# Patient Record
Sex: MALE | Race: ASIAN | NOT HISPANIC OR LATINO | URBAN - METROPOLITAN AREA
[De-identification: names, ages, dates, MRNs, and addresses within clinical notes are randomized per-mention and may not be internally consistent; named-entity substitution may affect disease eponyms.]

---

## 2017-09-03 ENCOUNTER — INPATIENT (INPATIENT)
Facility: HOSPITAL | Age: 77
LOS: 5 days | Discharge: HOME | End: 2017-09-09
Attending: SPECIALIST | Admitting: SPECIALIST
Payer: MEDICAID

## 2017-09-03 VITALS
DIASTOLIC BLOOD PRESSURE: 80 MMHG | TEMPERATURE: 98 F | SYSTOLIC BLOOD PRESSURE: 161 MMHG | OXYGEN SATURATION: 97 % | RESPIRATION RATE: 16 BRPM | HEART RATE: 53 BPM

## 2017-09-03 DIAGNOSIS — R10.9 UNSPECIFIED ABDOMINAL PAIN: ICD-10-CM

## 2017-09-03 DIAGNOSIS — N18.4 CHRONIC KIDNEY DISEASE, STAGE 4 (SEVERE): ICD-10-CM

## 2017-09-03 DIAGNOSIS — E87.5 HYPERKALEMIA: ICD-10-CM

## 2017-09-03 DIAGNOSIS — K80.00 CALCULUS OF GALLBLADDER WITH ACUTE CHOLECYSTITIS WITHOUT OBSTRUCTION: ICD-10-CM

## 2017-09-03 DIAGNOSIS — I10 ESSENTIAL (PRIMARY) HYPERTENSION: ICD-10-CM

## 2017-09-03 LAB
ALBUMIN SERPL ELPH-MCNC: 3.9 G/DL — SIGNIFICANT CHANGE UP (ref 3.3–5)
ALBUMIN SERPL ELPH-MCNC: 4.2 G/DL — SIGNIFICANT CHANGE UP (ref 3.3–5)
ALP SERPL-CCNC: 52 U/L — SIGNIFICANT CHANGE UP (ref 40–120)
ALP SERPL-CCNC: 56 U/L — SIGNIFICANT CHANGE UP (ref 40–120)
ALT FLD-CCNC: 10 U/L — SIGNIFICANT CHANGE UP (ref 4–41)
ALT FLD-CCNC: 14 U/L — SIGNIFICANT CHANGE UP (ref 4–41)
AMYLASE P1 CFR SERPL: 113 U/L — SIGNIFICANT CHANGE UP (ref 25–125)
APTT BLD: 29.2 SEC — SIGNIFICANT CHANGE UP (ref 27.5–37.4)
AST SERPL-CCNC: 19 U/L — SIGNIFICANT CHANGE UP (ref 4–40)
AST SERPL-CCNC: 36 U/L — SIGNIFICANT CHANGE UP (ref 4–40)
BASE EXCESS BLDV CALC-SCNC: -2 MMOL/L — SIGNIFICANT CHANGE UP
BASE EXCESS BLDV CALC-SCNC: -2.4 MMOL/L — SIGNIFICANT CHANGE UP
BASE EXCESS BLDV CALC-SCNC: -2.5 MMOL/L — SIGNIFICANT CHANGE UP
BASOPHILS # BLD AUTO: 0.06 K/UL — SIGNIFICANT CHANGE UP (ref 0–0.2)
BASOPHILS NFR BLD AUTO: 0.6 % — SIGNIFICANT CHANGE UP (ref 0–2)
BILIRUB SERPL-MCNC: 0.4 MG/DL — SIGNIFICANT CHANGE UP (ref 0.2–1.2)
BILIRUB SERPL-MCNC: 0.5 MG/DL — SIGNIFICANT CHANGE UP (ref 0.2–1.2)
BLD GP AB SCN SERPL QL: NEGATIVE — SIGNIFICANT CHANGE UP
BLOOD GAS VENOUS - CREATININE: 2.1 MG/DL — HIGH (ref 0.5–1.3)
BLOOD GAS VENOUS - CREATININE: 2.11 MG/DL — HIGH (ref 0.5–1.3)
BLOOD GAS VENOUS - CREATININE: 2.16 MG/DL — HIGH (ref 0.5–1.3)
BUN SERPL-MCNC: 21 MG/DL — SIGNIFICANT CHANGE UP (ref 7–23)
BUN SERPL-MCNC: 23 MG/DL — SIGNIFICANT CHANGE UP (ref 7–23)
BUN SERPL-MCNC: 23 MG/DL — SIGNIFICANT CHANGE UP (ref 7–23)
CALCIUM SERPL-MCNC: 9.4 MG/DL — SIGNIFICANT CHANGE UP (ref 8.4–10.5)
CALCIUM SERPL-MCNC: 9.5 MG/DL — SIGNIFICANT CHANGE UP (ref 8.4–10.5)
CALCIUM SERPL-MCNC: 9.5 MG/DL — SIGNIFICANT CHANGE UP (ref 8.4–10.5)
CHLORIDE BLDV-SCNC: 95 MMOL/L — LOW (ref 96–108)
CHLORIDE BLDV-SCNC: 98 MMOL/L — SIGNIFICANT CHANGE UP (ref 96–108)
CHLORIDE BLDV-SCNC: 98 MMOL/L — SIGNIFICANT CHANGE UP (ref 96–108)
CHLORIDE SERPL-SCNC: 94 MMOL/L — LOW (ref 98–107)
CHLORIDE SERPL-SCNC: 95 MMOL/L — LOW (ref 98–107)
CHLORIDE SERPL-SCNC: 96 MMOL/L — LOW (ref 98–107)
CK MB BLD-MCNC: 2.13 NG/ML — SIGNIFICANT CHANGE UP (ref 1–6.6)
CK MB BLD-MCNC: 2.62 NG/ML — SIGNIFICANT CHANGE UP (ref 1–6.6)
CK MB BLD-MCNC: SIGNIFICANT CHANGE UP (ref 0–2.5)
CK MB BLD-MCNC: SIGNIFICANT CHANGE UP (ref 0–2.5)
CK SERPL-CCNC: 107 U/L — SIGNIFICANT CHANGE UP (ref 30–200)
CK SERPL-CCNC: 64 U/L — SIGNIFICANT CHANGE UP (ref 30–200)
CO2 SERPL-SCNC: 17 MMOL/L — LOW (ref 22–31)
CO2 SERPL-SCNC: 18 MMOL/L — LOW (ref 22–31)
CO2 SERPL-SCNC: 20 MMOL/L — LOW (ref 22–31)
CREAT SERPL-MCNC: 2.19 MG/DL — HIGH (ref 0.5–1.3)
CREAT SERPL-MCNC: 2.3 MG/DL — HIGH (ref 0.5–1.3)
CREAT SERPL-MCNC: 2.31 MG/DL — HIGH (ref 0.5–1.3)
EOSINOPHIL # BLD AUTO: 0.22 K/UL — SIGNIFICANT CHANGE UP (ref 0–0.5)
EOSINOPHIL NFR BLD AUTO: 2.1 % — SIGNIFICANT CHANGE UP (ref 0–6)
GAS PNL BLDV: 110 MMOL/L — CRITICAL LOW (ref 136–146)
GAS PNL BLDV: 126 MMOL/L — LOW (ref 136–146)
GAS PNL BLDV: 130 MMOL/L — LOW (ref 136–146)
GLUCOSE BLDV-MCNC: 186 — HIGH (ref 70–99)
GLUCOSE BLDV-MCNC: 198 — HIGH (ref 70–99)
GLUCOSE BLDV-MCNC: 204 — HIGH (ref 70–99)
GLUCOSE SERPL-MCNC: 179 MG/DL — HIGH (ref 70–99)
GLUCOSE SERPL-MCNC: 192 MG/DL — HIGH (ref 70–99)
GLUCOSE SERPL-MCNC: 207 MG/DL — HIGH (ref 70–99)
HCO3 BLDV-SCNC: 20 MMOL/L — SIGNIFICANT CHANGE UP (ref 20–27)
HCO3 BLDV-SCNC: 21 MMOL/L — SIGNIFICANT CHANGE UP (ref 20–27)
HCO3 BLDV-SCNC: 21 MMOL/L — SIGNIFICANT CHANGE UP (ref 20–27)
HCT VFR BLD CALC: 45.1 % — SIGNIFICANT CHANGE UP (ref 39–50)
HCT VFR BLDV CALC: 41.4 % — SIGNIFICANT CHANGE UP (ref 39–51)
HCT VFR BLDV CALC: 43 % — SIGNIFICANT CHANGE UP (ref 39–51)
HCT VFR BLDV CALC: 45.3 % — SIGNIFICANT CHANGE UP (ref 39–51)
HGB BLD-MCNC: 14.7 G/DL — SIGNIFICANT CHANGE UP (ref 13–17)
HGB BLDV-MCNC: 13.5 G/DL — SIGNIFICANT CHANGE UP (ref 13–17)
HGB BLDV-MCNC: 14 G/DL — SIGNIFICANT CHANGE UP (ref 13–17)
HGB BLDV-MCNC: 14.8 G/DL — SIGNIFICANT CHANGE UP (ref 13–17)
IMM GRANULOCYTES # BLD AUTO: 0.03 # — SIGNIFICANT CHANGE UP
IMM GRANULOCYTES NFR BLD AUTO: 0.3 % — SIGNIFICANT CHANGE UP (ref 0–1.5)
INR BLD: 0.98 — SIGNIFICANT CHANGE UP (ref 0.88–1.17)
LACTATE BLDV-MCNC: 2.8 MMOL/L — HIGH (ref 0.5–2)
LACTATE BLDV-MCNC: 2.9 MMOL/L — HIGH (ref 0.5–2)
LACTATE BLDV-MCNC: 3.5 MMOL/L — HIGH (ref 0.5–2)
LIDOCAIN IGE QN: 111.2 U/L — HIGH (ref 7–60)
LYMPHOCYTES # BLD AUTO: 1.39 K/UL — SIGNIFICANT CHANGE UP (ref 1–3.3)
LYMPHOCYTES # BLD AUTO: 13.5 % — SIGNIFICANT CHANGE UP (ref 13–44)
MCHC RBC-ENTMCNC: 29.1 PG — SIGNIFICANT CHANGE UP (ref 27–34)
MCHC RBC-ENTMCNC: 32.6 % — SIGNIFICANT CHANGE UP (ref 32–36)
MCV RBC AUTO: 89.3 FL — SIGNIFICANT CHANGE UP (ref 80–100)
MONOCYTES # BLD AUTO: 0.69 K/UL — SIGNIFICANT CHANGE UP (ref 0–0.9)
MONOCYTES NFR BLD AUTO: 6.7 % — SIGNIFICANT CHANGE UP (ref 2–14)
NEUTROPHILS # BLD AUTO: 7.87 K/UL — HIGH (ref 1.8–7.4)
NEUTROPHILS NFR BLD AUTO: 76.8 % — SIGNIFICANT CHANGE UP (ref 43–77)
NRBC # FLD: 0 — SIGNIFICANT CHANGE UP
PCO2 BLDV: 47 MMHG — SIGNIFICANT CHANGE UP (ref 41–51)
PCO2 BLDV: 52 MMHG — HIGH (ref 41–51)
PCO2 BLDV: 59 MMHG — HIGH (ref 41–51)
PH BLDV: 7.24 PH — LOW (ref 7.32–7.43)
PH BLDV: 7.27 PH — LOW (ref 7.32–7.43)
PH BLDV: 7.32 PH — SIGNIFICANT CHANGE UP (ref 7.32–7.43)
PLATELET # BLD AUTO: 186 K/UL — SIGNIFICANT CHANGE UP (ref 150–400)
PMV BLD: 11.1 FL — SIGNIFICANT CHANGE UP (ref 7–13)
PO2 BLDV: 26 MMHG — LOW (ref 35–40)
PO2 BLDV: 27 MMHG — LOW (ref 35–40)
PO2 BLDV: 36 MMHG — SIGNIFICANT CHANGE UP (ref 35–40)
POTASSIUM BLDV-SCNC: 4.8 MMOL/L — HIGH (ref 3.4–4.5)
POTASSIUM BLDV-SCNC: 6.1 MMOL/L — HIGH (ref 3.4–4.5)
POTASSIUM BLDV-SCNC: SIGNIFICANT CHANGE UP MMOL/L (ref 3.4–4.5)
POTASSIUM SERPL-MCNC: 5.3 MMOL/L — SIGNIFICANT CHANGE UP (ref 3.5–5.3)
POTASSIUM SERPL-MCNC: 6 MMOL/L — HIGH (ref 3.5–5.3)
POTASSIUM SERPL-MCNC: SIGNIFICANT CHANGE UP MMOL/L (ref 3.5–5.3)
POTASSIUM SERPL-SCNC: 5.3 MMOL/L — SIGNIFICANT CHANGE UP (ref 3.5–5.3)
POTASSIUM SERPL-SCNC: 6 MMOL/L — HIGH (ref 3.5–5.3)
POTASSIUM SERPL-SCNC: SIGNIFICANT CHANGE UP MMOL/L (ref 3.5–5.3)
PROT SERPL-MCNC: 7.5 G/DL — SIGNIFICANT CHANGE UP (ref 6–8.3)
PROT SERPL-MCNC: 8.3 G/DL — SIGNIFICANT CHANGE UP (ref 6–8.3)
PROTHROM AB SERPL-ACNC: 11 SEC — SIGNIFICANT CHANGE UP (ref 9.8–13.1)
RBC # BLD: 5.05 M/UL — SIGNIFICANT CHANGE UP (ref 4.2–5.8)
RBC # FLD: 12.6 % — SIGNIFICANT CHANGE UP (ref 10.3–14.5)
RH IG SCN BLD-IMP: POSITIVE — SIGNIFICANT CHANGE UP
RH IG SCN BLD-IMP: POSITIVE — SIGNIFICANT CHANGE UP
SAO2 % BLDV: 39.8 % — LOW (ref 60–85)
SAO2 % BLDV: 43.6 % — LOW (ref 60–85)
SAO2 % BLDV: 57.4 % — LOW (ref 60–85)
SODIUM SERPL-SCNC: 127 MMOL/L — LOW (ref 135–145)
SODIUM SERPL-SCNC: 130 MMOL/L — LOW (ref 135–145)
SODIUM SERPL-SCNC: 131 MMOL/L — LOW (ref 135–145)
TROPONIN T SERPL-MCNC: < 0.06 NG/ML — SIGNIFICANT CHANGE UP (ref 0–0.06)
TROPONIN T SERPL-MCNC: < 0.06 NG/ML — SIGNIFICANT CHANGE UP (ref 0–0.06)
TSH SERPL-MCNC: 10.17 UIU/ML — HIGH (ref 0.27–4.2)
WBC # BLD: 10.26 K/UL — SIGNIFICANT CHANGE UP (ref 3.8–10.5)
WBC # FLD AUTO: 10.26 K/UL — SIGNIFICANT CHANGE UP (ref 3.8–10.5)

## 2017-09-03 PROCEDURE — 76705 ECHO EXAM OF ABDOMEN: CPT | Mod: 26

## 2017-09-03 PROCEDURE — 71010: CPT | Mod: 26

## 2017-09-03 RX ORDER — ONDANSETRON 8 MG/1
4 TABLET, FILM COATED ORAL ONCE
Qty: 0 | Refills: 0 | Status: COMPLETED | OUTPATIENT
Start: 2017-09-03 | End: 2017-09-03

## 2017-09-03 RX ORDER — DEXTROSE 50 % IN WATER 50 %
25 SYRINGE (ML) INTRAVENOUS ONCE
Qty: 0 | Refills: 0 | Status: DISCONTINUED | OUTPATIENT
Start: 2017-09-03 | End: 2017-09-04

## 2017-09-03 RX ORDER — DEXTROSE 50 % IN WATER 50 %
12.5 SYRINGE (ML) INTRAVENOUS ONCE
Qty: 0 | Refills: 0 | Status: DISCONTINUED | OUTPATIENT
Start: 2017-09-03 | End: 2017-09-04

## 2017-09-03 RX ORDER — HEPARIN SODIUM 5000 [USP'U]/ML
5000 INJECTION INTRAVENOUS; SUBCUTANEOUS EVERY 8 HOURS
Qty: 0 | Refills: 0 | Status: DISCONTINUED | OUTPATIENT
Start: 2017-09-03 | End: 2017-09-04

## 2017-09-03 RX ORDER — MORPHINE SULFATE 50 MG/1
4 CAPSULE, EXTENDED RELEASE ORAL ONCE
Qty: 0 | Refills: 0 | Status: DISCONTINUED | OUTPATIENT
Start: 2017-09-03 | End: 2017-09-03

## 2017-09-03 RX ORDER — SODIUM CHLORIDE 9 MG/ML
1000 INJECTION, SOLUTION INTRAVENOUS
Qty: 0 | Refills: 0 | Status: DISCONTINUED | OUTPATIENT
Start: 2017-09-03 | End: 2017-09-04

## 2017-09-03 RX ORDER — SODIUM CHLORIDE 9 MG/ML
1000 INJECTION INTRAMUSCULAR; INTRAVENOUS; SUBCUTANEOUS
Qty: 0 | Refills: 0 | Status: DISCONTINUED | OUTPATIENT
Start: 2017-09-03 | End: 2017-09-03

## 2017-09-03 RX ORDER — SODIUM CHLORIDE 9 MG/ML
1000 INJECTION INTRAMUSCULAR; INTRAVENOUS; SUBCUTANEOUS
Qty: 0 | Refills: 0 | Status: DISCONTINUED | OUTPATIENT
Start: 2017-09-03 | End: 2017-09-04

## 2017-09-03 RX ORDER — GLUCAGON INJECTION, SOLUTION 0.5 MG/.1ML
1 INJECTION, SOLUTION SUBCUTANEOUS ONCE
Qty: 0 | Refills: 0 | Status: DISCONTINUED | OUTPATIENT
Start: 2017-09-03 | End: 2017-09-04

## 2017-09-03 RX ORDER — SODIUM CHLORIDE 9 MG/ML
500 INJECTION INTRAMUSCULAR; INTRAVENOUS; SUBCUTANEOUS ONCE
Qty: 0 | Refills: 0 | Status: COMPLETED | OUTPATIENT
Start: 2017-09-03 | End: 2017-09-03

## 2017-09-03 RX ORDER — ATENOLOL 25 MG/1
25 TABLET ORAL DAILY
Qty: 0 | Refills: 0 | Status: DISCONTINUED | OUTPATIENT
Start: 2017-09-03 | End: 2017-09-04

## 2017-09-03 RX ORDER — FUROSEMIDE 40 MG
20 TABLET ORAL ONCE
Qty: 0 | Refills: 0 | Status: DISCONTINUED | OUTPATIENT
Start: 2017-09-03 | End: 2017-09-03

## 2017-09-03 RX ORDER — ATENOLOL 25 MG/1
25 TABLET ORAL ONCE
Qty: 0 | Refills: 0 | Status: DISCONTINUED | OUTPATIENT
Start: 2017-09-03 | End: 2017-09-03

## 2017-09-03 RX ORDER — LEVOTHYROXINE SODIUM 125 MCG
1 TABLET ORAL
Qty: 0 | Refills: 0 | COMMUNITY

## 2017-09-03 RX ORDER — LEVOTHYROXINE SODIUM 125 MCG
75 TABLET ORAL DAILY
Qty: 0 | Refills: 0 | Status: DISCONTINUED | OUTPATIENT
Start: 2017-09-03 | End: 2017-09-04

## 2017-09-03 RX ORDER — DEXTROSE 50 % IN WATER 50 %
1 SYRINGE (ML) INTRAVENOUS ONCE
Qty: 0 | Refills: 0 | Status: DISCONTINUED | OUTPATIENT
Start: 2017-09-03 | End: 2017-09-04

## 2017-09-03 RX ORDER — INSULIN LISPRO 100/ML
VIAL (ML) SUBCUTANEOUS EVERY 6 HOURS
Qty: 0 | Refills: 0 | Status: DISCONTINUED | OUTPATIENT
Start: 2017-09-03 | End: 2017-09-04

## 2017-09-03 RX ADMIN — MORPHINE SULFATE 4 MILLIGRAM(S): 50 CAPSULE, EXTENDED RELEASE ORAL at 16:00

## 2017-09-03 RX ADMIN — ONDANSETRON 4 MILLIGRAM(S): 8 TABLET, FILM COATED ORAL at 10:16

## 2017-09-03 RX ADMIN — SODIUM CHLORIDE 100 MILLILITER(S): 9 INJECTION INTRAMUSCULAR; INTRAVENOUS; SUBCUTANEOUS at 18:32

## 2017-09-03 RX ADMIN — MORPHINE SULFATE 4 MILLIGRAM(S): 50 CAPSULE, EXTENDED RELEASE ORAL at 10:16

## 2017-09-03 RX ADMIN — MORPHINE SULFATE 4 MILLIGRAM(S): 50 CAPSULE, EXTENDED RELEASE ORAL at 11:33

## 2017-09-03 RX ADMIN — ONDANSETRON 4 MILLIGRAM(S): 8 TABLET, FILM COATED ORAL at 15:44

## 2017-09-03 RX ADMIN — SODIUM CHLORIDE 500 MILLILITER(S): 9 INJECTION INTRAMUSCULAR; INTRAVENOUS; SUBCUTANEOUS at 10:48

## 2017-09-03 RX ADMIN — SODIUM CHLORIDE 75 MILLILITER(S): 9 INJECTION INTRAMUSCULAR; INTRAVENOUS; SUBCUTANEOUS at 15:35

## 2017-09-03 RX ADMIN — MORPHINE SULFATE 4 MILLIGRAM(S): 50 CAPSULE, EXTENDED RELEASE ORAL at 15:44

## 2017-09-03 RX ADMIN — HEPARIN SODIUM 5000 UNIT(S): 5000 INJECTION INTRAVENOUS; SUBCUTANEOUS at 22:44

## 2017-09-03 NOTE — ED ADULT NURSE NOTE - CHIEF COMPLAINT QUOTE
C/o continued upper abdominal pain since yesterday. As per daughter, pt c/o left sided chest pain yesterday. Denies chest pain at this moment/melena/hematochezia. PMH 2 cardiac stents, [place], gastric ulcer.

## 2017-09-03 NOTE — ED PROVIDER NOTE - PROGRESS NOTE DETAILS
ALEXANDRA note: 76y/o M with PMH HTN, DM, HLD, GERD , CAD s/p stent x 2 p/w RUQ pain since 4am. Pain RUQ w/ radiation to epigastric area 8/10 sharp , worse with palpation. Denies fever, chills, chest pain, SOB, N/V, diarrhea. Last BM yesterday nml. Tender to palpation RUQ + rand. Took asa 81x 2 and Tylenol x 2 . CBC, cmp, vbg, lipase, ekg, PT/INR,trop, ckmb, cxr, US RUQ likely biliary colic vs cholecystitis POCUS RUQ: some cholecystic sludge, no edema, no wall thickening. With stone in CBD. -Sudha PGY1

## 2017-09-03 NOTE — H&P ADULT - NSHPPHYSICALEXAM_GEN_ALL_CORE
T(C): 36.7 (09-03-17 @ 18:00), Max: 36.7 (09-03-17 @ 18:00)  HR: 54 (09-03-17 @ 18:00) (42 - 54)  BP: 139/82 (09-03-17 @ 18:00) (121/56 - 194/83)  RR: 16 (09-03-17 @ 18:00) (16 - 20)  SpO2: 96% (09-03-17 @ 18:00) (96% - 100%)      General: NAD, Lying in bed comfortably  Neuro: A+Ox3, no focal deficits  HEENT: NC/AT, EOMI  Cardio: RRR, nml S1/S2  Resp: Good effort, CTA b/l  GI/Abd: Soft, mildly distended, tender in RUQ with focal guarding, negative Ugarte's sign  Vascular: All 4 extremities warm  Musculoskeletal: All 4 extremities moving spontaneously, no limitations.

## 2017-09-03 NOTE — H&P ADULT - NSHPLABSRESULTS_GEN_ALL_CORE
CBC (09-03 @ 09:20)                              14.7                           10.26   )----------------(  186        76.8  % Neutrophils, 13.5  % Lymphocytes, ANC: 7.87<H>                              45.1      BMP (09-03 @ 14:25)             131<L>  |  95<L>   |  21    		Ca++ --      Ca 9.4                ---------------------------------( 179<H>		Mg --                 5.3     |  20<L>   |  2.19<H>			Ph --      BMP (09-03 @ 12:10)             127<L>  |  94<L>   |  23    		Ca++ --      Ca 9.5                ---------------------------------( 207<H>		Mg --                 --      |  18<L>   |  2.31<H>			Ph --        LFTs (09-03 @ 14:25)      TPro 7.5 / Alb 3.9 / TBili 0.5 / DBili -- / AST 19 / ALT 10 / AlkPhos 52  LFTs (09-03 @ 09:20)      TPro 8.3 / Alb 4.2 / TBili 0.4 / DBili -- / AST 36 / ALT 14 / AlkPhos 56    Coags (09-03 @ 09:20)  aPTT 29.2 / INR 0.98 / PT 11.0    Cardiac Markers (09-03 @ 14:25)     Trop: < 0.06 -- / CKMB: 2.13 / CK: 64  Cardiac Markers (09-03 @ 09:20)     Trop: < 0.06 -- / CKMB: 2.62 / CK: 107      VBG (09-03 @ 14:25)     7.32 / 47 / 26<L> / 21 / -2.0 / 39.8<L>%     Lactate: 2.8<H>  VBG (09-03 @ 12:10)     7.27<L> / 52<H> / 36 / 21 / -2.5 / 57.4<L>%     Lactate: 3.5<H>    ---------------------------------------------------------------------------------------------     IMAGING:   < from: US Abdomen Limited (09.03.17 @ 14:12) >  Gallbladder: There is a nonmobile stone measuring 0.8 x 0.6 cm within the   neck of the gallbladder.   < end of copied text >

## 2017-09-03 NOTE — ED ADULT NURSE NOTE - OBJECTIVE STATEMENT
pt is a 77 yr old male c/o abd pain RUQ since 3am, radiating to right arm and chest at times. pain 5/10 in RUQ only at this time. pt denies N/V/D. pt states feeling weak and "not well". pt denies dizziness, ha, fever, chills. PIV placed, labs sent, md lopes in progress. us done by md at bedside with gall stones noted. pt placed on cardiac monitor, noted SB 50s. will ctm

## 2017-09-03 NOTE — H&P ADULT - PMH
Diabetes    Essential hypertension    Hyperlipidemia    Peptic ulcer  s/p treatment for H pylori  Stented coronary artery

## 2017-09-03 NOTE — H&P ADULT - ASSESSMENT
ASSESSMENT: Patient is a 77y old m with symptomatic cholelithiasis    PLAN:   - Admit to Dr. Davis, general surgery  - NPO  - IV fluid hydration  - Pain control as needed  - No need for antibiotics  - Added on to OR schedule for today for laparoscopic cholecystectomy with IOC.  - Patient consented for above procedure  - Patient and plan discussed with Attending, Dr. Ryan Bloom MD PGY3  A Team Surgery, #[id number]

## 2017-09-03 NOTE — PATIENT PROFILE ADULT. - LANGUAGE ASSISTANCE NEEDED
patient able to make needs known in english/No-Patient/Caregiver offered and refused free interpretation services.

## 2017-09-03 NOTE — ED PROVIDER NOTE - ATTENDING CONTRIBUTION TO CARE
Case of a 76 y/o male patient with past medical history HTN, DM, s/p 2 stents, HLD, stomach ulcer presenting to the ED with abdominal pain. Patient's daughter states that at the beginning he did complain of right arm that radiated to her abdomen. Physical exam with RUQ tenderness, will do blood test, cardiac workup, ultrasound and pain meds. Will monitor closely. Agree with resident's physical exam, assessment and documentation

## 2017-09-03 NOTE — PATIENT PROFILE ADULT. - VISION (WITH CORRECTIVE LENSES IF THE PATIENT USUALLY WEARS THEM):
Partially impaired: cannot see medication labels or newsprint, but can see obstacles in path, and the surrounding layout; can count fingers at arm's length/wears glasses fro reading

## 2017-09-03 NOTE — H&P ADULT - HISTORY OF PRESENT ILLNESS
Patient is a 77y old  Male who presents with a chief complaint of abdominal pain.  Patient was in his usual state of health yesterday evening, but woke up early this morning around 3:00 am with right sided chest pain.  The pain then moved towards the center of his chest and then down towards his epigastric area, finally localizing to his right upper quadrant.  The location of the pain moved over the course of a few hours this morning.  His right upper quadrant pain persisted through this morning and he came to the ED for further evaluation.  He denies nausea or emesis, although he did not have an appetite this morning.  He denies fevers/chills, denies lightheadedness/dizziness, denies SOB, denies constipation/diarrhea, with his last BM yesterday evening and it was normal.      Patient's past medical history is significant for CAD with 4 stents, on aspirin and Effient.  He last took Effient two days ago as he ran out of his pills and has yet to obtain his refill.  He has known chronic ECG abnormalities but patient and daughter are unsure of the exact abnormalities.  He also has CKD with hyponatremia (usual sodium 130-135) and a baseline creatine of between 2-3, usually around 2.5.  Patient had a previous history of H. pylori - related PUD, and was treated with triple therapy.  He had a recent EGD earlier this year which showed resolution of his PUD.  Patient has a known history of cholelithiasis but had been asymptomatic prior to today.      Patient's daughter is an oncologist who previously worked at [facility], Dr. Arian Browne, who assisted with providing significant collateral information.      Patient speaks moderate level English and refused  services, insisted on discussion directly in English with help of his son as  as needed.      --------------------------------------------------------------------------------------------

## 2017-09-03 NOTE — ED PROVIDER NOTE - OBJECTIVE STATEMENT
78 y/o M PMH HTN, DM, s/p 2 stents, HLD, stomach ulcer p/w abdominal pain. Per daughter, pt woke up at 3am complaining of L chest/shoulder pain. She gave him 2 Tylenol and 2 [id number]. Pain subsided in the chest, but began in the RUQ radiating to the rest of his abdomen later. He denies n/v/d, fevers, chills. Had a bowel movement this morning, no blood. Pain does not radiate to the back. Denies palpitations. No hx of kidney stones, renal colic, gall stones. Patient ate rice and peas for dinner yesterday. No other complaints.

## 2017-09-03 NOTE — ED PROVIDER NOTE - MEDICAL DECISION MAKING DETAILS
77M pmh stents, hld, dm, htn p/w R abdominal pain. POCUS with choledocolithiasis. Will get official sono. Pt is bradycardic with abnml EKG. Will order troponin, ckmb. Order lipase to r/o pancreatitis. Pain control. Reassess.

## 2017-09-03 NOTE — ED ADULT TRIAGE NOTE - CHIEF COMPLAINT QUOTE
C/o continued upper abdominal pain since yesterday. As per daughter, pt c/o left sided chest pain yesterday. Denies chest pain at this moment/melena/hematochezia. PMH 2 cardiac stents, [place], gastric ulcer.

## 2017-09-03 NOTE — H&P ADULT - ATTENDING COMMENTS
Above noted. I spoke with Dr. Browne (daughter). She will get in touch with Dr. Savage (Cardiology) to obtain recent information regarding the patient's cardiac status.  Plan: NPO IV antibiotic in the interim. Discussed with the patient and his son and daughter.

## 2017-09-04 ENCOUNTER — RESULT REVIEW (OUTPATIENT)
Age: 77
End: 2017-09-04

## 2017-09-04 ENCOUNTER — TRANSCRIPTION ENCOUNTER (OUTPATIENT)
Age: 77
End: 2017-09-04

## 2017-09-04 DIAGNOSIS — E83.42 HYPOMAGNESEMIA: ICD-10-CM

## 2017-09-04 LAB
ALBUMIN SERPL ELPH-MCNC: 3.7 G/DL — SIGNIFICANT CHANGE UP (ref 3.3–5)
ALP SERPL-CCNC: 45 U/L — SIGNIFICANT CHANGE UP (ref 40–120)
ALT FLD-CCNC: 9 U/L — SIGNIFICANT CHANGE UP (ref 4–41)
AST SERPL-CCNC: 15 U/L — SIGNIFICANT CHANGE UP (ref 4–40)
BILIRUB DIRECT SERPL-MCNC: 0.2 MG/DL — SIGNIFICANT CHANGE UP (ref 0.1–0.2)
BILIRUB SERPL-MCNC: 0.8 MG/DL — SIGNIFICANT CHANGE UP (ref 0.2–1.2)
BUN SERPL-MCNC: 20 MG/DL — SIGNIFICANT CHANGE UP (ref 7–23)
CALCIUM SERPL-MCNC: 9.1 MG/DL — SIGNIFICANT CHANGE UP (ref 8.4–10.5)
CHLORIDE SERPL-SCNC: 96 MMOL/L — LOW (ref 98–107)
CO2 SERPL-SCNC: 21 MMOL/L — LOW (ref 22–31)
CREAT SERPL-MCNC: 2.1 MG/DL — HIGH (ref 0.5–1.3)
GLUCOSE SERPL-MCNC: 110 MG/DL — HIGH (ref 70–99)
HBA1C BLD-MCNC: 7 % — HIGH (ref 4–5.6)
HCT VFR BLD CALC: 41.7 % — SIGNIFICANT CHANGE UP (ref 39–50)
HGB BLD-MCNC: 13.3 G/DL — SIGNIFICANT CHANGE UP (ref 13–17)
MAGNESIUM SERPL-MCNC: 1.5 MG/DL — LOW (ref 1.6–2.6)
MCHC RBC-ENTMCNC: 27.9 PG — SIGNIFICANT CHANGE UP (ref 27–34)
MCHC RBC-ENTMCNC: 31.9 % — LOW (ref 32–36)
MCV RBC AUTO: 87.6 FL — SIGNIFICANT CHANGE UP (ref 80–100)
NRBC # FLD: 0 — SIGNIFICANT CHANGE UP
PHOSPHATE SERPL-MCNC: 2.8 MG/DL — SIGNIFICANT CHANGE UP (ref 2.5–4.5)
PLATELET # BLD AUTO: 185 K/UL — SIGNIFICANT CHANGE UP (ref 150–400)
PMV BLD: 11.7 FL — SIGNIFICANT CHANGE UP (ref 7–13)
POTASSIUM SERPL-MCNC: 4.5 MMOL/L — SIGNIFICANT CHANGE UP (ref 3.5–5.3)
POTASSIUM SERPL-SCNC: 4.5 MMOL/L — SIGNIFICANT CHANGE UP (ref 3.5–5.3)
PROT SERPL-MCNC: 7.1 G/DL — SIGNIFICANT CHANGE UP (ref 6–8.3)
RBC # BLD: 4.76 M/UL — SIGNIFICANT CHANGE UP (ref 4.2–5.8)
RBC # FLD: 12.5 % — SIGNIFICANT CHANGE UP (ref 10.3–14.5)
SODIUM SERPL-SCNC: 133 MMOL/L — LOW (ref 135–145)
WBC # BLD: 13.96 K/UL — HIGH (ref 3.8–10.5)
WBC # FLD AUTO: 13.96 K/UL — HIGH (ref 3.8–10.5)

## 2017-09-04 PROCEDURE — 74000: CPT | Mod: 26

## 2017-09-04 PROCEDURE — 88304 TISSUE EXAM BY PATHOLOGIST: CPT | Mod: 26

## 2017-09-04 RX ORDER — MAGNESIUM SULFATE 500 MG/ML
1 VIAL (ML) INJECTION ONCE
Qty: 0 | Refills: 0 | Status: COMPLETED | OUTPATIENT
Start: 2017-09-04 | End: 2017-09-04

## 2017-09-04 RX ORDER — HYDROMORPHONE HYDROCHLORIDE 2 MG/ML
0.5 INJECTION INTRAMUSCULAR; INTRAVENOUS; SUBCUTANEOUS ONCE
Qty: 0 | Refills: 0 | Status: DISCONTINUED | OUTPATIENT
Start: 2017-09-04 | End: 2017-09-04

## 2017-09-04 RX ORDER — HEPARIN SODIUM 5000 [USP'U]/ML
5000 INJECTION INTRAVENOUS; SUBCUTANEOUS EVERY 8 HOURS
Qty: 0 | Refills: 0 | Status: DISCONTINUED | OUTPATIENT
Start: 2017-09-04 | End: 2017-09-09

## 2017-09-04 RX ORDER — PIPERACILLIN AND TAZOBACTAM 4; .5 G/20ML; G/20ML
3.38 INJECTION, POWDER, LYOPHILIZED, FOR SOLUTION INTRAVENOUS EVERY 8 HOURS
Qty: 0 | Refills: 0 | Status: DISCONTINUED | OUTPATIENT
Start: 2017-09-04 | End: 2017-09-09

## 2017-09-04 RX ORDER — SODIUM CHLORIDE 9 MG/ML
1000 INJECTION INTRAMUSCULAR; INTRAVENOUS; SUBCUTANEOUS
Qty: 0 | Refills: 0 | Status: DISCONTINUED | OUTPATIENT
Start: 2017-09-04 | End: 2017-09-06

## 2017-09-04 RX ORDER — OXYCODONE HYDROCHLORIDE 5 MG/1
10 TABLET ORAL EVERY 6 HOURS
Qty: 0 | Refills: 0 | Status: DISCONTINUED | OUTPATIENT
Start: 2017-09-04 | End: 2017-09-09

## 2017-09-04 RX ORDER — OXYCODONE HYDROCHLORIDE 5 MG/1
5 TABLET ORAL EVERY 6 HOURS
Qty: 0 | Refills: 0 | Status: DISCONTINUED | OUTPATIENT
Start: 2017-09-04 | End: 2017-09-09

## 2017-09-04 RX ORDER — ACETAMINOPHEN 500 MG
650 TABLET ORAL EVERY 6 HOURS
Qty: 0 | Refills: 0 | Status: DISCONTINUED | OUTPATIENT
Start: 2017-09-04 | End: 2017-09-09

## 2017-09-04 RX ORDER — MAGNESIUM SULFATE 500 MG/ML
1 VIAL (ML) INJECTION ONCE
Qty: 0 | Refills: 0 | Status: DISCONTINUED | OUTPATIENT
Start: 2017-09-04 | End: 2017-09-04

## 2017-09-04 RX ORDER — ACETAMINOPHEN 500 MG
1000 TABLET ORAL ONCE
Qty: 0 | Refills: 0 | Status: COMPLETED | OUTPATIENT
Start: 2017-09-04 | End: 2017-09-04

## 2017-09-04 RX ORDER — FENTANYL CITRATE 50 UG/ML
25 INJECTION INTRAVENOUS
Qty: 0 | Refills: 0 | Status: DISCONTINUED | OUTPATIENT
Start: 2017-09-04 | End: 2017-09-04

## 2017-09-04 RX ORDER — ATENOLOL 25 MG/1
25 TABLET ORAL DAILY
Qty: 0 | Refills: 0 | Status: DISCONTINUED | OUTPATIENT
Start: 2017-09-04 | End: 2017-09-09

## 2017-09-04 RX ORDER — INSULIN LISPRO 100/ML
VIAL (ML) SUBCUTANEOUS EVERY 6 HOURS
Qty: 0 | Refills: 0 | Status: DISCONTINUED | OUTPATIENT
Start: 2017-09-04 | End: 2017-09-05

## 2017-09-04 RX ORDER — LEVOTHYROXINE SODIUM 125 MCG
75 TABLET ORAL DAILY
Qty: 0 | Refills: 0 | Status: DISCONTINUED | OUTPATIENT
Start: 2017-09-04 | End: 2017-09-07

## 2017-09-04 RX ADMIN — Medication 800 MILLIGRAM(S): at 22:50

## 2017-09-04 RX ADMIN — Medication 1000 MILLIGRAM(S): at 23:30

## 2017-09-04 RX ADMIN — Medication 800 MILLIGRAM(S): at 16:37

## 2017-09-04 RX ADMIN — FENTANYL CITRATE 25 MICROGRAM(S): 50 INJECTION INTRAVENOUS at 22:00

## 2017-09-04 RX ADMIN — SODIUM CHLORIDE 100 MILLILITER(S): 9 INJECTION INTRAMUSCULAR; INTRAVENOUS; SUBCUTANEOUS at 21:00

## 2017-09-04 RX ADMIN — SODIUM CHLORIDE 100 MILLILITER(S): 9 INJECTION INTRAMUSCULAR; INTRAVENOUS; SUBCUTANEOUS at 21:20

## 2017-09-04 RX ADMIN — HEPARIN SODIUM 5000 UNIT(S): 5000 INJECTION INTRAVENOUS; SUBCUTANEOUS at 06:15

## 2017-09-04 RX ADMIN — Medication 100 GRAM(S): at 11:05

## 2017-09-04 RX ADMIN — FENTANYL CITRATE 25 MICROGRAM(S): 50 INJECTION INTRAVENOUS at 21:40

## 2017-09-04 RX ADMIN — HYDROMORPHONE HYDROCHLORIDE 0.5 MILLIGRAM(S): 2 INJECTION INTRAMUSCULAR; INTRAVENOUS; SUBCUTANEOUS at 22:50

## 2017-09-04 RX ADMIN — HYDROMORPHONE HYDROCHLORIDE 0.5 MILLIGRAM(S): 2 INJECTION INTRAMUSCULAR; INTRAVENOUS; SUBCUTANEOUS at 22:35

## 2017-09-04 RX ADMIN — FENTANYL CITRATE 25 MICROGRAM(S): 50 INJECTION INTRAVENOUS at 21:25

## 2017-09-04 RX ADMIN — FENTANYL CITRATE 25 MICROGRAM(S): 50 INJECTION INTRAVENOUS at 22:15

## 2017-09-04 RX ADMIN — Medication 75 MICROGRAM(S): at 06:15

## 2017-09-04 RX ADMIN — HEPARIN SODIUM 5000 UNIT(S): 5000 INJECTION INTRAVENOUS; SUBCUTANEOUS at 14:48

## 2017-09-04 RX ADMIN — HEPARIN SODIUM 5000 UNIT(S): 5000 INJECTION INTRAVENOUS; SUBCUTANEOUS at 21:20

## 2017-09-04 RX ADMIN — ATENOLOL 25 MILLIGRAM(S): 25 TABLET ORAL at 06:15

## 2017-09-04 RX ADMIN — SODIUM CHLORIDE 100 MILLILITER(S): 9 INJECTION INTRAMUSCULAR; INTRAVENOUS; SUBCUTANEOUS at 11:58

## 2017-09-04 NOTE — CONSULT NOTE ADULT - SUBJECTIVE AND OBJECTIVE BOX
Patient is a 77y old  Male who presents with a chief complaint of Abdominal pain (03 Sep 2017 18:09)    :    History of Present Illness: 	  Patient is a 77y old  Male who presents with a chief complaint of abdominal pain.  Patient was in his usual state of health yesterday evening, but woke up early this morning around 3:00 am with right sided chest pain.  The pain then moved towards the center of his chest and then down towards his epigastric area, finally localizing to his right upper quadrant.  The location of the pain moved over the course of a few hours this morning.  His right upper quadrant pain persisted through this morning and he came to the ED for further evaluation.  He denies nausea or emesis, although he did not have an appetite this morning.  He denies fevers/chills, denies lightheadedness/dizziness, denies SOB, denies constipation/diarrhea, with his last BM yesterday evening and it was normal.      Patient's past medical history is significant for CAD with 4 stents, on aspirin and Effient. Last PCI was January in [facility] ([facility]) .   He last took Effient two days ago as he ran out of his pills and has yet to obtain his refill.    He also has CKD with hyponatremia (usual sodium 130-135) and a baseline creatine of between 2-3, usually around 2.5.  Patient had a previous history of H. pylori - related PUD, and was treated with triple therapy.  He had a recent EGD earlier this year (January)  which showed resolution of his PUD.  Patient has a known history of cholelithiasis but had been asymptomatic prior to today.    Patient denies shortness of breath.  Able to exercise/walk miles without symptoms.    Patient's daughter is an oncologist who previously worked at [facility], Dr. Arian Browne, who assisted with providing significant collateral information.     MEDICATIONS  (STANDING):  heparin  Injectable 5000 Unit(s) SubCutaneous every 8 hours  sodium chloride 0.9%. 1000 milliLiter(s) (100 mL/Hr) IV Continuous <Continuous>  ATENolol  Tablet 25 milliGRAM(s) Oral daily  levothyroxine 75 MICROGram(s) Oral daily  insulin lispro (HumaLOG) corrective regimen sliding scale   SubCutaneous every 6 hours  magnesium sulfate  IVPB 1 Gram(s) IV Intermittent once  (Effient and aspirin on hold).        ROS- hypothroid            Allergies    No Known Allergies    Intolerances            PHYSICAL EXAM:  Vital Signs Last 24 Hrs  T(C): 37.6 (04 Sep 2017 06:11), Max: 37.6 (04 Sep 2017 06:11)  T(F): 99.7 (04 Sep 2017 06:11), Max: 99.7 (04 Sep 2017 06:11)  HR: 72 (04 Sep 2017 10:52) (52 - 80)  BP: 119/62 (04 Sep 2017 10:52) (114/65 - 151/83)  BP(mean): --  RR: 18 (04 Sep 2017 10:52) (16 - 18)  SpO2: 94% (04 Sep 2017 10:52) (94% - 100%)    GENERAL: NAD, well-groomed, well-developed  HEAD:  Atraumatic, Normocephalic  EYES: EOMI, PERRLA, conjunctiva and sclera clear  NECK: Supple, No JVD, Normal thyroid  NERVOUS SYSTEM:  Alert & Oriented X3, Good concentration;  and symmetric  CHEST/LUNG: Clear to auscultation bilaterally; No rales, rhonchi, wheezing, or rubs  HEART: S1S2 regular, without murmur, rub nor gallop  ABDOMEN: Soft, moderately tender RUQ Bowel sounds present  EXTREMITIES:  2+ Peripheral Pulses, No clubbing, cyanosis, or edema      LABS:                        13.3   13.96 )-----------( 185      ( 04 Sep 2017 06:08 )             41.7     04 Sep 2017 06:08    133    |  96     |  20     ----------------------------<  110    4.5     |  21     |  2.10     Ca    9.1        04 Sep 2017 06:08  Phos  2.8       04 Sep 2017 06:08  Mg     1.5       04 Sep 2017 06:08    TPro  7.1    /  Alb  3.7    /  TBili  0.8    /  DBili  0.2    /  AST  15     /  ALT  9      /  AlkPhos  45     04 Sep 2017 06:08    PT/INR - ( 03 Sep 2017 09:20 )   PT: 11.0 SEC;   INR: 0.98     HbA1C 7  TSH 10           PTT - ( 03 Sep 2017 09:20 )  PTT:29.2 SEC  CAPILLARY BLOOD GLUCOSE  107 (04 Sep 2017 12:25)  94 (04 Sep 2017 09:11)  109 (04 Sep 2017 06:08)  87 (03 Sep 2017 23:21)      ECG SB, T inverted lead II, mark-laterally    < from: US Abdomen Limited (09.03.17 @ 14:12) >  Nonmobile gallstone within the neck of the gallbladder. Recommend nuclear   medicine hepatobiliary scan for further evaluation if there is concern   for acute cholecystitis.    < end of copied text >      assessment:  78 yo male with significant history of CAD, presents with cholycystitis      Plan:   Pt's ECG essentially unchanged from that was described in report from [facility], shown to me by his daughter, Dr. Browne.  Would therefore allow patient to go to OR today, check post op ECG, restart dual antiplatelet meds postoperative.     Care Discussed with Consultants/Other Providers: Dr. Browne

## 2017-09-04 NOTE — PROGRESS NOTE ADULT - SUBJECTIVE AND OBJECTIVE BOX
Surgery A Team Progress Note    HD# 2: with cholelithiasis    SUBJECTIVE:   patient was seen and examined this morning. There was no acute event overnight. He was resting in bed comfortably. He does not report pain, fever, n/v, chest pain, or shortness of breath.     OBJECTIVE:   T(C): 37.6 (09-04-17 @ 06:11), Max: 37.6 (09-04-17 @ 06:11)  HR: 72 (09-04-17 @ 10:52) (52 - 80)  BP: 119/62 (09-04-17 @ 10:52) (114/65 - 151/83)  RR: 18 (09-04-17 @ 10:52) (16 - 18)  SpO2: 94% (09-04-17 @ 10:52) (94% - 100%)  Wt(kg): --  CAPILLARY BLOOD GLUCOSE  94 (04 Sep 2017 09:11)  109 (04 Sep 2017 06:08)  87 (03 Sep 2017 23:21)        I&O's Detail    03 Sep 2017 07:01  -  04 Sep 2017 07:00  --------------------------------------------------------  IN:    Oral Fluid: 50 mL    sodium chloride 0.9%.: 1300 mL  Total IN: 1350 mL    OUT:    Voided: 800 mL  Total OUT: 800 mL    Total NET: 550 mL          PHYSICAL EXAM:  Gen: Well-nourished, well-developed, A&O x3, resting in bed in no acute distress  CV: RRR  Resp: Patent airways, unlabored   Abdomen: Soft, mild RUQ pain/tenderness, nondistended  Extremities: All 4 extremities warm and well perfused, no edema

## 2017-09-04 NOTE — PROGRESS NOTE ADULT - SUBJECTIVE AND OBJECTIVE BOX
Patient seen and examined at bedside. No chest pain/sob    VITALS:  T(F): 99.7 (09-04-17 @ 06:11), Max: 99.7 (09-04-17 @ 06:11)  HR: 72 (09-04-17 @ 10:52)  BP: 119/62 (09-04-17 @ 10:52)  RR: 18 (09-04-17 @ 10:52)  SpO2: 94% (09-04-17 @ 10:52)  Wt(kg): --    09-03 @ 07:01  -  09-04 @ 07:00  --------------------------------------------------------  IN: 1350 mL / OUT: 800 mL / NET: 550 mL      Height (cm): 157.48 (09-03 @ 18:00)  Weight (kg): 65.8 (09-03 @ 18:00)  BMI (kg/m2): 26.5 (09-03 @ 18:00)  BSA (m2): 1.67 (09-03 @ 18:00)    PHYSICAL EXAM:  Constitutional: NAD  Neck: No JVD  Respiratory: CTAB, no wheezes, rales or rhonchi  Cardiovascular: S1, S2, RRR  Gastrointestinal: BS+, soft, NT/ND  Extremities: No peripheral edema    Hospital Medications:   MEDICATIONS  (STANDING):  heparin  Injectable 5000 Unit(s) SubCutaneous every 8 hours  sodium chloride 0.9%. 1000 milliLiter(s) (100 mL/Hr) IV Continuous <Continuous>  ATENolol  Tablet 25 milliGRAM(s) Oral daily  levothyroxine 75 MICROGram(s) Oral daily  insulin lispro (HumaLOG) corrective regimen sliding scale   SubCutaneous every 6 hours  magnesium sulfate  IVPB 1 Gram(s) IV Intermittent once      LABS:  09-04    133<L>  |  96<L>  |  20  ----------------------------<  110<H>  4.5   |  21<L>  |  2.10<H>    Ca    9.1      04 Sep 2017 06:08  Phos  2.8     09-04  Mg     1.5     09-04    TPro  7.1  /  Alb  3.7  /  TBili  0.8  /  DBili  0.2  /  AST  15  /  ALT  9   /  AlkPhos  45  09-04    Creatinine Trend: 2.10 <--, 2.19 <--, 2.31 <--, 2.30 <--  Albumin, Serum: 3.7 g/dL (09-04 @ 06:08)  Phosphorus Level, Serum: 2.8 mg/dL (09-04 @ 06:08)  Albumin, Serum: 3.9 g/dL (09-03 @ 14:25)                              13.3   13.96 )-----------( 185      ( 04 Sep 2017 06:08 )             41.7     Urine Studies:          HbA1c 7.0      [09-04-17 @ 06:08]  TSH 10.17      [09-03-17 @ 12:10]        RADIOLOGY & ADDITIONAL STUDIES:

## 2017-09-04 NOTE — PROGRESS NOTE ADULT - ASSESSMENT
Patient is a 77y old m HD2 with symptomatic cholelithiasis, Lab is significant fr WBC:13.9, NA of 133, and magnesium of 1.5    PLAN:   - NPO  - IV fluid hydration  - Pain control as needed  - No need for antibiotics  - Added on to OR schedule for today for laparoscopic cholecystectomy with IOC.  - f/u with cardiology (Dr. Peters) recommendation  - per nephorology, BP is optimal, for hyperkalemia kayexalate PRN, monitor electrolytes  - Patient and plan discussed with Attending, Dr. Davis Patient is a 77y old m HD2 with symptomatic cholelithiasis, Lab is significant fr WBC:13.9, NA of 133, and magnesium of 1.5    PLAN:   - NPO  - IV fluid hydration  - Pain control as needed  - No need for antibiotics  - Added on to OR schedule for today for laparoscopic cholecystectomy with IOC.  - f/u with cardiology (Dr. Peters) recommendation  - per nephorology, BP is optimal, for hyperkalemia kayexalate PRN, monitor electrolytes

## 2017-09-05 LAB
BUN SERPL-MCNC: 23 MG/DL — SIGNIFICANT CHANGE UP (ref 7–23)
CALCIUM SERPL-MCNC: 8.3 MG/DL — LOW (ref 8.4–10.5)
CHLORIDE SERPL-SCNC: 98 MMOL/L — SIGNIFICANT CHANGE UP (ref 98–107)
CO2 SERPL-SCNC: 16 MMOL/L — LOW (ref 22–31)
CREAT SERPL-MCNC: 2.24 MG/DL — HIGH (ref 0.5–1.3)
GLUCOSE SERPL-MCNC: 185 MG/DL — HIGH (ref 70–99)
HCT VFR BLD CALC: 37.6 % — LOW (ref 39–50)
HGB BLD-MCNC: 12.4 G/DL — LOW (ref 13–17)
MAGNESIUM SERPL-MCNC: 1.9 MG/DL — SIGNIFICANT CHANGE UP (ref 1.6–2.6)
MCHC RBC-ENTMCNC: 29.5 PG — SIGNIFICANT CHANGE UP (ref 27–34)
MCHC RBC-ENTMCNC: 33 % — SIGNIFICANT CHANGE UP (ref 32–36)
MCV RBC AUTO: 89.3 FL — SIGNIFICANT CHANGE UP (ref 80–100)
NRBC # FLD: 0 — SIGNIFICANT CHANGE UP
PHOSPHATE SERPL-MCNC: 3.1 MG/DL — SIGNIFICANT CHANGE UP (ref 2.5–4.5)
PLATELET # BLD AUTO: 156 K/UL — SIGNIFICANT CHANGE UP (ref 150–400)
PMV BLD: 11.8 FL — SIGNIFICANT CHANGE UP (ref 7–13)
POTASSIUM SERPL-MCNC: 4.3 MMOL/L — SIGNIFICANT CHANGE UP (ref 3.5–5.3)
POTASSIUM SERPL-SCNC: 4.3 MMOL/L — SIGNIFICANT CHANGE UP (ref 3.5–5.3)
RBC # BLD: 4.21 M/UL — SIGNIFICANT CHANGE UP (ref 4.2–5.8)
RBC # FLD: 12.8 % — SIGNIFICANT CHANGE UP (ref 10.3–14.5)
SODIUM SERPL-SCNC: 134 MMOL/L — LOW (ref 135–145)
WBC # BLD: 15.82 K/UL — HIGH (ref 3.8–10.5)
WBC # FLD AUTO: 15.82 K/UL — HIGH (ref 3.8–10.5)

## 2017-09-05 RX ORDER — PANTOPRAZOLE SODIUM 20 MG/1
40 TABLET, DELAYED RELEASE ORAL DAILY
Qty: 0 | Refills: 0 | Status: DISCONTINUED | OUTPATIENT
Start: 2017-09-05 | End: 2017-09-05

## 2017-09-05 RX ORDER — INSULIN LISPRO 100/ML
VIAL (ML) SUBCUTANEOUS
Qty: 0 | Refills: 0 | Status: DISCONTINUED | OUTPATIENT
Start: 2017-09-05 | End: 2017-09-09

## 2017-09-05 RX ORDER — PANTOPRAZOLE SODIUM 20 MG/1
40 TABLET, DELAYED RELEASE ORAL
Qty: 0 | Refills: 0 | Status: DISCONTINUED | OUTPATIENT
Start: 2017-09-05 | End: 2017-09-09

## 2017-09-05 RX ADMIN — OXYCODONE HYDROCHLORIDE 10 MILLIGRAM(S): 5 TABLET ORAL at 10:45

## 2017-09-05 RX ADMIN — Medication 75 MICROGRAM(S): at 05:53

## 2017-09-05 RX ADMIN — PIPERACILLIN AND TAZOBACTAM 25 GRAM(S): 4; .5 INJECTION, POWDER, LYOPHILIZED, FOR SOLUTION INTRAVENOUS at 12:50

## 2017-09-05 RX ADMIN — Medication 650 MILLIGRAM(S): at 05:53

## 2017-09-05 RX ADMIN — Medication 1: at 05:53

## 2017-09-05 RX ADMIN — OXYCODONE HYDROCHLORIDE 10 MILLIGRAM(S): 5 TABLET ORAL at 11:15

## 2017-09-05 RX ADMIN — Medication: at 23:08

## 2017-09-05 RX ADMIN — PIPERACILLIN AND TAZOBACTAM 25 GRAM(S): 4; .5 INJECTION, POWDER, LYOPHILIZED, FOR SOLUTION INTRAVENOUS at 23:03

## 2017-09-05 RX ADMIN — PIPERACILLIN AND TAZOBACTAM 25 GRAM(S): 4; .5 INJECTION, POWDER, LYOPHILIZED, FOR SOLUTION INTRAVENOUS at 02:48

## 2017-09-05 RX ADMIN — PANTOPRAZOLE SODIUM 40 MILLIGRAM(S): 20 TABLET, DELAYED RELEASE ORAL at 12:49

## 2017-09-05 RX ADMIN — Medication 650 MILLIGRAM(S): at 23:04

## 2017-09-05 RX ADMIN — Medication 650 MILLIGRAM(S): at 18:18

## 2017-09-05 RX ADMIN — Medication 1: at 12:50

## 2017-09-05 RX ADMIN — ATENOLOL 25 MILLIGRAM(S): 25 TABLET ORAL at 05:53

## 2017-09-05 RX ADMIN — Medication 650 MILLIGRAM(S): at 06:23

## 2017-09-05 RX ADMIN — HEPARIN SODIUM 5000 UNIT(S): 5000 INJECTION INTRAVENOUS; SUBCUTANEOUS at 13:58

## 2017-09-05 RX ADMIN — Medication 650 MILLIGRAM(S): at 13:35

## 2017-09-05 RX ADMIN — SODIUM CHLORIDE 75 MILLILITER(S): 9 INJECTION INTRAMUSCULAR; INTRAVENOUS; SUBCUTANEOUS at 15:51

## 2017-09-05 RX ADMIN — HEPARIN SODIUM 5000 UNIT(S): 5000 INJECTION INTRAVENOUS; SUBCUTANEOUS at 23:04

## 2017-09-05 RX ADMIN — Medication 650 MILLIGRAM(S): at 12:49

## 2017-09-05 RX ADMIN — Medication 650 MILLIGRAM(S): at 17:48

## 2017-09-05 RX ADMIN — HEPARIN SODIUM 5000 UNIT(S): 5000 INJECTION INTRAVENOUS; SUBCUTANEOUS at 05:53

## 2017-09-05 NOTE — PROGRESS NOTE ADULT - SUBJECTIVE AND OBJECTIVE BOX
Dr. Radford  Office [phone]  Cell [phone]  Beckie ZHANG  Cell [phone]      Patient is a 77y old  Male who presents with a chief complaint of Abdominal pain (03 Sep 2017 18:09)      Patient seen and examined at bedside. No chest pain/sob    VITALS:  T(F): 99.2 (09-05-17 @ 22:21), Max: 99.2 (09-05-17 @ 22:21)  HR: 74 (09-05-17 @ 22:21)  BP: 145/77 (09-05-17 @ 22:21)  RR: 16 (09-05-17 @ 22:21)  SpO2: 96% (09-05-17 @ 22:21)  Wt(kg): --    09-04 @ 07:01  -  09-05 @ 07:00  --------------------------------------------------------  IN: 1200 mL / OUT: 650 mL / NET: 550 mL    09-05 @ 07:01  -  09-05 @ 22:35  --------------------------------------------------------  IN: 314 mL / OUT: 700 mL / NET: -386 mL          PHYSICAL EXAM:  Constitutional: NAD  Neck: No JVD  Respiratory: CTAB, no wheezes, rales or rhonchi  Cardiovascular: S1, S2, RRR  Gastrointestinal: BS+, soft, has right UQ and epigastric tenderness  Extremities: No peripheral edema    Hospital Medications:   MEDICATIONS  (STANDING):  heparin  Injectable 5000 Unit(s) SubCutaneous every 8 hours  sodium chloride 0.9%. 1000 milliLiter(s) (75 mL/Hr) IV Continuous <Continuous>  ATENolol  Tablet 25 milliGRAM(s) Oral daily  levothyroxine 75 MICROGram(s) Oral daily  insulin lispro (HumaLOG) corrective regimen sliding scale   SubCutaneous every 6 hours  piperacillin/tazobactam IVPB. 3.375 Gram(s) IV Intermittent every 8 hours  acetaminophen   Tablet. 650 milliGRAM(s) Oral every 6 hours  pantoprazole    Tablet 40 milliGRAM(s) Oral before breakfast      LABS:  09-05    134<L>  |  98  |  23  ----------------------------<  185<H>  4.3   |  16<L>  |  2.24<H>    Ca    8.3<L>      05 Sep 2017 05:41  Phos  3.1     09-05  Mg     1.9     09-05    TPro  7.1  /  Alb  3.7  /  TBili  0.8  /  DBili  0.2  /  AST  15  /  ALT  9   /  AlkPhos  45  09-04    Creatinine Trend: 2.24 <--, 2.10 <--, 2.19 <--, 2.31 <--, 2.30 <--    Phosphorus Level, Serum: 3.1 mg/dL (09-05 @ 05:41)                              12.4   15.82 )-----------( 156      ( 05 Sep 2017 05:41 )             37.6     Urine Studies:      HbA1c 7.0      [09-04-17 @ 06:08]  TSH 10.17      [09-03-17 @ 12:10]        RADIOLOGY & ADDITIONAL STUDIES:

## 2017-09-05 NOTE — PROGRESS NOTE ADULT - SUBJECTIVE AND OBJECTIVE BOX
A Team Progress Note    S: No acute events overnight. Patient resting comfortably in bed. Pain well controlled. Denies fevers/chills and N/V. Denies flatus or bowel movement.    O:  T(C): 36.5 (09-05-17 @ 05:43), Max: 38.1 (09-04-17 @ 15:32)  HR: 63 (09-05-17 @ 05:43) (56 - 86)  BP: 145/88 (09-05-17 @ 05:43) (108/63 - 147/74)  RR: 16 (09-05-17 @ 05:43) (15 - 21)  SpO2: 97% (09-05-17 @ 05:43) (94% - 100%)  Wt(kg): --    09-04 @ 07:01  -  09-05 @ 07:00  --------------------------------------------------------  IN:    IV PiggyBack: 100 mL    sodium chloride 0.9%.: 1100 mL  Total IN: 1200 mL    OUT:    Voided: 650 mL  Total OUT: 650 mL    Total NET: 550 mL        CBC Full  -  ( 05 Sep 2017 05:41 )  WBC Count : 15.82 K/uL  Hemoglobin : 12.4 g/dL  Hematocrit : 37.6 %  Platelet Count - Automated : 156 K/uL  Mean Cell Volume : 89.3 fL  Mean Cell Hemoglobin : 29.5 pg  Mean Cell Hemoglobin Concentration : 33.0 %      CAPILLARY BLOOD GLUCOSE  192 (05 Sep 2017 05:43)  128 (04 Sep 2017 23:30)  98 (04 Sep 2017 21:00)  92 (04 Sep 2017 17:05)  107 (04 Sep 2017 12:25)          PHYSICAL EXAM:  Gen: A&Ox3, laying in bed in NAD  Abd: Soft, NT, ND  Incision: Dressings in place c/d/i

## 2017-09-05 NOTE — CHART NOTE - NSCHARTNOTEFT_GEN_A_CORE
POST-OPERATION NOTE    STATUS POST:  Cholecystectomy with cholangiography    SUBJECTIVE:   patient was seen and examined last night at 11:30. There was no acute event post operation. He had cholecystectomy with cholangiogram which indicated a normal anatomy. however due to degree of inflammation, he is receiving Zosyn post-op. He does not have flatus. He does not report pain (except incision site), fever, n/v, chest pain, or shortness of breath.     Vital Signs Last 24 Hrs  T(C): 36.5 (05 Sep 2017 05:43), Max: 38.1 (04 Sep 2017 15:32)  T(F): 97.7 (05 Sep 2017 05:43), Max: 100.5 (04 Sep 2017 15:32)  HR: 63 (05 Sep 2017 05:43) (56 - 86)  BP: 145/88 (05 Sep 2017 05:43) (108/63 - 147/74)  BP(mean): --  RR: 16 (05 Sep 2017 05:43) (15 - 21)  SpO2: 97% (05 Sep 2017 05:43) (94% - 100%)  I&O's Summary    04 Sep 2017 07:01  -  05 Sep 2017 07:00  --------------------------------------------------------  IN: 1200 mL / OUT: 650 mL / NET: 550 mL      I&O's Detail    04 Sep 2017 07:01  -  05 Sep 2017 07:00  --------------------------------------------------------  IN:    IV PiggyBack: 100 mL    sodium chloride 0.9%.: 1100 mL  Total IN: 1200 mL    OUT:    Voided: 650 mL  Total OUT: 650 mL    Total NET: 550 mL          MEDICATIONS  (STANDING):  heparin  Injectable 5000 Unit(s) SubCutaneous every 8 hours  sodium chloride 0.9%. 1000 milliLiter(s) (100 mL/Hr) IV Continuous <Continuous>  ATENolol  Tablet 25 milliGRAM(s) Oral daily  levothyroxine 75 MICROGram(s) Oral daily  insulin lispro (HumaLOG) corrective regimen sliding scale   SubCutaneous every 6 hours  piperacillin/tazobactam IVPB. 3.375 Gram(s) IV Intermittent every 8 hours  acetaminophen   Tablet. 650 milliGRAM(s) Oral every 6 hours    MEDICATIONS  (PRN):  oxyCODONE    IR 5 milliGRAM(s) Oral every 6 hours PRN Moderate Pain (4 - 6)  oxyCODONE    IR 10 milliGRAM(s) Oral every 6 hours PRN Severe Pain (7 - 10)      LABS:                        12.4   15.82 )-----------( 156      ( 05 Sep 2017 05:41 )             37.6     09-05    134<L>  |  98  |  23  ----------------------------<  185<H>  4.3   |  16<L>  |  2.24<H>    Ca    8.3<L>      05 Sep 2017 05:41  Phos  3.1     09-05  Mg     1.9     09-05    TPro  7.1  /  Alb  3.7  /  TBili  0.8  /  DBili  0.2  /  AST  15  /  ALT  9   /  AlkPhos  45  09-04    PT/INR - ( 03 Sep 2017 09:20 )   PT: 11.0 SEC;   INR: 0.98          PTT - ( 03 Sep 2017 09:20 )  PTT:29.2 SEC        PHYSICAL EXAM:  Gen: Well-nourished, well-developed, A&O x3, resting in bed in no acute distress  CV: RRR  Resp: Patent airways, unlabored   Abdomen: Soft, mild tenderness at incision sites. distended  incision:c/d/i  Extremities: All 4 extremities warm and well perfused, no edema       Patient is a 77y old M with symptomatic cholelithiasis s/p Cholecystectomy with cholangiography    - Pain control: Tylenol, Oxycodone  - Diet: NPO  - Activity: as tolerated  - ABx: Zosyn  - DVT ppx:SQH  - f/u with cardiology (Dr. Peters) recommendation  - per nephorology, BP is optimal, for hyperkalemia kayexalate PRN, monitor electrolytes  :

## 2017-09-05 NOTE — PROGRESS NOTE ADULT - SUBJECTIVE AND OBJECTIVE BOX
Patient is a 77y old  Male who presents with a chief complaint of Abdominal pain (03 Sep 2017 18:09)    had cholycystectomy.  Now with diffuse abdominal tenderness  INTERVAL HPI/OVERNIGHT EVENTS:    MEDICATIONS  (STANDING):  heparin  Injectable 5000 Unit(s) SubCutaneous every 8 hours  sodium chloride 0.9%. 1000 milliLiter(s) (100 mL/Hr) IV Continuous <Continuous>  ATENolol  Tablet 25 milliGRAM(s) Oral daily  levothyroxine 75 MICROGram(s) Oral daily  insulin lispro (HumaLOG) corrective regimen sliding scale   SubCutaneous every 6 hours  piperacillin/tazobactam IVPB. 3.375 Gram(s) IV Intermittent every 8 hours  acetaminophen   Tablet. 650 milliGRAM(s) Oral every 6 hours    MEDICATIONS  (PRN):  oxyCODONE    IR 5 milliGRAM(s) Oral every 6 hours PRN Moderate Pain (4 - 6)  oxyCODONE    IR 10 milliGRAM(s) Oral every 6 hours PRN Severe Pain (7 - 10)        Allergies    No Known Allergies    Intolerances        REVIEW OF SYSTEMS:  CARDIOVASCULAR: No chest pain, palpitations, dizziness, or leg swelling; no shortness of breath     RESPIRATORY: No cough, wheezing, chills or hemoptysis; No shortness of breath    GASTROINTESTINAL: See above    NEUROLOGICAL: No headaches, memory loss, loss of strength, numbness      PHYSICAL EXAM:  Vital Signs Last 24 Hrs  T(C): 36.5 (05 Sep 2017 05:43), Max: 38.1 (04 Sep 2017 15:32)  T(F): 97.7 (05 Sep 2017 05:43), Max: 100.5 (04 Sep 2017 15:32)  HR: 63 (05 Sep 2017 05:43) (56 - 86)  BP: 145/88 (05 Sep 2017 05:43) (108/63 - 147/74)  BP(mean): --  RR: 16 (05 Sep 2017 05:43) (15 - 21)  SpO2: 97% (05 Sep 2017 05:43) (94% - 100%)    GENERAL: NAD, well-groomed, well-developed  HEAD:  Atraumatic, Normocephalic  EYES: EOMI, PERRLA, conjunctiva and sclera clear  NECK: Supple, No JVD, Normal thyroid  NERVOUS SYSTEM:  Alert & Oriented X3, Good concentration;  and symmetric  CHEST/LUNG: Clear to auscultation bilaterally; No rales, rhonchi, wheezing, or rubs  HEART: S1S2 regular, without murmur, rub nor gallop  ABDOMEN: Soft, mildly diffuse tender, without rebound or guarding  EXTREMITIES:  2+ Peripheral Pulses, No clubbing, cyanosis, or edema      LABS:                        12.4   15.82 )-----------( 156      ( 05 Sep 2017 05:41 )             37.6     05 Sep 2017 05:41    134    |  98     |  23     ----------------------------<  185    4.3     |  16     |  2.24     Ca    8.3        05 Sep 2017 05:41  Phos  3.1       05 Sep 2017 05:41  Mg     1.9       05 Sep 2017 05:41        CAPILLARY BLOOD GLUCOSE  192 (05 Sep 2017 05:43)  128 (04 Sep 2017 23:30)  98 (04 Sep 2017 21:00)  92 (04 Sep 2017 17:05)  107 (04 Sep 2017 12:25)          assessment:  POD 1-    Plan:   pt on Zosyn.  Follow heart rate.  Recheck TFT's   Follow abdominal exam, white blood cell count:

## 2017-09-05 NOTE — PROGRESS NOTE ADULT - SUBJECTIVE AND OBJECTIVE BOX
ANESTHESIA POSTOP CHECK    77y Male POSTOP DAY 1 S/P lap nedra    Vital Signs Last 24 Hrs  T(C): 36.5 (05 Sep 2017 05:43), Max: 38.1 (04 Sep 2017 15:32)  T(F): 97.7 (05 Sep 2017 05:43), Max: 100.5 (04 Sep 2017 15:32)  HR: 63 (05 Sep 2017 05:43) (56 - 86)  BP: 145/88 (05 Sep 2017 05:43) (108/63 - 147/74)  BP(mean): --  RR: 16 (05 Sep 2017 05:43) (15 - 21)  SpO2: 97% (05 Sep 2017 05:43) (94% - 100%)  I&O's Summary    04 Sep 2017 07:01  -  05 Sep 2017 07:00  --------------------------------------------------------  IN: 1200 mL / OUT: 650 mL / NET: 550 mL        [x ] NO APPARENT ANESTHESIA COMPLICATIONS      Comments:

## 2017-09-05 NOTE — PROGRESS NOTE ADULT - ASSESSMENT
A/P: 77M now s/p lap cholecystectomy w/ IOC for symptomatic cholecystitis. Patient noted to have a significant amount of inflammation and was therefore started on zosyn.    - Cont Zosyn  - Pain control  - Diet: Adv to CLD  - Encourage IS/OOB as tolerated  - DVT ppx:[facility]    Sid Burton M.D.

## 2017-09-06 DIAGNOSIS — E83.39 OTHER DISORDERS OF PHOSPHORUS METABOLISM: ICD-10-CM

## 2017-09-06 DIAGNOSIS — E87.2 ACIDOSIS: ICD-10-CM

## 2017-09-06 LAB
BUN SERPL-MCNC: 30 MG/DL — HIGH (ref 7–23)
CALCIUM SERPL-MCNC: 8.6 MG/DL — SIGNIFICANT CHANGE UP (ref 8.4–10.5)
CHLORIDE SERPL-SCNC: 103 MMOL/L — SIGNIFICANT CHANGE UP (ref 98–107)
CO2 SERPL-SCNC: 20 MMOL/L — LOW (ref 22–31)
CREAT SERPL-MCNC: 2.18 MG/DL — HIGH (ref 0.5–1.3)
GLUCOSE SERPL-MCNC: 108 MG/DL — HIGH (ref 70–99)
HCT VFR BLD CALC: 36.8 % — LOW (ref 39–50)
HGB BLD-MCNC: 11.7 G/DL — LOW (ref 13–17)
MAGNESIUM SERPL-MCNC: 2 MG/DL — SIGNIFICANT CHANGE UP (ref 1.6–2.6)
MCHC RBC-ENTMCNC: 28.1 PG — SIGNIFICANT CHANGE UP (ref 27–34)
MCHC RBC-ENTMCNC: 31.8 % — LOW (ref 32–36)
MCV RBC AUTO: 88.5 FL — SIGNIFICANT CHANGE UP (ref 80–100)
NRBC # FLD: 0 — SIGNIFICANT CHANGE UP
PHOSPHATE SERPL-MCNC: 1.8 MG/DL — LOW (ref 2.5–4.5)
PLATELET # BLD AUTO: 169 K/UL — SIGNIFICANT CHANGE UP (ref 150–400)
PMV BLD: 12.1 FL — SIGNIFICANT CHANGE UP (ref 7–13)
POTASSIUM SERPL-MCNC: 4.1 MMOL/L — SIGNIFICANT CHANGE UP (ref 3.5–5.3)
POTASSIUM SERPL-SCNC: 4.1 MMOL/L — SIGNIFICANT CHANGE UP (ref 3.5–5.3)
RBC # BLD: 4.16 M/UL — LOW (ref 4.2–5.8)
RBC # FLD: 13 % — SIGNIFICANT CHANGE UP (ref 10.3–14.5)
SODIUM SERPL-SCNC: 136 MMOL/L — SIGNIFICANT CHANGE UP (ref 135–145)
SURGICAL PATHOLOGY STUDY: SIGNIFICANT CHANGE UP
WBC # BLD: 12.87 K/UL — HIGH (ref 3.8–10.5)
WBC # FLD AUTO: 12.87 K/UL — HIGH (ref 3.8–10.5)

## 2017-09-06 RX ORDER — SODIUM CHLORIDE 9 MG/ML
3 INJECTION INTRAMUSCULAR; INTRAVENOUS; SUBCUTANEOUS EVERY 8 HOURS
Qty: 0 | Refills: 0 | Status: DISCONTINUED | OUTPATIENT
Start: 2017-09-06 | End: 2017-09-09

## 2017-09-06 RX ADMIN — PANTOPRAZOLE SODIUM 40 MILLIGRAM(S): 20 TABLET, DELAYED RELEASE ORAL at 05:29

## 2017-09-06 RX ADMIN — SODIUM CHLORIDE 3 MILLILITER(S): 9 INJECTION INTRAMUSCULAR; INTRAVENOUS; SUBCUTANEOUS at 23:07

## 2017-09-06 RX ADMIN — Medication 650 MILLIGRAM(S): at 11:28

## 2017-09-06 RX ADMIN — ATENOLOL 25 MILLIGRAM(S): 25 TABLET ORAL at 05:29

## 2017-09-06 RX ADMIN — PIPERACILLIN AND TAZOBACTAM 25 GRAM(S): 4; .5 INJECTION, POWDER, LYOPHILIZED, FOR SOLUTION INTRAVENOUS at 05:29

## 2017-09-06 RX ADMIN — Medication 650 MILLIGRAM(S): at 06:29

## 2017-09-06 RX ADMIN — Medication 650 MILLIGRAM(S): at 05:29

## 2017-09-06 RX ADMIN — HEPARIN SODIUM 5000 UNIT(S): 5000 INJECTION INTRAVENOUS; SUBCUTANEOUS at 05:29

## 2017-09-06 RX ADMIN — SODIUM CHLORIDE 3 MILLILITER(S): 9 INJECTION INTRAMUSCULAR; INTRAVENOUS; SUBCUTANEOUS at 13:07

## 2017-09-06 RX ADMIN — HEPARIN SODIUM 5000 UNIT(S): 5000 INJECTION INTRAVENOUS; SUBCUTANEOUS at 23:22

## 2017-09-06 RX ADMIN — PIPERACILLIN AND TAZOBACTAM 25 GRAM(S): 4; .5 INJECTION, POWDER, LYOPHILIZED, FOR SOLUTION INTRAVENOUS at 18:23

## 2017-09-06 RX ADMIN — Medication 650 MILLIGRAM(S): at 23:22

## 2017-09-06 RX ADMIN — Medication 75 MICROGRAM(S): at 05:29

## 2017-09-06 RX ADMIN — Medication 650 MILLIGRAM(S): at 18:23

## 2017-09-06 RX ADMIN — Medication 650 MILLIGRAM(S): at 00:04

## 2017-09-06 RX ADMIN — Medication 650 MILLIGRAM(S): at 12:27

## 2017-09-06 RX ADMIN — Medication 650 MILLIGRAM(S): at 18:41

## 2017-09-06 RX ADMIN — Medication 63.75 MILLIMOLE(S): at 09:38

## 2017-09-06 RX ADMIN — HEPARIN SODIUM 5000 UNIT(S): 5000 INJECTION INTRAVENOUS; SUBCUTANEOUS at 13:22

## 2017-09-06 RX ADMIN — PIPERACILLIN AND TAZOBACTAM 25 GRAM(S): 4; .5 INJECTION, POWDER, LYOPHILIZED, FOR SOLUTION INTRAVENOUS at 11:26

## 2017-09-06 NOTE — PROGRESS NOTE ADULT - PROBLEM SELECTOR PLAN 1
Baseline ~2.5. RF better than baseline  Monitor Baseline ~2.5. Renal function better than baseline  Monitor

## 2017-09-06 NOTE — PROGRESS NOTE ADULT - SUBJECTIVE AND OBJECTIVE BOX
Patient seen and examined at bedside. No chest pain/sob    VITALS:  T(F): 97.9 (09-06-17 @ 05:26), Max: 99.2 (09-05-17 @ 22:21)  HR: 70 (09-06-17 @ 05:26)  BP: 128/66 (09-06-17 @ 05:26)  RR: 16 (09-06-17 @ 05:26)  SpO2: 100% (09-06-17 @ 05:26)  Wt(kg): --    09-05 @ 07:01  -  09-06 @ 07:00  --------------------------------------------------------  IN: 914 mL / OUT: 1550 mL / NET: -636 mL          PHYSICAL EXAM:  Constitutional: NAD  Neck: No JVD  Respiratory: CTAB, no wheezes, rales or rhonchi  Cardiovascular: S1, S2, RRR  Gastrointestinal: BS+, soft, NT/ND  Extremities: No peripheral edema    Hospital Medications:   MEDICATIONS  (STANDING):  heparin  Injectable 5000 Unit(s) SubCutaneous every 8 hours  ATENolol  Tablet 25 milliGRAM(s) Oral daily  levothyroxine 75 MICROGram(s) Oral daily  piperacillin/tazobactam IVPB. 3.375 Gram(s) IV Intermittent every 8 hours  acetaminophen   Tablet. 650 milliGRAM(s) Oral every 6 hours  pantoprazole    Tablet 40 milliGRAM(s) Oral before breakfast  insulin lispro (HumaLOG) corrective regimen sliding scale   SubCutaneous Before meals and at bedtime  sodium chloride 0.9% lock flush 3 milliLiter(s) IV Push every 8 hours  sodium phosphate IVPB 15 milliMole(s) IV Intermittent once      LABS:  09-06    136  |  103  |  30<H>  ----------------------------<  108<H>  4.1   |  20<L>  |  2.18<H>    Ca    8.6      06 Sep 2017 07:00  Phos  1.8     09-06  Mg     2.0     09-06      Creatinine Trend: 2.18 <--, 2.24 <--, 2.10 <--, 2.19 <--, 2.31 <--, 2.30 <--  Phosphorus Level, Serum: 1.8 mg/dL (09-06 @ 07:00)                              11.7   12.87 )-----------( 169      ( 06 Sep 2017 07:00 )             36.8     Urine Studies:          HbA1c 7.0      [09-04-17 @ 06:08]  TSH 10.17      [09-03-17 @ 12:10]        RADIOLOGY & ADDITIONAL STUDIES: Patient seen and examined at bedside. No chest pain/sob    VITALS:  T(F): 97.9 (09-06-17 @ 05:26), Max: 99.2 (09-05-17 @ 22:21)  HR: 70 (09-06-17 @ 05:26)  BP: 128/66 (09-06-17 @ 05:26)  RR: 16 (09-06-17 @ 05:26)  SpO2: 100% (09-06-17 @ 05:26)  Wt(kg): --    09-05 @ 07:01  -  09-06 @ 07:00  --------------------------------------------------------  IN: 914 mL / OUT: 1550 mL / NET: -636 mL          PHYSICAL EXAM:  Constitutional: NAD  Neck: No JVD  Respiratory: CTAB, no wheezes, rales or rhonchi  Cardiovascular: S1, S2, RRR  Gastrointestinal: BS+, soft, Right UQ epigastric, still with some tenderness  Extremities: No peripheral edema    Hospital Medications:   MEDICATIONS  (STANDING):  heparin  Injectable 5000 Unit(s) SubCutaneous every 8 hours  ATENolol  Tablet 25 milliGRAM(s) Oral daily  levothyroxine 75 MICROGram(s) Oral daily  piperacillin/tazobactam IVPB. 3.375 Gram(s) IV Intermittent every 8 hours  acetaminophen   Tablet. 650 milliGRAM(s) Oral every 6 hours  pantoprazole    Tablet 40 milliGRAM(s) Oral before breakfast  insulin lispro (HumaLOG) corrective regimen sliding scale   SubCutaneous Before meals and at bedtime  sodium chloride 0.9% lock flush 3 milliLiter(s) IV Push every 8 hours  sodium phosphate IVPB 15 milliMole(s) IV Intermittent once      LABS:  09-06    136  |  103  |  30<H>  ----------------------------<  108<H>  4.1   |  20<L>  |  2.18<H>    Ca    8.6      06 Sep 2017 07:00  Phos  1.8     09-06  Mg     2.0     09-06      Creatinine Trend: 2.18 <--, 2.24 <--, 2.10 <--, 2.19 <--, 2.31 <--, 2.30 <--  Phosphorus Level, Serum: 1.8 mg/dL (09-06 @ 07:00)                              11.7   12.87 )-----------( 169      ( 06 Sep 2017 07:00 )             36.8     Urine Studies:          HbA1c 7.0      [09-04-17 @ 06:08]  TSH 10.17      [09-03-17 @ 12:10]        RADIOLOGY & ADDITIONAL STUDIES:

## 2017-09-06 NOTE — PROGRESS NOTE ADULT - SUBJECTIVE AND OBJECTIVE BOX
Patient is a 77y old  Male who presents with a chief complaint of Abdominal pain (03 Sep 2017 18:09)      INTERVAL HPI/OVERNIGHT EVENTS:    MEDICATIONS  (STANDING):  heparin  Injectable 5000 Unit(s) SubCutaneous every 8 hours  ATENolol  Tablet 25 milliGRAM(s) Oral daily  levothyroxine 75 MICROGram(s) Oral daily  piperacillin/tazobactam IVPB. 3.375 Gram(s) IV Intermittent every 8 hours  acetaminophen   Tablet. 650 milliGRAM(s) Oral every 6 hours  pantoprazole    Tablet 40 milliGRAM(s) Oral before breakfast  insulin lispro (HumaLOG) corrective regimen sliding scale   SubCutaneous Before meals and at bedtime  sodium chloride 0.9% lock flush 3 milliLiter(s) IV Push every 8 hours  sodium phosphate IVPB 15 milliMole(s) IV Intermittent once    MEDICATIONS  (PRN):  oxyCODONE    IR 5 milliGRAM(s) Oral every 6 hours PRN Moderate Pain (4 - 6)  oxyCODONE    IR 10 milliGRAM(s) Oral every 6 hours PRN Severe Pain (7 - 10)        Allergies    No Known Allergies    Intolerances        REVIEW OF SYSTEMS:  CARDIOVASCULAR: No chest pain, palpitations, dizziness, or leg swelling; no shortness of breath     RESPIRATORY: No cough, wheezing, chills or hemoptysis; No shortness of breath    GASTROINTESTINAL: No abdominal or epigastric pain. No nausea, vomiting, or hematemesis; No diarrhea or constipation. No melena or hematochezia.    NEUROLOGICAL: No headaches, memory loss, loss of strength, numbness      PHYSICAL EXAM:  Vital Signs Last 24 Hrs  T(C): 36.6 (06 Sep 2017 05:26), Max: 37.3 (05 Sep 2017 22:21)  T(F): 97.9 (06 Sep 2017 05:26), Max: 99.2 (05 Sep 2017 22:21)  HR: 70 (06 Sep 2017 05:26) (63 - 77)  BP: 128/66 (06 Sep 2017 05:26) (118/70 - 145/77)  BP(mean): --  RR: 16 (06 Sep 2017 05:26) (16 - 18)  SpO2: 100% (06 Sep 2017 05:26) (94% - 100%)    GENERAL: NAD, well-groomed, well-developed  HEAD:  Atraumatic, Normocephalic  EYES: EOMI, PERRLA, conjunctiva and sclera clear  NECK: Supple, No JVD, Normal thyroid  NERVOUS SYSTEM:  Alert & Oriented X3, Good concentration;  and symmetric  CHEST/LUNG: Clear to auscultation bilaterally; No rales, rhonchi, wheezing, or rubs  HEART: S1S2 regular, without murmur, rub nor gallop  ABDOMEN: Soft, Nontender, Nondistended; Bowel sounds present  EXTREMITIES:  2+ Peripheral Pulses, No clubbing, cyanosis, or edema      LABS:                        11.7   12.87 )-----------( 169      ( 06 Sep 2017 07:00 )             36.8     06 Sep 2017 07:00    136    |  103    |  30     ----------------------------<  108    4.1     |  20     |  2.18     Ca    8.6        06 Sep 2017 07:00  Phos  1.8       06 Sep 2017 07:00  Mg     2.0       06 Sep 2017 07:00        CAPILLARY BLOOD GLUCOSE  100 (06 Sep 2017 08:31)  154 (05 Sep 2017 22:21)  136 (05 Sep 2017 17:43)  189 (05 Sep 2017 11:45)          :        assessment:  patient tolerating PO.  POD 2.  HR better.       Plan:   Continue antibiotics.   Will recheck TSH tomorrow.   Sugars fairly controlled    Care Discussed with Consultants/Other Providers: Dr. Davis

## 2017-09-06 NOTE — PROGRESS NOTE ADULT - ASSESSMENT
77y M  s/p lap cholecystectomy w/ IOC for symptomatic cholecystitis. Intraoperatively a significant amount of inflammation and pus was noted; patient was started on Zosyn; recovering well - AVSS, however still has leukocytosis (improving - 12.87 decreased from 15.82 yesterday.)    - Continue Zosyn (Today is day 3)  - Continue Pain control  - Continue reg diet  - OOB  - DVT ppx:SQH

## 2017-09-06 NOTE — PROGRESS NOTE ADULT - SUBJECTIVE AND OBJECTIVE BOX
Surgery A Team Progress Note:    Hospital Day 4 POD 2 s/p laparoscopic cholecystectomy w/ intraoperative cholangiogram for symptomatic cholecystitis.    Subjective:  No acute overnight events.  Patient examined at bedside, resting.  States he ambulated 5 times yesterday.  Plan was discussed with the patient; team told patient that we will let him know when he can go home once we round with Dr. Davis.  Patient states he is "offended" by the comment of "being able to go home" - it is unclear what the patient was offended by; similar instance occurred yesterday and patient's son reexplained the plan at bedside yesterday; appears to be mild language barrier.  No further complaints at this time.    Objective:  Vital Signs Last 24 Hrs  T(C): 36.8 (06 Sep 2017 10:27), Max: 37.3 (05 Sep 2017 22:21)  T(F): 98.2 (06 Sep 2017 10:27), Max: 99.2 (05 Sep 2017 22:21)  HR: 65 (06 Sep 2017 10:27) (63 - 77)  BP: 125/73 (06 Sep 2017 10:27) (118/70 - 145/77)  RR: 17 (06 Sep 2017 10:27) (16 - 17)  SpO2: 99% (06 Sep 2017 10:27) (94% - 100%)    Labs:                        11.7   12.87 )-----------( 169      ( 06 Sep 2017 07:00 )             36.8       09-06    136  |  103  |  30<H>  ----------------------------<  108<H>  4.1   |  20<L>  |  2.18<H>    Ca    8.6      06 Sep 2017 07:00  Phos  1.8     09-06  Mg     2.0     09-06    I&O's Detail    05 Sep 2017 07:01  -  06 Sep 2017 07:00  --------------------------------------------------------  IN:    Oral Fluid: 160 mL    sodium chloride 0.9%: 754 mL  Total IN: 914 mL    OUT:    Voided: 1550 mL  Total OUT: 1550 mL    Total NET: -636 mL      06 Sep 2017 07:01  -  06 Sep 2017 11:05  --------------------------------------------------------  IN:    IV PiggyBack: 250 mL  Total IN: 250 mL    OUT:    Voided: 400 mL  Total OUT: 400 mL    Total NET: -150 mL    Physical Exam:  General: NAD  Respiratory: Nonlabored breathing  Abdomen: Soft, NT, ND. Removed dressings at bedside; incisions clean and dry.

## 2017-09-07 LAB
BUN SERPL-MCNC: 32 MG/DL — HIGH (ref 7–23)
CALCIUM SERPL-MCNC: 8.8 MG/DL — SIGNIFICANT CHANGE UP (ref 8.4–10.5)
CHLORIDE SERPL-SCNC: 103 MMOL/L — SIGNIFICANT CHANGE UP (ref 98–107)
CO2 SERPL-SCNC: 19 MMOL/L — LOW (ref 22–31)
CREAT SERPL-MCNC: 2.19 MG/DL — HIGH (ref 0.5–1.3)
GLUCOSE SERPL-MCNC: 78 MG/DL — SIGNIFICANT CHANGE UP (ref 70–99)
HCT VFR BLD CALC: 32.7 % — LOW (ref 39–50)
HGB BLD-MCNC: 10.8 G/DL — LOW (ref 13–17)
MAGNESIUM SERPL-MCNC: 1.9 MG/DL — SIGNIFICANT CHANGE UP (ref 1.6–2.6)
MCHC RBC-ENTMCNC: 28.7 PG — SIGNIFICANT CHANGE UP (ref 27–34)
MCHC RBC-ENTMCNC: 33 % — SIGNIFICANT CHANGE UP (ref 32–36)
MCV RBC AUTO: 87 FL — SIGNIFICANT CHANGE UP (ref 80–100)
NRBC # FLD: 0 — SIGNIFICANT CHANGE UP
PHOSPHATE SERPL-MCNC: 2.5 MG/DL — SIGNIFICANT CHANGE UP (ref 2.5–4.5)
PLATELET # BLD AUTO: 170 K/UL — SIGNIFICANT CHANGE UP (ref 150–400)
PMV BLD: 12 FL — SIGNIFICANT CHANGE UP (ref 7–13)
POTASSIUM SERPL-MCNC: 3.9 MMOL/L — SIGNIFICANT CHANGE UP (ref 3.5–5.3)
POTASSIUM SERPL-SCNC: 3.9 MMOL/L — SIGNIFICANT CHANGE UP (ref 3.5–5.3)
RBC # BLD: 3.76 M/UL — LOW (ref 4.2–5.8)
RBC # FLD: 12.8 % — SIGNIFICANT CHANGE UP (ref 10.3–14.5)
SODIUM SERPL-SCNC: 137 MMOL/L — SIGNIFICANT CHANGE UP (ref 135–145)
TSH SERPL-MCNC: 7.13 UIU/ML — HIGH (ref 0.27–4.2)
WBC # BLD: 8.64 K/UL — SIGNIFICANT CHANGE UP (ref 3.8–10.5)
WBC # FLD AUTO: 8.64 K/UL — SIGNIFICANT CHANGE UP (ref 3.8–10.5)

## 2017-09-07 RX ORDER — LEVOTHYROXINE SODIUM 125 MCG
88 TABLET ORAL DAILY
Qty: 0 | Refills: 0 | Status: DISCONTINUED | OUTPATIENT
Start: 2017-09-07 | End: 2017-09-09

## 2017-09-07 RX ADMIN — Medication: at 22:15

## 2017-09-07 RX ADMIN — Medication 650 MILLIGRAM(S): at 07:28

## 2017-09-07 RX ADMIN — PANTOPRAZOLE SODIUM 40 MILLIGRAM(S): 20 TABLET, DELAYED RELEASE ORAL at 06:28

## 2017-09-07 RX ADMIN — SODIUM CHLORIDE 3 MILLILITER(S): 9 INJECTION INTRAMUSCULAR; INTRAVENOUS; SUBCUTANEOUS at 22:13

## 2017-09-07 RX ADMIN — HEPARIN SODIUM 5000 UNIT(S): 5000 INJECTION INTRAVENOUS; SUBCUTANEOUS at 22:13

## 2017-09-07 RX ADMIN — PIPERACILLIN AND TAZOBACTAM 25 GRAM(S): 4; .5 INJECTION, POWDER, LYOPHILIZED, FOR SOLUTION INTRAVENOUS at 14:31

## 2017-09-07 RX ADMIN — Medication 75 MICROGRAM(S): at 06:28

## 2017-09-07 RX ADMIN — ATENOLOL 25 MILLIGRAM(S): 25 TABLET ORAL at 06:28

## 2017-09-07 RX ADMIN — Medication 88 MICROGRAM(S): at 14:32

## 2017-09-07 RX ADMIN — Medication 650 MILLIGRAM(S): at 00:22

## 2017-09-07 RX ADMIN — PIPERACILLIN AND TAZOBACTAM 25 GRAM(S): 4; .5 INJECTION, POWDER, LYOPHILIZED, FOR SOLUTION INTRAVENOUS at 18:55

## 2017-09-07 RX ADMIN — Medication 650 MILLIGRAM(S): at 15:07

## 2017-09-07 RX ADMIN — Medication 650 MILLIGRAM(S): at 14:31

## 2017-09-07 RX ADMIN — Medication 650 MILLIGRAM(S): at 06:28

## 2017-09-07 RX ADMIN — SODIUM CHLORIDE 3 MILLILITER(S): 9 INJECTION INTRAMUSCULAR; INTRAVENOUS; SUBCUTANEOUS at 14:28

## 2017-09-07 RX ADMIN — Medication 650 MILLIGRAM(S): at 18:55

## 2017-09-07 RX ADMIN — HEPARIN SODIUM 5000 UNIT(S): 5000 INJECTION INTRAVENOUS; SUBCUTANEOUS at 06:28

## 2017-09-07 RX ADMIN — SODIUM CHLORIDE 3 MILLILITER(S): 9 INJECTION INTRAMUSCULAR; INTRAVENOUS; SUBCUTANEOUS at 06:25

## 2017-09-07 RX ADMIN — PIPERACILLIN AND TAZOBACTAM 25 GRAM(S): 4; .5 INJECTION, POWDER, LYOPHILIZED, FOR SOLUTION INTRAVENOUS at 03:26

## 2017-09-07 RX ADMIN — HEPARIN SODIUM 5000 UNIT(S): 5000 INJECTION INTRAVENOUS; SUBCUTANEOUS at 14:32

## 2017-09-07 NOTE — PROGRESS NOTE ADULT - ASSESSMENT
A/P: 77M now s/p lap cholecystectomy w/ IOC for symptomatic cholecystitis. Patient noted to have a significant amount of inflammation and was therefore started on zosyn. Doing well.    - Cont Zosyn  - Diet: Reg  - Monitor GI function  - Pain control  - Encourage IS/OOB as tolerated  - DVT ppx: [facility]    Sid Burton M.D.

## 2017-09-07 NOTE — PROGRESS NOTE ADULT - SUBJECTIVE AND OBJECTIVE BOX
Patient seen and examined at bedside. No chest pain/sob    VITALS:  T(F): 98.1 (09-07-17 @ 06:25), Max: 99.3 (09-06-17 @ 22:08)  HR: 64 (09-07-17 @ 06:25)  BP: 138/65 (09-07-17 @ 06:25)  RR: 18 (09-07-17 @ 06:25)  SpO2: 96% (09-07-17 @ 06:25)  Wt(kg): --    09-06 @ 07:01  -  09-07 @ 07:00  --------------------------------------------------------  IN: 450 mL / OUT: 3300 mL / NET: -2850 mL          PHYSICAL EXAM:  Constitutional: NAD  Neck: No JVD  Respiratory: CTAB, no wheezes, rales or rhonchi  Cardiovascular: S1, S2, RRR  Gastrointestinal: BS+, soft, RUQ tenderness  Extremities: 1+ peripheral edema    Hospital Medications:   MEDICATIONS  (STANDING):  heparin  Injectable 5000 Unit(s) SubCutaneous every 8 hours  ATENolol  Tablet 25 milliGRAM(s) Oral daily  levothyroxine 75 MICROGram(s) Oral daily  piperacillin/tazobactam IVPB. 3.375 Gram(s) IV Intermittent every 8 hours  acetaminophen   Tablet. 650 milliGRAM(s) Oral every 6 hours  pantoprazole    Tablet 40 milliGRAM(s) Oral before breakfast  insulin lispro (HumaLOG) corrective regimen sliding scale   SubCutaneous Before meals and at bedtime  sodium chloride 0.9% lock flush 3 milliLiter(s) IV Push every 8 hours      LABS:  09-06    136  |  103  |  30<H>  ----------------------------<  108<H>  4.1   |  20<L>  |  2.18<H>    Ca    8.6      06 Sep 2017 07:00  Phos  1.8     09-06  Mg     2.0     09-06      Creatinine Trend: 2.18 <--, 2.24 <--, 2.10 <--, 2.19 <--, 2.31 <--, 2.30 <--                              11.7   12.87 )-----------( 169      ( 06 Sep 2017 07:00 )             36.8     Urine Studies:          HbA1c 7.0      [09-04-17 @ 06:08]  TSH 10.17      [09-03-17 @ 12:10]        RADIOLOGY & ADDITIONAL STUDIES:

## 2017-09-07 NOTE — PROGRESS NOTE ADULT - SUBJECTIVE AND OBJECTIVE BOX
A Team Progress Note    S: No acute events overnight. Patient resting comfortably in bed. Some abdominal pain today. Denies fevers/chills and N/V. + flatus/ - bowel movement. Tolerating diet.    O:  T(C): 36.7 (09-07-17 @ 06:25), Max: 37.4 (09-06-17 @ 22:08)  HR: 64 (09-07-17 @ 06:25) (64 - 79)  BP: 138/65 (09-07-17 @ 06:25) (114/70 - 138/65)  RR: 18 (09-07-17 @ 06:25) (18 - 18)  SpO2: 96% (09-07-17 @ 06:25) (94% - 97%)  Wt(kg): --    09-06 @ 07:01  -  09-07 @ 07:00  --------------------------------------------------------  IN:    IV PiggyBack: 450 mL  Total IN: 450 mL    OUT:    Voided: 3300 mL  Total OUT: 3300 mL    Total NET: -2850 mL        CBC Full  -  ( 07 Sep 2017 06:55 )  WBC Count : 8.64 K/uL  Hemoglobin : 10.8 g/dL  Hematocrit : 32.7 %  Platelet Count - Automated : 170 K/uL  Mean Cell Volume : 87.0 fL  Mean Cell Hemoglobin : 28.7 pg  Mean Cell Hemoglobin Concentration : 33.0 %      CAPILLARY BLOOD GLUCOSE  90 (06 Sep 2017 22:08)  70 (06 Sep 2017 17:44)  104 (06 Sep 2017 12:25)          PHYSICAL EXAM:  Gen: A&Ox3, laying in bed in NAD  Abd: Soft, moderately tender in RLQ, mildly distended  Incision: Dressings in place c/d/i

## 2017-09-07 NOTE — PROGRESS NOTE ADULT - SUBJECTIVE AND OBJECTIVE BOX
Patient is a 77y old  Male who presents with a chief complaint of Abdominal pain (03 Sep 2017 18:09)      INTERVAL HPI/OVERNIGHT EVENTS:    MEDICATIONS  (STANDING):  heparin  Injectable 5000 Unit(s) SubCutaneous every 8 hours  ATENolol  Tablet 25 milliGRAM(s) Oral daily  levothyroxine 75 MICROGram(s) Oral daily  piperacillin/tazobactam IVPB. 3.375 Gram(s) IV Intermittent every 8 hours  acetaminophen   Tablet. 650 milliGRAM(s) Oral every 6 hours  pantoprazole    Tablet 40 milliGRAM(s) Oral before breakfast  insulin lispro (HumaLOG) corrective regimen sliding scale   SubCutaneous Before meals and at bedtime  sodium chloride 0.9% lock flush 3 milliLiter(s) IV Push every 8 hours    MEDICATIONS  (PRN):  oxyCODONE    IR 5 milliGRAM(s) Oral every 6 hours PRN Moderate Pain (4 - 6)  oxyCODONE    IR 10 milliGRAM(s) Oral every 6 hours PRN Severe Pain (7 - 10)          Allergies    No Known Allergies    Intolerances        REVIEW OF SYSTEMS:  CARDIOVASCULAR: No chest pain, palpitations, dizziness, or leg swelling; no shortness of breath     RESPIRATORY: No cough, wheezing, chills or hemoptysis; No shortness of breath    GASTROINTESTINAL: No abdominal or epigastric pain. No nausea, vomiting, or hematemesis; No diarrhea or constipation. No melena or hematochezia.    NEUROLOGICAL: No headaches, memory loss, loss of strength, numbness      PHYSICAL EXAM:  Vital Signs Last 24 Hrs  T(C): 36.7 (07 Sep 2017 06:25), Max: 37.4 (06 Sep 2017 22:08)  T(F): 98.1 (07 Sep 2017 06:25), Max: 99.3 (06 Sep 2017 22:08)  HR: 64 (07 Sep 2017 06:25) (64 - 79)  BP: 138/65 (07 Sep 2017 06:25) (114/70 - 138/65)  BP(mean): --  RR: 18 (07 Sep 2017 06:25) (17 - 18)  SpO2: 96% (07 Sep 2017 06:25) (94% - 99%)    GENERAL: NAD, well-groomed, well-developed  HEAD:  Atraumatic, Normocephalic  EYES: EOMI, PERRLA, conjunctiva and sclera clear  NECK: Supple, No JVD, Normal thyroid  NERVOUS SYSTEM:  Alert & Oriented X3, Good concentration;  and symmetric  CHEST/LUNG: Clear to auscultation bilaterally; No rales, rhonchi, wheezing, or rubs  HEART: S1S2 regular, without murmur, rub nor gallop  ABDOMEN: Soft, Nontender, Nondistended; Bowel sounds present  EXTREMITIES:  2+ Peripheral Pulses, No clubbing, cyanosis, or edema      LABS:                        10.8   8.64  )-----------( 170      ( 07 Sep 2017 06:55 )             32.7     07 Sep 2017 06:55    137    |  103    |  32     ----------------------------<  78     3.9     |  19     |  2.19     Ca    8.8        07 Sep 2017 06:55  Phos  2.5       07 Sep 2017 06:55  Mg     1.9       07 Sep 2017 06:55        CAPILLARY BLOOD GLUCOSE  90 (06 Sep 2017 22:08)  70 (06 Sep 2017 17:44)  104 (06 Sep 2017 12:25)          assessment:  POD 3, h/o DM, CAD.      Plan:   TSH remains elevated.  Will increase Synthroid 75 to88

## 2017-09-08 LAB
BUN SERPL-MCNC: 32 MG/DL — HIGH (ref 7–23)
CALCIUM SERPL-MCNC: 9 MG/DL — SIGNIFICANT CHANGE UP (ref 8.4–10.5)
CHLORIDE SERPL-SCNC: 101 MMOL/L — SIGNIFICANT CHANGE UP (ref 98–107)
CO2 SERPL-SCNC: 18 MMOL/L — LOW (ref 22–31)
CREAT SERPL-MCNC: 2.06 MG/DL — HIGH (ref 0.5–1.3)
GLUCOSE SERPL-MCNC: 160 MG/DL — HIGH (ref 70–99)
HCT VFR BLD CALC: 33.8 % — LOW (ref 39–50)
HGB BLD-MCNC: 11 G/DL — LOW (ref 13–17)
MAGNESIUM SERPL-MCNC: 1.9 MG/DL — SIGNIFICANT CHANGE UP (ref 1.6–2.6)
MCHC RBC-ENTMCNC: 27.7 PG — SIGNIFICANT CHANGE UP (ref 27–34)
MCHC RBC-ENTMCNC: 32.5 % — SIGNIFICANT CHANGE UP (ref 32–36)
MCV RBC AUTO: 85.1 FL — SIGNIFICANT CHANGE UP (ref 80–100)
NRBC # FLD: 0 — SIGNIFICANT CHANGE UP
PHOSPHATE SERPL-MCNC: 2.9 MG/DL — SIGNIFICANT CHANGE UP (ref 2.5–4.5)
PLATELET # BLD AUTO: 192 K/UL — SIGNIFICANT CHANGE UP (ref 150–400)
PMV BLD: 11.9 FL — SIGNIFICANT CHANGE UP (ref 7–13)
POTASSIUM SERPL-MCNC: 4.2 MMOL/L — SIGNIFICANT CHANGE UP (ref 3.5–5.3)
POTASSIUM SERPL-SCNC: 4.2 MMOL/L — SIGNIFICANT CHANGE UP (ref 3.5–5.3)
RBC # BLD: 3.97 M/UL — LOW (ref 4.2–5.8)
RBC # FLD: 12.6 % — SIGNIFICANT CHANGE UP (ref 10.3–14.5)
SODIUM SERPL-SCNC: 132 MMOL/L — LOW (ref 135–145)
WBC # BLD: 6.68 K/UL — SIGNIFICANT CHANGE UP (ref 3.8–10.5)
WBC # FLD AUTO: 6.68 K/UL — SIGNIFICANT CHANGE UP (ref 3.8–10.5)

## 2017-09-08 RX ADMIN — Medication: at 23:10

## 2017-09-08 RX ADMIN — SODIUM CHLORIDE 3 MILLILITER(S): 9 INJECTION INTRAMUSCULAR; INTRAVENOUS; SUBCUTANEOUS at 14:14

## 2017-09-08 RX ADMIN — ATENOLOL 25 MILLIGRAM(S): 25 TABLET ORAL at 05:49

## 2017-09-08 RX ADMIN — PIPERACILLIN AND TAZOBACTAM 25 GRAM(S): 4; .5 INJECTION, POWDER, LYOPHILIZED, FOR SOLUTION INTRAVENOUS at 23:10

## 2017-09-08 RX ADMIN — Medication 88 MICROGRAM(S): at 05:49

## 2017-09-08 RX ADMIN — HEPARIN SODIUM 5000 UNIT(S): 5000 INJECTION INTRAVENOUS; SUBCUTANEOUS at 05:49

## 2017-09-08 RX ADMIN — Medication 650 MILLIGRAM(S): at 14:13

## 2017-09-08 RX ADMIN — SODIUM CHLORIDE 3 MILLILITER(S): 9 INJECTION INTRAMUSCULAR; INTRAVENOUS; SUBCUTANEOUS at 21:22

## 2017-09-08 RX ADMIN — HEPARIN SODIUM 5000 UNIT(S): 5000 INJECTION INTRAVENOUS; SUBCUTANEOUS at 23:10

## 2017-09-08 RX ADMIN — SODIUM CHLORIDE 3 MILLILITER(S): 9 INJECTION INTRAMUSCULAR; INTRAVENOUS; SUBCUTANEOUS at 05:45

## 2017-09-08 RX ADMIN — PIPERACILLIN AND TAZOBACTAM 25 GRAM(S): 4; .5 INJECTION, POWDER, LYOPHILIZED, FOR SOLUTION INTRAVENOUS at 02:12

## 2017-09-08 RX ADMIN — PIPERACILLIN AND TAZOBACTAM 25 GRAM(S): 4; .5 INJECTION, POWDER, LYOPHILIZED, FOR SOLUTION INTRAVENOUS at 13:03

## 2017-09-08 RX ADMIN — Medication 650 MILLIGRAM(S): at 23:10

## 2017-09-08 RX ADMIN — PANTOPRAZOLE SODIUM 40 MILLIGRAM(S): 20 TABLET, DELAYED RELEASE ORAL at 05:49

## 2017-09-08 RX ADMIN — Medication 2: at 13:02

## 2017-09-08 RX ADMIN — Medication 650 MILLIGRAM(S): at 06:19

## 2017-09-08 RX ADMIN — Medication 1: at 18:59

## 2017-09-08 RX ADMIN — Medication 650 MILLIGRAM(S): at 18:58

## 2017-09-08 RX ADMIN — HEPARIN SODIUM 5000 UNIT(S): 5000 INJECTION INTRAVENOUS; SUBCUTANEOUS at 13:04

## 2017-09-08 RX ADMIN — Medication 650 MILLIGRAM(S): at 13:04

## 2017-09-08 RX ADMIN — Medication 650 MILLIGRAM(S): at 05:49

## 2017-09-08 NOTE — PROGRESS NOTE ADULT - SUBJECTIVE AND OBJECTIVE BOX
Patient is a 77y old  Male who presents with a chief complaint of Abdominal pain (03 Sep 2017 18:09)      INTERVAL HPI/OVERNIGHT EVENTS:    MEDICATIONS  (STANDING):  heparin  Injectable 5000 Unit(s) SubCutaneous every 8 hours  ATENolol  Tablet 25 milliGRAM(s) Oral daily  piperacillin/tazobactam IVPB. 3.375 Gram(s) IV Intermittent every 8 hours  acetaminophen   Tablet. 650 milliGRAM(s) Oral every 6 hours  pantoprazole    Tablet 40 milliGRAM(s) Oral before breakfast  insulin lispro (HumaLOG) corrective regimen sliding scale   SubCutaneous Before meals and at bedtime  sodium chloride 0.9% lock flush 3 milliLiter(s) IV Push every 8 hours  levothyroxine 88 MICROGram(s) Oral daily    MEDICATIONS  (PRN):  oxyCODONE    IR 5 milliGRAM(s) Oral every 6 hours PRN Moderate Pain (4 - 6)  oxyCODONE    IR 10 milliGRAM(s) Oral every 6 hours PRN Severe Pain (7 - 10)        Orders last 24 hours:  Complete Blood Count: AM Sched. Collection: 08-Sep-2017 06:00 (09-07-17 @ 15:59)  Basic Metabolic Panel w/Mg & Inorg Phos: AM Sched. Collection: 08-Sep-2017 06:00 (09-07-17 @ 15:59)      Allergies    No Known Allergies    Intolerances        REVIEW OF SYSTEMS:  CARDIOVASCULAR: No chest pain, palpitations, dizziness, or leg swelling; no shortness of breath     RESPIRATORY: No cough, wheezing, chills or hemoptysis; No shortness of breath    GASTROINTESTINAL: No abdominal or epigastric pain. No nausea, vomiting, or hematemesis; No diarrhea or constipation. No melena or hematochezia.    NEUROLOGICAL: No headaches, memory loss, loss of strength, numbness      PHYSICAL EXAM:  Vital Signs Last 24 Hrs  T(C): 36.5 (08 Sep 2017 05:47), Max: 37.2 (08 Sep 2017 02:26)  T(F): 97.7 (08 Sep 2017 05:47), Max: 99 (08 Sep 2017 02:26)  HR: 67 (08 Sep 2017 05:47) (61 - 70)  BP: 142/68 (08 Sep 2017 05:47) (121/71 - 166/96)  BP(mean): --  RR: 16 (08 Sep 2017 05:47) (16 - 20)  SpO2: 98% (08 Sep 2017 05:47) (97% - 100%)    GENERAL: NAD, well-groomed, well-developed  HEAD:  Atraumatic, Normocephalic  EYES: EOMI, PERRLA, conjunctiva and sclera clear  NECK: Supple, No JVD, Normal thyroid  NERVOUS SYSTEM:  Alert & Oriented X3, Good concentration;  and symmetric  CHEST/LUNG: Clear to auscultation bilaterally; No rales, rhonchi, wheezing, or rubs  HEART: S1S2 regular, without murmur, rub nor gallop  ABDOMEN: Soft, Nontender, Nondistended; Bowel sounds present  EXTREMITIES:  2+ Peripheral Pulses, No clubbing, cyanosis, or edema  Patient is a 77y old  Male who presents with a chief complaint of Abdominal pain (03 Sep 2017 18:09)      MEDICATIONS  (PRN):  oxyCODONE    IR 5 milliGRAM(s) Oral every 6 hours PRN Moderate Pain (4 - 6)  oxyCODONE    IR 10 milliGRAM(s) Oral every 6 hours PRN Severe Pain (7 - 10)        Orders last 24 hours:  Complete Blood Count: AM Sched. Collection: 08-Sep-2017 06:00 (09-07-17 @ 15:59)  Basic Metabolic Panel w/Mg & Inorg Phos: AM Sched. Collection: 08-Sep-2017 06:00 (09-07-17 @ 15:59)      Allergies    No Known Allergies    Intolerances        REVIEW OF SYSTEMS:  CARDIOVASCULAR: No chest pain, palpitations, dizziness, or leg swelling; no shortness of breath     RESPIRATORY: No cough, wheezing, chills or hemoptysis; No shortness of breath    GASTROINTESTINAL: No abdominal or epigastric pain. No nausea, vomiting, or hematemesis; No diarrhea or constipation. No melena or hematochezia.    NEUROLOGICAL: No headaches, memory loss, loss of strength, numbness      PHYSICAL EXAM:  Vital Signs Last 24 Hrs  T(C): 36.5 (08 Sep 2017 05:47), Max: 37.2 (08 Sep 2017 02:26)  T(F): 97.7 (08 Sep 2017 05:47), Max: 99 (08 Sep 2017 02:26)  HR: 67 (08 Sep 2017 05:47) (61 - 70)  BP: 142/68 (08 Sep 2017 05:47) (121/71 - 166/96)  BP(mean): --  RR: 16 (08 Sep 2017 05:47) (16 - 20)  SpO2: 98% (08 Sep 2017 05:47) (97% - 100%)    GENERAL: NAD, well-groomed, well-developed  HEAD:  Atraumatic, Normocephalic  EYES: EOMI, PERRLA, conjunctiva and sclera clear  NECK: Supple, No JVD, Normal thyroid  NERVOUS SYSTEM:  Alert & Oriented X3, Good concentration;  and symmetric  CHEST/LUNG: Clear to auscultation bilaterally; No rales, rhonchi, wheezing, or rubs  HEART: S1S2 regular, without murmur, rub nor gallop  ABDOMEN: Soft, Nontender, Nondistended; Bowel sounds present  EXTREMITIES:  2+ Peripheral Pulses, No clubbing, cyanosis, or edema    Groin - essentially unchanged, mildly less erythema  LABS:                        11.0   6.68  )-----------( 192      ( 08 Sep 2017 07:00 )             33.8     08 Sep 2017 07:00    132    |  101    |  32     ----------------------------<  160    4.2     |  18     |  2.06     Ca    9.0        08 Sep 2017 07:00  Phos  2.9       08 Sep 2017 07:00  Mg     1.9       08 Sep 2017 07:00        CAPILLARY BLOOD GLUCOSE  167 (07 Sep 2017 22:14)  104 (07 Sep 2017 17:27)  129 (07 Sep 2017 12:06)  91 (07 Sep 2017 11:01)           assessment:  Pt stable, Sugars controlled with RISS.      Plan:   DM teaching.  Continue antibiotics/wound care.   dietary evaluation

## 2017-09-08 NOTE — PROGRESS NOTE ADULT - PROBLEM SELECTOR PROBLEM 1
CKD (chronic kidney disease), stage IV

## 2017-09-08 NOTE — PROGRESS NOTE ADULT - SUBJECTIVE AND OBJECTIVE BOX
Surgery A Team Progress Note    STATUS POST:  lap cholecystectomy with negative IOC    POST OPERATIVE DAY # 4    SUBJECTIVE:    patient was seen and examined this morning. There was no acute event overnight. His pain is well controlled. He is on regular diet and tolerates it well. He has flatus and BM. He does not report pain, fever, n/v, chest pain, or shortness of breath.   OBJECTIVE:   T(C): 36.5 (09-08-17 @ 05:47), Max: 37.2 (09-08-17 @ 02:26)  HR: 67 (09-08-17 @ 05:47) (61 - 70)  BP: 142/68 (09-08-17 @ 05:47) (121/71 - 166/96)  RR: 16 (09-08-17 @ 05:47) (16 - 20)  SpO2: 98% (09-08-17 @ 05:47) (97% - 100%)  Wt(kg): --  CAPILLARY BLOOD GLUCOSE  167 (07 Sep 2017 22:14)  104 (07 Sep 2017 17:27)  129 (07 Sep 2017 12:06)  91 (07 Sep 2017 11:01)        I&O's Detail    07 Sep 2017 07:01  -  08 Sep 2017 07:00  --------------------------------------------------------  IN:    IV PiggyBack: 100 mL    Oral Fluid: 400 mL  Total IN: 500 mL    OUT:    Voided: 850 mL  Total OUT: 850 mL    Total NET: -350 mL        PHYSICAL EXAM:  Gen: Well-nourished, well-developed, A&O x3, resting in bed in no acute distress  CV: RRR  Resp: Patent airways, unlabored   Abdomen: Soft, nontender, mildly distended, parumbilical echymosis  Extremities: All 4 extremities warm and well perfused, no edema

## 2017-09-08 NOTE — PROGRESS NOTE ADULT - PROBLEM SELECTOR PLAN 4
s/p cholecystectomy  f/u surgery
Feels better today  (+) stone neck of gallbladder  F/U surgery

## 2017-09-08 NOTE — PROGRESS NOTE ADULT - ASSESSMENT
A/P: 77M now s/p lap cholecystectomy w/ IOC for symptomatic cholecystitis. Patient noted to have a significant amount of inflammation and was therefore started on zosyn. Doing well. WBC is 6.6 and trending down. lab is significant for Na:132 and BG of 160.    - Cont Zosyn  - Monitor CBC for WBC  - Diet: Reg  - Monitor GI function  - Pain control  - Encourage IS/OOB as tolerated  - DVT ppx: SQH

## 2017-09-08 NOTE — PROGRESS NOTE ADULT - SUBJECTIVE AND OBJECTIVE BOX
Dr. Radford  Office [phone]  Cell [phone]  Beckie ZHANG  Cell [phone]      Patient is a 77y old  Male who presents with a chief complaint of Abdominal pain (03 Sep 2017 18:09)      Patient seen and examined at bedside. No chest pain/sob    VITALS:  T(F): 98.2 (09-08-17 @ 17:36), Max: 99 (09-08-17 @ 02:26)  HR: 58 (09-08-17 @ 17:36)  BP: 149/83 (09-08-17 @ 17:36)  RR: 18 (09-08-17 @ 17:36)  SpO2: 95% (09-08-17 @ 17:36)  Wt(kg): --    09-07 @ 07:01  -  09-08 @ 07:00  --------------------------------------------------------  IN: 500 mL / OUT: 850 mL / NET: -350 mL    09-08 @ 07:01  -  09-08 @ 21:12  --------------------------------------------------------  IN: 0 mL / OUT: 1600 mL / NET: -1600 mL          PHYSICAL EXAM:  Constitutional: NAD  Neck: No JVD  Respiratory: CTAB, no wheezes, rales or rhonchi  Cardiovascular: S1, S2, RRR  Gastrointestinal: BS+, soft, NT, right UQ tenderness  Extremities: No peripheral edema    Hospital Medications:   MEDICATIONS  (STANDING):  heparin  Injectable 5000 Unit(s) SubCutaneous every 8 hours  ATENolol  Tablet 25 milliGRAM(s) Oral daily  piperacillin/tazobactam IVPB. 3.375 Gram(s) IV Intermittent every 8 hours  acetaminophen   Tablet. 650 milliGRAM(s) Oral every 6 hours  pantoprazole    Tablet 40 milliGRAM(s) Oral before breakfast  insulin lispro (HumaLOG) corrective regimen sliding scale   SubCutaneous Before meals and at bedtime  sodium chloride 0.9% lock flush 3 milliLiter(s) IV Push every 8 hours  levothyroxine 88 MICROGram(s) Oral daily      LABS:  09-08    132<L>  |  101  |  32<H>  ----------------------------<  160<H>  4.2   |  18<L>  |  2.06<H>    Ca    9.0      08 Sep 2017 07:00  Phos  2.9     09-08  Mg     1.9     09-08      Creatinine Trend: 2.06 <--, 2.19 <--, 2.18 <--, 2.24 <--, 2.10 <--, 2.19 <--, 2.31 <--, 2.30 <--    Phosphorus Level, Serum: 2.9 mg/dL (09-08 @ 07:00)                              11.0   6.68  )-----------( 192      ( 08 Sep 2017 07:00 )             33.8     Urine Studies:      HbA1c 7.0      [09-04-17 @ 06:08]  TSH 7.13      [09-07-17 @ 06:55]        RADIOLOGY & ADDITIONAL STUDIES:

## 2017-09-08 NOTE — PROGRESS NOTE ADULT - PROBLEM SELECTOR PROBLEM 4
Abdominal pain, unspecified location

## 2017-09-08 NOTE — PROGRESS NOTE ADULT - PROBLEM SELECTOR PLAN 3
H/O recurrent hyperkalemia. Improved  Kayexalate PRN  Monitor electrolytes

## 2017-09-08 NOTE — PROGRESS NOTE ADULT - SUBJECTIVE AND OBJECTIVE BOX
Patient is a 77y old  Male who presents with a chief complaint of Abdominal pain (03 Sep 2017 18:09)      INTERVAL HPI/OVERNIGHT EVENTS:    MEDICATIONS  (STANDING):  heparin  Injectable 5000 Unit(s) SubCutaneous every 8 hours  ATENolol  Tablet 25 milliGRAM(s) Oral daily  piperacillin/tazobactam IVPB. 3.375 Gram(s) IV Intermittent every 8 hours  acetaminophen   Tablet. 650 milliGRAM(s) Oral every 6 hours  pantoprazole    Tablet 40 milliGRAM(s) Oral before breakfast  insulin lispro (HumaLOG) corrective regimen sliding scale   SubCutaneous Before meals and at bedtime  sodium chloride 0.9% lock flush 3 milliLiter(s) IV Push every 8 hours  levothyroxine 88 MICROGram(s) Oral daily    MEDICATIONS  (PRN):  oxyCODONE    IR 5 milliGRAM(s) Oral every 6 hours PRN Moderate Pain (4 - 6)  oxyCODONE    IR 10 milliGRAM(s) Oral every 6 hours PRN Severe Pain (7 - 10)        Orders last 24 hours:  Complete Blood Count: AM Sched. Collection: 08-Sep-2017 06:00 (09-07-17 @ 15:59)  Basic Metabolic Panel w/Mg & Inorg Phos: AM Sched. Collection: 08-Sep-2017 06:00 (09-07-17 @ 15:59)      Allergies    No Known Allergies    Intolerances        REVIEW OF SYSTEMS:  CARDIOVASCULAR: No chest pain, palpitations, dizziness, or leg swelling; no shortness of breath     RESPIRATORY: No cough, wheezing, chills or hemoptysis; No shortness of breath    GASTROINTESTINAL: mild abdominal pain; tolerating PO No diarrhea or constipation. No melena or hematochezia.    NEUROLOGICAL: No headaches, memory loss, loss of strength, numbness      PHYSICAL EXAM:  Vital Signs Last 24 Hrs  T(C): 36.1 (08 Sep 2017 09:46), Max: 37.2 (08 Sep 2017 02:26)  T(F): 97 (08 Sep 2017 09:46), Max: 99 (08 Sep 2017 02:26)  HR: 58 (08 Sep 2017 09:46) (58 - 70)  BP: 137/76 (08 Sep 2017 09:46) (121/71 - 166/96)  BP(mean): --  RR: 18 (08 Sep 2017 09:46) (16 - 20)  SpO2: 97% (08 Sep 2017 09:46) (97% - 100%)    GENERAL: NAD, well-groomed, well-developed  HEAD:  Atraumatic, Normocephalic  EYES: EOMI, PERRLA, conjunctiva and sclera clear  NECK: Supple, No JVD, Normal thyroid  NERVOUS SYSTEM:  Alert & Oriented X3, Good concentration;  and symmetric  CHEST/LUNG: Clear to auscultation bilaterally; No rales, rhonchi, wheezing, or rubs  HEART: S1S2 regular, without murmur, rub nor gallop  ABDOMEN: Soft, mildly tender RUQ, wound site clean and dry Bowel sounds present  EXTREMITIES:  2+ Peripheral Pulses, No clubbing, cyanosis, or edema      LABS:                        11.0   6.68  )-----------( 192      ( 08 Sep 2017 07:00 )             33.8     08 Sep 2017 07:00    132    |  101    |  32     ----------------------------<  160    4.2     |  18     |  2.06     Ca    9.0        08 Sep 2017 07:00  Phos  2.9       08 Sep 2017 07:00  Mg     1.9       08 Sep 2017 07:00        CAPILLARY BLOOD GLUCOSE  154 (08 Sep 2017 09:46)  167 (07 Sep 2017 22:14)  104 (07 Sep 2017 17:27)  129 (07 Sep 2017 12:06)  91 (07 Sep 2017 11:01)        POD 4.  tolerating PO.  Continue antibiotics.  DVT prophylaxis

## 2017-09-09 ENCOUNTER — TRANSCRIPTION ENCOUNTER (OUTPATIENT)
Age: 77
End: 2017-09-09

## 2017-09-09 VITALS
RESPIRATION RATE: 18 BRPM | TEMPERATURE: 98 F | DIASTOLIC BLOOD PRESSURE: 62 MMHG | OXYGEN SATURATION: 97 % | HEART RATE: 58 BPM | SYSTOLIC BLOOD PRESSURE: 117 MMHG

## 2017-09-09 LAB
BUN SERPL-MCNC: 31 MG/DL — HIGH (ref 7–23)
CALCIUM SERPL-MCNC: 9.5 MG/DL — SIGNIFICANT CHANGE UP (ref 8.4–10.5)
CHLORIDE SERPL-SCNC: 101 MMOL/L — SIGNIFICANT CHANGE UP (ref 98–107)
CO2 SERPL-SCNC: 19 MMOL/L — LOW (ref 22–31)
CREAT SERPL-MCNC: 2.06 MG/DL — HIGH (ref 0.5–1.3)
GLUCOSE SERPL-MCNC: 191 MG/DL — HIGH (ref 70–99)
HCT VFR BLD CALC: 35.8 % — LOW (ref 39–50)
HGB BLD-MCNC: 11.9 G/DL — LOW (ref 13–17)
MAGNESIUM SERPL-MCNC: 1.9 MG/DL — SIGNIFICANT CHANGE UP (ref 1.6–2.6)
MCHC RBC-ENTMCNC: 28.5 PG — SIGNIFICANT CHANGE UP (ref 27–34)
MCHC RBC-ENTMCNC: 33.2 % — SIGNIFICANT CHANGE UP (ref 32–36)
MCV RBC AUTO: 85.6 FL — SIGNIFICANT CHANGE UP (ref 80–100)
NRBC # FLD: 0 — SIGNIFICANT CHANGE UP
PHOSPHATE SERPL-MCNC: 3.2 MG/DL — SIGNIFICANT CHANGE UP (ref 2.5–4.5)
PLATELET # BLD AUTO: 217 K/UL — SIGNIFICANT CHANGE UP (ref 150–400)
PMV BLD: 11.4 FL — SIGNIFICANT CHANGE UP (ref 7–13)
POTASSIUM SERPL-MCNC: 4 MMOL/L — SIGNIFICANT CHANGE UP (ref 3.5–5.3)
POTASSIUM SERPL-SCNC: 4 MMOL/L — SIGNIFICANT CHANGE UP (ref 3.5–5.3)
RBC # BLD: 4.18 M/UL — LOW (ref 4.2–5.8)
RBC # FLD: 12.7 % — SIGNIFICANT CHANGE UP (ref 10.3–14.5)
SODIUM SERPL-SCNC: 137 MMOL/L — SIGNIFICANT CHANGE UP (ref 135–145)
WBC # BLD: 7.15 K/UL — SIGNIFICANT CHANGE UP (ref 3.8–10.5)
WBC # FLD AUTO: 7.15 K/UL — SIGNIFICANT CHANGE UP (ref 3.8–10.5)

## 2017-09-09 RX ORDER — HYDRALAZINE HCL 50 MG
10 TABLET ORAL ONCE
Qty: 0 | Refills: 0 | Status: COMPLETED | OUTPATIENT
Start: 2017-09-09 | End: 2017-09-09

## 2017-09-09 RX ORDER — ACETAMINOPHEN 500 MG
2 TABLET ORAL
Qty: 0 | Refills: 0 | DISCHARGE
Start: 2017-09-09

## 2017-09-09 RX ADMIN — PANTOPRAZOLE SODIUM 40 MILLIGRAM(S): 20 TABLET, DELAYED RELEASE ORAL at 05:25

## 2017-09-09 RX ADMIN — ATENOLOL 25 MILLIGRAM(S): 25 TABLET ORAL at 11:50

## 2017-09-09 RX ADMIN — SODIUM CHLORIDE 3 MILLILITER(S): 9 INJECTION INTRAMUSCULAR; INTRAVENOUS; SUBCUTANEOUS at 05:25

## 2017-09-09 RX ADMIN — Medication 650 MILLIGRAM(S): at 18:03

## 2017-09-09 RX ADMIN — Medication 650 MILLIGRAM(S): at 12:30

## 2017-09-09 RX ADMIN — Medication 650 MILLIGRAM(S): at 18:30

## 2017-09-09 RX ADMIN — Medication 2: at 13:45

## 2017-09-09 RX ADMIN — PIPERACILLIN AND TAZOBACTAM 25 GRAM(S): 4; .5 INJECTION, POWDER, LYOPHILIZED, FOR SOLUTION INTRAVENOUS at 05:25

## 2017-09-09 RX ADMIN — Medication 650 MILLIGRAM(S): at 11:50

## 2017-09-09 RX ADMIN — PIPERACILLIN AND TAZOBACTAM 25 GRAM(S): 4; .5 INJECTION, POWDER, LYOPHILIZED, FOR SOLUTION INTRAVENOUS at 13:46

## 2017-09-09 RX ADMIN — SODIUM CHLORIDE 3 MILLILITER(S): 9 INJECTION INTRAMUSCULAR; INTRAVENOUS; SUBCUTANEOUS at 13:46

## 2017-09-09 RX ADMIN — HEPARIN SODIUM 5000 UNIT(S): 5000 INJECTION INTRAVENOUS; SUBCUTANEOUS at 13:46

## 2017-09-09 RX ADMIN — Medication 650 MILLIGRAM(S): at 05:25

## 2017-09-09 RX ADMIN — HEPARIN SODIUM 5000 UNIT(S): 5000 INJECTION INTRAVENOUS; SUBCUTANEOUS at 05:25

## 2017-09-09 RX ADMIN — Medication 10 MILLIGRAM(S): at 03:50

## 2017-09-09 RX ADMIN — Medication 88 MICROGRAM(S): at 05:25

## 2017-09-09 NOTE — PROGRESS NOTE ADULT - ASSESSMENT
A/P: 77M now s/p lap cholecystectomy w/ IOC for symptomatic cholecystitis. Patient noted to have a significant amount of inflammation and was therefore started on zosyn. Doing well.    - Diet: Reg  - Pain control  - Encourage IS/OOB as tolerated  - DVT ppx: SQH  - Dispo: Discharge to home today.    Sid Burton M.D.

## 2017-09-09 NOTE — DISCHARGE NOTE ADULT - MEDICATION SUMMARY - MEDICATIONS TO TAKE
I will START or STAY ON the medications listed below when I get home from the hospital:    acetaminophen 325 mg oral tablet  -- 2 tab(s) by mouth every 6 hours  -- Indication: For Mild to Moderate Pain    Januvia 25 mg oral tablet  -- 1 tab(s) by mouth once a day  -- Indication: For Home med    Tradjenta 5 mg oral tablet  -- 1 tab(s) by mouth once a day  -- Indication: For Home med    glipiZIDE 10 mg oral tablet, extended release  -- 1 tab(s) by mouth 2 times a day  -- Indication: For Home med    simvastatin 20 mg oral tablet  -- 1 tab(s) by mouth once a day (at bedtime)  -- Indication: For Home med    fenofibrate 54 mg oral tablet  -- 1 tab(s) by mouth once a day  -- Indication: For Home med    Effient 5 mg oral tablet  -- 1 tab(s) by mouth once a day  -- Indication: For Home med    zolpidem 10 mg oral tablet  -- 1 tab(s) by mouth once a day (at bedtime)  -- Indication: For Home med    atenolol 25 mg oral tablet  -- 1 tab(s) by mouth once a day  -- Indication: For Home med    famotidine 40 mg oral tablet  -- 1 tab(s) by mouth once a day (at bedtime)  -- Indication: For Home med    simethicone 80 mg oral tablet  -- 2 cap(s) by mouth 4 times a day  -- Indication: For Home med    levothyroxine 75 mcg (0.075 mg) oral capsule  -- 1 cap(s) by mouth once a day  -- Indication: For Home med

## 2017-09-09 NOTE — DISCHARGE NOTE ADULT - ADDITIONAL INSTRUCTIONS
Please follow up with Dr. Davis within 1-2 weeks after discharge from the hospital. You may call [phone] to schedule an appointment.

## 2017-09-09 NOTE — PROGRESS NOTE ADULT - SUBJECTIVE AND OBJECTIVE BOX
A Team Progress Note    S: No acute events overnight. Patient resting comfortably in bed. Pain well controlled. Denies fevers/chills and N/V.     O:  T(C): 36.7 (09-08-17 @ 21:24), Max: 36.8 (09-08-17 @ 17:36)  HR: 59 (09-08-17 @ 21:24) (58 - 59)  BP: 163/92 (09-08-17 @ 21:24) (149/83 - 163/92)  RR: 18 (09-08-17 @ 21:24) (18 - 18)  SpO2: 100% (09-08-17 @ 21:24) (95% - 100%)  Wt(kg): --    09-08 @ 07:01  -  09-09 @ 07:00  --------------------------------------------------------  IN:  Total IN: 0 mL    OUT:    Voided: 1600 mL  Total OUT: 1600 mL    Total NET: -1600 mL        CBC Full  -  ( 09 Sep 2017 05:47 )  WBC Count : 7.15 K/uL  Hemoglobin : 11.9 g/dL  Hematocrit : 35.8 %  Platelet Count - Automated : 217 K/uL  Mean Cell Volume : 85.6 fL  Mean Cell Hemoglobin : 28.5 pg  Mean Cell Hemoglobin Concentration : 33.2 %      CAPILLARY BLOOD GLUCOSE  154 (08 Sep 2017 21:24)  190 (08 Sep 2017 17:36)  209 (08 Sep 2017 12:59)        PHYSICAL EXAM:  Gen: A&Ox3, laying in bed in NAD  Abd: Soft, moderately tender in RLQ, mildly distended  Incision: Dressings in place c/d/i

## 2017-09-09 NOTE — DISCHARGE NOTE ADULT - CARE PLAN
Principal Discharge DX:	Abdominal pain, unspecified location  Goal:	Surgical intervention  Instructions for follow-up, activity and diet:	The patient may resume a regular diet and activity. Take medications as prescribed.

## 2017-09-09 NOTE — DISCHARGE NOTE ADULT - HOSPITAL COURSE
Patient is a 77y old Male w/ PMH significant for CAD with 4 stents, on aspirin and Effient who p/w c/o abdominal pain. He denied N/V, but did have a decrease in his appetite. Patient has a known history of cholelithiasis now with with symptomatic cholelithiasis, Lab is significant fr WBC:13.9. Patient taken to the OR on 9/4 for Cholecystectomy with cholangiography and was found to have an acutely inflamed gallbladder and an IOC that demonstrated normal anatomy. Patient was started on Zosyn 2/2 the amount of inflammation seen. The patient recovered well and his leukocytosis improved. Patient tolerated a normal diet and his pain was well controlled. Patient is hemodynamically stable and now ready for discharge.

## 2017-09-09 NOTE — DISCHARGE NOTE ADULT - INSTRUCTIONS
The patient may resume a regular diet and activity. Take medications as prescribed. Call DM for f/u appointment, call for fever, severe pain

## 2017-09-09 NOTE — DISCHARGE NOTE ADULT - PLAN OF CARE
Surgical intervention The patient may resume a regular diet and activity. Take medications as prescribed.

## 2017-09-09 NOTE — DISCHARGE NOTE ADULT - PATIENT PORTAL LINK FT
“You can access the [facility] Patient Portal, offered by [facility], by registering with the following website: [email or web address]”

## 2017-09-25 PROBLEM — I10 ESSENTIAL (PRIMARY) HYPERTENSION: Chronic | Status: ACTIVE | Noted: 2017-09-03

## 2017-09-25 PROBLEM — Z00.00 ENCOUNTER FOR PREVENTIVE HEALTH EXAMINATION: Status: ACTIVE | Noted: 2017-09-25

## 2017-09-25 PROBLEM — K27.9 PEPTIC ULCER, SITE UNSPECIFIED, UNSPECIFIED AS ACUTE OR CHRONIC, WITHOUT HEMORRHAGE OR PERFORATION: Chronic | Status: ACTIVE | Noted: 2017-09-03

## 2017-09-25 PROBLEM — E78.5 HYPERLIPIDEMIA, UNSPECIFIED: Chronic | Status: ACTIVE | Noted: 2017-09-03

## 2017-09-27 ENCOUNTER — APPOINTMENT (OUTPATIENT)
Dept: SURGERY | Facility: CLINIC | Age: 77
End: 2017-09-27
Payer: COMMERCIAL

## 2017-09-27 VITALS
HEIGHT: 62 IN | DIASTOLIC BLOOD PRESSURE: 78 MMHG | TEMPERATURE: 98.7 F | HEART RATE: 62 BPM | SYSTOLIC BLOOD PRESSURE: 126 MMHG | BODY MASS INDEX: 25.76 KG/M2 | WEIGHT: 140 LBS

## 2017-09-27 DIAGNOSIS — F17.200 NICOTINE DEPENDENCE, UNSPECIFIED, UNCOMPLICATED: ICD-10-CM

## 2017-09-27 DIAGNOSIS — Z87.448 PERSONAL HISTORY OF OTHER DISEASES OF URINARY SYSTEM: ICD-10-CM

## 2017-09-27 DIAGNOSIS — Z83.3 FAMILY HISTORY OF DIABETES MELLITUS: ICD-10-CM

## 2017-09-27 DIAGNOSIS — Z86.79 PERSONAL HISTORY OF OTHER DISEASES OF THE CIRCULATORY SYSTEM: ICD-10-CM

## 2017-09-27 DIAGNOSIS — Z82.3 FAMILY HISTORY OF STROKE: ICD-10-CM

## 2017-09-27 DIAGNOSIS — Z86.39 PERSONAL HISTORY OF OTHER ENDOCRINE, NUTRITIONAL AND METABOLIC DISEASE: ICD-10-CM

## 2017-09-27 DIAGNOSIS — Z09 ENCOUNTER FOR FOLLOW-UP EXAMINATION AFTER COMPLETED TREATMENT FOR CONDITIONS OTHER THAN MALIGNANT NEOPLASM: ICD-10-CM

## 2017-09-27 PROCEDURE — 99024 POSTOP FOLLOW-UP VISIT: CPT

## 2017-09-27 RX ORDER — GLIPIZIDE 2.5 MG/1
2.5 TABLET, EXTENDED RELEASE ORAL
Refills: 0 | Status: ACTIVE | COMMUNITY

## 2017-09-27 RX ORDER — CHROMIUM 200 MCG
TABLET ORAL
Refills: 0 | Status: ACTIVE | COMMUNITY

## 2017-09-27 RX ORDER — LEVOTHYROXINE SODIUM 75 UG/1
75 TABLET ORAL
Refills: 0 | Status: ACTIVE | COMMUNITY

## 2017-09-27 RX ORDER — ATENOLOL 50 MG/1
50 TABLET ORAL
Refills: 0 | Status: ACTIVE | COMMUNITY

## 2017-09-27 RX ORDER — PRASUGREL HYDROCHLORIDE 5 MG/1
5 TABLET, COATED ORAL
Refills: 0 | Status: ACTIVE | COMMUNITY

## 2019-08-30 ENCOUNTER — INPATIENT (INPATIENT)
Facility: HOSPITAL | Age: 79
LOS: 17 days | Discharge: HOME HEALTH | End: 2019-09-17
Attending: INTERNAL MEDICINE | Admitting: INTERNAL MEDICINE
Payer: MEDICAID

## 2019-08-30 VITALS
DIASTOLIC BLOOD PRESSURE: 62 MMHG | TEMPERATURE: 98 F | RESPIRATION RATE: 17 BRPM | OXYGEN SATURATION: 100 % | HEART RATE: 78 BPM | SYSTOLIC BLOOD PRESSURE: 116 MMHG

## 2019-08-30 DIAGNOSIS — K59.00 CONSTIPATION, UNSPECIFIED: ICD-10-CM

## 2019-08-30 DIAGNOSIS — E78.5 HYPERLIPIDEMIA, UNSPECIFIED: ICD-10-CM

## 2019-08-30 DIAGNOSIS — E11.9 TYPE 2 DIABETES MELLITUS WITHOUT COMPLICATIONS: ICD-10-CM

## 2019-08-30 DIAGNOSIS — R55 SYNCOPE AND COLLAPSE: ICD-10-CM

## 2019-08-30 DIAGNOSIS — I10 ESSENTIAL (PRIMARY) HYPERTENSION: ICD-10-CM

## 2019-08-30 DIAGNOSIS — E03.9 HYPOTHYROIDISM, UNSPECIFIED: ICD-10-CM

## 2019-08-30 DIAGNOSIS — Z29.9 ENCOUNTER FOR PROPHYLACTIC MEASURES, UNSPECIFIED: ICD-10-CM

## 2019-08-30 DIAGNOSIS — E83.52 HYPERCALCEMIA: ICD-10-CM

## 2019-08-30 DIAGNOSIS — I25.10 ATHEROSCLEROTIC HEART DISEASE OF NATIVE CORONARY ARTERY WITHOUT ANGINA PECTORIS: ICD-10-CM

## 2019-08-30 DIAGNOSIS — Z95.5 PRESENCE OF CORONARY ANGIOPLASTY IMPLANT AND GRAFT: Chronic | ICD-10-CM

## 2019-08-30 DIAGNOSIS — N28.9 DISORDER OF KIDNEY AND URETER, UNSPECIFIED: ICD-10-CM

## 2019-08-30 DIAGNOSIS — E16.2 HYPOGLYCEMIA, UNSPECIFIED: ICD-10-CM

## 2019-08-30 LAB
ALBUMIN SERPL ELPH-MCNC: 3.2 G/DL — LOW (ref 3.3–5)
ALBUMIN SERPL ELPH-MCNC: 3.3 G/DL — SIGNIFICANT CHANGE UP (ref 3.3–5)
ALP SERPL-CCNC: 70 U/L — SIGNIFICANT CHANGE UP (ref 40–120)
ALP SERPL-CCNC: 72 U/L — SIGNIFICANT CHANGE UP (ref 40–120)
ALT FLD-CCNC: 58 U/L — HIGH (ref 4–41)
ALT FLD-CCNC: 67 U/L — HIGH (ref 4–41)
ANION GAP SERPL CALC-SCNC: 11 MMO/L — SIGNIFICANT CHANGE UP (ref 7–14)
ANION GAP SERPL CALC-SCNC: 9 MMO/L — SIGNIFICANT CHANGE UP (ref 7–14)
ANISOCYTOSIS BLD QL: SIGNIFICANT CHANGE UP
APPEARANCE UR: CLEAR — SIGNIFICANT CHANGE UP
APTT BLD: 22.4 SEC — LOW (ref 27.5–36.3)
AST SERPL-CCNC: 49 U/L — HIGH (ref 4–40)
AST SERPL-CCNC: 62 U/L — HIGH (ref 4–40)
BACTERIA # UR AUTO: NEGATIVE — SIGNIFICANT CHANGE UP
BASE EXCESS BLDV CALC-SCNC: 0.1 MMOL/L — SIGNIFICANT CHANGE UP
BASOPHILS # BLD AUTO: 0.02 K/UL — SIGNIFICANT CHANGE UP (ref 0–0.2)
BASOPHILS NFR BLD AUTO: 0.3 % — SIGNIFICANT CHANGE UP (ref 0–2)
BASOPHILS NFR SPEC: 0 % — SIGNIFICANT CHANGE UP (ref 0–2)
BILIRUB SERPL-MCNC: 0.7 MG/DL — SIGNIFICANT CHANGE UP (ref 0.2–1.2)
BILIRUB SERPL-MCNC: 0.8 MG/DL — SIGNIFICANT CHANGE UP (ref 0.2–1.2)
BILIRUB UR-MCNC: NEGATIVE — SIGNIFICANT CHANGE UP
BLASTS # FLD: 0 % — SIGNIFICANT CHANGE UP (ref 0–0)
BLOOD GAS VENOUS - CREATININE: 2.68 MG/DL — HIGH (ref 0.5–1.3)
BLOOD UR QL VISUAL: NEGATIVE — SIGNIFICANT CHANGE UP
BUN SERPL-MCNC: 40 MG/DL — HIGH (ref 7–23)
BUN SERPL-MCNC: 43 MG/DL — HIGH (ref 7–23)
CA-I BLD-SCNC: 1.46 MMOL/L — HIGH (ref 1.03–1.23)
CALCIUM SERPL-MCNC: 10.8 MG/DL — HIGH (ref 8.4–10.5)
CALCIUM SERPL-MCNC: 11.1 MG/DL — HIGH (ref 8.4–10.5)
CHLORIDE BLDV-SCNC: 97 MMOL/L — SIGNIFICANT CHANGE UP (ref 96–108)
CHLORIDE SERPL-SCNC: 93 MMOL/L — LOW (ref 98–107)
CHLORIDE SERPL-SCNC: 95 MMOL/L — LOW (ref 98–107)
CK SERPL-CCNC: 55 U/L — SIGNIFICANT CHANGE UP (ref 30–200)
CO2 SERPL-SCNC: 23 MMOL/L — SIGNIFICANT CHANGE UP (ref 22–31)
CO2 SERPL-SCNC: 23 MMOL/L — SIGNIFICANT CHANGE UP (ref 22–31)
COLOR SPEC: SIGNIFICANT CHANGE UP
CREAT SERPL-MCNC: 2.53 MG/DL — HIGH (ref 0.5–1.3)
CREAT SERPL-MCNC: 2.59 MG/DL — HIGH (ref 0.5–1.3)
EOSINOPHIL # BLD AUTO: 0.03 K/UL — SIGNIFICANT CHANGE UP (ref 0–0.5)
EOSINOPHIL NFR BLD AUTO: 0.5 % — SIGNIFICANT CHANGE UP (ref 0–6)
EOSINOPHIL NFR FLD: 0 % — SIGNIFICANT CHANGE UP (ref 0–6)
GAS PNL BLDV: 124 MMOL/L — LOW (ref 136–146)
GIANT PLATELETS BLD QL SMEAR: PRESENT — SIGNIFICANT CHANGE UP
GLUCOSE BLDV-MCNC: 40 MG/DL — CRITICAL LOW (ref 70–99)
GLUCOSE SERPL-MCNC: 42 MG/DL — CRITICAL LOW (ref 70–99)
GLUCOSE SERPL-MCNC: 43 MG/DL — CRITICAL LOW (ref 70–99)
GLUCOSE UR-MCNC: 300 — HIGH
HCO3 BLDV-SCNC: 22 MMOL/L — SIGNIFICANT CHANGE UP (ref 20–27)
HCT VFR BLD CALC: 46.4 % — SIGNIFICANT CHANGE UP (ref 39–50)
HCT VFR BLDV CALC: 46.1 % — SIGNIFICANT CHANGE UP (ref 39–51)
HGB BLD-MCNC: 15 G/DL — SIGNIFICANT CHANGE UP (ref 13–17)
HGB BLDV-MCNC: 15 G/DL — SIGNIFICANT CHANGE UP (ref 13–17)
HYALINE CASTS # UR AUTO: NEGATIVE — SIGNIFICANT CHANGE UP
IMM GRANULOCYTES NFR BLD AUTO: 1.4 % — SIGNIFICANT CHANGE UP (ref 0–1.5)
INR BLD: 0.97 — SIGNIFICANT CHANGE UP (ref 0.88–1.17)
KETONES UR-MCNC: NEGATIVE — SIGNIFICANT CHANGE UP
LACTATE BLDV-MCNC: 1.2 MMOL/L — SIGNIFICANT CHANGE UP (ref 0.5–2)
LEUKOCYTE ESTERASE UR-ACNC: NEGATIVE — SIGNIFICANT CHANGE UP
LYMPHOCYTES # BLD AUTO: 0.46 K/UL — LOW (ref 1–3.3)
LYMPHOCYTES # BLD AUTO: 7 % — LOW (ref 13–44)
LYMPHOCYTES NFR SPEC AUTO: 3.5 % — LOW (ref 13–44)
MACROCYTES BLD QL: SLIGHT — SIGNIFICANT CHANGE UP
MAGNESIUM SERPL-MCNC: 2 MG/DL — SIGNIFICANT CHANGE UP (ref 1.6–2.6)
MCHC RBC-ENTMCNC: 28.7 PG — SIGNIFICANT CHANGE UP (ref 27–34)
MCHC RBC-ENTMCNC: 32.3 % — SIGNIFICANT CHANGE UP (ref 32–36)
MCV RBC AUTO: 88.7 FL — SIGNIFICANT CHANGE UP (ref 80–100)
METAMYELOCYTES # FLD: 0 % — SIGNIFICANT CHANGE UP (ref 0–1)
MONOCYTES # BLD AUTO: 0.77 K/UL — SIGNIFICANT CHANGE UP (ref 0–0.9)
MONOCYTES NFR BLD AUTO: 11.7 % — SIGNIFICANT CHANGE UP (ref 2–14)
MONOCYTES NFR BLD: 12.3 % — HIGH (ref 2–9)
MYELOCYTES NFR BLD: 0 % — SIGNIFICANT CHANGE UP (ref 0–0)
NEUTROPHIL AB SER-ACNC: 73.7 % — SIGNIFICANT CHANGE UP (ref 43–77)
NEUTROPHILS # BLD AUTO: 5.2 K/UL — SIGNIFICANT CHANGE UP (ref 1.8–7.4)
NEUTROPHILS NFR BLD AUTO: 79.1 % — HIGH (ref 43–77)
NEUTS BAND # BLD: 7.9 % — HIGH (ref 0–6)
NITRITE UR-MCNC: NEGATIVE — SIGNIFICANT CHANGE UP
NRBC # BLD: 1 /100WBC — SIGNIFICANT CHANGE UP
NRBC # FLD: 0 K/UL — SIGNIFICANT CHANGE UP (ref 0–0)
OTHER - HEMATOLOGY %: 0 — SIGNIFICANT CHANGE UP
OVALOCYTES BLD QL SMEAR: SLIGHT — SIGNIFICANT CHANGE UP
PCO2 BLDV: 48 MMHG — SIGNIFICANT CHANGE UP (ref 41–51)
PH BLDV: 7.34 PH — SIGNIFICANT CHANGE UP (ref 7.32–7.43)
PH UR: 6 — SIGNIFICANT CHANGE UP (ref 5–8)
PHOSPHATE SERPL-MCNC: 3.1 MG/DL — SIGNIFICANT CHANGE UP (ref 2.5–4.5)
PLATELET # BLD AUTO: 122 K/UL — LOW (ref 150–400)
PLATELET COUNT - ESTIMATE: SIGNIFICANT CHANGE UP
PMV BLD: 11.6 FL — SIGNIFICANT CHANGE UP (ref 7–13)
PO2 BLDV: < 24 MMHG — LOW (ref 35–40)
POIKILOCYTOSIS BLD QL AUTO: SLIGHT — SIGNIFICANT CHANGE UP
POLYCHROMASIA BLD QL SMEAR: SIGNIFICANT CHANGE UP
POTASSIUM BLDV-SCNC: 4.4 MMOL/L — SIGNIFICANT CHANGE UP (ref 3.4–4.5)
POTASSIUM SERPL-MCNC: 4.7 MMOL/L — SIGNIFICANT CHANGE UP (ref 3.5–5.3)
POTASSIUM SERPL-MCNC: 4.9 MMOL/L — SIGNIFICANT CHANGE UP (ref 3.5–5.3)
POTASSIUM SERPL-SCNC: 4.7 MMOL/L — SIGNIFICANT CHANGE UP (ref 3.5–5.3)
POTASSIUM SERPL-SCNC: 4.9 MMOL/L — SIGNIFICANT CHANGE UP (ref 3.5–5.3)
PROMYELOCYTES # FLD: 0 % — SIGNIFICANT CHANGE UP (ref 0–0)
PROT SERPL-MCNC: 6.5 G/DL — SIGNIFICANT CHANGE UP (ref 6–8.3)
PROT SERPL-MCNC: 6.8 G/DL — SIGNIFICANT CHANGE UP (ref 6–8.3)
PROT UR-MCNC: 20 — SIGNIFICANT CHANGE UP
PROTHROM AB SERPL-ACNC: 11 SEC — SIGNIFICANT CHANGE UP (ref 9.8–13.1)
RBC # BLD: 5.23 M/UL — SIGNIFICANT CHANGE UP (ref 4.2–5.8)
RBC # FLD: 13.8 % — SIGNIFICANT CHANGE UP (ref 10.3–14.5)
RBC CASTS # UR COMP ASSIST: SIGNIFICANT CHANGE UP (ref 0–?)
SAO2 % BLDV: 18.3 % — LOW (ref 60–85)
SODIUM SERPL-SCNC: 127 MMOL/L — LOW (ref 135–145)
SODIUM SERPL-SCNC: 127 MMOL/L — LOW (ref 135–145)
SP GR SPEC: 1.01 — SIGNIFICANT CHANGE UP (ref 1–1.04)
SQUAMOUS # UR AUTO: SIGNIFICANT CHANGE UP
TROPONIN T, HIGH SENSITIVITY: 101 NG/L — CRITICAL HIGH (ref ?–14)
TROPONIN T, HIGH SENSITIVITY: 87 NG/L — CRITICAL HIGH (ref ?–14)
TROPONIN T, HIGH SENSITIVITY: 92 NG/L — CRITICAL HIGH (ref ?–14)
UROBILINOGEN FLD QL: NORMAL — SIGNIFICANT CHANGE UP
VARIANT LYMPHS # BLD: 2.6 % — SIGNIFICANT CHANGE UP
WBC # BLD: 6.57 K/UL — SIGNIFICANT CHANGE UP (ref 3.8–10.5)
WBC # FLD AUTO: 6.57 K/UL — SIGNIFICANT CHANGE UP (ref 3.8–10.5)
WBC UR QL: SIGNIFICANT CHANGE UP (ref 0–?)

## 2019-08-30 PROCEDURE — 71045 X-RAY EXAM CHEST 1 VIEW: CPT | Mod: 26

## 2019-08-30 PROCEDURE — 74019 RADEX ABDOMEN 2 VIEWS: CPT | Mod: 26

## 2019-08-30 PROCEDURE — 99222 1ST HOSP IP/OBS MODERATE 55: CPT

## 2019-08-30 RX ORDER — DOCUSATE SODIUM 100 MG
100 CAPSULE ORAL THREE TIMES A DAY
Refills: 0 | Status: DISCONTINUED | OUTPATIENT
Start: 2019-08-30 | End: 2019-09-17

## 2019-08-30 RX ORDER — ZOLPIDEM TARTRATE 10 MG/1
5 TABLET ORAL AT BEDTIME
Refills: 0 | Status: DISCONTINUED | OUTPATIENT
Start: 2019-08-30 | End: 2019-08-30

## 2019-08-30 RX ORDER — INSULIN LISPRO 100/ML
VIAL (ML) SUBCUTANEOUS
Refills: 0 | Status: DISCONTINUED | OUTPATIENT
Start: 2019-08-30 | End: 2019-09-17

## 2019-08-30 RX ORDER — ERGOCALCIFEROL 1.25 MG/1
50000 CAPSULE ORAL
Refills: 0 | Status: DISCONTINUED | OUTPATIENT
Start: 2019-08-30 | End: 2019-09-09

## 2019-08-30 RX ORDER — ISOSORBIDE DINITRATE 5 MG/1
5 TABLET ORAL THREE TIMES A DAY
Refills: 0 | Status: DISCONTINUED | OUTPATIENT
Start: 2019-08-30 | End: 2019-09-06

## 2019-08-30 RX ORDER — GABAPENTIN 400 MG/1
400 CAPSULE ORAL
Refills: 0 | Status: DISCONTINUED | OUTPATIENT
Start: 2019-08-30 | End: 2019-08-30

## 2019-08-30 RX ORDER — AMLODIPINE BESYLATE 2.5 MG/1
5 TABLET ORAL DAILY
Refills: 0 | Status: DISCONTINUED | OUTPATIENT
Start: 2019-08-30 | End: 2019-09-06

## 2019-08-30 RX ORDER — SIMETHICONE 80 MG/1
2 TABLET, CHEWABLE ORAL
Qty: 0 | Refills: 0 | DISCHARGE

## 2019-08-30 RX ORDER — ASPIRIN/CALCIUM CARB/MAGNESIUM 324 MG
81 TABLET ORAL DAILY
Refills: 0 | Status: DISCONTINUED | OUTPATIENT
Start: 2019-08-31 | End: 2019-09-11

## 2019-08-30 RX ORDER — DEXTROSE 50 % IN WATER 50 %
15 SYRINGE (ML) INTRAVENOUS ONCE
Refills: 0 | Status: DISCONTINUED | OUTPATIENT
Start: 2019-08-30 | End: 2019-09-17

## 2019-08-30 RX ORDER — PRASUGREL 5 MG/1
1 TABLET, FILM COATED ORAL
Qty: 0 | Refills: 0 | DISCHARGE

## 2019-08-30 RX ORDER — GLUCAGON INJECTION, SOLUTION 0.5 MG/.1ML
1 INJECTION, SOLUTION SUBCUTANEOUS ONCE
Refills: 0 | Status: DISCONTINUED | OUTPATIENT
Start: 2019-08-30 | End: 2019-09-17

## 2019-08-30 RX ORDER — FAMOTIDINE 10 MG/ML
20 INJECTION INTRAVENOUS
Refills: 0 | Status: DISCONTINUED | OUTPATIENT
Start: 2019-08-30 | End: 2019-08-30

## 2019-08-30 RX ORDER — LEVOTHYROXINE SODIUM 125 MCG
88 TABLET ORAL DAILY
Refills: 0 | Status: DISCONTINUED | OUTPATIENT
Start: 2019-08-30 | End: 2019-09-17

## 2019-08-30 RX ORDER — DEXTROSE 50 % IN WATER 50 %
12.5 SYRINGE (ML) INTRAVENOUS ONCE
Refills: 0 | Status: DISCONTINUED | OUTPATIENT
Start: 2019-08-30 | End: 2019-09-17

## 2019-08-30 RX ORDER — LEVOTHYROXINE SODIUM 125 MCG
1 TABLET ORAL
Qty: 0 | Refills: 0 | DISCHARGE

## 2019-08-30 RX ORDER — POLYETHYLENE GLYCOL 3350 17 G/17G
17 POWDER, FOR SOLUTION ORAL DAILY
Refills: 0 | Status: DISCONTINUED | OUTPATIENT
Start: 2019-08-30 | End: 2019-09-17

## 2019-08-30 RX ORDER — ZOLPIDEM TARTRATE 10 MG/1
1 TABLET ORAL
Qty: 0 | Refills: 0 | DISCHARGE

## 2019-08-30 RX ORDER — FAMOTIDINE 10 MG/ML
10 INJECTION INTRAVENOUS DAILY
Refills: 0 | Status: DISCONTINUED | OUTPATIENT
Start: 2019-08-30 | End: 2019-09-17

## 2019-08-30 RX ORDER — FAMOTIDINE 10 MG/ML
1 INJECTION INTRAVENOUS
Qty: 0 | Refills: 0 | DISCHARGE

## 2019-08-30 RX ORDER — CLOPIDOGREL BISULFATE 75 MG/1
75 TABLET, FILM COATED ORAL DAILY
Refills: 0 | Status: DISCONTINUED | OUTPATIENT
Start: 2019-08-30 | End: 2019-09-11

## 2019-08-30 RX ORDER — ZOLPIDEM TARTRATE 10 MG/1
5 TABLET ORAL AT BEDTIME
Refills: 0 | Status: DISCONTINUED | OUTPATIENT
Start: 2019-08-30 | End: 2019-09-04

## 2019-08-30 RX ORDER — DEXTROSE 50 % IN WATER 50 %
50 SYRINGE (ML) INTRAVENOUS ONCE
Refills: 0 | Status: COMPLETED | OUTPATIENT
Start: 2019-08-30 | End: 2019-08-30

## 2019-08-30 RX ORDER — LINAGLIPTIN 5 MG/1
1 TABLET, FILM COATED ORAL
Qty: 0 | Refills: 0 | DISCHARGE

## 2019-08-30 RX ORDER — SITAGLIPTIN 50 MG/1
1 TABLET, FILM COATED ORAL
Qty: 0 | Refills: 0 | DISCHARGE

## 2019-08-30 RX ORDER — SENNA PLUS 8.6 MG/1
2 TABLET ORAL AT BEDTIME
Refills: 0 | Status: DISCONTINUED | OUTPATIENT
Start: 2019-08-30 | End: 2019-09-17

## 2019-08-30 RX ORDER — FENOFIBRATE,MICRONIZED 130 MG
1 CAPSULE ORAL
Qty: 0 | Refills: 0 | DISCHARGE

## 2019-08-30 RX ORDER — ATENOLOL 25 MG/1
1 TABLET ORAL
Qty: 0 | Refills: 0 | DISCHARGE

## 2019-08-30 RX ORDER — HEPARIN SODIUM 5000 [USP'U]/ML
5000 INJECTION INTRAVENOUS; SUBCUTANEOUS EVERY 8 HOURS
Refills: 0 | Status: DISCONTINUED | OUTPATIENT
Start: 2019-08-30 | End: 2019-09-17

## 2019-08-30 RX ORDER — DEXTROSE 50 % IN WATER 50 %
25 SYRINGE (ML) INTRAVENOUS ONCE
Refills: 0 | Status: DISCONTINUED | OUTPATIENT
Start: 2019-08-30 | End: 2019-09-17

## 2019-08-30 RX ORDER — GABAPENTIN 400 MG/1
400 CAPSULE ORAL AT BEDTIME
Refills: 0 | Status: DISCONTINUED | OUTPATIENT
Start: 2019-08-30 | End: 2019-09-17

## 2019-08-30 RX ORDER — SIMVASTATIN 20 MG/1
20 TABLET, FILM COATED ORAL AT BEDTIME
Refills: 0 | Status: DISCONTINUED | OUTPATIENT
Start: 2019-08-30 | End: 2019-09-17

## 2019-08-30 RX ORDER — ASPIRIN/CALCIUM CARB/MAGNESIUM 324 MG
324 TABLET ORAL ONCE
Refills: 0 | Status: COMPLETED | OUTPATIENT
Start: 2019-08-30 | End: 2019-08-30

## 2019-08-30 RX ORDER — SODIUM CHLORIDE 9 MG/ML
1000 INJECTION, SOLUTION INTRAVENOUS
Refills: 0 | Status: DISCONTINUED | OUTPATIENT
Start: 2019-08-30 | End: 2019-09-17

## 2019-08-30 RX ADMIN — Medication 100 MILLIGRAM(S): at 22:25

## 2019-08-30 RX ADMIN — Medication 10 MILLIGRAM(S): at 22:25

## 2019-08-30 RX ADMIN — POLYETHYLENE GLYCOL 3350 17 GRAM(S): 17 POWDER, FOR SOLUTION ORAL at 18:05

## 2019-08-30 RX ADMIN — ISOSORBIDE DINITRATE 5 MILLIGRAM(S): 5 TABLET ORAL at 22:25

## 2019-08-30 RX ADMIN — SENNA PLUS 2 TABLET(S): 8.6 TABLET ORAL at 22:25

## 2019-08-30 RX ADMIN — SIMVASTATIN 20 MILLIGRAM(S): 20 TABLET, FILM COATED ORAL at 22:25

## 2019-08-30 RX ADMIN — Medication 5 MILLIGRAM(S): at 18:05

## 2019-08-30 RX ADMIN — GABAPENTIN 400 MILLIGRAM(S): 400 CAPSULE ORAL at 22:25

## 2019-08-30 RX ADMIN — Medication 50 MILLILITER(S): at 09:08

## 2019-08-30 RX ADMIN — CLOPIDOGREL BISULFATE 75 MILLIGRAM(S): 75 TABLET, FILM COATED ORAL at 18:04

## 2019-08-30 RX ADMIN — Medication 324 MILLIGRAM(S): at 14:36

## 2019-08-30 RX ADMIN — Medication 88 MICROGRAM(S): at 18:05

## 2019-08-30 NOTE — ED PROVIDER NOTE - CLINICAL SUMMARY MEDICAL DECISION MAKING FREE TEXT BOX
80 yo M Croatian speaking PMH significant for CAD with 4 stents, on aspirin p/w constipation x 1 day and syncopal episode 6 days ago. syncope maybe due cardiac will get trop cxr abasic labs and ekg. Constipation will get abd xray, enema if xr is normal.

## 2019-08-30 NOTE — ED PROVIDER NOTE - ATTENDING CONTRIBUTION TO CARE
80 yo male presents to ED for evaluation of constipation x 1 day. Patient is poor historian but reports that he had 78 yo male presents to ED for evaluation of constipation x 1 day. Patient is poor historian but reports that he had constipation since yesterday. Patient's daughter reports that patient had near syncopal episode 6 days ago. Patient reports last night, he took his insulin but did not eat any food this morning because he was feeling constipated. Upon arrival to ED, patient was found to have low glucose, was given d50. Patient was still awake and answering questions appropriately. Patient denies chest pain, shortness of breath, abd pain, blood in stool.   Gen: no acute distress, well appearing, awake, alert and oriented x 3  Cardiac: regular rate and rhythm, +S1S2  Pulm: Clear to auscultation bilaterally  Abd: soft, nontender, nondistended, no guarding  Neuro: awake, alert, oriented x 3, sensorimotor intact  MDM  Elderly male with constipation, near syncopal episode earlier this week - Will check labs, EKG, imaging, medicate, reassess

## 2019-08-30 NOTE — ED ADULT NURSE NOTE - OBJECTIVE STATEMENT
Receive pt in spot 26 alert and oriented x 3 c/p constipation s/p syncopal; episode this am witnessed by daughter.  Denies chest pain, sob, dizziness, head injury.  Resp unlabored.  Skin intact.  IV 20 g placed by float RN.  FS 37 at triage.  1 amp of d50 given

## 2019-08-30 NOTE — ED PROVIDER NOTE - NS ED ROS FT
GENERAL: No fever or chills, EYES: no change in vision, HEENT: no trouble speaking, CARDIAC: no chest pain, palpitation PULMONARY: no cough or SOB, GI: no abdominal pain, no nausea, no vomiting, no diarrhea or + constipation, : No changes in urination, SKIN: no rashes, NEURO: no headache,  MSK: No muscle pain ~Michelle Harper MD

## 2019-08-30 NOTE — H&P ADULT - NEGATIVE ENMT SYMPTOMS
no nasal congestion/no nasal obstruction/no nose bleeds/no dry mouth/no ear pain/no post-nasal discharge/no gum bleeding/no throat pain/no dysphagia/no tinnitus/no sinus symptoms/no vertigo/no nasal discharge

## 2019-08-30 NOTE — CONSULT NOTE ADULT - SUBJECTIVE AND OBJECTIVE BOX
HISTORY OF PRESENT ILLNESS:  78 YO M ? hx of CAD s/p 4 stent presents to the ED with constipation. As per patient had episode of dizziness and lightheadedness on saturday but never lost consciousness. No chest pain or shortness of breath. Came today to the ED because of constipation. Ed consulted cardiology because of +trop and concern for changes in EKG.         Allergies    No Known Allergies    Intolerances    	    MEDICATIONS:  aspirin  chewable 324 milliGRAM(s) Oral once          saline laxative (FLEET) Rectal Enema 1 Enema Rectal once          PAST MEDICAL & SURGICAL HISTORY:  Hyperlipidemia  Peptic ulcer: s/p treatment for H pylori  Stented coronary artery  Diabetes  Essential hypertension  No significant past surgical history      FAMILY HISTORY:  No pertinent family history in first degree relatives      SOCIAL HISTORY:    [ ] Non-smoker  [ ] Smoker  [ ] Alcohol      REVIEW OF SYSTEMS:  General: no fatigue/malaise, weight loss/gain.  Skin: no rashes.  Ophthalmologic: no blurred vision, no loss of vision. 	  ENT: no sore throat, rhinorrhea, sinus congestion.  Respiratory: no SOB, cough or wheeze.  Gastrointestinal:  no N/V/D, no melena/hematemesis/hematochezia.  Genitourinary: no dysuria/hesitancy or hematuria.  Musculoskeletal: no myalgias or arthralgias.  Neurological: no changes in vision or hearing, no lightheadedness/dizziness, no syncope/near syncope	  Psychiatric: no unusual stress/anxiety.   Hematology/Lymphatics: no unusual bleeding, bruising and no lymphadenopathy.  Endocrine: no unusual thirst.   All others negative except as stated above and in HPI.    PHYSICAL EXAM:  T(C): 36.4 (08-30-19 @ 10:01), Max: 36.4 (08-30-19 @ 08:34)  HR: 73 (08-30-19 @ 12:30) (73 - 78)  BP: 121/71 (08-30-19 @ 12:30) (116/62 - 139/70)  RR: 16 (08-30-19 @ 12:30) (16 - 17)  SpO2: 100% (08-30-19 @ 12:30) (100% - 100%)  Wt(kg): --  I&O's Summary      Appearance: Normal	  HEENT:   Normal oral mucosa, PERRL, EOMI	  Lymphatic: No lymphadenopathy  Cardiovascular: Normal S1 S2, No JVD, No murmurs, No edema  Respiratory: Lungs clear to auscultation	  Psychiatry: A & O x 3, Mood & affect appropriate  Gastrointestinal:  Soft, Non-tender, + BS	  Skin: No rashes, No ecchymoses, No cyanosis	  Neurologic: Non-focal  Extremities: Normal range of motion, No clubbing, cyanosis or edema  Vascular: Peripheral pulses palpable 2+ bilaterally        LABS:	 	    CBC Full  -  ( 30 Aug 2019 09:00 )  WBC Count : 6.57 K/uL  Hemoglobin : 15.0 g/dL  Hematocrit : 46.4 %  Platelet Count - Automated : 122 K/uL  Mean Cell Volume : 88.7 fL  Mean Cell Hemoglobin : 28.7 pg  Mean Cell Hemoglobin Concentration : 32.3 %  Auto Neutrophil # : 5.20 K/uL  Auto Lymphocyte # : 0.46 K/uL  Auto Monocyte # : 0.77 K/uL  Auto Eosinophil # : 0.03 K/uL  Auto Basophil # : 0.02 K/uL  Auto Neutrophil % : 79.1 %  Auto Lymphocyte % : 7.0 %  Auto Monocyte % : 11.7 %  Auto Eosinophil % : 0.5 %  Auto Basophil % : 0.3 %    08-30    127<L>  |  93<L>  |  43<H>  ----------------------------<  43<LL>  4.7   |  23  |  2.59<H>    Ca    11.1<H>      30 Aug 2019 09:00    TPro  6.8  /  Alb  3.3  /  TBili  0.8  /  DBili  x   /  AST  62<H>  /  ALT  67<H>  /  AlkPhos  72  08-30      proBNP:   Lipid Profile:   HgA1c:   TSH:       CARDIAC MARKERS:            TELEMETRY: 	    ECG: Sinus with 1st degree AV block.  Diffuse T wave inversions unchanged from prior. 9/3/17.  RADIOLOGY:  OTHER: 	    PREVIOUS DIAGNOSTIC TESTING:    [ ] Echocardiogram:   [ ]  Catheterization:  [ ] Stress Test:  	  	  ASSESSMENT/PLAN: 	  78 YO M with ?hx of CAD presents to the ED for constipation and near syncope/lightheadedness last week. In the ED had normal exam, EKG unchanged since prior. Trop notable @101 and found to have creatinine of 2.6 elevated from baseline of 2 as per ED convo with PMD and glucose of <60.     In setting of renal disease and hypoglycemia troponin elevation likely type II.   Unlikely ACS  c/w Asa.   further cardiac evaluation can be done as outpatient     Jasiel Mancini  Cardiology Fellow  [phone]  All Cardiology service information can be found 24/7 on [email or web address], password: [email or web address]

## 2019-08-30 NOTE — H&P ADULT - NSICDXPASTMEDICALHX_GEN_ALL_CORE_FT
PAST MEDICAL HISTORY:  Adult hypothyroidism     Diabetes     Essential hypertension     Hyperlipidemia     Peptic ulcer s/p treatment for H pylori    Stented coronary artery PAST MEDICAL HISTORY:  Adult hypothyroidism     CAD (coronary artery disease)     Diabetes     Essential hypertension     Hyperlipidemia     Peptic ulcer s/p treatment for H pylori

## 2019-08-30 NOTE — ED PROVIDER NOTE - OBJECTIVE STATEMENT
78 yo M Portuguese speakingPMH significant for CAD with 4 stents, on aspirin p/w constipation x 1 day. 80 yo M Tajik speaking PMH significant for CAD with 4 stents, on aspirin p/w constipation x 1 day and syncopal episode 6 days ago. Pt state that his abdomen has been more distended. and report pain in the anal region. Pt state that he has not taken anything for his constipation. Spoke to pt daughter on the phone Dr. Browne who state that pt had a near syncope6 days ago was lightheaded and has been constipated even after using cholace. Denies any chest pain, sob, abd pain, blood in stool.

## 2019-08-30 NOTE — H&P ADULT - NSHPSOCIALHISTORY_GEN_ALL_CORE
Patient lives with his wife and daughter. Patient admits to smoking 1 cigarette per day for the past 50 years. Patient denies alcohol or drug use. Patient ambulated with a cane.

## 2019-08-30 NOTE — H&P ADULT - PROBLEM SELECTOR PLAN 1
Admit to telemetry for continuous monitoring   Check orthostatic vital signs, serial EKGs, trend cardiac enzymes, monitor FBS + electrolytes. CT of the head. Admit to telemetry for continuous monitoring. Check serial EKGs, trend cardiac enzymes, monitor FBS + electrolytes. Echo, CT of the head, orthostatic vital signs. F/U MD note

## 2019-08-30 NOTE — H&P ADULT - NEGATIVE GENERAL SYMPTOMS
no sweating/no anorexia/no polydipsia/no chills/no weight loss/no fatigue/no fever/no weight gain/no polyphagia/no polyuria/no malaise

## 2019-08-30 NOTE — H&P ADULT - RS GEN PE MLT RESP DETAILS PC
no intercostal retractions/respirations non-labored/no rhonchi/no wheezes/no subcutaneous emphysema/no rales/breath sounds equal/good air movement/normal/airway patent/no chest wall tenderness/clear to auscultation bilaterally

## 2019-08-30 NOTE — H&P ADULT - NEGATIVE CARDIOVASCULAR SYMPTOMS
no chest pain/no dyspnea on exertion/no orthopnea/no paroxysmal nocturnal dyspnea/no peripheral edema/no palpitations

## 2019-08-30 NOTE — ED ADULT TRIAGE NOTE - CHIEF COMPLAINT QUOTE
Pt co constipation x 4 days. Taking senna without results. Daughter states patient had syncopal episode on Saturday while he was changing his clothes. Episode witnessed by daughter. +LOC x 1 minute. No head trauma. Daughter states patient appears more lethargic and is not sleeping well at night. Pt co constipation x 4 days. Taking senna without results. Daughter states patient had syncopal episode on Saturday while he was changing his clothes. Episode witnessed by daughter. +LOC x 1 minute. No head trauma. Daughter states patient appears more lethargic and is not sleeping well at night. PMH: cardiac stents, diabetes, htn Pt co constipation x 4 days. Taking senna without results. Daughter states patient had syncopal episode on Saturday while he was changing his clothes. Episode witnessed by daughter. +LOC x 1 minute. No head trauma. Daughter states patient appears more lethargic and is not sleeping well at night. PMH: cardiac stents, diabetes, htn  FS 37- give apple juice and brought directly to room 27. Pt awake and mentating well.

## 2019-08-30 NOTE — H&P ADULT - PROBLEM SELECTOR PLAN 8
Heparin 5000 U SQ q 8h  Hold Effient Continue amlodipine, isosorbide dinitrate  Monitor BP and adjust medications as necessary   DASH diet

## 2019-08-30 NOTE — CONSULT NOTE ADULT - ASSESSMENT
79 year old male with a history of CAD, DM2, HTN, and HLD presents with constipation and possible syncope    CKD (chronic kidney disease), stage IV.    Baseline ~ 2.2-2.5. Renal function relatively stable  monitor for now  avoid nephrotoxic agents    Essential hypertension.     Optimal.   monitor    hyponatremia  ? etiology (reluctant historian) possible sec to poor po intake  check urine na, osmo, TSH and cortiosl level  monitor bmp Q 12 until Na >130  avoid hypotonic solution    Hypercalcemia  ? dehydration  check pth, vit d 25, vit d 1,25  mild proteinuria. vasculitis w/u in office neg except +ANCA  was referred for rheum eval in office but unsure if patient followed up  repeat anca    proteinuria  check urine p/c ratio 79 year old male with a history of CAD, DM2, HTN, and HLD presents with constipation and possible syncope    CKD (chronic kidney disease), stage IV.    Baseline ~ 2.2-2.5. Renal function relatively stable  monitor for now  avoid nephrotoxic agents    Essential hypertension.    Optimal.   monitor    hyponatremia  ? etiology (reluctant historian) possible sec to poor po intake  check urine na, osmo, TSH and cortiosl level  monitor bmp Q 12 until Na >130  avoid hypotonic solution    Hypercalcemia  ? dehydration  check pth, vit d 25, vit d 1,25, SPEP, serum immunofixation, serum free light chain  mild proteinuria. vasculitis w/u in office neg except +ANCA  was referred for rheum eval in office but unsure if patient followed up  repeat anca    proteinuria  check urine p/c ratio

## 2019-08-30 NOTE — H&P ADULT - HISTORY OF PRESENT ILLNESS
79 year old male with a history of CAD, DM2, HTN, and HLD presents with constipation  and "rectal issue" since yesterday. Patient states that his abdomen feels distended and "filled with gas". Patient's last bowel movement was on Wednesday during the day. Patient denies abdominal pain, but states that he feels "uneasy". Patient denies dysphagia, blood in stool, weight changes, chest pain, SOB, fever, chills, nausea, vomiting.     As per ED note, the patient's daughter stated that he had a syncopal episode and fall while changing his clothes six days ago. She reports that the patient lost consciousness for about a minute. She denies head trauma. She mentions that he has not had an appetite for the past 6 days and has not been eating much.     Patient denies this syncopal episode, loss of consciousness, or dizziness. 79 year old male with a history of CAD, DM2, HTN, HLD & hypothyroidism presents with constipation  and "rectal issue" since yesterday. Patient states that his abdomen feels distended and "filled with gas". Patient's last bowel movement was on Wednesday during the day. Patient denies abdominal pain, but states that he feels "uneasy". Patient denies nausea, vomiting, dysphagia, blood in stool, weight changes, chest pain, SOB, fever, chills.   As per ED note, the patient's daughter stated that he had a syncopal episode and fall while changing his clothes six days ago. She reports that the patient lost consciousness for about a minute. She denies head trauma. She mentions that he has not had an appetite for the past 6 days and has not been eating much.   Patient denies this syncopal episode, loss of consciousness, or dizziness.

## 2019-08-30 NOTE — H&P ADULT - NEGATIVE NEUROLOGICAL SYMPTOMS
no paresthesias/no focal seizures/no syncope/no difficulty walking/no vertigo/no weakness/no generalized seizures/no tremors/no loss of sensation/no headache/no confusion/no loss of consciousness

## 2019-08-30 NOTE — ED PROVIDER NOTE - PHYSICAL EXAMINATION
Gen: AAOx3, non-toxic  Head: NCAT  HEENT: EOMI, PERRLA, oral mucosa moist, normal conjunctiva  Lung: CTAB, no respiratory distress, no wheezes/rhonchi/rales B/L, speaking in full sentences  CV: RRR, no murmurs, rubs or gallops  Abd: soft, NT, Distended, no guarding, no CVA tenderness, no rebound tenderness  MSK: no visible deformities, full range of motion of all 4 exts  Neuro: No focal sensory or motor deficits  Skin: Warm, well perfused, no rash  Psych: normal affect.   ~Michelle Harper MD

## 2019-08-30 NOTE — H&P ADULT - PROBLEM SELECTOR PLAN 6
Continue atenolol   Monitor BP and adjust medications as necessary   DASH diet continue ASA, Plavix, nitrates, statin

## 2019-08-30 NOTE — H&P ADULT - NEUROLOGICAL DETAILS
normal strength/responds to pain/sensation intact/cranial nerves intact/superficial reflexes intact/responds to verbal commands/no spontaneous movement/Not oriented to exact date

## 2019-08-30 NOTE — H&P ADULT - NEGATIVE OPHTHALMOLOGIC SYMPTOMS
no discharge R/no pain L/no irritation R/no lacrimation R/no blurred vision L/no discharge L/no irritation L/no loss of vision R/no diplopia/no photophobia/no blurred vision R/no lacrimation L/no pain R/no loss of vision L

## 2019-08-30 NOTE — H&P ADULT - PROBLEM SELECTOR PLAN 7
Continue Levothyroxine, monitor thyroid hormones Monitor FBS and adjust with ISS as needed, order HgbA1C, diabetic diet, Home meds held due to hypoglycemia

## 2019-08-30 NOTE — H&P ADULT - NEGATIVE GENERAL GENITOURINARY SYMPTOMS
no dysuria/no urinary hesitancy/normal urinary frequency/no flank pain L/no incontinence/no hematuria/no flank pain R/no nocturia

## 2019-08-30 NOTE — ED PROVIDER NOTE - CARE PLAN
Principal Discharge DX:	Syncope, unspecified syncope type Principal Discharge DX:	Syncope, unspecified syncope type  Secondary Diagnosis:	FRANCES (acute kidney injury)

## 2019-08-30 NOTE — H&P ADULT - PROBLEM SELECTOR PLAN 4
Hold Januvia, Tradjenta, Glipizide   Monitor FBS and adjust with ISS as needed, order HgbA1C, diabetic diet Monitor BUN/ creat. F/U Renal c/s Dr Acevedo

## 2019-08-30 NOTE — H&P ADULT - NSICDXPASTSURGICALHX_GEN_ALL_CORE_FT
PAST SURGICAL HISTORY:  S/P coronary artery stent placement PAST SURGICAL HISTORY:  S/P coronary artery stent placement x4, last one in early 2018

## 2019-08-30 NOTE — ED ADULT NURSE NOTE - CHIEF COMPLAINT QUOTE
Pt co constipation x 4 days. Taking senna without results. Daughter states patient had syncopal episode on Saturday while he was changing his clothes. Episode witnessed by daughter. +LOC x 1 minute. No head trauma. Daughter states patient appears more lethargic and is not sleeping well at night. PMH: cardiac stents, diabetes, htn  FS 37- give apple juice and brought directly to room 27. Pt awake and mentating well.

## 2019-08-30 NOTE — CONSULT NOTE ADULT - SUBJECTIVE AND OBJECTIVE BOX
[facility] [place] [facility]   MD UYEN HERNANDEZ DO ANGELA WONG, PA    TEL:  OFFICE: [phone]  DR BARGER CELL: [phone]  DR. HERNANDEZ CELL: [phone]  CHUCKIE TERRAZAS CELL: [phone]      HPI:  history mostly from chart. pt reluctant historian, want me to refer to his chart   79 year old male with a history of CAD, DM2, HTN, and HLD presents with constipation  and "rectal issue" since yesterday. Patient states that his abdomen feels distended and "filled with gas". Patient's last bowel movement was on Wednesday during the day. Patient denies abdominal pain, but states that he feels "uneasy". Patient denies dysphagia, blood in stool, weight changes, chest pain, SOB, fever, chills, nausea, vomiting.     As per ED note, the patient's daughter stated that he had a syncopal episode and fall while changing his clothes six days ago. She reports that the patient lost consciousness for about a minute. She denies head trauma. She mentions that he has not had an appetite for the past 6 days and has not been eating much.     pt follow up with dr. barger for ckd management. pt is ckd 4 baseline ~ 2.2-2.5    Allergies:  No Known Allergies      PAST MEDICAL & SURGICAL HISTORY:  CAD (coronary artery disease)  Adult hypothyroidism  Hyperlipidemia  Peptic ulcer: s/p treatment for H pylori  Diabetes  Essential hypertension  S/P coronary artery stent placement: x4, last one in early 2018      Home Medications Reviewed    Hospital Medications:   MEDICATIONS  (STANDING):  bisacodyl 5 milliGRAM(s) Oral once  clopidogrel Tablet 75 milliGRAM(s) Oral daily  docusate sodium 100 milliGRAM(s) Oral three times a day  ergocalciferol 90539 Unit(s) Oral <User Schedule>  famotidine    Tablet 20 milliGRAM(s) Oral two times a day  gabapentin 400 milliGRAM(s) Oral two times a day  heparin  Injectable 5000 Unit(s) SubCutaneous every 8 hours  levothyroxine 88 MICROGram(s) Oral daily  polyethylene glycol 3350 17 Gram(s) Oral daily  predniSONE   Tablet 10 milliGRAM(s) Oral two times a day  saline laxative (FLEET) Rectal Enema 1 Enema Rectal once  senna 2 Tablet(s) Oral at bedtime  simvastatin 20 milliGRAM(s) Oral at bedtime      SOCIAL HISTORY:  Denies ETOh, Smoking,     FAMILY HISTORY:  No pertinent family history in first degree relatives      REVIEW OF SYSTEMS:  CONSTITUTIONAL: No weakness, fevers or chills  EYES/ENT: No visual changes;  No vertigo or throat pain   NECK: No pain or stiffness  RESPIRATORY: No cough, wheezing, hemoptysis; No shortness of breath  CARDIOVASCULAR: No chest pain or palpitations.  GASTROINTESTINAL: see hpi  GENITOURINARY: No dysuria, frequency, foamy urine, urinary urgency, incontinence or hematuria  NEUROLOGICAL: No numbness or weakness  SKIN: No itching, burning, rashes, or lesions   VASCULAR: No bilateral lower extremity edema.   All other review of systems is negative unless indicated above.    VITALS:  T(F): 97.5 (19 @ 10:01), Max: 97.5 (19 @ 08:34)  HR: 73 (19 @ 12:30)  BP: 121/71 (19 @ 12:30)  RR: 16 (19 @ 12:30)  SpO2: 100% (19 @ 12:30)  Wt(kg): --    Height (cm): 157.48 ( @ 15:17)  Weight (kg): 65.3 ( @ 15:17)  BMI (kg/m2): 26.3 ( @ 15:17)  BSA (m2): 1.66 ( @ 15:17)    PHYSICAL EXAM:  Constitutional: NAD  HEENT: anicteric sclera, oropharynx clear, MMM  Neck: No JVD  Respiratory: CTAB, no wheezes, rales or rhonchi  Cardiovascular: S1, S2, RRR  Gastrointestinal: BS+, soft, NT/ND  Extremities: No cyanosis or clubbing. No peripheral edema  Neurological: A/O x 3, no focal deficits  Psychiatric: Normal mood, normal affect  : No CVA tenderness. No warren.   Skin: No rashes    LABS:      127<L>  |  93<L>  |  43<H>  ----------------------------<  43<LL>  4.7   |  23  |  2.59<H>    Ca    11.1<H>      30 Aug 2019 09:00    TPro  6.8  /  Alb  3.3  /  TBili  0.8  /  DBili      /  AST  62<H>  /  ALT  67<H>  /  AlkPhos  72  08    Creatinine Trend: 2.59 <--                        15.0   6.57  )-----------( 122      ( 30 Aug 2019 09:00 )             46.4     Urine Studies:  Urinalysis Basic - ( 30 Aug 2019 12:00 )    Color: LIGHT YELLOW / Appearance: CLEAR / S.013 / pH: 6.0  Gluc: 300 / Ketone: NEGATIVE  / Bili: NEGATIVE / Urobili: NORMAL   Blood: NEGATIVE / Protein: 20 / Nitrite: NEGATIVE   Leuk Esterase: NEGATIVE / RBC: 0-2 / WBC 0-2   Sq Epi: OCC / Non Sq Epi:  / Bacteria: NEGATIVE          RADIOLOGY & ADDITIONAL STUDIES:

## 2019-08-30 NOTE — H&P ADULT - ASSESSMENT
79 year old male with a history of CAD, DM2, HTN, and HLD presents with constipation x 1 day and syncope and collapse 6 days ago, admitted to telemetry for syncope workup.     EKG: NSR @ 73 bpm  TWI V2-V6, I and avL. Left axis deviation. 79 year old male with a history of CAD, DM2, HTN, HLD, Hypothyroidism, PUD in 2014 presents with constipation x 1 day and syncope and collapse 6 days ago, admitted to telemetry for syncope workup.     EKG: NSR @ 73 bpm 1st deg AVB, TWI V2-V6, I and avL. Left axis deviation. 79 year old male with a history of CAD, DM2, HTN, HLD, Hypothyroidism, PUD in 2014 presents with constipation x 1 day and syncope and collapse 6 days ago, admitted to telemetry for syncope workup, elevated troponin 101, Pt also with hypoglycemia FS 37 upon presentation. Pt also with Acute on Chronic Renal Insufficiency & Hypercalcemia      EKG: NSR @ 73 bpm 1st deg AVB, TWI V2-V6, I and avL. Left axis deviation.

## 2019-08-30 NOTE — H&P ADULT - MUSCULOSKELETAL
ROM intact/normal/normal strength/no joint swelling/no joint erythema/no joint warmth/no calf tenderness details… detailed exam

## 2019-08-30 NOTE — H&P ADULT - GASTROINTESTINAL DETAILS
no masses palpable/no rigidity/bowel sounds normal/no organomegaly/nontender/no bruit/no rebound tenderness/no guarding no organomegaly/no bruit/no rebound tenderness/no rigidity/soft/nontender/bowel sounds normal/no masses palpable/no guarding

## 2019-08-30 NOTE — H&P ADULT - NEGATIVE MUSCULOSKELETAL SYMPTOMS
no arm pain L/no arthralgia/no joint swelling/no muscle weakness/no back pain/no leg pain L/no myalgia/no neck pain/no arm pain R/no leg pain R/no arthritis/no muscle cramps/no stiffness

## 2019-08-30 NOTE — H&P ADULT - PROBLEM SELECTOR PLAN 2
Hold Januvia, Tradjenta, Glipizide  Monitor FBS and adjust with ISS as needed, order HgbA1C Hold Glipizide  Monitor FBS and adjust with ISS as needed, order HgbA1C Hold Glipizide, Januvia, Basal insulin, Monitor FBS and adjust with ISS as needed, order HgbA1C

## 2019-08-31 LAB
ALBUMIN SERPL ELPH-MCNC: 2.9 G/DL — LOW (ref 3.3–5)
ALP SERPL-CCNC: 65 U/L — SIGNIFICANT CHANGE UP (ref 40–120)
ALT FLD-CCNC: 54 U/L — HIGH (ref 4–41)
ANION GAP SERPL CALC-SCNC: 11 MMO/L — SIGNIFICANT CHANGE UP (ref 7–14)
ANION GAP SERPL CALC-SCNC: 12 MMO/L — SIGNIFICANT CHANGE UP (ref 7–14)
AST SERPL-CCNC: 44 U/L — HIGH (ref 4–40)
BASOPHILS # BLD AUTO: 0.01 K/UL — SIGNIFICANT CHANGE UP (ref 0–0.2)
BASOPHILS NFR BLD AUTO: 0.2 % — SIGNIFICANT CHANGE UP (ref 0–2)
BILIRUB SERPL-MCNC: 0.7 MG/DL — SIGNIFICANT CHANGE UP (ref 0.2–1.2)
BUN SERPL-MCNC: 38 MG/DL — HIGH (ref 7–23)
BUN SERPL-MCNC: 45 MG/DL — HIGH (ref 7–23)
CALCIUM SERPL-MCNC: 10 MG/DL — SIGNIFICANT CHANGE UP (ref 8.4–10.5)
CALCIUM SERPL-MCNC: 10.1 MG/DL — SIGNIFICANT CHANGE UP (ref 8.4–10.5)
CHLORIDE SERPL-SCNC: 93 MMOL/L — LOW (ref 98–107)
CHLORIDE SERPL-SCNC: 96 MMOL/L — LOW (ref 98–107)
CHOLEST SERPL-MCNC: 121 MG/DL — SIGNIFICANT CHANGE UP (ref 120–199)
CO2 SERPL-SCNC: 18 MMOL/L — LOW (ref 22–31)
CO2 SERPL-SCNC: 20 MMOL/L — LOW (ref 22–31)
CREAT ?TM UR-MCNC: 37.7 MG/DL — SIGNIFICANT CHANGE UP
CREAT SERPL-MCNC: 2.34 MG/DL — HIGH (ref 0.5–1.3)
CREAT SERPL-MCNC: 2.51 MG/DL — HIGH (ref 0.5–1.3)
EOSINOPHIL # BLD AUTO: 0.01 K/UL — SIGNIFICANT CHANGE UP (ref 0–0.5)
EOSINOPHIL NFR BLD AUTO: 0.2 % — SIGNIFICANT CHANGE UP (ref 0–6)
GLUCOSE SERPL-MCNC: 131 MG/DL — HIGH (ref 70–99)
GLUCOSE SERPL-MCNC: 194 MG/DL — HIGH (ref 70–99)
HBA1C BLD-MCNC: 6.1 % — HIGH (ref 4–5.6)
HCT VFR BLD CALC: 39 % — SIGNIFICANT CHANGE UP (ref 39–50)
HDLC SERPL-MCNC: 33 MG/DL — LOW (ref 35–55)
HGB BLD-MCNC: 13.1 G/DL — SIGNIFICANT CHANGE UP (ref 13–17)
IMM GRANULOCYTES NFR BLD AUTO: 0.6 % — SIGNIFICANT CHANGE UP (ref 0–1.5)
LIPID PNL WITH DIRECT LDL SERPL: 74 MG/DL — SIGNIFICANT CHANGE UP
LYMPHOCYTES # BLD AUTO: 0.23 K/UL — LOW (ref 1–3.3)
LYMPHOCYTES # BLD AUTO: 4.8 % — LOW (ref 13–44)
MAGNESIUM SERPL-MCNC: 1.9 MG/DL — SIGNIFICANT CHANGE UP (ref 1.6–2.6)
MCHC RBC-ENTMCNC: 29.2 PG — SIGNIFICANT CHANGE UP (ref 27–34)
MCHC RBC-ENTMCNC: 33.6 % — SIGNIFICANT CHANGE UP (ref 32–36)
MCV RBC AUTO: 86.9 FL — SIGNIFICANT CHANGE UP (ref 80–100)
MONOCYTES # BLD AUTO: 0.43 K/UL — SIGNIFICANT CHANGE UP (ref 0–0.9)
MONOCYTES NFR BLD AUTO: 8.9 % — SIGNIFICANT CHANGE UP (ref 2–14)
NEUTROPHILS # BLD AUTO: 4.1 K/UL — SIGNIFICANT CHANGE UP (ref 1.8–7.4)
NEUTROPHILS NFR BLD AUTO: 85.3 % — HIGH (ref 43–77)
NRBC # FLD: 0 K/UL — SIGNIFICANT CHANGE UP (ref 0–0)
OSMOLALITY UR: 256 MOSMO/KG — SIGNIFICANT CHANGE UP (ref 50–1200)
PHOSPHATE SERPL-MCNC: 3.4 MG/DL — SIGNIFICANT CHANGE UP (ref 2.5–4.5)
PLATELET # BLD AUTO: 110 K/UL — LOW (ref 150–400)
PMV BLD: 10.6 FL — SIGNIFICANT CHANGE UP (ref 7–13)
POTASSIUM SERPL-MCNC: 5 MMOL/L — SIGNIFICANT CHANGE UP (ref 3.5–5.3)
POTASSIUM SERPL-MCNC: 5.4 MMOL/L — HIGH (ref 3.5–5.3)
POTASSIUM SERPL-SCNC: 5 MMOL/L — SIGNIFICANT CHANGE UP (ref 3.5–5.3)
POTASSIUM SERPL-SCNC: 5.4 MMOL/L — HIGH (ref 3.5–5.3)
PROT SERPL-MCNC: 5.6 G/DL — LOW (ref 6–8.3)
RBC # BLD: 4.49 M/UL — SIGNIFICANT CHANGE UP (ref 4.2–5.8)
RBC # FLD: 13.9 % — SIGNIFICANT CHANGE UP (ref 10.3–14.5)
SODIUM SERPL-SCNC: 124 MMOL/L — LOW (ref 135–145)
SODIUM SERPL-SCNC: 126 MMOL/L — LOW (ref 135–145)
SODIUM UR-SCNC: 35 MMOL/L — SIGNIFICANT CHANGE UP
TRIGL SERPL-MCNC: 96 MG/DL — SIGNIFICANT CHANGE UP (ref 10–149)
TROPONIN T, HIGH SENSITIVITY: 70 NG/L — CRITICAL HIGH (ref ?–14)
TSH SERPL-MCNC: 2.56 UIU/ML — SIGNIFICANT CHANGE UP (ref 0.27–4.2)
WBC # BLD: 4.81 K/UL — SIGNIFICANT CHANGE UP (ref 3.8–10.5)
WBC # FLD AUTO: 4.81 K/UL — SIGNIFICANT CHANGE UP (ref 3.8–10.5)

## 2019-08-31 PROCEDURE — 99232 SBSQ HOSP IP/OBS MODERATE 35: CPT

## 2019-08-31 PROCEDURE — 70450 CT HEAD/BRAIN W/O DYE: CPT | Mod: 26

## 2019-08-31 RX ORDER — SODIUM POLYSTYRENE SULFONATE 4.1 MEQ/G
15 POWDER, FOR SUSPENSION ORAL ONCE
Refills: 0 | Status: COMPLETED | OUTPATIENT
Start: 2019-08-31 | End: 2019-08-31

## 2019-08-31 RX ORDER — METOPROLOL TARTRATE 50 MG
25 TABLET ORAL DAILY
Refills: 0 | Status: DISCONTINUED | OUTPATIENT
Start: 2019-08-31 | End: 2019-09-06

## 2019-08-31 RX ORDER — SODIUM CHLORIDE 9 MG/ML
1000 INJECTION INTRAMUSCULAR; INTRAVENOUS; SUBCUTANEOUS
Refills: 0 | Status: DISCONTINUED | OUTPATIENT
Start: 2019-08-31 | End: 2019-09-04

## 2019-08-31 RX ADMIN — Medication 100 MILLIGRAM(S): at 05:23

## 2019-08-31 RX ADMIN — Medication 100 MILLIGRAM(S): at 12:58

## 2019-08-31 RX ADMIN — Medication 10 MILLIGRAM(S): at 17:41

## 2019-08-31 RX ADMIN — SENNA PLUS 2 TABLET(S): 8.6 TABLET ORAL at 21:34

## 2019-08-31 RX ADMIN — SODIUM POLYSTYRENE SULFONATE 15 GRAM(S): 4.1 POWDER, FOR SUSPENSION ORAL at 14:52

## 2019-08-31 RX ADMIN — AMLODIPINE BESYLATE 5 MILLIGRAM(S): 2.5 TABLET ORAL at 05:23

## 2019-08-31 RX ADMIN — ISOSORBIDE DINITRATE 5 MILLIGRAM(S): 5 TABLET ORAL at 21:34

## 2019-08-31 RX ADMIN — Medication 88 MICROGRAM(S): at 05:23

## 2019-08-31 RX ADMIN — Medication 1: at 17:41

## 2019-08-31 RX ADMIN — Medication 3: at 12:58

## 2019-08-31 RX ADMIN — ISOSORBIDE DINITRATE 5 MILLIGRAM(S): 5 TABLET ORAL at 05:23

## 2019-08-31 RX ADMIN — POLYETHYLENE GLYCOL 3350 17 GRAM(S): 17 POWDER, FOR SOLUTION ORAL at 12:58

## 2019-08-31 RX ADMIN — SODIUM CHLORIDE 75 MILLILITER(S): 9 INJECTION INTRAMUSCULAR; INTRAVENOUS; SUBCUTANEOUS at 10:58

## 2019-08-31 RX ADMIN — FAMOTIDINE 10 MILLIGRAM(S): 10 INJECTION INTRAVENOUS at 12:58

## 2019-08-31 RX ADMIN — SIMVASTATIN 20 MILLIGRAM(S): 20 TABLET, FILM COATED ORAL at 21:34

## 2019-08-31 RX ADMIN — Medication 81 MILLIGRAM(S): at 12:57

## 2019-08-31 RX ADMIN — Medication 100 MILLIGRAM(S): at 21:34

## 2019-08-31 RX ADMIN — ISOSORBIDE DINITRATE 5 MILLIGRAM(S): 5 TABLET ORAL at 12:58

## 2019-08-31 RX ADMIN — Medication 10 MILLIGRAM(S): at 05:23

## 2019-08-31 RX ADMIN — GABAPENTIN 400 MILLIGRAM(S): 400 CAPSULE ORAL at 21:33

## 2019-08-31 RX ADMIN — CLOPIDOGREL BISULFATE 75 MILLIGRAM(S): 75 TABLET, FILM COATED ORAL at 12:57

## 2019-08-31 NOTE — PROGRESS NOTE ADULT - ASSESSMENT
79 year old male with CAD, DM2, HTN, HLD presents with constipation/abdominal complaints with syncope and mild troponin elevation.  Troponin 92 and subsequently downtrended in setting of CKD, additionally CK not elevated.    #Elevated troponin and syncope  -- would check TTE, alternatively patient could get performed as outpatient if rapid follow up could be obtained  -- continue asa, plavix, simvastatin, imdur    #CAD  -- continue asa  -- continue plavix  -- continue simvastatin  -- continue imdur  -- unclear why patient not on beta-blocker with CAD hx, would initiate toprol 25 mg    #HLD  -- continue simvastatin    #HTN  -- continue amlodipine    Addison Sanchez  Cardiovascular Disease Fellow  [phone]    For all cardiology-related service needs, information can be found 24/7 on [email or web address] [email or web address]

## 2019-08-31 NOTE — PROGRESS NOTE ADULT - SUBJECTIVE AND OBJECTIVE BOX
[facility] [place] [facility]   MD UYEN HERNANDEZ DO ANGELA WONG, PA    TEL:  OFFICE: [phone]  DR CASAREZ CELL: [phone]  DR. HERNANDEZ CELL: [phone]  CHUCKIE TERRAZAS CELL: [phone]        Patient is a 79y old  Male who presents with a chief complaint of     Patient seen and examined at bedside. No chest pain/sob    VITALS:  T(F): 98.4 (08-31-19 @ 12:50), Max: 98.4 (08-31-19 @ 12:50)  HR: 84 (08-31-19 @ 12:50)  BP: 117/71 (08-31-19 @ 12:50)  RR: 16 (08-31-19 @ 12:50)  SpO2: 98% (08-31-19 @ 12:50)  Wt(kg): --    08-30 @ 07:01  -  08-31 @ 07:00  --------------------------------------------------------  IN: 326 mL / OUT: 1100 mL / NET: -774 mL    08-31 @ 07:01  -  08-31 @ 13:20  --------------------------------------------------------  IN: 0 mL / OUT: 350 mL / NET: -350 mL      Height (cm): 157.48 (08-30 @ 15:17)  Weight (kg): 65.3 (08-30 @ 15:17)  BMI (kg/m2): 26.3 (08-30 @ 15:17)  BSA (m2): 1.66 (08-30 @ 15:17)    PHYSICAL EXAM:  Constitutional: NAD  Neck: No JVD  Respiratory: CTAB, no wheezes, rales or rhonchi  Cardiovascular: S1, S2, RRR  Gastrointestinal: BS+, soft, NT/ND  Extremities: No peripheral edema    Hospital Medications:   MEDICATIONS  (STANDING):  amLODIPine   Tablet 5 milliGRAM(s) Oral daily  aspirin enteric coated 81 milliGRAM(s) Oral daily  clopidogrel Tablet 75 milliGRAM(s) Oral daily  dextrose 5%. 1000 milliLiter(s) (50 mL/Hr) IV Continuous <Continuous>  dextrose 50% Injectable 12.5 Gram(s) IV Push once  dextrose 50% Injectable 25 Gram(s) IV Push once  dextrose 50% Injectable 25 Gram(s) IV Push once  docusate sodium 100 milliGRAM(s) Oral three times a day  ergocalciferol 24657 Unit(s) Oral <User Schedule>  famotidine    Tablet 10 milliGRAM(s) Oral daily  gabapentin 400 milliGRAM(s) Oral at bedtime  heparin  Injectable 5000 Unit(s) SubCutaneous every 8 hours  insulin lispro (HumaLOG) corrective regimen sliding scale   SubCutaneous three times a day before meals  isosorbide   dinitrate Tablet (ISORDIL) 5 milliGRAM(s) Oral three times a day  levothyroxine 88 MICROGram(s) Oral daily  polyethylene glycol 3350 17 Gram(s) Oral daily  predniSONE   Tablet 10 milliGRAM(s) Oral two times a day  senna 2 Tablet(s) Oral at bedtime  simvastatin 20 milliGRAM(s) Oral at bedtime  sodium chloride 0.9%. 1000 milliLiter(s) (75 mL/Hr) IV Continuous <Continuous>      LABS:  08-31    124<L>  |  93<L>  |  38<H>  ----------------------------<  131<H>  5.4<H>   |  20<L>  |  2.34<H>    Ca    10.1      31 Aug 2019 07:00  Phos  3.4     08-31  Mg     1.9     08-31    TPro  5.6<L>  /  Alb  2.9<L>  /  TBili  0.7  /  DBili      /  AST  44<H>  /  ALT  54<H>  /  AlkPhos  65  08-31    Creatinine Trend: 2.34 <--, 2.53 <--, 2.59 <--    Phosphorus Level, Serum: 3.4 mg/dL (08-31 @ 07:00)  Albumin, Serum: 2.9 g/dL (08-31 @ 07:00)  Phosphorus Level, Serum: 3.1 mg/dL (08-30 @ 16:10)  Albumin, Serum: 3.2 g/dL (08-30 @ 16:10)                              13.1   4.81  )-----------( 110      ( 31 Aug 2019 07:00 )             39.0     Urine Studies:  Urinalysis - [08-30-19 @ 12:00]      Color LIGHT YELLOW / Appearance CLEAR / SG 1.013 / pH 6.0      Gluc 300 / Ketone NEGATIVE  / Bili NEGATIVE / Urobili NORMAL       Blood NEGATIVE / Protein 20 / Leuk Est NEGATIVE / Nitrite NEGATIVE      RBC 0-2 / WBC 0-2 / Hyaline NEGATIVE / Gran  / Sq Epi OCC / Non Sq Epi  / Bacteria NEGATIVE      HbA1c 6.1      [08-31-19 @ 07:00]  TSH 2.56      [08-31-19 @ 07:00]  Lipid: chol 121, TG 96, HDL 33, LDL 74      [08-31-19 @ 07:00]        RADIOLOGY & ADDITIONAL STUDIES:

## 2019-08-31 NOTE — PROGRESS NOTE ADULT - SUBJECTIVE AND OBJECTIVE BOX
Patient is a 79y old  Male who presents with a chief complaint of     HPI:  79 year old male with a history of CAD, DM2, HTN, HLD & hypothyroidism presents with constipation  and "rectal issue" since yesterday. Patient states that his abdomen feels distended and "filled with gas". Patient's last bowel movement was on Wednesday during the day. Patient denies abdominal pain, but states that he feels "uneasy". Patient denies nausea, vomiting, dysphagia, blood in stool, weight changes, chest pain, SOB, fever, chills.   As per ED note, the patient's daughter stated that he had a syncopal episode and fall while changing his clothes six days ago. She reports that the patient lost consciousness for about a minute. She denies head trauma. She mentions that he has not had an appetite for the past 6 days and has not been eating much.   Patient denies this syncopal episode, loss of consciousness, or dizziness. (30 Aug 2019 15:17)    Currently stable  Denies dizziness but is confused    PAST MEDICAL & SURGICAL HISTORY:  CAD (coronary artery disease)  Adult hypothyroidism  Hyperlipidemia  Peptic ulcer: s/p treatment for H pylori  Diabetes  Essential hypertension  S/P coronary artery stent placement: x4, last one in early 2018      Review of Systems:   CONSTITUTIONAL: No fever, weight loss, or fatigue  EYES: No eye pain, visual disturbances, or discharge  ENMT:  No difficulty hearing, tinnitus, vertigo; No sinus or throat pain  NECK: No pain or stiffness  BREASTS: No pain, masses, or nipple discharge  RESPIRATORY: No cough, wheezing, chills or hemoptysis; No shortness of breath  CARDIOVASCULAR: No chest pain, palpitations, dizziness, or leg swelling  GASTROINTESTINAL: No abdominal or epigastric pain. No nausea, vomiting, or hematemesis; No diarrhea or constipation. No melena or hematochezia.  GENITOURINARY: No dysuria, frequency, hematuria, or incontinence  NEUROLOGICAL: No headaches, memory loss, loss of strength, numbness, or tremors  SKIN: No itching, burning, rashes, or lesions   LYMPH NODES: No enlarged glands  ENDOCRINE: No heat or cold intolerance; No hair loss  MUSCULOSKELETAL: No joint pain or swelling; No muscle, back, or extremity pain  PSYCHIATRIC: No depression, anxiety, mood swings, or difficulty sleeping  HEME/LYMPH: No easy bruising, or bleeding gums  ALLERY AND IMMUNOLOGIC: No hives or eczema    Allergies    No Known Allergies    Intolerances        Social History:     FAMILY HISTORY:  No pertinent family history in first degree relatives      MEDICATIONS  (STANDING):  amLODIPine   Tablet 5 milliGRAM(s) Oral daily  aspirin enteric coated 81 milliGRAM(s) Oral daily  clopidogrel Tablet 75 milliGRAM(s) Oral daily  dextrose 5%. 1000 milliLiter(s) (50 mL/Hr) IV Continuous <Continuous>  dextrose 50% Injectable 12.5 Gram(s) IV Push once  dextrose 50% Injectable 25 Gram(s) IV Push once  dextrose 50% Injectable 25 Gram(s) IV Push once  docusate sodium 100 milliGRAM(s) Oral three times a day  ergocalciferol 05836 Unit(s) Oral <User Schedule>  famotidine    Tablet 10 milliGRAM(s) Oral daily  gabapentin 400 milliGRAM(s) Oral at bedtime  heparin  Injectable 5000 Unit(s) SubCutaneous every 8 hours  insulin lispro (HumaLOG) corrective regimen sliding scale   SubCutaneous three times a day before meals  isosorbide   dinitrate Tablet (ISORDIL) 5 milliGRAM(s) Oral three times a day  levothyroxine 88 MICROGram(s) Oral daily  metoprolol succinate ER 25 milliGRAM(s) Oral daily  polyethylene glycol 3350 17 Gram(s) Oral daily  predniSONE   Tablet 10 milliGRAM(s) Oral two times a day  senna 2 Tablet(s) Oral at bedtime  simvastatin 20 milliGRAM(s) Oral at bedtime  sodium chloride 0.9%. 1000 milliLiter(s) (75 mL/Hr) IV Continuous <Continuous>    MEDICATIONS  (PRN):  dextrose 40% Gel 15 Gram(s) Oral once PRN Blood Glucose LESS THAN 70 milliGRAM(s)/deciliter  glucagon  Injectable 1 milliGRAM(s) IntraMuscular once PRN Glucose LESS THAN 70 milligrams/deciliter  zolpidem 5 milliGRAM(s) Oral at bedtime PRN Insomnia  zolpidem 5 milliGRAM(s) Oral at bedtime PRN Insomnia        CAPILLARY BLOOD GLUCOSE      POCT Blood Glucose.: 298 mg/dL (31 Aug 2019 12:35)  POCT Blood Glucose.: 130 mg/dL (31 Aug 2019 08:42)  POCT Blood Glucose.: 122 mg/dL (30 Aug 2019 23:13)  POCT Blood Glucose.: 118 mg/dL (30 Aug 2019 22:42)  POCT Blood Glucose.: 64 mg/dL (30 Aug 2019 22:21)  POCT Blood Glucose.: 44 mg/dL (30 Aug 2019 22:03)  POCT Blood Glucose.: 44 mg/dL (30 Aug 2019 22:01)  POCT Blood Glucose.: 87 mg/dL (30 Aug 2019 17:36)    I&O's Summary    30 Aug 2019 07:  -  31 Aug 2019 07:00  --------------------------------------------------------  IN: 326 mL / OUT: 1100 mL / NET: -774 mL    31 Aug 2019 07:01  -  31 Aug 2019 16:39  --------------------------------------------------------  IN: 0 mL / OUT: 750 mL / NET: -750 mL        PHYSICAL EXAM:  Vital Signs Last 24 Hrs  T(C): 36.9 (31 Aug 2019 12:50), Max: 36.9 (31 Aug 2019 12:50)  T(F): 98.4 (31 Aug 2019 12:50), Max: 98.4 (31 Aug 2019 12:50)  HR: 84 (31 Aug 2019 12:50) (72 - 92)  BP: 117/71 (31 Aug 2019 12:50) (113/72 - 129/74)  BP(mean): --  RR: 16 (31 Aug 2019 12:50) (16 - 17)  SpO2: 98% (31 Aug 2019 12:50) (98% - 99%)    GENERAL: NAD, well-developed  HEAD:  Atraumatic, Normocephalic  EYES: EOMI, PERRLA, conjunctiva and sclera clear  NECK: Supple, No JVD  CHEST/LUNG: Clear to auscultation bilaterally; No wheeze  HEART: Regular rate and rhythm; No murmurs, rubs, or gallops  ABDOMEN: Soft, Nontender, Nondistended; Bowel sounds present  EXTREMITIES:  2+ Peripheral Pulses, No clubbing, cyanosis, or edema  PSYCH: AAOx3  NEUROLOGY: non-focal  SKIN: No rashes or lesions    LABS:                        13.1   4.81  )-----------( 110      ( 31 Aug 2019 07:00 )             39.0     08-    124<L>  |  93<L>  |  38<H>  ----------------------------<  131<H>  5.4<H>   |  20<L>  |  2.34<H>    Ca    10.1      31 Aug 2019 07:00  Phos  3.4     08  Mg     1.9     08    TPro  5.6<L>  /  Alb  2.9<L>  /  TBili  0.7  /  DBili  x   /  AST  44<H>  /  ALT  54<H>  /  AlkPhos  65  0831    PT/INR - ( 30 Aug 2019 09:04 )   PT: 11.0 SEC;   INR: 0.97          PTT - ( 30 Aug 2019 09:04 )  PTT:22.4 SEC  CARDIAC MARKERS ( 30 Aug 2019 16:10 )  x     / x     / 55 u/L / x     / x          Urinalysis Basic - ( 30 Aug 2019 12:00 )    Color: LIGHT YELLOW / Appearance: CLEAR / S.013 / pH: 6.0  Gluc: 300 / Ketone: NEGATIVE  / Bili: NEGATIVE / Urobili: NORMAL   Blood: NEGATIVE / Protein: 20 / Nitrite: NEGATIVE   Leuk Esterase: NEGATIVE / RBC: 0-2 / WBC 0-2   Sq Epi: OCC / Non Sq Epi: x / Bacteria: NEGATIVE        RADIOLOGY & ADDITIONAL TESTS:    Imaging Personally Reviewed:    Consultant(s) Notes Reviewed:      Care Discussed with Consultants/Other Providers:

## 2019-08-31 NOTE — PROGRESS NOTE ADULT - ASSESSMENT
79 year old male with a history of CAD, DM2, HTN, and HLD presents with constipation and possible syncope    CKD (chronic kidney disease), stage IV.    Baseline ~ 2.2-2.5. Renal function relatively stable  monitor for now  avoid nephrotoxic agents    Essential hypertension.    Optimal.   monitor    hyponatremia  ? etiology (reluctant historian) possible sec to poor po intake  check urine na, osmo, TSH and cortiosl level  monitor bmp Q 12 until Na >130  will give a trail of gentle hydration repeat bmp @ 5pm (call dr. barger at [phone])  avoid hypotonic solution    Hypercalcemia  ? dehydration  check pth, vit d 25, vit d 1,25, SPEP, serum immunofixation, serum free light chain  mild proteinuria. vasculitis w/u in office neg except +ANCA  was referred for rheum eval in office but unsure if patient followed up  repeat anca    proteinuria  check urine p/c ratio

## 2019-08-31 NOTE — PROGRESS NOTE ADULT - SUBJECTIVE AND OBJECTIVE BOX
Cardiology Progress Note    Interval events: No acute events overnight. Patient with no complaints this morning.    Tele: SR/ST     Medications:  amLODIPine   Tablet 5 milliGRAM(s) Oral daily  aspirin enteric coated 81 milliGRAM(s) Oral daily  clopidogrel Tablet 75 milliGRAM(s) Oral daily  dextrose 40% Gel 15 Gram(s) Oral once PRN  dextrose 5%. 1000 milliLiter(s) IV Continuous <Continuous>  dextrose 50% Injectable 12.5 Gram(s) IV Push once  dextrose 50% Injectable 25 Gram(s) IV Push once  dextrose 50% Injectable 25 Gram(s) IV Push once  docusate sodium 100 milliGRAM(s) Oral three times a day  ergocalciferol 28922 Unit(s) Oral <User Schedule>  famotidine    Tablet 10 milliGRAM(s) Oral daily  gabapentin 400 milliGRAM(s) Oral at bedtime  glucagon  Injectable 1 milliGRAM(s) IntraMuscular once PRN  heparin  Injectable 5000 Unit(s) SubCutaneous every 8 hours  insulin lispro (HumaLOG) corrective regimen sliding scale   SubCutaneous three times a day before meals  isosorbide   dinitrate Tablet (ISORDIL) 5 milliGRAM(s) Oral three times a day  levothyroxine 88 MICROGram(s) Oral daily  polyethylene glycol 3350 17 Gram(s) Oral daily  predniSONE   Tablet 10 milliGRAM(s) Oral two times a day  senna 2 Tablet(s) Oral at bedtime  simvastatin 20 milliGRAM(s) Oral at bedtime  sodium chloride 0.9%. 1000 milliLiter(s) IV Continuous <Continuous>  zolpidem 5 milliGRAM(s) Oral at bedtime PRN  zolpidem 5 milliGRAM(s) Oral at bedtime PRN      Review of Systems:  Constitutional: [ ] Fever [ ] Chills [ ] Fatigue [ ] Weight change   HEENT: [ ] Blurred vision [ ] Eye Pain [ ] Headache [ ] Runny nose [ ] Sore Throat   Respiratory: [ ] Cough [ ] Wheezing [ ] Shortness of breath  Cardiovascular: [ ] Chest Pain [ ] Palpitations [ ] MACHUCA [ ] PND [ ] Orthopnea  Gastrointestinal: [ ] Abdominal Pain [ ] Diarrhea [ ] Constipation [ ] Hemorrhoids [ ] Nausea [ ] Vomiting  Genitourinary: [ ] Nocturia [ ] Dysuria [ ] Incontinence  Extremities: [ ] Swelling [ ] Joint Pain  Neurologic: [ ] Focal deficit [ ] Paresthesias [ ] Syncope  Lymphatic: [ ] Swelling [ ] Lymphadenopathy   Skin: [ ] Rash [ ] Ecchymoses [ ] Wounds [ ] Lesions  Psychiatry: [ ] Depression [ ] Suicidal/Homicidal Ideation [ ] Anxiety [ ] Sleep Disturbances  [x] 10 point review of systems is otherwise negative except as mentioned above            [ ]Unable to obtain    Vitals:  T(C): 36.4 (19 @ 05:21), Max: 36.8 (19 @ 18:55)  HR: 92 (19 @ 05:21) (72 - 92)  BP: 115/67 (19 @ 05:21) (113/72 - 139/70)  BP(mean): --  RR: 16 (19 @ 05:21) (16 - 17)  SpO2: 98% (19 @ 05:21) (98% - 100%)  Wt(kg): --  Daily Height in cm: 157.48 (30 Aug 2019 15:17)    Daily   I&O's Summary    30 Aug 2019 07:  -  31 Aug 2019 07:00  --------------------------------------------------------  IN: 326 mL / OUT: 1100 mL / NET: -774 mL    31 Aug 2019 07:01  -  31 Aug 2019 09:58  --------------------------------------------------------  IN: 0 mL / OUT: 350 mL / NET: -350 mL        Physical Exam:  NAD  PERRL, EOMI  Normal oral [place] NC/AT  S1, S2, RRR, No m/r/g appreciated, No edema, no elevation in JVP  Clear to auscultation bilaterally  Soft, Non-tender, Non-distended, BS+  No clubbing, No joint deformity   Non-focal  No lymphadenopathy  AAOx3, Mood & affect appropriate  No rashes, No ecchymoses, No cyanosis    Labs:                        13.1   4.81  )-----------( 110      ( 31 Aug 2019 07:00 )             39.0         124<L>  |  93<L>  |  38<H>  ----------------------------<  131<H>  5.4<H>   |  20<L>  |  2.34<H>    Ca    10.1      31 Aug 2019 07:00  Phos  3.4       Mg     1.9         TPro  5.6<L>  /  Alb  2.9<L>  /  TBili  0.7  /  DBili  x   /  AST  44<H>  /  ALT  54<H>  /  AlkPhos  65      PT/INR - ( 30 Aug 2019 09:04 )   PT: 11.0 SEC;   INR: 0.97          PTT - ( 30 Aug 2019 09:04 )  PTT:22.4 SEC  CARDIAC MARKERS ( 30 Aug 2019 16:10 )  x     / x     / 55 u/L / x     / x          Total Cholesterol: 121  LDL: 74  HDL: 33  T    Hemoglobin A1C, Whole Blood: 6.1 % ( @ 07:00)    New results/imaging:

## 2019-08-31 NOTE — PROGRESS NOTE ADULT - ASSESSMENT
79 year old male with a history of CAD, DM2, HTN, HLD, Hypothyroidism, PUD in 2014 presents with constipation x 1 day and syncope and collapse 6 days ago, admitted to telemetry for syncope workup, elevated troponin 101, Pt also with hypoglycemia FS 37 upon presentation. Pt also with Acute on Chronic Renal Insufficiency & Hypercalcemia      EKG: NSR @ 73 bpm 1st deg AVB, TWI V2-V6, I and avL. Left axis deviation.

## 2019-09-01 ENCOUNTER — OUTPATIENT (OUTPATIENT)
Dept: OUTPATIENT SERVICES | Facility: HOSPITAL | Age: 79
LOS: 1 days | End: 2019-09-01
Payer: MEDICAID

## 2019-09-01 DIAGNOSIS — Z95.5 PRESENCE OF CORONARY ANGIOPLASTY IMPLANT AND GRAFT: Chronic | ICD-10-CM

## 2019-09-01 LAB
ANION GAP SERPL CALC-SCNC: 13 MMO/L — SIGNIFICANT CHANGE UP (ref 7–14)
BASOPHILS # BLD AUTO: 0.01 K/UL — SIGNIFICANT CHANGE UP (ref 0–0.2)
BASOPHILS NFR BLD AUTO: 0.2 % — SIGNIFICANT CHANGE UP (ref 0–2)
BUN SERPL-MCNC: 45 MG/DL — HIGH (ref 7–23)
CALCIUM SERPL-MCNC: 9.7 MG/DL — SIGNIFICANT CHANGE UP (ref 8.4–10.5)
CHLORIDE SERPL-SCNC: 94 MMOL/L — LOW (ref 98–107)
CO2 SERPL-SCNC: 20 MMOL/L — LOW (ref 22–31)
CORTIS SERPL-MCNC: 3.7 UG/DL — SIGNIFICANT CHANGE UP (ref 2.7–18.4)
CREAT SERPL-MCNC: 2.42 MG/DL — HIGH (ref 0.5–1.3)
EOSINOPHIL # BLD AUTO: 0.01 K/UL — SIGNIFICANT CHANGE UP (ref 0–0.5)
EOSINOPHIL NFR BLD AUTO: 0.2 % — SIGNIFICANT CHANGE UP (ref 0–6)
GLUCOSE SERPL-MCNC: 315 MG/DL — HIGH (ref 70–99)
HCT VFR BLD CALC: 38.1 % — LOW (ref 39–50)
HGB BLD-MCNC: 11.9 G/DL — LOW (ref 13–17)
IMM GRANULOCYTES NFR BLD AUTO: 0.7 % — SIGNIFICANT CHANGE UP (ref 0–1.5)
LYMPHOCYTES # BLD AUTO: 0.22 K/UL — LOW (ref 1–3.3)
LYMPHOCYTES # BLD AUTO: 4.8 % — LOW (ref 13–44)
MAGNESIUM SERPL-MCNC: 2 MG/DL — SIGNIFICANT CHANGE UP (ref 1.6–2.6)
MCHC RBC-ENTMCNC: 28 PG — SIGNIFICANT CHANGE UP (ref 27–34)
MCHC RBC-ENTMCNC: 31.2 % — LOW (ref 32–36)
MCV RBC AUTO: 89.6 FL — SIGNIFICANT CHANGE UP (ref 80–100)
MONOCYTES # BLD AUTO: 0.46 K/UL — SIGNIFICANT CHANGE UP (ref 0–0.9)
MONOCYTES NFR BLD AUTO: 10 % — SIGNIFICANT CHANGE UP (ref 2–14)
NEUTROPHILS # BLD AUTO: 3.87 K/UL — SIGNIFICANT CHANGE UP (ref 1.8–7.4)
NEUTROPHILS NFR BLD AUTO: 84.1 % — HIGH (ref 43–77)
NRBC # FLD: 0 K/UL — SIGNIFICANT CHANGE UP (ref 0–0)
PLATELET # BLD AUTO: 122 K/UL — LOW (ref 150–400)
PMV BLD: 10.8 FL — SIGNIFICANT CHANGE UP (ref 7–13)
POTASSIUM SERPL-MCNC: 4.7 MMOL/L — SIGNIFICANT CHANGE UP (ref 3.5–5.3)
POTASSIUM SERPL-SCNC: 4.7 MMOL/L — SIGNIFICANT CHANGE UP (ref 3.5–5.3)
PROT SERPL-MCNC: 6.2 G/DL — SIGNIFICANT CHANGE UP (ref 6–8.3)
PROT UR-MCNC: 7.4 MG/DL — SIGNIFICANT CHANGE UP
PTH-INTACT SERPL-MCNC: 25.46 PG/ML — SIGNIFICANT CHANGE UP (ref 15–65)
RBC # BLD: 4.25 M/UL — SIGNIFICANT CHANGE UP (ref 4.2–5.8)
RBC # FLD: 13.8 % — SIGNIFICANT CHANGE UP (ref 10.3–14.5)
SODIUM SERPL-SCNC: 127 MMOL/L — LOW (ref 135–145)
TSH SERPL-MCNC: 2.14 UIU/ML — SIGNIFICANT CHANGE UP (ref 0.27–4.2)
WBC # BLD: 4.6 K/UL — SIGNIFICANT CHANGE UP (ref 3.8–10.5)
WBC # FLD AUTO: 4.6 K/UL — SIGNIFICANT CHANGE UP (ref 3.8–10.5)

## 2019-09-01 PROCEDURE — 99232 SBSQ HOSP IP/OBS MODERATE 35: CPT

## 2019-09-01 PROCEDURE — G9001: CPT

## 2019-09-01 PROCEDURE — 84166 PROTEIN E-PHORESIS/URINE/CSF: CPT | Mod: 26

## 2019-09-01 RX ORDER — SODIUM CHLORIDE 9 MG/ML
1000 INJECTION INTRAMUSCULAR; INTRAVENOUS; SUBCUTANEOUS
Refills: 0 | Status: DISCONTINUED | OUTPATIENT
Start: 2019-09-01 | End: 2019-09-04

## 2019-09-01 RX ADMIN — SODIUM CHLORIDE 75 MILLILITER(S): 9 INJECTION INTRAMUSCULAR; INTRAVENOUS; SUBCUTANEOUS at 15:10

## 2019-09-01 RX ADMIN — Medication 25 MILLIGRAM(S): at 05:25

## 2019-09-01 RX ADMIN — SODIUM CHLORIDE 75 MILLILITER(S): 9 INJECTION INTRAMUSCULAR; INTRAVENOUS; SUBCUTANEOUS at 21:44

## 2019-09-01 RX ADMIN — Medication 100 MILLIGRAM(S): at 05:25

## 2019-09-01 RX ADMIN — GABAPENTIN 400 MILLIGRAM(S): 400 CAPSULE ORAL at 21:43

## 2019-09-01 RX ADMIN — ERGOCALCIFEROL 50000 UNIT(S): 1.25 CAPSULE ORAL at 13:03

## 2019-09-01 RX ADMIN — SENNA PLUS 2 TABLET(S): 8.6 TABLET ORAL at 21:43

## 2019-09-01 RX ADMIN — AMLODIPINE BESYLATE 5 MILLIGRAM(S): 2.5 TABLET ORAL at 05:25

## 2019-09-01 RX ADMIN — FAMOTIDINE 10 MILLIGRAM(S): 10 INJECTION INTRAVENOUS at 09:02

## 2019-09-01 RX ADMIN — ISOSORBIDE DINITRATE 5 MILLIGRAM(S): 5 TABLET ORAL at 05:25

## 2019-09-01 RX ADMIN — Medication 10 MILLIGRAM(S): at 05:25

## 2019-09-01 RX ADMIN — SIMVASTATIN 20 MILLIGRAM(S): 20 TABLET, FILM COATED ORAL at 21:43

## 2019-09-01 RX ADMIN — Medication 100 MILLIGRAM(S): at 21:43

## 2019-09-01 RX ADMIN — POLYETHYLENE GLYCOL 3350 17 GRAM(S): 17 POWDER, FOR SOLUTION ORAL at 09:02

## 2019-09-01 RX ADMIN — Medication 3: at 09:00

## 2019-09-01 RX ADMIN — CLOPIDOGREL BISULFATE 75 MILLIGRAM(S): 75 TABLET, FILM COATED ORAL at 09:01

## 2019-09-01 RX ADMIN — Medication 10 MILLIGRAM(S): at 17:21

## 2019-09-01 RX ADMIN — Medication 3: at 13:02

## 2019-09-01 RX ADMIN — ISOSORBIDE DINITRATE 5 MILLIGRAM(S): 5 TABLET ORAL at 21:43

## 2019-09-01 RX ADMIN — Medication 88 MICROGRAM(S): at 05:25

## 2019-09-01 RX ADMIN — ISOSORBIDE DINITRATE 5 MILLIGRAM(S): 5 TABLET ORAL at 13:04

## 2019-09-01 RX ADMIN — Medication 81 MILLIGRAM(S): at 09:02

## 2019-09-01 NOTE — PROGRESS NOTE ADULT - SUBJECTIVE AND OBJECTIVE BOX
Patient is a 79y old  Male who presents with a chief complaint of     HPI:  Confused, no distress. Denies dizziness  Currently stable    PAST MEDICAL & SURGICAL HISTORY:  CAD (coronary artery disease)  Adult hypothyroidism  Hyperlipidemia  Peptic ulcer: s/p treatment for H pylori  Diabetes  Essential hypertension  S/P coronary artery stent placement: x4, last one in early 2018      Review of Systems:   CONSTITUTIONAL: No fever, weight loss, or fatigue  EYES: No eye pain, visual disturbances, or discharge  ENMT:  No difficulty hearing, tinnitus, vertigo; No sinus or throat pain  NECK: No pain or stiffness  BREASTS: No pain, masses, or nipple discharge  RESPIRATORY: No cough, wheezing, chills or hemoptysis; No shortness of breath  CARDIOVASCULAR: No chest pain, palpitations, dizziness, or leg swelling  GASTROINTESTINAL: No abdominal or epigastric pain. No nausea, vomiting, or hematemesis; No diarrhea or constipation. No melena or hematochezia.  GENITOURINARY: No dysuria, frequency, hematuria, or incontinence  NEUROLOGICAL: No headaches, memory loss, loss of strength, numbness, or tremors  SKIN: No itching, burning, rashes, or lesions   LYMPH NODES: No enlarged glands  ENDOCRINE: No heat or cold intolerance; No hair loss  MUSCULOSKELETAL: No joint pain or swelling; No muscle, back, or extremity pain  PSYCHIATRIC: No depression, anxiety, mood swings, or difficulty sleeping  HEME/LYMPH: No easy bruising, or bleeding gums  ALLERY AND IMMUNOLOGIC: No hives or eczema    Allergies    No Known Allergies    Intolerances        Social History:     FAMILY HISTORY:  No pertinent family history in first degree relatives      MEDICATIONS  (STANDING):  amLODIPine   Tablet 5 milliGRAM(s) Oral daily  aspirin enteric coated 81 milliGRAM(s) Oral daily  clopidogrel Tablet 75 milliGRAM(s) Oral daily  dextrose 5%. 1000 milliLiter(s) (50 mL/Hr) IV Continuous <Continuous>  dextrose 50% Injectable 12.5 Gram(s) IV Push once  dextrose 50% Injectable 25 Gram(s) IV Push once  dextrose 50% Injectable 25 Gram(s) IV Push once  docusate sodium 100 milliGRAM(s) Oral three times a day  ergocalciferol 33798 Unit(s) Oral <User Schedule>  famotidine    Tablet 10 milliGRAM(s) Oral daily  gabapentin 400 milliGRAM(s) Oral at bedtime  heparin  Injectable 5000 Unit(s) SubCutaneous every 8 hours  insulin lispro (HumaLOG) corrective regimen sliding scale   SubCutaneous three times a day before meals  isosorbide   dinitrate Tablet (ISORDIL) 5 milliGRAM(s) Oral three times a day  levothyroxine 88 MICROGram(s) Oral daily  metoprolol succinate ER 25 milliGRAM(s) Oral daily  polyethylene glycol 3350 17 Gram(s) Oral daily  predniSONE   Tablet 10 milliGRAM(s) Oral two times a day  senna 2 Tablet(s) Oral at bedtime  simvastatin 20 milliGRAM(s) Oral at bedtime  sodium chloride 0.9%. 1000 milliLiter(s) (75 mL/Hr) IV Continuous <Continuous>    MEDICATIONS  (PRN):  dextrose 40% Gel 15 Gram(s) Oral once PRN Blood Glucose LESS THAN 70 milliGRAM(s)/deciliter  glucagon  Injectable 1 milliGRAM(s) IntraMuscular once PRN Glucose LESS THAN 70 milligrams/deciliter  zolpidem 5 milliGRAM(s) Oral at bedtime PRN Insomnia  zolpidem 5 milliGRAM(s) Oral at bedtime PRN Insomnia        CAPILLARY BLOOD GLUCOSE      POCT Blood Glucose.: 298 mg/dL (31 Aug 2019 12:35)  POCT Blood Glucose.: 130 mg/dL (31 Aug 2019 08:42)  POCT Blood Glucose.: 122 mg/dL (30 Aug 2019 23:13)  POCT Blood Glucose.: 118 mg/dL (30 Aug 2019 22:42)  POCT Blood Glucose.: 64 mg/dL (30 Aug 2019 22:21)  POCT Blood Glucose.: 44 mg/dL (30 Aug 2019 22:03)  POCT Blood Glucose.: 44 mg/dL (30 Aug 2019 22:01)  POCT Blood Glucose.: 87 mg/dL (30 Aug 2019 17:36)    I&O's Summary    30 Aug 2019 07:  -  31 Aug 2019 07:00  --------------------------------------------------------  IN: 326 mL / OUT: 1100 mL / NET: -774 mL    31 Aug 2019 07:  -  31 Aug 2019 16:39  --------------------------------------------------------  IN: 0 mL / OUT: 750 mL / NET: -750 mL        PHYSICAL EXAM:  Vital Signs Last 24 Hrs  T(C): 36.9 (31 Aug 2019 12:50), Max: 36.9 (31 Aug 2019 12:50)  T(F): 98.4 (31 Aug 2019 12:50), Max: 98.4 (31 Aug 2019 12:50)  HR: 84 (31 Aug 2019 12:50) (72 - 92)  BP: 117/71 (31 Aug 2019 12:50) (113/72 - 129/74)  BP(mean): --  RR: 16 (31 Aug 2019 12:50) (16 - 17)  SpO2: 98% (31 Aug 2019 12:50) (98% - 99%)    GENERAL: NAD, well-developed  HEAD:  Atraumatic, Normocephalic  EYES: EOMI, PERRLA, conjunctiva and sclera clear  NECK: Supple, No JVD  CHEST/LUNG: Clear to auscultation bilaterally; No wheeze  HEART: Regular rate and rhythm; No murmurs, rubs, or gallops  ABDOMEN: Soft, Nontender, Nondistended; Bowel sounds present  EXTREMITIES:  2+ Peripheral Pulses, No clubbing, cyanosis, or edema  PSYCH: AAOx3  NEUROLOGY: non-focal  SKIN: No rashes or lesions    LABS:                        13.1   4.81  )-----------( 110      ( 31 Aug 2019 07:00 )             39.0         124<L>  |  93<L>  |  38<H>  ----------------------------<  131<H>  5.4<H>   |  20<L>  |  2.34<H>    Ca    10.1      31 Aug 2019 07:00  Phos  3.4       Mg     1.9         TPro  5.6<L>  /  Alb  2.9<L>  /  TBili  0.7  /  DBili  x   /  AST  44<H>  /  ALT  54<H>  /  AlkPhos  65      PT/INR - ( 30 Aug 2019 09:04 )   PT: 11.0 SEC;   INR: 0.97          PTT - ( 30 Aug 2019 09:04 )  PTT:22.4 SEC  CARDIAC MARKERS ( 30 Aug 2019 16:10 )  x     / x     / 55 u/L / x     / x          Urinalysis Basic - ( 30 Aug 2019 12:00 )    Color: LIGHT YELLOW / Appearance: CLEAR / S.013 / pH: 6.0  Gluc: 300 / Ketone: NEGATIVE  / Bili: NEGATIVE / Urobili: NORMAL   Blood: NEGATIVE / Protein: 20 / Nitrite: NEGATIVE   Leuk Esterase: NEGATIVE / RBC: 0-2 / WBC 0-2   Sq Epi: OCC / Non Sq Epi: x / Bacteria: NEGATIVE        RADIOLOGY & ADDITIONAL TESTS:    Imaging Personally Reviewed:    Consultant(s) Notes Reviewed:      Care Discussed with Consultants/Other Providers:

## 2019-09-01 NOTE — PROGRESS NOTE ADULT - ASSESSMENT
79 year old male with CAD, DM2, HTN, HLD presents with constipation/abdominal complaints with syncope and mild troponin elevation.  Troponin 92 and subsequently downtrended in setting of CKD, additionally CK not elevated.    #Elevated troponin and syncope  -- f/u tte  -- continue asa, plavix, simvastatin, imdur    #CAD  -- continue asa  -- continue plavix  -- continue simvastatin  -- continue imdur  -- continue toprol 25 mg    #HLD  -- continue simvastatin    #HTN  -- continue amlodipine    Addison Sanchez  Cardiovascular Disease Fellow  [phone]    For all cardiology-related service needs, information can be found 24/7 on [email or web address]

## 2019-09-01 NOTE — PROGRESS NOTE ADULT - SUBJECTIVE AND OBJECTIVE BOX
Patient seen and examined at bedside. No chest pain/sob    VITALS:  T(F): 98.2 (09-01-19 @ 13:00), Max: 98.2 (08-31-19 @ 21:27)  HR: 62 (09-01-19 @ 13:00)  BP: 104/65 (09-01-19 @ 13:00)  RR: 15 (09-01-19 @ 13:00)  SpO2: 99% (09-01-19 @ 13:00)  Wt(kg): --    08-31 @ 07:01  -  09-01 @ 07:00  --------------------------------------------------------  IN: 350 mL / OUT: 1170 mL / NET: -820 mL    09-01 @ 07:01  -  09-01 @ 14:13  --------------------------------------------------------  IN: 0 mL / OUT: 400 mL / NET: -400 mL          PHYSICAL EXAM:  Constitutional: NAD  Neck: No JVD  Respiratory: CTAB, no wheezes, rales or rhonchi  Cardiovascular: S1, S2, RRR  Gastrointestinal: BS+, soft, NT/ND  Extremities: No peripheral edema    Hospital Medications:   MEDICATIONS  (STANDING):  amLODIPine   Tablet 5 milliGRAM(s) Oral daily  aspirin enteric coated 81 milliGRAM(s) Oral daily  clopidogrel Tablet 75 milliGRAM(s) Oral daily  dextrose 5%. 1000 milliLiter(s) (50 mL/Hr) IV Continuous <Continuous>  dextrose 50% Injectable 12.5 Gram(s) IV Push once  dextrose 50% Injectable 25 Gram(s) IV Push once  dextrose 50% Injectable 25 Gram(s) IV Push once  docusate sodium 100 milliGRAM(s) Oral three times a day  ergocalciferol 86487 Unit(s) Oral <User Schedule>  famotidine    Tablet 10 milliGRAM(s) Oral daily  gabapentin 400 milliGRAM(s) Oral at bedtime  heparin  Injectable 5000 Unit(s) SubCutaneous every 8 hours  insulin lispro (HumaLOG) corrective regimen sliding scale   SubCutaneous three times a day before meals  isosorbide   dinitrate Tablet (ISORDIL) 5 milliGRAM(s) Oral three times a day  levothyroxine 88 MICROGram(s) Oral daily  metoprolol succinate ER 25 milliGRAM(s) Oral daily  polyethylene glycol 3350 17 Gram(s) Oral daily  predniSONE   Tablet 10 milliGRAM(s) Oral two times a day  senna 2 Tablet(s) Oral at bedtime  simvastatin 20 milliGRAM(s) Oral at bedtime  sodium chloride 0.9%. 1000 milliLiter(s) (75 mL/Hr) IV Continuous <Continuous>      LABS:  09-01    127<L>  |  94<L>  |  45<H>  ----------------------------<  315<H>  4.7   |  20<L>  |  2.42<H>    Ca    9.7      01 Sep 2019 06:00  Phos  3.4     08-31  Mg     2.0     09-01    TPro  6.2  /  Alb      /  TBili      /  DBili      /  AST      /  ALT      /  AlkPhos      09-01    Creatinine Trend: 2.42 <--, 2.51 <--, 2.34 <--, 2.53 <--, 2.59 <--    calcium--  intact pth--  parathyroid hormone intact, serum25.46                            11.9   4.60  )-----------( 122      ( 01 Sep 2019 06:00 )             38.1     Urine Studies:  Sodium, Random Urine: 35 mmol/L (08-31 @ 13:28)  Osmolality, Random Urine: 256 mosmo/kg (08-31 @ 13:28)  Creatinine, Random Urine: 37.70 mg/dL (08-31 @ 13:28)    Sodium, Random Urine: 35 mmol/L (08-31 @ 13:28)  Osmolality, Random Urine: 256 mosmo/kg (08-31 @ 13:28)  Creatinine, Random Urine: 37.70 mg/dL (08-31 @ 13:28)    Urinalysis - [08-30-19 @ 12:00]      Color LIGHT YELLOW / Appearance CLEAR / SG 1.013 / pH 6.0      Gluc 300 / Ketone NEGATIVE  / Bili NEGATIVE / Urobili NORMAL       Blood NEGATIVE / Protein 20 / Leuk Est NEGATIVE / Nitrite NEGATIVE      RBC 0-2 / WBC 0-2 / Hyaline NEGATIVE / Gran  / Sq Epi OCC / Non Sq Epi  / Bacteria NEGATIVE    Urine Creatinine 37.70      [08-31-19 @ 13:28]  Urine Sodium 35      [08-31-19 @ 13:28]  Urine Osmolality 256      [08-31-19 @ 13:28]    PTH 25.46 (Ca --)      [09-01-19 @ 06:00]   --  HbA1c 6.1      [08-31-19 @ 07:00]  TSH 2.14      [09-01-19 @ 06:00]  Lipid: chol 121, TG 96, HDL 33, LDL 74      [08-31-19 @ 07:00]        RADIOLOGY & ADDITIONAL STUDIES:

## 2019-09-01 NOTE — PROGRESS NOTE ADULT - ASSESSMENT
79 year old male with a history of CAD, DM2, HTN, and HLD presents with constipation and possible syncope    CKD (chronic kidney disease), stage IV.    Baseline ~ 2.2-2.5. Renal function is stable  monitor for now  avoid nephrotoxic agents      Essential hypertension.    Optimal.   monitor      hyponatremia  ? etiology (reluctant historian) possible sec to poor po intake  Na still less than 130.   Give another ter of NS at 75ml/hr x 1 day  Urine na low, U. osmo normal, TSH normal and cortisol low  monitor bmp Q 12 until Na >130  avoid hypotonic solution    Hypercalcemia  ? dehydration  F/U pth, vit d 25, vit d 1,25, SPEP, serum immunofixation, serum free light chain  mild proteinuria. vasculitis w/u in office neg except +ANCA  was referred for rheum eval in office but unsure if patient followed up  repeat anca    proteinuria  F/U urine p/c ratio

## 2019-09-02 LAB
ANION GAP SERPL CALC-SCNC: 13 MMO/L — SIGNIFICANT CHANGE UP (ref 7–14)
ANION GAP SERPL CALC-SCNC: 13 MMO/L — SIGNIFICANT CHANGE UP (ref 7–14)
BASOPHILS # BLD AUTO: 0.01 K/UL — SIGNIFICANT CHANGE UP (ref 0–0.2)
BASOPHILS NFR BLD AUTO: 0.2 % — SIGNIFICANT CHANGE UP (ref 0–2)
BUN SERPL-MCNC: 46 MG/DL — HIGH (ref 7–23)
BUN SERPL-MCNC: 46 MG/DL — HIGH (ref 7–23)
CALCIUM SERPL-MCNC: 9.4 MG/DL — SIGNIFICANT CHANGE UP (ref 8.4–10.5)
CALCIUM SERPL-MCNC: 9.7 MG/DL — SIGNIFICANT CHANGE UP (ref 8.4–10.5)
CHLORIDE SERPL-SCNC: 95 MMOL/L — LOW (ref 98–107)
CHLORIDE SERPL-SCNC: 97 MMOL/L — LOW (ref 98–107)
CO2 SERPL-SCNC: 14 MMOL/L — LOW (ref 22–31)
CO2 SERPL-SCNC: 20 MMOL/L — LOW (ref 22–31)
CREAT SERPL-MCNC: 1.95 MG/DL — HIGH (ref 0.5–1.3)
CREAT SERPL-MCNC: 2.05 MG/DL — HIGH (ref 0.5–1.3)
EOSINOPHIL # BLD AUTO: 0.03 K/UL — SIGNIFICANT CHANGE UP (ref 0–0.5)
EOSINOPHIL NFR BLD AUTO: 0.5 % — SIGNIFICANT CHANGE UP (ref 0–6)
GLUCOSE SERPL-MCNC: 255 MG/DL — HIGH (ref 70–99)
GLUCOSE SERPL-MCNC: 383 MG/DL — HIGH (ref 70–99)
HCT VFR BLD CALC: 40.5 % — SIGNIFICANT CHANGE UP (ref 39–50)
HGB BLD-MCNC: 12.8 G/DL — LOW (ref 13–17)
IMM GRANULOCYTES NFR BLD AUTO: 0.7 % — SIGNIFICANT CHANGE UP (ref 0–1.5)
LYMPHOCYTES # BLD AUTO: 0.29 K/UL — LOW (ref 1–3.3)
LYMPHOCYTES # BLD AUTO: 5.1 % — LOW (ref 13–44)
MAGNESIUM SERPL-MCNC: 2 MG/DL — SIGNIFICANT CHANGE UP (ref 1.6–2.6)
MCHC RBC-ENTMCNC: 29.1 PG — SIGNIFICANT CHANGE UP (ref 27–34)
MCHC RBC-ENTMCNC: 31.6 % — LOW (ref 32–36)
MCV RBC AUTO: 92 FL — SIGNIFICANT CHANGE UP (ref 80–100)
MONOCYTES # BLD AUTO: 0.81 K/UL — SIGNIFICANT CHANGE UP (ref 0–0.9)
MONOCYTES NFR BLD AUTO: 14.2 % — HIGH (ref 2–14)
NEUTROPHILS # BLD AUTO: 4.53 K/UL — SIGNIFICANT CHANGE UP (ref 1.8–7.4)
NEUTROPHILS NFR BLD AUTO: 79.3 % — HIGH (ref 43–77)
NRBC # FLD: 0 K/UL — SIGNIFICANT CHANGE UP (ref 0–0)
PLATELET # BLD AUTO: 135 K/UL — LOW (ref 150–400)
PMV BLD: 10.8 FL — SIGNIFICANT CHANGE UP (ref 7–13)
POTASSIUM SERPL-MCNC: 4.8 MMOL/L — SIGNIFICANT CHANGE UP (ref 3.5–5.3)
POTASSIUM SERPL-MCNC: 5.7 MMOL/L — HIGH (ref 3.5–5.3)
POTASSIUM SERPL-SCNC: 4.8 MMOL/L — SIGNIFICANT CHANGE UP (ref 3.5–5.3)
POTASSIUM SERPL-SCNC: 5.7 MMOL/L — HIGH (ref 3.5–5.3)
RBC # BLD: 4.4 M/UL — SIGNIFICANT CHANGE UP (ref 4.2–5.8)
RBC # FLD: 13.9 % — SIGNIFICANT CHANGE UP (ref 10.3–14.5)
SODIUM SERPL-SCNC: 124 MMOL/L — LOW (ref 135–145)
SODIUM SERPL-SCNC: 128 MMOL/L — LOW (ref 135–145)
WBC # BLD: 5.71 K/UL — SIGNIFICANT CHANGE UP (ref 3.8–10.5)
WBC # FLD AUTO: 5.71 K/UL — SIGNIFICANT CHANGE UP (ref 3.8–10.5)

## 2019-09-02 PROCEDURE — 99232 SBSQ HOSP IP/OBS MODERATE 35: CPT

## 2019-09-02 RX ORDER — SODIUM BICARBONATE 1 MEQ/ML
0.12 SYRINGE (ML) INTRAVENOUS
Qty: 75 | Refills: 0 | Status: DISCONTINUED | OUTPATIENT
Start: 2019-09-02 | End: 2019-09-03

## 2019-09-02 RX ORDER — SODIUM CHLORIDE 9 MG/ML
1000 INJECTION, SOLUTION INTRAVENOUS
Refills: 0 | Status: DISCONTINUED | OUTPATIENT
Start: 2019-09-02 | End: 2019-09-02

## 2019-09-02 RX ADMIN — Medication 88 MICROGRAM(S): at 06:54

## 2019-09-02 RX ADMIN — CLOPIDOGREL BISULFATE 75 MILLIGRAM(S): 75 TABLET, FILM COATED ORAL at 17:09

## 2019-09-02 RX ADMIN — FAMOTIDINE 10 MILLIGRAM(S): 10 INJECTION INTRAVENOUS at 17:09

## 2019-09-02 RX ADMIN — SIMVASTATIN 20 MILLIGRAM(S): 20 TABLET, FILM COATED ORAL at 21:25

## 2019-09-02 RX ADMIN — Medication 5: at 12:57

## 2019-09-02 RX ADMIN — Medication 100 MILLIGRAM(S): at 06:54

## 2019-09-02 RX ADMIN — AMLODIPINE BESYLATE 5 MILLIGRAM(S): 2.5 TABLET ORAL at 06:54

## 2019-09-02 RX ADMIN — Medication 2: at 08:32

## 2019-09-02 RX ADMIN — HEPARIN SODIUM 5000 UNIT(S): 5000 INJECTION INTRAVENOUS; SUBCUTANEOUS at 21:25

## 2019-09-02 RX ADMIN — Medication 10 MILLIGRAM(S): at 17:09

## 2019-09-02 RX ADMIN — ISOSORBIDE DINITRATE 5 MILLIGRAM(S): 5 TABLET ORAL at 06:54

## 2019-09-02 RX ADMIN — GABAPENTIN 400 MILLIGRAM(S): 400 CAPSULE ORAL at 21:25

## 2019-09-02 RX ADMIN — Medication 81 MILLIGRAM(S): at 17:09

## 2019-09-02 RX ADMIN — ISOSORBIDE DINITRATE 5 MILLIGRAM(S): 5 TABLET ORAL at 21:25

## 2019-09-02 RX ADMIN — Medication 100 MEQ/KG/HR: at 14:18

## 2019-09-02 RX ADMIN — Medication 1: at 17:10

## 2019-09-02 RX ADMIN — SODIUM CHLORIDE 100 MILLILITER(S): 9 INJECTION, SOLUTION INTRAVENOUS at 11:12

## 2019-09-02 RX ADMIN — Medication 25 MILLIGRAM(S): at 06:54

## 2019-09-02 RX ADMIN — Medication 10 MILLIGRAM(S): at 06:54

## 2019-09-02 NOTE — PROGRESS NOTE ADULT - ASSESSMENT
79 year old male with CAD, DM2, HTN, HLD presents with constipation/abdominal complaints with syncope and mild troponin elevation.  Troponin 92 and subsequently downtrended in setting of CKD, additionally CK not elevated.    #Elevated troponin and syncope  -- f/u tte  -- continue asa, plavix, simvastatin, imdur    #CAD  -- continue asa  -- continue plavix  -- continue simvastatin  -- continue imdur  -- continue toprol 25 mg    #HLD  -- continue simvastatin    #HTN  -- continue amlodipine    Addison Sanchez  Cardiovascular Disease Fellow  [phone]    For all cardiology-related service needs, information can be found 24/7 on [email or web address]

## 2019-09-02 NOTE — PROGRESS NOTE ADULT - SUBJECTIVE AND OBJECTIVE BOX
Cardiology Progress Note    Interval events: No acute events overnight. Patient resting comfortably. No complaints this morning.    Tele: SR    Medications:  amLODIPine   Tablet 5 milliGRAM(s) Oral daily  aspirin enteric coated 81 milliGRAM(s) Oral daily  clopidogrel Tablet 75 milliGRAM(s) Oral daily  dextrose 40% Gel 15 Gram(s) Oral once PRN  dextrose 5%. 1000 milliLiter(s) IV Continuous <Continuous>  dextrose 50% Injectable 12.5 Gram(s) IV Push once  dextrose 50% Injectable 25 Gram(s) IV Push once  dextrose 50% Injectable 25 Gram(s) IV Push once  docusate sodium 100 milliGRAM(s) Oral three times a day  ergocalciferol 25154 Unit(s) Oral <User Schedule>  famotidine    Tablet 10 milliGRAM(s) Oral daily  gabapentin 400 milliGRAM(s) Oral at bedtime  glucagon  Injectable 1 milliGRAM(s) IntraMuscular once PRN  heparin  Injectable 5000 Unit(s) SubCutaneous every 8 hours  insulin lispro (HumaLOG) corrective regimen sliding scale   SubCutaneous three times a day before meals  isosorbide   dinitrate Tablet (ISORDIL) 5 milliGRAM(s) Oral three times a day  levothyroxine 88 MICROGram(s) Oral daily  metoprolol succinate ER 25 milliGRAM(s) Oral daily  polyethylene glycol 3350 17 Gram(s) Oral daily  predniSONE   Tablet 10 milliGRAM(s) Oral two times a day  senna 2 Tablet(s) Oral at bedtime  simvastatin 20 milliGRAM(s) Oral at bedtime  sodium chloride 0.9%. 1000 milliLiter(s) IV Continuous <Continuous>  sodium chloride 0.9%. 1000 milliLiter(s) IV Continuous <Continuous>  zolpidem 5 milliGRAM(s) Oral at bedtime PRN  zolpidem 5 milliGRAM(s) Oral at bedtime PRN      Review of Systems:  Constitutional: [ ] Fever [ ] Chills [ ] Fatigue [ ] Weight change   HEENT: [ ] Blurred vision [ ] Eye Pain [ ] Headache [ ] Runny nose [ ] Sore Throat   Respiratory: [ ] Cough [ ] Wheezing [ ] Shortness of breath  Cardiovascular: [ ] Chest Pain [ ] Palpitations [ ] MACHUCA [ ] PND [ ] Orthopnea  Gastrointestinal: [ ] Abdominal Pain [ ] Diarrhea [ ] Constipation [ ] Hemorrhoids [ ] Nausea [ ] Vomiting  Genitourinary: [ ] Nocturia [ ] Dysuria [ ] Incontinence  Extremities: [ ] Swelling [ ] Joint Pain  Neurologic: [ ] Focal deficit [ ] Paresthesias [ ] Syncope  Lymphatic: [ ] Swelling [ ] Lymphadenopathy   Skin: [ ] Rash [ ] Ecchymoses [ ] Wounds [ ] Lesions  Psychiatry: [ ] Depression [ ] Suicidal/Homicidal Ideation [ ] Anxiety [ ] Sleep Disturbances  [x] 10 point review of systems is otherwise negative except as mentioned above            [ ]Unable to obtain    Vitals:  T(C): 36.7 (09-02-19 @ 06:53), Max: 36.8 (09-01-19 @ 13:00)  HR: 72 (09-02-19 @ 06:53) (62 - 72)  BP: 117/76 (09-02-19 @ 06:53) (104/65 - 118/64)  BP(mean): --  RR: 16 (09-02-19 @ 06:53) (15 - 16)  SpO2: 98% (09-02-19 @ 06:53) (98% - 99%)  Wt(kg): --  Daily     Daily   I&O's Summary    01 Sep 2019 07:01  -  02 Sep 2019 07:00  --------------------------------------------------------  IN: 1025 mL / OUT: 3330 mL / NET: -2305 mL        Physical Exam:  NAD  PERRL, EOMI  Normal oral [place] NC/AT  S1, S2, RRR, No m/r/g appreciated, No edema, no elevation in JVP  Clear to auscultation bilaterally  Soft, Non-tender, Non-distended, BS+  No clubbing, No joint deformity   Non-focal  No lymphadenopathy  AAOx3, Mood & affect appropriate  No rashes, No ecchymoses, No cyanosis    Labs:                        11.9   4.60  )-----------( 122      ( 01 Sep 2019 06:00 )             38.1     09-01    127<L>  |  94<L>  |  45<H>  ----------------------------<  315<H>  4.7   |  20<L>  |  2.42<H>    Ca    9.7      01 Sep 2019 06:00  Mg     2.0     09-01    TPro  6.2  /  Alb  x   /  TBili  x   /  DBili  x   /  AST  x   /  ALT  x   /  AlkPhos  x   09-01    New results/imaging:  pending

## 2019-09-02 NOTE — PROGRESS NOTE ADULT - ASSESSMENT
79 year old male with a history of CAD, DM2, HTN, and HLD presents with constipation and possible syncope    CKD (chronic kidney disease), stage IV.    Baseline ~ 2.2-2.5. Renal function is stable  monitor for now  avoid nephrotoxic agents      Essential hypertension.    Optimal.   monitor      hyponatremia  work up suggest polydipsia  Na still less than 130.   also has acidosis sec to diarrhea  Start 1/2NS with 75meq NaHCO3 100cc/hr  TSH normal and cortisol low  monitor bmp Q 12 until Na >130  avoid hypotonic solution    Hypercalcemia  ? dehydration, better now  F/U pth, vit d 25, vit d 1,25, SPEP, serum immunofixation, serum free light chain  mild proteinuria. vasculitis w/u in office neg except +ANCA  was referred for rheum eval in office but unsure if patient followed up  repeat anca    proteinuria  F/U urine p/c ratio

## 2019-09-02 NOTE — PROGRESS NOTE ADULT - SUBJECTIVE AND OBJECTIVE BOX
Dr. Radford  Office [phone]  Cell [phone]  Beckie ZHANG  Cell [phone]      Patient is a 79y old  Male who presents with a chief complaint of     Patient seen and examined at bedside. No chest pain/sob    VITALS:  T(F): 98 (09-02-19 @ 11:50), Max: 98 (09-02-19 @ 06:53)  HR: 71 (09-02-19 @ 11:50)  BP: 92/55 (09-02-19 @ 11:50)  RR: 16 (09-02-19 @ 11:50)  SpO2: 97% (09-02-19 @ 11:50)  Wt(kg): --    09-01 @ 07:01  -  09-02 @ 07:00  --------------------------------------------------------  IN: 1025 mL / OUT: 3330 mL / NET: -2305 mL    09-02 @ 07:01  -  09-02 @ 15:35  --------------------------------------------------------  IN: 0 mL / OUT: 600 mL / NET: -600 mL          PHYSICAL EXAM:  Constitutional: NAD  Neck: No JVD  Respiratory: CTAB, no wheezes, rales or rhonchi  Cardiovascular: S1, S2, RRR  Gastrointestinal: BS+, soft, NT/ND  Extremities: No peripheral edema    Hospital Medications:   MEDICATIONS  (STANDING):  amLODIPine   Tablet 5 milliGRAM(s) Oral daily  aspirin enteric coated 81 milliGRAM(s) Oral daily  clopidogrel Tablet 75 milliGRAM(s) Oral daily  dextrose 5%. 1000 milliLiter(s) (50 mL/Hr) IV Continuous <Continuous>  dextrose 50% Injectable 12.5 Gram(s) IV Push once  dextrose 50% Injectable 25 Gram(s) IV Push once  dextrose 50% Injectable 25 Gram(s) IV Push once  docusate sodium 100 milliGRAM(s) Oral three times a day  ergocalciferol 49632 Unit(s) Oral <User Schedule>  famotidine    Tablet 10 milliGRAM(s) Oral daily  gabapentin 400 milliGRAM(s) Oral at bedtime  heparin  Injectable 5000 Unit(s) SubCutaneous every 8 hours  insulin lispro (HumaLOG) corrective regimen sliding scale   SubCutaneous three times a day before meals  isosorbide   dinitrate Tablet (ISORDIL) 5 milliGRAM(s) Oral three times a day  levothyroxine 88 MICROGram(s) Oral daily  metoprolol succinate ER 25 milliGRAM(s) Oral daily  polyethylene glycol 3350 17 Gram(s) Oral daily  predniSONE   Tablet 10 milliGRAM(s) Oral two times a day  senna 2 Tablet(s) Oral at bedtime  simvastatin 20 milliGRAM(s) Oral at bedtime  sodium bicarbonate  Infusion 0.115 mEq/kG/Hr (100 mL/Hr) IV Continuous <Continuous>  sodium chloride 0.9%. 1000 milliLiter(s) (75 mL/Hr) IV Continuous <Continuous>  sodium chloride 0.9%. 1000 milliLiter(s) (75 mL/Hr) IV Continuous <Continuous>      LABS:  09-02    128<L>  |  95<L>  |  46<H>  ----------------------------<  383<H>  4.8   |  20<L>  |  2.05<H>    Ca    9.7      02 Sep 2019 12:24  Mg     2.0     09-02    TPro  6.2  /  Alb      /  TBili      /  DBili      /  AST      /  ALT      /  AlkPhos      09-01    Creatinine Trend: 2.05 <--, 1.95 <--, 2.42 <--, 2.51 <--, 2.34 <--, 2.53 <--, 2.59 <--                            12.8   5.71  )-----------( 135      ( 02 Sep 2019 05:00 )             40.5     Urine Studies:  Urinalysis - [08-30-19 @ 12:00]      Color LIGHT YELLOW / Appearance CLEAR / SG 1.013 / pH 6.0      Gluc 300 / Ketone NEGATIVE  / Bili NEGATIVE / Urobili NORMAL       Blood NEGATIVE / Protein 20 / Leuk Est NEGATIVE / Nitrite NEGATIVE      RBC 0-2 / WBC 0-2 / Hyaline NEGATIVE / Gran  / Sq Epi OCC / Non Sq Epi  / Bacteria NEGATIVE    Urine Creatinine 37.70      [08-31-19 @ 13:28]  Urine Protein 7.4      [09-01-19 @ 17:20]  Urine Sodium 35      [08-31-19 @ 13:28]  Urine Osmolality 256      [08-31-19 @ 13:28]    PTH 25.46 (Ca --)      [09-01-19 @ 06:00]   --  HbA1c 6.1      [08-31-19 @ 07:00]  TSH 2.14      [09-01-19 @ 06:00]  Lipid: chol 121, TG 96, HDL 33, LDL 74      [08-31-19 @ 07:00]        RADIOLOGY & ADDITIONAL STUDIES:

## 2019-09-03 DIAGNOSIS — E87.1 HYPO-OSMOLALITY AND HYPONATREMIA: ICD-10-CM

## 2019-09-03 DIAGNOSIS — Z71.89 OTHER SPECIFIED COUNSELING: ICD-10-CM

## 2019-09-03 PROBLEM — E03.9 HYPOTHYROIDISM, UNSPECIFIED: Chronic | Status: ACTIVE | Noted: 2019-08-30

## 2019-09-03 LAB
ANION GAP SERPL CALC-SCNC: 11 MMO/L — SIGNIFICANT CHANGE UP (ref 7–14)
BASOPHILS # BLD AUTO: 0.02 K/UL — SIGNIFICANT CHANGE UP (ref 0–0.2)
BASOPHILS NFR BLD AUTO: 0.4 % — SIGNIFICANT CHANGE UP (ref 0–2)
BUN SERPL-MCNC: 51 MG/DL — HIGH (ref 7–23)
CALCIUM SERPL-MCNC: 10.1 MG/DL — SIGNIFICANT CHANGE UP (ref 8.4–10.5)
CHLORIDE SERPL-SCNC: 94 MMOL/L — LOW (ref 98–107)
CO2 SERPL-SCNC: 24 MMOL/L — SIGNIFICANT CHANGE UP (ref 22–31)
CREAT SERPL-MCNC: 2.06 MG/DL — HIGH (ref 0.5–1.3)
EOSINOPHIL # BLD AUTO: 0.01 K/UL — SIGNIFICANT CHANGE UP (ref 0–0.5)
EOSINOPHIL NFR BLD AUTO: 0.2 % — SIGNIFICANT CHANGE UP (ref 0–6)
GLUCOSE SERPL-MCNC: 229 MG/DL — HIGH (ref 70–99)
HCT VFR BLD CALC: 40.6 % — SIGNIFICANT CHANGE UP (ref 39–50)
HCT VFR BLD CALC: 40.6 % — SIGNIFICANT CHANGE UP (ref 39–50)
HGB BLD-MCNC: 13.1 G/DL — SIGNIFICANT CHANGE UP (ref 13–17)
HGB BLD-MCNC: 13.1 G/DL — SIGNIFICANT CHANGE UP (ref 13–17)
IMM GRANULOCYTES NFR BLD AUTO: 1.2 % — SIGNIFICANT CHANGE UP (ref 0–1.5)
KAPPA FREE LIGHT CHAINS, SERUM: 4 MG/DL — HIGH (ref 0.33–1.94)
LAMBDA FREE LIGHT CHAINS, SERUM: 3.03 MG/DL — HIGH (ref 0.57–2.63)
LYMPHOCYTES # BLD AUTO: 0.37 K/UL — LOW (ref 1–3.3)
LYMPHOCYTES # BLD AUTO: 7.4 % — LOW (ref 13–44)
MAGNESIUM SERPL-MCNC: 1.9 MG/DL — SIGNIFICANT CHANGE UP (ref 1.6–2.6)
MCHC RBC-ENTMCNC: 28.5 PG — SIGNIFICANT CHANGE UP (ref 27–34)
MCHC RBC-ENTMCNC: 28.5 PG — SIGNIFICANT CHANGE UP (ref 27–34)
MCHC RBC-ENTMCNC: 32.3 % — SIGNIFICANT CHANGE UP (ref 32–36)
MCHC RBC-ENTMCNC: 32.3 % — SIGNIFICANT CHANGE UP (ref 32–36)
MCV RBC AUTO: 88.5 FL — SIGNIFICANT CHANGE UP (ref 80–100)
MCV RBC AUTO: 88.5 FL — SIGNIFICANT CHANGE UP (ref 80–100)
MONOCYTES # BLD AUTO: 0.55 K/UL — SIGNIFICANT CHANGE UP (ref 0–0.9)
MONOCYTES NFR BLD AUTO: 11 % — SIGNIFICANT CHANGE UP (ref 2–14)
NEUTROPHILS # BLD AUTO: 3.97 K/UL — SIGNIFICANT CHANGE UP (ref 1.8–7.4)
NEUTROPHILS NFR BLD AUTO: 79.8 % — HIGH (ref 43–77)
NRBC # FLD: 0 K/UL — SIGNIFICANT CHANGE UP (ref 0–0)
NRBC # FLD: 0 K/UL — SIGNIFICANT CHANGE UP (ref 0–0)
PLATELET # BLD AUTO: 158 K/UL — SIGNIFICANT CHANGE UP (ref 150–400)
PLATELET # BLD AUTO: 158 K/UL — SIGNIFICANT CHANGE UP (ref 150–400)
PMV BLD: 11.2 FL — SIGNIFICANT CHANGE UP (ref 7–13)
PMV BLD: 11.2 FL — SIGNIFICANT CHANGE UP (ref 7–13)
POTASSIUM SERPL-MCNC: 5 MMOL/L — SIGNIFICANT CHANGE UP (ref 3.5–5.3)
POTASSIUM SERPL-SCNC: 5 MMOL/L — SIGNIFICANT CHANGE UP (ref 3.5–5.3)
PROT SERPL-MCNC: 6.3 G/DL — SIGNIFICANT CHANGE UP (ref 6–8.3)
PTH-INTACT SERPL-MCNC: 20 PG/ML — SIGNIFICANT CHANGE UP (ref 15–65)
RBC # BLD: 4.59 M/UL — SIGNIFICANT CHANGE UP (ref 4.2–5.8)
RBC # BLD: 4.59 M/UL — SIGNIFICANT CHANGE UP (ref 4.2–5.8)
RBC # FLD: 13.8 % — SIGNIFICANT CHANGE UP (ref 10.3–14.5)
RBC # FLD: 13.8 % — SIGNIFICANT CHANGE UP (ref 10.3–14.5)
SODIUM SERPL-SCNC: 129 MMOL/L — LOW (ref 135–145)
WBC # BLD: 4.98 K/UL — SIGNIFICANT CHANGE UP (ref 3.8–10.5)
WBC # BLD: 4.98 K/UL — SIGNIFICANT CHANGE UP (ref 3.8–10.5)
WBC # FLD AUTO: 4.98 K/UL — SIGNIFICANT CHANGE UP (ref 3.8–10.5)
WBC # FLD AUTO: 4.98 K/UL — SIGNIFICANT CHANGE UP (ref 3.8–10.5)

## 2019-09-03 PROCEDURE — 86335 IMMUNFIX E-PHORSIS/URINE/CSF: CPT | Mod: 26

## 2019-09-03 PROCEDURE — 93306 TTE W/DOPPLER COMPLETE: CPT | Mod: 26

## 2019-09-03 PROCEDURE — 84165 PROTEIN E-PHORESIS SERUM: CPT | Mod: 26

## 2019-09-03 PROCEDURE — 99232 SBSQ HOSP IP/OBS MODERATE 35: CPT

## 2019-09-03 PROCEDURE — 86334 IMMUNOFIX E-PHORESIS SERUM: CPT | Mod: 26

## 2019-09-03 RX ORDER — SODIUM BICARBONATE 1 MEQ/ML
0.06 SYRINGE (ML) INTRAVENOUS
Qty: 75 | Refills: 0 | Status: DISCONTINUED | OUTPATIENT
Start: 2019-09-03 | End: 2019-09-04

## 2019-09-03 RX ADMIN — Medication 10 MILLIGRAM(S): at 18:00

## 2019-09-03 RX ADMIN — SIMVASTATIN 20 MILLIGRAM(S): 20 TABLET, FILM COATED ORAL at 21:38

## 2019-09-03 RX ADMIN — AMLODIPINE BESYLATE 5 MILLIGRAM(S): 2.5 TABLET ORAL at 05:20

## 2019-09-03 RX ADMIN — ZOLPIDEM TARTRATE 5 MILLIGRAM(S): 10 TABLET ORAL at 21:42

## 2019-09-03 RX ADMIN — Medication 1: at 09:11

## 2019-09-03 RX ADMIN — Medication 10 MILLIGRAM(S): at 05:20

## 2019-09-03 RX ADMIN — Medication 50 MEQ/KG/HR: at 14:12

## 2019-09-03 RX ADMIN — Medication 3: at 13:14

## 2019-09-03 RX ADMIN — CLOPIDOGREL BISULFATE 75 MILLIGRAM(S): 75 TABLET, FILM COATED ORAL at 13:15

## 2019-09-03 RX ADMIN — Medication 100 MEQ/KG/HR: at 09:10

## 2019-09-03 RX ADMIN — ISOSORBIDE DINITRATE 5 MILLIGRAM(S): 5 TABLET ORAL at 05:20

## 2019-09-03 RX ADMIN — Medication 100 MILLIGRAM(S): at 21:38

## 2019-09-03 RX ADMIN — HEPARIN SODIUM 5000 UNIT(S): 5000 INJECTION INTRAVENOUS; SUBCUTANEOUS at 21:41

## 2019-09-03 RX ADMIN — Medication 25 MILLIGRAM(S): at 05:20

## 2019-09-03 RX ADMIN — ISOSORBIDE DINITRATE 5 MILLIGRAM(S): 5 TABLET ORAL at 13:15

## 2019-09-03 RX ADMIN — FAMOTIDINE 10 MILLIGRAM(S): 10 INJECTION INTRAVENOUS at 13:15

## 2019-09-03 RX ADMIN — HEPARIN SODIUM 5000 UNIT(S): 5000 INJECTION INTRAVENOUS; SUBCUTANEOUS at 05:20

## 2019-09-03 RX ADMIN — ISOSORBIDE DINITRATE 5 MILLIGRAM(S): 5 TABLET ORAL at 22:00

## 2019-09-03 RX ADMIN — SENNA PLUS 2 TABLET(S): 8.6 TABLET ORAL at 21:37

## 2019-09-03 RX ADMIN — Medication 2: at 17:57

## 2019-09-03 RX ADMIN — Medication 50 MEQ/KG/HR: at 21:42

## 2019-09-03 RX ADMIN — Medication 88 MICROGRAM(S): at 05:20

## 2019-09-03 RX ADMIN — GABAPENTIN 400 MILLIGRAM(S): 400 CAPSULE ORAL at 21:38

## 2019-09-03 RX ADMIN — Medication 81 MILLIGRAM(S): at 13:15

## 2019-09-03 NOTE — PROGRESS NOTE ADULT - SUBJECTIVE AND OBJECTIVE BOX
Patient is a 79y old  Male who presents with a chief complaint of     HPI:  Confused, no distress. Denies dizziness  Currently stable  COnfused at times    PAST MEDICAL & SURGICAL HISTORY:  CAD (coronary artery disease)  Adult hypothyroidism  Hyperlipidemia  Peptic ulcer: s/p treatment for H pylori  Diabetes  Essential hypertension  S/P coronary artery stent placement: x4, last one in early 2018      Review of Systems:   CONSTITUTIONAL: No fever, weight loss, or fatigue  EYES: No eye pain, visual disturbances, or discharge  ENMT:  No difficulty hearing, tinnitus, vertigo; No sinus or throat pain  NECK: No pain or stiffness  BREASTS: No pain, masses, or nipple discharge  RESPIRATORY: No cough, wheezing, chills or hemoptysis; No shortness of breath  CARDIOVASCULAR: No chest pain, palpitations, dizziness, or leg swelling  GASTROINTESTINAL: No abdominal or epigastric pain. No nausea, vomiting, or hematemesis; No diarrhea or constipation. No melena or hematochezia.  GENITOURINARY: No dysuria, frequency, hematuria, or incontinence  NEUROLOGICAL: No headaches, memory loss, loss of strength, numbness, or tremors  SKIN: No itching, burning, rashes, or lesions   LYMPH NODES: No enlarged glands  ENDOCRINE: No heat or cold intolerance; No hair loss  MUSCULOSKELETAL: No joint pain or swelling; No muscle, back, or extremity pain  PSYCHIATRIC: No depression, anxiety, mood swings, or difficulty sleeping  HEME/LYMPH: No easy bruising, or bleeding gums  ALLERY AND IMMUNOLOGIC: No hives or eczema    Allergies    No Known Allergies    Intolerances        Social History:     FAMILY HISTORY:  No pertinent family history in first degree relatives      MEDICATIONS  (STANDING):  amLODIPine   Tablet 5 milliGRAM(s) Oral daily  aspirin enteric coated 81 milliGRAM(s) Oral daily  clopidogrel Tablet 75 milliGRAM(s) Oral daily  dextrose 5%. 1000 milliLiter(s) (50 mL/Hr) IV Continuous <Continuous>  dextrose 50% Injectable 12.5 Gram(s) IV Push once  dextrose 50% Injectable 25 Gram(s) IV Push once  dextrose 50% Injectable 25 Gram(s) IV Push once  docusate sodium 100 milliGRAM(s) Oral three times a day  ergocalciferol 73036 Unit(s) Oral <User Schedule>  famotidine    Tablet 10 milliGRAM(s) Oral daily  gabapentin 400 milliGRAM(s) Oral at bedtime  heparin  Injectable 5000 Unit(s) SubCutaneous every 8 hours  insulin lispro (HumaLOG) corrective regimen sliding scale   SubCutaneous three times a day before meals  isosorbide   dinitrate Tablet (ISORDIL) 5 milliGRAM(s) Oral three times a day  levothyroxine 88 MICROGram(s) Oral daily  metoprolol succinate ER 25 milliGRAM(s) Oral daily  polyethylene glycol 3350 17 Gram(s) Oral daily  predniSONE   Tablet 10 milliGRAM(s) Oral two times a day  senna 2 Tablet(s) Oral at bedtime  simvastatin 20 milliGRAM(s) Oral at bedtime  sodium chloride 0.9%. 1000 milliLiter(s) (75 mL/Hr) IV Continuous <Continuous>    MEDICATIONS  (PRN):  dextrose 40% Gel 15 Gram(s) Oral once PRN Blood Glucose LESS THAN 70 milliGRAM(s)/deciliter  glucagon  Injectable 1 milliGRAM(s) IntraMuscular once PRN Glucose LESS THAN 70 milligrams/deciliter  zolpidem 5 milliGRAM(s) Oral at bedtime PRN Insomnia  zolpidem 5 milliGRAM(s) Oral at bedtime PRN Insomnia        CAPILLARY BLOOD GLUCOSE      POCT Blood Glucose.: 298 mg/dL (31 Aug 2019 12:35)  POCT Blood Glucose.: 130 mg/dL (31 Aug 2019 08:42)  POCT Blood Glucose.: 122 mg/dL (30 Aug 2019 23:13)  POCT Blood Glucose.: 118 mg/dL (30 Aug 2019 22:42)  POCT Blood Glucose.: 64 mg/dL (30 Aug 2019 22:21)  POCT Blood Glucose.: 44 mg/dL (30 Aug 2019 22:03)  POCT Blood Glucose.: 44 mg/dL (30 Aug 2019 22:01)  POCT Blood Glucose.: 87 mg/dL (30 Aug 2019 17:36)    I&O's Summary    30 Aug 2019 07:  -  31 Aug 2019 07:00  --------------------------------------------------------  IN: 326 mL / OUT: 1100 mL / NET: -774 mL    31 Aug 2019 07:  -  31 Aug 2019 16:39  --------------------------------------------------------  IN: 0 mL / OUT: 750 mL / NET: -750 mL        PHYSICAL EXAM:  Vital Signs Last 24 Hrs  T(C): 36.9 (31 Aug 2019 12:50), Max: 36.9 (31 Aug 2019 12:50)  T(F): 98.4 (31 Aug 2019 12:50), Max: 98.4 (31 Aug 2019 12:50)  HR: 84 (31 Aug 2019 12:50) (72 - 92)  BP: 117/71 (31 Aug 2019 12:50) (113/72 - 129/74)  BP(mean): --  RR: 16 (31 Aug 2019 12:50) (16 - 17)  SpO2: 98% (31 Aug 2019 12:50) (98% - 99%)    GENERAL: NAD, well-developed  HEAD:  Atraumatic, Normocephalic  EYES: EOMI, PERRLA, conjunctiva and sclera clear  NECK: Supple, No JVD  CHEST/LUNG: Clear to auscultation bilaterally; No wheeze  HEART: Regular rate and rhythm; No murmurs, rubs, or gallops  ABDOMEN: Soft, Nontender, Nondistended; Bowel sounds present  EXTREMITIES:  2+ Peripheral Pulses, No clubbing, cyanosis, or edema  PSYCH: AAOx3  NEUROLOGY: non-focal  SKIN: No rashes or lesions    LABS:                        13.1   4.81  )-----------( 110      ( 31 Aug 2019 07:00 )             39.0     0831    124<L>  |  93<L>  |  38<H>  ----------------------------<  131<H>  5.4<H>   |  20<L>  |  2.34<H>    Ca    10.1      31 Aug 2019 07:00  Phos  3.4       Mg     1.9         TPro  5.6<L>  /  Alb  2.9<L>  /  TBili  0.7  /  DBili  x   /  AST  44<H>  /  ALT  54<H>  /  AlkPhos  65      PT/INR - ( 30 Aug 2019 09:04 )   PT: 11.0 SEC;   INR: 0.97          PTT - ( 30 Aug 2019 09:04 )  PTT:22.4 SEC  CARDIAC MARKERS ( 30 Aug 2019 16:10 )  x     / x     / 55 u/L / x     / x          Urinalysis Basic - ( 30 Aug 2019 12:00 )    Color: LIGHT YELLOW / Appearance: CLEAR / S.013 / pH: 6.0  Gluc: 300 / Ketone: NEGATIVE  / Bili: NEGATIVE / Urobili: NORMAL   Blood: NEGATIVE / Protein: 20 / Nitrite: NEGATIVE   Leuk Esterase: NEGATIVE / RBC: 0-2 / WBC 0-2   Sq Epi: OCC / Non Sq Epi: x / Bacteria: NEGATIVE        RADIOLOGY & ADDITIONAL TESTS:    Imaging Personally Reviewed:    Consultant(s) Notes Reviewed:      Care Discussed with Consultants/Other Providers:

## 2019-09-03 NOTE — PROGRESS NOTE ADULT - SUBJECTIVE AND OBJECTIVE BOX
[facility] [place] [facility]   MD UYEN HERNANDEZ DO ANGELA WONG, PA    TEL:  OFFICE: [phone]  DR CASAREZ CELL: [phone]  DR. HERNANDEZ CELL: [phone]  CHUCKIE TERRAZAS CELL: [phone]        Patient is a 79y old  Male who presents with a chief complaint of     Patient seen and examined at bedside. No chest pain/sob    VITALS:  T(F): 97.6 (09-03-19 @ 13:05), Max: 98.9 (09-02-19 @ 21:22)  HR: 78 (09-03-19 @ 13:05)  BP: 130/67 (09-03-19 @ 13:05)  RR: 17 (09-03-19 @ 13:05)  SpO2: 97% (09-03-19 @ 13:05)  Wt(kg): --    09-02 @ 07:01  -  09-03 @ 07:00  --------------------------------------------------------  IN: 0 mL / OUT: 600 mL / NET: -600 mL          PHYSICAL EXAM:  Constitutional: NAD  Neck: No JVD  Respiratory: CTAB, no wheezes, rales or rhonchi  Cardiovascular: S1, S2, RRR  Gastrointestinal: BS+, soft, NT/ND  Extremities: No peripheral edema    Hospital Medications:   MEDICATIONS  (STANDING):  amLODIPine   Tablet 5 milliGRAM(s) Oral daily  aspirin enteric coated 81 milliGRAM(s) Oral daily  clopidogrel Tablet 75 milliGRAM(s) Oral daily  dextrose 5%. 1000 milliLiter(s) (50 mL/Hr) IV Continuous <Continuous>  dextrose 50% Injectable 12.5 Gram(s) IV Push once  dextrose 50% Injectable 25 Gram(s) IV Push once  dextrose 50% Injectable 25 Gram(s) IV Push once  docusate sodium 100 milliGRAM(s) Oral three times a day  ergocalciferol 90817 Unit(s) Oral <User Schedule>  famotidine    Tablet 10 milliGRAM(s) Oral daily  gabapentin 400 milliGRAM(s) Oral at bedtime  heparin  Injectable 5000 Unit(s) SubCutaneous every 8 hours  insulin lispro (HumaLOG) corrective regimen sliding scale   SubCutaneous three times a day before meals  isosorbide   dinitrate Tablet (ISORDIL) 5 milliGRAM(s) Oral three times a day  levothyroxine 88 MICROGram(s) Oral daily  metoprolol succinate ER 25 milliGRAM(s) Oral daily  polyethylene glycol 3350 17 Gram(s) Oral daily  predniSONE   Tablet 10 milliGRAM(s) Oral two times a day  senna 2 Tablet(s) Oral at bedtime  simvastatin 20 milliGRAM(s) Oral at bedtime  sodium bicarbonate  Infusion 0.115 mEq/kG/Hr (100 mL/Hr) IV Continuous <Continuous>  sodium chloride 0.9%. 1000 milliLiter(s) (75 mL/Hr) IV Continuous <Continuous>  sodium chloride 0.9%. 1000 milliLiter(s) (75 mL/Hr) IV Continuous <Continuous>      LABS:  09-03    129<L>  |  94<L>  |  51<H>  ----------------------------<  229<H>  5.0   |  24  |  2.06<H>    Ca    10.1      03 Sep 2019 05:45  Mg     1.9     09-03    TPro  6.3  /  Alb      /  TBili      /  DBili      /  AST      /  ALT      /  AlkPhos      09-03    Creatinine Trend: 2.06 <--, 2.05 <--, 1.95 <--, 2.42 <--, 2.51 <--, 2.34 <--, 2.53 <--, 2.59 <--      calcium--  intact pth--  parathyroid hormone intact, serum20.00                            13.1   4.98  )-----------( 158      ( 03 Sep 2019 05:45 )             40.6     Urine Studies:  Urinalysis - [08-30-19 @ 12:00]      Color LIGHT YELLOW / Appearance CLEAR / SG 1.013 / pH 6.0      Gluc 300 / Ketone NEGATIVE  / Bili NEGATIVE / Urobili NORMAL       Blood NEGATIVE / Protein 20 / Leuk Est NEGATIVE / Nitrite NEGATIVE      RBC 0-2 / WBC 0-2 / Hyaline NEGATIVE / Gran  / Sq Epi OCC / Non Sq Epi  / Bacteria NEGATIVE    Urine Creatinine 37.70      [08-31-19 @ 13:28]  Urine Protein 7.4      [09-01-19 @ 17:20]  Urine Sodium 35      [08-31-19 @ 13:28]  Urine Osmolality 256      [08-31-19 @ 13:28]    PTH 20.00 (Ca --)      [09-03-19 @ 05:45]   --  PTH 25.46 (Ca --)      [09-01-19 @ 06:00]   --  HbA1c 6.1      [08-31-19 @ 07:00]  TSH 2.14      [09-01-19 @ 06:00]  Lipid: chol 121, TG 96, HDL 33, LDL 74      [08-31-19 @ 07:00]      Free Light Chains: kappa 4.00, lambda 3.03, ratio = --      [09-03 @ 05:45]    RADIOLOGY & ADDITIONAL STUDIES: [facility] [place] [facility]   MD MARYANN HERNANDEZ DO ANGELA WONG, PA    TEL:  OFFICE: [phone]  DR CASAREZ CELL: [phone]  DR. REYNOSO CELL: [phone]  CHUCKIE TERRAZAS CELL: [phone]        Patient is a 79y old  Male who presents with a chief complaint of constipation and SOB    Patient seen and examined at bedside. No chest pain/sob    VITALS:  T(F): 97.6 (09-03-19 @ 13:05), Max: 98.9 (09-02-19 @ 21:22)  HR: 78 (09-03-19 @ 13:05)  BP: 130/67 (09-03-19 @ 13:05)  RR: 17 (09-03-19 @ 13:05)  SpO2: 97% (09-03-19 @ 13:05)  Wt(kg): --    09-02 @ 07:01  -  09-03 @ 07:00  --------------------------------------------------------  IN: 0 mL / OUT: 600 mL / NET: -600 mL          PHYSICAL EXAM:  Constitutional: NAD  Neck: No JVD  Respiratory: Minimal rales in B/L bases. no wheezes or rhonchi  Cardiovascular: S1, S2, RRR  Gastrointestinal: BS+, soft, NT/ND  Extremities: 1+ B/L [facility] Medications:   MEDICATIONS  (STANDING):  amLODIPine   Tablet 5 milliGRAM(s) Oral daily  aspirin enteric coated 81 milliGRAM(s) Oral daily  clopidogrel Tablet 75 milliGRAM(s) Oral daily  dextrose 5%. 1000 milliLiter(s) (50 mL/Hr) IV Continuous <Continuous>  dextrose 50% Injectable 12.5 Gram(s) IV Push once  dextrose 50% Injectable 25 Gram(s) IV Push once  dextrose 50% Injectable 25 Gram(s) IV Push once  docusate sodium 100 milliGRAM(s) Oral three times a day  ergocalciferol 04062 Unit(s) Oral <User Schedule>  famotidine    Tablet 10 milliGRAM(s) Oral daily  gabapentin 400 milliGRAM(s) Oral at bedtime  heparin  Injectable 5000 Unit(s) SubCutaneous every 8 hours  insulin lispro (HumaLOG) corrective regimen sliding scale   SubCutaneous three times a day before meals  isosorbide   dinitrate Tablet (ISORDIL) 5 milliGRAM(s) Oral three times a day  levothyroxine 88 MICROGram(s) Oral daily  metoprolol succinate ER 25 milliGRAM(s) Oral daily  polyethylene glycol 3350 17 Gram(s) Oral daily  predniSONE   Tablet 10 milliGRAM(s) Oral two times a day  senna 2 Tablet(s) Oral at bedtime  simvastatin 20 milliGRAM(s) Oral at bedtime  sodium bicarbonate  Infusion 0.115 mEq/kG/Hr (100 mL/Hr) IV Continuous <Continuous>  sodium chloride 0.9%. 1000 milliLiter(s) (75 mL/Hr) IV Continuous <Continuous>  sodium chloride 0.9%. 1000 milliLiter(s) (75 mL/Hr) IV Continuous <Continuous>      LABS:  09-03    129<L>  |  94<L>  |  51<H>  ----------------------------<  229<H>  5.0   |  24  |  2.06<H>    Ca    10.1      03 Sep 2019 05:45  Mg     1.9     09-03    TPro  6.3  /  Alb      /  TBili      /  DBili      /  AST      /  ALT      /  AlkPhos      09-03    Creatinine Trend: 2.06 <--, 2.05 <--, 1.95 <--, 2.42 <--, 2.51 <--, 2.34 <--, 2.53 <--, 2.59 <--      calcium--  intact pth--  parathyroid hormone intact, serum20.00                            13.1   4.98  )-----------( 158      ( 03 Sep 2019 05:45 )             40.6     Urine Studies:  Urinalysis - [08-30-19 @ 12:00]      Color LIGHT YELLOW / Appearance CLEAR / SG 1.013 / pH 6.0      Gluc 300 / Ketone NEGATIVE  / Bili NEGATIVE / Urobili NORMAL       Blood NEGATIVE / Protein 20 / Leuk Est NEGATIVE / Nitrite NEGATIVE      RBC 0-2 / WBC 0-2 / Hyaline NEGATIVE / Gran  / Sq Epi OCC / Non Sq Epi  / Bacteria NEGATIVE    Urine Creatinine 37.70      [08-31-19 @ 13:28]  Urine Protein 7.4      [09-01-19 @ 17:20]  Urine Sodium 35      [08-31-19 @ 13:28]  Urine Osmolality 256      [08-31-19 @ 13:28]    PTH 20.00 (Ca --)      [09-03-19 @ 05:45]   --  PTH 25.46 (Ca --)      [09-01-19 @ 06:00]   --  HbA1c 6.1      [08-31-19 @ 07:00]  TSH 2.14      [09-01-19 @ 06:00]  Lipid: chol 121, TG 96, HDL 33, LDL 74      [08-31-19 @ 07:00]      Free Light Chains: kappa 4.00, lambda 3.03, ratio = --      [09-03 @ 05:45]    RADIOLOGY & ADDITIONAL STUDIES:

## 2019-09-03 NOTE — PROGRESS NOTE ADULT - ASSESSMENT
79 year old male with a history of CAD, DM2, HTN, and HLD presents with constipation and possible syncope    CKD (chronic kidney disease), stage IV.    Baseline ~ 2.2-2.5. Renal function is stable  monitor for now  avoid nephrotoxic agents      Essential hypertension.    Optimal.   monitor      hyponatremia  work up suggest polydipsia  Na still less than 130.   also has acidosis sec to diarrhea  on 1/2NS with 75meq NaHCO3 100cc/hr. now improving, diarrhea also better, will decrease IVF @ 50cc/hr  TSH normal and cortisol low  monitor bmp Q 12 until Na >130  avoid hypotonic solution    Hypercalcemia  ? dehydration, better now  PTH 20  K/L ratio ok  F/U vit d 25, vit d 1,25, SPEP, serum immunofixation  mild proteinuria. vasculitis w/u in office neg except +ANCA  was referred for rheum eval in office but unsure if patient followed up  repeat anca    proteinuria  F/U urine p/c ratio 79 year old male with a history of CAD, DM2, HTN, and HLD presents with constipation and possible syncope    CKD (chronic kidney disease), stage IV.    Baseline ~ 2.2-2.5. Renal function is stable  monitor for now  avoid nephrotoxic agents      Essential hypertension.    Optimal.   monitor      hyponatremia  work up suggests polydipsia  Na still less than 130.   also has acidosis sec to diarrhea  on 1/2NS with 75meq NaHCO3 100cc/hr. now improving, diarrhea also better, will decrease IVF @ 50cc/hr  TSH normal and cortisol low  monitor bmp Q 12 until Na >130  avoid hypotonic solution    Hypercalcemia  ? dehydration, better now  PTH 20  K/L ratio ok  F/U vit d 25, vit d 1,25, SPEP, serum immunofixation  mild proteinuria. vasculitis w/u in office neg except +ANCA  was referred for rheum eval in office but unsure if patient followed up  repeat anca    proteinuria  F/U urine p/c ratio

## 2019-09-04 LAB
24R-OH-CALCIDIOL SERPL-MCNC: 34.9 NG/ML — SIGNIFICANT CHANGE UP (ref 30–80)
ANION GAP SERPL CALC-SCNC: 14 MMO/L — SIGNIFICANT CHANGE UP (ref 7–14)
BUN SERPL-MCNC: 56 MG/DL — HIGH (ref 7–23)
C-ANCA SER-ACNC: NEGATIVE — SIGNIFICANT CHANGE UP
CALCIUM SERPL-MCNC: 10.4 MG/DL — SIGNIFICANT CHANGE UP (ref 8.4–10.5)
CHLORIDE SERPL-SCNC: 93 MMOL/L — LOW (ref 98–107)
CO2 SERPL-SCNC: 23 MMOL/L — SIGNIFICANT CHANGE UP (ref 22–31)
CREAT SERPL-MCNC: 2 MG/DL — HIGH (ref 0.5–1.3)
GLUCOSE SERPL-MCNC: 441 MG/DL — HIGH (ref 70–99)
HCT VFR BLD CALC: 37.1 % — LOW (ref 39–50)
HGB BLD-MCNC: 12.4 G/DL — LOW (ref 13–17)
KAPPA/LAMBDA FREE LIGHT CHAIN RATIO, SERUM: 1.32 — SIGNIFICANT CHANGE UP
MAGNESIUM SERPL-MCNC: 2 MG/DL — SIGNIFICANT CHANGE UP (ref 1.6–2.6)
MCHC RBC-ENTMCNC: 28.9 PG — SIGNIFICANT CHANGE UP (ref 27–34)
MCHC RBC-ENTMCNC: 33.4 % — SIGNIFICANT CHANGE UP (ref 32–36)
MCV RBC AUTO: 86.5 FL — SIGNIFICANT CHANGE UP (ref 80–100)
NRBC # FLD: 0 K/UL — SIGNIFICANT CHANGE UP (ref 0–0)
P-ANCA SER-ACNC: NEGATIVE — SIGNIFICANT CHANGE UP
PHOSPHATE SERPL-MCNC: 4.9 MG/DL — HIGH (ref 2.5–4.5)
PLATELET # BLD AUTO: 167 K/UL — SIGNIFICANT CHANGE UP (ref 150–400)
PMV BLD: 11.1 FL — SIGNIFICANT CHANGE UP (ref 7–13)
POTASSIUM SERPL-MCNC: 4.8 MMOL/L — SIGNIFICANT CHANGE UP (ref 3.5–5.3)
POTASSIUM SERPL-SCNC: 4.8 MMOL/L — SIGNIFICANT CHANGE UP (ref 3.5–5.3)
RBC # BLD: 4.29 M/UL — SIGNIFICANT CHANGE UP (ref 4.2–5.8)
RBC # FLD: 13.8 % — SIGNIFICANT CHANGE UP (ref 10.3–14.5)
SODIUM SERPL-SCNC: 130 MMOL/L — LOW (ref 135–145)
VIT D25+D1,25 OH+D1,25 PNL SERPL-MCNC: 149 PG/ML — HIGH (ref 19.9–79.3)
WBC # BLD: 4.5 K/UL — SIGNIFICANT CHANGE UP (ref 3.8–10.5)
WBC # FLD AUTO: 4.5 K/UL — SIGNIFICANT CHANGE UP (ref 3.8–10.5)

## 2019-09-04 RX ORDER — INSULIN GLARGINE 100 [IU]/ML
6 INJECTION, SOLUTION SUBCUTANEOUS AT BEDTIME
Refills: 0 | Status: DISCONTINUED | OUTPATIENT
Start: 2019-09-04 | End: 2019-09-11

## 2019-09-04 RX ADMIN — Medication 10 MILLIGRAM(S): at 17:06

## 2019-09-04 RX ADMIN — POLYETHYLENE GLYCOL 3350 17 GRAM(S): 17 POWDER, FOR SOLUTION ORAL at 08:53

## 2019-09-04 RX ADMIN — Medication 10 MILLIGRAM(S): at 05:33

## 2019-09-04 RX ADMIN — INSULIN GLARGINE 6 UNIT(S): 100 INJECTION, SOLUTION SUBCUTANEOUS at 21:43

## 2019-09-04 RX ADMIN — SIMVASTATIN 20 MILLIGRAM(S): 20 TABLET, FILM COATED ORAL at 21:43

## 2019-09-04 RX ADMIN — Medication 3: at 08:50

## 2019-09-04 RX ADMIN — Medication 3: at 12:56

## 2019-09-04 RX ADMIN — ZOLPIDEM TARTRATE 5 MILLIGRAM(S): 10 TABLET ORAL at 21:44

## 2019-09-04 RX ADMIN — HEPARIN SODIUM 5000 UNIT(S): 5000 INJECTION INTRAVENOUS; SUBCUTANEOUS at 13:13

## 2019-09-04 RX ADMIN — SENNA PLUS 2 TABLET(S): 8.6 TABLET ORAL at 21:43

## 2019-09-04 RX ADMIN — HEPARIN SODIUM 5000 UNIT(S): 5000 INJECTION INTRAVENOUS; SUBCUTANEOUS at 21:43

## 2019-09-04 RX ADMIN — Medication 81 MILLIGRAM(S): at 08:51

## 2019-09-04 RX ADMIN — ISOSORBIDE DINITRATE 5 MILLIGRAM(S): 5 TABLET ORAL at 21:43

## 2019-09-04 RX ADMIN — Medication 100 MILLIGRAM(S): at 05:32

## 2019-09-04 RX ADMIN — Medication 100 MILLIGRAM(S): at 13:15

## 2019-09-04 RX ADMIN — HEPARIN SODIUM 5000 UNIT(S): 5000 INJECTION INTRAVENOUS; SUBCUTANEOUS at 05:32

## 2019-09-04 RX ADMIN — Medication 5: at 17:50

## 2019-09-04 RX ADMIN — Medication 100 MILLIGRAM(S): at 21:43

## 2019-09-04 RX ADMIN — AMLODIPINE BESYLATE 5 MILLIGRAM(S): 2.5 TABLET ORAL at 05:32

## 2019-09-04 RX ADMIN — Medication 50 MEQ/KG/HR: at 12:56

## 2019-09-04 RX ADMIN — GABAPENTIN 400 MILLIGRAM(S): 400 CAPSULE ORAL at 21:43

## 2019-09-04 RX ADMIN — CLOPIDOGREL BISULFATE 75 MILLIGRAM(S): 75 TABLET, FILM COATED ORAL at 08:51

## 2019-09-04 RX ADMIN — Medication 25 MILLIGRAM(S): at 05:32

## 2019-09-04 RX ADMIN — Medication 88 MICROGRAM(S): at 05:32

## 2019-09-04 RX ADMIN — FAMOTIDINE 10 MILLIGRAM(S): 10 INJECTION INTRAVENOUS at 08:51

## 2019-09-04 RX ADMIN — ISOSORBIDE DINITRATE 5 MILLIGRAM(S): 5 TABLET ORAL at 05:32

## 2019-09-04 NOTE — PROGRESS NOTE ADULT - SUBJECTIVE AND OBJECTIVE BOX
Patient seen and examined at bedside.    Overnight Events:   No overnight events.     REVIEW OF SYSTEMS:  Constitutional:     [ ] negative [ ] fevers [ ] chills [ ] weight loss [ ] weight gain  HEENT:                  [ ] negative [ ] dry eyes [ ] eye irritation [ ] postnasal drip [ ] nasal congestion  CV:                         [ ] negative  [ ] chest pain [ ] orthopnea [ ] palpitations [ ] murmur  Resp:                     [ ] negative [ ] cough [ ] shortness of breath [ ] dyspnea [ ] wheezing [ ] sputum [ ]hemoptysis  GI:                          [ ] negative [ ] nausea [ ] vomiting [ ] diarrhea [ ] constipation [ ] abd pain [ ] dysphagia   :                        [ ] negative [ ] dysuria [ ] nocturia [ ] hematuria [ ] increased urinary frequency  Musculoskeletal: [ ] negative [ ] back pain [ ] myalgias [ ] arthralgias [ ] fracture  Skin:                       [ ] negative [ ] rash [ ] itch  Neurological:        [ ] negative [ ] headache [ ] dizziness [ ] syncope [ ] weakness [ ] numbness  Psychiatric:           [ ] negative [ ] anxiety [ ] depression  Endocrine:            [ ] negative [ ] diabetes [ ] thyroid problem  Heme/Lymph:      [ ] negative [ ] anemia [ ] bleeding problem  Allergic/Immune: [ ] negative [ ] itchy eyes [ ] nasal discharge [ ] hives [ ] angioedema    [ ] All other systems negative  [ ] Unable to assess ROS due to    Current Meds:  amLODIPine   Tablet 5 milliGRAM(s) Oral daily  aspirin enteric coated 81 milliGRAM(s) Oral daily  clopidogrel Tablet 75 milliGRAM(s) Oral daily  dextrose 40% Gel 15 Gram(s) Oral once PRN  dextrose 5%. 1000 milliLiter(s) IV Continuous <Continuous>  dextrose 50% Injectable 12.5 Gram(s) IV Push once  dextrose 50% Injectable 25 Gram(s) IV Push once  dextrose 50% Injectable 25 Gram(s) IV Push once  docusate sodium 100 milliGRAM(s) Oral three times a day  ergocalciferol 80511 Unit(s) Oral <User Schedule>  famotidine    Tablet 10 milliGRAM(s) Oral daily  gabapentin 400 milliGRAM(s) Oral at bedtime  glucagon  Injectable 1 milliGRAM(s) IntraMuscular once PRN  heparin  Injectable 5000 Unit(s) SubCutaneous every 8 hours  insulin lispro (HumaLOG) corrective regimen sliding scale   SubCutaneous three times a day before meals  isosorbide   dinitrate Tablet (ISORDIL) 5 milliGRAM(s) Oral three times a day  levothyroxine 88 MICROGram(s) Oral daily  metoprolol succinate ER 25 milliGRAM(s) Oral daily  polyethylene glycol 3350 17 Gram(s) Oral daily  predniSONE   Tablet 10 milliGRAM(s) Oral two times a day  senna 2 Tablet(s) Oral at bedtime  simvastatin 20 milliGRAM(s) Oral at bedtime  sodium bicarbonate  Infusion 0.057 mEq/kG/Hr IV Continuous <Continuous>  sodium chloride 0.9%. 1000 milliLiter(s) IV Continuous <Continuous>  sodium chloride 0.9%. 1000 milliLiter(s) IV Continuous <Continuous>  zolpidem 5 milliGRAM(s) Oral at bedtime PRN  zolpidem 5 milliGRAM(s) Oral at bedtime PRN      PAST MEDICAL & SURGICAL HISTORY:  CAD (coronary artery disease)  Adult hypothyroidism  Hyperlipidemia  Peptic ulcer: s/p treatment for H pylori  Diabetes  Essential hypertension  S/P coronary artery stent placement: x4, last one in early 2018      Vitals:  T(F): 97.6 (09-04), Max: 98 (09-03)  HR: 73 (09-04) (70 - 76)  BP: 98/50 (09-04) (98/50 - 134/78)  RR: 15 (09-04)  SpO2: 97% (09-04)  I&O's Summary    03 Sep 2019 07:01  -  04 Sep 2019 07:00  --------------------------------------------------------  IN: 1200 mL / OUT: 600 mL / NET: 600 mL    04 Sep 2019 07:01  -  04 Sep 2019 15:14  --------------------------------------------------------  IN: 0 mL / OUT: 700 mL / NET: -700 mL        Physical Exam:  Appearance: No acute distress; well appearing  Eyes: PERRL, EOMI, pink conjunctiva  HENT: Normal oral mucosa  Cardiovascular: RRR, S1, S2, no murmurs, rubs, or gallops; no edema; no JVD  Respiratory: Clear to auscultation bilaterally  Gastrointestinal: soft, non-tender, non-distended with normal bowel sounds  Musculoskeletal: No clubbing; no joint deformity   Neurologic: Non-focal  Lymphatic: No lymphadenopathy  Psychiatry: AAOx3, mood & affect appropriate  Skin: No rashes, ecchymoses, or cyanosis                          12.4   4.50  )-----------( 167      ( 04 Sep 2019 03:50 )             37.1     09-04    130<L>  |  93<L>  |  56<H>  ----------------------------<  441<H>  4.8   |  23  |  2.00<H>    Ca    10.4      04 Sep 2019 03:50  Phos  4.9     09-04  Mg     2.0     09-04    TPro  6.3  /  Alb  x   /  TBili  x   /  DBili  x   /  AST  x   /  ALT  x   /  AlkPhos  x   09-03                  New ECG(s): Personally reviewed    Echo:    Stress Testing:     Cath:    Imaging:    Interpretation of Telemetry:

## 2019-09-04 NOTE — PROGRESS NOTE ADULT - SUBJECTIVE AND OBJECTIVE BOX
[facility]   MD MARYANN HERNANDEZ D.O. FATIMA SHEIKH, D.O. RUORU WONG, PA    TEL:  OFFICE: [phone]  DR CASAREZ CELL: [phone]  DR. REYNOSO CELL: [phone]  DR. HERNANDEZ CELL: [phone]   GORDON TERRAZAS CELL: [phone]      RENAL FOLLOW UP NOTE  --------------------------------------------------------------------------------  HPI: Pt seen and examined. Has no complaints at this time. Denies SOB or diarrhea.        PAST HISTORY  --------------------------------------------------------------------------------  No significant changes to PMH, PSH, FHx, SHx, unless otherwise noted    ALLERGIES & MEDICATIONS  --------------------------------------------------------------------------------  Allergies    No Known Allergies    Intolerances      Standing Inpatient Medications  amLODIPine   Tablet 5 milliGRAM(s) Oral daily  aspirin enteric coated 81 milliGRAM(s) Oral daily  clopidogrel Tablet 75 milliGRAM(s) Oral daily  dextrose 5%. 1000 milliLiter(s) IV Continuous <Continuous>  dextrose 50% Injectable 12.5 Gram(s) IV Push once  dextrose 50% Injectable 25 Gram(s) IV Push once  dextrose 50% Injectable 25 Gram(s) IV Push once  docusate sodium 100 milliGRAM(s) Oral three times a day  ergocalciferol 59977 Unit(s) Oral <User Schedule>  famotidine    Tablet 10 milliGRAM(s) Oral daily  gabapentin 400 milliGRAM(s) Oral at bedtime  heparin  Injectable 5000 Unit(s) SubCutaneous every 8 hours  insulin lispro (HumaLOG) corrective regimen sliding scale   SubCutaneous three times a day before meals  isosorbide   dinitrate Tablet (ISORDIL) 5 milliGRAM(s) Oral three times a day  levothyroxine 88 MICROGram(s) Oral daily  metoprolol succinate ER 25 milliGRAM(s) Oral daily  polyethylene glycol 3350 17 Gram(s) Oral daily  predniSONE   Tablet 10 milliGRAM(s) Oral two times a day  senna 2 Tablet(s) Oral at bedtime  simvastatin 20 milliGRAM(s) Oral at bedtime  sodium bicarbonate  Infusion 0.057 mEq/kG/Hr IV Continuous <Continuous>  sodium chloride 0.9%. 1000 milliLiter(s) IV Continuous <Continuous>  sodium chloride 0.9%. 1000 milliLiter(s) IV Continuous <Continuous>    PRN Inpatient Medications  dextrose 40% Gel 15 Gram(s) Oral once PRN  glucagon  Injectable 1 milliGRAM(s) IntraMuscular once PRN  zolpidem 5 milliGRAM(s) Oral at bedtime PRN  zolpidem 5 milliGRAM(s) Oral at bedtime PRN      REVIEW OF SYSTEMS  --------------------------------------------------------------------------------  General: no fever  CVS: no chest pain  RESP: no sob, no cough  ABD: no abdominal pain  : no dysuria,  MSK: no edema     VITALS/PHYSICAL EXAM  --------------------------------------------------------------------------------  T(C): 36.4 (09-04-19 @ 13:10), Max: 36.7 (09-03-19 @ 21:00)  HR: 73 (09-04-19 @ 13:10) (70 - 76)  BP: 98/50 (09-04-19 @ 13:10) (98/50 - 134/78)  RR: 15 (09-04-19 @ 13:10) (15 - 16)  SpO2: 97% (09-04-19 @ 13:10) (97% - 98%)  Wt(kg): --        09-03-19 @ 07:01  -  09-04-19 @ 07:00  --------------------------------------------------------  IN: 1200 mL / OUT: 600 mL / NET: 600 mL    09-04-19 @ 07:01  -  09-04-19 @ 15:28  --------------------------------------------------------  IN: 0 mL / OUT: 700 mL / NET: -700 mL      Physical Exam:  	Gen: NAD  	HEENT: MMM  	Pulm: CTA B/L  	CV: S1S2  	Abd: Soft, +BS  	Ext: B/L LE edema, 2+ on left, 3+ on right              Neuro: Awake   	Skin: Warm and Dry   	    LABS/STUDIES  --------------------------------------------------------------------------------              12.4   4.50  >-----------<  167      [09-04-19 @ 03:50]              37.1     130  |  93  |  56  ----------------------------<  441      [09-04-19 @ 03:50]  4.8   |  23  |  2.00        Ca     10.4     [09-04-19 @ 03:50]      Mg     2.0     [09-04-19 @ 03:50]      Phos  4.9     [09-04-19 @ 03:50]    TPro  6.3  /  Alb  x   /  TBili  x   /  DBili  x   /  AST  x   /  ALT  x   /  AlkPhos  x   [09-03-19 @ 05:45]          Creatinine Trend:  SCr 2.00 [09-04 @ 03:50]  SCr 2.06 [09-03 @ 05:45]  SCr 2.05 [09-02 @ 12:24]  SCr 1.95 [09-02 @ 05:00]  SCr 2.42 [09-01 @ 06:00]    Urinalysis - [08-30-19 @ 12:00]      Color LIGHT YELLOW / Appearance CLEAR / SG 1.013 / pH 6.0      Gluc 300 / Ketone NEGATIVE  / Bili NEGATIVE / Urobili NORMAL       Blood NEGATIVE / Protein 20 / Leuk Est NEGATIVE / Nitrite NEGATIVE      RBC 0-2 / WBC 0-2 / Hyaline NEGATIVE / Gran  / Sq Epi OCC / Non Sq Epi  / Bacteria NEGATIVE    Urine Creatinine 37.70      [08-31-19 @ 13:28]  Urine Protein 7.4      [09-01-19 @ 17:20]  Urine Sodium 35      [08-31-19 @ 13:28]  Urine Osmolality 256      [08-31-19 @ 13:28]    PTH 20.00 (Ca --)      [09-03-19 @ 05:45]   --  PTH 25.46 (Ca --)      [09-01-19 @ 06:00]   --  Vitamin D (25OH) 34.9      [09-03-19 @ 05:45]  HbA1c 6.1      [08-31-19 @ 07:00]  TSH 2.14      [09-01-19 @ 06:00]  Lipid: chol 121, TG 96, HDL 33, LDL 74      [08-31-19 @ 07:00]      Free Light Chains: kappa 4.00, lambda 3.03, ratio = 1.32      [09-03 @ 05:45]

## 2019-09-04 NOTE — PROGRESS NOTE ADULT - ASSESSMENT
79 year old male with a history of CAD, DM2, HTN, and HLD presents with constipation and possible syncope    CKD (chronic kidney disease), stage IV.    Baseline ~ 2.2-2.5. Renal function is stable  monitor for now  avoid nephrotoxic agents      Met. acidosis 2/2 diarrhea  1/2NS with 75meq NaHCO3 @ 50cc/hr  recommend D/C IVF given worsening LE edema and resolved diarrhea    LE edema  suspect 2/2 IVF but recommend venous dopplers of B/L LE's to r/o DVT    Essential hypertension.    Optimal.   monitor      hyponatremia  work up suggests polydipsia  Na slowly improving   TSH normal and cortisol low  monitor bmp Q 12 until Na >130  avoid hypotonic solutions    Hypercalcemia  ? dehydration, better now  PTH 20  K/L ratio ok  F/U vit d 25, vit d 1,25, SPEP, serum immunofixation  mild proteinuria. vasculitis w/u in office neg except +ANCA  was referred for rheum eval in office but unsure if patient followed up  repeat anca    proteinuria  F/U urine p/c ratio normal shape/mass R

## 2019-09-04 NOTE — PROGRESS NOTE ADULT - SUBJECTIVE AND OBJECTIVE BOX
Patient is a 79y old  Male who presents with a chief complaint of     HPI:  Confused, no distress. Denies dizziness      PAST MEDICAL & SURGICAL HISTORY:  CAD (coronary artery disease)  Adult hypothyroidism  Hyperlipidemia  Peptic ulcer: s/p treatment for H pylori  Diabetes  Essential hypertension  S/P coronary artery stent placement: x4, last one in early 2018      Review of Systems:   CONSTITUTIONAL: No fever, weight loss, or fatigue  EYES: No eye pain, visual disturbances, or discharge  ENMT:  No difficulty hearing, tinnitus, vertigo; No sinus or throat pain  NECK: No pain or stiffness  BREASTS: No pain, masses, or nipple discharge  RESPIRATORY: No cough, wheezing, chills or hemoptysis; No shortness of breath  CARDIOVASCULAR: No chest pain, palpitations, dizziness, or leg swelling  GASTROINTESTINAL: No abdominal or epigastric pain. No nausea, vomiting, or hematemesis; No diarrhea or constipation. No melena or hematochezia.  GENITOURINARY: No dysuria, frequency, hematuria, or incontinence  NEUROLOGICAL: No headaches, memory loss, loss of strength, numbness, or tremors  SKIN: No itching, burning, rashes, or lesions   LYMPH NODES: No enlarged glands  ENDOCRINE: No heat or cold intolerance; No hair loss  MUSCULOSKELETAL: No joint pain or swelling; No muscle, back, or extremity pain  PSYCHIATRIC: No depression, anxiety, mood swings, or difficulty sleeping  HEME/LYMPH: No easy bruising, or bleeding gums  ALLERY AND IMMUNOLOGIC: No hives or eczema    Allergies    No Known Allergies    Intolerances        Social History:     FAMILY HISTORY:  No pertinent family history in first degree relatives      MEDICATIONS  (STANDING):  amLODIPine   Tablet 5 milliGRAM(s) Oral daily  aspirin enteric coated 81 milliGRAM(s) Oral daily  clopidogrel Tablet 75 milliGRAM(s) Oral daily  dextrose 5%. 1000 milliLiter(s) (50 mL/Hr) IV Continuous <Continuous>  dextrose 50% Injectable 12.5 Gram(s) IV Push once  dextrose 50% Injectable 25 Gram(s) IV Push once  dextrose 50% Injectable 25 Gram(s) IV Push once  docusate sodium 100 milliGRAM(s) Oral three times a day  ergocalciferol 61971 Unit(s) Oral <User Schedule>  famotidine    Tablet 10 milliGRAM(s) Oral daily  gabapentin 400 milliGRAM(s) Oral at bedtime  heparin  Injectable 5000 Unit(s) SubCutaneous every 8 hours  insulin lispro (HumaLOG) corrective regimen sliding scale   SubCutaneous three times a day before meals  isosorbide   dinitrate Tablet (ISORDIL) 5 milliGRAM(s) Oral three times a day  levothyroxine 88 MICROGram(s) Oral daily  metoprolol succinate ER 25 milliGRAM(s) Oral daily  polyethylene glycol 3350 17 Gram(s) Oral daily  predniSONE   Tablet 10 milliGRAM(s) Oral two times a day  senna 2 Tablet(s) Oral at bedtime  simvastatin 20 milliGRAM(s) Oral at bedtime  sodium chloride 0.9%. 1000 milliLiter(s) (75 mL/Hr) IV Continuous <Continuous>    MEDICATIONS  (PRN):  dextrose 40% Gel 15 Gram(s) Oral once PRN Blood Glucose LESS THAN 70 milliGRAM(s)/deciliter  glucagon  Injectable 1 milliGRAM(s) IntraMuscular once PRN Glucose LESS THAN 70 milligrams/deciliter  zolpidem 5 milliGRAM(s) Oral at bedtime PRN Insomnia  zolpidem 5 milliGRAM(s) Oral at bedtime PRN Insomnia        CAPILLARY BLOOD GLUCOSE      POCT Blood Glucose.: 298 mg/dL (31 Aug 2019 12:35)  POCT Blood Glucose.: 130 mg/dL (31 Aug 2019 08:42)  POCT Blood Glucose.: 122 mg/dL (30 Aug 2019 23:13)  POCT Blood Glucose.: 118 mg/dL (30 Aug 2019 22:42)  POCT Blood Glucose.: 64 mg/dL (30 Aug 2019 22:21)  POCT Blood Glucose.: 44 mg/dL (30 Aug 2019 22:03)  POCT Blood Glucose.: 44 mg/dL (30 Aug 2019 22:01)  POCT Blood Glucose.: 87 mg/dL (30 Aug 2019 17:36)    I&O's Summary    30 Aug 2019 07:  -  31 Aug 2019 07:00  --------------------------------------------------------  IN: 326 mL / OUT: 1100 mL / NET: -774 mL    31 Aug 2019 07:  -  31 Aug 2019 16:39  --------------------------------------------------------  IN: 0 mL / OUT: 750 mL / NET: -750 mL        PHYSICAL EXAM:  Vital Signs Last 24 Hrs  T(C): 36.9 (31 Aug 2019 12:50), Max: 36.9 (31 Aug 2019 12:50)  T(F): 98.4 (31 Aug 2019 12:50), Max: 98.4 (31 Aug 2019 12:50)  HR: 84 (31 Aug 2019 12:50) (72 - 92)  BP: 117/71 (31 Aug 2019 12:50) (113/72 - 129/74)  BP(mean): --  RR: 16 (31 Aug 2019 12:50) (16 - 17)  SpO2: 98% (31 Aug 2019 12:50) (98% - 99%)    GENERAL: NAD, well-developed  HEAD:  Atraumatic, Normocephalic  EYES: EOMI, PERRLA, conjunctiva and sclera clear  NECK: Supple, No JVD  CHEST/LUNG: Clear to auscultation bilaterally; No wheeze  HEART: Regular rate and rhythm; No murmurs, rubs, or gallops  ABDOMEN: Soft, Nontender, Nondistended; Bowel sounds present  EXTREMITIES:  2+ Peripheral Pulses, No clubbing, cyanosis, or edema  PSYCH: AAOx3  NEUROLOGY: non-focal  SKIN: No rashes or lesions    LABS:                        13.1   4.81  )-----------( 110      ( 31 Aug 2019 07:00 )             39.0     08    124<L>  |  93<L>  |  38<H>  ----------------------------<  131<H>  5.4<H>   |  20<L>  |  2.34<H>    Ca    10.1      31 Aug 2019 07:00  Phos  3.4       Mg     1.9         TPro  5.6<L>  /  Alb  2.9<L>  /  TBili  0.7  /  DBili  x   /  AST  44<H>  /  ALT  54<H>  /  AlkPhos  65      PT/INR - ( 30 Aug 2019 09:04 )   PT: 11.0 SEC;   INR: 0.97          PTT - ( 30 Aug 2019 09:04 )  PTT:22.4 SEC  CARDIAC MARKERS ( 30 Aug 2019 16:10 )  x     / x     / 55 u/L / x     / x          Urinalysis Basic - ( 30 Aug 2019 12:00 )    Color: LIGHT YELLOW / Appearance: CLEAR / S.013 / pH: 6.0  Gluc: 300 / Ketone: NEGATIVE  / Bili: NEGATIVE / Urobili: NORMAL   Blood: NEGATIVE / Protein: 20 / Nitrite: NEGATIVE   Leuk Esterase: NEGATIVE / RBC: 0-2 / WBC 0-2   Sq Epi: OCC / Non Sq Epi: x / Bacteria: NEGATIVE        RADIOLOGY & ADDITIONAL TESTS:    Imaging Personally Reviewed:    Consultant(s) Notes Reviewed:      Care Discussed with Consultants/Other Providers:

## 2019-09-04 NOTE — PROGRESS NOTE ADULT - ASSESSMENT
79 year old male with CAD, DM2, HTN, HLD presents with constipation/abdominal complaints with syncope and mild troponin elevation.  Troponin 92 and subsequently downtrended in setting of CKD, additionally CK not elevated. Echo with notable LVOT obstruction which can possibly explain symptoms of near syncope last week.     Would start Metoprolol 25mg twice daily  Cardiology follow up as outpatient  At this time no further cardiac interventions.   Please call cardiology with any questions.     Jasiel Mancini  [phone]

## 2019-09-05 LAB
ANION GAP SERPL CALC-SCNC: 13 MMO/L — SIGNIFICANT CHANGE UP (ref 7–14)
BUN SERPL-MCNC: 78 MG/DL — HIGH (ref 7–23)
CALCIUM SERPL-MCNC: 10.6 MG/DL — HIGH (ref 8.4–10.5)
CHLORIDE SERPL-SCNC: 93 MMOL/L — LOW (ref 98–107)
CO2 SERPL-SCNC: 22 MMOL/L — SIGNIFICANT CHANGE UP (ref 22–31)
CREAT SERPL-MCNC: 1.83 MG/DL — HIGH (ref 0.5–1.3)
GLUCOSE SERPL-MCNC: 273 MG/DL — HIGH (ref 70–99)
HCT VFR BLD CALC: 33.9 % — LOW (ref 39–50)
HGB BLD-MCNC: 11.2 G/DL — LOW (ref 13–17)
MAGNESIUM SERPL-MCNC: 1.8 MG/DL — SIGNIFICANT CHANGE UP (ref 1.6–2.6)
MCHC RBC-ENTMCNC: 29.5 PG — SIGNIFICANT CHANGE UP (ref 27–34)
MCHC RBC-ENTMCNC: 33 % — SIGNIFICANT CHANGE UP (ref 32–36)
MCV RBC AUTO: 89.2 FL — SIGNIFICANT CHANGE UP (ref 80–100)
NRBC # FLD: 0.02 K/UL — SIGNIFICANT CHANGE UP (ref 0–0)
PHOSPHATE SERPL-MCNC: 3 MG/DL — SIGNIFICANT CHANGE UP (ref 2.5–4.5)
PLATELET # BLD AUTO: 163 K/UL — SIGNIFICANT CHANGE UP (ref 150–400)
PMV BLD: 11.5 FL — SIGNIFICANT CHANGE UP (ref 7–13)
POTASSIUM SERPL-MCNC: 5 MMOL/L — SIGNIFICANT CHANGE UP (ref 3.5–5.3)
POTASSIUM SERPL-SCNC: 5 MMOL/L — SIGNIFICANT CHANGE UP (ref 3.5–5.3)
RBC # BLD: 3.8 M/UL — LOW (ref 4.2–5.8)
RBC # FLD: 14 % — SIGNIFICANT CHANGE UP (ref 10.3–14.5)
SODIUM SERPL-SCNC: 128 MMOL/L — LOW (ref 135–145)
WBC # BLD: 7.13 K/UL — SIGNIFICANT CHANGE UP (ref 3.8–10.5)
WBC # FLD AUTO: 7.13 K/UL — SIGNIFICANT CHANGE UP (ref 3.8–10.5)

## 2019-09-05 PROCEDURE — 93970 EXTREMITY STUDY: CPT | Mod: 26

## 2019-09-05 RX ORDER — FUROSEMIDE 40 MG
40 TABLET ORAL DAILY
Refills: 0 | Status: DISCONTINUED | OUTPATIENT
Start: 2019-09-05 | End: 2019-09-06

## 2019-09-05 RX ADMIN — Medication 40 MILLIGRAM(S): at 17:54

## 2019-09-05 RX ADMIN — POLYETHYLENE GLYCOL 3350 17 GRAM(S): 17 POWDER, FOR SOLUTION ORAL at 10:21

## 2019-09-05 RX ADMIN — Medication 88 MICROGRAM(S): at 05:30

## 2019-09-05 RX ADMIN — AMLODIPINE BESYLATE 5 MILLIGRAM(S): 2.5 TABLET ORAL at 05:30

## 2019-09-05 RX ADMIN — Medication 10 MILLIGRAM(S): at 17:56

## 2019-09-05 RX ADMIN — SIMVASTATIN 20 MILLIGRAM(S): 20 TABLET, FILM COATED ORAL at 23:29

## 2019-09-05 RX ADMIN — Medication 3: at 10:16

## 2019-09-05 RX ADMIN — CLOPIDOGREL BISULFATE 75 MILLIGRAM(S): 75 TABLET, FILM COATED ORAL at 10:17

## 2019-09-05 RX ADMIN — FAMOTIDINE 10 MILLIGRAM(S): 10 INJECTION INTRAVENOUS at 10:22

## 2019-09-05 RX ADMIN — Medication 1: at 17:51

## 2019-09-05 RX ADMIN — Medication 2: at 13:04

## 2019-09-05 RX ADMIN — Medication 25 MILLIGRAM(S): at 05:31

## 2019-09-05 RX ADMIN — Medication 10 MILLIGRAM(S): at 05:30

## 2019-09-05 RX ADMIN — INSULIN GLARGINE 6 UNIT(S): 100 INJECTION, SOLUTION SUBCUTANEOUS at 23:29

## 2019-09-05 RX ADMIN — Medication 81 MILLIGRAM(S): at 10:16

## 2019-09-05 RX ADMIN — HEPARIN SODIUM 5000 UNIT(S): 5000 INJECTION INTRAVENOUS; SUBCUTANEOUS at 05:30

## 2019-09-05 RX ADMIN — Medication 100 MILLIGRAM(S): at 05:30

## 2019-09-05 RX ADMIN — GABAPENTIN 400 MILLIGRAM(S): 400 CAPSULE ORAL at 23:29

## 2019-09-05 RX ADMIN — ISOSORBIDE DINITRATE 5 MILLIGRAM(S): 5 TABLET ORAL at 05:30

## 2019-09-05 NOTE — PROGRESS NOTE ADULT - PROBLEM SELECTOR PLAN 7
Monitor FBS and adjust with ISS as needed, order HgbA1C, diabetic diet, Home meds held due to hypoglycemia
Continue amlodipine, isosorbide dinitrate  Monitor BP and adjust medications as necessary   DASH diet
Monitor FBS and adjust with ISS as needed, order HgbA1C, diabetic diet, Home meds held due to hypoglycemia

## 2019-09-05 NOTE — PROGRESS NOTE ADULT - PROBLEM SELECTOR PROBLEM 3
Acute on chronic renal insufficiency
Constipation
Constipation

## 2019-09-05 NOTE — PROGRESS NOTE ADULT - PROBLEM SELECTOR PLAN 6
continue ASA, Plavix, nitrates, statin
Monitor FBS and adjust with ISS as needed, order HgbA1C, diabetic diet, Home meds held due to hypoglycemia. Lantus started for uncontrolled diabetes
Lantus started for uncontrolled diabetes since he is not on his home dose of medications. these can be restarted at discharge
Monitor FBS and adjust with ISS as needed, order HgbA1C, diabetic diet, Home meds held due to hypoglycemia
continue ASA, Plavix, nitrates, statin

## 2019-09-05 NOTE — PROGRESS NOTE ADULT - SUBJECTIVE AND OBJECTIVE BOX
Patient is a 79y old  Male who presents with a chief complaint of     HPI:  Confused, no distress. Denies dizziness      PAST MEDICAL & SURGICAL HISTORY:  CAD (coronary artery disease)  Adult hypothyroidism  Hyperlipidemia  Peptic ulcer: s/p treatment for H pylori  Diabetes  Essential hypertension  S/P coronary artery stent placement: x4, last one in early 2018      Review of Systems:   CONSTITUTIONAL: No fever, weight loss, or fatigue  EYES: No eye pain, visual disturbances, or discharge  ENMT:  No difficulty hearing, tinnitus, vertigo; No sinus or throat pain  NECK: No pain or stiffness  BREASTS: No pain, masses, or nipple discharge  RESPIRATORY: No cough, wheezing, chills or hemoptysis; No shortness of breath  CARDIOVASCULAR: No chest pain, palpitations, dizziness, or leg swelling  GASTROINTESTINAL: No abdominal or epigastric pain. No nausea, vomiting, or hematemesis; No diarrhea or constipation. No melena or hematochezia.  GENITOURINARY: No dysuria, frequency, hematuria, or incontinence  NEUROLOGICAL: No headaches, memory loss, loss of strength, numbness, or tremors  SKIN: No itching, burning, rashes, or lesions   LYMPH NODES: No enlarged glands  ENDOCRINE: No heat or cold intolerance; No hair loss  MUSCULOSKELETAL: No joint pain or swelling; No muscle, back, or extremity pain  PSYCHIATRIC: No depression, anxiety, mood swings, or difficulty sleeping  HEME/LYMPH: No easy bruising, or bleeding gums  ALLERY AND IMMUNOLOGIC: No hives or eczema    Allergies    No Known Allergies    Intolerances        Social History:     FAMILY HISTORY:  No pertinent family history in first degree relatives      MEDICATIONS  (STANDING):  amLODIPine   Tablet 5 milliGRAM(s) Oral daily  aspirin enteric coated 81 milliGRAM(s) Oral daily  clopidogrel Tablet 75 milliGRAM(s) Oral daily  dextrose 5%. 1000 milliLiter(s) (50 mL/Hr) IV Continuous <Continuous>  dextrose 50% Injectable 12.5 Gram(s) IV Push once  dextrose 50% Injectable 25 Gram(s) IV Push once  dextrose 50% Injectable 25 Gram(s) IV Push once  docusate sodium 100 milliGRAM(s) Oral three times a day  ergocalciferol 38771 Unit(s) Oral <User Schedule>  famotidine    Tablet 10 milliGRAM(s) Oral daily  gabapentin 400 milliGRAM(s) Oral at bedtime  heparin  Injectable 5000 Unit(s) SubCutaneous every 8 hours  insulin lispro (HumaLOG) corrective regimen sliding scale   SubCutaneous three times a day before meals  isosorbide   dinitrate Tablet (ISORDIL) 5 milliGRAM(s) Oral three times a day  levothyroxine 88 MICROGram(s) Oral daily  metoprolol succinate ER 25 milliGRAM(s) Oral daily  polyethylene glycol 3350 17 Gram(s) Oral daily  predniSONE   Tablet 10 milliGRAM(s) Oral two times a day  senna 2 Tablet(s) Oral at bedtime  simvastatin 20 milliGRAM(s) Oral at bedtime  sodium chloride 0.9%. 1000 milliLiter(s) (75 mL/Hr) IV Continuous <Continuous>    MEDICATIONS  (PRN):  dextrose 40% Gel 15 Gram(s) Oral once PRN Blood Glucose LESS THAN 70 milliGRAM(s)/deciliter  glucagon  Injectable 1 milliGRAM(s) IntraMuscular once PRN Glucose LESS THAN 70 milligrams/deciliter  zolpidem 5 milliGRAM(s) Oral at bedtime PRN Insomnia  zolpidem 5 milliGRAM(s) Oral at bedtime PRN Insomnia        CAPILLARY BLOOD GLUCOSE      POCT Blood Glucose.: 298 mg/dL (31 Aug 2019 12:35)  POCT Blood Glucose.: 130 mg/dL (31 Aug 2019 08:42)  POCT Blood Glucose.: 122 mg/dL (30 Aug 2019 23:13)  POCT Blood Glucose.: 118 mg/dL (30 Aug 2019 22:42)  POCT Blood Glucose.: 64 mg/dL (30 Aug 2019 22:21)  POCT Blood Glucose.: 44 mg/dL (30 Aug 2019 22:03)  POCT Blood Glucose.: 44 mg/dL (30 Aug 2019 22:01)  POCT Blood Glucose.: 87 mg/dL (30 Aug 2019 17:36)    I&O's Summary    30 Aug 2019 07:  -  31 Aug 2019 07:00  --------------------------------------------------------  IN: 326 mL / OUT: 1100 mL / NET: -774 mL    31 Aug 2019 07:  -  31 Aug 2019 16:39  --------------------------------------------------------  IN: 0 mL / OUT: 750 mL / NET: -750 mL        PHYSICAL EXAM:  Vital Signs Last 24 Hrs  T(C): 36.9 (31 Aug 2019 12:50), Max: 36.9 (31 Aug 2019 12:50)  T(F): 98.4 (31 Aug 2019 12:50), Max: 98.4 (31 Aug 2019 12:50)  HR: 84 (31 Aug 2019 12:50) (72 - 92)  BP: 117/71 (31 Aug 2019 12:50) (113/72 - 129/74)  BP(mean): --  RR: 16 (31 Aug 2019 12:50) (16 - 17)  SpO2: 98% (31 Aug 2019 12:50) (98% - 99%)    GENERAL: NAD, well-developed  HEAD:  Atraumatic, Normocephalic  EYES: EOMI, PERRLA, conjunctiva and sclera clear  NECK: Supple, No JVD  CHEST/LUNG: Clear to auscultation bilaterally; No wheeze  HEART: Regular rate and rhythm; No murmurs, rubs, or gallops  ABDOMEN: Soft, Nontender, Nondistended; Bowel sounds present  EXTREMITIES:  2+ Peripheral Pulses, No clubbing, cyanosis, or edema  PSYCH: AAOx3  NEUROLOGY: non-focal  SKIN: No rashes or lesions    LABS:                        13.1   4.81  )-----------( 110      ( 31 Aug 2019 07:00 )             39.0     08    124<L>  |  93<L>  |  38<H>  ----------------------------<  131<H>  5.4<H>   |  20<L>  |  2.34<H>    Ca    10.1      31 Aug 2019 07:00  Phos  3.4       Mg     1.9         TPro  5.6<L>  /  Alb  2.9<L>  /  TBili  0.7  /  DBili  x   /  AST  44<H>  /  ALT  54<H>  /  AlkPhos  65      PT/INR - ( 30 Aug 2019 09:04 )   PT: 11.0 SEC;   INR: 0.97          PTT - ( 30 Aug 2019 09:04 )  PTT:22.4 SEC  CARDIAC MARKERS ( 30 Aug 2019 16:10 )  x     / x     / 55 u/L / x     / x          Urinalysis Basic - ( 30 Aug 2019 12:00 )    Color: LIGHT YELLOW / Appearance: CLEAR / S.013 / pH: 6.0  Gluc: 300 / Ketone: NEGATIVE  / Bili: NEGATIVE / Urobili: NORMAL   Blood: NEGATIVE / Protein: 20 / Nitrite: NEGATIVE   Leuk Esterase: NEGATIVE / RBC: 0-2 / WBC 0-2   Sq Epi: OCC / Non Sq Epi: x / Bacteria: NEGATIVE        RADIOLOGY & ADDITIONAL TESTS:    Imaging Personally Reviewed:    Consultant(s) Notes Reviewed:      Care Discussed with Consultants/Other Providers:

## 2019-09-05 NOTE — PROGRESS NOTE ADULT - PROBLEM SELECTOR PLAN 9
Continue Levothyroxine, monitor thyroid hormones
Heparin SC TID
Continue Levothyroxine, monitor thyroid hormones
Heparin SC TID
Heparin SC TID

## 2019-09-05 NOTE — PROGRESS NOTE ADULT - PROBLEM SELECTOR PLAN 5
Ca 10.1 now.
continue ASA, Plavix, nitrates, statin
Ca improved
continue ASA, Plavix, nitrates, statin
continue ASA, Plavix, nitrates, statin

## 2019-09-05 NOTE — PROGRESS NOTE ADULT - PROBLEM SELECTOR PLAN 4
Ca improved
Monitor BUN/ creat. F/U Renal c/s Dr Acevedo noted.  Improving Hyponatremia: MCDANIEL as advised
Monitor BUN/ creat. F/U Renal c/s Dr Acevedo noted. Hyponatremia: MCDANIEL as advised

## 2019-09-05 NOTE — PROGRESS NOTE ADULT - SUBJECTIVE AND OBJECTIVE BOX
[facility] [place] [facility]   MD MARYANN HERNANDEZ D.O. FATIMA SHEIKH, D.O. RUORU WONG, PA    TEL:  OFFICE: [phone]  DR CASAREZ CELL: [phone]  DR. REYNOSO CELL: [phone]  DR. HERNANDEZ CELL: [phone]   GORDON TERRAZAS CELL: [phone]      RENAL FOLLOW UP NOTE  --------------------------------------------------------------------------------  HPI: Pt seen and examined. Has no complaints at this time. Wants to go home.        PAST HISTORY  --------------------------------------------------------------------------------  No significant changes to PMH, PSH, FHx, SHx, unless otherwise noted    ALLERGIES & MEDICATIONS  --------------------------------------------------------------------------------  Allergies    No Known Allergies    Intolerances      Standing Inpatient Medications  amLODIPine   Tablet 5 milliGRAM(s) Oral daily  aspirin enteric coated 81 milliGRAM(s) Oral daily  clopidogrel Tablet 75 milliGRAM(s) Oral daily  dextrose 5%. 1000 milliLiter(s) IV Continuous <Continuous>  dextrose 50% Injectable 12.5 Gram(s) IV Push once  dextrose 50% Injectable 25 Gram(s) IV Push once  dextrose 50% Injectable 25 Gram(s) IV Push once  docusate sodium 100 milliGRAM(s) Oral three times a day  ergocalciferol 12398 Unit(s) Oral <User Schedule>  famotidine    Tablet 10 milliGRAM(s) Oral daily  gabapentin 400 milliGRAM(s) Oral at bedtime  heparin  Injectable 5000 Unit(s) SubCutaneous every 8 hours  insulin glargine Injectable (LANTUS) 6 Unit(s) SubCutaneous at bedtime  insulin lispro (HumaLOG) corrective regimen sliding scale   SubCutaneous three times a day before meals  isosorbide   dinitrate Tablet (ISORDIL) 5 milliGRAM(s) Oral three times a day  levothyroxine 88 MICROGram(s) Oral daily  metoprolol succinate ER 25 milliGRAM(s) Oral daily  polyethylene glycol 3350 17 Gram(s) Oral daily  predniSONE   Tablet 10 milliGRAM(s) Oral two times a day  senna 2 Tablet(s) Oral at bedtime  simvastatin 20 milliGRAM(s) Oral at bedtime    PRN Inpatient Medications  dextrose 40% Gel 15 Gram(s) Oral once PRN  glucagon  Injectable 1 milliGRAM(s) IntraMuscular once PRN  zolpidem 5 milliGRAM(s) Oral at bedtime PRN  zolpidem 5 milliGRAM(s) Oral at bedtime PRN      REVIEW OF SYSTEMS  --------------------------------------------------------------------------------  General: no fever  CVS: no chest pain  RESP: no sob, no cough  ABD: no abdominal pain  : no dysuria,  MSK: no edema     VITALS/PHYSICAL EXAM  --------------------------------------------------------------------------------  T(C): 36.8 (09-05-19 @ 12:22), Max: 36.8 (09-05-19 @ 12:22)  HR: 82 (09-05-19 @ 12:22) (73 - 82)  BP: 100/70 (09-05-19 @ 12:40) (92/54 - 120/60)  RR: 16 (09-05-19 @ 12:22) (15 - 16)  SpO2: 97% (09-05-19 @ 12:22) (97% - 98%)  Wt(kg): --        09-04-19 @ 07:01  -  09-05-19 @ 07:00  --------------------------------------------------------  IN: 300 mL / OUT: 1800 mL / NET: -1500 mL    09-05-19 @ 07:01  -  09-05-19 @ 13:07  --------------------------------------------------------  IN: 0 mL / OUT: 500 mL / NET: -500 mL      Physical Exam:  	Gen: NAD  	HEENT: MMM  	Pulm: CTA B/L  	CV: S1S2  	Abd: Soft, +BS  	Ext: B/L 2+ pitting LE edema              Neuro: Awake   	Skin: Warm and Dry   	    LABS/STUDIES  --------------------------------------------------------------------------------              11.2   7.13  >-----------<  163      [09-05-19 @ 07:00]              33.9     128  |  93  |  78  ----------------------------<  273      [09-05-19 @ 07:00]  5.0   |  22  |  1.83        Ca     10.6     [09-05-19 @ 07:00]      Mg     1.8     [09-05-19 @ 07:00]      Phos  3.0     [09-05-19 @ 07:00]            Creatinine Trend:  SCr 1.83 [09-05 @ 07:00]  SCr 2.00 [09-04 @ 03:50]  SCr 2.06 [09-03 @ 05:45]  SCr 2.05 [09-02 @ 12:24]  SCr 1.95 [09-02 @ 05:00]    Urinalysis - [08-30-19 @ 12:00]      Color LIGHT YELLOW / Appearance CLEAR / SG 1.013 / pH 6.0      Gluc 300 / Ketone NEGATIVE  / Bili NEGATIVE / Urobili NORMAL       Blood NEGATIVE / Protein 20 / Leuk Est NEGATIVE / Nitrite NEGATIVE      RBC 0-2 / WBC 0-2 / Hyaline NEGATIVE / Gran  / Sq Epi OCC / Non Sq Epi  / Bacteria NEGATIVE    Urine Creatinine 37.70      [08-31-19 @ 13:28]  Urine Protein 7.4      [09-01-19 @ 17:20]  Urine Sodium 35      [08-31-19 @ 13:28]  Urine Osmolality 256      [08-31-19 @ 13:28]    PTH 20.00 (Ca --)      [09-03-19 @ 05:45]   --  PTH 25.46 (Ca --)      [09-01-19 @ 06:00]   --  Vitamin D (25OH) 34.9      [09-03-19 @ 05:45]  HbA1c 6.1      [08-31-19 @ 07:00]  TSH 2.14      [09-01-19 @ 06:00]  Lipid: chol 121, TG 96, HDL 33, LDL 74      [08-31-19 @ 07:00]      ANCA: cANCA Negative, pANCA Negative, atypical ANCA --      [09-03-19 @ 05:45]  Free Light Chains: kappa 4.00, lambda 3.03, ratio = 1.32      [09-03 @ 05:45]

## 2019-09-05 NOTE — PROGRESS NOTE ADULT - PROBLEM SELECTOR PROBLEM 6
CAD (coronary artery disease)
Type 2 diabetes mellitus
CAD (coronary artery disease)
Type 2 diabetes mellitus
Type 2 diabetes mellitus

## 2019-09-05 NOTE — PROGRESS NOTE ADULT - PROBLEM SELECTOR PLAN 8
Continue amlodipine, isosorbide dinitrate  Monitor BP and adjust medications as necessary   DASH diet
Continue Levothyroxine, monitor thyroid hormones
Continue amlodipine, isosorbide dinitrate  Monitor BP and adjust medications as necessary   DASH diet

## 2019-09-05 NOTE — PROGRESS NOTE ADULT - PROBLEM SELECTOR PROBLEM 5
CAD (coronary artery disease)
Hypercalcemia
Hypercalcemia

## 2019-09-05 NOTE — PROGRESS NOTE ADULT - PROBLEM SELECTOR PLAN 3
Monitor BUN/ creat. F/U Renal c/s Dr Acevedo noted.  Improving Hyponatremia: MCDANIEL as advised
Miralax, senna, colace, KAYA done, High fiber diet
Monitor BUN/ creat. F/U Renal c/s Dr Acevedo noted.
Monitor BUN/ creat. F/U Renal c/s Dr Acevedo noted.  Improving Hyponatremia: MCDANIEL as advised
Miralax, senna, colace, KAYA done, High fiber diet

## 2019-09-05 NOTE — PROGRESS NOTE ADULT - ASSESSMENT
79 year old male with a history of CAD, DM2, HTN, and HLD presents with constipation and possible syncope    CKD (chronic kidney disease), stage IV    Baseline ~ 2.2-2.5.   "Improved" Cr today is likely dilutional as pt is not adhering to fluid restriction  monitor for now  avoid nephrotoxic agents    LE edema  likely 2/2 volume overload from non-adherence  recommend lasix PO    Essential hypertension.    recommend holding isosorbide and starting lasix 40 mg PO (which will help with sodium, fluid retention and BP)  monitor BP as he has had SBP<100 mmHg today      hyponatremia  work up suggests polydipsia  non-adherent with fluid restriction  start lasix 40 mg PO daily  TSH normal and cortisol low  monitor bmp Q 12 until Na >130  avoid hypotonic solutions    Hypercalcemia  initially resolved but now recurrent  PTH 20, Vit D 1,25 OH is high but vit D 25-OH is normal  K/L ratio nml  mild proteinuria. vasculitis w/u in office neg except +ANCA  was referred for rheum eval in office but unsure if patient followed up  repeat anca here neg on 9/3  lasix will help with hypercalcemia as well    proteinuria  check urine p/c ratio 79 year old male with a history of CAD, DM2, HTN, and HLD presents with constipation and possible syncope    CKD (chronic kidney disease), stage IV    Baseline ~ 2.2-2.5.   "Improved" Cr today is likely dilutional as pt is not adhering to fluid restriction  monitor for now  avoid nephrotoxic agents    LE edema  likely 2/2 volume overload from non-adherence  recommend lasix PO    Essential hypertension  recommend holding isosorbide and starting lasix 40 mg PO (which will help with sodium, fluid retention and BP)  monitor BP as he has had SBP<100 mmHg today      hyponatremia  work up suggests polydipsia  non-adherent with fluid restriction  start lasix 40 mg PO daily  TSH normal and cortisol low  monitor bmp Q 12 until Na >130  avoid hypotonic solutions    Hypercalcemia  initially resolved but now recurrent  PTH 20, Vit D 1,25 OH is high but vit D 25-OH is normal  K/L ratio nml  mild proteinuria. vasculitis w/u in office neg except +ANCA  was referred for rheum eval in office but unsure if patient followed up  repeat anca here neg on 9/3  lasix will help with hypercalcemia as well    Uncontrolled DM  recommend endocrine eval    proteinuria  check urine p/c ratio 79 year old male with a history of CAD, DM2, HTN, and HLD presents with constipation and possible syncope    CKD (chronic kidney disease), stage IV    Baseline ~ 2.2-2.5.   "Improved" Cr today is likely dilutional as pt is not adhering to fluid restriction  monitor for now  avoid nephrotoxic agents    LE edema  likely 2/2 volume overload from non-adherence  recommend lasix PO    Essential hypertension  recommend holding isosorbide and starting lasix 40 mg PO (which will help with sodium, fluid retention and BP)  monitor BP as he has had SBP<100 mmHg today      hyponatremia  work up suggests polydipsia  non-adherent with fluid restriction  start lasix 40 mg PO daily  TSH normal and cortisol low  monitor bmp Q 12 until Na >130  avoid hypotonic solutions    Hypercalcemia  initially resolved but now recurrent  PTH 20, Vit D 1,25 OH is high but vit D 25-OH is normal  K/L ratio nml  mild proteinuria. vasculitis w/u in office neg except +ANCA  was referred for rheum eval in office but unsure if patient followed up  repeat anca here neg on 9/3  lasix will help with hypercalcemia as well  check ACE level    Uncontrolled DM  recommend endocrine eval    proteinuria  check urine p/c ratio

## 2019-09-05 NOTE — PROGRESS NOTE ADULT - PROBLEM SELECTOR PROBLEM 7
Type 2 diabetes mellitus
Essential hypertension
Type 2 diabetes mellitus

## 2019-09-05 NOTE — PROGRESS NOTE ADULT - PROBLEM SELECTOR PLAN 1
Telemetry benign so far. Cardiology eval noted. TTE ordered
Telemetry benign so far. Cardiology eval noted. TTE pending
Telemetry benign so far. Cardiology eval noted. TTE shows some degree of LVOT obstruction. This could be the reason for the weakness, unsteady gait.  PT evaluation for DC planning
Telemetry benign so far. Cardiology eval noted. TTE suggested
Telemetry benign so far. Cardiology eval noted. TTE shows some degree of LVOT obstruction. This could be the reason for the weakness

## 2019-09-05 NOTE — PROGRESS NOTE ADULT - PROBLEM SELECTOR PLAN 2
Ser Na improving. Renal FU noted
Hold Glipizide, Januvia, Basal insulin, Monitor FBS and adjust with ISS as needed, order HgbA1C
Ser Na improving. Renal FU noted
Ser Na still low  Renal FU noted
Hold Glipizide, Januvia, Basal insulin, Monitor FBS and adjust with ISS as needed, order HgbA1C

## 2019-09-05 NOTE — PROGRESS NOTE ADULT - PROBLEM SELECTOR PROBLEM 4
Hypercalcemia
Acute on chronic renal insufficiency
Hypercalcemia
Hypercalcemia
Acute on chronic renal insufficiency

## 2019-09-06 LAB
ALBUMIN SERPL ELPH-MCNC: 2.9 G/DL — LOW (ref 3.3–5)
ALP SERPL-CCNC: 72 U/L — SIGNIFICANT CHANGE UP (ref 40–120)
ALT FLD-CCNC: 34 U/L — SIGNIFICANT CHANGE UP (ref 4–41)
ANION GAP SERPL CALC-SCNC: 13 MMO/L — SIGNIFICANT CHANGE UP (ref 7–14)
ANION GAP SERPL CALC-SCNC: 15 MMO/L — HIGH (ref 7–14)
APPEARANCE UR: CLEAR — SIGNIFICANT CHANGE UP
AST SERPL-CCNC: 24 U/L — SIGNIFICANT CHANGE UP (ref 4–40)
BASE EXCESS BLDV CALC-SCNC: -0.4 MMOL/L — SIGNIFICANT CHANGE UP
BASOPHILS # BLD AUTO: 0.04 K/UL — SIGNIFICANT CHANGE UP (ref 0–0.2)
BASOPHILS NFR BLD AUTO: 0.5 % — SIGNIFICANT CHANGE UP (ref 0–2)
BILIRUB SERPL-MCNC: 0.3 MG/DL — SIGNIFICANT CHANGE UP (ref 0.2–1.2)
BILIRUB UR-MCNC: NEGATIVE — SIGNIFICANT CHANGE UP
BLD GP AB SCN SERPL QL: NEGATIVE — SIGNIFICANT CHANGE UP
BLOOD UR QL VISUAL: NEGATIVE — SIGNIFICANT CHANGE UP
BUN SERPL-MCNC: 78 MG/DL — HIGH (ref 7–23)
BUN SERPL-MCNC: 79 MG/DL — HIGH (ref 7–23)
CALCIUM SERPL-MCNC: 10.5 MG/DL — SIGNIFICANT CHANGE UP (ref 8.4–10.5)
CALCIUM SERPL-MCNC: 10.9 MG/DL — HIGH (ref 8.4–10.5)
CHLORIDE SERPL-SCNC: 91 MMOL/L — LOW (ref 98–107)
CHLORIDE SERPL-SCNC: 95 MMOL/L — LOW (ref 98–107)
CK MB BLD-MCNC: 4.26 NG/ML — SIGNIFICANT CHANGE UP (ref 1–6.6)
CK SERPL-CCNC: 20 U/L — LOW (ref 30–200)
CO2 SERPL-SCNC: 20 MMOL/L — LOW (ref 22–31)
CO2 SERPL-SCNC: 21 MMOL/L — LOW (ref 22–31)
COLOR SPEC: SIGNIFICANT CHANGE UP
CREAT SERPL-MCNC: 2.1 MG/DL — HIGH (ref 0.5–1.3)
CREAT SERPL-MCNC: 2.64 MG/DL — HIGH (ref 0.5–1.3)
EOSINOPHIL # BLD AUTO: 0.01 K/UL — SIGNIFICANT CHANGE UP (ref 0–0.5)
EOSINOPHIL NFR BLD AUTO: 0.1 % — SIGNIFICANT CHANGE UP (ref 0–6)
GAS PNL BLDV: 126 MMOL/L — LOW (ref 136–146)
GLUCOSE BLDV-MCNC: 193 MG/DL — HIGH (ref 70–99)
GLUCOSE SERPL-MCNC: 173 MG/DL — HIGH (ref 70–99)
GLUCOSE SERPL-MCNC: 199 MG/DL — HIGH (ref 70–99)
GLUCOSE UR-MCNC: 500 — HIGH
HCO3 BLDV-SCNC: 24 MMOL/L — SIGNIFICANT CHANGE UP (ref 20–27)
HCT VFR BLD CALC: 32.4 % — LOW (ref 39–50)
HCT VFR BLD CALC: 34.3 % — LOW (ref 39–50)
HCT VFR BLDV CALC: 34.4 % — LOW (ref 39–51)
HGB BLD-MCNC: 10.7 G/DL — LOW (ref 13–17)
HGB BLD-MCNC: 11.3 G/DL — LOW (ref 13–17)
HGB BLDV-MCNC: 11.2 G/DL — LOW (ref 13–17)
IMM GRANULOCYTES NFR BLD AUTO: 3.4 % — HIGH (ref 0–1.5)
KETONES UR-MCNC: NEGATIVE — SIGNIFICANT CHANGE UP
LEUKOCYTE ESTERASE UR-ACNC: NEGATIVE — SIGNIFICANT CHANGE UP
LYMPHOCYTES # BLD AUTO: 0.86 K/UL — LOW (ref 1–3.3)
LYMPHOCYTES # BLD AUTO: 11.7 % — LOW (ref 13–44)
MAGNESIUM SERPL-MCNC: 1.9 MG/DL — SIGNIFICANT CHANGE UP (ref 1.6–2.6)
MCHC RBC-ENTMCNC: 28.9 PG — SIGNIFICANT CHANGE UP (ref 27–34)
MCHC RBC-ENTMCNC: 29.2 PG — SIGNIFICANT CHANGE UP (ref 27–34)
MCHC RBC-ENTMCNC: 32.9 % — SIGNIFICANT CHANGE UP (ref 32–36)
MCHC RBC-ENTMCNC: 33 % — SIGNIFICANT CHANGE UP (ref 32–36)
MCV RBC AUTO: 87.6 FL — SIGNIFICANT CHANGE UP (ref 80–100)
MCV RBC AUTO: 88.6 FL — SIGNIFICANT CHANGE UP (ref 80–100)
MONOCYTES # BLD AUTO: 1.11 K/UL — HIGH (ref 0–0.9)
MONOCYTES NFR BLD AUTO: 15.2 % — HIGH (ref 2–14)
NEUTROPHILS # BLD AUTO: 5.05 K/UL — SIGNIFICANT CHANGE UP (ref 1.8–7.4)
NEUTROPHILS NFR BLD AUTO: 69.1 % — SIGNIFICANT CHANGE UP (ref 43–77)
NITRITE UR-MCNC: NEGATIVE — SIGNIFICANT CHANGE UP
NRBC # FLD: 0.02 K/UL — SIGNIFICANT CHANGE UP (ref 0–0)
NRBC # FLD: 0.03 K/UL — SIGNIFICANT CHANGE UP (ref 0–0)
PCO2 BLDV: 38 MMHG — LOW (ref 41–51)
PH BLDV: 7.41 PH — SIGNIFICANT CHANGE UP (ref 7.32–7.43)
PH UR: 6 — SIGNIFICANT CHANGE UP (ref 5–8)
PHOSPHATE SERPL-MCNC: 3.8 MG/DL — SIGNIFICANT CHANGE UP (ref 2.5–4.5)
PLATELET # BLD AUTO: 172 K/UL — SIGNIFICANT CHANGE UP (ref 150–400)
PLATELET # BLD AUTO: 188 K/UL — SIGNIFICANT CHANGE UP (ref 150–400)
PMV BLD: 11.1 FL — SIGNIFICANT CHANGE UP (ref 7–13)
PMV BLD: 11.2 FL — SIGNIFICANT CHANGE UP (ref 7–13)
PO2 BLDV: 50 MMHG — HIGH (ref 35–40)
POTASSIUM BLDV-SCNC: 4.4 MMOL/L — SIGNIFICANT CHANGE UP (ref 3.4–4.5)
POTASSIUM SERPL-MCNC: 4.7 MMOL/L — SIGNIFICANT CHANGE UP (ref 3.5–5.3)
POTASSIUM SERPL-MCNC: 4.7 MMOL/L — SIGNIFICANT CHANGE UP (ref 3.5–5.3)
POTASSIUM SERPL-SCNC: 4.7 MMOL/L — SIGNIFICANT CHANGE UP (ref 3.5–5.3)
POTASSIUM SERPL-SCNC: 4.7 MMOL/L — SIGNIFICANT CHANGE UP (ref 3.5–5.3)
PROT SERPL-MCNC: 5.8 G/DL — LOW (ref 6–8.3)
PROT UR-MCNC: NEGATIVE — SIGNIFICANT CHANGE UP
RBC # BLD: 3.7 M/UL — LOW (ref 4.2–5.8)
RBC # BLD: 3.87 M/UL — LOW (ref 4.2–5.8)
RBC # FLD: 14 % — SIGNIFICANT CHANGE UP (ref 10.3–14.5)
RBC # FLD: 14 % — SIGNIFICANT CHANGE UP (ref 10.3–14.5)
RH IG SCN BLD-IMP: POSITIVE — SIGNIFICANT CHANGE UP
SAO2 % BLDV: 81.7 % — SIGNIFICANT CHANGE UP (ref 60–85)
SODIUM SERPL-SCNC: 127 MMOL/L — LOW (ref 135–145)
SODIUM SERPL-SCNC: 128 MMOL/L — LOW (ref 135–145)
SP GR SPEC: 1.01 — SIGNIFICANT CHANGE UP (ref 1–1.04)
TROPONIN T, HIGH SENSITIVITY: 106 NG/L — CRITICAL HIGH (ref ?–14)
UROBILINOGEN FLD QL: NORMAL — SIGNIFICANT CHANGE UP
WBC # BLD: 7.26 K/UL — SIGNIFICANT CHANGE UP (ref 3.8–10.5)
WBC # BLD: 7.32 K/UL — SIGNIFICANT CHANGE UP (ref 3.8–10.5)
WBC # FLD AUTO: 7.26 K/UL — SIGNIFICANT CHANGE UP (ref 3.8–10.5)
WBC # FLD AUTO: 7.32 K/UL — SIGNIFICANT CHANGE UP (ref 3.8–10.5)

## 2019-09-06 PROCEDURE — 71045 X-RAY EXAM CHEST 1 VIEW: CPT | Mod: 26

## 2019-09-06 RX ORDER — SODIUM CHLORIDE 9 MG/ML
500 INJECTION INTRAMUSCULAR; INTRAVENOUS; SUBCUTANEOUS ONCE
Refills: 0 | Status: COMPLETED | OUTPATIENT
Start: 2019-09-06 | End: 2019-09-06

## 2019-09-06 RX ORDER — GLUCAGON INJECTION, SOLUTION 0.5 MG/.1ML
3 INJECTION, SOLUTION SUBCUTANEOUS ONCE
Refills: 0 | Status: COMPLETED | OUTPATIENT
Start: 2019-09-06 | End: 2019-09-06

## 2019-09-06 RX ADMIN — GLUCAGON INJECTION, SOLUTION 3 MILLIGRAM(S): 0.5 INJECTION, SOLUTION SUBCUTANEOUS at 15:53

## 2019-09-06 RX ADMIN — SENNA PLUS 2 TABLET(S): 8.6 TABLET ORAL at 21:50

## 2019-09-06 RX ADMIN — Medication 100 MILLIGRAM(S): at 14:50

## 2019-09-06 RX ADMIN — Medication 10 MILLIGRAM(S): at 17:23

## 2019-09-06 RX ADMIN — CLOPIDOGREL BISULFATE 75 MILLIGRAM(S): 75 TABLET, FILM COATED ORAL at 08:56

## 2019-09-06 RX ADMIN — Medication 100 MILLIGRAM(S): at 21:50

## 2019-09-06 RX ADMIN — Medication 3: at 08:55

## 2019-09-06 RX ADMIN — Medication 2: at 17:23

## 2019-09-06 RX ADMIN — FAMOTIDINE 10 MILLIGRAM(S): 10 INJECTION INTRAVENOUS at 08:54

## 2019-09-06 RX ADMIN — GABAPENTIN 400 MILLIGRAM(S): 400 CAPSULE ORAL at 21:50

## 2019-09-06 RX ADMIN — POLYETHYLENE GLYCOL 3350 17 GRAM(S): 17 POWDER, FOR SOLUTION ORAL at 08:54

## 2019-09-06 RX ADMIN — SODIUM CHLORIDE 500 MILLILITER(S): 9 INJECTION INTRAMUSCULAR; INTRAVENOUS; SUBCUTANEOUS at 12:30

## 2019-09-06 RX ADMIN — SIMVASTATIN 20 MILLIGRAM(S): 20 TABLET, FILM COATED ORAL at 21:50

## 2019-09-06 RX ADMIN — HEPARIN SODIUM 5000 UNIT(S): 5000 INJECTION INTRAVENOUS; SUBCUTANEOUS at 14:49

## 2019-09-06 RX ADMIN — SODIUM CHLORIDE 500 MILLILITER(S): 9 INJECTION INTRAMUSCULAR; INTRAVENOUS; SUBCUTANEOUS at 17:29

## 2019-09-06 RX ADMIN — INSULIN GLARGINE 6 UNIT(S): 100 INJECTION, SOLUTION SUBCUTANEOUS at 21:50

## 2019-09-06 RX ADMIN — Medication 81 MILLIGRAM(S): at 08:54

## 2019-09-06 NOTE — DIETITIAN INITIAL EVALUATION ADULT. - PROBLEM SELECTOR PLAN 1
Admit to telemetry for continuous monitoring. Check serial EKGs, trend cardiac enzymes, monitor FBS + electrolytes. Echo, CT of the head, orthostatic vital signs. F/U MD note

## 2019-09-06 NOTE — PROGRESS NOTE ADULT - SUBJECTIVE AND OBJECTIVE BOX
Patient is a 79y old  Male who presents with a chief complaint of syncope (04 Sep 2019 15:13)      INTERVAL HPI/OVERNIGHT EVENTS:  T(C): 36.7 (09-06-19 @ 12:07), Max: 36.7 (09-06-19 @ 06:20)  HR: 97 (09-06-19 @ 17:01) (43 - 97)  BP: 73/37 (09-06-19 @ 17:01) (48/25 - 118/68)  RR: 17 (09-06-19 @ 12:07) (17 - 18)  SpO2: 97% (09-06-19 @ 12:07) (96% - 98%)  Wt(kg): --  I&O's Summary    05 Sep 2019 07:01  -  06 Sep 2019 07:00  --------------------------------------------------------  IN: 1980 mL / OUT: 1910 mL / NET: 70 mL    06 Sep 2019 07:01  -  06 Sep 2019 18:08  --------------------------------------------------------  IN: 240 mL / OUT: 1250 mL / NET: -1010 mL        LABS:                        11.3   7.32  )-----------( 172      ( 06 Sep 2019 06:30 )             34.3     09-06    127<L>  |  91<L>  |  78<H>  ----------------------------<  173<H>  4.7   |  21<L>  |  2.10<H>    Ca    10.5      06 Sep 2019 11:56  Phos  3.8     09-06  Mg     1.9     09-06          CAPILLARY BLOOD GLUCOSE      POCT Blood Glucose.: 203 mg/dL (06 Sep 2019 16:36)  POCT Blood Glucose.: 231 mg/dL (06 Sep 2019 16:13)  POCT Blood Glucose.: 204 mg/dL (06 Sep 2019 15:47)  POCT Blood Glucose.: 142 mg/dL (06 Sep 2019 12:27)  POCT Blood Glucose.: 258 mg/dL (06 Sep 2019 08:28)  POCT Blood Glucose.: 311 mg/dL (06 Sep 2019 06:21)  POCT Blood Glucose.: 328 mg/dL (05 Sep 2019 23:10)            MEDICATIONS  (STANDING):  amLODIPine   Tablet 5 milliGRAM(s) Oral daily  aspirin enteric coated 81 milliGRAM(s) Oral daily  clopidogrel Tablet 75 milliGRAM(s) Oral daily  dextrose 5%. 1000 milliLiter(s) (50 mL/Hr) IV Continuous <Continuous>  dextrose 50% Injectable 12.5 Gram(s) IV Push once  dextrose 50% Injectable 25 Gram(s) IV Push once  dextrose 50% Injectable 25 Gram(s) IV Push once  docusate sodium 100 milliGRAM(s) Oral three times a day  ergocalciferol 41413 Unit(s) Oral <User Schedule>  famotidine    Tablet 10 milliGRAM(s) Oral daily  furosemide    Tablet 40 milliGRAM(s) Oral daily  gabapentin 400 milliGRAM(s) Oral at bedtime  heparin  Injectable 5000 Unit(s) SubCutaneous every 8 hours  insulin glargine Injectable (LANTUS) 6 Unit(s) SubCutaneous at bedtime  insulin lispro (HumaLOG) corrective regimen sliding scale   SubCutaneous three times a day before meals  levothyroxine 88 MICROGram(s) Oral daily  metoprolol succinate ER 25 milliGRAM(s) Oral daily  polyethylene glycol 3350 17 Gram(s) Oral daily  predniSONE   Tablet 10 milliGRAM(s) Oral two times a day  senna 2 Tablet(s) Oral at bedtime  simvastatin 20 milliGRAM(s) Oral at bedtime    MEDICATIONS  (PRN):  dextrose 40% Gel 15 Gram(s) Oral once PRN Blood Glucose LESS THAN 70 milliGRAM(s)/deciliter  glucagon  Injectable 1 milliGRAM(s) IntraMuscular once PRN Glucose LESS THAN 70 milligrams/deciliter  zolpidem 5 milliGRAM(s) Oral at bedtime PRN Insomnia  zolpidem 5 milliGRAM(s) Oral at bedtime PRN Insomnia          PHYSICAL EXAM:  GENERAL: NAD, well-groomed, well-developed  HEAD:  Atraumatic, Normocephalic  CHEST/LUNG: Clear to percussion bilaterally; No rales, rhonchi, wheezing, or rubs  HEART: Regular rate and rhythm; No murmurs, rubs, or gallops  ABDOMEN: Soft, Nontender, Nondistended; Bowel sounds present  EXTREMITIES:  2+ Peripheral Pulses, No clubbing, cyanosis, or edema  LYMPH: No lymphadenopathy noted  SKIN: No rashes or lesions    Care Discussed with Consultants/Other Providers [ ] YES  [ ] NO

## 2019-09-06 NOTE — PROVIDER CONTACT NOTE (OTHER) - ACTION/TREATMENT ORDERED:
Tele PA to contact nephrology if fluids should be NS or Na Bicarb. Awaiting for their orders at this time.

## 2019-09-06 NOTE — PROGRESS NOTE ADULT - ASSESSMENT
79 year old male with a history of CAD, DM2, HTN, and HLD presents with constipation and possible syncope    Hypotension  no evidence of infection at this time  recommend checking blood and urine cultures and checking CXR  check TTE and troponins to r/o cardiac event  continue to hold all anti-hypertensives and lasix  recommend glucagon IV for reversal of metoprolol  monitor BP closely  IV NS bolus PRN symptoms or worsening hypotension    CKD (chronic kidney disease), stage IV    Baseline ~ 2.2-2.5.   monitor for now, suspect he may have FRANCES given severe hypotension today  avoid nephrotoxic agents    LE edema  likely 2/2 volume overload from non-adherence  holding lasix due to hypotension      hyponatremia  work up suggests polydipsia  non-adherent with fluid restriction  TSH normal and cortisol low  monitor bmp Q 12 until Na >130  avoid hypotonic solutions    Hypercalcemia  initially resolved but now recurrent  PTH 20, Vit D 1,25 OH is high but vit D 25-OH is normal  K/L ratio nml  mild proteinuria. vasculitis w/u in office neg except +ANCA  was referred for rheum eval in office but unsure if patient followed up  repeat anca here neg on 9/3  lasix will help with hypercalcemia as well  check ACE level    Uncontrolled DM  recommend endocrine eval    proteinuria  check urine p/c ratio

## 2019-09-06 NOTE — CHART NOTE - NSCHARTNOTEFT_GEN_A_CORE
Rapid response called for hypotension (70s/40s) and unresponsiveness. Sternal rub was performed and patient became awake. BP was rechecked and was found to be 102/70. HR in 80s.Patient was oriented to place. Morning BP meds to be held. Will continue to monitor closely.

## 2019-09-06 NOTE — PROVIDER CONTACT NOTE (OTHER) - RECOMMENDATIONS
Notify tele LEATHA Veloz and continue to monitor BP Notify tele LEATHA Veloz and continue to monitor BP and give IV fluids

## 2019-09-06 NOTE — PROVIDER CONTACT NOTE (OTHER) - RECOMMENDATIONS
Place pt on bed alarm due to hypotension increasing pts fall risk. Education continued and reinforced. Call bell within reach.

## 2019-09-06 NOTE — PROGRESS NOTE ADULT - SUBJECTIVE AND OBJECTIVE BOX
CARDIOLOGY PROGRESS NOTE    CARLOS ENRIQUE TATE | [id number]    SUBJECTIVE / 24H EVENTS  Patient seen and examined. Patient with hypotension earlier in day. Patient asymptomatic w/o complaints eating lunch at time of exam. Denies HA, SOB, CP, nausea / vomiting.     OBJECTIVE:    VITAL SIGNS:  T(C): 36.7 (19 @ 12:07), Max: 36.7 (19 @ 06:20)  HR: 97 (19 @ 17:01) (43 - 97)  BP: 73/37 (19 @ 17:01) (48/25 - 118/68)  RR: 17 (19 @ 12:07) (17 - 18)  SpO2: 97% (19 @ 12:07) (96% - 98%)  Daily Weight in k (06 Sep 2019 14:34)  POCT Blood Glucose.: 203 mg/dL (19 @ 16:36)  POCT Blood Glucose.: 231 mg/dL (19 @ 16:13)  POCT Blood Glucose.: 204 mg/dL (19 @ 15:47)      PHYSICAL EXAM:  Gen: NAD  LS: Respirations unlabored. CTA b/l  Card: RRR. No m/r/g.   GI: Soft. Nontender. Nondistended.       19 @ 07:01  -  19 @ 07:00  --------------------------------------------------------  IN:    Oral Fluid: 1980 mL  Total IN: 1980 mL    OUT:    Voided: 1910 mL  Total OUT: 1910 mL    Total NET: 70 mL      19 @ 07:01  -  19 @ 17:35  --------------------------------------------------------  IN:    Oral Fluid: 240 mL  Total IN: 240 mL    OUT:    Voided: 1250 mL  Total OUT: 1250 mL    Total NET: -1010 mL    LAB VALUES:      127<L>  |  91<L>  |  78<H>  ----------------------------<  173<H>  4.7   |  21<L>  |  2.10<H>    Ca    10.5      06 Sep 2019 11:56  Phos  3.8       Mg     1.9                                      11.3   7.32  )-----------( 172      ( 06 Sep 2019 06:30 )             34.3       MICROBIOLOGY:    No new microbiology data for review.     RADIOLOGY:    No new radiographic images for review.    MEDICATIONS  (STANDING):  amLODIPine   Tablet 5 milliGRAM(s) Oral daily  aspirin enteric coated 81 milliGRAM(s) Oral daily  clopidogrel Tablet 75 milliGRAM(s) Oral daily  dextrose 5%. 1000 milliLiter(s) (50 mL/Hr) IV Continuous <Continuous>  dextrose 50% Injectable 12.5 Gram(s) IV Push once  dextrose 50% Injectable 25 Gram(s) IV Push once  dextrose 50% Injectable 25 Gram(s) IV Push once  docusate sodium 100 milliGRAM(s) Oral three times a day  ergocalciferol 09166 Unit(s) Oral <User Schedule>  famotidine    Tablet 10 milliGRAM(s) Oral daily  furosemide    Tablet 40 milliGRAM(s) Oral daily  gabapentin 400 milliGRAM(s) Oral at bedtime  heparin  Injectable 5000 Unit(s) SubCutaneous every 8 hours  insulin glargine Injectable (LANTUS) 6 Unit(s) SubCutaneous at bedtime  insulin lispro (HumaLOG) corrective regimen sliding scale   SubCutaneous three times a day before meals  levothyroxine 88 MICROGram(s) Oral daily  metoprolol succinate ER 25 milliGRAM(s) Oral daily  polyethylene glycol 3350 17 Gram(s) Oral daily  predniSONE   Tablet 10 milliGRAM(s) Oral two times a day  senna 2 Tablet(s) Oral at bedtime  simvastatin 20 milliGRAM(s) Oral at bedtime    MEDICATIONS  (PRN):  dextrose 40% Gel 15 Gram(s) Oral once PRN Blood Glucose LESS THAN 70 milliGRAM(s)/deciliter  glucagon  Injectable 1 milliGRAM(s) IntraMuscular once PRN Glucose LESS THAN 70 milligrams/deciliter  zolpidem 5 milliGRAM(s) Oral at bedtime PRN Insomnia  zolpidem 5 milliGRAM(s) Oral at bedtime PRN Insomnia

## 2019-09-06 NOTE — CHART NOTE - NSCHARTNOTEFT_GEN_A_CORE
pt remains hypotensive 70's/40's despite glucagon will order normal saline 500 cc bolus discussed with nephrology patients sodium 127 and hypotension despite 500 cc bolus earlier.  concern for bblock toxicity will give glucagon and if no improvement of bp will order another bolus.  awaiting feedback on NA level    addendum to above:  notified by nursing pts urine concentrated and foul smelling - will order ua and uc       addendum to above:  pt remains hypotensive 70's/40's despite glucagon will order normal saline 500 cc bolus.  cardiology called earlier today and will continue to follow patient discussed with nephrology patients sodium 127 and hypotension despite 500 cc bolus earlier.  concern for bblock toxicity will give glucagon and if no improvement of bp will order another bolus.  awaiting feedback on NA level    addendum to above:  notified by nursing pts urine concentrated and foul smelling - will order ua and uc .  currently afebrile     addendum to above:  pt remains hypotensive 70's/40's despite glucagon will order normal saline 500 cc bolus.  cardiology called earlier today and will continue to follow patient

## 2019-09-06 NOTE — DIETITIAN INITIAL EVALUATION ADULT. - PROBLEM SELECTOR PLAN 7
Monitor FBS and adjust with ISS as needed, order HgbA1C, diabetic diet, Home meds held due to hypoglycemia

## 2019-09-06 NOTE — PROGRESS NOTE ADULT - ASSESSMENT
Problem/Plan - 1:  ·  Problem: Syncope and collapse.  Plan: Telemetry benign so far. Cardiology eval noted. TTE shows some degree of LVOT obstruction. This could be the reason for the weakness, unsteady gait.  PT evaluation for DC planning.     Problem/Plan - 2:  ·  Problem: Hyponatremia syndrome.  Plan: Ser Na still low  Renal FU noted.     Problem/Plan - 3:  ·  Problem: Acute on chronic renal insufficiency.  Plan: Monitor BUN/ creat. F/U Renal c/s Dr Acevedo noted.     Problem/Plan - 4:  ·  Problem: Hypercalcemia.  Plan: Ca improved.     Problem/Plan - 5:  ·  Problem: CAD (coronary artery disease).  Plan: continue ASA, Plavix, nitrates, statin.     Problem/Plan - 6:Problem: Type 2 diabetes mellitus. Plan: Lantus started for uncontrolled diabetes since he is not on his home dose of medications. these can be restarted at discharge.    Problem/Plan - 7:  ·  Problem: Essential hypertension.  Plan: hold meds sec to hypotension    Problem/Plan - 8:  ·  Problem: Hypothyroidism.  Plan: Continue Levothyroxine, monitor thyroid hormones.

## 2019-09-06 NOTE — CHART NOTE - NSCHARTNOTEFT_GEN_A_CORE
Notified by RN of patient with low bp and lethargy. Patient seen at bedside A+Ox3. Resting comfortable in NAD. Denies chest pain, dyspnea, abdominal pain. Patient noted to be hypotensive throughout the day. BP 70/40. Patient received 1L fluid bolus in total and IV Glucagon. Bp meds including Amlodipine, Lasix, and Metoprolol d/c'd.  Infectious workup including CBC, CMP, CE, VBG w lactate, Blood cx x 2, and CXR ordered. Repeat Bp 85/50. Patient asymptomatic. Will continue to monitor closely.

## 2019-09-06 NOTE — DIETITIAN INITIAL EVALUATION ADULT. - PROBLEM SELECTOR PLAN 8
Continue amlodipine, isosorbide dinitrate  Monitor BP and adjust medications as necessary   DASH diet

## 2019-09-06 NOTE — CHART NOTE - NSCHARTNOTEFT_GEN_A_CORE
spoke with daughter Imani HCP - pt takes glipizide, januvia and insulin for dm control.  daughter thinks dose of lantus is 15-25 units at night.  endocrine consult called.

## 2019-09-06 NOTE — PROGRESS NOTE ADULT - SUBJECTIVE AND OBJECTIVE BOX
[facility]   MD MARYANN HERNANDEZ D.O. FATIMA SHEIKH, D.O. RUORU WONG, PA    OFFICE: [phone]  DR CASAREZ CELL: [phone]  DR. REYNOSO CELL: [phone]  DR. HERNANDEZ CELL: [phone]  LEATHA TERRAZAS CELL: [phone]      RENAL FOLLOW UP NOTE  --------------------------------------------------------------------------------  HPI: Pt seen and examined. Has no complaints at the time of my evaluation    Per notes and PA, pt had severe hypotension this AM with SBP in 40's and was barely responsive. RRT was called. All anti-hypertensive meds were held as was lasix.        PAST HISTORY  --------------------------------------------------------------------------------  No significant changes to PMH, PSH, FHx, SHx, unless otherwise noted    ALLERGIES & MEDICATIONS  --------------------------------------------------------------------------------  Allergies    No Known Allergies    Intolerances      Standing Inpatient Medications  amLODIPine   Tablet 5 milliGRAM(s) Oral daily  aspirin enteric coated 81 milliGRAM(s) Oral daily  clopidogrel Tablet 75 milliGRAM(s) Oral daily  dextrose 5%. 1000 milliLiter(s) IV Continuous <Continuous>  dextrose 50% Injectable 12.5 Gram(s) IV Push once  dextrose 50% Injectable 25 Gram(s) IV Push once  dextrose 50% Injectable 25 Gram(s) IV Push once  docusate sodium 100 milliGRAM(s) Oral three times a day  ergocalciferol 74335 Unit(s) Oral <User Schedule>  famotidine    Tablet 10 milliGRAM(s) Oral daily  furosemide    Tablet 40 milliGRAM(s) Oral daily  gabapentin 400 milliGRAM(s) Oral at bedtime  heparin  Injectable 5000 Unit(s) SubCutaneous every 8 hours  insulin glargine Injectable (LANTUS) 6 Unit(s) SubCutaneous at bedtime  insulin lispro (HumaLOG) corrective regimen sliding scale   SubCutaneous three times a day before meals  levothyroxine 88 MICROGram(s) Oral daily  metoprolol succinate ER 25 milliGRAM(s) Oral daily  polyethylene glycol 3350 17 Gram(s) Oral daily  predniSONE   Tablet 10 milliGRAM(s) Oral two times a day  senna 2 Tablet(s) Oral at bedtime  simvastatin 20 milliGRAM(s) Oral at bedtime    PRN Inpatient Medications  dextrose 40% Gel 15 Gram(s) Oral once PRN  glucagon  Injectable 1 milliGRAM(s) IntraMuscular once PRN  zolpidem 5 milliGRAM(s) Oral at bedtime PRN  zolpidem 5 milliGRAM(s) Oral at bedtime PRN      REVIEW OF SYSTEMS  --------------------------------------------------------------------------------  General: no fever  CVS: no chest pain  RESP: no sob, no cough  ABD: no abdominal pain  : no dysuria,  MSK: no edema     VITALS/PHYSICAL EXAM  --------------------------------------------------------------------------------  T(C): 36.7 (09-06-19 @ 12:07), Max: 36.7 (09-06-19 @ 06:20)  HR: 97 (09-06-19 @ 17:01) (43 - 97)  BP: 73/37 (09-06-19 @ 17:01) (48/25 - 118/68)  RR: 17 (09-06-19 @ 12:07) (17 - 18)  SpO2: 97% (09-06-19 @ 12:07) (96% - 98%)  Wt(kg): --        09-05-19 @ 07:01  -  09-06-19 @ 07:00  --------------------------------------------------------  IN: 1980 mL / OUT: 1910 mL / NET: 70 mL    09-06-19 @ 07:01  -  09-06-19 @ 18:32  --------------------------------------------------------  IN: 240 mL / OUT: 2150 mL / NET: -1910 mL      Physical Exam:  	Gen: NAD  	HEENT: MMM  	Pulm: CTA B/L  	CV: S1S2  	Abd: Soft, +BS  	Ext:  B/L 2+ LE edema              Neuro: Awake   	Skin: Warm and Dry   	    LABS/STUDIES  --------------------------------------------------------------------------------              11.3   7.32  >-----------<  172      [09-06-19 @ 06:30]              34.3     127  |  91  |  78  ----------------------------<  173      [09-06-19 @ 11:56]  4.7   |  21  |  2.10        Ca     10.5     [09-06-19 @ 11:56]      Mg     1.9     [09-06-19 @ 11:56]      Phos  3.8     [09-06-19 @ 11:56]            Creatinine Trend:  SCr 2.10 [09-06 @ 11:56]  SCr 1.83 [09-05 @ 07:00]  SCr 2.00 [09-04 @ 03:50]  SCr 2.06 [09-03 @ 05:45]  SCr 2.05 [09-02 @ 12:24]    Urinalysis - [09-06-19 @ 17:55]      Color LIGHT YELLOW / Appearance CLEAR / SG 1.012 / pH 6.0      Gluc 500 / Ketone NEGATIVE  / Bili NEGATIVE / Urobili NORMAL       Blood NEGATIVE / Protein NEGATIVE / Leuk Est NEGATIVE / Nitrite NEGATIVE      RBC  / WBC  / Hyaline  / Gran  / Sq Epi  / Non Sq Epi  / Bacteria     Urine Creatinine 37.70      [08-31-19 @ 13:28]  Urine Protein 7.4      [09-01-19 @ 17:20]  Urine Sodium 35      [08-31-19 @ 13:28]  Urine Osmolality 256      [08-31-19 @ 13:28]    PTH 20.00 (Ca --)      [09-03-19 @ 05:45]   --  PTH 25.46 (Ca --)      [09-01-19 @ 06:00]   --  Vitamin D (25OH) 34.9      [09-03-19 @ 05:45]  HbA1c 6.1      [08-31-19 @ 07:00]  TSH 2.14      [09-01-19 @ 06:00]  Lipid: chol 121, TG 96, HDL 33, LDL 74      [08-31-19 @ 07:00]      ANCA: cANCA Negative, pANCA Negative, atypical ANCA --      [09-03-19 @ 05:45]  Free Light Chains: kappa 4.00, lambda 3.03, ratio = 1.32      [09-03 @ 05:45]

## 2019-09-06 NOTE — PHYSICAL THERAPY INITIAL EVALUATION ADULT - PERTINENT HX OF CURRENT PROBLEM, REHAB EVAL
Patient is 79 year old male admitted with history of CAD, DM2, HTN, presents with constipation and rectal issue.

## 2019-09-06 NOTE — PROVIDER CONTACT NOTE (OTHER) - RECOMMENDATIONS
PA to come assess patient. JESSE DARDEN made aware. chest xray being completed. blood cultures drawn. Safety mantained,will continue to monitor.

## 2019-09-06 NOTE — PROVIDER CONTACT NOTE (OTHER) - BACKGROUND
Pt was RRT this morning for hypotension and continues to be hypotensive Pt was RRT this morning for hypotension and continues to be hypotensive. MEWS score now 7.

## 2019-09-06 NOTE — PROGRESS NOTE ADULT - ASSESSMENT
79 year old male with CAD, DM2, HTN, HLD presents with constipation/abdominal complaints with syncope and mild troponin elevation.  Cardiology notified of hypotension. Patient asymptomatic at time of examination.     Clinical and laboratory evidence of intravascular hypovolemia in the setting of recent diuretic administration. In the setting of LVOT, excessive hypovolemia can significantly impact hemodynamics. Recommend IV-F resuscitation at present time.   Cardiology will continue to follow.     Please call cardiology with any questions.    Cardiology Consult Service

## 2019-09-06 NOTE — DIETITIAN INITIAL EVALUATION ADULT. - OTHER INFO
78 y/o admitted with the DX of constipation, syncope and collapse, pt reported to have good po and appetite with no nausea, vomiting and diarrhea at this time, pt with c/o constipation, food options discussed, adequate hydration encouraged. Labs reviewed, K and phos level improved, pt on Lantus 6 units for bedtime and Humalog for corrective regimen/ CSCHO diet for elevated blood glucose levels. Current diet remains appropriate, Nutrition education provided with written material.

## 2019-09-07 LAB
ANION GAP SERPL CALC-SCNC: 14 MMO/L — SIGNIFICANT CHANGE UP (ref 7–14)
ANION GAP SERPL CALC-SCNC: 14 MMO/L — SIGNIFICANT CHANGE UP (ref 7–14)
BUN SERPL-MCNC: 69 MG/DL — HIGH (ref 7–23)
BUN SERPL-MCNC: 75 MG/DL — HIGH (ref 7–23)
CALCIUM SERPL-MCNC: 10.3 MG/DL — SIGNIFICANT CHANGE UP (ref 8.4–10.5)
CALCIUM SERPL-MCNC: 11.3 MG/DL — HIGH (ref 8.4–10.5)
CHLORIDE SERPL-SCNC: 93 MMOL/L — LOW (ref 98–107)
CHLORIDE SERPL-SCNC: 95 MMOL/L — LOW (ref 98–107)
CK MB BLD-MCNC: 4.02 NG/ML — SIGNIFICANT CHANGE UP (ref 1–6.6)
CK MB BLD-MCNC: SIGNIFICANT CHANGE UP (ref 0–2.5)
CK SERPL-CCNC: 15 U/L — LOW (ref 30–200)
CO2 SERPL-SCNC: 21 MMOL/L — LOW (ref 22–31)
CO2 SERPL-SCNC: 22 MMOL/L — SIGNIFICANT CHANGE UP (ref 22–31)
CREAT SERPL-MCNC: 2.15 MG/DL — HIGH (ref 0.5–1.3)
CREAT SERPL-MCNC: 2.32 MG/DL — HIGH (ref 0.5–1.3)
GAS PNL BLDMV: SIGNIFICANT CHANGE UP
GAS PNL BLDMV: SIGNIFICANT CHANGE UP
GLUCOSE SERPL-MCNC: 217 MG/DL — HIGH (ref 70–99)
GLUCOSE SERPL-MCNC: 220 MG/DL — HIGH (ref 70–99)
HCT VFR BLD CALC: 33 % — LOW (ref 39–50)
HGB BLD-MCNC: 10.9 G/DL — LOW (ref 13–17)
LDH SERPL L TO P-CCNC: 198 U/L — SIGNIFICANT CHANGE UP (ref 135–225)
MAGNESIUM SERPL-MCNC: 2 MG/DL — SIGNIFICANT CHANGE UP (ref 1.6–2.6)
MAGNESIUM SERPL-MCNC: 2.1 MG/DL — SIGNIFICANT CHANGE UP (ref 1.6–2.6)
MCHC RBC-ENTMCNC: 28.9 PG — SIGNIFICANT CHANGE UP (ref 27–34)
MCHC RBC-ENTMCNC: 33 % — SIGNIFICANT CHANGE UP (ref 32–36)
MCV RBC AUTO: 87.5 FL — SIGNIFICANT CHANGE UP (ref 80–100)
NRBC # FLD: 0.02 K/UL — SIGNIFICANT CHANGE UP (ref 0–0)
PHOSPHATE SERPL-MCNC: 4.5 MG/DL — SIGNIFICANT CHANGE UP (ref 2.5–4.5)
PHOSPHATE SERPL-MCNC: 4.8 MG/DL — HIGH (ref 2.5–4.5)
PLATELET # BLD AUTO: 193 K/UL — SIGNIFICANT CHANGE UP (ref 150–400)
PMV BLD: 11.5 FL — SIGNIFICANT CHANGE UP (ref 7–13)
POTASSIUM SERPL-MCNC: 4.8 MMOL/L — SIGNIFICANT CHANGE UP (ref 3.5–5.3)
POTASSIUM SERPL-MCNC: 5.2 MMOL/L — SIGNIFICANT CHANGE UP (ref 3.5–5.3)
POTASSIUM SERPL-SCNC: 4.8 MMOL/L — SIGNIFICANT CHANGE UP (ref 3.5–5.3)
POTASSIUM SERPL-SCNC: 5.2 MMOL/L — SIGNIFICANT CHANGE UP (ref 3.5–5.3)
RBC # BLD: 3.77 M/UL — LOW (ref 4.2–5.8)
RBC # FLD: 13.9 % — SIGNIFICANT CHANGE UP (ref 10.3–14.5)
SODIUM SERPL-SCNC: 129 MMOL/L — LOW (ref 135–145)
SODIUM SERPL-SCNC: 130 MMOL/L — LOW (ref 135–145)
SPECIMEN SOURCE: SIGNIFICANT CHANGE UP
SPECIMEN SOURCE: SIGNIFICANT CHANGE UP
TROPONIN T, HIGH SENSITIVITY: 82 NG/L — CRITICAL HIGH (ref ?–14)
WBC # BLD: 8.62 K/UL — SIGNIFICANT CHANGE UP (ref 3.8–10.5)
WBC # FLD AUTO: 8.62 K/UL — SIGNIFICANT CHANGE UP (ref 3.8–10.5)

## 2019-09-07 PROCEDURE — 99233 SBSQ HOSP IP/OBS HIGH 50: CPT | Mod: GC

## 2019-09-07 RX ORDER — FLUDROCORTISONE ACETATE 0.1 MG/1
0.1 TABLET ORAL DAILY
Refills: 0 | Status: DISCONTINUED | OUTPATIENT
Start: 2019-09-07 | End: 2019-09-11

## 2019-09-07 RX ADMIN — FAMOTIDINE 10 MILLIGRAM(S): 10 INJECTION INTRAVENOUS at 08:57

## 2019-09-07 RX ADMIN — Medication 3: at 12:54

## 2019-09-07 RX ADMIN — INSULIN GLARGINE 6 UNIT(S): 100 INJECTION, SOLUTION SUBCUTANEOUS at 21:38

## 2019-09-07 RX ADMIN — Medication 88 MICROGRAM(S): at 05:23

## 2019-09-07 RX ADMIN — Medication 100 MILLIGRAM(S): at 05:23

## 2019-09-07 RX ADMIN — FLUDROCORTISONE ACETATE 0.1 MILLIGRAM(S): 0.1 TABLET ORAL at 18:16

## 2019-09-07 RX ADMIN — Medication 10 MILLIGRAM(S): at 17:18

## 2019-09-07 RX ADMIN — Medication 81 MILLIGRAM(S): at 08:57

## 2019-09-07 RX ADMIN — Medication 1: at 08:57

## 2019-09-07 RX ADMIN — Medication 10 MILLIGRAM(S): at 05:23

## 2019-09-07 RX ADMIN — GABAPENTIN 400 MILLIGRAM(S): 400 CAPSULE ORAL at 21:38

## 2019-09-07 RX ADMIN — SENNA PLUS 2 TABLET(S): 8.6 TABLET ORAL at 21:38

## 2019-09-07 RX ADMIN — Medication 4: at 17:19

## 2019-09-07 RX ADMIN — CLOPIDOGREL BISULFATE 75 MILLIGRAM(S): 75 TABLET, FILM COATED ORAL at 08:57

## 2019-09-07 RX ADMIN — SIMVASTATIN 20 MILLIGRAM(S): 20 TABLET, FILM COATED ORAL at 21:38

## 2019-09-07 RX ADMIN — Medication 100 MILLIGRAM(S): at 21:38

## 2019-09-07 NOTE — PROGRESS NOTE ADULT - ASSESSMENT
79 year old male with a history of CAD, DM2, HTN, and HLD presents with constipation and possible syncope    Hypotension  Bp remains low today  no evidence of infection at this time  awaiting blood, urine cultures and echo  UA without evidence of blood or protein  Check x ray clear   Check AM cortisol and TSH   ? sympathetic dysfunction from DM ? over diuresis   monitor BP closely    CKD (chronic kidney disease), stage IV    Baseline ~ 2.2-2.5.   Currently stable   UA without protein or blood   avoid nephrotoxic agents    LE edema  likely 2/2 volume overload from non-adherence  holding lasix due to hypotension      hyponatremia  work up suggests polydipsia  non-adherent with fluid restriction  TSH normal and cortisol low  monitor bmp Q 12 until Na >130  avoid hypotonic solutions    Hypercalcemia   resolved  PTH 20, Vit D 1,25 OH is high but vit D 25-OH is normal  K/L ratio nml  mild proteinuria. vasculitis w/u in office neg except +ANCA  was referred for rheum eval in office but unsure if patient followed up  repeat anca here neg on 9/3  lasix will help with hypercalcemia as well  check ACE level    Uncontrolled DM  recommend endocrine eval    proteinuria  UA without protein or blood   check urine p/c ratio

## 2019-09-07 NOTE — CHART NOTE - NSCHARTNOTEFT_GEN_A_CORE
RRT called for patient with Bp 67/37. Patient was attempting to get out of bed. Repeat Bp check when patient lying flat 120/77. Patient asymptomatic. Possibly 2/2 orthostatic hypotension. Compression stockings ordered. Will continue to monitor    Vital Signs Last 24 Hrs  T(C): 36.5 (07 Sep 2019 04:40), Max: 36.7 (06 Sep 2019 06:20)  T(F): 97.7 (07 Sep 2019 04:40), Max: 98.1 (06 Sep 2019 12:07)  HR: 91 (07 Sep 2019 04:50) (43 - 107)  BP: 120/77 (07 Sep 2019 04:50) (48/25 - 120/77)  BP(mean): --  RR: 18 (07 Sep 2019 04:40) (17 - 18)  SpO2: 99% (07 Sep 2019 04:40) (96% - 99%)

## 2019-09-07 NOTE — PROGRESS NOTE ADULT - SUBJECTIVE AND OBJECTIVE BOX
Patient is a 79y old  Male who presents with a chief complaint of -- (07 Sep 2019 13:33)      INTERVAL HPI/OVERNIGHT EVENTS:  T(C): 36.7 (19 @ 16:00), Max: 36.7 (19 @ 12:02)  HR: 80 (19 @ 16:00) (70 - 107)  BP: 95/54 (19 @ 16:00) (67/37 - 120/77)  RR: 16 (19 @ 16:00) (16 - 18)  SpO2: 96% (19 @ 16:00) (96% - 99%)  Wt(kg): --  I&O's Summary    06 Sep 2019 07:  -  07 Sep 2019 07:00  --------------------------------------------------------  IN: 240 mL / OUT: 2750 mL / NET: -2510 mL    07 Sep 2019 07:01  -  07 Sep 2019 19:03  --------------------------------------------------------  IN: 240 mL / OUT: 1050 mL / NET: -810 mL        LABS:                        10.9   8.62  )-----------( 193      ( 07 Sep 2019 08:11 )             33.0         129<L>  |  93<L>  |  69<H>  ----------------------------<  217<H>  4.8   |  22  |  2.15<H>    Ca    10.3      07 Sep 2019 08:11  Phos  4.5     -  Mg     2.0         TPro  5.8<L>  /  Alb  2.9<L>  /  TBili  0.3  /  DBili  x   /  AST  24  /  ALT  34  /  AlkPhos  72        Urinalysis Basic - ( 06 Sep 2019 17:55 )    Color: LIGHT YELLOW / Appearance: CLEAR / S.012 / pH: 6.0  Gluc: 500 / Ketone: NEGATIVE  / Bili: NEGATIVE / Urobili: NORMAL   Blood: NEGATIVE / Protein: NEGATIVE / Nitrite: NEGATIVE   Leuk Esterase: NEGATIVE / RBC: x / WBC x   Sq Epi: x / Non Sq Epi: x / Bacteria: x      CAPILLARY BLOOD GLUCOSE      POCT Blood Glucose.: 312 mg/dL (07 Sep 2019 17:03)  POCT Blood Glucose.: 300 mg/dL (07 Sep 2019 12:38)  POCT Blood Glucose.: 189 mg/dL (07 Sep 2019 08:34)  POCT Blood Glucose.: 187 mg/dL (06 Sep 2019 21:26)        Urinalysis Basic - ( 06 Sep 2019 17:55 )    Color: LIGHT YELLOW / Appearance: CLEAR / S.012 / pH: 6.0  Gluc: 500 / Ketone: NEGATIVE  / Bili: NEGATIVE / Urobili: NORMAL   Blood: NEGATIVE / Protein: NEGATIVE / Nitrite: NEGATIVE   Leuk Esterase: NEGATIVE / RBC: x / WBC x   Sq Epi: x / Non Sq Epi: x / Bacteria: x        MEDICATIONS  (STANDING):  aspirin enteric coated 81 milliGRAM(s) Oral daily  clopidogrel Tablet 75 milliGRAM(s) Oral daily  dextrose 5%. 1000 milliLiter(s) (50 mL/Hr) IV Continuous <Continuous>  dextrose 50% Injectable 12.5 Gram(s) IV Push once  dextrose 50% Injectable 25 Gram(s) IV Push once  dextrose 50% Injectable 25 Gram(s) IV Push once  docusate sodium 100 milliGRAM(s) Oral three times a day  ergocalciferol 71448 Unit(s) Oral <User Schedule>  famotidine    Tablet 10 milliGRAM(s) Oral daily  fludroCORTISONE 0.1 milliGRAM(s) Oral daily  gabapentin 400 milliGRAM(s) Oral at bedtime  heparin  Injectable 5000 Unit(s) SubCutaneous every 8 hours  insulin glargine Injectable (LANTUS) 6 Unit(s) SubCutaneous at bedtime  insulin lispro (HumaLOG) corrective regimen sliding scale   SubCutaneous three times a day before meals  levothyroxine 88 MICROGram(s) Oral daily  polyethylene glycol 3350 17 Gram(s) Oral daily  senna 2 Tablet(s) Oral at bedtime  simvastatin 20 milliGRAM(s) Oral at bedtime    MEDICATIONS  (PRN):  dextrose 40% Gel 15 Gram(s) Oral once PRN Blood Glucose LESS THAN 70 milliGRAM(s)/deciliter  glucagon  Injectable 1 milliGRAM(s) IntraMuscular once PRN Glucose LESS THAN 70 milligrams/deciliter          PHYSICAL EXAM:  GENERAL: NAD, well-groomed, well-developed  HEAD:  Atraumatic, Normocephalic  CHEST/LUNG: Clear to percussion bilaterally; No rales, rhonchi, wheezing, or rubs  HEART: Regular rate and rhythm; No murmurs, rubs, or gallops  ABDOMEN: Soft, Nontender, Nondistended; Bowel sounds present  EXTREMITIES:  2+ Peripheral Pulses, No clubbing, cyanosis, or edema  LYMPH: No lymphadenopathy noted  SKIN: No rashes or lesions    Care Discussed with Consultants/Other Providers [ ] YES  [ ] NO

## 2019-09-07 NOTE — PROGRESS NOTE ADULT - SUBJECTIVE AND OBJECTIVE BOX
Cardiology Progress Note    Interval events: Multiple RRTs and chart notes reviewed for hypotensive episodes that are brief, possibly orthostatic in etiology. Patient lying supine upon my exam, no complaints.    Tele: SR 90s    Medications:  aspirin enteric coated 81 milliGRAM(s) Oral daily  clopidogrel Tablet 75 milliGRAM(s) Oral daily  dextrose 40% Gel 15 Gram(s) Oral once PRN  dextrose 5%. 1000 milliLiter(s) IV Continuous <Continuous>  dextrose 50% Injectable 12.5 Gram(s) IV Push once  dextrose 50% Injectable 25 Gram(s) IV Push once  dextrose 50% Injectable 25 Gram(s) IV Push once  docusate sodium 100 milliGRAM(s) Oral three times a day  ergocalciferol 91156 Unit(s) Oral <User Schedule>  famotidine    Tablet 10 milliGRAM(s) Oral daily  gabapentin 400 milliGRAM(s) Oral at bedtime  glucagon  Injectable 1 milliGRAM(s) IntraMuscular once PRN  heparin  Injectable 5000 Unit(s) SubCutaneous every 8 hours  insulin glargine Injectable (LANTUS) 6 Unit(s) SubCutaneous at bedtime  insulin lispro (HumaLOG) corrective regimen sliding scale   SubCutaneous three times a day before meals  levothyroxine 88 MICROGram(s) Oral daily  polyethylene glycol 3350 17 Gram(s) Oral daily  predniSONE   Tablet 10 milliGRAM(s) Oral two times a day  senna 2 Tablet(s) Oral at bedtime  simvastatin 20 milliGRAM(s) Oral at bedtime      Review of Systems:  Constitutional: [ ] Fever [ ] Chills [ ] Fatigue [ ] Weight change   HEENT: [ ] Blurred vision [ ] Eye Pain [ ] Headache [ ] Runny nose [ ] Sore Throat   Respiratory: [ ] Cough [ ] Wheezing [ ] Shortness of breath  Cardiovascular: [ ] Chest Pain [ ] Palpitations [ ] MACHUCA [ ] PND [ ] Orthopnea  Gastrointestinal: [ ] Abdominal Pain [ ] Diarrhea [ ] Constipation [ ] Hemorrhoids [ ] Nausea [ ] Vomiting  Genitourinary: [ ] Nocturia [ ] Dysuria [ ] Incontinence  Extremities: [ ] Swelling [ ] Joint Pain  Neurologic: [ ] Focal deficit [ ] Paresthesias [ ] Syncope  Lymphatic: [ ] Swelling [ ] Lymphadenopathy   Skin: [ ] Rash [ ] Ecchymoses [ ] Wounds [ ] Lesions  Psychiatry: [ ] Depression [ ] Suicidal/Homicidal Ideation [ ] Anxiety [ ] Sleep Disturbances  [x] 10 point review of systems is otherwise negative except as mentioned above            [ ]Unable to obtain    Vitals:  T(C): 36.5 (19 @ 04:40), Max: 36.7 (19 @ 12:07)  HR: 91 (19 @ 04:50) (70 - 107)  BP: 120/77 (19 @ 04:50) (67/37 - 120/77)  BP(mean): --  RR: 18 (19 @ 04:40) (17 - 18)  SpO2: 99% (19 @ 04:40) (96% - 99%)  Wt(kg): --  Daily     Daily Weight in k.2 (07 Sep 2019 07:28)  I&O's Summary    06 Sep 2019 07:01  -  07 Sep 2019 07:00  --------------------------------------------------------  IN: 240 mL / OUT: 2750 mL / NET: -2510 mL        Physical Exam:  Gen: NAD  LS: Respirations unlabored. CTA b/l  Card: RRR. No m/r/g.   GI: Soft. Nontender. Nondistended.     Labs:                        10.7   7.26  )-----------( 188      ( 06 Sep 2019 20:30 )             32.4     -    130<L>  |  95<L>  |  75<H>  ----------------------------<  220<H>  5.2   |  21<L>  |  2.32<H>    Ca    11.3<H>      07 Sep 2019 04:48  Phos  4.8     -  Mg     2.1         TPro  5.8<L>  /  Alb  2.9<L>  /  TBili  0.3  /  DBili  x   /  AST  24  /  ALT  34  /  AlkPhos  72  09-06      CARDIAC MARKERS ( 06 Sep 2019 20:30 )  x     / x     / 20 u/L / 4.26 ng/mL / x        New results/imaging:  TTE 9/3/19  CONCLUSIONS:  1. Mitral annular calcification, systolic anterior motion  of the mitral valve.  Mild mitral regurgitation.  2. Calcified trileaflet aortic valve with grossly  moderately decreased opening.  3. Normal left atrium.  LA volume index = 20 cc/m2.  4. Increased relative wall thickness with normal left  ventricular mass index, consistent with concentric left  ventricular remodeling.  5. Hyperdynamic left ventricle. Peak left ventricular  outflow tract gradient equals 43 mm Hg, consistent with  moderate LVOT obstruction.  6. Normal right ventricular size and function.

## 2019-09-07 NOTE — PROGRESS NOTE ADULT - SUBJECTIVE AND OBJECTIVE BOX
[facility] [place] NEPHROLOGY PRACTICE   MD UYEN HERNANDEZ D.O, PA    TELEPHONE NUMBERS:  OFFICE: [phone]  DR. CASAREZ CELL: [phone]  GORDON ZHANG CELL: [phone]  DR. HERNANDEZ CELL:  [phone]    RENAL FOLLOW UP NOTE  --------------------------------------------------------------------------------  HPI: Pt seen and examined at bedside.   Dickson SMITH, chest pain     PAST HISTORY  --------------------------------------------------------------------------------  No significant changes to PMH, PSH, FHx, SHx, unless otherwise noted    ALLERGIES & MEDICATIONS  --------------------------------------------------------------------------------  Allergies    No Known Allergies    Intolerances      Standing Inpatient Medications  aspirin enteric coated 81 milliGRAM(s) Oral daily  clopidogrel Tablet 75 milliGRAM(s) Oral daily  dextrose 5%. 1000 milliLiter(s) IV Continuous <Continuous>  dextrose 50% Injectable 12.5 Gram(s) IV Push once  dextrose 50% Injectable 25 Gram(s) IV Push once  dextrose 50% Injectable 25 Gram(s) IV Push once  docusate sodium 100 milliGRAM(s) Oral three times a day  ergocalciferol 55814 Unit(s) Oral <User Schedule>  famotidine    Tablet 10 milliGRAM(s) Oral daily  gabapentin 400 milliGRAM(s) Oral at bedtime  heparin  Injectable 5000 Unit(s) SubCutaneous every 8 hours  insulin glargine Injectable (LANTUS) 6 Unit(s) SubCutaneous at bedtime  insulin lispro (HumaLOG) corrective regimen sliding scale   SubCutaneous three times a day before meals  levothyroxine 88 MICROGram(s) Oral daily  polyethylene glycol 3350 17 Gram(s) Oral daily  predniSONE   Tablet 10 milliGRAM(s) Oral two times a day  senna 2 Tablet(s) Oral at bedtime  simvastatin 20 milliGRAM(s) Oral at bedtime    PRN Inpatient Medications  dextrose 40% Gel 15 Gram(s) Oral once PRN  glucagon  Injectable 1 milliGRAM(s) IntraMuscular once PRN      REVIEW OF SYSTEMS  --------------------------------------------------------------------------------  General: no fever  CVS: no chest pain  RESP: no sob, no cough  ABD: no abdominal pain  : no dysuria  MSK: no edema     VITALS/PHYSICAL EXAM  --------------------------------------------------------------------------------  T(C): 36.7 (09-07-19 @ 12:02), Max: 36.7 (09-07-19 @ 12:02)  HR: 80 (09-07-19 @ 12:02) (70 - 107)  BP: 96/50 (09-07-19 @ 12:02) (67/37 - 120/77)  RR: 17 (09-07-19 @ 12:02) (17 - 18)  SpO2: 96% (09-07-19 @ 12:02) (96% - 99%)  Wt(kg): --    09-06-19 @ 07:01  -  09-07-19 @ 07:00  --------------------------------------------------------  IN: 240 mL / OUT: 2750 mL / NET: -2510 mL    09-07-19 @ 07:01  -  09-07-19 @ 13:33  --------------------------------------------------------  IN: 240 mL / OUT: 1050 mL / NET: -810 mL      Physical Exam:  	Gen: NAD  	HEENT: MMM  	Pulm: CTA B/L  	CV: S1S2  	Abd: Soft, +BS  	Ext: No LE edema B/L                      Neuro: Awake   	Skin: Warm and Dry     LABS/STUDIES  --------------------------------------------------------------------------------              10.9   8.62  >-----------<  193      [09-07-19 @ 08:11]              33.0     129  |  93  |  69  ----------------------------<  217      [09-07-19 @ 08:11]  4.8   |  22  |  2.15        Ca     10.3     [09-07-19 @ 08:11]      Mg     2.0     [09-07-19 @ 08:11]      Phos  4.5     [09-07-19 @ 08:11]    TPro  5.8  /  Alb  2.9  /  TBili  0.3  /  DBili  x   /  AST  24  /  ALT  34  /  AlkPhos  72  [09-06-19 @ 20:30]    CK 15      [09-07-19 @ 08:11]        [09-07-19 @ 08:11]    Creatinine Trend:  SCr 2.15 [09-07 @ 08:11]  SCr 2.32 [09-07 @ 04:48]  SCr 2.64 [09-06 @ 20:30]  SCr 2.10 [09-06 @ 11:56]  SCr 1.83 [09-05 @ 07:00]    Urinalysis - [09-06-19 @ 17:55]      Color LIGHT YELLOW / Appearance CLEAR / SG 1.012 / pH 6.0      Gluc 500 / Ketone NEGATIVE  / Bili NEGATIVE / Urobili NORMAL       Blood NEGATIVE / Protein NEGATIVE / Leuk Est NEGATIVE / Nitrite NEGATIVE      RBC  / WBC  / Hyaline  / Gran  / Sq Epi  / Non Sq Epi  / Bacteria     Urine Protein 7.4      [09-01-19 @ 17:20]    PTH 20.00 (Ca --)      [09-03-19 @ 05:45]   --  PTH 25.46 (Ca --)      [09-01-19 @ 06:00]   --  Vitamin D (25OH) 34.9      [09-03-19 @ 05:45]  HbA1c 6.1      [08-31-19 @ 07:00]  TSH 2.14      [09-01-19 @ 06:00]  Lipid: chol 121, TG 96, HDL 33, LDL 74      [08-31-19 @ 07:00]      ANCA: cANCA Negative, pANCA Negative, atypical ANCA --      [09-03-19 @ 05:45]  Free Light Chains: kappa 4.00, lambda 3.03, ratio = 1.32      [09-03 @ 05:45]

## 2019-09-07 NOTE — RAPID RESPONSE TEAM SUMMARY - NSOTHERINTERVENTIONSRRT_GEN_ALL_CORE
All BP meds were stopped.
-None  -Would obtain ECG if bradycardic again, as patient's pamela cardia improved with painful stimuli  -Holding BP meds until the afternoon

## 2019-09-07 NOTE — RAPID RESPONSE TEAM SUMMARY - NSADDTLFINDINGSRRT_GEN_ALL_CORE
Nursing found patient with BP 60s/40s and not arousable after patient sat up to urinate.  Pt was laid in bed and BP went back to 112/75, arousable, talking.  Pt AOx3 after awake.
Vitals /84 HR 50s with improvement to 80s when woken up  Physical exam: patient responding to painful stimuli, AOx2 to person and place  Lungs: CTAB  Cardio: RRR  Ext: no edema

## 2019-09-07 NOTE — PROGRESS NOTE ADULT - ASSESSMENT
79 year old male with CAD, DM, HTN, HLD a/w constipation/abdominal complaints with syncope and mild troponin elevation.  TTE revealed hyperdynamic LV with LVOT obstruction, no SWMAs  Additionally, hospital course complicated by episodes of hypotension possibly orthostatic but given LVOT obstruction more likely the etiology would treat for this.    #LVOT obstruction leading to episodes of hypotension  -- liberalize fluid intake, IVF to supplement if needed  -- would initiate Toprol 50 mg daily, reduced HR and negative inotropy would benefit patient with LVOT obstruction  -- if HR not sufficiently reduced would consider CCBs  -- can provide compression stockings  -- if optimized from LVOT obstruction standpoint and still symptomatic may have to consider florinef for tx of orthostatic hypotension    #CAD  -- continue asa, plavix, simvastatin    #HLD  -- continue simvastatin    Addison Sanchez  Cardiovascular Disease Fellow  [phone]    For all cardiology-related service needs, information can be found 24/7 on [email or web address]

## 2019-09-08 LAB
ANION GAP SERPL CALC-SCNC: 16 MMO/L — HIGH (ref 7–14)
BACTERIA UR CULT: SIGNIFICANT CHANGE UP
BUN SERPL-MCNC: 62 MG/DL — HIGH (ref 7–23)
CALCIUM SERPL-MCNC: 10.3 MG/DL — SIGNIFICANT CHANGE UP (ref 8.4–10.5)
CHLORIDE SERPL-SCNC: 92 MMOL/L — LOW (ref 98–107)
CO2 SERPL-SCNC: 21 MMOL/L — LOW (ref 22–31)
CREAT SERPL-MCNC: 2.14 MG/DL — HIGH (ref 0.5–1.3)
GLUCOSE SERPL-MCNC: 289 MG/DL — HIGH (ref 70–99)
HCT VFR BLD CALC: 40.7 % — SIGNIFICANT CHANGE UP (ref 39–50)
HGB BLD-MCNC: 13 G/DL — SIGNIFICANT CHANGE UP (ref 13–17)
MAGNESIUM SERPL-MCNC: 2.1 MG/DL — SIGNIFICANT CHANGE UP (ref 1.6–2.6)
MCHC RBC-ENTMCNC: 29.3 PG — SIGNIFICANT CHANGE UP (ref 27–34)
MCHC RBC-ENTMCNC: 31.9 % — LOW (ref 32–36)
MCV RBC AUTO: 91.7 FL — SIGNIFICANT CHANGE UP (ref 80–100)
NRBC # FLD: 0 K/UL — SIGNIFICANT CHANGE UP (ref 0–0)
PHOSPHATE SERPL-MCNC: 3.4 MG/DL — SIGNIFICANT CHANGE UP (ref 2.5–4.5)
PLATELET # BLD AUTO: 200 K/UL — SIGNIFICANT CHANGE UP (ref 150–400)
PMV BLD: 11.6 FL — SIGNIFICANT CHANGE UP (ref 7–13)
POTASSIUM SERPL-MCNC: 5.2 MMOL/L — SIGNIFICANT CHANGE UP (ref 3.5–5.3)
POTASSIUM SERPL-SCNC: 5.2 MMOL/L — SIGNIFICANT CHANGE UP (ref 3.5–5.3)
RBC # BLD: 4.44 M/UL — SIGNIFICANT CHANGE UP (ref 4.2–5.8)
RBC # FLD: 14.3 % — SIGNIFICANT CHANGE UP (ref 10.3–14.5)
SODIUM SERPL-SCNC: 129 MMOL/L — LOW (ref 135–145)
SPECIMEN SOURCE: SIGNIFICANT CHANGE UP
WBC # BLD: 9.08 K/UL — SIGNIFICANT CHANGE UP (ref 3.8–10.5)
WBC # FLD AUTO: 9.08 K/UL — SIGNIFICANT CHANGE UP (ref 3.8–10.5)

## 2019-09-08 PROCEDURE — 99232 SBSQ HOSP IP/OBS MODERATE 35: CPT | Mod: GC

## 2019-09-08 RX ORDER — INSULIN LISPRO 100/ML
VIAL (ML) SUBCUTANEOUS AT BEDTIME
Refills: 0 | Status: DISCONTINUED | OUTPATIENT
Start: 2019-09-08 | End: 2019-09-17

## 2019-09-08 RX ADMIN — SENNA PLUS 2 TABLET(S): 8.6 TABLET ORAL at 21:40

## 2019-09-08 RX ADMIN — GABAPENTIN 400 MILLIGRAM(S): 400 CAPSULE ORAL at 21:40

## 2019-09-08 RX ADMIN — HEPARIN SODIUM 5000 UNIT(S): 5000 INJECTION INTRAVENOUS; SUBCUTANEOUS at 21:40

## 2019-09-08 RX ADMIN — Medication 81 MILLIGRAM(S): at 09:22

## 2019-09-08 RX ADMIN — Medication 10 MILLIGRAM(S): at 06:05

## 2019-09-08 RX ADMIN — Medication 4: at 12:59

## 2019-09-08 RX ADMIN — FAMOTIDINE 10 MILLIGRAM(S): 10 INJECTION INTRAVENOUS at 09:22

## 2019-09-08 RX ADMIN — POLYETHYLENE GLYCOL 3350 17 GRAM(S): 17 POWDER, FOR SOLUTION ORAL at 09:22

## 2019-09-08 RX ADMIN — INSULIN GLARGINE 6 UNIT(S): 100 INJECTION, SOLUTION SUBCUTANEOUS at 21:41

## 2019-09-08 RX ADMIN — SIMVASTATIN 20 MILLIGRAM(S): 20 TABLET, FILM COATED ORAL at 21:41

## 2019-09-08 RX ADMIN — Medication 3: at 09:22

## 2019-09-08 RX ADMIN — Medication 100 MILLIGRAM(S): at 21:40

## 2019-09-08 RX ADMIN — ERGOCALCIFEROL 50000 UNIT(S): 1.25 CAPSULE ORAL at 09:23

## 2019-09-08 RX ADMIN — Medication 100 MILLIGRAM(S): at 06:05

## 2019-09-08 RX ADMIN — Medication 100 MILLIGRAM(S): at 13:01

## 2019-09-08 RX ADMIN — Medication 2: at 22:05

## 2019-09-08 RX ADMIN — Medication 5 MILLIGRAM(S): at 17:45

## 2019-09-08 RX ADMIN — FLUDROCORTISONE ACETATE 0.1 MILLIGRAM(S): 0.1 TABLET ORAL at 06:05

## 2019-09-08 RX ADMIN — Medication 4: at 17:45

## 2019-09-08 RX ADMIN — Medication 88 MICROGRAM(S): at 06:05

## 2019-09-08 RX ADMIN — CLOPIDOGREL BISULFATE 75 MILLIGRAM(S): 75 TABLET, FILM COATED ORAL at 09:22

## 2019-09-08 NOTE — PROGRESS NOTE ADULT - SUBJECTIVE AND OBJECTIVE BOX
Cardiology Progress Note    Interval events: Orthostatics positive, florinef started. Patient in no acute distress this morning. No complaints. No dizziness, lightheadedness.    Tele: SR, PVCs    Medications:  aspirin enteric coated 81 milliGRAM(s) Oral daily  clopidogrel Tablet 75 milliGRAM(s) Oral daily  dextrose 40% Gel 15 Gram(s) Oral once PRN  dextrose 5%. 1000 milliLiter(s) IV Continuous <Continuous>  dextrose 50% Injectable 12.5 Gram(s) IV Push once  dextrose 50% Injectable 25 Gram(s) IV Push once  dextrose 50% Injectable 25 Gram(s) IV Push once  docusate sodium 100 milliGRAM(s) Oral three times a day  ergocalciferol 98765 Unit(s) Oral <User Schedule>  famotidine    Tablet 10 milliGRAM(s) Oral daily  fludroCORTISONE 0.1 milliGRAM(s) Oral daily  gabapentin 400 milliGRAM(s) Oral at bedtime  glucagon  Injectable 1 milliGRAM(s) IntraMuscular once PRN  heparin  Injectable 5000 Unit(s) SubCutaneous every 8 hours  insulin glargine Injectable (LANTUS) 6 Unit(s) SubCutaneous at bedtime  insulin lispro (HumaLOG) corrective regimen sliding scale   SubCutaneous three times a day before meals  levothyroxine 88 MICROGram(s) Oral daily  polyethylene glycol 3350 17 Gram(s) Oral daily  predniSONE   Tablet 10 milliGRAM(s) Oral daily  predniSONE   Tablet 5 milliGRAM(s) Oral daily  senna 2 Tablet(s) Oral at bedtime  simvastatin 20 milliGRAM(s) Oral at bedtime      Review of Systems:  Constitutional: [ ] Fever [ ] Chills [ ] Fatigue [ ] Weight change   HEENT: [ ] Blurred vision [ ] Eye Pain [ ] Headache [ ] Runny nose [ ] Sore Throat   Respiratory: [ ] Cough [ ] Wheezing [ ] Shortness of breath  Cardiovascular: [ ] Chest Pain [ ] Palpitations [ ] MACHUCA [ ] PND [ ] Orthopnea  Gastrointestinal: [ ] Abdominal Pain [ ] Diarrhea [ ] Constipation [ ] Hemorrhoids [ ] Nausea [ ] Vomiting  Genitourinary: [ ] Nocturia [ ] Dysuria [ ] Incontinence  Extremities: [ ] Swelling [ ] Joint Pain  Neurologic: [ ] Focal deficit [ ] Paresthesias [ ] Syncope  Lymphatic: [ ] Swelling [ ] Lymphadenopathy   Skin: [ ] Rash [ ] Ecchymoses [ ] Wounds [ ] Lesions  Psychiatry: [ ] Depression [ ] Suicidal/Homicidal Ideation [ ] Anxiety [ ] Sleep Disturbances  [x] 10 point review of systems is otherwise negative except as mentioned above            [ ]Unable to obtain    Vitals:  T(C): 36.6 (09-08-19 @ 05:15), Max: 36.7 (09-07-19 @ 12:02)  HR: 84 (09-08-19 @ 05:15) (61 - 87)  BP: 100/58 (09-08-19 @ 05:15) (95/54 - 108/64)  BP(mean): --  RR: 18 (09-08-19 @ 05:15) (16 - 18)  SpO2: 99% (09-08-19 @ 05:15) (96% - 99%)  Wt(kg): --  Daily     Daily   I&O's Summary    07 Sep 2019 07:01  -  08 Sep 2019 07:00  --------------------------------------------------------  IN: 240 mL / OUT: 1050 mL / NET: -810 mL        Physical Exam:  Gen: NAD  LS: Respirations unlabored. CTA b/l  Card: RRR. No m/r/g.   GI: Soft. Nontender. Nondistended.     Labs:                        10.9   8.62  )-----------( 193      ( 07 Sep 2019 08:11 )             33.0     09-07    129<L>  |  93<L>  |  69<H>  ----------------------------<  217<H>  4.8   |  22  |  2.15<H>    Ca    10.3      07 Sep 2019 08:11  Phos  4.5     09-07  Mg     2.0     09-07    TPro  5.8<L>  /  Alb  2.9<L>  /  TBili  0.3  /  DBili  x   /  AST  24  /  ALT  34  /  AlkPhos  72  09-06      CARDIAC MARKERS ( 07 Sep 2019 08:11 )  x     / x     / 15 u/L / 4.02 ng/mL / x      CARDIAC MARKERS ( 06 Sep 2019 20:30 )  x     / x     / 20 u/L / 4.26 ng/mL / x        New results/imaging:  TTE 9/3/19  CONCLUSIONS:  1. Mitral annular calcification, systolic anterior motion  of the mitral valve.  Mild mitral regurgitation.  2. Calcified trileaflet aortic valve with grossly  moderately decreased opening.  3. Normal left atrium.  LA volume index = 20 cc/m2.  4. Increased relative wall thickness with normal left  ventricular mass index, consistent with concentric left  ventricular remodeling.  5. Hyperdynamic left ventricle. Peak left ventricular  outflow tract gradient equals 43 mm Hg, consistent with  moderate LVOT obstruction.  6. Normal right ventricular size and function.

## 2019-09-08 NOTE — PROGRESS NOTE ADULT - ASSESSMENT
79 year old male with CAD, DM, HTN, HLD a/w constipation/abdominal complaints with syncope and mild troponin elevation.  TTE revealed hyperdynamic LV with LVOT obstruction, no SWMAs  Orthostatic vitals positive, started on florinef.    #Orthostatic hypotension  -- liberalize fluid intake, IVF if needed  -- continue florinef  -- rest of care as per primary team    #CAD  -- continue asa, plavix, simvastatin    #HLD  -- continue simvastatin    Addison Sanchez  Cardiovascular Disease Fellow  [phone]    For all cardiology-related service needs, information can be found 24/7 on [email or web address] [email or web address]

## 2019-09-08 NOTE — PROGRESS NOTE ADULT - ASSESSMENT
79 year old male with a history of CAD, DM2, HTN, and HLD presents with constipation and possible syncope    Hypotension  Bp stable today  no evidence of infection at this time  awaiting blood, urine cultures and echo  UA without evidence of blood or protein  Check x ray clear   Check AM cortisol and TSH   Pt started on florinef by cardiology. Monitor serum K   monitor BP closely    CKD (chronic kidney disease), stage IV    Baseline ~ 2.2-2.5.   Currently stable   UA without protein or blood   avoid nephrotoxic agents    LE edema  likely 2/2 volume overload from non-adherence  holding lasix due to hypotension      hyponatremia  work up suggests polydipsia  non-adherent with fluid restriction  TSH normal and cortisol low  monitor bmp Q 12 until Na >130  avoid hypotonic solutions    Hypercalcemia   resolved  PTH 20, Vit D 1,25 OH is high but vit D 25-OH is normal  K/L ratio nml  mild proteinuria. vasculitis w/u in office neg except +ANCA  was referred for rheum eval in office but unsure if patient followed up  repeat anca here neg on 9/3  lasix will help with hypercalcemia as well  check ACE level    Uncontrolled DM  recommend endocrine eval    proteinuria  UA without protein or blood   check urine p/c ratio

## 2019-09-08 NOTE — PROGRESS NOTE ADULT - ASSESSMENT
Problem/Plan - 1:  ·  Problem: Syncope and collapse.  Plan: Telemetry benign so far. Cardiology eval noted. TTE shows some degree of LVOT obstruction. This could be the reason for the weakness, unsteady gait.  PT evaluation for DC planning.     Problem/Plan - 2:  ·  Problem: Hyponatremia syndrome.  Plan: Ser Na still low  Renal FU noted.     Problem/Plan - 3:  ·  Problem: Acute on chronic renal insufficiency.  Plan: Monitor BUN/ creat. F/U Renal c/s Dr Acevedo noted.     Problem/Plan - 4:  ·  Problem: Hypercalcemia.  Plan: Ca improved.     Problem/Plan - 5:  ·  Problem: CAD (coronary artery disease).  Plan: continue ASA, Plavix, nitrates, statin.     Problem/Plan - 6:Problem: Type 2 diabetes mellitus. Plan: Lantus started for uncontrolled diabetes since he is not on his home dose of medications. these can be restarted at discharge.    Problem/Plan - 7:  ·  Problem: Essential hypertension.  Plan: hold meds sec to hypotension    Problem/Plan - 8:·  Problem: Hypothyroidism.  Plan: Continue Levothyroxine, monitor thyroid hormones.

## 2019-09-08 NOTE — PROGRESS NOTE ADULT - SUBJECTIVE AND OBJECTIVE BOX
[facility] [place] [facility]PHROLOGY PRACTICE   MD UYEN HERNANDEZ D.O, PA    TELEPHONE NUMBERS:  OFFICE: [phone]  DR. CASAREZ CELL: [phone]  GORDON ZHANG CELL: [phone]  DR. HERNANDEZ CELL:  [phone]    RENAL FOLLOW UP NOTE  --------------------------------------------------------------------------------  HPI: Pt seen and examined at bedside. Son at bedside. States the patient was on prednisone for one month for hip pain. He abruptly stopped the prednisone one week before being admitted to the hospital. Pt was restarted on prednisone in the hospital. Was being evaluated for hypotension. ? adrenal insufficiency from abrupt prednisone discontinuation causing hypotension.     PAST HISTORY  --------------------------------------------------------------------------------  No significant changes to PMH, PSH, FHx, SHx, unless otherwise noted    ALLERGIES & MEDICATIONS  --------------------------------------------------------------------------------  Allergies    No Known Allergies    Intolerances      Standing Inpatient Medications  aspirin enteric coated 81 milliGRAM(s) Oral daily  clopidogrel Tablet 75 milliGRAM(s) Oral daily  dextrose 5%. 1000 milliLiter(s) IV Continuous <Continuous>  dextrose 50% Injectable 12.5 Gram(s) IV Push once  dextrose 50% Injectable 25 Gram(s) IV Push once  dextrose 50% Injectable 25 Gram(s) IV Push once  docusate sodium 100 milliGRAM(s) Oral three times a day  ergocalciferol 93948 Unit(s) Oral <User Schedule>  famotidine    Tablet 10 milliGRAM(s) Oral daily  fludroCORTISONE 0.1 milliGRAM(s) Oral daily  gabapentin 400 milliGRAM(s) Oral at bedtime  heparin  Injectable 5000 Unit(s) SubCutaneous every 8 hours  insulin glargine Injectable (LANTUS) 6 Unit(s) SubCutaneous at bedtime  insulin lispro (HumaLOG) corrective regimen sliding scale   SubCutaneous three times a day before meals  levothyroxine 88 MICROGram(s) Oral daily  polyethylene glycol 3350 17 Gram(s) Oral daily  predniSONE   Tablet 10 milliGRAM(s) Oral daily  predniSONE   Tablet 5 milliGRAM(s) Oral daily  senna 2 Tablet(s) Oral at bedtime  simvastatin 20 milliGRAM(s) Oral at bedtime    PRN Inpatient Medications  dextrose 40% Gel 15 Gram(s) Oral once PRN  glucagon  Injectable 1 milliGRAM(s) IntraMuscular once PRN      REVIEW OF SYSTEMS  --------------------------------------------------------------------------------  General: no fever  CVS: no chest pain  RESP: no sob, no cough  ABD: no abdominal pain  : no dysuria  MSK: no edema     VITALS/PHYSICAL EXAM  --------------------------------------------------------------------------------  T(C): 36.3 (09-08-19 @ 13:00), Max: 36.7 (09-07-19 @ 16:00)  HR: 99 (09-08-19 @ 13:00) (61 - 99)  BP: 125/70 (09-08-19 @ 13:00) (90/43 - 125/70)  RR: 18 (09-08-19 @ 13:00) (16 - 18)  SpO2: 100% (09-08-19 @ 13:00) (96% - 100%)  Wt(kg): --    09-07-19 @ 07:01  -  09-08-19 @ 07:00  --------------------------------------------------------  IN: 240 mL / OUT: 1050 mL / NET: -810 mL      Physical Exam:  	Gen: NAD  	HEENT: MMM  	Pulm: CTA B/L  	CV: S1S2  	Abd: Soft, +BS  	Ext: No LE edema B/L                      Neuro: Awake   	Skin: Warm and Dry     LABS/STUDIES  --------------------------------------------------------------------------------              13.0   9.08  >-----------<  200      [09-08-19 @ 09:17]              40.7     129  |  92  |  62  ----------------------------<  289      [09-08-19 @ 09:17]  5.2   |  21  |  2.14        Ca     10.3     [09-08-19 @ 09:17]      Mg     2.1     [09-08-19 @ 09:17]      Phos  3.4     [09-08-19 @ 09:17]    TPro  5.8  /  Alb  2.9  /  TBili  0.3  /  DBili  x   /  AST  24  /  ALT  34  /  AlkPhos  72  [09-06-19 @ 20:30]    CK 15      [09-07-19 @ 08:11]        [09-07-19 @ 08:11]    Creatinine Trend:  SCr 2.14 [09-08 @ 09:17]  SCr 2.15 [09-07 @ 08:11]  SCr 2.32 [09-07 @ 04:48]  SCr 2.64 [09-06 @ 20:30]  SCr 2.10 [09-06 @ 11:56]    Urinalysis - [09-06-19 @ 17:55]      Color LIGHT YELLOW / Appearance CLEAR / SG 1.012 / pH 6.0      Gluc 500 / Ketone NEGATIVE  / Bili NEGATIVE / Urobili NORMAL       Blood NEGATIVE / Protein NEGATIVE / Leuk Est NEGATIVE / Nitrite NEGATIVE      RBC  / WBC  / Hyaline  / Gran  / Sq Epi  / Non Sq Epi  / Bacteria     Urine Protein 7.4      [09-01-19 @ 17:20]    PTH 20.00 (Ca --)      [09-03-19 @ 05:45]   --  PTH 25.46 (Ca --)      [09-01-19 @ 06:00]   --  Vitamin D (25OH) 34.9      [09-03-19 @ 05:45]  HbA1c 6.1      [08-31-19 @ 07:00]  TSH 2.14      [09-01-19 @ 06:00]  Lipid: chol 121, TG 96, HDL 33, LDL 74      [08-31-19 @ 07:00]      ANCA: cANCA Negative, pANCA Negative, atypical ANCA --      [09-03-19 @ 05:45]  Free Light Chains: kappa 4.00, lambda 3.03, ratio = 1.32      [09-03 @ 05:45]

## 2019-09-08 NOTE — PROGRESS NOTE ADULT - SUBJECTIVE AND OBJECTIVE BOX
Patient is a 79y old  Male who presents with a chief complaint of Hypotension (08 Sep 2019 15:36)      INTERVAL HPI/OVERNIGHT EVENTS:  T(C): 36.5 (09-08-19 @ 17:00), Max: 36.7 (09-07-19 @ 21:36)  HR: 96 (09-08-19 @ 17:00) (61 - 99)  BP: 122/68 (09-08-19 @ 17:00) (90/43 - 125/70)  RR: 16 (09-08-19 @ 17:00) (16 - 18)  SpO2: 97% (09-08-19 @ 17:00) (97% - 100%)  Wt(kg): --  I&O's Summary    07 Sep 2019 07:01  -  08 Sep 2019 07:00  --------------------------------------------------------  IN: 240 mL / OUT: 1050 mL / NET: -810 mL        LABS:                        13.0   9.08  )-----------( 200      ( 08 Sep 2019 09:17 )             40.7     09-08    129<L>  |  92<L>  |  62<H>  ----------------------------<  289<H>  5.2   |  21<L>  |  2.14<H>    Ca    10.3      08 Sep 2019 09:17  Phos  3.4     09-08  Mg     2.1     09-08    TPro  5.8<L>  /  Alb  2.9<L>  /  TBili  0.3  /  DBili  x   /  AST  24  /  ALT  34  /  AlkPhos  72  09-06        CAPILLARY BLOOD GLUCOSE      POCT Blood Glucose.: 330 mg/dL (08 Sep 2019 17:39)  POCT Blood Glucose.: 312 mg/dL (08 Sep 2019 12:01)  POCT Blood Glucose.: 256 mg/dL (08 Sep 2019 08:44)  POCT Blood Glucose.: 277 mg/dL (07 Sep 2019 20:59)            MEDICATIONS  (STANDING):  aspirin enteric coated 81 milliGRAM(s) Oral daily  clopidogrel Tablet 75 milliGRAM(s) Oral daily  dextrose 5%. 1000 milliLiter(s) (50 mL/Hr) IV Continuous <Continuous>  dextrose 50% Injectable 12.5 Gram(s) IV Push once  dextrose 50% Injectable 25 Gram(s) IV Push once  dextrose 50% Injectable 25 Gram(s) IV Push once  docusate sodium 100 milliGRAM(s) Oral three times a day  ergocalciferol 70394 Unit(s) Oral <User Schedule>  famotidine    Tablet 10 milliGRAM(s) Oral daily  fludroCORTISONE 0.1 milliGRAM(s) Oral daily  gabapentin 400 milliGRAM(s) Oral at bedtime  heparin  Injectable 5000 Unit(s) SubCutaneous every 8 hours  insulin glargine Injectable (LANTUS) 6 Unit(s) SubCutaneous at bedtime  insulin lispro (HumaLOG) corrective regimen sliding scale   SubCutaneous three times a day before meals  levothyroxine 88 MICROGram(s) Oral daily  polyethylene glycol 3350 17 Gram(s) Oral daily  predniSONE   Tablet 10 milliGRAM(s) Oral daily  predniSONE   Tablet 5 milliGRAM(s) Oral daily  senna 2 Tablet(s) Oral at bedtime  simvastatin 20 milliGRAM(s) Oral at bedtime    MEDICATIONS  (PRN):  dextrose 40% Gel 15 Gram(s) Oral once PRN Blood Glucose LESS THAN 70 milliGRAM(s)/deciliter  glucagon  Injectable 1 milliGRAM(s) IntraMuscular once PRN Glucose LESS THAN 70 milligrams/deciliter          PHYSICAL EXAM:  GENERAL: NAD, well-groomed, well-developed  HEAD:  Atraumatic, Normocephalic  CHEST/LUNG: Clear to percussion bilaterally; No rales, rhonchi, wheezing, or rubs  HEART: Regular rate and rhythm; No murmurs, rubs, or gallops  ABDOMEN: Soft, Nontender, Nondistended; Bowel sounds present  EXTREMITIES:  2+ Peripheral Pulses, No clubbing, cyanosis, or edema  LYMPH: No lymphadenopathy noted  SKIN: No rashes or lesions    Care Discussed with Consultants/Other Providers [ ] YES  [ ] NO

## 2019-09-09 LAB
ACE SERPL-CCNC: 24 U/L — SIGNIFICANT CHANGE UP (ref 14–82)
ANION GAP SERPL CALC-SCNC: 14 MMO/L — SIGNIFICANT CHANGE UP (ref 7–14)
ANION GAP SERPL CALC-SCNC: 14 MMO/L — SIGNIFICANT CHANGE UP (ref 7–14)
BUN SERPL-MCNC: 59 MG/DL — HIGH (ref 7–23)
BUN SERPL-MCNC: 61 MG/DL — HIGH (ref 7–23)
CALCIUM SERPL-MCNC: 10.6 MG/DL — HIGH (ref 8.4–10.5)
CALCIUM SERPL-MCNC: 11.3 MG/DL — HIGH (ref 8.4–10.5)
CHLORIDE SERPL-SCNC: 91 MMOL/L — LOW (ref 98–107)
CHLORIDE SERPL-SCNC: 92 MMOL/L — LOW (ref 98–107)
CO2 SERPL-SCNC: 20 MMOL/L — LOW (ref 22–31)
CO2 SERPL-SCNC: 21 MMOL/L — LOW (ref 22–31)
CORTIS SERPL-MCNC: 1.3 UG/DL — LOW (ref 2.7–18.4)
CREAT SERPL-MCNC: 1.94 MG/DL — HIGH (ref 0.5–1.3)
CREAT SERPL-MCNC: 2.38 MG/DL — HIGH (ref 0.5–1.3)
GLUCOSE SERPL-MCNC: 308 MG/DL — HIGH (ref 70–99)
GLUCOSE SERPL-MCNC: 342 MG/DL — HIGH (ref 70–99)
HCT VFR BLD CALC: 32.1 % — LOW (ref 39–50)
HGB BLD-MCNC: 10.6 G/DL — LOW (ref 13–17)
MAGNESIUM SERPL-MCNC: 2 MG/DL — SIGNIFICANT CHANGE UP (ref 1.6–2.6)
MAGNESIUM SERPL-MCNC: 2 MG/DL — SIGNIFICANT CHANGE UP (ref 1.6–2.6)
MCHC RBC-ENTMCNC: 29 PG — SIGNIFICANT CHANGE UP (ref 27–34)
MCHC RBC-ENTMCNC: 33 % — SIGNIFICANT CHANGE UP (ref 32–36)
MCV RBC AUTO: 87.9 FL — SIGNIFICANT CHANGE UP (ref 80–100)
NRBC # FLD: 0 K/UL — SIGNIFICANT CHANGE UP (ref 0–0)
PHOSPHATE SERPL-MCNC: 4.3 MG/DL — SIGNIFICANT CHANGE UP (ref 2.5–4.5)
PHOSPHATE SERPL-MCNC: 4.8 MG/DL — HIGH (ref 2.5–4.5)
PLATELET # BLD AUTO: 196 K/UL — SIGNIFICANT CHANGE UP (ref 150–400)
PMV BLD: 10.9 FL — SIGNIFICANT CHANGE UP (ref 7–13)
POTASSIUM SERPL-MCNC: 4.6 MMOL/L — SIGNIFICANT CHANGE UP (ref 3.5–5.3)
POTASSIUM SERPL-MCNC: 4.8 MMOL/L — SIGNIFICANT CHANGE UP (ref 3.5–5.3)
POTASSIUM SERPL-SCNC: 4.6 MMOL/L — SIGNIFICANT CHANGE UP (ref 3.5–5.3)
POTASSIUM SERPL-SCNC: 4.8 MMOL/L — SIGNIFICANT CHANGE UP (ref 3.5–5.3)
RBC # BLD: 3.65 M/UL — LOW (ref 4.2–5.8)
RBC # FLD: 13.9 % — SIGNIFICANT CHANGE UP (ref 10.3–14.5)
SODIUM SERPL-SCNC: 126 MMOL/L — LOW (ref 135–145)
SODIUM SERPL-SCNC: 126 MMOL/L — LOW (ref 135–145)
TSH SERPL-MCNC: 5.55 UIU/ML — HIGH (ref 0.27–4.2)
WBC # BLD: 6.78 K/UL — SIGNIFICANT CHANGE UP (ref 3.8–10.5)
WBC # FLD AUTO: 6.78 K/UL — SIGNIFICANT CHANGE UP (ref 3.8–10.5)

## 2019-09-09 RX ORDER — SODIUM CHLORIDE 9 MG/ML
500 INJECTION INTRAMUSCULAR; INTRAVENOUS; SUBCUTANEOUS ONCE
Refills: 0 | Status: COMPLETED | OUTPATIENT
Start: 2019-09-09 | End: 2019-09-09

## 2019-09-09 RX ADMIN — Medication 5: at 12:59

## 2019-09-09 RX ADMIN — INSULIN GLARGINE 6 UNIT(S): 100 INJECTION, SOLUTION SUBCUTANEOUS at 21:35

## 2019-09-09 RX ADMIN — SIMVASTATIN 20 MILLIGRAM(S): 20 TABLET, FILM COATED ORAL at 21:35

## 2019-09-09 RX ADMIN — Medication 88 MICROGRAM(S): at 04:49

## 2019-09-09 RX ADMIN — Medication 100 MILLIGRAM(S): at 21:35

## 2019-09-09 RX ADMIN — Medication 4: at 09:09

## 2019-09-09 RX ADMIN — HEPARIN SODIUM 5000 UNIT(S): 5000 INJECTION INTRAVENOUS; SUBCUTANEOUS at 04:48

## 2019-09-09 RX ADMIN — FAMOTIDINE 10 MILLIGRAM(S): 10 INJECTION INTRAVENOUS at 09:09

## 2019-09-09 RX ADMIN — Medication 81 MILLIGRAM(S): at 09:09

## 2019-09-09 RX ADMIN — GABAPENTIN 400 MILLIGRAM(S): 400 CAPSULE ORAL at 21:35

## 2019-09-09 RX ADMIN — Medication 3: at 21:36

## 2019-09-09 RX ADMIN — SODIUM CHLORIDE 500 MILLILITER(S): 9 INJECTION INTRAMUSCULAR; INTRAVENOUS; SUBCUTANEOUS at 18:05

## 2019-09-09 RX ADMIN — CLOPIDOGREL BISULFATE 75 MILLIGRAM(S): 75 TABLET, FILM COATED ORAL at 09:09

## 2019-09-09 RX ADMIN — Medication 5 MILLIGRAM(S): at 16:58

## 2019-09-09 RX ADMIN — SENNA PLUS 2 TABLET(S): 8.6 TABLET ORAL at 21:35

## 2019-09-09 RX ADMIN — HEPARIN SODIUM 5000 UNIT(S): 5000 INJECTION INTRAVENOUS; SUBCUTANEOUS at 21:35

## 2019-09-09 RX ADMIN — Medication 4: at 16:59

## 2019-09-09 RX ADMIN — Medication 10 MILLIGRAM(S): at 04:48

## 2019-09-09 RX ADMIN — FLUDROCORTISONE ACETATE 0.1 MILLIGRAM(S): 0.1 TABLET ORAL at 04:49

## 2019-09-09 NOTE — PROGRESS NOTE ADULT - SUBJECTIVE AND OBJECTIVE BOX
Patient seen and examined at bedside.    Overnight Events:   No overnight events.     REVIEW OF SYSTEMS:  Constitutional:     [ ] negative [ ] fevers [ ] chills [ ] weight loss [ ] weight gain  HEENT:                  [ ] negative [ ] dry eyes [ ] eye irritation [ ] postnasal drip [ ] nasal congestion  CV:                         [ ] negative  [ ] chest pain [ ] orthopnea [ ] palpitations [ ] murmur  Resp:                     [ ] negative [ ] cough [ ] shortness of breath [ ] dyspnea [ ] wheezing [ ] sputum [ ]hemoptysis  GI:                          [ ] negative [ ] nausea [ ] vomiting [ ] diarrhea [ ] constipation [ ] abd pain [ ] dysphagia   :                        [ ] negative [ ] dysuria [ ] nocturia [ ] hematuria [ ] increased urinary frequency  Musculoskeletal: [ ] negative [ ] back pain [ ] myalgias [ ] arthralgias [ ] fracture  Skin:                       [ ] negative [ ] rash [ ] itch  Neurological:        [ ] negative [ ] headache [ ] dizziness [ ] syncope [ ] weakness [ ] numbness  Psychiatric:           [ ] negative [ ] anxiety [ ] depression  Endocrine:            [ ] negative [ ] diabetes [ ] thyroid problem  Heme/Lymph:      [ ] negative [ ] anemia [ ] bleeding problem  Allergic/Immune: [ ] negative [ ] itchy eyes [ ] nasal discharge [ ] hives [ ] angioedema    [ ] All other systems negative  [ ] Unable to assess ROS due to    Current Meds:  aspirin enteric coated 81 milliGRAM(s) Oral daily  clopidogrel Tablet 75 milliGRAM(s) Oral daily  dextrose 40% Gel 15 Gram(s) Oral once PRN  dextrose 5%. 1000 milliLiter(s) IV Continuous <Continuous>  dextrose 50% Injectable 12.5 Gram(s) IV Push once  dextrose 50% Injectable 25 Gram(s) IV Push once  dextrose 50% Injectable 25 Gram(s) IV Push once  docusate sodium 100 milliGRAM(s) Oral three times a day  famotidine    Tablet 10 milliGRAM(s) Oral daily  fludroCORTISONE 0.1 milliGRAM(s) Oral daily  gabapentin 400 milliGRAM(s) Oral at bedtime  glucagon  Injectable 1 milliGRAM(s) IntraMuscular once PRN  heparin  Injectable 5000 Unit(s) SubCutaneous every 8 hours  insulin glargine Injectable (LANTUS) 6 Unit(s) SubCutaneous at bedtime  insulin lispro (HumaLOG) corrective regimen sliding scale   SubCutaneous at bedtime  insulin lispro (HumaLOG) corrective regimen sliding scale   SubCutaneous three times a day before meals  levothyroxine 88 MICROGram(s) Oral daily  polyethylene glycol 3350 17 Gram(s) Oral daily  predniSONE   Tablet 10 milliGRAM(s) Oral daily  predniSONE   Tablet 5 milliGRAM(s) Oral daily  senna 2 Tablet(s) Oral at bedtime  simvastatin 20 milliGRAM(s) Oral at bedtime      PAST MEDICAL & SURGICAL HISTORY:  CAD (coronary artery disease)  Adult hypothyroidism  Hyperlipidemia  Peptic ulcer: s/p treatment for H pylori  Diabetes  Essential hypertension  S/P coronary artery stent placement: x4, last one in early 2018      Vitals:  T(F): 97.3 (09-09), Max: 98 (09-08)  HR: 95 (09-09) (90 - 99)  BP: 109/58 (09-09) (108/60 - 128/70)  RR: 15 (09-09)  SpO2: 100% (09-09)  I&O's Summary    08 Sep 2019 07:01  -  09 Sep 2019 07:00  --------------------------------------------------------  IN: 320 mL / OUT: 1450 mL / NET: -1130 mL        Physical Exam:  Appearance: No acute distress; well appearing  Eyes: PERRL, EOMI, pink conjunctiva  HENT: Normal oral mucosa  Cardiovascular: RRR, S1, S2, no murmurs, rubs, or gallops; no edema; no JVD  Respiratory: Clear to auscultation bilaterally  Gastrointestinal: soft, non-tender, non-distended with normal bowel sounds  Musculoskeletal: No clubbing; no joint deformity   Neurologic: Non-focal  Lymphatic: No lymphadenopathy  Psychiatry: AAOx3, mood & affect appropriate  Skin: No rashes, ecchymoses, or cyanosis                          10.6   6.78  )-----------( 196      ( 09 Sep 2019 03:20 )             32.1     09-09    126<L>  |  92<L>  |  59<H>  ----------------------------<  342<H>  4.8   |  20<L>  |  1.94<H>    Ca    10.6<H>      09 Sep 2019 03:20  Phos  4.3     09-09  Mg     2.0     09-09                    New ECG(s): Personally reviewed    Echo:    Stress Testing:     Cath:    Imaging:    Interpretation of Telemetry:

## 2019-09-09 NOTE — PROGRESS NOTE ADULT - SUBJECTIVE AND OBJECTIVE BOX
Patient is a 79y old  Male who presents with a chief complaint of Hypotension (08 Sep 2019 15:36)      INTERVAL HPI/OVERNIGHT EVENTS:  T(C): 36.7 (09-09-19 @ 16:50), Max: 36.7 (09-08-19 @ 21:00)  HR: 96 (09-09-19 @ 16:50) (83 - 98)  BP: 89/49 (09-09-19 @ 16:50) (89/49 - 128/70)  RR: 15 (09-09-19 @ 16:50) (15 - 16)  SpO2: 96% (09-09-19 @ 16:50) (96% - 100%)  Wt(kg): --  I&O's Summary    08 Sep 2019 07:01  -  09 Sep 2019 07:00  --------------------------------------------------------  IN: 320 mL / OUT: 1450 mL / NET: -1130 mL    09 Sep 2019 07:01  -  09 Sep 2019 19:21  --------------------------------------------------------  IN: 0 mL / OUT: 300 mL / NET: -300 mL        LABS:                        10.6   6.78  )-----------( 196      ( 09 Sep 2019 03:20 )             32.1     09-09    126<L>  |  91<L>  |  61<H>  ----------------------------<  308<H>  4.6   |  21<L>  |  2.38<H>    Ca    11.3<H>      09 Sep 2019 17:30  Phos  4.8     09-09  Mg     2.0     09-09          CAPILLARY BLOOD GLUCOSE      POCT Blood Glucose.: 327 mg/dL (09 Sep 2019 16:55)  POCT Blood Glucose.: 375 mg/dL (09 Sep 2019 12:37)  POCT Blood Glucose.: 316 mg/dL (09 Sep 2019 08:56)  POCT Blood Glucose.: 301 mg/dL (08 Sep 2019 21:28)            MEDICATIONS  (STANDING):  aspirin enteric coated 81 milliGRAM(s) Oral daily  clopidogrel Tablet 75 milliGRAM(s) Oral daily  dextrose 5%. 1000 milliLiter(s) (50 mL/Hr) IV Continuous <Continuous>  dextrose 50% Injectable 12.5 Gram(s) IV Push once  dextrose 50% Injectable 25 Gram(s) IV Push once  dextrose 50% Injectable 25 Gram(s) IV Push once  docusate sodium 100 milliGRAM(s) Oral three times a day  famotidine    Tablet 10 milliGRAM(s) Oral daily  fludroCORTISONE 0.1 milliGRAM(s) Oral daily  gabapentin 400 milliGRAM(s) Oral at bedtime  heparin  Injectable 5000 Unit(s) SubCutaneous every 8 hours  insulin glargine Injectable (LANTUS) 6 Unit(s) SubCutaneous at bedtime  insulin lispro (HumaLOG) corrective regimen sliding scale   SubCutaneous at bedtime  insulin lispro (HumaLOG) corrective regimen sliding scale   SubCutaneous three times a day before meals  levothyroxine 88 MICROGram(s) Oral daily  polyethylene glycol 3350 17 Gram(s) Oral daily  predniSONE   Tablet 10 milliGRAM(s) Oral daily  predniSONE   Tablet 5 milliGRAM(s) Oral daily  senna 2 Tablet(s) Oral at bedtime  simvastatin 20 milliGRAM(s) Oral at bedtime    MEDICATIONS  (PRN):  dextrose 40% Gel 15 Gram(s) Oral once PRN Blood Glucose LESS THAN 70 milliGRAM(s)/deciliter  glucagon  Injectable 1 milliGRAM(s) IntraMuscular once PRN Glucose LESS THAN 70 milligrams/deciliter          PHYSICAL EXAM:  GENERAL: NAD, well-groomed, well-developed  HEAD:  Atraumatic, Normocephalic  CHEST/LUNG: Clear to percussion bilaterally; No rales, rhonchi, wheezing, or rubs  HEART: Regular rate and rhythm; No murmurs, rubs, or gallops  ABDOMEN: Soft, Nontender, Nondistended; Bowel sounds present  EXTREMITIES:  2+ Peripheral Pulses, No clubbing, cyanosis, or edema  LYMPH: No lymphadenopathy noted  SKIN: No rashes or lesions    Care Discussed with Consultants/Other Providers [ ] YES  [ ] NO

## 2019-09-09 NOTE — PROGRESS NOTE ADULT - SUBJECTIVE AND OBJECTIVE BOX
[facility] [place] [facility]   MD UYEN HERNANDEZ, DO MARYANN REYNOSO, LEATHA NICOLE    TEL:  OFFICE: [phone]  DR CASAREZ CELL: [phone]  DR. HERNANDEZ CELL: [phone]  DR. REYNOSO CELL: [phone]  CHUCKIE TERRAZAS CELL: [phone]        Patient is a 79y old  Male who presents with a chief complaint of Hypotension (08 Sep 2019 15:36)      Patient seen and examined at bedside. No chest pain/sob    VITALS:  T(F): 98 (09-09-19 @ 16:50), Max: 98 (09-08-19 @ 21:00)  HR: 96 (09-09-19 @ 16:50)  BP: 89/49 (09-09-19 @ 16:50)  RR: 15 (09-09-19 @ 16:50)  SpO2: 96% (09-09-19 @ 16:50)  Wt(kg): --    09-08 @ 07:01  -  09-09 @ 07:00  --------------------------------------------------------  IN: 320 mL / OUT: 1450 mL / NET: -1130 mL    09-09 @ 07:01  -  09-09 @ 17:47  --------------------------------------------------------  IN: 0 mL / OUT: 300 mL / NET: -300 mL          PHYSICAL EXAM:  Constitutional: NAD  Neck: No JVD  Respiratory: CTAB, no wheezes, rales or rhonchi  Cardiovascular: S1, S2, RRR  Gastrointestinal: BS+, soft, NT/ND  Extremities: No peripheral edema    Hospital Medications:   MEDICATIONS  (STANDING):  aspirin enteric coated 81 milliGRAM(s) Oral daily  clopidogrel Tablet 75 milliGRAM(s) Oral daily  dextrose 5%. 1000 milliLiter(s) (50 mL/Hr) IV Continuous <Continuous>  dextrose 50% Injectable 12.5 Gram(s) IV Push once  dextrose 50% Injectable 25 Gram(s) IV Push once  dextrose 50% Injectable 25 Gram(s) IV Push once  docusate sodium 100 milliGRAM(s) Oral three times a day  famotidine    Tablet 10 milliGRAM(s) Oral daily  fludroCORTISONE 0.1 milliGRAM(s) Oral daily  gabapentin 400 milliGRAM(s) Oral at bedtime  heparin  Injectable 5000 Unit(s) SubCutaneous every 8 hours  insulin glargine Injectable (LANTUS) 6 Unit(s) SubCutaneous at bedtime  insulin lispro (HumaLOG) corrective regimen sliding scale   SubCutaneous at bedtime  insulin lispro (HumaLOG) corrective regimen sliding scale   SubCutaneous three times a day before meals  levothyroxine 88 MICROGram(s) Oral daily  polyethylene glycol 3350 17 Gram(s) Oral daily  predniSONE   Tablet 10 milliGRAM(s) Oral daily  predniSONE   Tablet 5 milliGRAM(s) Oral daily  senna 2 Tablet(s) Oral at bedtime  simvastatin 20 milliGRAM(s) Oral at bedtime      LABS:  09-09    126<L>  |  92<L>  |  59<H>  ----------------------------<  342<H>  4.8   |  20<L>  |  1.94<H>    Ca    10.6<H>      09 Sep 2019 03:20  Phos  4.3     09-09  Mg     2.0     09-09      Creatinine Trend: 1.94 <--, 2.14 <--, 2.15 <--, 2.32 <--, 2.64 <--, 2.10 <--, 1.83 <--, 2.00 <--, 2.06 <--    Phosphorus Level, Serum: 4.3 mg/dL (09-09 @ 03:20)                              10.6   6.78  )-----------( 196      ( 09 Sep 2019 03:20 )             32.1     Urine Studies:  Urinalysis - [09-06-19 @ 17:55]      Color LIGHT YELLOW / Appearance CLEAR / SG 1.012 / pH 6.0      Gluc 500 / Ketone NEGATIVE  / Bili NEGATIVE / Urobili NORMAL       Blood NEGATIVE / Protein NEGATIVE / Leuk Est NEGATIVE / Nitrite NEGATIVE      RBC  / WBC  / Hyaline  / Gran  / Sq Epi  / Non Sq Epi  / Bacteria       PTH 20.00 (Ca --)      [09-03-19 @ 05:45]   --  PTH 25.46 (Ca --)      [09-01-19 @ 06:00]   --  Vitamin D (25OH) 34.9      [09-03-19 @ 05:45]  HbA1c 6.1      [08-31-19 @ 07:00]  TSH 5.55      [09-09-19 @ 03:20]  Lipid: chol 121, TG 96, HDL 33, LDL 74      [08-31-19 @ 07:00]      ANCA: cANCA Negative, pANCA Negative, atypical ANCA --      [09-03-19 @ 05:45]  Free Light Chains: kappa 4.00, lambda 3.03, ratio = 1.32      [09-03 @ 05:45]    RADIOLOGY & ADDITIONAL STUDIES:

## 2019-09-09 NOTE — PROGRESS NOTE ADULT - ASSESSMENT
79 year old male with a history of CAD, DM2, HTN, and HLD presents with constipation and possible syncope    Hypotension  Bp still soft  no evidence of infection at this time  awaiting blood, urine cultures and echo  UA without evidence of blood or protein  Check x ray clear   Am cortisol low on prednisone now  Pt started on florinef by cardiology. Monitor serum K   monitor BP closely    CKD (chronic kidney disease), stage IV    Baseline ~ 2.2-2.5.   Currently stable   UA without protein or blood   avoid nephrotoxic agents    LE edema  likely 2/2 volume overload from non-adherence  holding lasix due to hypotension  better now      hyponatremia  work up suggests polydipsia  non-adherent with fluid restriction  TSH normal and cortisol low  monitor bmp Q 12 until Na >130  avoid hypotonic solutions    Hypercalcemia  PTH 20, Vit D 1,25 OH is high but vit D 25-OH is normal  K/L ratio nml  d/c vit d   mild proteinuria. vasculitis w/u in office neg except +ANCA  was referred for rheum eval in office but unsure if patient followed up  repeat anca here neg on 9/3  lasix will help with hypercalcemia as well  check ACE level  check QuantiFeron     Uncontrolled DM  recommend endocrine eval    proteinuria  UA without protein or blood   check urine p/c ratio 79 year old male with a history of CAD, DM2, HTN, and HLD presents with constipation and possible syncope    Hypotension  Bp still soft  no evidence of infection at this time  awaiting blood, urine cultures and echo  UA without evidence of blood or protein  Check x ray clear   Am cortisol low on prednisone now  Pt started on florinef by cardiology. Monitor serum K   monitor BP closely    CKD (chronic kidney disease), stage IV    Baseline ~ 2.2-2.5.   Currently stable   UA without protein or blood   avoid nephrotoxic agents    LE edema  likely 2/2 volume overload from non-adherence  holding lasix due to hypotension  better now      hyponatremia  work up suggests polydipsia  non-adherent with fluid restriction  TSH normal and cortisol low  monitor bmp Q 12 until Na >130  avoid hypotonic solutions  holding lasix 2/2 hypotension    Hypercalcemia  PTH 20, Vit D 1,25 OH is high but vit D 25-OH is normal  K/L ratio nml  d/c vit d   mild proteinuria. vasculitis w/u in office neg except +ANCA  was referred for rheum eval in office but unsure if patient followed up  repeat anca here neg on 9/3  lasix will help with hypercalcemia as well  check ACE level  check QuantiFeron     Uncontrolled DM  recommend endocrine eval    proteinuria  UA without protein or blood   check urine p/c ratio

## 2019-09-09 NOTE — PROGRESS NOTE ADULT - ASSESSMENT
80 YO M hx of CKD, presented with ?syncope. 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 Given lasix for peripheral edema and started on BB to increase filling time followed by hypotension. Orthostatics positive. Started on fludricortisone.     Syncope likely multifactorial  Pt. is extremely preload dependant from LVOT obstruction accompanied by autonomic disability.   Pt. improving.   Care per primary team  Please call cardiology with any other questions.       Jasiel Mancini  Fellow  [phone]

## 2019-09-10 LAB
ANION GAP SERPL CALC-SCNC: 12 MMO/L — SIGNIFICANT CHANGE UP (ref 7–14)
BUN SERPL-MCNC: 55 MG/DL — HIGH (ref 7–23)
CALCIUM SERPL-MCNC: 11.1 MG/DL — HIGH (ref 8.4–10.5)
CHLORIDE SERPL-SCNC: 91 MMOL/L — LOW (ref 98–107)
CO2 SERPL-SCNC: 22 MMOL/L — SIGNIFICANT CHANGE UP (ref 22–31)
CREAT ?TM UR-MCNC: 28.4 MG/DL — SIGNIFICANT CHANGE UP
CREAT SERPL-MCNC: 1.99 MG/DL — HIGH (ref 0.5–1.3)
GLUCOSE SERPL-MCNC: 387 MG/DL — HIGH (ref 70–99)
HCT VFR BLD CALC: 31.5 % — LOW (ref 39–50)
HGB BLD-MCNC: 10.4 G/DL — LOW (ref 13–17)
MAGNESIUM SERPL-MCNC: 2 MG/DL — SIGNIFICANT CHANGE UP (ref 1.6–2.6)
MCHC RBC-ENTMCNC: 28.9 PG — SIGNIFICANT CHANGE UP (ref 27–34)
MCHC RBC-ENTMCNC: 33 % — SIGNIFICANT CHANGE UP (ref 32–36)
MCV RBC AUTO: 87.5 FL — SIGNIFICANT CHANGE UP (ref 80–100)
NRBC # FLD: 0.04 K/UL — SIGNIFICANT CHANGE UP (ref 0–0)
PHOSPHATE SERPL-MCNC: 4.4 MG/DL — SIGNIFICANT CHANGE UP (ref 2.5–4.5)
PLATELET # BLD AUTO: 225 K/UL — SIGNIFICANT CHANGE UP (ref 150–400)
PMV BLD: 11.4 FL — SIGNIFICANT CHANGE UP (ref 7–13)
POTASSIUM SERPL-MCNC: 4.8 MMOL/L — SIGNIFICANT CHANGE UP (ref 3.5–5.3)
POTASSIUM SERPL-SCNC: 4.8 MMOL/L — SIGNIFICANT CHANGE UP (ref 3.5–5.3)
PROT UR-MCNC: 4.5 MG/DL — SIGNIFICANT CHANGE UP
RBC # BLD: 3.6 M/UL — LOW (ref 4.2–5.8)
RBC # FLD: 14 % — SIGNIFICANT CHANGE UP (ref 10.3–14.5)
SODIUM SERPL-SCNC: 125 MMOL/L — LOW (ref 135–145)
WBC # BLD: 7.47 K/UL — SIGNIFICANT CHANGE UP (ref 3.8–10.5)
WBC # FLD AUTO: 7.47 K/UL — SIGNIFICANT CHANGE UP (ref 3.8–10.5)

## 2019-09-10 PROCEDURE — 93010 ELECTROCARDIOGRAM REPORT: CPT

## 2019-09-10 PROCEDURE — 74176 CT ABD & PELVIS W/O CONTRAST: CPT | Mod: 26

## 2019-09-10 PROCEDURE — 71250 CT THORAX DX C-: CPT | Mod: 26

## 2019-09-10 RX ORDER — METOPROLOL TARTRATE 50 MG
12.5 TABLET ORAL ONCE
Refills: 0 | Status: COMPLETED | OUTPATIENT
Start: 2019-09-10 | End: 2019-09-11

## 2019-09-10 RX ORDER — OLANZAPINE 15 MG/1
5 TABLET, FILM COATED ORAL ONCE
Refills: 0 | Status: COMPLETED | OUTPATIENT
Start: 2019-09-10 | End: 2019-09-10

## 2019-09-10 RX ORDER — SODIUM CHLORIDE 9 MG/ML
1000 INJECTION INTRAMUSCULAR; INTRAVENOUS; SUBCUTANEOUS ONCE
Refills: 0 | Status: COMPLETED | OUTPATIENT
Start: 2019-09-10 | End: 2019-09-10

## 2019-09-10 RX ADMIN — SIMVASTATIN 20 MILLIGRAM(S): 20 TABLET, FILM COATED ORAL at 21:18

## 2019-09-10 RX ADMIN — SODIUM CHLORIDE 83.33 MILLILITER(S): 9 INJECTION INTRAMUSCULAR; INTRAVENOUS; SUBCUTANEOUS at 16:16

## 2019-09-10 RX ADMIN — HEPARIN SODIUM 5000 UNIT(S): 5000 INJECTION INTRAVENOUS; SUBCUTANEOUS at 12:44

## 2019-09-10 RX ADMIN — Medication 5: at 17:19

## 2019-09-10 RX ADMIN — Medication 88 MICROGRAM(S): at 05:01

## 2019-09-10 RX ADMIN — Medication 81 MILLIGRAM(S): at 12:44

## 2019-09-10 RX ADMIN — Medication 5 MILLIGRAM(S): at 21:18

## 2019-09-10 RX ADMIN — INSULIN GLARGINE 6 UNIT(S): 100 INJECTION, SOLUTION SUBCUTANEOUS at 21:19

## 2019-09-10 RX ADMIN — Medication 10 MILLIGRAM(S): at 05:00

## 2019-09-10 RX ADMIN — Medication 4: at 12:45

## 2019-09-10 RX ADMIN — GABAPENTIN 400 MILLIGRAM(S): 400 CAPSULE ORAL at 21:18

## 2019-09-10 RX ADMIN — FAMOTIDINE 10 MILLIGRAM(S): 10 INJECTION INTRAVENOUS at 12:43

## 2019-09-10 RX ADMIN — FLUDROCORTISONE ACETATE 0.1 MILLIGRAM(S): 0.1 TABLET ORAL at 05:00

## 2019-09-10 RX ADMIN — HEPARIN SODIUM 5000 UNIT(S): 5000 INJECTION INTRAVENOUS; SUBCUTANEOUS at 05:00

## 2019-09-10 RX ADMIN — Medication 2: at 08:56

## 2019-09-10 RX ADMIN — CLOPIDOGREL BISULFATE 75 MILLIGRAM(S): 75 TABLET, FILM COATED ORAL at 12:44

## 2019-09-10 RX ADMIN — Medication 1: at 21:19

## 2019-09-10 RX ADMIN — OLANZAPINE 5 MILLIGRAM(S): 15 TABLET, FILM COATED ORAL at 21:28

## 2019-09-10 RX ADMIN — Medication 100 MILLIGRAM(S): at 04:59

## 2019-09-10 NOTE — PROGRESS NOTE ADULT - SUBJECTIVE AND OBJECTIVE BOX
Patient is a 79y old  Male who presents with a chief complaint of Hypotension (08 Sep 2019 15:36)      INTERVAL HPI/OVERNIGHT EVENTS:  T(C): 36.7 (09-10-19 @ 12:25), Max: 36.7 (09-10-19 @ 00:25)  HR: 95 (09-10-19 @ 12:25) (70 - 98)  BP: 113/72 (09-10-19 @ 12:25) (92/56 - 125/60)  RR: 16 (09-10-19 @ 12:25) (14 - 16)  SpO2: 97% (09-10-19 @ 12:25) (97% - 100%)  Wt(kg): --  I&O's Summary    09 Sep 2019 07:01  -  10 Sep 2019 07:00  --------------------------------------------------------  IN: 120 mL / OUT: 1850 mL / NET: -1730 mL    10 Sep 2019 07:01  -  10 Sep 2019 18:02  --------------------------------------------------------  IN: 0 mL / OUT: 600 mL / NET: -600 mL        LABS:                        10.4   7.47  )-----------( 225      ( 10 Sep 2019 11:55 )             31.5     09-10    125<L>  |  91<L>  |  55<H>  ----------------------------<  387<H>  4.8   |  22  |  1.99<H>    Ca    11.1<H>      10 Sep 2019 11:55  Phos  4.4     09-10  Mg     2.0     09-10          CAPILLARY BLOOD GLUCOSE      POCT Blood Glucose.: 354 mg/dL (10 Sep 2019 17:07)  POCT Blood Glucose.: 349 mg/dL (10 Sep 2019 12:21)  POCT Blood Glucose.: 247 mg/dL (10 Sep 2019 08:45)  POCT Blood Glucose.: 353 mg/dL (09 Sep 2019 21:29)            MEDICATIONS  (STANDING):  aspirin enteric coated 81 milliGRAM(s) Oral daily  clopidogrel Tablet 75 milliGRAM(s) Oral daily  dextrose 5%. 1000 milliLiter(s) (50 mL/Hr) IV Continuous <Continuous>  dextrose 50% Injectable 12.5 Gram(s) IV Push once  dextrose 50% Injectable 25 Gram(s) IV Push once  dextrose 50% Injectable 25 Gram(s) IV Push once  docusate sodium 100 milliGRAM(s) Oral three times a day  famotidine    Tablet 10 milliGRAM(s) Oral daily  fludroCORTISONE 0.1 milliGRAM(s) Oral daily  gabapentin 400 milliGRAM(s) Oral at bedtime  heparin  Injectable 5000 Unit(s) SubCutaneous every 8 hours  insulin glargine Injectable (LANTUS) 6 Unit(s) SubCutaneous at bedtime  insulin lispro (HumaLOG) corrective regimen sliding scale   SubCutaneous at bedtime  insulin lispro (HumaLOG) corrective regimen sliding scale   SubCutaneous three times a day before meals  levothyroxine 88 MICROGram(s) Oral daily  polyethylene glycol 3350 17 Gram(s) Oral daily  predniSONE   Tablet 10 milliGRAM(s) Oral daily  predniSONE   Tablet 5 milliGRAM(s) Oral daily  senna 2 Tablet(s) Oral at bedtime  simvastatin 20 milliGRAM(s) Oral at bedtime    MEDICATIONS  (PRN):  dextrose 40% Gel 15 Gram(s) Oral once PRN Blood Glucose LESS THAN 70 milliGRAM(s)/deciliter  glucagon  Injectable 1 milliGRAM(s) IntraMuscular once PRN Glucose LESS THAN 70 milligrams/deciliter          PHYSICAL EXAM:  GENERAL: NAD, well-groomed, well-developed  HEAD:  Atraumatic, Normocephalic  CHEST/LUNG: Clear to percussion bilaterally; No rales, rhonchi, wheezing, or rubs  HEART: Regular rate and rhythm; No murmurs, rubs, or gallops  ABDOMEN: Soft, Nontender, Nondistended; Bowel sounds present  EXTREMITIES:  2+ Peripheral Pulses, No clubbing, cyanosis, or edema  LYMPH: No lymphadenopathy noted  SKIN: No rashes or lesions    Care Discussed with Consultants/Other Providers [ ] YES  [ ] NO

## 2019-09-10 NOTE — PROVIDER CONTACT NOTE (MEDICATION) - SITUATION
pt stated he was looking for something under his bed. and then pt went unresponsive. pt found unresponsive and placed into bed where he woke up

## 2019-09-10 NOTE — PROGRESS NOTE ADULT - SUBJECTIVE AND OBJECTIVE BOX
[facility] [place] [facility]   MD UYEN HERNANDEZ, DO MARYANN REYNOSO, LEATHA NICOLE    TEL:  OFFICE: [phone]  DR CASAREZ CELL: [phone]  DR. HERNANDEZ CELL: [phone]  DR. REYNOSO CELL: [phone]  CHUCKIE TERRAZAS CELL: [phone]        Patient is a 79y old  Male who presents with a chief complaint of Hypotension (08 Sep 2019 15:36)      Patient seen and examined at bedside. No chest pain/sob    VITALS:  T(F): 98.1 (09-10-19 @ 12:25), Max: 98.1 (09-10-19 @ 12:25)  HR: 95 (09-10-19 @ 12:25)  BP: 113/72 (09-10-19 @ 12:25)  RR: 16 (09-10-19 @ 12:25)  SpO2: 97% (09-10-19 @ 12:25)  Wt(kg): --    09-09 @ 07:01  -  09-10 @ 07:00  --------------------------------------------------------  IN: 120 mL / OUT: 1850 mL / NET: -1730 mL    09-10 @ 07:01  -  09-10 @ 15:36  --------------------------------------------------------  IN: 0 mL / OUT: 600 mL / NET: -600 mL          PHYSICAL EXAM:  Constitutional: NAD  Neck: No JVD  Respiratory: CTAB, no wheezes, rales or rhonchi  Cardiovascular: S1, S2, RRR  Gastrointestinal: BS+, soft, NT/ND  Extremities: No peripheral edema    Hospital Medications:   MEDICATIONS  (STANDING):  aspirin enteric coated 81 milliGRAM(s) Oral daily  clopidogrel Tablet 75 milliGRAM(s) Oral daily  dextrose 5%. 1000 milliLiter(s) (50 mL/Hr) IV Continuous <Continuous>  dextrose 50% Injectable 12.5 Gram(s) IV Push once  dextrose 50% Injectable 25 Gram(s) IV Push once  dextrose 50% Injectable 25 Gram(s) IV Push once  docusate sodium 100 milliGRAM(s) Oral three times a day  famotidine    Tablet 10 milliGRAM(s) Oral daily  fludroCORTISONE 0.1 milliGRAM(s) Oral daily  gabapentin 400 milliGRAM(s) Oral at bedtime  heparin  Injectable 5000 Unit(s) SubCutaneous every 8 hours  insulin glargine Injectable (LANTUS) 6 Unit(s) SubCutaneous at bedtime  insulin lispro (HumaLOG) corrective regimen sliding scale   SubCutaneous at bedtime  insulin lispro (HumaLOG) corrective regimen sliding scale   SubCutaneous three times a day before meals  levothyroxine 88 MICROGram(s) Oral daily  polyethylene glycol 3350 17 Gram(s) Oral daily  predniSONE   Tablet 10 milliGRAM(s) Oral daily  predniSONE   Tablet 5 milliGRAM(s) Oral daily  senna 2 Tablet(s) Oral at bedtime  simvastatin 20 milliGRAM(s) Oral at bedtime      LABS:  09-10    125<L>  |  91<L>  |  55<H>  ----------------------------<  387<H>  4.8   |  22  |  1.99<H>    Ca    11.1<H>      10 Sep 2019 11:55  Phos  4.4     09-10  Mg     2.0     09-10      Creatinine Trend: 1.99 <--, 2.38 <--, 1.94 <--, 2.14 <--, 2.15 <--, 2.32 <--, 2.64 <--, 2.10 <--, 1.83 <--, 2.00 <--    Phosphorus Level, Serum: 4.4 mg/dL (09-10 @ 11:55)  Phosphorus Level, Serum: 4.8 mg/dL (09-09 @ 17:30)                              10.4   7.47  )-----------( 225      ( 10 Sep 2019 11:55 )             31.5     Urine Studies:  Urinalysis - [09-06-19 @ 17:55]      Color LIGHT YELLOW / Appearance CLEAR / SG 1.012 / pH 6.0      Gluc 500 / Ketone NEGATIVE  / Bili NEGATIVE / Urobili NORMAL       Blood NEGATIVE / Protein NEGATIVE / Leuk Est NEGATIVE / Nitrite NEGATIVE      RBC  / WBC  / Hyaline  / Gran  / Sq Epi  / Non Sq Epi  / Bacteria       PTH 20.00 (Ca --)      [09-03-19 @ 05:45]   --  PTH 25.46 (Ca --)      [09-01-19 @ 06:00]   --  Vitamin D (25OH) 34.9      [09-03-19 @ 05:45]  HbA1c 6.1      [08-31-19 @ 07:00]  TSH 5.55      [09-09-19 @ 03:20]  Lipid: chol 121, TG 96, HDL 33, LDL 74      [08-31-19 @ 07:00]      ANCA: cANCA Negative, pANCA Negative, atypical ANCA --      [09-03-19 @ 05:45]  Free Light Chains: kappa 4.00, lambda 3.03, ratio = 1.32      [09-03 @ 05:45]    RADIOLOGY & ADDITIONAL STUDIES:

## 2019-09-10 NOTE — CHART NOTE - NSCHARTNOTEFT_GEN_A_CORE
Rapid response called for unresponsiveness. Patient was found down on the floor by nursing staff with loss of consciousness. During rapid patient was oriented and regained consciousness but became uncooperative. Patient has multiple rapid responses.    Vital Signs Last 24 Hrs  T(C): 36.7 (10 Sep 2019 20:26), Max: 36.7 (10 Sep 2019 00:25)  T(F): 98 (10 Sep 2019 20:26), Max: 98.1 (10 Sep 2019 12:25)  HR: 111 (10 Sep 2019 20:26) (70 - 111)  BP: 117/63 (10 Sep 2019 20:26) (92/56 - 125/60)  BP(mean): --  RR: 18 (10 Sep 2019 20:26) (15 - 18)  SpO2: 98% (10 Sep 2019 20:26) (97% - 100%)    Recommendations made by RRT.    -Continue IVF hydration. Patient has LVOT obstruction and needs to remain euvolemic to slightly hypervolemic in order to generate adequate cardiac output  -Continue Florinef -- consider uptitrating to 0.2 mg in morning  -consider introduction of BB for HR control to allow for adequate ventricular filling  -Psych c/s for recommendations regarding behavioral issues and aggression.    Will continue to monitor closely. Rapid response called for unresponsiveness. Patient was found down on the floor by nursing staff with loss of consciousness. During rapid patient was oriented and regained consciousness but became uncooperative. Patient has multiple rapid responses.    Vital Signs Last 24 Hrs  T(C): 36.7 (10 Sep 2019 20:26), Max: 36.7 (10 Sep 2019 00:25)  T(F): 98 (10 Sep 2019 20:26), Max: 98.1 (10 Sep 2019 12:25)  HR: 111 (10 Sep 2019 20:26) (70 - 111)  BP: 117/63 (10 Sep 2019 20:26) (92/56 - 125/60)  BP(mean): --  RR: 18 (10 Sep 2019 20:26) (15 - 18)  SpO2: 98% (10 Sep 2019 20:26) (97% - 100%)    Recommendations made by RRT.    -Continue IVF hydration. Patient has LVOT obstruction and needs to remain euvolemic to slightly hypervolemic in order to generate adequate cardiac output  -Continue Florinef -- consider uptitrating to 0.2 mg in morning  -consider introduction of BB for HR control to allow for adequate ventricular filling  -Psych c/s for recommendations regarding behavioral issues and aggression.    Metoprolol 12.5 mg started. Zyprexa 5 mg ordered. Will continue to monitor closely.

## 2019-09-10 NOTE — RAPID RESPONSE TEAM SUMMARY - NSSITUATIONBACKGROUNDRRT_GEN_ALL_CORE
79 year old man with a history of CAD, DM2, HTN, HLD, Hypothyroidism, PUD in 2014 presented with constipation and syncope with collapse, admitted to telemetry for syncope workup.
79 year old man with a history of CAD, DM2, HTN, HLD, Hypothyroidism, PUD in 2014 presented with constipation and syncope with collapse, admitted to telemetry for syncope workup.  RN concerned over hypotension 70s/40 and not responding to painful stimuli.
80 YO M hx of CKD, CAD, DM2, HTN, and HLD who was found down at home due to possible syncopal episode. 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 Orthostatics positive.     Patient was found down on the floor by nursing staff with LOC. There was no post-ictal episode and patient was oriented after he regained consciousness, but he became combative and uncooperative with exam. Telemetry demonstrated sinus tachycardia with HR low 100s. Of note, multiple RRTs have been called on patient for the same reason. Vitals: afebrile, HR 90s, SBP 150s. Pertinent exam findings: coarse breath sounds on lung exam.     Recommendations:   -Continue IVF hydration. Patient has LVOT obstruction and needs to remain euvolemic to slightly hypervolemic in order to generate adequate cardiac output  -Continue Florinef -- consider uptitrating to 0.2 mg in morning  -consider introduction of BB for HR control to allow for adequate ventricular filling  -Psych c/s for recommendations regarding behavioral issues and aggression

## 2019-09-10 NOTE — PROGRESS NOTE ADULT - ASSESSMENT
79 year old male with a history of CAD, DM2, HTN, and HLD presents with constipation and possible syncope    Hypotension  was hypotensive to 80s yesterday  given NS bouls   start NS @ 75cc/hr x 12 hrs today, monitor fluid status closely. lasix 40mg po x 1 if fluid overloaded   no evidence of infection at this time  UA without evidence of blood or protein  Check x ray clear   Am cortisol low on prednisone now  Pt started on florinef by cardiology. Monitor serum K   monitor BP closely    CKD (chronic kidney disease), stage IV    Baseline ~ 2.2-2.5.   Currently stable   UA without protein or blood   avoid nephrotoxic agents    LE edema  likely 2/2 volume overload from non-adherence  getting NS for hypotension   monitor fluid status   better now      hyponatremia  work up suggests polydipsia and hyperglycemia  non-adherent with fluid restriction  corrected Na is 130  please optimize DM control   TSH normal and cortisol low  monitor bmp Q 12 until Na >130  avoid hypotonic solutions    Hypercalcemia  PTH 20, Vit D 1,25 OH is high but vit D 25-OH is normal  K/L ratio nml  d/c vit d   mild proteinuria. vasculitis w/u in office neg except +ANCA  was referred for rheum eval in office but unsure if patient followed up  repeat anca here neg on 9/3  lasix will help with hypercalcemia as well   ACE level ok  pending QuantiFeron     calcium persistently high, will need CT abd/plevic/chest with PO contrast only. would avoid IV contrast at this time  plan D/w team    Uncontrolled DM  recommend endocrine eval    proteinuria  UA without protein or blood   check urine p/c ratio 79 year old male with a history of CAD, DM2, HTN, and HLD presents with constipation and possible syncope    Hypotension  was hypotensive to 80s yesterday  given NS bouls   start NS @ 75cc/hr x 12 hrs today, monitor fluid status closely. lasix 40mg po x 1 if fluid overloaded   no evidence of infection at this time  UA without evidence of blood or protein  Check x ray clear   Am cortisol low on prednisone now  Pt on florinef by cardiology. Monitor serum K   monitor BP closely    CKD (chronic kidney disease), stage IV    Baseline ~ 2.2-2.5.   Currently stable   UA without protein or blood   avoid nephrotoxic agents    LE edema  likely 2/2 volume overload from non-adherence  getting NS for hypotension   monitor fluid status   better now      hyponatremia  work up suggests polydipsia and hyperglycemia  non-adherent with fluid restriction  corrected Na is 130  please optimize DM control   TSH normal and cortisol low  monitor bmp Q 12 until Na >130  avoid hypotonic solutions    Hypercalcemia  PTH 20, Vit D 1,25 OH is high but vit D 25-OH is normal  K/L ratio nml  d/c vit d   mild proteinuria. vasculitis w/u in office neg except +ANCA  was referred for rheum eval in office but unsure if patient followed up  repeat anca here neg on 9/3  lasix will help with hypercalcemia as well   ACE level ok  pending QuantiFeron     calcium persistently high, will need CT abd/plevic/chest with PO contrast only. would avoid IV contrast at this time  plan D/w team    Uncontrolled DM  recommend endocrine eval    proteinuria  UA without protein or blood   check urine p/c ratio

## 2019-09-11 DIAGNOSIS — F43.20 ADJUSTMENT DISORDER, UNSPECIFIED: ICD-10-CM

## 2019-09-11 LAB
ANION GAP SERPL CALC-SCNC: 13 MMO/L — SIGNIFICANT CHANGE UP (ref 7–14)
ANION GAP SERPL CALC-SCNC: 19 MMO/L — HIGH (ref 7–14)
BACTERIA BLD CULT: SIGNIFICANT CHANGE UP
BACTERIA BLD CULT: SIGNIFICANT CHANGE UP
BUN SERPL-MCNC: 50 MG/DL — HIGH (ref 7–23)
BUN SERPL-MCNC: 52 MG/DL — HIGH (ref 7–23)
CALCIUM SERPL-MCNC: 11.4 MG/DL — HIGH (ref 8.4–10.5)
CALCIUM SERPL-MCNC: 11.4 MG/DL — HIGH (ref 8.4–10.5)
CHLORIDE SERPL-SCNC: 91 MMOL/L — LOW (ref 98–107)
CHLORIDE SERPL-SCNC: 95 MMOL/L — LOW (ref 98–107)
CO2 SERPL-SCNC: 18 MMOL/L — LOW (ref 22–31)
CO2 SERPL-SCNC: 18 MMOL/L — LOW (ref 22–31)
CREAT SERPL-MCNC: 2.06 MG/DL — HIGH (ref 0.5–1.3)
CREAT SERPL-MCNC: 2.18 MG/DL — HIGH (ref 0.5–1.3)
GLUCOSE SERPL-MCNC: 277 MG/DL — HIGH (ref 70–99)
GLUCOSE SERPL-MCNC: 286 MG/DL — HIGH (ref 70–99)
MAGNESIUM SERPL-MCNC: 2.2 MG/DL — SIGNIFICANT CHANGE UP (ref 1.6–2.6)
MAGNESIUM SERPL-MCNC: 2.3 MG/DL — SIGNIFICANT CHANGE UP (ref 1.6–2.6)
PHOSPHATE SERPL-MCNC: 4.4 MG/DL — SIGNIFICANT CHANGE UP (ref 2.5–4.5)
PHOSPHATE SERPL-MCNC: 5.1 MG/DL — HIGH (ref 2.5–4.5)
POTASSIUM SERPL-MCNC: 5.1 MMOL/L — SIGNIFICANT CHANGE UP (ref 3.5–5.3)
POTASSIUM SERPL-MCNC: 5.3 MMOL/L — SIGNIFICANT CHANGE UP (ref 3.5–5.3)
POTASSIUM SERPL-SCNC: 5.1 MMOL/L — SIGNIFICANT CHANGE UP (ref 3.5–5.3)
POTASSIUM SERPL-SCNC: 5.3 MMOL/L — SIGNIFICANT CHANGE UP (ref 3.5–5.3)
SODIUM SERPL-SCNC: 126 MMOL/L — LOW (ref 135–145)
SODIUM SERPL-SCNC: 128 MMOL/L — LOW (ref 135–145)

## 2019-09-11 PROCEDURE — 90792 PSYCH DIAG EVAL W/MED SRVCS: CPT

## 2019-09-11 PROCEDURE — 99222 1ST HOSP IP/OBS MODERATE 55: CPT | Mod: GC

## 2019-09-11 RX ORDER — HALOPERIDOL DECANOATE 100 MG/ML
1 INJECTION INTRAMUSCULAR EVERY 6 HOURS
Refills: 0 | Status: DISCONTINUED | OUTPATIENT
Start: 2019-09-11 | End: 2019-09-17

## 2019-09-11 RX ORDER — SODIUM CHLORIDE 9 MG/ML
1000 INJECTION INTRAMUSCULAR; INTRAVENOUS; SUBCUTANEOUS ONCE
Refills: 0 | Status: COMPLETED | OUTPATIENT
Start: 2019-09-11 | End: 2019-09-11

## 2019-09-11 RX ORDER — FLUDROCORTISONE ACETATE 0.1 MG/1
0.2 TABLET ORAL DAILY
Refills: 0 | Status: DISCONTINUED | OUTPATIENT
Start: 2019-09-11 | End: 2019-09-13

## 2019-09-11 RX ORDER — METOPROLOL TARTRATE 50 MG
12.5 TABLET ORAL DAILY
Refills: 0 | Status: DISCONTINUED | OUTPATIENT
Start: 2019-09-11 | End: 2019-09-17

## 2019-09-11 RX ORDER — INSULIN GLARGINE 100 [IU]/ML
10 INJECTION, SOLUTION SUBCUTANEOUS AT BEDTIME
Refills: 0 | Status: DISCONTINUED | OUTPATIENT
Start: 2019-09-11 | End: 2019-09-17

## 2019-09-11 RX ADMIN — INSULIN GLARGINE 10 UNIT(S): 100 INJECTION, SOLUTION SUBCUTANEOUS at 22:23

## 2019-09-11 RX ADMIN — CLOPIDOGREL BISULFATE 75 MILLIGRAM(S): 75 TABLET, FILM COATED ORAL at 12:29

## 2019-09-11 RX ADMIN — FAMOTIDINE 10 MILLIGRAM(S): 10 INJECTION INTRAVENOUS at 12:29

## 2019-09-11 RX ADMIN — Medication 3: at 17:44

## 2019-09-11 RX ADMIN — SIMVASTATIN 20 MILLIGRAM(S): 20 TABLET, FILM COATED ORAL at 22:23

## 2019-09-11 RX ADMIN — GABAPENTIN 400 MILLIGRAM(S): 400 CAPSULE ORAL at 22:23

## 2019-09-11 RX ADMIN — SODIUM CHLORIDE 125 MILLILITER(S): 9 INJECTION INTRAMUSCULAR; INTRAVENOUS; SUBCUTANEOUS at 17:00

## 2019-09-11 RX ADMIN — FLUDROCORTISONE ACETATE 0.1 MILLIGRAM(S): 0.1 TABLET ORAL at 05:35

## 2019-09-11 RX ADMIN — Medication 10 MILLIGRAM(S): at 05:36

## 2019-09-11 RX ADMIN — Medication 12.5 MILLIGRAM(S): at 05:35

## 2019-09-11 RX ADMIN — Medication 3: at 08:58

## 2019-09-11 RX ADMIN — Medication 5 MILLIGRAM(S): at 22:23

## 2019-09-11 RX ADMIN — Medication 5: at 12:29

## 2019-09-11 RX ADMIN — Medication 88 MICROGRAM(S): at 05:35

## 2019-09-11 RX ADMIN — Medication 81 MILLIGRAM(S): at 12:29

## 2019-09-11 NOTE — PROGRESS NOTE ADULT - ASSESSMENT
Problem/Plan - 1:  ·  Problem: Syncope and collapse.  Plan: Telemetry benign so far. Cardiology eval noted. TTE shows some degree of LVOT obstruction. This could be the reason for the weakness, unsteady gait.  PT evaluation for DC planning.     Problem/Plan - 2:  ·  Problem: Hyponatremia syndrome.  Plan: Ser Na still low  Renal FU noted.     Problem/Plan - 3:  ·  Problem: Acute on chronic renal insufficiency.  Plan: Monitor BUN/ creat. F/U Renal c/s Dr Acevedo noted.     Problem/Plan - 4:  ·  Problem: Hypercalcemia.  Plan: Ca improved.     Problem/Plan - 5:  ·  Problem: CAD (coronary artery disease).  Plan: continue ASA, Plavix, nitrates, statin.     Problem/Plan - 6:Problem: Type 2 diabetes mellitus. Plan: Lantus started for uncontrolled diabetes since he is not on his home dose of medications. these can be restarted at discharge.    Problem/Plan - 7:  ·  Problem: Essential hypertension.  Plan: hold meds sec to hypotension    Problem/Plan - 8:·  Problem: lymphadenopthay.  Plan: heme fu appreciated   biopdy pending  ro lymphoma

## 2019-09-11 NOTE — CHART NOTE - NSCHARTNOTEFT_GEN_A_CORE
RRT was called for patient last night 9/10 for unresponsiveness. Pt has been having consistently low BPs despite fluids. RRT recommended IVF hydration and uptitrating Florinef to 0.2mg. Patient has LVOT obstruction and needs to remain euvolemic to slightly hypervolemic in order to generate adequate cardiac output. Spoke with Cardiology resident who agrees with increasing the Florinef to 0.2mg. Will continue to monitor. RRT was called for patient last night 9/10 for unresponsiveness. Pt has been having consistently low BPs despite fluids. RRT recommended IVF hydration and uptitrating Florinef to 0.2mg. Patient has LVOT obstruction and needs to remain euvolemic to slightly hypervolemic in order to generate adequate cardiac output. Spoke with Cardiology resident who agrees with increasing the Florinef to 0.2mg. Will continue to monitor.    RRT also recommended psych consult for agitation. Called psych in am who recommended Haldol 1mg IV q6h PRN. Last EKG done 9/10 with a QTC of 425.

## 2019-09-11 NOTE — BEHAVIORAL HEALTH ASSESSMENT NOTE - RISK ASSESSMENT
Risk factors; Acute and chronic  medical issues, periods of agitation,   Protective factors; Denies acute psychotic sxs, denies SI and HI.    Patient is not at acute risk of harm to self or others at this time.

## 2019-09-11 NOTE — BEHAVIORAL HEALTH ASSESSMENT NOTE - NSBHCHARTREVIEWVS_PSY_A_CORE FT
ICU Vital Signs Last 24 Hrs  T(C): 36.8 (11 Sep 2019 12:20), Max: 37.1 (11 Sep 2019 04:20)  T(F): 98.2 (11 Sep 2019 12:20), Max: 98.7 (11 Sep 2019 04:20)  HR: 77 (11 Sep 2019 12:20) (70 - 111)  BP: 99/64 (11 Sep 2019 12:20) (99/64 - 147/68)  BP(mean): --  ABP: --  ABP(mean): --  RR: 18 (11 Sep 2019 12:20) (16 - 18)  SpO2: 100% (11 Sep 2019 12:20) (98% - 100%)

## 2019-09-11 NOTE — CONSULT NOTE ADULT - SUBJECTIVE AND OBJECTIVE BOX
HPI:    79M with PMH CAD, DM, HTN, HLD, hypothyroidism, who presents with constipation was noted to have a recent syncopal episode and fall when changing his clothes 6 days PTA with LOC of 1 minute duration. No head trauma. He was evaluated by cardiology and TTE shows LVOT obstruction, with unremarkable telemetry thus far. He was additionally noted to have hyponatremia and acute on chronic kidney insufficiency (stage IV at baseline per EHR).  He was placed on florinef per cardiology after being found to have a low serum cortisol level and is also on prednisone. He is receiving IVF for hypotension. Hyponatremia is felt to be due to hyperglycemia and polydipsia with nonadherence to fluid restriction. He additionally has hypercalcemia with a normal PTH level and elevated 1, 25 hydroxy vitamin D level. ACE level wnl. Quantiferon testing has been ordered. He had a vasculitis workup done in the office which was ANCA +, has not seen rheumatology, but repeat ANCA negative on 9/3.    Patient went for a CT/C/A/P for workup of hypercalcemia (PO contrast only) and was found to have lymphadenopathy involving the bilateral hilar, subcarinal, paratracheal, retroperitoneal lymph nodes and ill-defined hypodensity involving the majority of the spleen (10 cm). There are additionally scattered pulmonary nodules in the b/l lungs, largest measuring 1.4cm and a 1.3 cm pleural based nodule.      Allergies    No Known Allergies    Intolerances        MEDICATIONS  (STANDING):  aspirin enteric coated 81 milliGRAM(s) Oral daily  clopidogrel Tablet 75 milliGRAM(s) Oral daily  dextrose 5%. 1000 milliLiter(s) (50 mL/Hr) IV Continuous <Continuous>  dextrose 50% Injectable 12.5 Gram(s) IV Push once  dextrose 50% Injectable 25 Gram(s) IV Push once  dextrose 50% Injectable 25 Gram(s) IV Push once  docusate sodium 100 milliGRAM(s) Oral three times a day  famotidine    Tablet 10 milliGRAM(s) Oral daily  fludroCORTISONE 0.2 milliGRAM(s) Oral daily  gabapentin 400 milliGRAM(s) Oral at bedtime  heparin  Injectable 5000 Unit(s) SubCutaneous every 8 hours  insulin glargine Injectable (LANTUS) 6 Unit(s) SubCutaneous at bedtime  insulin lispro (HumaLOG) corrective regimen sliding scale   SubCutaneous at bedtime  insulin lispro (HumaLOG) corrective regimen sliding scale   SubCutaneous three times a day before meals  levothyroxine 88 MICROGram(s) Oral daily  metoprolol succinate ER 12.5 milliGRAM(s) Oral daily  polyethylene glycol 3350 17 Gram(s) Oral daily  predniSONE   Tablet 5 milliGRAM(s) Oral daily  senna 2 Tablet(s) Oral at bedtime  simvastatin 20 milliGRAM(s) Oral at bedtime    MEDICATIONS  (PRN):  dextrose 40% Gel 15 Gram(s) Oral once PRN Blood Glucose LESS THAN 70 milliGRAM(s)/deciliter  glucagon  Injectable 1 milliGRAM(s) IntraMuscular once PRN Glucose LESS THAN 70 milligrams/deciliter  haloperidol    Injectable 1 milliGRAM(s) IV Push every 6 hours PRN Agitation      PAST MEDICAL & SURGICAL HISTORY:  CAD (coronary artery disease)  Adult hypothyroidism  Hyperlipidemia  Peptic ulcer: s/p treatment for H pylori  Diabetes  Essential hypertension  S/P coronary artery stent placement: x4, last one in early 2018      FAMILY HISTORY:  No pertinent family history in first degree relatives      SOCIAL HISTORY: No EtOH, no tobacco    REVIEW OF SYSTEMS:    CONSTITUTIONAL: No weakness, fevers or chills  EYES/ENT: No visual changes;  No vertigo or throat pain   NECK: No pain or stiffness  RESPIRATORY: No cough, wheezing, hemoptysis; No shortness of breath  CARDIOVASCULAR: No chest pain or palpitations  GASTROINTESTINAL: No abdominal or epigastric pain. No nausea, vomiting, or hematemesis; No diarrhea or constipation. No melena or hematochezia.  GENITOURINARY: No dysuria, frequency or hematuria  NEUROLOGICAL: No numbness or weakness  SKIN: No itching, burning, rashes, or lesions   All other review of systems is negative unless indicated above.        T(F): 98.2 (09-11-19 @ 12:20), Max: 98.7 (09-11-19 @ 04:20)  HR: 77 (09-11-19 @ 12:20)  BP: 99/64 (09-11-19 @ 12:20)  RR: 18 (09-11-19 @ 12:20)  SpO2: 100% (09-11-19 @ 12:20)  Wt(kg): --    GENERAL: NAD, well-developed  HEAD:  Atraumatic, Normocephalic  EYES: EOMI, PERRLA, conjunctiva and sclera clear  NECK: Supple, No JVD  CHEST/LUNG: Clear to auscultation bilaterally; No wheeze  HEART: Regular rate and rhythm; No murmurs, rubs, or gallops  ABDOMEN: Soft, Nontender, Nondistended; Bowel sounds present  EXTREMITIES:  2+ Peripheral Pulses, No clubbing, cyanosis, or edema  NEUROLOGY: non-focal  SKIN: No rashes or lesions                          10.4   7.47  )-----------( 225      ( 10 Sep 2019 11:55 )             31.5       09-11    128<L>  |  91<L>  |  52<H>  ----------------------------<  277<H>  5.3   |  18<L>  |  2.06<H>    Ca    11.4<H>      11 Sep 2019 07:25  Phos  4.4     09-11  Mg     2.3     09-11        Phosphorus Level, Serum: 4.4 mg/dL (09-11 @ 07:25)  Magnesium, Serum: 2.3 mg/dL (09-11 @ 07:25)

## 2019-09-11 NOTE — BEHAVIORAL HEALTH ASSESSMENT NOTE - HPI (INCLUDE ILLNESS QUALITY, SEVERITY, DURATION, TIMING, CONTEXT, MODIFYING FACTORS, ASSOCIATED SIGNS AND SYMPTOMS)
Patient is a 79 year old man with  pmh of CAD, DM2, HTN, and HLD, who  presents with constipation and possible syncope. Patient received Zyprexa 5mg PRN on 9/10, psychiatry was consulted for agitation.   Patient seen and evaluated, cooperative but easily irritable and at times dismissive. Patient AAOX3, stated that he came to the hospital as he almost fell and was constipated. Patient denies being agitated, he stated that he was just upset. Patient expressed frustration being in the hospital, stated that " I am fine, why staff keep on coming and check on me". Patient denies feeling confused, denies acute psychotic sxs, denies SI and HI.     PPH: Denies past admission, denies SA, denies outpt. treatment    SAH: Denies cigarettes smoking, denies alcohol use.

## 2019-09-11 NOTE — PROGRESS NOTE ADULT - SUBJECTIVE AND OBJECTIVE BOX
[facility]   MD UYEN HERNANDEZ, DO MARYANN REYNOSO, LEATHA NICOLE    TEL:  OFFICE: [phone]  DR CASAREZ CELL: [phone]  DR. HERNANDEZ CELL: [phone]  DR. REYNOSO CELL: [phone]  CHUCKIE TERRAZAS CELL: [phone]        Patient is a 79y old  Male who presents with a chief complaint of Hypotension (08 Sep 2019 15:36)      Patient seen and examined at bedside. No chest pain/sob    VITALS:  T(F): 98.2 (09-11-19 @ 12:20), Max: 98.7 (09-11-19 @ 04:20)  HR: 77 (09-11-19 @ 12:20)  BP: 99/64 (09-11-19 @ 12:20)  RR: 18 (09-11-19 @ 12:20)  SpO2: 100% (09-11-19 @ 12:20)  Wt(kg): --    09-10 @ 07:01  -  09-11 @ 07:00  --------------------------------------------------------  IN: 1000 mL / OUT: 2100 mL / NET: -1100 mL          PHYSICAL EXAM:  Constitutional: NAD  Neck: No JVD  Respiratory: CTAB, no wheezes, rales or rhonchi  Cardiovascular: S1, S2, RRR  Gastrointestinal: BS+, soft, NT/ND  Extremities: No peripheral edema    Hospital Medications:   MEDICATIONS  (STANDING):  aspirin enteric coated 81 milliGRAM(s) Oral daily  clopidogrel Tablet 75 milliGRAM(s) Oral daily  dextrose 5%. 1000 milliLiter(s) (50 mL/Hr) IV Continuous <Continuous>  dextrose 50% Injectable 12.5 Gram(s) IV Push once  dextrose 50% Injectable 25 Gram(s) IV Push once  dextrose 50% Injectable 25 Gram(s) IV Push once  docusate sodium 100 milliGRAM(s) Oral three times a day  famotidine    Tablet 10 milliGRAM(s) Oral daily  fludroCORTISONE 0.2 milliGRAM(s) Oral daily  gabapentin 400 milliGRAM(s) Oral at bedtime  heparin  Injectable 5000 Unit(s) SubCutaneous every 8 hours  insulin glargine Injectable (LANTUS) 6 Unit(s) SubCutaneous at bedtime  insulin lispro (HumaLOG) corrective regimen sliding scale   SubCutaneous at bedtime  insulin lispro (HumaLOG) corrective regimen sliding scale   SubCutaneous three times a day before meals  levothyroxine 88 MICROGram(s) Oral daily  metoprolol succinate ER 12.5 milliGRAM(s) Oral daily  polyethylene glycol 3350 17 Gram(s) Oral daily  predniSONE   Tablet 5 milliGRAM(s) Oral daily  senna 2 Tablet(s) Oral at bedtime  simvastatin 20 milliGRAM(s) Oral at bedtime      LABS:  09-11    128<L>  |  91<L>  |  52<H>  ----------------------------<  277<H>  5.3   |  18<L>  |  2.06<H>    Ca    11.4<H>      11 Sep 2019 07:25  Phos  4.4     09-11  Mg     2.3     09-11      Creatinine Trend: 2.06 <--, 1.99 <--, 2.38 <--, 1.94 <--, 2.14 <--, 2.15 <--, 2.32 <--, 2.64 <--, 2.10 <--, 1.83 <--    Phosphorus Level, Serum: 4.4 mg/dL (09-11 @ 07:25)                              10.4   7.47  )-----------( 225      ( 10 Sep 2019 11:55 )             31.5     Urine Studies:  Urinalysis - [09-06-19 @ 17:55]      Color LIGHT YELLOW / Appearance CLEAR / SG 1.012 / pH 6.0      Gluc 500 / Ketone NEGATIVE  / Bili NEGATIVE / Urobili NORMAL       Blood NEGATIVE / Protein NEGATIVE / Leuk Est NEGATIVE / Nitrite NEGATIVE      RBC  / WBC  / Hyaline  / Gran  / Sq Epi  / Non Sq Epi  / Bacteria     Urine Creatinine 28.40      [09-10-19 @ 19:45]  Urine Protein 4.5      [09-10-19 @ 19:45]    PTH 20.00 (Ca --)      [09-03-19 @ 05:45]   --  PTH 25.46 (Ca --)      [09-01-19 @ 06:00]   --  Vitamin D (25OH) 34.9      [09-03-19 @ 05:45]  HbA1c 6.1      [08-31-19 @ 07:00]  TSH 5.55      [09-09-19 @ 03:20]  Lipid: chol 121, TG 96, HDL 33, LDL 74      [08-31-19 @ 07:00]      ANCA: cANCA Negative, pANCA Negative, atypical ANCA --      [09-03-19 @ 05:45]  Free Light Chains: kappa 4.00, lambda 3.03, ratio = 1.32      [09-03 @ 05:45]    RADIOLOGY & ADDITIONAL STUDIES:

## 2019-09-11 NOTE — PROGRESS NOTE ADULT - ASSESSMENT
79 year old male with a history of CAD, DM2, HTN, and HLD presents with constipation and possible syncope    Hypotension  again with RRT called for syncope 9/10  Am cortisol low on prednisone now  florinef increased to 0.2 daily today  monitor BP closely  NS bolus if needed    CKD (chronic kidney disease), stage IV    Baseline ~ 2.2-2.5.   Currently stable   UA without protein or blood   avoid nephrotoxic agents      hyponatremia  work up suggests polydipsia and hyperglycemia  non-adherent with fluid restriction  corrected Na is 131 today  repeat work up, check urine na and osmo  please optimize DM control   TSH normal and cortisol low  monitor bmp  avoid hypotonic solutions    Hypercalcemia  PTH 20, Vit D 1,25 OH is high but vit D 25-OH is normal  K/L ratio nml  d/c vit d   mild proteinuria. vasculitis w/u in office neg except +ANCA  was referred for rheum eval in office but unsure if patient followed up  repeat anca here neg on 9/3  lasix will help with hypercalcemia as well   ACE level ok  pending QuantiFeron   check PTH RP    calcium persistently high, s/p CT abd/plevic/chest showed Scattered bilateral pulmonary nodules and mediastinal lymphadenopathy. possible lymphoma with possible splenic involvement. hemo/onc on board recommended biopsy.       Uncontrolled DM  recommend endocrine eval    proteinuria  UA without protein or blood   urine p/c ratio 158mg/day  mild  moniotr

## 2019-09-11 NOTE — CHART NOTE - NSCHARTNOTEFT_GEN_A_CORE
Spoke with IR resident Dr. Mckoy regarding IR biopsy of retroperitoneal lymph node. IR will do biopsy but pt needs to be off of aspirin and plavix first. Spoke with Cardiology Dr. Mancini to see if pt can be off aspirin and plavix for that long since he had stents placed in 2018. Dr. Mancini wants to speak to attending first tomorrow 9/12 and find out more information regarding the exact date before taking off the aspirin and plavix. OK to hold aspirin and plavix tomorrow 9/12 until Dr. Mancini talks to attending and will put the patient back on aspirin and plavix in the afternoon if attending says pt cannot be off of it.

## 2019-09-11 NOTE — CONSULT NOTE ADULT - ASSESSMENT
79M with PMH CAD, HTN, DM, CKD who presented with constipation, with recent history of syncope, found to have various metabolic abnormalities including hypercalcemia, hyponatremia, acute kidney injury, low cortisol level, with workup for hypercalcemia yielding lymphadenopathy suspicious for underlying lymphoma.    Lymphadenopathy, r/o lymphoma  - recommend biopsy of most accessible lymph node for further evaluation  - recheck LDH, uric acid, ESR, recent normal LDH noted  - check SPEP/UPEP, serum/urine SERINA. normal K/L ratio noted  - normal ACE level noted, f/u quantiferon gold     Anemia, normocytic  - check iron studies, b12, folate  - normal TSH noted    Jaime Bloom, PGY6  Hematology Fellow  Pager: [phone] 79M with PMH CAD, HTN, DM, CKD who presented with constipation, with recent history of syncope, found to have various metabolic abnormalities including hypercalcemia, hyponatremia, acute kidney injury, low cortisol level, with workup for hypercalcemia yielding lymphadenopathy suspicious for underlying lymphoma.    Lymphadenopathy, r/o lymphoma  - recommend biopsy of most accessible lymph node for further evaluation  - recheck LDH, uric acid, ESR, recent normal LDH noted  - normal SPEP/UPEP noted, normal K/L ratio noted  - normal ACE level noted, f/u quantiferon gold     Anemia, normocytic  - check iron studies, b12, folate  - normal TSH noted    Jaime Bloom, PGY6  Hematology Fellow  Pager: [phone] 79M with PMH CAD, HTN, DM, CKD who presented with constipation, with recent history of syncope, found to have various metabolic abnormalities including hypercalcemia, hyponatremia, acute kidney injury, low cortisol level, with workup for hypercalcemia yielding lymphadenopathy suspicious for underlying lymphoma.    Lymphadenopathy, r/o lymphoma  - recommend biopsy of most accessible lymph node for further evaluation  - recheck LDH, uric acid, ESR, recent normal LDH noted  - normal SPEP/UPEP noted, normal K/L ratio noted  - normal ACE level noted, f/u quantiferon gold   - check PSA to r/o alternative etiology of lymphadenopathy and hypercalcemia    Anemia, normocytic  - check iron studies, b12, folate  - normal SPEP/UPEP noted  - normal TSH noted    Jaime Bloom, PGY6  Hematology Fellow  Pager: [phone]

## 2019-09-11 NOTE — PROGRESS NOTE ADULT - SUBJECTIVE AND OBJECTIVE BOX
Patient is a 79y old  Male who presents with a chief complaint of Hypotension (08 Sep 2019 15:36)      INTERVAL HPI/OVERNIGHT EVENTS:  T(C): 36.6 (09-11-19 @ 16:20), Max: 37.1 (09-11-19 @ 04:20)  HR: 85 (09-11-19 @ 16:20) (70 - 111)  BP: 95/56 (09-11-19 @ 16:20) (95/56 - 147/68)  RR: 18 (09-11-19 @ 16:20) (18 - 18)  SpO2: 100% (09-11-19 @ 16:20) (98% - 100%)  Wt(kg): --  I&O's Summary    10 Sep 2019 07:01  -  11 Sep 2019 07:00  --------------------------------------------------------  IN: 1000 mL / OUT: 2100 mL / NET: -1100 mL    11 Sep 2019 07:01  -  11 Sep 2019 18:17  --------------------------------------------------------  IN: 550 mL / OUT: 1150 mL / NET: -600 mL        LABS:                        10.4   7.47  )-----------( 225      ( 10 Sep 2019 11:55 )             31.5     09-11    126<L>  |  95<L>  |  50<H>  ----------------------------<  286<H>  5.1   |  18<L>  |  2.18<H>    Ca    11.4<H>      11 Sep 2019 17:30  Phos  5.1     09-11  Mg     2.2     09-11          CAPILLARY BLOOD GLUCOSE      POCT Blood Glucose.: 254 mg/dL (11 Sep 2019 17:24)  POCT Blood Glucose.: 359 mg/dL (11 Sep 2019 12:19)  POCT Blood Glucose.: 288 mg/dL (11 Sep 2019 08:50)  POCT Blood Glucose.: 262 mg/dL (10 Sep 2019 20:27)            MEDICATIONS  (STANDING):  aspirin enteric coated 81 milliGRAM(s) Oral daily  clopidogrel Tablet 75 milliGRAM(s) Oral daily  dextrose 5%. 1000 milliLiter(s) (50 mL/Hr) IV Continuous <Continuous>  dextrose 50% Injectable 12.5 Gram(s) IV Push once  dextrose 50% Injectable 25 Gram(s) IV Push once  dextrose 50% Injectable 25 Gram(s) IV Push once  docusate sodium 100 milliGRAM(s) Oral three times a day  famotidine    Tablet 10 milliGRAM(s) Oral daily  fludroCORTISONE 0.2 milliGRAM(s) Oral daily  gabapentin 400 milliGRAM(s) Oral at bedtime  heparin  Injectable 5000 Unit(s) SubCutaneous every 8 hours  insulin glargine Injectable (LANTUS) 10 Unit(s) SubCutaneous at bedtime  insulin lispro (HumaLOG) corrective regimen sliding scale   SubCutaneous at bedtime  insulin lispro (HumaLOG) corrective regimen sliding scale   SubCutaneous three times a day before meals  levothyroxine 88 MICROGram(s) Oral daily  metoprolol succinate ER 12.5 milliGRAM(s) Oral daily  polyethylene glycol 3350 17 Gram(s) Oral daily  predniSONE   Tablet 5 milliGRAM(s) Oral daily  senna 2 Tablet(s) Oral at bedtime  simvastatin 20 milliGRAM(s) Oral at bedtime    MEDICATIONS  (PRN):  dextrose 40% Gel 15 Gram(s) Oral once PRN Blood Glucose LESS THAN 70 milliGRAM(s)/deciliter  glucagon  Injectable 1 milliGRAM(s) IntraMuscular once PRN Glucose LESS THAN 70 milligrams/deciliter  haloperidol    Injectable 1 milliGRAM(s) IV Push every 6 hours PRN Agitation          PHYSICAL EXAM:  GENERAL: NAD, well-groomed, well-developed  HEAD:  Atraumatic, Normocephalic  CHEST/LUNG: Clear to percussion bilaterally; No rales, rhonchi, wheezing, or rubs  HEART: Regular rate and rhythm; No murmurs, rubs, or gallops  ABDOMEN: Soft, Nontender, Nondistended; Bowel sounds present  EXTREMITIES:  2+ Peripheral Pulses, No clubbing, cyanosis, or edema  LYMPH: No lymphadenopathy noted  SKIN: No rashes or lesions    Care Discussed with Consultants/Other Providers [ ] YES  [ ] NO

## 2019-09-11 NOTE — BEHAVIORAL HEALTH ASSESSMENT NOTE - SUMMARY
Patient is a 79 year old man with  pmh of CAD, DM2, HTN, and HLD, who  presents with constipation and possible syncope. Patient received Zyprexa 5mg PRN on 9/10, psychiatry was consulted for agitation.     Patient seen and evaluated, cooperative but easily irritable and at times dismissive. Patient AAOX3, stated that he came to the hospital as he almost fell and was constipated. Patient denies being agitated, he stated that he was just upset. Patient expressed frustration being in the hospital, stated that " I am fine, why staff keep on coming and check on me". Patient denies feeling confused, denies acute psychotic sxs, denies SI and HI.       PLAN:   Haldol 1mg PO/IM/IV q6h prn for agitation, if qtc <500  Monitor EKG for qtc  Collateral needed, pt. gave consent to talk to his daughter

## 2019-09-12 LAB
ANION GAP SERPL CALC-SCNC: 13 MMO/L — SIGNIFICANT CHANGE UP (ref 7–14)
BUN SERPL-MCNC: 56 MG/DL — HIGH (ref 7–23)
CALCIUM SERPL-MCNC: 12 MG/DL — HIGH (ref 8.4–10.5)
CHLORIDE SERPL-SCNC: 96 MMOL/L — LOW (ref 98–107)
CO2 SERPL-SCNC: 21 MMOL/L — LOW (ref 22–31)
CREAT SERPL-MCNC: 2.39 MG/DL — HIGH (ref 0.5–1.3)
ERYTHROCYTE [SEDIMENTATION RATE] IN BLOOD: 12 MM/HR — SIGNIFICANT CHANGE UP (ref 1–15)
FERRITIN SERPL-MCNC: 214.6 NG/ML — SIGNIFICANT CHANGE UP (ref 30–400)
GAMMA INTERFERON BACKGROUND BLD IA-ACNC: 0.01 IU/ML — SIGNIFICANT CHANGE UP
GLUCOSE SERPL-MCNC: 224 MG/DL — HIGH (ref 70–99)
HCT VFR BLD CALC: 31.5 % — LOW (ref 39–50)
HGB BLD-MCNC: 10.3 G/DL — LOW (ref 13–17)
IRON SATN MFR SERPL: 234 UG/DL — SIGNIFICANT CHANGE UP (ref 155–535)
IRON SATN MFR SERPL: 38 UG/DL — LOW (ref 45–165)
LDH SERPL L TO P-CCNC: 169 U/L — SIGNIFICANT CHANGE UP (ref 135–225)
M TB IFN-G BLD-IMP: SIGNIFICANT CHANGE UP
M TB IFN-G CD4+ BCKGRND COR BLD-ACNC: 0 IU/ML — SIGNIFICANT CHANGE UP
M TB IFN-G CD4+CD8+ BCKGRND COR BLD-ACNC: 0 IU/ML — SIGNIFICANT CHANGE UP
MAGNESIUM SERPL-MCNC: 2.1 MG/DL — SIGNIFICANT CHANGE UP (ref 1.6–2.6)
MCHC RBC-ENTMCNC: 28.9 PG — SIGNIFICANT CHANGE UP (ref 27–34)
MCHC RBC-ENTMCNC: 32.7 % — SIGNIFICANT CHANGE UP (ref 32–36)
MCV RBC AUTO: 88.5 FL — SIGNIFICANT CHANGE UP (ref 80–100)
NRBC # FLD: 0.07 K/UL — SIGNIFICANT CHANGE UP (ref 0–0)
OSMOLALITY UR: 425 MOSMO/KG — SIGNIFICANT CHANGE UP (ref 50–1200)
PHOSPHATE SERPL-MCNC: 4.3 MG/DL — SIGNIFICANT CHANGE UP (ref 2.5–4.5)
PLATELET # BLD AUTO: 237 K/UL — SIGNIFICANT CHANGE UP (ref 150–400)
PMV BLD: 11.2 FL — SIGNIFICANT CHANGE UP (ref 7–13)
POTASSIUM SERPL-MCNC: 4.7 MMOL/L — SIGNIFICANT CHANGE UP (ref 3.5–5.3)
POTASSIUM SERPL-SCNC: 4.7 MMOL/L — SIGNIFICANT CHANGE UP (ref 3.5–5.3)
PSA FLD-MCNC: 0.51 NG/ML — SIGNIFICANT CHANGE UP (ref 0–4)
QUANT TB PLUS MITOGEN MINUS NIL: 0.14 IU/ML — SIGNIFICANT CHANGE UP
RBC # BLD: 3.56 M/UL — LOW (ref 4.2–5.8)
RBC # FLD: 14.6 % — HIGH (ref 10.3–14.5)
SODIUM SERPL-SCNC: 130 MMOL/L — LOW (ref 135–145)
SODIUM UR-SCNC: 57 MMOL/L — SIGNIFICANT CHANGE UP
UIBC SERPL-MCNC: 195.8 UG/DL — SIGNIFICANT CHANGE UP (ref 110–370)
URATE SERPL-MCNC: 9.3 MG/DL — HIGH (ref 3.4–8.8)
WBC # BLD: 7.82 K/UL — SIGNIFICANT CHANGE UP (ref 3.8–10.5)
WBC # FLD AUTO: 7.82 K/UL — SIGNIFICANT CHANGE UP (ref 3.8–10.5)

## 2019-09-12 RX ORDER — ASPIRIN/CALCIUM CARB/MAGNESIUM 324 MG
81 TABLET ORAL DAILY
Refills: 0 | Status: DISCONTINUED | OUTPATIENT
Start: 2019-09-12 | End: 2019-09-17

## 2019-09-12 RX ADMIN — Medication 2: at 09:12

## 2019-09-12 RX ADMIN — SIMVASTATIN 20 MILLIGRAM(S): 20 TABLET, FILM COATED ORAL at 21:29

## 2019-09-12 RX ADMIN — GABAPENTIN 400 MILLIGRAM(S): 400 CAPSULE ORAL at 21:29

## 2019-09-12 RX ADMIN — Medication 88 MICROGRAM(S): at 04:40

## 2019-09-12 RX ADMIN — INSULIN GLARGINE 10 UNIT(S): 100 INJECTION, SOLUTION SUBCUTANEOUS at 21:30

## 2019-09-12 RX ADMIN — Medication 2: at 13:20

## 2019-09-12 RX ADMIN — FAMOTIDINE 10 MILLIGRAM(S): 10 INJECTION INTRAVENOUS at 09:14

## 2019-09-12 RX ADMIN — Medication 3: at 18:10

## 2019-09-12 RX ADMIN — FLUDROCORTISONE ACETATE 0.2 MILLIGRAM(S): 0.1 TABLET ORAL at 04:40

## 2019-09-12 NOTE — PROGRESS NOTE ADULT - SUBJECTIVE AND OBJECTIVE BOX
Patient is a 79y old  Male who presents with a chief complaint of Hypotension (08 Sep 2019 15:36)      INTERVAL HPI/OVERNIGHT EVENTS:  No new events reported  No dizziness tele uneventful  Plavix has been held, as per Dr Browne, his daughter, his PCI was oevr a year ago.  T(C): 36.7 (09-10-19 @ 12:25), Max: 36.7 (09-10-19 @ 00:25)  HR: 95 (09-10-19 @ 12:25) (70 - 98)  BP: 113/72 (09-10-19 @ 12:25) (92/56 - 125/60)  RR: 16 (09-10-19 @ 12:25) (14 - 16)  SpO2: 97% (09-10-19 @ 12:25) (97% - 100%)  Wt(kg): --  I&O's Summary    09 Sep 2019 07:01  -  10 Sep 2019 07:00  --------------------------------------------------------  IN: 120 mL / OUT: 1850 mL / NET: -1730 mL    10 Sep 2019 07:01  -  10 Sep 2019 18:02  --------------------------------------------------------  IN: 0 mL / OUT: 600 mL / NET: -600 mL        LABS:                        10.4   7.47  )-----------( 225      ( 10 Sep 2019 11:55 )             31.5     09-10    125<L>  |  91<L>  |  55<H>  ----------------------------<  387<H>  4.8   |  22  |  1.99<H>    Ca    11.1<H>      10 Sep 2019 11:55  Phos  4.4     09-10  Mg     2.0     09-10          CAPILLARY BLOOD GLUCOSE      POCT Blood Glucose.: 354 mg/dL (10 Sep 2019 17:07)  POCT Blood Glucose.: 349 mg/dL (10 Sep 2019 12:21)  POCT Blood Glucose.: 247 mg/dL (10 Sep 2019 08:45)  POCT Blood Glucose.: 353 mg/dL (09 Sep 2019 21:29)            MEDICATIONS  (STANDING):  aspirin enteric coated 81 milliGRAM(s) Oral daily  clopidogrel Tablet 75 milliGRAM(s) Oral daily  dextrose 5%. 1000 milliLiter(s) (50 mL/Hr) IV Continuous <Continuous>  dextrose 50% Injectable 12.5 Gram(s) IV Push once  dextrose 50% Injectable 25 Gram(s) IV Push once  dextrose 50% Injectable 25 Gram(s) IV Push once  docusate sodium 100 milliGRAM(s) Oral three times a day  famotidine    Tablet 10 milliGRAM(s) Oral daily  fludroCORTISONE 0.1 milliGRAM(s) Oral daily  gabapentin 400 milliGRAM(s) Oral at bedtime  heparin  Injectable 5000 Unit(s) SubCutaneous every 8 hours  insulin glargine Injectable (LANTUS) 6 Unit(s) SubCutaneous at bedtime  insulin lispro (HumaLOG) corrective regimen sliding scale   SubCutaneous at bedtime  insulin lispro (HumaLOG) corrective regimen sliding scale   SubCutaneous three times a day before meals  levothyroxine 88 MICROGram(s) Oral daily  polyethylene glycol 3350 17 Gram(s) Oral daily  predniSONE   Tablet 10 milliGRAM(s) Oral daily  predniSONE   Tablet 5 milliGRAM(s) Oral daily  senna 2 Tablet(s) Oral at bedtime  simvastatin 20 milliGRAM(s) Oral at bedtime    MEDICATIONS  (PRN):  dextrose 40% Gel 15 Gram(s) Oral once PRN Blood Glucose LESS THAN 70 milliGRAM(s)/deciliter  glucagon  Injectable 1 milliGRAM(s) IntraMuscular once PRN Glucose LESS THAN 70 milligrams/deciliter          PHYSICAL EXAM:  GENERAL: NAD, well-groomed, well-developed  HEAD:  Atraumatic, Normocephalic  CHEST/LUNG: Clear to percussion bilaterally; No rales, rhonchi, wheezing, or rubs  HEART: Regular rate and rhythm; No murmurs, rubs, or gallops  ABDOMEN: Soft, Nontender, Nondistended; Bowel sounds present  EXTREMITIES:  2+ Peripheral Pulses, No clubbing, cyanosis, or edema  LYMPH: No lymphadenopathy noted  SKIN: No rashes or lesions    Care Discussed with Consultants/Other Providers [ ] YES  [ ] NO

## 2019-09-12 NOTE — PROGRESS NOTE ADULT - ASSESSMENT
Problem/Plan - 1:  ·  Problem: Syncope and collapse.  Plan: Telemetry benign so far. Cardiology eval noted. TTE shows some degree of LVOT obstruction. This could be the reason for the weakness, unsteady gait.  PT evaluation for DC planning.     Problem/Plan - 2:  ·  Problem: Hyponatremia syndrome.  Plan: Ser Na still low  Renal FU noted.     Problem/Plan - 3:  ·  Problem: Acute on chronic renal insufficiency.  Plan: Monitor BUN/ creat. F/U Renal c/s Dr Acevedo noted.     Problem/Plan - 4:  ·  Problem: Hypercalcemia.  Plan: Ca improved.     Problem/Plan - 5:  ·  Problem: CAD (coronary artery disease).  Plan: continue ASA, Plavix, nitrates, statin.     Problem/Plan - 6:Problem: Type 2 diabetes mellitus. Plan: Lantus started for uncontrolled diabetes since he is not on his home dose of medications.     Problem # 7: Abdominal lymphadenopathy: Needs a biopsy. PET can be performed to evaluate lymphadenopathy as well. Plavix held.   PET scanning needs  to be approved    Problem/Plan - 7:  ·  Problem: Essential hypertension.  Plan: hold meds sec to hypotension    Problem/Plan - 8:·  Problem: Hypothyroidism.  Plan: Continue Levothyroxine, monitor thyroid hormones.

## 2019-09-12 NOTE — PROGRESS NOTE ADULT - SUBJECTIVE AND OBJECTIVE BOX
[facility] [place] [facility]   MD MARYANN HERNANDEZ D.O. FATIMA SHEIKH, D.O. RUORU WONG, PA    OFFICE: [phone]  DR CASAREZ CELL: [phone]  DR. REYNOSO CELL: [phone]  DR. HERNANDEZ CELL: [phone]  LEATHA TERRAZAS CELL: [phone]      RENAL FOLLOW UP NOTE  --------------------------------------------------------------------------------  HPI: Pt seen and examined. has no complaints this AM.         PAST HISTORY  --------------------------------------------------------------------------------  No significant changes to PMH, PSH, FHx, SHx, unless otherwise noted    ALLERGIES & MEDICATIONS  --------------------------------------------------------------------------------  Allergies    No Known Allergies    Intolerances      Standing Inpatient Medications  dextrose 5%. 1000 milliLiter(s) IV Continuous <Continuous>  dextrose 50% Injectable 12.5 Gram(s) IV Push once  dextrose 50% Injectable 25 Gram(s) IV Push once  dextrose 50% Injectable 25 Gram(s) IV Push once  docusate sodium 100 milliGRAM(s) Oral three times a day  famotidine    Tablet 10 milliGRAM(s) Oral daily  fludroCORTISONE 0.2 milliGRAM(s) Oral daily  gabapentin 400 milliGRAM(s) Oral at bedtime  heparin  Injectable 5000 Unit(s) SubCutaneous every 8 hours  insulin glargine Injectable (LANTUS) 10 Unit(s) SubCutaneous at bedtime  insulin lispro (HumaLOG) corrective regimen sliding scale   SubCutaneous at bedtime  insulin lispro (HumaLOG) corrective regimen sliding scale   SubCutaneous three times a day before meals  levothyroxine 88 MICROGram(s) Oral daily  metoprolol succinate ER 12.5 milliGRAM(s) Oral daily  polyethylene glycol 3350 17 Gram(s) Oral daily  senna 2 Tablet(s) Oral at bedtime  simvastatin 20 milliGRAM(s) Oral at bedtime    PRN Inpatient Medications  dextrose 40% Gel 15 Gram(s) Oral once PRN  glucagon  Injectable 1 milliGRAM(s) IntraMuscular once PRN  haloperidol    Injectable 1 milliGRAM(s) IV Push every 6 hours PRN      REVIEW OF SYSTEMS  --------------------------------------------------------------------------------  General: no fever  CVS: no chest pain  RESP: no sob, no cough  ABD: no abdominal pain  : no dysuria,  MSK: no edema     VITALS/PHYSICAL EXAM  --------------------------------------------------------------------------------  T(C): 36.8 (09-12-19 @ 10:20), Max: 37.1 (09-12-19 @ 00:20)  HR: 94 (09-12-19 @ 10:20) (85 - 98)  BP: 107/56 (09-12-19 @ 10:20) (90/54 - 113/65)  RR: 17 (09-12-19 @ 10:20) (17 - 18)  SpO2: 98% (09-12-19 @ 10:20) (98% - 100%)  Wt(kg): --        09-11-19 @ 07:01  -  09-12-19 @ 07:00  --------------------------------------------------------  IN: 550 mL / OUT: 1150 mL / NET: -600 mL      Physical Exam:  	Gen: NAD  	HEENT: MMM  	Pulm: CTA B/L  	CV: S1S2  	Abd: Soft, +BS  	Ext: No LE edema B/L              Neuro: Awake   	Skin: Warm and Dry   	    LABS/STUDIES  --------------------------------------------------------------------------------              10.3   7.82  >-----------<  237      [09-12-19 @ 06:08]              31.5     130  |  96  |  56  ----------------------------<  224      [09-12-19 @ 06:08]  4.7   |  21  |  2.39        Ca     12.0     [09-12-19 @ 06:08]      Mg     2.1     [09-12-19 @ 06:08]      Phos  4.3     [09-12-19 @ 06:08]          Uric acid 9.3      [09-12-19 @ 06:08]        [09-12-19 @ 06:08]    Creatinine Trend:  SCr 2.39 [09-12 @ 06:08]  SCr 2.18 [09-11 @ 17:30]  SCr 2.06 [09-11 @ 07:25]  SCr 1.99 [09-10 @ 11:55]  SCr 2.38 [09-09 @ 17:30]    Urinalysis - [09-06-19 @ 17:55]      Color LIGHT YELLOW / Appearance CLEAR / SG 1.012 / pH 6.0      Gluc 500 / Ketone NEGATIVE  / Bili NEGATIVE / Urobili NORMAL       Blood NEGATIVE / Protein NEGATIVE / Leuk Est NEGATIVE / Nitrite NEGATIVE      RBC  / WBC  / Hyaline  / Gran  / Sq Epi  / Non Sq Epi  / Bacteria     Urine Creatinine 28.40      [09-10-19 @ 19:45]  Urine Protein 4.5      [09-10-19 @ 19:45]  Urine Sodium 57      [09-12-19 @ 05:00]  Urine Osmolality 425      [09-12-19 @ 05:00]    Iron 38, TIBC 234, %sat --      [09-12-19 @ 06:08]  Ferritin 214.6      [09-12-19 @ 06:08]  PTH 20.00 (Ca --)      [09-03-19 @ 05:45]   --  PTH 25.46 (Ca --)      [09-01-19 @ 06:00]   --  Vitamin D (25OH) 34.9      [09-03-19 @ 05:45]  HbA1c 6.1      [08-31-19 @ 07:00]  TSH 5.55      [09-09-19 @ 03:20]  Lipid: chol 121, TG 96, HDL 33, LDL 74      [08-31-19 @ 07:00]      ANCA: cANCA Negative, pANCA Negative, atypical ANCA --      [09-03-19 @ 05:45]  Free Light Chains: kappa 4.00, lambda 3.03, ratio = 1.32      [09-03 @ 05:45]

## 2019-09-12 NOTE — PROGRESS NOTE ADULT - ASSESSMENT
79 year old male with a history of CAD, DM2, HTN, and HLD presents with constipation and possible syncope    Hypotension  florinef increased to 0.2 daily on 9/11/19  monitor BP closely  NS bolus if needed    CKD (chronic kidney disease), stage IV    Baseline ~ 2.2-2.5.   Currently stable    avoid nephrotoxic agents      hyponatremia  work up suggests SIADH + hyperglycemia  optimize DM control   TSH normal and cortisol low  continue florinef  monitor bmp  avoid hypotonic solutions    Hypercalcemia  PTH 20, Vit D 1,25 OH is high but vit D 25-OH is normal  K/L ratio nml  mild proteinuria. vasculitis w/u in office neg except +ANCA  was referred for rheum eval in office but unsure if patient followed up  repeat anca here neg on 9/3     ACE level ok  pending QuantiFeron   check PTH RP    calcium persistently high, s/p CT abd/plevic/chest showed: Scattered bilateral pulmonary nodules and mediastinal lymphadenopathy. possible lymphoma with possible splenic involvement." hemo/onc on board recommended biopsy.     calcitonin if needed to rapidly decrease Ca but tachyphylaxis common so avoid use unless Ca >14  w/ current Ca level and CKD stage IV, avoid using bisphosphonate  continue to monitor closely    Uncontrolled DM  recommend endocrine eval    proteinuria  UA without protein or blood   urine p/c ratio 158mg/day  mild  monitor

## 2019-09-12 NOTE — PROGRESS NOTE ADULT - SUBJECTIVE AND OBJECTIVE BOX
Patient seen and examined at bedside.    Overnight Events:   No overnight events.     REVIEW OF SYSTEMS:  Constitutional:     [ ] negative [ ] fevers [ ] chills [ ] weight loss [ ] weight gain  HEENT:                  [ ] negative [ ] dry eyes [ ] eye irritation [ ] postnasal drip [ ] nasal congestion  CV:                         [ ] negative  [ ] chest pain [ ] orthopnea [ ] palpitations [ ] murmur  Resp:                     [ ] negative [ ] cough [ ] shortness of breath [ ] dyspnea [ ] wheezing [ ] sputum [ ]hemoptysis  GI:                          [ ] negative [ ] nausea [ ] vomiting [ ] diarrhea [ ] constipation [ ] abd pain [ ] dysphagia   :                        [ ] negative [ ] dysuria [ ] nocturia [ ] hematuria [ ] increased urinary frequency  Musculoskeletal: [ ] negative [ ] back pain [ ] myalgias [ ] arthralgias [ ] fracture  Skin:                       [ ] negative [ ] rash [ ] itch  Neurological:        [ ] negative [ ] headache [ ] dizziness [ ] syncope [ ] weakness [ ] numbness  Psychiatric:           [ ] negative [ ] anxiety [ ] depression  Endocrine:            [ ] negative [ ] diabetes [ ] thyroid problem  Heme/Lymph:      [ ] negative [ ] anemia [ ] bleeding problem  Allergic/Immune: [ ] negative [ ] itchy eyes [ ] nasal discharge [ ] hives [ ] angioedema    [ ] All other systems negative  [ ] Unable to assess ROS due to    Current Meds:  dextrose 40% Gel 15 Gram(s) Oral once PRN  dextrose 5%. 1000 milliLiter(s) IV Continuous <Continuous>  dextrose 50% Injectable 12.5 Gram(s) IV Push once  dextrose 50% Injectable 25 Gram(s) IV Push once  dextrose 50% Injectable 25 Gram(s) IV Push once  docusate sodium 100 milliGRAM(s) Oral three times a day  famotidine    Tablet 10 milliGRAM(s) Oral daily  fludroCORTISONE 0.2 milliGRAM(s) Oral daily  gabapentin 400 milliGRAM(s) Oral at bedtime  glucagon  Injectable 1 milliGRAM(s) IntraMuscular once PRN  haloperidol    Injectable 1 milliGRAM(s) IV Push every 6 hours PRN  heparin  Injectable 5000 Unit(s) SubCutaneous every 8 hours  insulin glargine Injectable (LANTUS) 10 Unit(s) SubCutaneous at bedtime  insulin lispro (HumaLOG) corrective regimen sliding scale   SubCutaneous at bedtime  insulin lispro (HumaLOG) corrective regimen sliding scale   SubCutaneous three times a day before meals  levothyroxine 88 MICROGram(s) Oral daily  metoprolol succinate ER 12.5 milliGRAM(s) Oral daily  polyethylene glycol 3350 17 Gram(s) Oral daily  senna 2 Tablet(s) Oral at bedtime  simvastatin 20 milliGRAM(s) Oral at bedtime      PAST MEDICAL & SURGICAL HISTORY:  CAD (coronary artery disease)  Adult hypothyroidism  Hyperlipidemia  Peptic ulcer: s/p treatment for H pylori  Diabetes  Essential hypertension  S/P coronary artery stent placement: x4, last one in early 2018      Vitals:  T(F): 98.2 (09-12), Max: 98.7 (09-12)  HR: 99 (09-12) (85 - 99)  BP: 96/56 (09-12) (90/54 - 113/65)  RR: 17 (09-12)  SpO2: 100% (09-12)  I&O's Summary    11 Sep 2019 07:01  -  12 Sep 2019 07:00  --------------------------------------------------------  IN: 550 mL / OUT: 1150 mL / NET: -600 mL        Physical Exam:  Appearance: No acute distress; well appearing  Eyes: PERRL, EOMI, pink conjunctiva  HENT: Normal oral mucosa  Cardiovascular: RRR, S1, S2, no murmurs, rubs, or gallops; no edema; no JVD  Respiratory: Clear to auscultation bilaterally  Gastrointestinal: soft, non-tender, non-distended with normal bowel sounds  Musculoskeletal: No clubbing; no joint deformity   Neurologic: Non-focal  Lymphatic: No lymphadenopathy  Psychiatry: AAOx3, mood & affect appropriate  Skin: No rashes, ecchymoses, or cyanosis                          10.3   7.82  )-----------( 237      ( 12 Sep 2019 06:08 )             31.5     09-12    130<L>  |  96<L>  |  56<H>  ----------------------------<  224<H>  4.7   |  21<L>  |  2.39<H>    Ca    12.0<H>      12 Sep 2019 06:08  Phos  4.3     09-12  Mg     2.1     09-12                    New ECG(s): Personally reviewed    Echo:  < from: Transthoracic Echocardiogram (09.03.19 @ 15:00) >  CONCLUSIONS:  1. Mitral annular calcification, systolic anterior motion  of the mitral valve.  Mild mitral regurgitation.  2. Calcified trileaflet aortic valve with grossly  moderately decreased opening.  3. Normal left atrium.  LA volume index = 20 cc/m2.  4. Increased relative wall thickness with normal left  ventricular mass index, consistent with concentric left  ventricular remodeling.  5. Hyperdynamic left ventricle. Peak left ventricular  outflow tract gradient equals 43 mm Hg, consistent with  moderate LVOT obstruction.  6. Normal right ventricular size and function.    < end of copied text >    Stress Testing:     Cath:    Imaging:  < from: CT Abdomen and Pelvis w/ Oral Cont (09.10.19 @ 18:32) >  Scattered bilateral pulmonary nodules and mediastinal lymphadenopathy.    Retroperitoneal lymphadenopathy and ill-defined hypodensity involving the   spleen.    Primary consideration is lymphoma with possible splenic involvement.    < end of copied text >    Interpretation of Telemetry:

## 2019-09-12 NOTE — PROGRESS NOTE ADULT - ASSESSMENT
79 year old male with a history of CAD, DM2, HTN, and HLD presents with constipation and possible syncope. No found to have possible lymphoma on imaging. Cardiology asked to reevaluate anti platelets preprocedure.     Unclear when patient had stents placed, pt. is unaware of his stents and imaging consistent with coronary stents  Please attempt to obtain records from outside hospital  If >1 year can hold plavix  Would continue Aspirin    Jasiel Mancini  Cardiology Fellow  [phone]    All Cardiology service information can be found 24/7 on [email or web address], password: [email or web address]

## 2019-09-13 LAB
ANION GAP SERPL CALC-SCNC: 12 MMO/L — SIGNIFICANT CHANGE UP (ref 7–14)
ANISOCYTOSIS BLD QL: SLIGHT — SIGNIFICANT CHANGE UP
BASOPHILS # BLD AUTO: 0.06 K/UL — SIGNIFICANT CHANGE UP (ref 0–0.2)
BASOPHILS NFR BLD AUTO: 0.5 % — SIGNIFICANT CHANGE UP (ref 0–2)
BASOPHILS NFR SPEC: 0 % — SIGNIFICANT CHANGE UP (ref 0–2)
BLASTS # FLD: 0 % — SIGNIFICANT CHANGE UP (ref 0–0)
BUN SERPL-MCNC: 54 MG/DL — HIGH (ref 7–23)
BURR CELLS BLD QL SMEAR: PRESENT — SIGNIFICANT CHANGE UP
CALCIUM SERPL-MCNC: 11.3 MG/DL — HIGH (ref 8.4–10.5)
CHLORIDE SERPL-SCNC: 96 MMOL/L — LOW (ref 98–107)
CO2 SERPL-SCNC: 20 MMOL/L — LOW (ref 22–31)
CREAT SERPL-MCNC: 2.27 MG/DL — HIGH (ref 0.5–1.3)
DACRYOCYTES BLD QL SMEAR: SLIGHT — SIGNIFICANT CHANGE UP
EOSINOPHIL # BLD AUTO: 0.02 K/UL — SIGNIFICANT CHANGE UP (ref 0–0.5)
EOSINOPHIL NFR BLD AUTO: 0.2 % — SIGNIFICANT CHANGE UP (ref 0–6)
EOSINOPHIL NFR FLD: 0 % — SIGNIFICANT CHANGE UP (ref 0–6)
GLUCOSE SERPL-MCNC: 148 MG/DL — HIGH (ref 70–99)
HBA1C BLD-MCNC: 7 % — HIGH (ref 4–5.6)
HCT VFR BLD CALC: 36.9 % — LOW (ref 39–50)
HGB BLD-MCNC: 11.8 G/DL — LOW (ref 13–17)
IMM GRANULOCYTES NFR BLD AUTO: 1.2 % — SIGNIFICANT CHANGE UP (ref 0–1.5)
LYMPHOCYTES # BLD AUTO: 0.81 K/UL — LOW (ref 1–3.3)
LYMPHOCYTES # BLD AUTO: 6.2 % — LOW (ref 13–44)
LYMPHOCYTES NFR SPEC AUTO: 0.9 % — LOW (ref 13–44)
MACROCYTES BLD QL: SLIGHT — SIGNIFICANT CHANGE UP
MAGNESIUM SERPL-MCNC: 2.1 MG/DL — SIGNIFICANT CHANGE UP (ref 1.6–2.6)
MCHC RBC-ENTMCNC: 28.7 PG — SIGNIFICANT CHANGE UP (ref 27–34)
MCHC RBC-ENTMCNC: 32 % — SIGNIFICANT CHANGE UP (ref 32–36)
MCV RBC AUTO: 89.8 FL — SIGNIFICANT CHANGE UP (ref 80–100)
METAMYELOCYTES # FLD: 0 % — SIGNIFICANT CHANGE UP (ref 0–1)
MONOCYTES # BLD AUTO: 2.97 K/UL — HIGH (ref 0–0.9)
MONOCYTES NFR BLD AUTO: 22.7 % — HIGH (ref 2–14)
MONOCYTES NFR BLD: 7 % — SIGNIFICANT CHANGE UP (ref 2–9)
MYELOCYTES NFR BLD: 0 % — SIGNIFICANT CHANGE UP (ref 0–0)
NEUTROPHIL AB SER-ACNC: 92.1 % — HIGH (ref 43–77)
NEUTROPHILS # BLD AUTO: 9.05 K/UL — HIGH (ref 1.8–7.4)
NEUTROPHILS NFR BLD AUTO: 69.2 % — SIGNIFICANT CHANGE UP (ref 43–77)
NEUTS BAND # BLD: 0 % — SIGNIFICANT CHANGE UP (ref 0–6)
NRBC # FLD: 0.12 K/UL — SIGNIFICANT CHANGE UP (ref 0–0)
OTHER - HEMATOLOGY %: 0 — SIGNIFICANT CHANGE UP
PLATELET # BLD AUTO: 227 K/UL — SIGNIFICANT CHANGE UP (ref 150–400)
PLATELET COUNT - ESTIMATE: NORMAL — SIGNIFICANT CHANGE UP
PMV BLD: 11.2 FL — SIGNIFICANT CHANGE UP (ref 7–13)
POIKILOCYTOSIS BLD QL AUTO: SLIGHT — SIGNIFICANT CHANGE UP
POLYCHROMASIA BLD QL SMEAR: SLIGHT — SIGNIFICANT CHANGE UP
POTASSIUM SERPL-MCNC: 4.5 MMOL/L — SIGNIFICANT CHANGE UP (ref 3.5–5.3)
POTASSIUM SERPL-SCNC: 4.5 MMOL/L — SIGNIFICANT CHANGE UP (ref 3.5–5.3)
PROMYELOCYTES # FLD: 0 % — SIGNIFICANT CHANGE UP (ref 0–0)
RBC # BLD: 4.11 M/UL — LOW (ref 4.2–5.8)
RBC # FLD: 14.9 % — HIGH (ref 10.3–14.5)
SCHISTOCYTES BLD QL AUTO: SLIGHT — SIGNIFICANT CHANGE UP
SODIUM SERPL-SCNC: 128 MMOL/L — LOW (ref 135–145)
VARIANT LYMPHS # BLD: 0 % — SIGNIFICANT CHANGE UP
WBC # BLD: 13.07 K/UL — HIGH (ref 3.8–10.5)
WBC # FLD AUTO: 13.07 K/UL — HIGH (ref 3.8–10.5)

## 2019-09-13 RX ORDER — FLUDROCORTISONE ACETATE 0.1 MG/1
0.1 TABLET ORAL
Refills: 0 | Status: DISCONTINUED | OUTPATIENT
Start: 2019-09-14 | End: 2019-09-17

## 2019-09-13 RX ORDER — FLUDROCORTISONE ACETATE 0.1 MG/1
0.1 TABLET ORAL
Refills: 0 | Status: COMPLETED | OUTPATIENT
Start: 2019-09-13 | End: 2019-09-13

## 2019-09-13 RX ADMIN — SIMVASTATIN 20 MILLIGRAM(S): 20 TABLET, FILM COATED ORAL at 21:07

## 2019-09-13 RX ADMIN — Medication 88 MICROGRAM(S): at 05:06

## 2019-09-13 RX ADMIN — GABAPENTIN 400 MILLIGRAM(S): 400 CAPSULE ORAL at 21:07

## 2019-09-13 RX ADMIN — INSULIN GLARGINE 10 UNIT(S): 100 INJECTION, SOLUTION SUBCUTANEOUS at 21:12

## 2019-09-13 RX ADMIN — Medication 81 MILLIGRAM(S): at 09:16

## 2019-09-13 RX ADMIN — FLUDROCORTISONE ACETATE 0.1 MILLIGRAM(S): 0.1 TABLET ORAL at 21:07

## 2019-09-13 RX ADMIN — FLUDROCORTISONE ACETATE 0.2 MILLIGRAM(S): 0.1 TABLET ORAL at 05:06

## 2019-09-13 RX ADMIN — Medication 1: at 21:12

## 2019-09-13 RX ADMIN — Medication 100 MILLIGRAM(S): at 13:24

## 2019-09-13 RX ADMIN — FAMOTIDINE 10 MILLIGRAM(S): 10 INJECTION INTRAVENOUS at 09:15

## 2019-09-13 RX ADMIN — Medication 1: at 17:47

## 2019-09-13 RX ADMIN — POLYETHYLENE GLYCOL 3350 17 GRAM(S): 17 POWDER, FOR SOLUTION ORAL at 09:16

## 2019-09-13 RX ADMIN — Medication 1: at 12:23

## 2019-09-13 RX ADMIN — HEPARIN SODIUM 5000 UNIT(S): 5000 INJECTION INTRAVENOUS; SUBCUTANEOUS at 13:22

## 2019-09-13 NOTE — PROVIDER CONTACT NOTE (OTHER) - BACKGROUND
Pt admitted for syncope and fall. Multiple rapid responses during admission for unresponsiveness and hypotension. Fludrocortisone was increased to 0.2mg on 9/11. Pt continues to receive Florinef.

## 2019-09-13 NOTE — CONSULT NOTE ADULT - ATTENDING COMMENTS
Consulted for possible lymph node biopsy for diagnosis for suspected lymphoma  CT abd reviewed- multiple mesenteric and RP nodes with a splenic mass  Would consult IR for possible splenic mass biopsy or RP node core needle biopsy for diagnosis  Consider GI for EUS core needle biopsy  If GI and IR biopsy are non-diagnostic - would plan for operative biopsy   -Thank you for allowing me to take care of your patient. I will follow the patient with you.  Plan discussed with   Regards  Rip Fischer MD  Division of Surgical Oncology  [address] [place] [id number]  Ph:[phone]
Patient seen and agree with Dr. Bloom's note. Patient is 79M with PMH CAD, HTN, DM, CKD who presented with constipation, with recent history of syncope, found to have various metabolic abnormalities including hypercalcemia, hyponatremia, acute kidney injury, low cortisol level, with workup for hypercalcemia yielding lymphadenopathy suspicious for underlying lymphoma.    Lymphadenopathy, r/o lymphoma  - recommend biopsy of most accessible lymph node for further evaluation  - recheck LDH, uric acid, ESR, recent normal LDH noted  - normal SPEP/UPEP noted, normal K/L ratio noted  - normal ACE level noted, f/u quantiferon gold   - check PSA to r/o alternative etiology of lymphadenopathy and hypercalcemia    Anemia, normocytic  - check iron studies, b12, folate  - normal SPEP/UPEP noted  - normal TSH noted

## 2019-09-13 NOTE — PROGRESS NOTE ADULT - ASSESSMENT
Problem/Plan - 1:  ·  Problem: Syncope and collapse.  Plan: Telemetry benign so far. Cardiology eval noted. TTE shows some degree of LVOT obstruction. This could be the reason for the weakness, unsteady gait.  PT evaluation for DC planning.     Problem/Plan - 2:  ·  Problem: Hyponatremia syndrome.  Plan: Ser Na still low  Renal FU noted.     Problem/Plan - 3:  ·  Problem: Acute on chronic renal insufficiency.  Plan: Monitor BUN/ creat. F/U Renal c/s Dr Acevedo noted.     Problem/Plan - 4:  ·  Problem: Hypercalcemia.  Plan: Ca improved.     Problem/Plan - 5:  ·  Problem: CAD (coronary artery disease).  Plan: continue ASA, Plavix, nitrates, statin.     Problem/Plan - 6:Problem: Type 2 diabetes mellitus. Plan: Lantus started for uncontrolled diabetes since he is not on his home dose of medications.     Problem # 7: Lung and Abdominal lymphadenopathy: Needs a biopsy. PET can be performed to evaluate lymphadenopathy as well. Plavix held. Onc and Onc surgery eval called  PET scanning needs  to be approved    Problem/Plan - 8:  ·  Problem: Essential hypertension.  Plan: hold meds sec to hypotension    Problem/Plan - 9:·  Problem: Hypothyroidism.  Plan: Continue Levothyroxine, monitor thyroid hormones.

## 2019-09-13 NOTE — PROVIDER CONTACT NOTE (OTHER) - RECOMMENDATIONS
sheet given from flow lab, unable to reach flow lab for clarification of proper tubes for blood draw.

## 2019-09-13 NOTE — CONSULT NOTE ADULT - CONSULT REASON
Abnormal CT scan ,R/O Lymphoproliferative disorder. Patient already followed by house Hem/onc,for second opinion called by Dr Bryan .

## 2019-09-13 NOTE — PROGRESS NOTE ADULT - SUBJECTIVE AND OBJECTIVE BOX
[facility] [place] [facility]   MD UYEN HERNANDEZ, DO MARYANN REYNOSO, LEATHA NICOLE    TEL:  OFFICE: [phone]  DR CASAREZ CELL: [phone]  DR. HERNANDEZ CELL: [phone]  DR. REYNOSO CELL: [phone]  CHUCKIE TERRAZAS CELL: [phone]        Patient is a 79y old  Male who presents with a chief complaint of sob (11 Sep 2019 18:17)      Patient seen and examined at bedside. No chest pain/sob    VITALS:  T(F): 98 (09-13-19 @ 11:31), Max: 98.5 (09-13-19 @ 00:15)  HR: 77 (09-13-19 @ 11:31)  BP: 107/53 (09-13-19 @ 11:31)  RR: 16 (09-13-19 @ 11:31)  SpO2: 97% (09-13-19 @ 11:31)  Wt(kg): --        PHYSICAL EXAM:  Constitutional: NAD  Neck: No JVD  Respiratory: CTAB, no wheezes, rales or rhonchi  Cardiovascular: S1, S2, RRR  Gastrointestinal: BS+, soft, NT/ND  Extremities: No peripheral edema    Hospital Medications:   MEDICATIONS  (STANDING):  aspirin enteric coated 81 milliGRAM(s) Oral daily  dextrose 5%. 1000 milliLiter(s) (50 mL/Hr) IV Continuous <Continuous>  dextrose 50% Injectable 12.5 Gram(s) IV Push once  dextrose 50% Injectable 25 Gram(s) IV Push once  dextrose 50% Injectable 25 Gram(s) IV Push once  docusate sodium 100 milliGRAM(s) Oral three times a day  famotidine    Tablet 10 milliGRAM(s) Oral daily  fludroCORTISONE 0.2 milliGRAM(s) Oral daily  gabapentin 400 milliGRAM(s) Oral at bedtime  heparin  Injectable 5000 Unit(s) SubCutaneous every 8 hours  insulin glargine Injectable (LANTUS) 10 Unit(s) SubCutaneous at bedtime  insulin lispro (HumaLOG) corrective regimen sliding scale   SubCutaneous at bedtime  insulin lispro (HumaLOG) corrective regimen sliding scale   SubCutaneous three times a day before meals  levothyroxine 88 MICROGram(s) Oral daily  metoprolol succinate ER 12.5 milliGRAM(s) Oral daily  polyethylene glycol 3350 17 Gram(s) Oral daily  senna 2 Tablet(s) Oral at bedtime  simvastatin 20 milliGRAM(s) Oral at bedtime      LABS:  09-13    128<L>  |  96<L>  |  54<H>  ----------------------------<  148<H>  4.5   |  20<L>  |  2.27<H>    Ca    11.3<H>      13 Sep 2019 06:50  Phos  4.3     09-12  Mg     2.1     09-13      Creatinine Trend: 2.27 <--, 2.39 <--, 2.18 <--, 2.06 <--, 1.99 <--, 2.38 <--, 1.94 <--, 2.14 <--, 2.15 <--, 2.32 <--, 2.64 <--                                11.8   13.07 )-----------( 227      ( 13 Sep 2019 06:50 )             36.9     Urine Studies:  Urinalysis - [09-06-19 @ 17:55]      Color LIGHT YELLOW / Appearance CLEAR / SG 1.012 / pH 6.0      Gluc 500 / Ketone NEGATIVE  / Bili NEGATIVE / Urobili NORMAL       Blood NEGATIVE / Protein NEGATIVE / Leuk Est NEGATIVE / Nitrite NEGATIVE      RBC  / WBC  / Hyaline  / Gran  / Sq Epi  / Non Sq Epi  / Bacteria     Urine Creatinine 28.40      [09-10-19 @ 19:45]  Urine Protein 4.5      [09-10-19 @ 19:45]  Urine Sodium 57      [09-12-19 @ 05:00]  Urine Osmolality 425      [09-12-19 @ 05:00]    Iron 38, TIBC 234, %sat --      [09-12-19 @ 06:08]  Ferritin 214.6      [09-12-19 @ 06:08]  PTH 20.00 (Ca --)      [09-03-19 @ 05:45]   --  PTH 25.46 (Ca --)      [09-01-19 @ 06:00]   --  Vitamin D (25OH) 34.9      [09-03-19 @ 05:45]  HbA1c 7.0      [09-13-19 @ 06:50]  TSH 5.55      [09-09-19 @ 03:20]  Lipid: chol 121, TG 96, HDL 33, LDL 74      [08-31-19 @ 07:00]      ANCA: cANCA Negative, pANCA Negative, atypical ANCA --      [09-03-19 @ 05:45]  Free Light Chains: kappa 4.00, lambda 3.03, ratio = 1.32      [09-03 @ 05:45]    RADIOLOGY & ADDITIONAL STUDIES:

## 2019-09-13 NOTE — PROVIDER CONTACT NOTE (OTHER) - BACKGROUND
(Admit Diagnosis) Syncope and collapse  (PMH) CAD (coronary artery disease)  (PMH) Adult hypothyroidism  (PMH) Peptic ulcer  (PMH) Essential hypertension

## 2019-09-13 NOTE — CONSULT NOTE ADULT - SUBJECTIVE AND OBJECTIVE BOX
CC: 79y old Male admitted with a chief complaint of Severe constipation and abdominal distention ,recent history of syncope    HPI:  79 year old male with a history of CAD, DM2, HTN, HLD & hypothyroidism presents with constipation. Patient states that his abdomen feels distended and "filled with gas". Patient's last bowel movement was on Wednesday during the day. Patient denies abdominal pain, but states that he feels "uneasy". Patient denies nausea, vomiting, dysphagia, blood in stool, weight changes, chest pain, SOB, fever, chills.   As per ED note, the patient's daughter stated that he had a syncopal episode and fall while changing his clothes six days ago. She reports that the patient lost consciousness for about a minute. She denies head trauma. She mentions that he has not had an appetite for the past 6 days and has not been eating much.   Patient denies this syncopal episode, loss of consciousness, or dizziness.    Currently Patient tolerating PO, reports he is passing flatus and having BM. Surgical oncology consulted for lymph node biopsy to r/o lymphoma.     PMHx: CAD (coronary artery disease)  Adult hypothyroidism  Hyperlipidemia  Peptic ulcer  Stented coronary artery  Diabetes  Essential hypertension    PSHx: S/P coronary artery stent placement  No significant past surgical history    Medications (inpatient): aspirin enteric coated 81 milliGRAM(s) Oral daily  dextrose 5%. 1000 milliLiter(s) IV Continuous <Continuous>  dextrose 50% Injectable 12.5 Gram(s) IV Push once  dextrose 50% Injectable 25 Gram(s) IV Push once  dextrose 50% Injectable 25 Gram(s) IV Push once  docusate sodium 100 milliGRAM(s) Oral three times a day  famotidine    Tablet 10 milliGRAM(s) Oral daily  gabapentin 400 milliGRAM(s) Oral at bedtime  heparin  Injectable 5000 Unit(s) SubCutaneous every 8 hours  insulin glargine Injectable (LANTUS) 10 Unit(s) SubCutaneous at bedtime  insulin lispro (HumaLOG) corrective regimen sliding scale   SubCutaneous at bedtime  insulin lispro (HumaLOG) corrective regimen sliding scale   SubCutaneous three times a day before meals  levothyroxine 88 MICROGram(s) Oral daily  metoprolol succinate ER 12.5 milliGRAM(s) Oral daily  polyethylene glycol 3350 17 Gram(s) Oral daily  senna 2 Tablet(s) Oral at bedtime  simvastatin 20 milliGRAM(s) Oral at bedtime    Medications (PRN):dextrose 40% Gel 15 Gram(s) Oral once PRN  glucagon  Injectable 1 milliGRAM(s) IntraMuscular once PRN  haloperidol    Injectable 1 milliGRAM(s) IV Push every 6 hours PRN    Allergies: No Known Allergies  (Intolerances: )  Social Hx:   Family Hx: No pertinent family history in first degree relatives      Physical Exam  T(C): 37.5  HR: 95 (70 - 99)  BP: 115/61 (88/55 - 115/61)  RR: 16 (16 - 16)  SpO2: 99% (97% - 100%)  Tmax: T(C): , Max: 37.5 (09-13-19 @ 21:00)    General: well developed, well nourished, NAD  Neuro: alert and oriented, no focal deficits, moves all extremities spontaneously  HEENT: NCAT, EOMI, anicteric, mucosa moist  Respiratory: airway patent, respirations unlabored  CVS: regular rate and rhythm  Abdomen: soft, nontender, nondistended  Extremities: no edema, sensation and movement grossly intact  Skin: warm, dry, appropriate color    Labs:                        11.8   13.07 )-----------( 227      ( 13 Sep 2019 06:50 )             36.9       09-13    128<L>  |  96<L>  |  54<H>  ----------------------------<  148<H>  4.5   |  20<L>  |  2.27<H>    Ca    11.3<H>      13 Sep 2019 06:50  Phos  4.3     09-12  Mg     2.1     09-13              Imaging and other studies:  < from: CT Abdomen and Pelvis w/ Oral Cont (09.10.19 @ 18:32) >  FINDINGS:    CHEST:     LUNGS AND LARGE AIRWAYS: Patent central airways. Scattered pulmonary   nodules in the bilateral lungs, the largest measuring 1.4 cm in the right   lower lobe.  PLEURA: A 1.3 x 0.5 cm pleural-based nodule. No pleural effusion.  VESSELS: Atherosclerotic changes of the thoracic aorta.  HEART: Heart size is normal. No pericardial effusion. Coronary artery   calcifications.  MEDIASTINUM AND AMBERLY: Bilateral hilar, subcarinal, and paratracheal   lymphadenopathy. For reference, a 2.8 x 2.0 cm subcarinal lymph node   (2:38). Additional subcentimeter prevascular lymph nodes.  CHEST WALL AND LOWER NECK: Within normal limits.    ABDOMEN AND PELVIS:    LIVER: Within normal limits.  BILE DUCTS: Normal caliber.  GALLBLADDER: Cholecystectomy.  SPLEEN: Ill-defined hypodensity involving the majority of the spleen.   Spleenis normal in size, measuring 10.0 cm in craniocaudal diameter.  PANCREAS: Within normal limits.  ADRENALS: Within normal limits.  KIDNEYS/URETERS: A 2.2 cm cyst in the upper pole of the right kidney.  No   renal stones or hydronephrosis.    BLADDER: Within normal limits.  REPRODUCTIVE ORGANS: Prostate within normal limits.    BOWEL: No bowel obstruction. Appendix is normal.  PERITONEUM: No ascites.  VESSELS: Atherosclerotic changes.  RETROPERITONEUM/LYMPH NODES: Retroperitoneal lymphadenopathy, for   reference a 1.9 x 1.3 cm left gastric lymph node (2:66). A 2.6 x 1.5 cm   left retrocrural lymph node. Subcentimeter mesenteric lymph nodes.  ABDOMINAL WALL: Small fat-containing umbilical hernia.  BONES: Degenerative changes.    IMPRESSION:     Scattered bilateral pulmonary nodules and mediastinal lymphadenopathy.    Retroperitoneal lymphadenopathy and ill-defined hypodensity involving the   spleen.    Primary consideration is lymphoma with possible splenic involvement.

## 2019-09-13 NOTE — CONSULT NOTE ADULT - SUBJECTIVE AND OBJECTIVE BOX
Patient is a 79y old  Male who presents with a chief complaint of sob (11 Sep 2019 18:17)      HPI:  79 year old male with a history of CAD, DM2, HTN, HLD & hypothyroidism presents with constipation  and "rectal issue" since yesterday. Patient states that his abdomen feels distended and "filled with gas". Patient's last bowel movement was on Wednesday during the day. Patient denies abdominal pain, but states that he feels "uneasy". Patient denies nausea, vomiting, dysphagia, blood in stool, weight changes, chest pain, SOB, fever, chills.   As per ED note, the patient's daughter stated that he had a syncopal episode and fall while changing his clothes six days ago. She reports that the patient lost consciousness for about a minute. She denies head trauma. She mentions that he has not had an appetite for the past 6 days and has not been eating much.   Patient denies this syncopal episode, loss of consciousness, or dizziness. (30 Aug 2019 15:17)       ROS:  Negative except for:    PAST MEDICAL & SURGICAL HISTORY:  CAD (coronary artery disease)  Adult hypothyroidism  Hyperlipidemia  Peptic ulcer: s/p treatment for H pylori  Diabetes  Essential hypertension  S/P coronary artery stent placement: x4, last one in early 2018      SOCIAL HISTORY:    FAMILY HISTORY:  No pertinent family history in first degree relatives      MEDICATIONS  (STANDING):  aspirin enteric coated 81 milliGRAM(s) Oral daily  dextrose 5%. 1000 milliLiter(s) (50 mL/Hr) IV Continuous <Continuous>  dextrose 50% Injectable 12.5 Gram(s) IV Push once  dextrose 50% Injectable 25 Gram(s) IV Push once  dextrose 50% Injectable 25 Gram(s) IV Push once  docusate sodium 100 milliGRAM(s) Oral three times a day  famotidine    Tablet 10 milliGRAM(s) Oral daily  fludroCORTISONE 0.1 milliGRAM(s) Oral <User Schedule>  gabapentin 400 milliGRAM(s) Oral at bedtime  heparin  Injectable 5000 Unit(s) SubCutaneous every 8 hours  insulin glargine Injectable (LANTUS) 10 Unit(s) SubCutaneous at bedtime  insulin lispro (HumaLOG) corrective regimen sliding scale   SubCutaneous at bedtime  insulin lispro (HumaLOG) corrective regimen sliding scale   SubCutaneous three times a day before meals  levothyroxine 88 MICROGram(s) Oral daily  metoprolol succinate ER 12.5 milliGRAM(s) Oral daily  polyethylene glycol 3350 17 Gram(s) Oral daily  senna 2 Tablet(s) Oral at bedtime  simvastatin 20 milliGRAM(s) Oral at bedtime    MEDICATIONS  (PRN):  dextrose 40% Gel 15 Gram(s) Oral once PRN Blood Glucose LESS THAN 70 milliGRAM(s)/deciliter  glucagon  Injectable 1 milliGRAM(s) IntraMuscular once PRN Glucose LESS THAN 70 milligrams/deciliter  haloperidol    Injectable 1 milliGRAM(s) IV Push every 6 hours PRN Agitation      Allergies    No Known Allergies    Intolerances        Vital Signs Last 24 Hrs  T(C): 36.7 (13 Sep 2019 17:02), Max: 36.9 (13 Sep 2019 00:15)  T(F): 98 (13 Sep 2019 17:02), Max: 98.5 (13 Sep 2019 00:15)  HR: 70 (13 Sep 2019 17:02) (70 - 99)  BP: 112/60 (13 Sep 2019 17:02) (88/55 - 112/60)  BP(mean): --  RR: 16 (13 Sep 2019 17:02) (16 - 16)  SpO2: 100% (13 Sep 2019 17:02) (97% - 100%)    PHYSICAL EXAM  General: adult in NAD  HEENT: clear oropharynx, anicteric sclera, pink conjunctiva  Neck: supple  CV: normal S1/S2 with no murmur rubs or gallops  Lungs: positive air movement b/l ant lungs,clear to auscultation, no wheezes, no rales  Abdomen: soft non-tender ,distended, no hepatosplenomegaly  Ext: no clubbing cyanosis or edema  Skin: no rashes and no petechiae  Neuro: alert with no focal deficits      LABS:                          11.8   13.07 )-----------( 227      ( 13 Sep 2019 06:50 )             36.9         Mean Cell Volume : 89.8 fL  Mean Cell Hemoglobin : 28.7 pg  Mean Cell Hemoglobin Concentration : 32.0 %  Auto Neutrophil # : 9.05 K/uL  Auto Lymphocyte # : 0.81 K/uL  Auto Monocyte # : 2.97 K/uL  Auto Eosinophil # : 0.02 K/uL  Auto Basophil # : 0.06 K/uL  Auto Neutrophil % : 69.2 %  Auto Lymphocyte % : 6.2 %  Auto Monocyte % : 22.7 %  Auto Eosinophil % : 0.2 %  Auto Basophil % : 0.5 %      Serial CBC's  09-13 @ 06:50  Hct-36.9 / Hgb-11.8 / Plat-227 / RBC-4.11 / WBC-13.07  Serial CBC's  09-12 @ 06:08  Hct-31.5 / Hgb-10.3 / Plat-237 / RBC-3.56 / WBC-7.82  Serial CBC's  09-10 @ 11:55  Hct-31.5 / Hgb-10.4 / Plat-225 / RBC-3.60 / WBC-7.47      09-13    128<L>  |  96<L>  |  54<H>  ----------------------------<  148<H>  4.5   |  20<L>  |  2.27<H>    Ca    11.3<H>      13 Sep 2019 06:50  Phos  4.3     09-12  Mg     2.1     09-13            Ferritin, Serum: 214.6 ng/mL (09-12 @ 06:08)              BLOOD SMEAR INTERPRETATION:       RADIOLOGY & ADDITIONAL STUDIES:  < from: CT Abdomen and Pelvis w/ Oral Cont (09.10.19 @ 18:32) >  EXAM:  CT ABDOMEN AND PELVIS OC      EXAM:  CT CHEST OC        PROCEDURE DATE:  Sep 10 2019         INTERPRETATION:  CLINICAL INFORMATION: Hypercalcemia R 55.     COMPARISON: None.    PROCEDURE:   CT of the Chest, Abdomen and Pelvis was performed without intravenous   contrast.   Intravenous contrast: None.  Oral contrast: Positive contrast was administered.  Sagittal and coronal reformats were performed.    FINDINGS:    CHEST:     LUNGS AND LARGE AIRWAYS: Patent central airways. Scattered pulmonary   nodules in the bilateral lungs, the largest measuring 1.4 cm in the right   lower lobe.    < end of copied text >  < from: CT Abdomen and Pelvis w/ Oral Cont (09.10.19 @ 18:32) >  PLEURA: A 1.3 x 0.5 cm pleural-based nodule. No pleural effusion.  VESSELS: Atherosclerotic changes of the thoracic aorta.  HEART: Heart size is normal. No pericardial effusion. Coronary artery   calcifications.  MEDIASTINUM AND AMBERLY: Bilateral hilar, subcarinal, and paratracheal   lymphadenopathy. For reference, a 2.8 x 2.0 cm subcarinal lymph node   (2:38). Additional subcentimeter prevascular lymph nodes.  CHEST WALL AND LOWER NECK: Within normal limits.    ABDOMEN AND PELVIS:    LIVER: Within normal limits.  BILE DUCTS: Normal caliber.  GALLBLADDER: Cholecystectomy.  SPLEEN: Ill-defined hypodensity involving the majority of the spleen.   Spleenis normal in size, measuring 10.0 cm in craniocaudal diameter.  PANCREAS: Within normal limits.  ADRENALS: Within normal limits.    < end of copied text >  < from: CT Abdomen and Pelvis w/ Oral Cont (09.10.19 @ 18:32) >  BOWEL: No bowel obstruction. Appendix is normal.  PERITONEUM: No ascites.  VESSELS: Atherosclerotic changes.  RETROPERITONEUM/LYMPH NODES: Retroperitoneal lymphadenopathy, for   reference a 1.9 x 1.3 cm left gastric lymph node (2:66). A 2.6 x 1.5 cm   left retrocrural lymph node. Subcentimeter mesenteric lymph nodes.  ABDOMINAL WALL: Small fat-containing umbilical hernia.  BONES: Degenerative changes.    < end of copied text >  < from: CT Abdomen and Pelvis w/ Oral Cont (09.10.19 @ 18:32) >  IMPRESSION:     Scattered bilateral pulmonary nodules and mediastinal lymphadenopathy.    Retroperitoneal lymphadenopathy and ill-defined hypodensity involving the   spleen.    Primary consideration is lymphoma with possible splenic involvement.            < end of copied text > Patient is a 79y old  Male who presents with a chief complaint of sob (11 Sep 2019 18:17)      HPI:  79 year old male with a history of CAD, DM2, HTN, HLD & hypothyroidism presents with constipation  and "rectal issue" since yesterday. Patient states that his abdomen feels distended and "filled with gas". Patient's last bowel movement was on Wednesday during the day. Patient denies abdominal pain, but states that he feels "uneasy". Patient denies nausea, vomiting, dysphagia, blood in stool, weight changes, chest pain, SOB, fever, chills.   As per ED note, the patient's daughter stated that he had a syncopal episode and fall while changing his clothes six days ago. She reports that the patient lost consciousness for about a minute. She denies head trauma. She mentions that he has not had an appetite for the past 6 days and has not been eating much.   Patient denies this syncopal episode, loss of consciousness, or dizziness. (30 Aug 2019 15:17)       ROS:  Negative except for: Constipation and abdominal distention    PAST MEDICAL & SURGICAL HISTORY:  CAD (coronary artery disease)  Adult hypothyroidism  Hyperlipidemia  Peptic ulcer: s/p treatment for H pylori  Diabetes  Essential hypertension  S/P coronary artery stent placement: x4, last one in early 2018      SOCIAL HISTORY:    FAMILY HISTORY:  No pertinent family history in first degree relatives      MEDICATIONS  (STANDING):  aspirin enteric coated 81 milliGRAM(s) Oral daily  dextrose 5%. 1000 milliLiter(s) (50 mL/Hr) IV Continuous <Continuous>  dextrose 50% Injectable 12.5 Gram(s) IV Push once  dextrose 50% Injectable 25 Gram(s) IV Push once  dextrose 50% Injectable 25 Gram(s) IV Push once  docusate sodium 100 milliGRAM(s) Oral three times a day  famotidine    Tablet 10 milliGRAM(s) Oral daily  fludroCORTISONE 0.1 milliGRAM(s) Oral <User Schedule>  gabapentin 400 milliGRAM(s) Oral at bedtime  heparin  Injectable 5000 Unit(s) SubCutaneous every 8 hours  insulin glargine Injectable (LANTUS) 10 Unit(s) SubCutaneous at bedtime  insulin lispro (HumaLOG) corrective regimen sliding scale   SubCutaneous at bedtime  insulin lispro (HumaLOG) corrective regimen sliding scale   SubCutaneous three times a day before meals  levothyroxine 88 MICROGram(s) Oral daily  metoprolol succinate ER 12.5 milliGRAM(s) Oral daily  polyethylene glycol 3350 17 Gram(s) Oral daily  senna 2 Tablet(s) Oral at bedtime  simvastatin 20 milliGRAM(s) Oral at bedtime    MEDICATIONS  (PRN):  dextrose 40% Gel 15 Gram(s) Oral once PRN Blood Glucose LESS THAN 70 milliGRAM(s)/deciliter  glucagon  Injectable 1 milliGRAM(s) IntraMuscular once PRN Glucose LESS THAN 70 milligrams/deciliter  haloperidol    Injectable 1 milliGRAM(s) IV Push every 6 hours PRN Agitation      Allergies    No Known Allergies    Intolerances        Vital Signs Last 24 Hrs  T(C): 36.7 (13 Sep 2019 17:02), Max: 36.9 (13 Sep 2019 00:15)  T(F): 98 (13 Sep 2019 17:02), Max: 98.5 (13 Sep 2019 00:15)  HR: 70 (13 Sep 2019 17:02) (70 - 99)  BP: 112/60 (13 Sep 2019 17:02) (88/55 - 112/60)  BP(mean): --  RR: 16 (13 Sep 2019 17:02) (16 - 16)  SpO2: 100% (13 Sep 2019 17:02) (97% - 100%)    PHYSICAL EXAM  General: adult in NAD  HEENT: clear oropharynx, anicteric sclera, pink conjunctiva  Neck: supple  CV: normal S1/S2 with no murmur rubs or gallops  Lungs: positive air movement b/l ant lungs,clear to auscultation, no wheezes, no rales  Abdomen: soft non-tender ,distended, no hepatosplenomegaly  Ext: no clubbing cyanosis or edema  Skin: no rashes and no petechiae  Neuro: alert with no focal deficits      LABS:                          11.8   13.07 )-----------( 227      ( 13 Sep 2019 06:50 )             36.9         Mean Cell Volume : 89.8 fL  Mean Cell Hemoglobin : 28.7 pg  Mean Cell Hemoglobin Concentration : 32.0 %  Auto Neutrophil # : 9.05 K/uL  Auto Lymphocyte # : 0.81 K/uL  Auto Monocyte # : 2.97 K/uL  Auto Eosinophil # : 0.02 K/uL  Auto Basophil # : 0.06 K/uL  Auto Neutrophil % : 69.2 %  Auto Lymphocyte % : 6.2 %  Auto Monocyte % : 22.7 %  Auto Eosinophil % : 0.2 %  Auto Basophil % : 0.5 %      Serial CBC's  09-13 @ 06:50  Hct-36.9 / Hgb-11.8 / Plat-227 / RBC-4.11 / WBC-13.07  Serial CBC's  09-12 @ 06:08  Hct-31.5 / Hgb-10.3 / Plat-237 / RBC-3.56 / WBC-7.82  Serial CBC's  09-10 @ 11:55  Hct-31.5 / Hgb-10.4 / Plat-225 / RBC-3.60 / WBC-7.47      09-13    128<L>  |  96<L>  |  54<H>  ----------------------------<  148<H>  4.5   |  20<L>  |  2.27<H>    Ca    11.3<H>      13 Sep 2019 06:50  Phos  4.3     09-12  Mg     2.1     09-13            Ferritin, Serum: 214.6 ng/mL (09-12 @ 06:08)              BLOOD SMEAR INTERPRETATION:       RADIOLOGY & ADDITIONAL STUDIES:  < from: CT Abdomen and Pelvis w/ Oral Cont (09.10.19 @ 18:32) >  EXAM:  CT ABDOMEN AND PELVIS OC      EXAM:  CT CHEST OC        PROCEDURE DATE:  Sep 10 2019         INTERPRETATION:  CLINICAL INFORMATION: Hypercalcemia R 55.     COMPARISON: None.    PROCEDURE:   CT of the Chest, Abdomen and Pelvis was performed without intravenous   contrast.   Intravenous contrast: None.  Oral contrast: Positive contrast was administered.  Sagittal and coronal reformats were performed.    FINDINGS:    CHEST:     LUNGS AND LARGE AIRWAYS: Patent central airways. Scattered pulmonary   nodules in the bilateral lungs, the largest measuring 1.4 cm in the right   lower lobe.    < end of copied text >  < from: CT Abdomen and Pelvis w/ Oral Cont (09.10.19 @ 18:32) >  PLEURA: A 1.3 x 0.5 cm pleural-based nodule. No pleural effusion.  VESSELS: Atherosclerotic changes of the thoracic aorta.  HEART: Heart size is normal. No pericardial effusion. Coronary artery   calcifications.  MEDIASTINUM AND AMBERLY: Bilateral hilar, subcarinal, and paratracheal   lymphadenopathy. For reference, a 2.8 x 2.0 cm subcarinal lymph node   (2:38). Additional subcentimeter prevascular lymph nodes.  CHEST WALL AND LOWER NECK: Within normal limits.    ABDOMEN AND PELVIS:    LIVER: Within normal limits.  BILE DUCTS: Normal caliber.  GALLBLADDER: Cholecystectomy.  SPLEEN: Ill-defined hypodensity involving the majority of the spleen.   Spleenis normal in size, measuring 10.0 cm in craniocaudal diameter.  PANCREAS: Within normal limits.  ADRENALS: Within normal limits.    < end of copied text >  < from: CT Abdomen and Pelvis w/ Oral Cont (09.10.19 @ 18:32) >  BOWEL: No bowel obstruction. Appendix is normal.  PERITONEUM: No ascites.  VESSELS: Atherosclerotic changes.  RETROPERITONEUM/LYMPH NODES: Retroperitoneal lymphadenopathy, for   reference a 1.9 x 1.3 cm left gastric lymph node (2:66). A 2.6 x 1.5 cm   left retrocrural lymph node. Subcentimeter mesenteric lymph nodes.  ABDOMINAL WALL: Small fat-containing umbilical hernia.  BONES: Degenerative changes.    < end of copied text >  < from: CT Abdomen and Pelvis w/ Oral Cont (09.10.19 @ 18:32) >  IMPRESSION:     Scattered bilateral pulmonary nodules and mediastinal lymphadenopathy.    Retroperitoneal lymphadenopathy and ill-defined hypodensity involving the   spleen.    Primary consideration is lymphoma with possible splenic involvement.            < end of copied text >

## 2019-09-13 NOTE — CONSULT NOTE ADULT - ASSESSMENT
78 y/o male admitted for syncopal work up found to have diffuse lymphadenopathy on CT scan, surgical eval for excisional LN biopsy     - consider IR for LN biopsy or GI consult for EUS guided biopsy of gastric nodes  - difficult to access RP nodes, surgery would be too invasive for diagnostic purposes  - care per primary team   - Discussed with Dr. Amado GAYTAN Team Surgery

## 2019-09-13 NOTE — PROGRESS NOTE ADULT - ASSESSMENT
79 year old male with a history of CAD, DM2, HTN, and HLD presents with constipation and possible syncope    Hypotension  florinef increased to 0.2 daily on 9/11/19, however bp still drops during the day  will change to florinef 0.1mg Q8  monitor BP closely    CKD (chronic kidney disease), stage IV    Baseline ~ 2.2-2.5.   Currently stable    avoid nephrotoxic agents      hyponatremia  work up suggests SIADH + hyperglycemia  optimize DM control   TSH normal and cortisol low  continue florinef  free water restriction <1L/day  monitor bmp  avoid hypotonic solutions    Hypercalcemia  PTH 20, Vit D 1,25 OH is high but vit D 25-OH is normal  K/L ratio nml  mild proteinuria. vasculitis w/u in office neg except +ANCA  was referred for rheum eval in office but unsure if patient followed up  repeat anca here neg on 9/3     ACE level ok  QuantiFeron indeterminate, chest xray clear  pending PTH RP    calcium persistently high, s/p CT abd/plevic/chest showed: Scattered bilateral pulmonary nodules and mediastinal lymphadenopathy. possible lymphoma with possible splenic involvement." hemo/onc on board recommended biopsy.     calcitonin if needed to rapidly decrease Ca but tachyphylaxis common so avoid use unless Ca >14  w/ current Ca level and CKD stage IV, avoid using bisphosphonate  continue to monitor closely    Uncontrolled DM  better    proteinuria  UA without protein or blood   urine p/c ratio 158mg/day  mild  monitor 79 year old male with a history of CAD, DM2, HTN, and HLD presents with constipation and possible syncope    Hypotension  florinef increased to 0.2 daily on 9/11/19, however bp still drops during the day  will change to florinef 0.1mg Q8 hrs  monitor BP closely    CKD (chronic kidney disease), stage IV    Baseline ~ 2.2-2.5.   Currently stable    avoid nephrotoxic agents      hyponatremia  work up suggests SIADH + hyperglycemia  optimize DM control   TSH normal and cortisol low  continue florinef as above  free water restriction <1L/day  monitor bmp  avoid hypotonic solutions    Hypercalcemia  PTH 20, Vit D 1,25 OH is high but vit D 25-OH is normal  K/L ratio nml  mild proteinuria. vasculitis w/u in office neg except +ANCA  was referred for rheum eval in office but unsure if patient followed up  repeat anca here neg on 9/3     ACE level ok  QuantiFeron indeterminate, chest xray clear  pending PTH RP    calcium persistently high, s/p CT abd/plevic/chest showed: Scattered bilateral pulmonary nodules and mediastinal lymphadenopathy. possible lymphoma with possible splenic involvement." hemo/onc following and recommended biopsy.     continue to monitor closely    Uncontrolled DM  better    proteinuria  UA without protein or blood   urine p/c ratio 158mg/day  mild  monitor

## 2019-09-13 NOTE — CONSULT NOTE ADULT - ASSESSMENT
79 year old male with a history of CAD, DM2, HTN, HLD, Hypothyroidism, PUD in 2014 presents with constipation x 1 day and syncope with collapse 6 days ago, admitted to telemetry for syncope workup, elevated troponin 101, Pt also with hypoglycemia FS 37 upon presentation. Pt also with Acute on Chronic Renal Insufficiency & Hypercalcemia .Workup showed mediastinal and retroperitoneal LNs concerning for lymphoma. Nucleated red blood cells on peripheral smear.  Patient will need staging PET scan followed by LN biopsy of the most active and assessable LN. Given normal LDH, high /intermediate grade lymphoma unlikely. Will also recommend peripheral blood flow cytometry. Patient being followed by nephrology and cardiology.Is on vigorous hydrtion for hypercalcemia. Parathyroid related peptide being requested.  Patient will be followed by house oncology for further care. Please reconsult as needed. Phone message was left for patient's daughter.  Thanks

## 2019-09-14 LAB
ALBUMIN SERPL ELPH-MCNC: 3 G/DL — LOW (ref 3.3–5)
ALP SERPL-CCNC: 78 U/L — SIGNIFICANT CHANGE UP (ref 40–120)
ALT FLD-CCNC: 30 U/L — SIGNIFICANT CHANGE UP (ref 4–41)
ANION GAP SERPL CALC-SCNC: 12 MMO/L — SIGNIFICANT CHANGE UP (ref 7–14)
AST SERPL-CCNC: 28 U/L — SIGNIFICANT CHANGE UP (ref 4–40)
BASOPHILS # BLD AUTO: 0.03 K/UL — SIGNIFICANT CHANGE UP (ref 0–0.2)
BASOPHILS NFR BLD AUTO: 0.3 % — SIGNIFICANT CHANGE UP (ref 0–2)
BILIRUB SERPL-MCNC: 0.8 MG/DL — SIGNIFICANT CHANGE UP (ref 0.2–1.2)
BUN SERPL-MCNC: 56 MG/DL — HIGH (ref 7–23)
CALCIUM SERPL-MCNC: 11 MG/DL — HIGH (ref 8.4–10.5)
CHLORIDE SERPL-SCNC: 95 MMOL/L — LOW (ref 98–107)
CO2 SERPL-SCNC: 22 MMOL/L — SIGNIFICANT CHANGE UP (ref 22–31)
CREAT SERPL-MCNC: 2.55 MG/DL — HIGH (ref 0.5–1.3)
EOSINOPHIL # BLD AUTO: 0.03 K/UL — SIGNIFICANT CHANGE UP (ref 0–0.5)
EOSINOPHIL NFR BLD AUTO: 0.3 % — SIGNIFICANT CHANGE UP (ref 0–6)
GAS PNL BLDMV: SIGNIFICANT CHANGE UP
GLUCOSE SERPL-MCNC: 186 MG/DL — HIGH (ref 70–99)
HCT VFR BLD CALC: 33.9 % — LOW (ref 39–50)
HGB BLD-MCNC: 11.2 G/DL — LOW (ref 13–17)
IMM GRANULOCYTES NFR BLD AUTO: 1.2 % — SIGNIFICANT CHANGE UP (ref 0–1.5)
LYMPHOCYTES # BLD AUTO: 0.54 K/UL — LOW (ref 1–3.3)
LYMPHOCYTES # BLD AUTO: 5.6 % — LOW (ref 13–44)
MCHC RBC-ENTMCNC: 29.4 PG — SIGNIFICANT CHANGE UP (ref 27–34)
MCHC RBC-ENTMCNC: 33 % — SIGNIFICANT CHANGE UP (ref 32–36)
MCV RBC AUTO: 89 FL — SIGNIFICANT CHANGE UP (ref 80–100)
MONOCYTES # BLD AUTO: 2.04 K/UL — HIGH (ref 0–0.9)
MONOCYTES NFR BLD AUTO: 21.2 % — HIGH (ref 2–14)
NEUTROPHILS # BLD AUTO: 6.85 K/UL — SIGNIFICANT CHANGE UP (ref 1.8–7.4)
NEUTROPHILS NFR BLD AUTO: 71.4 % — SIGNIFICANT CHANGE UP (ref 43–77)
NRBC # FLD: 0.02 K/UL — SIGNIFICANT CHANGE UP (ref 0–0)
PLATELET # BLD AUTO: 209 K/UL — SIGNIFICANT CHANGE UP (ref 150–400)
PMV BLD: 11.1 FL — SIGNIFICANT CHANGE UP (ref 7–13)
POTASSIUM SERPL-MCNC: 4.6 MMOL/L — SIGNIFICANT CHANGE UP (ref 3.5–5.3)
POTASSIUM SERPL-SCNC: 4.6 MMOL/L — SIGNIFICANT CHANGE UP (ref 3.5–5.3)
PROT SERPL-MCNC: 6.2 G/DL — SIGNIFICANT CHANGE UP (ref 6–8.3)
RBC # BLD: 3.81 M/UL — LOW (ref 4.2–5.8)
RBC # FLD: 14.9 % — HIGH (ref 10.3–14.5)
SODIUM SERPL-SCNC: 129 MMOL/L — LOW (ref 135–145)
WBC # BLD: 9.61 K/UL — SIGNIFICANT CHANGE UP (ref 3.8–10.5)
WBC # FLD AUTO: 9.61 K/UL — SIGNIFICANT CHANGE UP (ref 3.8–10.5)

## 2019-09-14 RX ORDER — SODIUM CHLORIDE 9 MG/ML
500 INJECTION INTRAMUSCULAR; INTRAVENOUS; SUBCUTANEOUS ONCE
Refills: 0 | Status: COMPLETED | OUTPATIENT
Start: 2019-09-14 | End: 2019-09-14

## 2019-09-14 RX ADMIN — HEPARIN SODIUM 5000 UNIT(S): 5000 INJECTION INTRAVENOUS; SUBCUTANEOUS at 13:22

## 2019-09-14 RX ADMIN — GABAPENTIN 400 MILLIGRAM(S): 400 CAPSULE ORAL at 21:02

## 2019-09-14 RX ADMIN — SODIUM CHLORIDE 500 MILLILITER(S): 9 INJECTION INTRAMUSCULAR; INTRAVENOUS; SUBCUTANEOUS at 09:54

## 2019-09-14 RX ADMIN — Medication 88 MICROGRAM(S): at 05:12

## 2019-09-14 RX ADMIN — INSULIN GLARGINE 10 UNIT(S): 100 INJECTION, SOLUTION SUBCUTANEOUS at 21:02

## 2019-09-14 RX ADMIN — SENNA PLUS 2 TABLET(S): 8.6 TABLET ORAL at 21:02

## 2019-09-14 RX ADMIN — Medication 1: at 17:48

## 2019-09-14 RX ADMIN — Medication 100 MILLIGRAM(S): at 21:02

## 2019-09-14 RX ADMIN — Medication 100 MILLIGRAM(S): at 05:12

## 2019-09-14 RX ADMIN — Medication 81 MILLIGRAM(S): at 09:26

## 2019-09-14 RX ADMIN — Medication 3: at 13:22

## 2019-09-14 RX ADMIN — Medication 2: at 21:03

## 2019-09-14 RX ADMIN — Medication 1: at 09:26

## 2019-09-14 RX ADMIN — FLUDROCORTISONE ACETATE 0.1 MILLIGRAM(S): 0.1 TABLET ORAL at 21:02

## 2019-09-14 RX ADMIN — FAMOTIDINE 10 MILLIGRAM(S): 10 INJECTION INTRAVENOUS at 09:27

## 2019-09-14 RX ADMIN — FLUDROCORTISONE ACETATE 0.1 MILLIGRAM(S): 0.1 TABLET ORAL at 05:12

## 2019-09-14 RX ADMIN — Medication 100 MILLIGRAM(S): at 13:22

## 2019-09-14 RX ADMIN — SIMVASTATIN 20 MILLIGRAM(S): 20 TABLET, FILM COATED ORAL at 21:02

## 2019-09-14 RX ADMIN — FLUDROCORTISONE ACETATE 0.1 MILLIGRAM(S): 0.1 TABLET ORAL at 13:23

## 2019-09-14 RX ADMIN — POLYETHYLENE GLYCOL 3350 17 GRAM(S): 17 POWDER, FOR SOLUTION ORAL at 09:26

## 2019-09-14 RX ADMIN — HEPARIN SODIUM 5000 UNIT(S): 5000 INJECTION INTRAVENOUS; SUBCUTANEOUS at 21:02

## 2019-09-14 NOTE — PROGRESS NOTE ADULT - SUBJECTIVE AND OBJECTIVE BOX
CARLOS ENRIQUE TATE  79y  Patient is a 79y old  Male who presents with a chief complaint of Severe constipation and abdominal distention ,recent history of syncope (13 Sep 2019 18:37)    HPI:  Followed for FRANCES, CKD and Hypotension, on Florinef.  Hypotensive last night.    HEALTH ISSUES - PROBLEM Dx:  Adjustment disorder  Hyponatremia syndrome: Hyponatremia syndrome  CAD (coronary artery disease): CAD (coronary artery disease)  Hypercalcemia: Hypercalcemia  Acute on chronic renal insufficiency: Acute on chronic renal insufficiency  Prophylactic measure: Prophylactic measure  Hypothyroidism: Hypothyroidism  Essential hypertension: Essential hypertension  Hyperlipidemia: Hyperlipidemia  Type 2 diabetes mellitus: Type 2 diabetes mellitus  Constipation: Constipation  Hypoglycemia: Hypoglycemia  Syncope and collapse: Syncope and collapse        MEDICATIONS  (STANDING):  aspirin enteric coated 81 milliGRAM(s) Oral daily  dextrose 5%. 1000 milliLiter(s) (50 mL/Hr) IV Continuous <Continuous>  dextrose 50% Injectable 12.5 Gram(s) IV Push once  dextrose 50% Injectable 25 Gram(s) IV Push once  dextrose 50% Injectable 25 Gram(s) IV Push once  docusate sodium 100 milliGRAM(s) Oral three times a day  famotidine    Tablet 10 milliGRAM(s) Oral daily  fludroCORTISONE 0.1 milliGRAM(s) Oral <User Schedule>  gabapentin 400 milliGRAM(s) Oral at bedtime  heparin  Injectable 5000 Unit(s) SubCutaneous every 8 hours  insulin glargine Injectable (LANTUS) 10 Unit(s) SubCutaneous at bedtime  insulin lispro (HumaLOG) corrective regimen sliding scale   SubCutaneous at bedtime  insulin lispro (HumaLOG) corrective regimen sliding scale   SubCutaneous three times a day before meals  levothyroxine 88 MICROGram(s) Oral daily  metoprolol succinate ER 12.5 milliGRAM(s) Oral daily  polyethylene glycol 3350 17 Gram(s) Oral daily  senna 2 Tablet(s) Oral at bedtime  simvastatin 20 milliGRAM(s) Oral at bedtime    MEDICATIONS  (PRN):  dextrose 40% Gel 15 Gram(s) Oral once PRN Blood Glucose LESS THAN 70 milliGRAM(s)/deciliter  glucagon  Injectable 1 milliGRAM(s) IntraMuscular once PRN Glucose LESS THAN 70 milligrams/deciliter  haloperidol    Injectable 1 milliGRAM(s) IV Push every 6 hours PRN Agitation    Vital Signs Last 24 Hrs  T(C): 36.6 (14 Sep 2019 05:00), Max: 37.5 (13 Sep 2019 21:00)  T(F): 97.8 (14 Sep 2019 05:00), Max: 99.5 (13 Sep 2019 21:00)  HR: 96 (14 Sep 2019 05:00) (70 - 98)  BP: 91/56 (14 Sep 2019 05:00) (91/56 - 115/61)  BP(mean): --  RR: 16 (14 Sep 2019 05:00) (16 - 16)  SpO2: 97% (14 Sep 2019 05:00) (97% - 100%)  Daily     Daily     PHYSICAL EXAM:  Constitutional:  He appears comfortable and not distressed. Not diaphoretic.    Neck:  The thyroid is normal. Trachea is midline.     Respiratory: The lungs are clear to auscultation. No dullness and expansion is normal.    Cardiovascular: S1 and S2 are normal. No mummurs, rubs or gallops are present.    Gastrointestinal: The abdomen is soft. No tenderness is present. No masses are present. Bowel sounds are normal.    Genitourinary: The bladder is not distended. No CVA tenderness is present.    Extremities: No edema is noted. No deformities are present.    Neurological: Cognition is normal. Tone, power and sensation are normal.                               11.2   9.61  )-----------( 209      ( 14 Sep 2019 07:00 )             33.9     09-14    129<L>  |  95<L>  |  56<H>  ----------------------------<  186<H>  4.6   |  22  |  2.55<H>    Ca    11.0<H>      14 Sep 2019 07:00  Mg     2.1     09-13    TPro  6.2  /  Alb  3.0<L>  /  TBili  0.8  /  DBili  x   /  AST  28  /  ALT  30  /  AlkPhos  78  09-14

## 2019-09-14 NOTE — PROGRESS NOTE ADULT - SUBJECTIVE AND OBJECTIVE BOX
Patient is a 79y old  Male who presents with a chief complaint of Hypotension (08 Sep 2019 15:36)  BP is labile  No dizziness reported    INTERVAL HPI/OVERNIGHT EVENTS:  No new events reported  No dizziness tele uneventful  Plavix has been held, as per Dr Browne, his daughter, his PCI was over a year ago.  T(C): 36.7 (09-10-19 @ 12:25), Max: 36.7 (09-10-19 @ 00:25)  HR: 95 (09-10-19 @ 12:25) (70 - 98)  BP: 113/72 (09-10-19 @ 12:25) (92/56 - 125/60)  RR: 16 (09-10-19 @ 12:25) (14 - 16)  SpO2: 97% (09-10-19 @ 12:25) (97% - 100%)  Wt(kg): --  I&O's Summary    09 Sep 2019 07:01  -  10 Sep 2019 07:00  --------------------------------------------------------  IN: 120 mL / OUT: 1850 mL / NET: -1730 mL    10 Sep 2019 07:01  -  10 Sep 2019 18:02  --------------------------------------------------------  IN: 0 mL / OUT: 600 mL / NET: -600 mL        LABS:                        10.4   7.47  )-----------( 225      ( 10 Sep 2019 11:55 )             31.5     09-10    125<L>  |  91<L>  |  55<H>  ----------------------------<  387<H>  4.8   |  22  |  1.99<H>    Ca    11.1<H>      10 Sep 2019 11:55  Phos  4.4     09-10  Mg     2.0     09-10          CAPILLARY BLOOD GLUCOSE      POCT Blood Glucose.: 354 mg/dL (10 Sep 2019 17:07)  POCT Blood Glucose.: 349 mg/dL (10 Sep 2019 12:21)  POCT Blood Glucose.: 247 mg/dL (10 Sep 2019 08:45)  POCT Blood Glucose.: 353 mg/dL (09 Sep 2019 21:29)            MEDICATIONS  (STANDING):  aspirin enteric coated 81 milliGRAM(s) Oral daily  clopidogrel Tablet 75 milliGRAM(s) Oral daily  dextrose 5%. 1000 milliLiter(s) (50 mL/Hr) IV Continuous <Continuous>  dextrose 50% Injectable 12.5 Gram(s) IV Push once  dextrose 50% Injectable 25 Gram(s) IV Push once  dextrose 50% Injectable 25 Gram(s) IV Push once  docusate sodium 100 milliGRAM(s) Oral three times a day  famotidine    Tablet 10 milliGRAM(s) Oral daily  fludroCORTISONE 0.1 milliGRAM(s) Oral daily  gabapentin 400 milliGRAM(s) Oral at bedtime  heparin  Injectable 5000 Unit(s) SubCutaneous every 8 hours  insulin glargine Injectable (LANTUS) 6 Unit(s) SubCutaneous at bedtime  insulin lispro (HumaLOG) corrective regimen sliding scale   SubCutaneous at bedtime  insulin lispro (HumaLOG) corrective regimen sliding scale   SubCutaneous three times a day before meals  levothyroxine 88 MICROGram(s) Oral daily  polyethylene glycol 3350 17 Gram(s) Oral daily  predniSONE   Tablet 10 milliGRAM(s) Oral daily  predniSONE   Tablet 5 milliGRAM(s) Oral daily  senna 2 Tablet(s) Oral at bedtime  simvastatin 20 milliGRAM(s) Oral at bedtime    MEDICATIONS  (PRN):  dextrose 40% Gel 15 Gram(s) Oral once PRN Blood Glucose LESS THAN 70 milliGRAM(s)/deciliter  glucagon  Injectable 1 milliGRAM(s) IntraMuscular once PRN Glucose LESS THAN 70 milligrams/deciliter          PHYSICAL EXAM:  GENERAL: NAD, well-groomed, well-developed  HEAD:  Atraumatic, Normocephalic  CHEST/LUNG: Clear to percussion bilaterally; No rales, rhonchi, wheezing, or rubs  HEART: Regular rate and rhythm; No murmurs, rubs, or gallops  ABDOMEN: Soft, Nontender, Nondistended; Bowel sounds present  EXTREMITIES:  2+ Peripheral Pulses, No clubbing, cyanosis, or edema  LYMPH: No lymphadenopathy noted  SKIN: No rashes or lesions    Care Discussed with Consultants/Other Providers [ ] YES  [ ] NO

## 2019-09-14 NOTE — CHART NOTE - NSCHARTNOTEFT_GEN_A_CORE
writer called by primary Rn via patient's low pressure of 71/43, writer examined patient at bedside - patient alert and oriented x 3, eating breakfast, verbalizing he needs to walk to the bathroom and walk around sometimes. Patient denies any dizziness, no chest pain or shortness of breathe noted, vital signs otherwise within normal limits. Normal saline 0.9% 500cc bolus ordered and will monitor blood pressure accordingly.

## 2019-09-15 LAB
ALBUMIN SERPL ELPH-MCNC: 2.6 G/DL — LOW (ref 3.3–5)
ALP SERPL-CCNC: 81 U/L — SIGNIFICANT CHANGE UP (ref 40–120)
ALT FLD-CCNC: 42 U/L — HIGH (ref 4–41)
ANION GAP SERPL CALC-SCNC: 16 MMO/L — HIGH (ref 7–14)
AST SERPL-CCNC: 44 U/L — HIGH (ref 4–40)
BILIRUB SERPL-MCNC: 0.7 MG/DL — SIGNIFICANT CHANGE UP (ref 0.2–1.2)
BUN SERPL-MCNC: 49 MG/DL — HIGH (ref 7–23)
CALCIUM SERPL-MCNC: 10.2 MG/DL — SIGNIFICANT CHANGE UP (ref 8.4–10.5)
CHLORIDE SERPL-SCNC: 94 MMOL/L — LOW (ref 98–107)
CO2 SERPL-SCNC: 16 MMOL/L — LOW (ref 22–31)
CREAT SERPL-MCNC: 2.53 MG/DL — HIGH (ref 0.5–1.3)
GLUCOSE SERPL-MCNC: 204 MG/DL — HIGH (ref 70–99)
HCT VFR BLD CALC: 32.1 % — LOW (ref 39–50)
HGB BLD-MCNC: 10.5 G/DL — LOW (ref 13–17)
MCHC RBC-ENTMCNC: 29.4 PG — SIGNIFICANT CHANGE UP (ref 27–34)
MCHC RBC-ENTMCNC: 32.7 % — SIGNIFICANT CHANGE UP (ref 32–36)
MCV RBC AUTO: 89.9 FL — SIGNIFICANT CHANGE UP (ref 80–100)
NRBC # FLD: 0 K/UL — SIGNIFICANT CHANGE UP (ref 0–0)
PLATELET # BLD AUTO: 203 K/UL — SIGNIFICANT CHANGE UP (ref 150–400)
PMV BLD: 11.3 FL — SIGNIFICANT CHANGE UP (ref 7–13)
POTASSIUM SERPL-MCNC: 4.8 MMOL/L — SIGNIFICANT CHANGE UP (ref 3.5–5.3)
POTASSIUM SERPL-SCNC: 4.8 MMOL/L — SIGNIFICANT CHANGE UP (ref 3.5–5.3)
PROT SERPL-MCNC: 5.4 G/DL — LOW (ref 6–8.3)
RBC # BLD: 3.57 M/UL — LOW (ref 4.2–5.8)
RBC # FLD: 14.8 % — HIGH (ref 10.3–14.5)
SODIUM SERPL-SCNC: 126 MMOL/L — LOW (ref 135–145)
WBC # BLD: 8.46 K/UL — SIGNIFICANT CHANGE UP (ref 3.8–10.5)
WBC # FLD AUTO: 8.46 K/UL — SIGNIFICANT CHANGE UP (ref 3.8–10.5)

## 2019-09-15 PROCEDURE — 74018 RADEX ABDOMEN 1 VIEW: CPT | Mod: 26

## 2019-09-15 RX ORDER — SIMETHICONE 80 MG/1
80 TABLET, CHEWABLE ORAL DAILY
Refills: 0 | Status: DISCONTINUED | OUTPATIENT
Start: 2019-09-15 | End: 2019-09-17

## 2019-09-15 RX ORDER — SODIUM CHLORIDE 9 MG/ML
250 INJECTION INTRAMUSCULAR; INTRAVENOUS; SUBCUTANEOUS ONCE
Refills: 0 | Status: COMPLETED | OUTPATIENT
Start: 2019-09-15 | End: 2019-09-15

## 2019-09-15 RX ADMIN — Medication 1: at 17:32

## 2019-09-15 RX ADMIN — SIMETHICONE 80 MILLIGRAM(S): 80 TABLET, CHEWABLE ORAL at 09:23

## 2019-09-15 RX ADMIN — Medication 1: at 09:21

## 2019-09-15 RX ADMIN — GABAPENTIN 400 MILLIGRAM(S): 400 CAPSULE ORAL at 21:57

## 2019-09-15 RX ADMIN — SODIUM CHLORIDE 500 MILLILITER(S): 9 INJECTION INTRAMUSCULAR; INTRAVENOUS; SUBCUTANEOUS at 05:29

## 2019-09-15 RX ADMIN — INSULIN GLARGINE 10 UNIT(S): 100 INJECTION, SOLUTION SUBCUTANEOUS at 21:57

## 2019-09-15 RX ADMIN — Medication 1: at 12:39

## 2019-09-15 RX ADMIN — FLUDROCORTISONE ACETATE 0.1 MILLIGRAM(S): 0.1 TABLET ORAL at 05:35

## 2019-09-15 RX ADMIN — HEPARIN SODIUM 5000 UNIT(S): 5000 INJECTION INTRAVENOUS; SUBCUTANEOUS at 12:40

## 2019-09-15 RX ADMIN — FLUDROCORTISONE ACETATE 0.1 MILLIGRAM(S): 0.1 TABLET ORAL at 21:57

## 2019-09-15 RX ADMIN — POLYETHYLENE GLYCOL 3350 17 GRAM(S): 17 POWDER, FOR SOLUTION ORAL at 09:21

## 2019-09-15 RX ADMIN — FAMOTIDINE 10 MILLIGRAM(S): 10 INJECTION INTRAVENOUS at 09:21

## 2019-09-15 RX ADMIN — SENNA PLUS 2 TABLET(S): 8.6 TABLET ORAL at 21:57

## 2019-09-15 RX ADMIN — Medication 81 MILLIGRAM(S): at 09:24

## 2019-09-15 RX ADMIN — Medication 100 MILLIGRAM(S): at 05:35

## 2019-09-15 RX ADMIN — SODIUM CHLORIDE 500 MILLILITER(S): 9 INJECTION INTRAMUSCULAR; INTRAVENOUS; SUBCUTANEOUS at 01:30

## 2019-09-15 RX ADMIN — SIMVASTATIN 20 MILLIGRAM(S): 20 TABLET, FILM COATED ORAL at 21:57

## 2019-09-15 RX ADMIN — Medication 100 MILLIGRAM(S): at 12:40

## 2019-09-15 RX ADMIN — Medication 88 MICROGRAM(S): at 05:35

## 2019-09-15 RX ADMIN — HEPARIN SODIUM 5000 UNIT(S): 5000 INJECTION INTRAVENOUS; SUBCUTANEOUS at 21:57

## 2019-09-15 RX ADMIN — SODIUM CHLORIDE 500 MILLILITER(S): 9 INJECTION INTRAMUSCULAR; INTRAVENOUS; SUBCUTANEOUS at 06:02

## 2019-09-15 RX ADMIN — FLUDROCORTISONE ACETATE 0.1 MILLIGRAM(S): 0.1 TABLET ORAL at 12:39

## 2019-09-15 RX ADMIN — Medication 100 MILLIGRAM(S): at 21:57

## 2019-09-15 NOTE — CHART NOTE - NSCHARTNOTEFT_GEN_A_CORE
79 year old male with a history of CAD, DM2, HTN, and HLD, initially admitted for possible syncope. RRT called for s/p fall. Patient has had syncope during this admission. He was responsive when found by nurse. Found to be hypotensive to SBP of 70s, which improved on its own to SBP in 90s (MAP 66). Patient was responding to questions during the RRT and verbally agitated, asking for water. Hypotension has responded to 250-500 cc bolus of IVF in the past. RRT was ended after patient found to have no neurologic deficits on exam and improvement in BP.    To Follow up  [ ] Incident report for fall per primary team  [ ] Work up for syncope  [ ] Complete 250 cc NS bolus x1    Sussy Bowles MD  Internal Medicine PGY2 Rapid Response PGY 2 Note  Patient is a 79y old  Male  admitted for syncope.  Rapid response team called because s/p fall.    Patient was seen and examined at the bedside by the rapid response team.    Allergies    No Known Allergies    Intolerances        PAST MEDICAL & SURGICAL HISTORY:  CAD (coronary artery disease)  Adult hypothyroidism  Hyperlipidemia  Peptic ulcer: s/p treatment for H pylori  Diabetes  Essential hypertension  S/P coronary artery stent placement: x4, last one in early 2018      Vital Signs Last 24 Hrs  T(C): 36.8 (15 Sep 2019 05:36), Max: 36.8 (14 Sep 2019 21:00)  T(F): 98.2 (15 Sep 2019 05:36), Max: 98.2 (14 Sep 2019 21:00)  HR: 96 (15 Sep 2019 05:36) (70 - 100)  BP: 108/66 (15 Sep 2019 05:36) (70/49 - 114/70)  BP(mean): --  RR: 16 (15 Sep 2019 05:36) (16 - 18)  SpO2: 100% (15 Sep 2019 05:36) (96% - 100%)          GENERAL: The patient is awake and alert in no apparent distress, verbally agitated  HEENT: Head is normocephalic and atraumatic. Extraocular muscles are intact. Mucous membranes are moist. No throat erythema/exudates no lymphadenopathy, no JVD,   NECK: Supple.  LUNGS: Clear to auscultation BL without wheezing, rales or rhonchi; respirations unlabored  HEART: Regular rate and rhythm ,+S1/+S2, no murmurs, rubs, gallops  ABDOMEN: Soft, nontender, and nondistended, no rebound, guarding rigidity, bowel sounds in all 4 quadrants  EXTREMITIES: Without any cyanosis, clubbing, rash, lesions or edema.  SKIN: No new rashes or lesions.  MSK: strength equal BL  VASCULAR: Radial and Dorsal pedal pulses palpable BL  NEUROLOGIC: Grossly intact, following commands, but agitated.  PSYCH: No new changes.                            11.2   9.61  )-----------( 209      ( 14 Sep 2019 07:00 )             33.9     09-14    129<L>  |  95<L>  |  56<H>  ----------------------------<  186<H>  4.6   |  22  |  2.55<H>    Ca    11.0<H>      14 Sep 2019 07:00    TPro  6.2  /  Alb  3.0<L>  /  TBili  0.8  /  DBili  x   /  AST  28  /  ALT  30  /  AlkPhos  78  09-14         LIVER FUNCTIONS - ( 14 Sep 2019 07:00 )  Alb: 3.0 g/dL / Pro: 6.2 g/dL / ALK PHOS: 78 u/L / ALT: 30 u/L / AST: 28 u/L / GGT: x                  Vital Signs Last 24 Hrs*       Assessment- Rapid Response called for 79y year old Male with a past medical history of CAD, DM2, HTN, and HLD, initially admitted for possible syncope. RRT called for s/p fall. Patient has had syncope during this admission. He was responsive when found on floor by nurse. Found to be hypotensive to SBP of 70s, which improved on its own to SBP in 90s (MAP 66). Patient was responding to questions during the RRT and verbally agitated, asking for water. Hypotension has responded to 250-500 cc bolus of IVF in the past. RRT was ended after patient found to have no neurologic deficits on exam and improvement in BP.    To Follow up  [ ] Incident report for fall per primary team  [ ] Work up for syncope  [ ] Complete 250 cc NS bolus x1    Sussy Bowles MD  Internal Medicine PGY2

## 2019-09-15 NOTE — PROGRESS NOTE ADULT - ASSESSMENT
79 year old male with a history of CAD, DM2, HTN, and HLD presents with constipation and possible syncope    Hypotension  florinef increased to 0.2 daily on 9/11/19, however bp still drops during the day  will change to florinef 0.1mg Q8 hrs  monitor BP closely    CKD (chronic kidney disease), stage IV    Baseline ~ 2.2-2.5.   Currently stable    avoid nephrotoxic agents      Hyponatremia  Work up suggests SIADH + hyperglycemia  Optimize DM control   TSH normal and cortisol low  Continue florinef as above  Avoid hypotonic solutions    Hypercalcemia  PTH 20, Vit D 1,25 OH is high but vit D 25-OH is normal  K/L ratio nml  mild proteinuria. vasculitis w/u in office neg except +ANCA  was referred for rheum eval in office but unsure if patient followed up  repeat anca here neg on 9/3     ACE level ok  QuantiFeron indeterminate, chest xray clear  pending PTH RP    Calcium persistently high, s/p CT abd/plevic/chest showed: Scattered bilateral pulmonary nodules and mediastinal lymphadenopathy. 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     continue to monitor closely    Uncontrolled DM  better    proteinuria  UA without protein or blood   urine p/c ratio 158mg/day  mild  monitor

## 2019-09-15 NOTE — PROGRESS NOTE ADULT - ASSESSMENT
Problem/Plan - 1:  ·  Problem: Syncope and collapse. with recurrent falls Plan: Telemetry benign so far. Cardiology eval noted. TTE shows some degree of LVOT obstruction. This could be the reason for the weakness, unsteady gait.  Falls probably from hypotension. already on Florinef. Stockings ordered as well    Problem/Plan - 2:  ·  Problem: Hyponatremia syndrome.  Plan: Ser Na still low  Renal FU noted.     Problem/Plan - 3:  ·  Problem: Acute on chronic renal insufficiency.  Plan: Monitor BUN/ creat. F/U Renal c/s Dr Acevedo noted.     Problem/Plan - 4:  ·  Problem: Hypercalcemia.  Plan: Ca improved.     Problem/Plan - 5:  ·  Problem: CAD (coronary artery disease).  Plan: continue ASA, Plavix, nitrates, statin.     Problem/Plan - 6:Problem: Type 2 diabetes mellitus. Plan: Lantus started for uncontrolled diabetes since he is not on his home dose of medications.     Problem # 7: Lung and Abdominal lymphadenopathy: Needs a biopsy. PET can be performed to evaluate lymphadenopathy as well. Plavix held. Onc and Onc surgery eval called  PET scanning needs  to be approved    Problem/Plan - 8:  ·  Problem: Essential hypertension.  Plan: hold meds sec to hypotension    Problem/Plan - 9:·  Problem: Hypothyroidism.  Plan: Continue Levothyroxine, monitor thyroid hormones.

## 2019-09-15 NOTE — CHART NOTE - NSCHARTNOTEFT_GEN_A_CORE
Pt BP low on routine VS check    ICU Vital Signs Last 24 Hrs  T(C): 36.7 (15 Sep 2019 01:00), Max: 36.8 (14 Sep 2019 21:00)  T(F): 98.1 (15 Sep 2019 01:00), Max: 98.2 (14 Sep 2019 21:00)  HR: 100 (15 Sep 2019 01:00) (70 - 100)  BP: 84/50 (15 Sep 2019 01:00) (70/49 - 114/70)  BP(mean): --  ABP: --  ABP(mean): --  RR: 16 (15 Sep 2019 01:00) (16 - 18)  SpO2: 100% (15 Sep 2019 01:00) (96% - 100%)      A/P:  Pts BP has been responding to fluid boluses  250cc .9NS IV Bolus Now, monitor VS symptoms

## 2019-09-15 NOTE — PROGRESS NOTE ADULT - SUBJECTIVE AND OBJECTIVE BOX
CARLOS ENRIQUE TATE  79y  Patient is a 79y old  Male who presents with a chief complaint of Severe constipation and abdominal distention ,recent history of syncope (13 Sep 2019 18:37)    HPI:  He continues to have hypotensive spells, but he states that he is not dizzy.  Follow up for CKD. He is treated with Florinef.    HEALTH ISSUES - PROBLEM Dx:  Adjustment disorder  Hyponatremia syndrome: Hyponatremia syndrome  CAD (coronary artery disease): CAD (coronary artery disease)  Hypercalcemia: Hypercalcemia  Acute on chronic renal insufficiency: Acute on chronic renal insufficiency  Prophylactic measure: Prophylactic measure  Hypothyroidism: Hypothyroidism  Essential hypertension: Essential hypertension  Hyperlipidemia: Hyperlipidemia  Type 2 diabetes mellitus: Type 2 diabetes mellitus  Constipation: Constipation  Hypoglycemia: Hypoglycemia  Syncope and collapse: Syncope and collapse        MEDICATIONS  (STANDING):  aspirin enteric coated 81 milliGRAM(s) Oral daily  dextrose 5%. 1000 milliLiter(s) (50 mL/Hr) IV Continuous <Continuous>  dextrose 50% Injectable 12.5 Gram(s) IV Push once  dextrose 50% Injectable 25 Gram(s) IV Push once  dextrose 50% Injectable 25 Gram(s) IV Push once  docusate sodium 100 milliGRAM(s) Oral three times a day  famotidine    Tablet 10 milliGRAM(s) Oral daily  fludroCORTISONE 0.1 milliGRAM(s) Oral <User Schedule>  gabapentin 400 milliGRAM(s) Oral at bedtime  heparin  Injectable 5000 Unit(s) SubCutaneous every 8 hours  insulin glargine Injectable (LANTUS) 10 Unit(s) SubCutaneous at bedtime  insulin lispro (HumaLOG) corrective regimen sliding scale   SubCutaneous at bedtime  insulin lispro (HumaLOG) corrective regimen sliding scale   SubCutaneous three times a day before meals  levothyroxine 88 MICROGram(s) Oral daily  metoprolol succinate ER 12.5 milliGRAM(s) Oral daily  polyethylene glycol 3350 17 Gram(s) Oral daily  senna 2 Tablet(s) Oral at bedtime  simvastatin 20 milliGRAM(s) Oral at bedtime    MEDICATIONS  (PRN):  dextrose 40% Gel 15 Gram(s) Oral once PRN Blood Glucose LESS THAN 70 milliGRAM(s)/deciliter  glucagon  Injectable 1 milliGRAM(s) IntraMuscular once PRN Glucose LESS THAN 70 milligrams/deciliter  haloperidol    Injectable 1 milliGRAM(s) IV Push every 6 hours PRN Agitation  simethicone 80 milliGRAM(s) Chew daily PRN Gas    Vital Signs Last 24 Hrs  T(C): 36.7 (15 Sep 2019 16:10), Max: 36.8 (14 Sep 2019 21:00)  T(F): 98 (15 Sep 2019 16:10), Max: 98.2 (14 Sep 2019 21:00)  HR: 88 (15 Sep 2019 16:10) (88 - 100)  BP: 100/58 (15 Sep 2019 16:10) (84/50 - 108/66)  BP(mean): --  RR: 16 (15 Sep 2019 16:10) (16 - 16)  SpO2: 100% (15 Sep 2019 16:10) (99% - 100%)  Daily     Daily     PHYSICAL EXAM:  Constitutional: He appears comfortable and not distressed. Not diaphoretic. Clinically appears euvolemic.    Neck:  The thyroid is normal. Trachea is midline.     Respiratory: The lungs are clear to auscultation. No dullness and expansion is normal.    Cardiovascular: S1 and S2 are normal. No mummurs, rubs or gallops are present.    Gastrointestinal: The abdomen is soft. No tenderness is present. No masses are present. Bowel sounds are normal.    Genitourinary: The bladder is not distended. No CVA tenderness is present.    Extremities: No edema is noted. No deformities are present.    Neurological: Cognition is normal. Tone, power and sensation are normal.     Skin: No lesions are seen  or palpated.    Lymph Nodes: No lymphadenopathy is present.                            10.5   8.46  )-----------( 203      ( 15 Sep 2019 12:25 )             32.1     09-15    126<L>  |  94<L>  |  49<H>  ----------------------------<  204<H>  4.8   |  16<L>  |  2.53<H>    Ca    10.2      15 Sep 2019 05:30    TPro  5.4<L>  /  Alb  2.6<L>  /  TBili  0.7  /  DBili  x   /  AST  44<H>  /  ALT  42<H>  /  AlkPhos  81  09-15

## 2019-09-15 NOTE — PROGRESS NOTE ADULT - SUBJECTIVE AND OBJECTIVE BOX
Patient is a 79y old  Male who presents with a chief complaint of Hypotension (08 Sep 2019 15:36)  BP is labile  No dizziness reported  Fell today, no obvious S/S of injury noted  BP is low at times.  Tele uneventful  Plavix has been held, as per Dr Browne, his daughter, his PCI was over a year ago.  T(C): 36.7 (09-10-19 @ 12:25), Max: 36.7 (09-10-19 @ 00:25)  HR: 95 (09-10-19 @ 12:25) (70 - 98)  BP: 113/72 (09-10-19 @ 12:25) (92/56 - 125/60)  RR: 16 (09-10-19 @ 12:25) (14 - 16)  SpO2: 97% (09-10-19 @ 12:25) (97% - 100%)  Wt(kg): --  I&O's Summary    09 Sep 2019 07:01  -  10 Sep 2019 07:00  --------------------------------------------------------  IN: 120 mL / OUT: 1850 mL / NET: -1730 mL    10 Sep 2019 07:01  -  10 Sep 2019 18:02  --------------------------------------------------------  IN: 0 mL / OUT: 600 mL / NET: -600 mL        LABS:                        10.4   7.47  )-----------( 225      ( 10 Sep 2019 11:55 )             31.5     09-10    125<L>  |  91<L>  |  55<H>  ----------------------------<  387<H>  4.8   |  22  |  1.99<H>    Ca    11.1<H>      10 Sep 2019 11:55  Phos  4.4     09-10  Mg     2.0     09-10          CAPILLARY BLOOD GLUCOSE      POCT Blood Glucose.: 354 mg/dL (10 Sep 2019 17:07)  POCT Blood Glucose.: 349 mg/dL (10 Sep 2019 12:21)  POCT Blood Glucose.: 247 mg/dL (10 Sep 2019 08:45)  POCT Blood Glucose.: 353 mg/dL (09 Sep 2019 21:29)            MEDICATIONS  (STANDING):  aspirin enteric coated 81 milliGRAM(s) Oral daily  clopidogrel Tablet 75 milliGRAM(s) Oral daily  dextrose 5%. 1000 milliLiter(s) (50 mL/Hr) IV Continuous <Continuous>  dextrose 50% Injectable 12.5 Gram(s) IV Push once  dextrose 50% Injectable 25 Gram(s) IV Push once  dextrose 50% Injectable 25 Gram(s) IV Push once  docusate sodium 100 milliGRAM(s) Oral three times a day  famotidine    Tablet 10 milliGRAM(s) Oral daily  fludroCORTISONE 0.1 milliGRAM(s) Oral daily  gabapentin 400 milliGRAM(s) Oral at bedtime  heparin  Injectable 5000 Unit(s) SubCutaneous every 8 hours  insulin glargine Injectable (LANTUS) 6 Unit(s) SubCutaneous at bedtime  insulin lispro (HumaLOG) corrective regimen sliding scale   SubCutaneous at bedtime  insulin lispro (HumaLOG) corrective regimen sliding scale   SubCutaneous three times a day before meals  levothyroxine 88 MICROGram(s) Oral daily  polyethylene glycol 3350 17 Gram(s) Oral daily  predniSONE   Tablet 10 milliGRAM(s) Oral daily  predniSONE   Tablet 5 milliGRAM(s) Oral daily  senna 2 Tablet(s) Oral at bedtime  simvastatin 20 milliGRAM(s) Oral at bedtime    MEDICATIONS  (PRN):  dextrose 40% Gel 15 Gram(s) Oral once PRN Blood Glucose LESS THAN 70 milliGRAM(s)/deciliter  glucagon  Injectable 1 milliGRAM(s) IntraMuscular once PRN Glucose LESS THAN 70 milligrams/deciliter          PHYSICAL EXAM:  GENERAL: NAD, well-groomed, well-developed  HEAD:  Atraumatic, Normocephalic  CHEST/LUNG: Clear to percussion bilaterally; No rales, rhonchi, wheezing, or rubs  HEART: Regular rate and rhythm; No murmurs, rubs, or gallops  ABDOMEN: Soft, Nontender, Nondistended; Bowel sounds present  EXTREMITIES:  2+ Peripheral Pulses, No clubbing, cyanosis, or edema  LYMPH: No lymphadenopathy noted  SKIN: No rashes or lesions    Care Discussed with Consultants/Other Providers [ ] YES  [ ] NO

## 2019-09-15 NOTE — CHART NOTE - NSCHARTNOTEFT_GEN_A_CORE
78 y/o male was found on the floor last night by the nurse. as per pt he denies falling or hitting any pats of his body. pt states he was trying to reach for his urinal when he slid down and set on the floor. the patient could not get up and was tired of sitting so he lay down on the floor until help came and helped him get back In to bad.   Vitals are stable  head is NC/AT  lungs naheed  card- + S1, S2  abd - soft, NT/ND  ext- no edema , no signs of trauma  A/P: 78 y/o male s/p being found on floor. not a fall. no need for xrays or CT scans. will cont close monitoring

## 2019-09-16 LAB
ALBUMIN SERPL ELPH-MCNC: 2.6 G/DL — LOW (ref 3.3–5)
ALP SERPL-CCNC: 83 U/L — SIGNIFICANT CHANGE UP (ref 40–120)
ALT FLD-CCNC: 62 U/L — HIGH (ref 4–41)
ANION GAP SERPL CALC-SCNC: 11 MMO/L — SIGNIFICANT CHANGE UP (ref 7–14)
ANION GAP SERPL CALC-SCNC: 11 MMO/L — SIGNIFICANT CHANGE UP (ref 7–14)
AST SERPL-CCNC: 53 U/L — HIGH (ref 4–40)
BASOPHILS # BLD AUTO: 0.03 K/UL — SIGNIFICANT CHANGE UP (ref 0–0.2)
BASOPHILS NFR BLD AUTO: 0.3 % — SIGNIFICANT CHANGE UP (ref 0–2)
BILIRUB SERPL-MCNC: 0.8 MG/DL — SIGNIFICANT CHANGE UP (ref 0.2–1.2)
BUN SERPL-MCNC: 43 MG/DL — HIGH (ref 7–23)
BUN SERPL-MCNC: 43 MG/DL — HIGH (ref 7–23)
CALCIUM SERPL-MCNC: 10.6 MG/DL — HIGH (ref 8.4–10.5)
CALCIUM SERPL-MCNC: 10.6 MG/DL — HIGH (ref 8.4–10.5)
CHLORIDE SERPL-SCNC: 97 MMOL/L — LOW (ref 98–107)
CHLORIDE SERPL-SCNC: 97 MMOL/L — LOW (ref 98–107)
CO2 SERPL-SCNC: 21 MMOL/L — LOW (ref 22–31)
CO2 SERPL-SCNC: 21 MMOL/L — LOW (ref 22–31)
CREAT SERPL-MCNC: 2.29 MG/DL — HIGH (ref 0.5–1.3)
CREAT SERPL-MCNC: 2.29 MG/DL — HIGH (ref 0.5–1.3)
EOSINOPHIL # BLD AUTO: 0.05 K/UL — SIGNIFICANT CHANGE UP (ref 0–0.5)
EOSINOPHIL NFR BLD AUTO: 0.6 % — SIGNIFICANT CHANGE UP (ref 0–6)
GLUCOSE SERPL-MCNC: 132 MG/DL — HIGH (ref 70–99)
GLUCOSE SERPL-MCNC: 132 MG/DL — HIGH (ref 70–99)
HCT VFR BLD CALC: 32.1 % — LOW (ref 39–50)
HCT VFR BLD CALC: 32.1 % — LOW (ref 39–50)
HGB BLD-MCNC: 10.2 G/DL — LOW (ref 13–17)
HGB BLD-MCNC: 10.2 G/DL — LOW (ref 13–17)
IMM GRANULOCYTES NFR BLD AUTO: 1 % — SIGNIFICANT CHANGE UP (ref 0–1.5)
LYMPHOCYTES # BLD AUTO: 0.65 K/UL — LOW (ref 1–3.3)
LYMPHOCYTES # BLD AUTO: 7.5 % — LOW (ref 13–44)
MAGNESIUM SERPL-MCNC: 2 MG/DL — SIGNIFICANT CHANGE UP (ref 1.6–2.6)
MCHC RBC-ENTMCNC: 28.5 PG — SIGNIFICANT CHANGE UP (ref 27–34)
MCHC RBC-ENTMCNC: 28.5 PG — SIGNIFICANT CHANGE UP (ref 27–34)
MCHC RBC-ENTMCNC: 31.8 % — LOW (ref 32–36)
MCHC RBC-ENTMCNC: 31.8 % — LOW (ref 32–36)
MCV RBC AUTO: 89.7 FL — SIGNIFICANT CHANGE UP (ref 80–100)
MCV RBC AUTO: 89.7 FL — SIGNIFICANT CHANGE UP (ref 80–100)
MONOCYTES # BLD AUTO: 2.12 K/UL — HIGH (ref 0–0.9)
MONOCYTES NFR BLD AUTO: 24.6 % — HIGH (ref 2–14)
NEUTROPHILS # BLD AUTO: 5.68 K/UL — SIGNIFICANT CHANGE UP (ref 1.8–7.4)
NEUTROPHILS NFR BLD AUTO: 66 % — SIGNIFICANT CHANGE UP (ref 43–77)
NRBC # FLD: 0 K/UL — SIGNIFICANT CHANGE UP (ref 0–0)
NRBC # FLD: 0 K/UL — SIGNIFICANT CHANGE UP (ref 0–0)
PLATELET # BLD AUTO: 186 K/UL — SIGNIFICANT CHANGE UP (ref 150–400)
PLATELET # BLD AUTO: 186 K/UL — SIGNIFICANT CHANGE UP (ref 150–400)
PMV BLD: 11.3 FL — SIGNIFICANT CHANGE UP (ref 7–13)
PMV BLD: 11.3 FL — SIGNIFICANT CHANGE UP (ref 7–13)
POTASSIUM SERPL-MCNC: 4.1 MMOL/L — SIGNIFICANT CHANGE UP (ref 3.5–5.3)
POTASSIUM SERPL-MCNC: 4.1 MMOL/L — SIGNIFICANT CHANGE UP (ref 3.5–5.3)
POTASSIUM SERPL-SCNC: 4.1 MMOL/L — SIGNIFICANT CHANGE UP (ref 3.5–5.3)
POTASSIUM SERPL-SCNC: 4.1 MMOL/L — SIGNIFICANT CHANGE UP (ref 3.5–5.3)
PROT SERPL-MCNC: 5.6 G/DL — LOW (ref 6–8.3)
RBC # BLD: 3.58 M/UL — LOW (ref 4.2–5.8)
RBC # BLD: 3.58 M/UL — LOW (ref 4.2–5.8)
RBC # FLD: 14.7 % — HIGH (ref 10.3–14.5)
RBC # FLD: 14.7 % — HIGH (ref 10.3–14.5)
SODIUM SERPL-SCNC: 129 MMOL/L — LOW (ref 135–145)
SODIUM SERPL-SCNC: 129 MMOL/L — LOW (ref 135–145)
WBC # BLD: 8.62 K/UL — SIGNIFICANT CHANGE UP (ref 3.8–10.5)
WBC # BLD: 8.62 K/UL — SIGNIFICANT CHANGE UP (ref 3.8–10.5)
WBC # FLD AUTO: 8.62 K/UL — SIGNIFICANT CHANGE UP (ref 3.8–10.5)
WBC # FLD AUTO: 8.62 K/UL — SIGNIFICANT CHANGE UP (ref 3.8–10.5)

## 2019-09-16 RX ORDER — SODIUM CHLORIDE 9 MG/ML
500 INJECTION INTRAMUSCULAR; INTRAVENOUS; SUBCUTANEOUS
Refills: 0 | Status: COMPLETED | OUTPATIENT
Start: 2019-09-16 | End: 2019-09-16

## 2019-09-16 RX ADMIN — SENNA PLUS 2 TABLET(S): 8.6 TABLET ORAL at 21:38

## 2019-09-16 RX ADMIN — POLYETHYLENE GLYCOL 3350 17 GRAM(S): 17 POWDER, FOR SOLUTION ORAL at 13:27

## 2019-09-16 RX ADMIN — INSULIN GLARGINE 10 UNIT(S): 100 INJECTION, SOLUTION SUBCUTANEOUS at 21:41

## 2019-09-16 RX ADMIN — FLUDROCORTISONE ACETATE 0.1 MILLIGRAM(S): 0.1 TABLET ORAL at 06:05

## 2019-09-16 RX ADMIN — Medication 100 MILLIGRAM(S): at 06:05

## 2019-09-16 RX ADMIN — Medication 100 MILLIGRAM(S): at 21:38

## 2019-09-16 RX ADMIN — Medication 81 MILLIGRAM(S): at 13:18

## 2019-09-16 RX ADMIN — HEPARIN SODIUM 5000 UNIT(S): 5000 INJECTION INTRAVENOUS; SUBCUTANEOUS at 21:38

## 2019-09-16 RX ADMIN — Medication 88 MICROGRAM(S): at 06:05

## 2019-09-16 RX ADMIN — FAMOTIDINE 10 MILLIGRAM(S): 10 INJECTION INTRAVENOUS at 13:24

## 2019-09-16 RX ADMIN — FLUDROCORTISONE ACETATE 0.1 MILLIGRAM(S): 0.1 TABLET ORAL at 21:38

## 2019-09-16 RX ADMIN — FLUDROCORTISONE ACETATE 0.1 MILLIGRAM(S): 0.1 TABLET ORAL at 13:18

## 2019-09-16 RX ADMIN — Medication 100 MILLIGRAM(S): at 13:17

## 2019-09-16 RX ADMIN — Medication 1: at 13:15

## 2019-09-16 RX ADMIN — SIMVASTATIN 20 MILLIGRAM(S): 20 TABLET, FILM COATED ORAL at 21:38

## 2019-09-16 RX ADMIN — GABAPENTIN 400 MILLIGRAM(S): 400 CAPSULE ORAL at 21:38

## 2019-09-16 RX ADMIN — SODIUM CHLORIDE 75 MILLILITER(S): 9 INJECTION INTRAMUSCULAR; INTRAVENOUS; SUBCUTANEOUS at 17:39

## 2019-09-16 RX ADMIN — HEPARIN SODIUM 5000 UNIT(S): 5000 INJECTION INTRAVENOUS; SUBCUTANEOUS at 06:05

## 2019-09-16 RX ADMIN — HEPARIN SODIUM 5000 UNIT(S): 5000 INJECTION INTRAVENOUS; SUBCUTANEOUS at 13:16

## 2019-09-16 NOTE — PROGRESS NOTE ADULT - SUBJECTIVE AND OBJECTIVE BOX
[facility]   MD UYEN HERNANDEZ, DO MARYANN REYNOSO, LEATHA NICOLE    TEL:  OFFICE: [phone]  DR CASAREZ CELL: [phone]  DR. HERNANDEZ CELL: [phone]  DR. REYNOSO CELL: [phone]  CHUCKIE TERRAZAS CELL: [phone]        Patient is a 79y old  Male who presents with a chief complaint of Severe constipation and abdominal distention ,recent history of syncope (13 Sep 2019 18:37)      Patient seen and examined at bedside. No chest pain/sob    VITALS:  T(F): 99.2 (09-16-19 @ 12:53), Max: 99.2 (09-16-19 @ 12:53)  HR: 98 (09-16-19 @ 12:53)  BP: 99/52 (09-16-19 @ 12:53)  RR: 16 (09-16-19 @ 12:53)  SpO2: 98% (09-16-19 @ 12:53)  Wt(kg): --    09-15 @ 07:01  -  09-16 @ 07:00  --------------------------------------------------------  IN: 640 mL / OUT: 650 mL / NET: -10 mL    09-16 @ 07:01  -  09-16 @ 16:03  --------------------------------------------------------  IN: 0 mL / OUT: 250 mL / NET: -250 mL          PHYSICAL EXAM:  Constitutional: NAD  Neck: No JVD  Respiratory: CTAB, no wheezes, rales or rhonchi  Cardiovascular: S1, S2, RRR  Gastrointestinal: BS+, soft, NT/ND  Extremities: No peripheral edema    Hospital Medications:   MEDICATIONS  (STANDING):  aspirin enteric coated 81 milliGRAM(s) Oral daily  dextrose 5%. 1000 milliLiter(s) (50 mL/Hr) IV Continuous <Continuous>  dextrose 50% Injectable 12.5 Gram(s) IV Push once  dextrose 50% Injectable 25 Gram(s) IV Push once  dextrose 50% Injectable 25 Gram(s) IV Push once  docusate sodium 100 milliGRAM(s) Oral three times a day  famotidine    Tablet 10 milliGRAM(s) Oral daily  fludroCORTISONE 0.1 milliGRAM(s) Oral <User Schedule>  gabapentin 400 milliGRAM(s) Oral at bedtime  heparin  Injectable 5000 Unit(s) SubCutaneous every 8 hours  insulin glargine Injectable (LANTUS) 10 Unit(s) SubCutaneous at bedtime  insulin lispro (HumaLOG) corrective regimen sliding scale   SubCutaneous at bedtime  insulin lispro (HumaLOG) corrective regimen sliding scale   SubCutaneous three times a day before meals  levothyroxine 88 MICROGram(s) Oral daily  metoprolol succinate ER 12.5 milliGRAM(s) Oral daily  polyethylene glycol 3350 17 Gram(s) Oral daily  senna 2 Tablet(s) Oral at bedtime  simvastatin 20 milliGRAM(s) Oral at bedtime      LABS:  09-16    129<L>  |  97<L>  |  43<H>  ----------------------------<  132<H>  4.1   |  21<L>  |  2.29<H>    Ca    10.6<H>      16 Sep 2019 05:00  Mg     2.0     09-16    TPro  5.6<L>  /  Alb  2.6<L>  /  TBili  0.8  /  DBili      /  AST  53<H>  /  ALT  62<H>  /  AlkPhos  83  09-16    Creatinine Trend: 2.29 <--, 2.53 <--, 2.55 <--, 2.27 <--, 2.39 <--, 2.18 <--, 2.06 <--, 1.99 <--, 2.38 <--    Albumin, Serum: 2.6 g/dL (09-16 @ 05:00)                              10.2   8.62  )-----------( 186      ( 16 Sep 2019 05:00 )             32.1     Urine Studies:  Urinalysis - [09-06-19 @ 17:55]      Color LIGHT YELLOW / Appearance CLEAR / SG 1.012 / pH 6.0      Gluc 500 / Ketone NEGATIVE  / Bili NEGATIVE / Urobili NORMAL       Blood NEGATIVE / Protein NEGATIVE / Leuk Est NEGATIVE / Nitrite NEGATIVE      RBC  / WBC  / Hyaline  / Gran  / Sq Epi  / Non Sq Epi  / Bacteria     Urine Creatinine 28.40      [09-10-19 @ 19:45]  Urine Protein 4.5      [09-10-19 @ 19:45]  Urine Sodium 57      [09-12-19 @ 05:00]  Urine Osmolality 425      [09-12-19 @ 05:00]    Iron 38, TIBC 234, %sat --      [09-12-19 @ 06:08]  Ferritin 214.6      [09-12-19 @ 06:08]  PTH 20.00 (Ca --)      [09-03-19 @ 05:45]   --  PTH 25.46 (Ca --)      [09-01-19 @ 06:00]   --  Vitamin D (25OH) 34.9      [09-03-19 @ 05:45]  HbA1c 7.0      [09-13-19 @ 06:50]  TSH 5.55      [09-09-19 @ 03:20]  Lipid: chol 121, TG 96, HDL 33, LDL 74      [08-31-19 @ 07:00]      ANCA: cANCA Negative, pANCA Negative, atypical ANCA --      [09-03-19 @ 05:45]  Free Light Chains: kappa 4.00, lambda 3.03, ratio = 1.32      [09-03 @ 05:45]    RADIOLOGY & ADDITIONAL STUDIES:

## 2019-09-16 NOTE — PROVIDER CONTACT NOTE (OTHER) - REASON
9 beats  of vtach
BP 84/50
BP 88/55
BP 90/55 after 250 cc bolus
BP 92/56
BP 95/56
BP 98/50
BP 99/64
Blood pressure 89/49 and orthostatic blood pressures
Blood pressure 90/43
Foul smelling urine
Notify provider sbp <100
Notify provider sbp <100
Patient complaining of R eye pressure
Patient's SBP <100
Patient's SBP <100
Patient's blood pressure was 92/55
Positive orthostatic blood pressure
Positive orthostatics
Pt BP was 70/43
Pt BP was 73/37
Pt BP was 80/45
Pt BP was 80/45
Pt BP was 92/54
SBP <100
continues with orthostatics positive
fs recheck
fs recheck
hypoglycemia
noted with blood pressure less than 100
noted with low blood pressure 70/40
noted with low blood pressure 87/44
noted with low blood pressure 90/50
noted with low blood pressure 95/55
orthostatics
orthostatics positive
pt more lethargic than last night. not baseline. bp 85/50. BP low all day as well
pt unresponsive to pain, pamela and low bp
refusing orthostatics
unable to reach Flow Cytometry lab
unable to reach Flow Cytometry lab regarding proper tube needed for sheet collection
Notify provider sbp <100
hypoglycemia

## 2019-09-16 NOTE — CHART NOTE - NSCHARTNOTEFT_GEN_A_CORE
Hematology previously concerned for lymphadenopathy. Subsequently private hematologist was additionally consulted. The patient plans to follow up with Dr. Licea on discharge and plan was discussed with her that further workup can be arranged as an outpatient including PET-CT and biopsy. Will sign off at this time, please call Dr. Licea with any further questions.    Jaime Bloom, PGY6  Hematology Fellow  Pager: [phone]

## 2019-09-16 NOTE — PROVIDER CONTACT NOTE (OTHER) - NAME OF MD/NP/PA/DO NOTIFIED:
cynthia osorio [id number]
Elizabeth Rebolledo
Elizabeth Rebolledo
Kelsey ZHANG
LEATHA Bruce
LEATHA Bruce
RAPID RESPONSE
Tele LEATHA Bruce
Tele LEATHA Cole
Tele LEATHA Drummond notified
Tele LEATHA Veloz
Tele LEATHA Veloz notified
Tele LEATHA Veloz notified
Tele PA Cassy
Zoila Cárdenas
cynthia osorio [id number]
cynthia osorio [id number]
cynthia osorio [id number]
cynthia pierce [id number]
cynthia tomlinson
cynthia werner [id number]
tele JULES chavez
tele LEATHA Drummond
tele LEATHA Drummond
tele LEATHA bishop
tele LEATHA bishop
tele NP david aware
tele cynthia Cummings
Tele LEATHA Cole

## 2019-09-16 NOTE — CHART NOTE - NSCHARTNOTEFT_GEN_A_CORE
Source: Patient [ ]    Family [ ]     other [x ] RN, chart review     Nutrition f/u. 78 y/o M admitted with syncope and collapse; pt with hypernatremia and hypercalcemia (due to possible lymphoma with pending LN biopsy per chart). Pt sleeping, A&Ox3; discussed pt with RN. Per RN, pt with fair PO intake "picks" at food. Last BM 9/13 per flow sheets. Pt on bowel regimen -admitted with constipation.     Diet, Regular:   Consistent Carbohydrate {No Snacks} (CSTCHO)  1000mL Fluid Restriction ([id number])  Low Sodium (09-13-19 @ 15:43)    Current Weight:   (8/30) 65.3 kg  (9/3) 61.2 kg  (9/7) 60.2 kg     Fluid related wt loss? Edema reduced from 2/3+ B/L LE to 1+ L/R arm     Edema: 1+ L/R arm   Pressure Injuries: none noted     __________________ Pertinent Medications__________________   MEDICATIONS  (STANDING):  aspirin enteric coated 81 milliGRAM(s) Oral daily  dextrose 5%. 1000 milliLiter(s) (50 mL/Hr) IV Continuous <Continuous>  dextrose 50% Injectable 12.5 Gram(s) IV Push once  dextrose 50% Injectable 25 Gram(s) IV Push once  dextrose 50% Injectable 25 Gram(s) IV Push once  docusate sodium 100 milliGRAM(s) Oral three times a day  famotidine    Tablet 10 milliGRAM(s) Oral daily  fludroCORTISONE 0.1 milliGRAM(s) Oral <User Schedule>  gabapentin 400 milliGRAM(s) Oral at bedtime  heparin  Injectable 5000 Unit(s) SubCutaneous every 8 hours  insulin glargine Injectable (LANTUS) 10 Unit(s) SubCutaneous at bedtime  insulin lispro (HumaLOG) corrective regimen sliding scale   SubCutaneous at bedtime  insulin lispro (HumaLOG) corrective regimen sliding scale   SubCutaneous three times a day before meals  levothyroxine 88 MICROGram(s) Oral daily  metoprolol succinate ER 12.5 milliGRAM(s) Oral daily  polyethylene glycol 3350 17 Gram(s) Oral daily  senna 2 Tablet(s) Oral at bedtime  simvastatin 20 milliGRAM(s) Oral at bedtime      __________________ Pertinent Labs__________________   09-16 Na129 mmol/L<L> Glu 132 mg/dL<H> K+ 4.1 mmol/L Cr  2.29 mg/dL<H> BUN 43 mg/dL<H> 09-12 Phos 4.3 mg/dL 09-16 Alb 2.6 g/dL<L> 09-13 [id number] 7.0 %<H> 08-31 Chol 121 mg/dL LDL 74 mg/dL HDL 33 mg/dL<L> Trig 96 mg/dL            Estimated Needs:   [x ] no change since previous assessment  [ ] recalculated:       Previous Nutrition Diagnosis: Altered nutrition related lab values     Nutrition Diagnosis is [ ] ongoing  [ ] resolved [x ] not applicable     New Nutrition Diagnosis: Inadequate oral intake related to reduced appetite/dislike of foods? as evidenced by PO intake <75% meals.       Recommendations:    1. Please help with menu selections and encouraging PO intake.   2. Consider adding Ensure Enlive 1x daily (350 alexander and 20 gm protein) to help patient meet protein/calorie needs.           Monitoring and Evaluation:     [x ] PO intake [ x] Tolerance to diet prescription [x ] weights [x ] follow up per protocol  [ ] other:

## 2019-09-16 NOTE — PROVIDER CONTACT NOTE (OTHER) - ASSESSMENT
BP 91/56; Pt denies any symptoms
BP 96/50 HR 84; pt does not complain of any symptoms, pt resting comfortably in bed
asymptomatic  VS stable
Manual BP 84/50 ; pt denies any symptoms
Pt denies any symptoms. Pt asleep at the time of vital signs assessment. BP 88/55 done automatically, HR 99.
Pt symptomatic
pt asymptomaic.
pt asymptomatic
pt asymptomatic
vital stable   no acute distress noted   fall safety maintained
vitals signs stable   no acute distress noted   fall safety maintained
BP 85/50.
No drainage or change in vision noted. No redness.
Patient's SBP <100
Pt is asymptomatic
Pt is asymptomatic and denies dizziness, weakness, and lightheadedness
Pt is asymptomatic and denies lightheadedness, dizziness, and discomfort.
Pt is asymptomatic at this time. Sitting at the side of the bed washing up and eating breakfast
Pt is asymptomatic, no dizziness or lightheadedness when standing or sitting. no floaters in vision
Pt is asymptomatic. Denies lightheadedness, dizziness, discomfort, and it A&Ox4.
Pt is asymptomatic. denies lightheadedness, dizziness, or discomfort. pt is resting comfortably
Urine has a strong odor and foul smelling
bp 48/25
no acute distress noted   fall safety maintained  pt is asymptomatic at this time   tele PA at bedside
vitals stable   no acute distress noted   fall safety maintained
vitals stable   no acute distress noted   fall safety maintained

## 2019-09-16 NOTE — PROGRESS NOTE ADULT - ASSESSMENT
79 year old male with a history of CAD, DM2, HTN, and HLD presents with constipation and possible syncope    Hypotension  on florinef 0.1mg Q8 hrs  still with orthostatic hypotension  please d/w cardio if midodrine can be added. (pt have LVOT obstruction)  on low  dose metoprolol  please d/w cardio if metoprolol can be d/c  monitor BP closely    CKD (chronic kidney disease), stage IV    Baseline ~ 2.2-2.5.   Currently stable    avoid nephrotoxic agents      Hyponatremia  Work up suggests SIADH + hyperglycemia  Optimize DM control   TSH normal and cortisol low  Continue florinef as above  Avoid hypotonic solutions    Hypercalcemia  PTH 20, Vit D 1,25 OH is high but vit D 25-OH is normal  K/L ratio nml  mild proteinuria. vasculitis w/u in office neg except +ANCA  was referred for rheum eval in office but unsure if patient followed up  repeat anca here neg on 9/3     ACE level ok  QuantiFeron indeterminate, chest xray clear  pending PTH RP    Calcium persistently high, s/p CT abd/plevic/chest showed: Scattered bilateral pulmonary nodules and mediastinal lymphadenopathy. 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     continue to monitor closely    Uncontrolled DM  better    proteinuria  UA without protein or blood   urine p/c ratio 158mg/day  mild  monitor 79 year old male with a history of CAD, DM2, HTN, and HLD presents with constipation and possible syncope    Hypotension  on florinef 0.1mg Q8 hrs  still with orthostatic hypotension  please d/w cardio if midodrine can be added. (pt have LVOT obstruction)  on low dose metoprolol please d/w cardio if metoprolol can be d/c  monitor BP closely    CKD (chronic kidney disease), stage IV    Baseline ~ 2.2-2.5.   Currently stable    avoid nephrotoxic agents      Hyponatremia  Work up suggests SIADH + hyperglycemia  Optimize DM control   TSH normal and cortisol low  Continue florinef as above  Avoid hypotonic solutions    Hypercalcemia  PTH 20, Vit D 1,25 OH is high but vit D 25-OH is normal  K/L ratio nml  mild proteinuria. vasculitis w/u in office neg except +ANCA  was referred for rheum eval in office but unsure if patient followed up  repeat anca here neg on 9/3    ACE level ok  QuantiFeron indeterminate, chest xray clear  pending PTH RP    Calcium persistently high, s/p CT abd/plevic/chest showed: Scattered bilateral pulmonary nodules and mediastinal lymphadenopathy. 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     continue to monitor closely    Uncontrolled DM  better    proteinuria  UA without protein or blood   urine p/c ratio 158mg/day  mild  monitor

## 2019-09-16 NOTE — PROGRESS NOTE ADULT - ASSESSMENT
Problem/Plan - 1:  ·  Problem: Syncope and collapse. with recurrent falls Plan: Telemetry benign so far. Cardiology eval noted. TTE shows some degree of LVOT obstruction. This could be the reason for the weakness, unsteady gait.  Falls probably from hypotension. already on Florinef. Stockings ordered as well    Problem/Plan - 2:  ·  Problem: Hyponatremia syndrome.  Plan: Ser Na still low  Renal FU noted.     Problem/Plan - 3:  ·  Problem: Acute on chronic renal insufficiency.  Plan: Monitor BUN/ creat. F/U Renal c/s Dr Acevedo noted.     Problem/Plan - 4:  ·  Problem: Hypercalcemia.  Plan: Ca improved.     Problem/Plan - 5:  ·  Problem: CAD (coronary artery disease).  Plan: continue ASA, Plavix, nitrates, statin.     Problem/Plan - 6:Problem: Type 2 diabetes mellitus. Plan: Lantus started for uncontrolled diabetes since he is not on his home dose of medications.     Problem # 7: Lung and Abdominal lymphadenopathy: Needs a biopsy. PET can be performed to evaluate lymphadenopathy as well. Plavix held. Onc and Onc surgery eval called  PET scanning needs  to be approved, can be performed as out patient as well.    Problem/Plan - 8:  ·  Problem: Essential hypertension.  Plan: hold meds sec to hypotension    Problem/Plan - 9:·  Problem: Hypothyroidism.  Plan: Continue Levothyroxine, monitor thyroid hormones.

## 2019-09-17 ENCOUNTER — TRANSCRIPTION ENCOUNTER (OUTPATIENT)
Age: 79
End: 2019-09-17

## 2019-09-17 VITALS
RESPIRATION RATE: 16 BRPM | DIASTOLIC BLOOD PRESSURE: 68 MMHG | HEART RATE: 86 BPM | TEMPERATURE: 98 F | SYSTOLIC BLOOD PRESSURE: 108 MMHG | OXYGEN SATURATION: 97 %

## 2019-09-17 LAB
ANION GAP SERPL CALC-SCNC: 12 MMO/L — SIGNIFICANT CHANGE UP (ref 7–14)
BUN SERPL-MCNC: 36 MG/DL — HIGH (ref 7–23)
CALCIUM SERPL-MCNC: 10.6 MG/DL — HIGH (ref 8.4–10.5)
CHLORIDE SERPL-SCNC: 95 MMOL/L — LOW (ref 98–107)
CO2 SERPL-SCNC: 21 MMOL/L — LOW (ref 22–31)
CREAT SERPL-MCNC: 2.1 MG/DL — HIGH (ref 0.5–1.3)
GLUCOSE SERPL-MCNC: 115 MG/DL — HIGH (ref 70–99)
HCT VFR BLD CALC: 32.7 % — LOW (ref 39–50)
HGB BLD-MCNC: 10.7 G/DL — LOW (ref 13–17)
MAGNESIUM SERPL-MCNC: 1.8 MG/DL — SIGNIFICANT CHANGE UP (ref 1.6–2.6)
MCHC RBC-ENTMCNC: 28.9 PG — SIGNIFICANT CHANGE UP (ref 27–34)
MCHC RBC-ENTMCNC: 32.7 % — SIGNIFICANT CHANGE UP (ref 32–36)
MCV RBC AUTO: 88.4 FL — SIGNIFICANT CHANGE UP (ref 80–100)
NRBC # FLD: 0 K/UL — SIGNIFICANT CHANGE UP (ref 0–0)
PLATELET # BLD AUTO: 202 K/UL — SIGNIFICANT CHANGE UP (ref 150–400)
PMV BLD: 11.7 FL — SIGNIFICANT CHANGE UP (ref 7–13)
POTASSIUM SERPL-MCNC: 4 MMOL/L — SIGNIFICANT CHANGE UP (ref 3.5–5.3)
POTASSIUM SERPL-SCNC: 4 MMOL/L — SIGNIFICANT CHANGE UP (ref 3.5–5.3)
RBC # BLD: 3.7 M/UL — LOW (ref 4.2–5.8)
RBC # FLD: 14.8 % — HIGH (ref 10.3–14.5)
SODIUM SERPL-SCNC: 128 MMOL/L — LOW (ref 135–145)
WBC # BLD: 7.67 K/UL — SIGNIFICANT CHANGE UP (ref 3.8–10.5)
WBC # FLD AUTO: 7.67 K/UL — SIGNIFICANT CHANGE UP (ref 3.8–10.5)

## 2019-09-17 PROCEDURE — 99232 SBSQ HOSP IP/OBS MODERATE 35: CPT

## 2019-09-17 RX ORDER — METOPROLOL TARTRATE 50 MG
0.5 TABLET ORAL
Qty: 15 | Refills: 0
Start: 2019-09-17 | End: 2019-10-16

## 2019-09-17 RX ORDER — SENNA PLUS 8.6 MG/1
2 TABLET ORAL
Qty: 0 | Refills: 0 | DISCHARGE
Start: 2019-09-17

## 2019-09-17 RX ORDER — DOCUSATE SODIUM 100 MG
1 CAPSULE ORAL
Qty: 0 | Refills: 0 | DISCHARGE
Start: 2019-09-17

## 2019-09-17 RX ORDER — AMLODIPINE BESYLATE 2.5 MG/1
1 TABLET ORAL
Qty: 0 | Refills: 0 | DISCHARGE

## 2019-09-17 RX ORDER — INSULIN GLARGINE 100 [IU]/ML
3 INJECTION, SOLUTION SUBCUTANEOUS
Qty: 0 | Refills: 0 | DISCHARGE
Start: 2019-09-17

## 2019-09-17 RX ORDER — ISOSORBIDE DINITRATE 5 MG/1
1 TABLET ORAL
Qty: 0 | Refills: 0 | DISCHARGE

## 2019-09-17 RX ORDER — FLUDROCORTISONE ACETATE 0.1 MG/1
1 TABLET ORAL
Qty: 0 | Refills: 0 | DISCHARGE
Start: 2019-09-17 | End: 2019-10-16

## 2019-09-17 RX ORDER — INSULIN GLARGINE 100 [IU]/ML
25 INJECTION, SOLUTION SUBCUTANEOUS
Qty: 0 | Refills: 0 | DISCHARGE

## 2019-09-17 RX ORDER — FLUDROCORTISONE ACETATE 0.1 MG/1
1 TABLET ORAL
Qty: 90 | Refills: 0
Start: 2019-09-17 | End: 2019-10-16

## 2019-09-17 RX ORDER — MIDODRINE HYDROCHLORIDE 2.5 MG/1
5 TABLET ORAL THREE TIMES A DAY
Refills: 0 | Status: DISCONTINUED | OUTPATIENT
Start: 2019-09-17 | End: 2019-09-17

## 2019-09-17 RX ORDER — MIDODRINE HYDROCHLORIDE 2.5 MG/1
1 TABLET ORAL
Qty: 90 | Refills: 0
Start: 2019-09-17 | End: 2019-10-16

## 2019-09-17 RX ORDER — CLOPIDOGREL BISULFATE 75 MG/1
1 TABLET, FILM COATED ORAL
Qty: 0 | Refills: 0 | DISCHARGE

## 2019-09-17 RX ORDER — GABAPENTIN 400 MG/1
1 CAPSULE ORAL
Qty: 0 | Refills: 0 | DISCHARGE

## 2019-09-17 RX ORDER — INSULIN GLARGINE 100 [IU]/ML
8 INJECTION, SOLUTION SUBCUTANEOUS
Qty: 0 | Refills: 0 | DISCHARGE
Start: 2019-09-17

## 2019-09-17 RX ORDER — INSULIN GLARGINE 100 [IU]/ML
10 INJECTION, SOLUTION SUBCUTANEOUS
Qty: 0 | Refills: 0 | DISCHARGE
Start: 2019-09-17

## 2019-09-17 RX ORDER — GABAPENTIN 400 MG/1
1 CAPSULE ORAL
Qty: 30 | Refills: 0
Start: 2019-09-17 | End: 2019-10-16

## 2019-09-17 RX ORDER — MIDODRINE HYDROCHLORIDE 2.5 MG/1
10 TABLET ORAL
Refills: 0 | Status: DISCONTINUED | OUTPATIENT
Start: 2019-09-17 | End: 2019-09-17

## 2019-09-17 RX ADMIN — HEPARIN SODIUM 5000 UNIT(S): 5000 INJECTION INTRAVENOUS; SUBCUTANEOUS at 13:01

## 2019-09-17 RX ADMIN — POLYETHYLENE GLYCOL 3350 17 GRAM(S): 17 POWDER, FOR SOLUTION ORAL at 12:58

## 2019-09-17 RX ADMIN — FLUDROCORTISONE ACETATE 0.1 MILLIGRAM(S): 0.1 TABLET ORAL at 05:17

## 2019-09-17 RX ADMIN — Medication 100 MILLIGRAM(S): at 13:01

## 2019-09-17 RX ADMIN — HEPARIN SODIUM 5000 UNIT(S): 5000 INJECTION INTRAVENOUS; SUBCUTANEOUS at 05:18

## 2019-09-17 RX ADMIN — MIDODRINE HYDROCHLORIDE 5 MILLIGRAM(S): 2.5 TABLET ORAL at 14:35

## 2019-09-17 RX ADMIN — Medication 88 MICROGRAM(S): at 05:17

## 2019-09-17 RX ADMIN — FAMOTIDINE 10 MILLIGRAM(S): 10 INJECTION INTRAVENOUS at 12:58

## 2019-09-17 RX ADMIN — Medication 81 MILLIGRAM(S): at 12:58

## 2019-09-17 RX ADMIN — FLUDROCORTISONE ACETATE 0.1 MILLIGRAM(S): 0.1 TABLET ORAL at 12:58

## 2019-09-17 RX ADMIN — Medication 1: at 12:58

## 2019-09-17 RX ADMIN — Medication 100 MILLIGRAM(S): at 05:17

## 2019-09-17 NOTE — DISCHARGE NOTE PROVIDER - PROVIDER TOKENS
PROVIDER:[TOKEN:[[id number]]],PROVIDER:[TOKEN:[[id number]]] PROVIDER:[TOKEN:[[id number]]],PROVIDER:[TOKEN:[[id number]]],FREE:[LAST:[Dr Salena Guaman],PHONE:[(   )    -],FAX:[(   )    -],ADDRESS:[cardiology]]

## 2019-09-17 NOTE — DISCHARGE NOTE NURSING/CASE MANAGEMENT/SOCIAL WORK - PATIENT PORTAL LINK FT
You can access the FollowMyHealth Patient Portal offered by [facility] by registering at the following website: [email or web address]. By joining [employer]’s FollowMyHealth portal, you will also be able to view your health information using other applications (apps) compatible with our system.

## 2019-09-17 NOTE — PROGRESS NOTE ADULT - PROVIDER SPECIALTY LIST ADULT
Cardiology
Hospitalist
Internal Medicine
Nephrology
Cardiology
Hospitalist
Cardiology
Hospitalist
Internal Medicine
Nephrology
Internal Medicine

## 2019-09-17 NOTE — PROGRESS NOTE ADULT - ASSESSMENT
Problem/Plan - 1:  ·  Problem: Syncope and collapse. with recurrent falls Plan: Telemetry benign so far. Cardiology eval noted. TTE shows some degree of LVOT obstruction. This could be the reason for the weakness, unsteady gait.  Falls probably from hypotension. already on Florinef. Stockings ordered as well    Problem/Plan - 2:  ·  Problem: Hyponatremia syndrome.  Plan: Ser Na still low  Renal FU noted.     Problem/Plan - 3:  ·  Problem: Acute on chronic renal insufficiency.  Plan: Monitor BUN/ creat. F/U Renal c/s Dr Acevedo noted.     Problem/Plan - 4:  ·  Problem: Hypercalcemia.  Plan: Ca improved.     Problem/Plan - 5:  ·  Problem: CAD (coronary artery disease).  Plan: continue ASA, Plavix, nitrates, statin.     Problem/Plan - 6:Problem: Type 2 diabetes mellitus. Plan: Lantus started for uncontrolled diabetes since he is not on his home dose of medications.     Problem # 7: Lung and Abdominal lymphadenopathy: Needs a biopsy. PET can be performed to evaluate lymphadenopathy as well. Plavix held. Onc and Onc surgery eval called  PET scanning needs  to be approved, can be performed as out patient as well.    Problem/Plan - 8:  ·  Problem: Essential hypertension.  Plan: hold meds sec to hypotension  Stop BB as well for now  Orthostasis reported. On florinef, add Midodrine 5 mg TID    Problem/Plan - 9:·  Problem: Hypothyroidism.  Plan: Continue Levothyroxine, monitor thyroid hormones.

## 2019-09-17 NOTE — DISCHARGE NOTE PROVIDER - NSDCCPCAREPLAN_GEN_ALL_CORE_FT
PRINCIPAL DISCHARGE DIAGNOSIS  Diagnosis: Syncope, unspecified syncope type  Assessment and Plan of Treatment: likely related to hypotension as well as moderate LVOT obtruction, continue low dose metoprolol. Hold rest of your blood pressure medication. Followup with cardiology in 2 weeks on discharge      SECONDARY DISCHARGE DIAGNOSES  Diagnosis: FRANCES (acute kidney injury)  Assessment and Plan of Treatment: PRINCIPAL DISCHARGE DIAGNOSIS  Diagnosis: Syncope, unspecified syncope type  Assessment and Plan of Treatment: likely related to hypotension as well as moderate LVOT obtruction, continue low dose metoprolol. Hold rest of your blood pressure medication. Followup with cardiology in 2 weeks on discharge      SECONDARY DISCHARGE DIAGNOSES  Diagnosis: Abnormal CT scan  Assessment and Plan of Treatment: Noted lung and mediastinal nodules, recommended to followup with Dr Licea in 1 week. you would need outpatient biopsy and PET scan.    Diagnosis: Hypercalcemia  Assessment and Plan of Treatment: elevated calcium improved, hypercalcemia possible related to abnormal finding in CT    Diagnosis: Hyponatremia syndrome  Assessment and Plan of Treatment: low calcium due to increase fluid intake. followup with neprhology    Diagnosis: CAD (coronary artery disease)  Assessment and Plan of Treatment: hold plavix for now given future biopsy.    Diagnosis: Acute on chronic renal insufficiency  Assessment and Plan of Treatment: Acute on chronic renal insufficiency    Diagnosis: Hypothyroidism  Assessment and Plan of Treatment: Hypothyroidism    Diagnosis: Essential hypertension  Assessment and Plan of Treatment: Essential hypertension    Diagnosis: Hyperlipidemia  Assessment and Plan of Treatment: Hyperlipidemia    Diagnosis: Type 2 diabetes mellitus  Assessment and Plan of Treatment: Type 2 diabetes mellitus PRINCIPAL DISCHARGE DIAGNOSIS  Diagnosis: Syncope, unspecified syncope type  Assessment and Plan of Treatment: likely related to hypotension as well as moderate LVOT obtruction, continue low dose metoprolol. Hold rest of your blood pressure medication. Followup with cardiology in 2 weeks on discharge      SECONDARY DISCHARGE DIAGNOSES  Diagnosis: Abnormal CT scan  Assessment and Plan of Treatment: Noted lung and mediastinal nodules, recommended to followup with Dr Licea in 1 week. you would need outpatient biopsy and PET scan.    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 followup with neprhology    Diagnosis: CAD (coronary artery disease)  Assessment and Plan of Treatment: hold plavix for now given future biopsy.    Diagnosis: Acute on chronic renal insufficiency  Assessment and Plan of Treatment: Acute on chronic renal insufficiency    Diagnosis: Hypothyroidism  Assessment and Plan of Treatment: Hypothyroidism    Diagnosis: Essential hypertension  Assessment and Plan of Treatment: Essential hypertension    Diagnosis: Hyperlipidemia  Assessment and Plan of Treatment: Hyperlipidemia    Diagnosis: Type 2 diabetes mellitus  Assessment and Plan of Treatment: Type 2 diabetes mellitus PRINCIPAL DISCHARGE DIAGNOSIS  Diagnosis: Syncope, unspecified syncope type  Assessment and Plan of Treatment: likely related to hypotension as well as moderate LVOT obtruction, continue low dose metoprolol. Hold rest of your blood pressure medication. Followup with cardiology in 2 weeks on discharge      SECONDARY DISCHARGE DIAGNOSES  Diagnosis: Orthostatic hypotension  Assessment and Plan of Treatment: continue florinef and midodrine as directed. followup with your PMD in 1 week    Diagnosis: Abnormal CT scan  Assessment and Plan of Treatment: Noted lung and mediastinal nodules, recommended to followup with Dr Licea in 1 week. you would need outpatient biopsy and PET scan.    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 followup with neprhology    Diagnosis: CAD (coronary artery disease)  Assessment and Plan of Treatment: hold plavix for now given future biopsy.    Diagnosis: Acute on chronic renal insufficiency  Assessment and Plan of Treatment: Continue blood pressure, cholesterol and diabetic medications. Goal of hemoglobin A1C (HgbA1C) < 7%.  Avoid nephrotoxic drugs such as nonsteroidal anti-inflammatory agents (NSAIDs).   Please follow up with your nephrologist to monitor your kidney function, continue with low protein and potassium diet.    Diagnosis: Hypothyroidism  Assessment and Plan of Treatment: Please continue to take your Synthroid as prescribed and follow up with your PMD and endocrinologist for follow up.    Diagnosis: Essential hypertension  Assessment and Plan of Treatment: Low sodium and fat diet, follow up with primary care physician.    Diagnosis: Hyperlipidemia  Assessment and Plan of Treatment: Low fat diet, exercise daily and continue current medications. Follow up with primary care physician and cardiologist for management.    Diagnosis: Type 2 diabetes mellitus  Assessment and Plan of Treatment: Hypoglycemia management: please check your fingerstick every morning or if you are not feeling well. If your FS is >300mg/dl X3 or more readings please contact your PMD/Endocrinologist. If your FS is low <70mg/dl and/or you have symptoms of very low blood sugar FIRST drink1/2 cup of apple juice (or take 4 glucose tab/tube of glucose gel) and recheck FS in 15mins. Repeat these steps until blood sugar is above 100mg/dl, if NECESSARY. Then call your provider to discuss low blood sugar.   What to expend at followup appointment: please bring a log of your fingerstick and/or your glucometer to you appointment. Your blood sugar tracking will help your diabetes team determine the best plan PRINCIPAL DISCHARGE DIAGNOSIS  Diagnosis: Syncope, unspecified syncope type  Assessment and Plan of Treatment: likely related to hypotension as well as moderate LVOT obtruction. Followup with cardiology in 1-2 weeks on discharge, would recommend restart low dose metoprolol if BP tolerates outpatient      SECONDARY DISCHARGE DIAGNOSES  Diagnosis: Orthostatic hypotension  Assessment and Plan of Treatment: continue florinef and midodrine as directed. followup with your PMD in 1 week    Diagnosis: Abnormal CT scan  Assessment and Plan of Treatment: Noted lung and mediastinal nodules, recommended to followup with Dr Licea in 1 week. you would need outpatient biopsy and PET scan.    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 followup with neprhology    Diagnosis: CAD (coronary artery disease)  Assessment and Plan of Treatment: hold plavix for now given future biopsy.    Diagnosis: Acute on chronic renal insufficiency  Assessment and Plan of Treatment: Continue blood pressure, cholesterol and diabetic medications. Goal of hemoglobin A1C (HgbA1C) < 7%.  Avoid nephrotoxic drugs such as nonsteroidal anti-inflammatory agents (NSAIDs).   Please follow up with your nephrologist to monitor your kidney function, continue with low protein and potassium diet.    Diagnosis: Hypothyroidism  Assessment and Plan of Treatment: Please continue to take your Synthroid as prescribed and follow up with your PMD and endocrinologist for follow up.    Diagnosis: Essential hypertension  Assessment and Plan of Treatment: Low sodium and fat diet, follow up with primary care physician.    Diagnosis: Hyperlipidemia  Assessment and Plan of Treatment: Low fat diet, exercise daily and continue current medications. Follow up with primary care physician and cardiologist for management.    Diagnosis: Type 2 diabetes mellitus  Assessment and Plan of Treatment: Hypoglycemia management: please check your fingerstick every morning or if you are not feeling well. If your FS is >300mg/dl X3 or more readings please contact your PMD/Endocrinologist. If your FS is low <70mg/dl and/or you have symptoms of very low blood sugar FIRST drink1/2 cup of apple juice (or take 4 glucose tab/tube of glucose gel) and recheck FS in 15mins. Repeat these steps until blood sugar is above 100mg/dl, if NECESSARY. Then call your provider to discuss low blood sugar.   What to expend at followup appointment: please bring a log of your fingerstick and/or your glucometer to you appointment. Your blood sugar tracking will help your diabetes team determine the best plan PRINCIPAL DISCHARGE DIAGNOSIS  Diagnosis: Syncope, unspecified syncope type  Assessment and Plan of Treatment: likely related to hypotension as well as moderate LVOT obtruction. Followup with your cardiology in 1 weeks on discharge, would recommend restart low dose metoprolol if BP tolerates outpatient      SECONDARY DISCHARGE DIAGNOSES  Diagnosis: Orthostatic hypotension  Assessment and Plan of Treatment: continue florinef and midodrine as directed. followup with your PMD in 1 week    Diagnosis: Abnormal CT scan  Assessment and Plan of Treatment: Noted lung and mediastinal nodules, recommended to followup with Dr Licea in 1 week. you would need outpatient biopsy and PET scan.    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 followup with neprhology    Diagnosis: CAD (coronary artery disease)  Assessment and Plan of Treatment: hold plavix for now given future biopsy.    Diagnosis: Acute on chronic renal insufficiency  Assessment and Plan of Treatment: Continue blood pressure, cholesterol and diabetic medications. Goal of hemoglobin A1C (HgbA1C) < 7%.  Avoid nephrotoxic drugs such as nonsteroidal anti-inflammatory agents (NSAIDs).   Please follow up with your nephrologist to monitor your kidney function, continue with low protein and potassium diet.    Diagnosis: Hypothyroidism  Assessment and Plan of Treatment: Please continue to take your Synthroid as prescribed and follow up with your PMD and endocrinologist for follow up.    Diagnosis: Essential hypertension  Assessment and Plan of Treatment: Low sodium and fat diet, follow up with primary care physician.    Diagnosis: Hyperlipidemia  Assessment and Plan of Treatment: Low fat diet, exercise daily and continue current medications. Follow up with primary care physician and cardiologist for management.    Diagnosis: Type 2 diabetes mellitus  Assessment and Plan of Treatment: Hypoglycemia management: please check your fingerstick every morning or if you are not feeling well. If your FS is >300mg/dl X3 or more readings please contact your PMD/Endocrinologist. If your FS is low <70mg/dl and/or you have symptoms of very low blood sugar FIRST drink1/2 cup of apple juice (or take 4 glucose tab/tube of glucose gel) and recheck FS in 15mins. Repeat these steps until blood sugar is above 100mg/dl, if NECESSARY. Then call your provider to discuss low blood sugar.   What to expend at followup appointment: please bring a log of your fingerstick and/or your glucometer to you appointment. Your blood sugar tracking will help your diabetes team determine the best plan

## 2019-09-17 NOTE — CHART NOTE - NSCHARTNOTESELECT_GEN_ALL_CORE
Event Note
Event Note/hematology
Nutrition Services/Follow Up
Rapid Response

## 2019-09-17 NOTE — CHART NOTE - NSCHARTNOTEFT_GEN_A_CORE
Spoke with renal recommended midodrine if clear by cards, ? if BB can be discontiued. spoke with cardiology Dr Addis mcginnis to start midodrine but would prefer pt be on metoprolol given LOVT. Case d/w Dr Tariq start midodrine 5mg TID. As per Dr Tariq pt cleared for discharge 9/17. Spoke with renal recommended midodrine if clear by cards, ? if BB can be discontiued. spoke with cardiology Dr Addis mcginnis to start midodrine but would prefer pt be on metoprolol given LOVT. Case d/w Dr Tariq start midodrine 5mg TID. Pt's BP has been around high 80s to low 90s, per parameter he has not been given metoprolol due to BP for the past 5 days. per Dr Tariq discontinue metoprolol. As per Dr Tariq pt cleared for discharge 9/17.

## 2019-09-17 NOTE — PROGRESS NOTE ADULT - SUBJECTIVE AND OBJECTIVE BOX
[facility]   MD UYEN HERNANDEZ, DO MARYANN REYNOSO, LEATHA NICOLE    TEL:  OFFICE: [phone]  DR CASAREZ CELL: [phone]  DR. HERNANDEZ CELL: [phone]  DR. REYNOSO CELL: [phone]  CHUCKIE TERRAZAS CELL: [phone]        Patient is a 79y old  Male who presents with a chief complaint of syncope (17 Sep 2019 08:55)      Patient seen and examined at bedside. No chest pain/sob    VITALS:  T(F): 97.7 (09-17-19 @ 09:15), Max: 99.2 (09-16-19 @ 12:53)  HR: 96 (09-17-19 @ 09:15)  BP: 113/61 (09-17-19 @ 09:15)  RR: 16 (09-17-19 @ 09:15)  SpO2: 97% (09-17-19 @ 09:15)  Wt(kg): --    09-16 @ 07:01  -  09-17 @ 07:00  --------------------------------------------------------  IN: 890 mL / OUT: 1400 mL / NET: -510 mL          PHYSICAL EXAM:  Constitutional: NAD  Neck: No JVD  Respiratory: CTAB, no wheezes, rales or rhonchi  Cardiovascular: S1, S2, RRR  Gastrointestinal: BS+, soft, NT/ND  Extremities: No peripheral edema    Hospital Medications:   MEDICATIONS  (STANDING):  aspirin enteric coated 81 milliGRAM(s) Oral daily  dextrose 5%. 1000 milliLiter(s) (50 mL/Hr) IV Continuous <Continuous>  dextrose 50% Injectable 12.5 Gram(s) IV Push once  dextrose 50% Injectable 25 Gram(s) IV Push once  dextrose 50% Injectable 25 Gram(s) IV Push once  docusate sodium 100 milliGRAM(s) Oral three times a day  famotidine    Tablet 10 milliGRAM(s) Oral daily  fludroCORTISONE 0.1 milliGRAM(s) Oral <User Schedule>  gabapentin 400 milliGRAM(s) Oral at bedtime  heparin  Injectable 5000 Unit(s) SubCutaneous every 8 hours  insulin glargine Injectable (LANTUS) 10 Unit(s) SubCutaneous at bedtime  insulin lispro (HumaLOG) corrective regimen sliding scale   SubCutaneous at bedtime  insulin lispro (HumaLOG) corrective regimen sliding scale   SubCutaneous three times a day before meals  levothyroxine 88 MICROGram(s) Oral daily  metoprolol succinate ER 12.5 milliGRAM(s) Oral daily  polyethylene glycol 3350 17 Gram(s) Oral daily  senna 2 Tablet(s) Oral at bedtime  simvastatin 20 milliGRAM(s) Oral at bedtime      LABS:  09-17    128<L>  |  95<L>  |  36<H>  ----------------------------<  115<H>  4.0   |  21<L>  |  2.10<H>    Ca    10.6<H>      17 Sep 2019 06:06  Mg     1.8     09-17    TPro  5.6<L>  /  Alb  2.6<L>  /  TBili  0.8  /  DBili      /  AST  53<H>  /  ALT  62<H>  /  AlkPhos  83  09-16    Creatinine Trend: 2.10 <--, 2.29 <--, 2.53 <--, 2.55 <--, 2.27 <--, 2.39 <--, 2.18 <--, 2.06 <--                                10.7   7.67  )-----------( 202      ( 17 Sep 2019 06:06 )             32.7     Urine Studies:  Urinalysis - [09-06-19 @ 17:55]      Color LIGHT YELLOW / Appearance CLEAR / SG 1.012 / pH 6.0      Gluc 500 / Ketone NEGATIVE  / Bili NEGATIVE / Urobili NORMAL       Blood NEGATIVE / Protein NEGATIVE / Leuk Est NEGATIVE / Nitrite NEGATIVE      RBC  / WBC  / Hyaline  / Gran  / Sq Epi  / Non Sq Epi  / Bacteria     Urine Creatinine 28.40      [09-10-19 @ 19:45]  Urine Protein 4.5      [09-10-19 @ 19:45]  Urine Sodium 57      [09-12-19 @ 05:00]  Urine Osmolality 425      [09-12-19 @ 05:00]    Iron 38, TIBC 234, %sat --      [09-12-19 @ 06:08]  Ferritin 214.6      [09-12-19 @ 06:08]  PTH 20.00 (Ca --)      [09-03-19 @ 05:45]   --  PTH 25.46 (Ca --)      [09-01-19 @ 06:00]   --  Vitamin D (25OH) 34.9      [09-03-19 @ 05:45]  HbA1c 7.0      [09-13-19 @ 06:50]  TSH 5.55      [09-09-19 @ 03:20]  Lipid: chol 121, TG 96, HDL 33, LDL 74      [08-31-19 @ 07:00]      ANCA: cANCA Negative, pANCA Negative, atypical ANCA --      [09-03-19 @ 05:45]  Free Light Chains: kappa 4.00, lambda 3.03, ratio = 1.32      [09-03 @ 05:45]    RADIOLOGY & ADDITIONAL STUDIES:

## 2019-09-17 NOTE — PROGRESS NOTE ADULT - SUBJECTIVE AND OBJECTIVE BOX
Patient is a 79y old  Male who presents with a chief complaint of Hypotension (08 Sep 2019 15:36)  BP is labile  No dizziness reported    Tele uneventful    T(C): 36.7 (09-10-19 @ 12:25), Max: 36.7 (09-10-19 @ 00:25)  HR: 95 (09-10-19 @ 12:25) (70 - 98)  BP: 113/72 (09-10-19 @ 12:25) (92/56 - 125/60)  RR: 16 (09-10-19 @ 12:25) (14 - 16)  SpO2: 97% (09-10-19 @ 12:25) (97% - 100%)  Wt(kg): --  I&O's Summary    09 Sep 2019 07:01  -  10 Sep 2019 07:00  --------------------------------------------------------  IN: 120 mL / OUT: 1850 mL / NET: -1730 mL    10 Sep 2019 07:01  -  10 Sep 2019 18:02  --------------------------------------------------------  IN: 0 mL / OUT: 600 mL / NET: -600 mL        LABS:                        10.4   7.47  )-----------( 225      ( 10 Sep 2019 11:55 )             31.5     09-10    125<L>  |  91<L>  |  55<H>  ----------------------------<  387<H>  4.8   |  22  |  1.99<H>    Ca    11.1<H>      10 Sep 2019 11:55  Phos  4.4     09-10  Mg     2.0     09-10          CAPILLARY BLOOD GLUCOSE      POCT Blood Glucose.: 354 mg/dL (10 Sep 2019 17:07)  POCT Blood Glucose.: 349 mg/dL (10 Sep 2019 12:21)  POCT Blood Glucose.: 247 mg/dL (10 Sep 2019 08:45)  POCT Blood Glucose.: 353 mg/dL (09 Sep 2019 21:29)            MEDICATIONS  (STANDING):  aspirin enteric coated 81 milliGRAM(s) Oral daily  clopidogrel Tablet 75 milliGRAM(s) Oral daily  dextrose 5%. 1000 milliLiter(s) (50 mL/Hr) IV Continuous <Continuous>  dextrose 50% Injectable 12.5 Gram(s) IV Push once  dextrose 50% Injectable 25 Gram(s) IV Push once  dextrose 50% Injectable 25 Gram(s) IV Push once  docusate sodium 100 milliGRAM(s) Oral three times a day  famotidine    Tablet 10 milliGRAM(s) Oral daily  fludroCORTISONE 0.1 milliGRAM(s) Oral daily  gabapentin 400 milliGRAM(s) Oral at bedtime  heparin  Injectable 5000 Unit(s) SubCutaneous every 8 hours  insulin glargine Injectable (LANTUS) 6 Unit(s) SubCutaneous at bedtime  insulin lispro (HumaLOG) corrective regimen sliding scale   SubCutaneous at bedtime  insulin lispro (HumaLOG) corrective regimen sliding scale   SubCutaneous three times a day before meals  levothyroxine 88 MICROGram(s) Oral daily  polyethylene glycol 3350 17 Gram(s) Oral daily  predniSONE   Tablet 10 milliGRAM(s) Oral daily  predniSONE   Tablet 5 milliGRAM(s) Oral daily  senna 2 Tablet(s) Oral at bedtime  simvastatin 20 milliGRAM(s) Oral at bedtime    MEDICATIONS  (PRN):  dextrose 40% Gel 15 Gram(s) Oral once PRN Blood Glucose LESS THAN 70 milliGRAM(s)/deciliter  glucagon  Injectable 1 milliGRAM(s) IntraMuscular once PRN Glucose LESS THAN 70 milligrams/deciliter          PHYSICAL EXAM:  GENERAL: NAD, well-groomed, well-developed  HEAD:  Atraumatic, Normocephalic  CHEST/LUNG: Clear to percussion bilaterally; No rales, rhonchi, wheezing, or rubs  HEART: Regular rate and rhythm; No murmurs, rubs, or gallops  ABDOMEN: Soft, Nontender, Nondistended; Bowel sounds present  EXTREMITIES:  2+ Peripheral Pulses, No clubbing, cyanosis, or edema  LYMPH: No lymphadenopathy noted  SKIN: No rashes or lesions    Care Discussed with Consultants/Other Providers [ ] YES  [ ] NO

## 2019-09-17 NOTE — PROGRESS NOTE ADULT - ASSESSMENT
79 year old male with a history of CAD, DM2, HTN, and HLD presents with constipation and possible syncope    Hypotension  on florinef 0.1mg Q8 hrs  still with orthostatic hypotension  please d/w cardio if midodrine can be added. (pt have LVOT obstruction)  on low dose metoprolol please d/w cardio if metoprolol can be d/c  monitor BP closely    CKD (chronic kidney disease), stage IV    Baseline ~ 2.2-2.5.   Currently stable    avoid nephrotoxic agents      Hyponatremia  Work up suggests SIADH + hyperglycemia  Optimize DM control   TSH normal and cortisol low  Continue florinef as above  Avoid hypotonic solutions    Hypercalcemia  PTH 20, Vit D 1,25 OH is high but vit D 25-OH is normal  K/L ratio nml  mild proteinuria. vasculitis w/u in office neg except +ANCA  was referred for rheum eval in office but unsure if patient followed up  repeat anca here neg on 9/3    ACE level ok  QuantiFeron indeterminate, chest xray clear  pending PTH RP    Calcium persistently high, s/p CT abd/plevic/chest showed: Scattered bilateral pulmonary nodules and mediastinal lymphadenopathy. 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     continue to monitor closely    Uncontrolled DM  better    proteinuria  UA without protein or blood   urine p/c ratio 158mg/day  mild  monitor

## 2019-09-17 NOTE — DISCHARGE NOTE PROVIDER - CARE PROVIDER_API CALL
Kunal Licea)  Hematology; HospicePalliative Medicine; Internal Medicine; Medical Oncology  [address] [place] [address]  Phone: [phone]  Fax: [phone]  Follow Up Time:     Casimiro Radford)  Internal Medicine; Nephrology  [address], [address] [place] [address]  Phone: [phone]  Fax: [phone]  Follow Up Time: Kunal Licea)  Hematology; HospicePalliative Medicine; Internal Medicine; Medical Oncology  [address] [place] [address]  Phone: [phone]  Fax: [phone]  Follow Up Time:     Casimiro Radford)  Internal Medicine; Nephrology  [address] [place] [address]  Phone: [phone]  Fax: [phone]  Follow Up Time:     Dr Salena Guaman,   cardiology  Phone: (   )    -  Fax: (   )    -  Follow Up Time:

## 2019-09-17 NOTE — DISCHARGE NOTE PROVIDER - HOSPITAL COURSE
80 yo M c/o constipation since yesterday. Daughter- Dr Browne reports pt syncopized x 1 minute last saturday while changing his clothes. EKG- NSR @ 73 1st deg AVB, LAD, TWI v2-6, I, L, CXR- clear lungs, AXR- moderate stool burden. Also found to have an elevated troponin 101, seen by [facility]. Pt also admitted with Hypoglycemia FS 37 & Hypercalcemia with FRANCES        + syncope/ increase trop - likely in setting of CKD    + Hypercalcemia - ? Lymphoma    + hyponatremia  likely due to polydipsia acidosis due to diarrhea    + Mod LVOT obstruction - 9/11 started on metoprolol         Hospital course:        Syncope and collapse. with recurrent falls     - Telemetry benign so far. Cardiology eval noted. TTE shows some degree of LVOT obstruction. This could be the reason for the weakness, unsteady gait.    Falls probably from hypotension. already on Florinef. Stockings ordered as well        Hyponatremia syndrome.     - sodium 128, hyponatremia likely due to polydipsia. TSH normal, cortisol lwo        - Renal FU noted.         Acute on chronic renal insufficiency.      - Monitor BUN/ creat. F/U Renal c/s Dr Acevedo noted.         Hypercalcemia.      - Ca improved.         CAD (coronary artery disease).      - continue ASA, nitrates, statin.     - plavix on hold for future biopsy        Type 2 diabetes mellitus.     - Lantus started for uncontrolled diabetes since he is not on his home dose of medications.         Lung and Abdominal lymphadenopathy    - outpatient biopsy and PET. Plavix held.         Essential hypertension    -  hold meds sec to hypotension    - metoprolol restarted for tachycardia    - continue florinef        Hypothyroidism    - Continue Levothyroxine, monitor thyroid hormones. 80 yo M c/o constipation since yesterday. Daughter- Dr Browne reports pt syncopized x 1 minute last saturday while changing his clothes. EKG- NSR @ 73 1st deg AVB, LAD, TWI v2-6, I, L, CXR- clear lungs, AXR- moderate stool burden. Also found to have an elevated troponin 101, seen by [facility]. Pt also admitted with Hypoglycemia FS 37 & Hypercalcemia with FRANCES        + syncope/ increase trop - likely in setting of CKD    + Hypercalcemia - ? Lymphoma    + hyponatremia  likely due to polydipsia acidosis due to diarrhea    + Mod LVOT obstruction - 9/11 started on metoprolol         Hospital course:        Syncope and collapse. with recurrent falls     - Telemetry benign so far. Cardiology eval noted. TTE shows some degree of LVOT obstruction. This could be the reason for the weakness, unsteady gait.    Falls probably from hypotension. already on Florinef. 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 Prominent soft tissue region is seen involving the left cavernous sinus region.    - Echo- Mild mitral regurgitation. Moderate LVOT obstruction.    - Metoprolol 12.5 mg was on hold for hypotension but restarted 9/11 due to high HR's    - AXR - non obstructive bowel gas pattern             Hyponatremia syndrome.     - sodium 128, hyponatremia likely due to polydipsia. TSH normal, cortisol lwo        Acute on chronic renal insufficiency.      - Monitor BUN/ creat.        Hypercalcemia.      - PTH 20, Vit D 1,25 OH is high but vit D 25-OH is normal. K/L ratio nml. ACE normal    - Renal cs- mild proteinuria. vasculitis w/u in office neg except +ANCA. Repeat anca here neg on 9/3    - 9/11 CT CAP- Scattered bilateral pulmonary nodules and mediastinal lymphadenopathy. Retroperitoneal lymphadenopathy and ill-defined hypodensity involving the spleen. Primary consideration is lymphoma with possible splenic involvement.    - Heme/onc cs- recommend biopsy of most accessible lymph node for further evaluation. Recheck LDH, uric acid, ESR, recent normal LDH noted. Normal SPEP/UPEP noted, normal K/L ratio noted. Normal ACE level noted, f/u quantiferon gold         CAD (coronary artery disease).      - continue ASA, nitrates, statin.     - plavix on hold for future biopsy        Type 2 diabetes mellitus.     - Lantus started for uncontrolled diabetes since he is not on his home dose of medications.         Lung and Abdominal lymphadenopathy    - outpatient biopsy and PET. Plavix held.         Essential hypertension    -  hold meds sec to hypotension    - metoprolol restarted for tachycardia    - continue florinef        Hypothyroidism    - Continue Levothyroxine, monitor thyroid hormones. 80 yo M c/o constipation since yesterday. Daughter- Dr Browne reports pt syncopized x 1 minute last saturday while changing his clothes. EKG- NSR @ 73 1st deg AVB, LAD, TWI v2-6, I, L, CXR- clear lungs, AXR- moderate stool burden. Also found to have an elevated troponin 101, seen by [facility]. Pt also admitted with Hypoglycemia FS 37 & Hypercalcemia with FRANCES        + syncope/ increase trop - likely in setting of CKD    + Hypercalcemia - ? Lymphoma    + hyponatremia  likely due to polydipsia acidosis due to diarrhea    + Mod LVOT obstruction - 9/11 started on metoprolol         Hospital course:        Syncope and collapse. with recurrent falls     - Telemetry benign so far. Cardiology eval noted. TTE shows some degree of LVOT obstruction. This could be the reason for the weakness, unsteady gait.    Falls probably from hypotension. already on Florinef. Stockings ordered as well    - CT head: Old infarcts . Prominent soft tissue region is seen involving the left cavernous sinus region.    - Echo- Mild mitral regurgitation. Moderate LVOT obstruction.    - Metoprolol 12.5 mg was restarted    - AXR - non obstructive bowel gas pattern             Hyponatremia syndrome.     - sodium 128, hyponatremia likely due to polydipsia. TSH normal, cortisol lwo        Acute on chronic renal insufficiency.      - Monitor BUN/ creat.        Hypercalcemia.      - PTH 20, Vit D 1,25 OH is high but vit D 25-OH is normal. K/L ratio nml. ACE normal    - Renal cs- mild proteinuria. vasculitis w/u in office neg except +ANCA. Repeat anca here neg on 9/3    - 9/11 CT CAP- Scattered bilateral pulmonary nodules and mediastinal lymphadenopathy. Retroperitoneal lymphadenopathy and ill-defined hypodensity involving the spleen. Primary consideration is lymphoma with possible splenic involvement.    - Heme/onc cs- recommend biopsy of most accessible lymph node for further evaluation. Recheck LDH, uric acid, ESR, recent normal LDH noted. Normal SPEP/UPEP noted, normal K/L ratio noted. Normal ACE level noted, f/u quantiferon gold         CAD (coronary artery disease).      - continue ASA, nitrates, statin.     - plavix on hold for future biopsy        Type 2 diabetes mellitus.     - Lantus started for uncontrolled diabetes since he is not on his home dose of medications.         Lung and Abdominal lymphadenopathy    - outpatient biopsy and PET. Plavix held.         Essential hypertension    -  hold meds sec to hypotension    - metoprolol restarted for tachycardia    - continue florinef        Hypothyroidism    - Continue Levothyroxine, monitor thyroid hormones. 80 yo M c/o constipation since yesterday. Daughter- Dr Browne reports pt syncopized x 1 minute last saturday while changing his clothes. EKG- NSR @ 73 1st deg AVB, LAD, TWI v2-6, I, L, CXR- clear lungs, AXR- moderate stool burden. Also found to have an elevated troponin 101, seen by [facility]. Pt also admitted with Hypoglycemia FS 37 & Hypercalcemia with FRANCES        + syncope/ increase trop - likely in setting of CKD    + Hypercalcemia - ? Lymphoma    + hyponatremia  likely due to polydipsia acidosis due to diarrhea    + Mod LVOT obstruction - 9/11 started on metoprolol         Hospital course:        Syncope and collapse. with recurrent falls     - Telemetry benign so far. Cardiology eval noted. TTE shows some degree of LVOT obstruction. This could be the reason for the weakness, unsteady gait.    Falls probably from hypotension. already on Florinef. Stockings ordered as well    - CT head: Old infarcts . Prominent soft tissue region is seen involving the left cavernous sinus region.    - Echo- Mild mitral regurgitation. Moderate LVOT obstruction.    - Metoprolol 12.5 mg was restarted    - AXR - non obstructive bowel gas pattern             Hyponatremia syndrome.     - sodium 128, hyponatremia likely due to polydipsia. TSH normal, cortisol lwo        Acute on chronic renal insufficiency.      - Monitor BUN/ creat.        Hypercalcemia.      - PTH 20, Vit D 1,25 OH is high but vit D 25-OH is normal. K/L ratio nml. ACE normal    - Renal cs- mild proteinuria. vasculitis w/u in office neg except +ANCA. Repeat anca here neg on 9/3    - 9/11 CT CAP- Scattered bilateral pulmonary nodules and mediastinal lymphadenopathy. Retroperitoneal lymphadenopathy and ill-defined hypodensity involving the spleen. Primary consideration is lymphoma with possible splenic involvement.    - Heme/onc cs- recommend biopsy of most accessible lymph node for further evaluation. Recheck LDH, uric acid, ESR, recent normal LDH noted. Normal SPEP/UPEP noted, normal K/L ratio noted. Normal ACE level noted, f/u quantiferon gold     - outpatient biopsy and PET scan, surgery consulted recommended IR guided bx.         CAD (coronary artery disease).      - continue ASA, nitrates, statin.     - plavix on hold for future biopsy        Type 2 diabetes mellitus.     - Lantus started for uncontrolled diabetes since he is not on his home dose of medications.         Lung and Abdominal lymphadenopathy    - outpatient biopsy and PET. Plavix held.         Essential hypertension    -  hold meds sec to hypotension    - metoprolol restarted for tachycardia    - continue florinef        Hypothyroidism    - Continue Levothyroxine, monitor thyroid hormones. 80 yo M c/o constipation since yesterday. Daughter- Dr Browne reports pt syncopized x 1 minute last saturday while changing his clothes. EKG- NSR @ 73 1st deg AVB, LAD, TWI v2-6, I, L, CXR- clear lungs, AXR- moderate stool burden. Also found to have an elevated troponin 101, seen by [facility]. Pt also admitted with Hypoglycemia FS 37 & Hypercalcemia with FRANCES        + syncope/ increase trop - likely in setting of CKD    + Hypercalcemia - ? Lymphoma    + hyponatremia  likely due to polydipsia acidosis due to diarrhea    + Mod LVOT obstruction - 9/11 started on metoprolol         Hospital course:        Syncope and collapse. with recurrent falls     - Telemetry benign so far. Cardiology eval noted. TTE shows some degree of LVOT obstruction. This could be the reason for the weakness, unsteady gait.    Falls probably from hypotension. already on Florinef. Stockings ordered as well    - CT head: Old infarcts . Prominent soft tissue region is seen involving the left cavernous sinus region.    - Echo- Mild mitral regurgitation. Moderate LVOT obstruction.    - Metoprolol 12.5 mg was restarted    - AXR - non obstructive bowel gas pattern     - PT: home PT         Hyponatremia syndrome.     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 TSH normal, cortisol lwo        Acute on chronic renal insufficiency.      - Monitor BUN/ creat.        Hypercalcemia.      - PTH 20, Vit D 1,25 OH is high but vit D 25-OH is normal. K/L ratio nml. ACE normal    - Renal cs- mild proteinuria. vasculitis w/u in office neg except +ANCA. Repeat anca here neg on 9/3    - 9/11 CT CAP- Scattered bilateral pulmonary nodules and mediastinal lymphadenopathy. Retroperitoneal lymphadenopathy and ill-defined hypodensity involving the spleen. Primary consideration is lymphoma with possible splenic involvement.    - Heme/onc cs- recommend biopsy of most accessible lymph node for further evaluation. Recheck LDH, uric acid, ESR, recent normal LDH noted. Normal SPEP/UPEP noted, normal K/L ratio noted. Normal ACE level noted, f/u quantiferon gold     - outpatient biopsy and PET scan, surgery consulted recommended IR guided bx.         CAD (coronary artery disease).      - continue ASA, nitrates, statin.     - plavix on hold for future biopsy        Type 2 diabetes mellitus.     - Lantus diabetes since he is not on his home dose of medications.         Lung and Abdominal lymphadenopathy    - outpatient biopsy and PET. Plavix held.         Essential hypertension    -  hold meds sec to hypotension    - metoprolol restarted for tachycardia    - continue florinef        Hypothyroidism    - Continue Levothyroxine, monitor thyroid hormones. 78 yo M c/o constipation since yesterday. Daughter- Dr Browne reports pt syncopized x 1 minute last saturday while changing his clothes. EKG- NSR @ 73 1st deg AVB, LAD, TWI v2-6, I, L, CXR- clear lungs, AXR- moderate stool burden. Also found to have an elevated troponin 101, seen by [facility]. Pt also admitted with Hypoglycemia FS 37 & Hypercalcemia with FRANCES        + syncope/ increase trop - likely in setting of CKD    + Hypercalcemia - ? Lymphoma    + hyponatremia  likely due to polydipsia acidosis due to diarrhea    + Mod LVOT obstruction - 9/11 started on metoprolol         Hospital course:        Syncope and collapse. with recurrent falls     - Telemetry benign so far. Cardiology eval noted. TTE shows some degree of LVOT obstruction. This could be the reason for the weakness, unsteady gait.    Falls probably from hypotension. already on Florinef. Stockings ordered as well    - CT head: Old infarcts . Prominent soft tissue region is seen involving the left cavernous sinus region.    - Echo- Mild mitral regurgitation. Moderate LVOT obstruction.    - Metoprolol 12.5 mg was restarted but BP has been low unable to tolerate metoprolol     - AXR - non obstructive bowel gas pattern     - PT: home PT         Hyponatremia syndrome.     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 TSH normal, cortisol lwo        Acute on chronic renal insufficiency.      - Monitor BUN/ creat.        Hypercalcemia.      - PTH 20, Vit D 1,25 OH is high but vit D 25-OH is normal. K/L ratio nml. ACE normal    - Renal cs- mild proteinuria. vasculitis w/u in office neg except +ANCA. Repeat anca here neg on 9/3    - 9/11 CT CAP- Scattered bilateral pulmonary nodules and mediastinal lymphadenopathy. Retroperitoneal lymphadenopathy and ill-defined hypodensity involving the spleen. Primary consideration is lymphoma with possible splenic involvement.    - Heme/onc cs- recommend biopsy of most accessible lymph node for further evaluation. Recheck LDH, uric acid, ESR, recent normal LDH noted. Normal SPEP/UPEP noted, normal K/L ratio noted. Normal ACE level noted, f/u quantiferon gold     - outpatient biopsy and PET scan, surgery consulted recommended IR guided bx.         CAD (coronary artery disease).      - continue ASA, nitrates, statin.     - plavix on hold for future biopsy        Type 2 diabetes mellitus.     - Lantus diabetes since he is not on his home dose of medications.         Lung and Abdominal lymphadenopathy    - outpatient biopsy and PET. Plavix held.         Essential hypertension    -  hold meds sec to hypotension    - metoprolol restarted for tachycardia    - continue florinef        Hypothyroidism    - Continue Levothyroxine, monitor thyroid hormones. 80 yo M c/o constipation since yesterday. Daughter- Dr Browne reports pt syncopized x 1 minute last saturday while changing his clothes. EKG- NSR @ 73 1st deg AVB, LAD, TWI v2-6, I, L, CXR- clear lungs, AXR- moderate stool burden. Also found to have an elevated troponin 101, seen by [facility]. Pt also admitted with Hypoglycemia FS 37 & Hypercalcemia with FRANCES        + syncope/ increase trop - likely in setting of CKD    + Hypercalcemia - ? Lymphoma    + hyponatremia  likely due to polydipsia acidosis due to diarrhea    + Mod LVOT obstruction - 9/11 started on metoprolol         Hospital course:        Syncope and collapse. with recurrent falls     - Telemetry benign so far. Cardiology eval noted. TTE shows some degree of LVOT obstruction. This could be the reason for the weakness, unsteady gait.    Falls probably from hypotension. already on Florinef. Stockings ordered as well    - CT head: Old infarcts . Prominent soft tissue region is seen involving the left cavernous sinus region.    - Echo- Mild mitral regurgitation. Moderate LVOT obstruction.    - Metoprolol 12.5 mg was restarted but BP has been low unable to tolerate metoprolol     - AXR - non obstructive bowel gas pattern     - PT: home PT         Hyponatremia syndrome.     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 TSH normal, cortisol lwo        Acute on chronic renal insufficiency.      - Monitor BUN/ creat.        Hypercalcemia.      - PTH 20, Vit D 1,25 OH is high but vit D 25-OH is normal. K/L ratio nml. ACE normal    - Renal cs- mild proteinuria. vasculitis w/u in office neg except +ANCA. Repeat anca here neg on 9/3    - 9/11 CT CAP- Scattered bilateral pulmonary nodules and mediastinal lymphadenopathy. Retroperitoneal lymphadenopathy and ill-defined hypodensity involving the spleen. Primary consideration is lymphoma with possible splenic involvement.    - Heme/onc cs- recommend biopsy of most accessible lymph node for further evaluation. Recheck LDH, uric acid, ESR, recent normal LDH noted. Normal SPEP/UPEP noted, normal K/L ratio noted. Normal ACE level noted, f/u quantiferon gold     - outpatient biopsy and PET scan, surgery consulted recommended IR guided bx.         CAD (coronary artery disease).      - continue ASA, nitrates, statin.     - plavix on hold for future biopsy        Type 2 diabetes mellitus.     - Lantus diabetes since he is not on his home dose of medications.         Lung and Abdominal lymphadenopathy    - outpatient biopsy and PET. Plavix held.         Essential hypertension    -  hold meds sec to hypotension    - continue florinef        Hypothyroidism    - Continue Levothyroxine, monitor thyroid hormones.         Reviewed discharge medications with patient; All new medications requiring new prescription sent to pharmacy of patients choice. Reviewed need for prescription for previous home medicaitons and new prescriptions sent if requested. Patient in agreement and understands. As per Dr Tariq pt cleared for discharge on 9/17

## 2019-09-17 NOTE — PROGRESS NOTE ADULT - SUBJECTIVE AND OBJECTIVE BOX
Patient seen and examined at bedside.    Overnight Events:   No overnight events.     REVIEW OF SYSTEMS:  Constitutional:     [ ] negative [ ] fevers [ ] chills [ ] weight loss [ ] weight gain  HEENT:                  [ ] negative [ ] dry eyes [ ] eye irritation [ ] postnasal drip [ ] nasal congestion  CV:                         [ ] negative  [ ] chest pain [ ] orthopnea [ ] palpitations [ ] murmur  Resp:                     [ ] negative [ ] cough [ ] shortness of breath [ ] dyspnea [ ] wheezing [ ] sputum [ ]hemoptysis  GI:                          [ ] negative [ ] nausea [ ] vomiting [ ] diarrhea [ ] constipation [ ] abd pain [ ] dysphagia   :                        [ ] negative [ ] dysuria [ ] nocturia [ ] hematuria [ ] increased urinary frequency  Musculoskeletal: [ ] negative [ ] back pain [ ] myalgias [ ] arthralgias [ ] fracture  Skin:                       [ ] negative [ ] rash [ ] itch  Neurological:        [ ] negative [ ] headache [ ] dizziness [ ] syncope [ ] weakness [ ] numbness  Psychiatric:           [ ] negative [ ] anxiety [ ] depression  Endocrine:            [ ] negative [ ] diabetes [ ] thyroid problem  Heme/Lymph:      [ ] negative [ ] anemia [ ] bleeding problem  Allergic/Immune: [ ] negative [ ] itchy eyes [ ] nasal discharge [ ] hives [ ] angioedema    [ ] All other systems negative  [ ] Unable to assess ROS due to    Current Meds:  aspirin enteric coated 81 milliGRAM(s) Oral daily  dextrose 40% Gel 15 Gram(s) Oral once PRN  dextrose 5%. 1000 milliLiter(s) IV Continuous <Continuous>  dextrose 50% Injectable 12.5 Gram(s) IV Push once  dextrose 50% Injectable 25 Gram(s) IV Push once  dextrose 50% Injectable 25 Gram(s) IV Push once  docusate sodium 100 milliGRAM(s) Oral three times a day  famotidine    Tablet 10 milliGRAM(s) Oral daily  fludroCORTISONE 0.1 milliGRAM(s) Oral <User Schedule>  gabapentin 400 milliGRAM(s) Oral at bedtime  glucagon  Injectable 1 milliGRAM(s) IntraMuscular once PRN  haloperidol    Injectable 1 milliGRAM(s) IV Push every 6 hours PRN  heparin  Injectable 5000 Unit(s) SubCutaneous every 8 hours  insulin glargine Injectable (LANTUS) 10 Unit(s) SubCutaneous at bedtime  insulin lispro (HumaLOG) corrective regimen sliding scale   SubCutaneous at bedtime  insulin lispro (HumaLOG) corrective regimen sliding scale   SubCutaneous three times a day before meals  levothyroxine 88 MICROGram(s) Oral daily  metoprolol succinate ER 12.5 milliGRAM(s) Oral daily  midodrine. 5 milliGRAM(s) Oral three times a day  polyethylene glycol 3350 17 Gram(s) Oral daily  senna 2 Tablet(s) Oral at bedtime  simethicone 80 milliGRAM(s) Chew daily PRN  simvastatin 20 milliGRAM(s) Oral at bedtime      PAST MEDICAL & SURGICAL HISTORY:  CAD (coronary artery disease)  Adult hypothyroidism  Hyperlipidemia  Peptic ulcer: s/p treatment for H pylori  Diabetes  Essential hypertension  S/P coronary artery stent placement: x4, last one in early 2018      Vitals:  T(F): 98 (09-17), Max: 98.3 (09-16)  HR: 95 (09-17) (83 - 96)  BP: 107/64 (09-17) (103/69 - 126/71)  RR: 16 (09-17)  SpO2: 97% (09-17)  I&O's Summary    16 Sep 2019 07:01  -  17 Sep 2019 07:00  --------------------------------------------------------  IN: 890 mL / OUT: 1400 mL / NET: -510 mL        Physical Exam:  Appearance: No acute distress; well appearing  Eyes: PERRL, EOMI, pink conjunctiva  HENT: Normal oral mucosa  Cardiovascular: RRR, S1, S2, no murmurs, rubs, or gallops; no edema; no JVD  Respiratory: Clear to auscultation bilaterally  Gastrointestinal: soft, non-tender, mildly distended with normal bowel sounds  Musculoskeletal: No clubbing; no joint deformity   Neurologic: Non-focal  Lymphatic: No lymphadenopathy  Psychiatry: AAOx3, mood & affect appropriate  Skin: No rashes, ecchymoses, or cyanosis                          10.7   7.67  )-----------( 202      ( 17 Sep 2019 06:06 )             32.7     09-17    128<L>  |  95<L>  |  36<H>  ----------------------------<  115<H>  4.0   |  21<L>  |  2.10<H>    Ca    10.6<H>      17 Sep 2019 06:06  Mg     1.8     09-17    TPro  5.6<L>  /  Alb  2.6<L>  /  TBili  0.8  /  DBili  x   /  AST  53<H>  /  ALT  62<H>  /  AlkPhos  83  09-16                  New ECG(s): Personally reviewed    Echo:  < from: Transthoracic Echocardiogram (09.03.19 @ 15:00) >  CONCLUSIONS:  1. Mitral annular calcification, systolic anterior motion  of the mitral valve.  Mild mitral regurgitation.  2. Calcified trileaflet aortic valve with grossly  moderately decreased opening.  3. Normal left atrium.  LA volume index = 20 cc/m2.  4. Increased relative wall thickness with normal left  ventricular mass index, consistent with concentric left  ventricular remodeling.  5. Hyperdynamic left ventricle. Peak left ventricular  outflow tract gradient equals 43 mm Hg, consistent with  moderate LVOT obstruction.  6. Normal right ventricular size and function.    < end of copied text >    Stress Testing:     Cath:    Imaging:  < from: CT Abdomen and Pelvis w/ Oral Cont (09.10.19 @ 18:32) >  Scattered bilateral pulmonary nodules and mediastinal lymphadenopathy.    Retroperitoneal lymphadenopathy and ill-defined hypodensity involving the   spleen.    Primary consideration is lymphoma with possible splenic involvement.      < end of copied text >    Interpretation of Telemetry:

## 2019-09-17 NOTE — PROGRESS NOTE ADULT - ATTENDING COMMENTS
Agree with above assessment and plan  Continue current meds  FU TTE  Tn likely not ischemic in origin
Check orthostatics daily.  Uptitrate Florinef as needed.
Increase fluid intake.  Falls precautions.  Check orthostatics. If (+), start fludrocortisone.
Patient seen and examined  Agree with above assessment and plan  Downtrending Tn without elevation of CK  Continue to monitor
Patient seen and examined - agree with above assessment and plan  Patient with syncope while straining from constipation  Agree with above plan  Can check TTE
The patient's care was discussed with the consulting cardiology fellow. I independently evaluated the patient. I agree with the updated assessment and recommendations noted above. The patient remains asymptomatic.     If PCI was performed more than one year ago, dual antiplatelet therapy may be held prior to lymph node biopsy. If the patient's PCI was performed within the last year, or specifically within the last 3 months, cessation of DAPT would be associated with an increased risk of acute stent thrombosis, and fadi-procedure continuation of DAPT should be considered. DAPT should not be stopped until the date of PCI can be confirmed, and if it cannot be verified, I would recommend pursuing a more conservative strategy as acute stent thrombosis is associated with significant morbidity and mortality.    Heber Magana MD  Cardiology
personally saw and examined patient, labs/vitals reviewed  agree with above assessment and plan  pt grossly euvolemic  would uptitrate BB therapy to allow for increased diastolic LV filling, which will stretch the LV, leading to decreased LVOT gradients and improve cardiac output and blood pressure.
Pt seen and examined. Please see LEATHA Agee's note for recommendations
DC planning if Diabetes gets under better control in AM with the use of Insulin.
DC planning if Diabetes gets under better control in AM with the use of Insulin.

## 2019-09-17 NOTE — PROGRESS NOTE ADULT - ASSESSMENT
78 YO M hx of CKD, CAD presented for syncope. 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 Course complicated by abdominal pain. CT scan concerning for lymphoma. Evaluation undergoing per primary team. Cardiology ask to evaluate for midodrine for BP as outpatient.     Pt. has LVOT obstruction.   Normotensive on nursing notes.   No contraindication for midodrine at this time  Would continue beta blocker to increase filling time and optimize cardiac output.       Jasiel Mancini  Cardiology Fellow  [phone]    All Cardiology service information can be found 24/7 on [email or web address], password: [email or web address] 78 YO M hx of CKD, CAD presented for syncope. 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 Course complicated by abdominal pain. CT scan concerning for lymphoma. Evaluation undergoing per primary team. Cardiology ask to evaluate for midodrine for BP as outpatient.     Pt. has LVOT obstruction.   Normotensive on nursing notes.   Would continue beta blocker to increase filling time and optimize cardiac output.       Jasiel Mancini  Cardiology Fellow  [phone]    All Cardiology service information can be found 24/7 on [email or web address], password: [email or web address]

## 2019-09-18 LAB — PTH RELATED PROT SERPL-MCNC: <2 — SIGNIFICANT CHANGE UP

## 2019-09-19 ENCOUNTER — INPATIENT (INPATIENT)
Facility: HOSPITAL | Age: 79
LOS: 2 days | Discharge: HOME | End: 2019-09-22
Attending: HOSPITALIST | Admitting: HOSPITALIST
Payer: MEDICAID

## 2019-09-19 VITALS
OXYGEN SATURATION: 100 % | RESPIRATION RATE: 20 BRPM | DIASTOLIC BLOOD PRESSURE: 88 MMHG | SYSTOLIC BLOOD PRESSURE: 138 MMHG | TEMPERATURE: 98 F | HEART RATE: 102 BPM

## 2019-09-19 DIAGNOSIS — Z95.5 PRESENCE OF CORONARY ANGIOPLASTY IMPLANT AND GRAFT: Chronic | ICD-10-CM

## 2019-09-19 DIAGNOSIS — E16.2 HYPOGLYCEMIA, UNSPECIFIED: ICD-10-CM

## 2019-09-19 LAB
ALBUMIN SERPL ELPH-MCNC: 2.9 G/DL — LOW (ref 3.3–5)
ALP SERPL-CCNC: 108 U/L — SIGNIFICANT CHANGE UP (ref 40–120)
ALT FLD-CCNC: 45 U/L — HIGH (ref 4–41)
ANION GAP SERPL CALC-SCNC: 13 MMO/L — SIGNIFICANT CHANGE UP (ref 7–14)
APTT BLD: 23.3 SEC — LOW (ref 27.5–36.3)
AST SERPL-CCNC: 89 U/L — HIGH (ref 4–40)
BASE EXCESS BLDV CALC-SCNC: -1.1 MMOL/L — SIGNIFICANT CHANGE UP
BASOPHILS # BLD AUTO: 0.03 K/UL — SIGNIFICANT CHANGE UP (ref 0–0.2)
BASOPHILS NFR BLD AUTO: 0.3 % — SIGNIFICANT CHANGE UP (ref 0–2)
BILIRUB SERPL-MCNC: 0.5 MG/DL — SIGNIFICANT CHANGE UP (ref 0.2–1.2)
BLOOD GAS VENOUS - CREATININE: 2.03 MG/DL — HIGH (ref 0.5–1.3)
BUN SERPL-MCNC: 22 MG/DL — SIGNIFICANT CHANGE UP (ref 7–23)
CALCIUM SERPL-MCNC: 10.5 MG/DL — SIGNIFICANT CHANGE UP (ref 8.4–10.5)
CHLORIDE BLDV-SCNC: 95 MMOL/L — LOW (ref 96–108)
CHLORIDE SERPL-SCNC: 94 MMOL/L — LOW (ref 98–107)
CO2 SERPL-SCNC: 21 MMOL/L — LOW (ref 22–31)
CREAT SERPL-MCNC: 1.88 MG/DL — HIGH (ref 0.5–1.3)
EOSINOPHIL # BLD AUTO: 0.01 K/UL — SIGNIFICANT CHANGE UP (ref 0–0.5)
EOSINOPHIL NFR BLD AUTO: 0.1 % — SIGNIFICANT CHANGE UP (ref 0–6)
GAS PNL BLDV: 126 MMOL/L — LOW (ref 136–146)
GLUCOSE BLDV-MCNC: 162 MG/DL — HIGH (ref 70–99)
GLUCOSE SERPL-MCNC: 145 MG/DL — HIGH (ref 70–99)
HCO3 BLDV-SCNC: 22 MMOL/L — SIGNIFICANT CHANGE UP (ref 20–27)
HCT VFR BLD CALC: 36.1 % — LOW (ref 39–50)
HCT VFR BLDV CALC: 38.3 % — LOW (ref 39–51)
HGB BLD-MCNC: 11.6 G/DL — LOW (ref 13–17)
HGB BLDV-MCNC: 12.5 G/DL — LOW (ref 13–17)
IMM GRANULOCYTES NFR BLD AUTO: 0.6 % — SIGNIFICANT CHANGE UP (ref 0–1.5)
INR BLD: 0.95 — SIGNIFICANT CHANGE UP (ref 0.88–1.17)
LACTATE BLDV-MCNC: 3.1 MMOL/L — HIGH (ref 0.5–2)
LYMPHOCYTES # BLD AUTO: 0.13 K/UL — LOW (ref 1–3.3)
LYMPHOCYTES # BLD AUTO: 1.2 % — LOW (ref 13–44)
MAGNESIUM SERPL-MCNC: 1.9 MG/DL — SIGNIFICANT CHANGE UP (ref 1.6–2.6)
MCHC RBC-ENTMCNC: 28.4 PG — SIGNIFICANT CHANGE UP (ref 27–34)
MCHC RBC-ENTMCNC: 32.1 % — SIGNIFICANT CHANGE UP (ref 32–36)
MCV RBC AUTO: 88.3 FL — SIGNIFICANT CHANGE UP (ref 80–100)
MONOCYTES # BLD AUTO: 1.11 K/UL — HIGH (ref 0–0.9)
MONOCYTES NFR BLD AUTO: 10.2 % — SIGNIFICANT CHANGE UP (ref 2–14)
NEUTROPHILS # BLD AUTO: 9.54 K/UL — HIGH (ref 1.8–7.4)
NEUTROPHILS NFR BLD AUTO: 87.6 % — HIGH (ref 43–77)
NRBC # FLD: 0 K/UL — SIGNIFICANT CHANGE UP (ref 0–0)
PCO2 BLDV: 46 MMHG — SIGNIFICANT CHANGE UP (ref 41–51)
PH BLDV: 7.33 PH — SIGNIFICANT CHANGE UP (ref 7.32–7.43)
PLATELET # BLD AUTO: 273 K/UL — SIGNIFICANT CHANGE UP (ref 150–400)
PMV BLD: 11.4 FL — SIGNIFICANT CHANGE UP (ref 7–13)
PO2 BLDV: 20 MMHG — LOW (ref 35–40)
POTASSIUM BLDV-SCNC: 5.4 MMOL/L — HIGH (ref 3.4–4.5)
POTASSIUM SERPL-MCNC: 4.8 MMOL/L — SIGNIFICANT CHANGE UP (ref 3.5–5.3)
POTASSIUM SERPL-SCNC: 4.8 MMOL/L — SIGNIFICANT CHANGE UP (ref 3.5–5.3)
PROT SERPL-MCNC: 7 G/DL — SIGNIFICANT CHANGE UP (ref 6–8.3)
PROTHROM AB SERPL-ACNC: 10.8 SEC — SIGNIFICANT CHANGE UP (ref 9.8–13.1)
RBC # BLD: 4.09 M/UL — LOW (ref 4.2–5.8)
RBC # FLD: 15 % — HIGH (ref 10.3–14.5)
SAO2 % BLDV: 21 % — LOW (ref 60–85)
SODIUM SERPL-SCNC: 128 MMOL/L — LOW (ref 135–145)
WBC # BLD: 10.89 K/UL — HIGH (ref 3.8–10.5)
WBC # FLD AUTO: 10.89 K/UL — HIGH (ref 3.8–10.5)

## 2019-09-19 PROCEDURE — 71045 X-RAY EXAM CHEST 1 VIEW: CPT | Mod: 26

## 2019-09-19 RX ORDER — IPRATROPIUM/ALBUTEROL SULFATE 18-103MCG
3 AEROSOL WITH ADAPTER (GRAM) INHALATION ONCE
Refills: 0 | Status: COMPLETED | OUTPATIENT
Start: 2019-09-19 | End: 2019-09-19

## 2019-09-19 RX ORDER — SODIUM CHLORIDE 9 MG/ML
1000 INJECTION INTRAMUSCULAR; INTRAVENOUS; SUBCUTANEOUS ONCE
Refills: 0 | Status: COMPLETED | OUTPATIENT
Start: 2019-09-19 | End: 2019-09-19

## 2019-09-19 RX ADMIN — SODIUM CHLORIDE 1000 MILLILITER(S): 9 INJECTION INTRAMUSCULAR; INTRAVENOUS; SUBCUTANEOUS at 22:32

## 2019-09-19 RX ADMIN — Medication 3 MILLILITER(S): at 23:34

## 2019-09-19 RX ADMIN — Medication 3 MILLILITER(S): at 22:55

## 2019-09-19 NOTE — ED PROVIDER NOTE - PMH
Adult hypothyroidism    CAD (coronary artery disease)    Diabetes    Essential hypertension    Hyperlipidemia    Peptic ulcer  s/p treatment for H pylori

## 2019-09-19 NOTE — ED ADULT TRIAGE NOTE - CHIEF COMPLAINT QUOTE
Pt was found unresponsive by family and upon EMS arrival pt's FS was 23. Pt was given 1 amp of D50 and pt became responsive. Upon arrival pt offers no complaints. Pt is on Januvia and Insulin as per EMS. Fs 81. pt is sent to room 11.

## 2019-09-19 NOTE — ED PROVIDER NOTE - PHYSICAL EXAMINATION
CONSTITUTIONAL: well developed; well nourished; well appearing in no acute distress  HEAD: normocephalic; atraumatic  NECK: supple; non tender. + full passive ROM in all directions  CARD: S1, S2 normal; no murmurs, gallops, or rubs. Regular rate and rhythm  RESP: no wheezes, rales or rhonchi. Good air movement bilaterally without significant accessory muscle use  ABD: soft; non-distended; non-tender. No rebound, no guarding, no pulsatile abdominal mass  SKIN: warm and dry, no lesions noted  NEURO: alert, oriented, CN II-XII grossly intact,motor and sensory grossly intact, speech nonslurred, stable gait, no focal deficits  PSYCH: calm, cooperative, appropriate, good eye contact, logical thought process, no apparent danger to self or others

## 2019-09-19 NOTE — ED PROVIDER NOTE - OBJECTIVE STATEMENT
Pt is a 78 yo m w/ a PMHx of DM, HLD, hypothyroidism, CAD and HTN. Presents to ED after found unresponsive at home. Daughter called EMS and glucose was 23. EMS gave 1 amp of D50 and became responsive. Pt has had hypoglycemic episodes in the past. Recent change in medications for his DM. Pt was at baseline mentally upon arrival. BG was 97 on arrival. Pt denies fever/chills, n/v/d, chest pain, abdominal pain, SOB, HA or urinary problems.

## 2019-09-19 NOTE — ED PROVIDER NOTE - PROGRESS NOTE DETAILS
Travis PGY3: pt presenting with hypoglycemia that resolved pt asx at presentation d/c'd 2 d/a glyburide had been added 2 d/a to his medications in ed pt developed wheezing (?aspiration) and tachycardia to 140s ekg showed ST at 118 will admit

## 2019-09-19 NOTE — ED ADULT NURSE NOTE - OBJECTIVE STATEMENT
A&Ox3 presents from home for hypoglycemia. At present patient is awake & alert, FS 93, given orange juice. HR 90's. SPO2 96 on room air. IV presents placed by EMS #20 L arm. Daily smoker. Some audible wheezing, family states possible aspiration. Placed on cardiac monitor, report given to primary RN Gloria

## 2019-09-19 NOTE — ED PROVIDER NOTE - NS ED ROS FT
Constitutional: no fevers/chills, no sick contacts  Eyes: No visual changes, eye pain or discharge. No photophobia  Neck:  No neck pain or stiffness. No limited ROM  Cardiac: No SOB or edema. No chest pain with exertion.  Respiratory: No cough or respiratory distress. No hemoptysis. No history of asthma or RAD  GI: No nausea, vomiting, diarrhea or abdominal pain  : No dysuria, frequency or burning. No discharge  MS: No myalgia, muscle weakness, joint pain or back pain  Neuro: No headache or weakness. No LOC  Except as documented in the HPI, all other systems are negative

## 2019-09-19 NOTE — ED PROVIDER NOTE - ATTENDING CONTRIBUTION TO CARE
JESSE Mitchell MD performed a history and physical exam of the patient and discussed their management with the resident and /or advanced care provider. I reviewed the resident and /or ACP's note and agree with the documented findings and plan of care. My medical decision making and observations are found above.    Awake, Alert, Conversant.  Resting comfortably.  Breath sounds clear in all lung fields.  Mildly tachycardic and regular heart rate without murmurs, rubs, or gallops.  Normal S1/S2.  Abdomen soft and nontender.  No lower extremity swelling or tenderness.  Oriented and conversant with fluent speech, moving all extremities with good strength.

## 2019-09-19 NOTE — ED PROVIDER NOTE - CLINICAL SUMMARY MEDICAL DECISION MAKING FREE TEXT BOX
Dr. Mitchell: I agree with the above provided history and exam and addend/modify it as follows.  79M hx CAD S/P stent, DM, CKD, HLD, hypothyroidism, HTN with recent admission for hypertension and management of glycemic control: discharged 2 days prior P/W hypoglycemia.  Given D50 IV and complex carbs PO on arrival. Likely due to poor PO intake in combination with renal insufficiency.  No evidence of infectious etiology at this time but will R/O.  Doubt ACS.  mental status improved following glucose.

## 2019-09-20 DIAGNOSIS — E11.649 TYPE 2 DIABETES MELLITUS WITH HYPOGLYCEMIA WITHOUT COMA: ICD-10-CM

## 2019-09-20 DIAGNOSIS — I25.10 ATHEROSCLEROTIC HEART DISEASE OF NATIVE CORONARY ARTERY WITHOUT ANGINA PECTORIS: ICD-10-CM

## 2019-09-20 DIAGNOSIS — I95.9 HYPOTENSION, UNSPECIFIED: ICD-10-CM

## 2019-09-20 DIAGNOSIS — N18.9 CHRONIC KIDNEY DISEASE, UNSPECIFIED: ICD-10-CM

## 2019-09-20 DIAGNOSIS — E16.2 HYPOGLYCEMIA, UNSPECIFIED: ICD-10-CM

## 2019-09-20 DIAGNOSIS — Z29.9 ENCOUNTER FOR PROPHYLACTIC MEASURES, UNSPECIFIED: ICD-10-CM

## 2019-09-20 DIAGNOSIS — R00.0 TACHYCARDIA, UNSPECIFIED: ICD-10-CM

## 2019-09-20 DIAGNOSIS — Q24.8 OTHER SPECIFIED CONGENITAL MALFORMATIONS OF HEART: ICD-10-CM

## 2019-09-20 DIAGNOSIS — E03.9 HYPOTHYROIDISM, UNSPECIFIED: ICD-10-CM

## 2019-09-20 DIAGNOSIS — R06.2 WHEEZING: ICD-10-CM

## 2019-09-20 DIAGNOSIS — E87.1 HYPO-OSMOLALITY AND HYPONATREMIA: ICD-10-CM

## 2019-09-20 DIAGNOSIS — E83.52 HYPERCALCEMIA: ICD-10-CM

## 2019-09-20 LAB
BASE EXCESS BLDV CALC-SCNC: -2.1 MMOL/L — SIGNIFICANT CHANGE UP
BLOOD GAS VENOUS - CREATININE: 1.92 MG/DL — HIGH (ref 0.5–1.3)
BLOOD GAS VENOUS - FIO2: 21 — SIGNIFICANT CHANGE UP
CHLORIDE BLDV-SCNC: 106 MMOL/L — SIGNIFICANT CHANGE UP (ref 96–108)
D DIMER BLD IA.RAPID-MCNC: 1563 NG/ML — SIGNIFICANT CHANGE UP
GAS PNL BLDV: 127 MMOL/L — LOW (ref 136–146)
GLUCOSE BLDV-MCNC: 26 MG/DL — CRITICAL LOW (ref 70–99)
HCO3 BLDV-SCNC: 22 MMOL/L — SIGNIFICANT CHANGE UP (ref 20–27)
HCT VFR BLDV CALC: 31 % — LOW (ref 39–51)
HGB BLDV-MCNC: 10 G/DL — LOW (ref 13–17)
LACTATE BLDV-MCNC: 2.8 MMOL/L — HIGH (ref 0.5–2)
NT-PROBNP SERPL-SCNC: 2936 PG/ML — SIGNIFICANT CHANGE UP
PCO2 BLDV: 37 MMHG — LOW (ref 41–51)
PH BLDV: 7.4 PH — SIGNIFICANT CHANGE UP (ref 7.32–7.43)
PO2 BLDV: 28 MMHG — LOW (ref 35–40)
POTASSIUM BLDV-SCNC: 3.4 MMOL/L — SIGNIFICANT CHANGE UP (ref 3.4–4.5)
SAO2 % BLDV: 45.2 % — LOW (ref 60–85)
TROPONIN T, HIGH SENSITIVITY: 69 NG/L — CRITICAL HIGH (ref ?–14)

## 2019-09-20 PROCEDURE — 99223 1ST HOSP IP/OBS HIGH 75: CPT

## 2019-09-20 PROCEDURE — 71045 X-RAY EXAM CHEST 1 VIEW: CPT | Mod: 26

## 2019-09-20 PROCEDURE — 78582 LUNG VENTILAT&PERFUS IMAGING: CPT | Mod: 26,GC

## 2019-09-20 PROCEDURE — 12345: CPT | Mod: NC

## 2019-09-20 RX ORDER — INSULIN LISPRO 100/ML
VIAL (ML) SUBCUTANEOUS AT BEDTIME
Refills: 0 | Status: DISCONTINUED | OUTPATIENT
Start: 2019-09-20 | End: 2019-09-22

## 2019-09-20 RX ORDER — DEXTROSE 50 % IN WATER 50 %
12.5 SYRINGE (ML) INTRAVENOUS ONCE
Refills: 0 | Status: DISCONTINUED | OUTPATIENT
Start: 2019-09-20 | End: 2019-09-22

## 2019-09-20 RX ORDER — SODIUM CHLORIDE 9 MG/ML
1000 INJECTION, SOLUTION INTRAVENOUS
Refills: 0 | Status: DISCONTINUED | OUTPATIENT
Start: 2019-09-20 | End: 2019-09-20

## 2019-09-20 RX ORDER — SODIUM CHLORIDE 9 MG/ML
1000 INJECTION, SOLUTION INTRAVENOUS
Refills: 0 | Status: DISCONTINUED | OUTPATIENT
Start: 2019-09-20 | End: 2019-09-22

## 2019-09-20 RX ORDER — LEVALBUTEROL 1.25 MG/.5ML
0.63 SOLUTION, CONCENTRATE RESPIRATORY (INHALATION) EVERY 6 HOURS
Refills: 0 | Status: DISCONTINUED | OUTPATIENT
Start: 2019-09-20 | End: 2019-09-22

## 2019-09-20 RX ORDER — DEXTROSE 50 % IN WATER 50 %
25 SYRINGE (ML) INTRAVENOUS ONCE
Refills: 0 | Status: DISCONTINUED | OUTPATIENT
Start: 2019-09-20 | End: 2019-09-22

## 2019-09-20 RX ORDER — LEVOTHYROXINE SODIUM 125 MCG
88 TABLET ORAL DAILY
Refills: 0 | Status: DISCONTINUED | OUTPATIENT
Start: 2019-09-20 | End: 2019-09-20

## 2019-09-20 RX ORDER — SODIUM CHLORIDE 9 MG/ML
1000 INJECTION INTRAMUSCULAR; INTRAVENOUS; SUBCUTANEOUS
Refills: 0 | Status: DISCONTINUED | OUTPATIENT
Start: 2019-09-20 | End: 2019-09-21

## 2019-09-20 RX ORDER — SIMVASTATIN 20 MG/1
20 TABLET, FILM COATED ORAL AT BEDTIME
Refills: 0 | Status: DISCONTINUED | OUTPATIENT
Start: 2019-09-20 | End: 2019-09-22

## 2019-09-20 RX ORDER — FAMOTIDINE 10 MG/ML
20 INJECTION INTRAVENOUS
Refills: 0 | Status: DISCONTINUED | OUTPATIENT
Start: 2019-09-20 | End: 2019-09-22

## 2019-09-20 RX ORDER — SENNA PLUS 8.6 MG/1
2 TABLET ORAL AT BEDTIME
Refills: 0 | Status: DISCONTINUED | OUTPATIENT
Start: 2019-09-20 | End: 2019-09-22

## 2019-09-20 RX ORDER — INFLUENZA VIRUS VACCINE 15; 15; 15; 15 UG/.5ML; UG/.5ML; UG/.5ML; UG/.5ML
0.5 SUSPENSION INTRAMUSCULAR ONCE
Refills: 0 | Status: DISCONTINUED | OUTPATIENT
Start: 2019-09-20 | End: 2019-09-22

## 2019-09-20 RX ORDER — GLUCAGON INJECTION, SOLUTION 0.5 MG/.1ML
1 INJECTION, SOLUTION SUBCUTANEOUS ONCE
Refills: 0 | Status: DISCONTINUED | OUTPATIENT
Start: 2019-09-20 | End: 2019-09-22

## 2019-09-20 RX ORDER — DEXTROSE 50 % IN WATER 50 %
50 SYRINGE (ML) INTRAVENOUS ONCE
Refills: 0 | Status: COMPLETED | OUTPATIENT
Start: 2019-09-20 | End: 2019-09-20

## 2019-09-20 RX ORDER — DEXTROSE 50 % IN WATER 50 %
15 SYRINGE (ML) INTRAVENOUS ONCE
Refills: 0 | Status: DISCONTINUED | OUTPATIENT
Start: 2019-09-20 | End: 2019-09-22

## 2019-09-20 RX ORDER — INSULIN LISPRO 100/ML
VIAL (ML) SUBCUTANEOUS
Refills: 0 | Status: DISCONTINUED | OUTPATIENT
Start: 2019-09-20 | End: 2019-09-22

## 2019-09-20 RX ORDER — MIDODRINE HYDROCHLORIDE 2.5 MG/1
5 TABLET ORAL THREE TIMES A DAY
Refills: 0 | Status: DISCONTINUED | OUTPATIENT
Start: 2019-09-20 | End: 2019-09-22

## 2019-09-20 RX ORDER — ASPIRIN/CALCIUM CARB/MAGNESIUM 324 MG
81 TABLET ORAL DAILY
Refills: 0 | Status: DISCONTINUED | OUTPATIENT
Start: 2019-09-20 | End: 2019-09-20

## 2019-09-20 RX ORDER — DOCUSATE SODIUM 100 MG
100 CAPSULE ORAL THREE TIMES A DAY
Refills: 0 | Status: DISCONTINUED | OUTPATIENT
Start: 2019-09-20 | End: 2019-09-22

## 2019-09-20 RX ORDER — HEPARIN SODIUM 5000 [USP'U]/ML
5000 INJECTION INTRAVENOUS; SUBCUTANEOUS EVERY 8 HOURS
Refills: 0 | Status: DISCONTINUED | OUTPATIENT
Start: 2019-09-20 | End: 2019-09-22

## 2019-09-20 RX ORDER — FLUDROCORTISONE ACETATE 0.1 MG/1
0.1 TABLET ORAL
Refills: 0 | Status: DISCONTINUED | OUTPATIENT
Start: 2019-09-20 | End: 2019-09-22

## 2019-09-20 RX ADMIN — SENNA PLUS 2 TABLET(S): 8.6 TABLET ORAL at 21:14

## 2019-09-20 RX ADMIN — Medication 88 MICROGRAM(S): at 06:45

## 2019-09-20 RX ADMIN — FLUDROCORTISONE ACETATE 0.1 MILLIGRAM(S): 0.1 TABLET ORAL at 18:32

## 2019-09-20 RX ADMIN — Medication 4: at 18:25

## 2019-09-20 RX ADMIN — Medication 125 MILLIGRAM(S): at 10:53

## 2019-09-20 RX ADMIN — HEPARIN SODIUM 5000 UNIT(S): 5000 INJECTION INTRAVENOUS; SUBCUTANEOUS at 06:46

## 2019-09-20 RX ADMIN — Medication 50 MILLILITER(S): at 04:32

## 2019-09-20 RX ADMIN — FLUDROCORTISONE ACETATE 0.1 MILLIGRAM(S): 0.1 TABLET ORAL at 06:45

## 2019-09-20 RX ADMIN — Medication 2: at 13:44

## 2019-09-20 RX ADMIN — HEPARIN SODIUM 5000 UNIT(S): 5000 INJECTION INTRAVENOUS; SUBCUTANEOUS at 21:14

## 2019-09-20 RX ADMIN — Medication 100 MILLIGRAM(S): at 21:14

## 2019-09-20 RX ADMIN — Medication 50 MILLILITER(S): at 02:04

## 2019-09-20 RX ADMIN — LEVALBUTEROL 0.63 MILLIGRAM(S): 1.25 SOLUTION, CONCENTRATE RESPIRATORY (INHALATION) at 07:21

## 2019-09-20 RX ADMIN — SODIUM CHLORIDE 75 MILLILITER(S): 9 INJECTION, SOLUTION INTRAVENOUS at 02:30

## 2019-09-20 RX ADMIN — HEPARIN SODIUM 5000 UNIT(S): 5000 INJECTION INTRAVENOUS; SUBCUTANEOUS at 13:58

## 2019-09-20 RX ADMIN — MIDODRINE HYDROCHLORIDE 5 MILLIGRAM(S): 2.5 TABLET ORAL at 06:46

## 2019-09-20 RX ADMIN — LEVALBUTEROL 0.63 MILLIGRAM(S): 1.25 SOLUTION, CONCENTRATE RESPIRATORY (INHALATION) at 14:30

## 2019-09-20 RX ADMIN — SIMVASTATIN 20 MILLIGRAM(S): 20 TABLET, FILM COATED ORAL at 21:14

## 2019-09-20 RX ADMIN — Medication 100 MILLIGRAM(S): at 06:45

## 2019-09-20 RX ADMIN — SODIUM CHLORIDE 75 MILLILITER(S): 9 INJECTION INTRAMUSCULAR; INTRAVENOUS; SUBCUTANEOUS at 18:33

## 2019-09-20 RX ADMIN — SODIUM CHLORIDE 80 MILLILITER(S): 9 INJECTION, SOLUTION INTRAVENOUS at 09:13

## 2019-09-20 RX ADMIN — SODIUM CHLORIDE 50 MILLILITER(S): 9 INJECTION, SOLUTION INTRAVENOUS at 05:00

## 2019-09-20 RX ADMIN — Medication 81 MILLIGRAM(S): at 13:58

## 2019-09-20 RX ADMIN — FAMOTIDINE 20 MILLIGRAM(S): 10 INJECTION INTRAVENOUS at 06:45

## 2019-09-20 RX ADMIN — MIDODRINE HYDROCHLORIDE 5 MILLIGRAM(S): 2.5 TABLET ORAL at 14:30

## 2019-09-20 RX ADMIN — FAMOTIDINE 20 MILLIGRAM(S): 10 INJECTION INTRAVENOUS at 18:25

## 2019-09-20 RX ADMIN — Medication 3: at 22:53

## 2019-09-20 NOTE — H&P ADULT - NSHPPHYSICALEXAM_GEN_ALL_CORE
GENERAL APPEARANCE: Well developed, well nourished, alert and cooperative. NAD.   HEENT:  PERRL, EOMI. External auditory canals normal, hearing grossly intact.  NECK: Neck supple, non-tender without lymphadenopathy, masses or thyromegaly.  CARDIAC: Normal S1 and S2. No S3, S4 or murmurs. Rhythm is regular.  LUNGS: Clear to auscultation and percussion without rales, rhonchi, wheezing or diminished breath sounds.  ABDOMEN: Positive bowel sounds. Soft, nondistended, nontender. No guarding or rebound.   MUSCULOSKELETAL: ROM intact spine and extremities. No joint erythema or tenderness.   BACK: normal gait and posture, no spinal deformity, symmetry of spinal muscles, without tenderness, decreased range of motion.  EXTREMITIES: No significant deformity or joint abnormality. No edema. Peripheral pulses intact. No varicosities.  NEUROLOGICAL: CN II-XII intact. Strength and sensation symmetric and intact throughout.   SKIN: Skin normal color, texture and turgor with no lesions or eruptions.  PSYCHIATRIC: AOx3.Normal affect and behavior. GENERAL APPEARANCE: Well developed, well nourished, alert and cooperative  HEENT:  PERRL, EOMI. External auditory canals normal, hearing grossly intact.  NECK: Neck supple, non-tender without masses or thyromegaly.  CARDIAC: Normal S1 and S2. No S3, S4 or murmurs. Rhythm is tachycardic  LUNGS: Tachypneic but without accessory muscle use. Diffuse expiratory wheeze.   ABDOMEN: Positive bowel sounds. Soft, distended, nontender. No guarding or rebound.   MUSCULOSKELETAL: ROM intact spine and extremities. No joint erythema or tenderness.   BACK: normal posture, no spinal deformity, symmetry of spinal muscles, without tenderness, decreased range of motion.  EXTREMITIES: No significant deformity or joint abnormality. +1 edema. Peripheral pulses intact. No varicosities.  NEUROLOGICAL: CN II-XII intact. Strength and sensation symmetric and intact throughout.   SKIN: Skin normal color, texture and turgor with no lesions or eruptions.  PSYCHIATRIC: AOx2. Tangential but able to re-orient

## 2019-09-20 NOTE — PROGRESS NOTE ADULT - SUBJECTIVE AND OBJECTIVE BOX
Patient is a 79y old  Male who presents with a chief complaint of hypoglycemia (20 Sep 2019 13:10)        SUBJECTIVE / OVERNIGHT EVENTS:      MEDICATIONS  (STANDING):  aspirin  chewable 81 milliGRAM(s) Oral daily  dextrose 10% + sodium chloride 0.9%. 1000 milliLiter(s) (80 mL/Hr) IV Continuous <Continuous>  dextrose 5%. 1000 milliLiter(s) (50 mL/Hr) IV Continuous <Continuous>  dextrose 50% Injectable 12.5 Gram(s) IV Push once  dextrose 50% Injectable 25 Gram(s) IV Push once  dextrose 50% Injectable 25 Gram(s) IV Push once  docusate sodium 100 milliGRAM(s) Oral three times a day  famotidine    Tablet 20 milliGRAM(s) Oral two times a day  fludroCORTISONE 0.1 milliGRAM(s) Oral two times a day  heparin  Injectable 5000 Unit(s) SubCutaneous every 8 hours  insulin lispro (HumaLOG) corrective regimen sliding scale   SubCutaneous three times a day before meals  insulin lispro (HumaLOG) corrective regimen sliding scale   SubCutaneous at bedtime  levothyroxine 88 MICROGram(s) Oral daily  midodrine. 5 milliGRAM(s) Oral three times a day  senna 2 Tablet(s) Oral at bedtime  simvastatin 20 milliGRAM(s) Oral at bedtime    MEDICATIONS  (PRN):  dextrose 40% Gel 15 Gram(s) Oral once PRN Blood Glucose LESS THAN 70 milliGRAM(s)/deciliter  glucagon  Injectable 1 milliGRAM(s) IntraMuscular once PRN Glucose LESS THAN 70 milligrams/deciliter  levalbuterol Inhalation 0.63 milliGRAM(s) Inhalation every 6 hours PRN shortnes of breath/wheezing      Vital Signs Last 24 Hrs  T(C): 36.9 (20 Sep 2019 14:00), Max: 37.6 (20 Sep 2019 11:39)  T(F): 98.4 (20 Sep 2019 14:00), Max: 99.6 (20 Sep 2019 11:39)  HR: 95 (20 Sep 2019 14:00) (95 - 124)  BP: 115/69 (20 Sep 2019 14:00) (100/52 - 138/88)  BP(mean): --  RR: 18 (20 Sep 2019 14:00) (18 - 26)  SpO2: 95% (20 Sep 2019 14:00) (95% - 100%)  CAPILLARY BLOOD GLUCOSE      POCT Blood Glucose.: 224 mg/dL (20 Sep 2019 13:34)  POCT Blood Glucose.: 91 mg/dL (20 Sep 2019 08:16)  POCT Blood Glucose.: 97 mg/dL (20 Sep 2019 06:48)  POCT Blood Glucose.: 116 mg/dL (20 Sep 2019 05:53)  POCT Blood Glucose.: 189 mg/dL (20 Sep 2019 04:53)  POCT Blood Glucose.: 83 mg/dL (20 Sep 2019 03:58)  POCT Blood Glucose.: 97 mg/dL (20 Sep 2019 03:16)  POCT Blood Glucose.: 163 mg/dL (20 Sep 2019 02:14)  POCT Blood Glucose.: 29 mg/dL (20 Sep 2019 01:45)  POCT Blood Glucose.: 28 mg/dL (20 Sep 2019 01:44)  POCT Blood Glucose.: 83 mg/dL (19 Sep 2019 22:34)  POCT Blood Glucose.: 100 mg/dL (19 Sep 2019 21:40)  POCT Blood Glucose.: 93 mg/dL (19 Sep 2019 21:00)  POCT Blood Glucose.: 81 mg/dL (19 Sep 2019 20:49)    I&O's Summary        PHYSICAL EXAM  GENERAL: NAD, well-developed  HEAD:  Atraumatic, Normocephalic  EYES: EOMI, PERRLA, conjunctiva and sclera clear  NECK: Supple, No JVD  CHEST/LUNG: Clear to auscultation bilaterally; No wheeze  HEART: Regular rate and rhythm; No murmurs, rubs, or gallops  ABDOMEN: Soft, Nontender, Nondistended; Bowel sounds present  EXTREMITIES:  2+ Peripheral Pulses, No clubbing, cyanosis, or edema  PSYCH: AAOx3  SKIN: No rashes or lesions    LABS:                        10.1   11.23 )-----------( 240      ( 20 Sep 2019 07:00 )             31.7     09-20    131<L>  |  97<L>  |  19  ----------------------------<  80  3.9   |  21<L>  |  1.81<H>    Ca    9.8      20 Sep 2019 07:00  Mg     1.9     09-19    TPro  5.5<L>  /  Alb  2.5<L>  /  TBili  0.5  /  DBili  x   /  AST  47<H>  /  ALT  34  /  AlkPhos  81  09-20    PT/INR - ( 19 Sep 2019 21:10 )   PT: 10.8 SEC;   INR: 0.95          PTT - ( 19 Sep 2019 21:10 )  PTT:23.3 SEC  CARDIAC MARKERS ( 20 Sep 2019 07:00 )  x     / x     / 21 u/L / 4.02 ng/mL / x              RADIOLOGY & ADDITIONAL TESTS:    Imaging Personally Reviewed:  Consultant(s) Notes Reviewed:    Care Discussed with Consultants/Other Providers: Patient is a 79y old  Male who presents with a chief complaint of hypoglycemia (20 Sep 2019 13:10)        SUBJECTIVE / OVERNIGHT EVENTS: reports feeling better. No CP, SOB. AAOx3. Tolerating diet. No abn pain, N/V/D, dysuria. reports some constipation.       MEDICATIONS  (STANDING):  aspirin  chewable 81 milliGRAM(s) Oral daily  dextrose 10% + sodium chloride 0.9%. 1000 milliLiter(s) (80 mL/Hr) IV Continuous <Continuous>  dextrose 5%. 1000 milliLiter(s) (50 mL/Hr) IV Continuous <Continuous>  dextrose 50% Injectable 12.5 Gram(s) IV Push once  dextrose 50% Injectable 25 Gram(s) IV Push once  dextrose 50% Injectable 25 Gram(s) IV Push once  docusate sodium 100 milliGRAM(s) Oral three times a day  famotidine    Tablet 20 milliGRAM(s) Oral two times a day  fludroCORTISONE 0.1 milliGRAM(s) Oral two times a day  heparin  Injectable 5000 Unit(s) SubCutaneous every 8 hours  insulin lispro (HumaLOG) corrective regimen sliding scale   SubCutaneous three times a day before meals  insulin lispro (HumaLOG) corrective regimen sliding scale   SubCutaneous at bedtime  levothyroxine 88 MICROGram(s) Oral daily  midodrine. 5 milliGRAM(s) Oral three times a day  senna 2 Tablet(s) Oral at bedtime  simvastatin 20 milliGRAM(s) Oral at bedtime    MEDICATIONS  (PRN):  dextrose 40% Gel 15 Gram(s) Oral once PRN Blood Glucose LESS THAN 70 milliGRAM(s)/deciliter  glucagon  Injectable 1 milliGRAM(s) IntraMuscular once PRN Glucose LESS THAN 70 milligrams/deciliter  levalbuterol Inhalation 0.63 milliGRAM(s) Inhalation every 6 hours PRN shortnes of breath/wheezing      Vital Signs Last 24 Hrs  T(C): 36.9 (20 Sep 2019 14:00), Max: 37.6 (20 Sep 2019 11:39)  T(F): 98.4 (20 Sep 2019 14:00), Max: 99.6 (20 Sep 2019 11:39)  HR: 95 (20 Sep 2019 14:00) (95 - 124)  BP: 115/69 (20 Sep 2019 14:00) (100/52 - 138/88)  BP(mean): --  RR: 18 (20 Sep 2019 14:00) (18 - 26)  SpO2: 95% (20 Sep 2019 14:00) (95% - 100%)  CAPILLARY BLOOD GLUCOSE      POCT Blood Glucose.: 224 mg/dL (20 Sep 2019 13:34)  POCT Blood Glucose.: 91 mg/dL (20 Sep 2019 08:16)  POCT Blood Glucose.: 97 mg/dL (20 Sep 2019 06:48)  POCT Blood Glucose.: 116 mg/dL (20 Sep 2019 05:53)  POCT Blood Glucose.: 189 mg/dL (20 Sep 2019 04:53)  POCT Blood Glucose.: 83 mg/dL (20 Sep 2019 03:58)  POCT Blood Glucose.: 97 mg/dL (20 Sep 2019 03:16)  POCT Blood Glucose.: 163 mg/dL (20 Sep 2019 02:14)  POCT Blood Glucose.: 29 mg/dL (20 Sep 2019 01:45)  POCT Blood Glucose.: 28 mg/dL (20 Sep 2019 01:44)  POCT Blood Glucose.: 83 mg/dL (19 Sep 2019 22:34)  POCT Blood Glucose.: 100 mg/dL (19 Sep 2019 21:40)  POCT Blood Glucose.: 93 mg/dL (19 Sep 2019 21:00)  POCT Blood Glucose.: 81 mg/dL (19 Sep 2019 20:49)    I&O's Summary        PHYSICAL EXAM  GENERAL: NAD, well-developed  HEAD:  Atraumatic, Normocephalic  EYES: EOMI, PERRLA, conjunctiva and sclera clear  NECK: Supple, No JVD  CHEST/LUNG: b/l wheeze   HEART: Regular rate and rhythm; No murmurs, rubs, or gallops  ABDOMEN: Soft, Nontender, Nondistended; Bowel sounds present  EXTREMITIES:  2+ Peripheral Pulses, No clubbing, cyanosis, or edema  PSYCH: AAOx3  SKIN: No rashes or lesions    LABS:                        10.1   11.23 )-----------( 240      ( 20 Sep 2019 07:00 )             31.7     09-20    131<L>  |  97<L>  |  19  ----------------------------<  80  3.9   |  21<L>  |  1.81<H>    Ca    9.8      20 Sep 2019 07:00  Mg     1.9     09-19    TPro  5.5<L>  /  Alb  2.5<L>  /  TBili  0.5  /  DBili  x   /  AST  47<H>  /  ALT  34  /  AlkPhos  81  09-20    PT/INR - ( 19 Sep 2019 21:10 )   PT: 10.8 SEC;   INR: 0.95          PTT - ( 19 Sep 2019 21:10 )  PTT:23.3 SEC  CARDIAC MARKERS ( 20 Sep 2019 07:00 )  x     / x     / 21 u/L / 4.02 ng/mL / x              RADIOLOGY & ADDITIONAL TESTS:    Imaging Personally Reviewed: CXR personally reviewed: evidence of mediastinal congestion.  pending official read     Care Discussed with Consultants/Other Providers: discussed case with IR, consulted for biopsy

## 2019-09-20 NOTE — PROGRESS NOTE ADULT - PROBLEM SELECTOR PLAN 8
-orthostatic hypotension 2/2 to adrenal insuff? Low cortisol last admission  -continue with midodrine   -continue with florinef -orthostatic hypotension 2/2 to adrenal insuff? Low cortisol last admission  -continue with midodrine   -continue with florinef  -repeat cortisol in am

## 2019-09-20 NOTE — PROGRESS NOTE ADULT - PROBLEM SELECTOR PLAN 3
-EKG sinus tachycardia  -2/2 to hypoglycemia vs hypovolemia vs PE vs infection  -Wells score: low-mod risk for PE. D dimer elevated. Will check V/Q scan given baseline impaired renal function  -s/p 1L , continue with IVF -EKG sinus tachycardia w/ T wave inversions in V2-V4 unchanged from previous EKG. Sinus tachycardia now resolved  -2/2 to hypoglycemia vs hypovolemia vs PE?  -Wells score: low-mod risk for PE. D dimer elevated. Will check V/Q scan given baseline impaired renal function  -s/p 1L , continue with IVF

## 2019-09-20 NOTE — PROGRESS NOTE ADULT - PROBLEM SELECTOR PLAN 7
-Cr around baseline  -avoid nephrotoxins   -continue to monitor -Cr around baseline  -renally dose meds  -nephrology following and appreciate recs

## 2019-09-20 NOTE — H&P ADULT - PROBLEM SELECTOR PLAN 2
-pt with diffuse expiratory wheeze on exam, tachypneic and tachycardic  -Daughter reports no history of pulmonary dz but states pt is a long time smoker  -f/u RVP to r/o viral etiology  -Follow up repeat CXR to assess for fluid overload   -will start on xopenex given tachycardia. Consider adding on steroids for underlying copd exacerbation if no improvement -pt with diffuse expiratory wheeze on exam, tachypneic and tachycardic  -Daughter reports no history of pulmonary dz but states pt is a long time smoker. VBG stable.  -f/u RVP to r/o viral etiology  -Follow up repeat CXR to assess for fluid overload   -will start on xopenex given tachycardia. Consider adding on steroids for underlying copd exacerbation if no improvement -pt with diffuse expiratory wheeze on exam, tachypneic and tachycardic  -Daughter reports no history of pulmonary dz but states pt is a long time smoker. VBG stable.  -f/u RVP to r/o viral etiology  -concern for aspiration event, repeat dysphagia screen and c/w dysphagia diet for now  -Follow up repeat CXR to assess for fluid overload   -will start on xopenex given tachycardia. Consider adding on steroids for underlying copd exacerbation if no improvement

## 2019-09-20 NOTE — PROGRESS NOTE ADULT - PROBLEM SELECTOR PLAN 1
-c/b decreased responsiveness now improved to baseline mental status AOX2  -secondary to recent medication adjustments in the setting of poor PO intake  -hold all oral hypoglycemic agents and insulin   -s/p 1 amp of d50 with improvement in FS to the 100s. Continue with D5NS @75, FS q2 until normal x 3  -Dysphagia 1 diet for now pending repeat evaluation. Pt not cooperative with full dysphagia screen, can be re-attempted in AM  -Endo c/s in AM -c/b decreased responsiveness now improved to baseline mental status AOX2-3 (knew name, location and year)  -secondary to recent medication adjustments in the setting of poor PO intake? was on sulfonylurea at home and will discontinue. will monitor FS on basal/bolus regimen   -c/w D10 IVF but d/c once FS improved.   -Dysphagia 1 diet for now pending official speech/swallow eval.   -Endo consulted

## 2019-09-20 NOTE — H&P ADULT - ASSESSMENT
78 yo M w/ a PMHx of T2DM, hypothyroidism, CAD, HLD and HTN brought in by EMS after decreased responsiveness secondary to hypoglycemia in the setting of recent med adjustments c/b wheezing and respiratory distress 80 yo M w/ a PMHx of T2DM, hypothyroidism, CAD, HLD and HTN brought in by EMS after decreased responsiveness secondary to hypoglycemia in the setting of recent med adjustments c/b wheezing and sinus tachycardia

## 2019-09-20 NOTE — H&P ADULT - PROBLEM SELECTOR PLAN 5
-will need to determine etiology of tachycardia prior to restarting beta blocker therapy. Must also caution against precipitating worsening hypotension   -continue to monitor

## 2019-09-20 NOTE — H&P ADULT - NSICDXPASTMEDICALHX_GEN_ALL_CORE_FT
PAST MEDICAL HISTORY:  Adult hypothyroidism     CAD (coronary artery disease)     Diabetes     Essential hypertension     Hyperlipidemia     Peptic ulcer s/p treatment for H pylori

## 2019-09-20 NOTE — PROGRESS NOTE ADULT - PROBLEM SELECTOR PLAN 10
DVT ppx: hep  Diet: dysphagia 1 pending repeat dysphagia screen once pt cooperative  PT eval DVT ppx: HSQ  Diet: dysphagia 1 pending repeat dysphagia screen   PT eval

## 2019-09-20 NOTE — CONSULT NOTE ADULT - ATTENDING COMMENTS
Anjali Keene MD  Pager [phone] ([facility])/ [phone] ([facility]) [please provide 10 digit call back number]  Nights and weekends: [phone]  Please note that this patient may be followed by a different provider tomorrow. If no answer or after hours, please contact [phone].  For final dc reccomendations, please call [phone] or page the endocrine fellow on call.

## 2019-09-20 NOTE — H&P ADULT - PROBLEM SELECTOR PLAN 4
-Pt reported chest pressure at rest that is since resolved however he is a poor historian and at times denied any complaints  -EKG unchanged from prior. Trops 62, downtrending since prior admission. Low suspicion for ACS, suspected demand ischemia 2/2 to sinus tachy. Follow up repeat trops.   -continue with aspirin, statin

## 2019-09-20 NOTE — PROGRESS NOTE ADULT - PROBLEM SELECTOR PLAN 5
-will need to determine etiology of tachycardia prior to restarting beta blocker therapy. Must also caution against precipitating worsening hypotension   -continue to monitor -unable to tolerate BB, will consider restarting if BP can tolerate, but currently on midodrine and florinef for orthostatic hypotension

## 2019-09-20 NOTE — ED ADULT NURSE REASSESSMENT NOTE - NS ED NURSE REASSESS POST TX BREATHING
No longer audibly wheezing, resting quielty/breathing improved post treatment
wheezing improved cky=938% pt st" I feel better"/breathing slightly improved post treatment
breathing improved post treatment/decreased audible wheezing, pox= 98% room air. pt denies cp or shortness of breath

## 2019-09-20 NOTE — H&P ADULT - PROBLEM SELECTOR PLAN 6
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 If worsening, consider salt tabs

## 2019-09-20 NOTE — H&P ADULT - PROBLEM SELECTOR PLAN 8
-orthostatic hypotension 2/2 to adrenal insuff? Low cortisol last admission  -continue with midodrine   -continue with florinef

## 2019-09-20 NOTE — PROGRESS NOTE ADULT - PROBLEM SELECTOR PLAN 6
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 If worsening, consider salt tabs, fluid restriction  -repeat urine osm, na

## 2019-09-20 NOTE — CONSULT NOTE ADULT - ASSESSMENT
80 yo M w/ a PMHx of T2DM, hypothyroidism, CAD, HLD, ckd 4 and HTN brought in by EMS after decreased responsiveness secondary to hypoglycemia.      CKD (chronic kidney disease), stage IV    Baseline ~ 2.2-2.5.   Currently better than baseline  avoid nephrotoxic agents      Hyponatremia  Work up suggests SIADH prior  hx of low cortisol level, off steroid now  stable  on florinef   repeat urine na, urine osmo, cortisol  Avoid hypotonic solutions    Hypercalcemia  PTH 20, Vit D 1,25 OH is high but vit D 25-OH is normal  K/L ratio nml  mild proteinuria. vasculitis w/u in office neg except +ANCA  repeat anca here neg on 9/3  ACE level ok  QuantiFeron indeterminate, chest xray clear  Calcium persistently high, s/p CT abd/plevic/chest showed: Scattered bilateral pulmonary nodules and mediastinal lymphadenopathy. Possible lymphoma with possible splenic involvement."   continue to monitor closely  consider heme eval    hypoglycemia  on D5+ NS  consider endo eval    hx of HTN, now hypotensive  orthostatic +  on midodrine and florinef  continue to monitor 78 yo M w/ a PMHx of T2DM, hypothyroidism, CAD, HLD, ckd 4 and HTN brought in by EMS after decreased responsiveness secondary to hypoglycemia.      CKD (chronic kidney disease), stage IV    Baseline ~ 2.2-2.5.   Currently better than baseline  avoid nephrotoxic agents      Hyponatremia  Work up suggests SIADH prior  hx of low cortisol level, off steroid now  stable  on florinef   repeat urine na, urine osmo, cortisol  Avoid hypotonic solutions    Hypercalcemia  PTH 20, Vit D 1,25 OH is high but vit D 25-OH is normal  K/L ratio nml  mild proteinuria. vasculitis w/u in office neg except +ANCA  repeat anca here neg on 9/3  ACE level ok  QuantiFeron indeterminate, chest xray clear  Calcium persistently high, s/p CT abd/plevic/chest showed: Scattered bilateral pulmonary nodules and mediastinal lymphadenopathy. Possible lymphoma with possible splenic involvement."   continue to monitor closely  heme eval    hypoglycemia  on D5+ NS  consider endo eval    hx of HTN, now hypotensive  orthostatic +  on midodrine and florinef  continue to monitor

## 2019-09-20 NOTE — H&P ADULT - PROBLEM SELECTOR PLAN 3
-EKG sinus tachycardia  -2/2 to hypoglycemia vs hypovolemia vs PE vs infection  -Wells score: low-mod risk for PE. D dimer elevated. Will check V/Q scan given baseline impaired renal function  -s/p 1L , continue with IVF

## 2019-09-20 NOTE — CONSULT NOTE ADULT - SUBJECTIVE AND OBJECTIVE BOX
[facility] [facility]   MD UYEN HERNANDEZ DO MARYANNE SOURIAL, LEATHA NICOLE    TEL:  OFFICE: [phone]  DR CASAREZ CELL: [phone]  DR. HERNANDEZ CELL: [phone]  DR. REYNOSO CELL: [phone]  CHUCKIE TERRAZAS CELL: [phone]    HPI:  78 yo M w/ a PMHx of T2DM, hypothyroidism, CAD, HLD, ckd 4 and HTN brought in by EMS after decreased responsiveness secondary to hypoglycemia. patient recently admitted for syncope started on midodrine and flornief. echo showed moderated LVOT obstruction. pt also had work up for hypercalcemia. CT showed scattered b/l pl nodules and medistinal lymphadenopathy plan for outpatient work up.   pt follow up with dr. Forrest for ckd management for ckd 4 baseline ~ 2.2-2.5  pt seen in ED with audible wheeze, denied complaints.       Allergies:  No Known Allergies      PAST MEDICAL & SURGICAL HISTORY:  CAD (coronary artery disease)  Adult hypothyroidism  Hyperlipidemia  Peptic ulcer: s/p treatment for H pylori  Diabetes  Essential hypertension  S/P coronary artery stent placement: x4, last one in early 2018      Home Medications Reviewed    Hospital Medications:   MEDICATIONS  (STANDING):  aspirin  chewable 81 milliGRAM(s) Oral daily  dextrose 10% + sodium chloride 0.9%. 1000 milliLiter(s) (80 mL/Hr) IV Continuous <Continuous>  dextrose 5%. 1000 milliLiter(s) (50 mL/Hr) IV Continuous <Continuous>  dextrose 50% Injectable 12.5 Gram(s) IV Push once  dextrose 50% Injectable 25 Gram(s) IV Push once  dextrose 50% Injectable 25 Gram(s) IV Push once  docusate sodium 100 milliGRAM(s) Oral three times a day  famotidine    Tablet 20 milliGRAM(s) Oral two times a day  fludroCORTISONE 0.1 milliGRAM(s) Oral two times a day  heparin  Injectable 5000 Unit(s) SubCutaneous every 8 hours  insulin lispro (HumaLOG) corrective regimen sliding scale   SubCutaneous three times a day before meals  insulin lispro (HumaLOG) corrective regimen sliding scale   SubCutaneous at bedtime  levothyroxine 88 MICROGram(s) Oral daily  midodrine. 5 milliGRAM(s) Oral three times a day  senna 2 Tablet(s) Oral at bedtime  simvastatin 20 milliGRAM(s) Oral at bedtime      SOCIAL HISTORY:  Denies ETOh, Smoking,     FAMILY HISTORY:  No pertinent family history in first degree relatives      REVIEW OF SYSTEMS:  CONSTITUTIONAL: No weakness, fevers or chills  EYES/ENT: No visual changes;  No vertigo or throat pain   NECK: No pain or stiffness  RESPIRATORY: see hpi  CARDIOVASCULAR: No chest pain or palpitations.  GASTROINTESTINAL: No abdominal or epigastric pain. No nausea, vomiting, or hematemesis; No diarrhea or constipation. No melena or hematochezia.  GENITOURINARY: No dysuria, frequency, foamy urine, urinary urgency, incontinence or hematuria  NEUROLOGICAL: No numbness or weakness  SKIN: No itching, burning, rashes, or lesions   VASCULAR: No bilateral lower extremity edema.   All other review of systems is negative unless indicated above.    VITALS:  T(F): 99.6 (09-20-19 @ 11:39), Max: 99.6 (09-20-19 @ 11:39)  HR: 98 (09-20-19 @ 11:39)  BP: 100/52 (09-20-19 @ 11:39)  RR: 20 (09-20-19 @ 11:39)  SpO2: 98% (09-20-19 @ 11:39)  Wt(kg): --        PHYSICAL EXAM:  Constitutional: NAD  HEENT: anicteric sclera, oropharynx clear, MMM  Neck: No JVD  Respiratory: audible wheeze, no rales or crackles  Cardiovascular: S1, S2, RRR  Gastrointestinal: BS+, soft, NT/ND  Extremities: No cyanosis or clubbing. No peripheral edema  Neurological: A/O x 2/3  Psychiatric: Normal mood, normal affect  : No CVA tenderness. No warren.   Skin: No rashes      LABS:  09-20    131<L>  |  97<L>  |  19  ----------------------------<  80  3.9   |  21<L>  |  1.81<H>    Ca    9.8      20 Sep 2019 07:00  Mg     1.9     09-19    TPro  5.5<L>  /  Alb  2.5<L>  /  TBili  0.5  /  DBili      /  AST  47<H>  /  ALT  34  /  AlkPhos  81  09-20    Creatinine Trend: 1.81 <--, 1.83 <--, 1.88 <--, 2.10 <--, 2.29 <--, 2.53 <--, 2.55 <--                        10.1   11.23 )-----------( 240      ( 20 Sep 2019 07:00 )             31.7     Urine Studies:        RADIOLOGY & ADDITIONAL STUDIES:

## 2019-09-20 NOTE — ED ADULT NURSE REASSESSMENT NOTE - NS ED NURSE REASSESS COMMENT FT1
Patient is alert and oriented x 3, no c/o pain no respiratory distress. Cardiac monitor in place regular sinus rhythm, will continue to monitor.
[place] RN called to bedside: pt hypoglycemic. Pt on cm= sinus tach vs as per flow sheet charting. Pt resting quielty. is axox3 resp +exhertional  sob, with audible wheezing. POX 98%room air.Pt denies feeling short of breath, denies cp. On reassessment fbs 26, vbg sent glucose 26, (critcal value call by lab dept trop 69 and glucose 26) . call placed to Hospitalist. MAR on unit. Amp d50 given, repeat trop, cmp, intiated d5 ns, repeat son=092.   (as per converstation with family pt  type 2 DM, on Lantus took 10units last night when sugar dropped,)   dysphagia screen incomplete. Pt passed 5cc and 20cc refusing to drink  90cc , Hospitalist notifed.

## 2019-09-20 NOTE — H&P ADULT - PROBLEM SELECTOR PLAN 1
-c/b decreased responsiveness in the setting of   -secondary to recent medication adjustments in the setting of poor PO intake  -hold all oral hypoglycemic agents and insulin   -s/p 1 amp of d50 with improvement in FS to the 100s. Continue with D5NS @75, FS q2 until normal x 3  -Dysphagia 1 diet for now pending repeat evaluation. Pt not cooperative with full dysphagia screen, can be re-attempted in AM -c/b decreased responsiveness now improved to baseline mental status AOX2  -secondary to recent medication adjustments in the setting of poor PO intake  -hold all oral hypoglycemic agents and insulin   -s/p 1 amp of d50 with improvement in FS to the 100s. Continue with D5NS @75, FS q2 until normal x 3  -Dysphagia 1 diet for now pending repeat evaluation. Pt not cooperative with full dysphagia screen, can be re-attempted in AM -c/b decreased responsiveness now improved to baseline mental status AOX2  -secondary to recent medication adjustments in the setting of poor PO intake  -hold all oral hypoglycemic agents and insulin   -s/p 1 amp of d50 with improvement in FS to the 100s. Continue with D5NS @75, FS q2 until normal x 3  -Dysphagia 1 diet for now pending repeat evaluation. Pt not cooperative with full dysphagia screen, can be re-attempted in AM  -Endo c/s in AM

## 2019-09-20 NOTE — PROGRESS NOTE ADULT - PROBLEM SELECTOR PLAN 2
-pt with diffuse expiratory wheeze on exam, tachypneic and tachycardic  -Daughter reports no history of pulmonary dz but states pt is a long time smoker. VBG stable.  -f/u RVP to r/o viral etiology  -concern for aspiration event, repeat dysphagia screen and c/w dysphagia diet for now  -Follow up repeat CXR to assess for fluid overload   -will start on xopenex given tachycardia. Consider adding on steroids for underlying copd exacerbation if no improvement -in setting of mediastinal lymphadenopathy   -pt with diffuse expiratory wheeze on exam, tachypneic and tachycardic  -Daughter reports no history of pulmonary dz but states pt is a long time smoker.  -VBG w/ normal pH, CO2. RVP neg  -concern for aspiration event, repeat dysphagia screen and c/w dysphagia diet for now  -started on xopenex given tachycardia. can monitor off steroids given improvement in breathing   -breathing changes may be 2/2 underlying pulmonary disease, may have lymphoma? consulted IR for lymph node biopsy

## 2019-09-20 NOTE — ED ADULT NURSE REASSESSMENT NOTE - COMFORT CARE
repositioned
HOB elevated/warm blanket provided/repositioned
repositioned
repositioned/using urinal/assisted to bathroom
repositioned/warm blanket provided

## 2019-09-20 NOTE — H&P ADULT - NSHPLABSRESULTS_GEN_ALL_CORE
(09-19 @ 21:10)                      11.6  10.89 )-----------( 273                 36.1    Neutrophils = 9.54 (87.6%)  Lymphocytes = 0.13 (1.2%)  Eosinophils = 0.01 (0.1%)  Basophils = 0.03 (0.3%)  Monocytes = 1.11 (10.2%)  Bands = --%    09-19    128<L>  |  94<L>  |  22  ----------------------------<  145<H>  4.8   |  21<L>  |  1.88<H>    Ca    10.5      19 Sep 2019 21:10  Mg     1.9     09-19    TPro  7.0  /  Alb  2.9<L>  /  TBili  0.5  /  DBili  x   /  AST  89<H>  /  ALT  45<H>  /  AlkPhos  108  09-19    ( 19 Sep 2019 21:10 )   PT: 10.8 SEC;   INR: 0.95 ;       PTT:23.3 SEC      RVP: pending    Venous Blood Gas:  09-19 @ 21:10  7.33/46/20/22/21.0  VBG Lactate: 3.1 (09-19 @ 21:10)                      11.6  10.89 )-----------( 273                 36.1    Neutrophils = 9.54 (87.6%)  Lymphocytes = 0.13 (1.2%)  Eosinophils = 0.01 (0.1%)  Basophils = 0.03 (0.3%)  Monocytes = 1.11 (10.2%)  Bands = --%    09-19    128<L>  |  94<L>  |  22  ----------------------------<  145<H>  4.8   |  21<L>  |  1.88<H>    Ca    10.5      19 Sep 2019 21:10  Mg     1.9     09-19    TPro  7.0  /  Alb  2.9<L>  /  TBili  0.5  /  DBili  x   /  AST  89<H>  /  ALT  45<H>  /  AlkPhos  108  09-19    ( 19 Sep 2019 21:10 )   PT: 10.8 SEC;   INR: 0.95 ;       PTT:23.3 SEC      RVP: pending    Venous Blood Gas:  09-19 @ 21:10  7.33/46/20/22/21.0  VBG Lactate: 3.1    Labs reviewed  Elevated D dimer  Trops 69    < from: Xray Chest 1 View- PORTABLE-Urgent (09.19.19 @ 22:27) >      INTERPRETATION:  Bilateral pulmonary nodules are better appreciated on CT   chest 9/10/2019.    < end of copied text >    EKG Sinus tachy, diffuse T wave inversions unchanged from prior

## 2019-09-20 NOTE — ED ADULT NURSE REASSESSMENT NOTE - CONDITION
15 post d50 xus=131, Float mika Albrecht at bedside. coverage rpt to MIKA Cervantes to reassess./improved

## 2019-09-20 NOTE — H&P ADULT - ATTENDING COMMENTS
Pt reevaluated. Continues to have audible expiratory upper airway wheeze. Repeat CXR pending to eval for volume overload. RVP negative. Concern for copd/asthma given smoking history. Will treat with solumedrol 125 mg x1 and reassess, should also help with hypoglycemia. If persistently dropping sugars on D10, will need to get the ICU involved

## 2019-09-20 NOTE — PROGRESS NOTE ADULT - ASSESSMENT
78 yo M w/ a PMHx of T2DM, hypothyroidism, CAD, HLD and HTN brought in by EMS after decreased responsiveness secondary to hypoglycemia in the setting of recent med adjustments c/b wheezing and sinus tachycardia 79M h/o HTN, HLD, T2DM, hypothyroidism, CAD, brought in by EMS after decreased responsiveness secondary to hypoglycemia in the setting of recent med adjustments, c/b dyspnea, wheezing and sinus tachycardia, now improved:

## 2019-09-20 NOTE — ED ADULT NURSE REASSESSMENT NOTE - SYMPTOMS
none
shortness of breath/exhertional Shortness of breath
wheezing
none/+ wheezing denies feeling shortness of breath
tachycardia 118 rr 24

## 2019-09-20 NOTE — H&P ADULT - NSHPREVIEWOFSYSTEMS_GEN_ALL_CORE
Limited 2/2 to pt being a poor historian   CONSTITUTIONAL:  No weight loss, fever, chills, weakness or fatigue.  HEENT:  Eyes:  No visual loss, blurred vision, double vision or yellow sclerae. Ears, Nose, Throat:  No hearing loss, sneezing, congestion, runny nose or sore throat.  SKIN:  No rash or itching.  CARDIOVASCULAR:  +chest pressure No chest pain. No palpitations or edema.  RESPIRATORY:  No shortness of breath, cough or sputum.  GASTROINTESTINAL:  +pressure like abdominal pain No anorexia, nausea, vomiting or diarrhea. No blood.  GENITOURINARY:  Denies hematuria, dysuria.   NEUROLOGICAL:  No headache, dizziness, syncope, paralysis, ataxia, numbness or tingling in the extremities. No change in bowel or bladder control.  MUSCULOSKELETAL:  No muscle, back pain, joint pain or stiffness.  PSYCHIATRIC:  No history of depression or anxiety.  ALLERGIES:  No history of asthma, hives, eczema or rhinitis.

## 2019-09-20 NOTE — PROGRESS NOTE ADULT - PROBLEM SELECTOR PLAN 4
-Pt reported chest pressure at rest that is since resolved however he is a poor historian and at times denied any complaints  -EKG unchanged from prior. Trops 62, downtrending since prior admission. Low suspicion for ACS, suspected demand ischemia 2/2 to sinus tachy. Follow up repeat trops.   -continue with aspirin, statin -Pt reported chest pressure at rest that has since resolved  -EKG unchanged from prior. initial troponins with delta <6. now with 3 troponins that trended up, however most recent trop of 83 consistent with prior trop during last admission of 82. also with normal CKMB, CK. trop elevated in setting of CKD and will stop trending  -continue with statin, asa and plavix on hold for lymph node biopsy

## 2019-09-20 NOTE — H&P ADULT - NSHPSOCIALHISTORY_GEN_ALL_CORE
Currently some day smoker as per daughter, smokes 2-3 ciggs daily  Denies alcohol or illicit drug use  Lives with daughter and wife  Dependent for most ADLs

## 2019-09-20 NOTE — ED ADULT NURSE REASSESSMENT NOTE - GENERAL PATIENT STATE
axox4 fbs in ED 93 will repeat/family/SO at bedside/smiling/interactive/comfortable appearance
comfortable appearance
cooperative
family/SO at bedside/comfortable appearance
comfortable appearance/Pt is awake aox3./resting/sleeping
comfortable appearance/audible wheezing denies short of breath denies cp pt is axox4/resting/sleeping
improvement verbalized/following xopenex neb wheezing improved.

## 2019-09-20 NOTE — ED ADULT NURSE REASSESSMENT NOTE - RESPONSE TO
response to breathing treatment:
response to breathing treatment:
response to care/treatments:
response to breathing treatment:

## 2019-09-20 NOTE — CONSULT NOTE ADULT - SUBJECTIVE AND OBJECTIVE BOX
Reason For Consult: hypoglycemia     HPI:  80 yo M w/ a PMHx of T2DM, hypothyroidism, CAD, HLD and HTN brought in by EMS after decreased responsiveness secondary to hypoglycemia. Pt is a poor historian, history obtained from daughter Imani. Daughter reports pt was noted to be more lethargic and confused this evening. He was also noted to be short of breath and with an audible wheeze. EMS was called, FS found to be 23 at the time. Pt was treated with 1 amp of D50 with subsequent improvement in mental status. Of note, pt was recently admitted for syncopal workup. At that time, he was found to be hypoglycemic and his long acting insulin was readjusted. 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 As per daughter, pt is at baseline mental status (typically AOx2). BG was 97 on arrival. Pt complains of substernal pressure that occurred at rest yesterday night. Denies radiation of pain or associated symptoms. Also reports intermittent pressure like pain in the abdomen that is resolved. Pt denies fever/chills, n/v/d, chest pain, abdominal pain, SOB, HA or urinary problems.  Of note, pt was also evaluated by Heme/onc last admission due to unexplained hypercalcemia. CT chest demonstrated scattered bilateral pulmonary nodules and mediastinal lymphadenopathy. Pt was planned for outpatient biopsy and PET scan  In the ED, pt was noted to be tachypneic and tachycardic with audible wheeze. EKG was sinus tachycardia. Pt was treated with 1L NS bolus and given duonebs x 2 with improvement. (20 Sep 2019 00:55)      endocrine called for further assistance  please note, pt is very limited informant.   he does not know the names of his DM medications, states he does not check FSBG.  He states his daughter gives him all the medications.  Pt does endorse little to no appetite at home.   Labs at admission notable for GFR 35, normal calcium.  Regarding glucose his glucose was normal at presentation however decreased to 28 at around 1am. at the time of my exam he is on a dextrose drip.    Please note, I called the daughter to obtain collateral information, no answer, voicemail left.  Per med rec. pt appears to be on Glipizide 10mg, Basaglar 10mg, and Januvia. A1C 7.4    Pt also with hypothyroidism per the chart, on levothyroxine 88mcg  Pt also with recent hypercalcemia at recent admission, workup with elevated Kappa and Gamma light chains, normal ratio, PTH 20, Vit D 34, Vit D 1-25 149  additionally am cortisol at 3am sept 6th was 3.6 and 1.7 on sept 9th.  TSH recently 2.14    vital signs at admission notable for tachycardia.      PAST MEDICAL & SURGICAL HISTORY:  CAD (coronary artery disease)  Adult hypothyroidism  Hyperlipidemia  Peptic ulcer: s/p treatment for H pylori  Diabetes  Essential hypertension  S/P coronary artery stent placement: x4, last one in early 2018      FAMILY HISTORY:  No pertinent family history in first degree relatives      Social History:  lives with family     Outpatient Medications:  Home Medications:   * Patient Currently Takes Medications as of 19-Sep-2019 23:43 documented in Structured Notes  · 	midodrine 5 mg oral tablet: Last Dose Taken:  , 1 tab(s) orally 3 times a day  · 	fludrocortisone 0.1 mg oral tablet: Last Dose Taken:  , 1 tab(s) orally 2 times a day  · 	Basaglar KwikPen 100 units/mL subcutaneous solution: Last Dose Taken:  , 10 unit(s) subcutaneous once a day (at bedtime)  · 	docusate sodium 100 mg oral capsule: Last Dose Taken:  , 1 cap(s) orally 3 times a day  · 	senna oral tablet: Last Dose Taken:  , 2 tab(s) orally once a day (at bedtime)  · 	zolpidem 10 mg oral tablet: Last Dose Taken:  , 1 tab(s) orally once a day (at bedtime), As Needed - for insomnia  · 	levothyroxine 88 mcg (0.088 mg) oral capsule: Last Dose Taken:  , 1 cap(s) orally once a day  · 	Januvia 100 mg oral tablet: Last Dose Taken:  , 1 tab(s) orally once a day  · 	famotidine 20 mg oral tablet: Last Dose Taken:  , 1 tab(s) orally 2 times a day  · 	Vitamin D2 50,000 intl units (1.25 mg) oral capsule: Last Dose Taken:  , 1 cap(s) orally once a week  · 	aspirin 81 mg oral tablet: Last Dose Taken:  , 1 tab(s) orally once a day  · 	simvastatin 20 mg oral tablet: Last Dose Taken:  , 1 tab(s) orally once a day (at bedtime)  · 	glipiZIDE 10 mg oral tablet, extended release: Last Dose Taken:  , 1 tab(s) orally 2 times a day      MEDICATIONS  (STANDING):  dextrose 5%. 1000 milliLiter(s) (50 mL/Hr) IV Continuous <Continuous>  dextrose 50% Injectable 12.5 Gram(s) IV Push once  dextrose 50% Injectable 25 Gram(s) IV Push once  dextrose 50% Injectable 25 Gram(s) IV Push once  docusate sodium 100 milliGRAM(s) Oral three times a day  famotidine    Tablet 20 milliGRAM(s) Oral two times a day  fludroCORTISONE 0.1 milliGRAM(s) Oral two times a day  heparin  Injectable 5000 Unit(s) SubCutaneous every 8 hours  insulin lispro (HumaLOG) corrective regimen sliding scale   SubCutaneous three times a day before meals  insulin lispro (HumaLOG) corrective regimen sliding scale   SubCutaneous at bedtime  levothyroxine 88 MICROGram(s) Oral daily  midodrine. 5 milliGRAM(s) Oral three times a day  senna 2 Tablet(s) Oral at bedtime  simvastatin 20 milliGRAM(s) Oral at bedtime  sodium chloride 0.9%. 1000 milliLiter(s) (75 mL/Hr) IV Continuous <Continuous>    MEDICATIONS  (PRN):  dextrose 40% Gel 15 Gram(s) Oral once PRN Blood Glucose LESS THAN 70 milliGRAM(s)/deciliter  glucagon  Injectable 1 milliGRAM(s) IntraMuscular once PRN Glucose LESS THAN 70 milligrams/deciliter  levalbuterol Inhalation 0.63 milliGRAM(s) Inhalation every 6 hours PRN shortnes of breath/wheezing      Allergies    No Known Allergies    Intolerances      Review of Systems:  Constitutional: No fever  Eyes: No blurry vision  Neuro: No tremors  HEENT: No pain  Cardiovascular: No chest pain, palpitations  Respiratory: No SOB, no cough  GI: No nausea, vomiting, abdominal pain  : No dysuria  Skin: no rash  Psych: no depression  Endocrine: no polyuria, polydipsia  Hem/lymph: no swelling  Osteoporosis: no fractures    ALL OTHER SYSTEMS REVIEWED AND NEGATIVE        PHYSICAL EXAM:  VITALS: T(C): 36.9 (09-20-19 @ 14:00)  T(F): 98.4 (09-20-19 @ 14:00), Max: 99.6 (09-20-19 @ 11:39)  HR: 95 (09-20-19 @ 14:00) (95 - 124)  BP: 115/69 (09-20-19 @ 14:00) (100/52 - 138/88)  RR:  (18 - 26)  SpO2:  (95% - 100%)  Wt(kg): --  GENERAL: NAD,  EYES: No proptosis, no lid lag, anicteric  HEENT:  Atraumatic, Normocephalic, moist mucous membranes  RESPIRATORY: Clear to auscultation bilaterally; No rales, rhonchi, wheezing, or rubs  CARDIOVASCULAR: Regular rate and rhythm; No murmurs; no peripheral edema  GI: Soft, nontender, non distended, normal bowel sounds  SKIN: Dry, intact, No rashes or lesions  MUSCULOSKELETAL: Full range of motion, normal strength  NEURO: sensation intact, extraocular movements intact, no tremor, normal reflexes  PSYCH: Alert and oriented x 3, normal affect, normal mood      POCT Blood Glucose.: 224 mg/dL (09-20-19 @ 13:34)  POCT Blood Glucose.: 91 mg/dL (09-20-19 @ 08:16)  POCT Blood Glucose.: 97 mg/dL (09-20-19 @ 06:48)  POCT Blood Glucose.: 116 mg/dL (09-20-19 @ 05:53)  POCT Blood Glucose.: 189 mg/dL (09-20-19 @ 04:53)  POCT Blood Glucose.: 83 mg/dL (09-20-19 @ 03:58)  POCT Blood Glucose.: 97 mg/dL (09-20-19 @ 03:16)  POCT Blood Glucose.: 163 mg/dL (09-20-19 @ 02:14)  POCT Blood Glucose.: 29 mg/dL (09-20-19 @ 01:45)  POCT Blood Glucose.: 28 mg/dL (09-20-19 @ 01:44)  POCT Blood Glucose.: 83 mg/dL (09-19-19 @ 22:34)  POCT Blood Glucose.: 100 mg/dL (09-19-19 @ 21:40)  POCT Blood Glucose.: 93 mg/dL (09-19-19 @ 21:00)  POCT Blood Glucose.: 81 mg/dL (09-19-19 @ 20:49)  POCT Blood Glucose.: 127 mg/dL (09-17-19 @ 17:40)                            10.1   11.23 )-----------( 240      ( 20 Sep 2019 07:00 )             31.7       09-20    131<L>  |  97<L>  |  19  ----------------------------<  80  3.9   |  21<L>  |  1.81<H>    EGFR if : 40  EGFR if non : 35    Ca    9.8      09-20  Mg     1.9     09-19    TPro  5.5<L>  /  Alb  2.5<L>  /  TBili  0.5  /  DBili  x   /  AST  47<H>  /  ALT  34  /  AlkPhos  81  09-20      Thyroid Function Tests:  09-09 @ 03:20 TSH 5.55 FreeT4 -- T3 -- Anti TPO -- Anti Thyroglobulin Ab -- TSI --  09-01 @ 06:00 TSH 2.14 FreeT4 -- T3 -- Anti TPO -- Anti Thyroglobulin Ab -- TSI --      Hemoglobin A1C, Whole Blood: 7.0 % <H> [4.0 - 5.6] (09-13-19 @ 06:50)  Hemoglobin A1C, Whole Blood: 6.1 % <H> [4.0 - 5.6] (08-31-19 @ 07:00)      08-31 Chol 121 LDL 74 HDL 33<L> Trig 96    Radiology:

## 2019-09-20 NOTE — ED ADULT NURSE REASSESSMENT NOTE - ANCILLARY STATUS
Trenton Fine at bedside for intial eval labs sent by Trenton hogue.
awaiting radiology
trop,#2 cmp sent/lab results pending
am labs cmp, trop, vbg sent@ 7:45am/lab results pending
awaiting lab draw
vbg sent, repeat trop and cmp

## 2019-09-20 NOTE — PATIENT PROFILE ADULT - INFORMATION PROVIDED TO:
"Chief Complaint   Patient presents with     Urgent Care     Derm Problem     started having itching in vaginal and anal area. Was treated with Diflucan by primary 2 times but getting worse and now has rash.        Initial BP 96/60  Pulse 75  Temp 98.2  F (36.8  C) (Oral)  Resp 16  Ht 5' 5.5\" (1.664 m)  Wt 135 lb (61.2 kg)  SpO2 96%  Breastfeeding? No  BMI 22.12 kg/m2 Estimated body mass index is 22.12 kg/(m^2) as calculated from the following:    Height as of this encounter: 5' 5.5\" (1.664 m).    Weight as of this encounter: 135 lb (61.2 kg).  Medication Reconciliation: complete  " patient

## 2019-09-20 NOTE — H&P ADULT - HISTORY OF PRESENT ILLNESS
80 yo M w/ a PMHx of T2DM, hypothyroidism, CAD, HLD and HTN brought in by EMS after decreased responsiveness secondary to hypoglycemia. Pt is a poor historian, history obtained from daughter Imani. Daughter reports pt was noted to be more lethargic and confused this evening. He was also noted to be short of breath and with an audible wheeze. EMS was called, FS found to be 23 at the time. Pt was treated with 1 amp of D50 with subsequent improvement in mental status. Of note, pt was recently admitted for syncopal workup. At that time, he was found to be hypoglycemic and his long acting insulin was readjusted. Also found to have moderate LVOT Daughter Pt has had hypoglycemic episodes in the past. Recent change in medications for his DM. Pt was at baseline mentally upon arrival. BG was 97 on arrival. Pt denies fever/chills, n/v/d, chest pain, abdominal pain, SOB, HA or urinary problems. 80 yo M w/ a PMHx of T2DM, hypothyroidism, CAD, HLD and HTN brought in by EMS after decreased responsiveness secondary to hypoglycemia. Pt is a poor historian, history obtained from daughter Imani. Daughter reports pt was noted to be more lethargic and confused this evening. He was also noted to be short of breath and with an audible wheeze. EMS was called, FS found to be 23 at the time. Pt was treated with 1 amp of D50 with subsequent improvement in mental status. Of note, pt was recently admitted for syncopal workup. At that time, he was found to be hypoglycemic and his long acting insulin was readjusted. 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 As per daughter, pt is at baseline mental status (typically AOx2). BG was 97 on arrival. Pt complains of substernal pressure that occurred at rest yesterday night. Denies radiation of pain or associated symptoms. Also reports intermittent pressure like pain in the abdomen that is resolved. Pt denies fever/chills, n/v/d, chest pain, abdominal pain, SOB, HA or urinary problems.  In the ED, pt was noted to be tachypneic and tachycardic with audible wheeze. EKG was sinus tachycardia. Pt was treated with 1L NS bolus and given duonebs x 2 with improvement. 78 yo M w/ a PMHx of T2DM, hypothyroidism, CAD, HLD and HTN brought in by EMS after decreased responsiveness secondary to hypoglycemia. Pt is a poor historian, history obtained from daughter Imani. Daughter reports pt was noted to be more lethargic and confused this evening. He was also noted to be short of breath and with an audible wheeze. EMS was called, FS found to be 23 at the time. Pt was treated with 1 amp of D50 with subsequent improvement in mental status. Of note, pt was recently admitted for syncopal workup. At that time, he was found to be hypoglycemic and his long acting insulin was readjusted. 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 As per daughter, pt is at baseline mental status (typically AOx2). BG was 97 on arrival. Pt complains of substernal pressure that occurred at rest yesterday night. Denies radiation of pain or associated symptoms. Also reports intermittent pressure like pain in the abdomen that is resolved. Pt denies fever/chills, n/v/d, chest pain, abdominal pain, SOB, HA or urinary problems.  Of note, pt was also evaluated by Heme/onc last admission due to unexplained hypercalcemia. CT chest demonstrated scattered bilateral pulmonary nodules and mediastinal lymphadenopathy. Pt was planned for outpatient biopsy and PET scan  In the ED, pt was noted to be tachypneic and tachycardic with audible wheeze. EKG was sinus tachycardia. Pt was treated with 1L NS bolus and given duonebs x 2 with improvement.

## 2019-09-20 NOTE — CONSULT NOTE ADULT - ASSESSMENT
80 yo M w/ a PMHx of T2DM A1c 7.4 (in the setting of anemia and decreased renal function) , hypothyroidism, CAD, HLD and HTN brought in by EMS after decreased responsiveness secondary to hypoglycemia to 20s at home.  Of note, pt was recently admitted to [facility] for syncopal workup also found to be hypoglycemic and his long acting insulin was adjusted, moderate LVOT obstruction, orthostatic hypotension, hypercalcemia with CT chest w/ scattered bilateral pulmonary nodules and mediastinal lymphadenopathy to undergo further workup.      endocrine called for DM assistance      #Diabetes  pt is a limited informant, unable to obtain collateral from daughter Imani (called and left voicemail)  per most recent med recc, pt is on glipizide 10mg, basaglar 10 and Januvia   pt with very poor PO intake as well and decreased renal function to 35 which will both predispose him to hypoglycemia  pt with recurrent hypolgycemia inpt likely due to increased half life of sulfonylurea and insulin in decreased renal function  now glucose improved on dextrose drip     inpt plan  D5 IV infusion titrated to maintain target  180 mg/dL  dc D5W when above 200 for two FSBG  (please place on the pump for IVF with dextrose so it will run at a set rate 100cc/hr-120cc/hr)    NOTE: Avoid dextrose containing solutions except for treatment of hypoglycemia.      once glucose is stable for 2 more FSBG, check FSBG premeals and bedtime and 3am  hold insulin until we have further data and trend     if glucose >250 for two or more FSBG, start low dose ISS premeals and bedtime. Will add basal as needed.    once eating, BG goal for the pt 100-250. Avoid hypoglycemia. Okay to allow glucose to run higher at this time to allow the body to reset after marked hypoglycemia       Dispo: No sulfonylurea at dc given elderly age and decreased renal function.  regimen TBD based on inpt trend       #hx of recent hypercalcemia   currently calcium normal, however recent admission with hypercalcemia noted.   at that time, Vit D 1-25 149 (elevated, not expected in setting of decreased renal function) in setting of PTH of 20 (not suppressed, but not markedly elevated as seen in primary hyperpara)  appears to be possible malignancy related given elevated light chains and known CT chest findings or vit d 1-25 mediated   can send Pthrp   recommend SPEP UPEP with SERINA if not done previously   followup heme-onc reccs  encourage hydration   follow calcium levels daily, currently normal     #rule out AI  pt with syncope, hypoglycemia, weakness   found to low am cortisols (albeit done at 3am at 1.3-3) earlier this month   would rule out AI with 8am CORTISOL and ACTH   if cortisol <15, pt will need acth STIM test   please hold levothyroxine until above workup is completed     *if HD untable, would recommend stress dose steroids hydroCort 50mg q8 until further assessment*    #hypothryodism  on levoT 88mcg  hold until HPA axis is assessed 78 yo M w/ a PMHx of T2DM A1c 7.4 (in the setting of anemia and decreased renal function) , hypothyroidism, CAD, HLD and HTN brought in by EMS after decreased responsiveness secondary to hypoglycemia to 20s at home.  Of note, pt was recently admitted to [facility] for syncopal workup also found to be hypoglycemic and his long acting insulin was adjusted, moderate LVOT obstruction, orthostatic hypotension, hypercalcemia with CT chest w/ scattered bilateral pulmonary nodules and mediastinal lymphadenopathy to undergo further workup.      endocrine called for DM assistance      #Diabetes  pt is a limited informant, unable to obtain collateral from daughter Imani (called and left voicemail)  per most recent med recc, pt is on glipizide 10mg, basaglar 10 and Januvia   pt with very poor PO intake as well and decreased renal function to 35 which will both predispose him to hypoglycemia  pt with recurrent hypolgycemia inpt likely due to increased half life of sulfonylurea and insulin in decreased renal function  now glucose improved on dextrose drip     inpt plan  D5 IV infusion titrated to maintain target  180 mg/dL  dc D5W when above 200 for two FSBG  (please place on the pump for IVF with dextrose so it will run at a set rate 100cc/hr-120cc/hr)    NOTE: Avoid dextrose containing solutions except for treatment of hypoglycemia.      once glucose is stable for 2 more FSBG, check FSBG premeals and bedtime and 3am  hold insulin until we have further data and trend     if glucose >250 for two or more FSBG, start low dose ISS premeals and bedtime. Will add basal as needed.    once eating, BG goal for the pt 100-250. Avoid hypoglycemia. Okay to allow glucose to run higher at this time to allow the body to reset after marked hypoglycemia       Dispo: No sulfonylurea at dc given elderly age and decreased renal function.  regimen TBD based on inpt trend       #hx of recent hypercalcemia   currently calcium normal, however corrected is elevated at 11  recent previous workup,  Vit D 1-25 149 (elevated, not expected in setting of decreased renal function) in setting of PTH of 20 (not suppressed, but not markedly elevated as seen in primary hyperpara)  appears to be possible malignancy related given elevated light chains and known CT chest findings or vit d 1-25 mediated   can send Pthrp   recommend SPEP UPEP with SERINA if not done previously   followup heme-onc reccs  encourage hydration   follow calcium levels daily, correct daily for albumin. avoid calcium containing supplements     #rule out AI  pt with syncope, hypoglycemia, weakness   found to low am cortisols (albeit done at 3am at 1.3-3) earlier this month   would rule out AI with 8am CORTISOL and ACTH   if cortisol <15, pt will need acth STIM test   please hold levothyroxine until above workup is completed     *if HD untable, would recommend stress dose steroids hydroCort 50mg q8 until further assessment*    #hypothryodism  on levoT 88mcg  hold until HPA axis is assessed

## 2019-09-21 DIAGNOSIS — E27.40 UNSPECIFIED ADRENOCORTICAL INSUFFICIENCY: ICD-10-CM

## 2019-09-21 LAB
ALBUMIN SERPL ELPH-MCNC: 2.7 G/DL — LOW (ref 3.3–5)
ALP SERPL-CCNC: 104 U/L — SIGNIFICANT CHANGE UP (ref 40–120)
ALT FLD-CCNC: 35 U/L — SIGNIFICANT CHANGE UP (ref 4–41)
ANION GAP SERPL CALC-SCNC: 16 MMO/L — HIGH (ref 7–14)
AST SERPL-CCNC: 38 U/L — SIGNIFICANT CHANGE UP (ref 4–40)
BASOPHILS # BLD AUTO: 0.01 K/UL — SIGNIFICANT CHANGE UP (ref 0–0.2)
BASOPHILS NFR BLD AUTO: 0.1 % — SIGNIFICANT CHANGE UP (ref 0–2)
BILIRUB SERPL-MCNC: 0.6 MG/DL — SIGNIFICANT CHANGE UP (ref 0.2–1.2)
BUN SERPL-MCNC: 30 MG/DL — HIGH (ref 7–23)
CALCIUM SERPL-MCNC: 10.7 MG/DL — HIGH (ref 8.4–10.5)
CHLORIDE SERPL-SCNC: 97 MMOL/L — LOW (ref 98–107)
CO2 SERPL-SCNC: 17 MMOL/L — LOW (ref 22–31)
CORTIS SERPL-MCNC: 17.6 UG/DL — SIGNIFICANT CHANGE UP (ref 2.7–18.4)
CORTIS SERPL-MCNC: 19.6 UG/DL — HIGH (ref 2.7–18.4)
CORTIS SERPL-MCNC: 4.4 UG/DL — SIGNIFICANT CHANGE UP (ref 2.7–18.4)
CORTIS SERPL-MCNC: 6.3 UG/DL — SIGNIFICANT CHANGE UP (ref 2.7–18.4)
CREAT SERPL-MCNC: 1.89 MG/DL — HIGH (ref 0.5–1.3)
EOSINOPHIL # BLD AUTO: 0 K/UL — SIGNIFICANT CHANGE UP (ref 0–0.5)
EOSINOPHIL NFR BLD AUTO: 0 % — SIGNIFICANT CHANGE UP (ref 0–6)
GLUCOSE SERPL-MCNC: 282 MG/DL — HIGH (ref 70–99)
HCT VFR BLD CALC: 31.2 % — LOW (ref 39–50)
HGB BLD-MCNC: 9.9 G/DL — LOW (ref 13–17)
IMM GRANULOCYTES NFR BLD AUTO: 0.5 % — SIGNIFICANT CHANGE UP (ref 0–1.5)
LYMPHOCYTES # BLD AUTO: 0.23 K/UL — LOW (ref 1–3.3)
LYMPHOCYTES # BLD AUTO: 2.1 % — LOW (ref 13–44)
MAGNESIUM SERPL-MCNC: 2 MG/DL — SIGNIFICANT CHANGE UP (ref 1.6–2.6)
MCHC RBC-ENTMCNC: 28.4 PG — SIGNIFICANT CHANGE UP (ref 27–34)
MCHC RBC-ENTMCNC: 31.7 % — LOW (ref 32–36)
MCV RBC AUTO: 89.4 FL — SIGNIFICANT CHANGE UP (ref 80–100)
MONOCYTES # BLD AUTO: 0.7 K/UL — SIGNIFICANT CHANGE UP (ref 0–0.9)
MONOCYTES NFR BLD AUTO: 6.4 % — SIGNIFICANT CHANGE UP (ref 2–14)
NEUTROPHILS # BLD AUTO: 9.91 K/UL — HIGH (ref 1.8–7.4)
NEUTROPHILS NFR BLD AUTO: 90.9 % — HIGH (ref 43–77)
NRBC # FLD: 0 K/UL — SIGNIFICANT CHANGE UP (ref 0–0)
PHOSPHATE SERPL-MCNC: 3.2 MG/DL — SIGNIFICANT CHANGE UP (ref 2.5–4.5)
PLATELET # BLD AUTO: 257 K/UL — SIGNIFICANT CHANGE UP (ref 150–400)
PMV BLD: 11.1 FL — SIGNIFICANT CHANGE UP (ref 7–13)
POTASSIUM SERPL-MCNC: 4.1 MMOL/L — SIGNIFICANT CHANGE UP (ref 3.5–5.3)
POTASSIUM SERPL-SCNC: 4.1 MMOL/L — SIGNIFICANT CHANGE UP (ref 3.5–5.3)
PROT SERPL-MCNC: 6 G/DL — SIGNIFICANT CHANGE UP (ref 6–8.3)
PTH RELATED PROT SERPL-MCNC: <2 — SIGNIFICANT CHANGE UP
RBC # BLD: 3.49 M/UL — LOW (ref 4.2–5.8)
RBC # FLD: 15.4 % — HIGH (ref 10.3–14.5)
SODIUM SERPL-SCNC: 130 MMOL/L — LOW (ref 135–145)
WBC # BLD: 10.91 K/UL — HIGH (ref 3.8–10.5)
WBC # FLD AUTO: 10.91 K/UL — HIGH (ref 3.8–10.5)

## 2019-09-21 PROCEDURE — 99233 SBSQ HOSP IP/OBS HIGH 50: CPT

## 2019-09-21 RX ORDER — COSYNTROPIN 0.25 MG/ML
0.25 INJECTION, SOLUTION INTRAVENOUS ONCE
Refills: 0 | Status: COMPLETED | OUTPATIENT
Start: 2019-09-21 | End: 2019-09-21

## 2019-09-21 RX ORDER — INSULIN GLARGINE 100 [IU]/ML
8 INJECTION, SOLUTION SUBCUTANEOUS AT BEDTIME
Refills: 0 | Status: DISCONTINUED | OUTPATIENT
Start: 2019-09-21 | End: 2019-09-22

## 2019-09-21 RX ADMIN — FAMOTIDINE 20 MILLIGRAM(S): 10 INJECTION INTRAVENOUS at 17:57

## 2019-09-21 RX ADMIN — INSULIN GLARGINE 8 UNIT(S): 100 INJECTION, SOLUTION SUBCUTANEOUS at 22:22

## 2019-09-21 RX ADMIN — Medication 2: at 13:43

## 2019-09-21 RX ADMIN — SIMVASTATIN 20 MILLIGRAM(S): 20 TABLET, FILM COATED ORAL at 22:21

## 2019-09-21 RX ADMIN — HEPARIN SODIUM 5000 UNIT(S): 5000 INJECTION INTRAVENOUS; SUBCUTANEOUS at 22:21

## 2019-09-21 RX ADMIN — FAMOTIDINE 20 MILLIGRAM(S): 10 INJECTION INTRAVENOUS at 05:31

## 2019-09-21 RX ADMIN — Medication 100 MILLIGRAM(S): at 22:21

## 2019-09-21 RX ADMIN — Medication 3: at 09:30

## 2019-09-21 RX ADMIN — HEPARIN SODIUM 5000 UNIT(S): 5000 INJECTION INTRAVENOUS; SUBCUTANEOUS at 05:31

## 2019-09-21 RX ADMIN — HEPARIN SODIUM 5000 UNIT(S): 5000 INJECTION INTRAVENOUS; SUBCUTANEOUS at 13:45

## 2019-09-21 RX ADMIN — FLUDROCORTISONE ACETATE 0.1 MILLIGRAM(S): 0.1 TABLET ORAL at 05:31

## 2019-09-21 RX ADMIN — Medication 100 MILLIGRAM(S): at 05:31

## 2019-09-21 RX ADMIN — Medication 2: at 17:57

## 2019-09-21 RX ADMIN — FLUDROCORTISONE ACETATE 0.1 MILLIGRAM(S): 0.1 TABLET ORAL at 17:57

## 2019-09-21 RX ADMIN — COSYNTROPIN 0.25 MILLIGRAM(S): 0.25 INJECTION, SOLUTION INTRAVENOUS at 16:55

## 2019-09-21 RX ADMIN — Medication 100 MILLIGRAM(S): at 13:45

## 2019-09-21 NOTE — PROGRESS NOTE ADULT - SUBJECTIVE AND OBJECTIVE BOX
Patient is a 79y old  Male who presents with a chief complaint of hypoglycemia (20 Sep 2019 16:55)        SUBJECTIVE / OVERNIGHT EVENTS:      MEDICATIONS  (STANDING):  dextrose 5%. 1000 milliLiter(s) (100 mL/Hr) IV Continuous <Continuous>  dextrose 50% Injectable 12.5 Gram(s) IV Push once  dextrose 50% Injectable 25 Gram(s) IV Push once  dextrose 50% Injectable 25 Gram(s) IV Push once  docusate sodium 100 milliGRAM(s) Oral three times a day  famotidine    Tablet 20 milliGRAM(s) Oral two times a day  fludroCORTISONE 0.1 milliGRAM(s) Oral two times a day  heparin  Injectable 5000 Unit(s) SubCutaneous every 8 hours  influenza   Vaccine 0.5 milliLiter(s) IntraMuscular once  insulin lispro (HumaLOG) corrective regimen sliding scale   SubCutaneous three times a day before meals  insulin lispro (HumaLOG) corrective regimen sliding scale   SubCutaneous at bedtime  midodrine. 5 milliGRAM(s) Oral three times a day  senna 2 Tablet(s) Oral at bedtime  simvastatin 20 milliGRAM(s) Oral at bedtime  sodium chloride 0.9%. 1000 milliLiter(s) (75 mL/Hr) IV Continuous <Continuous>    MEDICATIONS  (PRN):  dextrose 40% Gel 15 Gram(s) Oral once PRN Blood Glucose LESS THAN 70 milliGRAM(s)/deciliter  glucagon  Injectable 1 milliGRAM(s) IntraMuscular once PRN Glucose LESS THAN 70 milligrams/deciliter  levalbuterol Inhalation 0.63 milliGRAM(s) Inhalation every 6 hours PRN shortnes of breath/wheezing      Vital Signs Last 24 Hrs  T(C): 36.6 (21 Sep 2019 08:00), Max: 37.6 (20 Sep 2019 11:39)  T(F): 97.9 (21 Sep 2019 08:00), Max: 99.6 (20 Sep 2019 11:39)  HR: 95 (21 Sep 2019 08:00) (94 - 120)  BP: 150/91 (21 Sep 2019 08:00) (100/52 - 150/91)  BP(mean): --  RR: 17 (21 Sep 2019 08:00) (16 - 20)  SpO2: 98% (21 Sep 2019 08:00) (95% - 99%)  CAPILLARY BLOOD GLUCOSE      POCT Blood Glucose.: 290 mg/dL (21 Sep 2019 09:18)  POCT Blood Glucose.: 359 mg/dL (20 Sep 2019 22:49)  POCT Blood Glucose.: 360 mg/dL (20 Sep 2019 21:35)  POCT Blood Glucose.: 309 mg/dL (20 Sep 2019 17:48)  POCT Blood Glucose.: 224 mg/dL (20 Sep 2019 13:34)    I&O's Summary    20 Sep 2019 07:01  -  21 Sep 2019 07:00  --------------------------------------------------------  IN: 0 mL / OUT: 500 mL / NET: -500 mL    21 Sep 2019 07:01  -  21 Sep 2019 09:42  --------------------------------------------------------  IN: 0 mL / OUT: 200 mL / NET: -200 mL          PHYSICAL EXAM  GENERAL: NAD, well-developed  HEAD:  Atraumatic, Normocephalic  EYES: EOMI, PERRLA, conjunctiva and sclera clear  NECK: Supple, No JVD  CHEST/LUNG: Clear to auscultation bilaterally; No wheeze  HEART: Regular rate and rhythm; No murmurs, rubs, or gallops  ABDOMEN: Soft, Nontender, Nondistended; Bowel sounds present  EXTREMITIES:  2+ Peripheral Pulses, No clubbing, cyanosis, or edema  PSYCH: AAOx3  SKIN: No rashes or lesions    LABS:                        10.1   11.23 )-----------( 240      ( 20 Sep 2019 07:00 )             31.7     09-20    131<L>  |  97<L>  |  19  ----------------------------<  80  3.9   |  21<L>  |  1.81<H>    Ca    9.8      20 Sep 2019 07:00  Mg     1.9     09-19    TPro  5.5<L>  /  Alb  2.5<L>  /  TBili  0.5  /  DBili  x   /  AST  47<H>  /  ALT  34  /  AlkPhos  81  09-20    PT/INR - ( 19 Sep 2019 21:10 )   PT: 10.8 SEC;   INR: 0.95          PTT - ( 19 Sep 2019 21:10 )  PTT:23.3 SEC  CARDIAC MARKERS ( 20 Sep 2019 07:00 )  x     / x     / 21 u/L / 4.02 ng/mL / x              RADIOLOGY & ADDITIONAL TESTS:    Imaging Personally Reviewed:  Consultant(s) Notes Reviewed:    Care Discussed with Consultants/Other Providers: Patient is a 79y old  Male who presents with a chief complaint of hypoglycemia (20 Sep 2019 16:55)        SUBJECTIVE / OVERNIGHT EVENTS: denies any complaints today. no F/C, CP, SOB, abn pain, N/V/D/C, change in urinary freq. tolerating diet.         MEDICATIONS  (STANDING):  dextrose 5%. 1000 milliLiter(s) (100 mL/Hr) IV Continuous <Continuous>  dextrose 50% Injectable 12.5 Gram(s) IV Push once  dextrose 50% Injectable 25 Gram(s) IV Push once  dextrose 50% Injectable 25 Gram(s) IV Push once  docusate sodium 100 milliGRAM(s) Oral three times a day  famotidine    Tablet 20 milliGRAM(s) Oral two times a day  fludroCORTISONE 0.1 milliGRAM(s) Oral two times a day  heparin  Injectable 5000 Unit(s) SubCutaneous every 8 hours  influenza   Vaccine 0.5 milliLiter(s) IntraMuscular once  insulin lispro (HumaLOG) corrective regimen sliding scale   SubCutaneous three times a day before meals  insulin lispro (HumaLOG) corrective regimen sliding scale   SubCutaneous at bedtime  midodrine. 5 milliGRAM(s) Oral three times a day  senna 2 Tablet(s) Oral at bedtime  simvastatin 20 milliGRAM(s) Oral at bedtime  sodium chloride 0.9%. 1000 milliLiter(s) (75 mL/Hr) IV Continuous <Continuous>    MEDICATIONS  (PRN):  dextrose 40% Gel 15 Gram(s) Oral once PRN Blood Glucose LESS THAN 70 milliGRAM(s)/deciliter  glucagon  Injectable 1 milliGRAM(s) IntraMuscular once PRN Glucose LESS THAN 70 milligrams/deciliter  levalbuterol Inhalation 0.63 milliGRAM(s) Inhalation every 6 hours PRN shortnes of breath/wheezing      Vital Signs Last 24 Hrs  T(C): 36.6 (21 Sep 2019 08:00), Max: 37.6 (20 Sep 2019 11:39)  T(F): 97.9 (21 Sep 2019 08:00), Max: 99.6 (20 Sep 2019 11:39)  HR: 95 (21 Sep 2019 08:00) (94 - 120)  BP: 150/91 (21 Sep 2019 08:00) (100/52 - 150/91)  BP(mean): --  RR: 17 (21 Sep 2019 08:00) (16 - 20)  SpO2: 98% (21 Sep 2019 08:00) (95% - 99%)  CAPILLARY BLOOD GLUCOSE      POCT Blood Glucose.: 290 mg/dL (21 Sep 2019 09:18)  POCT Blood Glucose.: 359 mg/dL (20 Sep 2019 22:49)  POCT Blood Glucose.: 360 mg/dL (20 Sep 2019 21:35)  POCT Blood Glucose.: 309 mg/dL (20 Sep 2019 17:48)  POCT Blood Glucose.: 224 mg/dL (20 Sep 2019 13:34)    I&O's Summary    20 Sep 2019 07:01  -  21 Sep 2019 07:00  --------------------------------------------------------  IN: 0 mL / OUT: 500 mL / NET: -500 mL    21 Sep 2019 07:01  -  21 Sep 2019 09:42  --------------------------------------------------------  IN: 0 mL / OUT: 200 mL / NET: -200 mL      PHYSICAL EXAM  GENERAL: NAD, well-developed  HEAD:  Atraumatic, Normocephalic  EYES: EOMI, PERRLA, conjunctiva and sclera clear  NECK: Supple, No JVD  CHEST/LUNG: CTABL, no wheeze  HEART: Regular rate and rhythm; No murmurs, rubs, or gallops  ABDOMEN: Soft, Nontender, Nondistended; Bowel sounds present  EXTREMITIES:  2+ Peripheral Pulses, No clubbing, cyanosis, or edema  PSYCH: AAOx3  SKIN: No rashes or lesions    LABS:                        10.1   11.23 )-----------( 240      ( 20 Sep 2019 07:00 )             31.7     09-20    131<L>  |  97<L>  |  19  ----------------------------<  80  3.9   |  21<L>  |  1.81<H>    Ca    9.8      20 Sep 2019 07:00  Mg     1.9     09-19    TPro  5.5<L>  /  Alb  2.5<L>  /  TBili  0.5  /  DBili  x   /  AST  47<H>  /  ALT  34  /  AlkPhos  81  09-20    PT/INR - ( 19 Sep 2019 21:10 )   PT: 10.8 SEC;   INR: 0.95          PTT - ( 19 Sep 2019 21:10 )  PTT:23.3 SEC  CARDIAC MARKERS ( 20 Sep 2019 07:00 )  x     / x     / 21 u/L / 4.02 ng/mL / x              RADIOLOGY & ADDITIONAL TESTS:    Imaging Personally Reviewed:  Consultant(s) Notes Reviewed:    Care Discussed with Consultants/Other Providers: discussed case with endocrinology team and reviewed consult note. will perform cosyntropin test for AI.

## 2019-09-21 NOTE — PROGRESS NOTE ADULT - PROBLEM SELECTOR PLAN 1
-c/b decreased responsiveness now improved to baseline mental status AOX2-3 (knew name, location and year)  -secondary to recent medication adjustments in the setting of poor PO intake? was on sulfonylurea at home and will discontinue. will monitor FS on basal/bolus regimen   -c/w D10 IVF but d/c once FS improved.   -Dysphagia 1 diet for now pending official speech/swallow eval.   -Endo consulted -c/b decreased responsiveness now improved to baseline mental status AOX2-3 (knows name, location and year)  -secondary to recent medication adjustments in the setting of poor PO intake? was on sulfonylurea at home and will discontinue. will monitor FS on basal/SSI regimen   -Dysphagia 1 diet for now pending official speech/swallow eval.   -Endo consulted and recs appreciated

## 2019-09-21 NOTE — PROGRESS NOTE ADULT - SUBJECTIVE AND OBJECTIVE BOX
[facility]   MD MARYANN HERNANDEZ D.O. FATIMA SHEIKH, D.O. RUORU WONG, PA    OFFICE: [phone]  DR CASAREZ CELL: [phone]  DR. REYNOSO CELL: [phone]  DR. HERNANDEZ CELL: [phone]  LEATHA TERRAZAS CELL: [phone]      RENAL FOLLOW UP NOTE  --------------------------------------------------------------------------------  HPI: Pt seen and examined. Has no complaints this AM.        PAST HISTORY  --------------------------------------------------------------------------------  No significant changes to PMH, PSH, FHx, SHx, unless otherwise noted    ALLERGIES & MEDICATIONS  --------------------------------------------------------------------------------  Allergies    No Known Allergies    Intolerances      Standing Inpatient Medications  dextrose 5%. 1000 milliLiter(s) IV Continuous <Continuous>  dextrose 50% Injectable 12.5 Gram(s) IV Push once  dextrose 50% Injectable 25 Gram(s) IV Push once  dextrose 50% Injectable 25 Gram(s) IV Push once  docusate sodium 100 milliGRAM(s) Oral three times a day  famotidine    Tablet 20 milliGRAM(s) Oral two times a day  fludroCORTISONE 0.1 milliGRAM(s) Oral two times a day  heparin  Injectable 5000 Unit(s) SubCutaneous every 8 hours  influenza   Vaccine 0.5 milliLiter(s) IntraMuscular once  insulin glargine Injectable (LANTUS) 8 Unit(s) SubCutaneous at bedtime  insulin lispro (HumaLOG) corrective regimen sliding scale   SubCutaneous three times a day before meals  insulin lispro (HumaLOG) corrective regimen sliding scale   SubCutaneous at bedtime  midodrine. 5 milliGRAM(s) Oral three times a day  senna 2 Tablet(s) Oral at bedtime  simvastatin 20 milliGRAM(s) Oral at bedtime    PRN Inpatient Medications  dextrose 40% Gel 15 Gram(s) Oral once PRN  glucagon  Injectable 1 milliGRAM(s) IntraMuscular once PRN  levalbuterol Inhalation 0.63 milliGRAM(s) Inhalation every 6 hours PRN      REVIEW OF SYSTEMS  --------------------------------------------------------------------------------  General: no fever  CVS: no chest pain  RESP: no sob, no cough  ABD: no abdominal pain  : no dysuria,  MSK: no edema     VITALS/PHYSICAL EXAM  --------------------------------------------------------------------------------  T(C): 36.8 (09-21-19 @ 16:00), Max: 36.8 (09-21-19 @ 16:00)  HR: 74 (09-21-19 @ 16:00) (74 - 120)  BP: 120/77 (09-21-19 @ 16:00) (119/61 - 150/91)  RR: 17 (09-21-19 @ 16:00) (16 - 19)  SpO2: 95% (09-21-19 @ 16:00) (95% - 100%)  Wt(kg): --  Height (cm): 157.5 (09-20-19 @ 17:30)  Weight (kg): 64.9 (09-20-19 @ 17:30)  BMI (kg/m2): 26.2 (09-20-19 @ 17:30)  BSA (m2): 1.66 (09-20-19 @ 17:30)      09-20-19 @ 07:01  -  09-21-19 @ 07:00  --------------------------------------------------------  IN: 0 mL / OUT: 500 mL / NET: -500 mL    09-21-19 @ 07:01  -  09-21-19 @ 17:42  --------------------------------------------------------  IN: 250 mL / OUT: 850 mL / NET: -600 mL      Physical Exam:  	Gen: NAD  	HEENT: MMM  	Pulm: CTA B/L  	CV: S1S2  	Abd: Soft, +BS  	Ext: B/L LE 2+ edema and UE 1+ edema noted              Neuro: Awake   	Skin: Warm and Dry       LABS/STUDIES  --------------------------------------------------------------------------------              9.9    10.91 >-----------<  257      [09-21-19 @ 11:25]              31.2     130  |  97  |  30  ----------------------------<  282      [09-21-19 @ 11:25]  4.1   |  17  |  1.89        Ca     10.7     [09-21-19 @ 11:25]      Mg     2.0     [09-21-19 @ 11:25]      Phos  3.2     [09-21-19 @ 11:25]    TPro  6.0  /  Alb  2.7  /  TBili  0.6  /  DBili  x   /  AST  38  /  ALT  35  /  AlkPhos  104  [09-21-19 @ 11:25]    PT/INR: PT 10.8 , INR 0.95       [09-19-19 @ 21:10]  PTT: 23.3       [09-19-19 @ 21:10]    CK 21      [09-20-19 @ 07:00]    Creatinine Trend:  SCr 1.89 [09-21 @ 11:25]  SCr 1.81 [09-20 @ 07:00]  SCr 1.83 [09-20 @ 02:26]  SCr 1.88 [09-19 @ 21:10]  SCr 2.10 [09-17 @ 06:06]    Urinalysis - [09-06-19 @ 17:55]      Color LIGHT YELLOW / Appearance CLEAR / SG 1.012 / pH 6.0      Gluc 500 / Ketone NEGATIVE  / Bili NEGATIVE / Urobili NORMAL       Blood NEGATIVE / Protein NEGATIVE / Leuk Est NEGATIVE / Nitrite NEGATIVE      RBC  / WBC  / Hyaline  / Gran  / Sq Epi  / Non Sq Epi  / Bacteria       Iron 38, TIBC 234, %sat --      [09-12-19 @ 06:08]  Ferritin 214.6      [09-12-19 @ 06:08]  PTH 20.00 (Ca --)      [09-03-19 @ 05:45]   --  PTH 25.46 (Ca --)      [09-01-19 @ 06:00]   --  Vitamin D (25OH) 34.9      [09-03-19 @ 05:45]  HbA1c 7.0      [09-13-19 @ 06:50]  TSH 5.55      [09-09-19 @ 03:20]  Lipid: chol 121, TG 96, HDL 33, LDL 74      [08-31-19 @ 07:00]      ANCA: cANCA Negative, pANCA Negative, atypical ANCA --      [09-03-19 @ 05:45]  Free Light Chains: kappa 4.00, lambda 3.03, ratio = 1.32      [09-03 @ 05:45]

## 2019-09-21 NOTE — PROGRESS NOTE ADULT - ASSESSMENT
80 yo M w/ a PMHx of T2DM, hypothyroidism, CAD, HLD, ckd 4 and HTN brought in by EMS after decreased responsiveness secondary to hypoglycemia.      CKD (chronic kidney disease), stage IV    Baseline ~ 2.2-2.5.   Cr likely improved 2/2 volume overload/dilution  Avoid nephrotoxins including NSAIDs, IV contrast (if possible), [employer] products  maintain MAP >65    Acidosis  likely 2/2 advanced CKD  start sodium bicarb 650 mg PO BID      Hyponatremia  Previous work up suggests SIADH   hx of low cortisol level, on florinef   stable  Avoid hypotonic solutions  1L fluid restriction  recommend lasix 40 mg PO daily    Hypercalcemia  PTH 20, Vit D 1,25 OH is high but vit D 25-OH is normal  K/L ratio nml  mild proteinuria. vasculitis w/u in office neg except +ANCA  repeat anca here neg on 9/3  ACE level ok  QuantiFeron indeterminate, chest xray clear  Calcium persistently high, s/p CT abd/pelvis/chest showed: "Scattered bilateral pulmonary nodules and mediastinal lymphadenopathy. Possible lymphoma with possible splenic involvement."   continue to monitor closely  heme eval  lasix 40 mg daily should help    Hypotension  w/ Hx HTN  improved  on midodrine and florinef; can increase florinef to 0.1 mg Q8hrs if needed for hypotension  continue to monitor

## 2019-09-21 NOTE — PROGRESS NOTE ADULT - PROBLEM SELECTOR PLAN 3
-EKG sinus tachycardia w/ T wave inversions in V2-V4 unchanged from previous EKG. Sinus tachycardia now resolved  -2/2 to hypoglycemia vs hypovolemia vs PE?  -Wells score: low-mod risk for PE. D dimer elevated. Will check V/Q scan given baseline impaired renal function  -s/p 1L , continue with IVF -Pt initially reported brief episode of chest pressure prior to admission that has since resolved  -EKG unchanged from prior. initial troponins with delta <6. now with 3 troponins that trended up, however 3rd  trop of 83 consistent with prior trop during last admission of 82. also with normal CKMB, CK. trop likely elevated in setting of CKD and will stop trending  -continue with statin. asa and plavix on hold for lymph node biopsy

## 2019-09-21 NOTE — PROGRESS NOTE ADULT - PROBLEM SELECTOR PROBLEM 2
GENERAL SURGERY POSTOPERATIVE NOTE    OPERATION:   -SUBJECTIVE:   Patient remains critically ill in the ICU, Precedex gtt stopped and no longer requiring pressors.  Patient found sitting in chair, states she is feeling better today.  Denies shortness of breath and abdominal pain is well controlled.  Patient does endorse nausea but denies vomiting.        OBJECTIVE:   Blood pressure 129/62, pulse 94, temperature 97.5 °F (36.4 °C), resp. rate 30, height 5' 2\" (1.575 m), weight 78.8 kg, SpO2 97 %.  RECENT WEIGHTS:  Weight    06/12/19 0612 06/13/19 0400 06/14/19 0600 06/15/19 0524   Weight: 73.4 kg 76.5 kg 79.7 kg 78.8 kg     General:  Alert, no acute distress.  Abdomen:  Soft, nondistended,  incisional tenderness. Surgical incision packing removed, viable tissue. Alysa wound with on increased erythema, induration or warmth. Right PAUL with serosanguinous drainage  35 ml/24 hrs, Left #2 PAUL with serous drainage 130 ml/24 hrs, Left #3 PAUL with serous drainage 35 ml/24 hrs   G-tube with thin biliious drainage 560 ml/24 hrs    Neurologic:  Grossly normal.  Intake/Output:    Intake/Output Summary (Last 24 hours) at 6/15/2019 0719  Last data filed at 6/15/2019 0600  Gross per 24 hour   Intake 3434.97 ml   Output 4115 ml   Net -680.03 ml      Last Stool Occurrence:  1(small) (06/13/19 0600)    Laboratory Results:  Recent Labs   Lab 06/15/19  0436 06/15/19  0315 06/14/19  0840 06/14/19  0230   WBC 13.6* 11.6* 14.6* 18.7*   RBC 2.57* 2.26*  --  3.19*   HCT 22.4* 19.5*  --  27.5*   HGB 7.0* 6.2*  --  8.7*    241  --  305     Recent Labs   Lab 06/15/19  0315 06/14/19 2015 06/14/19  0424 06/14/19  0230 06/13/19  1438  Adult hypothyroidism TPN until adequate oral intake.   -Luz to remain today, will assess tomorrow.  -G-tube to gravity  -Continue antibiotics  -Redress wound as needed. Iodoform packing to remain in place until Saturday.   -JPs to remain in place until patient tolerating a regular diet   -Encourage IS use and ambulation   -Medical management per critical care      ANGELA Kidd [phone]  After 5 pm please page the on call surgeon for the Acute Care Service at [phone] with any emergent concerns.    I have reviewed the chart of [place] and I have interviewed and examined the patient. She is feeling better today she is off the BiPAP. All her drains are serous the G-tube is working well. I agree with Otilia NP note, assessment and treatment plan.    Jonathan Ayala MD

## 2019-09-21 NOTE — PHYSICAL THERAPY INITIAL EVALUATION ADULT - ADDITIONAL COMMENTS
Patient lives in a house with 3 family members; 4 steps to enter and 11 steps to bedroom. Patient uses a straight cane for ambulation

## 2019-09-21 NOTE — PROGRESS NOTE ADULT - PROBLEM SELECTOR PLAN 8
-orthostatic hypotension 2/2 to adrenal insuff? Low cortisol last admission  -continue with midodrine   -continue with florinef  -repeat cortisol in am -synthroid on hold while working up AI

## 2019-09-21 NOTE — PROGRESS NOTE ADULT - PROBLEM SELECTOR PLAN 9
-continue with levothyroxine DVT ppx: HSQ  Diet: dysphagia 1 pending repeat dysphagia screen   PT eval: home with w/ rolling walker

## 2019-09-21 NOTE — PROGRESS NOTE ADULT - ASSESSMENT
79M h/o HTN, HLD, T2DM, hypothyroidism, CAD, brought in by EMS after decreased responsiveness secondary to hypoglycemia in the setting of recent med adjustments, c/b dyspnea, wheezing and sinus tachycardia, now improved: 79M h/o HTN, HLD, T2DM, hypothyroidism, CAD, brought in by EMS after decreased responsiveness secondary to hypoglycemia in the setting of recent med adjustments, hypoglycemia resolved and now pending w/u for AI:

## 2019-09-21 NOTE — PHYSICAL THERAPY INITIAL EVALUATION ADULT - PERTINENT HX OF CURRENT PROBLEM, REHAB EVAL
Patient is a 79 year old male who was admitted to [facility] for hypoglycemia. PMH includes HTN, HLD, DM, hypothyroid, CAD, and recent admission for syncope and collapse.

## 2019-09-21 NOTE — PHYSICAL THERAPY INITIAL EVALUATION ADULT - CRITERIA FOR SKILLED THERAPEUTIC INTERVENTIONS
risk reduction/prevention/rehab potential/impairments found/therapy frequency/anticipated equipment needs at discharge/anticipated discharge recommendation/functional limitations in following categories/predicted duration of therapy intervention

## 2019-09-21 NOTE — PROGRESS NOTE ADULT - PROBLEM SELECTOR PLAN 2
-in setting of mediastinal lymphadenopathy   -pt with diffuse expiratory wheeze on exam, tachypneic and tachycardic  -Daughter reports no history of pulmonary dz but states pt is a long time smoker.  -VBG w/ normal pH, CO2. RVP neg  -concern for aspiration event, repeat dysphagia screen and c/w dysphagia diet for now  -started on xopenex given tachycardia. can monitor off steroids given improvement in breathing   -breathing changes may be 2/2 underlying pulmonary disease, may have lymphoma? consulted IR for lymph node biopsy -given hypotension, low sodium, will w/u for adrenal insufficiency with cosyntropin stim test  -endo following and will appreciate recs

## 2019-09-21 NOTE — PROGRESS NOTE ADULT - SUBJECTIVE AND OBJECTIVE BOX
Chief Complaint: hypoglycemia     History: Patient reports to improved appetite. Now with hyperglycemia, BG 200s. No current complaints.    MEDICATIONS  (STANDING):  dextrose 5%. 1000 milliLiter(s) (100 mL/Hr) IV Continuous <Continuous>  dextrose 50% Injectable 12.5 Gram(s) IV Push once  dextrose 50% Injectable 25 Gram(s) IV Push once  dextrose 50% Injectable 25 Gram(s) IV Push once  docusate sodium 100 milliGRAM(s) Oral three times a day  famotidine    Tablet 20 milliGRAM(s) Oral two times a day  fludroCORTISONE 0.1 milliGRAM(s) Oral two times a day  heparin  Injectable 5000 Unit(s) SubCutaneous every 8 hours  influenza   Vaccine 0.5 milliLiter(s) IntraMuscular once  insulin lispro (HumaLOG) corrective regimen sliding scale   SubCutaneous three times a day before meals  insulin lispro (HumaLOG) corrective regimen sliding scale   SubCutaneous at bedtime  midodrine. 5 milliGRAM(s) Oral three times a day  senna 2 Tablet(s) Oral at bedtime  simvastatin 20 milliGRAM(s) Oral at bedtime  sodium chloride 0.9%. 1000 milliLiter(s) (75 mL/Hr) IV Continuous <Continuous>    MEDICATIONS  (PRN):  dextrose 40% Gel 15 Gram(s) Oral once PRN Blood Glucose LESS THAN 70 milliGRAM(s)/deciliter  glucagon  Injectable 1 milliGRAM(s) IntraMuscular once PRN Glucose LESS THAN 70 milligrams/deciliter  levalbuterol Inhalation 0.63 milliGRAM(s) Inhalation every 6 hours PRN shortnes of breath/wheezing      Allergies    No Known Allergies    Intolerances      Review of Systems:  ALL OTHER SYSTEMS REVIEWED AND NEGATIVE      PHYSICAL EXAM:  VITALS: T(C): 36.7 (09-21-19 @ 12:00)  T(F): 98 (09-21-19 @ 12:00), Max: 98.4 (09-20-19 @ 14:00)  HR: 90 (09-21-19 @ 12:00) (90 - 120)  BP: 133/72 (09-21-19 @ 12:00) (115/69 - 150/91)  RR:  (16 - 19)  SpO2:  (95% - 100%)  Wt(kg): --  GENERAL: NAD, well-groomed, well-developed  EYES: No proptosis, no lid lag, anicteric  HEENT:  Atraumatic, Normocephalic, moist mucous membranes  SKIN: Dry, intact, No rashes or lesions  MUSCULOSKELETAL: Full range of motion, normal strength  NEURO: sensation intact, extraocular movements intact, no tremor  PSYCH: Alert and oriented x 3, normal affect, normal mood    POCT Blood Glucose.: 212 mg/dL (09-21-19 @ 13:16)  POCT Blood Glucose.: 290 mg/dL (09-21-19 @ 09:18)  POCT Blood Glucose.: 359 mg/dL (09-20-19 @ 22:49)  POCT Blood Glucose.: 360 mg/dL (09-20-19 @ 21:35)  POCT Blood Glucose.: 309 mg/dL (09-20-19 @ 17:48)  POCT Blood Glucose.: 224 mg/dL (09-20-19 @ 13:34)  POCT Blood Glucose.: 91 mg/dL (09-20-19 @ 08:16)  POCT Blood Glucose.: 97 mg/dL (09-20-19 @ 06:48)  POCT Blood Glucose.: 116 mg/dL (09-20-19 @ 05:53)  POCT Blood Glucose.: 189 mg/dL (09-20-19 @ 04:53)  POCT Blood Glucose.: 83 mg/dL (09-20-19 @ 03:58)  POCT Blood Glucose.: 97 mg/dL (09-20-19 @ 03:16)  POCT Blood Glucose.: 163 mg/dL (09-20-19 @ 02:14)  POCT Blood Glucose.: 29 mg/dL (09-20-19 @ 01:45)  POCT Blood Glucose.: 28 mg/dL (09-20-19 @ 01:44)  POCT Blood Glucose.: 83 mg/dL (09-19-19 @ 22:34)  POCT Blood Glucose.: 100 mg/dL (09-19-19 @ 21:40)  POCT Blood Glucose.: 93 mg/dL (09-19-19 @ 21:00)  POCT Blood Glucose.: 81 mg/dL (09-19-19 @ 20:49)      09-21    130<L>  |  97<L>  |  30<H>  ----------------------------<  282<H>  4.1   |  17<L>  |  1.89<H>    EGFR if : 38  EGFR if non : 33    Ca    10.7<H>      09-21  Mg     2.0     09-21  Phos  3.2     09-21    TPro  6.0  /  Alb  2.7<L>  /  TBili  0.6  /  DBili  x   /  AST  38  /  ALT  35  /  AlkPhos  104  09-21          Thyroid Function Tests:  09-09 @ 03:20 TSH 5.55 FreeT4 -- T3 -- Anti TPO -- Anti Thyroglobulin Ab -- TSI --  09-01 @ 06:00 TSH 2.14 FreeT4 -- T3 -- Anti TPO -- Anti Thyroglobulin Ab -- TSI --      Hemoglobin A1C, Whole Blood: 7.0 % <H> [4.0 - 5.6] (09-13-19 @ 06:50)  Hemoglobin A1C, Whole Blood: 6.1 % <H> [4.0 - 5.6] (08-31-19 @ 07:00)

## 2019-09-21 NOTE — PROGRESS NOTE ADULT - PROBLEM SELECTOR PLAN 6
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 If worsening, consider salt tabs, fluid restriction  -repeat urine osm, na -CKD3  -renally dose meds  -nephrology following and appreciate recs

## 2019-09-21 NOTE — PROGRESS NOTE ADULT - PROBLEM SELECTOR PLAN 4
-Pt reported chest pressure at rest that has since resolved  -EKG unchanged from prior. initial troponins with delta <6. now with 3 troponins that trended up, however most recent trop of 83 consistent with prior trop during last admission of 82. also with normal CKMB, CK. trop elevated in setting of CKD and will stop trending  -continue with statin, asa and plavix on hold for lymph node biopsy -unable to tolerate BB, will consider restarting if BP can tolerate, but currently on midodrine and florinef for orthostatic hypotension vs adrenal insufficiency

## 2019-09-21 NOTE — PROGRESS NOTE ADULT - PROBLEM SELECTOR PLAN 5
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 If worsening, consider salt tabs, fluid restriction and pending w/u for AI  -repeat urine osm, na

## 2019-09-21 NOTE — PROGRESS NOTE ADULT - ASSESSMENT
78 yo M w/ a PMHx of T2DM A1c 7.4 (in the setting of anemia and decreased renal function) , hypothyroidism, CAD, HLD and HTN brought in by EMS after decreased responsiveness secondary to hypoglycemia to 20s at home.  Of note, pt was recently admitted to [facility] for syncopal workup also found to be hypoglycemic and his long acting insulin was adjusted, moderate LVOT obstruction, orthostatic hypotension, hypercalcemia with CT chest w/ scattered bilateral pulmonary nodules and mediastinal lymphadenopathy to undergo further workup.      endocrine called for DM assistance      #Diabetes  per most recent med recc, pt is on glipizide 10mg, basaglar 10 and Januvia   pt with very poor PO intake as well and decreased renal function to 35 which will both predispose him to hypoglycemia  pt with recurrent hypolgycemia inpt likely due to increased half life of sulfonylurea and insulin in decreased renal function  now glucose improved, off dextrose and on humalog correctional scale.   Start lantus 8 units qhs for now and will titrate as needed.     once eating, BG goal for the pt 100-250. Avoid hypoglycemia. Okay to allow glucose to run higher at this time to allow the body to reset after marked hypoglycemia     Dispo: No sulfonylurea at dc given elderly age and decreased renal function.  regimen TBD based on inpt trend, likely basal insulin +/- renally dosed januvia.      #hx of recent hypercalcemia   currently calcium normal, however corrected is elevated at 11.7.  recent previous workup,  Vit D 1-25 149 (elevated, not expected in setting of decreased renal function) in setting of PTH of 20 (not suppressed, but not markedly elevated as seen in primary hyperpara)  appears to be possible malignancy related given elevated light chains and known CT chest findings or vit d 1-25 mediated   PTHrp already sent previously and negative.  SPEP already done previously and appears to have increased kappa light chains. Also with previously known lymphadenopathy. has outpatient hematologist and needs close f/u.   followup heme-onc reccs  encourage hydration   follow calcium levels daily, correct daily for albumin. avoid calcium containing supplements     #rule out AI  pt with syncope, hypoglycemia, weakness. On last admission appears to have been started on midrodrine and florinef.  found to low am cortisols (albeit done at 3am at 1.3-3) earlier this month   repeat AM cortisol was 6.3 (at 11AM). Needs cosyntropin stimulation test- can be done at any time of day as follows. Check cortisol at time 0, then inject 0.25mg of cosyntropin IV X1 and check cortisol again at time 30 minutes and at time 60 minutes after cosyntropin administration.   please hold levothyroxine until above workup is completed     *if HD untable, would recommend stress dose steroids hydroCort 50mg q8 until further assessment*    #hypothryodism  on levoT 88mcg  hold until HPA axis is assessed

## 2019-09-22 ENCOUNTER — TRANSCRIPTION ENCOUNTER (OUTPATIENT)
Age: 79
End: 2019-09-22

## 2019-09-22 VITALS
TEMPERATURE: 98 F | RESPIRATION RATE: 16 BRPM | HEART RATE: 99 BPM | OXYGEN SATURATION: 97 % | DIASTOLIC BLOOD PRESSURE: 90 MMHG | SYSTOLIC BLOOD PRESSURE: 154 MMHG

## 2019-09-22 LAB
ACTH SER-ACNC: 3.7 PG/ML — LOW (ref 7.2–63.3)
ALBUMIN SERPL ELPH-MCNC: 2.5 G/DL — LOW (ref 3.3–5)
ALP SERPL-CCNC: 87 U/L — SIGNIFICANT CHANGE UP (ref 40–120)
ALT FLD-CCNC: 34 U/L — SIGNIFICANT CHANGE UP (ref 4–41)
ANION GAP SERPL CALC-SCNC: 10 MMO/L — SIGNIFICANT CHANGE UP (ref 7–14)
AST SERPL-CCNC: 34 U/L — SIGNIFICANT CHANGE UP (ref 4–40)
BILIRUB SERPL-MCNC: 0.6 MG/DL — SIGNIFICANT CHANGE UP (ref 0.2–1.2)
BUN SERPL-MCNC: 31 MG/DL — HIGH (ref 7–23)
CALCIUM SERPL-MCNC: 10.3 MG/DL — SIGNIFICANT CHANGE UP (ref 8.4–10.5)
CHLORIDE SERPL-SCNC: 99 MMOL/L — SIGNIFICANT CHANGE UP (ref 98–107)
CO2 SERPL-SCNC: 23 MMOL/L — SIGNIFICANT CHANGE UP (ref 22–31)
CREAT SERPL-MCNC: 1.91 MG/DL — HIGH (ref 0.5–1.3)
GLUCOSE SERPL-MCNC: 127 MG/DL — HIGH (ref 70–99)
HCT VFR BLD CALC: 29.7 % — LOW (ref 39–50)
HGB BLD-MCNC: 9.5 G/DL — LOW (ref 13–17)
MAGNESIUM SERPL-MCNC: 1.9 MG/DL — SIGNIFICANT CHANGE UP (ref 1.6–2.6)
MCHC RBC-ENTMCNC: 29.3 PG — SIGNIFICANT CHANGE UP (ref 27–34)
MCHC RBC-ENTMCNC: 32 % — SIGNIFICANT CHANGE UP (ref 32–36)
MCV RBC AUTO: 91.7 FL — SIGNIFICANT CHANGE UP (ref 80–100)
NRBC # FLD: 0 K/UL — SIGNIFICANT CHANGE UP (ref 0–0)
PHOSPHATE SERPL-MCNC: 2.4 MG/DL — LOW (ref 2.5–4.5)
PLATELET # BLD AUTO: 236 K/UL — SIGNIFICANT CHANGE UP (ref 150–400)
PMV BLD: 11.3 FL — SIGNIFICANT CHANGE UP (ref 7–13)
POTASSIUM SERPL-MCNC: 4.3 MMOL/L — SIGNIFICANT CHANGE UP (ref 3.5–5.3)
POTASSIUM SERPL-SCNC: 4.3 MMOL/L — SIGNIFICANT CHANGE UP (ref 3.5–5.3)
PROT SERPL-MCNC: 5.5 G/DL — LOW (ref 6–8.3)
RBC # BLD: 3.24 M/UL — LOW (ref 4.2–5.8)
RBC # FLD: 16 % — HIGH (ref 10.3–14.5)
SODIUM SERPL-SCNC: 132 MMOL/L — LOW (ref 135–145)
WBC # BLD: 7.36 K/UL — SIGNIFICANT CHANGE UP (ref 3.8–10.5)
WBC # FLD AUTO: 7.36 K/UL — SIGNIFICANT CHANGE UP (ref 3.8–10.5)

## 2019-09-22 PROCEDURE — 99239 HOSP IP/OBS DSCHRG MGMT >30: CPT

## 2019-09-22 RX ORDER — SODIUM,POTASSIUM PHOSPHATES 278-250MG
1 POWDER IN PACKET (EA) ORAL
Refills: 0 | Status: DISCONTINUED | OUTPATIENT
Start: 2019-09-22 | End: 2019-09-22

## 2019-09-22 RX ORDER — SITAGLIPTIN 50 MG/1
1 TABLET, FILM COATED ORAL
Qty: 0 | Refills: 0 | DISCHARGE

## 2019-09-22 RX ORDER — SODIUM CHLORIDE 9 MG/ML
500 INJECTION INTRAMUSCULAR; INTRAVENOUS; SUBCUTANEOUS ONCE
Refills: 0 | Status: COMPLETED | OUTPATIENT
Start: 2019-09-22 | End: 2019-09-22

## 2019-09-22 RX ORDER — ERGOCALCIFEROL 1.25 MG/1
1 CAPSULE ORAL
Qty: 0 | Refills: 0 | DISCHARGE

## 2019-09-22 RX ORDER — SITAGLIPTIN 50 MG/1
1 TABLET, FILM COATED ORAL
Qty: 30 | Refills: 1
Start: 2019-09-22 | End: 2019-11-20

## 2019-09-22 RX ADMIN — Medication 100 MILLIGRAM(S): at 13:25

## 2019-09-22 RX ADMIN — FAMOTIDINE 20 MILLIGRAM(S): 10 INJECTION INTRAVENOUS at 06:37

## 2019-09-22 RX ADMIN — Medication 1 TABLET(S): at 13:25

## 2019-09-22 RX ADMIN — Medication 1 TABLET(S): at 18:10

## 2019-09-22 RX ADMIN — Medication 100 MILLIGRAM(S): at 06:38

## 2019-09-22 RX ADMIN — HEPARIN SODIUM 5000 UNIT(S): 5000 INJECTION INTRAVENOUS; SUBCUTANEOUS at 13:26

## 2019-09-22 RX ADMIN — FLUDROCORTISONE ACETATE 0.1 MILLIGRAM(S): 0.1 TABLET ORAL at 06:37

## 2019-09-22 RX ADMIN — FAMOTIDINE 20 MILLIGRAM(S): 10 INJECTION INTRAVENOUS at 18:10

## 2019-09-22 RX ADMIN — Medication 1: at 14:00

## 2019-09-22 RX ADMIN — FLUDROCORTISONE ACETATE 0.1 MILLIGRAM(S): 0.1 TABLET ORAL at 18:10

## 2019-09-22 RX ADMIN — SODIUM CHLORIDE 125 MILLILITER(S): 9 INJECTION INTRAMUSCULAR; INTRAVENOUS; SUBCUTANEOUS at 15:16

## 2019-09-22 NOTE — PROGRESS NOTE ADULT - SUBJECTIVE AND OBJECTIVE BOX
Patient is a 79y old  Male who presents with a chief complaint of hypoglycemia (21 Sep 2019 17:42)        SUBJECTIVE / OVERNIGHT EVENTS:      MEDICATIONS  (STANDING):  dextrose 5%. 1000 milliLiter(s) (100 mL/Hr) IV Continuous <Continuous>  dextrose 50% Injectable 12.5 Gram(s) IV Push once  dextrose 50% Injectable 25 Gram(s) IV Push once  dextrose 50% Injectable 25 Gram(s) IV Push once  docusate sodium 100 milliGRAM(s) Oral three times a day  famotidine    Tablet 20 milliGRAM(s) Oral two times a day  fludroCORTISONE 0.1 milliGRAM(s) Oral two times a day  heparin  Injectable 5000 Unit(s) SubCutaneous every 8 hours  influenza   Vaccine 0.5 milliLiter(s) IntraMuscular once  insulin glargine Injectable (LANTUS) 8 Unit(s) SubCutaneous at bedtime  insulin lispro (HumaLOG) corrective regimen sliding scale   SubCutaneous three times a day before meals  insulin lispro (HumaLOG) corrective regimen sliding scale   SubCutaneous at bedtime  midodrine. 5 milliGRAM(s) Oral three times a day  senna 2 Tablet(s) Oral at bedtime  simvastatin 20 milliGRAM(s) Oral at bedtime    MEDICATIONS  (PRN):  dextrose 40% Gel 15 Gram(s) Oral once PRN Blood Glucose LESS THAN 70 milliGRAM(s)/deciliter  glucagon  Injectable 1 milliGRAM(s) IntraMuscular once PRN Glucose LESS THAN 70 milligrams/deciliter  levalbuterol Inhalation 0.63 milliGRAM(s) Inhalation every 6 hours PRN shortnes of breath/wheezing      Vital Signs Last 24 Hrs  T(C): 36.6 (22 Sep 2019 04:45), Max: 36.8 (21 Sep 2019 16:00)  T(F): 97.8 (22 Sep 2019 04:45), Max: 98.2 (21 Sep 2019 16:00)  HR: 93 (22 Sep 2019 04:45) (74 - 96)  BP: 138/76 (22 Sep 2019 06:37) (120/77 - 150/91)  BP(mean): --  RR: 19 (22 Sep 2019 04:45) (16 - 19)  SpO2: 100% (22 Sep 2019 04:45) (95% - 100%)  CAPILLARY BLOOD GLUCOSE      POCT Blood Glucose.: 194 mg/dL (21 Sep 2019 21:45)  POCT Blood Glucose.: 204 mg/dL (21 Sep 2019 17:30)  POCT Blood Glucose.: 212 mg/dL (21 Sep 2019 13:16)  POCT Blood Glucose.: 290 mg/dL (21 Sep 2019 09:18)    I&O's Summary    21 Sep 2019 07:01  -  22 Sep 2019 07:00  --------------------------------------------------------  IN: 586 mL / OUT: 1550 mL / NET: -964 mL          PHYSICAL EXAM  GENERAL: NAD, well-developed  HEAD:  Atraumatic, Normocephalic  EYES: EOMI, PERRLA, conjunctiva and sclera clear  NECK: Supple, No JVD  CHEST/LUNG: Clear to auscultation bilaterally; No wheeze  HEART: Regular rate and rhythm; No murmurs, rubs, or gallops  ABDOMEN: Soft, Nontender, Nondistended; Bowel sounds present  EXTREMITIES:  2+ Peripheral Pulses, No clubbing, cyanosis, or edema  PSYCH: AAOx3  SKIN: No rashes or lesions    LABS:                        9.9    10.91 )-----------( 257      ( 21 Sep 2019 11:25 )             31.2     09-21    130<L>  |  97<L>  |  30<H>  ----------------------------<  282<H>  4.1   |  17<L>  |  1.89<H>    Ca    10.7<H>      21 Sep 2019 11:25  Phos  3.2     09-21  Mg     2.0     09-21    TPro  6.0  /  Alb  2.7<L>  /  TBili  0.6  /  DBili  x   /  AST  38  /  ALT  35  /  AlkPhos  104  09-21              RADIOLOGY & ADDITIONAL TESTS:    Imaging Personally Reviewed:  Consultant(s) Notes Reviewed:    Care Discussed with Consultants/Other Providers: Patient is a 79y old  Male who presents with a chief complaint of hypoglycemia (21 Sep 2019 17:42)        SUBJECTIVE / OVERNIGHT EVENTS: no F/C, CP, SOB, abn pain, N/V/D/C, change in urinary freq. tolerating diet.        MEDICATIONS  (STANDING):  dextrose 5%. 1000 milliLiter(s) (100 mL/Hr) IV Continuous <Continuous>  dextrose 50% Injectable 12.5 Gram(s) IV Push once  dextrose 50% Injectable 25 Gram(s) IV Push once  dextrose 50% Injectable 25 Gram(s) IV Push once  docusate sodium 100 milliGRAM(s) Oral three times a day  famotidine    Tablet 20 milliGRAM(s) Oral two times a day  fludroCORTISONE 0.1 milliGRAM(s) Oral two times a day  heparin  Injectable 5000 Unit(s) SubCutaneous every 8 hours  influenza   Vaccine 0.5 milliLiter(s) IntraMuscular once  insulin glargine Injectable (LANTUS) 8 Unit(s) SubCutaneous at bedtime  insulin lispro (HumaLOG) corrective regimen sliding scale   SubCutaneous three times a day before meals  insulin lispro (HumaLOG) corrective regimen sliding scale   SubCutaneous at bedtime  midodrine. 5 milliGRAM(s) Oral three times a day  senna 2 Tablet(s) Oral at bedtime  simvastatin 20 milliGRAM(s) Oral at bedtime    MEDICATIONS  (PRN):  dextrose 40% Gel 15 Gram(s) Oral once PRN Blood Glucose LESS THAN 70 milliGRAM(s)/deciliter  glucagon  Injectable 1 milliGRAM(s) IntraMuscular once PRN Glucose LESS THAN 70 milligrams/deciliter  levalbuterol Inhalation 0.63 milliGRAM(s) Inhalation every 6 hours PRN shortnes of breath/wheezing      Vital Signs Last 24 Hrs  T(C): 36.6 (22 Sep 2019 04:45), Max: 36.8 (21 Sep 2019 16:00)  T(F): 97.8 (22 Sep 2019 04:45), Max: 98.2 (21 Sep 2019 16:00)  HR: 93 (22 Sep 2019 04:45) (74 - 96)  BP: 138/76 (22 Sep 2019 06:37) (120/77 - 150/91)  BP(mean): --  RR: 19 (22 Sep 2019 04:45) (16 - 19)  SpO2: 100% (22 Sep 2019 04:45) (95% - 100%)  CAPILLARY BLOOD GLUCOSE      POCT Blood Glucose.: 194 mg/dL (21 Sep 2019 21:45)  POCT Blood Glucose.: 204 mg/dL (21 Sep 2019 17:30)  POCT Blood Glucose.: 212 mg/dL (21 Sep 2019 13:16)  POCT Blood Glucose.: 290 mg/dL (21 Sep 2019 09:18)    I&O's Summary    21 Sep 2019 07:01  -  22 Sep 2019 07:00  --------------------------------------------------------  IN: 586 mL / OUT: 1550 mL / NET: -964 mL        PHYSICAL EXAM  GENERAL: NAD, well-developed  HEAD:  Atraumatic, Normocephalic  EYES: EOMI, PERRLA, conjunctiva and sclera clear  NECK: Supple, No JVD  CHEST/LUNG: CTABL, no wheeze  HEART: Regular rate and rhythm; No murmurs, rubs, or gallops  ABDOMEN: Soft, Nontender, Nondistended; Bowel sounds present  EXTREMITIES:  2+ Peripheral Pulses, No clubbing, cyanosis, or edema  PSYCH: AAOx3  SKIN: No rashes or lesions      LABS:                        9.9    10.91 )-----------( 257      ( 21 Sep 2019 11:25 )             31.2     09-21    130<L>  |  97<L>  |  30<H>  ----------------------------<  282<H>  4.1   |  17<L>  |  1.89<H>    Ca    10.7<H>      21 Sep 2019 11:25  Phos  3.2     09-21  Mg     2.0     09-21    TPro  6.0  /  Alb  2.7<L>  /  TBili  0.6  /  DBili  x   /  AST  38  /  ALT  35  /  AlkPhos  104  09-21          RADIOLOGY & ADDITIONAL TESTS:    Imaging Personally Reviewed:  Consultant(s) Notes Reviewed:    Care Discussed with Consultants/Other Providers:

## 2019-09-22 NOTE — DISCHARGE NOTE NURSING/CASE MANAGEMENT/SOCIAL WORK - PATIENT PORTAL LINK FT
You can access the FollowMyHealth Patient Portal offered by [facility] by registering at the following website: [email or web address]. By joining [facility]’s FollowMyHealth portal, you will also be able to view your health information using other applications (apps) compatible with our system.

## 2019-09-22 NOTE — DISCHARGE NOTE PROVIDER - NSFOLLOWUPCLINICS_GEN_ALL_ED_FT
[facility] Endocr[facility]  Endocrinology  [address] [place] [address]  Phone: [phone]  Fax:   Follow Up Time:

## 2019-09-22 NOTE — PROGRESS NOTE ADULT - PROBLEM SELECTOR PLAN 1
-c/b decreased responsiveness now improved to baseline mental status AOX2-3 (knows name, location and year)  -secondary to recent medication adjustments in the setting of poor PO intake? was on sulfonylurea at home and will discontinue. will monitor FS on basal/SSI regimen   -Dysphagia 1 diet for now pending official speech/swallow eval.   -Endo consulted and recs appreciated -c/b decreased responsiveness now improved to baseline mental status AOX2-3 (knows name, location and year)  -secondary to recent medication adjustments in the setting of poor PO intake? was on sulfonylurea at home and will discontinue. will monitor FS on basal/SSI regimen   -speech and swallow moses joseph  -Endo consulted and recs appreciated: d/c on basaglar 8U hs and januvia 50 mg. discontinue glipizde

## 2019-09-22 NOTE — PROGRESS NOTE ADULT - PROBLEM SELECTOR PLAN 2
-given hypotension, low sodium, will w/u for adrenal insufficiency with cosyntropin stim test  -endo following and will appreciate recs negative w/u, cosyntropin testing normal

## 2019-09-22 NOTE — DISCHARGE NOTE PROVIDER - INSTRUCTIONS
Low salt, low fat, low cholesterol diabetic diet.     **PER speech and swallow test: MECHANICAL SOFT DIET WITH NECTAR THICK LIQUIDS is recommended to avoid aspiration (swallowing gastric contents into your lungs)

## 2019-09-22 NOTE — PROGRESS NOTE ADULT - ASSESSMENT
78 yo M w/ a PMHx of T2DM, hypothyroidism, CAD, HLD, ckd 4 and HTN brought in by EMS after decreased responsiveness secondary to hypoglycemia.      CKD (chronic kidney disease), stage IV    Baseline ~ 2.2-2.5.   Cr likely improved 2/2 volume overload/dilution  Avoid nephrotoxins including NSAIDs, IV contrast (if possible), [employer] products  maintain MAP >65    Acidosis  likely 2/2 advanced CKD  stable w/o PO bicarb  continue to monitor for now      Hyponatremia  Previous work up suggests SIADH   hx of low cortisol level, on florinef   stable  Avoid hypotonic solutions  1L fluid restriction  recommend lasix 40 mg PO daily    Hypercalcemia  PTH 20, Vit D 1,25 OH is high but vit D 25-OH is normal  K/L ratio nml  mild proteinuria. vasculitis w/u in office neg except +ANCA  repeat anca here neg on 9/3  ACE level ok  QuantiFeron indeterminate, chest xray clear  Calcium persistently high, s/p CT abd/pelvis/chest showed: "Scattered bilateral pulmonary nodules and mediastinal lymphadenopathy. Possible lymphoma with possible splenic involvement."   continue to monitor closely  heme eval  lasix 40 mg daily should help    Hypotension  w/ Hx HTN  improved  on midodrine and florinef  consider decreasing midodrine since BP elevated above 130 at times  continue to monitor

## 2019-09-22 NOTE — PROGRESS NOTE ADULT - PROBLEM SELECTOR PLAN 9
DVT ppx: HSQ  Diet: dysphagia 1 pending repeat dysphagia screen   PT eval: home with w/ rolling walker DVT ppx: HSQ  Diet: dysphagia 1 pending repeat dysphagia screen   PT eval: home with w/ rolling walker  d/c on 9/22

## 2019-09-22 NOTE — SWALLOW BEDSIDE ASSESSMENT ADULT - ASR SWALLOW ASPIRATION MONITOR
change of breathing pattern/oral hygiene/fever/pneumonia/throat clearing/upper respiratory infection/gurgly voice/position upright (89Y)/cough

## 2019-09-22 NOTE — PROGRESS NOTE ADULT - SUBJECTIVE AND OBJECTIVE BOX
[facility]   MD MARYANN HERNANDEZ D.O. FATIMA SHEIKH, D.O. RUORU WONG, PA    OFFICE: [phone]  DR CASAREZ CELL: [phone]  DR. REYNOSO CELL: [phone]  DR. HERNANDEZ CELL: [phone]  LEATHA TERRAZAS CELL: [phone]      RENAL FOLLOW UP NOTE  --------------------------------------------------------------------------------  HPI: Pt seen and examined. States he feels well and wants to go home today. Has no complaints at this time.        PAST HISTORY  --------------------------------------------------------------------------------  No significant changes to PMH, PSH, FHx, SHx, unless otherwise noted    ALLERGIES & MEDICATIONS  --------------------------------------------------------------------------------  Allergies    No Known Allergies    Intolerances      Standing Inpatient Medications  dextrose 5%. 1000 milliLiter(s) IV Continuous <Continuous>  dextrose 50% Injectable 12.5 Gram(s) IV Push once  dextrose 50% Injectable 25 Gram(s) IV Push once  dextrose 50% Injectable 25 Gram(s) IV Push once  docusate sodium 100 milliGRAM(s) Oral three times a day  famotidine    Tablet 20 milliGRAM(s) Oral two times a day  fludroCORTISONE 0.1 milliGRAM(s) Oral two times a day  heparin  Injectable 5000 Unit(s) SubCutaneous every 8 hours  influenza   Vaccine 0.5 milliLiter(s) IntraMuscular once  insulin glargine Injectable (LANTUS) 8 Unit(s) SubCutaneous at bedtime  insulin lispro (HumaLOG) corrective regimen sliding scale   SubCutaneous three times a day before meals  insulin lispro (HumaLOG) corrective regimen sliding scale   SubCutaneous at bedtime  midodrine. 5 milliGRAM(s) Oral three times a day  potassium acid phosphate/sodium acid phosphate tablet (K-PHOS No. 2) 1 Tablet(s) Oral four times a day with meals  senna 2 Tablet(s) Oral at bedtime  simvastatin 20 milliGRAM(s) Oral at bedtime    PRN Inpatient Medications  dextrose 40% Gel 15 Gram(s) Oral once PRN  glucagon  Injectable 1 milliGRAM(s) IntraMuscular once PRN  levalbuterol Inhalation 0.63 milliGRAM(s) Inhalation every 6 hours PRN      REVIEW OF SYSTEMS  --------------------------------------------------------------------------------  General: no fever  CVS: no chest pain  RESP: no sob, no cough  ABD: no abdominal pain  : no dysuria,  MSK: no edema     VITALS/PHYSICAL EXAM  --------------------------------------------------------------------------------  T(C): 36.4 (09-22-19 @ 12:25), Max: 36.8 (09-21-19 @ 16:00)  HR: 98 (09-22-19 @ 12:25) (74 - 98)  BP: 132/78 (09-22-19 @ 12:25) (120/77 - 138/77)  RR: 17 (09-22-19 @ 12:25) (16 - 19)  SpO2: 100% (09-22-19 @ 12:25) (95% - 100%)  Wt(kg): --  Height (cm): 157.5 (09-20-19 @ 17:30)  Weight (kg): 64.9 (09-20-19 @ 17:30)  BMI (kg/m2): 26.2 (09-20-19 @ 17:30)  BSA (m2): 1.66 (09-20-19 @ 17:30)      09-21-19 @ 07:01  -  09-22-19 @ 07:00  --------------------------------------------------------  IN: 586 mL / OUT: 1550 mL / NET: -964 mL      Physical Exam:  	Gen: NAD  	HEENT: MMM  	Pulm: CTA B/L  	CV: S1S2  	Abd: Soft, +BS  	Ext: B/L LE 2+ edema              Neuro: Awake   	Skin: Warm and Dry     LABS/STUDIES  --------------------------------------------------------------------------------              9.5    7.36  >-----------<  236      [09-22-19 @ 07:17]              29.7     132  |  99  |  31  ----------------------------<  127      [09-22-19 @ 07:17]  4.3   |  23  |  1.91        Ca     10.3     [09-22-19 @ 07:17]      Mg     1.9     [09-22-19 @ 07:17]      Phos  2.4     [09-22-19 @ 07:17]    TPro  5.5  /  Alb  2.5  /  TBili  0.6  /  DBili  x   /  AST  34  /  ALT  34  /  AlkPhos  87  [09-22-19 @ 07:17]          Creatinine Trend:  SCr 1.91 [09-22 @ 07:17]  SCr 1.89 [09-21 @ 11:25]  SCr 1.81 [09-20 @ 07:00]  SCr 1.83 [09-20 @ 02:26]  SCr 1.88 [09-19 @ 21:10]    Urinalysis - [09-06-19 @ 17:55]      Color LIGHT YELLOW / Appearance CLEAR / SG 1.012 / pH 6.0      Gluc 500 / Ketone NEGATIVE  / Bili NEGATIVE / Urobili NORMAL       Blood NEGATIVE / Protein NEGATIVE / Leuk Est NEGATIVE / Nitrite NEGATIVE      RBC  / WBC  / Hyaline  / Gran  / Sq Epi  / Non Sq Epi  / Bacteria       Iron 38, TIBC 234, %sat --      [09-12-19 @ 06:08]  Ferritin 214.6      [09-12-19 @ 06:08]  PTH 20.00 (Ca --)      [09-03-19 @ 05:45]   --  PTH 25.46 (Ca --)      [09-01-19 @ 06:00]   --  Vitamin D (25OH) 34.9      [09-03-19 @ 05:45]  HbA1c 7.0      [09-13-19 @ 06:50]  TSH 5.55      [09-09-19 @ 03:20]  Lipid: chol 121, TG 96, HDL 33, LDL 74      [08-31-19 @ 07:00]      ANCA: cANCA Negative, pANCA Negative, atypical ANCA --      [09-03-19 @ 05:45]  Free Light Chains: kappa 4.00, lambda 3.03, ratio = 1.32      [09-03 @ 05:45]

## 2019-09-22 NOTE — DISCHARGE NOTE PROVIDER - NSDCCPCAREPLAN_GEN_ALL_CORE_FT
PRINCIPAL DISCHARGE DIAGNOSIS  Diagnosis: Type 2 diabetes mellitus with hypoglycemia without coma, unspecified whether long term insulin use  Assessment and Plan of Treatment: Your blood sugar was low when you came to the hospital. This is likely due to your poor kidney function and poor dietary habits. Please ensure you are eating a balanced diet. Your diabetes regimen was adjusted. The only diabetes medication you should be taking is BASAGLAR 8 UNITS at bedtime and JANUVIA 50mg daily. STOP GLIPIZIDE as this may cause your blood glucose to become dangerously low.  Follow up with endocrinology within 1-2 weeks of discharge. Follow up with your primary care physician within 5 days of discharge.      Monitor blood glucose levels throughout the day before meals and at bedtime. Record blood sugars and bring to outpatient providers appointment in order to be reviewed by your doctor for management modifications. If your sugars are more than 400 or less than 70 you should contact your PCP immediately. Monitor for signs/symptoms of low blood glucose, such as, dizziness, altered mental status, or cool/clammy skin. In addition, monitor for signs/symptoms of high blood glucose, such as, feeling hot, dry, fatigued, or with increased thirst/urination. Make regular podiatry appointments in order to have feet checked for wounds and uncontrolled toe nail growth to prevent infections, as well as, appointments with an ophthalmologist to monitor your vision.      SECONDARY DISCHARGE DIAGNOSES  Diagnosis: Hypercalcemia  Assessment and Plan of Treatment: Your calcium levels are elevated. You had a recent CT scan of the chest performed which showed pulomonary nodules and lymph nodes in your chest and abdomen concerning for cancer. This was discussed with your daughter who has agreed for you to follow up with a hematologist on discharge. Avoid calcium containing supplements.    Diagnosis: Hypotension  Assessment and Plan of Treatment: Continue taking florinef and midodrine as prescribed.

## 2019-09-22 NOTE — PROGRESS NOTE ADULT - PROBLEM SELECTOR PLAN 5
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 If worsening, consider salt tabs, fluid restriction and pending  -also with stable hypercalcemia and will need repeat outpt labs to assess. likely in setting of malignancy. patient will f/u with hematology

## 2019-09-22 NOTE — SWALLOW BEDSIDE ASSESSMENT ADULT - PHARYNGEAL PHASE
Within functional limits Cough post oral intake/Throat clear post oral intake/Delayed pharyngeal swallow

## 2019-09-22 NOTE — DISCHARGE NOTE PROVIDER - HOSPITAL COURSE
80 yo M w/ a PMHx of T2DM, hypothyroidism, CAD, HLD and HTN brought in by EMS after decreased responsiveness secondary to hypoglycemia in the setting of recent med adjustments c/b wheezing and sinus tachycardia. VQ scan with low probability for PE.  Endocrinology was consulted. Hypoglycemia likely due to poor PO intake as well as decreased renal function. Had recurrent hypoglycemia during this admission requiring D5W likely due to increased half life of sulfonylurea and insulin in decreased renal function. Glucose improved on dextrose drip. Mental status back to baseline. Patient with hypotension and low sodium therefore concern for adrenal insufficiency.  Adrenal insufficiency ruled out with cosyntropin sim test. Hyponatremia is chronic secondary to SIADH. Patient's corrected calcium is elevated. Appears to be possible malignancy related given elevated light chains and known CT chest findings or vit d 1-25 mediated PTHrp already sent previously and negative. SPEP already done previously and appears to have increased kappa light chains. Also with previously known lymphadenopathy. Has outpatient hematologist and needs close f/u. CT Chest Sept 2019: Scattered bilateral pulmonary nodules and mediastinal lymphadenopathy. Retroperitoneal lymphadenopathy and ill-defined hypodensity involving the spleen. Primary consideration is lymphoma with possible splenic involvement. Dr. Ugarte discussed the above concern for malignancy with patient's daughter. Outpatient follow up with hematology recommended (patient has a hematologist).         PT recommends rolling walker however per case management documentation, patient was offered and refused rolling walker.         Case discussed with Dr. Ugarte on 9/22/19. The patient is medically stable for discharge and has appropriate follow up. 78 yo M w/ a PMHx of T2DM, hypothyroidism, CAD, HLD and HTN brought in by EMS after decreased responsiveness secondary to hypoglycemia in the setting of recent med adjustments c/b wheezing and sinus tachycardia. VQ scan with low probability for PE.  Endocrinology was consulted. Hypoglycemia likely due to poor PO intake as well as decreased renal function, and on sulfonylurea.  Glucose improved on dextrose drip. Mental status back to baseline. Patient with hypotension and low sodium therefore concern for adrenal insufficiency.  Adrenal insufficiency ruled out with cosyntropin sim test. Hyponatremia is chronic secondary to SIADH. Patient's corrected calcium is elevated. Appears to be possible malignancy related given elevated light chains and known CT chest findings or vit d 1-25 mediated PTHrp already sent previously and negative. SPEP already done previously and appears to have increased kappa light chains. Also with previously known lymphadenopathy. Has outpatient hematologist and needs close f/u. CT Chest Sept 2019: Scattered bilateral pulmonary nodules and mediastinal lymphadenopathy. Retroperitoneal lymphadenopathy and ill-defined hypodensity involving the spleen. Primary consideration is lymphoma with possible splenic involvement. Dr. Ugarte discussed the above concern for malignancy with patient's daughter. Family requesting to pursue w/u as outpt. Has PET scan schedule and will schedule bx after PET. Outpatient follow up with hematology recommended (patient has a hematologist). Will f/u with hematology and PMD to have repeat labs and assess hypercalcemia. Endocrine recommended discharging on basaglar 8U hs and januvia 50. Will c/w ASA for CAD and plavix on hold for bx, family aware. Optimized for d/c.         PT recommends rolling walker.         The patient is medically stable for discharge and has appropriate follow up.

## 2019-09-22 NOTE — PROGRESS NOTE ADULT - ASSESSMENT
79M h/o HTN, HLD, T2DM, hypothyroidism, CAD, brought in by EMS after decreased responsiveness secondary to hypoglycemia in the setting of recent med adjustments, hypoglycemia resolved and now pending w/u for AI: 79M h/o HTN, HLD, T2DM, hypothyroidism, CAD, brought in by EMS after decreased responsiveness secondary to hypoglycemia in the setting of recent med adjustments, hypoglycemia resolved, neg w/u for adrenal insufficiency, optimized for d/c on 9/22:

## 2019-09-22 NOTE — PROGRESS NOTE ADULT - PROBLEM SELECTOR PLAN 7
-orthostatic hypotension 2/2 to adrenal insuff? Low cortisol last admission. pending w/u for AI  -continue with midodrine and florinef  -cortisol from 11 am on 9/21, 6.3 -orthostatic hypotension  -continue with midodrine and florinef and can wean off as outpt

## 2019-09-22 NOTE — SWALLOW BEDSIDE ASSESSMENT ADULT - SWALLOW EVAL: DIAGNOSIS
1. Functional oral phase for puree consistency, mechanical soft solids and nectar thick liquids marked by functional mastication for solids, adequate bolus manipulation and functional oral transit time 2. Mild oral phase dysphagia for regular solids and thin liquids marked by extended/prolonged mastication for solids, reduced bolus manipulation and reduced anterior-posterior transport with suspected posterior loss of bolus for thin liquids 3. Functional pharyngeal phase for puree consistency, mechanical soft solids, regular solids and nectar thick liquids marked by adequate laryngeal elevation upon digital palpation and NO overt s/s of laryngeal penetration/aspiration noted 4. Moderate pharyngeal phase dysphagia for thin liquids marked by delay in laryngeal elevation upon digital palpation. Immediate evidence of cough response and throat clear noted subsequent deglutition indicative of laryngeal penetration/aspiration

## 2019-09-22 NOTE — SWALLOW BEDSIDE ASSESSMENT ADULT - SWALLOW EVAL: RECOMMENDED FEEDING/EATING TECHNIQUES
small sips/bites/alternate food with liquid/check mouth frequently for oral residue/pocketing/position upright (90 degrees)/maintain upright posture during/after eating for 30 mins/oral hygiene

## 2019-09-22 NOTE — PROGRESS NOTE ADULT - PROBLEM SELECTOR PLAN 3
-Pt initially reported brief episode of chest pressure prior to admission that has since resolved  -EKG unchanged from prior. initial troponins with delta <6. now with 3 troponins that trended up, however 3rd  trop of 83 consistent with prior trop during last admission of 82. also with normal CKMB, CK. trop likely elevated in setting of CKD and will stop trending  -continue with statin. asa and plavix on hold for lymph node biopsy

## 2019-09-22 NOTE — PROGRESS NOTE ADULT - PROBLEM SELECTOR PLAN 4
-unable to tolerate BB, will consider restarting if BP can tolerate, but currently on midodrine and florinef for orthostatic hypotension vs adrenal insufficiency

## 2019-09-22 NOTE — SWALLOW BEDSIDE ASSESSMENT ADULT - COMMENTS
Patient is an "79 M from home, PMH: orthostatic hypotension, on Midodrine, DM, minimally responsive at home, FS- 23, Recently changed DM meds, and with poor PO intake, Wheezing and tachycardic in ED". CXR completed on 9/20 reported "small bibasilar effusions and atelectasis are not changed; interstitial edema".     Patient was seen upright at bedside. Patient was alert/awake and verbally responsive to simple questions. Patient able to follow simple directions.

## 2019-09-22 NOTE — PROGRESS NOTE ADULT - PROBLEM SELECTOR PROBLEM 1
Type 2 diabetes mellitus with hypoglycemia without coma, unspecified whether long term insulin use
Hypoglycemia

## 2019-10-28 ENCOUNTER — EMERGENCY (EMERGENCY)
Facility: HOSPITAL | Age: 79
LOS: 1 days | Discharge: ANOTHER HOSPITAL | End: 2019-10-28
Attending: EMERGENCY MEDICINE | Admitting: EMERGENCY MEDICINE
Payer: MEDICAID

## 2019-10-28 VITALS
RESPIRATION RATE: 16 BRPM | OXYGEN SATURATION: 97 % | DIASTOLIC BLOOD PRESSURE: 68 MMHG | SYSTOLIC BLOOD PRESSURE: 126 MMHG | HEART RATE: 74 BPM | TEMPERATURE: 98 F

## 2019-10-28 DIAGNOSIS — Z95.5 PRESENCE OF CORONARY ANGIOPLASTY IMPLANT AND GRAFT: Chronic | ICD-10-CM

## 2019-10-28 LAB
ALBUMIN SERPL ELPH-MCNC: 2.4 G/DL — LOW (ref 3.3–5)
ALP SERPL-CCNC: 95 U/L — SIGNIFICANT CHANGE UP (ref 40–120)
ALT FLD-CCNC: 25 U/L — SIGNIFICANT CHANGE UP (ref 4–41)
ANION GAP SERPL CALC-SCNC: 12 MMO/L — SIGNIFICANT CHANGE UP (ref 7–14)
APTT BLD: 26.1 SEC — LOW (ref 27.5–36.3)
AST SERPL-CCNC: 62 U/L — HIGH (ref 4–40)
BASOPHILS # BLD AUTO: 0.03 K/UL — SIGNIFICANT CHANGE UP (ref 0–0.2)
BASOPHILS NFR BLD AUTO: 0.3 % — SIGNIFICANT CHANGE UP (ref 0–2)
BILIRUB SERPL-MCNC: 0.6 MG/DL — SIGNIFICANT CHANGE UP (ref 0.2–1.2)
BLD GP AB SCN SERPL QL: NEGATIVE — SIGNIFICANT CHANGE UP
BUN SERPL-MCNC: 15 MG/DL — SIGNIFICANT CHANGE UP (ref 7–23)
CALCIUM SERPL-MCNC: 7 MG/DL — LOW (ref 8.4–10.5)
CHLORIDE SERPL-SCNC: 101 MMOL/L — SIGNIFICANT CHANGE UP (ref 98–107)
CO2 SERPL-SCNC: 24 MMOL/L — SIGNIFICANT CHANGE UP (ref 22–31)
CREAT SERPL-MCNC: 1.09 MG/DL — SIGNIFICANT CHANGE UP (ref 0.5–1.3)
EOSINOPHIL # BLD AUTO: 0 K/UL — SIGNIFICANT CHANGE UP (ref 0–0.5)
EOSINOPHIL NFR BLD AUTO: 0 % — SIGNIFICANT CHANGE UP (ref 0–6)
GLUCOSE SERPL-MCNC: 308 MG/DL — HIGH (ref 70–99)
HCT VFR BLD CALC: 32.6 % — LOW (ref 39–50)
HGB BLD-MCNC: 10.6 G/DL — LOW (ref 13–17)
IMM GRANULOCYTES NFR BLD AUTO: 10.6 % — HIGH (ref 0–1.5)
INR BLD: 1.24 — HIGH (ref 0.88–1.17)
LYMPHOCYTES # BLD AUTO: 0.88 K/UL — LOW (ref 1–3.3)
LYMPHOCYTES # BLD AUTO: 9.7 % — LOW (ref 13–44)
MCHC RBC-ENTMCNC: 29.4 PG — SIGNIFICANT CHANGE UP (ref 27–34)
MCHC RBC-ENTMCNC: 32.5 % — SIGNIFICANT CHANGE UP (ref 32–36)
MCV RBC AUTO: 90.6 FL — SIGNIFICANT CHANGE UP (ref 80–100)
MONOCYTES # BLD AUTO: 0.71 K/UL — SIGNIFICANT CHANGE UP (ref 0–0.9)
MONOCYTES NFR BLD AUTO: 7.8 % — SIGNIFICANT CHANGE UP (ref 2–14)
NEUTROPHILS # BLD AUTO: 6.5 K/UL — SIGNIFICANT CHANGE UP (ref 1.8–7.4)
NEUTROPHILS NFR BLD AUTO: 71.6 % — SIGNIFICANT CHANGE UP (ref 43–77)
NRBC # FLD: 0.04 K/UL — SIGNIFICANT CHANGE UP (ref 0–0)
PLATELET # BLD AUTO: 196 K/UL — SIGNIFICANT CHANGE UP (ref 150–400)
PMV BLD: 13.1 FL — HIGH (ref 7–13)
POTASSIUM SERPL-MCNC: 3.9 MMOL/L — SIGNIFICANT CHANGE UP (ref 3.5–5.3)
POTASSIUM SERPL-SCNC: 3.9 MMOL/L — SIGNIFICANT CHANGE UP (ref 3.5–5.3)
PROT SERPL-MCNC: 5.2 G/DL — LOW (ref 6–8.3)
PROTHROM AB SERPL-ACNC: 14.2 SEC — HIGH (ref 9.8–13.1)
RBC # BLD: 3.6 M/UL — LOW (ref 4.2–5.8)
RBC # FLD: 20.5 % — HIGH (ref 10.3–14.5)
RH IG SCN BLD-IMP: POSITIVE — SIGNIFICANT CHANGE UP
SODIUM SERPL-SCNC: 137 MMOL/L — SIGNIFICANT CHANGE UP (ref 135–145)
WBC # BLD: 9.08 K/UL — SIGNIFICANT CHANGE UP (ref 3.8–10.5)
WBC # FLD AUTO: 9.08 K/UL — SIGNIFICANT CHANGE UP (ref 3.8–10.5)

## 2019-10-28 PROCEDURE — 72125 CT NECK SPINE W/O DYE: CPT | Mod: 26

## 2019-10-28 PROCEDURE — 70450 CT HEAD/BRAIN W/O DYE: CPT | Mod: 26

## 2019-10-28 PROCEDURE — 99285 EMERGENCY DEPT VISIT HI MDM: CPT

## 2019-10-28 PROCEDURE — 99284 EMERGENCY DEPT VISIT MOD MDM: CPT

## 2019-10-28 RX ORDER — PROTHROMBIN COMPLEX CONCENTRATE (HUMAN) 25.5; 16.5; 24; 22; 22; 26 [IU]/ML; [IU]/ML; [IU]/ML; [IU]/ML; [IU]/ML; [IU]/ML
2000 POWDER, FOR SOLUTION INTRAVENOUS ONCE
Refills: 0 | Status: COMPLETED | OUTPATIENT
Start: 2019-10-28 | End: 2019-10-28

## 2019-10-28 NOTE — ED CLERICAL - NS ED CLERK NOTE PRE-ARRIVAL INFORMATION; ADDITIONAL PRE-ARRIVAL INFORMATION
Unwitnessed fall - abrasion to back of head- on Eliquis.  Sent for CT and back to [facility] if neg.  Hx lymphoma, CAD, thyroid disease.

## 2019-10-28 NOTE — ED ADULT NURSE NOTE - NSIMPLEMENTINTERV_GEN_ALL_ED
Implemented All Fall with Harm Risk Interventions:  [place] to call system. Call bell, personal items and telephone within reach. Instruct patient to call for assistance. Room bathroom lighting operational. Non-slip footwear when patient is off stretcher. Physically safe environment: no spills, clutter or unnecessary equipment. Stretcher in lowest position, wheels locked, appropriate side rails in place. Provide visual cue, wrist band, yellow gown, etc. Monitor gait and stability. Monitor for mental status changes and reorient to person, place, and time. Review medications for side effects contributing to fall risk. Reinforce activity limits and safety measures with patient and family. Provide visual clues: red socks.

## 2019-10-28 NOTE — ED ADULT NURSE NOTE - CHIEF COMPLAINT QUOTE
Pt sent from [facility] for unwitnessed fall on Eliquis. Pt noted to have bandage to back of head with dried blood to hair. Sent for CT scan. Pt AA0X1 in triage. As per call in pt normally AA0X3, as per EMS [facility] staff told them pt baseline AA0X1.      [phone] (Maycol Browne) Please call daughter upon eval.

## 2019-10-28 NOTE — ED PROVIDER NOTE - CLINICAL SUMMARY MEDICAL DECISION MAKING FREE TEXT BOX
79y male presenting with a mechanical fall. No chest pain, SOB, dizziness. Will get ct head and neck.

## 2019-10-28 NOTE — ED PROVIDER NOTE - ATTENDING CONTRIBUTION TO CARE
Attending note:   After face to face evaluation of this patient, I concur with above noted hx, pe, and care plan for this patient.  80 y/o M with fall at [facility] with head trauma noted with abrasion to occiput.    Patient on AC.    Evaluation in progress

## 2019-10-28 NOTE — ED PROVIDER NOTE - OBJECTIVE STATEMENT
79y male with PMH of CAD s/p stents x4, HTN, DM, Hypothyroid, lymphoma presenting after a fall. He was getting out of chair when he fell backward hitting his head. Denies LOC, no symptoms proceeding the fall. Fall was unwitnessed, nurses at [facility] stated that down time was a few minutes, daughter spoke to him after the fall he was respond like his normal self, was complaining of a headache. No other complaints, denies chest pain, SOB, abdominal pain, nausea, vomiting, back pain. 79y male with PMH of CAD s/p stents x4, HTN, DM, Hypothyroid, lymphoma presenting after a fall. He was getting out of chair when he fell backward hitting his head. Denies LOC, no symptoms proceeding the fall. Fall was unwitnessed, nurses at [facility] stated that down time was a few minutes, daughter spoke to him after the fall he was respond like his normal self, was complaining of a headache. No other complaints, denies chest pain, SOB, abdominal pain, nausea, vomiting, back pain..

## 2019-10-28 NOTE — ED PROVIDER NOTE - PROGRESS NOTE DETAILS
Zechariah Jama, PGY-2: radiology called, patient has SAH with mass effect, neurosurgery called immediately after speaking with radiology. Spoke with pharmacy regarding kcentra, 2000units ordered. Kareem MARTINEZ has evaluated patient in ED. Attending Neurosurgeon has not called back to staff case. Family is familiar with [facility] and would be OK with patient at [facility]. INO Dr. Shah ED and Dr. Ng NSICU accepting. Pt GCS 14. Protecting airway. MAHENDRA.

## 2019-10-28 NOTE — ED ADULT TRIAGE NOTE - CHIEF COMPLAINT QUOTE
Pt sent from [facility] for unwitnessed fall on Eliquis. Pt noted to have bandage to back of head with dried blood to hair. Sent for CT scan. Pt AA0X1 in triage. As per call in pt normally AA0X3, as per EMS [facility] staff told them pt baseline AA0X1. Pt sent from [facility] for unwitnessed fall on Eliquis. Pt noted to have bandage to back of head with dried blood to hair. Sent for CT scan. Pt AA0X1 in triage. As per call in pt normally AA0X3, as per EMS [facility] staff told them pt baseline AA0X1.      [phone] (Maycol Browne) Please call daughter upon eval.

## 2019-10-28 NOTE — ED ADULT NURSE NOTE - OBJECTIVE STATEMENT
As per daughter, she was called by nursing home, [facility], was informed that pt had an unwitnessed fall. Pt a&ox1, c/o of "head spinning", poor historian. Pt appear grimaces when repositioned on stretcher, apprx. 3cm abrasion noted to posterior of head, dry blood noted on pillow and gauze that was placed prior to his arrival to ed. Respirations even and unlabored, abdomen nontender, +1 bilateral lower extremity edema noted. 20 g IV to right wrist, labs drawn and sent. will continue to monitor

## 2019-10-28 NOTE — ED PROVIDER NOTE - PHYSICAL EXAMINATION
Gen: AAOx2, non-toxic  Head: abrasion to the occiput with swelling, no active bleeding  HEENT: EOMI, oral mucosa moist, normal conjunctiva  Lung: CTAB, no respiratory distress, no wheezes/rhonchi/rales B/L  CV: RRR, no murmurs, rubs or gallops  Abd: soft, NTND, no guarding  MSK: no visible deformities, midline spinal tenderness  Neuro: No focal sensory or motor deficits, CN2-12 intact  Skin: Warm, well perfused, no rash  ~Zechariah Evita PGY2

## 2019-10-29 ENCOUNTER — INPATIENT (INPATIENT)
Facility: HOSPITAL | Age: 79
LOS: 6 days | Discharge: HOME | DRG: 85 | End: 2019-11-05
Attending: NEUROLOGICAL SURGERY | Admitting: NEUROLOGICAL SURGERY
Payer: MEDICAID

## 2019-10-29 VITALS
HEART RATE: 90 BPM | SYSTOLIC BLOOD PRESSURE: 131 MMHG | OXYGEN SATURATION: 98 % | DIASTOLIC BLOOD PRESSURE: 79 MMHG | RESPIRATION RATE: 20 BRPM

## 2019-10-29 VITALS
TEMPERATURE: 98 F | HEART RATE: 88 BPM | OXYGEN SATURATION: 96 % | SYSTOLIC BLOOD PRESSURE: 120 MMHG | RESPIRATION RATE: 18 BRPM | DIASTOLIC BLOOD PRESSURE: 72 MMHG

## 2019-10-29 DIAGNOSIS — S06.5X9A TRAUMATIC SUBDURAL HEMORRHAGE WITH LOSS OF CONSCIOUSNESS OF UNSPECIFIED DURATION, INITIAL ENCOUNTER: ICD-10-CM

## 2019-10-29 DIAGNOSIS — Z71.89 OTHER SPECIFIED COUNSELING: ICD-10-CM

## 2019-10-29 DIAGNOSIS — R53.2 FUNCTIONAL QUADRIPLEGIA: ICD-10-CM

## 2019-10-29 DIAGNOSIS — G93.40 ENCEPHALOPATHY, UNSPECIFIED: ICD-10-CM

## 2019-10-29 DIAGNOSIS — Z51.5 ENCOUNTER FOR PALLIATIVE CARE: ICD-10-CM

## 2019-10-29 DIAGNOSIS — Z95.5 PRESENCE OF CORONARY ANGIOPLASTY IMPLANT AND GRAFT: Chronic | ICD-10-CM

## 2019-10-29 LAB
ANION GAP SERPL CALC-SCNC: 10 MMOL/L — SIGNIFICANT CHANGE UP (ref 5–17)
ANISOCYTOSIS BLD QL: SLIGHT — SIGNIFICANT CHANGE UP
BASOPHILS NFR SPEC: 0 % — SIGNIFICANT CHANGE UP (ref 0–2)
BLD GP AB SCN SERPL QL: NEGATIVE — SIGNIFICANT CHANGE UP
BUN SERPL-MCNC: 17 MG/DL — SIGNIFICANT CHANGE UP (ref 7–23)
CALCIUM SERPL-MCNC: 7.1 MG/DL — LOW (ref 8.4–10.5)
CHLORIDE SERPL-SCNC: 99 MMOL/L — SIGNIFICANT CHANGE UP (ref 96–108)
CO2 SERPL-SCNC: 27 MMOL/L — SIGNIFICANT CHANGE UP (ref 22–31)
CREAT SERPL-MCNC: 0.98 MG/DL — SIGNIFICANT CHANGE UP (ref 0.5–1.3)
EOSINOPHIL NFR FLD: 0 % — SIGNIFICANT CHANGE UP (ref 0–6)
GLUCOSE BLDC GLUCOMTR-MCNC: 150 MG/DL — HIGH (ref 70–99)
GLUCOSE BLDC GLUCOMTR-MCNC: 170 MG/DL — HIGH (ref 70–99)
GLUCOSE BLDC GLUCOMTR-MCNC: 240 MG/DL — HIGH (ref 70–99)
GLUCOSE BLDC GLUCOMTR-MCNC: 269 MG/DL — HIGH (ref 70–99)
GLUCOSE SERPL-MCNC: 169 MG/DL — HIGH (ref 70–99)
HCT VFR BLD CALC: 29.3 % — LOW (ref 39–50)
HGB BLD-MCNC: 9.8 G/DL — LOW (ref 13–17)
HYPOCHROMIA BLD QL: SLIGHT — SIGNIFICANT CHANGE UP
LYMPHOCYTES NFR SPEC AUTO: 12 % — LOW (ref 13–44)
MACROCYTES BLD QL: SLIGHT — SIGNIFICANT CHANGE UP
MAGNESIUM SERPL-MCNC: 1.6 MG/DL — SIGNIFICANT CHANGE UP (ref 1.6–2.6)
MANUAL SMEAR VERIFICATION: SIGNIFICANT CHANGE UP
MCHC RBC-ENTMCNC: 30.1 PG — SIGNIFICANT CHANGE UP (ref 27–34)
MCHC RBC-ENTMCNC: 33.4 GM/DL — SIGNIFICANT CHANGE UP (ref 32–36)
MCV RBC AUTO: 89.9 FL — SIGNIFICANT CHANGE UP (ref 80–100)
MONOCYTES NFR BLD: 7 % — SIGNIFICANT CHANGE UP (ref 2–9)
NEUTROPHIL AB SER-ACNC: 72 % — SIGNIFICANT CHANGE UP (ref 43–77)
NEUTS BAND # BLD: 7 % — HIGH (ref 0–6)
NRBC # BLD: 0 /100 WBCS — SIGNIFICANT CHANGE UP (ref 0–0)
NRBC # BLD: 0 /100WBC — SIGNIFICANT CHANGE UP
OVALOCYTES BLD QL SMEAR: SLIGHT — SIGNIFICANT CHANGE UP
PHOSPHATE SERPL-MCNC: 1.7 MG/DL — LOW (ref 2.5–4.5)
PLATELET # BLD AUTO: 179 K/UL — SIGNIFICANT CHANGE UP (ref 150–400)
PLATELET COUNT - ESTIMATE: NORMAL — SIGNIFICANT CHANGE UP
POIKILOCYTOSIS BLD QL AUTO: SLIGHT — SIGNIFICANT CHANGE UP
POLYCHROMASIA BLD QL SMEAR: SLIGHT — SIGNIFICANT CHANGE UP
POTASSIUM SERPL-MCNC: 2.6 MMOL/L — CRITICAL LOW (ref 3.5–5.3)
POTASSIUM SERPL-SCNC: 2.6 MMOL/L — CRITICAL LOW (ref 3.5–5.3)
RBC # BLD: 3.26 M/UL — LOW (ref 4.2–5.8)
RBC # FLD: 20.6 % — HIGH (ref 10.3–14.5)
RH IG SCN BLD-IMP: POSITIVE — SIGNIFICANT CHANGE UP
SODIUM SERPL-SCNC: 136 MMOL/L — SIGNIFICANT CHANGE UP (ref 135–145)
VARIANT LYMPHS # BLD: 2 % — SIGNIFICANT CHANGE UP
WBC # BLD: 12.73 K/UL — HIGH (ref 3.8–10.5)
WBC # FLD AUTO: 12.73 K/UL — HIGH (ref 3.8–10.5)

## 2019-10-29 PROCEDURE — 99223 1ST HOSP IP/OBS HIGH 75: CPT

## 2019-10-29 PROCEDURE — 99291 CRITICAL CARE FIRST HOUR: CPT

## 2019-10-29 PROCEDURE — 99497 ADVNCD CARE PLAN 30 MIN: CPT | Mod: 25

## 2019-10-29 PROCEDURE — 71045 X-RAY EXAM CHEST 1 VIEW: CPT | Mod: 26

## 2019-10-29 PROCEDURE — 93010 ELECTROCARDIOGRAM REPORT: CPT

## 2019-10-29 PROCEDURE — 93306 TTE W/DOPPLER COMPLETE: CPT | Mod: 26

## 2019-10-29 PROCEDURE — 99223 1ST HOSP IP/OBS HIGH 75: CPT | Mod: GC

## 2019-10-29 PROCEDURE — 93970 EXTREMITY STUDY: CPT | Mod: 26

## 2019-10-29 PROCEDURE — 70450 CT HEAD/BRAIN W/O DYE: CPT | Mod: 26

## 2019-10-29 PROCEDURE — 93971 EXTREMITY STUDY: CPT | Mod: 26,59

## 2019-10-29 RX ORDER — LEVOTHYROXINE SODIUM 125 MCG
88 TABLET ORAL DAILY
Refills: 0 | Status: DISCONTINUED | OUTPATIENT
Start: 2019-10-29 | End: 2019-11-05

## 2019-10-29 RX ORDER — LEVETIRACETAM 250 MG/1
500 TABLET, FILM COATED ORAL ONCE
Refills: 0 | Status: COMPLETED | OUTPATIENT
Start: 2019-10-29 | End: 2019-10-29

## 2019-10-29 RX ORDER — DEXTROSE 50 % IN WATER 50 %
25 SYRINGE (ML) INTRAVENOUS ONCE
Refills: 0 | Status: DISCONTINUED | OUTPATIENT
Start: 2019-10-29 | End: 2019-11-05

## 2019-10-29 RX ORDER — POTASSIUM PHOSPHATE, MONOBASIC POTASSIUM PHOSPHATE, DIBASIC 236; 224 MG/ML; MG/ML
15 INJECTION, SOLUTION INTRAVENOUS ONCE
Refills: 0 | Status: COMPLETED | OUTPATIENT
Start: 2019-10-29 | End: 2019-10-29

## 2019-10-29 RX ORDER — LEVETIRACETAM 250 MG/1
500 TABLET, FILM COATED ORAL EVERY 12 HOURS
Refills: 0 | Status: DISCONTINUED | OUTPATIENT
Start: 2019-10-29 | End: 2019-10-29

## 2019-10-29 RX ORDER — SIMVASTATIN 20 MG/1
20 TABLET, FILM COATED ORAL AT BEDTIME
Refills: 0 | Status: DISCONTINUED | OUTPATIENT
Start: 2019-10-29 | End: 2019-11-05

## 2019-10-29 RX ORDER — INSULIN LISPRO 100/ML
VIAL (ML) SUBCUTANEOUS
Refills: 0 | Status: DISCONTINUED | OUTPATIENT
Start: 2019-10-29 | End: 2019-10-30

## 2019-10-29 RX ORDER — SENNA PLUS 8.6 MG/1
2 TABLET ORAL AT BEDTIME
Refills: 0 | Status: DISCONTINUED | OUTPATIENT
Start: 2019-10-29 | End: 2019-10-30

## 2019-10-29 RX ORDER — SODIUM CHLORIDE 9 MG/ML
1000 INJECTION, SOLUTION INTRAVENOUS
Refills: 0 | Status: DISCONTINUED | OUTPATIENT
Start: 2019-10-29 | End: 2019-11-05

## 2019-10-29 RX ORDER — SODIUM CHLORIDE 9 MG/ML
1000 INJECTION INTRAMUSCULAR; INTRAVENOUS; SUBCUTANEOUS
Refills: 0 | Status: DISCONTINUED | OUTPATIENT
Start: 2019-10-29 | End: 2019-10-29

## 2019-10-29 RX ORDER — FLUDROCORTISONE ACETATE 0.1 MG/1
0.1 TABLET ORAL
Refills: 0 | Status: DISCONTINUED | OUTPATIENT
Start: 2019-10-29 | End: 2019-11-01

## 2019-10-29 RX ORDER — POTASSIUM CHLORIDE 20 MEQ
40 PACKET (EA) ORAL EVERY 4 HOURS
Refills: 0 | Status: COMPLETED | OUTPATIENT
Start: 2019-10-29 | End: 2019-10-30

## 2019-10-29 RX ORDER — ACETAMINOPHEN 500 MG
650 TABLET ORAL EVERY 6 HOURS
Refills: 0 | Status: DISCONTINUED | OUTPATIENT
Start: 2019-10-29 | End: 2019-11-05

## 2019-10-29 RX ORDER — DEXTROSE 50 % IN WATER 50 %
12.5 SYRINGE (ML) INTRAVENOUS ONCE
Refills: 0 | Status: DISCONTINUED | OUTPATIENT
Start: 2019-10-29 | End: 2019-11-05

## 2019-10-29 RX ORDER — ONDANSETRON 8 MG/1
4 TABLET, FILM COATED ORAL EVERY 6 HOURS
Refills: 0 | Status: DISCONTINUED | OUTPATIENT
Start: 2019-10-29 | End: 2019-11-05

## 2019-10-29 RX ORDER — FAMOTIDINE 10 MG/ML
20 INJECTION INTRAVENOUS
Refills: 0 | Status: DISCONTINUED | OUTPATIENT
Start: 2019-10-29 | End: 2019-11-05

## 2019-10-29 RX ORDER — CHLORHEXIDINE GLUCONATE 213 G/1000ML
1 SOLUTION TOPICAL
Refills: 0 | Status: DISCONTINUED | OUTPATIENT
Start: 2019-10-29 | End: 2019-11-01

## 2019-10-29 RX ORDER — INSULIN LISPRO 100/ML
VIAL (ML) SUBCUTANEOUS
Refills: 0 | Status: DISCONTINUED | OUTPATIENT
Start: 2019-10-29 | End: 2019-10-29

## 2019-10-29 RX ORDER — INSULIN LISPRO 100/ML
VIAL (ML) SUBCUTANEOUS AT BEDTIME
Refills: 0 | Status: DISCONTINUED | OUTPATIENT
Start: 2019-10-29 | End: 2019-10-29

## 2019-10-29 RX ORDER — DEXTROSE 50 % IN WATER 50 %
15 SYRINGE (ML) INTRAVENOUS ONCE
Refills: 0 | Status: DISCONTINUED | OUTPATIENT
Start: 2019-10-29 | End: 2019-11-05

## 2019-10-29 RX ORDER — FUROSEMIDE 40 MG
20 TABLET ORAL ONCE
Refills: 0 | Status: COMPLETED | OUTPATIENT
Start: 2019-10-29 | End: 2019-10-29

## 2019-10-29 RX ORDER — SODIUM CHLORIDE 9 MG/ML
3 INJECTION INTRAMUSCULAR; INTRAVENOUS; SUBCUTANEOUS EVERY 8 HOURS
Refills: 0 | Status: DISCONTINUED | OUTPATIENT
Start: 2019-10-29 | End: 2019-10-29

## 2019-10-29 RX ORDER — LEVETIRACETAM 250 MG/1
500 TABLET, FILM COATED ORAL EVERY 12 HOURS
Refills: 0 | Status: DISCONTINUED | OUTPATIENT
Start: 2019-10-29 | End: 2019-10-31

## 2019-10-29 RX ORDER — DEXAMETHASONE 0.5 MG/5ML
2 ELIXIR ORAL
Refills: 0 | Status: DISCONTINUED | OUTPATIENT
Start: 2019-10-29 | End: 2019-11-01

## 2019-10-29 RX ORDER — ESCITALOPRAM OXALATE 10 MG/1
5 TABLET, FILM COATED ORAL DAILY
Refills: 0 | Status: DISCONTINUED | OUTPATIENT
Start: 2019-10-29 | End: 2019-11-05

## 2019-10-29 RX ORDER — MAGNESIUM SULFATE 500 MG/ML
2 VIAL (ML) INJECTION ONCE
Refills: 0 | Status: COMPLETED | OUTPATIENT
Start: 2019-10-29 | End: 2019-10-29

## 2019-10-29 RX ORDER — INSULIN LISPRO 100/ML
VIAL (ML) SUBCUTANEOUS EVERY 6 HOURS
Refills: 0 | Status: DISCONTINUED | OUTPATIENT
Start: 2019-10-29 | End: 2019-10-29

## 2019-10-29 RX ORDER — GLUCAGON INJECTION, SOLUTION 0.5 MG/.1ML
1 INJECTION, SOLUTION SUBCUTANEOUS ONCE
Refills: 0 | Status: DISCONTINUED | OUTPATIENT
Start: 2019-10-29 | End: 2019-11-05

## 2019-10-29 RX ORDER — MIDODRINE HYDROCHLORIDE 2.5 MG/1
5 TABLET ORAL THREE TIMES A DAY
Refills: 0 | Status: DISCONTINUED | OUTPATIENT
Start: 2019-10-29 | End: 2019-11-05

## 2019-10-29 RX ORDER — POLYETHYLENE GLYCOL 3350 17 G/17G
17 POWDER, FOR SOLUTION ORAL DAILY
Refills: 0 | Status: DISCONTINUED | OUTPATIENT
Start: 2019-10-29 | End: 2019-11-05

## 2019-10-29 RX ADMIN — FLUDROCORTISONE ACETATE 0.1 MILLIGRAM(S): 0.1 TABLET ORAL at 17:15

## 2019-10-29 RX ADMIN — MIDODRINE HYDROCHLORIDE 5 MILLIGRAM(S): 2.5 TABLET ORAL at 05:34

## 2019-10-29 RX ADMIN — FAMOTIDINE 20 MILLIGRAM(S): 10 INJECTION INTRAVENOUS at 17:16

## 2019-10-29 RX ADMIN — Medication 50 GRAM(S): at 23:22

## 2019-10-29 RX ADMIN — FLUDROCORTISONE ACETATE 0.1 MILLIGRAM(S): 0.1 TABLET ORAL at 05:33

## 2019-10-29 RX ADMIN — Medication 2 MILLIGRAM(S): at 08:36

## 2019-10-29 RX ADMIN — Medication 20 MILLIGRAM(S): at 07:30

## 2019-10-29 RX ADMIN — SODIUM CHLORIDE 50 MILLILITER(S): 9 INJECTION INTRAMUSCULAR; INTRAVENOUS; SUBCUTANEOUS at 18:00

## 2019-10-29 RX ADMIN — Medication 4: at 05:38

## 2019-10-29 RX ADMIN — Medication 2: at 21:51

## 2019-10-29 RX ADMIN — CHLORHEXIDINE GLUCONATE 1 APPLICATION(S): 213 SOLUTION TOPICAL at 21:11

## 2019-10-29 RX ADMIN — LEVETIRACETAM 400 MILLIGRAM(S): 250 TABLET, FILM COATED ORAL at 05:34

## 2019-10-29 RX ADMIN — SIMVASTATIN 20 MILLIGRAM(S): 20 TABLET, FILM COATED ORAL at 21:11

## 2019-10-29 RX ADMIN — POTASSIUM PHOSPHATE, MONOBASIC POTASSIUM PHOSPHATE, DIBASIC 62.5 MILLIMOLE(S): 236; 224 INJECTION, SOLUTION INTRAVENOUS at 23:22

## 2019-10-29 RX ADMIN — Medication 2: at 12:22

## 2019-10-29 RX ADMIN — Medication 88 MICROGRAM(S): at 05:34

## 2019-10-29 RX ADMIN — POLYETHYLENE GLYCOL 3350 17 GRAM(S): 17 POWDER, FOR SOLUTION ORAL at 12:23

## 2019-10-29 RX ADMIN — PROTHROMBIN COMPLEX CONCENTRATE (HUMAN) 400 INTERNATIONAL UNIT(S): 25.5; 16.5; 24; 22; 22; 26 POWDER, FOR SOLUTION INTRAVENOUS at 00:14

## 2019-10-29 RX ADMIN — Medication 40 MILLIEQUIVALENT(S): at 23:22

## 2019-10-29 RX ADMIN — LEVETIRACETAM 400 MILLIGRAM(S): 250 TABLET, FILM COATED ORAL at 17:15

## 2019-10-29 RX ADMIN — SODIUM CHLORIDE 3 MILLILITER(S): 9 INJECTION INTRAMUSCULAR; INTRAVENOUS; SUBCUTANEOUS at 05:34

## 2019-10-29 RX ADMIN — MIDODRINE HYDROCHLORIDE 5 MILLIGRAM(S): 2.5 TABLET ORAL at 17:15

## 2019-10-29 RX ADMIN — LEVETIRACETAM 400 MILLIGRAM(S): 250 TABLET, FILM COATED ORAL at 01:01

## 2019-10-29 RX ADMIN — MIDODRINE HYDROCHLORIDE 5 MILLIGRAM(S): 2.5 TABLET ORAL at 12:23

## 2019-10-29 RX ADMIN — FAMOTIDINE 20 MILLIGRAM(S): 10 INJECTION INTRAVENOUS at 05:33

## 2019-10-29 NOTE — CONSULT NOTE ADULT - ATTENDING COMMENTS
This is an 80 y/o M with history of DLBCL s/p 2 cycles R-CVP presents s/p fall with worsening mental status found with sub dural hematoma. Appreciate care as per NSICU. Continue off anticoagulation, s/p KCentra. On doppler b/l lower extremity DVTs seen, recommend IVC filter. Discussed with daughter on phone.

## 2019-10-29 NOTE — CONSULT NOTE ADULT - ATTENDING COMMENTS
Appreciate hematology recs  I spoke with the daughter.  Although he is high risk for VTE and for anticoagulation, he has only BELOW the knee DVT.    Favor surveillance with a repeat duplex in 2-3 days over an IVC filter  Daughter is in agreement.  I have reached out to hematology to discuss as well      [place] [id number]

## 2019-10-29 NOTE — PROGRESS NOTE ADULT - ASSESSMENT
ASSESSMENT/PLAN:  TSDH 2/2 fall    NEURO:    Neurochecks q1 hrs,    CT Head in the AM,    Decadron (for h/o lymphoma)  keppra for siezure ppx  Activity: [] mobilize as tolerated [x] Bedrest [] PT [] OT [] PMNR    PULM:  O2 sat > 92%  Incentive spirometry, if able    CV:  SBP goal 100 - 160  syncope work-up, call cardio, check echo  midodrine - home med      RENAL:  Fluids:  NS @ 75    GI:    Diet: npo for possible OR   GI prophylaxis [] not indicated [] PPI [] other:  Bowel regimen [x] colace [x] senna [] other:    ENDO:   Goal euglycemia (-180), iss with FS     HEME/ONC:  h/o lymphoma on decadron   VTE prophylaxis: [] SCDs [] chemoprophylaxis [x] hold chemoprophylaxis due to: sdh   [x] high risk of DVT/PE on admission due to: lymphoma, check le dopplers     ID:  afebrile     SOCIAL/FAMILY:  [x] awaiting [] updated at bedside [] family meeting    CODE STATUS:  [x] Full Code [] DNR [] DNI [] Palliative/Comfort Care    DISPOSITION:  [x] ICU [] Stroke Unit [] Floor [] EMU [] RCU [] PCU    [x] Patient is at high risk of neurologic deterioration/death due to:  seziures, hemorrhage, herniation       Time spent: 45 critical care minutes    Contact: [phone] ASSESSMENT/PLAN:  TSDH 2/2 fall    NEURO:  bilateral subdural   Neurochecks q2 hrs,    CT Head- stable bilateral SDH    Cont Decadron (for h/o lymphoma)  keppra for seizure ppx for 7 days  Activity: [] mobilize as tolerated [x] PT [x] OT [] PMNR    PULM:  O2 sat > 92%  Incentive spirometry, if able    CV: CAD s/p stent>1year ago  SBP goal 100 - 160  appreciate cards recs  cont statin  midodrine and fluornef- home med  cont  hold antiplatelets and asa in setting of SDH    RENAL: CKD III   Home nephrologist consulted- lasix 20IV  Fluids:  NS @ 50  ext cath    GI:    Diet: speech eval and start TF  GI prophylaxis [] not indicated [] PPI [] other: famotidine  Bowel regimen [x] colace [x] senna [] other:    ENDO: hypothyroid  Goal euglycemia (-180), iss with FS   cont synthroid    HEME/ONC:  h/o lymphoma on decadron   VTE prophylaxis: [] SCDs [] chemoprophylaxis [x] hold chemoprophylaxis due to: sdh   [x] high risk of DVT/PE on admission due to: lymphoma, check le dopplers     ID:  afebrile     SOCIAL/FAMILY:  [x] awaiting [] updated at bedside [] family meeting    CODE STATUS:  [x] Full Code [] DNR [] DNI [] Palliative/Comfort Care    DISPOSITION:  [x] ICU [] Stroke Unit [] Floor [] EMU [] RCU [] PCU    [x] Patient is at high risk of neurologic deterioration/death due to:  seziures, hemorrhage, herniation       Time spent: 45 critical care minutes    Contact: [phone] ASSESSMENT/PLAN:  TSDH 2/2 fall    NEURO:  bilateral subdural   Neurochecks q2 hrs,    CT Head- stable bilateral SDH    Cont Decadron (for h/o lymphoma)  keppra for seizure ppx for 7 days  C/w Lexapro 5mg qd (home med)  Activity: [] mobilize as tolerated [x] PT [x] OT [] PMNR    PULM:  O2 sat > 92%  Incentive spirometry    CV: CAD s/p stent>1year ago  SBP goal 100 - 160  appreciate cards recs  cont statin  midodrine and fluornef- home med  cont  hold antiplatelets and asa in setting of SDH  per daughter, patient had recent echo at [place] that did not re-demonstrate LVOT obstruction; likely was dynamic LVOT obstruction 2/2 volume status    RENAL: CKD III   Home nephrologist consulted- lasix 20IV  Fluids:  NS @ 50  ext cath    GI:    Last BM 10/28 per patient and daughter  Diet: speech eval and start TF  GI prophylaxis [] not indicated [] PPI [] other: famotidine  Bowel regimen [x] colace [x] senna [] other:    ENDO: hypothyroid  Goal euglycemia (-180), iss with FS   cont synthroid    HEME/ONC:  repeat dopplers for r/o DVT given h/o LUE clot requiring eliquis  hold megase due to concern for DVT  h/o lymphoma on decadron   VTE prophylaxis: [] SCDs [] chemoprophylaxis [x] hold chemoprophylaxis due to: sdh   [x] high risk of DVT/PE on admission due to: lymphoma, check le dopplers     ID:  afebrile     SOCIAL/FAMILY:  [x] awaiting [] updated at bedside [] family meeting    CODE STATUS:  [x] Full Code [] DNR [] DNI [] Palliative/Comfort Care    DISPOSITION:  [x] ICU [] Stroke Unit [] Floor [] EMU [] RCU [] PCU    [x] Patient is at high risk of neurologic deterioration/death due to:  seziures, hemorrhage, herniation       Time spent: 45 critical care minutes    Contact: [phone]

## 2019-10-29 NOTE — OCCUPATIONAL THERAPY INITIAL EVALUATION ADULT - PERSONAL SAFETY AND JUDGMENT, REHAB EVAL
impaired/impulsivity noted when attempting to lay, attempting to stand and agitated with therapist attempting assistance

## 2019-10-29 NOTE — SWALLOW BEDSIDE ASSESSMENT ADULT - SLP GENERAL OBSERVATIONS
Pt seen at bedside, awake and alert, generally cooperative, following directions for the purposes of the exam. Pt seen at bedside, awake and alert, inconsistently cooperative, following directions for the purposes of the exam.

## 2019-10-29 NOTE — PROGRESS NOTE ADULT - SUBJECTIVE AND OBJECTIVE BOX
found to have + vte in le b/l  lasix this am  no surgical planning patient is DNR   passed dyspahgia eval    AAOx4, follows commands. PERRL. RUE 5/5, LUE 4/5 with mild drift,  RLE 5/5,  LLE 4/5, no drift.

## 2019-10-29 NOTE — ED ADULT NURSE NOTE - NS ED NURSE REPORT GIVEN TO FT
GERD (gastroesophageal reflux disease)    Hypertension    Other iron deficiency anemia    Sickle cell anemia NSCU RN Rossana

## 2019-10-29 NOTE — CHART NOTE - NSCHARTNOTEFT_GEN_A_CORE
CAPRINI SCORE [CLOT] Score on Admission for     AGE RELATED RISK FACTORS                                                       MOBILITY RELATED FACTORS  [ ] Age 41-60 years                                            (1 Point)                  [ ] Bed rest                                                        (1 Point)  [ ] Age: 61-74 years                                           (2 Points)                 [ ] Plaster cast                                                   (2 Points)  [X ] Age= 75 years                                              (3 Points)                 [ ] Bed bound for more than 72 hours                 (2 Points)    DISEASE RELATED RISK FACTORS                                               GENDER SPECIFIC FACTORS  [ ] Edema in the lower extremities                       (1 Point)                  [ ] Pregnancy                                                     (1 Point)  [ ] Varicose veins                                               (1 Point)                  [ ] Post-partum < 6 weeks                                   (1 Point)             [ X] BMI > 25 Kg/m2                                            (1 Point)                  [ ] Hormonal therapy  or oral contraception          (1 Point)                 [ ] Sepsis (in the previous month)                        (1 Point)                  [ ] History of pregnancy complications                 (1 point)  [ ] Pneumonia or serious lung disease                                               [ ] Unexplained or recurrent                     (1 Point)           (in the previous month)                               (1 Point)  [ ] Abnormal pulmonary function test                     (1 Point)                 SURGERY RELATED RISK FACTORS (include planned surgeries)  [ ] Acute myocardial infarction                              (1 Point)                 [ ]  Section                                             (1 Point)  [ ] Congestive heart failure (in the previous month)  (1 Point)         [ ] Minor surgery                                                  (1 Point)   [ ] Inflammatory bowel disease                             (1 Point)                 [ ] Arthroscopic surgery                                        (2 Points)  [ ] Central venous access                                      (2 Points)                [X ] General surgery lasting more than 45 minutes   (2 Points)       [ ] Stroke (in the previous month)                          (5 Points)               [ ] Elective arthroplasty                                         (5 Points)            [X ] current or past malignancy                              (2 Points)                                                                                                       HEMATOLOGY RELATED FACTORS                                                 TRAUMA RELATED RISK FACTORS  [ ] Prior episodes of VTE                                     (3 Points)                [ ] Fracture of the hip, pelvis, or leg                       (5 Points)  [ ] Positive family history for VTE                         (3 Points)                 [ ] Acute spinal cord injury (in the previous month)  (5 Points)  [ ] Prothrombin 15997 A                                     (3 Points)                 [ ] Paralysis  (less than 1 month)                             (5 Points)  [ ] Factor V Leiden                                             (3 Points)                  [ ] Multiple Trauma within 1 month                        (5 Points)  [ ] Lupus anticoagulants                                     (3 Points)                                                           [ ] Anticardiolipin antibodies                               (3 Points)                                                       [ ] High homocysteine in the blood                      (3 Points)                                             [ ] Other congenital or acquired thrombophilia      (3 Points)                                                [ ] Heparin induced thrombocytopenia                  (3 Points)                                          Total Score [    8      ]    Risk:  Very low 0   Low 1 to 2   Moderate 3 to 4   High =5       VTE Prophylaxis Recommendations':  [X ] mechanical pneumatic compression devices                                      [ ] contraindicated: _____________________  [ ] chemo prophylaxis                                                                                   [X ] contraindicated __Subdural hematoma ___________________    **** HIGH LIKELIHOOD DVT PRESENT ON ADMISSION  [ X] (please order LE dopplers within 24 hours of admission)

## 2019-10-29 NOTE — CONSULT NOTE ADULT - ASSESSMENT
78 YO male s/p fall with right frontotemporal, right parafalcine, and right tentorial acute SDH.  patient's family has requested transfer to [facility]

## 2019-10-29 NOTE — H&P ADULT - NSHPPHYSICALEXAM_GEN_ALL_CORE
AAO X 2  PERRLA, EOMI  CN 2-12 grossly intact  SIM, Right 5/5, Left 4/5 with mild UE drift  SILT AAO X 2  pupils 2mm b/l, EOMI  CN 2-12 grossly intact  SIM, Right 5/5, Left 4+/5 with mild UE drift  SILT

## 2019-10-29 NOTE — SWALLOW BEDSIDE ASSESSMENT ADULT - PHARYNGEAL PHASE
palpable hyolaryngeal elevation/excursion; no overt s/s laryngeal penetration/aspiration Wet vocal quality post oral intake/palpable hyolaryngeal elevation/excursion; audible swallow

## 2019-10-29 NOTE — H&P ADULT - HISTORY OF PRESENT ILLNESS
79y male with PMH of CAD s/p stents x4, HTN, DM, Hypothyroid, lymphoma presenting after a fall. He was getting out of chair when he fell backward hitting his head. Denies LOC, no symptoms proceeding the fall. Fall was unwitnessed, nurses at [place] stated that down time was a few minutes, daughter spoke to him after the fall he was respond like his normal self, was complaining of a headache. No other complaints, denies chest pain, SOB, abdominal pain, nausea, vomiting, back pain.

## 2019-10-29 NOTE — SWALLOW BEDSIDE ASSESSMENT ADULT - ASR SWALLOW ASPIRATION MONITOR
fever/throat clearing/upper respiratory infection/Monitor for s/s aspiration/laryngeal penetration. If noted:  D/C p.o. intake, provide non-oral nutrition/hydration/meds, and contact this service @ [phone]/pneumonia/change of breathing pattern/cough/gurgly voice

## 2019-10-29 NOTE — OCCUPATIONAL THERAPY INITIAL EVALUATION ADULT - DIAGNOSIS, OT EVAL
t presents with decreased strength, decreased balance, poor cognition, poor motivation, and decreased endurance.

## 2019-10-29 NOTE — PROGRESS NOTE ADULT - ASSESSMENT
ASSESSMENT/PLAN:  TSDH 2/2 fall    NEURO:    Neurochecks q1 hrs,  CT Head in the AM,  Decadron (for h/o lymphoma)  keppra for siezure ppx  Activity: [] mobilize as tolerated [x] Bedrest [] PT [] OT [] PMNR    PULM:  O2 sat > 92%    CV:  SBP goal 100 - 160  syncope work-up, call cardio, check echo  midodrine - home med      RENAL:  Fluids:  NS @ 75    GI:    Diet: npo for possible OR   GI prophylaxis [] not indicated [] PPI [] other:  Bowel regimen [x] colace [x] senna [] other:    ENDO:   Goal euglycemia (-180), iss with FS     HEME/ONC:  h/o lymphoma on decadron   VTE prophylaxis: [] SCDs [] chemoprophylaxis [x] hold chemoprophylaxis due to: sdh   [x] high risk of DVT/PE on admission due to: lymphoma, check le dopplers     ID:  afebrile     SOCIAL/FAMILY:  [x] awaiting [] updated at bedside [] family meeting    CODE STATUS:  [x] Full Code [] DNR [] DNI [] Palliative/Comfort Care    DISPOSITION:  [x] ICU [] Stroke Unit [] Floor [] EMU [] RCU [] PCU    [x] Patient is at high risk of neurologic deterioration/death due to:  seziures, hemorrhage, herniation       Time spent: 45 critical care minutes    Contact: [phone]

## 2019-10-29 NOTE — OCCUPATIONAL THERAPY INITIAL EVALUATION ADULT - ADDITIONAL COMMENTS
CT brain: Acute right-sided subdural hematoma over the right frontal and temporal convexity and along the right proximal and tentorial leaflet. Maximum thickness of the subdural hematoma is 1.1 cm over the right tentorial leaflet. Regional mass effect upon the right cerebrum with up to 8 mm shift of the midline towards the left. No central herniation. Chronic areas of right frontal and temporal cortical encephalomalacia and chronic ischemic changes in the frontoparietal white matter.  CT of cervical spine: No acute cervical spine fracture or evidence of traumatic malalignment. Multilevel cervical spondylosis.

## 2019-10-29 NOTE — CONSULT NOTE ADULT - SUBJECTIVE AND OBJECTIVE BOX
1y male with PMH of CAD s/p stents x4, HTN, DM, Hypothyroid, lymphoma presenting after a fall. He was getting out of chair when he fell backward hitting his head. Denies LOC, no symptoms proceeding the fall. Fall was unwitnessed, nurses at [facility] stated that down time was a few minutes, daughter spoke to him after the fall he was respond like his normal self, was complaining of a headache. No other complaints, denies chest pain, SOB, abdominal pain, nausea, vomiting, back pain.    WDWN male in NAD  Vital Signs Last 24 Hrs  T(C): 36.9 (28 Oct 2019 17:07), Max: 36.9 (28 Oct 2019 17:07)  T(F): 98.5 (28 Oct 2019 17:07), Max: 98.5 (28 Oct 2019 17:07)  HR: 90 (29 Oct 2019 00:07) (74 - 90)  BP: 131/79 (29 Oct 2019 00:07) (126/68 - 163/98)  BP(mean): --  RR: 20 (29 Oct 2019 00:07) (16 - 20)  SpO2: 98% (29 Oct 2019 00:07) (97% - 98%)    AAO X 2  PERRLA, EOMI  CN 2-12 grossly intact  SIM, Right 5/5, Left 4/5 with mild UE drift  SILT                          10.6   9.08  )-----------( 196      ( 28 Oct 2019 21:25 )             32.6     10-28    137  |  101  |  15  ----------------------------<  308<H>  3.9   |  24  |  1.09    Ca    7.0<L>      28 Oct 2019 21:25    TPro  5.2<L>  /  Alb  2.4<L>  /  TBili  0.6  /  DBili  x   /  AST  62<H>  /  ALT  25  /  AlkPhos  95  10-28      PT/INR - ( 28 Oct 2019 21:40 )   PT: 14.2 SEC;   INR: 1.24          PTT - ( 28 Oct 2019 21:40 )  PTT:26.1 SEC    < from: CT Head No Cont (10.28.19 @ 23:37) >    TECHNIQUE: Axial noncontrast CT of the head was performed. Axial section   images were reconstructed using soft tissue and bone algorithms. Coronal   and sagittal plane images were reconstructed using soft tissue   algorithms.     FINDINGS:     There is an acute right-sided subdural hematoma overthe right frontal   and temporal convexity and along the right falx and tentorial leaflet.   Maximum thickness of the subdural hematoma over the right frontotemporal   convexity is 1 cm, over the right tentorial leaflet is 1.1 cm, and along   the right falx is 1 cm. There is mass effect upon the right cerebrum with   up to 8 mm shift of the midline towards the left. There is no central   herniation. There is generalized cerebral and cerebellar volume loss and   chronic areas of right frontal and temporal cortical encephalomalacia,   unchanged compared with prior exam. There are mild chronic ischemic   changes in the frontoparietal white matter. Senescent calcifications are   seen in the basal ganglia. There are atherosclerotic calcifications of   the aortoiliac tree and vertebrobasilar system with mild dolichoectasia   of the vertebrobasilar system.    There is no significant scalp soft tissue swelling or scalp hematoma. The   skull base and bony calvarium are intact. The visualized paranasal   sinuses and tympanic/mastoid cavities are clear apart from mild ethmoid   mucosal thickening. There is evidence of bilateral lens surgery.    < end of copied text >

## 2019-10-29 NOTE — CONSULT NOTE ADULT - ASSESSMENT
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 Found to have right-sided SDH with mass effect and 8 mm midline shift to left. Nephrology is consulted for CKD and volume overload.    CKD stage 4  - baseline Cr ~2; suspect this Cr is dilutional from volume overload    - Avoid nephrotoxins including NSAIDs, IV contrast (if possible), [employer] products  - maintain MAP >65  - renal diet once NPO is lifted    Volume overload  - tends to drink lots of fluids at home  - 1L fluid restriction once NPO is lifted  - start lasix 20 mg IV daily  - monitor I/O's accurately    HTN  - has Hx HTN but was on midodrine and florinef during last admission for hypotension, was undergoing adrenal insufficiency workup w/ endocrine   - BP acceptable/minimally elevated this AM ('s)  - goal BP <140/90  - recommend lasix IV as above  - monitor BP   - consider endocrine consult for followup of prior workup of AI    Hypocalcemia  - Has Hx hypercalcemia of malignancy  - corrected Ca today is 8.3  - gentle repletion   - monitor

## 2019-10-29 NOTE — CONSULT NOTE ADULT - ASSESSMENT
80 y/o male , pmh as described above, admitted s/p fall at [facility] sustaining SDH, called for goals of care

## 2019-10-29 NOTE — OCCUPATIONAL THERAPY INITIAL EVALUATION ADULT - LONG TERM MEMORY, REHAB EVAL
[address]   [place] [address]  [phone]    2017  PATIENT INFORMATION:     NAME: Adarsh Silva                    : 1976                                 .    SPECIAL INSTRUCTIONS:    Please excuse Adarsh Silva from work.  He was hospitalized on 2017 through 2017.   He may return to work no sooner than 2017 with the following restrictions.  He will also need to be excused for follow up appointments in the next coming week.    [] Full Work  [x] Return to work WITH limitations (see below)    LIMITATIONS  [x] Keep wound clean and dry  [] RIGHT hand work only  [x] May Lift up to 20 lbs only  [] LEFT hand work only   [] Sit down work only    [] Other limitations:      Electronically signed on 2017 at 8:38 AM by Brandon Howe MD     Provider Signature:  
poor recall of biographical information

## 2019-10-29 NOTE — ED PROVIDER NOTE - ATTENDING CONTRIBUTION TO CARE
Attending MD Saldivar: I personally have seen and examined this patient.  Resident note reviewed and agree on plan of care and except where noted.  See below for details.     Seen in [facility], accompanied by son    79M with PMH/PSH including non Hodgkins lymphoma, CAD s/p stents, presents to the ED transferred from [facility] with SDH on Eliquis found to have 8mm shift.  Son reports had fall at [facility] with head trauma.  [facility] Dr. Allred reports patient was GCS 14 at [facility] with new L sided weakness.  Reports received Keppra there and is being transferred for Neurosx at [facility].  IN the ED, Dr. Sahu of neurosx in the ED.  On exam, NAD, GCS 14, AAOx2 (son reports baseline for last 5-6 weeks), head NCAT, CN 2-12 grossly intact, PERRL, ranging neck fully, lungs CTAB with good inspiratory effort, +S1S2, no m/r/g, abdomen soft with +BS, NT, ND, no CVAT, moving all extremities with 5/5 strength R upper and lower extremities, 4/5 L sided extremities, +1 pitting edema; A/P: 79M with SDH with shift, pending repeat CT head and T&S, admission to [facility] ICU

## 2019-10-29 NOTE — CONSULT NOTE ADULT - ASSESSMENT
79y male with PMH of CAD s/p stents x4, HTN, DM, Hypothyroid, DLBC lymphoma presented to [facility] after a fall transferred to [facility] for further management of SDH.    # SDU  -2/2 fall  -CTH showed R sided frontal and temporal convexity with mass effect on the right cerebrum and 8 mm ML shift. No herniation.  -Pt was given Kcentra to reverse Eliquis   -neurosx monitoring him clinically - no plan for acute surgical intervention    # DLBCL  -recently dx'ed   -pt on treatment [facility] R-CVP  -follows up at [place]  -no indication for inpatient treatment currently    # DVT  -RLE below the knee DVT  -pt on previously on eliquis  -given pt's SDH - holding AC for 10-14 days      Darian Avitia MD. PGY 5  Heme-Onc fellow  [phone]; [id number] 79y male with PMH of CAD s/p stents x4, HTN, DM, Hypothyroid, DLBC lymphoma presented to [facility] after a fall transferred to [facility] for further management of SDH.    # SDU  -2/2 fall  -CTH showed R sided frontal and temporal convexity with mass effect on the right cerebrum and 8 mm ML shift. No herniation.  -Pt was given Kcentra to reverse Eliquis   -neurosx monitoring him clinically - no plan for acute surgical intervention    # DLBCL  -recently dx'ed   -pt on treatment wiht R-CP (without V), s/p 2 cycles, last chemo 2 weeks ago  -follows up at [place]  -no indication for inpatient treatment currently    # DVT  -pt had LUE DVT and was previously on eliquis  -now found to have new acute RLE DVT  -currently anticoagulation is contraindicated due to SDH for the next 10-14 days per neurosurgery - would recommend IVC filter placement to prevent PE      Darian Avitia MD. PGY 5  Heme-Onc fellow  [phone]; [id number]

## 2019-10-29 NOTE — PROGRESS NOTE ADULT - ASSESSMENT
TSDH 2/2 fall    CT Head- stable bilateral SDH    Cont Decadron (for h/o lymphoma)  keppra for seizure ppx for 7 days  C/w Lexapro 5mg qd (home med)  IVL  dysphagia diet   repeat dopplers in 7 days

## 2019-10-29 NOTE — OCCUPATIONAL THERAPY INITIAL EVALUATION ADULT - PERTINENT HX OF CURRENT PROBLEM, REHAB EVAL
78 yo M with PMH of CAD s/p stents x4, HTN, DM, Hypothyroid, lymphoma presenting after a fall. He was getting out of chair when he fell backward hitting his head. Denies LOC, no symptoms proceeding the fall. Fall was unwitnessed, nurses at [facility] [place] stated that down time was a few minutes, daughter spoke to him after the fall he was respond like his normal self, was complaining of a headache. No other complaints, denies chest pain, SOB, abdominal pain, nausea, vomiting, back pain. See below

## 2019-10-29 NOTE — SWALLOW BEDSIDE ASSESSMENT ADULT - SLP PERTINENT HISTORY OF CURRENT PROBLEM
79y male with PMH of CAD s/p stents x4, HTN, DM, Hypothyroid, lymphoma presenting from rehab after a fall. He was getting out of chair when he fell backward hitting his head. Denies LOC, no symptoms proceeding the fall. Fall was unwitnessed, nurses at [facility] stated that down time was a few minutes, daughter spoke to him after the fall he was respond like his normal self, was complaining of a headache. No other complaints, denies chest pain, SOB, abdominal pain, nausea, vomiting, back pain. Patient was on Eliquis - given Kcentra at [facility].  Patient not on ASA as per patient and family. Of note, patient currently full code as per family, would want surgical intervention. On admission, GCS: 15. HIGH RISK FOR VTE 2/2 Lymphoma.

## 2019-10-29 NOTE — OCCUPATIONAL THERAPY INITIAL EVALUATION ADULT - PLANNED THERAPY INTERVENTIONS, OT EVAL
cognitive, visual perceptual/parent/caregiver training.../ADL retraining/bed mobility training/strengthening/transfer training/balance training

## 2019-10-29 NOTE — CONSULT NOTE ADULT - SUBJECTIVE AND OBJECTIVE BOX
79y male with PMH of CAD s/p stents x4, HTN, DM, Hypothyroid, lymphoma, LUE DVT (on eliquis) who presented after a mechanical fall.  Pt presented to [facility] on 9/2019 and discharge to rehab.  While at rehab, got up out of a chair, slipped and fell.  CTH showed acute R SDH w/ mass effect.  She received Kcentra and admitted to NICU.  Venous LE doppler showed b/l below knee DVTs.  Vascular cardiology consulted for recs regarding IVC filter placement.       PMH:   CAD (coronary artery disease)  Adult hypothyroidism  Hyperlipidemia  Peptic ulcer  Stented coronary artery  Diabetes  Essential hypertension      PSH:   S/P coronary artery stent placement  No significant past surgical history      Medications:   acetaminophen   Tablet .. 650 milliGRAM(s) Oral every 6 hours PRN  chlorhexidine 4% Liquid 1 Application(s) Topical <User Schedule>  dexAMETHasone     Tablet 2 milliGRAM(s) Oral <User Schedule>  dextrose 40% Gel 15 Gram(s) Oral once PRN  dextrose 5%. 1000 milliLiter(s) IV Continuous <Continuous>  dextrose 50% Injectable 12.5 Gram(s) IV Push once  dextrose 50% Injectable 25 Gram(s) IV Push once  dextrose 50% Injectable 25 Gram(s) IV Push once  escitalopram 5 milliGRAM(s) Oral daily  famotidine    Tablet 20 milliGRAM(s) Oral two times a day  fludroCORTISONE 0.1 milliGRAM(s) Oral two times a day  glucagon  Injectable 1 milliGRAM(s) IntraMuscular once PRN  insulin lispro (HumaLOG) corrective regimen sliding scale   SubCutaneous every 6 hours  levETIRAcetam  IVPB 500 milliGRAM(s) IV Intermittent every 12 hours  levothyroxine 88 MICROGram(s) Oral daily  midodrine. 5 milliGRAM(s) Oral three times a day  ondansetron Injectable 4 milliGRAM(s) IV Push every 6 hours PRN  polyethylene glycol 3350 17 Gram(s) Oral daily  senna 2 Tablet(s) Oral at bedtime PRN  simvastatin 20 milliGRAM(s) Oral at bedtime  sodium chloride 0.9%. 1000 milliLiter(s) IV Continuous <Continuous>      Allergies:  No Known Allergies      FAMILY HISTORY:      Social History:  Smoking History:  Alcohol Use:  Drug Use:    Review of Systems:  REVIEW OF SYSTEMS:  CONSTITUTIONAL: No weakness, fevers or chills  EYES/ENT: No visual changes;  No dysphagia  NECK: No pain or stiffness  RESPIRATORY: No cough, wheezing, hemoptysis; No shortness of breath  CARDIOVASCULAR: No chest pain or palpitations; No lower extremity edema  GASTROINTESTINAL: No abdominal or epigastric pain. No nausea, vomiting, or hematemesis; No diarrhea or constipation. No melena or hematochezia.  BACK: No back pain  GENITOURINARY: No dysuria, frequency or hematuria  NEUROLOGICAL: No numbness or weakness  SKIN: No itching, burning, rashes, or lesions   All other review of systems is negative unless indicated above.    Physical Exam:  T(F): 98.3 (10-29), Max: 99.2 (10-29)  HR: 85 (10-29) (67 - 98)  BP: 105/59 (10-29) (105/59 - 163/98)  RR: 18 (10-29)  SpO2: 96% (10-29)  GENERAL: No acute distress, well-developed  HEAD:  Atraumatic, Normocephalic  ENT: EOMI, PERRLA, conjunctiva and sclera clear, Neck supple, No JVD, moist mucosa  CHEST/LUNG: Clear to auscultation bilaterally; No wheeze, equal breath sounds bilaterally   BACK: No spinal tenderness  HEART: Regular rate and rhythm; No murmurs, rubs, or gallops  ABDOMEN: Soft, Nontender, Nondistended; Bowel sounds present  EXTREMITIES:  No clubbing, cyanosis, or edema  PSYCH: Nl behavior, nl affect  NEUROLOGY: AAOx3, non-focal, cranial nerves intact  SKIN: Normal color, No rashes or lesions  LINES:    Cardiovascular Diagnostic Testing:    ECG: sinus rhythym, Non-specific ST-T changes in the precordium.     Echo:    < from: Transthoracic Echocardiogram (10.29.19 @ 16:21) >  1. Aortic valve not well visualized; appears calcified.  Peak transaortic valve gradient equals 14 mm Hg, estimated  aortic valve area equals 2 sqcm (by continuity equation),  aortic valve velocity time integral equals 41 cm,  consistent with mild aortic stenosis. Minimal aortic  regurgitation.  2. Increased relative wall thickness with normal left  ventricular mass index, consistent with concentric left  ventricular remodeling.  3. Hyperdynamic left ventricular systolic function.  4. The right ventricle is not well visualized; grossly  normal right ventricular systolic function.  5. Estimated right ventricular systolic pressure equals 20  mm Hg, assuming right atrial pressure equals 8 mm Hg,  consistent with normal pulmonary pressures.  6. Left pleural effusion.  *** No previous Echo exam.  ---------------------------------------    < end of copied text >      Stress Testing:    Cath:    Interpretation of Telemetry:    Imaging:    Labs: Personally reviewed                        10.6   9.08  )-----------( 196      ( 28 Oct 2019 21:25 )             32.6     10-28    137  |  101  |  15  ----------------------------<  308<H>  3.9   |  24  |  1.09    Ca    7.0<L>      28 Oct 2019 21:25    TPro  5.2<L>  /  Alb  2.4<L>  /  TBili  0.6  /  DBili  x   /  AST  62<H>  /  ALT  25  /  AlkPhos  95  10-28    PT/INR - ( 28 Oct 2019 21:40 )   PT: 14.2 SEC;   INR: 1.24          PTT - ( 28 Oct 2019 21:40 )  PTT:26.1 SEC 79y male with PMH of CAD s/p stents x4, HTN, DM, Hypothyroid, lymphoma, LUE DVT (on eliquis) who presented after a mechanical fall.  Pt presented to [facility] on 9/2019 and discharge to rehab.  While at rehab, got up out of a chair, slipped and fell.  CTH showed acute R SDH w/ mass effect.  She received Kcentra and admitted to NICU.  Venous LE doppler showed b/l below knee DVTs.  Vascular cardiology consulted for recs regarding IVC filter placement.       PMH:   CAD (coronary artery disease)  Adult hypothyroidism  Hyperlipidemia  Peptic ulcer  Stented coronary artery  Diabetes  Essential hypertension      PSH:   S/P coronary artery stent placement  No significant past surgical history      Medications:   acetaminophen   Tablet .. 650 milliGRAM(s) Oral every 6 hours PRN  chlorhexidine 4% Liquid 1 Application(s) Topical <User Schedule>  dexAMETHasone     Tablet 2 milliGRAM(s) Oral <User Schedule>  dextrose 40% Gel 15 Gram(s) Oral once PRN  dextrose 5%. 1000 milliLiter(s) IV Continuous <Continuous>  dextrose 50% Injectable 12.5 Gram(s) IV Push once  dextrose 50% Injectable 25 Gram(s) IV Push once  dextrose 50% Injectable 25 Gram(s) IV Push once  escitalopram 5 milliGRAM(s) Oral daily  famotidine    Tablet 20 milliGRAM(s) Oral two times a day  fludroCORTISONE 0.1 milliGRAM(s) Oral two times a day  glucagon  Injectable 1 milliGRAM(s) IntraMuscular once PRN  insulin lispro (HumaLOG) corrective regimen sliding scale   SubCutaneous every 6 hours  levETIRAcetam  IVPB 500 milliGRAM(s) IV Intermittent every 12 hours  levothyroxine 88 MICROGram(s) Oral daily  midodrine. 5 milliGRAM(s) Oral three times a day  ondansetron Injectable 4 milliGRAM(s) IV Push every 6 hours PRN  polyethylene glycol 3350 17 Gram(s) Oral daily  senna 2 Tablet(s) Oral at bedtime PRN  simvastatin 20 milliGRAM(s) Oral at bedtime  sodium chloride 0.9%. 1000 milliLiter(s) IV Continuous <Continuous>      Allergies:  No Known Allergies      FAMILY HISTORY:      Social History:  Smoking History:  Alcohol Use:  Drug Use:    Review of Systems:  REVIEW OF SYSTEMS:  unable to assess as pt is AAOx1.      Physical Exam:  T(F): 98.3 (10-29), Max: 99.2 (10-29)  HR: 85 (10-29) (67 - 98)  BP: 105/59 (10-29) (105/59 - 163/98)  RR: 18 (10-29)  SpO2: 96% (10-29)  GENERAL: No acute distress, well-developed  HEAD:  Atraumatic, Normocephalic  ENT: EOMI, PERRLA, conjunctiva and sclera clear, Neck supple, Normal JVP   CHEST/LUNG: Clear to auscultation bilaterally; No wheeze, equal breath sounds bilaterally   BACK: No spinal tenderness  HEART: Regular rate and rhythm; No murmurs, rubs, or gallops  ABDOMEN: Soft, Nontender, Nondistended; Bowel sounds present    EXTREMITIES:  BLE +1 pitting edema w/ pain to palpation.     PSYCH: Nl behavior, nl affect  NEUROLOGY: AAOx3, non-focal, cranial nerves intact  SKIN: Normal color, No rashes or lesions  LINES:    Cardiovascular Diagnostic Testing:    ECG: sinus rhythym, Non-specific ST-T changes in the precordium.     Echo:    < from: Transthoracic Echocardiogram (10.29.19 @ 16:21) >  1. Aortic valve not well visualized; appears calcified.  Peak transaortic valve gradient equals 14 mm Hg, estimated  aortic valve area equals 2 sqcm (by continuity equation),  aortic valve velocity time integral equals 41 cm,  consistent with mild aortic stenosis. Minimal aortic  regurgitation.  2. Increased relative wall thickness with normal left  ventricular mass index, consistent with concentric left  ventricular remodeling.  3. Hyperdynamic left ventricular systolic function.  4. The right ventricle is not well visualized; grossly  normal right ventricular systolic function.  5. Estimated right ventricular systolic pressure equals 20  mm Hg, assuming right atrial pressure equals 8 mm Hg,  consistent with normal pulmonary pressures.  6. Left pleural effusion.  *** No previous Echo exam.  ---------------------------------------    < end of copied text >      Stress Testing:    Cath:    Interpretation of Telemetry:    Imaging:    Labs: Personally reviewed                        10.6   9.08  )-----------( 196      ( 28 Oct 2019 21:25 )             32.6     10-28    137  |  101  |  15  ----------------------------<  308<H>  3.9   |  24  |  1.09    Ca    7.0<L>      28 Oct 2019 21:25    TPro  5.2<L>  /  Alb  2.4<L>  /  TBili  0.6  /  DBili  x   /  AST  62<H>  /  ALT  25  /  AlkPhos  95  10-28    PT/INR - ( 28 Oct 2019 21:40 )   PT: 14.2 SEC;   INR: 1.24          PTT - ( 28 Oct 2019 21:40 )  PTT:26.1 SEC

## 2019-10-29 NOTE — ED PROVIDER NOTE - CRITICAL CARE PROVIDED
interpretation of diagnostic studies/consultation with other physicians/additional history taking/consult w/ pt's family directly relating to pts condition/direct patient care (not related to procedure)/conducted a detailed discussion of DNR status

## 2019-10-29 NOTE — PROGRESS NOTE ADULT - SUBJECTIVE AND OBJECTIVE BOX
79y male with PMH of CAD s/p stents x4, HTN, DM, Hypothyroid, lymphoma presenting after a fall. He was getting out of chair when he fell backward hitting his head. Denies LOC, no symptoms proceeding the fall. Fall was unwitnessed, nurses at [place] stated that down time was a few minutes, daughter spoke to him after the fall he was respond like his normal self, was complaining of a headache. No other complaints, denies chest pain, SOB, abdominal pain, nausea, vomiting, back pain.    Patient was on Eliquis - given Kcentra at [facility].  Patient not on ASA as per patient and family.     Of note, patient currently full code as per family, would want surgical intervention.     On admission, the patient was:  GCS: 15    ***HIGH RISK FOR VTE 2/2 Lymphoma***     VITALS:  Recent Vitals Reviewed   LABS:  Recent Labs Reviewed  MEDICATIONS: All Recent Medicaitons Reviewed   IMAGING:   Recent imaging studies were reviewed.    EXAMINATION:  General:  calm  HEENT:  MMM  Neuro:  awake, alert, oriented x 2, follows commands,  Pupils Equal and reactive, RUE 5/5,  LUE 4/5 with mild drift,  RLE 5/5,  LLE 4/5, no drift   Cards:  s1, s2 RRR  Respiratory:  lungs clear b/l   [place]:  soft  Extremities:  no edema  Skin:  warm/dry

## 2019-10-29 NOTE — CONSULT NOTE ADULT - ASSESSMENT
79y male with PMH of Cognitive delay, CAD s/p stents x4, HTN, DM, Hypothyroid, lymphoma, LUE DVT (on eliquis) who presented after a mechanical fall.  Found to have acute R SDH as well as acute b/l below knee DVTs.  Currently hemodynamically stable w/ no evidence of PE.      RECS  - cont current management as per NICU team  - no indications for IVC filter at this point   - obtain LE venous duplex on 10/31 for surveillance.     Plan discussed w/ Patient's daughter, Dr. Sparks, and Dr. Rome.

## 2019-10-29 NOTE — CONSULT NOTE ADULT - SUBJECTIVE AND OBJECTIVE BOX
[facility]   MD Anusha Siddiqui D.O. Fatima Sheikh, D.O. Ruoru Wong, PA    From 7 AM - 5 PM:  OFFICE: [phone]  Dr. Radford cell: [phone]  Dr. Townsend cell: [phone]  Dr. Steinberg cell: [phone]  LEATHA Agee cell: [phone]    From 5 PM - 7 AM: Answering Service: [phone]      -- INITIAL RENAL CONSULT NOTE  --------------------------------------------------------------------------------  HPI: Mr. 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 Pt is a poor historian so Hx obtained from chart. Per records, pt fell backwards from standing hit, + hit back of hit. Reportedly complained of headache. Sent in for evaluation and found to have right-sided SDH with mass effect and 8 mm midline shift to left.    Currently, his only complaint is that he is thirsty and hungry. Denies any SOB, cough, pain, nausea, vomiting.        PAST HISTORY  --------------------------------------------------------------------------------  PAST MEDICAL & SURGICAL HISTORY:  CAD (coronary artery disease)  Adult hypothyroidism  Hyperlipidemia  Peptic ulcer: s/p treatment for H pylori  Diabetes  Essential hypertension  S/P coronary artery stent placement: x4, last one in early 2018    FAMILY HISTORY:    PAST SOCIAL HISTORY:    ALLERGIES & MEDICATIONS  --------------------------------------------------------------------------------  Allergies    No Known Allergies    Intolerances      Standing Inpatient Medications  chlorhexidine 4% Liquid 1 Application(s) Topical <User Schedule>  dexAMETHasone     Tablet 2 milliGRAM(s) Oral <User Schedule>  dextrose 5%. 1000 milliLiter(s) IV Continuous <Continuous>  dextrose 50% Injectable 12.5 Gram(s) IV Push once  dextrose 50% Injectable 25 Gram(s) IV Push once  dextrose 50% Injectable 25 Gram(s) IV Push once  famotidine    Tablet 20 milliGRAM(s) Oral two times a day  fludroCORTISONE 0.1 milliGRAM(s) Oral two times a day  insulin lispro (HumaLOG) corrective regimen sliding scale   SubCutaneous every 6 hours  levETIRAcetam  IVPB 500 milliGRAM(s) IV Intermittent every 12 hours  levothyroxine 88 MICROGram(s) Oral daily  midodrine. 5 milliGRAM(s) Oral three times a day  polyethylene glycol 3350 17 Gram(s) Oral daily  simvastatin 20 milliGRAM(s) Oral at bedtime  sodium chloride 0.9% lock flush 3 milliLiter(s) IV Push every 8 hours  sodium chloride 0.9%. 1000 milliLiter(s) IV Continuous <Continuous>    PRN Inpatient Medications  acetaminophen   Tablet .. 650 milliGRAM(s) Oral every 6 hours PRN  dextrose 40% Gel 15 Gram(s) Oral once PRN  glucagon  Injectable 1 milliGRAM(s) IntraMuscular once PRN  ondansetron Injectable 4 milliGRAM(s) IV Push every 6 hours PRN  senna 2 Tablet(s) Oral at bedtime PRN      REVIEW OF SYSTEMS  --------------------------------------------------------------------------------  Gen: No fevers/chills  Skin: No rashes  Head/Eyes/Ears: Normal hearing,  Normal vision   Respiratory: No dyspnea, cough  CV: No chest pain  GI: No abdominal pain, diarrhea, constipation, nausea, vomiting  : No dysuria, hematuria  MSK: No  edema  Heme: No easy bruising or bleeding  Psych: No significant depression    All other systems were reviewed and are negative, except as noted.    VITALS/PHYSICAL EXAM  --------------------------------------------------------------------------------  T(C): 36.8 (10-29-19 @ 07:00), Max: 37.3 (10-29-19 @ 03:36)  HR: 73 (10-29-19 @ 08:00) (67 - 90)  BP: 149/77 (10-29-19 @ 08:00) (107/91 - 163/98)  RR: 18 (10-29-19 @ 08:00) (16 - 22)  SpO2: 95% (10-29-19 @ 08:00) (95% - 98%)  Wt(kg): --  Height (cm): 152.4 (10-29-19 @ 07:00)  Weight (kg): 65.3 (10-29-19 @ 07:00)  BMI (kg/m2): 28.1 (10-29-19 @ 07:00)  BSA (m2): 1.62 (10-29-19 @ 07:00)      10-28-19 @ 07:01  -  10-29-19 @ 07:00  --------------------------------------------------------  IN: 340 mL / OUT: 0 mL / NET: 340 mL    10-29-19 @ 07:01  -  10-29-19 @ 09:39  --------------------------------------------------------  IN: 210 mL / OUT: 400 mL / NET: -190 mL      Physical Exam:  	Gen: NAD  	HEENT: MMM  	Pulm: CTA B/L  	CV: S1S2, + murmur  	Abd: Soft, +BS   	Ext: Anasarca noted  	Neuro: Awake  	Skin: Warm and dry      LABS/STUDIES  --------------------------------------------------------------------------------              10.6   9.08  >-----------<  196      [10-28-19 @ 21:25]              32.6     137  |  101  |  15  ----------------------------<  308      [10-28-19 @ 21:25]  3.9   |  24  |  1.09        Ca     7.0     [10-28-19 @ 21:25]    TPro  5.2  /  Alb  2.4  /  TBili  0.6  /  DBili  x   /  AST  62  /  ALT  25  /  AlkPhos  95  [10-28-19 @ 21:25]    PT/INR: PT 14.2 , INR 1.24       [10-28-19 @ 21:40]  PTT: 26.1       [10-28-19 @ 21:40]      Creatinine Trend:  SCr 1.09 [10-28 @ 21:25]    Urinalysis - [09-06-19 @ 17:55]      Color LIGHT YELLOW / Appearance CLEAR / SG 1.012 / pH 6.0      Gluc 500 / Ketone NEGATIVE  / Bili NEGATIVE / Urobili NORMAL       Blood NEGATIVE / Protein NEGATIVE / Leuk Est NEGATIVE / Nitrite NEGATIVE      RBC  / WBC  / Hyaline  / Gran  / Sq Epi  / Non Sq Epi  / Bacteria       Iron 38, TIBC 234, %sat --      [09-12-19 @ 06:08]  Ferritin 214.6      [09-12-19 @ 06:08]  PTH 20.00 (Ca --)      [09-03-19 @ 05:45]   --  PTH 25.46 (Ca --)      [09-01-19 @ 06:00]   --  Vitamin D (25OH) 34.9      [09-03-19 @ 05:45]  HbA1c 7.0      [09-13-19 @ 06:50]  TSH 5.55      [09-09-19 @ 03:20]  Lipid: chol 121, TG 96, HDL 33, LDL 74      [08-31-19 @ 07:00]      ANCA: cANCA Negative, pANCA Negative, atypical ANCA --      [09-03-19 @ 05:45]  Free Light Chains: kappa 4.00, lambda 3.03, ratio = 1.32      [09-03 @ 05:45]

## 2019-10-29 NOTE — ED ADULT NURSE NOTE - OBJECTIVE STATEMENT
78 yo M w/ PMHx of CAD s/p stents x4, HTN, DM, Hypothyroid, lymphoma presents to ED via EMS transfer from [facility] c/o SDH. Per family at bedside states today pt suffered fall while at [facility] re[facility], hit his head, was transferred to [facility] for further eval. Pt on eliquis, found to have SDH w/ 8 mm shift. Transferred here for further neuro consult. Per son pt has been confused over past 5-6 weeks, no new changes in mental status today. No signs of injury noted. B/L 3+ pitting edema to LE noted. Pt denies complaints at this time. PERRL. L side slightly weaker than R side in both UE and LE. Pt received keppra at [facility] prior to transfer. Pt denies any CP, SOB, N/V, fever, chills, urinary complaints, constipation, diarrhea, HA, dizziness, weakness. Pt A&Ox2, lungs CTA, +central pulses. Abdomen soft, not tender, not distended. Ambulating w/ steady gait, safety and comfort maintained, no acute distress noted at this time.

## 2019-10-29 NOTE — SWALLOW BEDSIDE ASSESSMENT ADULT - ORAL PREPARATORY PHASE
Within functional limits prolonged yet efficient mastication Refuses to accept bolus into oral cavity

## 2019-10-29 NOTE — SWALLOW BEDSIDE ASSESSMENT ADULT - COMMENTS
Swallow Hx:  Bedside Swallow Eval at [facility] 9/22/19: 1. Functional oral phase for puree consistency, mechanical soft solids and nectar thick liquids marked by functional mastication for solids, adequate bolus manipulation and functional oral transit time 2. Mild oral phase dysphagia for regular solids and thin liquids marked by extended/prolonged mastication for solids, reduced bolus manipulation and reduced anterior-posterior transport with suspected posterior loss of bolus for thin liquids 3. Functional pharyngeal phase for puree consistency, mechanical soft solids, regular solids and nectar thick liquids marked by adequate laryngeal elevation upon digital palpation and NO overt s/s of laryngeal penetration/aspiration noted 4. Moderate pharyngeal phase dysphagia for thin liquids marked by delay in laryngeal elevation upon digital palpation. Immediate evidence of cough response and throat clear noted subsequent deglutition indicative of laryngeal penetration/aspiration. Rx Mechanical soft with Nectar-thick liquids. CTH: Acute right-sided subdural hematoma over the right frontal and temporal convexity and along the right proximal and tentorial leaflet. Maximum thickness of the subdural hematoma is 1.1 cm over the right tentorial leaflet. Regional mass effect upon the right cerebrum with up to 8 mm shift of the midline towards the left. No central herniation. Chronic areas of right frontal and temporal cortical encephalomalacia and chronic ischemic changes in the frontoparietal white matter.    Swallow Hx:  Bedside Swallow Eval at [facility] 9/22/19: 1. Functional oral phase for puree consistency, mechanical soft solids and nectar thick liquids marked by functional mastication for solids, adequate bolus manipulation and functional oral transit time 2. Mild oral phase dysphagia for regular solids and thin liquids marked by extended/prolonged mastication for solids, reduced bolus manipulation and reduced anterior-posterior transport with suspected posterior loss of bolus for thin liquids 3. Functional pharyngeal phase for puree consistency, mechanical soft solids, regular solids and nectar thick liquids marked by adequate laryngeal elevation upon digital palpation and NO overt s/s of laryngeal penetration/aspiration noted 4. Moderate pharyngeal phase dysphagia for thin liquids marked by delay in laryngeal elevation upon digital palpation. Immediate evidence of cough response and throat clear noted subsequent deglutition indicative of laryngeal penetration/aspiration. Rx Mechanical soft with Nectar-thick liquids.

## 2019-10-29 NOTE — CONSULT NOTE ADULT - SUBJECTIVE AND OBJECTIVE BOX
79y male with PMH of CAD s/p stents x4, HTN, DM, Hypothyroid, DLBC lymphoma presented to [facility] after a fall transferred to [facility] for further management of SDH. He was getting out of chair when he fell backward hitting his head. Denies LOC, no symptoms proceeding the fall. Fall was unwitnessed, nurses at [facility] stated that down time was a few minutes, daughter spoke to him after the fall he was respond like his normal self, was complaining of a headache. No other complaints, denies chest pain, SOB, abdominal pain, nausea, vomiting, back pain.     Pt was taking Eliquis for DVT. CTH showed R sided frontal and temporal convexity with mass effect on the right cerebrum and 8 mm ML shift. No herniation. Pt was given Kcentra to reverse Eliquis and transferred to [facility] for further management. Pt has hx of DLBCL diagnosed recently, being treated at [place] with CVP.    REVIEW OF SYSTEMS:  CONSTITUTIONAL: + fatigue  EYES/ENT: No visual changes, no throat pain   RESPIRATORY: No cough, wheezing, hemoptysis; No shortness of breath  CARDIOVASCULAR: No chest pain or palpitations  GASTROINTESTINAL: No abdominal pain, nausea, vomiting, or hematemesis; No diarrhea or constipation. No melena or hematochezia.  GENITOURINARY: No dysuria, frequency or hematuria  MUSCULOSKELETAL: No joint pain.  NEUROLOGICAL: No dizziness, numbness, or weakness  SKIN: No itching, burning, rashes, or lesions   All other review of systems is negative unless indicated above.        Allergies  No Known Allergies      PAST MEDICAL & SURGICAL HISTORY:  CAD (coronary artery disease)  Adult hypothyroidism  Hyperlipidemia  Peptic ulcer: s/p treatment for H pylori  Diabetes  Essential hypertension  S/P coronary artery stent placement: x4, last one in early 2018      FAMILY HISTORY:  No pertinent family hx    Social History:  No EtOH, no tobacco        VITAL SIGNS:  Vital Signs Last 24 Hrs  T(C): 36.8 (29 Oct 2019 11:00), Max: 37.3 (29 Oct 2019 03:36)  T(F): 98.3 (29 Oct 2019 11:00), Max: 99.2 (29 Oct 2019 03:36)  HR: 69 (29 Oct 2019 11:00) (67 - 90)  BP: 150/82 (29 Oct 2019 11:00) (107/91 - 163/98)  BP(mean): 100 (29 Oct 2019 11:00) (86 - 106)  RR: 21 (29 Oct 2019 11:00) (16 - 24)  SpO2: 96% (29 Oct 2019 11:00) (95% - 98%)      PHYSICAL EXAM:     GENERAL: no acute distress  HEENT: EOMI, neck supple  RESPIRATORY: LCTAB/L, no rhonchi, rales, or wheezing  CARDIOVASCULAR: RRR, no murmurs, gallops, rubs  ABDOMINAL: soft, non-tender, non-distended, positive bowel sounds   EXTREMITIES: no clubbing, cyanosis, or edema  NEUROLOGICAL: alert and oriented x 3, non-focal  SKIN: no rashes or lesions   MUSCULOSKELETAL: no gross joint deformity                          10.6   9.08  )-----------( 196      ( 28 Oct 2019 21:25 )             32.6     10-28    137  |  101  |  15  ----------------------------<  308<H>  3.9   |  24  |  1.09    Ca    7.0<L>      28 Oct 2019 21:25    TPro  5.2<L>  /  Alb  2.4<L>  /  TBili  0.6  /  DBili  x   /  AST  62<H>  /  ALT  25  /  AlkPhos  95  10-28      MEDICATIONS  (STANDING):  chlorhexidine 4% Liquid 1 Application(s) Topical <User Schedule>  dexAMETHasone     Tablet 2 milliGRAM(s) Oral <User Schedule>  dextrose 5%. 1000 milliLiter(s) (50 mL/Hr) IV Continuous <Continuous>  dextrose 50% Injectable 12.5 Gram(s) IV Push once  dextrose 50% Injectable 25 Gram(s) IV Push once  dextrose 50% Injectable 25 Gram(s) IV Push once  famotidine    Tablet 20 milliGRAM(s) Oral two times a day  fludroCORTISONE 0.1 milliGRAM(s) Oral two times a day  insulin lispro (HumaLOG) corrective regimen sliding scale   SubCutaneous every 6 hours  levETIRAcetam  IVPB 500 milliGRAM(s) IV Intermittent every 12 hours  levothyroxine 88 MICROGram(s) Oral daily  midodrine. 5 milliGRAM(s) Oral three times a day  polyethylene glycol 3350 17 Gram(s) Oral daily  simvastatin 20 milliGRAM(s) Oral at bedtime  sodium chloride 0.9% lock flush 3 milliLiter(s) IV Push every 8 hours  sodium chloride 0.9%. 1000 milliLiter(s) (50 mL/Hr) IV Continuous <Continuous>    < from: CT Head No Cont (10.28.19 @ 23:37) >  IMPRESSION:     CT brain: Acute right-sided subdural hematoma over the right frontal and   temporal convexity and along the right proximal and tentorial leaflet.   Maximum thickness of the subdural hematoma is 1.1 cm over the right   tentorial leaflet. Regional mass effect upon the right cerebrum with up   to 8 mm shift of the midline towards the left. No central herniation.    Chronic areas of right frontal and temporal cortical encephalomalacia and   chronic ischemic changes in the frontoparietal white matter.    CT of cervical spine: No acute cervical spine fracture or evidence of   traumatic malalignment. Multilevel cervical spondylosis.    Findings were discussed by Dr. Sparrow with Dr. Jama at 11:46 PM on   10/28/2019. Institution read-back verification policy was followed.       < end of copied text > 79y male with PMH of CAD s/p stents x4, HTN, DM, Hypothyroid, LUE DVT on eliquis,  DLBC (on RCP, last 2 weeks ago) lymphoma presented to [facility] after a fall transferred to [facility] for further management of SDH. He was getting out of chair when he fell backward hitting his head. Denies LOC, no symptoms proceeding the fall. Fall was unwitnessed, nurses at [facility] stated that down time was a few minutes, daughter spoke to him after the fall he was respond like his normal self, was complaining of a headache. No other complaints, denies chest pain, SOB, abdominal pain, nausea, vomiting, back pain.     Pt was taking Eliquis for DVT. CTH showed R sided frontal and temporal convexity with mass effect on the right cerebrum and 8 mm ML shift. No herniation. Pt was given Kcentra to reverse Eliquis and transferred to [facility] for further management. Pt has hx of DLBCL diagnosed recently, being treated at [facility] with CVP.    REVIEW OF SYSTEMS:  CONSTITUTIONAL: + fatigue  EYES/ENT: No visual changes, no throat pain   RESPIRATORY: No cough, wheezing, hemoptysis; No shortness of breath  CARDIOVASCULAR: No chest pain or palpitations  GASTROINTESTINAL: No abdominal pain, nausea, vomiting, or hematemesis; No diarrhea or constipation. No melena or hematochezia.  GENITOURINARY: No dysuria, frequency or hematuria  MUSCULOSKELETAL: No joint pain.  NEUROLOGICAL: No dizziness, numbness, or weakness  SKIN: No itching, burning, rashes, or lesions   All other review of systems is negative unless indicated above.        Allergies  No Known Allergies      PAST MEDICAL & SURGICAL HISTORY:  CAD (coronary artery disease)  Adult hypothyroidism  Hyperlipidemia  Peptic ulcer: s/p treatment for H pylori  Diabetes  Essential hypertension  S/P coronary artery stent placement: x4, last one in early 2018      FAMILY HISTORY:  No pertinent family hx    Social History:  No EtOH, no tobacco        VITAL SIGNS:  Vital Signs Last 24 Hrs  T(C): 36.8 (29 Oct 2019 11:00), Max: 37.3 (29 Oct 2019 03:36)  T(F): 98.3 (29 Oct 2019 11:00), Max: 99.2 (29 Oct 2019 03:36)  HR: 69 (29 Oct 2019 11:00) (67 - 90)  BP: 150/82 (29 Oct 2019 11:00) (107/91 - 163/98)  BP(mean): 100 (29 Oct 2019 11:00) (86 - 106)  RR: 21 (29 Oct 2019 11:00) (16 - 24)  SpO2: 96% (29 Oct 2019 11:00) (95% - 98%)      PHYSICAL EXAM:     GENERAL: no acute distress  HEENT: EOMI, neck supple  RESPIRATORY: LCTAB/L, no rhonchi, rales, or wheezing  CARDIOVASCULAR: RRR, no murmurs, gallops, rubs  ABDOMINAL: soft, non-tender, non-distended, positive bowel sounds   EXTREMITIES: no clubbing, cyanosis, or edema  NEUROLOGICAL: alert and oriented x 3, non-focal  SKIN: no rashes or lesions   MUSCULOSKELETAL: no gross joint deformity                          10.6   9.08  )-----------( 196      ( 28 Oct 2019 21:25 )             32.6     10-28    137  |  101  |  15  ----------------------------<  308<H>  3.9   |  24  |  1.09    Ca    7.0<L>      28 Oct 2019 21:25    TPro  5.2<L>  /  Alb  2.4<L>  /  TBili  0.6  /  DBili  x   /  AST  62<H>  /  ALT  25  /  AlkPhos  95  10-28      MEDICATIONS  (STANDING):  chlorhexidine 4% Liquid 1 Application(s) Topical <User Schedule>  dexAMETHasone     Tablet 2 milliGRAM(s) Oral <User Schedule>  dextrose 5%. 1000 milliLiter(s) (50 mL/Hr) IV Continuous <Continuous>  dextrose 50% Injectable 12.5 Gram(s) IV Push once  dextrose 50% Injectable 25 Gram(s) IV Push once  dextrose 50% Injectable 25 Gram(s) IV Push once  famotidine    Tablet 20 milliGRAM(s) Oral two times a day  fludroCORTISONE 0.1 milliGRAM(s) Oral two times a day  insulin lispro (HumaLOG) corrective regimen sliding scale   SubCutaneous every 6 hours  levETIRAcetam  IVPB 500 milliGRAM(s) IV Intermittent every 12 hours  levothyroxine 88 MICROGram(s) Oral daily  midodrine. 5 milliGRAM(s) Oral three times a day  polyethylene glycol 3350 17 Gram(s) Oral daily  simvastatin 20 milliGRAM(s) Oral at bedtime  sodium chloride 0.9% lock flush 3 milliLiter(s) IV Push every 8 hours  sodium chloride 0.9%. 1000 milliLiter(s) (50 mL/Hr) IV Continuous <Continuous>    < from: CT Head No Cont (10.28.19 @ 23:37) >  IMPRESSION:     CT brain: Acute right-sided subdural hematoma over the right frontal and   temporal convexity and along the right proximal and tentorial leaflet.   Maximum thickness of the subdural hematoma is 1.1 cm over the right   tentorial leaflet. Regional mass effect upon the right cerebrum with up   to 8 mm shift of the midline towards the left. No central herniation.    Chronic areas of right frontal and temporal cortical encephalomalacia and   chronic ischemic changes in the frontoparietal white matter.    CT of cervical spine: No acute cervical spine fracture or evidence of   traumatic malalignment. Multilevel cervical spondylosis.    Findings were discussed by Dr. Sparrow with Dr. Jama at 11:46 PM on   10/28/2019. Institution read-back verification policy was followed.       < end of copied text >      < from: VA Duplex Lower Ext Vein Scan, Bilat (10.29.19 @ 14:04) >  IMPRESSION: Acute calf vein DVT affects the right peroneal, soleal and   gastrocnemius veins and a left soleal vein.    LEATHA Martin notified.    < end of copied text >      < from: VA Duplex Upper Ext Vein Scan, Left (10.29.19 @ 14:06) >  IMPRESSION:     Acute DVT affects a left brachial vein and the left axillary vein.    Superficial thrombophlebitis affects the left cephalic vein.

## 2019-10-29 NOTE — OCCUPATIONAL THERAPY INITIAL EVALUATION ADULT - LIVES WITH, PROFILE
pt lives with family in a house, plan for youngest daughter to be primary caregiver, +RW, +commode, + tub bench

## 2019-10-29 NOTE — CONSULT NOTE ADULT - SUBJECTIVE AND OBJECTIVE BOX
HPI:  79y male with PMH of CAD s/p stents x4, HTN, DM, Hypothyroid, lymphoma presenting after a fall. He was getting out of chair when he fell backward hitting his head. Denies LOC, no symptoms proceeding the fall. Fall was unwitnessed, nurses at [place] [facility] stated that down time was a few minutes, daughter spoke to him after the fall he was respond like his normal self, was complaining of a headache. No other complaints, denies chest pain, SOB, abdominal pain, nausea, vomiting, back pain. (29 Oct 2019 02:08)    PERTINENT PM/SXH:   CAD (coronary artery disease)  Adult hypothyroidism  Hyperlipidemia  Peptic ulcer  Stented coronary artery  Diabetes  Essential hypertension    S/P coronary artery stent placement  No significant past surgical history    FAMILY HISTORY:    ITEMS NOT CHECKED ARE NOT PRESENT    SOCIAL HISTORY:   Significant other/partner:  [x ]  Children:  [x ]  Sikh/Spirituality: Other   Substance hx:  [ ]   Tobacco hx:  [ ]   Alcohol hx: [ ]   Home Opioid hx:  [ ] I-Stop Reference No:  Living Situation: [ ]Home  [ ]Long term care  [x ]Rehab :  [facility] [ ]Other    ADVANCE DIRECTIVES:    DNR  YES  MOLST  [x ]  Living Will  [ ]   DECISION MAKER(s):  [X ] Health Care Proxy(s)  [ ] Surrogate(s)  [ ] Guardian           Name(s): Phone Number(s):  [facility]/HCP DR PETE TATE   BASELINE (I)ADL(s) (prior to admission):  [place]: [ ]Total  [ ] Moderate [ x]Dependent    Allergies    No Known Allergies    Intolerances    MEDICATIONS  (STANDING):  chlorhexidine 4% Liquid 1 Application(s) Topical <User Schedule>  dexAMETHasone     Tablet 2 milliGRAM(s) Oral <User Schedule>  dextrose 5%. 1000 milliLiter(s) (50 mL/Hr) IV Continuous <Continuous>  dextrose 50% Injectable 12.5 Gram(s) IV Push once  dextrose 50% Injectable 25 Gram(s) IV Push once  dextrose 50% Injectable 25 Gram(s) IV Push once  famotidine    Tablet 20 milliGRAM(s) Oral two times a day  fludroCORTISONE 0.1 milliGRAM(s) Oral two times a day  insulin lispro (HumaLOG) corrective regimen sliding scale   SubCutaneous every 6 hours  levETIRAcetam  IVPB 500 milliGRAM(s) IV Intermittent every 12 hours  levothyroxine 88 MICROGram(s) Oral daily  midodrine. 5 milliGRAM(s) Oral three times a day  polyethylene glycol 3350 17 Gram(s) Oral daily  simvastatin 20 milliGRAM(s) Oral at bedtime  sodium chloride 0.9% lock flush 3 milliLiter(s) IV Push every 8 hours  sodium chloride 0.9%. 1000 milliLiter(s) (50 mL/Hr) IV Continuous <Continuous>    MEDICATIONS  (PRN):  acetaminophen   Tablet .. 650 milliGRAM(s) Oral every 6 hours PRN Temp greater or equal to 38C (100.4F), Mild Pain (1 - 3)  dextrose 40% Gel 15 Gram(s) Oral once PRN Blood Glucose LESS THAN 70 milliGRAM(s)/deciliter  glucagon  Injectable 1 milliGRAM(s) IntraMuscular once PRN Glucose LESS THAN 70 milligrams/deciliter  ondansetron Injectable 4 milliGRAM(s) IV Push every 6 hours PRN Nausea and/or Vomiting  senna 2 Tablet(s) Oral at bedtime PRN Constipation    PRESENT SYMPTOMS: [ ]Unable to obtain due to poor mentation   Source if other than patient:  [ ]Family   [ ]Team     Pain: [ ] yes [X ] no  QOL impact -   Location -                    Aggravating factors -  Quality -  Radiation -  Timing-  Severity (0-10 scale):  Minimal acceptable level (0-10 scale):     PAIN AD Score:     http://geriatrictoolkit.[place].[facility]/cog/painad.pdf (press ctrl +  left click to view)    Dyspnea:                           [ ]Mild [ ]Moderate [ ]Severe  Anxiety:                             [ ]Mild [ ]Moderate [ ]Severe  Fatigue:                             [x ]Mild [ ]Moderate [ ]Severe  Nausea:                             [ ]Mild [ ]Moderate [ ]Severe  Loss of appetite:              [ ]Mild [ ]Moderate [ ]Severe  Constipation:                    [ ]Mild [ ]Moderate [ ]Severe    Other Symptoms:  [ x]All other review of systems negative     Karnofsky Performance Score/Palliative Performance Status Version 2:   30     %    http://npcrc.org/files/news/palliative_performance_scale_ppsv2.pdf  PHYSICAL EXAM:  Vital Signs Last 24 Hrs  T(C): 36.8 (29 Oct 2019 07:00), Max: 37.3 (29 Oct 2019 03:36)  T(F): 98.2 (29 Oct 2019 07:00), Max: 99.2 (29 Oct 2019 03:36)  HR: 86 (29 Oct 2019 09:00) (67 - 90)  BP: 123/71 (29 Oct 2019 09:00) (107/91 - 163/98)  BP(mean): 86 (29 Oct 2019 09:00) (86 - 99)  RR: 20 (29 Oct 2019 09:00) (16 - 22)  SpO2: 96% (29 Oct 2019 09:00) (95% - 98%) I&O's Summary    28 Oct 2019 07:01  -  29 Oct 2019 07:00  --------------------------------------------------------  IN: 315 mL / OUT: 0 mL / NET: 315 mL    29 Oct 2019 07:01  -  29 Oct 2019 10:32  --------------------------------------------------------  IN: 180 mL / OUT: 400 mL / NET: -220 mL    GENERAL:  [x ]Alert  [x ]Oriented x 1-2  [ ]Lethargic  [ ]Cachexia  [ ]Unarousable  [x ]Verbal  [ ]Non-Verbal  Behavioral:   [ ] Anxiety  [ ] Delirium [ ] Agitation [ ] Other  HEENT:  [ ]Normal   [x ]Dry mouth   [ ]ET Tube/Trach  [ ]Oral lesions  PULMONARY:   [x ]Clear [ ]Tachypnea  [ ]Audible excessive secretions   [ ]Rhonchi        [ ]Right [ ]Left [ ]Bilateral  [ ]Crackles        [ ]Right [ ]Left [ ]Bilateral  [ ]Wheezing     [ ]Right [ ]Left [ ]Bilateral  CARDIOVASCULAR:    [ ]Regular [ x]Irregular [ ]Tachy  [ ][place] [ ]Murmur [ ]Other  GASTROINTESTINAL:  [x ]Softly distended   [ ]Distended   [x ]+BS  [ x]Non tender [ ]Tender  [ ]PEG [x ]OGT/ NGT  Last BM:   GENITOURINARY:  [ ]Normal [ x] Incontinent   [ ]Oliguria/Anuria   [ ][place]  MUSCULOSKELETAL:   [ ]Normal   [x ]Weakness  [ ]Bed/Wheelchair bound [ ]Edema  NEUROLOGIC:   [ ]No focal deficits  [x ] Cognitive impairment  [ ] Dysphagia [ ]Dysarthria [ ] Paresis [ ]Other   SKIN:   [ ]Normal   [ ]Pressure ulcer(s)  [ ]Rash    CRITICAL CARE:  [ ] Shock Present  [ ]Septic [ ]Cardiogenic [x ]Neurologic [ ]Hypovolemic  [ ]  Vasopressors [ ]  Inotropes   [ ] Respiratory failure present [ ] mechanical ventilation [ ] non-invasive ventilatory support [ ] High flow  [ ] Acute  [ ] Chronic [ ] Hypoxic  [ ] Hypercarbic [ ] Other  [ ] Other organ failure     LABS:                        10.6   9.08  )-----------( 196      ( 28 Oct 2019 21:25 )             32.6   10-28    137  |  101  |  15  ----------------------------<  308<H>  3.9   |  24  |  1.09    Ca    7.0<L>      28 Oct 2019 21:25    TPro  5.2<L>  /  Alb  2.4<L>  /  TBili  0.6  /  DBili  x   /  AST  62<H>  /  ALT  25  /  AlkPhos  95  10-28  PT/INR - ( 28 Oct 2019 21:40 )   PT: 14.2 SEC;   INR: 1.24          PTT - ( 28 Oct 2019 21:40 )  PTT:26.1 SEC      RADIOLOGY & ADDITIONAL STUDIES:  < from: CT Head No Cont (10.29.19 @ 02:34) >    INTERPRETATION:  CLINICAL INFORMATION: Subdural hematoma. Follow-up study.    TECHNIQUE: Noncontrast axial CT images were acquired through the head.   Two-dimensionalsagittal and coronal reformats were generated.    COMPARISON STUDY: CT head from 10/28/2019.    FINDINGS:    Right-sided parafalcine and supratentorial and right frontoparietal   convexity subdural hematoma is unchanged in size. Unchanged mass effect  on the right cerebral hemisphere and right lateral ventricle. No change   in size of the ventricles. Unchanged leftward midline shift measuring 7   mm.    Stable thin left frontal subdural hematoma measuring up to 3 mm.    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    Mucosal thickening of right ethmoid sinus. The mastoid air cells and   middle ear cavities are clear.    The soft tissues of the scalp are unremarkable. The calvarium is intact.    IMPRESSION:     Stable examsince prior study. Stable size of bilateral, right greater   than left subdural hematomas. Stable right to left subfalcine herniation.            < end of copied text >        PROTEIN CALORIE MALNUTRITION PRESENT: [ ] Yes [ ] No  [ ] PPSV2 < or = to 30% [ ] significant weight loss  [ x] poor nutritional intake [ ] catabolic state [ ] anasarca     Albumin, Serum: 2.4 g/dL (10-28-19 @ 21:25)  Artificial Nutrition [ ]     REFERRALS:   [ ]Chaplaincy  [ ] Hospice  [ ]Child Life  [ x]Social Work  [ ]Case management [ ]Holistic Therapy     Goals of Care Document:   Care Coordination Assessment [C. Provider] (10-29-19 @ 10:13)   Progress Notes - Care Coordination [C. Provider] (10-29-19 @ 10:07)

## 2019-10-29 NOTE — OCCUPATIONAL THERAPY INITIAL EVALUATION ADULT - ANTICIPATED DISCHARGE DISPOSITION, OT EVAL
home with family assistance for all functional activities, home OT to promote independence and safety/home w/ OT/home w/ level of assist

## 2019-10-29 NOTE — H&P ADULT - ASSESSMENT
Jorge Browne  79M pmhx lymphoma at rehab s/p chemo with fall on eliquis / ASA81 with R and interhemispheric acute SDH w/ mild shift  - Admit to [facility] for q1h checks  - keppra  - Received Kcentra at OSH, may need further dosing if underdose for weight  - Repeat CTH in ER Jorge Browne  79M pmhx lymphoma at rehab s/p chemo with fall on eliquis / ASA81 with R and interhemispheric acute SDH w/ mild shift  - Admit to [facility] for q1h checks  - keppra  - Received 2000u Jah at [facility]  - Repeat CTH in ER grossly stable f/u read  - Plan for observation / allowance of the SDH to chronicify if possible to facilitate nancy hole evacuation Jorge Browne  79M pmhx lymphoma at rehab s/p chemo with fall on eliquis with R and interhemispheric acute SDH w/ mild shift  - Admit to [facility] for q1h checks  - keppra  - Received 2000u Jah at [facility]  - Repeat CTH in ER grossly stable f/u read  - Plan for observation / allowance of the SDH to chronicify if possible to facilitate nancy hole evacuation

## 2019-10-29 NOTE — ED ADULT NURSE NOTE - NSIMPLEMENTINTERV_GEN_ALL_ED
Implemented All Fall with Harm Risk Interventions:  [place] to call system. Call bell, personal items and telephone within reach. Instruct patient to call for assistance. Room bathroom lighting operational. Non-slip footwear when patient is off stretcher. Physically safe environment: no spills, clutter or unnecessary equipment. Stretcher in lowest position, wheels locked, appropriate side rails in place. Provide visual cue, wrist band, yellow gown, etc. Monitor gait and stability. Monitor for mental status changes and reorient to person, place, and time. Review medications for side effects contributing to fall risk. Reinforce activity limits and safety measures with patient and family. Provide visual clues: red socks.

## 2019-10-29 NOTE — PROGRESS NOTE ADULT - SUBJECTIVE AND OBJECTIVE BOX
79y male with PMH of CAD s/p stents x4, HTN, DM, Hypothyroid, lymphoma presenting after a fall. He was getting out of chair when he fell backward hitting his head. Denies LOC, no symptoms proceeding the fall. Fall was unwitnessed, nurses at [place] stated that down time was a few minutes, daughter spoke to him after the fall he was respond like his normal self, was complaining of a headache. No other complaints, denies chest pain, SOB, abdominal pain, nausea, vomiting, back pain.    Patient was on Eliquis - given Kcentra at [facility].  Patient not on ASA as per patient and family.     Of note, patient currently full code as per family, would want surgical intervention.     On admission, the patient was:  GCS: 15    ***HIGH RISK FOR VTE 2/2 Lymphoma***     Overnight Events: Neurologically stable.     ROS: Negative unless specified.     VITALS:   T(C): 36.8 (10-29-19 @ 07:00), Max: 37.3 (10-29-19 @ 03:36)  HR: 73 (10-29-19 @ 08:00) (67 - 90)  BP: 149/77 (10-29-19 @ 08:00) (107/91 - 163/98)  RR: 18 (10-29-19 @ 08:00) (16 - 22)  SpO2: 95% (10-29-19 @ 08:00) (95% - 98%)    10-28-19 @ 07:01  -  10-29-19 @ 07:00  --------------------------------------------------------  IN: 340 mL / OUT: 0 mL / NET: 340 mL    10-29-19 @ 07:01  -  10-29-19 @ 09:04  --------------------------------------------------------  IN: 210 mL / OUT: 400 mL / NET: -190 mL      LABS:                          10.6   9.08  )-----------( 196      ( 28 Oct 2019 21:25 )             32.6     10-28    137  |  101  |  15  ----------------------------<  308<H>  3.9   |  24  |  1.09    Ca    7.0<L>      28 Oct 2019 21:25    TPro  5.2<L>  /  Alb  2.4<L>  /  TBili  0.6  /  DBili  x   /  AST  62<H>  /  ALT  25  /  AlkPhos  95  10-28    PT/INR - ( 28 Oct 2019 21:40 )   PT: 14.2 SEC;   INR: 1.24          PTT - ( 28 Oct 2019 21:40 )  PTT:26.1 SEC  MEDS:  MEDICATIONS  (STANDING):  chlorhexidine 4% Liquid 1 Application(s) Topical <User Schedule>  dexAMETHasone     Tablet 2 milliGRAM(s) Oral <User Schedule>  dextrose 5%. 1000 milliLiter(s) (50 mL/Hr) IV Continuous <Continuous>  dextrose 50% Injectable 12.5 Gram(s) IV Push once  dextrose 50% Injectable 25 Gram(s) IV Push once  dextrose 50% Injectable 25 Gram(s) IV Push once  famotidine    Tablet 20 milliGRAM(s) Oral two times a day  fludroCORTISONE 0.1 milliGRAM(s) Oral two times a day  insulin lispro (HumaLOG) corrective regimen sliding scale   SubCutaneous every 6 hours  levETIRAcetam  IVPB 500 milliGRAM(s) IV Intermittent every 12 hours  levothyroxine 88 MICROGram(s) Oral daily  midodrine. 5 milliGRAM(s) Oral three times a day  polyethylene glycol 3350 17 Gram(s) Oral daily  simvastatin 20 milliGRAM(s) Oral at bedtime  sodium chloride 0.9% lock flush 3 milliLiter(s) IV Push every 8 hours  sodium chloride 0.9%. 1000 milliLiter(s) (75 mL/Hr) IV Continuous <Continuous>    EXAMINATION:  General:  calm  HEENT:  MMM  Neuro:  awake, alert, oriented x 2, follows commands,  Pupils Equal and reactive, RUE 5/5,  LUE 4/5 with mild drift,  RLE 5/5,  LLE 4/5, no drift   Cards:  s1, s2 RRR  Respiratory:  lungs clear b/l   [place]:  soft  Extremities:  no edema  Skin:  warm/dry 79y male with PMH of CAD s/p stents x4 (most recent one 2017 per daughter), HTN, DM, Hypothyroid, diffuse large B cell lymphoma presenting after a fall. He was in rehab for deconditioning after chemotherapy at the time, and was sitting in a wheelchair when he tried to stand up on his own and fell from standing. Denies LOC, no symptoms proceeding the fall. Pt reports that he remembers events preceding the fall and after the fall. Fall was unwitnessed, nurses at [place] stated that down time was a few minutes, daughter spoke to him after the fall he was respond like his normal self, was complaining of a headache. No other complaints, denies chest pain, SOB, abdominal pain, nausea, vomiting, back pain.    Patient was on Eliquis at the time for LUE DVT - was given Kcentra at [facility].    Patient was no longer on DAPT because his stents are >1year old. Normally takes a baby aspirin, but per patient and family his baby aspirin was being held for 1 week prior to fall due to GIB (GIB now resolved s/p clip, per daughter)    Patient has had several workups for fall/syncope in recent history. Was determined to have orthostatic hypotension, and was started on midodrine and fluoronef. Daughter (physician) reports that his blood pressure has been stable on this current regimen.  Daughter also reports that patient had been on megase for appetite stimulation as well.    On admission, the patient was:  GCS: 15    ***HIGH RISK FOR VTE 2/2 Lymphoma***     Overnight Events: Neurologically stable.     ROS: Negative unless specified.     VITALS:   T(C): 36.8 (10-29-19 @ 07:00), Max: 37.3 (10-29-19 @ 03:36)  HR: 73 (10-29-19 @ 08:00) (67 - 90)  BP: 149/77 (10-29-19 @ 08:00) (107/91 - 163/98)  RR: 18 (10-29-19 @ 08:00) (16 - 22)  SpO2: 95% (10-29-19 @ 08:00) (95% - 98%)    10-28-19 @ 07:01  -  10-29-19 @ 07:00  --------------------------------------------------------  IN: 340 mL / OUT: 0 mL / NET: 340 mL    10-29-19 @ 07:01  -  10-29-19 @ 09:04  --------------------------------------------------------  IN: 210 mL / OUT: 400 mL / NET: -190 mL      LABS:                          10.6   9.08  )-----------( 196      ( 28 Oct 2019 21:25 )             32.6     10-28    137  |  101  |  15  ----------------------------<  308<H>  3.9   |  24  |  1.09    Ca    7.0<L>      28 Oct 2019 21:25    TPro  5.2<L>  /  Alb  2.4<L>  /  TBili  0.6  /  DBili  x   /  AST  62<H>  /  ALT  25  /  AlkPhos  95  10-28    PT/INR - ( 28 Oct 2019 21:40 )   PT: 14.2 SEC;   INR: 1.24          PTT - ( 28 Oct 2019 21:40 )  PTT:26.1 SEC    MEDS:  MEDICATIONS  (STANDING):  chlorhexidine 4% Liquid 1 Application(s) Topical <User Schedule>  dexAMETHasone     Tablet 2 milliGRAM(s) Oral <User Schedule>  dextrose 5%. 1000 milliLiter(s) (50 mL/Hr) IV Continuous <Continuous>  dextrose 50% Injectable 12.5 Gram(s) IV Push once  dextrose 50% Injectable 25 Gram(s) IV Push once  dextrose 50% Injectable 25 Gram(s) IV Push once  famotidine    Tablet 20 milliGRAM(s) Oral two times a day  fludroCORTISONE 0.1 milliGRAM(s) Oral two times a day  insulin lispro (HumaLOG) corrective regimen sliding scale   SubCutaneous every 6 hours  levETIRAcetam  IVPB 500 milliGRAM(s) IV Intermittent every 12 hours  levothyroxine 88 MICROGram(s) Oral daily  midodrine. 5 milliGRAM(s) Oral three times a day  polyethylene glycol 3350 17 Gram(s) Oral daily  simvastatin 20 milliGRAM(s) Oral at bedtime  sodium chloride 0.9% lock flush 3 milliLiter(s) IV Push every 8 hours  sodium chloride 0.9%. 1000 milliLiter(s) (75 mL/Hr) IV Continuous <Continuous>    EXAMINATION:   General:  No acute distress. Pleasant, calm, cooperative.  HEENT:  MMM. NGT in place.  Neuro:  AAOx4, follows commands. PERRL. RUE 5/5, LUE 4/5 with mild drift,  RLE 5/5,  LLE 4/5, no drift.  Cards:  s1, s2 RRR  Respiratory:  lungs clear b/l   Abdomen:  soft, nondistended, nontender  Extremities:  mild edema to ankles bilaterally  Skin:  warm/dry

## 2019-10-29 NOTE — ED PROVIDER NOTE - NEUROLOGICAL, MLM
A&O x3. CN 2-12 intact. Strength 5/5 throughout b/l right sided UE and LE. Left sided ext with slightly decreased global strength. Sensation intact to light touch throughout b/l UE and LE

## 2019-10-29 NOTE — SWALLOW BEDSIDE ASSESSMENT ADULT - SWALLOW EVAL: DIAGNOSIS
Pt presents with evidence of 1) oropharyngeal dysphagia notable for mildly prolonged oral management of chewables, and ? s/s laryngeal penetration/aspiration with thin liquids, 2) suspected cognitive-linguistic deficits.

## 2019-10-29 NOTE — ED PROVIDER NOTE - CLINICAL SUMMARY MEDICAL DECISION MAKING FREE TEXT BOX
Pt transferred for NSG evaluation; NSG at bedside promptly upon arrival to ED, plan for admission; workup completed at [facility]

## 2019-10-29 NOTE — ED PROVIDER NOTE - OBJECTIVE STATEMENT
79y male with PMH of CAD s/p stents x4, HTN, DM, Hypothyroid, lymphoma presents after a fall while getting up from a chair and hit his head. Does not believe he lost consciousness and recalls everythin that happened. Was not altered when nursing staff found him at his NH; only complaining of HA.  Denies recent CP/sob f/c n/v/d

## 2019-10-30 LAB
ANION GAP SERPL CALC-SCNC: 10 MMOL/L — SIGNIFICANT CHANGE UP (ref 5–17)
ANION GAP SERPL CALC-SCNC: 12 MMOL/L — SIGNIFICANT CHANGE UP (ref 5–17)
APTT BLD: 18.5 SEC — LOW (ref 27.5–36.3)
BUN SERPL-MCNC: 17 MG/DL — SIGNIFICANT CHANGE UP (ref 7–23)
BUN SERPL-MCNC: 17 MG/DL — SIGNIFICANT CHANGE UP (ref 7–23)
CALCIUM SERPL-MCNC: 7.4 MG/DL — LOW (ref 8.4–10.5)
CALCIUM SERPL-MCNC: 7.4 MG/DL — LOW (ref 8.4–10.5)
CHLORIDE SERPL-SCNC: 101 MMOL/L — SIGNIFICANT CHANGE UP (ref 96–108)
CHLORIDE SERPL-SCNC: 102 MMOL/L — SIGNIFICANT CHANGE UP (ref 96–108)
CO2 SERPL-SCNC: 23 MMOL/L — SIGNIFICANT CHANGE UP (ref 22–31)
CO2 SERPL-SCNC: 27 MMOL/L — SIGNIFICANT CHANGE UP (ref 22–31)
CREAT SERPL-MCNC: 0.92 MG/DL — SIGNIFICANT CHANGE UP (ref 0.5–1.3)
CREAT SERPL-MCNC: 1.02 MG/DL — SIGNIFICANT CHANGE UP (ref 0.5–1.3)
GLUCOSE BLDC GLUCOMTR-MCNC: 155 MG/DL — HIGH (ref 70–99)
GLUCOSE BLDC GLUCOMTR-MCNC: 172 MG/DL — HIGH (ref 70–99)
GLUCOSE BLDC GLUCOMTR-MCNC: 211 MG/DL — HIGH (ref 70–99)
GLUCOSE BLDC GLUCOMTR-MCNC: 226 MG/DL — HIGH (ref 70–99)
GLUCOSE SERPL-MCNC: 199 MG/DL — HIGH (ref 70–99)
GLUCOSE SERPL-MCNC: 221 MG/DL — HIGH (ref 70–99)
HCT VFR BLD CALC: 36 % — LOW (ref 39–50)
HGB BLD-MCNC: 11.4 G/DL — LOW (ref 13–17)
INR BLD: 0.94 RATIO — SIGNIFICANT CHANGE UP (ref 0.88–1.16)
MAGNESIUM SERPL-MCNC: 1.9 MG/DL — SIGNIFICANT CHANGE UP (ref 1.6–2.6)
MAGNESIUM SERPL-MCNC: 2.1 MG/DL — SIGNIFICANT CHANGE UP (ref 1.6–2.6)
MCHC RBC-ENTMCNC: 29.2 PG — SIGNIFICANT CHANGE UP (ref 27–34)
MCHC RBC-ENTMCNC: 31.7 GM/DL — LOW (ref 32–36)
MCV RBC AUTO: 92.3 FL — SIGNIFICANT CHANGE UP (ref 80–100)
NRBC # BLD: 0 /100 WBCS — SIGNIFICANT CHANGE UP (ref 0–0)
PHOSPHATE SERPL-MCNC: 1.4 MG/DL — LOW (ref 2.5–4.5)
PHOSPHATE SERPL-MCNC: 2.6 MG/DL — SIGNIFICANT CHANGE UP (ref 2.5–4.5)
PLATELET # BLD AUTO: 211 K/UL — SIGNIFICANT CHANGE UP (ref 150–400)
POTASSIUM SERPL-MCNC: 3.5 MMOL/L — SIGNIFICANT CHANGE UP (ref 3.5–5.3)
POTASSIUM SERPL-MCNC: 5.1 MMOL/L — SIGNIFICANT CHANGE UP (ref 3.5–5.3)
POTASSIUM SERPL-SCNC: 3.5 MMOL/L — SIGNIFICANT CHANGE UP (ref 3.5–5.3)
POTASSIUM SERPL-SCNC: 5.1 MMOL/L — SIGNIFICANT CHANGE UP (ref 3.5–5.3)
PROTHROM AB SERPL-ACNC: 10.8 SEC — SIGNIFICANT CHANGE UP (ref 10–12.9)
RBC # BLD: 3.9 M/UL — LOW (ref 4.2–5.8)
RBC # FLD: 21.3 % — HIGH (ref 10.3–14.5)
SODIUM SERPL-SCNC: 135 MMOL/L — SIGNIFICANT CHANGE UP (ref 135–145)
SODIUM SERPL-SCNC: 140 MMOL/L — SIGNIFICANT CHANGE UP (ref 135–145)
WBC # BLD: 9.88 K/UL — SIGNIFICANT CHANGE UP (ref 3.8–10.5)
WBC # FLD AUTO: 9.88 K/UL — SIGNIFICANT CHANGE UP (ref 3.8–10.5)

## 2019-10-30 PROCEDURE — 99232 SBSQ HOSP IP/OBS MODERATE 35: CPT

## 2019-10-30 PROCEDURE — 99291 CRITICAL CARE FIRST HOUR: CPT

## 2019-10-30 RX ORDER — INSULIN GLARGINE 100 [IU]/ML
5 INJECTION, SOLUTION SUBCUTANEOUS AT BEDTIME
Refills: 0 | Status: DISCONTINUED | OUTPATIENT
Start: 2019-10-30 | End: 2019-10-30

## 2019-10-30 RX ORDER — FUROSEMIDE 40 MG
20 TABLET ORAL ONCE
Refills: 0 | Status: COMPLETED | OUTPATIENT
Start: 2019-10-30 | End: 2019-10-30

## 2019-10-30 RX ORDER — POTASSIUM CHLORIDE 20 MEQ
40 PACKET (EA) ORAL ONCE
Refills: 0 | Status: COMPLETED | OUTPATIENT
Start: 2019-10-30 | End: 2019-10-30

## 2019-10-30 RX ORDER — INSULIN GLARGINE 100 [IU]/ML
5 INJECTION, SOLUTION SUBCUTANEOUS EVERY MORNING
Refills: 0 | Status: DISCONTINUED | OUTPATIENT
Start: 2019-10-30 | End: 2019-11-01

## 2019-10-30 RX ORDER — SENNA PLUS 8.6 MG/1
2 TABLET ORAL AT BEDTIME
Refills: 0 | Status: DISCONTINUED | OUTPATIENT
Start: 2019-10-30 | End: 2019-11-05

## 2019-10-30 RX ORDER — INSULIN LISPRO 100/ML
VIAL (ML) SUBCUTANEOUS
Refills: 0 | Status: DISCONTINUED | OUTPATIENT
Start: 2019-10-30 | End: 2019-11-05

## 2019-10-30 RX ADMIN — Medication 2: at 12:23

## 2019-10-30 RX ADMIN — Medication 40 MILLIEQUIVALENT(S): at 03:28

## 2019-10-30 RX ADMIN — Medication 40 MILLIEQUIVALENT(S): at 14:57

## 2019-10-30 RX ADMIN — SIMVASTATIN 20 MILLIGRAM(S): 20 TABLET, FILM COATED ORAL at 21:33

## 2019-10-30 RX ADMIN — Medication 20 MILLIGRAM(S): at 12:23

## 2019-10-30 RX ADMIN — ESCITALOPRAM OXALATE 5 MILLIGRAM(S): 10 TABLET, FILM COATED ORAL at 16:32

## 2019-10-30 RX ADMIN — FLUDROCORTISONE ACETATE 0.1 MILLIGRAM(S): 0.1 TABLET ORAL at 17:45

## 2019-10-30 RX ADMIN — LEVETIRACETAM 400 MILLIGRAM(S): 250 TABLET, FILM COATED ORAL at 18:15

## 2019-10-30 RX ADMIN — Medication 2 MILLIGRAM(S): at 08:38

## 2019-10-30 RX ADMIN — FAMOTIDINE 20 MILLIGRAM(S): 10 INJECTION INTRAVENOUS at 05:35

## 2019-10-30 RX ADMIN — LEVETIRACETAM 400 MILLIGRAM(S): 250 TABLET, FILM COATED ORAL at 05:35

## 2019-10-30 RX ADMIN — CHLORHEXIDINE GLUCONATE 1 APPLICATION(S): 213 SOLUTION TOPICAL at 21:33

## 2019-10-30 RX ADMIN — Medication 88 MICROGRAM(S): at 05:35

## 2019-10-30 RX ADMIN — Medication 4: at 17:42

## 2019-10-30 RX ADMIN — FLUDROCORTISONE ACETATE 0.1 MILLIGRAM(S): 0.1 TABLET ORAL at 05:35

## 2019-10-30 RX ADMIN — Medication 2: at 22:27

## 2019-10-30 RX ADMIN — Medication 40 MILLIEQUIVALENT(S): at 12:23

## 2019-10-30 RX ADMIN — MIDODRINE HYDROCHLORIDE 5 MILLIGRAM(S): 2.5 TABLET ORAL at 05:35

## 2019-10-30 RX ADMIN — MIDODRINE HYDROCHLORIDE 5 MILLIGRAM(S): 2.5 TABLET ORAL at 16:35

## 2019-10-30 RX ADMIN — FAMOTIDINE 20 MILLIGRAM(S): 10 INJECTION INTRAVENOUS at 17:45

## 2019-10-30 RX ADMIN — Medication 4: at 08:37

## 2019-10-30 NOTE — PROGRESS NOTE ADULT - SUBJECTIVE AND OBJECTIVE BOX
79y male with PMH of CAD s/p stents x4 (most recent one 2017 per daughter), HTN, DM, Hypothyroid, diffuse large B cell lymphoma presenting after a fall. He was in rehab for deconditioning after chemotherapy at the time, and was sitting in a wheelchair when he tried to stand up on his own and fell from standing. Denies LOC, no symptoms proceeding the fall. Pt reports that he remembers events preceding the fall and after the fall. Fall was unwitnessed, nurses at [place] stated that down time was a few minutes, daughter spoke to him after the fall he was respond like his normal self, was complaining of a headache. No other complaints, denies chest pain, SOB, abdominal pain, nausea, vomiting, back pain.    Patient was on Eliquis at the time for LUE DVT - was given Kcentra at [facility].    Patient was no longer on DAPT because his stents are >1year old. Normally takes a baby aspirin, but per patient and family his baby aspirin was being held for 1 week prior to fall due to GIB (GIB now resolved s/p clip, per daughter)    Patient has had several workups for fall/syncope in recent history. Was determined to have orthostatic hypotension, and was started on midodrine and fluoronef. Daughter (physician) reports that his blood pressure has been stable on this current regimen.  Daughter also reports that patient had been on megase for appetite stimulation as well.    On admission, the patient was:  GCS: 15    ***HIGH RISK FOR VTE 2/2 Lymphoma***     Overnight Events: [facility] Course:  10/29: Dopplers show acute calf DVT affecting R peroneal, R soleal, R gastrocnemius veins and L soleal vein; acute upper extremity DVT in L brachial and L axillary veins    ROS: Negative unless specified.     ICU Vital Signs Last 24 Hrs  T(C): 36.6 (30 Oct 2019 07:00), Max: 37.3 (29 Oct 2019 17:00)  T(F): 97.8 (30 Oct 2019 07:00), Max: 99.2 (29 Oct 2019 17:00)  HR: 80 (30 Oct 2019 08:00) (68 - 98)  BP: 150/82 (30 Oct 2019 08:00) (105/59 - 150/82)  BP(mean): 101 (30 Oct 2019 08:00) (74 - 106)  ABP: --  ABP(mean): --  RR: 17 (30 Oct 2019 08:00) (14 - 24)  SpO2: 98% (30 Oct 2019 08:00) (93% - 98%)    I&O's Summary    29 Oct 2019 07:01  -  30 Oct 2019 07:00  --------------------------------------------------------  IN: 2500 mL / OUT: 1803 mL / NET: 697 mL          LABS:                        9.8    12.73 )-----------( 179      ( 29 Oct 2019 21:07 )             29.3     10-30    140  |  101  |  17  ----------------------------<  221<H>  3.5   |  27  |  1.02    Ca    7.4<L>      30 Oct 2019 05:09  Phos  2.6     10-30  Mg     2.1     10-30    TPro  5.2<L>  /  Alb  2.4<L>  /  TBili  0.6  /  DBili  x   /  AST  62<H>  /  ALT  25  /  AlkPhos  95  10-28        MEDS:  MEDICATIONS  (STANDING):  chlorhexidine 4% Liquid 1 Application(s) Topical <User Schedule>  dexAMETHasone     Tablet 2 milliGRAM(s) Oral <User Schedule>  dextrose 5%. 1000 milliLiter(s) (50 mL/Hr) IV Continuous <Continuous>  dextrose 50% Injectable 12.5 Gram(s) IV Push once  dextrose 50% Injectable 25 Gram(s) IV Push once  dextrose 50% Injectable 25 Gram(s) IV Push once  escitalopram 5 milliGRAM(s) Oral daily  famotidine    Tablet 20 milliGRAM(s) Oral two times a day  fludroCORTISONE 0.1 milliGRAM(s) Oral two times a day  insulin lispro (HumaLOG) corrective regimen sliding scale   SubCutaneous three times a day before meals  levETIRAcetam  IVPB 500 milliGRAM(s) IV Intermittent every 12 hours  levothyroxine 88 MICROGram(s) Oral daily  midodrine. 5 milliGRAM(s) Oral three times a day  polyethylene glycol 3350 17 Gram(s) Oral daily  simvastatin 20 milliGRAM(s) Oral at bedtime    MEDICATIONS  (PRN):  acetaminophen   Tablet .. 650 milliGRAM(s) Oral every 6 hours PRN Temp greater or equal to 38C (100.4F), Mild Pain (1 - 3)  dextrose 40% Gel 15 Gram(s) Oral once PRN Blood Glucose LESS THAN 70 milliGRAM(s)/deciliter  glucagon  Injectable 1 milliGRAM(s) IntraMuscular once PRN Glucose LESS THAN 70 milligrams/deciliter  ondansetron Injectable 4 milliGRAM(s) IV Push every 6 hours PRN Nausea and/or Vomiting  senna 2 Tablet(s) Oral at bedtime PRN Constipation      EXAMINATION:   General:  No acute distress. Pleasant, calm, cooperative.  HEENT:  MMM. NGT in place.  Neuro:  AAOx4, follows commands. PERRL. RUE 5/5, LUE 4/5 with mild drift,  RLE 5/5,  LLE 4/5, no drift.  Cards:  s1, s2 RRR  Respiratory:  lungs clear b/l; decreased breath sounds at bases  Abdomen:  soft, nondistended, nontender  Extremities:  mild edema to ankles bilaterally  Skin:  warm/dry 79y male with PMH of CAD s/p stents x4 (most recent one 2017 per daughter), HTN, DM, Hypothyroid, diffuse large B cell lymphoma presenting after a fall. He was in rehab for deconditioning after chemotherapy at the time, and was sitting in a wheelchair when he tried to stand up on his own and fell from standing. Denies LOC, no symptoms proceeding the fall. Pt reports that he remembers events preceding the fall and after the fall. Fall was unwitnessed, nurses at [place] stated that down time was a few minutes, daughter spoke to him after the fall he was respond like his normal self, was complaining of a headache. No other complaints, denies chest pain, SOB, abdominal pain, nausea, vomiting, back pain.    Patient was on Eliquis at the time for LUE DVT - was given Kcentra at [facility].    Patient was no longer on DAPT because his stents are >1year old. Normally takes a baby aspirin, but per patient and family his baby aspirin was being held for 1 week prior to fall due to GIB (GIB now resolved s/p clip, per daughter)    Patient has had several workups for fall/syncope in recent history. Was determined to have orthostatic hypotension, and was started on midodrine and fluoronef. Daughter (physician) reports that his blood pressure has been stable on this current regimen.  Daughter also reports that patient had been on megase for appetite stimulation as well.    On admission, the patient was:  GCS: 15    ***HIGH RISK FOR VTE 2/2 Lymphoma***     Overnight Events: [facility] Course:  10/29: Dopplers show acute calf DVT affecting R peroneal, R soleal, R gastrocnemius veins and L soleal vein; acute upper extremity DVT in L brachial and L axillary veins    ROS: Negative unless specified.     T(C): 36.7 (10-30-19 @ 11:00), Max: 37.3 (10-29-19 @ 17:00)  HR: 82 (10-30-19 @ 11:00) (68 - 98)  BP: 120/76 (10-30-19 @ 11:00) (105/59 - 150/82)  RR: 15 (10-30-19 @ 11:00) (14 - 18)  SpO2: 95% (10-30-19 @ 11:00) (93% - 98%)  10-29-19 @ 07:01  -  10-30-19 @ 07:00  --------------------------------------------------------  IN: 2500 mL / OUT: 1803 mL / NET: 697 mL    acetaminophen   Tablet .. 650 milliGRAM(s) Oral every 6 hours PRN  chlorhexidine 4% Liquid 1 Application(s) Topical <User Schedule>  dexAMETHasone     Tablet 2 milliGRAM(s) Oral <User Schedule>  dextrose 40% Gel 15 Gram(s) Oral once PRN  dextrose 5%. 1000 milliLiter(s) IV Continuous <Continuous>  dextrose 50% Injectable 12.5 Gram(s) IV Push once  dextrose 50% Injectable 25 Gram(s) IV Push once  dextrose 50% Injectable 25 Gram(s) IV Push once  escitalopram 5 milliGRAM(s) Oral daily  famotidine    Tablet 20 milliGRAM(s) Oral two times a day  fludroCORTISONE 0.1 milliGRAM(s) Oral two times a day  glucagon  Injectable 1 milliGRAM(s) IntraMuscular once PRN  insulin lispro (HumaLOG) corrective regimen sliding scale   SubCutaneous three times a day before meals  levETIRAcetam  IVPB 500 milliGRAM(s) IV Intermittent every 12 hours  levothyroxine 88 MICROGram(s) Oral daily  midodrine. 5 milliGRAM(s) Oral three times a day  ondansetron Injectable 4 milliGRAM(s) IV Push every 6 hours PRN  polyethylene glycol 3350 17 Gram(s) Oral daily  senna 2 Tablet(s) Oral at bedtime PRN  simvastatin 20 milliGRAM(s) Oral at bedtime  senna 2 Tablet(s) Oral at bedtime PRN Constipation      EXAMINATION:   General:  No acute distress. Pleasant, calm, cooperative.  HEENT:  MMM. NGT in place.  Neuro:  AAOx4, follows commands. PERRL. RUE 5/5, LUE 4/5 with mild drift,  RLE 5/5,  LLE 4/5, no drift.  Cards:  s1, s2 RRR  Respiratory:  lungs clear b/l; decreased breath sounds at bases  Abdomen:  soft, nondistended, nontender  Extremities:  mild edema to ankles bilaterally  Skin:  warm/dry      LABS:  Na: 140 (10-30 @ 05:09), 136 (10-29 @ 21:07), 137 (10-28 @ 21:25)  K: 3.5 (10-30 @ 05:09), 2.6 (10-29 @ 21:07), 3.9 (10-28 @ 21:25)  Cl: 101 (10-30 @ 05:09), 99 (10-29 @ 21:07), 101 (10-28 @ 21:25)  CO2: 27 (10-30 @ 05:09), 27 (10-29 @ 21:07), 24 (10-28 @ 21:25)  BUN: 17 (10-30 @ 05:09), 17 (10-29 @ 21:07), 15 (10-28 @ 21:25)  Cr: 1.02 (10-30 @ 05:09), 0.98 (10-29 @ 21:07), 1.09 (10-28 @ 21:25)  Glu: 221(10-30 @ 05:09), 169(10-29 @ 21:07), 308(10-28 @ 21:25)    Hgb: 9.8 (10-29 @ 21:07), 10.6 (10-28 @ 21:25)  Hct: 29.3 (10-29 @ 21:07), 32.6 (10-28 @ 21:25)  WBC: 12.73 (10-29 @ 21:07), 9.08 (10-28 @ 21:25)  Plt: 179 (10-29 @ 21:07), 196 (10-28 @ 21:25)    INR: 1.24 10-28-19 @ 21:40  PTT: 26.1 10-28-19 @ 21:40

## 2019-10-30 NOTE — PROGRESS NOTE ADULT - ASSESSMENT
79y male with PMH of Cognitive delay, CAD s/p stents x4, HTN, DM, LVOT obstruction, Hypothyroid, lymphoma, LUE DVT (on eliquis) who presented after a mechanical fall.  Found to have acute R SDH as well as acute b/l below knee DVTs.  Currently hemodynamically stable w/ no evidence of PE.      RECS  - cont current management as per NICU team  - obtain LE venous duplex tomorrow for surveillance.  If DVTs have progressed above the knee, will proceed w/ IVC filter  - if cleared by neurosurgery, start SC lovenox 40 mg daily for DVT prophylaxis      Plan discussed w/ Dr. Sparks

## 2019-10-30 NOTE — CONSULT NOTE ADULT - ASSESSMENT
cad history of stent  stable  off antiplt therapy for now     Mild AS  stable    Hypotension   on midodrine/florinef    Below knee DVT  fu with vascular card

## 2019-10-30 NOTE — PROGRESS NOTE ADULT - SUBJECTIVE AND OBJECTIVE BOX
DNR/DNI  + LE VTE     AAOx4, follows commands. PERRL. RUE 5/5, LUE 4/5 with mild drift,  RLE 5/5,  LLE 4/5, no drift.

## 2019-10-30 NOTE — PROGRESS NOTE ADULT - SUBJECTIVE AND OBJECTIVE BOX
Vascular Cardiology      PAGER:  [phone]               [facility] [id number]              EMAIL [email or web address]   OFFICE [phone]    INTERVAL HISTORY:    - no acute overnight events         Allergies    No Known Allergies    Intolerances    	    MEDICATIONS:  midodrine. 5 milliGRAM(s) Oral three times a day        acetaminophen   Tablet .. 650 milliGRAM(s) Oral every 6 hours PRN  escitalopram 5 milliGRAM(s) Oral daily  levETIRAcetam  IVPB 500 milliGRAM(s) IV Intermittent every 12 hours  ondansetron Injectable 4 milliGRAM(s) IV Push every 6 hours PRN    famotidine    Tablet 20 milliGRAM(s) Oral two times a day  polyethylene glycol 3350 17 Gram(s) Oral daily  senna 2 Tablet(s) Oral at bedtime PRN    dexAMETHasone     Tablet 2 milliGRAM(s) Oral <User Schedule>  dextrose 40% Gel 15 Gram(s) Oral once PRN  dextrose 50% Injectable 12.5 Gram(s) IV Push once  dextrose 50% Injectable 25 Gram(s) IV Push once  dextrose 50% Injectable 25 Gram(s) IV Push once  fludroCORTISONE 0.1 milliGRAM(s) Oral two times a day  glucagon  Injectable 1 milliGRAM(s) IntraMuscular once PRN  insulin lispro (HumaLOG) corrective regimen sliding scale   SubCutaneous three times a day before meals  levothyroxine 88 MICROGram(s) Oral daily  simvastatin 20 milliGRAM(s) Oral at bedtime    chlorhexidine 4% Liquid 1 Application(s) Topical <User Schedule>  dextrose 5%. 1000 milliLiter(s) IV Continuous <Continuous>      PAST MEDICAL & SURGICAL HISTORY:  CAD (coronary artery disease)  Adult hypothyroidism  Hyperlipidemia  Peptic ulcer: s/p treatment for H pylori  Diabetes  Essential hypertension  S/P coronary artery stent placement: x4, last one in early 2018      FAMILY HISTORY:      SOCIAL HISTORY:  unchanged    REVIEW OF SYSTEMS:  unable to fully assess as patient has cognitive delay/dementia 	    [ ] All others negative	  [ ] Unable to obtain    PHYSICAL EXAM:  T(C): 36.6 (10-30-19 @ 07:00), Max: 37.3 (10-29-19 @ 17:00)  HR: 80 (10-30-19 @ 08:00) (68 - 98)  BP: 150/82 (10-30-19 @ 08:00) (105/59 - 150/82)  RR: 17 (10-30-19 @ 08:00) (14 - 21)  SpO2: 98% (10-30-19 @ 08:00) (93% - 98%)  Wt(kg): --  I&O's Summary    29 Oct 2019 07:01  -  30 Oct 2019 07:00  --------------------------------------------------------  IN: 2500 mL / OUT: 1803 mL / NET: 697 mL        Appearance: Normal	  HEENT:   Normal oral mucosa	  Lymphatic: No lymphadenopathy  Cardiovascular: Normal S1 S2, Normal JVP, II/VI systolic ejection murmur audible along left sternal border   Respiratory: Lungs clear to auscultation	  Psychiatry: A & O x 3, Mood & affect appropriate  Gastrointestinal:  Soft, Non-tender, + BS	  Skin: No rashes, No ecchymoses, No cyanosis	  Neurologic: Non-focal    Extremities: +1 BLE pitting edema w/ tenderness to palpation.     Vascular: Peripheral pulses palpable 2+ bilaterally      LABS:	 	    CBC Full  -  ( 29 Oct 2019 21:07 )  WBC Count : 12.73 K/uL  Hemoglobin : 9.8 g/dL  Hematocrit : 29.3 %  Platelet Count - Automated : 179 K/uL  Mean Cell Volume : 89.9 fl  Mean Cell Hemoglobin : 30.1 pg  Mean Cell Hemoglobin Concentration : 33.4 gm/dL  Auto Neutrophil # : x  Auto Lymphocyte # : x  Auto Monocyte # : x  Auto Eosinophil # : x  Auto Basophil # : x  Auto Neutrophil % : x  Auto Lymphocyte % : x  Auto Monocyte % : x  Auto Eosinophil % : x  Auto Basophil % : x    10-30    140  |  101  |  17  ----------------------------<  221<H>  3.5   |  27  |  1.02  10-29    136  |  99  |  17  ----------------------------<  169<H>  2.6<LL>   |  27  |  0.98    Ca    7.4<L>      30 Oct 2019 05:09  Ca    7.1<L>      29 Oct 2019 21:07  Phos  2.6     10-30  Phos  1.7     10-29  Mg     2.1     10-30  Mg     1.6     10-29    TPro  5.2<L>  /  Alb  2.4<L>  /  TBili  0.6  /  DBili  x   /  AST  62<H>  /  ALT  25  /  AlkPhos  95  10-28      Telemetry:  sinus rhythm, HR 80s

## 2019-10-30 NOTE — PROGRESS NOTE ADULT - ASSESSMENT
TSDH 2/2 fall    CT Head- stable bilateral SDH    Cont Decadron (for h/o lymphoma)  keppra for seizure ppx for 7 days  C/w Lexapro 5mg qd (home med)  IVL  dysphagia diet   lantus   repeat dopplers for potential ivc filter

## 2019-10-30 NOTE — PROGRESS NOTE ADULT - ASSESSMENT
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 Found to have right-sided SDH with mass effect and 8 mm midline shift to left. Also found to have RLE DVT. Nephrology is consulted for CKD and volume overload.    CKD stage 4  - baseline Cr ~2 for the past year; ? current Cr is dilutional from volume overload    - Avoid nephrotoxins including NSAIDs, IV contrast (if possible), [employer] products  - maintain MAP >65  - renal diet once NPO is lifted    Volume overload  - tends to drink lots of fluids at home  - 1L fluid restriction once NPO is lifted  - continue lasix 20 mg IV daily  - monitor I/O's accurately    HTN  - has Hx HTN but was on midodrine and florinef during last admission for hypotension, was undergoing adrenal insufficiency workup w/ endocrine   - BP acceptable/minimally elevated this AM ('s)  - goal BP <140/90  - continue lasix  - monitor BP   - consider endocrine consult for followup of prior workup of AI    Hypocalcemia  - Has Hx hypercalcemia of malignancy  - hypocalcemia is improved; corrected Ca today is 8.7  - monitor

## 2019-10-30 NOTE — PROGRESS NOTE ADULT - SUBJECTIVE AND OBJECTIVE BOX
[facility]   MD Anusha Siddiqui D.O. Fatima Sheikh, D.O. Ruoru Wong, PA    From 7 AM - 5 PM:  OFFICE: [phone]  Dr. Radford cell: [phone]  Dr. Townsend cell: [phone]  Dr. Steinberg cell: [phone]  LEATHA Agee cell: [phone]    From 5 PM - 7 AM: Answering Service: [phone]        RENAL FOLLOW UP NOTE  --------------------------------------------------------------------------------  HPI: Pt seen and examined. Has no complaints today. Denies any pain, SOB, cough.        PAST HISTORY  --------------------------------------------------------------------------------  No significant changes to PMH, PSH, FHx, SHx, unless otherwise noted    ALLERGIES & MEDICATIONS  --------------------------------------------------------------------------------  Allergies    No Known Allergies    Intolerances      Standing Inpatient Medications  chlorhexidine 4% Liquid 1 Application(s) Topical <User Schedule>  dexAMETHasone     Tablet 2 milliGRAM(s) Oral <User Schedule>  dextrose 5%. 1000 milliLiter(s) IV Continuous <Continuous>  dextrose 50% Injectable 12.5 Gram(s) IV Push once  dextrose 50% Injectable 25 Gram(s) IV Push once  dextrose 50% Injectable 25 Gram(s) IV Push once  escitalopram 5 milliGRAM(s) Oral daily  famotidine    Tablet 20 milliGRAM(s) Oral two times a day  fludroCORTISONE 0.1 milliGRAM(s) Oral two times a day  insulin lispro (HumaLOG) corrective regimen sliding scale   SubCutaneous three times a day before meals  levETIRAcetam  IVPB 500 milliGRAM(s) IV Intermittent every 12 hours  levothyroxine 88 MICROGram(s) Oral daily  midodrine. 5 milliGRAM(s) Oral three times a day  polyethylene glycol 3350 17 Gram(s) Oral daily  simvastatin 20 milliGRAM(s) Oral at bedtime    PRN Inpatient Medications  acetaminophen   Tablet .. 650 milliGRAM(s) Oral every 6 hours PRN  dextrose 40% Gel 15 Gram(s) Oral once PRN  glucagon  Injectable 1 milliGRAM(s) IntraMuscular once PRN  ondansetron Injectable 4 milliGRAM(s) IV Push every 6 hours PRN  senna 2 Tablet(s) Oral at bedtime PRN      REVIEW OF SYSTEMS  --------------------------------------------------------------------------------  General: no fever  CVS: no chest pain  RESP: no sob, no cough  ABD: no abdominal pain  : no dysuria,  MSK: no edema     VITALS/PHYSICAL EXAM  --------------------------------------------------------------------------------  T(C): 36.6 (10-30-19 @ 07:00), Max: 37.3 (10-29-19 @ 17:00)  HR: 80 (10-30-19 @ 08:00) (68 - 98)  BP: 150/82 (10-30-19 @ 08:00) (105/59 - 150/82)  RR: 17 (10-30-19 @ 08:00) (14 - 24)  SpO2: 98% (10-30-19 @ 08:00) (93% - 98%)  Wt(kg): --  Height (cm): 152.4 (10-29-19 @ 07:00)  Weight (kg): 65.3 (10-29-19 @ 07:00)  BMI (kg/m2): 28.1 (10-29-19 @ 07:00)  BSA (m2): 1.62 (10-29-19 @ 07:00)      10-29-19 @ 07:01  -  10-30-19 @ 07:00  --------------------------------------------------------  IN: 2500 mL / OUT: 1803 mL / NET: 697 mL      Physical Exam:  	Gen: NAD  	HEENT: MMM  	Pulm: CTA B/L  	CV: S1S2, + murmur  	Abd: Soft, +BS  	Ext: Anasarca noted              Neuro: Awake   	Skin: Warm and Dry   	    LABS/STUDIES  --------------------------------------------------------------------------------              9.8    12.73 >-----------<  179      [10-29-19 @ 21:07]              29.3     140  |  101  |  17  ----------------------------<  221      [10-30-19 @ 05:09]  3.5   |  27  |  1.02        Ca     7.4     [10-30-19 @ 05:09]      Mg     2.1     [10-30-19 @ 05:09]      Phos  2.6     [10-30-19 @ 05:09]    TPro  5.2  /  Alb  2.4  /  TBili  0.6  /  DBili  x   /  AST  62  /  ALT  25  /  AlkPhos  95  [10-28-19 @ 21:25]    PT/INR: PT 14.2 , INR 1.24       [10-28-19 @ 21:40]  PTT: 26.1       [10-28-19 @ 21:40]      Creatinine Trend:  SCr 1.02 [10-30 @ 05:09]  SCr 0.98 [10-29 @ 21:07]  SCr 1.09 [10-28 @ 21:25]        Iron 38, TIBC 234, %sat --      [09-12-19 @ 06:08]  Ferritin 214.6      [09-12-19 @ 06:08]  PTH 20.00 (Ca --)      [09-03-19 @ 05:45]   --  PTH 25.46 (Ca --)      [09-01-19 @ 06:00]   --  Vitamin D (25OH) 34.9      [09-03-19 @ 05:45]  HbA1c 7.0      [09-13-19 @ 06:50]  TSH 5.55      [09-09-19 @ 03:20]  Lipid: chol 121, TG 96, HDL 33, LDL 74      [08-31-19 @ 07:00]

## 2019-10-30 NOTE — CONSULT NOTE ADULT - SUBJECTIVE AND OBJECTIVE BOX
CHIEF COMPLAINT:Patient is a 79y old  Male who presents with a chief complaint of sdh (30 Oct 2019 10:17)      HISTORY OF PRESENT ILLNESS:    79 male with history as below cad, s/p stent   HTN, DM, lympoha, DVT admitted s/p fall with SDH   pos below knee dvt  Due to altered mental status, subjective information were not able to be obtained from the patient. History was obtained, to the extent possible, from review of the chart and collateral sources of information.    PAST MEDICAL & SURGICAL HISTORY:  CAD (coronary artery disease)  Adult hypothyroidism  Hyperlipidemia  Peptic ulcer: s/p treatment for H pylori  Diabetes  Essential hypertension  S/P coronary artery stent placement: x4, last one in early 2018          MEDICATIONS:  midodrine. 5 milliGRAM(s) Oral three times a day        acetaminophen   Tablet .. 650 milliGRAM(s) Oral every 6 hours PRN  escitalopram 5 milliGRAM(s) Oral daily  levETIRAcetam  IVPB 500 milliGRAM(s) IV Intermittent every 12 hours  ondansetron Injectable 4 milliGRAM(s) IV Push every 6 hours PRN    famotidine    Tablet 20 milliGRAM(s) Oral two times a day  polyethylene glycol 3350 17 Gram(s) Oral daily  senna 2 Tablet(s) Oral at bedtime PRN    dexAMETHasone     Tablet 2 milliGRAM(s) Oral <User Schedule>  dextrose 40% Gel 15 Gram(s) Oral once PRN  dextrose 50% Injectable 12.5 Gram(s) IV Push once  dextrose 50% Injectable 25 Gram(s) IV Push once  dextrose 50% Injectable 25 Gram(s) IV Push once  fludroCORTISONE 0.1 milliGRAM(s) Oral two times a day  glucagon  Injectable 1 milliGRAM(s) IntraMuscular once PRN  insulin glargine Injectable (LANTUS) 5 Unit(s) SubCutaneous every morning  insulin lispro (HumaLOG) corrective regimen sliding scale   SubCutaneous three times a day before meals  levothyroxine 88 MICROGram(s) Oral daily  simvastatin 20 milliGRAM(s) Oral at bedtime    chlorhexidine 4% Liquid 1 Application(s) Topical <User Schedule>  dextrose 5%. 1000 milliLiter(s) IV Continuous <Continuous>      FAMILY HISTORY:      Non-contributory    SOCIAL HISTORY:    No tobacco, drugs or etoh    Allergies    No Known Allergies    Intolerances    	    REVIEW OF SYSTEMS:  as above  The rest of the 14 points ROS reviewed and except above they are unremarkable.        PHYSICAL EXAM:  T(C): 36.7 (10-30-19 @ 11:00), Max: 37.3 (10-29-19 @ 17:00)  HR: 86 (10-30-19 @ 14:00) (72 - 86)  BP: 117/74 (10-30-19 @ 14:00) (105/59 - 150/82)  RR: 15 (10-30-19 @ 14:00) (14 - 18)  SpO2: 95% (10-30-19 @ 14:00) (93% - 98%)  Wt(kg): --  I&O's Summary    29 Oct 2019 07:01  -  30 Oct 2019 07:00  --------------------------------------------------------  IN: 2500 mL / OUT: 1803 mL / NET: 697 mL      JVP: Normal  Neck: supple  Lung: clear   CV: S1 S2 , Murmur:  Abd: soft  Ext: No edema  neuro: Awake / a bit confused   Psych: flat affect  Skin: normal    LABS/DATA:    TELEMETRY: 	    ECG:  	   	  CARDIAC MARKERS:          < from: Transthoracic Echocardiogram (10.29.19 @ 16:21) >  Conclusions:  1. Aortic valve not well visualized; appears calcified.  Peak transaortic valve gradient equals 14 mm Hg, estimated  aortic valve area equals 2 sqcm (by continuity equation),  aortic valve velocity time integral equals 41 cm,  consistent with mild aortic stenosis. Minimal aortic  regurgitation.  2. Increased relative wall thickness with normal left  ventricular mass index, consistent with concentric left  ventricular remodeling.  3. Hyperdynamic left ventricular systolic function.  4. The right ventricle is not well visualized; grossly  normal right ventricular systolic function.  5. Estimated right ventricular systolic pressure equals 20  mm Hg, assuming right atrial pressure equals 8 mm Hg,  consistent with normal pulmonary pressures.  6. Left pleural effusion.  *** No previous Echo exam.  ------------------------------------------------------------------------  Confirmed on  10/29/2019 - 18:08:58 by Rip Daniel M.D.  ------------------------------------------------------------------------    < end of copied text >                              9.8    12.73 )-----------( 179      ( 29 Oct 2019 21:07 )             29.3     10-30    140  |  101  |  17  ----------------------------<  221<H>  3.5   |  27  |  1.02    Ca    7.4<L>      30 Oct 2019 05:09  Phos  2.6     10-30  Mg     2.1     10-30    TPro  5.2<L>  /  Alb  2.4<L>  /  TBili  0.6  /  DBili  x   /  AST  62<H>  /  ALT  25  /  AlkPhos  95  10-28    proBNP:   Lipid Profile:   HgA1c:   TSH:

## 2019-10-30 NOTE — PROGRESS NOTE ADULT - ASSESSMENT
ASSESSMENT/PLAN:  TSDH 2/2 fall    NEURO:  bilateral subdural   Neurochecks q2 hrs,    CT Head- stable bilateral SDH    Cont Decadron (for h/o lymphoma)  keppra for seizure ppx for 7 days (day 2 of 7)  C/w Lexapro 5mg qd (home med)  Activity: [] mobilize as tolerated [x] PT [x] OT [] PMNR    PULM:  left pleural effusion     O2 sat > 92%  Incentive spirometry    CV: CAD s/p stent>1year ago  SBP goal 100 - 160  cont statin  midodrine and fluornef- home med  cont  hold antiplatelets and asa in setting of SDH  TTE = EF 65-70%, LHV; intermediate diastolic dysfunction; mild aortic stenosis  appreciate cards recs    RENAL: CKD III   Home nephrologist consulted- lasix 20IV  Fluids:  NS @ 50  ext cath    GI:    Last BM 10/28 per patient and daughter  Diet: speech eval and start TF  GI prophylaxis [] not indicated [] PPI [] other: famotidine  Bowel regimen [x] colace [x] senna [] other:    ENDO: hypothyroid  Goal euglycemia (-180), iss with FS   cont synthroid    HEME/ONC:  repeat dopplers show acute DVTs in R calf, L calf, and L upper extremity. As LE DVTs below knee, no urgent need for IVC filter. Favor repeat surveillance dopplers in 2 days  risk of restarting eliquis at this point outweighs benefit, given size and extent of SDH  will re-evaluate appropriateness of restarting eliquis after next repeat dopplers  hold megase due to concern for DVT  h/o lymphoma on decadron   VTE prophylaxis: [] SCDs [] chemoprophylaxis [x] hold chemoprophylaxis due to: sdh   [x] high risk of DVT/PE on admission due to: lymphoma, repeat dopplers in 2 days    ID:  afebrile     SOCIAL/FAMILY:  [x] awaiting [] updated at bedside [] family meeting    CODE STATUS:  [x] Full Code [] DNR [] DNI [] Palliative/Comfort Care    DISPOSITION:  [x] ICU [] Stroke Unit [] Floor [] EMU [] RCU [] PCU    [x] Patient is at high risk of neurologic deterioration/death due to:  seziures, hemorrhage, herniation       Time spent: 45 critical care minutes    Contact: [phone] ASSESSMENT/PLAN:  Traumatic acute SDH 2/2 fall with brain compression     NEURO: traumatic subdural   Neurochecks q2 hrs,    CT Head- stable  SDH    Cont Decadron (for h/o lymphoma)  keppra for seizure ppx for 7 days (day 2 of 7)  C/w Lexapro 5mg qd (home med)  Activity: [] mobilize as tolerated [x] PT [x] OT [] PMNR    PULM:  left pleural effusion     O2 sat > 92%  Incentive spirometry    CV: CAD s/p stent>1year ago  SBP goal 100 - 160 mmhg   cont statin  midodrine and fluornef- home med  cont  hold antiplatelets (asa) in setting of SDH, will discuss with NS whne ok to resume aspirin   TTE = EF 65-70%, LHV; intermediate diastolic dysfunction; mild aortic stenosis  appreciate cards recs    RENAL: CKD III   Home nephrologist consulted- lasix 20IV  Fluids:  IVL  ext cath    GI:    Last BM 10/28 per patient and daughter  Diet: speech eval and start TF  GI prophylaxis [] not indicated [] PPI [] other: famotidine  Bowel regimen [x] colace [x] senna [] other:    ENDO: hypothyroid  Goal euglycemia (-180), iss with FS   start lantus 5 units ( home lantus is 12 u)   cont synthroid    HEME/ONC:  repeat dopplers tomorrow  start lovenox 40 mg sc qhs if ok with NS  has below knee DVT   h/o lymphoma on decadron   VTE prophylaxis: [] SCDs [x] chemoprophylaxis    [x] high risk of DVT/PE on admission due to: lymphoma, repeat dopplers tomorrow     ID:  afebrile     SOCIAL/FAMILY:  [x] awaiting [] updated at bedside [] family meeting    CODE STATUS:  [x] Full Code [] DNR [] DNI [] Palliative/Comfort Care    DISPOSITION:  [x] ICU [] Stroke Unit [] Floor [] EMU [] RCU [] PCU    [x] Patient is at high risk of neurologic deterioration/death due to:  seziures, hemorrhage, herniation       Time spent: 35 critical care minutes    Contact: [phone]

## 2019-10-31 LAB
ANION GAP SERPL CALC-SCNC: 11 MMOL/L — SIGNIFICANT CHANGE UP (ref 5–17)
ANION GAP SERPL CALC-SCNC: 11 MMOL/L — SIGNIFICANT CHANGE UP (ref 5–17)
ANION GAP SERPL CALC-SCNC: 73 MMOL/L — HIGH (ref 5–17)
BUN SERPL-MCNC: 15 MG/DL — SIGNIFICANT CHANGE UP (ref 7–23)
BUN SERPL-MCNC: 16 MG/DL — SIGNIFICANT CHANGE UP (ref 7–23)
BUN SERPL-MCNC: 6 MG/DL — LOW (ref 7–23)
CA-I BLD-SCNC: 0.33 MMOL/L — CRITICAL LOW (ref 1.12–1.3)
CALCIUM SERPL-MCNC: 10.2 MG/DL — SIGNIFICANT CHANGE UP (ref 8.4–10.5)
CALCIUM SERPL-MCNC: 7.4 MG/DL — LOW (ref 8.4–10.5)
CALCIUM SERPL-MCNC: <3 MG/DL — CRITICAL LOW (ref 8.4–10.5)
CHLORIDE SERPL-SCNC: 103 MMOL/L — SIGNIFICANT CHANGE UP (ref 96–108)
CHLORIDE SERPL-SCNC: 104 MMOL/L — SIGNIFICANT CHANGE UP (ref 96–108)
CHLORIDE SERPL-SCNC: 122 MMOL/L — HIGH (ref 96–108)
CO2 SERPL-SCNC: 23 MMOL/L — SIGNIFICANT CHANGE UP (ref 22–31)
CO2 SERPL-SCNC: 25 MMOL/L — SIGNIFICANT CHANGE UP (ref 22–31)
CO2 SERPL-SCNC: <5 MMOL/L — CRITICAL LOW (ref 22–31)
CREAT SERPL-MCNC: 0.85 MG/DL — SIGNIFICANT CHANGE UP (ref 0.5–1.3)
CREAT SERPL-MCNC: 0.93 MG/DL — SIGNIFICANT CHANGE UP (ref 0.5–1.3)
CREAT SERPL-MCNC: <0.3 MG/DL — LOW (ref 0.5–1.3)
GLUCOSE BLDC GLUCOMTR-MCNC: 161 MG/DL — HIGH (ref 70–99)
GLUCOSE BLDC GLUCOMTR-MCNC: 166 MG/DL — HIGH (ref 70–99)
GLUCOSE BLDC GLUCOMTR-MCNC: 188 MG/DL — HIGH (ref 70–99)
GLUCOSE BLDC GLUCOMTR-MCNC: 239 MG/DL — HIGH (ref 70–99)
GLUCOSE BLDC GLUCOMTR-MCNC: 255 MG/DL — HIGH (ref 70–99)
GLUCOSE SERPL-MCNC: 194 MG/DL — HIGH (ref 70–99)
GLUCOSE SERPL-MCNC: 201 MG/DL — HIGH (ref 70–99)
GLUCOSE SERPL-MCNC: 98 MG/DL — SIGNIFICANT CHANGE UP (ref 70–99)
MAGNESIUM SERPL-MCNC: 0.6 MG/DL — CRITICAL LOW (ref 1.6–2.6)
MAGNESIUM SERPL-MCNC: 2.1 MG/DL — SIGNIFICANT CHANGE UP (ref 1.6–2.6)
PHOSPHATE SERPL-MCNC: 3.7 MG/DL — SIGNIFICANT CHANGE UP (ref 2.5–4.5)
PHOSPHATE SERPL-MCNC: >36 MG/DL — HIGH (ref 2.5–4.5)
POTASSIUM SERPL-MCNC: 3.6 MMOL/L — SIGNIFICANT CHANGE UP (ref 3.5–5.3)
POTASSIUM SERPL-MCNC: 4.3 MMOL/L — SIGNIFICANT CHANGE UP (ref 3.5–5.3)
POTASSIUM SERPL-MCNC: <2 MMOL/L — CRITICAL LOW (ref 3.5–5.3)
POTASSIUM SERPL-SCNC: 3.6 MMOL/L — SIGNIFICANT CHANGE UP (ref 3.5–5.3)
POTASSIUM SERPL-SCNC: 4.3 MMOL/L — SIGNIFICANT CHANGE UP (ref 3.5–5.3)
POTASSIUM SERPL-SCNC: <2 MMOL/L — CRITICAL LOW (ref 3.5–5.3)
SODIUM SERPL-SCNC: 138 MMOL/L — SIGNIFICANT CHANGE UP (ref 135–145)
SODIUM SERPL-SCNC: 139 MMOL/L — SIGNIFICANT CHANGE UP (ref 135–145)
SODIUM SERPL-SCNC: >170 MMOL/L — CRITICAL HIGH (ref 135–145)

## 2019-10-31 PROCEDURE — 70450 CT HEAD/BRAIN W/O DYE: CPT | Mod: 26

## 2019-10-31 PROCEDURE — 99232 SBSQ HOSP IP/OBS MODERATE 35: CPT

## 2019-10-31 PROCEDURE — 93970 EXTREMITY STUDY: CPT | Mod: 26

## 2019-10-31 PROCEDURE — 99232 SBSQ HOSP IP/OBS MODERATE 35: CPT | Mod: GC

## 2019-10-31 PROCEDURE — 93971 EXTREMITY STUDY: CPT | Mod: 26,59,RT

## 2019-10-31 PROCEDURE — 99233 SBSQ HOSP IP/OBS HIGH 50: CPT

## 2019-10-31 RX ORDER — CALCIUM GLUCONATE 100 MG/ML
3 VIAL (ML) INTRAVENOUS ONCE
Refills: 0 | Status: DISCONTINUED | OUTPATIENT
Start: 2019-10-31 | End: 2019-10-31

## 2019-10-31 RX ORDER — MAGNESIUM SULFATE 500 MG/ML
1 VIAL (ML) INJECTION ONCE
Refills: 0 | Status: COMPLETED | OUTPATIENT
Start: 2019-10-31 | End: 2019-10-31

## 2019-10-31 RX ORDER — CALCIUM GLUCONATE 100 MG/ML
2 VIAL (ML) INTRAVENOUS ONCE
Refills: 0 | Status: COMPLETED | OUTPATIENT
Start: 2019-10-31 | End: 2019-10-31

## 2019-10-31 RX ORDER — ENOXAPARIN SODIUM 100 MG/ML
40 INJECTION SUBCUTANEOUS
Refills: 0 | Status: DISCONTINUED | OUTPATIENT
Start: 2019-10-31 | End: 2019-11-05

## 2019-10-31 RX ORDER — DIVALPROEX SODIUM 500 MG/1
500 TABLET, DELAYED RELEASE ORAL EVERY 12 HOURS
Refills: 0 | Status: DISCONTINUED | OUTPATIENT
Start: 2019-10-31 | End: 2019-11-01

## 2019-10-31 RX ORDER — CALCIUM GLUCONATE 100 MG/ML
2 VIAL (ML) INTRAVENOUS ONCE
Refills: 0 | Status: DISCONTINUED | OUTPATIENT
Start: 2019-10-31 | End: 2019-10-31

## 2019-10-31 RX ORDER — POTASSIUM CHLORIDE 20 MEQ
40 PACKET (EA) ORAL ONCE
Refills: 0 | Status: COMPLETED | OUTPATIENT
Start: 2019-10-31 | End: 2019-10-31

## 2019-10-31 RX ADMIN — LEVETIRACETAM 400 MILLIGRAM(S): 250 TABLET, FILM COATED ORAL at 05:11

## 2019-10-31 RX ADMIN — FLUDROCORTISONE ACETATE 0.1 MILLIGRAM(S): 0.1 TABLET ORAL at 05:11

## 2019-10-31 RX ADMIN — MIDODRINE HYDROCHLORIDE 5 MILLIGRAM(S): 2.5 TABLET ORAL at 17:25

## 2019-10-31 RX ADMIN — DIVALPROEX SODIUM 500 MILLIGRAM(S): 500 TABLET, DELAYED RELEASE ORAL at 17:31

## 2019-10-31 RX ADMIN — Medication 6: at 12:50

## 2019-10-31 RX ADMIN — INSULIN GLARGINE 5 UNIT(S): 100 INJECTION, SOLUTION SUBCUTANEOUS at 08:45

## 2019-10-31 RX ADMIN — Medication 2 MILLIGRAM(S): at 08:47

## 2019-10-31 RX ADMIN — FAMOTIDINE 20 MILLIGRAM(S): 10 INJECTION INTRAVENOUS at 17:26

## 2019-10-31 RX ADMIN — Medication 200 GRAM(S): at 13:37

## 2019-10-31 RX ADMIN — Medication 88 MICROGRAM(S): at 05:11

## 2019-10-31 RX ADMIN — Medication 650 MILLIGRAM(S): at 04:20

## 2019-10-31 RX ADMIN — Medication 100 GRAM(S): at 06:36

## 2019-10-31 RX ADMIN — MIDODRINE HYDROCHLORIDE 5 MILLIGRAM(S): 2.5 TABLET ORAL at 05:11

## 2019-10-31 RX ADMIN — Medication 40 MILLIEQUIVALENT(S): at 18:08

## 2019-10-31 RX ADMIN — CHLORHEXIDINE GLUCONATE 1 APPLICATION(S): 213 SOLUTION TOPICAL at 20:27

## 2019-10-31 RX ADMIN — Medication 2: at 21:45

## 2019-10-31 RX ADMIN — Medication 2: at 17:31

## 2019-10-31 RX ADMIN — SIMVASTATIN 20 MILLIGRAM(S): 20 TABLET, FILM COATED ORAL at 20:25

## 2019-10-31 RX ADMIN — Medication 2: at 08:45

## 2019-10-31 RX ADMIN — Medication 200 GRAM(S): at 12:48

## 2019-10-31 RX ADMIN — Medication 650 MILLIGRAM(S): at 03:48

## 2019-10-31 RX ADMIN — FLUDROCORTISONE ACETATE 0.1 MILLIGRAM(S): 0.1 TABLET ORAL at 18:08

## 2019-10-31 RX ADMIN — Medication 85 MILLIMOLE(S): at 08:45

## 2019-10-31 RX ADMIN — ENOXAPARIN SODIUM 40 MILLIGRAM(S): 100 INJECTION SUBCUTANEOUS at 17:31

## 2019-10-31 RX ADMIN — FAMOTIDINE 20 MILLIGRAM(S): 10 INJECTION INTRAVENOUS at 05:11

## 2019-10-31 NOTE — PROGRESS NOTE ADULT - ASSESSMENT
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 Found to have right-sided SDH with mass effect and 8 mm midline shift to left. Also found to have RLE DVT. Nephrology is consulted for CKD and volume overload.    CKD stage 4  - baseline Cr ~2 for the past year; ? current Cr is dilutional from volume overload vs. prolonged FRANCES state (? 2/2 prolonged hypercalcemic state)    - Cr stable ~0.9    - Avoid nephrotoxins including NSAIDs, IV contrast (if possible), [employer]  - maintain MAP >65  - renal diet once NPO is lifted    Volume overload  - tends to drink lots of fluids at home  - 1L fluid restriction once NPO is lifted    - hold lasix today   - monitor I/O's accurately    HTN  - has Hx HTN but was on midodrine and florinef during last admission for hypotension, was undergoing adrenal insufficiency workup w/ endocrine   - BP acceptable/minimally elevated this AM ('s)  - goal BP <140/90  - continue lasix  - monitor BP   - consider endocrine consult for followup of prior workup of AI    Hypocalcemia  - Has Hx hypercalcemia of malignancy  - hypocalcemia is improved  - monitor    Hypophosphatemia  - likely from poor PO intake  - supplement as needed  - monitor

## 2019-10-31 NOTE — PROGRESS NOTE ADULT - SUBJECTIVE AND OBJECTIVE BOX
dopplers show no propagation of vte    AAOx2-3, follows commands. PERRL. RUE 5/5, LUE 4/5 with mild drift,  RLE 5/5,  LLE 4/5, no drift. dopplers show no propagation of vte    AAOx2, follows simple commands. PERRL. RUE 5/5, LUE 4/5 with mild drift,  RLE 5/5,  LLE 4/5, no drift.

## 2019-10-31 NOTE — PROGRESS NOTE ADULT - SUBJECTIVE AND OBJECTIVE BOX
[facility] [place] [facility]   MD Anusha Siddiqui D.O. Fatima Sheikh, D.O. Ruoru Wong, PA    From 7 AM - 5 PM:  OFFICE: [phone]  Dr. Radford cell: [phone]  Dr. Townsend cell: [phone]  Dr. Steinberg cell: [phone]  LEATHA Agee cell: [phone]    From 5 PM - 7 AM: Answering Service: [phone]        RENAL FOLLOW UP NOTE  --------------------------------------------------------------------------------  HPI: Pt seen and examined. Has no complaints today except feeling tired. Daughter in room states he has not slept well from hr neurochecks. she is concerned he is getting volume contracted w/ Hgb 11 and poor PO intake.        PAST HISTORY  --------------------------------------------------------------------------------  No significant changes to PMH, PSH, FHx, SHx, unless otherwise noted    ALLERGIES & MEDICATIONS  --------------------------------------------------------------------------------  Allergies    No Known Allergies    Intolerances      Standing Inpatient Medications  chlorhexidine 4% Liquid 1 Application(s) Topical <User Schedule>  dexAMETHasone     Tablet 2 milliGRAM(s) Oral <User Schedule>  dextrose 5%. 1000 milliLiter(s) IV Continuous <Continuous>  dextrose 50% Injectable 12.5 Gram(s) IV Push once  dextrose 50% Injectable 25 Gram(s) IV Push once  dextrose 50% Injectable 25 Gram(s) IV Push once  diVALproex  milliGRAM(s) Oral every 12 hours  enoxaparin Injectable 40 milliGRAM(s) SubCutaneous <User Schedule>  escitalopram 5 milliGRAM(s) Oral daily  famotidine    Tablet 20 milliGRAM(s) Oral two times a day  fludroCORTISONE 0.1 milliGRAM(s) Oral two times a day  insulin glargine Injectable (LANTUS) 5 Unit(s) SubCutaneous every morning  insulin lispro (HumaLOG) corrective regimen sliding scale   SubCutaneous Before meals and at bedtime  levothyroxine 88 MICROGram(s) Oral daily  midodrine. 5 milliGRAM(s) Oral three times a day  polyethylene glycol 3350 17 Gram(s) Oral daily  senna 2 Tablet(s) Oral at bedtime  simvastatin 20 milliGRAM(s) Oral at bedtime    PRN Inpatient Medications  acetaminophen   Tablet .. 650 milliGRAM(s) Oral every 6 hours PRN  dextrose 40% Gel 15 Gram(s) Oral once PRN  glucagon  Injectable 1 milliGRAM(s) IntraMuscular once PRN  ondansetron Injectable 4 milliGRAM(s) IV Push every 6 hours PRN      REVIEW OF SYSTEMS  --------------------------------------------------------------------------------  General: no fever  CVS: no chest pain  RESP: no sob, no cough  ABD: no abdominal pain  : no dysuria,  MSK: no edema     VITALS/PHYSICAL EXAM  --------------------------------------------------------------------------------  T(C): 36.4 (10-31-19 @ 11:00), Max: 36.9 (10-30-19 @ 23:00)  HR: 96 (10-31-19 @ 15:00) (80 - 107)  BP: 130/80 (10-31-19 @ 15:00) (120/89 - 166/95)  RR: 12 (10-31-19 @ 15:00) (12 - 24)  SpO2: 100% (10-31-19 @ 15:00) (92% - 100%)  Wt(kg): --        10-30-19 @ 07:01  -  10-31-19 @ 07:00  --------------------------------------------------------  IN: 840 mL / OUT: 1350 mL / NET: -510 mL    10-31-19 @ 07:01  -  10-31-19 @ 15:18  --------------------------------------------------------  IN: 725 mL / OUT: 200 mL / NET: 525 mL      Physical Exam:  	Gen: NAD  	HEENT: MMM  	Pulm: CTA B/L  	CV: S1S2, + murmur  	Abd: Soft, +BS  	Ext: Anasarca noted              Neuro: Awake   	Skin: Warm and Dry       LABS/STUDIES  --------------------------------------------------------------------------------              11.4   9.88  >-----------<  211      [10-30-19 @ 22:10]              36.0     >170  |  122  |  6   ----------------------------<  98      [10-31-19 @ 12:45]  <2.0   |  <5  |  <0.30        Ca     <3.0     [10-31-19 @ 12:45]      iCa    0.33     [10-31 @ 11:15]      Mg     .6     [10-31-19 @ 12:45]      Phos  >36.0     [10-31-19 @ 12:45]      PT/INR: PT 10.8 , INR 0.94       [10-30-19 @ 22:10]  PTT: 18.5       [10-30-19 @ 22:10]      Creatinine Trend:  SCr <0.30 [10-31 @ 12:45]  SCr 0.93 [10-30 @ 23:31]  SCr 0.92 [10-30 @ 22:10]  SCr 1.02 [10-30 @ 05:09]  SCr 0.98 [10-29 @ 21:07]        Iron 38, TIBC 234, %sat --      [09-12-19 @ 06:08]  Ferritin 214.6      [09-12-19 @ 06:08]  PTH 20.00 (Ca --)      [09-03-19 @ 05:45]   --  PTH 25.46 (Ca --)      [09-01-19 @ 06:00]   --  Vitamin D (25OH) 34.9      [09-03-19 @ 05:45]  HbA1c 7.0      [09-13-19 @ 06:50]  TSH 5.55      [09-09-19 @ 03:20]  Lipid: chol 121, TG 96, HDL 33, LDL 74      [08-31-19 @ 07:00]

## 2019-10-31 NOTE — DIETITIAN INITIAL EVALUATION ADULT. - REASON INDICATOR FOR ASSESSMENT
Seen for: length of stay on NSCU  Source: pt, EMR, RN    Per chart, pt is a 79 year old male with PMH of CAD s/p stents x4, HTN, DM, Hypothyroid, lymphoma [facility]. Found to have right-sided SDH with mass effect and 8 mm midline shift to left. Also found to have RLE DVT. Seen by nephrology for CKD and volume overload.

## 2019-10-31 NOTE — PROGRESS NOTE ADULT - ASSESSMENT
79y male with PMH of Cognitive delay, CAD s/p stents x4, HTN, DM, LVOT obstruction, Hypothyroid, lymphoma, LUE DVT (on eliquis) who presented after a mechanical fall.  Found to have acute R SDH as well as acute b/l below knee DVTs.  Currently hemodynamically stable w/ no evidence of PE.    Repeat CT head w/ stable SDH.        RECS  - cont current management as per NICU team  - obtain LE venous duplex today for surveillance.  If DVTs have progressed above the knee, will proceed w/ IVC filter  - cont SC lovenox 40 mg daily for DVT prophylaxis      Plan discussed w/ Dr. Sparks 79y male with PMH of Cognitive delay, CAD s/p stents x4, HTN, DM, LVOT obstruction, Hypothyroid, lymphoma, LUE DVT (on eliquis) who presented after a mechanical fall.  Found to have acute R SDH as well as acute b/l below knee DVTs.  Currently hemodynamically stable w/ no evidence of PE.    Repeat CT head w/ stable SDH.    Reviewed venous duplex today: stable/unchanged R below knee DVT.        RECS  - cont current management as per NICU team  - f/u LE venous duplex official report   - cont SC lovenox 40 mg daily for DVT prophylaxis  - hold off on IVC filter.  Repeat bilateral lower extremity venous duplex on Monday, 11/4/2019       Plan discussed w/ Dr. Sparks

## 2019-10-31 NOTE — DIETITIAN INITIAL EVALUATION ADULT. - OTHER INFO
Pt reports good intake PTA. Per chart, pt's daughter previous reported pt to be on megace for appetite stimulation. Unable to assess adherence to recommended diet for T2DM at this time. Per chart, noted recent increase in HgbA1c from 6.1% (8/31) to HgbA1c 7.0% (9/13). Per chart, pt takes Januvia PTA. NKFA. Per chart, no PTA micronutrient supplementation. Unable to obtain UBW. Current weight noted as 65.3 kg (10/29), which is consistent with weight from previous RD note of 65 kg (9/6), suggesting weight maintenance for ~2 months. Nutrition Focused Physical Exam deferred at this time; per visual examination pt exhibits mild muscle wasting to temples; however, likely age-related depletion considering likely weight maintenance within the last few weeks. RD to follow up to perform complete exam as able.    Edema per chart: none noted  Skin per chart: posterior head skin tear    Ht: 60 inches, Wt: 65.3 kg (10/29), BMI: 28.1 kg/m2, IBW: 106 pounds (+/-10%), %IBW: 134%

## 2019-10-31 NOTE — PROGRESS NOTE ADULT - SUBJECTIVE AND OBJECTIVE BOX
79y male with PMH of CAD s/p stents x4 (most recent one 2017 per daughter), HTN, DM, Hypothyroid, diffuse large B cell lymphoma presenting after a fall. He was in rehab for deconditioning after chemotherapy at the time, and was sitting in a wheelchair when he tried to stand up on his own and fell from standing. Denies LOC, no symptoms proceeding the fall. Pt reports that he remembers events preceding the fall and after the fall. Fall was unwitnessed, nurses at [facility] stated that down time was a few minutes, daughter spoke to him after the fall he was respond like his normal self, was complaining of a headache. No other complaints, denies chest pain, SOB, abdominal pain, nausea, vomiting, back pain.    Patient was on Eliquis at the time for LUE DVT - [facility]    Patient was no longer on DAPT because his stents are >1year old. Normally takes a baby aspirin, but per patient and family his baby aspirin was being held for 1 week prior to fall due to GIB (GIB now resolved s/p clip, per daughter)    Patient has had several workups for fall/syncope in recent history. Was determined to have orthostatic hypotension, and was started on midodrine and fluoronef. Daughter (physician) reports that his blood pressure has been stable on this current regimen.  Daughter also reports that patient had been on megase for appetite stimulation as well.    On admission, the patient was:  GCS: 15    ***HIGH RISK FOR VTE 2/2 Lymphoma***     Hospital Course:  10/29: Dopplers show acute calf DVT affecting R peroneal, R soleal, R gastrocnemius veins and L soleal vein; acute upper extremity DVT in L brachial and L axillary veins    Overnight Events:     ROS: negative [] unable to obtain as patient is comatose/intubated/aphasic []     VITALS:   T(C): 36.7 (10-31-19 @ 05:00), Max: 36.9 (10-30-19 @ 23:00)  HR: 84 (10-31-19 @ 07:00) (80 - 102)  BP: 166/95 (10-31-19 @ 07:00) (117/74 - 166/95)  RR: 13 (10-31-19 @ 07:00) (12 - 22)  SpO2: 94% (10-31-19 @ 07:00) (92% - 100%)    10-30-19 @ 07:01  -  10-31-19 @ 07:00  --------------------------------------------------------  IN: 840 mL / OUT: 1350 mL / NET: -510 mL      LABS:                          11.4   9.88  )-----------( 211      ( 30 Oct 2019 22:10 )             36.0     10-30    139  |  103  |  16  ----------------------------<  194<H>  4.3   |  25  |  0.93    Ca    7.4<L>      30 Oct 2019 23:31  Phos  1.4     10-30  Mg     1.9     10-30      PT/INR - ( 30 Oct 2019 22:10 )   PT: 10.8 sec;   INR: 0.94 ratio         PTT - ( 30 Oct 2019 22:10 )  PTT:18.5 sec  MEDS:  MEDICATIONS  (STANDING):  chlorhexidine 4% Liquid 1 Application(s) Topical <User Schedule>  dexAMETHasone     Tablet 2 milliGRAM(s) Oral <User Schedule>  dextrose 5%. 1000 milliLiter(s) (50 mL/Hr) IV Continuous <Continuous>  dextrose 50% Injectable 12.5 Gram(s) IV Push once  dextrose 50% Injectable 25 Gram(s) IV Push once  dextrose 50% Injectable 25 Gram(s) IV Push once  escitalopram 5 milliGRAM(s) Oral daily  famotidine    Tablet 20 milliGRAM(s) Oral two times a day  fludroCORTISONE 0.1 milliGRAM(s) Oral two times a day  insulin glargine Injectable (LANTUS) 5 Unit(s) SubCutaneous every morning  insulin lispro (HumaLOG) corrective regimen sliding scale   SubCutaneous Before meals and at bedtime  levETIRAcetam  IVPB 500 milliGRAM(s) IV Intermittent every 12 hours  levothyroxine 88 MICROGram(s) Oral daily  midodrine. 5 milliGRAM(s) Oral three times a day  polyethylene glycol 3350 17 Gram(s) Oral daily  senna 2 Tablet(s) Oral at bedtime  simvastatin 20 milliGRAM(s) Oral at bedtime      EXAMINATION:   General:  No acute distress. Pleasant, calm, cooperative.  HEENT:  MMM. NGT in place.  Neuro:  AAOx4, follows commands. PERRL. RUE 5/5, LUE 4/5 with mild drift,  RLE 5/5,  LLE 4/5, no drift.  Cards:  s1, s2 RRR  Respiratory:  lungs clear b/l; decreased breath sounds at bases  Abdomen:  soft, nondistended, nontender  Extremities:  mild edema to ankles bilaterally  Skin:  warm/dry 79y male with PMH of CAD s/p stents x4 (most recent one 2017 per daughter), HTN, DM, Hypothyroid, diffuse large B cell lymphoma presenting after a fall. He was in rehab for deconditioning after chemotherapy at the time, and was sitting in a wheelchair when he tried to stand up on his own and fell from standing. Denies LOC, no symptoms proceeding the fall. Pt reports that he remembers events preceding the fall and after the fall. Fall was unwitnessed, nurses at [facility] stated that down time was a few minutes, daughter spoke to him after the fall he was respond like his normal self, was complaining of a headache. No other complaints, denies chest pain, SOB, abdominal pain, nausea, vomiting, back pain.    Patient was on Eliquis at the time for LUE DVT - was given Kcentra at [facility]    Patient was no longer on DAPT because his stents are >1year old. Normally takes a baby aspirin, but per patient and family his baby aspirin was being held for 1 week prior to fall due to GIB (GIB now resolved s/p clip, per daughter)    Patient has had several workups for fall/syncope in recent history. Was determined to have orthostatic hypotension, and was started on midodrine and fluoronef. Daughter (physician) reports that his blood pressure has been stable on this current regimen.  Daughter also reports that patient had been on megase for appetite stimulation as well.    On admission, the patient was:  GCS: 15    ***HIGH RISK FOR VTE 2/2 Lymphoma***     Hospital Course:  10/29: Dopplers show acute calf DVT affecting R peroneal, R soleal, R gastrocnemius veins and L soleal vein; acute upper extremity DVT in L brachial and L axillary veins    Overnight Events: Neurologically stable.     ROS: Negative unless specified    VITALS:   T(C): 36.7 (10-31-19 @ 05:00), Max: 36.9 (10-30-19 @ 23:00)  HR: 84 (10-31-19 @ 07:00) (80 - 102)  BP: 166/95 (10-31-19 @ 07:00) (117/74 - 166/95)  RR: 13 (10-31-19 @ 07:00) (12 - 22)  SpO2: 94% (10-31-19 @ 07:00) (92% - 100%)    10-30-19 @ 07:01  -  10-31-19 @ 07:00  --------------------------------------------------------  IN: 840 mL / OUT: 1350 mL / NET: -510 mL      LABS:                          11.4   9.88  )-----------( 211      ( 30 Oct 2019 22:10 )             36.0     10-30    139  |  103  |  16  ----------------------------<  194<H>  4.3   |  25  |  0.93    Ca    7.4<L>      30 Oct 2019 23:31  Phos  1.4     10-30  Mg     1.9     10-30      PT/INR - ( 30 Oct 2019 22:10 )   PT: 10.8 sec;   INR: 0.94 ratio         PTT - ( 30 Oct 2019 22:10 )  PTT:18.5 sec  MEDS:  MEDICATIONS  (STANDING):  chlorhexidine 4% Liquid 1 Application(s) Topical <User Schedule>  dexAMETHasone     Tablet 2 milliGRAM(s) Oral <User Schedule>  dextrose 5%. 1000 milliLiter(s) (50 mL/Hr) IV Continuous <Continuous>  dextrose 50% Injectable 12.5 Gram(s) IV Push once  dextrose 50% Injectable 25 Gram(s) IV Push once  dextrose 50% Injectable 25 Gram(s) IV Push once  escitalopram 5 milliGRAM(s) Oral daily  famotidine    Tablet 20 milliGRAM(s) Oral two times a day  fludroCORTISONE 0.1 milliGRAM(s) Oral two times a day  insulin glargine Injectable (LANTUS) 5 Unit(s) SubCutaneous every morning  insulin lispro (HumaLOG) corrective regimen sliding scale   SubCutaneous Before meals and at bedtime  levETIRAcetam  IVPB 500 milliGRAM(s) IV Intermittent every 12 hours  levothyroxine 88 MICROGram(s) Oral daily  midodrine. 5 milliGRAM(s) Oral three times a day  polyethylene glycol 3350 17 Gram(s) Oral daily  senna 2 Tablet(s) Oral at bedtime  simvastatin 20 milliGRAM(s) Oral at bedtime      EXAMINATION:   General:  No acute distress. Pleasant, calm, cooperative.  HEENT:  MMM. NGT in place.  Neuro:  AAOx4, follows commands. PERRL. RUE 5/5, LUE 4/5 with mild drift,  RLE 5/5,  LLE 4/5, no drift.  Cards:  s1, s2 RRR  Respiratory:  lungs clear b/l; decreased breath sounds at bases  Abdomen:  soft, nondistended, nontender  Extremities:  mild edema to ankles bilaterally  Skin:  warm/dry 79y male with PMH of CAD s/p stents x4 (most recent one 2017 per daughter), HTN, DM, Hypothyroid, diffuse large B cell lymphoma presenting after a fall. He was in rehab for deconditioning after chemotherapy at the time, and was sitting in a wheelchair when he tried to stand up on his own and fell from standing. Denies LOC, no symptoms proceeding the fall. Pt reports that he remembers events preceding the fall and after the fall. Fall was unwitnessed, nurses at [facility] stated that down time was a few minutes, daughter spoke to him after the fall he was respond like his normal self, was complaining of a headache. No other complaints, denies chest pain, SOB, abdominal pain, nausea, vomiting, back pain.    Patient was on Eliquis at the time for LUE DVT - was given Kcentra at [facility].    Patient was no longer on DAPT because his stents are >1year old. Normally takes a baby aspirin, but per patient and family his baby aspirin was being held for 1 week prior to fall due to GIB (GIB now resolved s/p clip, per daughter)    Patient has had several workups for fall/syncope in recent history. Was determined to have orthostatic hypotension, and was started on midodrine and fluoronef. Daughter (physician) reports that his blood pressure has been stable on this current regimen.  Daughter also reports that patient had been on megase for appetite stimulation as well.    On admission, the patient was:  GCS: 15    ***HIGH RISK FOR VTE 2/2 Lymphoma***     Hospital Course:  10/29: Dopplers show acute calf DVT affecting R peroneal, R soleal, R gastrocnemius veins and L soleal vein; acute upper extremity DVT in L brachial and L axillary veins    Overnight Events: Neurologically stable.     ROS: Negative unless specified    VITALS:   T(C): 36.7 (10-31-19 @ 05:00), Max: 36.9 (10-30-19 @ 23:00)  HR: 84 (10-31-19 @ 07:00) (80 - 102)  BP: 166/95 (10-31-19 @ 07:00) (117/74 - 166/95)  RR: 13 (10-31-19 @ 07:00) (12 - 22)  SpO2: 94% (10-31-19 @ 07:00) (92% - 100%)    10-30-19 @ 07:01  -  10-31-19 @ 07:00  --------------------------------------------------------  IN: 840 mL / OUT: 1350 mL / NET: -510 mL      LABS:                          11.4   9.88  )-----------( 211      ( 30 Oct 2019 22:10 )             36.0     10-30    139  |  103  |  16  ----------------------------<  194<H>  4.3   |  25  |  0.93    Ca    7.4<L>      30 Oct 2019 23:31  Phos  1.4     10-30  Mg     1.9     10-30      PT/INR - ( 30 Oct 2019 22:10 )   PT: 10.8 sec;   INR: 0.94 ratio         PTT - ( 30 Oct 2019 22:10 )  PTT:18.5 sec  MEDS:  MEDICATIONS  (STANDING):  chlorhexidine 4% Liquid 1 Application(s) Topical <User Schedule>  dexAMETHasone     Tablet 2 milliGRAM(s) Oral <User Schedule>  dextrose 5%. 1000 milliLiter(s) (50 mL/Hr) IV Continuous <Continuous>  dextrose 50% Injectable 12.5 Gram(s) IV Push once  dextrose 50% Injectable 25 Gram(s) IV Push once  dextrose 50% Injectable 25 Gram(s) IV Push once  escitalopram 5 milliGRAM(s) Oral daily  famotidine    Tablet 20 milliGRAM(s) Oral two times a day  fludroCORTISONE 0.1 milliGRAM(s) Oral two times a day  insulin glargine Injectable (LANTUS) 5 Unit(s) SubCutaneous every morning  insulin lispro (HumaLOG) corrective regimen sliding scale   SubCutaneous Before meals and at bedtime  levETIRAcetam  IVPB 500 milliGRAM(s) IV Intermittent every 12 hours  levothyroxine 88 MICROGram(s) Oral daily  midodrine. 5 milliGRAM(s) Oral three times a day  polyethylene glycol 3350 17 Gram(s) Oral daily  senna 2 Tablet(s) Oral at bedtime  simvastatin 20 milliGRAM(s) Oral at bedtime      EXAMINATION:   General:  Agitated this AM.   HEENT:  MMM.   Neuro:  AAOx3, b/l UE - No drift. Did not co-operate with strength testing.   Cards:  s1, s2 RRR  Respiratory:  No respiratory distress  Abdomen:  nondistended  Extremities:  mild edema to ankles bilaterally  Skin:  warm/dry

## 2019-10-31 NOTE — DIETITIAN INITIAL EVALUATION ADULT. - ADD RECOMMEND
Recommend change diet to Consistent Carbohydrate (no snacks) in setting of PMH of T2DM and in-house hyperglycemia. Texture/consistency of diet/liquids as per medical team/speech pathologist. Monitor need for/pt acceptance to oral nutrition supplements. Obtain subjective history from pt/family as able. Monitor tolerance to diet prescription, nutritional intake, weight trends, labs and skin integrity.

## 2019-10-31 NOTE — DIETITIAN INITIAL EVALUATION ADULT. - PERTINENT LABORATORY DATA
(10/31) POCT blood glucose 188-255. (10/30) POCT blood glucose 155-211, Glucose 194, Calcium 7.4, (9/13) HgbA1c 7.0%, (8/31) HgbA1c 6.1%

## 2019-10-31 NOTE — DIETITIAN INITIAL EVALUATION ADULT. - DIET TYPE
Limited information able to be obtained from as pt did not wish to participate in RD interview and was cursing/yelling at RD.

## 2019-10-31 NOTE — PROGRESS NOTE ADULT - ASSESSMENT
cad history of stent  stable  off antiplt therapy for now     Mild AS  stable    Hypotension   on midodrine/florinef  monitor supine BP     Below knee DVT  fu with vascular card

## 2019-10-31 NOTE — PROGRESS NOTE ADULT - SUBJECTIVE AND OBJECTIVE BOX
Hematology Follow-up ***INCOMPLETE***    INTERVAL HPI/OVERNIGHT EVENTS:          VITAL SIGNS:  T(F): 97.5 (10-31-19 @ 11:00)  HR: 91 (10-31-19 @ 13:00)  BP: 145/79 (10-31-19 @ 13:00)  RR: 17 (10-31-19 @ 13:00)  SpO2: 100% (10-31-19 @ 13:00)  Wt(kg): --    10-30-19 @ 07:01  -  10-31-19 @ 07:00  --------------------------------------------------------  IN: 840 mL / OUT: 1350 mL / NET: -510 mL    10-31-19 @ 07:01  -  10-31-19 @ 13:30  --------------------------------------------------------  IN: 725 mL / OUT: 200 mL / NET: 525 mL        PHYSICAL EXAM:    Constitutional: AAOx3, NAD   Eyes: PERRL, EOMI, sclera non-icteric  Neck: supple, no masses, no JVD  Respiratory: CTA b/l, no wheezing, rhonchi, rales, with normal respiratory effort  Cardiovascular: RRR, normal S1S2, no M/R/G  Gastrointestinal: soft, NTND, no masses palpable, BS normal in all four quadrants, no HSM  Extremities:  no c/c/e  MSK: no obvious abnormalities.   Neurological: Grossly intact  Skin: Normal temperature, no rash  Psych: normal affect    MEDICATIONS  (STANDING):  calcium gluconate IVPB 2 Gram(s) IV Intermittent once  chlorhexidine 4% Liquid 1 Application(s) Topical <User Schedule>  dexAMETHasone     Tablet 2 milliGRAM(s) Oral <User Schedule>  dextrose 5%. 1000 milliLiter(s) (50 mL/Hr) IV Continuous <Continuous>  dextrose 50% Injectable 12.5 Gram(s) IV Push once  dextrose 50% Injectable 25 Gram(s) IV Push once  dextrose 50% Injectable 25 Gram(s) IV Push once  diVALproex  milliGRAM(s) Oral every 12 hours  enoxaparin Injectable 40 milliGRAM(s) SubCutaneous <User Schedule>  escitalopram 5 milliGRAM(s) Oral daily  famotidine    Tablet 20 milliGRAM(s) Oral two times a day  fludroCORTISONE 0.1 milliGRAM(s) Oral two times a day  insulin glargine Injectable (LANTUS) 5 Unit(s) SubCutaneous every morning  insulin lispro (HumaLOG) corrective regimen sliding scale   SubCutaneous Before meals and at bedtime  levothyroxine 88 MICROGram(s) Oral daily  midodrine. 5 milliGRAM(s) Oral three times a day  polyethylene glycol 3350 17 Gram(s) Oral daily  senna 2 Tablet(s) Oral at bedtime  simvastatin 20 milliGRAM(s) Oral at bedtime    MEDICATIONS  (PRN):  acetaminophen   Tablet .. 650 milliGRAM(s) Oral every 6 hours PRN Temp greater or equal to 38C (100.4F), Mild Pain (1 - 3)  dextrose 40% Gel 15 Gram(s) Oral once PRN Blood Glucose LESS THAN 70 milliGRAM(s)/deciliter  glucagon  Injectable 1 milliGRAM(s) IntraMuscular once PRN Glucose LESS THAN 70 milligrams/deciliter  ondansetron Injectable 4 milliGRAM(s) IV Push every 6 hours PRN Nausea and/or Vomiting      No Known Allergies      LABS:                        11.4   9.88  )-----------( 211      ( 30 Oct 2019 22:10 )             36.0     10-30    139  |  103  |  16  ----------------------------<  194<H>  4.3   |  25  |  0.93    Ca    7.4<L>      30 Oct 2019 23:31  Phos  >36.0     10-31  Mg     1.9     10-30      PT/INR - ( 30 Oct 2019 22:10 )   PT: 10.8 sec;   INR: 0.94 ratio         PTT - ( 30 Oct 2019 22:10 )  PTT:18.5 sec       RADIOLOGY & ADDITIONAL TESTS:  Studies reviewed. Hematology Follow-up    INTERVAL HPI/OVERNIGHT EVENTS:  no verbalized complaints. patient resting comfortably.        VITAL SIGNS:  T(F): 97.5 (10-31-19 @ 11:00)  HR: 91 (10-31-19 @ 13:00)  BP: 145/79 (10-31-19 @ 13:00)  RR: 17 (10-31-19 @ 13:00)  SpO2: 100% (10-31-19 @ 13:00)  Wt(kg): --    10-30-19 @ 07:01  -  10-31-19 @ 07:00  --------------------------------------------------------  IN: 840 mL / OUT: 1350 mL / NET: -510 mL    10-31-19 @ 07:01  -  10-31-19 @ 13:30  --------------------------------------------------------  IN: 725 mL / OUT: 200 mL / NET: 525 mL        PHYSICAL EXAM:    Constitutional: AAOx3, NAD   Eyes: PERRL, EOMI, sclera non-icteric  Neck: supple, no masses, no JVD  Respiratory: CTA b/l, no wheezing, with normal respiratory effort  Cardiovascular: RRR, normal S1S2, no M/R/G  Gastrointestinal: soft, NTND  Extremities:  no c/c/e  MSK: no obvious abnormalities.   Neurological: Grossly intact  Skin: no rash      MEDICATIONS  (STANDING):  calcium gluconate IVPB 2 Gram(s) IV Intermittent once  chlorhexidine 4% Liquid 1 Application(s) Topical <User Schedule>  dexAMETHasone     Tablet 2 milliGRAM(s) Oral <User Schedule>  dextrose 5%. 1000 milliLiter(s) (50 mL/Hr) IV Continuous <Continuous>  dextrose 50% Injectable 12.5 Gram(s) IV Push once  dextrose 50% Injectable 25 Gram(s) IV Push once  dextrose 50% Injectable 25 Gram(s) IV Push once  diVALproex  milliGRAM(s) Oral every 12 hours  enoxaparin Injectable 40 milliGRAM(s) SubCutaneous <User Schedule>  escitalopram 5 milliGRAM(s) Oral daily  famotidine    Tablet 20 milliGRAM(s) Oral two times a day  fludroCORTISONE 0.1 milliGRAM(s) Oral two times a day  insulin glargine Injectable (LANTUS) 5 Unit(s) SubCutaneous every morning  insulin lispro (HumaLOG) corrective regimen sliding scale   SubCutaneous Before meals and at bedtime  levothyroxine 88 MICROGram(s) Oral daily  midodrine. 5 milliGRAM(s) Oral three times a day  polyethylene glycol 3350 17 Gram(s) Oral daily  senna 2 Tablet(s) Oral at bedtime  simvastatin 20 milliGRAM(s) Oral at bedtime    MEDICATIONS  (PRN):  acetaminophen   Tablet .. 650 milliGRAM(s) Oral every 6 hours PRN Temp greater or equal to 38C (100.4F), Mild Pain (1 - 3)  dextrose 40% Gel 15 Gram(s) Oral once PRN Blood Glucose LESS THAN 70 milliGRAM(s)/deciliter  glucagon  Injectable 1 milliGRAM(s) IntraMuscular once PRN Glucose LESS THAN 70 milligrams/deciliter  ondansetron Injectable 4 milliGRAM(s) IV Push every 6 hours PRN Nausea and/or Vomiting      No Known Allergies      LABS:                        11.4   9.88  )-----------( 211      ( 30 Oct 2019 22:10 )             36.0     10-30    139  |  103  |  16  ----------------------------<  194<H>  4.3   |  25  |  0.93    Ca    7.4<L>      30 Oct 2019 23:31  Phos  >36.0     10-31  Mg     1.9     10-30      PT/INR - ( 30 Oct 2019 22:10 )   PT: 10.8 sec;   INR: 0.94 ratio    PTT - ( 30 Oct 2019 22:10 )  PTT:18.5 sec       RADIOLOGY & ADDITIONAL TESTS:  Studies reviewed.    CT head non con, 10/31:  INTERPRETATION:  Clinical indication: Follow-up subdural hematoma.    Multiple axial sections were performed from base of skull to vertex for   contrast enhancement.    This exam is compared with prior noncontrast head CT performed on October 29, 2019.    Acute subdural hematoma involving the right temporal frontal parietal   region is again seen as well as acute subdural along the interhemispheric   region on the right side and right tentorial region. This subdural   hematoma measures approximately 1.2 cm in widest diameter and previously   measured approximately 1.3 cm and widest diameter.    Area of encephalomalacia and gliosis involving the left temporal/frontal   region is identified. This is the result of an old infarct or old trauma.    Localized mass effect consisting of sulcal effacement is seen. No   significant shift or herniation is seen    Evaluation of the osseous structures with the appropriate window appears   unremarkable    The visualized paranasal sinuses mastoid and middle ear regions appear   clear.    Impression: Subdural hematomas again seen as described above.

## 2019-10-31 NOTE — PROGRESS NOTE ADULT - ASSESSMENT
79y male with PMH of CAD s/p stents x4, HTN, DM, Hypothyroid, DLBC lymphoma presented to [facility] after a fall transferred to [facility] for further management of SDH.    # SDU  -2/2 fall  -CTH showed R sided frontal and temporal convexity with mass effect on the right cerebrum and 8 mm ML shift. No herniation.  -Pt was given Kcentra to reverse Eliquis   -neurosx monitoring him clinically - no plan for acute surgical intervention    # DLBCL  -recently dx'ed   -pt on treatment wiht R-CP (without V), s/p 2 cycles, last chemo 2 weeks ago  -follows up at [facility]  -no indication for inpatient treatment currently    # DVT  -pt had LUE DVT and was previously on eliquis  -now found to have new acute RLE DVT  -currently anticoagulation is contraindicated due to SDH for the next 10-14 days per neurosurgery - would recommend IVC filter placement to prevent PE 79y male with PMH of CAD s/p stents x4, HTN, DM, Hypothyroid, DLBC lymphoma presented to [facility] after a fall transferred to [facility] for further management of SDH.    # SDH  -2/2 fall  -CTH showed R sided frontal and temporal convexity with mass effect on the right cerebrum and 8 mm ML shift. No herniation. Repeat CT overtly stable.  -Pt was given Kcentra to reverse Eliquis   -neurosx monitoring him clinically - no plan for acute surgical intervention    # DLBCL  -recently dx'ed and treated with R-CP (without V), s/p 2 cycles, last chemo 2 weeks ago  -please check tumor lysis labs: CMP, LDH, Uric Acid, Phos to make sure this is not contributing to his mental status  -no indication for inpatient treatment currently. follow up at [facility] on discharge    # DVT  -pt had LUE DVT and was previously on eliquis  -now found to have new acute RLE DVT. repeat LE US in progress. plan for IVC filter per vascular cardiology if clot is progressing    Debbie Ellison MD  Hematology/Oncology Fellow  Pager: [phone]

## 2019-10-31 NOTE — PROGRESS NOTE ADULT - SUBJECTIVE AND OBJECTIVE BOX
Subjective: Patient seen and examined. No new events except as noted.     SUBJECTIVE/ROS:  Due to altered mental status, subjective information were not able to be obtained from the patient. History was obtained, to the extent possible, from review of the chart and collateral sources of information.       MEDICATIONS:  MEDICATIONS  (STANDING):  chlorhexidine 4% Liquid 1 Application(s) Topical <User Schedule>  dexAMETHasone     Tablet 2 milliGRAM(s) Oral <User Schedule>  dextrose 5%. 1000 milliLiter(s) (50 mL/Hr) IV Continuous <Continuous>  dextrose 50% Injectable 12.5 Gram(s) IV Push once  dextrose 50% Injectable 25 Gram(s) IV Push once  dextrose 50% Injectable 25 Gram(s) IV Push once  escitalopram 5 milliGRAM(s) Oral daily  famotidine    Tablet 20 milliGRAM(s) Oral two times a day  fludroCORTISONE 0.1 milliGRAM(s) Oral two times a day  insulin glargine Injectable (LANTUS) 5 Unit(s) SubCutaneous every morning  insulin lispro (HumaLOG) corrective regimen sliding scale   SubCutaneous Before meals and at bedtime  levETIRAcetam  IVPB 500 milliGRAM(s) IV Intermittent every 12 hours  levothyroxine 88 MICROGram(s) Oral daily  midodrine. 5 milliGRAM(s) Oral three times a day  polyethylene glycol 3350 17 Gram(s) Oral daily  senna 2 Tablet(s) Oral at bedtime  simvastatin 20 milliGRAM(s) Oral at bedtime  sodium phosphate IVPB 30 milliMole(s) IV Intermittent once      PHYSICAL EXAM:  T(C): 36.7 (10-31-19 @ 05:00), Max: 36.9 (10-30-19 @ 23:00)  HR: 84 (10-31-19 @ 07:00) (80 - 102)  BP: 166/95 (10-31-19 @ 07:00) (117/74 - 166/95)  RR: 13 (10-31-19 @ 07:00) (12 - 22)  SpO2: 94% (10-31-19 @ 07:00) (92% - 100%)  Wt(kg): --  I&O's Summary    30 Oct 2019 07:01  -  31 Oct 2019 07:00  --------------------------------------------------------  IN: 840 mL / OUT: 1350 mL / NET: -510 mL            JVP: Normal  Neck: supple  Lung: clear   CV: S1 S2 , Murmur:  Abd: soft  Ext: No edema  neuro: Awake   Psych: flat affect  Skin: normal``    LABS/DATA:    CARDIAC MARKERS:                                11.4   9.88  )-----------( 211      ( 30 Oct 2019 22:10 )             36.0     10-30    139  |  103  |  16  ----------------------------<  194<H>  4.3   |  25  |  0.93    Ca    7.4<L>      30 Oct 2019 23:31  Phos  1.4     10-30  Mg     1.9     10-30      proBNP:   Lipid Profile:   HgA1c:   TSH:     TELE:  EKG:

## 2019-10-31 NOTE — PROGRESS NOTE ADULT - SUBJECTIVE AND OBJECTIVE BOX
Vascular Cardiology      PAGER:  [phone]               [facility] [id number]              EMAIL [email or web address]   OFFICE [phone]    INTERVAL HISTORY:  - no acute overnight events  - no new/acute complaints         Allergies    No Known Allergies    Intolerances    	    MEDICATIONS:  enoxaparin Injectable 40 milliGRAM(s) SubCutaneous <User Schedule>  midodrine. 5 milliGRAM(s) Oral three times a day        acetaminophen   Tablet .. 650 milliGRAM(s) Oral every 6 hours PRN  diVALproex  milliGRAM(s) Oral every 12 hours  escitalopram 5 milliGRAM(s) Oral daily  ondansetron Injectable 4 milliGRAM(s) IV Push every 6 hours PRN    famotidine    Tablet 20 milliGRAM(s) Oral two times a day  polyethylene glycol 3350 17 Gram(s) Oral daily  senna 2 Tablet(s) Oral at bedtime    dexAMETHasone     Tablet 2 milliGRAM(s) Oral <User Schedule>  dextrose 40% Gel 15 Gram(s) Oral once PRN  dextrose 50% Injectable 12.5 Gram(s) IV Push once  dextrose 50% Injectable 25 Gram(s) IV Push once  dextrose 50% Injectable 25 Gram(s) IV Push once  fludroCORTISONE 0.1 milliGRAM(s) Oral two times a day  glucagon  Injectable 1 milliGRAM(s) IntraMuscular once PRN  insulin glargine Injectable (LANTUS) 5 Unit(s) SubCutaneous every morning  insulin lispro (HumaLOG) corrective regimen sliding scale   SubCutaneous Before meals and at bedtime  levothyroxine 88 MICROGram(s) Oral daily  simvastatin 20 milliGRAM(s) Oral at bedtime    calcium gluconate IVPB 2 Gram(s) IV Intermittent once  calcium gluconate IVPB 2 Gram(s) IV Intermittent once  chlorhexidine 4% Liquid 1 Application(s) Topical <User Schedule>  dextrose 5%. 1000 milliLiter(s) IV Continuous <Continuous>      PAST MEDICAL & SURGICAL HISTORY:  CAD (coronary artery disease)  Adult hypothyroidism  Hyperlipidemia  Peptic ulcer: s/p treatment for H pylori  Diabetes  Essential hypertension  S/P coronary artery stent placement: x4, last one in early 2018      FAMILY HISTORY:      SOCIAL HISTORY:  unchanged    REVIEW OF SYSTEMS:  unable to fully assess due to dementia       PHYSICAL EXAM:  T(C): 36.4 (10-31-19 @ 11:00), Max: 36.9 (10-30-19 @ 23:00)  HR: 85 (10-31-19 @ 12:00) (80 - 107)  BP: 123/77 (10-31-19 @ 12:00) (117/74 - 166/95)  RR: 13 (10-31-19 @ 12:00) (12 - 24)  SpO2: 100% (10-31-19 @ 12:00) (92% - 100%)  Wt(kg): --  I&O's Summary    30 Oct 2019 07:01  -  31 Oct 2019 07:00  --------------------------------------------------------  IN: 840 mL / OUT: 1350 mL / NET: -510 mL    31 Oct 2019 07:01  -  31 Oct 2019 12:29  --------------------------------------------------------  IN: 625 mL / OUT: 200 mL / NET: 425 mL        Appearance  HEENT:   Normal oral mucosa	  Lymphatic: No lymphadenopathy  Cardiovascular: Normal S1 S2, Normal JVP, II/VI systolic ejection murmur audible along left sternal border   Respiratory: Lungs clear to auscultation	  Psychiatry: A & O x 3, Mood & affect appropriate  Gastrointestinal:  Soft, Non-tender, + BS	  Skin: No rashes, No ecchymoses, No cyanosis	  Neurologic: Non-focal    Extremities: +1 BLE pitting edema w/ tenderness to palpation.     Vascular: Peripheral pulses palpable 2+ bilaterally        LABS:	 	    CBC Full  -  ( 30 Oct 2019 22:10 )  WBC Count : 9.88 K/uL  Hemoglobin : 11.4 g/dL  Hematocrit : 36.0 %  Platelet Count - Automated : 211 K/uL  Mean Cell Volume : 92.3 fl  Mean Cell Hemoglobin : 29.2 pg  Mean Cell Hemoglobin Concentration : 31.7 gm/dL  Auto Neutrophil # : x  Auto Lymphocyte # : x  Auto Monocyte # : x  Auto Eosinophil # : x  Auto Basophil # : x  Auto Neutrophil % : x  Auto Lymphocyte % : x  Auto Monocyte % : x  Auto Eosinophil % : x  Auto Basophil % : x    10-30    139  |  103  |  16  ----------------------------<  194<H>  4.3   |  25  |  0.93  10-30    135  |  102  |  17  ----------------------------<  199<H>  5.1   |  23  |  0.92    Ca    7.4<L>      30 Oct 2019 23:31  Ca    7.4<L>      30 Oct 2019 22:10  Phos  1.4     10-30  Phos  2.6     10-30  Mg     1.9     10-30  Mg     2.1     10-30        Telemetry:  sinus rhythm, HR 80s

## 2019-10-31 NOTE — PROGRESS NOTE ADULT - ASSESSMENT
ASSESSMENT/PLAN:  Traumatic acute SDH 2/2 fall with brain compression     NEURO: traumatic subdural   Neurochecks q2 hrs,    CT Head- stable  SDH    Cont Decadron (for h/o lymphoma)  keppra for seizure ppx for 7 days (day 2 of 7)  C/w Lexapro 5mg qd (home med)  Activity: [] mobilize as tolerated [x] PT [x] OT [] PMNR    PULM:  left pleural effusion     O2 sat > 92%  Incentive spirometry    CV: CAD s/p stent>1year ago  SBP goal 100 - 160 mmhg   cont statin  midodrine and fluornef- home med  cont  hold antiplatelets (asa) in setting of SDH, will discuss with NS whne ok to resume aspirin   TTE = EF 65-70%, LHV; intermediate diastolic dysfunction; mild aortic stenosis  appreciate cards recs    RENAL: CKD III   Home nephrologist consulted- lasix 20IV  Fluids:  IVL  ext cath    GI:    Last BM 10/28 per patient and daughter  Diet: speech eval and start TF  GI prophylaxis [] not indicated [] PPI [] other: famotidine  Bowel regimen [x] colace [x] senna [] other:    ENDO: hypothyroid  Goal euglycemia (-180), iss with FS   start lantus 5 units ( home lantus is 12 u)   cont synthroid    HEME/ONC:  repeat dopplers tomorrow  start lovenox 40 mg sc qhs if ok with NS  has below knee DVT   h/o lymphoma on decadron   VTE prophylaxis: [] SCDs [x] chemoprophylaxis    [x] high risk of DVT/PE on admission due to: lymphoma, repeat dopplers tomorrow     ID:  afebrile     SOCIAL/FAMILY:  [x] awaiting [] updated at bedside [] family meeting    CODE STATUS:  [x] Full Code [] DNR [] DNI [] Palliative/Comfort Care    DISPOSITION:  [x] ICU [] Stroke Unit [] Floor [] EMU [] RCU [] PCU    [x] Patient is at high risk of neurologic deterioration/death due to:  seziures, hemorrhage, herniation       Time spent: 35 critical care minutes    Contact: [phone] ASSESSMENT/PLAN:  Traumatic acute SDH 2/2 fall with brain compression     NEURO: traumatic subdural   Neurochecks q2 hrs,    CT Head- stable  SDH    Cont Decadron (for h/o lymphoma)  keppra for seizure ppx for 7 days (day 2 of 7)  C/w Lexapro 5mg qd (home med)  Activity: [] mobilize as tolerated [x] PT [x] OT [] PMNR    PULM:  left pleural effusion     O2 sat > 92%  Incentive spirometry    CV: CAD s/p stent>1year ago  SBP goal 100 - 160 mmhg   cont statin  midodrine and fluornef- home med  cont  hold antiplatelets (asa) in setting of SDH, will discuss with NS whne ok to resume aspirin   TTE = EF 65-70%, LHV; intermediate diastolic dysfunction; mild aortic stenosis  appreciate cards recs    RENAL: CKD III   Home nephrologist consulted- lasix 20IV  Fluids:  IVL  ext cath    GI:    Last BM 10/28 per patient and daughter  Diet: speech eval and start TF  GI prophylaxis [] not indicated [] PPI [] other: famotidine  Bowel regimen [x] colace [x] senna [] other:    ENDO: hypothyroid  Goal euglycemia (-180), iss with FS   start lantus 5 units ( home lantus is 12 u)   cont synthroid    HEME/ONC:  repeat dopplers tomorrow  start lovenox 40 mg sc qhs if ok with NS  has below knee DVT   h/o lymphoma on decadron   VTE prophylaxis: [] SCDs [x] chemoprophylaxis    [x] high risk of DVT/PE on admission due to: lymphoma, repeat dopplers tomorrow     ID:  afebrile     SOCIAL/FAMILY:  [x] awaiting [] updated at bedside [] family meeting    CODE STATUS:  [] Full Code [X] DNR [X] DNI [] Palliative/Comfort Care    DISPOSITION:  [x] ICU [] Stroke Unit [] Floor [] EMU [] RCU [] PCU    [x] Patient is at high risk of neurologic deterioration/death due to:  seziures, hemorrhage, herniation       Time spent: 35 critical care minutes    Contact: [phone] ASSESSMENT/PLAN:  Traumatic acute SDH 2/2 fall with brain compression     NEURO: traumatic subdural   Neurochecks q4 hrs,    CT Head- stable  SDH    Cont Decadron (for h/o lymphoma)  Change Keppra to Valproic 500 q12 (day 3/7)  C/w Lexapro 5mg qd (home med)  Activity: [X] mobilize as tolerated [x] PT [x] OT [] PMNR    PULM:  left pleural effusion     O2 sat > 92%  Incentive spirometry    CV: CAD s/p stent>1year ago  SBP goal 100 - 160 mmhg   cont statin  midodrine and fluornef- home med  cont  hold antiplatelets (asa) in setting of SDH, will discuss with NS chacha ok to resume aspirin   TTE = EF 65-70%, LHV; intermediate diastolic dysfunction; mild aortic stenosis  appreciate cards recs    RENAL: CKD III   Home nephrologist consulted- lasix 20IV  Fluids:  IVL  ext cath    GI:    Last BM 10/31   Dysphagia 3 diet  GI prophylaxis [] not indicated [] PPI [] other: famotidine  Bowel regimen [x] colace [x] senna [] other:    ENDO: hypothyroid  Goal euglycemia (-180), iss with FS   start lantus 5 units ( home lantus is 12 u)   cont synthroid    HEME/ONC:  repeat dopplers today  start lovenox 40 mg sc qhs   has below knee DVT   h/o lymphoma on decadron   VTE prophylaxis: [] SCDs [x] chemoprophylaxis    [x] high risk of DVT/PE on admission due to: lymphoma, repeat dopplers tomorrow     ID:  afebrile     SOCIAL/FAMILY:  [x] awaiting [] updated at bedside [] family meeting    CODE STATUS:  [] Full Code [X] DNR [X] DNI [] Palliative/Comfort Care    DISPOSITION:  [x] ICU [] Stroke Unit [] Floor [] EMU [] RCU [] PCU    [x] Patient is at high risk of neurologic deterioration/death due to:  seziures, hemorrhage, herniation     Time spent: 35 critical care minutes    Contact: [phone] ASSESSMENT/PLAN:  Traumatic acute SDH 2/2 fall with brain compression     NEURO: Neurochecks q2 hrs, protected sleep at night  CT Head- stable  SDH    Cont Decadron (for h/o lymphoma)  Change Keppra to Valproic 500 q12 (day 3/7)  C/w Lexapro 5mg qd (home med)  Activity: [X] mobilize as tolerated [x] PT [x] OT [] PMNR    PULM:  left pleural effusion     O2 sat > 92%  Incentive spirometry    CV: CAD s/p stent>1year ago  SBP goal 100 - 160 mmhg   cont statin  midodrine and fluornef- home med  cont  hold antiplatelets (asa) in setting of SDH, will discuss with NS chacha ok to resume aspirin   TTE = EF 65-70%, LHV; intermediate diastolic dysfunction; mild aortic stenosis  appreciate cards recs    RENAL: CKD III   Home nephrologist consulted  Fluids:  IVL  ext cath    GI:    Last BM 10/31   Dysphagia 3 diet  GI prophylaxis [] not indicated [] PPI [X] other: famotidine (Steroids)  Bowel regimen [x] colace [x] senna [] other:    ENDO: hypothyroid - cont synthroid  Goal euglycemia (-180), iss with FS   On lantus 5 units ( home lantus is 12 u)     HEME/ONC:  repeat dopplers today.  If propagation; will let Dr. Sparks know.   h/o lymphoma on decadron   VTE prophylaxis: [] SCDs [x] chemoprophylaxis  - start lovenox 40 mg sc qhs   [x] high risk of DVT/PE on admission due to: lymphoma, repeat dopplers tomorrow     ID:  afebrile     SOCIAL/FAMILY:  [x] awaiting [] updated at bedside [] family meeting    CODE STATUS:  [] Full Code [X] DNR [X] DNI [] Palliative/Comfort Care    DISPOSITION:  [x] ICU [] Stroke Unit [] Floor [] EMU [] RCU [] PCU    [x] Patient is at high risk of neurologic deterioration/death due to:  seziures, hemorrhage, herniation     Time spent: 35 critical care minutes    Contact: [phone]

## 2019-11-01 LAB
GLUCOSE BLDC GLUCOMTR-MCNC: 129 MG/DL — HIGH (ref 70–99)
GLUCOSE BLDC GLUCOMTR-MCNC: 136 MG/DL — HIGH (ref 70–99)
GLUCOSE BLDC GLUCOMTR-MCNC: 160 MG/DL — HIGH (ref 70–99)
GLUCOSE BLDC GLUCOMTR-MCNC: 181 MG/DL — HIGH (ref 70–99)

## 2019-11-01 PROCEDURE — 99233 SBSQ HOSP IP/OBS HIGH 50: CPT

## 2019-11-01 RX ORDER — INSULIN GLARGINE 100 [IU]/ML
8 INJECTION, SOLUTION SUBCUTANEOUS AT BEDTIME
Refills: 0 | Status: DISCONTINUED | OUTPATIENT
Start: 2019-11-01 | End: 2019-11-04

## 2019-11-01 RX ORDER — DEXAMETHASONE 0.5 MG/5ML
1 ELIXIR ORAL DAILY
Refills: 0 | Status: DISCONTINUED | OUTPATIENT
Start: 2019-11-01 | End: 2019-11-01

## 2019-11-01 RX ORDER — DIVALPROEX SODIUM 500 MG/1
500 TABLET, DELAYED RELEASE ORAL EVERY 12 HOURS
Refills: 0 | Status: DISCONTINUED | OUTPATIENT
Start: 2019-11-01 | End: 2019-11-03

## 2019-11-01 RX ORDER — DEXAMETHASONE 0.5 MG/5ML
2 ELIXIR ORAL
Refills: 0 | Status: DISCONTINUED | OUTPATIENT
Start: 2019-11-01 | End: 2019-11-05

## 2019-11-01 RX ORDER — DEXAMETHASONE 0.5 MG/5ML
1 ELIXIR ORAL ONCE
Refills: 0 | Status: COMPLETED | OUTPATIENT
Start: 2019-11-01 | End: 2019-11-01

## 2019-11-01 RX ADMIN — DIVALPROEX SODIUM 500 MILLIGRAM(S): 500 TABLET, DELAYED RELEASE ORAL at 05:15

## 2019-11-01 RX ADMIN — ESCITALOPRAM OXALATE 5 MILLIGRAM(S): 10 TABLET, FILM COATED ORAL at 11:34

## 2019-11-01 RX ADMIN — Medication 1 MILLIGRAM(S): at 15:42

## 2019-11-01 RX ADMIN — FAMOTIDINE 20 MILLIGRAM(S): 10 INJECTION INTRAVENOUS at 05:15

## 2019-11-01 RX ADMIN — FLUDROCORTISONE ACETATE 0.1 MILLIGRAM(S): 0.1 TABLET ORAL at 05:15

## 2019-11-01 RX ADMIN — Medication 2: at 08:52

## 2019-11-01 RX ADMIN — FLUDROCORTISONE ACETATE 0.1 MILLIGRAM(S): 0.1 TABLET ORAL at 17:00

## 2019-11-01 RX ADMIN — Medication 2: at 13:01

## 2019-11-01 RX ADMIN — MIDODRINE HYDROCHLORIDE 5 MILLIGRAM(S): 2.5 TABLET ORAL at 16:59

## 2019-11-01 RX ADMIN — Medication 88 MICROGRAM(S): at 05:14

## 2019-11-01 RX ADMIN — MIDODRINE HYDROCHLORIDE 5 MILLIGRAM(S): 2.5 TABLET ORAL at 05:15

## 2019-11-01 RX ADMIN — FAMOTIDINE 20 MILLIGRAM(S): 10 INJECTION INTRAVENOUS at 17:01

## 2019-11-01 RX ADMIN — INSULIN GLARGINE 5 UNIT(S): 100 INJECTION, SOLUTION SUBCUTANEOUS at 08:53

## 2019-11-01 RX ADMIN — INSULIN GLARGINE 8 UNIT(S): 100 INJECTION, SOLUTION SUBCUTANEOUS at 22:19

## 2019-11-01 RX ADMIN — ENOXAPARIN SODIUM 40 MILLIGRAM(S): 100 INJECTION SUBCUTANEOUS at 17:00

## 2019-11-01 RX ADMIN — SIMVASTATIN 20 MILLIGRAM(S): 20 TABLET, FILM COATED ORAL at 22:19

## 2019-11-01 RX ADMIN — DIVALPROEX SODIUM 500 MILLIGRAM(S): 500 TABLET, DELAYED RELEASE ORAL at 18:12

## 2019-11-01 RX ADMIN — MIDODRINE HYDROCHLORIDE 5 MILLIGRAM(S): 2.5 TABLET ORAL at 11:34

## 2019-11-01 RX ADMIN — Medication 1 MILLIGRAM(S): at 08:52

## 2019-11-01 NOTE — PROGRESS NOTE ADULT - SUBJECTIVE AND OBJECTIVE BOX
[facility]   MD Anusha Siddiqui D.O. Fatima Sheikh, D.O. Ruoru Wong, PA    From 7 AM - 5 PM:  OFFICE: [phone]  Dr. Radford cell: [phone]  Dr. Townsend cell: [phone]  Dr. Steinberg cell: [phone]  LEATHA Agee cell: [phone]    From 5 PM - 7 AM: Answering Service: [phone]        RENAL FOLLOW UP NOTE  --------------------------------------------------------------------------------  HPI: Pt seen and examined. Has no complaints this AM. Daughter at bedside states he is confused today. Keeps repeating the word "flexible"        PAST HISTORY  --------------------------------------------------------------------------------  No significant changes to PMH, PSH, FHx, SHx, unless otherwise noted    ALLERGIES & MEDICATIONS  --------------------------------------------------------------------------------  Allergies    No Known Allergies    Intolerances      Standing Inpatient Medications  chlorhexidine 4% Liquid 1 Application(s) Topical <User Schedule>  dexAMETHasone     Tablet 1 milliGRAM(s) Oral daily  dextrose 5%. 1000 milliLiter(s) IV Continuous <Continuous>  dextrose 50% Injectable 12.5 Gram(s) IV Push once  dextrose 50% Injectable 25 Gram(s) IV Push once  dextrose 50% Injectable 25 Gram(s) IV Push once  diVALproex  milliGRAM(s) Oral every 12 hours  enoxaparin Injectable 40 milliGRAM(s) SubCutaneous <User Schedule>  escitalopram 5 milliGRAM(s) Oral daily  famotidine    Tablet 20 milliGRAM(s) Oral two times a day  fludroCORTISONE 0.1 milliGRAM(s) Oral two times a day  insulin glargine Injectable (LANTUS) 5 Unit(s) SubCutaneous every morning  insulin lispro (HumaLOG) corrective regimen sliding scale   SubCutaneous Before meals and at bedtime  levothyroxine 88 MICROGram(s) Oral daily  midodrine. 5 milliGRAM(s) Oral three times a day  polyethylene glycol 3350 17 Gram(s) Oral daily  senna 2 Tablet(s) Oral at bedtime  simvastatin 20 milliGRAM(s) Oral at bedtime    PRN Inpatient Medications  acetaminophen   Tablet .. 650 milliGRAM(s) Oral every 6 hours PRN  dextrose 40% Gel 15 Gram(s) Oral once PRN  glucagon  Injectable 1 milliGRAM(s) IntraMuscular once PRN  ondansetron Injectable 4 milliGRAM(s) IV Push every 6 hours PRN      REVIEW OF SYSTEMS  --------------------------------------------------------------------------------  General: no fever  CVS: no chest pain  RESP: no sob, no cough  ABD: no abdominal pain  : no dysuria,  MSK: no edema     VITALS/PHYSICAL EXAM  --------------------------------------------------------------------------------  T(C): 36.2 (11-01-19 @ 11:00), Max: 37 (11-01-19 @ 07:00)  HR: 91 (11-01-19 @ 12:00) (85 - 122)  BP: 128/86 (11-01-19 @ 12:00) (122/94 - 160/95)  RR: 13 (11-01-19 @ 12:00) (12 - 30)  SpO2: 100% (11-01-19 @ 12:00) (96% - 100%)  Wt(kg): --        10-31-19 @ 07:01  -  11-01-19 @ 07:00  --------------------------------------------------------  IN: 2045 mL / OUT: 950 mL / NET: 1095 mL    11-01-19 @ 07:01  -  11-01-19 @ 12:03  --------------------------------------------------------  IN: 120 mL / OUT: 0 mL / NET: 120 mL      Physical Exam:  	Gen: NAD  	HEENT: MMM  	Pulm: CTA B/L  	CV: S1S2, + murmur  	Abd: Soft, +BS  	Ext: Anasarca improving              Neuro: Awake   	Skin: Warm and Dry     LABS/STUDIES  --------------------------------------------------------------------------------              11.4   9.88  >-----------<  211      [10-30-19 @ 22:10]              36.0     138  |  104  |  15  ----------------------------<  201      [10-31-19 @ 16:13]  3.6   |  23  |  0.85        Ca     10.2     [10-31-19 @ 16:13]      iCa    0.33     [10-31 @ 11:15]      Mg     2.1     [10-31-19 @ 16:13]      Phos  3.7     [10-31-19 @ 16:13]      PT/INR: PT 10.8 , INR 0.94       [10-30-19 @ 22:10]  PTT: 18.5       [10-30-19 @ 22:10]      Creatinine Trend:  SCr 0.85 [10-31 @ 16:13]  SCr <0.30 [10-31 @ 12:45]  SCr 0.93 [10-30 @ 23:31]  SCr 0.92 [10-30 @ 22:10]  SCr 1.02 [10-30 @ 05:09]        Iron 38, TIBC 234, %sat --      [09-12-19 @ 06:08]  Ferritin 214.6      [09-12-19 @ 06:08]  PTH 20.00 (Ca --)      [09-03-19 @ 05:45]   --  PTH 25.46 (Ca --)      [09-01-19 @ 06:00]   --  Vitamin D (25OH) 34.9      [09-03-19 @ 05:45]  HbA1c 7.0      [09-13-19 @ 06:50]  TSH 5.55      [09-09-19 @ 03:20]  Lipid: chol 121, TG 96, HDL 33, LDL 74      [08-31-19 @ 07:00]

## 2019-11-01 NOTE — PROGRESS NOTE ADULT - SUBJECTIVE AND OBJECTIVE BOX
79y male with PMH of CAD s/p stents x4 (most recent one 2017 per daughter), HTN, DM, Hypothyroid, diffuse large B cell lymphoma presenting after a fall. He was in rehab for deconditioning after chemotherapy at the time, and was sitting in a wheelchair when he tried to stand up on his own and fell from standing. Denies LOC, no symptoms proceeding the fall. Pt reports that he remembers events preceding the fall and after the fall. Fall was unwitnessed, nurses at [place] stated that down time was a few minutes, daughter spoke to him after the fall he was respond like his normal self, was complaining of a headache. No other complaints, denies chest pain, SOB, abdominal pain, nausea, vomiting, back pain.    Patient was on Eliquis at the time for LUE DVT - was given Kcentra at [facility].    Patient was no longer on DAPT because his stents are >1year old. Normally takes a baby aspirin, but per patient and family his baby aspirin was being held for 1 week prior to fall due to GIB (GIB now resolved s/p clip, per daughter)    Patient has had several workups for fall/syncope in recent history. Was determined to have orthostatic hypotension, and was started on midodrine and fluoronef. Daughter (physician) reports that his blood pressure has been stable on this current regimen.  Daughter also reports that patient had been on megase for appetite stimulation as well.    On admission, the patient was:  GCS: 15    ***HIGH RISK FOR VTE 2/2 Lymphoma***     Hospital Course:  10/29: Dopplers show acute calf DVT affecting R peroneal, R soleal, R gastrocnemius veins and L soleal vein; acute upper extremity DVT in L brachial and L axillary veins    Overnight Events:     ROS: negative [] unable to obtain as patient is comatose/intubated/aphasic []   VITALS:   T(C): 37 (11-01-19 @ 07:00), Max: 37 (11-01-19 @ 07:00)  HR: 98 (11-01-19 @ 08:00) (82 - 107)  BP: 160/95 (11-01-19 @ 08:00) (122/94 - 160/95)  RR: 19 (11-01-19 @ 08:00) (12 - 19)  SpO2: 100% (11-01-19 @ 08:00) (95% - 100%)    10-31-19 @ 07:01  -  11-01-19 @ 07:00  --------------------------------------------------------  IN: 2045 mL / OUT: 950 mL / NET: 1095 mL      LABS:                          11.4   9.88  )-----------( 211      ( 30 Oct 2019 22:10 )             36.0     10-31    138  |  104  |  15  ----------------------------<  201<H>  3.6   |  23  |  0.85    Ca    10.2      31 Oct 2019 16:13  Phos  3.7     10-31  Mg     2.1     10-31      PT/INR - ( 30 Oct 2019 22:10 )   PT: 10.8 sec;   INR: 0.94 ratio         PTT - ( 30 Oct 2019 22:10 )  PTT:18.5 sec  MEDS:  MEDICATIONS  (STANDING):  chlorhexidine 4% Liquid 1 Application(s) Topical <User Schedule>  dexAMETHasone     Tablet 1 milliGRAM(s) Oral daily  dextrose 5%. 1000 milliLiter(s) (50 mL/Hr) IV Continuous <Continuous>  dextrose 50% Injectable 12.5 Gram(s) IV Push once  dextrose 50% Injectable 25 Gram(s) IV Push once  dextrose 50% Injectable 25 Gram(s) IV Push once  diVALproex  milliGRAM(s) Oral every 12 hours  enoxaparin Injectable 40 milliGRAM(s) SubCutaneous <User Schedule>  escitalopram 5 milliGRAM(s) Oral daily  famotidine    Tablet 20 milliGRAM(s) Oral two times a day  fludroCORTISONE 0.1 milliGRAM(s) Oral two times a day  insulin glargine Injectable (LANTUS) 5 Unit(s) SubCutaneous every morning  insulin lispro (HumaLOG) corrective regimen sliding scale   SubCutaneous Before meals and at bedtime  levothyroxine 88 MICROGram(s) Oral daily  midodrine. 5 milliGRAM(s) Oral three times a day  polyethylene glycol 3350 17 Gram(s) Oral daily  senna 2 Tablet(s) Oral at bedtime  simvastatin 20 milliGRAM(s) Oral at bedtime    EXAMINATION:   General:  Agitated this AM.   HEENT:  MMM.   Neuro:  AAOx3, b/l UE - No drift. Did not co-operate with strength testing.   Cards:  s1, s2 RRR  Respiratory:  No respiratory distress  Abdomen:  nondistended  Extremities:  mild edema to ankles bilaterally  Skin:  warm/dry 79y male with PMH of CAD s/p stents x4 (most recent one 2017 per daughter), HTN, DM, Hypothyroid, diffuse large B cell lymphoma presenting after a fall. He was in rehab for deconditioning after chemotherapy at the time, and was sitting in a wheelchair when he tried to stand up on his own and fell from standing. Denies LOC, no symptoms proceeding the fall. Pt reports that he remembers events preceding the fall and after the fall. Fall was unwitnessed, nurses at [place] stated that down time was a few minutes, daughter spoke to him after the fall he was respond like his normal self, was complaining of a headache. No other complaints, denies chest pain, SOB, abdominal pain, nausea, vomiting, back pain.    Patient was on Eliquis at the time for LUE DVT - was given Kcentra at [facility].    Patient was no longer on DAPT because his stents are >1year old. Normally takes a baby aspirin, but per patient and family his baby aspirin was being held for 1 week prior to fall due to GIB (GIB now resolved s/p clip, per daughter)    Patient has had several workups for fall/syncope in recent history. Was determined to have orthostatic hypotension, and was started on midodrine and fluoronef. Daughter (physician) reports that his blood pressure has been stable on this current regimen.  Daughter also reports that patient had been on megase for appetite stimulation as well.    On admission, the patient was:  GCS: 15    ***HIGH RISK FOR VTE 2/2 Lymphoma***     Hospital Course:  10/29: Dopplers show acute calf DVT affecting R peroneal, R soleal, R gastrocnemius veins and L soleal vein; acute upper extremity DVT in L brachial and L axillary veins  10/30: exam stable. started on chemoprophylaxis for DVTs. pending repeat Dopplers tomorrow  10/31: repeat Dopplers show no further propagation of clot. Per Neurosx, possible plan for drain pending liquifaction of SDH    Overnight Events:     ROS: negative [x] unable to obtain as patient is comatose/intubated/aphasic []   VITALS:   T(C): 37 (11-01-19 @ 07:00), Max: 37 (11-01-19 @ 07:00)  HR: 98 (11-01-19 @ 08:00) (82 - 107)  BP: 160/95 (11-01-19 @ 08:00) (122/94 - 160/95)  RR: 19 (11-01-19 @ 08:00) (12 - 19)  SpO2: 100% (11-01-19 @ 08:00) (95% - 100%)    10-31-19 @ 07:01  -  11-01-19 @ 07:00  --------------------------------------------------------  IN: 2045 mL / OUT: 950 mL / NET: 1095 mL      LABS:                          11.4   9.88  )-----------( 211      ( 30 Oct 2019 22:10 )             36.0     10-31    138  |  104  |  15  ----------------------------<  201<H>  3.6   |  23  |  0.85    Ca    10.2      31 Oct 2019 16:13  Phos  3.7     10-31  Mg     2.1     10-31      PT/INR - ( 30 Oct 2019 22:10 )   PT: 10.8 sec;   INR: 0.94 ratio         PTT - ( 30 Oct 2019 22:10 )  PTT:18.5 sec  MEDS:  MEDICATIONS  (STANDING):  chlorhexidine 4% Liquid 1 Application(s) Topical <User Schedule>  dexAMETHasone     Tablet 1 milliGRAM(s) Oral daily  dextrose 5%. 1000 milliLiter(s) (50 mL/Hr) IV Continuous <Continuous>  dextrose 50% Injectable 12.5 Gram(s) IV Push once  dextrose 50% Injectable 25 Gram(s) IV Push once  dextrose 50% Injectable 25 Gram(s) IV Push once  diVALproex  milliGRAM(s) Oral every 12 hours  enoxaparin Injectable 40 milliGRAM(s) SubCutaneous <User Schedule>  escitalopram 5 milliGRAM(s) Oral daily  famotidine    Tablet 20 milliGRAM(s) Oral two times a day  fludroCORTISONE 0.1 milliGRAM(s) Oral two times a day  insulin glargine Injectable (LANTUS) 5 Unit(s) SubCutaneous every morning  insulin lispro (HumaLOG) corrective regimen sliding scale   SubCutaneous Before meals and at bedtime  levothyroxine 88 MICROGram(s) Oral daily  midodrine. 5 milliGRAM(s) Oral three times a day  polyethylene glycol 3350 17 Gram(s) Oral daily  senna 2 Tablet(s) Oral at bedtime  simvastatin 20 milliGRAM(s) Oral at bedtime    EXAMINATION:   General:  Agitated this AM.   HEENT:  MMM.   Neuro:  AAOx3, b/l UE - No drift. Did not co-operate with strength testing.   Cards:  s1, s2 RRR  Respiratory:  No respiratory distress  Abdomen:  nondistended  Extremities:  mild edema to ankles bilaterally  Skin:  warm/dry 79y male with PMH of CAD s/p stents x4 (most recent one 2017 per daughter), HTN, DM, Hypothyroid, diffuse large B cell lymphoma presenting after a fall. He was in rehab for deconditioning after chemotherapy at the time, and was sitting in a wheelchair when he tried to stand up on his own and fell from standing. Denies LOC, no symptoms proceeding the fall. Pt reports that he remembers events preceding the fall and after the fall. Fall was unwitnessed, nurses at [place] stated that down time was a few minutes, daughter spoke to him after the fall he was respond like his normal self, was complaining of a headache. No other complaints, denies chest pain, SOB, abdominal pain, nausea, vomiting, back pain.    Patient was on Eliquis at the time for LUE DVT - was given Kcentra at [facility].    Patient was no longer on DAPT because his stents are >1year old. Normally takes a baby aspirin, but per patient and family his baby aspirin was being held for 1 week prior to fall due to GIB (GIB now resolved s/p clip, per daughter)    Patient has had several workups for fall/syncope in recent history. Was determined to have orthostatic hypotension, and was started on midodrine and fluoronef. Daughter (physician) reports that his blood pressure has been stable on this current regimen.  Daughter also reports that patient had been on megase for appetite stimulation as well.    On admission, the patient was:  GCS: 15    ***HIGH RISK FOR VTE 2/2 Lymphoma***     Hospital Course:  10/29: Dopplers show acute calf DVT affecting R peroneal, R soleal, R gastrocnemius veins and L soleal vein; acute upper extremity DVT in L brachial and L axillary veins  10/30: exam stable. started on chemoprophylaxis for DVTs. pending repeat Dopplers tomorrow  10/31: repeat Dopplers show no further propagation of clot. Per Neurosx, possible plan for drain pending  if liquifaction of SDH    Overnight Events:     ROS: negative [x] unable to obtain as patient is comatose/intubated/aphasic []   VITALS:   T(C): 37 (11-01-19 @ 07:00), Max: 37 (11-01-19 @ 07:00)  HR: 98 (11-01-19 @ 08:00) (82 - 107)  BP: 160/95 (11-01-19 @ 08:00) (122/94 - 160/95)  RR: 19 (11-01-19 @ 08:00) (12 - 19)  SpO2: 100% (11-01-19 @ 08:00) (95% - 100%)    10-31-19 @ 07:01  -  11-01-19 @ 07:00  --------------------------------------------------------  IN: 2045 mL / OUT: 950 mL / NET: 1095 mL      LABS:                          11.4   9.88  )-----------( 211      ( 30 Oct 2019 22:10 )             36.0     10-31    138  |  104  |  15  ----------------------------<  201<H>  3.6   |  23  |  0.85    Ca    10.2      31 Oct 2019 16:13  Phos  3.7     10-31  Mg     2.1     10-31      PT/INR - ( 30 Oct 2019 22:10 )   PT: 10.8 sec;   INR: 0.94 ratio         PTT - ( 30 Oct 2019 22:10 )  PTT:18.5 sec    CAPILLARY BLOOD GLUCOSE  POCT Blood Glucose.: 160 mg/dL (01 Nov 2019 12:49)  POCT Blood Glucose.: 181 mg/dL (01 Nov 2019 08:46)  POCT Blood Glucose.: 166 mg/dL (31 Oct 2019 21:33)  POCT Blood Glucose.: 161 mg/dL (31 Oct 2019 17:30)    MEDS:  MEDICATIONS  (STANDING):  chlorhexidine 4% Liquid 1 Application(s) Topical <User Schedule>  dexAMETHasone     Tablet 2 milliGRAM(s) Oral daily  dextrose 5%. 1000 milliLiter(s) (50 mL/Hr) IV Continuous <Continuous>  dextrose 50% Injectable 12.5 Gram(s) IV Push once  dextrose 50% Injectable 25 Gram(s) IV Push once  dextrose 50% Injectable 25 Gram(s) IV Push once  diVALproex  milliGRAM(s) Oral every 12 hours  enoxaparin Injectable 40 milliGRAM(s) SubCutaneous <User Schedule>  escitalopram 5 milliGRAM(s) Oral daily  famotidine    Tablet 20 milliGRAM(s) Oral two times a day  fludroCORTISONE 0.1 milliGRAM(s) Oral two times a day  insulin glargine Injectable (LANTUS) 5 Unit(s) SubCutaneous every morning  insulin lispro (HumaLOG) corrective regimen sliding scale   SubCutaneous Before meals and at bedtime  levothyroxine 88 MICROGram(s) Oral daily  midodrine. 5 milliGRAM(s) Oral three times a day  polyethylene glycol 3350 17 Gram(s) Oral daily  senna 2 Tablet(s) Oral at bedtime  simvastatin 20 milliGRAM(s) Oral at bedtime    EXAMINATION:   General:  Agitated this AM.   HEENT:  MMM.   Neuro:  AAOx3, b/l UE - No drift. Did not co-operate with strength testing.   Cards:  s1, s2 RRR  Respiratory:  No respiratory distress  Abdomen:  nondistended  Extremities:  mild edema to ankles bilaterally  Skin:  warm/dry

## 2019-11-01 NOTE — SWALLOW BEDSIDE ASSESSMENT ADULT - SLP PERTINENT HISTORY OF CURRENT PROBLEM
79y male with PMH of CAD s/p stents x4, HTN, DM, Hypothyroid, lymphoma presenting from rehab after a fall. He was getting out of chair when he fell backward hitting his head. Denies LOC, no symptoms proceeding the fall. Fall was unwitnessed, nurses at [place] stated that down time was a few minutes, daughter spoke to him after the fall he was respond like his normal self, was complaining of a headache. No other complaints, denies chest pain, SOB, abdominal pain, nausea, vomiting, back pain. Patient was on Eliquis - given Kcentra at [facility].  Patient not on ASA as per patient and family. Of note, patient currently full code as per family, would want surgical intervention. On admission, GCS: 15. HIGH RISK FOR VTE 2/2 Lymphoma.

## 2019-11-01 NOTE — PHYSICAL THERAPY INITIAL EVALUATION ADULT - ADDITIONAL COMMENTS
per pts dtr at bedside (dtr is a physician), Pt lives in apt with spouse, youngest dtr will be with and assisting, +no elevator, +flt of stairs to [place], owns rolling walker/bedside commode/shower tub bench/W/C for long distances/hospital bed, family will assist pt at all times, will hire help also

## 2019-11-01 NOTE — PHYSICAL THERAPY INITIAL EVALUATION ADULT - DISCHARGE DISPOSITION, PT EVAL
DC pts dtr adament about pt returning home and not going back to subacute rehab; home with home PT services for general strengthening, to increase endurance, address fall prevention, perform balance training, safety assessment of home environment, and to restore pt's prior level of function, owns rolling walker/bedside commode/tub transfer bench/hospital bed/W/C for long distances, assist from HHA/family for ALL mobility/ADLs, recommend transport home 2*stair negotiation, MURPHY chavez.

## 2019-11-01 NOTE — SWALLOW BEDSIDE ASSESSMENT ADULT - ASR SWALLOW RECOMMEND DIAG
Deferred at this time. Will f/u to monitor for candidacy for instrumental exam vs. upgrade. VFSS/MBS/tentatively scheduled for 11/4, as per d/w LEATHA Ward

## 2019-11-01 NOTE — PHYSICAL THERAPY INITIAL EVALUATION ADULT - GENERAL OBSERVATIONS, REHAB EVAL
Pt received supine in bed, +ICU monitoring, +do not use BLE and LUE pink bands 2* DVT. Per Neurosx rounds and MD Cavanaugh pt is ok for OOB activity and no plan for IVC filter at this time. Pts dtr at bedside and assisted with pt during eval, translating at times,

## 2019-11-01 NOTE — PHYSICAL THERAPY INITIAL EVALUATION ADULT - PERTINENT HX OF CURRENT PROBLEM, REHAB EVAL
Pt is a 80 yo M admitted to [facility] on 10/29/19 with PMH of CAD s/p stents x4, HTN, DM, Hypothyroid, lymphoma presenting after a fall. He was getting out of chair when he fell backward hitting his head. Denies LOC, no symptoms proceeding the fall. Fall was unwitnessed, nurses at [place] stated that down time was a few minutes, daughter spoke to him after the fall he was respond like his normal self, was complaining of a headache.

## 2019-11-01 NOTE — PROGRESS NOTE ADULT - ASSESSMENT
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 Found to have right-sided SDH with mass effect and 8 mm midline shift to left. Also found to have RLE DVT. Nephrology is consulted for CKD and volume overload.    CKD stage 3  - baseline Cr stable ~0.9-1.1  - Had Cr ~2 for the past year; ? prolonged FRANCES state (? 2/2 prolonged hypercalcemic state) that has now resolved    - Avoid nephrotoxins including NSAIDs, IV contrast (if possible), [employer] products  - maintain MAP >65  - renal diet    Volume overload  - 1L fluid restriction   - hold lasix today   - monitor I/O's accurately    HTN  - has Hx HTN but was on midodrine and florinef during last admission for hypotension, was undergoing adrenal insufficiency workup w/ endocrine   - BP at goal now  - goal BP <140/90  - started on decadron this AM, monitor BP w/ decadron  - would hold florinef while on decadron as this is duplication of Tx  - monitor BP   - consider endocrine consult for followup of prior workup of AI    Hypocalcemia  - Has Hx hypercalcemia of malignancy  - hypocalcemia is improved  - monitor    Hypophosphatemia  - likely from poor PO intake  - supplement as needed  - monitor

## 2019-11-01 NOTE — PHYSICAL THERAPY INITIAL EVALUATION ADULT - PRECAUTIONS/LIMITATIONS, REHAB EVAL
No other complaints, denies chest pain, SOB, abdominal pain, nausea, vomiting, back pain. CT brain: Acute right-sided subdural hematoma over the right frontal and temporal convexity and along the right proximal and tentorial leaflet. Maximum thickness of the subdural hematoma is 1.1 cm over the right tentorial leaflet. Regional mass effect upon the right cerebrum with up to 8 mm shift of the midline towards the left. No central herniation. Chronic areas of right frontal and temporal cortical encephalomalacia and chronic ischemic changes in the frontoparietal white matter. CT of cervical spine: No acute cervical spine fracture or evidence of traumatic malalignment. Multilevel cervical spondylosis. +Traumatic acute SDH 2/2 fall with brain compression, +CT Head- stable  SDH, per vascular, Reviewed venous duplex today: stable/unchanged R below knee DVT.holding off on IVC/fall precautions

## 2019-11-01 NOTE — SWALLOW BEDSIDE ASSESSMENT ADULT - PHARYNGEAL PHASE
palpable hyolaryngeal elevation/excursion; no overt s/s laryngeal penetration/aspiration Wet vocal quality post oral intake/Cough post oral intake/palpable hyolaryngeal elevation/excursion; audible swallow

## 2019-11-01 NOTE — PROGRESS NOTE ADULT - SUBJECTIVE AND OBJECTIVE BOX
Patient seen and examined at bedside.    --Anticoagulation--  enoxaparin Injectable 40 milliGRAM(s) SubCutaneous <User Schedule>    T(C): 36.7 (10-31-19 @ 23:00), Max: 36.8 (10-31-19 @ 19:00)  HR: 97 (11-01-19 @ 00:00) (80 - 107)  BP: 146/86 (11-01-19 @ 00:00) (122/94 - 166/95)  RR: 19 (11-01-19 @ 00:00) (12 - 24)  SpO2: 100% (11-01-19 @ 00:00) (92% - 100%)  Wt(kg): --    Exam:  AOx2-3, conversant  RONEY BOB R > L

## 2019-11-01 NOTE — PROGRESS NOTE ADULT - SUBJECTIVE AND OBJECTIVE BOX
Subjective: Patient seen and examined. No new events except as noted.     SUBJECTIVE/ROS:  NAD      MEDICATIONS:  MEDICATIONS  (STANDING):  chlorhexidine 4% Liquid 1 Application(s) Topical <User Schedule>  dexAMETHasone     Tablet 1 milliGRAM(s) Oral daily  dextrose 5%. 1000 milliLiter(s) (50 mL/Hr) IV Continuous <Continuous>  dextrose 50% Injectable 12.5 Gram(s) IV Push once  dextrose 50% Injectable 25 Gram(s) IV Push once  dextrose 50% Injectable 25 Gram(s) IV Push once  diVALproex  milliGRAM(s) Oral every 12 hours  enoxaparin Injectable 40 milliGRAM(s) SubCutaneous <User Schedule>  escitalopram 5 milliGRAM(s) Oral daily  famotidine    Tablet 20 milliGRAM(s) Oral two times a day  fludroCORTISONE 0.1 milliGRAM(s) Oral two times a day  insulin glargine Injectable (LANTUS) 5 Unit(s) SubCutaneous every morning  insulin lispro (HumaLOG) corrective regimen sliding scale   SubCutaneous Before meals and at bedtime  levothyroxine 88 MICROGram(s) Oral daily  midodrine. 5 milliGRAM(s) Oral three times a day  polyethylene glycol 3350 17 Gram(s) Oral daily  senna 2 Tablet(s) Oral at bedtime  simvastatin 20 milliGRAM(s) Oral at bedtime      PHYSICAL EXAM:  T(C): 37 (11-01-19 @ 07:00), Max: 37 (11-01-19 @ 07:00)  HR: 98 (11-01-19 @ 08:00) (82 - 107)  BP: 160/95 (11-01-19 @ 08:00) (122/94 - 160/95)  RR: 19 (11-01-19 @ 08:00) (12 - 19)  SpO2: 100% (11-01-19 @ 08:00) (95% - 100%)  Wt(kg): --  I&O's Summary    31 Oct 2019 07:01  -  01 Nov 2019 07:00  --------------------------------------------------------  IN: 2045 mL / OUT: 950 mL / NET: 1095 mL            JVP: Normal  Neck: supple  Lung: clear   CV: S1 S2 , Murmur:  Abd: soft  Ext: No edema  neuro: Awake / alert  Psych: flat affect  Skin: normal``    LABS/DATA:    CARDIAC MARKERS:                                11.4   9.88  )-----------( 211      ( 30 Oct 2019 22:10 )             36.0     10-31    138  |  104  |  15  ----------------------------<  201<H>  3.6   |  23  |  0.85    Ca    10.2      31 Oct 2019 16:13  Phos  3.7     10-31  Mg     2.1     10-31      proBNP:   Lipid Profile:   HgA1c:   TSH:     TELE:  EKG:

## 2019-11-01 NOTE — SWALLOW BEDSIDE ASSESSMENT ADULT - SLP GENERAL OBSERVATIONS
Pt seen at bedside, awake and alert, generally cooperative, following directions for the purposes of the exam.

## 2019-11-01 NOTE — PROGRESS NOTE ADULT - ASSESSMENT
79M PMhx lymphoma admitted for acute SDH  - Keppra for 7 days  - cont decadron for hx lumphoma  - may need IVC filter prior to OR  - Plan to drain pending liquefaction of SDH  - Floor when stable

## 2019-11-01 NOTE — PROGRESS NOTE ADULT - ASSESSMENT
cad history of stent  stable  off antiplt therapy for now     Mild AS  stable    Hypotension   on midodrine/florinef  monitor supine BP and orthostatic vitals     Below knee DVT  fu with vascular card

## 2019-11-01 NOTE — SWALLOW BEDSIDE ASSESSMENT ADULT - SWALLOW EVAL: DIAGNOSIS
Pt presents with evidence of 1) oropharyngeal dysphagia notable for mildly prolonged oral management of chewables, and ? s/s laryngeal penetration/aspiration with thin liquids, 2) suspected cognitive-linguistic deficits. Pt presents with evidence of 1) oropharyngeal dysphagia notable for mildly prolonged oral management of chewables, and s/s laryngeal penetration/aspiration with thin liquids, 2) suspected cognitive-linguistic deficits.

## 2019-11-01 NOTE — SWALLOW BEDSIDE ASSESSMENT ADULT - COMMENTS
Swallow Hx:  Bedside Swallow Eval at [facility] 9/22/19: 1. Functional oral phase for puree consistency, mechanical soft solids and nectar thick liquids marked by functional mastication for solids, adequate bolus manipulation and functional oral transit time 2. Mild oral phase dysphagia for regular solids and thin liquids marked by extended/prolonged mastication for solids, reduced bolus manipulation and reduced anterior-posterior transport with suspected posterior loss of bolus for thin liquids 3. Functional pharyngeal phase for puree consistency, mechanical soft solids, regular solids and nectar thick liquids marked by adequate laryngeal elevation upon digital palpation and NO overt s/s of laryngeal penetration/aspiration noted 4. Moderate pharyngeal phase dysphagia for thin liquids marked by delay in laryngeal elevation upon digital palpation. Immediate evidence of cough response and throat clear noted subsequent deglutition indicative of laryngeal penetration/aspiration. Rx Mechanical soft with Nectar-thick liquids.

## 2019-11-01 NOTE — PROGRESS NOTE ADULT - ASSESSMENT
ASSESSMENT/PLAN:  Traumatic acute SDH 2/2 fall with brain compression     NEURO: Neurochecks q2 hrs, protected sleep at night  CT Head- stable  SDH    Cont Decadron (for h/o lymphoma)  Change Keppra to Valproic 500 q12 (day 3/7)  C/w Lexapro 5mg qd (home med)  Activity: [X] mobilize as tolerated [x] PT [x] OT [] PMNR    PULM:  left pleural effusion     O2 sat > 92%  Incentive spirometry    CV: CAD s/p stent>1year ago  SBP goal 100 - 160 mmhg   cont statin  midodrine and fluornef- home med  cont  hold antiplatelets (asa) in setting of SDH, will discuss with NS chacha ok to resume aspirin   TTE = EF 65-70%, LHV; intermediate diastolic dysfunction; mild aortic stenosis  appreciate cards recs    RENAL: CKD III   Home nephrologist consulted  Fluids:  IVL  ext cath    GI:    Last BM 10/31   Dysphagia 3 diet  GI prophylaxis [] not indicated [] PPI [X] other: famotidine (Steroids)  Bowel regimen [x] colace [x] senna [] other:    ENDO: hypothyroid - cont synthroid  Goal euglycemia (-180), iss with FS   On lantus 5 units ( home lantus is 12 u)     HEME/ONC:  repeat dopplers today.  If propagation; will let Dr. Sparks know.   h/o lymphoma on decadron   VTE prophylaxis: [] SCDs [x] chemoprophylaxis  - start lovenox 40 mg sc qhs   [x] high risk of DVT/PE on admission due to: lymphoma, repeat dopplers tomorrow     ID:  afebrile     SOCIAL/FAMILY:  [x] awaiting [] updated at bedside [] family meeting    CODE STATUS:  [] Full Code [X] DNR [X] DNI [] Palliative/Comfort Care    DISPOSITION:  [] ICU [] Stroke Unit [X] Floor [] EMU [] RCU [] PCU    [x] Patient is at high risk of neurologic deterioration/death due to:  seziures, hemorrhage, herniation     Time spent: 35 critical care minutes    Contact: [phone] ASSESSMENT/PLAN:  Traumatic acute SDH 2/2 fall with brain compression     NEURO: Neurochecks q2 hrs, protected sleep at night  CT Head- stable  SDH    Cont Decadron (for h/o lymphoma)  Change Keppra to Valproic 500 q12 (day 3/7)  C/w Lexapro 5mg qd (home med)  Per Neurosurgery, plan to drain pending liquefaction of SDH  - may need IVC filter prior to OR. F/u vascular cardiology  Activity: [X] mobilize as tolerated [x] PT [x] OT [] PMNR    PULM:  left pleural effusion     O2 sat > 92%  Incentive spirometry    CV: CAD s/p stent>1year ago  SBP goal 100 - 160 mmhg   cont statin  midodrine and fluornef- home med  cont  hold antiplatelets (asa) in setting of SDH, will discuss with LES burnham ok to resume aspirin   TTE = EF 65-70%, LHV; intermediate diastolic dysfunction; mild aortic stenosis  appreciate cards recs    RENAL: CKD III   Home nephrologist consulted  Fluids:  IVL  ext cath    GI:    Last BM 10/31   Dysphagia 3 diet  GI prophylaxis [] not indicated [] PPI [X] other: famotidine (Steroids)  Bowel regimen [x] colace [x] senna [] other:    ENDO: hypothyroid - cont synthroid  Goal euglycemia (-180), iss with FS   On lantus 5 units ( home lantus is 12 u)     HEME/ONC:  repeat dopplers 10/30 showed no propogation of clot. Per Vascular, repeat bilateral lower extremity venous duplex on Monday, 11/4/2019   If propagation; will let Dr. Sparks know.   h/o lymphoma on decadron   VTE prophylaxis: [] SCDs [x] chemoprophylaxis  - start lovenox 40 mg sc qhs   [x] high risk of DVT/PE on admission due to: lymphoma, repeat dopplers tomorrow     ID:  afebrile     SOCIAL/FAMILY:  [x] awaiting [] updated at bedside [] family meeting    CODE STATUS:  [] Full Code [X] DNR [X] DNI [] Palliative/Comfort Care    DISPOSITION:  [] ICU [] Stroke Unit [X] Floor [] EMU [] RCU [] PCU    [x] Patient is at high risk of neurologic deterioration/death due to:  seziures, hemorrhage, herniation     Time spent: 35 critical care minutes    Contact: [phone] ASSESSMENT/PLAN:  Traumatic acute SDH 2/2 fall with brain compression     NEURO: Neurochecks q2 hrs, protected sleep at night  CT Head- stable  SDH    Cont Decadron (for h/o lymphoma)  Change Keppra to Valproic 500 q12 (day 3/7)  C/w Lexapro 5mg qd (home med)  Per Neurosurgery, plan to drain pending liquefaction of SDH  - may need IVC filter prior to OR. F/u vascular cardiology  Activity: [X] mobilize as tolerated [x] PT [x] OT [] PMNR    PULM:  left pleural effusion     O2 sat > 92%  Incentive spirometry    CV: CAD s/p stent>1year ago  SBP goal 100 - 160 mmhg   cont statin  midodrine and fluornef- home med  cont  hold antiplatelets (asa) in setting of SDH, will discuss with LES burnham ok to resume aspirin   TTE = EF 65-70%, LHV; intermediate diastolic dysfunction; mild aortic stenosis  appreciate cards recs    RENAL: CKD III   Home nephrologist consulted  Fluids:  IVL  ext cath    GI:    Last BM 10/31   Dysphagia 3 diet  GI prophylaxis [] not indicated [] PPI [X] other: famotidine (Steroids)  Bowel regimen [x] colace [x] senna [] other:    ENDO: hypothyroid - cont synthroid  Goal euglycemia (-180), iss with FS   On lantus 5 units ( home lantus is 12 u)     HEME/ONC:  repeat dopplers 10/30 showed no propogation of clot. Per Vascular, repeat bilateral lower extremity venous duplex on Monday, 11/4/2019   If propagation; will let Dr. Sparks know.   h/o lymphoma on decadron   Per Heme/Onc, will check tumor lysis labs: CMP, LDH, Uric Acid, Phos to make sure this is not contributing to his mental status  VTE prophylaxis: [] SCDs [x] chemoprophylaxis  - start lovenox 40 mg sc qhs   [x] high risk of DVT/PE on admission due to: lymphoma, repeat dopplers tomorrow     ID:  afebrile     SOCIAL/FAMILY:  [x] awaiting [] updated at bedside [] family meeting    CODE STATUS:  [] Full Code [X] DNR [X] DNI [] Palliative/Comfort Care    DISPOSITION:  [] ICU [] Stroke Unit [X] Floor [] EMU [] RCU [] PCU    [x] Patient is at high risk of neurologic deterioration/death due to:  seziures, hemorrhage, herniation     Time spent: 35 critical care minutes    Contact: [phone] ASSESSMENT/PLAN:  Traumatic acute SDH 2/2 fall with brain compression     NEURO: Neurochecks q4h  Cont Decadron (for h/o lymphoma)  On Valproic 500 q12 (day 4/7)  C/w Lexapro 5mg qd (home med)  Per Neurosurgery, plan to drain pending liquefaction of SDH  - may need IVC filter prior to OR. F/u vascular cardiology  Activity: [X] mobilize as tolerated [x] PT [x] OT [] PMNR    PULM:  left pleural effusion     O2 sat > 92%  Incentive spirometry    CV: CAD s/p stent>1year ago  Off ASA/Plavix prior to admission 2/2 to PUD  SBP goal 100 - 160 mmhg   cont statin  midodrine and fluornef- home med  cont  hold antiplatelets (asa) in setting of SDH, will discuss with LES burnham ok to resume aspirin   TTE = EF 65-70%, LHV; intermediate diastolic dysfunction; mild aortic stenosis  appreciate cards recs    RENAL: CKD III   Home nephrologist consulted  Fluids:  IVL  ext cath    GI:    Last BM 10/31   Dysphagia 3 diet  GI prophylaxis [] not indicated [] PPI [X] other: famotidine (Steroids)  Bowel regimen [x] colace [x] senna [] other:    ENDO: hypothyroid - cont synthroid  Goal euglycemia (-180), iss with FS   On lantus 5 units ( home lantus is 12 u)     HEME/ONC:  repeat dopplers 10/30 showed no propogation of clot.   Per Vascular, repeat bilateral lower extremity venous duplex on Monday, 11/4/2019   Vascular Cardiology - Dr. Sparks following   h/o lymphoma on decadron   Per Heme/Onc, will check tumor lysis labs: CMP, LDH, Uric Acid, Phos to make sure this is not contributing to his mental status  VTE prophylaxis: [] SCDs [x] chemoprophylaxis  - on lovenox 40 mg sc qhs   [x] high risk of DVT/PE on admission due to: lymphoma, repeat dopplers 11/4    ID:  afebrile     SOCIAL/FAMILY:  [x] awaiting [] updated at bedside [] family meeting    CODE STATUS:  [] Full Code [X] DNR [X] DNI [] Palliative/Comfort Care    DISPOSITION:  [] ICU [] Stroke Unit [X] Floor [] EMU [] RCU [] PCU    [x] Patient is at high risk of neurologic deterioration/death due to:  seziures, hemorrhage, herniation     Time spent: 35 critical care minutes    Contact: [phone] ASSESSMENT/PLAN:  Traumatic acute SDH 2/2 fall with brain compression     NEURO: Neurochecks q4h  Cont Decadron (for h/o lymphoma)  On Valproic 500 q12 (day 4/7)  C/w Lexapro 5mg qd (home med)  Per Neurosurgery, plan to drain pending liquefaction of SDH  - may need IVC filter prior to OR. F/u vascular cardiology  Activity: [X] mobilize as tolerated [x] PT [x] OT [] PMNR    PULM:  left pleural effusion     O2 sat > 92%  Incentive spirometry    CV: CAD s/p stent>1year ago  Off ASA/Plavix prior to admission 2/2 to PUD  SBP goal 100 - 160 mmhg   cont statin  midodrine and fluornef- home med  cont  hold antiplatelets (asa) in setting of SDH, will discuss with NS when ok to resume aspirin   TTE = EF 65-70%, LHV; intermediate diastolic dysfunction; mild aortic stenosis  appreciate cards recs    RENAL: CKD III   Home nephrologist consulted  Fluids:  IVL  ext cath    GI:    Last BM 10/31   Dysphagia 3 diet  GI prophylaxis [X] other: famotidine (Steroids)  Bowel regimen [x] colace [x] senna [] other:    ENDO: hypothyroid - cont synthroid  HGBA1C - 7.0  Goal euglycemia (-180), iss with FS   On lantus 8 units ( home lantus is 12 u) - po intake variable     HEME/ONC:  repeat dopplers 10/30 showed no propogation of clot.   Per Vascular, repeat bilateral lower extremity venous duplex on Monday, 11/4/2019   Vascular Cardiology - Dr. Sparks following   h/o lymphoma on decadron   Per Heme/Onc, will check tumor lysis labs: CMP, LDH, Uric Acid, Phos to make sure this is not contributing to his mental status  VTE prophylaxis: [] SCDs [x] chemoprophylaxis  - on lovenox 40 mg sc qhs   [x] high risk of DVT/PE on admission due to: lymphoma, repeat dopplers 11/4    ID:  afebrile     SOCIAL/FAMILY:  [x] awaiting     CODE STATUS:   [X] DNR [X] DNI     DISPOSITION:   [X] Floor     [x] Patient is not critically ill yet medically complex     Critical Care Attestation:  Attending with resident/fellow:  I have personally provided 35 minutes of  time concurrently with the resident/fellow. This time excludes time spent on separate procedures and time spent teaching. I have reviewed the resident/fellow’s documentation and I agree with the assessment and plan of care.    Time spent: 35     Contact: [phone]

## 2019-11-01 NOTE — SWALLOW BEDSIDE ASSESSMENT ADULT - ASR SWALLOW ASPIRATION MONITOR
change of breathing pattern/cough/gurgly voice/fever/pneumonia/throat clearing/upper respiratory infection/Monitor for s/s aspiration/laryngeal penetration. If noted:  D/C p.o. intake, provide non-oral nutrition/hydration/meds, and contact this service @ [phone]

## 2019-11-02 DIAGNOSIS — R79.89 OTHER SPECIFIED ABNORMAL FINDINGS OF BLOOD CHEMISTRY: ICD-10-CM

## 2019-11-02 DIAGNOSIS — E87.8 OTHER DISORDERS OF ELECTROLYTE AND FLUID BALANCE, NOT ELSEWHERE CLASSIFIED: ICD-10-CM

## 2019-11-02 DIAGNOSIS — E03.9 HYPOTHYROIDISM, UNSPECIFIED: ICD-10-CM

## 2019-11-02 DIAGNOSIS — E11.69 TYPE 2 DIABETES MELLITUS WITH OTHER SPECIFIED COMPLICATION: ICD-10-CM

## 2019-11-02 DIAGNOSIS — I10 ESSENTIAL (PRIMARY) HYPERTENSION: ICD-10-CM

## 2019-11-02 LAB
ALBUMIN SERPL ELPH-MCNC: 2.7 G/DL — LOW (ref 3.3–5)
ALP SERPL-CCNC: 85 U/L — SIGNIFICANT CHANGE UP (ref 40–120)
ALT FLD-CCNC: 15 U/L — SIGNIFICANT CHANGE UP (ref 10–45)
ANION GAP SERPL CALC-SCNC: 9 MMOL/L — SIGNIFICANT CHANGE UP (ref 5–17)
AST SERPL-CCNC: 22 U/L — SIGNIFICANT CHANGE UP (ref 10–40)
BILIRUB SERPL-MCNC: 0.4 MG/DL — SIGNIFICANT CHANGE UP (ref 0.2–1.2)
BUN SERPL-MCNC: 14 MG/DL — SIGNIFICANT CHANGE UP (ref 7–23)
CALCIUM SERPL-MCNC: 7.6 MG/DL — LOW (ref 8.4–10.5)
CHLORIDE SERPL-SCNC: 104 MMOL/L — SIGNIFICANT CHANGE UP (ref 96–108)
CO2 SERPL-SCNC: 27 MMOL/L — SIGNIFICANT CHANGE UP (ref 22–31)
CREAT SERPL-MCNC: 0.9 MG/DL — SIGNIFICANT CHANGE UP (ref 0.5–1.3)
GLUCOSE BLDC GLUCOMTR-MCNC: 102 MG/DL — HIGH (ref 70–99)
GLUCOSE BLDC GLUCOMTR-MCNC: 167 MG/DL — HIGH (ref 70–99)
GLUCOSE BLDC GLUCOMTR-MCNC: 200 MG/DL — HIGH (ref 70–99)
GLUCOSE BLDC GLUCOMTR-MCNC: 206 MG/DL — HIGH (ref 70–99)
GLUCOSE SERPL-MCNC: 92 MG/DL — SIGNIFICANT CHANGE UP (ref 70–99)
LDH SERPL L TO P-CCNC: 301 U/L — HIGH (ref 50–242)
PHOSPHATE SERPL-MCNC: 1.6 MG/DL — LOW (ref 2.5–4.5)
POTASSIUM SERPL-MCNC: 3.7 MMOL/L — SIGNIFICANT CHANGE UP (ref 3.5–5.3)
POTASSIUM SERPL-SCNC: 3.7 MMOL/L — SIGNIFICANT CHANGE UP (ref 3.5–5.3)
PROT SERPL-MCNC: 4.9 G/DL — LOW (ref 6–8.3)
SODIUM SERPL-SCNC: 140 MMOL/L — SIGNIFICANT CHANGE UP (ref 135–145)
URATE SERPL-MCNC: 3.7 MG/DL — SIGNIFICANT CHANGE UP (ref 3.4–8.8)

## 2019-11-02 PROCEDURE — 99223 1ST HOSP IP/OBS HIGH 75: CPT

## 2019-11-02 PROCEDURE — 93010 ELECTROCARDIOGRAM REPORT: CPT

## 2019-11-02 PROCEDURE — 99232 SBSQ HOSP IP/OBS MODERATE 35: CPT

## 2019-11-02 PROCEDURE — 99233 SBSQ HOSP IP/OBS HIGH 50: CPT | Mod: 57

## 2019-11-02 RX ORDER — CALCIUM GLUCONATE 100 MG/ML
1 VIAL (ML) INTRAVENOUS ONCE
Refills: 0 | Status: COMPLETED | OUTPATIENT
Start: 2019-11-02 | End: 2019-11-02

## 2019-11-02 RX ADMIN — POLYETHYLENE GLYCOL 3350 17 GRAM(S): 17 POWDER, FOR SOLUTION ORAL at 12:42

## 2019-11-02 RX ADMIN — DIVALPROEX SODIUM 500 MILLIGRAM(S): 500 TABLET, DELAYED RELEASE ORAL at 05:29

## 2019-11-02 RX ADMIN — Medication 88 MICROGRAM(S): at 05:29

## 2019-11-02 RX ADMIN — FAMOTIDINE 20 MILLIGRAM(S): 10 INJECTION INTRAVENOUS at 05:29

## 2019-11-02 RX ADMIN — Medication 200 GRAM(S): at 10:48

## 2019-11-02 RX ADMIN — INSULIN GLARGINE 8 UNIT(S): 100 INJECTION, SOLUTION SUBCUTANEOUS at 23:01

## 2019-11-02 RX ADMIN — SIMVASTATIN 20 MILLIGRAM(S): 20 TABLET, FILM COATED ORAL at 21:19

## 2019-11-02 RX ADMIN — Medication 2: at 18:01

## 2019-11-02 RX ADMIN — FAMOTIDINE 20 MILLIGRAM(S): 10 INJECTION INTRAVENOUS at 18:00

## 2019-11-02 RX ADMIN — MIDODRINE HYDROCHLORIDE 5 MILLIGRAM(S): 2.5 TABLET ORAL at 12:50

## 2019-11-02 RX ADMIN — MIDODRINE HYDROCHLORIDE 5 MILLIGRAM(S): 2.5 TABLET ORAL at 18:00

## 2019-11-02 RX ADMIN — ESCITALOPRAM OXALATE 5 MILLIGRAM(S): 10 TABLET, FILM COATED ORAL at 12:42

## 2019-11-02 RX ADMIN — Medication 62.5 MILLIMOLE(S): at 10:50

## 2019-11-02 RX ADMIN — ENOXAPARIN SODIUM 40 MILLIGRAM(S): 100 INJECTION SUBCUTANEOUS at 18:00

## 2019-11-02 RX ADMIN — Medication 2 MILLIGRAM(S): at 08:50

## 2019-11-02 RX ADMIN — SENNA PLUS 2 TABLET(S): 8.6 TABLET ORAL at 21:19

## 2019-11-02 RX ADMIN — DIVALPROEX SODIUM 500 MILLIGRAM(S): 500 TABLET, DELAYED RELEASE ORAL at 18:00

## 2019-11-02 RX ADMIN — Medication 4: at 21:18

## 2019-11-02 RX ADMIN — MIDODRINE HYDROCHLORIDE 5 MILLIGRAM(S): 2.5 TABLET ORAL at 05:29

## 2019-11-02 RX ADMIN — Medication 2: at 12:43

## 2019-11-02 NOTE — CONSULT NOTE ADULT - PROBLEM SELECTOR PROBLEM 3
Functional quadriplegia
Type 2 diabetes mellitus with other specified complication, with long-term current use of insulin

## 2019-11-02 NOTE — CONSULT NOTE ADULT - PROBLEM SELECTOR RECOMMENDATION 9
Dr Xiao following,  family on board with surgery if deemed appropriate by NSX.  Patient awake , verbal , confused.
As per Neurosurgery team the plan is  for observation /with  allowance of the SDH to become chronic if possible to facilitate nancy hole evacuation.   Management as Neurosurgery team   cont Depakoate   monitor LFT

## 2019-11-02 NOTE — CONSULT NOTE ADULT - CONSULT REQUESTED DATE/TIME
02-Nov-2019 15:21
29-Oct-2019 09:39
30-Oct-2019 16:18
29-Oct-2019 12:00
29-Oct-2019 18:22
29-Oct-2019 10:00

## 2019-11-02 NOTE — CONSULT NOTE ADULT - SUBJECTIVE AND OBJECTIVE BOX
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  As reported, he was getting out of chair when he fell backward hitting his head. Denies LOC, no symptoms proceeding the fall.  Patient was found to have   R and interhemispheric acute SDH w/ mild shift and admitted to Neurosurgery service. Received 2000u Kcentra at [facility].   Plan for observation / allowance of the SDH to chronicify if possible to facilitate nancy hole evacuation.       Pt was taking Eliquis for DVT. CTH showed R sided frontal and temporal convexity with mass effect on the right cerebrum and 8 mm ML shift. No herniation. Pt was given Kcentra to reverse Eliquis and transferred to [facility] for further management. Pt has hx of DLBCL diagnosed recently, being treated at [place].  At [facility], : neurosx monitoring patient clinically with plan for  nancy hole evacuation in near future.  Medical consultation was called for contribution in medical management.      Home Medications:   · 	Rolling Walker: 1 unit(s)   · 	Januvia 50 mg oral tablet: 1 tab(s) orally once a day   · 	midodrine 5 mg oral tablet: 1 tab(s) orally 3 times a day  · 	fludrocortisone 0.1 mg oral tablet: 1 tab(s) orally 2 times a day  · 	Basaglar KwikPen 100 units/mL subcutaneous solution: 8 unit(s) subcutaneous once a day (at bedtime)  · 	docusate sodium 100 mg oral capsule: 1 cap(s) orally 3 times a day  · 	senna oral tablet: 2 tab(s) orally once a day (at bedtime)  · 	zolpidem 10 mg oral tablet: 1 tab(s) orally once a day (at bedtime), As Needed - for insomnia  · 	levothyroxine 88 mcg (0.088 mg) oral capsule: 1 cap(s) orally once a day  · 	famotidine 20 mg oral tablet: 1 tab(s) orally 2 times a day  · 	aspirin 81 mg oral tablet: 1 tab(s) orally once a day  · 	simvastatin 20 mg oral tablet: 1 tab(s) orally once a day (at bedtime)    PAST MEDICAL HISTORY:  Adult hypothyroidism   CAD (coronary artery disease)   Diabetes   Essential hypertension   Hyperlipidemia   Peptic ulcer s/p treatment for H pylori.     PAST SURGICAL HISTORY:  S/P coronary artery stent placement x4, last one in early 2018.      Medications:MEDICATIONS  (STANDING):  dexAMETHasone     Tablet 2 milliGRAM(s) Oral <User Schedule>  dextrose 5%. 1000 milliLiter(s) (50 mL/Hr) IV Continuous <Continuous>  dextrose 50% Injectable 12.5 Gram(s) IV Push once  dextrose 50% Injectable 25 Gram(s) IV Push once  dextrose 50% Injectable 25 Gram(s) IV Push once  diVALproex  milliGRAM(s) Oral every 12 hours  enoxaparin Injectable 40 milliGRAM(s) SubCutaneous <User Schedule>  escitalopram 5 milliGRAM(s) Oral daily  famotidine    Tablet 20 milliGRAM(s) Oral two times a day  insulin glargine Injectable (LANTUS) 8 Unit(s) SubCutaneous at bedtime  insulin lispro (HumaLOG) corrective regimen sliding scale   SubCutaneous Before meals and at bedtime  levothyroxine 88 MICROGram(s) Oral daily  midodrine. 5 milliGRAM(s) Oral three times a day  polyethylene glycol 3350 17 Gram(s) Oral daily  senna 2 Tablet(s) Oral at bedtime  simvastatin 20 milliGRAM(s) Oral at bedtime    MEDICATIONS  (PRN):  acetaminophen   Tablet .. 650 milliGRAM(s) Oral every 6 hours PRN Temp greater or equal to 38C (100.4F), Mild Pain (1 - 3)  dextrose 40% Gel 15 Gram(s) Oral once PRN Blood Glucose LESS THAN 70 milliGRAM(s)/deciliter  glucagon  Injectable 1 milliGRAM(s) IntraMuscular once PRN Glucose LESS THAN 70 milligrams/deciliter  ondansetron Injectable 4 milliGRAM(s) IV Push every 6 hours PRN Nausea and/or Vomiting      Allergies: Allergies    No Known Allergies    Intolerances        REVIEW OF SYSTEMS:  CONSTITUTIONAL: No fever, weight loss, or fatigue  EYES: No eye pain, visual disturbances, or discharge  ENMT:  No difficulty hearing, tinnitus, vertigo; No sinus or throat pain  NECK: No pain or stiffness  BREASTS: No pain, masses, or nipple discharge  RESPIRATORY: No cough, wheezing, chills or hemoptysis; No shortness of breath  CARDIOVASCULAR: No chest pain, palpitations, dizziness, or leg swelling  GASTROINTESTINAL: No abdominal or epigastric pain. No nausea, vomiting, or hematemesis; No diarrhea or constipation. No melena or hematochezia.  GENITOURINARY: No dysuria, frequency, hematuria, or incontinence  NEUROLOGICAL: No headaches, memory loss, loss of strength, numbness, or tremors  SKIN: No itching, burning, rashes, or lesions   LYMPH NODES: No enlarged glands  ENDOCRINE: No heat or cold intolerance; No hair loss  MUSCULOSKELETAL: No joint pain or swelling; No muscle, back, or extremity pain  PSYCHIATRIC: No depression, anxiety, mood swings, or difficulty sleeping  HEME/LYMPH: No easy bruising, or bleeding gums  ALLERY AND IMMUNOLOGIC: No hives or eczema    T(C): 36.4 (11-02-19 @ 12:34), Max: 37.2 (11-01-19 @ 23:54)  HR: 105 (11-02-19 @ 12:34) (87 - 110)  BP: 148/88 (11-02-19 @ 12:34) (130/82 - 148/88)  RR: 19 (11-02-19 @ 12:34) (13 - 19)  SpO2: 96% (11-02-19 @ 12:34) (96% - 100%)  Wt(kg): --Vital Signs Last 24 Hrs  T(C): 36.4 (02 Nov 2019 12:34), Max: 37.2 (01 Nov 2019 23:54)  T(F): 97.5 (02 Nov 2019 12:34), Max: 98.9 (01 Nov 2019 23:54)  HR: 105 (02 Nov 2019 12:34) (87 - 110)  BP: 148/88 (02 Nov 2019 12:34) (130/82 - 148/88)  BP(mean): 98 (01 Nov 2019 17:00) (98 - 101)  RR: 19 (02 Nov 2019 12:34) (13 - 19)  SpO2: 96% (02 Nov 2019 12:34) (96% - 100%)  I&O's Summary    01 Nov 2019 07:01  -  02 Nov 2019 07:00  --------------------------------------------------------  IN: 720 mL / OUT: 751 mL / NET: -31 mL    02 Nov 2019 08:01  -  02 Nov 2019 15:22  --------------------------------------------------------  IN: 200 mL / OUT: 0 mL / NET: 200 mL      Last Menstrual Period      PHYSICAL EXAM:  GENERAL: NAD, well-groomed, well-developed  HEAD:  Atraumatic, Normocephalic  EYES: EOMI, PERRLA, conjunctiva and sclera clear  ENMT: No tonsillar erythema, exudates, or enlargement; Moist mucous membranes, Good dentition, No lesions  NECK: Supple, No JVD, Normal thyroid  NERVOUS SYSTEM:  Alert & Oriented X3, Good concentration; Motor Strength 5/5 B/L upper and lower extremities; DTRs 2+ intact and symmetric  CHEST/LUNG: Clear to percussion bilaterally; No rales, rhonchi, wheezing, or rubs  HEART: Regular rate and rhythm; No murmurs, rubs, or gallops  ABDOMEN: Soft, Nontender, Nondistended; Bowel sounds present  EXTREMITIES:  2+ Peripheral Pulses, No clubbing, cyanosis, or edema  LYMPH: No lymphadenopathy noted  SKIN: No rashes or lesions    Consultant(s) Notes Reviewed:  [x ] YES  [ ] NO  Care Discussed with Consultants/Other Providers [ x] YES  [ ] NO  Name of Consultant  LABS:    11-02    140  |  104  |  14  ----------------------------<  92  3.7   |  27  |  0.90    Ca    7.6<L>      02 Nov 2019 06:32  Phos  1.6     11-02  Mg     2.1     10-31    TPro  4.9<L>  /  Alb  2.7<L>  /  TBili  0.4  /  DBili  x   /  AST  22  /  ALT  15  /  AlkPhos  85  11-02        CAPILLARY BLOOD GLUCOSE      POCT Blood Glucose.: 200 mg/dL (02 Nov 2019 12:29)  POCT Blood Glucose.: 102 mg/dL (02 Nov 2019 08:29)  POCT Blood Glucose.: 136 mg/dL (01 Nov 2019 21:40)  POCT Blood Glucose.: 129 mg/dL (01 Nov 2019 17:30)            RADIOLOGY & ADDITIONAL TESTS:  EKG :     Imaging Personally Reviewed:  [ ] YES  [ ] NO  HEALTH ISSUES - PROBLEM Dx:  Encounter for palliative care: Encounter for palliative care  ACP (advance care planning): ACP (advance care planning)  Functional quadriplegia: Functional quadriplegia  Encephalopathy: Encephalopathy  SDH (subdural hematoma): SDH (subdural hematoma) Patient is a 79y old  Male who presents with a chief complaint of sdh (02 Nov 2019 13:28)      INTERVAL HPI/OVERNIGHT EVENTS:  79y male with PMH of CAD s/p stents x4, HTN, DM2 ( on Basaglar insulin and Januvia at home) , Hypothyroid, LUE DVT on eliquis,  DLBC ( diagnosed recently, being treated at [place])  was transferred to [facility] on 10/29/19  from [facility]  for further management of SDH which resulted from a fall in the NH.  As reported, he was getting out of chair when he fell backward hitting his head.  Patient was found to have  R and interhemispheric acute SDH w/ mild shift and admitted to Neurosurgery service.  Pt was taking Eliquis for DVT received 2000u Kcentra at [facility].   As per Neurosurgery team the plan is  for observation /with  allowance of the SDH to become chronic if possible to facilitate nancy hole evacuation.     Medical consultation was called for contribution in medical management.      Home Medications:   Rolling Walker: 1 unit(s)   Januvia 50 mg oral tablet: 1 tab(s) orally once a day   Midodrine 5 mg oral tablet: 1 tab(s) orally 3 times a day  Fludrocortisone 0.1 mg oral tablet: 1 tab(s) orally 2 times a day  Basaglar KwikPen 100 units/mL subcutaneous solution: 8 unit(s) subcutaneous once a day (at bedtime)  Docusate sodium 100 mg oral capsule: 1 cap(s) orally 3 times a day  Senna oral tablet: 2 tab(s) orally once a day (at bedtime)  Zolpidem 10 mg oral tablet: 1 tab(s) orally once a day (at bedtime), As Needed - for insomnia  Levothyroxine 88 mcg (0.088 mg) oral capsule: 1 cap(s) orally once a day  Famotidine 20 mg oral tablet: 1 tab(s) orally 2 times a day  Aspirin 81 mg oral tablet: 1 tab(s) orally once a day  Simvastatin 20 mg oral tablet: 1 tab(s) orally once a day (at bedtime)    PAST MEDICAL HISTORY:  Adult hypothyroidism   CAD (coronary artery disease)   Diabetes   Essential hypertension   Hyperlipidemia   Peptic ulcer s/p treatment for H pylori.     PAST SURGICAL HISTORY:  S/P coronary artery stent placement x4, last one in early 2018.      Medications:MEDICATIONS  (STANDING):  dexAMETHasone     Tablet 2 milliGRAM(s) Oral <User Schedule>  dextrose 5%. 1000 milliLiter(s) (50 mL/Hr) IV Continuous <Continuous>  dextrose 50% Injectable 12.5 Gram(s) IV Push once  dextrose 50% Injectable 25 Gram(s) IV Push once  dextrose 50% Injectable 25 Gram(s) IV Push once  diVALproex  milliGRAM(s) Oral every 12 hours  enoxaparin Injectable 40 milliGRAM(s) SubCutaneous <User Schedule>  escitalopram 5 milliGRAM(s) Oral daily  famotidine    Tablet 20 milliGRAM(s) Oral two times a day  insulin glargine Injectable (LANTUS) 8 Unit(s) SubCutaneous at bedtime  insulin lispro (HumaLOG) corrective regimen sliding scale   SubCutaneous Before meals and at bedtime  levothyroxine 88 MICROGram(s) Oral daily  midodrine. 5 milliGRAM(s) Oral three times a day  polyethylene glycol 3350 17 Gram(s) Oral daily  senna 2 Tablet(s) Oral at bedtime  simvastatin 20 milliGRAM(s) Oral at bedtime    MEDICATIONS  (PRN):  acetaminophen   Tablet .. 650 milliGRAM(s) Oral every 6 hours PRN Temp greater or equal to 38C (100.4F), Mild Pain (1 - 3)  dextrose 40% Gel 15 Gram(s) Oral once PRN Blood Glucose LESS THAN 70 milliGRAM(s)/deciliter  glucagon  Injectable 1 milliGRAM(s) IntraMuscular once PRN Glucose LESS THAN 70 milligrams/deciliter  ondansetron Injectable 4 milliGRAM(s) IV Push every 6 hours PRN Nausea and/or Vomiting      Allergies: Allergies  No Known Allergies      REVIEW OF SYSTEMS:    Patient does not answer to most of my questions.  His answer is repeatedly :" nobody knows , I do not know"     T(C): 36.4 (11-02-19 @ 12:34), Max: 37.2 (11-01-19 @ 23:54)  HR: 105 (11-02-19 @ 12:34) (87 - 110)  BP: 148/88 (11-02-19 @ 12:34) (130/82 - 148/88)  RR: 19 (11-02-19 @ 12:34) (13 - 19)  SpO2: 96% (11-02-19 @ 12:34) (96% - 100%)  Wt(kg): --Vital Signs Last 24 Hrs  T(C): 36.4 (02 Nov 2019 12:34), Max: 37.2 (01 Nov 2019 23:54)  T(F): 97.5 (02 Nov 2019 12:34), Max: 98.9 (01 Nov 2019 23:54)  HR: 105 (02 Nov 2019 12:34) (87 - 110)  BP: 148/88 (02 Nov 2019 12:34) (130/82 - 148/88)  BP(mean): 98 (01 Nov 2019 17:00) (98 - 101)  RR: 19 (02 Nov 2019 12:34) (13 - 19)  SpO2: 96% (02 Nov 2019 12:34) (96% - 100%)  I&O's Summary    01 Nov 2019 07:01  -  02 Nov 2019 07:00  --------------------------------------------------------  IN: 720 mL / OUT: 751 mL / NET: -31 mL    02 Nov 2019 08:01  -  02 Nov 2019 15:22  --------------------------------------------------------  IN: 200 mL / OUT: 0 mL / NET: 200 mL        PHYSICAL EXAM: Limited examination   GENERAL: NAD  NECK: Supple, No JVD, Normal thyroid  NERVOUS SYSTEM:  Alert & responsive  CHEST/LUNG: Clear to percussion bilaterally  HEART: Regular rate and rhythm  ABDOMEN: Soft, Nontender, Nondistended; Bowel sounds present  EXTREMITIES: no edema of the legs       Consultant(s) Notes Reviewed:  [x ] YES  [ ] NO  Care Discussed with Consultants/Other Providers [ x] YES  [ ] NO  Name of Consultant  LABS:    11-02    140  |  104  |  14  ----------------------------<  92  3.7   |  27  |  0.90    Ca    7.6<L>      02 Nov 2019 06:32  Phos  1.6     11-02  Mg     2.1     10-31    TPro  4.9<L>  /  Alb  2.7<L>  /  TBili  0.4  /  DBili  x   /  AST  22  /  ALT  15  /  AlkPhos  85  11-02        CAPILLARY BLOOD GLUCOSE      POCT Blood Glucose.: 200 mg/dL (02 Nov 2019 12:29)  POCT Blood Glucose.: 102 mg/dL (02 Nov 2019 08:29)  POCT Blood Glucose.: 136 mg/dL (01 Nov 2019 21:40)  POCT Blood Glucose.: 129 mg/dL (01 Nov 2019 17:30)            RADIOLOGY & ADDITIONAL TESTS:  EKG :     Imaging Personally Reviewed:  [ ] YES  [ ] NO  HEALTH ISSUES - PROBLEM Dx:  Encounter for palliative care: Encounter for palliative care  ACP (advance care planning): ACP (advance care planning)  Functional quadriplegia: Functional quadriplegia  Encephalopathy: Encephalopathy  SDH (subdural hematoma): SDH (subdural hematoma)

## 2019-11-02 NOTE — PROGRESS NOTE ADULT - ASSESSMENT
HPI:  79y male with PMH of CAD s/p stents x4, HTN, DM, Hypothyroid, lymphoma presenting after a fall. He was getting out of chair when he fell backward hitting his head. Denies LOC, no symptoms proceeding the fall. Fall was unwitnessed, nurses at [place] stated that down time was a few minutes, daughter spoke to him after the fall he was respond like his normal self, was complaining of a headache. No other complaints, denies chest pain, SOB, abdominal pain, nausea, vomiting, back pain. (29 Oct 2019 02:08)  80 yo male with right acute and interhemispheric subdural hematoma  PROCEDURE:    POD#    PLAN:  1 Possible OR next week   2 continue physical therapy   3 occupational therapy   4 continue vpa   5 continue lexapro   6 continue midodrine to keep bp up   7 continue statin   8 continue synthroid   9 dysphagia 3 ccd diet   10 continue lantus and hiss   11 dvt ppx sql and scd   12 dispo :p       Assessment:  Please Check When Present   []  GCS  E   V  M     Heart Failure: []Acute, [] acute on chronic , []chronic  Heart Failure:  [] Diastolic (HFpEF), [] Systolic (HFrEF), []Combined (HFpEF and HFrEF), [] RHF, [] Pulm HTN, [] Other    [] FRANCES, [] ATN, [] AIN, [] other  [] CKD1, [] CKD2, [] CKD 3, [] CKD 4, [] CKD 5, []ESRD    Encephalopathy: [] Metabolic, [] Hepatic, [] toxic, [] Neurological, [] Other    Abnormal Nurtitional Status: [] malnurtition (see nutrition note), [ ]underweight: BMI < 19, [] morbid obesity: BMI >40, [] Cachexia    [] Sepsis  [] hypovolemic shock,[] cardiogenic shock, [] hemorrhagic shock, [] neuogenic shock  [] Acute Respiratory Failure  []Cerebral edema, [] Brain compression/ herniation,   [] Functional quadriplegia  [] Acute blood loss anemia HPI:  79y male with PMH of CAD s/p stents x4, HTN, DM, Hypothyroid, lymphoma presenting after a fall. He was getting out of chair when he fell backward hitting his head. Denies LOC, no symptoms proceeding the fall. Fall was unwitnessed, nurses at [facility] stated that down time was a few minutes, daughter spoke to him after the fall he was respond like his normal self, was complaining of a headache. No other complaints, denies chest pain, SOB, abdominal pain, nausea, vomiting, back pain. (29 Oct 2019 02:08)  80 yo male with right acute and interhemispheric subdural hematoma  PROCEDURE:    POD#    PLAN:  1 Possible OR next week   2 continue physical therapy   3 occupational therapy   4 continue vpa   5 continue lexapro   6 continue midodrine to keep bp up   7 continue statin   8 continue synthroid   9 dysphagia 3 ccd diet   10 continue lantus and hiss   11 dvt ppx sql and scd   12 dispo :p       Assessment:  Please Check When Present   []  GCS  E   V  M     Heart Failure: []Acute, [] acute on chronic , []chronic  Heart Failure:  [] Diastolic (HFpEF), [] Systolic (HFrEF), []Combined (HFpEF and HFrEF), [] RHF, [] Pulm HTN, [] Other    [] FRANCES, [] ATN, [] AIN, [] other  [] CKD1, [] CKD2, [] CKD 3, [] CKD 4, [] CKD 5, []ESRD    Encephalopathy: [] Metabolic, [] Hepatic, [] toxic, [] Neurological, [] Other    Abnormal Nurtitional Status: [] malnurtition (see nutrition note), [ ]underweight: BMI < 19, [] morbid obesity: BMI >40, [] Cachexia    [] Sepsis  [] hypovolemic shock,[] cardiogenic shock, [] hemorrhagic shock, [] neuogenic shock  [] Acute Respiratory Failure  []Cerebral edema, [] Brain compression/ herniation,   [] Functional quadriplegia  [] Acute blood loss anemia  More than 40 minutes were spent educating the patient and family regarding condition, medications, follow up plans, signs and symptoms to be concerned with, preparing paperwork, and questions answered regarding discharge.

## 2019-11-02 NOTE — PROGRESS NOTE ADULT - SUBJECTIVE AND OBJECTIVE BOX
Subjective: Patient seen and examined. No new events except as noted.     SUBJECTIVE/ROS:  resting   NAD  Pt seen and examined early this am       MEDICATIONS:  MEDICATIONS  (STANDING):  dexAMETHasone     Tablet 2 milliGRAM(s) Oral <User Schedule>  dextrose 5%. 1000 milliLiter(s) (50 mL/Hr) IV Continuous <Continuous>  dextrose 50% Injectable 12.5 Gram(s) IV Push once  dextrose 50% Injectable 25 Gram(s) IV Push once  dextrose 50% Injectable 25 Gram(s) IV Push once  diVALproex  milliGRAM(s) Oral every 12 hours  enoxaparin Injectable 40 milliGRAM(s) SubCutaneous <User Schedule>  escitalopram 5 milliGRAM(s) Oral daily  famotidine    Tablet 20 milliGRAM(s) Oral two times a day  insulin glargine Injectable (LANTUS) 8 Unit(s) SubCutaneous at bedtime  insulin lispro (HumaLOG) corrective regimen sliding scale   SubCutaneous Before meals and at bedtime  levothyroxine 88 MICROGram(s) Oral daily  midodrine. 5 milliGRAM(s) Oral three times a day  polyethylene glycol 3350 17 Gram(s) Oral daily  senna 2 Tablet(s) Oral at bedtime  simvastatin 20 milliGRAM(s) Oral at bedtime      PHYSICAL EXAM:  T(C): 36.4 (11-02-19 @ 12:34), Max: 37.2 (11-01-19 @ 23:54)  HR: 105 (11-02-19 @ 12:34) (87 - 110)  BP: 148/88 (11-02-19 @ 12:34) (129/87 - 148/88)  RR: 19 (11-02-19 @ 12:34) (12 - 19)  SpO2: 96% (11-02-19 @ 12:34) (96% - 100%)  Wt(kg): --  I&O's Summary    01 Nov 2019 07:01  -  02 Nov 2019 07:00  --------------------------------------------------------  IN: 720 mL / OUT: 751 mL / NET: -31 mL    02 Nov 2019 08:01  -  02 Nov 2019 13:28  --------------------------------------------------------  IN: 200 mL / OUT: 0 mL / NET: 200 mL            JVP: Normal  Neck: supple  Lung: clear   CV: S1 S2 , Murmur:  Abd: soft  Ext: No edema  neuro: Awake / alert  Psych: flat affect  Skin: normal``    LABS/DATA:    CARDIAC MARKERS:            11-02    140  |  104  |  14  ----------------------------<  92  3.7   |  27  |  0.90    Ca    7.6<L>      02 Nov 2019 06:32  Phos  1.6     11-02  Mg     2.1     10-31    TPro  4.9<L>  /  Alb  2.7<L>  /  TBili  0.4  /  DBili  x   /  AST  22  /  ALT  15  /  AlkPhos  85  11-02    proBNP:   Lipid Profile:   HgA1c:   TSH:     TELE:  EKG:

## 2019-11-02 NOTE — PROGRESS NOTE ADULT - SUBJECTIVE AND OBJECTIVE BOX
SUBJECTIVE:   Patient was seen and evaluated at bedside. Patient is resting in bed and is in no new acute distress.   OVERNIGHT EVENTS:   none   Vital Signs Last 24 Hrs  T(C): 36.4 (02 Nov 2019 12:34), Max: 37.2 (01 Nov 2019 23:54)  T(F): 97.5 (02 Nov 2019 12:34), Max: 98.9 (01 Nov 2019 23:54)  HR: 105 (02 Nov 2019 12:34) (87 - 110)  BP: 148/88 (02 Nov 2019 12:34) (129/87 - 148/88)  BP(mean): 98 (01 Nov 2019 17:00) (98 - 111)  RR: 19 (02 Nov 2019 12:34) (12 - 19)  SpO2: 96% (02 Nov 2019 12:34) (96% - 100%)    PHYSICAL EXAM:    General: No Acute Distress     Neurological: Awake, alert oriented to person, place, Following Commands un cooperative with exam, couldn't check for drift asked me to go out of room.     Pulmonary: Clear to Auscultation, No Rales, No Rhonchi, No Wheezes     Cardiovascular: S1, S2, Regular Rate and Rhythm     Gastrointestinal: Soft, Nontender, Nondistended     Incision:     LABS:   11-02    140  |  104  |  14  ----------------------------<  92  3.7   |  27  |  0.90    Ca    7.6<L>      02 Nov 2019 06:32  Phos  1.6     11-02  Mg     2.1     10-31    TPro  4.9<L>  /  Alb  2.7<L>  /  TBili  0.4  /  DBili  x   /  AST  22  /  ALT  15  /  AlkPhos  85  11-02 11-01 @ 07:01 - 11-02 @ 07:00  --------------------------------------------------------  IN: 720 mL / OUT: 751 mL / NET: -31 mL    11-02 @ 08:01  - 11-02 @ 12:47  --------------------------------------------------------  IN: 100 mL / OUT: 0 mL / NET: 100 mL      DRAINS:     MEDICATIONS:  Antibiotics:    Neuro:  acetaminophen   Tablet .. 650 milliGRAM(s) Oral every 6 hours PRN Temp greater or equal to 38C (100.4F), Mild Pain (1 - 3)  diVALproex  milliGRAM(s) Oral every 12 hours  escitalopram 5 milliGRAM(s) Oral daily  ondansetron Injectable 4 milliGRAM(s) IV Push every 6 hours PRN Nausea and/or Vomiting    Cardiac:  midodrine. 5 milliGRAM(s) Oral three times a day    Pulm:    GI/:  famotidine    Tablet 20 milliGRAM(s) Oral two times a day  polyethylene glycol 3350 17 Gram(s) Oral daily  senna 2 Tablet(s) Oral at bedtime    Other:   dexAMETHasone     Tablet 2 milliGRAM(s) Oral <User Schedule>  dextrose 40% Gel 15 Gram(s) Oral once PRN Blood Glucose LESS THAN 70 milliGRAM(s)/deciliter  dextrose 5%. 1000 milliLiter(s) IV Continuous <Continuous>  dextrose 50% Injectable 12.5 Gram(s) IV Push once  dextrose 50% Injectable 25 Gram(s) IV Push once  dextrose 50% Injectable 25 Gram(s) IV Push once  enoxaparin Injectable 40 milliGRAM(s) SubCutaneous <User Schedule>  glucagon  Injectable 1 milliGRAM(s) IntraMuscular once PRN Glucose LESS THAN 70 milligrams/deciliter  insulin glargine Injectable (LANTUS) 8 Unit(s) SubCutaneous at bedtime  insulin lispro (HumaLOG) corrective regimen sliding scale   SubCutaneous Before meals and at bedtime  levothyroxine 88 MICROGram(s) Oral daily  simvastatin 20 milliGRAM(s) Oral at bedtime    DIET: [] Regular [] CCD [] Renal [] Puree [] Dysphagia [] Tube Feeds: dysphagia 3     IMAGING: no new imaging today

## 2019-11-02 NOTE — PROGRESS NOTE ADULT - ASSESSMENT
cad history of stent  stable  off antiplt therapy for now   resume once deemed safe     Mild AS  stable    Hypotension   on midodrine/florinef  monitor supine BP and orthostatic vitals     Below knee DVT  fu with vascular card    Mild trachycardia  obtain EKG

## 2019-11-02 NOTE — CONSULT NOTE ADULT - ATTENDING COMMENTS
Greer Vasquez   hospitalist   [phone]   or   [phone] Please note that Joan Hernandez will take over tomorrow on the care of this patient , he was made aware by [facility] Hospitalist   [phone]   or   [phone]

## 2019-11-02 NOTE — PROGRESS NOTE ADULT - SUBJECTIVE AND OBJECTIVE BOX
[facility] [place] [facility]   MD UYEN HERNANDEZ D.O, PA    TELEPHONE NUMBERS:  OFFICE: [phone]  DR. CASAREZ CELL: [phone]  GORDON ZHANG CELL: [phone]  DR. HERNANDEZ CELL:  [phone]    RENAL FOLLOW UP NOTE  --------------------------------------------------------------------------------  HPI: Pt seen and examined at bedside. Admits to LE edema   Denies SOB, chest pain     PAST HISTORY  --------------------------------------------------------------------------------  No significant changes to PMH, PSH, FHx, SHx, unless otherwise noted    ALLERGIES & MEDICATIONS  --------------------------------------------------------------------------------  Allergies    No Known Allergies    Intolerances      Standing Inpatient Medications  calcium gluconate IVPB 1 Gram(s) IV Intermittent once  dexAMETHasone     Tablet 2 milliGRAM(s) Oral <User Schedule>  dextrose 5%. 1000 milliLiter(s) IV Continuous <Continuous>  dextrose 50% Injectable 12.5 Gram(s) IV Push once  dextrose 50% Injectable 25 Gram(s) IV Push once  dextrose 50% Injectable 25 Gram(s) IV Push once  diVALproex  milliGRAM(s) Oral every 12 hours  enoxaparin Injectable 40 milliGRAM(s) SubCutaneous <User Schedule>  escitalopram 5 milliGRAM(s) Oral daily  famotidine    Tablet 20 milliGRAM(s) Oral two times a day  insulin glargine Injectable (LANTUS) 8 Unit(s) SubCutaneous at bedtime  insulin lispro (HumaLOG) corrective regimen sliding scale   SubCutaneous Before meals and at bedtime  levothyroxine 88 MICROGram(s) Oral daily  midodrine. 5 milliGRAM(s) Oral three times a day  polyethylene glycol 3350 17 Gram(s) Oral daily  senna 2 Tablet(s) Oral at bedtime  simvastatin 20 milliGRAM(s) Oral at bedtime  sodium phosphate IVPB 15 milliMole(s) IV Intermittent once    PRN Inpatient Medications  acetaminophen   Tablet .. 650 milliGRAM(s) Oral every 6 hours PRN  dextrose 40% Gel 15 Gram(s) Oral once PRN  glucagon  Injectable 1 milliGRAM(s) IntraMuscular once PRN  ondansetron Injectable 4 milliGRAM(s) IV Push every 6 hours PRN      REVIEW OF SYSTEMS  --------------------------------------------------------------------------------  General: no fever  CVS: no chest pain  RESP: no sob, no cough  ABD: no abdominal pain  : no dysuria,  MSK: + edema     VITALS/PHYSICAL EXAM  --------------------------------------------------------------------------------  T(C): 37 (11-02-19 @ 07:57), Max: 37.2 (11-01-19 @ 23:54)  HR: 101 (11-02-19 @ 07:57) (87 - 122)  BP: 145/81 (11-02-19 @ 07:57) (124/79 - 145/81)  RR: 18 (11-02-19 @ 07:57) (12 - 30)  SpO2: 98% (11-02-19 @ 07:57) (96% - 100%)  Wt(kg): --    11-01-19 @ 07:01  -  11-02-19 @ 07:00  --------------------------------------------------------  IN: 720 mL / OUT: 751 mL / NET: -31 mL      Physical Exam:  	Gen: NAD  	HEENT: MMM  	Pulm: CTA B/L  	CV: S1S2  	Abd: Soft, +BS  	Ext: +1 LE edema B/L                      Neuro: Awake, poorly responsive   	Skin: Warm and Dry     LABS/STUDIES  --------------------------------------------------------------------------------    140  |  104  |  14  ----------------------------<  92      [11-02-19 @ 06:32]  3.7   |  27  |  0.90        Ca     7.6     [11-02-19 @ 06:32]      iCa    0.33     [10-31 @ 11:15]      Mg     2.1     [10-31-19 @ 16:13]      Phos  1.6     [11-02-19 @ 06:32]    TPro  4.9  /  Alb  2.7  /  TBili  0.4  /  DBili  x   /  AST  22  /  ALT  15  /  AlkPhos  85  [11-02-19 @ 06:32]    Uric acid 3.7      [11-02-19 @ 06:32]        [11-02-19 @ 06:32]    Creatinine Trend:  SCr 0.90 [11-02 @ 06:32]  SCr 0.85 [10-31 @ 16:13]  SCr <0.30 [10-31 @ 12:45]  SCr 0.93 [10-30 @ 23:31]  SCr 0.92 [10-30 @ 22:10]      Iron 38, TIBC 234, %sat --      [09-12-19 @ 06:08]  Ferritin 214.6      [09-12-19 @ 06:08]  PTH 20.00 (Ca --)      [09-03-19 @ 05:45]   --  PTH 25.46 (Ca --)      [09-01-19 @ 06:00]   --  Vitamin D (25OH) 34.9      [09-03-19 @ 05:45]  HbA1c 7.0      [09-13-19 @ 06:50]  TSH 5.55      [09-09-19 @ 03:20]  Lipid: chol 121, TG 96, HDL 33, LDL 74      [08-31-19 @ 07:00]

## 2019-11-02 NOTE — PROGRESS NOTE ADULT - ASSESSMENT
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 Found to have right-sided SDH with mass effect and 8 mm midline shift to left. Also found to have RLE DVT. Nephrology is consulted for CKD and volume overload.    CKD stage 3  - baseline Cr stable ~0.9-1.1  - Had Cr ~2 for the past year; ? prolonged FRANCES state (? 2/2 prolonged hypercalcemic state) that has now resolved vs. loss of body mass    - Avoid nephrotoxins including NSAIDs, IV contrast (if possible), Fleet's products  - maintain MAP >65  - renal diet    Volume overload  - 1L fluid restriction   - hold lasix today   - monitor I/O's accurately    HTN  - has Hx HTN but was on midodrine and florinef during last admission for hypotension, was undergoing adrenal insufficiency workup w/ endocrine   - BP at goal now  - goal BP <140/90  - started on decadron this AM, monitor BP w/ decadron  - would hold florinef while on decadron as this is duplication of Tx  - monitor BP   - consider endocrine consult for followup of prior workup of AI    Hypocalcemia  - Has Hx hypercalcemia of malignancy  - hypocalcemia is improved  - repeat ionized calcium in AM   - monitor    Hypophosphatemia  - Serum phos low today  - likely from poor PO intake  - supplement as needed  - monitor

## 2019-11-02 NOTE — CONSULT NOTE ADULT - PROBLEM SELECTOR RECOMMENDATION 5
Pt was noted to have low AM cortisol and low ACTH from hospital admission in 9/2019 and was on Florinef   Please get Endocrine evaluation , for completion of Adrenal insufficiency work up and if there is any plan for surgery patient will need Stress dose steroid for prevention of crisis.

## 2019-11-02 NOTE — CONSULT NOTE ADULT - PROBLEM SELECTOR RECOMMENDATION 3
KPS 30%: As discussed with daughter/HCP, Dr Browne, prior to August 2019, patient was independent of ADL's and IADL's.  Has had multiple hospitalizations since August 2019 for falls, hypoglycemia  and hypotension.  She shares that he has deteriorated exponentially with each hospitalization.   At this time, family wishes are to bring him home with home PT and home services when medically stable.
Pt was on Basaglar and Januvia   Cont current Lantus  at 8 units at bedtime / fasting glucose this AM was <130 and >100  Only 6 units of insulin/Humalog coverage administered in 24 hours and A1c = 7 in 9/2019/ so will continue insulin slidding scale coverage but if Post prandial glucose remain >180 as it now , will start preprandial insulin in tomorrow

## 2019-11-02 NOTE — CONSULT NOTE ADULT - PROBLEM SELECTOR RECOMMENDATION 6
Please replace the Phosphate IV   corrected Ca = 8.64 / cont to monitor   I agree with checking ionized calcium

## 2019-11-02 NOTE — CONSULT NOTE ADULT - PROBLEM SELECTOR RECOMMENDATION 4
Spent 30 minutes discussing advance directives with daughter/HCP. While at [facility], patient  was a DNR/DNI but directives were rescinded by daughter for possible evacuation/surgery.   I explained that patient may continue with his DNR/DNI status with no limitations on medical interventions.  If surgery is offered,  code status would NOT prevent her father from having surgery.  DARIANA COMPLETED>  DNR/DNI  NO LIMITATIONS ON MEDICAL INTERVENTIONS EXCEPT FOR RESUSCITATIVE MEASURES.
Patient is on Midodrine 5 mg oral TID  Pt was placed on Midodrine/ Florinef from 9/2019 admission due to Orthostatic Hypotension   If BP continues to rise and if able to check orthostatic and improves or resolved /might consider decrease Midodrine from three times a day to Two times a day and monitor BP   LVOT as per recent Echo in 9/2019--> Cardio follow up

## 2019-11-02 NOTE — CONSULT NOTE ADULT - ASSESSMENT
79y male with PMH of CAD s/p stents x4, HTN, DM, Hypothyroid, LUE DVT on eliquis,  DLBC (on RCP, last 2 weeks ago)  was transferred to [facility] on 10/29/19  from [facility] for further management of SDH which resulted from a fall in the NH.  As reported, he was getting out of chair when he fell backward hitting his head. Denies LOC, no symptoms proceeding the fall.  Patient was found to have   R and interhemispheric acute SDH w/ mild shift and admitted to Neurosurgery service. Received 2000u Kcentra at [facility].   Plan for observation / allowance of the SDH to chronicify if possible to facilitate nancy hole evacuation. 79y male with PMH of CAD s/p stents x4, HTN, DM2 ( on Basaglar insulin and Januvia at home) , Hypothyroid, LUE DVT on eliquis,  DLBC ( diagnosed recently, being treated at [facility])  was transferred to [facility] on 10/29/19  from [facility]  for further management of SDH which resulted from a fall in the NH.  As reported, he was getting out of chair when he fell backward hitting his head.  Patient was found to have  R and interhemispheric acute SDH w/ mild shift and admitted to Neurosurgery service.  Pt was taking Eliquis for DVT received 2000u Kcentra at [facility].   As per Neurosurgery team the plan is  for observation /with  allowance of the SDH to become chronic if possible to facilitate nancy hole evacuation.    Medical consultation was called for contribution in medical management.

## 2019-11-03 LAB
ANION GAP SERPL CALC-SCNC: 11 MMOL/L — SIGNIFICANT CHANGE UP (ref 5–17)
ANION GAP SERPL CALC-SCNC: 12 MMOL/L — SIGNIFICANT CHANGE UP (ref 5–17)
BUN SERPL-MCNC: 12 MG/DL — SIGNIFICANT CHANGE UP (ref 7–23)
BUN SERPL-MCNC: 12 MG/DL — SIGNIFICANT CHANGE UP (ref 7–23)
CA-I BLD-SCNC: 1.12 MMOL/L — SIGNIFICANT CHANGE UP (ref 1.12–1.3)
CALCIUM SERPL-MCNC: 7.8 MG/DL — LOW (ref 8.4–10.5)
CALCIUM SERPL-MCNC: 8 MG/DL — LOW (ref 8.4–10.5)
CHLORIDE SERPL-SCNC: 103 MMOL/L — SIGNIFICANT CHANGE UP (ref 96–108)
CHLORIDE SERPL-SCNC: 104 MMOL/L — SIGNIFICANT CHANGE UP (ref 96–108)
CO2 SERPL-SCNC: 23 MMOL/L — SIGNIFICANT CHANGE UP (ref 22–31)
CO2 SERPL-SCNC: 24 MMOL/L — SIGNIFICANT CHANGE UP (ref 22–31)
CREAT SERPL-MCNC: 0.96 MG/DL — SIGNIFICANT CHANGE UP (ref 0.5–1.3)
CREAT SERPL-MCNC: 0.98 MG/DL — SIGNIFICANT CHANGE UP (ref 0.5–1.3)
GLUCOSE BLDC GLUCOMTR-MCNC: 152 MG/DL — HIGH (ref 70–99)
GLUCOSE BLDC GLUCOMTR-MCNC: 163 MG/DL — HIGH (ref 70–99)
GLUCOSE BLDC GLUCOMTR-MCNC: 211 MG/DL — HIGH (ref 70–99)
GLUCOSE BLDC GLUCOMTR-MCNC: 217 MG/DL — HIGH (ref 70–99)
GLUCOSE SERPL-MCNC: 209 MG/DL — HIGH (ref 70–99)
GLUCOSE SERPL-MCNC: 211 MG/DL — HIGH (ref 70–99)
HCT VFR BLD CALC: 32.2 % — LOW (ref 39–50)
HGB BLD-MCNC: 10.4 G/DL — LOW (ref 13–17)
MAGNESIUM SERPL-MCNC: 1.8 MG/DL — SIGNIFICANT CHANGE UP (ref 1.6–2.6)
MCHC RBC-ENTMCNC: 29.4 PG — SIGNIFICANT CHANGE UP (ref 27–34)
MCHC RBC-ENTMCNC: 32.3 GM/DL — SIGNIFICANT CHANGE UP (ref 32–36)
MCV RBC AUTO: 91 FL — SIGNIFICANT CHANGE UP (ref 80–100)
NRBC # BLD: 0 /100 WBCS — SIGNIFICANT CHANGE UP (ref 0–0)
PHOSPHATE SERPL-MCNC: 1.5 MG/DL — LOW (ref 2.5–4.5)
PLATELET # BLD AUTO: 369 K/UL — SIGNIFICANT CHANGE UP (ref 150–400)
POTASSIUM SERPL-MCNC: 3.4 MMOL/L — LOW (ref 3.5–5.3)
POTASSIUM SERPL-MCNC: 3.4 MMOL/L — LOW (ref 3.5–5.3)
POTASSIUM SERPL-SCNC: 3.4 MMOL/L — LOW (ref 3.5–5.3)
POTASSIUM SERPL-SCNC: 3.4 MMOL/L — LOW (ref 3.5–5.3)
RBC # BLD: 3.54 M/UL — LOW (ref 4.2–5.8)
RBC # FLD: 20.9 % — HIGH (ref 10.3–14.5)
SODIUM SERPL-SCNC: 138 MMOL/L — SIGNIFICANT CHANGE UP (ref 135–145)
SODIUM SERPL-SCNC: 139 MMOL/L — SIGNIFICANT CHANGE UP (ref 135–145)
WBC # BLD: 11.83 K/UL — HIGH (ref 3.8–10.5)
WBC # FLD AUTO: 11.83 K/UL — HIGH (ref 3.8–10.5)

## 2019-11-03 RX ORDER — VALPROIC ACID (AS SODIUM SALT) 250 MG/5ML
500 SOLUTION, ORAL ORAL EVERY 12 HOURS
Refills: 0 | Status: DISCONTINUED | OUTPATIENT
Start: 2019-11-03 | End: 2019-11-05

## 2019-11-03 RX ORDER — DIVALPROEX SODIUM 500 MG/1
500 TABLET, DELAYED RELEASE ORAL EVERY 12 HOURS
Refills: 0 | Status: DISCONTINUED | OUTPATIENT
Start: 2019-11-03 | End: 2019-11-03

## 2019-11-03 RX ADMIN — Medication 4: at 12:38

## 2019-11-03 RX ADMIN — FAMOTIDINE 20 MILLIGRAM(S): 10 INJECTION INTRAVENOUS at 17:33

## 2019-11-03 RX ADMIN — Medication 500 MILLIGRAM(S): at 08:38

## 2019-11-03 RX ADMIN — MIDODRINE HYDROCHLORIDE 5 MILLIGRAM(S): 2.5 TABLET ORAL at 05:19

## 2019-11-03 RX ADMIN — SENNA PLUS 2 TABLET(S): 8.6 TABLET ORAL at 21:32

## 2019-11-03 RX ADMIN — MIDODRINE HYDROCHLORIDE 5 MILLIGRAM(S): 2.5 TABLET ORAL at 17:34

## 2019-11-03 RX ADMIN — ESCITALOPRAM OXALATE 5 MILLIGRAM(S): 10 TABLET, FILM COATED ORAL at 12:38

## 2019-11-03 RX ADMIN — SIMVASTATIN 20 MILLIGRAM(S): 20 TABLET, FILM COATED ORAL at 21:32

## 2019-11-03 RX ADMIN — ENOXAPARIN SODIUM 40 MILLIGRAM(S): 100 INJECTION SUBCUTANEOUS at 17:32

## 2019-11-03 RX ADMIN — Medication 4: at 08:39

## 2019-11-03 RX ADMIN — INSULIN GLARGINE 8 UNIT(S): 100 INJECTION, SOLUTION SUBCUTANEOUS at 21:31

## 2019-11-03 RX ADMIN — Medication 88 MICROGRAM(S): at 05:19

## 2019-11-03 RX ADMIN — Medication 500 MILLIGRAM(S): at 17:32

## 2019-11-03 RX ADMIN — Medication 2: at 17:33

## 2019-11-03 RX ADMIN — MIDODRINE HYDROCHLORIDE 5 MILLIGRAM(S): 2.5 TABLET ORAL at 12:38

## 2019-11-03 RX ADMIN — Medication 2 MILLIGRAM(S): at 08:38

## 2019-11-03 RX ADMIN — FAMOTIDINE 20 MILLIGRAM(S): 10 INJECTION INTRAVENOUS at 05:19

## 2019-11-03 NOTE — PROGRESS NOTE ADULT - SUBJECTIVE AND OBJECTIVE BOX
Subjective: Patient seen and examined. No new events except as noted.     SUBJECTIVE/ROS:  resting  NAD       MEDICATIONS:  MEDICATIONS  (STANDING):  dexAMETHasone     Tablet 2 milliGRAM(s) Oral <User Schedule>  dextrose 5%. 1000 milliLiter(s) (50 mL/Hr) IV Continuous <Continuous>  dextrose 50% Injectable 12.5 Gram(s) IV Push once  dextrose 50% Injectable 25 Gram(s) IV Push once  dextrose 50% Injectable 25 Gram(s) IV Push once  enoxaparin Injectable 40 milliGRAM(s) SubCutaneous <User Schedule>  escitalopram 5 milliGRAM(s) Oral daily  famotidine    Tablet 20 milliGRAM(s) Oral two times a day  insulin glargine Injectable (LANTUS) 8 Unit(s) SubCutaneous at bedtime  insulin lispro (HumaLOG) corrective regimen sliding scale   SubCutaneous Before meals and at bedtime  levothyroxine 88 MICROGram(s) Oral daily  midodrine. 5 milliGRAM(s) Oral three times a day  polyethylene glycol 3350 17 Gram(s) Oral daily  senna 2 Tablet(s) Oral at bedtime  simvastatin 20 milliGRAM(s) Oral at bedtime  valproic  acid Syrup 500 milliGRAM(s) Oral every 12 hours      PHYSICAL EXAM:  T(C): 36.9 (11-03-19 @ 07:59), Max: 37.2 (11-02-19 @ 15:51)  HR: 108 (11-03-19 @ 07:59) (100 - 114)  BP: 142/92 (11-03-19 @ 07:59) (136/80 - 155/97)  RR: 19 (11-03-19 @ 07:59) (18 - 19)  SpO2: 96% (11-03-19 @ 07:59) (96% - 97%)  Wt(kg): --  I&O's Summary    02 Nov 2019 08:01  -  03 Nov 2019 07:00  --------------------------------------------------------  IN: 260 mL / OUT: 300 mL / NET: -40 mL            JVP: Normal  Neck: supple  Lung: clear   CV: S1 S2 , Murmur:  Abd: soft  Ext: No edema  neuro: Awake / alert  Psych: flat affect  Skin: normal``    LABS/DATA:    CARDIAC MARKERS:            11-03    139  |  104  |  12  ----------------------------<  211<H>  3.4<L>   |  23  |  0.98    Ca    7.8<L>      03 Nov 2019 06:04  Phos  1.5     11-03  Mg     1.8     11-03    TPro  4.9<L>  /  Alb  2.7<L>  /  TBili  0.4  /  DBili  x   /  AST  22  /  ALT  15  /  AlkPhos  85  11-02    proBNP:   Lipid Profile:   HgA1c:   TSH:     TELE:  EKG:

## 2019-11-03 NOTE — PROGRESS NOTE ADULT - ASSESSMENT
cad history of stent  stable  off antiplt therapy for now   resume once deemed safe     Mild AS  stable    Hypotension   on midodrine/florinef  monitor supine BP and orthostatic vitals     Below knee DVT  fu with vascular card    Mild trachycardia  ekg sinus   given below knee dvt, would get ctpa to rule out PE   check CBC

## 2019-11-03 NOTE — PROGRESS NOTE ADULT - SUBJECTIVE AND OBJECTIVE BOX
[facility] [place] [facility]   MD UYEN HERNANDEZ D.O, PA    TELEPHONE NUMBERS:  OFFICE: [phone]  DR. CASAREZ CELL: [phone]  GORDON ZHANG CELL: [phone]  DR. HERNANDEZ CELL:  [phone]    RENAL FOLLOW UP NOTE  --------------------------------------------------------------------------------  HPI: Pt seen and examined at bedside. Poor oral intake. Denies SOB, improving LE Edema noted        PAST HISTORY  --------------------------------------------------------------------------------  No significant changes to PMH, PSH, FHx, SHx, unless otherwise noted    ALLERGIES & MEDICATIONS  --------------------------------------------------------------------------------  Allergies    No Known Allergies    Intolerances      Standing Inpatient Medications  dexAMETHasone     Tablet 2 milliGRAM(s) Oral <User Schedule>  dextrose 5%. 1000 milliLiter(s) IV Continuous <Continuous>  dextrose 50% Injectable 12.5 Gram(s) IV Push once  dextrose 50% Injectable 25 Gram(s) IV Push once  dextrose 50% Injectable 25 Gram(s) IV Push once  enoxaparin Injectable 40 milliGRAM(s) SubCutaneous <User Schedule>  escitalopram 5 milliGRAM(s) Oral daily  famotidine    Tablet 20 milliGRAM(s) Oral two times a day  insulin glargine Injectable (LANTUS) 8 Unit(s) SubCutaneous at bedtime  insulin lispro (HumaLOG) corrective regimen sliding scale   SubCutaneous Before meals and at bedtime  levothyroxine 88 MICROGram(s) Oral daily  midodrine. 5 milliGRAM(s) Oral three times a day  polyethylene glycol 3350 17 Gram(s) Oral daily  senna 2 Tablet(s) Oral at bedtime  simvastatin 20 milliGRAM(s) Oral at bedtime  valproic  acid Syrup 500 milliGRAM(s) Oral every 12 hours    PRN Inpatient Medications  acetaminophen   Tablet .. 650 milliGRAM(s) Oral every 6 hours PRN  dextrose 40% Gel 15 Gram(s) Oral once PRN  glucagon  Injectable 1 milliGRAM(s) IntraMuscular once PRN  ondansetron Injectable 4 milliGRAM(s) IV Push every 6 hours PRN      REVIEW OF SYSTEMS  --------------------------------------------------------------------------------  General: no fever  CVS: no chest pain  RESP: no sob, no cough  ABD: no abdominal pain  : no dysuria  MSK: no edema     VITALS/PHYSICAL EXAM  --------------------------------------------------------------------------------  T(C): 36.9 (11-03-19 @ 07:59), Max: 37.2 (11-02-19 @ 15:51)  HR: 108 (11-03-19 @ 07:59) (100 - 114)  BP: 142/92 (11-03-19 @ 07:59) (136/80 - 155/97)  RR: 19 (11-03-19 @ 07:59) (18 - 19)  SpO2: 96% (11-03-19 @ 07:59) (96% - 97%)  Wt(kg): --        11-02-19 @ 08:01  -  11-03-19 @ 07:00  --------------------------------------------------------  IN: 260 mL / OUT: 300 mL / NET: -40 mL      Physical Exam:  	Gen: NAD  	HEENT: MMM  	Pulm: CTA B/L  	CV: S1S2  	Abd: Soft, +BS  	Ext: trace LE edema B/L                      Neuro: Awake   	Skin: Warm and Dry       LABS/STUDIES  --------------------------------------------------------------------------------    139  |  104  |  12  ----------------------------<  211      [11-03-19 @ 06:04]  3.4   |  23  |  0.98        Ca     7.8     [11-03-19 @ 06:04]      iCa    1.12     [11-03 @ 06:04]      Mg     1.8     [11-03-19 @ 06:04]      Phos  1.5     [11-03-19 @ 06:04]    TPro  4.9  /  Alb  2.7  /  TBili  0.4  /  DBili  x   /  AST  22  /  ALT  15  /  AlkPhos  85  [11-02-19 @ 06:32]    Uric acid 3.7      [11-02-19 @ 06:32]        [11-02-19 @ 06:32]    Creatinine Trend:  SCr 0.98 [11-03 @ 06:04]  SCr 0.90 [11-02 @ 06:32]  SCr 0.85 [10-31 @ 16:13]  SCr <0.30 [10-31 @ 12:45]  SCr 0.93 [10-30 @ 23:31]    Iron 38, TIBC 234, %sat --      [09-12-19 @ 06:08]  Ferritin 214.6      [09-12-19 @ 06:08]  PTH 20.00 (Ca --)      [09-03-19 @ 05:45]   --  PTH 25.46 (Ca --)      [09-01-19 @ 06:00]   --  Vitamin D (25OH) 34.9      [09-03-19 @ 05:45]  HbA1c 7.0      [09-13-19 @ 06:50]  TSH 5.55      [09-09-19 @ 03:20]  Lipid: chol 121, TG 96, HDL 33, LDL 74      [08-31-19 @ 07:00]

## 2019-11-03 NOTE — PROGRESS NOTE ADULT - ASSESSMENT
HPI:  79y male with PMH of CAD s/p stents x4, HTN, DM, Hypothyroid, lymphoma presenting after a fall. He was getting out of chair when he fell backward hitting his head. Denies LOC, no symptoms proceeding the fall. Fall was unwitnessed, nurses at [facility] stated that down time was a few minutes, daughter spoke to him after the fall he was respond like his normal self, was complaining of a headache. No other complaints, denies chest pain, SOB, abdominal pain, nausea, vomiting, back pain. (29 Oct 2019 02:08)  80 yo male with right acute and interhemispheric subdural hematoma  PROCEDURE:    POD#    PLAN:  1 Possible OR next week, although at this time family member at bedside stating likely wonty want to prursue OR  2 tachycardia and DVT, holding AC given risk of neurological decline, Cards rec CTA chest; however, patients family member refused test, recognizing the risks or PE and further decline  3 occupational therapy   4 continue vpa   5 continue lexapro   6 continue midodrine to keep bp up   7 continue statin   8 continue synthroid   9 dysphagia 3 ccd diet   10 continue lantus and hiss   11 dvt ppx sql and scd   12 dispo :p       Assessment:  Please Check When Present   []  GCS  E   V  M     Heart Failure: []Acute, [] acute on chronic , []chronic  Heart Failure:  [] Diastolic (HFpEF), [] Systolic (HFrEF), []Combined (HFpEF and HFrEF), [] RHF, [] Pulm HTN, [] Other    [] FRANCES, [] ATN, [] AIN, [] other  [] CKD1, [] CKD2, [] CKD 3, [] CKD 4, [] CKD 5, []ESRD    Encephalopathy: [] Metabolic, [] Hepatic, [] toxic, [] Neurological, [] Other    Abnormal Nurtitional Status: [] malnurtition (see nutrition note), [ ]underweight: BMI < 19, [] morbid obesity: BMI >40, [] Cachexia    [] Sepsis  [] hypovolemic shock,[] cardiogenic shock, [] hemorrhagic shock, [] neuogenic shock  [] Acute Respiratory Failure  []Cerebral edema, [] Brain compression/ herniation,   [] Functional quadriplegia  [] Acute blood loss anemia  More than 40 minutes were spent educating the patient and family regarding condition, medications, follow up plans, signs and symptoms to be concerned with, preparing paperwork, and questions answered regarding discharge.

## 2019-11-03 NOTE — PROGRESS NOTE ADULT - SUBJECTIVE AND OBJECTIVE BOX
SUBJECTIVE: pt with no complaints, states he want to go home    Vital Signs Last 24 Hrs  T(C): 36.9 (11-03-19 @ 07:59), Max: 37.2 (11-02-19 @ 15:51)  T(F): 98.4 (11-03-19 @ 07:59), Max: 98.9 (11-02-19 @ 15:51)  HR: 108 (11-03-19 @ 07:59) (100 - 114)  BP: 142/92 (11-03-19 @ 07:59) (136/80 - 155/97)  BP(mean): --  RR: 19 (11-03-19 @ 07:59) (18 - 19)  SpO2: 96% (11-03-19 @ 07:59) (96% - 97%)    PHYSICAL EXAM:    Constitutional: No Acute Distress     Neurological: Awake, alert, oriented to person, place and time, speech clear and fluent, face equal, tongue midline, briskly following commands, no drift, moves all extremities antigravity, sensation intact to light touch throughout, pupils 4mm and reactive bilaterally    Cardiovascular:  Regular rate and rhythm     Gastrointestinal: Soft, Non-tender, Non-distended,     Extremities: No calf tenderness bilaterally, no cyanosis, clubbing or edema          LABS:                          10.4   11.83 )-----------( 369      ( 03 Nov 2019 10:39 )             32.2    11-03    138  |  103  |  12  ----------------------------<  209<H>  3.4<L>   |  24  |  0.96    Ca    8.0<L>      03 Nov 2019 10:39  Phos  1.5     11-03  Mg     1.8     11-03    TPro  4.9<L>  /  Alb  2.7<L>  /  TBili  0.4  /  DBili  x   /  AST  22  /  ALT  15  /  AlkPhos  85  11-02 11-02 @ 08:01  -  11-03 @ 07:00  --------------------------------------------------------  IN: 260 mL / OUT: 300 mL / NET: -40 mL        IMAGING:     MEDICATIONS:  Antibiotics:    Neuro:  acetaminophen   Tablet .. 650 milliGRAM(s) Oral every 6 hours PRN Temp greater or equal to 38C (100.4F), Mild Pain (1 - 3)  escitalopram 5 milliGRAM(s) Oral daily  ondansetron Injectable 4 milliGRAM(s) IV Push every 6 hours PRN Nausea and/or Vomiting  valproic  acid Syrup 500 milliGRAM(s) Oral every 12 hours    Cardiac:  midodrine. 5 milliGRAM(s) Oral three times a day    Pulm:    GI/:  famotidine    Tablet 20 milliGRAM(s) Oral two times a day  polyethylene glycol 3350 17 Gram(s) Oral daily  senna 2 Tablet(s) Oral at bedtime    Other:   dexAMETHasone     Tablet 2 milliGRAM(s) Oral <User Schedule>  dextrose 40% Gel 15 Gram(s) Oral once PRN Blood Glucose LESS THAN 70 milliGRAM(s)/deciliter  dextrose 5%. 1000 milliLiter(s) IV Continuous <Continuous>  dextrose 50% Injectable 12.5 Gram(s) IV Push once  dextrose 50% Injectable 25 Gram(s) IV Push once  dextrose 50% Injectable 25 Gram(s) IV Push once  enoxaparin Injectable 40 milliGRAM(s) SubCutaneous <User Schedule>  glucagon  Injectable 1 milliGRAM(s) IntraMuscular once PRN Glucose LESS THAN 70 milligrams/deciliter  insulin glargine Injectable (LANTUS) 8 Unit(s) SubCutaneous at bedtime  insulin lispro (HumaLOG) corrective regimen sliding scale   SubCutaneous Before meals and at bedtime  levothyroxine 88 MICROGram(s) Oral daily  simvastatin 20 milliGRAM(s) Oral at bedtime        DIET:

## 2019-11-03 NOTE — PROGRESS NOTE ADULT - ASSESSMENT
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 Found to have right-sided SDH with mass effect and 8 mm midline shift to left. Also found to have RLE DVT. Nephrology is consulted for CKD and volume overload.    CKD stage 3  - baseline Cr stable ~0.9-1.1  - Had Cr ~2 for the past year; ? prolonged FRANCES state (? 2/2 prolonged hypercalcemic state) that has now resolved vs. loss of body mass  - Serum Cr remains stable    - Avoid nephrotoxins including NSAIDs, IV contrast (if possible), Fleet's products  - maintain MAP >65  - renal diet    Volume overload  - 1L fluid restriction   - hold lasix today given improving hypervolemia and poor oral intake   - monitor strict I/O's   - Daily weight added today    HTN  - has Hx HTN but was on midodrine and florinef during last admission for hypotension, was undergoing adrenal insufficiency workup w/ endocrine   - BP at goal now  - goal BP <140/90  - started on decadron this AM, monitor BP w/ decadron  - would hold florinef while on decadron as this is duplication of Tx  - monitor BP   - consider endocrine consult for followup of prior workup of AI    Hypocalcemia  - Has Hx hypercalcemia of malignancy  - hypocalcemia is improved  - ionized calcium at goal   - monitor    Hypophosphatemia  - Serum phos low today due to poor oral intake  - supplement as needed neutraphos packets  - monitor

## 2019-11-04 ENCOUNTER — TRANSCRIPTION ENCOUNTER (OUTPATIENT)
Age: 79
End: 2019-11-04

## 2019-11-04 DIAGNOSIS — C83.30 DIFFUSE LARGE B-CELL LYMPHOMA, UNSPECIFIED SITE: ICD-10-CM

## 2019-11-04 DIAGNOSIS — I82.409 ACUTE EMBOLISM AND THROMBOSIS OF UNSPECIFIED DEEP VEINS OF UNSPECIFIED LOWER EXTREMITY: ICD-10-CM

## 2019-11-04 DIAGNOSIS — I25.10 ATHEROSCLEROTIC HEART DISEASE OF NATIVE CORONARY ARTERY WITHOUT ANGINA PECTORIS: ICD-10-CM

## 2019-11-04 DIAGNOSIS — R00.0 TACHYCARDIA, UNSPECIFIED: ICD-10-CM

## 2019-11-04 LAB
ANION GAP SERPL CALC-SCNC: 11 MMOL/L — SIGNIFICANT CHANGE UP (ref 5–17)
BASOPHILS # BLD AUTO: 0 K/UL — SIGNIFICANT CHANGE UP (ref 0–0.2)
BASOPHILS NFR BLD AUTO: 0 % — SIGNIFICANT CHANGE UP (ref 0–2)
BUN SERPL-MCNC: 13 MG/DL — SIGNIFICANT CHANGE UP (ref 7–23)
CALCIUM SERPL-MCNC: 7.7 MG/DL — LOW (ref 8.4–10.5)
CHLORIDE SERPL-SCNC: 104 MMOL/L — SIGNIFICANT CHANGE UP (ref 96–108)
CO2 SERPL-SCNC: 24 MMOL/L — SIGNIFICANT CHANGE UP (ref 22–31)
CREAT SERPL-MCNC: 1.02 MG/DL — SIGNIFICANT CHANGE UP (ref 0.5–1.3)
EOSINOPHIL # BLD AUTO: 0 K/UL — SIGNIFICANT CHANGE UP (ref 0–0.5)
EOSINOPHIL NFR BLD AUTO: 0 % — SIGNIFICANT CHANGE UP (ref 0–6)
GLUCOSE BLDC GLUCOMTR-MCNC: 111 MG/DL — HIGH (ref 70–99)
GLUCOSE BLDC GLUCOMTR-MCNC: 136 MG/DL — HIGH (ref 70–99)
GLUCOSE BLDC GLUCOMTR-MCNC: 153 MG/DL — HIGH (ref 70–99)
GLUCOSE BLDC GLUCOMTR-MCNC: 157 MG/DL — HIGH (ref 70–99)
GLUCOSE BLDC GLUCOMTR-MCNC: 80 MG/DL — SIGNIFICANT CHANGE UP (ref 70–99)
GLUCOSE SERPL-MCNC: 152 MG/DL — HIGH (ref 70–99)
HCT VFR BLD CALC: 32.7 % — LOW (ref 39–50)
HGB BLD-MCNC: 10.6 G/DL — LOW (ref 13–17)
LYMPHOCYTES # BLD AUTO: 0.95 K/UL — LOW (ref 1–3.3)
LYMPHOCYTES # BLD AUTO: 7.8 % — LOW (ref 13–44)
MAGNESIUM SERPL-MCNC: 2 MG/DL — SIGNIFICANT CHANGE UP (ref 1.6–2.6)
MCHC RBC-ENTMCNC: 29.7 PG — SIGNIFICANT CHANGE UP (ref 27–34)
MCHC RBC-ENTMCNC: 32.4 GM/DL — SIGNIFICANT CHANGE UP (ref 32–36)
MCV RBC AUTO: 91.6 FL — SIGNIFICANT CHANGE UP (ref 80–100)
MONOCYTES # BLD AUTO: 0.54 K/UL — SIGNIFICANT CHANGE UP (ref 0–0.9)
MONOCYTES NFR BLD AUTO: 4.4 % — SIGNIFICANT CHANGE UP (ref 2–14)
NEUTROPHILS # BLD AUTO: 10.68 K/UL — HIGH (ref 1.8–7.4)
NEUTROPHILS NFR BLD AUTO: 87.8 % — HIGH (ref 43–77)
PHOSPHATE SERPL-MCNC: 2.2 MG/DL — LOW (ref 2.5–4.5)
PLATELET # BLD AUTO: 448 K/UL — HIGH (ref 150–400)
POTASSIUM SERPL-MCNC: 3.4 MMOL/L — LOW (ref 3.5–5.3)
POTASSIUM SERPL-SCNC: 3.4 MMOL/L — LOW (ref 3.5–5.3)
RBC # BLD: 3.57 M/UL — LOW (ref 4.2–5.8)
RBC # FLD: 21.1 % — HIGH (ref 10.3–14.5)
SODIUM SERPL-SCNC: 139 MMOL/L — SIGNIFICANT CHANGE UP (ref 135–145)
WBC # BLD: 12.16 K/UL — HIGH (ref 3.8–10.5)
WBC # FLD AUTO: 12.16 K/UL — HIGH (ref 3.8–10.5)

## 2019-11-04 PROCEDURE — 99232 SBSQ HOSP IP/OBS MODERATE 35: CPT

## 2019-11-04 PROCEDURE — 99233 SBSQ HOSP IP/OBS HIGH 50: CPT

## 2019-11-04 PROCEDURE — 78582 LUNG VENTILAT&PERFUS IMAGING: CPT | Mod: 26

## 2019-11-04 PROCEDURE — 93970 EXTREMITY STUDY: CPT | Mod: 26

## 2019-11-04 PROCEDURE — 93971 EXTREMITY STUDY: CPT | Mod: 26,59

## 2019-11-04 PROCEDURE — 93010 ELECTROCARDIOGRAM REPORT: CPT

## 2019-11-04 PROCEDURE — 71045 X-RAY EXAM CHEST 1 VIEW: CPT | Mod: 26

## 2019-11-04 RX ORDER — ZOLPIDEM TARTRATE 10 MG/1
1 TABLET ORAL
Qty: 0 | Refills: 0 | DISCHARGE

## 2019-11-04 RX ORDER — ENOXAPARIN SODIUM 100 MG/ML
40 INJECTION SUBCUTANEOUS
Qty: 30 | Refills: 0
Start: 2019-11-04 | End: 2019-12-03

## 2019-11-04 RX ORDER — METOPROLOL TARTRATE 50 MG
12.5 TABLET ORAL EVERY 12 HOURS
Refills: 0 | Status: DISCONTINUED | OUTPATIENT
Start: 2019-11-04 | End: 2019-11-05

## 2019-11-04 RX ORDER — FAMOTIDINE 10 MG/ML
1 INJECTION INTRAVENOUS
Qty: 0 | Refills: 0 | DISCHARGE

## 2019-11-04 RX ORDER — INSULIN GLARGINE 100 [IU]/ML
5 INJECTION, SOLUTION SUBCUTANEOUS AT BEDTIME
Refills: 0 | Status: DISCONTINUED | OUTPATIENT
Start: 2019-11-04 | End: 2019-11-05

## 2019-11-04 RX ORDER — ESCITALOPRAM OXALATE 10 MG/1
1 TABLET, FILM COATED ORAL
Qty: 0 | Refills: 0 | DISCHARGE

## 2019-11-04 RX ORDER — POTASSIUM CHLORIDE 20 MEQ
10 PACKET (EA) ORAL ONCE
Refills: 0 | Status: COMPLETED | OUTPATIENT
Start: 2019-11-04 | End: 2019-11-04

## 2019-11-04 RX ORDER — ASPIRIN/CALCIUM CARB/MAGNESIUM 324 MG
1 TABLET ORAL
Qty: 0 | Refills: 0 | DISCHARGE

## 2019-11-04 RX ORDER — SIMVASTATIN 20 MG/1
1 TABLET, FILM COATED ORAL
Qty: 0 | Refills: 0 | DISCHARGE

## 2019-11-04 RX ORDER — PANTOPRAZOLE SODIUM 20 MG/1
1 TABLET, DELAYED RELEASE ORAL
Qty: 0 | Refills: 0 | DISCHARGE

## 2019-11-04 RX ORDER — SODIUM CHLORIDE 9 MG/ML
1000 INJECTION INTRAMUSCULAR; INTRAVENOUS; SUBCUTANEOUS
Refills: 0 | Status: DISCONTINUED | OUTPATIENT
Start: 2019-11-04 | End: 2019-11-04

## 2019-11-04 RX ORDER — LEVOTHYROXINE SODIUM 125 MCG
1 TABLET ORAL
Qty: 0 | Refills: 0 | DISCHARGE

## 2019-11-04 RX ADMIN — Medication 2 MILLIGRAM(S): at 08:55

## 2019-11-04 RX ADMIN — Medication 88 MICROGRAM(S): at 06:39

## 2019-11-04 RX ADMIN — Medication 2: at 01:02

## 2019-11-04 RX ADMIN — ENOXAPARIN SODIUM 40 MILLIGRAM(S): 100 INJECTION SUBCUTANEOUS at 17:50

## 2019-11-04 RX ADMIN — INSULIN GLARGINE 5 UNIT(S): 100 INJECTION, SOLUTION SUBCUTANEOUS at 22:49

## 2019-11-04 RX ADMIN — SODIUM CHLORIDE 250 MILLILITER(S): 9 INJECTION INTRAMUSCULAR; INTRAVENOUS; SUBCUTANEOUS at 12:15

## 2019-11-04 RX ADMIN — Medication 12.5 MILLIGRAM(S): at 17:50

## 2019-11-04 RX ADMIN — Medication 500 MILLIGRAM(S): at 17:49

## 2019-11-04 RX ADMIN — Medication 10 MILLIEQUIVALENT(S): at 17:49

## 2019-11-04 RX ADMIN — SIMVASTATIN 20 MILLIGRAM(S): 20 TABLET, FILM COATED ORAL at 22:49

## 2019-11-04 RX ADMIN — FAMOTIDINE 20 MILLIGRAM(S): 10 INJECTION INTRAVENOUS at 06:41

## 2019-11-04 RX ADMIN — Medication 2: at 22:50

## 2019-11-04 RX ADMIN — ESCITALOPRAM OXALATE 5 MILLIGRAM(S): 10 TABLET, FILM COATED ORAL at 13:08

## 2019-11-04 RX ADMIN — Medication 500 MILLIGRAM(S): at 06:40

## 2019-11-04 RX ADMIN — MIDODRINE HYDROCHLORIDE 5 MILLIGRAM(S): 2.5 TABLET ORAL at 17:49

## 2019-11-04 RX ADMIN — FAMOTIDINE 20 MILLIGRAM(S): 10 INJECTION INTRAVENOUS at 17:49

## 2019-11-04 NOTE — DISCHARGE NOTE PROVIDER - NSDCFUADDINST_GEN_ALL_CORE_FT
Return to ER if develops fevers, bleeding , wound drainage, uncontrolled pain, weakness of extremities, lethargy or sluggishness..  Do not take Aspirin  or Eliquis

## 2019-11-04 NOTE — SWALLOW VFSS/MBS ASSESSMENT ADULT - COMMENTS
Swallow Hx:  Bedside Swallow Eval at [facility] 9/22/19: 1. Functional oral phase for puree consistency, mechanical soft solids and nectar thick liquids marked by functional mastication for solids, adequate bolus manipulation and functional oral transit time 2. Mild oral phase dysphagia for regular solids and thin liquids marked by extended/prolonged mastication for solids, reduced bolus manipulation and reduced anterior-posterior transport with suspected posterior loss of bolus for thin liquids 3. Functional pharyngeal phase for puree consistency, mechanical soft solids, regular solids and nectar thick liquids marked by adequate laryngeal elevation upon digital palpation and NO overt s/s of laryngeal penetration/aspiration noted 4. Moderate pharyngeal phase dysphagia for thin liquids marked by delay in laryngeal elevation upon digital palpation. Immediate evidence of cough response and throat clear noted subsequent deglutition indicative of laryngeal penetration/aspiration. Rx Mechanical soft with Nectar-thick liquids.

## 2019-11-04 NOTE — DISCHARGE NOTE PROVIDER - NSDCCPCAREPLAN_GEN_ALL_CORE_FT
PRINCIPAL DISCHARGE DIAGNOSIS  Diagnosis: SDH (subdural hematoma)  Assessment and Plan of Treatment:       SECONDARY DISCHARGE DIAGNOSES  Diagnosis: Cognitive impairment  Assessment and Plan of Treatment:     Diagnosis: DM (diabetes mellitus), type 2  Assessment and Plan of Treatment:     Diagnosis: DVT, lower extremity  Assessment and Plan of Treatment:     Diagnosis: Lymphoma  Assessment and Plan of Treatment:     Diagnosis: Chronic kidney disease (CKD), stage III (moderate)  Assessment and Plan of Treatment: PRINCIPAL DISCHARGE DIAGNOSIS  Diagnosis: SDH (subdural hematoma)  Assessment and Plan of Treatment: No surgical interventions   No strenous activity. No heavy lifting. Do not return to work until cleared by physician. No driving until cleared by physician.        SECONDARY DISCHARGE DIAGNOSES  Diagnosis: CAD (coronary artery disease)  Assessment and Plan of Treatment: with stents. Aspirin  on hold. Repeat CT head on follow up visit with neurosurgery and possible clearance for antiplatelet therapy    Diagnosis: Cognitive impairment  Assessment and Plan of Treatment: Supervision and assist with all functional activities    Diagnosis: DM (diabetes mellitus), type 2  Assessment and Plan of Treatment: Blood sugar in . Fingerstick blood sugar check premeal.   Basal insulin to be adjusted as per blood sugar range  Januvia on hold. Call PMD if Blood sugars >250 consecutively    Diagnosis: DVT, lower extremity  Assessment and Plan of Treatment: Maintain Lovenox 40mg subcutaneousl   Follow up with Dr Sparks for repeat duplex    Diagnosis: Lymphoma  Assessment and Plan of Treatment: Follow up with hematology for treatment options    Diagnosis: Abnormal cortisol level  Assessment and Plan of Treatment: Follow up with endocrinology for further work up  maintain low dose steroids for now    Diagnosis: Electrolyte disturbance  Assessment and Plan of Treatment: Hypokalemia, Hypophosphatemia . Repleted as indicated. Repeat metabolic panel in 1 week    Diagnosis: Chronic kidney disease (CKD), stage III (moderate)  Assessment and Plan of Treatment: Avoid  Aspirin  Motrin, aleve , Naproxen . Avoid IV contrast and other nephrotoxic agents PRINCIPAL DISCHARGE DIAGNOSIS  Diagnosis: SDH (subdural hematoma)  Assessment and Plan of Treatment: No surgical interventions   No strenous activity. No heavy lifting. Do not return to work until cleared by physician. No driving until cleared by physician.        SECONDARY DISCHARGE DIAGNOSES  Diagnosis: CAD (coronary artery disease)  Assessment and Plan of Treatment: with stents. Aspirin  on hold. Repeat CT head on follow up visit with neurosurgery and possible clearance for antiplatelet therapy    Diagnosis: Cognitive impairment  Assessment and Plan of Treatment: Supervision and assist with all functional activities  Fall precautions   Home PT for strengthening    Diagnosis: DM (diabetes mellitus), type 2  Assessment and Plan of Treatment: Blood sugar in . Fingerstick blood sugar check premeal.   Basal insulin to be adjusted as per blood sugar range  Januvia on hold. Call PMD if Blood sugars >250 consecutively    Diagnosis: DVT, lower extremity  Assessment and Plan of Treatment: Maintain Lovenox 40mg subcutaneousl   Follow up with Dr Sparks for repeat duplex    Diagnosis: Lymphoma  Assessment and Plan of Treatment: Follow up with hematology for treatment options    Diagnosis: Abnormal cortisol level  Assessment and Plan of Treatment: Follow up with endocrinology for further work up  maintain low dose steroids for now    Diagnosis: Electrolyte disturbance  Assessment and Plan of Treatment: Hypokalemia, Hypophosphatemia . Repleted as indicated. Repeat metabolic panel in 1 week    Diagnosis: Chronic kidney disease (CKD), stage III (moderate)  Assessment and Plan of Treatment: Avoid  Aspirin  Motrin, aleve , Naproxen . Avoid IV contrast and other nephrotoxic agents

## 2019-11-04 NOTE — PROGRESS NOTE ADULT - SUBJECTIVE AND OBJECTIVE BOX
SUBJECTIVE:   Not very cooperative. Did not participate for MBS study   OVERNIGHT EVENTS: none    Vital Signs Last 24 Hrs  T(C): 36.7 (04 Nov 2019 07:45), Max: 37.1 (03 Nov 2019 23:27)  T(F): 98 (04 Nov 2019 07:45), Max: 98.8 (03 Nov 2019 23:27)  HR: 122 (04 Nov 2019 07:45) (80 - 122)  BP: 153/96 (04 Nov 2019 07:45) (112/91 - 153/96)  BP(mean): --  RR: 18 (04 Nov 2019 07:45) (18 - 19)  SpO2: 100% (04 Nov 2019 07:45) (95% - 100%)    PHYSICAL EXAM:    Constitutional: No Acute Distress     Neurological: Awake oriented x 1-2  Follows simpel  Commands, Moving all Extremities b/l LE 4/5 No drift     Pulmonary: Clear to Auscultation,     Cardiovascular: S1, S2, +     Gastrointestinal: Soft, Non-tender, Non-distended     Extremities: No calf tenderness     LABS:                        10.4   11.83 )-----------( 369      ( 03 Nov 2019 10:39 )             32.2    11-03    138  |  103  |  12  ----------------------------<  209<H>  3.4<L>   |  24  |  0.96    Ca    8.0<L>      03 Nov 2019 10:39  Phos  1.5     11-03  Mg     1.8     11-03        IMAGING:         MEDICATIONS:  acetaminophen   Tablet .. 650 milliGRAM(s) Oral every 6 hours PRN Temp greater or equal to 38C (100.4F), Mild Pain (1 - 3)  escitalopram 5 milliGRAM(s) Oral daily  ondansetron Injectable 4 milliGRAM(s) IV Push every 6 hours PRN Nausea and/or Vomiting  valproic  acid Syrup 500 milliGRAM(s) Oral every 12 hours  midodrine. 5 milliGRAM(s) Oral three times a day  famotidine    Tablet 20 milliGRAM(s) Oral two times a day  polyethylene glycol 3350 17 Gram(s) Oral daily  senna 2 Tablet(s) Oral at bedtime  dexAMETHasone     Tablet 2 milliGRAM(s) Oral <User Schedule>  enoxaparin Injectable 40 milliGRAM(s) SubCutaneous <User Schedule>  glucagon  Injectable 1 milliGRAM(s) IntraMuscular once PRN Glucose LESS THAN 70 milligrams/deciliter  insulin glargine Injectable (LANTUS) 8 Unit(s) SubCutaneous at bedtime  insulin lispro (HumaLOG) corrective regimen sliding scale   SubCutaneous Before meals and at bedtime  levothyroxine 88 MICROGram(s) Oral daily  simvastatin 20 milliGRAM(s) Oral at bedtime  sodium chloride 0.9%. 1000 milliLiter(s) IV Continuous <Continuous>      DIET:     Assessment:  Please Check When Present   []  GCS  E   V  M     Heart Failure: []Acute, [] acute on chronic , []chronic  Heart Failure:  [] Diastolic (HFpEF), [] Systolic (HFrEF), []Combined (HFpEF and HFrEF), [] RHF, [] Pulm HTN, [] Other    [] FRANCES, [] ATN, [] AIN, [] other  [] CKD1, [] CKD2, [] CKD 3, [] CKD 4, [] CKD 5, []ESRD    Encephalopathy: [] Metabolic, [] Hepatic, [] toxic, [] Neurological, [] Other    Abnormal Nurtitional Status: [] malnurtition (see nutrition note), [ ]underweight: BMI < 19, [] morbid obesity: BMI >40, [] Cachexia    [] Sepsis  [] hypovolemic shock,[] cardiogenic shock, [] hemorrhagic shock, [] neuogenic shock  [] Acute Respiratory Failure  []Cerebral edema, [] Brain compression/ herniation,   [] Functional quadriplegia  [] Acute blood loss anemia

## 2019-11-04 NOTE — PROGRESS NOTE ADULT - SUBJECTIVE AND OBJECTIVE BOX
[facility]   MD Anusha Siddiqui D.O. Fatima Sheikh, D.O. Ruoru Wong, PA    From 7 AM - 5 PM:  OFFICE: [phone]  Dr. Radford cell: [phone]  Dr. Townsend cell: [phone]  Dr. Steinberg cell: [phone]  LEATHA Agee cell: [phone]    From 5 PM - 7 AM: Answering Service: [phone]        RENAL FOLLOW UP NOTE  --------------------------------------------------------------------------------  HPI: Pt seen and examined. Repeats words again today. Denies any pain, SOB.        PAST HISTORY  --------------------------------------------------------------------------------  No significant changes to PMH, PSH, FHx, SHx, unless otherwise noted    ALLERGIES & MEDICATIONS  --------------------------------------------------------------------------------  Allergies    No Known Allergies    Intolerances      Standing Inpatient Medications  dexAMETHasone     Tablet 2 milliGRAM(s) Oral <User Schedule>  dextrose 5%. 1000 milliLiter(s) IV Continuous <Continuous>  dextrose 50% Injectable 12.5 Gram(s) IV Push once  dextrose 50% Injectable 25 Gram(s) IV Push once  dextrose 50% Injectable 25 Gram(s) IV Push once  enoxaparin Injectable 40 milliGRAM(s) SubCutaneous <User Schedule>  escitalopram 5 milliGRAM(s) Oral daily  famotidine    Tablet 20 milliGRAM(s) Oral two times a day  insulin glargine Injectable (LANTUS) 5 Unit(s) SubCutaneous at bedtime  insulin lispro (HumaLOG) corrective regimen sliding scale   SubCutaneous Before meals and at bedtime  levothyroxine 88 MICROGram(s) Oral daily  midodrine. 5 milliGRAM(s) Oral three times a day  polyethylene glycol 3350 17 Gram(s) Oral daily  senna 2 Tablet(s) Oral at bedtime  simvastatin 20 milliGRAM(s) Oral at bedtime  sodium chloride 0.9%. 1000 milliLiter(s) IV Continuous <Continuous>  valproic  acid Syrup 500 milliGRAM(s) Oral every 12 hours    PRN Inpatient Medications  acetaminophen   Tablet .. 650 milliGRAM(s) Oral every 6 hours PRN  dextrose 40% Gel 15 Gram(s) Oral once PRN  glucagon  Injectable 1 milliGRAM(s) IntraMuscular once PRN  ondansetron Injectable 4 milliGRAM(s) IV Push every 6 hours PRN      REVIEW OF SYSTEMS  --------------------------------------------------------------------------------  General: no fever  CVS: no chest pain  RESP: no sob, no cough  ABD: no abdominal pain  : no dysuria,  MSK: no edema     VITALS/PHYSICAL EXAM  --------------------------------------------------------------------------------  T(C): 37.1 (11-04-19 @ 12:52), Max: 37.1 (11-03-19 @ 23:27)  HR: 121 (11-04-19 @ 12:52) (120 - 127)  BP: 155/84 (11-04-19 @ 12:52) (112/91 - 155/84)  RR: 18 (11-04-19 @ 12:52) (18 - 18)  SpO2: 96% (11-04-19 @ 12:52) (95% - 100%)  Wt(kg): --        11-04-19 @ 07:01  -  11-04-19 @ 16:06  --------------------------------------------------------  IN: 480 mL / OUT: 0 mL / NET: 480 mL      Physical Exam:  	Gen: NAD  	HEENT: MMM  	Pulm: CTA B/L  	CV: S1S2, + murmur  	Abd: Soft, +BS  	Ext: mild LE edema B/L              Neuro: Awake, confused today   	Skin: Warm and Dry       LABS/STUDIES  --------------------------------------------------------------------------------              10.4   11.83 >-----------<  369      [11-03-19 @ 10:39]              32.2     138  |  103  |  12  ----------------------------<  209      [11-03-19 @ 10:39]  3.4   |  24  |  0.96        Ca     8.0     [11-03-19 @ 10:39]      iCa    1.12     [11-03 @ 06:04]      Mg     1.8     [11-03-19 @ 06:04]      Phos  1.5     [11-03-19 @ 06:04]            Creatinine Trend:  SCr 0.96 [11-03 @ 10:39]  SCr 0.98 [11-03 @ 06:04]  SCr 0.90 [11-02 @ 06:32]  SCr 0.85 [10-31 @ 16:13]  SCr <0.30 [10-31 @ 12:45]        Iron 38, TIBC 234, %sat --      [09-12-19 @ 06:08]  Ferritin 214.6      [09-12-19 @ 06:08]  PTH 20.00 (Ca --)      [09-03-19 @ 05:45]   --  PTH 25.46 (Ca --)      [09-01-19 @ 06:00]   --  Vitamin D (25OH) 34.9      [09-03-19 @ 05:45]  HbA1c 7.0      [09-13-19 @ 06:50]  TSH 5.55      [09-09-19 @ 03:20]  Lipid: chol 121, TG 96, HDL 33, LDL 74      [08-31-19 @ 07:00]

## 2019-11-04 NOTE — DISCHARGE NOTE NURSING/CASE MANAGEMENT/SOCIAL WORK - PATIENT PORTAL LINK FT
You can access the FollowMyHealth Patient Portal offered by [facility] by registering at the following website: [email or web address]. By joining [employer]’s FollowMyHealth portal, you will also be able to view your health information using other applications (apps) compatible with our system.

## 2019-11-04 NOTE — DISCHARGE NOTE PROVIDER - NSDCMRMEDTOKEN_GEN_ALL_CORE_FT
aspirin 81 mg oral tablet: 1 tab(s) orally once a day  Basaglar KwikPen 100 units/mL subcutaneous solution: 8 unit(s) subcutaneous once a day (at bedtime)  docusate sodium 100 mg oral capsule: 1 cap(s) orally 3 times a day  enoxaparin 40 mg/0.4 mL injectable solution: 40 milligram(s) subcutaneously once a day   famotidine 20 mg oral tablet: 1 tab(s) orally 2 times a day  fludrocortisone 0.1 mg oral tablet: 1 tab(s) orally 2 times a day  Januvia 50 mg oral tablet: 1 tab(s) orally once a day   levothyroxine 88 mcg (0.088 mg) oral capsule: 1 cap(s) orally once a day  midodrine 5 mg oral tablet: 1 tab(s) orally 3 times a day  Rolling Walker: 1 unit(s)   senna oral tablet: 2 tab(s) orally once a day (at bedtime)  simvastatin 20 mg oral tablet: 1 tab(s) orally once a day (at bedtime)  zolpidem 10 mg oral tablet: 1 tab(s) orally once a day (at bedtime), As Needed - for insomnia atenolol 25 mg oral tablet: 1 tab(s) orally once a day  Basaglar KwikPen 100 units/mL subcutaneous solution: 3 unit(s) subcutaneous once a day (at bedtime)  dexamethasone 2 mg oral tablet: 1 tab(s) orally once a day   enoxaparin 40 mg/0.4 mL injectable solution: 40 milligram(s) subcutaneously once a day   levothyroxine 88 mcg (0.088 mg) oral capsule: 1 cap(s) orally once a day  Lexapro 5 mg oral tablet: 1 tab(s) orally once a day  midodrine 5 mg oral tablet: 1 tab(s) orally 3 times a day  polyethylene glycol 3350 oral powder for reconstitution: 17 gram(s) orally once a day  Protonix 40 mg oral delayed release tablet: 1 tab(s) orally once a day  simvastatin 20 mg oral tablet: 1 tab(s) orally once a day (at bedtime)  Tylenol 325 mg oral tablet: 2 tab(s) orally every 6 hours, As Needed - for headache  valproic acid 250 mg/5 mL oral liquid: 5 milliliter(s) orally 2 times a day

## 2019-11-04 NOTE — SWALLOW VFSS/MBS ASSESSMENT ADULT - SLP PERTINENT HISTORY OF CURRENT PROBLEM
79y male with PMH of CAD s/p stents x4, HTN, DM, Hypothyroid, lymphoma presenting from rehab after a fall. He was getting out of chair when he fell backward hitting his head. Denies LOC, no symptoms proceeding the fall. Fall was unwitnessed, nurses at [place] stated that down time was a few minutes, daughter spoke to him after the fall he was respond like his normal self, was complaining of a headache. No other complaints, denies chest pain, SOB, abdominal pain, nausea, vomiting, back pain. Patient was on Eliquis - given Kcentra at [facility].  Patient not on ASA as per patient and family. Of note, patient currently full code as per family, would want surgical intervention. On admission, GCS: 15. HIGH RISK FOR VTE 2/2 Lymphoma

## 2019-11-04 NOTE — PROGRESS NOTE ADULT - ATTENDING COMMENTS
Examined the patient and agreed with above.
If ok with neurosugery then lovenox 40mg daily   Surveillance duplex tomorrow  hope to avoid IVC filter if possible, but if propagates then will need IVC filter    [place] [id number]
80 y/o M with history of DLBCL s/p 2 cycles R-CVP presents s/p fall with worsening mental status found with sub dural hematoma. Appreciate care as per NSICU. Continue off anticoagulation, s/p KCentra. On doppler b/l lower extremity DVTs seen below the knee, continue with serial US evaluations.

## 2019-11-04 NOTE — PROGRESS NOTE ADULT - ASSESSMENT
79y male with PMH of cognitive delay  CKD CAD s/p stents x4, HTN, DM, Hypothyroid, lymphoma recently diagnosed  LVOT obstruction, LUE DVT (on eliquis) , VQ scan 9/20 with low probability for PE , orthostatic hypotension on Midodrine & Florinef presenting after a fall. He was getting out of chair when he fell backward hitting his head. Denies LOC, no symptoms proceeding the fall. Fall was unwitnessed, nurses at [facility] stated that down time was a few minutes, daughter spoke to him after the fall he was respond like his normal self, was complaining of a headache.   80 yo male with right acute and interhemispheric subdural hematoma    Plan    Neuro stable. Keep off antiplatelet and anticoagulation   Sinus tachycardia- Daughter does not want to take risk of IV contrast in setting of CKD. Will rpt LE duplex to evaluate for progression. Possible component of dehydration  Gentle IV fluid challenge of  ( received multiple Lasix doses) . -150. Off Florinef since 11/1. Cards f/u appreciated   B/l below knee DVT-  rpt LE duplex to evaluate for progression  Decadron 2mg for lymphoma   On Dysphagia 3 Nectar thick liquids. Did not cooperate for MBS  PT- [facility]ON[facility]. Daughter Dr Browne wants to take patient home possibly later today. Asked PT revaluation   Possible d/c home later today Follow up with Dr Xiao 11/18/19 at 1:30pm

## 2019-11-04 NOTE — DISCHARGE NOTE PROVIDER - NSDCACTIVITY_GEN_ALL_CORE
No heavy lifting/straining/Do not drive or operate machinery/Showering allowed/Do not make important decisions/Walking - Indoors allowed/Walking - Outdoors allowed

## 2019-11-04 NOTE — DISCHARGE NOTE PROVIDER - HOSPITAL COURSE
79y male with PMH of cognitive delay  CKD CAD s/p stents x4, HTN, DM, Hypothyroid, lymphoma recently diagnosed  LVOT obstruction, LUE DVT (on eliquis) , VQ scan 9/20 with low probability for PE , orthostatic hypotension on Midodrine & Florinef presenting after a fall. He was getting out of chair when he fell backward hitting his head. Denies LOC, no symptoms proceeding the fall. Fall was unwitnessed, nurses at [place] stated that down time was a few minutes, daughter spoke to him after the fall he was respond like his normal self, was complaining of a headache.     Imaging revealed right acute and interhemispheric subdural hematoma. Last head CT 10/31 with stable Acute subdural hematoma involving the right temporal frontal parietal region well as acute subdural along the interhemispheric region on the right side and right tentorial region. This subdural hematoma measures approximately 1.2 cm in widest diameter .  Was also found to have bilateral below knee DVT 10/31/19 . Seen by vascular cardiology Patient seen and followed by cardiology , renal and medicine. Patient remains tachycardic- Sinus tach 110-120. Family reluctant for contrast study of lungs with h/o CKD. Seen and evaluated by PT and recommended for subacute rehab . family wishes to take patient home . Repeat duplex LE 11/4/19 with 79y male with PMH of cognitive delay  CKD CAD s/p stents x4, HTN, DM, Hypothyroid, lymphoma recently diagnosed  LVOT obstruction, LUE DVT (on eliquis) , VQ scan 9/20 with low probability for PE , orthostatic hypotension on Midodrine & Florinef presenting after a fall. He was getting out of chair when he fell backward hitting his head. Denies LOC, no symptoms proceeding the fall. Fall was unwitnessed, nurses at [facility] stated that down time was a few minutes, daughter spoke to him after the fall he was respond like his normal self, was complaining of a headache.     Imaging revealed right acute and interhemispheric subdural hematoma. Last head CT 10/31 with stable Acute subdural hematoma involving the right temporal frontal parietal region well as acute subdural along the interhemispheric region on the right side and right tentorial region. This subdural hematoma measures approximately 1.2 cm in widest diameter .  Was also found to have bilateral below knee DVT 10/31/19 . Seen by vascular cardiology Patient seen and followed by cardiology , renal and medicine. Patient in Sinus  tachycardia Sinus 110-120. Family reluctant for contrast study of lungs with h/o CKD. Repeat VQ scan 11/4/19- Low probaility for PE. Bilateral upper and lower extremity  duplex without propagation of below knee DVT. and persistent left axillary and brachial veins remain thrombosed.    Superficial thrombophlebitis affects both the right and left basilic and cephalic veins. To remain on prophylactic dose Lovenox .     Seen and evaluated by PT and recommended for subacute rehab . family wishes to take patient home .Discharged home in stable condition.

## 2019-11-04 NOTE — SWALLOW VFSS/MBS ASSESSMENT ADULT - DIAGNOSTIC IMPRESSIONS
Pt brought to radiology to complete modified barium swallow (MBS). Clinician educated patient re: MBS procedure. Pt requesting "water". Upon further description of MBS exam, patient began yelling, "get out". Pt closing eyes, not cooperative despite repeated attempts by clinician. Emotional support provided, patient calm, however still not cooperative for evaluation. D/W NP Mary and RN. MBS deferred and patient placed in transport back to floor. Will attempt to reschedule pending patient cooperation. Pt in no distress. Transport arrived to bring patient back to floor. Pt brought to radiology to complete modified barium swallow (MBS). Clinician educated patient re: MBS procedure. Pt requesting "water". Upon further description of MBS exam, patient began yelling, "get out". Pt closing eyes, not cooperative despite repeated attempts by clinician. Emotional support provided, patient calm, however still not cooperative for evaluation. D/W NP Mary and RN. MBS deferred and patient placed in transport to return to floor. Will attempt to reschedule pending patient cooperation. Pt in no distress. Transport arrived to bring patient back to floor.

## 2019-11-04 NOTE — SWALLOW VFSS/MBS ASSESSMENT ADULT - SLP GENERAL OBSERVATIONS
Pt found in radiology waiting area secure in LINA chair. Pt on room air. Pt appears agitated. Pt became increasingly agitated when clinician provides rationale for MBS. Patient yelling at radiology technician "get out" and turning head away.

## 2019-11-04 NOTE — DISCHARGE NOTE PROVIDER - PROVIDER TOKENS
PROVIDER:[TOKEN:[[id number]]],PROVIDER:[TOKEN:[[id number]]],PROVIDER:[TOKEN:[[id number]]],PROVIDER:[TOKEN:[[id number]]]

## 2019-11-04 NOTE — DISCHARGE NOTE PROVIDER - CARE PROVIDERS DIRECT ADDRESSES
,[email or web address],DirectAddress_Unknown,DirectAddress_Unknown

## 2019-11-04 NOTE — PROGRESS NOTE ADULT - PROBLEM SELECTOR PLAN 1
daughter/HCP doesn't want CTA bec of ADELSO risk. will get V/Q scan but daughter understands that it may be nondiagnostic. daughter/HCP doesn't want CTA bec of ADELSO risk. will get V/Q scan but daughter understands that it may be nondiagnostic. Restart b-blocker but Metoprolol instead of Atenolol to decrease orthostatic risk daughter/HCP doesn't want CTA bec of ADELSO risk. will get V/Q scan but daughter understands that it may be nondiagnostic. Restart b-blocker but Metoprolol instead of Atenolol to decrease orthostatic risk. check blood cx

## 2019-11-04 NOTE — PROGRESS NOTE ADULT - ASSESSMENT
79y male with PMH of CAD s/p stents x4, HTN, DM2 ( on Basaglar insulin and Januvia at home) , Hypothyroid, LUE DVT on eliquis,  DLBC ( diagnosed recently, being treated at [place])  was transferred to [facility] on 10/29/19  from [facility]  for further management of SDH which resulted from a fall in the NH.  As reported, he was getting out of chair when he fell backward hitting his head.  Patient was found to have  R and interhemispheric acute SDH w/ mild shift and admitted to Neurosurgery service.  Pt was taking Eliquis for DVT received 2000u Kcentra at [facility].   As per Neurosurgery team the plan is  for observation /with  allowance of the SDH to become chronic if possible to facilitate nancy hole evacuation.    Medical consultation was called for contribution in medical management.

## 2019-11-04 NOTE — PROGRESS NOTE ADULT - SUBJECTIVE AND OBJECTIVE BOX
Gabriel Marino   Pager [phone]  Office [phone]      CC: Patient is a 79y old  Male who presents with a chief complaint of sdh (04 Nov 2019 16:05)      SUBJECTIVE / OVERNIGHT EVENTS:    MEDICATIONS  (STANDING):  dexAMETHasone     Tablet 2 milliGRAM(s) Oral <User Schedule>  dextrose 5%. 1000 milliLiter(s) (50 mL/Hr) IV Continuous <Continuous>  dextrose 50% Injectable 12.5 Gram(s) IV Push once  dextrose 50% Injectable 25 Gram(s) IV Push once  dextrose 50% Injectable 25 Gram(s) IV Push once  enoxaparin Injectable 40 milliGRAM(s) SubCutaneous <User Schedule>  escitalopram 5 milliGRAM(s) Oral daily  famotidine    Tablet 20 milliGRAM(s) Oral two times a day  insulin glargine Injectable (LANTUS) 5 Unit(s) SubCutaneous at bedtime  insulin lispro (HumaLOG) corrective regimen sliding scale   SubCutaneous Before meals and at bedtime  levothyroxine 88 MICROGram(s) Oral daily  midodrine. 5 milliGRAM(s) Oral three times a day  polyethylene glycol 3350 17 Gram(s) Oral daily  senna 2 Tablet(s) Oral at bedtime  simvastatin 20 milliGRAM(s) Oral at bedtime  sodium chloride 0.9%. 1000 milliLiter(s) (250 mL/Hr) IV Continuous <Continuous>  valproic  acid Syrup 500 milliGRAM(s) Oral every 12 hours    MEDICATIONS  (PRN):  acetaminophen   Tablet .. 650 milliGRAM(s) Oral every 6 hours PRN Temp greater or equal to 38C (100.4F), Mild Pain (1 - 3)  dextrose 40% Gel 15 Gram(s) Oral once PRN Blood Glucose LESS THAN 70 milliGRAM(s)/deciliter  glucagon  Injectable 1 milliGRAM(s) IntraMuscular once PRN Glucose LESS THAN 70 milligrams/deciliter  ondansetron Injectable 4 milliGRAM(s) IV Push every 6 hours PRN Nausea and/or Vomiting      Vital Signs Last 24 Hrs  T(C): 36.7 (04 Nov 2019 16:19), Max: 37.1 (03 Nov 2019 23:27)  T(F): 98.1 (04 Nov 2019 16:19), Max: 98.8 (03 Nov 2019 23:27)  HR: 108 (04 Nov 2019 16:19) (108 - 127)  BP: 149/99 (04 Nov 2019 16:19) (112/91 - 155/84)  BP(mean): --  RR: 18 (04 Nov 2019 16:19) (18 - 18)  SpO2: 96% (04 Nov 2019 16:19) (95% - 100%)  CAPILLARY BLOOD GLUCOSE      POCT Blood Glucose.: 111 mg/dL (04 Nov 2019 12:53)  POCT Blood Glucose.: 80 mg/dL (04 Nov 2019 08:42)  POCT Blood Glucose.: 153 mg/dL (04 Nov 2019 01:00)  POCT Blood Glucose.: 163 mg/dL (03 Nov 2019 21:28)  POCT Blood Glucose.: 152 mg/dL (03 Nov 2019 17:11)    I&O's Summary    04 Nov 2019 07:01  -  04 Nov 2019 16:58  --------------------------------------------------------  IN: 480 mL / OUT: 0 mL / NET: 480 mL      tele:    PHYSICAL EXAM:    GENERAL: NAD   HEENT: EOMI, PERRL  PULM: Clear to auscultation bilaterally  CV: Regular rate and rhythm; nl S1, S2; No murmurs, rubs, or gallops  ABDOMEN: Soft, Nontender, Nondistended; Bowel sounds present  EXTREMITIES/MSK:  No edema, calf tenderness   PSYCH: AAOx3  NEUROLOGY: non-focal          LABS:                        10.6   x     )-----------( 448      ( 04 Nov 2019 16:49 )             32.7     11-03    138  |  103  |  12  ----------------------------<  209<H>  3.4<L>   |  24  |  0.96    Ca    8.0<L>      03 Nov 2019 10:39  Phos  1.5     11-03  Mg     1.8     11-03                  RADIOLOGY & ADDITIONAL TESTS:    Imaging Personally Reviewed:    Consultant(s) Notes Reviewed:      Care Discussed with Consultants/Other Providers: Gabriel Marino   Pager [phone]  Office [phone]      CC: Patient is a 79y old  Male who presents with a chief complaint of sdh (04 Nov 2019 16:05)      SUBJECTIVE / OVERNIGHT EVENTS: pt/fam deny any complaints. No f/c/r/cp/sob/n/v/abd pain.     MEDICATIONS  (STANDING):  dexAMETHasone     Tablet 2 milliGRAM(s) Oral <User Schedule>  dextrose 5%. 1000 milliLiter(s) (50 mL/Hr) IV Continuous <Continuous>  dextrose 50% Injectable 12.5 Gram(s) IV Push once  dextrose 50% Injectable 25 Gram(s) IV Push once  dextrose 50% Injectable 25 Gram(s) IV Push once  enoxaparin Injectable 40 milliGRAM(s) SubCutaneous <User Schedule>  escitalopram 5 milliGRAM(s) Oral daily  famotidine    Tablet 20 milliGRAM(s) Oral two times a day  insulin glargine Injectable (LANTUS) 5 Unit(s) SubCutaneous at bedtime  insulin lispro (HumaLOG) corrective regimen sliding scale   SubCutaneous Before meals and at bedtime  levothyroxine 88 MICROGram(s) Oral daily  midodrine. 5 milliGRAM(s) Oral three times a day  polyethylene glycol 3350 17 Gram(s) Oral daily  senna 2 Tablet(s) Oral at bedtime  simvastatin 20 milliGRAM(s) Oral at bedtime  sodium chloride 0.9%. 1000 milliLiter(s) (250 mL/Hr) IV Continuous <Continuous>  valproic  acid Syrup 500 milliGRAM(s) Oral every 12 hours    MEDICATIONS  (PRN):  acetaminophen   Tablet .. 650 milliGRAM(s) Oral every 6 hours PRN Temp greater or equal to 38C (100.4F), Mild Pain (1 - 3)  dextrose 40% Gel 15 Gram(s) Oral once PRN Blood Glucose LESS THAN 70 milliGRAM(s)/deciliter  glucagon  Injectable 1 milliGRAM(s) IntraMuscular once PRN Glucose LESS THAN 70 milligrams/deciliter  ondansetron Injectable 4 milliGRAM(s) IV Push every 6 hours PRN Nausea and/or Vomiting      Vital Signs Last 24 Hrs  T(C): 36.7 (04 Nov 2019 16:19), Max: 37.1 (03 Nov 2019 23:27)  T(F): 98.1 (04 Nov 2019 16:19), Max: 98.8 (03 Nov 2019 23:27)  HR: 108 (04 Nov 2019 16:19) (108 - 127)  BP: 149/99 (04 Nov 2019 16:19) (112/91 - 155/84)  BP(mean): --  RR: 18 (04 Nov 2019 16:19) (18 - 18)  SpO2: 96% (04 Nov 2019 16:19) (95% - 100%)  CAPILLARY BLOOD GLUCOSE      POCT Blood Glucose.: 111 mg/dL (04 Nov 2019 12:53)  POCT Blood Glucose.: 80 mg/dL (04 Nov 2019 08:42)  POCT Blood Glucose.: 153 mg/dL (04 Nov 2019 01:00)  POCT Blood Glucose.: 163 mg/dL (03 Nov 2019 21:28)  POCT Blood Glucose.: 152 mg/dL (03 Nov 2019 17:11)    I&O's Summary    04 Nov 2019 07:01  -  04 Nov 2019 16:58  --------------------------------------------------------  IN: 480 mL / OUT: 0 mL / NET: 480 mL          PHYSICAL EXAM:    GENERAL: NAD   HEENT: EOMI, PERRL  PULM: Clear to auscultation bilaterally  CV: Regular rate and rhythm; nl S1, S2; No murmurs, rubs, or gallops  ABDOMEN: Soft, Nontender, Nondistended; Bowel sounds present  EXTREMITIES/MSK:  No edema, calf tenderness   PSYCH: AAOx2  NEUROLOGY: grossly non-focal          LABS:                        10.6   x     )-----------( 448      ( 04 Nov 2019 16:49 )             32.7     11-03    138  |  103  |  12  ----------------------------<  209<H>  3.4<L>   |  24  |  0.96    Ca    8.0<L>      03 Nov 2019 10:39  Phos  1.5     11-03  Mg     1.8     11-03                  RADIOLOGY & ADDITIONAL TESTS:    Imaging Personally Reviewed: CXR    Consultant(s) Notes Reviewed:      Care Discussed with Consultants/Other Providers: neurosur/cards-vasc/renal

## 2019-11-04 NOTE — DISCHARGE NOTE PROVIDER - CARE PROVIDER_API CALL
Tyron Xiao)  Neurosurgery  General  [address], [address] [place] [address]  Phone: [phone]  Fax: [phone]  Follow Up Time:     Rex Sparks (DO)  Internal Medicine; Nuclear Cardiology  [address] [place] [address]  Phone: [phone]  Fax: [phone]  Follow Up Time:     Anusha Townsend (DO)  Internal Medicine  [address] [place] [address]  Phone: [phone]  Fax: [phone]  Follow Up Time:     Sebastián Mclean)  Cardiovascular Disease; Internal Medicine  [address] [facility] [address]  [place] [address]  Phone: [phone]  Fax: [phone]  Follow Up Time:

## 2019-11-04 NOTE — PROGRESS NOTE ADULT - SUBJECTIVE AND OBJECTIVE BOX
Subjective: Patient seen and examined. No new events except as noted.     SUBJECTIVE/ROS:  no new events       MEDICATIONS:  MEDICATIONS  (STANDING):  dexAMETHasone     Tablet 2 milliGRAM(s) Oral <User Schedule>  dextrose 5%. 1000 milliLiter(s) (50 mL/Hr) IV Continuous <Continuous>  dextrose 50% Injectable 12.5 Gram(s) IV Push once  dextrose 50% Injectable 25 Gram(s) IV Push once  dextrose 50% Injectable 25 Gram(s) IV Push once  enoxaparin Injectable 40 milliGRAM(s) SubCutaneous <User Schedule>  escitalopram 5 milliGRAM(s) Oral daily  famotidine    Tablet 20 milliGRAM(s) Oral two times a day  insulin glargine Injectable (LANTUS) 8 Unit(s) SubCutaneous at bedtime  insulin lispro (HumaLOG) corrective regimen sliding scale   SubCutaneous Before meals and at bedtime  levothyroxine 88 MICROGram(s) Oral daily  midodrine. 5 milliGRAM(s) Oral three times a day  polyethylene glycol 3350 17 Gram(s) Oral daily  senna 2 Tablet(s) Oral at bedtime  simvastatin 20 milliGRAM(s) Oral at bedtime  valproic  acid Syrup 500 milliGRAM(s) Oral every 12 hours      PHYSICAL EXAM:  T(C): 36.7 (11-04-19 @ 04:41), Max: 37.1 (11-03-19 @ 23:27)  HR: 121 (11-04-19 @ 04:41) (80 - 122)  BP: 152/99 (11-04-19 @ 04:41) (112/91 - 152/99)  RR: 18 (11-04-19 @ 04:41) (18 - 19)  SpO2: 99% (11-04-19 @ 04:41) (95% - 99%)  Wt(kg): --  I&O's Summary          JVP: Normal  Neck: supple  Lung: clear   CV: S1 S2 , Murmur:  Abd: soft  Ext: No edema  neuro: Awake / alert  Psych: flat affect  Skin: normal``    LABS/DATA:    CARDIAC MARKERS:                                10.4   11.83 )-----------( 369      ( 03 Nov 2019 10:39 )             32.2     11-03    138  |  103  |  12  ----------------------------<  209<H>  3.4<L>   |  24  |  0.96    Ca    8.0<L>      03 Nov 2019 10:39  Phos  1.5     11-03  Mg     1.8     11-03      proBNP:   Lipid Profile:   HgA1c:   TSH:     TELE:  EKG:

## 2019-11-04 NOTE — PROGRESS NOTE ADULT - ASSESSMENT
cad history of stent  stable  off antiplt therapy for now   resume once deemed safe     Mild AS  stable    Hypotension   on midodrine/florinef  monitor supine BP and orthostatic vitals     Below knee DVT  fu with vascular card    sinus trachycardia  given below knee dvt, recommend ctpa to rule out PE   stable hgb cad history of stent  stable  off antiplt therapy for now   resume once deemed safe     Mild AS  stable    Hypotension   on midodrine  monitor supine BP and orthostatic vitals     Below knee DVT  fu with vascular card    sinus trachycardia  given below knee dvt, recommend ctpa to rule out PE , family has refused   stable hgb

## 2019-11-04 NOTE — PROGRESS NOTE ADULT - ASSESSMENT
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 Found to have right-sided SDH with mass effect and 8 mm midline shift to left. Also found to have RLE DVT. Nephrology is consulted for CKD and volume overload.    CKD stage 3  - baseline Cr stable ~0.9-1.1  - Had Cr ~2 for the past year; ? prolonged FRANCES state (? 2/2 prolonged hypercalcemic state) that has now resolved vs. loss of body mass  - Serum Cr remains stable    - Avoid nephrotoxins including NSAIDs, IV contrast (if possible), [employer] products  - maintain MAP >65  - renal diet    Volume overload  - 1L fluid restriction   - hold lasix today given improving hypervolemia and poor oral intake   - monitor strict I/O's   - Daily weight added today    HTN  - has Hx HTN but was on midodrine and florinef during last admission for hypotension, was undergoing adrenal insufficiency workup w/ endocrine   - BP at goal now  - goal BP <140/90  - started on decadron this AM, monitor BP w/ decadron  - would hold florinef while on decadron as this is duplication of Tx  - monitor BP   - consider endocrine consult for followup of prior workup of AI    Hypocalcemia  - Has Hx hypercalcemia of malignancy  - hypocalcemia is improved  - ionized calcium at goal   - monitor    Hypophosphatemia  - likely from poor PO intake  - supplement as needed neutraphos packets  - monitor    Hypokalemia  - likely from poor PO intake  - supplement as needed  - monitor

## 2019-11-05 VITALS
HEART RATE: 85 BPM | RESPIRATION RATE: 18 BRPM | OXYGEN SATURATION: 96 % | DIASTOLIC BLOOD PRESSURE: 83 MMHG | TEMPERATURE: 98 F | SYSTOLIC BLOOD PRESSURE: 138 MMHG

## 2019-11-05 DIAGNOSIS — E83.39 OTHER DISORDERS OF PHOSPHORUS METABOLISM: ICD-10-CM

## 2019-11-05 DIAGNOSIS — E87.6 HYPOKALEMIA: ICD-10-CM

## 2019-11-05 LAB
GLUCOSE BLDC GLUCOMTR-MCNC: 137 MG/DL — HIGH (ref 70–99)
GLUCOSE BLDC GLUCOMTR-MCNC: 88 MG/DL — SIGNIFICANT CHANGE UP (ref 70–99)

## 2019-11-05 PROCEDURE — 82962 GLUCOSE BLOOD TEST: CPT

## 2019-11-05 PROCEDURE — 80048 BASIC METABOLIC PNL TOTAL CA: CPT

## 2019-11-05 PROCEDURE — 99233 SBSQ HOSP IP/OBS HIGH 50: CPT

## 2019-11-05 PROCEDURE — 93970 EXTREMITY STUDY: CPT

## 2019-11-05 PROCEDURE — 86901 BLOOD TYPING SEROLOGIC RH(D): CPT

## 2019-11-05 PROCEDURE — 93306 TTE W/DOPPLER COMPLETE: CPT

## 2019-11-05 PROCEDURE — 82330 ASSAY OF CALCIUM: CPT

## 2019-11-05 PROCEDURE — 84550 ASSAY OF BLOOD/URIC ACID: CPT

## 2019-11-05 PROCEDURE — 85730 THROMBOPLASTIN TIME PARTIAL: CPT

## 2019-11-05 PROCEDURE — 83615 LACTATE (LD) (LDH) ENZYME: CPT

## 2019-11-05 PROCEDURE — 86850 RBC ANTIBODY SCREEN: CPT

## 2019-11-05 PROCEDURE — 92610 EVALUATE SWALLOWING FUNCTION: CPT

## 2019-11-05 PROCEDURE — 97162 PT EVAL MOD COMPLEX 30 MIN: CPT

## 2019-11-05 PROCEDURE — 99239 HOSP IP/OBS DSCHRG MGMT >30: CPT

## 2019-11-05 PROCEDURE — 71045 X-RAY EXAM CHEST 1 VIEW: CPT

## 2019-11-05 PROCEDURE — 86900 BLOOD TYPING SEROLOGIC ABO: CPT

## 2019-11-05 PROCEDURE — 78582 LUNG VENTILAT&PERFUS IMAGING: CPT

## 2019-11-05 PROCEDURE — 99285 EMERGENCY DEPT VISIT HI MDM: CPT

## 2019-11-05 PROCEDURE — 83735 ASSAY OF MAGNESIUM: CPT

## 2019-11-05 PROCEDURE — 70450 CT HEAD/BRAIN W/O DYE: CPT

## 2019-11-05 PROCEDURE — 84100 ASSAY OF PHOSPHORUS: CPT

## 2019-11-05 PROCEDURE — 80053 COMPREHEN METABOLIC PANEL: CPT

## 2019-11-05 PROCEDURE — 87040 BLOOD CULTURE FOR BACTERIA: CPT

## 2019-11-05 PROCEDURE — 85610 PROTHROMBIN TIME: CPT

## 2019-11-05 PROCEDURE — A9540: CPT

## 2019-11-05 PROCEDURE — 85027 COMPLETE CBC AUTOMATED: CPT

## 2019-11-05 PROCEDURE — 93005 ELECTROCARDIOGRAM TRACING: CPT

## 2019-11-05 PROCEDURE — 97166 OT EVAL MOD COMPLEX 45 MIN: CPT

## 2019-11-05 PROCEDURE — A9567: CPT

## 2019-11-05 PROCEDURE — 93971 EXTREMITY STUDY: CPT

## 2019-11-05 RX ORDER — ACETAMINOPHEN 500 MG
2 TABLET ORAL
Qty: 0 | Refills: 0 | DISCHARGE
Start: 2019-11-05

## 2019-11-05 RX ORDER — VALPROIC ACID (AS SODIUM SALT) 250 MG/5ML
5 SOLUTION, ORAL ORAL
Qty: 100 | Refills: 0
Start: 2019-11-05 | End: 2019-11-11

## 2019-11-05 RX ORDER — SODIUM,POTASSIUM PHOSPHATES 278-250MG
1 POWDER IN PACKET (EA) ORAL ONCE
Refills: 0 | Status: COMPLETED | OUTPATIENT
Start: 2019-11-05 | End: 2019-11-05

## 2019-11-05 RX ORDER — ATENOLOL 25 MG/1
25 TABLET ORAL DAILY
Refills: 0 | Status: DISCONTINUED | OUTPATIENT
Start: 2019-11-05 | End: 2019-11-05

## 2019-11-05 RX ORDER — POLYETHYLENE GLYCOL 3350 17 G/17G
17 POWDER, FOR SOLUTION ORAL
Qty: 0 | Refills: 0 | DISCHARGE
Start: 2019-11-05

## 2019-11-05 RX ORDER — DEXAMETHASONE 0.5 MG/5ML
1 ELIXIR ORAL
Qty: 30 | Refills: 0
Start: 2019-11-05

## 2019-11-05 RX ADMIN — Medication 2 MILLIGRAM(S): at 08:58

## 2019-11-05 RX ADMIN — ESCITALOPRAM OXALATE 5 MILLIGRAM(S): 10 TABLET, FILM COATED ORAL at 11:20

## 2019-11-05 RX ADMIN — ATENOLOL 25 MILLIGRAM(S): 25 TABLET ORAL at 11:20

## 2019-11-05 RX ADMIN — Medication 1 PACKET(S): at 11:20

## 2019-11-05 RX ADMIN — Medication 12.5 MILLIGRAM(S): at 06:05

## 2019-11-05 RX ADMIN — Medication 88 MICROGRAM(S): at 06:05

## 2019-11-05 RX ADMIN — MIDODRINE HYDROCHLORIDE 5 MILLIGRAM(S): 2.5 TABLET ORAL at 06:05

## 2019-11-05 RX ADMIN — Medication 500 MILLIGRAM(S): at 06:06

## 2019-11-05 RX ADMIN — FAMOTIDINE 20 MILLIGRAM(S): 10 INJECTION INTRAVENOUS at 06:05

## 2019-11-05 RX ADMIN — MIDODRINE HYDROCHLORIDE 5 MILLIGRAM(S): 2.5 TABLET ORAL at 11:20

## 2019-11-05 NOTE — CHART NOTE - NSCHARTNOTEFT_GEN_A_CORE
This service has been following for swallow evaluation This service has been following for swallow assessment. MBS attempted yesterday, however, Pt unable/unwilling to participate. Pt's daughter (Arian Browne MD, Heme/Onc attending at [place] [facility]) was contacted regarding family wishes for dysphagia POC. As per discussion, daughter would prefer to defer instrumental exam at this time, and continue current diet of Dysphagia III with nectar thick liquids, given overt s/s laryngeal penetration/aspiration with thin liquids during bedside exams. She stated that she would like for Pt to be d/miroslava home as soon as medically stable, and will f/u regarding dysphagia after discharge, if clinically indicated, and if Pt better able to cooperate. This service has been following for swallow assessment. MBS attempted yesterday, however, Pt unable/unwilling to participate. Pt's daughter (Arian Browne MD, Heme/Onc attending at [facility]) was contacted regarding family wishes for dysphagia POC. As per discussion, daughter would prefer to defer instrumental exam at this time, and continue current diet of Dysphagia III with nectar thick liquids, given overt s/s laryngeal penetration/aspiration with thin liquids during bedside exams. She stated that she would like for Pt to be d/miroslava home as soon as medically stable, and will f/u regarding dysphagia after discharge, if clinically indicated, and if Pt better able to cooperate. JULES Hernandez was notified re outcome of discussion.

## 2019-11-05 NOTE — PROGRESS NOTE ADULT - PROBLEM SELECTOR PROBLEM 6
Type 2 diabetes mellitus with other specified complication, with long-term current use of insulin
Type 2 diabetes mellitus with other specified complication, with long-term current use of insulin

## 2019-11-05 NOTE — PROGRESS NOTE ADULT - SUBJECTIVE AND OBJECTIVE BOX
Gabriel Marino   Pager [phone]  Office [phone]      CC: Patient is a 79y old  Male who presents with a chief complaint of sdh (05 Nov 2019 08:25)      SUBJECTIVE / OVERNIGHT EVENTS: No events overnight. Pt only reports that he feels confused but denies any other symptoms on ROS.     MEDICATIONS  (STANDING):  ATENolol  Tablet 25 milliGRAM(s) Oral daily  dexAMETHasone     Tablet 2 milliGRAM(s) Oral <User Schedule>  dextrose 5%. 1000 milliLiter(s) (50 mL/Hr) IV Continuous <Continuous>  dextrose 50% Injectable 12.5 Gram(s) IV Push once  dextrose 50% Injectable 25 Gram(s) IV Push once  dextrose 50% Injectable 25 Gram(s) IV Push once  enoxaparin Injectable 40 milliGRAM(s) SubCutaneous <User Schedule>  escitalopram 5 milliGRAM(s) Oral daily  famotidine    Tablet 20 milliGRAM(s) Oral two times a day  insulin glargine Injectable (LANTUS) 5 Unit(s) SubCutaneous at bedtime  insulin lispro (HumaLOG) corrective regimen sliding scale   SubCutaneous Before meals and at bedtime  levothyroxine 88 MICROGram(s) Oral daily  midodrine. 5 milliGRAM(s) Oral three times a day  polyethylene glycol 3350 17 Gram(s) Oral daily  senna 2 Tablet(s) Oral at bedtime  simvastatin 20 milliGRAM(s) Oral at bedtime  valproic  acid Syrup 500 milliGRAM(s) Oral every 12 hours    MEDICATIONS  (PRN):  acetaminophen   Tablet .. 650 milliGRAM(s) Oral every 6 hours PRN Temp greater or equal to 38C (100.4F), Mild Pain (1 - 3)  dextrose 40% Gel 15 Gram(s) Oral once PRN Blood Glucose LESS THAN 70 milliGRAM(s)/deciliter  glucagon  Injectable 1 milliGRAM(s) IntraMuscular once PRN Glucose LESS THAN 70 milligrams/deciliter  ondansetron Injectable 4 milliGRAM(s) IV Push every 6 hours PRN Nausea and/or Vomiting      Vital Signs Last 24 Hrs  T(C): 37.1 (05 Nov 2019 08:47), Max: 37.1 (04 Nov 2019 12:52)  T(F): 98.7 (05 Nov 2019 08:47), Max: 98.8 (04 Nov 2019 12:52)  HR: 97 (05 Nov 2019 08:47) (83 - 127)  BP: 145/97 (05 Nov 2019 08:47) (127/79 - 159/98)  BP(mean): --  RR: 18 (05 Nov 2019 08:47) (18 - 19)  SpO2: 97% (05 Nov 2019 08:47) (94% - 97%)  CAPILLARY BLOOD GLUCOSE      POCT Blood Glucose.: 88 mg/dL (05 Nov 2019 08:31)  POCT Blood Glucose.: 157 mg/dL (04 Nov 2019 22:07)  POCT Blood Glucose.: 136 mg/dL (04 Nov 2019 17:23)  POCT Blood Glucose.: 111 mg/dL (04 Nov 2019 12:53)    I&O's Summary    04 Nov 2019 07:01  -  05 Nov 2019 07:00  --------------------------------------------------------  IN: 600 mL / OUT: 0 mL / NET: 600 mL          PHYSICAL EXAM:    GENERAL: NAD   HEENT: EOMI, PERRL  PULM: Clear to auscultation bilaterally  CV: Regular rate and rhythm; nl S1, S2; No murmurs, rubs, or gallops  ABDOMEN: Soft, Nontender, Nondistended; Bowel sounds present  EXTREMITIES/MSK:  No edema, calf tenderness   PSYCH: AAOx3  NEUROLOGY: non-focal          LABS:                        10.6   12.16 )-----------( 448      ( 04 Nov 2019 16:49 )             32.7     11-04    139  |  104  |  13  ----------------------------<  152<H>  3.4<L>   |  24  |  1.02    Ca    7.7<L>      04 Nov 2019 16:49  Phos  2.2     11-04  Mg     2.0     11-04                  RADIOLOGY & ADDITIONAL TESTS:    Imaging Personally Reviewed:    Consultant(s) Notes Reviewed:      Care Discussed with Consultants/Other Providers: neurosurg

## 2019-11-05 NOTE — PROGRESS NOTE ADULT - ASSESSMENT
79y male with PMH of CAD s/p stents x4, HTN, DM2 ( on Basaglar insulin and Januvia at home) , Hypothyroid, LUE DVT on eliquis,  DLBC ( diagnosed recently, being treated at [facility])  was transferred to [facility] on 10/29/19  from [facility]  for further management of SDH which resulted from a fall in the [facility].  As reported, he was getting out of chair when he fell backward hitting his head.  Patient was found to have  R and interhemispheric acute SDH w/ mild shift and admitted to Neurosurgery service.  Pt was taking Eliquis for DVT received 2000u Kcentra at [facility].   As per Neurosurgery team the plan is  for observation /with  allowance of the SDH to become chronic if possible to facilitate nancy hole evacuation.    Medical consultation was called for contribution in medical management.

## 2019-11-05 NOTE — PROGRESS NOTE ADULT - ASSESSMENT
79y male with PMH of cognitive delay  CKD CAD s/p stents x4, HTN, DM, Hypothyroid, lymphoma recently diagnosed  LVOT obstruction, LUE DVT (on eliquis) , VQ scan 9/20 with low probability for PE , orthostatic hypotension on Midodrine & Florinef presenting after a fall. He was getting out of chair when he fell backward hitting his head. Denies LOC, no symptoms proceeding the fall. Fall was unwitnessed, nurses at [facility] stated that down time was a few minutes, daughter spoke to him after the fall he was respond like his normal self, was complaining of a headache.   80 yo male with right acute and interhemispheric subdural hematoma    Plan    Neuro stable. Keep off antiplatelet and anticoagulation   Sinus tachycardia- Improved. On low dose Atenolol  B/l below knee DVT-  rpt LE duplex to without propagation . Maintain SQL 40mg at home . No a/c for now   ? Adrenal Insufficiency-On Decadron 2 mg   On Dysphagia 3 Nectar thick liquids.   PT- LAVONNE.  D/w Daughter Dr Browne , wants to take patient home later today with home care/ Home PT .  Follow up with Dr Xiao 11/18/19 at 1:30pm 79y male with PMH of cognitive delay  CKD CAD s/p stents x4, HTN, DM, Hypothyroid, lymphoma recently diagnosed  LVOT obstruction, LUE DVT (on eliquis) , VQ scan 9/20 with low probability for PE , orthostatic hypotension on Midodrine & Florinef presenting after a fall. He was getting out of chair when he fell backward hitting his head. Denies LOC, no symptoms proceeding the fall. Fall was unwitnessed, nurses at [facility] stated that down time was a few minutes, daughter spoke to him after the fall he was respond like his normal self, was complaining of a headache. Imaging revealed right acute and interhemispheric subdural hematoma    Plan    Neuro stable. Keep off antiplatelet and anticoagulation   Orthostatic- on Midodrine   Sinus tachycardia- Improved. On low dose Atenolol  B/l below knee DVT-  rpt LE duplex to without propagation . Maintain SQL 40mg at home . No a/c for now   ? Adrenal Insufficiency-On Decadron 2 mg   On Dysphagia 3 Nectar thick liquids.   PT- LAVONNE.  D/w Daughter Dr Browne , wants to take patient home later today with home care/ Home PT .  Follow up with Dr Xiao 11/18/19 at 1:30pm

## 2019-11-05 NOTE — PROGRESS NOTE ADULT - SUBJECTIVE AND OBJECTIVE BOX
SUBJECTIVE:   No complaints.   OVERNIGHT EVENTS: none    Vital Signs Last 24 Hrs  T(C): 37.1 (05 Nov 2019 08:47), Max: 37.1 (04 Nov 2019 12:52)  T(F): 98.7 (05 Nov 2019 08:47), Max: 98.8 (04 Nov 2019 12:52)  HR: 97 (05 Nov 2019 08:47) (83 - 127)  BP: 145/97 (05 Nov 2019 08:47) (127/79 - 159/98)  BP(mean): --  RR: 18 (05 Nov 2019 08:47) (18 - 19)  SpO2: 97% (05 Nov 2019 08:47) (94% - 97%)    PHYSICAL EXAM:    Constitutional: No Acute Distress     Neurological: Awake oriented x 2   Follows simple  Commands, Moving all Extremities b/l LE 4/5 No drift     Pulmonary: Clear to Auscultation,     Cardiovascular: S1, S2, +     Gastrointestinal: Soft, Non-tender, Non-distended     Extremities: No calf tenderness       LABS:                        10.6   12.16 )-----------( 448      ( 04 Nov 2019 16:49 )             32.7    11-04    139  |  104  |  13  ----------------------------<  152<H>  3.4<L>   |  24  |  1.02    Ca    7.7<L>      04 Nov 2019 16:49  Phos  2.2     11-04  Mg     2.0     11-04          IMAGING:         MEDICATIONS:    acetaminophen   Tablet .. 650 milliGRAM(s) Oral every 6 hours PRN Temp greater or equal to 38C (100.4F), Mild Pain (1 - 3)  escitalopram 5 milliGRAM(s) Oral daily  ondansetron Injectable 4 milliGRAM(s) IV Push every 6 hours PRN Nausea and/or Vomiting  valproic  acid Syrup 500 milliGRAM(s) Oral every 12 hours  ATENolol  Tablet 25 milliGRAM(s) Oral daily  midodrine. 5 milliGRAM(s) Oral three times a day  famotidine    Tablet 20 milliGRAM(s) Oral two times a day  polyethylene glycol 3350 17 Gram(s) Oral daily  senna 2 Tablet(s) Oral at bedtime  dexAMETHasone     Tablet 2 milliGRAM(s) Oral <User Schedule>  enoxaparin Injectable 40 milliGRAM(s) SubCutaneous <User Schedule>  glucagon  Injectable 1 milliGRAM(s) IntraMuscular once PRN Glucose LESS THAN 70 milligrams/deciliter  insulin glargine Injectable (LANTUS) 5 Unit(s) SubCutaneous at bedtime  insulin lispro (HumaLOG) corrective regimen sliding scale   SubCutaneous Before meals and at bedtime  levothyroxine 88 MICROGram(s) Oral daily  potassium phosphate / sodium phosphate powder 1 Packet(s) Oral once  simvastatin 20 milliGRAM(s) Oral at bedtime      DIET:     Assessment:  Please Check When Present   []  GCS  E   V  M     Heart Failure: []Acute, [] acute on chronic , []chronic  Heart Failure:  [] Diastolic (HFpEF), [] Systolic (HFrEF), []Combined (HFpEF and HFrEF), [] RHF, [] Pulm HTN, [] Other    [] FRANCES, [] ATN, [] AIN, [] other  [] CKD1, [] CKD2, [] CKD 3, [] CKD 4, [] CKD 5, []ESRD    Encephalopathy: [] Metabolic, [] Hepatic, [] toxic, [] Neurological, [] Other    Abnormal Nurtitional Status: [] malnurtition (see nutrition note), [ ]underweight: BMI < 19, [] morbid obesity: BMI >40, [] Cachexia    [] Sepsis  [] hypovolemic shock,[] cardiogenic shock, [] hemorrhagic shock, [] neuogenic shock  [] Acute Respiratory Failure  []Cerebral edema, [] Brain compression/ herniation,   [] Functional quadriplegia  [] Acute blood loss anemia SUBJECTIVE:   No complaints.   OVERNIGHT EVENTS: none    Vital Signs Last 24 Hrs  T(C): 37.1 (2019 08:47), Max: 37.1 (2019 12:52)  T(F): 98.7 (2019 08:47), Max: 98.8 (2019 12:52)  HR: 97 (2019 08:47) (83 - 127)  BP: 145/97 (2019 08:47) (127/79 - 159/98)  BP(mean): --  RR: 18 (2019 08:47) (18 - 19)  SpO2: 97% (2019 08:47) (94% - 97%)    PHYSICAL EXAM:    Constitutional: No Acute Distress     Neurological: Awake oriented x 2   Follows simple  Commands, Moving all Extremities b/l LE 4/5 No drift     Pulmonary: Clear to Auscultation,     Cardiovascular: S1, S2, +     Gastrointestinal: Soft, Non-tender, Non-distended     Extremities: No calf tenderness       LABS:                        10.6   12.16 )-----------( 448      ( 2019 16:49 )             32.7        139  |  104  |  13  ----------------------------<  152<H>  3.4<L>   |  24  |  1.02    Ca    7.7<L>      2019 16:49  Phos  2.2       Mg     2.0               IMAGIN/4 LE duplex -DVT is identified affecting the right peroneal soleal and   gastrocnemius veins and the left soleal vein without proximal propagation    -left axillary and brachial veins remain thrombosed.  Superficial thrombophlebitis affects both the right and left basilic and   cephalic veins.  - Abnormal lung V/Q scan. Low probability of pulmonary embolus.        MEDICATIONS:    acetaminophen   Tablet .. 650 milliGRAM(s) Oral every 6 hours PRN Temp greater or equal to 38C (100.4F), Mild Pain (1 - 3)  escitalopram 5 milliGRAM(s) Oral daily  ondansetron Injectable 4 milliGRAM(s) IV Push every 6 hours PRN Nausea and/or Vomiting  valproic  acid Syrup 500 milliGRAM(s) Oral every 12 hours  ATENolol  Tablet 25 milliGRAM(s) Oral daily  midodrine. 5 milliGRAM(s) Oral three times a day  famotidine    Tablet 20 milliGRAM(s) Oral two times a day  polyethylene glycol 3350 17 Gram(s) Oral daily  senna 2 Tablet(s) Oral at bedtime  dexAMETHasone     Tablet 2 milliGRAM(s) Oral <User Schedule>  enoxaparin Injectable 40 milliGRAM(s) SubCutaneous <User Schedule>  glucagon  Injectable 1 milliGRAM(s) IntraMuscular once PRN Glucose LESS THAN 70 milligrams/deciliter  insulin glargine Injectable (LANTUS) 5 Unit(s) SubCutaneous at bedtime  insulin lispro (HumaLOG) corrective regimen sliding scale   SubCutaneous Before meals and at bedtime  levothyroxine 88 MICROGram(s) Oral daily  potassium phosphate / sodium phosphate powder 1 Packet(s) Oral once  simvastatin 20 milliGRAM(s) Oral at bedtime      DIET:     Assessment:  Please Check When Present   []  GCS  E   V  M     Heart Failure: []Acute, [] acute on chronic , []chronic  Heart Failure:  [] Diastolic (HFpEF), [] Systolic (HFrEF), []Combined (HFpEF and HFrEF), [] RHF, [] Pulm HTN, [] Other    [] FRANCES, [] ATN, [] AIN, [] other  [] CKD1, [] CKD2, [] CKD 3, [] CKD 4, [] CKD 5, []ESRD    Encephalopathy: [] Metabolic, [] Hepatic, [] toxic, [] Neurological, [] Other    Abnormal Nurtitional Status: [] malnurtition (see nutrition note), [ ]underweight: BMI < 19, [] morbid obesity: BMI >40, [] Cachexia    [] Sepsis  [] hypovolemic shock,[] cardiogenic shock, [] hemorrhagic shock, [] neuogenic shock  [] Acute Respiratory Failure  []Cerebral edema, [] Brain compression/ herniation,   [] Functional quadriplegia  [] Acute blood loss anemia

## 2019-11-05 NOTE — PROGRESS NOTE ADULT - ASSESSMENT
cad history of stent  stable  off antiplt therapy for now   resume once deemed safe     Mild AS  stable    Hypotension   on midodrine  monitor supine BP and orthostatic vitals     Below knee DVT  fu with vascular card    sinus trachycardia  asymptomatic   low prob of PE on V/Q scan   pt was on atenolol before , will change BB to atenolol

## 2019-11-05 NOTE — PROGRESS NOTE ADULT - REASON FOR ADMISSION
sdh
Patient was admitted with subdural hematoma after a fall.
sdh

## 2019-11-05 NOTE — PROGRESS NOTE ADULT - PROBLEM SELECTOR PLAN 1
Improved on beta blocker. daughter/HCP doesn't want CTA bec of ADELSO risk understanding false neg rate of V/Q. HCP also understands that screening blood cx was sent but wants to take pt home understanding all risks.

## 2019-11-05 NOTE — PROGRESS NOTE ADULT - PROVIDER SPECIALTY LIST ADULT
Cardiology
Hospitalist
Hospitalist
NSICU
Nephrology
Neurosurgery
Vascular Cardiology
Vascular Cardiology
Heme/Onc
Nephrology

## 2019-11-05 NOTE — PROGRESS NOTE ADULT - SUBJECTIVE AND OBJECTIVE BOX
Subjective: Patient seen and examined. No new events except as noted.     SUBJECTIVE/ROS:  awake   in good spirit   denies any chest pain, sob, palpitation, dizziness or syncope.       MEDICATIONS:  MEDICATIONS  (STANDING):  dexAMETHasone     Tablet 2 milliGRAM(s) Oral <User Schedule>  dextrose 5%. 1000 milliLiter(s) (50 mL/Hr) IV Continuous <Continuous>  dextrose 50% Injectable 12.5 Gram(s) IV Push once  dextrose 50% Injectable 25 Gram(s) IV Push once  dextrose 50% Injectable 25 Gram(s) IV Push once  enoxaparin Injectable 40 milliGRAM(s) SubCutaneous <User Schedule>  escitalopram 5 milliGRAM(s) Oral daily  famotidine    Tablet 20 milliGRAM(s) Oral two times a day  insulin glargine Injectable (LANTUS) 5 Unit(s) SubCutaneous at bedtime  insulin lispro (HumaLOG) corrective regimen sliding scale   SubCutaneous Before meals and at bedtime  levothyroxine 88 MICROGram(s) Oral daily  metoprolol tartrate 12.5 milliGRAM(s) Oral every 12 hours  midodrine. 5 milliGRAM(s) Oral three times a day  polyethylene glycol 3350 17 Gram(s) Oral daily  senna 2 Tablet(s) Oral at bedtime  simvastatin 20 milliGRAM(s) Oral at bedtime  valproic  acid Syrup 500 milliGRAM(s) Oral every 12 hours      PHYSICAL EXAM:  T(C): 36.8 (11-05-19 @ 04:39), Max: 37.1 (11-04-19 @ 12:52)  HR: 114 (11-05-19 @ 04:39) (83 - 127)  BP: 157/101 (11-05-19 @ 04:39) (127/79 - 159/98)  RR: 18 (11-05-19 @ 04:39) (18 - 19)  SpO2: 97% (11-05-19 @ 04:39) (94% - 97%)  Wt(kg): --  I&O's Summary    04 Nov 2019 07:01  -  05 Nov 2019 07:00  --------------------------------------------------------  IN: 600 mL / OUT: 0 mL / NET: 600 mL            JVP: Normal  Neck: supple  Lung: clear   CV: S1 S2 , Murmur:  Abd: soft  Ext: No edema  neuro: Awake / alert  Psych: flat affect  Skin: normal``    LABS/DATA:    CARDIAC MARKERS:                                10.6   12.16 )-----------( 448      ( 04 Nov 2019 16:49 )             32.7     11-04    139  |  104  |  13  ----------------------------<  152<H>  3.4<L>   |  24  |  1.02    Ca    7.7<L>      04 Nov 2019 16:49  Phos  2.2     11-04  Mg     2.0     11-04      proBNP:   Lipid Profile:   HgA1c:   TSH:     TELE:  EKG:

## 2019-11-09 LAB
CULTURE RESULTS: SIGNIFICANT CHANGE UP
CULTURE RESULTS: SIGNIFICANT CHANGE UP
SPECIMEN SOURCE: SIGNIFICANT CHANGE UP
SPECIMEN SOURCE: SIGNIFICANT CHANGE UP

## 2019-11-14 ENCOUNTER — INPATIENT (INPATIENT)
Facility: HOSPITAL | Age: 79
LOS: 39 days | Discharge: NURSING, REHAB OR LONG-TERM CARE | End: 2019-12-24
Attending: INTERNAL MEDICINE | Admitting: INTERNAL MEDICINE
Payer: MEDICAID

## 2019-11-14 VITALS
DIASTOLIC BLOOD PRESSURE: 67 MMHG | OXYGEN SATURATION: 96 % | RESPIRATION RATE: 17 BRPM | SYSTOLIC BLOOD PRESSURE: 121 MMHG | TEMPERATURE: 101 F | HEART RATE: 87 BPM

## 2019-11-14 DIAGNOSIS — Z95.5 PRESENCE OF CORONARY ANGIOPLASTY IMPLANT AND GRAFT: Chronic | ICD-10-CM

## 2019-11-14 DIAGNOSIS — G93.40 ENCEPHALOPATHY, UNSPECIFIED: ICD-10-CM

## 2019-11-14 DIAGNOSIS — Z00.00 ENCOUNTER FOR GENERAL ADULT MEDICAL EXAMINATION WITHOUT ABNORMAL FINDINGS: ICD-10-CM

## 2019-11-14 DIAGNOSIS — I82.409 ACUTE EMBOLISM AND THROMBOSIS OF UNSPECIFIED DEEP VEINS OF UNSPECIFIED LOWER EXTREMITY: ICD-10-CM

## 2019-11-14 DIAGNOSIS — Z90.49 ACQUIRED ABSENCE OF OTHER SPECIFIED PARTS OF DIGESTIVE TRACT: Chronic | ICD-10-CM

## 2019-11-14 DIAGNOSIS — E87.1 HYPO-OSMOLALITY AND HYPONATREMIA: ICD-10-CM

## 2019-11-14 DIAGNOSIS — C85.90 NON-HODGKIN LYMPHOMA, UNSPECIFIED, UNSPECIFIED SITE: ICD-10-CM

## 2019-11-14 DIAGNOSIS — I95.1 ORTHOSTATIC HYPOTENSION: ICD-10-CM

## 2019-11-14 DIAGNOSIS — A41.9 SEPSIS, UNSPECIFIED ORGANISM: ICD-10-CM

## 2019-11-14 DIAGNOSIS — I21.4 NON-ST ELEVATION (NSTEMI) MYOCARDIAL INFARCTION: ICD-10-CM

## 2019-11-14 DIAGNOSIS — N39.0 URINARY TRACT INFECTION, SITE NOT SPECIFIED: ICD-10-CM

## 2019-11-14 DIAGNOSIS — Z86.79 PERSONAL HISTORY OF OTHER DISEASES OF THE CIRCULATORY SYSTEM: ICD-10-CM

## 2019-11-14 DIAGNOSIS — E11.9 TYPE 2 DIABETES MELLITUS WITHOUT COMPLICATIONS: ICD-10-CM

## 2019-11-14 LAB
ALBUMIN SERPL ELPH-MCNC: 2.1 G/DL — LOW (ref 3.3–5)
ALBUMIN SERPL ELPH-MCNC: 2.4 G/DL — LOW (ref 3.3–5)
ALP SERPL-CCNC: 81 U/L — SIGNIFICANT CHANGE UP (ref 40–120)
ALP SERPL-CCNC: 83 U/L — SIGNIFICANT CHANGE UP (ref 40–120)
ALT FLD-CCNC: 11 U/L — SIGNIFICANT CHANGE UP (ref 4–41)
ALT FLD-CCNC: SIGNIFICANT CHANGE UP U/L (ref 4–41)
ANION GAP SERPL CALC-SCNC: 12 MMO/L — SIGNIFICANT CHANGE UP (ref 7–14)
ANION GAP SERPL CALC-SCNC: 13 MMO/L — SIGNIFICANT CHANGE UP (ref 7–14)
ANION GAP SERPL CALC-SCNC: 19 MMO/L — HIGH (ref 7–14)
ANISOCYTOSIS BLD QL: SLIGHT — SIGNIFICANT CHANGE UP
APPEARANCE UR: SIGNIFICANT CHANGE UP
APTT BLD: 31.2 SEC — SIGNIFICANT CHANGE UP (ref 27.5–36.3)
AST SERPL-CCNC: 22 U/L — SIGNIFICANT CHANGE UP (ref 4–40)
AST SERPL-CCNC: SIGNIFICANT CHANGE UP U/L (ref 4–40)
BACTERIA # UR AUTO: NEGATIVE — SIGNIFICANT CHANGE UP
BASE EXCESS BLDV CALC-SCNC: -1.8 MMOL/L — SIGNIFICANT CHANGE UP
BASE EXCESS BLDV CALC-SCNC: -3 MMOL/L — SIGNIFICANT CHANGE UP
BASOPHILS # BLD AUTO: 0.06 K/UL — SIGNIFICANT CHANGE UP (ref 0–0.2)
BASOPHILS NFR BLD AUTO: 0.3 % — SIGNIFICANT CHANGE UP (ref 0–2)
BASOPHILS NFR SPEC: 0 % — SIGNIFICANT CHANGE UP (ref 0–2)
BILIRUB SERPL-MCNC: 0.3 MG/DL — SIGNIFICANT CHANGE UP (ref 0.2–1.2)
BILIRUB SERPL-MCNC: 0.3 MG/DL — SIGNIFICANT CHANGE UP (ref 0.2–1.2)
BILIRUB UR-MCNC: NEGATIVE — SIGNIFICANT CHANGE UP
BLOOD GAS VENOUS - CREATININE: 0.87 MG/DL — SIGNIFICANT CHANGE UP (ref 0.5–1.3)
BLOOD GAS VENOUS - CREATININE: 0.97 MG/DL — SIGNIFICANT CHANGE UP (ref 0.5–1.3)
BLOOD GAS VENOUS - FIO2: 21 — SIGNIFICANT CHANGE UP
BLOOD GAS VENOUS - FIO2: 21 — SIGNIFICANT CHANGE UP
BLOOD UR QL VISUAL: SIGNIFICANT CHANGE UP
BUN SERPL-MCNC: 13 MG/DL — SIGNIFICANT CHANGE UP (ref 7–23)
BUN SERPL-MCNC: 14 MG/DL — SIGNIFICANT CHANGE UP (ref 7–23)
BUN SERPL-MCNC: 15 MG/DL — SIGNIFICANT CHANGE UP (ref 7–23)
CALCIUM SERPL-MCNC: 8.1 MG/DL — LOW (ref 8.4–10.5)
CALCIUM SERPL-MCNC: 8.2 MG/DL — LOW (ref 8.4–10.5)
CALCIUM SERPL-MCNC: 8.4 MG/DL — SIGNIFICANT CHANGE UP (ref 8.4–10.5)
CHLORIDE BLDV-SCNC: 98 MMOL/L — SIGNIFICANT CHANGE UP (ref 96–108)
CHLORIDE BLDV-SCNC: 99 MMOL/L — SIGNIFICANT CHANGE UP (ref 96–108)
CHLORIDE SERPL-SCNC: 92 MMOL/L — LOW (ref 98–107)
CHLORIDE SERPL-SCNC: 94 MMOL/L — LOW (ref 98–107)
CHLORIDE SERPL-SCNC: 94 MMOL/L — LOW (ref 98–107)
CO2 SERPL-SCNC: 16 MMOL/L — LOW (ref 22–31)
CO2 SERPL-SCNC: 17 MMOL/L — LOW (ref 22–31)
CO2 SERPL-SCNC: 19 MMOL/L — LOW (ref 22–31)
COLOR SPEC: YELLOW — SIGNIFICANT CHANGE UP
CREAT SERPL-MCNC: 0.9 MG/DL — SIGNIFICANT CHANGE UP (ref 0.5–1.3)
CREAT SERPL-MCNC: 0.91 MG/DL — SIGNIFICANT CHANGE UP (ref 0.5–1.3)
CREAT SERPL-MCNC: 0.92 MG/DL — SIGNIFICANT CHANGE UP (ref 0.5–1.3)
EOSINOPHIL # BLD AUTO: 0.06 K/UL — SIGNIFICANT CHANGE UP (ref 0–0.5)
EOSINOPHIL NFR BLD AUTO: 0.3 % — SIGNIFICANT CHANGE UP (ref 0–6)
EOSINOPHIL NFR FLD: 1 % — SIGNIFICANT CHANGE UP (ref 0–6)
GAS PNL BLDV: 124 MMOL/L — LOW (ref 136–146)
GAS PNL BLDV: 125 MMOL/L — LOW (ref 136–146)
GLUCOSE BLDV-MCNC: 229 MG/DL — HIGH (ref 70–99)
GLUCOSE BLDV-MCNC: 276 MG/DL — HIGH (ref 70–99)
GLUCOSE SERPL-MCNC: 231 MG/DL — HIGH (ref 70–99)
GLUCOSE SERPL-MCNC: 274 MG/DL — HIGH (ref 70–99)
GLUCOSE SERPL-MCNC: 279 MG/DL — HIGH (ref 70–99)
GLUCOSE UR-MCNC: 500 — HIGH
HCO3 BLDV-SCNC: 21 MMOL/L — SIGNIFICANT CHANGE UP (ref 20–27)
HCO3 BLDV-SCNC: 23 MMOL/L — SIGNIFICANT CHANGE UP (ref 20–27)
HCT VFR BLD CALC: 32.7 % — LOW (ref 39–50)
HCT VFR BLDV CALC: 30.7 % — LOW (ref 39–51)
HCT VFR BLDV CALC: 32.6 % — LOW (ref 39–51)
HGB BLD-MCNC: 10.6 G/DL — LOW (ref 13–17)
HGB BLDV-MCNC: 10.5 G/DL — LOW (ref 13–17)
HGB BLDV-MCNC: 9.9 G/DL — LOW (ref 13–17)
HYALINE CASTS # UR AUTO: HIGH
IMM GRANULOCYTES NFR BLD AUTO: 5.6 % — HIGH (ref 0–1.5)
INR BLD: 1.1 — SIGNIFICANT CHANGE UP (ref 0.88–1.17)
KETONES UR-MCNC: NEGATIVE — SIGNIFICANT CHANGE UP
LACTATE BLDV-MCNC: 3.1 MMOL/L — HIGH (ref 0.5–2)
LACTATE BLDV-MCNC: 5 MMOL/L — CRITICAL HIGH (ref 0.5–2)
LDH SERPL L TO P-CCNC: 469 U/L — HIGH (ref 135–225)
LEUKOCYTE ESTERASE UR-ACNC: SIGNIFICANT CHANGE UP
LYMPHOCYTES # BLD AUTO: 13.4 % — SIGNIFICANT CHANGE UP (ref 13–44)
LYMPHOCYTES # BLD AUTO: 2.5 K/UL — SIGNIFICANT CHANGE UP (ref 1–3.3)
LYMPHOCYTES NFR SPEC AUTO: 8 % — LOW (ref 13–44)
MAGNESIUM SERPL-MCNC: 1.5 MG/DL — LOW (ref 1.6–2.6)
MANUAL SMEAR VERIFICATION: SIGNIFICANT CHANGE UP
MCHC RBC-ENTMCNC: 29.4 PG — SIGNIFICANT CHANGE UP (ref 27–34)
MCHC RBC-ENTMCNC: 32.4 % — SIGNIFICANT CHANGE UP (ref 32–36)
MCV RBC AUTO: 90.8 FL — SIGNIFICANT CHANGE UP (ref 80–100)
METAMYELOCYTES # FLD: 4 % — HIGH (ref 0–1)
MONOCYTES # BLD AUTO: 2.38 K/UL — HIGH (ref 0–0.9)
MONOCYTES NFR BLD AUTO: 12.8 % — SIGNIFICANT CHANGE UP (ref 2–14)
MONOCYTES NFR BLD: 12 % — HIGH (ref 2–9)
MYELOCYTES NFR BLD: 2 % — HIGH (ref 0–0)
NEUTROPHIL AB SER-ACNC: 70 % — SIGNIFICANT CHANGE UP (ref 43–77)
NEUTROPHILS # BLD AUTO: 12.59 K/UL — HIGH (ref 1.8–7.4)
NEUTROPHILS NFR BLD AUTO: 67.6 % — SIGNIFICANT CHANGE UP (ref 43–77)
NEUTS BAND # BLD: 3 % — SIGNIFICANT CHANGE UP (ref 0–6)
NITRITE UR-MCNC: NEGATIVE — SIGNIFICANT CHANGE UP
NRBC # BLD: 1 /100WBC — SIGNIFICANT CHANGE UP
NRBC # FLD: 0.03 K/UL — SIGNIFICANT CHANGE UP (ref 0–0)
NT-PROBNP SERPL-SCNC: 4372 PG/ML — SIGNIFICANT CHANGE UP
PCO2 BLDV: 32 MMHG — LOW (ref 41–51)
PCO2 BLDV: 40 MMHG — LOW (ref 41–51)
PH BLDV: 7.36 PH — SIGNIFICANT CHANGE UP (ref 7.32–7.43)
PH BLDV: 7.45 PH — HIGH (ref 7.32–7.43)
PH UR: 6.5 — SIGNIFICANT CHANGE UP (ref 5–8)
PHOSPHATE SERPL-MCNC: 3.1 MG/DL — SIGNIFICANT CHANGE UP (ref 2.5–4.5)
PLATELET # BLD AUTO: 423 K/UL — HIGH (ref 150–400)
PLATELET COUNT - ESTIMATE: NORMAL — SIGNIFICANT CHANGE UP
PMV BLD: 11 FL — SIGNIFICANT CHANGE UP (ref 7–13)
PO2 BLDV: 34 MMHG — LOW (ref 35–40)
PO2 BLDV: 72 MMHG — HIGH (ref 35–40)
POIKILOCYTOSIS BLD QL AUTO: SLIGHT — SIGNIFICANT CHANGE UP
POLYCHROMASIA BLD QL SMEAR: SLIGHT — SIGNIFICANT CHANGE UP
POTASSIUM BLDV-SCNC: 3.8 MMOL/L — SIGNIFICANT CHANGE UP (ref 3.4–4.5)
POTASSIUM BLDV-SCNC: 5.3 MMOL/L — HIGH (ref 3.4–4.5)
POTASSIUM SERPL-MCNC: 4.1 MMOL/L — SIGNIFICANT CHANGE UP (ref 3.5–5.3)
POTASSIUM SERPL-MCNC: 4.2 MMOL/L — SIGNIFICANT CHANGE UP (ref 3.5–5.3)
POTASSIUM SERPL-MCNC: SIGNIFICANT CHANGE UP MMOL/L (ref 3.5–5.3)
POTASSIUM SERPL-SCNC: 4.1 MMOL/L — SIGNIFICANT CHANGE UP (ref 3.5–5.3)
POTASSIUM SERPL-SCNC: 4.2 MMOL/L — SIGNIFICANT CHANGE UP (ref 3.5–5.3)
POTASSIUM SERPL-SCNC: SIGNIFICANT CHANGE UP MMOL/L (ref 3.5–5.3)
PROCALCITONIN SERPL-MCNC: 0.42 NG/ML — HIGH (ref 0.02–0.1)
PROT SERPL-MCNC: 5.3 G/DL — LOW (ref 6–8.3)
PROT SERPL-MCNC: 6.2 G/DL — SIGNIFICANT CHANGE UP (ref 6–8.3)
PROT UR-MCNC: 200 — HIGH
PROTHROM AB SERPL-ACNC: 12.3 SEC — SIGNIFICANT CHANGE UP (ref 9.8–13.1)
RBC # BLD: 3.6 M/UL — LOW (ref 4.2–5.8)
RBC # FLD: 19.2 % — HIGH (ref 10.3–14.5)
RBC CASTS # UR COMP ASSIST: SIGNIFICANT CHANGE UP (ref 0–?)
REVIEW TO FOLLOW: YES — SIGNIFICANT CHANGE UP
SAO2 % BLDV: 54.8 % — LOW (ref 60–85)
SAO2 % BLDV: 93.8 % — HIGH (ref 60–85)
SODIUM SERPL-SCNC: 122 MMOL/L — LOW (ref 135–145)
SODIUM SERPL-SCNC: 125 MMOL/L — LOW (ref 135–145)
SODIUM SERPL-SCNC: 129 MMOL/L — LOW (ref 135–145)
SP GR SPEC: 1.02 — SIGNIFICANT CHANGE UP (ref 1–1.04)
SQUAMOUS # UR AUTO: SIGNIFICANT CHANGE UP
T4 FREE SERPL-MCNC: 0.99 NG/DL — SIGNIFICANT CHANGE UP (ref 0.9–1.8)
TROPONIN T, HIGH SENSITIVITY: 101 NG/L — CRITICAL HIGH (ref ?–14)
TROPONIN T, HIGH SENSITIVITY: 115 NG/L — CRITICAL HIGH (ref ?–14)
TROPONIN T, HIGH SENSITIVITY: 90 NG/L — CRITICAL HIGH (ref ?–14)
TSH SERPL-MCNC: 12.33 UIU/ML — HIGH (ref 0.27–4.2)
URATE SERPL-MCNC: 3.1 MG/DL — LOW (ref 3.4–8.8)
UROBILINOGEN FLD QL: NORMAL — SIGNIFICANT CHANGE UP
WBC # BLD: 18.63 K/UL — HIGH (ref 3.8–10.5)
WBC # FLD AUTO: 18.63 K/UL — HIGH (ref 3.8–10.5)
WBC UR QL: >50 — HIGH (ref 0–?)

## 2019-11-14 PROCEDURE — 99223 1ST HOSP IP/OBS HIGH 75: CPT | Mod: GC

## 2019-11-14 PROCEDURE — 74176 CT ABD & PELVIS W/O CONTRAST: CPT | Mod: 26

## 2019-11-14 PROCEDURE — 71045 X-RAY EXAM CHEST 1 VIEW: CPT | Mod: 26

## 2019-11-14 PROCEDURE — 70450 CT HEAD/BRAIN W/O DYE: CPT | Mod: 26

## 2019-11-14 RX ORDER — SODIUM CHLORIDE 9 MG/ML
500 INJECTION INTRAMUSCULAR; INTRAVENOUS; SUBCUTANEOUS ONCE
Refills: 0 | Status: COMPLETED | OUTPATIENT
Start: 2019-11-14 | End: 2019-11-14

## 2019-11-14 RX ORDER — LEVOTHYROXINE SODIUM 125 MCG
88 TABLET ORAL DAILY
Refills: 0 | Status: DISCONTINUED | OUTPATIENT
Start: 2019-11-14 | End: 2019-11-17

## 2019-11-14 RX ORDER — ESCITALOPRAM OXALATE 10 MG/1
5 TABLET, FILM COATED ORAL DAILY
Refills: 0 | Status: DISCONTINUED | OUTPATIENT
Start: 2019-11-14 | End: 2019-12-01

## 2019-11-14 RX ORDER — VALPROIC ACID (AS SODIUM SALT) 250 MG/5ML
250 SOLUTION, ORAL ORAL
Refills: 0 | Status: DISCONTINUED | OUTPATIENT
Start: 2019-11-14 | End: 2019-11-14

## 2019-11-14 RX ORDER — ACETAMINOPHEN 500 MG
650 TABLET ORAL EVERY 6 HOURS
Refills: 0 | Status: DISCONTINUED | OUTPATIENT
Start: 2019-11-14 | End: 2019-12-01

## 2019-11-14 RX ORDER — MIDODRINE HYDROCHLORIDE 2.5 MG/1
5 TABLET ORAL THREE TIMES A DAY
Refills: 0 | Status: DISCONTINUED | OUTPATIENT
Start: 2019-11-14 | End: 2019-11-16

## 2019-11-14 RX ORDER — VANCOMYCIN HCL 1 G
1000 VIAL (EA) INTRAVENOUS ONCE
Refills: 0 | Status: COMPLETED | OUTPATIENT
Start: 2019-11-14 | End: 2019-11-14

## 2019-11-14 RX ORDER — SIMVASTATIN 20 MG/1
20 TABLET, FILM COATED ORAL AT BEDTIME
Refills: 0 | Status: DISCONTINUED | OUTPATIENT
Start: 2019-11-14 | End: 2019-12-01

## 2019-11-14 RX ORDER — VALPROIC ACID (AS SODIUM SALT) 250 MG/5ML
250 SOLUTION, ORAL ORAL
Refills: 0 | Status: DISCONTINUED | OUTPATIENT
Start: 2019-11-14 | End: 2019-11-19

## 2019-11-14 RX ORDER — SODIUM CHLORIDE 9 MG/ML
1000 INJECTION INTRAMUSCULAR; INTRAVENOUS; SUBCUTANEOUS ONCE
Refills: 0 | Status: COMPLETED | OUTPATIENT
Start: 2019-11-14 | End: 2019-11-14

## 2019-11-14 RX ORDER — ACETAMINOPHEN 500 MG
650 TABLET ORAL ONCE
Refills: 0 | Status: COMPLETED | OUTPATIENT
Start: 2019-11-14 | End: 2019-11-14

## 2019-11-14 RX ORDER — LEVETIRACETAM 250 MG/1
500 TABLET, FILM COATED ORAL EVERY 12 HOURS
Refills: 0 | Status: DISCONTINUED | OUTPATIENT
Start: 2019-11-14 | End: 2019-11-19

## 2019-11-14 RX ORDER — MAGNESIUM SULFATE 500 MG/ML
1 VIAL (ML) INJECTION ONCE
Refills: 0 | Status: COMPLETED | OUTPATIENT
Start: 2019-11-14 | End: 2019-11-14

## 2019-11-14 RX ORDER — DEXAMETHASONE 0.5 MG/5ML
1 ELIXIR ORAL DAILY
Refills: 0 | Status: DISCONTINUED | OUTPATIENT
Start: 2019-11-14 | End: 2019-11-15

## 2019-11-14 RX ORDER — LABETALOL HCL 100 MG
10 TABLET ORAL ONCE
Refills: 0 | Status: COMPLETED | OUTPATIENT
Start: 2019-11-14 | End: 2019-11-14

## 2019-11-14 RX ORDER — ATENOLOL 25 MG/1
25 TABLET ORAL DAILY
Refills: 0 | Status: DISCONTINUED | OUTPATIENT
Start: 2019-11-14 | End: 2019-11-14

## 2019-11-14 RX ORDER — PANTOPRAZOLE SODIUM 20 MG/1
40 TABLET, DELAYED RELEASE ORAL
Refills: 0 | Status: DISCONTINUED | OUTPATIENT
Start: 2019-11-14 | End: 2019-12-01

## 2019-11-14 RX ORDER — SODIUM CHLORIDE 9 MG/ML
1000 INJECTION INTRAMUSCULAR; INTRAVENOUS; SUBCUTANEOUS
Refills: 0 | Status: DISCONTINUED | OUTPATIENT
Start: 2019-11-14 | End: 2019-11-15

## 2019-11-14 RX ORDER — PIPERACILLIN AND TAZOBACTAM 4; .5 G/20ML; G/20ML
3.38 INJECTION, POWDER, LYOPHILIZED, FOR SOLUTION INTRAVENOUS ONCE
Refills: 0 | Status: COMPLETED | OUTPATIENT
Start: 2019-11-14 | End: 2019-11-14

## 2019-11-14 RX ORDER — LEVETIRACETAM 250 MG/1
1000 TABLET, FILM COATED ORAL ONCE
Refills: 0 | Status: COMPLETED | OUTPATIENT
Start: 2019-11-14 | End: 2019-11-14

## 2019-11-14 RX ADMIN — Medication 650 MILLIGRAM(S): at 14:10

## 2019-11-14 RX ADMIN — LEVETIRACETAM 400 MILLIGRAM(S): 250 TABLET, FILM COATED ORAL at 20:46

## 2019-11-14 RX ADMIN — SODIUM CHLORIDE 100 MILLILITER(S): 9 INJECTION INTRAMUSCULAR; INTRAVENOUS; SUBCUTANEOUS at 21:40

## 2019-11-14 RX ADMIN — Medication 250 MILLIGRAM(S): at 14:32

## 2019-11-14 RX ADMIN — Medication 650 MILLIGRAM(S): at 13:10

## 2019-11-14 RX ADMIN — PIPERACILLIN AND TAZOBACTAM 3.38 GRAM(S): 4; .5 INJECTION, POWDER, LYOPHILIZED, FOR SOLUTION INTRAVENOUS at 14:11

## 2019-11-14 RX ADMIN — SODIUM CHLORIDE 1000 MILLILITER(S): 9 INJECTION INTRAMUSCULAR; INTRAVENOUS; SUBCUTANEOUS at 21:45

## 2019-11-14 RX ADMIN — SODIUM CHLORIDE 1000 MILLILITER(S): 9 INJECTION INTRAMUSCULAR; INTRAVENOUS; SUBCUTANEOUS at 13:29

## 2019-11-14 RX ADMIN — PIPERACILLIN AND TAZOBACTAM 200 GRAM(S): 4; .5 INJECTION, POWDER, LYOPHILIZED, FOR SOLUTION INTRAVENOUS at 13:37

## 2019-11-14 RX ADMIN — Medication 2 MILLIGRAM(S): at 20:10

## 2019-11-14 RX ADMIN — Medication 10 MILLIGRAM(S): at 20:45

## 2019-11-14 RX ADMIN — SODIUM CHLORIDE 1000 MILLILITER(S): 9 INJECTION INTRAMUSCULAR; INTRAVENOUS; SUBCUTANEOUS at 23:19

## 2019-11-14 NOTE — CHART NOTE - NSCHARTNOTEFT_GEN_A_CORE
Called at bedside for seizure activity.  Pt had a GTC seizure with tongue biting that lasted 90 seconds.  IV ativan 2mg was given.  Pt was placed on nonrebreather and blood pressures were elevated to SBP's 199 x 2.  Pt was not arousable and had increased WOB.  Pt was given IV labetalol 10mg once for concern fo worsening of SDH in the setting of hypertensive urgency.  Pt ordered for an urgent CTH.  MICU consulted.  Per discussion with daughter patient is full code.     Christal Ann, PGY 2  [phone]

## 2019-11-14 NOTE — MEDICAL STUDENT ADULT H&P (EDUCATION) - NS MD HP STUD PE VITALS FT
Vital Signs Last 24 Hrs  T(C): 36.9 (14 Nov 2019 16:03), Max: 38.2 (14 Nov 2019 13:32)  T(F): 98.5 (14 Nov 2019 16:03), Max: 100.8 (14 Nov 2019 13:32)  HR: 81 (14 Nov 2019 16:03) (81 - 87)  BP: 155/76 (14 Nov 2019 16:03) (121/67 - 155/76)  BP(mean): --  RR: 16 (14 Nov 2019 16:03) (16 - 17)  SpO2: 96% (14 Nov 2019 16:03) (96% - 98%)

## 2019-11-14 NOTE — ED ADULT NURSE REASSESSMENT NOTE - NS ED NURSE REASSESS COMMENT FT1
Patient had a seizure around 1950 today while in the ED, he is currently post itcal and sleeping after receiving Ativan 2mg IV.  He response to painful stimulation only.  Only able to complete a limited neuro exam.  Connected to cardiac monitor @ sinus rhythm, vitals taken and will continue to monitor patient.

## 2019-11-14 NOTE — H&P ADULT - HISTORY OF PRESENT ILLNESS
Mr. Browne is an 80 year old man with a PMH of DLBCL, CAD, DM, HTN, BL LE DVT, LUE DVT not on AC, CKD, orthostatic hypotension, and recent admission in November 2019 for SDH who is now presenting with acute encephalopathy.      Pt not responding to questions, lethargic, per EMS: 78 yo M c PMH of DM, HTN, pleural effusions p/w 1 hour h/o AMS. Pt is A&Ox3 and ambulatory at baseline, 1 h/a was speaking to aide and suddenly stopped talking. no head trauma/fall/loc. was hypertensive and tachycardic in field, received IVF via EMS.     	home meds: protonix, levothyroxine, atenolol, januvia, lexapro, simvastatin, lovenox    	Pt has pleural effusions, which is evidence of fluid overload, and so will not receive full 30 cc/kg IVF hydration. Mr. Browne is an 80 year old man with a PMH of DLBCL, CAD, DM, HTN, BL LE DVT, LUE DVT not on AC, CKD, orthostatic hypotension, and recent admission in November 2019 for SDH who is now presenting with acute encephalopathy. The patient is in the ED, difficult to interview, and states that he does not remember events of this morning. Per ED notes, the patient was at baseline (A&Ox3, able to ambulate) until earlier today where he was talking to his aide and suddenly stopped talking. Presently, the patient has no complaints besides abdominal pain to palpation diffusely but specifically in the RUQ. He denies headache, chest pain, palpitations, difficulty breathing, N/V, or D/C. No head trauma/fall/loc. Pt with recent admission after a fall with SDH. Was hypertensive and tachycardic in field, received IVF via EMS.

## 2019-11-14 NOTE — MEDICAL STUDENT ADULT H&P (EDUCATION) - NS MD HP STUD ASPLAN PLAN FT
#NEURO  -s/p SDH (Oct 2019)  -C/w home Valproic acid 5mg BID for seizure prophylaxis  -Order MRI Head  -Taper Decadron    #CV  -C/w home Atenolol for Hypertension  -C/w home Midodrine 5mg TID for Orthostatic Hypotension  -C/w home Simvastatin 40mg  -Hold Lovenox until MRI head clear    #PULM  -Low suspicion for PE despite DVT history due to lack of tachycardia, tachypnea, satting well at time of examination  -Continue to monitor    #GI  -S/p lap cholecystectomy 2017  -TTP diffusely but especially in RUQ, +Ugarte's Sign  -CTAP Sept 2019 showed pulmonary nodules, lymphadenopathy  -Continue nectar-thickened liquids for Dysphagia 3    #ID  -Monitor off Abx for now (s/p Vanc/Zosyn ED)  -F/u blood culture and urine culture  -Consult AM    #ENDO  -C/w home Levothyroxine for hypothyroidism   -Order T4  -ISS for DM II (takes lantus 3U at home)    #HEME/ONC  -Oncologist at [facility]  -Consider order tumor lysis labs    #RENAL  -Hyponatremic (122-125) - 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 #NEURO  -s/p SDH (Oct 2019)  -C/w home Valproic acid 5mg BID for seizure prophylaxis  -Order MRI Head  -Taper Decadron    #CV  -C/w home Atenolol for Hypertension  -C/w home Midodrine 5mg TID for Orthostatic Hypotension  -C/w home Simvastatin 40mg  -Hold Lovenox until MRI head clear    #PULM  -Low suspicion for PE despite DVT history due to lack of tachycardia, tachypnea, satting well at time of examination  -Continue to monitor    #GI  -S/p lap cholecystectomy 2017  -TTP diffusely but especially in RUQ, +Ugarte's Sign  -CTAP Sept 2019 showed pulmonary nodules, lymphadenopathy  -Continue nectar-thickened liquids for Dysphagia 3    #ID  -Monitor off Abx for now (s/p Vanc/Zosyn ED)  -F/u blood culture and urine culture  -Consult AM    #ENDO  -C/w home Levothyroxine for hypothyroidism   -Order T4  -ISS for DM II (takes lantus 3U at home)    #HEME/ONC  -Hold Lovenox  -Oncologist at [facility]  -Consider order tumor lysis labs    #RENAL  -Hyponatremic (122-125) - 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

## 2019-11-14 NOTE — H&P ADULT - NSHPREVIEWOFSYSTEMS_GEN_ALL_CORE
REVIEW OF SYSTEMS:    CONSTITUTIONAL: No weakness, fevers or chills  EYES/ENT: No visual changes;  No vertigo or throat pain   NECK: No pain or stiffness  RESPIRATORY: No cough, wheezing, hemoptysis; No shortness of breath  CARDIOVASCULAR: No chest pain or palpitations  GASTROINTESTINAL: +abd pain. No nausea, vomiting, or hematemesis; No diarrhea or constipation. No melena or hematochezia.  GENITOURINARY: No dysuria, frequency or hematuria  NEUROLOGICAL: No numbness or weakness  SKIN: No itching, burning, rashes, or lesions   All other review of systems is negative unless indicated above.

## 2019-11-14 NOTE — MEDICAL STUDENT ADULT H&P (EDUCATION) - NS MD HP STUD MEDICATIONS FT
Atenolol  Sitagliptin  Basaglar  Simvastatin  Levothyroxine  Escitalopram  Valproic Acid  Midodrine  Atenolol Atenolol  Sitagliptin  Basaglar  Simvastatin  Levothyroxine  Escitalopram  Valproic Acid  Midodrine

## 2019-11-14 NOTE — ED PROVIDER NOTE - CLINICAL SUMMARY MEDICAL DECISION MAKING FREE TEXT BOX
Pt not responding to questions, lethargic, per EMS: 80 yo M c PMH of DM, HTN, pleural effusions p/w 1 hour h/o AMS. Pt is A&Ox3 and ambulatory at baseline, 1 h/a was speaking to aide and suddenly stopped talking. On exam, .8F, VS otherwise wnl, pt lethargic but arousable to voice and touch, no remarkable exam findings. will obtain cxr/labs/bcs/ua. tylenol/vanc/zosyn/ivf for tx. will reassess. will give gently fluid resuscitation as pt has h/o b/l pleural effusions and chf status is unknown

## 2019-11-14 NOTE — H&P ADULT - PROBLEM SELECTOR PLAN 6
SSI  -adrenal insufficiency work up negative on past admissions  -will taper off steroids in the s/o of possible infection. If pt found to have worsening lymphoma as cause of acute presentation will consider restarting home dose.    #hypthyroidism  -TSH>12  -will check free T4

## 2019-11-14 NOTE — ED ADULT NURSE REASSESSMENT NOTE - NS ED NURSE REASSESS COMMENT FT1
upon attempt to draw rpt labs, pt had an observed tonic-clonic seizure lasting approximately 90 seconds. EDMD called to bedside. placed on non-rebreather. medicated with ativan. pt bit tongue, blood observed to mouth. admitting MD called and now at bedside for evaluation.

## 2019-11-14 NOTE — H&P ADULT - NSICDXPASTMEDICALHX_GEN_ALL_CORE_FT
PAST MEDICAL HISTORY:  Acute deep vein thrombosis (DVT) of other vein of upper extremity, unspecified laterality LUE; on eliquis VQ scan on 9/20 with low probability for PE    Adult hypothyroidism     CAD (coronary artery disease) s/p stents x4    Chronic kidney disease (CKD)     Cognitive developmental delay     Diabetes On basaglar insulin and Januvia at home    Dysphagia Recently on dysphagia 3 nectar thick liquid diet during admission 11/6/2019    Essential hypertension     GI bleed s/p clipping    History of orthostatic hypotension On midodrine and florinef    History of subdural hematoma s/p unwitnessed fall with hospitilazation in early November 2019. R and interhemispheric subdural hematomas with mild shift.    Hyperlipidemia     Left ventricular outflow obstruction per daughter, patient had recent echo at [facility] that did not re-demonstrate LVOT obstruction; likely was dynamic LVOT obstruction 2/2 volume status    Lymphoma DLBC recently diagnosed being treated at [facility]    Peptic ulcer s/p treatment for H pylori PAST MEDICAL HISTORY:  Acute deep vein thrombosis (DVT) of other vein of upper extremity, unspecified laterality LUE; on eliquis VQ scan on 9/20 with low probability for PE    Adult hypothyroidism     CAD (coronary artery disease) s/p stents x4    Chronic kidney disease (CKD)     Cognitive developmental delay     Diabetes On basaglar insulin and Januvia at home    Dysphagia Recently on dysphagia 3 nectar thick liquid diet during admission 11/6/2019    Essential hypertension     GI bleed s/p clipping    History of orthostatic hypotension On midodrine and florinef    History of subdural hematoma s/p unwitnessed fall with hospitilazation in early November 2019. R and interhemispheric subdural hematomas with mild shift.    Hyperlipidemia     Left ventricular outflow obstruction per daughter, patient had recent echo at [facility] that did not re-demonstrate LVOT obstruction; likely was dynamic LVOT obstruction 2/2 volume status    Lymphoma DLBC recently diagnosed being treated at [facility]    Peptic ulcer s/p treatment for H pylori

## 2019-11-14 NOTE — H&P ADULT - PROBLEM SELECTOR PLAN 8
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 Pt was also hyponatremic on last admission.  -unclear if pt is hypovolemic, euvolemic, or hypervolemic on exam as pt as LE edema secondary to bl DVTs  -will send urine lytes   -1L fluid restriction

## 2019-11-14 NOTE — MEDICAL STUDENT ADULT H&P (EDUCATION) - NS MD HP STUD PE CONSITUTIONAL FT
Lying in bed, ill-appearing, lethargic  A&Ox1 (name, thinks he's home and that it is September) Lying in bed, ill-appearing, lethargic  A&Ox1 (knows name, thinks he's home and that it is September)

## 2019-11-14 NOTE — ED ADULT NURSE REASSESSMENT NOTE - NS ED NURSE REASSESS COMMENT FT1
Received patient report from MIKA Thurman @2030.  Patient post itcal and sleeping after having a seizure, given Ativan 2mg IV by previous nurse.  /102, given Labetalol per MD orders and Keppra.  Respirations equal and unlabored.  Patient is a "belly" breather, Vitals taken, on oxygen 4L NC and will continue to monitor patient.

## 2019-11-14 NOTE — H&P ADULT - NSHPPHYSICALEXAM_GEN_ALL_CORE
GENERAL: ill-appearing, lethargic  HEAD:  Atraumatic, Normocephalic  EYES: EOMI, PERRLA, conjunctiva and sclera clear, corneal arcus  ENT: Moist mucous membranes  NECK: Supple, No JVD  CHEST/LUNG: Clear to auscultation bilaterally mild expiratory wheezes; using abdominal muscles for respiration  HEART: Regular rate and rhythm; No murmurs, rubs, or gallops  ABDOMEN: Bowel sounds present; Soft, RUQ tenderness  EXTREMITIES:  2+ Peripheral Pulses, brisk capillary refill. 2+ LE edema  NERVOUS SYSTEM:  A&Ox1 (knows name, thinks he's home and that it is September). speech clear. No deficits. Intermittently closes eyes during evaluation  MSK: FROM all 4 extremities, generalized weakness but 4/5 strength in BL UE and LE. Sensation intact to light touch and pain in BL UE and LE.   SKIN: No rashes or lesions

## 2019-11-14 NOTE — CONSULT NOTE ADULT - SUBJECTIVE AND OBJECTIVE BOX
CHIEF COMPLAINT:     HPI:  Mr. 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 The patient is in the ED, difficult to interview, and states that he does not remember events of this morning. Per ED notes, the patient was at baseline (A&Ox3, able to ambulate) until earlier today where he was talking to his aide and suddenly stopped talking. Presently, the patient has no complaints besides abdominal pain to palpation diffusely but specifically in the RUQ. He denies headache, chest pain, palpitations, difficulty breathing, N/V, or D/C. No head trauma/fall/loc. Pt with recent admission after a fall with SDH. Was hypertensive and tachycardic in field, received IVF via EMS. (2019 16:44)    Pt was awake and eating once in he ED. Evaluate by primary Medicine team and was noted to be A&Ox1 (to name). However, he had a witnessed tonic clonic seizure by the ED attending following the evaluation. He was given 2mg ativan IVP at 8:10pm. He was also hypertensive to 199/127 and received labetalol 10mg IVP x1 a few minutes following the ativan. BP decreased to systolics 80s. Subsequently was found to be in post-ictal state and MICU consulted for airway protection.      FAMILY HISTORY: HTN, diabetes      SOCIAL HISTORY:  Smoking: none  EtOH Use: none  Marital Status:   Recent Travel: none  Advance Directives: Previously DNR/DNI, now confirmed full code with daughter/HCP Dr. Imani Browne at bedside.    Allergies    No Known Allergies    Intolerances    HOME MEDICATIONS:  · 	polyethylene glycol 3350 oral powder for reconstitution: 17 gram(s) orally once a day  · 	valproic acid 250 mg/5 mL oral liquid: 5 milliliter(s) orally 2 times a day   · 	Tylenol 325 mg oral tablet: 2 tab(s) orally every 6 hours, As Needed - for headache  · 	dexamethasone 2 mg oral tablet: 1 tab(s) orally once a day   · 	enoxaparin 40 mg/0.4 mL injectable solution: 40 milligram(s) subcutaneously once a day   · 	midodrine 5 mg oral tablet: 1 tab(s) orally 3 times a day  · 	Basaglar KwikPen 100 units/mL subcutaneous solution: 3 unit(s) subcutaneous once a day (at bedtime)  · 	levothyroxine 88 mcg (0.088 mg) oral capsule: 1 cap(s) orally once a day  · 	simvastatin 20 mg oral tablet: 1 tab(s) orally once a day (at bedtime)  · 	atenolol 25 mg oral tablet: 1 tab(s) orally once a day  · 	Lexapro 5 mg oral tablet: 1 tab(s) orally once a day  · 	Protonix 40 mg oral delayed release tablet: 1 tab(s) orally once a day    REVIEW OF SYSTEMS:  Constitutional:   Eyes:  ENT:  CV:  Resp:  GI:  :  MSK:  Integumentary:  Neurological:  Psychiatric:  Endocrine:  Hematologic/Lymphatic:  Allergic/Immunologic:  [ ] All other systems negative  [x] Unable to assess ROS because post-ictal    OBJECTIVE:  ICU Vital Signs Last 24 Hrs  T(C): 36.8 (2019 20:04), Max: 38.2 (2019 13:32)  T(F): 98.3 (2019 20:04), Max: 100.8 (2019 13:32)  HR: 88 (2019 21:38) (81 - 121)  BP: 86/55 (2019 21:38) (86/55 - 199/127)  BP(mean): 59 (2019 21:38) (59 - 62)  ABP: --  ABP(mean): --  RR: 18 (2019 21:38) (16 - 22)  SpO2: 100% (2019 21:38) (96% - 100%)        CAPILLARY BLOOD GLUCOSE      POCT Blood Glucose.: 299 mg/dL (2019 20:02)      PHYSICAL EXAM:    GENERAL: lethargic, post-ictal appearing  HEAD:  Normocephalic, atraumatic  EYES: EOMI, PERRLA  HEENT: Moist mucous membranes  NECK: Supple, No JVD  NERVOUS SYSTEM:  Alert & Oriented X3, Motor Strength 5/5 B/L upper and lower extremities  CHEST/LUNG: Clear to auscultation bilaterally  HEART: Regular rate and rhythm, no murmur   ABDOMEN: Soft, Nontender, Nondistended, Bowel sounds present  EXTREMITIES:   No clubbing, cyanosis, or edema  MUSCULOSKELETAL: No muscle tenderness, no joint tenderness  SKIN:  warm and dry, no rash               HOSPITAL MEDICATIONS:  MEDICATIONS  (STANDING):  ATENolol  Tablet 25 milliGRAM(s) Oral daily  dexAMETHasone     Tablet 1 milliGRAM(s) Oral daily  escitalopram 5 milliGRAM(s) Oral daily  levETIRAcetam  IVPB 500 milliGRAM(s) IV Intermittent every 12 hours  levothyroxine 88 MICROGram(s) Oral daily  midodrine. 5 milliGRAM(s) Oral three times a day  pantoprazole    Tablet 40 milliGRAM(s) Oral before breakfast  simvastatin 20 milliGRAM(s) Oral at bedtime  sodium chloride 0.9% Bolus 500 milliLiter(s) IV Bolus once  sodium chloride 0.9%. 1000 milliLiter(s) (100 mL/Hr) IV Continuous <Continuous>  valproic  acid Syrup 250 milliGRAM(s) Oral two times a day    MEDICATIONS  (PRN):  acetaminophen   Tablet .. 650 milliGRAM(s) Oral every 6 hours PRN Severe Pain (7 - 10)      LABS:                        10.6   18.63 )-----------( 423      ( 2019 13:10 )             32.7     11-14    129<L>  |  94<L>  |  13  ----------------------------<  279<H>  4.2   |  16<L>  |  0.90    Ca    8.4      2019 20:31  Phos  3.1     11-14  Mg     1.5     11-14    TPro  5.3<L>  /  Alb  2.1<L>  /  TBili  0.3  /  DBili  x   /  AST  22  /  ALT  11  /  AlkPhos  81  11-14    PT/INR - ( 2019 14:10 )   PT: 12.3 SEC;   INR: 1.10          PTT - ( 2019 14:10 )  PTT:31.2 SEC  Urinalysis Basic - ( 2019 13:10 )    Color: YELLOW / Appearance: Lt TURBID / S.021 / pH: 6.5  Gluc: 500 / Ketone: NEGATIVE  / Bili: NEGATIVE / Urobili: NORMAL   Blood: SMALL / Protein: 200 / Nitrite: NEGATIVE   Leuk Esterase: SMALL / RBC: 3-5 / WBC >50   Sq Epi: MODERATE / Non Sq Epi: x / Bacteria: NEGATIVE        Venous Blood Gas:   @ 14:10  7.45/32/72/23/93.8  VBG Lactate: 3.1  Venous Blood Gas:   @ 13:30  7.36/40/34/21/54.8  VBG Lactate: 5.0      MICROBIOLOGY:     RADIOLOGY:  [ ] Reviewed and interpreted by me    EKG: Jared Harris PGY2  MICU  [id number]    CHIEF COMPLAINT: Confusion    HPI:  Mr. 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 The patient is in the ED, difficult to interview, and states that he does not remember events of this morning. Per ED notes, the patient was at baseline (A&Ox3, able to ambulate) until earlier today where he was talking to his aide and suddenly stopped talking. Presently, the patient has no complaints besides abdominal pain to palpation diffusely but specifically in the RUQ. He denies headache, chest pain, palpitations, difficulty breathing, N/V, or D/C. No head trauma/fall/loc. Pt with recent admission after a fall with SDH. Was hypertensive and tachycardic in field, received IVF via EMS. (2019 16:44)    Pt was awake and eating once in he ED. Evaluate by primary Medicine team and was noted to be A&Ox1 (to name). However, he had a witnessed tonic clonic seizure by the ED attending following the evaluation. He was given 2mg ativan IVP at 8:10pm. He was also hypertensive to 199/127 and received labetalol 10mg IVP x1 a few minutes following the ativan. BP decreased to systolics 80s. Subsequently was found to be in post-ictal state and MICU consulted for airway protection.      FAMILY HISTORY: HTN, diabetes      SOCIAL HISTORY:  Smoking: none  EtOH Use: none  Marital Status:   Recent Travel: none  Advance Directives: Previously DNR/DNI, now confirmed full code with daughter/HCP  Imani Pavan at bedside.    Allergies    No Known Allergies    Intolerances    HOME MEDICATIONS:  · 	polyethylene glycol 3350 oral powder for reconstitution: 17 gram(s) orally once a day  · 	valproic acid 250 mg/5 mL oral liquid: 5 milliliter(s) orally 2 times a day   · 	Tylenol 325 mg oral tablet: 2 tab(s) orally every 6 hours, As Needed - for headache  · 	dexamethasone 2 mg oral tablet: 1 tab(s) orally once a day   · 	enoxaparin 40 mg/0.4 mL injectable solution: 40 milligram(s) subcutaneously once a day   · 	midodrine 5 mg oral tablet: 1 tab(s) orally 3 times a day  · 	Basaglar KwikPen 100 units/mL subcutaneous solution: 3 unit(s) subcutaneous once a day (at bedtime)  · 	levothyroxine 88 mcg (0.088 mg) oral capsule: 1 cap(s) orally once a day  · 	simvastatin 20 mg oral tablet: 1 tab(s) orally once a day (at bedtime)  · 	atenolol 25 mg oral tablet: 1 tab(s) orally once a day  · 	Lexapro 5 mg oral tablet: 1 tab(s) orally once a day  · 	Protonix 40 mg oral delayed release tablet: 1 tab(s) orally once a day    REVIEW OF SYSTEMS:  Constitutional:   Eyes:  ENT:  CV:  Resp:  GI:  :  MSK:  Integumentary:  Neurological:  Psychiatric:  Endocrine:  Hematologic/Lymphatic:  Allergic/Immunologic:  [ ] All other systems negative  [x] Unable to assess ROS because post-ictal    OBJECTIVE:  ICU Vital Signs Last 24 Hrs  T(C): 36.8 (2019 20:04), Max: 38.2 (2019 13:32)  T(F): 98.3 (2019 20:04), Max: 100.8 (2019 13:32)  HR: 88 (2019 21:38) (81 - 121)  BP: 86/55 (2019 21:38) (86/55 - 199/127)  BP(mean): 59 (2019 21:38) (59 - 62)  ABP: --  ABP(mean): --  RR: 18 (2019 21:38) (16 - 22)  SpO2: 100% (2019 21:38) (96% - 100%)        CAPILLARY BLOOD GLUCOSE      POCT Blood Glucose.: 299 mg/dL (2019 20:02)      PHYSICAL EXAM:    GENERAL: lethargic, post-ictal appearing  HEAD:  Normocephalic, atraumatic  EYES: EOMI, PERRLA  HEENT: Moist mucous membranes  NECK: Supple, No JVD  NERVOUS SYSTEM:  Alert & Oriented X0, unable to assess motor strength  CHEST/LUNG: Clear to auscultation bilaterally  HEART: Regular rate and rhythm, no murmur   ABDOMEN: Soft, Nontender, Nondistended, Bowel sounds present  EXTREMITIES:   No clubbing, cyanosis, or edema  MUSCULOSKELETAL: No muscle tenderness, no joint tenderness  SKIN:  warm and dry, no rash     HOSPITAL MEDICATIONS:  MEDICATIONS  (STANDING):  ATENolol  Tablet 25 milliGRAM(s) Oral daily  dexAMETHasone     Tablet 1 milliGRAM(s) Oral daily  escitalopram 5 milliGRAM(s) Oral daily  levETIRAcetam  IVPB 500 milliGRAM(s) IV Intermittent every 12 hours  levothyroxine 88 MICROGram(s) Oral daily  midodrine. 5 milliGRAM(s) Oral three times a day  pantoprazole    Tablet 40 milliGRAM(s) Oral before breakfast  simvastatin 20 milliGRAM(s) Oral at bedtime  sodium chloride 0.9% Bolus 500 milliLiter(s) IV Bolus once  sodium chloride 0.9%. 1000 milliLiter(s) (100 mL/Hr) IV Continuous <Continuous>  valproic  acid Syrup 250 milliGRAM(s) Oral two times a day    MEDICATIONS  (PRN):  acetaminophen   Tablet .. 650 milliGRAM(s) Oral every 6 hours PRN Severe Pain (7 - 10)      LABS:                        10.6   18.63 )-----------( 423      ( 2019 13:10 )             32.7     11-14    129<L>  |  94<L>  |  13  ----------------------------<  279<H>  4.2   |  16<L>  |  0.90    Ca    8.4      2019 20:31  Phos  3.1     11-14  Mg     1.5     11-14    TPro  5.3<L>  /  Alb  2.1<L>  /  TBili  0.3  /  DBili  x   /  AST  22  /  ALT  11  /  AlkPhos  81  11-14    PT/INR - ( 2019 14:10 )   PT: 12.3 SEC;   INR: 1.10          PTT - ( 2019 14:10 )  PTT:31.2 SEC  Urinalysis Basic - ( 2019 13:10 )    Color: YELLOW / Appearance: Lt TURBID / S.021 / pH: 6.5  Gluc: 500 / Ketone: NEGATIVE  / Bili: NEGATIVE / Urobili: NORMAL   Blood: SMALL / Protein: 200 / Nitrite: NEGATIVE   Leuk Esterase: SMALL / RBC: 3-5 / WBC >50   Sq Epi: MODERATE / Non Sq Epi: x / Bacteria: NEGATIVE        Venous Blood Gas:   @ 14:10  7.45/32/72/23/93.8  VBG Lactate: 3.1  Venous Blood Gas:   @ 13:30  7.36/40/34/21/54.8  VBG Lactate: 5.0      RADIOLOGY:  [x ] Reviewed and interpreted by me  < from: CT Head No Cont (19 @ 15:37) >  Previously noted subdural hematoma involving the right temporal frontal   occipital and parietal region as well as the interhemispheric region on   the right side is again seen. This subdural hematoma has demonstrated   expected evolutionary changes with an acute on subacute appearance now   identified. This subdural hematoma measures approximately 0.9 cm in   widest diameter and previously measured approximately 1.0 cm in widest   diameter. Mass effect on the right lateral ventricle is identified and   increased. Right to left shift (7.2 mm) is seen.    Area of encephalomalacia and gliosis involving the left frontal region is   again seen and unchanged.    Evaluation of the osseous structures with the appropriate window appears   unremarkable.    The visualized paranasal sinuses mastoid eminence appear clear.    Impression: Previously noted right-sided subdural hematoma is again seen   though has demonstrated expected evolutionary changes.    < end of copied text >      EKG: TWIs in v2-v4 unchanged from previous Jared Harris PGY2  MICU  [id number]    CHIEF COMPLAINT: Confusion    HPI:  Mr. 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 The patient is in the ED, difficult to interview, and states that he does not remember events of this morning. Per ED notes, the patient was at baseline (A&Ox3, able to ambulate) until earlier today where he was talking to his aide and suddenly stopped talking. Presently, the patient has no complaints besides abdominal pain to palpation diffusely but specifically in the RUQ. He denies headache, chest pain, palpitations, difficulty breathing, N/V, or D/C. No head trauma/fall/loc. Pt with recent admission after a fall with SDH. Was hypertensive and tachycardic in field, received IVF via EMS. (2019 16:44)    Pt was awake and eating once in he ED. Evaluate by primary Medicine team and was noted to be A&Ox1 (to name). However, he had a witnessed tonic clonic seizure by the ED attending following the evaluation. He was given 2mg ativan IVP at 8:10pm. He was also hypertensive to 199/127 and received labetalol 10mg IVP x1 a few minutes following the ativan. BP decreased to systolics 80s. Subsequently was found to be in post-ictal state and MICU consulted for airway protection.      FAMILY HISTORY: HTN, diabetes      SOCIAL HISTORY:  Smoking: none  EtOH Use: none  Marital Status:   Recent Travel: none  Advance Directives: Previously DNR/DNI, now confirmed full code with daughter/HCP  Imain Pavan at bedside.    Allergies    No Known Allergies    Intolerances    HOME MEDICATIONS:  · 	polyethylene glycol 3350 oral powder for reconstitution: 17 gram(s) orally once a day  · 	valproic acid 250 mg/5 mL oral liquid: 5 milliliter(s) orally 2 times a day   · 	Tylenol 325 mg oral tablet: 2 tab(s) orally every 6 hours, As Needed - for headache  · 	dexamethasone 2 mg oral tablet: 1 tab(s) orally once a day   · 	enoxaparin 40 mg/0.4 mL injectable solution: 40 milligram(s) subcutaneously once a day   · 	midodrine 5 mg oral tablet: 1 tab(s) orally 3 times a day  · 	Basaglar KwikPen 100 units/mL subcutaneous solution: 3 unit(s) subcutaneous once a day (at bedtime)  · 	levothyroxine 88 mcg (0.088 mg) oral capsule: 1 cap(s) orally once a day  · 	simvastatin 20 mg oral tablet: 1 tab(s) orally once a day (at bedtime)  · 	atenolol 25 mg oral tablet: 1 tab(s) orally once a day  · 	Lexapro 5 mg oral tablet: 1 tab(s) orally once a day  · 	Protonix 40 mg oral delayed release tablet: 1 tab(s) orally once a day    REVIEW OF SYSTEMS:  Constitutional:   Eyes:  ENT:  CV:  Resp:  GI:  :  MSK:  Integumentary:  Neurological:  Psychiatric:  Endocrine:  Hematologic/Lymphatic:  Allergic/Immunologic:  [ ] All other systems negative  [x] Unable to assess ROS because post-ictal    OBJECTIVE:  ICU Vital Signs Last 24 Hrs  T(C): 36.8 (2019 20:04), Max: 38.2 (2019 13:32)  T(F): 98.3 (2019 20:04), Max: 100.8 (2019 13:32)  HR: 88 (2019 21:38) (81 - 121)  BP: 86/55 (2019 21:38) (86/55 - 199/127)  BP(mean): 59 (2019 21:38) (59 - 62)  ABP: --  ABP(mean): --  RR: 18 (2019 21:38) (16 - 22)  SpO2: 100% (2019 21:38) (96% - 100%)        CAPILLARY BLOOD GLUCOSE      POCT Blood Glucose.: 299 mg/dL (2019 20:02)      PHYSICAL EXAM:    GENERAL: lethargic, post-ictal appearing  HEAD:  Normocephalic, atraumatic  EYES: EOMI, PERRLA  HEENT: Moist mucous membranes  NECK: Supple, No JVD  NERVOUS SYSTEM:  Alert & Oriented X0, unable to assess motor strength. Withdrawing to painful stimuli.  CHEST/LUNG: Mild ronchi at bases  HEART: Regular rate and rhythm, no murmur   ABDOMEN: Soft, Nontender, Nondistended, Bowel sounds present  EXTREMITIES:  1+ edema LE b/l  MUSCULOSKELETAL: No muscle tenderness, no joint tenderness  SKIN:  warm and dry, no rash     HOSPITAL MEDICATIONS:  MEDICATIONS  (STANDING):  ATENolol  Tablet 25 milliGRAM(s) Oral daily  dexAMETHasone     Tablet 1 milliGRAM(s) Oral daily  escitalopram 5 milliGRAM(s) Oral daily  levETIRAcetam  IVPB 500 milliGRAM(s) IV Intermittent every 12 hours  levothyroxine 88 MICROGram(s) Oral daily  midodrine. 5 milliGRAM(s) Oral three times a day  pantoprazole    Tablet 40 milliGRAM(s) Oral before breakfast  simvastatin 20 milliGRAM(s) Oral at bedtime  sodium chloride 0.9% Bolus 500 milliLiter(s) IV Bolus once  sodium chloride 0.9%. 1000 milliLiter(s) (100 mL/Hr) IV Continuous <Continuous>  valproic  acid Syrup 250 milliGRAM(s) Oral two times a day    MEDICATIONS  (PRN):  acetaminophen   Tablet .. 650 milliGRAM(s) Oral every 6 hours PRN Severe Pain (7 - 10)      LABS:                        10.6   18.63 )-----------( 423      ( 2019 13:10 )             32.7     11-14    129<L>  |  94<L>  |  13  ----------------------------<  279<H>  4.2   |  16<L>  |  0.90    Ca    8.4      2019 20:31  Phos  3.1     11-14  Mg     1.5     11-14    TPro  5.3<L>  /  Alb  2.1<L>  /  TBili  0.3  /  DBili  x   /  AST  22  /  ALT  11  /  AlkPhos  81  11-14    PT/INR - ( 2019 14:10 )   PT: 12.3 SEC;   INR: 1.10          PTT - ( 2019 14:10 )  PTT:31.2 SEC  Urinalysis Basic - ( 2019 13:10 )    Color: YELLOW / Appearance: Lt TURBID / S.021 / pH: 6.5  Gluc: 500 / Ketone: NEGATIVE  / Bili: NEGATIVE / Urobili: NORMAL   Blood: SMALL / Protein: 200 / Nitrite: NEGATIVE   Leuk Esterase: SMALL / RBC: 3-5 / WBC >50   Sq Epi: MODERATE / Non Sq Epi: x / Bacteria: NEGATIVE        Venous Blood Gas:   @ 14:10  7.45/32/72/23/93.8  VBG Lactate: 3.1  Venous Blood Gas:   @ 13:30  7.36/40/34/21/54.8  VBG Lactate: 5.0      RADIOLOGY:  [x ] Reviewed and interpreted by me  < from: CT Head No Cont (19 @ 15:37) >  Previously noted subdural hematoma involving the right temporal frontal   occipital and parietal region as well as the interhemispheric region on   the right side is again seen. This subdural hematoma has demonstrated   expected evolutionary changes with an acute on subacute appearance now   identified. This subdural hematoma measures approximately 0.9 cm in   widest diameter and previously measured approximately 1.0 cm in widest   diameter. Mass effect on the right lateral ventricle is identified and   increased. Right to left shift (7.2 mm) is seen.    Area of encephalomalacia and gliosis involving the left frontal region is   again seen and unchanged.    Evaluation of the osseous structures with the appropriate window appears   unremarkable.    The visualized paranasal sinuses mastoid eminence appear clear.    Impression: Previously noted right-sided subdural hematoma is again seen   though has demonstrated expected evolutionary changes.    < end of copied text >      EKG: TWIs in v2-v4 unchanged from previous

## 2019-11-14 NOTE — ED ADULT NURSE NOTE - OBJECTIVE STATEMENT
arrived to tr A confused and lethargic, pt unable to answer questions nsr on monitor, respirations equal and non labored, lungs clear, abdomen round soft non tender, skin intact, pt arousable to voice and sternal rub, safety maintained will monitor,

## 2019-11-14 NOTE — H&P ADULT - PROBLEM SELECTOR PLAN 2
T- 100.8 in the ED and tachycardic in the field. No identifiable source of infection at present. WBC 18.63 on admission. Neutrophil predominant.   -UA negative; CXR with BL pleural effusions but no consolidations  -pt does note belly pain on exam T- 100.8 in the ED and tachycardic in the field. No identifiable source of infection at present.  -low suspicion for meningitis clinically, CXR without consolidation but BL pleural effusions, UA negative  -procalcitonin to evaluate infection vs lymphoma as cause of sepsis  -will obtain CT A/P to evaluate for intrabdominal infection vs progression of lymphoma. Pt has RUQ pain on exam.  -also concern for PE given that pt was tachycardic in the field in the setting of 3 known DVT's. Recent V/Q scan on 11/4 with low suspicion for PE. T- 100.8 in the ED and tachycardic in the field. No identifiable source of infection at present.  -low suspicion for meningitis clinically, CXR without consolidation but BL pleural effusions, UA negative  -procalcitonin to evaluate infection vs lymphoma as cause of sepsis  -will obtain CT A/P to evaluate for intrabdominal infection vs progression of lymphoma. Pt has RUQ pain on exam.  -ID c/s  -if HD unstable will start broad spectrum abx

## 2019-11-14 NOTE — MEDICAL STUDENT ADULT H&P (EDUCATION) - NS MD HP STUD HX OF PRESENT ILLNESS FT
Patient is an 78yo with PMHx of Hypertension (on Atenolol & Midodrine due to Orthostatic Hypotension), DM II (on Sitagliptin and Basaglar), hyperlipidemia (on Simvastatin), Hypothyroidism (on Levothyroxine), CKD Stage 3 (baseline Cr .9-1.0), Subdural Hematoma (on Valproic acid), DVT, DLBC (managed at [facility] with Rituximab, Cyclophosphamide, and Prednisone) presenting with AMS. The patient is in the ED, difficult to interview, and states that he does not remember events of this morning. Per ED notes, the patient was at baseline (A&Ox3, able to ambulate) until earlier today where he was talking to his aide and suddenly stopped talking. Presently, the patient has no complaints besides abdominal pain. He denies headache, chest pain, palpitations, difficulty breathing, N/V, D/C. He does not remember his last BM. Patient is an 78yo with PMHx of Hypertension (on Atenolol & Midodrine due to Orthostatic Hypotension), DM II (on Sitagliptin and Basaglar), hyperlipidemia (on Simvastatin), Hypothyroidism (on Levothyroxine), CKD Stage 3 (baseline Cr .9-1.0), Subdural Hematoma (Oct 2019, on Valproic acid), DVT (in right upper extremity and bilaterally below the knee), DLBC (managed at [place] with Rituximab, Cyclophosphamide, Prednisone) presenting with AMS. The patient is in the ED, difficult to interview, and states that he does not remember events of this morning. Per ED notes, the patient was at baseline (A&Ox3, able to ambulate) until earlier today where he was talking to his aide and suddenly stopped talking. Presently, the patient has no complaints besides abdominal pain to palpation diffusely but specifically in the RUQ. He denies headache, chest pain, palpitations, difficulty breathing, N/V, or D/C.     ED course: EMS found the patient tachycardic and hypertensive. In the ED, rectal T100.4F. Received Vanc/Zosyn, Tylenol

## 2019-11-14 NOTE — ED ADULT NURSE REASSESSMENT NOTE - NS ED NURSE REASSESS COMMENT FT1
Patient BP decreased from 186/102 to 87/57 with MAP 62 with Labetalol.  Dr. Reddy med team notified. Patient started on  mL/hr and will continue to monitor BP.  Per Dr. Reddy, goal of MAP 65.  Patient is post itcal and sleeping after receiving Ativan post seizure.  Per daughter, patient takes Midodrine 5mg three times a day.  Respirations equal and unlabored.  On oxygen 4L NC.  Connected to cardiac monitor @ sinus rhythm.  Daughter at bedside and will continue to monitor patient.

## 2019-11-14 NOTE — MEDICAL STUDENT ADULT H&P (EDUCATION) - NS MD HP STUD ASPLAN ASSES FT
78 yo M w/ Hypertension, hyperlipidemia, DM II, CKD stage 3, hypothyroidism, pleural effusions, DVT, DLBC, Subdural hematoma presents with AMS and is ill-appearing with labs significant for WBC 18.63, Na 125, elevated troponin, lactate 5.0 (repeat 3.1).

## 2019-11-14 NOTE — H&P ADULT - PROBLEM SELECTOR PLAN 10
DVT ppx: holding off on lovenox in s/o possible CNS involvement of lymphoma  Diet: Dysphagia 3 diet based on last admission

## 2019-11-14 NOTE — CONSULT NOTE ADULT - ASSESSMENT
Mr. Browne is an 80 year old man with a PMH of DLBCL, CAD, T2DM, HTN, BL LE DVT, LUE DVT not on AC (2/2 recent SDH), CKD, orthostatic hypotension on midodrine and dexamethasone, now s/p witnessed tonic-clonic seizure with concern for airway protection.    #Airway  -Pt is s/p ativan 2mg for seizure and labetalol 10mg IVP for hypertension. He is post-ictal at this time and maintaining his airway adequately. Oxygenating 99% on RA.  -Pt will likely continue to be lethargic s/p ativan. Would re-evaluate in a few hours to monitor mental status. Noted to be not oriented, but alert prior to seizure activity.  -Ensure that pt's oral antiepileptics are converted to IV if he cannot tolerate PO. Would obtain blood levels in AM.  -Continue to monitor BP, would not want to drop by >30% acutely.    Pt not in need of MICU care at this time. Please call back with questions or concerns as clinical need arises.

## 2019-11-14 NOTE — H&P ADULT - PROBLEM SELECTOR PLAN 4
3 known active DVTs 3 known active DVTs. Will obtain BL UE and LE DVTs to evaluate for new DVT/progression.  -also concern for PE given that pt was tachycardic in the field in the setting of 3 known DVT's. Recent V/Q scan on 11/4 with low suspicion for PE.

## 2019-11-14 NOTE — ED ADULT NURSE REASSESSMENT NOTE - NS ED NURSE REASSESS COMMENT FT1
Patient BP 68/29, Dr. Odell on med team notified.  Given  mL bolus.  Patient is asleep, responds to painful stimulation.  He has been sleeping since receiving Ativan 2mg post seizure.  MICU has evaluated patient several times.  Respirations equal and unlabored.  Connected to cardiac monitor @ sinus rhythm and will continue to monitor patient.

## 2019-11-14 NOTE — ED PROVIDER NOTE - ATTENDING CONTRIBUTION TO CARE
Dr. Baum: 80 yo male with HTN, DM, HLD, hypothyroid, pleural effusions, in ED with AMS for approx 1 hour, found to have rectal temp 100.4 in ED.  On arrival to ED pt obviously responding to voice but not speaking full sentences.  On exam pt unwell appearing, in no ACUTE distress but lethargic appearing, heart tachycardic, lungs decreased breath sounds at bases, abd NTND, extremities without swelling, strength 5/5 in all extremities grossly when in bed and skin without rash.  Troponin elevated but no acute changes on EKG, consider possibly in the setting of sepsis.  Will repeat.

## 2019-11-14 NOTE — H&P ADULT - PROBLEM SELECTOR PLAN 7
Per chart review on R-CP. s/p 2 cycles. Unclear when last cycle was.   -obtaining tumor lysis labs  -MRI brain and CT C/A/P to evaluate for disease progression/secondary CNS lymphoma.

## 2019-11-14 NOTE — H&P ADULT - PROBLEM SELECTOR PLAN 5
Trops 90>115>101. EKG with no ST changes or q wave depression. Pt not complaining of CP.  -likely demand ischemia in the s/o sepsis.

## 2019-11-14 NOTE — H&P ADULT - ASSESSMENT
Mr. Browne is an 80 year old man with a PMH of DLBCL, CAD, DM, HTN, BL LE DVT, LUE DVT not on AC, CKD, orthostatic hypotension, and recent admission in November 2019 for SDH who is now presenting with acute encephalopathy.

## 2019-11-14 NOTE — ED PROVIDER NOTE - OBJECTIVE STATEMENT
Pt not responding to questions, lethargic, per EMS: 80 yo M c PMH of DM, HTN, pleural effusions p/w 1 hour h/o AMS. Pt is A&Ox3 and ambulatory at baseline, 1 h/a was speaking to aide and suddenly stopped talking. no head trauma/fall/loc. was hypertensive and tachycardic in field, received IVF via EMS.     home meds: protonix, levothyroxine, atenolol, januvia, lexapro, simvastatin, lovenox Pt not responding to questions, lethargic, per EMS: 78 yo M c PMH of DM, HTN, pleural effusions p/w 1 hour h/o AMS. Pt is A&Ox3 and ambulatory at baseline, 1 h/a was speaking to aide and suddenly stopped talking. no head trauma/fall/loc. was hypertensive and tachycardic in field, received IVF via EMS.     home meds: protonix, levothyroxine, atenolol, januvia, lexapro, simvastatin, lovenox    Pt has pleural effusions, which is evidence of fluid overload, and so will not receive full 30 cc/kg IVF hydration.

## 2019-11-14 NOTE — MEDICAL STUDENT ADULT H&P (EDUCATION) - NS MD HP STUD RESULTS LAB FT
10.6   18.63 )-----------( 423      ( 14 Nov 2019 13:10 )             32.7     11-14    125<L>  |  94<L>  |  15  ----------------------------<  274<H>  4.1   |  19<L>  |  0.91    Ca    8.1<L>      14 Nov 2019 14:10    TPro  5.3<L>  /  Alb  2.1<L>  /  TBili  0.3  /  DBili  x   /  AST  22  /  ALT  11  /  AlkPhos  81  11-14

## 2019-11-14 NOTE — H&P ADULT - NSHPLABSRESULTS_GEN_ALL_CORE
10.6   18.63 )-----------( 423      ( 2019 13:10 )             32.7     11-14    125<L>  |  94<L>  |  15  ----------------------------<  274<H>  4.1   |  19<L>  |  0.91    Ca    8.1<L>      2019 14:10    TPro  5.3<L>  /  Alb  2.1<L>  /  TBili  0.3  /  DBili  x   /  AST  22  /  ALT  11  /  AlkPhos  81  11-14        LIVER FUNCTIONS - ( 2019 14:10 )  Alb: 2.1 g/dL / Pro: 5.3 g/dL / ALK PHOS: 81 u/L / ALT: 11 u/L / AST: 22 u/L / GGT: x           PT/INR - ( 2019 14:10 )   PT: 12.3 SEC;   INR: 1.10          PTT - ( 2019 14:10 )  PTT:31.2 SEC  Urinalysis Basic - ( 2019 13:10 )    Color: YELLOW / Appearance: Lt TURBID / S.021 / pH: 6.5  Gluc: 500 / Ketone: NEGATIVE  / Bili: NEGATIVE / Urobili: NORMAL   Blood: SMALL / Protein: 200 / Nitrite: NEGATIVE   Leuk Esterase: SMALL / RBC: 3-5 / WBC >50   Sq Epi: MODERATE / Non Sq Epi: x / Bacteria: NEGATIVE                                    EKG:     RADIOLOGY STUDIES:

## 2019-11-14 NOTE — H&P ADULT - NSICDXPASTSURGICALHX_GEN_ALL_CORE_FT
PAST SURGICAL HISTORY:  S/P coronary artery stent placement x4, last one in early 2018 PAST SURGICAL HISTORY:  S/P coronary artery stent placement x4, last one in early 2018    S/P laparoscopic cholecystectomy

## 2019-11-14 NOTE — H&P ADULT - PROBLEM SELECTOR PLAN 1
Ddx At baseline pt is A&Ox3 and ambulatory. Now A&Ox0-1 with diffuse weakness.  Ddx includes infection, stroke (ischemic or paradoxical embolism), secondary CNS lymphoma.  -obtain MRI brain to evaluate for leptomingeal enhancement with secondary CNS lymphoma. Will taper off decadron to increase diagnostic yield of MRI as MRI appearances of secondary CNS lymphoma may be dramatically affected by corticosteroid administration, however, do not want to precipitate adrenal insufficiency   -low suspicion for meningitis clinically, CXR without consolidation but BL pleural effusions, UA negative  -will obtain CT A/P to evaluate for intrabdominal infection vs progression of lymphoma. Pt has RUQ pain on exam.  -will obtain CT chest to further evaluate chest and effusions

## 2019-11-15 DIAGNOSIS — R56.9 UNSPECIFIED CONVULSIONS: ICD-10-CM

## 2019-11-15 LAB
ALBUMIN SERPL ELPH-MCNC: 2.4 G/DL — LOW (ref 3.3–5)
ALP SERPL-CCNC: 86 U/L — SIGNIFICANT CHANGE UP (ref 40–120)
ALT FLD-CCNC: 14 U/L — SIGNIFICANT CHANGE UP (ref 4–41)
ANION GAP SERPL CALC-SCNC: 12 MMO/L — SIGNIFICANT CHANGE UP (ref 7–14)
ANION GAP SERPL CALC-SCNC: 13 MMO/L — SIGNIFICANT CHANGE UP (ref 7–14)
AST SERPL-CCNC: 31 U/L — SIGNIFICANT CHANGE UP (ref 4–40)
B PERT DNA SPEC QL NAA+PROBE: NOT DETECTED — SIGNIFICANT CHANGE UP
BASOPHILS # BLD AUTO: 0.17 K/UL — SIGNIFICANT CHANGE UP (ref 0–0.2)
BASOPHILS NFR BLD AUTO: 1.1 % — SIGNIFICANT CHANGE UP (ref 0–2)
BILIRUB SERPL-MCNC: 0.3 MG/DL — SIGNIFICANT CHANGE UP (ref 0.2–1.2)
BUN SERPL-MCNC: 12 MG/DL — SIGNIFICANT CHANGE UP (ref 7–23)
BUN SERPL-MCNC: 13 MG/DL — SIGNIFICANT CHANGE UP (ref 7–23)
C PNEUM DNA SPEC QL NAA+PROBE: NOT DETECTED — SIGNIFICANT CHANGE UP
CALCIUM SERPL-MCNC: 7.6 MG/DL — LOW (ref 8.4–10.5)
CALCIUM SERPL-MCNC: 7.8 MG/DL — LOW (ref 8.4–10.5)
CHLORIDE SERPL-SCNC: 96 MMOL/L — LOW (ref 98–107)
CHLORIDE SERPL-SCNC: 96 MMOL/L — LOW (ref 98–107)
CO2 SERPL-SCNC: 20 MMOL/L — LOW (ref 22–31)
CO2 SERPL-SCNC: 20 MMOL/L — LOW (ref 22–31)
CORTIS SERPL-MCNC: 19.7 UG/DL — HIGH (ref 2.7–18.4)
CREAT SERPL-MCNC: 0.9 MG/DL — SIGNIFICANT CHANGE UP (ref 0.5–1.3)
CREAT SERPL-MCNC: 0.92 MG/DL — SIGNIFICANT CHANGE UP (ref 0.5–1.3)
EOSINOPHIL # BLD AUTO: 0.1 K/UL — SIGNIFICANT CHANGE UP (ref 0–0.5)
EOSINOPHIL NFR BLD AUTO: 0.6 % — SIGNIFICANT CHANGE UP (ref 0–6)
FLUAV H1 2009 PAND RNA SPEC QL NAA+PROBE: NOT DETECTED — SIGNIFICANT CHANGE UP
FLUAV H1 RNA SPEC QL NAA+PROBE: NOT DETECTED — SIGNIFICANT CHANGE UP
FLUAV H3 RNA SPEC QL NAA+PROBE: NOT DETECTED — SIGNIFICANT CHANGE UP
FLUAV SUBTYP SPEC NAA+PROBE: NOT DETECTED — SIGNIFICANT CHANGE UP
FLUBV RNA SPEC QL NAA+PROBE: NOT DETECTED — SIGNIFICANT CHANGE UP
GLUCOSE SERPL-MCNC: 220 MG/DL — HIGH (ref 70–99)
GLUCOSE SERPL-MCNC: 247 MG/DL — HIGH (ref 70–99)
HADV DNA SPEC QL NAA+PROBE: NOT DETECTED — SIGNIFICANT CHANGE UP
HCOV PNL SPEC NAA+PROBE: SIGNIFICANT CHANGE UP
HCT VFR BLD CALC: 29.3 % — LOW (ref 39–50)
HCT VFR BLD CALC: 29.3 % — LOW (ref 39–50)
HGB BLD-MCNC: 9.4 G/DL — LOW (ref 13–17)
HGB BLD-MCNC: 9.4 G/DL — LOW (ref 13–17)
HMPV RNA SPEC QL NAA+PROBE: NOT DETECTED — SIGNIFICANT CHANGE UP
HPIV1 RNA SPEC QL NAA+PROBE: NOT DETECTED — SIGNIFICANT CHANGE UP
HPIV2 RNA SPEC QL NAA+PROBE: NOT DETECTED — SIGNIFICANT CHANGE UP
HPIV3 RNA SPEC QL NAA+PROBE: NOT DETECTED — SIGNIFICANT CHANGE UP
HPIV4 RNA SPEC QL NAA+PROBE: NOT DETECTED — SIGNIFICANT CHANGE UP
IMM GRANULOCYTES NFR BLD AUTO: 4.8 % — HIGH (ref 0–1.5)
LYMPHOCYTES # BLD AUTO: 13 % — SIGNIFICANT CHANGE UP (ref 13–44)
LYMPHOCYTES # BLD AUTO: 2.03 K/UL — SIGNIFICANT CHANGE UP (ref 1–3.3)
MAGNESIUM SERPL-MCNC: 1.4 MG/DL — LOW (ref 1.6–2.6)
MAGNESIUM SERPL-MCNC: 1.7 MG/DL — SIGNIFICANT CHANGE UP (ref 1.6–2.6)
MCHC RBC-ENTMCNC: 29.1 PG — SIGNIFICANT CHANGE UP (ref 27–34)
MCHC RBC-ENTMCNC: 29.1 PG — SIGNIFICANT CHANGE UP (ref 27–34)
MCHC RBC-ENTMCNC: 32.1 % — SIGNIFICANT CHANGE UP (ref 32–36)
MCHC RBC-ENTMCNC: 32.1 % — SIGNIFICANT CHANGE UP (ref 32–36)
MCV RBC AUTO: 90.7 FL — SIGNIFICANT CHANGE UP (ref 80–100)
MCV RBC AUTO: 90.7 FL — SIGNIFICANT CHANGE UP (ref 80–100)
MONOCYTES # BLD AUTO: 2.26 K/UL — HIGH (ref 0–0.9)
MONOCYTES NFR BLD AUTO: 14.5 % — HIGH (ref 2–14)
NEUTROPHILS # BLD AUTO: 10.29 K/UL — HIGH (ref 1.8–7.4)
NEUTROPHILS NFR BLD AUTO: 66 % — SIGNIFICANT CHANGE UP (ref 43–77)
NRBC # FLD: 0.02 K/UL — SIGNIFICANT CHANGE UP (ref 0–0)
NRBC # FLD: 0.02 K/UL — SIGNIFICANT CHANGE UP (ref 0–0)
OSMOLALITY SERPL: 283 MOSMO/KG — SIGNIFICANT CHANGE UP (ref 275–295)
PHOSPHATE SERPL-MCNC: 2.7 MG/DL — SIGNIFICANT CHANGE UP (ref 2.5–4.5)
PHOSPHATE SERPL-MCNC: 2.9 MG/DL — SIGNIFICANT CHANGE UP (ref 2.5–4.5)
PLATELET # BLD AUTO: 395 K/UL — SIGNIFICANT CHANGE UP (ref 150–400)
PLATELET # BLD AUTO: 395 K/UL — SIGNIFICANT CHANGE UP (ref 150–400)
PMV BLD: 11 FL — SIGNIFICANT CHANGE UP (ref 7–13)
PMV BLD: 11 FL — SIGNIFICANT CHANGE UP (ref 7–13)
POTASSIUM SERPL-MCNC: 4 MMOL/L — SIGNIFICANT CHANGE UP (ref 3.5–5.3)
POTASSIUM SERPL-MCNC: 4.6 MMOL/L — SIGNIFICANT CHANGE UP (ref 3.5–5.3)
POTASSIUM SERPL-SCNC: 4 MMOL/L — SIGNIFICANT CHANGE UP (ref 3.5–5.3)
POTASSIUM SERPL-SCNC: 4.6 MMOL/L — SIGNIFICANT CHANGE UP (ref 3.5–5.3)
PROT SERPL-MCNC: 4.9 G/DL — LOW (ref 6–8.3)
RBC # BLD: 3.23 M/UL — LOW (ref 4.2–5.8)
RBC # BLD: 3.23 M/UL — LOW (ref 4.2–5.8)
RBC # FLD: 18.9 % — HIGH (ref 10.3–14.5)
RBC # FLD: 18.9 % — HIGH (ref 10.3–14.5)
RSV RNA SPEC QL NAA+PROBE: NOT DETECTED — SIGNIFICANT CHANGE UP
RV+EV RNA SPEC QL NAA+PROBE: NOT DETECTED — SIGNIFICANT CHANGE UP
SODIUM SERPL-SCNC: 128 MMOL/L — LOW (ref 135–145)
SODIUM SERPL-SCNC: 129 MMOL/L — LOW (ref 135–145)
SPECIMEN SOURCE: SIGNIFICANT CHANGE UP
T4 AB SER-ACNC: 3.97 UG/DL — LOW (ref 5.1–13)
TROPONIN T, HIGH SENSITIVITY: 117 NG/L — CRITICAL HIGH (ref ?–14)
VALPROATE SERPL-MCNC: 15.1 UG/ML — LOW (ref 50–100)
WBC # BLD: 15.38 K/UL — HIGH (ref 3.8–10.5)
WBC # BLD: 15.38 K/UL — HIGH (ref 3.8–10.5)
WBC # FLD AUTO: 15.38 K/UL — HIGH (ref 3.8–10.5)
WBC # FLD AUTO: 15.38 K/UL — HIGH (ref 3.8–10.5)

## 2019-11-15 PROCEDURE — 99232 SBSQ HOSP IP/OBS MODERATE 35: CPT

## 2019-11-15 PROCEDURE — 93970 EXTREMITY STUDY: CPT | Mod: 26

## 2019-11-15 PROCEDURE — 99222 1ST HOSP IP/OBS MODERATE 55: CPT | Mod: GC

## 2019-11-15 PROCEDURE — 99233 SBSQ HOSP IP/OBS HIGH 50: CPT | Mod: GC

## 2019-11-15 PROCEDURE — 70551 MRI BRAIN STEM W/O DYE: CPT | Mod: 26

## 2019-11-15 PROCEDURE — 71250 CT THORAX DX C-: CPT | Mod: 26

## 2019-11-15 PROCEDURE — 70450 CT HEAD/BRAIN W/O DYE: CPT | Mod: 26

## 2019-11-15 RX ORDER — PIPERACILLIN AND TAZOBACTAM 4; .5 G/20ML; G/20ML
3.38 INJECTION, POWDER, LYOPHILIZED, FOR SOLUTION INTRAVENOUS EVERY 8 HOURS
Refills: 0 | Status: COMPLETED | OUTPATIENT
Start: 2019-11-15 | End: 2019-11-19

## 2019-11-15 RX ORDER — PIPERACILLIN AND TAZOBACTAM 4; .5 G/20ML; G/20ML
3.38 INJECTION, POWDER, LYOPHILIZED, FOR SOLUTION INTRAVENOUS ONCE
Refills: 0 | Status: COMPLETED | OUTPATIENT
Start: 2019-11-15 | End: 2019-11-15

## 2019-11-15 RX ADMIN — LEVETIRACETAM 400 MILLIGRAM(S): 250 TABLET, FILM COATED ORAL at 06:05

## 2019-11-15 RX ADMIN — Medication 100 GRAM(S): at 00:33

## 2019-11-15 RX ADMIN — Medication 26.25 MILLIGRAM(S): at 07:17

## 2019-11-15 RX ADMIN — PIPERACILLIN AND TAZOBACTAM 200 GRAM(S): 4; .5 INJECTION, POWDER, LYOPHILIZED, FOR SOLUTION INTRAVENOUS at 16:53

## 2019-11-15 RX ADMIN — PIPERACILLIN AND TAZOBACTAM 25 GRAM(S): 4; .5 INJECTION, POWDER, LYOPHILIZED, FOR SOLUTION INTRAVENOUS at 22:30

## 2019-11-15 RX ADMIN — LEVETIRACETAM 400 MILLIGRAM(S): 250 TABLET, FILM COATED ORAL at 16:53

## 2019-11-15 RX ADMIN — Medication 26.25 MILLIGRAM(S): at 16:53

## 2019-11-15 NOTE — PROGRESS NOTE ADULT - PROBLEM SELECTOR PLAN 4
3 known active DVTs. Will obtain BL UE and LE DVTs to evaluate for new DVT/progression.  -also concern for PE given that pt was tachycardic in the field in the setting of 3 known DVT's. Recent V/Q scan on 11/4 with low suspicion for PE. 3 known active DVTs. Will obtain BL UE and LE DVTs to evaluate for new DVT/progression.  -also concern for PE given that pt was tachycardic in the field in the setting of 3 known DVT's. Recent V/Q scan on 11/4 with low suspicion for PE.  -heme/onc consulted. Holding off on AC until clearance from neurosurgery.

## 2019-11-15 NOTE — PROGRESS NOTE ADULT - PROBLEM SELECTOR PLAN 9
-pt unable to tolerate PO meds at this point. BP stable after fluids. If unable to maintain MAP pt may need levophed ggt in MICU.

## 2019-11-15 NOTE — ED ADULT NURSE REASSESSMENT NOTE - NS ED NURSE REASSESS COMMENT FT1
Patient asleep, appears comfortable and in no apparent distress.  Continuing to monitor BP.  Responds to painful stimulation.  Observed patient yawning but hasn't open his eyes post seizure.

## 2019-11-15 NOTE — PROGRESS NOTE ADULT - PROBLEM SELECTOR PLAN 8
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 Pt was also hyponatremic on last admission.  -unclear if pt is hypovolemic, euvolemic, or hypervolemic on exam as pt as LE edema secondary to bl DVTs  -will send urine lytes   -1L fluid restriction  -pt may be having seizures in setting of known SDH with lowered seizure threshold 2/2 to hyponatremia. 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 Pt was also hyponatremic on a previous admission.  -unclear if pt is hypovolemic, euvolemic, or hypervolemic on exam as pt as LE edema secondary to bl DVTs. Dry mucous membranes, but bilateral pleural effusions.   -1L fluid restriction  -pt may be having seizures in setting of known SDH with lowered seizure threshold 2/2 to hyponatremia.

## 2019-11-15 NOTE — PROGRESS NOTE ADULT - PROBLEM SELECTOR PLAN 2
T- 100.8 in the ED and tachycardic in the field. No identifiable source of infection at present.  -low suspicion for meningitis clinically, CXR without consolidation but BL pleural effusions, UA negative  -procalcitonin 0.42  -CT A/P prelim read with no acute pathology.   -ID c/s  -if HD unstable will start broad spectrum abx T- 100.8 in the ED and tachycardic in the field. No identifiable source of infection at present.  -low suspicion for meningitis clinically, CXR without consolidation but BL pleural effusions, UA negative  -procalcitonin 0.42  -no evidence of intraabdominal infection on CT  -if HD unstable will start broad spectrum abx

## 2019-11-15 NOTE — CONSULT NOTE ADULT - ASSESSMENT
81 yo M hx DLBCL (dx 9/2019) s/p 2 cycles of R-CP (last ~4weeks ago) at [place], DM2, recent admission at [facility] (10/29-11/5) for SDH s/p fall found to have b/l LE and LUE DVT (not on AC) p/w AMS and seizure.    1. DLBCL:   - pt was admitted to [facility] in 8-9/2019, was found to be hypercalcemic and CT C/A/P (9/10/19) showed scattered b/l pulmonary nodules, mediastinal lymphadenopathy, retroperitoneal lymphadenopathy, and splenic lesions/enlargement concerning for lymphoma. Pt got an outpatient PET scan and biopsy (not in our system) and was diagnosed with DLBCL. He sees Dr. Jovan Poole at [place] and was started on Rituxan-Cytoxan-Prednisone (R-CP). He has received 2 cycles thus far, last cycle ~ 4 weeks ago, and has not been able to f/u due to recent hospitalizations. Reports pt had thoracentesis in the past and fluid was negative for malignant cells.   - CTAP non contrast now shows treatment response with decrease in size of the spleen and resolution of retroperitoneal adenopathy   - d/w pt's daughter, requesting if pt can get next cycle of chemo during this hospitalization since it has been difficult for outpatient follow up and he is due for his next cycle. Discussed that we would have to wait for acute issues to resolve prior to next cycle and would need records, pathology reports, and slides for review by our institution. She will work on getting us records and pathology slides.   - Pending MRI brain for further evaluation given AMS and seizures    2. DVTs: b/l LE (calf) DVTs and LUE DVT  - repeat b/l LE US now shows unchanged subacute DVT  - AC when cleared by neurosurgery  - if concern for propagation, then may need IVC filter to prevent PE    Jael Poole, PGY-6  Hematology-Oncology Fellow  Pager: [phone] ([facility]), [phone] ([facility])  (After 5 pm or on weekends please page the on-call fellow)

## 2019-11-15 NOTE — CONSULT NOTE ADULT - SUBJECTIVE AND OBJECTIVE BOX
Pt unable to provide any history at this time (? post ictal). History obtained from patient's daughter (Dr. Boo Pavan) via phone.    HPI: 81 yo M hx DLBCL (dx 9/2019) s/p 2 cycles of R-CP (last ~4weeks ago) at [place], DM2, recent admission at [facility] (10/29-11/5) for SDH s/p fall found to have b/l LE and LUE DVT (not on AC) p/w AMS and seizure. After recent admission at [facility] pt was discharged to home and was at baseline (AAOx3, able to ambulate) until on the day of admission when he suddenly stopped talking and may have had a seizure like episode at home noted by his wife. Pt was brought to the ER and overnight had a GTC seizure with tongue biting that lasted 90 seconds, was given ativan and MICU was consulted and started on keppra. Currently pt remains lethargic and unable to provide any history. Nods his head but doesn't open eyes or answer any questions despite multiple attempts. During last admission pt presented with fall with SDH and was in NSICU. Found to have b/l LE and LUE DVT but not on AC due to ICH.    Hematologic history:  - pt was admitted to [facility] in 8-9/2019, was found to be hypercalcemic and CT C/A/P showed scattered b/l pulmonary nodules, mediastinal lymphadenopathy, retroperitoneal lymphadenopathy, and splenic lesions/enlargement concerning for lymphoma. Pt got an outpatient PET scan and biopsy and was diagnosed with DLBCL. He sees Dr. Jovan Poole at [place] and was started on Rituxan-Cytoxan-Prednisone (R-CP). He has received 2 cycles thus far, last cycle ~ 4 weeks ago, and has not been able to f/u due to recent hospitalizations.    PAST MEDICAL & SURGICAL HISTORY:  GI bleed: s/p clipping  History of subdural hematoma: s/p unwitnessed fall with hospitilazation in early November 2019. R and interhemispheric subdural hematomas with mild shift.  Dysphagia: Recently on dysphagia 3 nectar thick liquid diet during admission 11/6/2019  History of orthostatic hypotension: On midodrine and florinef  Acute deep vein thrombosis (DVT) of other vein of upper extremity, unspecified laterality: LUE; on eliquis VQ scan on 9/20 with low probability for PE  Left ventricular outflow obstruction: per daughter, patient had recent echo at [place] that did not re-demonstrate LVOT obstruction; likely was dynamic LVOT obstruction 2/2 volume status  Lymphoma: DLBC recently diagnosed being treated at [place]  Chronic kidney disease (CKD)  Cognitive developmental delay  CAD (coronary artery disease): s/p stents x4  Adult hypothyroidism  Hyperlipidemia  Peptic ulcer: s/p treatment for H pylori  Diabetes: On basaglar insulin and Januvia at home  Essential hypertension  S/P laparoscopic cholecystectomy  S/P coronary artery stent placement: x4, last one in early 2018    Allergies    No Known Allergies    Intolerances    MEDICATIONS  (STANDING):  escitalopram 5 milliGRAM(s) Oral daily  levETIRAcetam  IVPB 500 milliGRAM(s) IV Intermittent every 12 hours  levothyroxine 88 MICROGram(s) Oral daily  midodrine. 5 milliGRAM(s) Oral three times a day  pantoprazole    Tablet 40 milliGRAM(s) Oral before breakfast  simvastatin 20 milliGRAM(s) Oral at bedtime  sodium chloride 0.9%. 1000 milliLiter(s) (100 mL/Hr) IV Continuous <Continuous>  valproate sodium IVPB 250 milliGRAM(s) IV Intermittent two times a day    MEDICATIONS  (PRN):  acetaminophen   Tablet .. 650 milliGRAM(s) Oral every 6 hours PRN Severe Pain (7 - 10)    FAMILY HISTORY:    SOCIAL HISTORY: No EtOH, no tobacco    REVIEW OF SYSTEMS: see HPI  All other review of systems is negative unless indicated above    VITALS:   T(F): 97.5 (11-15-19 @ 12:02), Max: 100.8 (11-14-19 @ 13:32)  HR: 89 (11-15-19 @ 12:02)  BP: 159/89 (11-15-19 @ 12:02)  RR: 20 (11-15-19 @ 12:02)  SpO2: 97% (11-15-19 @ 12:02)  Wt(kg): --    PHYSICAL EXAM:  GENERAL: lethargic, nods his head but doesn't open eyes, not responding to commands, and not answering questions  RESP: respirations unlabored, decreased BS b/l lower lobes  HEART: RRR, S1/S2  ABDOMEN: +BS, soft, NT, ND  EXTREMITIES: no LE edema  NEUROLOGIC: lethargic, minimally arousable, not following commands and not answering any questions    LABS:                         10.6   18.63 )-----------( 423      ( 14 Nov 2019 13:10 )             32.7   11-15    128<L>  |  96<L>  |  12  ----------------------------<  220<H>  4.6   |  20<L>  |  0.90    Ca    7.6<L>      15 Nov 2019 07:20  Phos  2.9     11-15  Mg     1.7     11-15    TPro  4.9<L>  /  Alb  2.4<L>  /  TBili  0.3  /  DBili  x   /  AST  31  /  ALT  14  /  AlkPhos  86  11-15    Phosphorus Level, Serum: 2.9 mg/dL (11-15 @ 07:20)  Magnesium, Serum: 1.7 mg/dL (11-15 @ 07:20)  Uric Acid, Serum: 3.1 mg/dL (11-14 @ 20:31)  Lactate Dehydrogenase, Serum: 469 U/L (11-14 @ 20:31)    PT/INR - ( 14 Nov 2019 14:10 )   PT: 12.3 SEC;   INR: 1.10     PTT - ( 14 Nov 2019 14:10 )  PTT:31.2 SEC    IMAGING:  - CT Abdomen and Pelvis No Cont (11.14.19 @ 19:27) >  IMPRESSION: Moderate bilateral pleural effusions, new since 9/10/2019. Heterogeneous spleen has decreased in size. Resolved retroperitoneal adenopathy, consistent with treatment response.  -  CT Head No Cont (11.15.19 @ 02:51) >  IMPRESSION: Stable subdural hemorrhages, midline shift, and ventricular size. A chronic left MCA infarct is again noted. No new abnormality is visualized.  -  US Duplex Venous Lower Ext Complete, Bilateral (11.15.19 @ 10:34) >  IMPRESSION: Unchanged subacute DVT right soleal vein.

## 2019-11-15 NOTE — PROGRESS NOTE ADULT - PROBLEM SELECTOR PLAN 3
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

## 2019-11-15 NOTE — PATIENT PROFILE ADULT - NSPROHMSYMPCOND_GEN_A_NUR
unable to assess at this time  due to mental status unable to assess at this time  due to mental status/diabetes

## 2019-11-15 NOTE — CONSULT NOTE ADULT - ASSESSMENT
80 year old man with a PMH of DLBCL, CAD, DM, HTN, BL LE DVT, LUE DVT not on AC, CKD, orthostatic hypotension, and recent admission in November 2019 for SDH who is now presenting with acute encephalopathy    CKD stage 3  - baseline Cr stable ~0.9-1.1  - Had Cr ~2 for the past year; ? prolonged FRANCES state (? 2/2 prolonged hypercalcemic state) that has now resolved vs. loss of body mass  - Serum Cr remains stable  - Avoid nephrotoxins agent  - renal diet  - monitor bmp    Hyponatremia  - ? etiology, present with Na 122 received 2L NS  - Na better right now. however looks hypervolemic  - check urine na, osmo, TSh and cortisol level  - free water restriction <1L/day  - optimize dm control  - avoid hypotonic solutions    HTN  - has Hx HTN but was on midodrine for orthostatic hypotension  - BP at goal now  - monitor bp    Hypocalcemia  - Has Hx hypercalcemia of malignancy  - low alb corrected alexander is optimal  - monitor    Pl. effusion b/l  mod effusion seen on ct chest  consider lasix 40mg iv x 1  monitor fluid status and clinical presentation 80 year old man with a PMH of DLBCL, CAD, DM, HTN, BL LE DVT, LUE DVT not on AC, CKD, orthostatic hypotension, and recent admission in November 2019 for SDH who is now presenting with acute encephalopathy    CKD stage 3  - baseline Cr stable ~0.9-1.1  - Had Cr ~2 for the past year; ? prolonged FRANCES state (? 2/2 prolonged hypercalcemic state) that has now resolved vs. loss of body mass  - Serum Cr remains stable  - Avoid nephrotoxins agent  - renal diet  - monitor bmp    Hyponatremia  - ? etiology, present with Na 122 received 2L NS  - Na improved right now. however looks hypervolemic  - check urine na, osmo, TSh and cortisol level  - free water restriction <1L/day  - optimize dm control  - avoid hypotonic solutions    HTN  - has Hx HTN but was on midodrine for orthostatic hypotension  - BP at goal now  - monitor bp    Hypocalcemia  - Has Hx hypercalcemia of malignancy  - low alb corrected alexander is optimal  - monitor    Pl. effusion b/l  mod effusion seen on ct chest  consider lasix 40mg iv x 1  monitor fluid status and clinical presentation 80 year old man with a PMH of DLBCL, CAD, DM, HTN, BL LE DVT, LUE DVT not on AC, CKD, orthostatic hypotension, and recent admission in November 2019 for SDH who is now presenting with acute encephalopathy    CKD stage 3  - baseline Cr stable ~0.9-1.1  - Had Cr ~2 for the past year; ? prolonged FRANCES state (? 2/2 prolonged hypercalcemic state) that has now resolved vs. loss of body mass  - Serum Cr remains stable  - Avoid nephrotoxins agent  - renal diet  - monitor bmp    Hyponatremia  - ? etiology, present with Na 122 received 2L NS  - Na improved right now. however looks hypervolemic  - check urine na, osmo, TSh and cortisol level  - free water restriction <1L/day  - optimize dm control  - avoid hypotonic solutions    HTN  - has Hx HTN but was on midodrine for orthostatic hypotension  - BP at goal now  - monitor bp    Hypocalcemia  - Has Hx hypercalcemia of malignancy  - low alb corrected alexander is optimal  - monitor    Pl. effusion b/l/ Anasarca   mod effusion seen on ct chest  consider lasix 40mg iv x 1  monitor fluid status and clinical presentation

## 2019-11-15 NOTE — PROGRESS NOTE ADULT - ATTENDING COMMENTS
I am taking over the care of this patient from Dr. Bryan, who admitted patient 11/14. Pt seen and examined, chart and labs reviewed.  Briefly, a 79M DM2, HTN, hypothyroidism, CAD s/p stents, PUD s/p H.pylori treatment, recent dx of DLBCL at [place] on Oct 2019 s/p 2 cycles of R-CP, recent fall with R frontal and occipital SDH with no evacuation, LUE DVT and b/l LE DVT formerly on therapeutic Lovenox, presents with acute encephalopathy.  Pt had witnessed seizure activity at home Thursday and again had tonic clonic seizure in ER s/p Ativan 2mg IVx1.     #Metabolic encephalopathy with seizure activity: multifactorial, unclear if this is a postictal state vs. true organic intracranial pathology from known SDH vs. possible CNS lymphoma vs. infection.  Pt remains sedated after 2mg IV Ativan dose.  CT head showing stable SDH with evolutional changes - neurosurgery consulted and will reevaluate need for evacuation (family amenable to neurosx procedure).  MRI brain pending to assess for leptomeningeal disease/CNS lymphoma, however CT head not showing large mass.    #SIRS: fevers/leukocytosis, elevated lactate - this can all be seen in a postictal state.  CT C/A/P reviewed, showing bilateral patchy consolidation in upper lobes.  Will resume IV Zosyn and f/u Bcx.  Repeat lactate in AM. UA negative, will also check RVP to complete infectious w/u.      #DLBCL: s/p 2 cycles of R-CP with interval improvement in lymphadenopathy on scans from 11/14.  Hematology consult reviewed and appreciated.  Also discussed care with daughter ( Pavan) who is a heme-onc attending.  Would hold off on any chemotherapy at this time until full infectious workup complete.  MRI brain pending to assess for CNS involvement of DLBCL.  No evidence of TLS on labs. Would defer LP at this time until we obtain MRI brain imaging.     #Hypercoagulable state, acute b/l LE DVT and LUE DVT: it was decided on last admission to continue pt on therapeutic Lovenox given high risk of VTE formation.  Repeat duplex showing stable clot with no propagation. Will hold anticoagulation at this time until MRI brain findings return.      #Hyponatremia, clinical exam suggestive of fluid overloaded state (including presence of b/l pleural effusions) however SIADH certainly also a possibility given intracranial process: would check urine studies if able, hold IVF at this time given pleff.  Low suspicion that his hyponatremia is precipitating his seizures.     #Elevated troponins: NSTEMI, EKG reviewed TWI present in V1-V4, which were present in the past.  Troponins now downtrending, can stop trending troponins.    Care discussed with daughter Dr. Browne

## 2019-11-15 NOTE — PATIENT PROFILE ADULT - NSPRESCRALCFREQ_GEN_A_NUR
unable to assess at this time  due to mental status unable to assess at this time  due to mental status/Never

## 2019-11-15 NOTE — CONSULT NOTE ADULT - SUBJECTIVE AND OBJECTIVE BOX
[facility]   MD UYEN HERNANDEZ DO MARYANNE SOURIAL, LEATHA NICOLE    TEL:  OFFICE: [phone]  DR BARGER CELL: [phone]  DR. HERNANDEZ CELL: [phone]  DR. REYNOSO CELL: [phone]  CHUCKIE TERRAZAS CELL: [phone]    HPI:  A+Ox0  80 year old man with a PMH of DLBCL, CAD, DM, HTN, BL LE DVT, LUE DVT not on AC, CKD, orthostatic hypotension, and recent admission in 2019 for SDH who is now presenting with acute encephalopathy. Patient is A+Ox3 at baseline then developed sudden AMS per chart.   Patient seen and examined at bedside, A+ox0, very lethargic. however is abusible with stimuli.   patient follow up with dr. barger as outpatient     Allergies:  No Known Allergies      PAST MEDICAL & SURGICAL HISTORY:  GI bleed: s/p clipping  History of subdural hematoma: s/p unwitnessed fall with hospitilazation in early 2019. R and interhemispheric subdural hematomas with mild shift.  Dysphagia: Recently on dysphagia 3 nectar thick liquid diet during admission 2019  History of orthostatic hypotension: On midodrine and florinef  Acute deep vein thrombosis (DVT) of other vein of upper extremity, unspecified laterality: LUE; on eliquis VQ scan on  with low probability for PE  Left ventricular outflow obstruction: per daughter, patient had recent echo at [place] that did not re-demonstrate LVOT obstruction; likely was dynamic LVOT obstruction 2/2 volume status  Lymphoma: DLBC recently diagnosed being treated at [place]  Chronic kidney disease (CKD)  Cognitive developmental delay  CAD (coronary artery disease): s/p stents x4  Adult hypothyroidism  Hyperlipidemia  Peptic ulcer: s/p treatment for H pylori  Diabetes: On basaglar insulin and Januvia at home  Essential hypertension  S/P laparoscopic cholecystectomy  S/P coronary artery stent placement: x4, last one in early       Home Medications Reviewed    Hospital Medications:   MEDICATIONS  (STANDING):  escitalopram 5 milliGRAM(s) Oral daily  levETIRAcetam  IVPB 500 milliGRAM(s) IV Intermittent every 12 hours  levothyroxine 88 MICROGram(s) Oral daily  midodrine. 5 milliGRAM(s) Oral three times a day  pantoprazole    Tablet 40 milliGRAM(s) Oral before breakfast  piperacillin/tazobactam IVPB. 3.375 Gram(s) IV Intermittent once  piperacillin/tazobactam IVPB.. 3.375 Gram(s) IV Intermittent every 8 hours  simvastatin 20 milliGRAM(s) Oral at bedtime  valproate sodium IVPB 250 milliGRAM(s) IV Intermittent two times a day      SOCIAL HISTORY:  Denies ETOh, Smoking,     FAMILY HISTORY:      REVIEW OF SYSTEMS:  unable to obtain    VITALS:  T(F): 97.9 (11-15-19 @ 14:24), Max: 98.8 (11-15-19 @ 06:15)  HR: 88 (11-15-19 @ 14:24)  BP: 158/99 (11-15-19 @ 14:24)  RR: 16 (11-15-19 @ 14:24)  SpO2: 100% (11-15-19 @ 14:24)  Wt(kg): --        PHYSICAL EXAM:  Constitutional: lethargic but responding to stimuli  HEENT: anicteric sclera, oropharynx clear, MMM  Neck: No JVD  Respiratory: ? crackles (anterior exam)  Cardiovascular: S1, S2, RRR  Gastrointestinal: BS+, soft, NT/ND  Extremities: b/l LE 2+ edema, right UE + edema  Neurological: A/O x 0  : No CVA tenderness. No warren.   Skin: No rashes    LABS:  11-15    128<L>  |  96<L>  |  12  ----------------------------<  220<H>  4.6   |  20<L>  |  0.90    Ca    7.6<L>      15 Nov 2019 07:20  Phos  2.9     11-15  Mg     1.7     11-15    TPro  4.9<L>  /  Alb  2.4<L>  /  TBili  0.3  /  DBili      /  AST  31  /  ALT  14  /  AlkPhos  86  11-15    Creatinine Trend: 0.90 <--, 0.92 <--, 0.90 <--, 0.91 <--, 0.92 <--                        9.4    15.38 )-----------( 395      ( 15 Nov 2019 12:59 )             29.3     Urine Studies:  Urinalysis Basic - ( 2019 13:10 )    Color: YELLOW / Appearance: Lt TURBID / S.021 / pH: 6.5  Gluc: 500 / Ketone: NEGATIVE  / Bili: NEGATIVE / Urobili: NORMAL   Blood: SMALL / Protein: 200 / Nitrite: NEGATIVE   Leuk Esterase: SMALL / RBC: 3-5 / WBC >50   Sq Epi: MODERATE / Non Sq Epi:  / Bacteria: NEGATIVE          RADIOLOGY & ADDITIONAL STUDIES:

## 2019-11-15 NOTE — PROVIDER CONTACT NOTE (CRITICAL VALUE NOTIFICATION) - BACKGROUND
Admit Diagnosis) Urinary tract infection  (PMH) Left ventricular outflow obstruction  (PMH) GI bleed  (PMH) History of subdural hematoma

## 2019-11-15 NOTE — PROGRESS NOTE ADULT - PROBLEM SELECTOR PLAN 7
Per chart review on R-CP. s/p 2 cycles. Unclear when last cycle was.   -no lab evidence of tumor lysis syndrome.   -MRI brain and CT C/A/P to evaluate for disease progression/secondary CNS lymphoma. Per chart review on R-CP. s/p 2 cycles. Last cycle ~4 weeks ago  -no lab evidence of tumor lysis syndrome.   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

## 2019-11-15 NOTE — PROGRESS NOTE ADULT - PROBLEM SELECTOR PLAN 10
DVT ppx: holding off on lovenox in s/o possible CNS involvement of lymphoma  Diet: Dysphagia 3 diet based on last admission DVT ppx: holding off on lovenox in s/o possible CNS involvement of lymphoma  Diet: Dysphagia 3 diet based on last admission but will leave pt NPO for now given AMS.

## 2019-11-15 NOTE — PATIENT PROFILE ADULT - NSPROEDALEARNPREF_GEN_A_NUR
unable to assess at this time  due to mental status video/verbal instruction/skill demonstration/unable to assess at this time  due to mental status

## 2019-11-15 NOTE — PROGRESS NOTE ADULT - SUBJECTIVE AND OBJECTIVE BOX
Jordan Martinez  Internal Medicine PGY-1  Pager: [phone] / [phone]      PATIENT:  CARLOS ENRIQUE TATE  [id number]    INTERVAL HISTORY/OVERNIGHT EVENTS:  Pt with GTC seizure overnight with tongue biting. Got 2mg Ativan. BP elevated and got IV labetalol as unclear if pt can tolerate PO. BP then dropped to 87/57. Bolused 500cc x2 and started on 100cc/hr fluids. Loaded with Keppra. MICU consulted but not a candidate for now. Per collateral (Daughter Mireya) he had "seizure like activity" prior to his presentation with teeth clenching at shaking at home.     SUBJECTIVE:      OBJECTIVE:    T(C): 37.1 (11-15-19 @ 06:15), Max: 38.2 (19 @ 13:32)  HR: 90 (11-15-19 @ 06:15) (76 - 121)  BP: 143/87 (11-15-19 @ 06:15) (68/29 - 199/127)  RR: 20 (11-15-19 @ 06:15) (16 - 25)  SpO2: 97% (11-15-19 @ 06:15) (95% - 100%)          PHYSICAL EXAM:  GENERAL: ill-appearing, lethargic  	HEAD:  Atraumatic, Normocephalic  	EYES: EOMI, PERRLA, conjunctiva and sclera clear, corneal arcus  	ENT: Moist mucous membranes  	NECK: Supple, No JVD  	CHEST/LUNG: Clear to auscultation bilaterally mild expiratory wheezes; using abdominal muscles for respiration  	HEART: Regular rate and rhythm; No murmurs, rubs, or gallops  	ABDOMEN: Bowel sounds present; Soft, RUQ tenderness  	EXTREMITIES:  2+ Peripheral Pulses, brisk capillary refill. 2+ LE edema  	NERVOUS SYSTEM:  A&Ox1 (knows name, thinks he's home and that it is September). speech clear. No deficits. Intermittently closes eyes during evaluation  	MSK: FROM all 4 extremities, generalized weakness but 4/5 strength in BL UE and LE. Sensation intact to light touch and pain in BL UE and LE.   SKIN: No rashes or lesions            LABS:                          10.6   18.63 )-----------( 423      ( 2019 13:10 )             32.7     11-15    129<L>  |  96<L>  |  13  ----------------------------<  247<H>  4.0   |  20<L>  |  0.92    Ca    7.8<L>      15 Nov 2019 00:30  Phos  2.7     11-15  Mg     1.4     11-15    TPro  5.3<L>  /  Alb  2.1<L>  /  TBili  0.3  /  DBili  x   /  AST  22  /  ALT  11  /  AlkPhos  81  11-14    LIVER FUNCTIONS - ( 2019 14:10 )  Alb: 2.1 g/dL / Pro: 5.3 g/dL / ALK PHOS: 81 u/L / ALT: 11 u/L / AST: 22 u/L / GGT: x             PT/INR - ( 2019 14:10 )   PT: 12.3 SEC;   INR: 1.10          PTT - ( 2019 14:10 )  PTT:31.2 SEC        Urinalysis Basic - ( 2019 13:10 )    Color: YELLOW / Appearance: Lt TURBID / S.021 / pH: 6.5  Gluc: 500 / Ketone: NEGATIVE  / Bili: NEGATIVE / Urobili: NORMAL   Blood: SMALL / Protein: 200 / Nitrite: NEGATIVE   Leuk Esterase: SMALL / RBC: 3-5 / WBC >50   Sq Epi: MODERATE / Non Sq Epi: x / Bacteria: NEGATIVE            Culture - Urine (collected 2019 13:27)  Source: URINE MIDSTREAM  Preliminary Report (15 Nov 2019 06:59):    NO GROWTH TO DATE          IMAGING:      MEDICATIONS:  MEDICATIONS  (STANDING):  dexAMETHasone     Tablet 1 milliGRAM(s) Oral daily  escitalopram 5 milliGRAM(s) Oral daily  levETIRAcetam  IVPB 500 milliGRAM(s) IV Intermittent every 12 hours  levothyroxine 88 MICROGram(s) Oral daily  midodrine. 5 milliGRAM(s) Oral three times a day  pantoprazole    Tablet 40 milliGRAM(s) Oral before breakfast  simvastatin 20 milliGRAM(s) Oral at bedtime  sodium chloride 0.9%. 1000 milliLiter(s) (100 mL/Hr) IV Continuous <Continuous>  valproate sodium IVPB 250 milliGRAM(s) IV Intermittent two times a day    MEDICATIONS  (PRN):  acetaminophen   Tablet .. 650 milliGRAM(s) Oral every 6 hours PRN Severe Pain (7 - 10) Jordan Martinez  Internal Medicine PGY-1  Pager: [phone] / [phone]      PATIENT:  CARLOS ENRIQUE TATE  [id number]    INTERVAL HISTORY/OVERNIGHT EVENTS:  Pt with GTC seizure overnight with tongue biting. Got 2mg Ativan. BP elevated and got IV labetalol as unclear if pt can tolerate PO. BP then dropped to 87/57. Bolused 500cc x2 and started on 100cc/hr fluids. Loaded with Keppra. MICU consulted but not a candidate for now. Per collateral (Daughter Mireya) he had "seizure like activity" prior to his presentation with teeth clenching at shaking at home.     SUBJECTIVE:  Pt nonverbal.     OBJECTIVE:    T(C): 37.1 (11-15-19 @ 06:15), Max: 38.2 (19 @ 13:32)  HR: 90 (11-15-19 @ 06:15) (76 - 121)  BP: 143/87 (11-15-19 @ 06:15) (68/29 - 199/127)  RR: 20 (11-15-19 @ 06:15) (16 - 25)  SpO2: 97% (11-15-19 @ 06:15) (95% - 100%)          PHYSICAL EXAM:  GENERAL: ill-appearing, lethargic  	HEAD:  Atraumatic, Normocephalic  	EYES: EOMI, PERRLA, conjunctiva and sclera clear, corneal arcus  	ENT: Moist mucous membranes  	NECK: Supple, No JVD  	CHEST/LUNG: Clear to auscultation bilaterally mild expiratory wheezes; using abdominal muscles for respiration  	HEART: Regular rate and rhythm; No murmurs, rubs, or gallops  	ABDOMEN: Bowel sounds present; Soft, RUQ tenderness  	EXTREMITIES:  2+ Peripheral Pulses, brisk capillary refill. 2+ LE edema  	NERVOUS SYSTEM:  A&Ox0. Moans in response to questions. Non-verbal.   	MSK: responds to painful stimuli.   SKIN: No rashes or lesions            LABS:                          10.6   18.63 )-----------( 423      ( 2019 13:10 )             32.7     -15    129<L>  |  96<L>  |  13  ----------------------------<  247<H>  4.0   |  20<L>  |  0.92    Ca    7.8<L>      15 Nov 2019 00:30  Phos  2.7     11-15  Mg     1.4     11-15    TPro  5.3<L>  /  Alb  2.1<L>  /  TBili  0.3  /  DBili  x   /  AST  22  /  ALT  11  /  AlkPhos  81  11-14    LIVER FUNCTIONS - ( 2019 14:10 )  Alb: 2.1 g/dL / Pro: 5.3 g/dL / ALK PHOS: 81 u/L / ALT: 11 u/L / AST: 22 u/L / GGT: x             PT/INR - ( 2019 14:10 )   PT: 12.3 SEC;   INR: 1.10          PTT - ( 2019 14:10 )  PTT:31.2 SEC        Urinalysis Basic - ( 2019 13:10 )    Color: YELLOW / Appearance: Lt TURBID / S.021 / pH: 6.5  Gluc: 500 / Ketone: NEGATIVE  / Bili: NEGATIVE / Urobili: NORMAL   Blood: SMALL / Protein: 200 / Nitrite: NEGATIVE   Leuk Esterase: SMALL / RBC: 3-5 / WBC >50   Sq Epi: MODERATE / Non Sq Epi: x / Bacteria: NEGATIVE            Culture - Urine (collected 2019 13:27)  Source: URINE MIDSTREAM  Preliminary Report (15 Nov 2019 06:59):    NO GROWTH TO DATE          IMAGING:      MEDICATIONS:  MEDICATIONS  (STANDING):  dexAMETHasone     Tablet 1 milliGRAM(s) Oral daily  escitalopram 5 milliGRAM(s) Oral daily  levETIRAcetam  IVPB 500 milliGRAM(s) IV Intermittent every 12 hours  levothyroxine 88 MICROGram(s) Oral daily  midodrine. 5 milliGRAM(s) Oral three times a day  pantoprazole    Tablet 40 milliGRAM(s) Oral before breakfast  simvastatin 20 milliGRAM(s) Oral at bedtime  sodium chloride 0.9%. 1000 milliLiter(s) (100 mL/Hr) IV Continuous <Continuous>  valproate sodium IVPB 250 milliGRAM(s) IV Intermittent two times a day    MEDICATIONS  (PRN):  acetaminophen   Tablet .. 650 milliGRAM(s) Oral every 6 hours PRN Severe Pain (7 - 10)

## 2019-11-15 NOTE — PROGRESS NOTE ADULT - PROBLEM SELECTOR PLAN 1
At baseline pt is A&Ox2 and ambulatory. Now A&Ox0-1 with diffuse weakness.  Ddx includes infection, stroke (ischemic or paradoxical embolism), secondary CNS lymphoma, post-ictal state in s/o in hospital seizure and possible seizure at home prior to presentation.   -obtain MRI brain to evaluate for leptomingeal enhancement with secondary CNS lymphoma. Will taper off decadron to increase diagnostic yield of MRI as MRI appearances of secondary CNS lymphoma may be dramatically affected by corticosteroid administration, however, do not want to precipitate adrenal insufficiency   -low suspicion for meningitis clinically, CXR without consolidation but BL pleural effusions, UA negative  -CT A/P prelim read with no acute pathology.   -will obtain CT chest to further evaluate chest and effusions  -CTH x2 showing normal progression of recent subdural from previous admission in Oct-Nov 2019. At baseline pt is A&Ox2 and ambulatory. Now A&Ox0-1 with diffuse weakness.  Ddx includes infection, stroke (ischemic or paradoxical embolism), secondary CNS lymphoma, post-ictal state in s/o in hospital seizure and possible seizure at home prior to presentation.   -per daughter pt has been tapered off of dexamethasone  -low suspicion for meningitis clinically, CXR without consolidation but BL pleural effusions, UA negative  -CT A/P prelim read with no acute pathology.   -CT chest with apical consolidations, representing infection vs alveolar edema. progression in moderate bilateral effusions (previously with thoracentesis negative for malignancy)  -Depakote level=15.1  -CTH x2 showing normal progression of recent subdural from previous admission in Oct-Nov 2019.  -Neurosurgery consulted with no acute intervention planned.

## 2019-11-15 NOTE — PROGRESS NOTE ADULT - ASSESSMENT
Mr. Browne is an 80 year old man with a PMH of DLBCL, CAD, DM, HTN, BL LE DVT, LUE DVT not on AC, CKD, orthostatic hypotension, and recent admission in November 2019 for SDH who is now presenting with acute encephalopathy ccb by GTC seizure.

## 2019-11-15 NOTE — CONSULT NOTE ADULT - ASSESSMENT
79y male w/ hx of SDH, now with new onset seizure and hyponatremia, stable SDH on CT head   -D/w HCP dr. Browne - would be open to surgical intervention if required  -Will d/w Dr Xiao- bleed appears stable, not likely that we would offer any intervention at this time   -Will follow

## 2019-11-15 NOTE — PATIENT PROFILE ADULT - OVER THE PAST TWO WEEKS, HAVE YOU FELT LITTLE INTEREST OR PLEASURE IN DOING THINGS?
unable to assess at this time  due to mental status unable to assess at this time  due to mental status/no

## 2019-11-15 NOTE — CONSULT NOTE ADULT - SUBJECTIVE AND OBJECTIVE BOX
HPI:  Mr. 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 The patient is in the ED, difficult to interview, and states that he does not remember events of this morning. Per ED notes, the patient was at baseline (A&Ox3, able to ambulate) until earlier today where he was talking to his aide and suddenly stopped talking. Presently, the patient has no complaints besides abdominal pain to palpation diffusely but specifically in the RUQ. He denies headache, chest pain, palpitations, difficulty breathing, N/V, or D/C. No head trauma/fall/loc. Pt with recent admission after a fall with SDH. Was hypertensive and tachycardic in field, received IVF via EMS. (2019 16:44)      Patient recently admitted to [facility]  under Dr Xiao after SDH was seen on CT imaging. He was observed and sent home with home care and PT.   Came back in to the ER last night with a GTC seizure episode, AMS and hyponatremia.     PAST MEDICAL & SURGICAL HISTORY:  GI bleed: s/p clipping  History of subdural hematoma: s/p unwitnessed fall with hospitilazation in early 2019. R and interhemispheric subdural hematomas with mild shift.  Dysphagia: Recently on dysphagia 3 nectar thick liquid diet during admission 2019  History of orthostatic hypotension: On midodrine and florinef  Acute deep vein thrombosis (DVT) of other vein of upper extremity, unspecified laterality: LUE; on eliquis VQ scan on  with low probability for PE  Left ventricular outflow obstruction: per daughter, patient had recent echo at [place] that did not re-demonstrate LVOT obstruction; likely was dynamic LVOT obstruction 2/2 volume status  Lymphoma: DLBC recently diagnosed being treated at [place]  Chronic kidney disease (CKD)  Cognitive developmental delay  CAD (coronary artery disease): s/p stents x4  Adult hypothyroidism  Hyperlipidemia  Peptic ulcer: s/p treatment for H pylori  Diabetes: On basaglar insulin and Januvia at home  Essential hypertension  S/P laparoscopic cholecystectomy  S/P coronary artery stent placement: x4, last one in early 2018    Allergies    No Known Allergies      acetaminophen   Tablet .. 650 milliGRAM(s) Oral every 6 hours PRN  escitalopram 5 milliGRAM(s) Oral daily  levETIRAcetam  IVPB 500 milliGRAM(s) IV Intermittent every 12 hours  levothyroxine 88 MICROGram(s) Oral daily  midodrine. 5 milliGRAM(s) Oral three times a day  pantoprazole    Tablet 40 milliGRAM(s) Oral before breakfast  simvastatin 20 milliGRAM(s) Oral at bedtime  sodium chloride 0.9%. 1000 milliLiter(s) IV Continuous <Continuous>  valproate sodium IVPB 250 milliGRAM(s) IV Intermittent two times a day    Vital Signs Last 24 Hrs  T(C): 37.1 (15 Nov 2019 06:15), Max: 38.2 (2019 13:32)  T(F): 98.8 (15 Nov 2019 06:15), Max: 100.8 (2019 13:32)  HR: 90 (15 Nov 2019 06:15) (76 - 121)  BP: 143/87 (15 Nov 2019 06:15) (68/29 - 199/127)  BP(mean): 106 (15 Nov 2019 06:15) (39 - 106)  RR: 20 (15 Nov 2019 06:15) (16 - 25)  SpO2: 97% (15 Nov 2019 06:15) (95% - 100%)    PHYSICAL EXAM:        LABS:                          10.6   18.63 )-----------( 423      ( 2019 13:10 )             32.7     11-15    122--128<L>  |  96<L>  |  12  ----------------------------<  220<H>  4.6   |  20<L>  |  0.90    Ca    7.6<L>      15 Nov 2019 07:20  Phos  2.9     11-15  Mg     1.7     15    TPro  4.9<L>  /  Alb  2.4<L>  /  TBili  0.3  /  DBili  x   /  AST  31  /  ALT  14  /  AlkPhos  86  11-15    PT/INR - ( 2019 14:10 )   PT: 12.3 SEC;   INR: 1.10          PTT - ( 2019 14:10 )  PTT:31.2 SEC  Urinalysis Basic - ( 2019 13:10 )    Color: YELLOW / Appearance: Lt TURBID / S.021 / pH: 6.5  Gluc: 500 / Ketone: NEGATIVE  / Bili: NEGATIVE / Urobili: NORMAL   Blood: SMALL / Protein: 200 / Nitrite: NEGATIVE   Leuk Esterase: SMALL / RBC: 3-5 / WBC >50   Sq Epi: MODERATE / Non Sq Epi: x / Bacteria: NEGATIVE        RADIOLOGY & ADDITIONAL STUDIES:    < from: CT Head No Cont (11.15.19 @ 02:51) >  IMPRESSION:    Stable subdural hemorrhages, midline shift, and ventricular size. A   chronic left MCA infarct is again noted. No new abnormality is   visualized. If the patient has new and and persistent symptoms, consider   short interval follow-up head CT or brain MRI follow-up.    < end of copied text > HPI:  Mr. 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 The patient is in the ED, difficult to interview, and states that he does not remember events of this morning. Per ED notes, the patient was at baseline (A&Ox3, able to ambulate) until earlier today where he was talking to his aide and suddenly stopped talking. Presently, the patient has no complaints besides abdominal pain to palpation diffusely but specifically in the RUQ. He denies headache, chest pain, palpitations, difficulty breathing, N/V, or D/C. No head trauma/fall/loc. Pt with recent admission after a fall with SDH. Was hypertensive and tachycardic in field, received IVF via EMS. (2019 16:44)      Patient recently admitted to [facility] under Dr Xiao after SDH was seen on CT imaging. He was observed and sent home with home care and PT.   Came back in to the ER last night with a GTC seizure episode, AMS and hyponatremia.     PAST MEDICAL & SURGICAL HISTORY:  GI bleed: s/p clipping  History of subdural hematoma: s/p unwitnessed fall with hospitilazation in early 2019. R and interhemispheric subdural hematomas with mild shift.  Dysphagia: Recently on dysphagia 3 nectar thick liquid diet during admission 2019  History of orthostatic hypotension: On midodrine and florinef  Acute deep vein thrombosis (DVT) of other vein of upper extremity, unspecified laterality: LUE; on eliquis VQ scan on  with low probability for PE  Left ventricular outflow obstruction: per daughter, patient had recent echo at [place] that did not re-demonstrate LVOT obstruction; likely was dynamic LVOT obstruction 2/2 volume status  Lymphoma: DLBC recently diagnosed being treated at [place]  Chronic kidney disease (CKD)  Cognitive developmental delay  CAD (coronary artery disease): s/p stents x4  Adult hypothyroidism  Hyperlipidemia  Peptic ulcer: s/p treatment for H pylori  Diabetes: On basaglar insulin and Januvia at home  Essential hypertension  S/P laparoscopic cholecystectomy  S/P coronary artery stent placement: x4, last one in early 2018    Allergies    No Known Allergies      acetaminophen   Tablet .. 650 milliGRAM(s) Oral every 6 hours PRN  escitalopram 5 milliGRAM(s) Oral daily  levETIRAcetam  IVPB 500 milliGRAM(s) IV Intermittent every 12 hours  levothyroxine 88 MICROGram(s) Oral daily  midodrine. 5 milliGRAM(s) Oral three times a day  pantoprazole    Tablet 40 milliGRAM(s) Oral before breakfast  simvastatin 20 milliGRAM(s) Oral at bedtime  sodium chloride 0.9%. 1000 milliLiter(s) IV Continuous <Continuous>  valproate sodium IVPB 250 milliGRAM(s) IV Intermittent two times a day    Vital Signs Last 24 Hrs  T(C): 37.1 (15 Nov 2019 06:15), Max: 38.2 (2019 13:32)  T(F): 98.8 (15 Nov 2019 06:15), Max: 100.8 (2019 13:32)  HR: 90 (15 Nov 2019 06:15) (76 - 121)  BP: 143/87 (15 Nov 2019 06:15) (68/29 - 199/127)  BP(mean): 106 (15 Nov 2019 06:15) (39 - 106)  RR: 20 (15 Nov 2019 06:15) (16 - 25)  SpO2: 97% (15 Nov 2019 06:15) (95% - 100%)    PHYSICAL EXAM:  A&O to person. Lethargic. PERRL. Pupils small.   Motor: generalized weakness. LUE and LLE not antigravity. RUE 3/5. RLE unable to assess. Slight spontaneous movement noted in RLE.   Sensation: intact to pain RUE RLE only.   Patient has 2+ pitting edema LUE and LLE    LABS:                          10.6   18.63 )-----------( 423      ( 2019 13:10 )             32.7     -15    122--128<L>  |  96<L>  |  12  ----------------------------<  220<H>  4.6   |  20<L>  |  0.90    Ca    7.6<L>      15 Nov 2019 07:20  Phos  2.9     11-15  Mg     1.7     11-15    TPro  4.9<L>  /  Alb  2.4<L>  /  TBili  0.3  /  DBili  x   /  AST  31  /  ALT  14  /  AlkPhos  86  11-15    PT/INR - ( 2019 14:10 )   PT: 12.3 SEC;   INR: 1.10       PTT - ( 2019 14:10 )  PTT:31.2 SEC  Urinalysis Basic - ( 2019 13:10 )    Color: YELLOW / Appearance: Lt TURBID / S.021 / pH: 6.5  Gluc: 500 / Ketone: NEGATIVE  / Bili: NEGATIVE / Urobili: NORMAL   Blood: SMALL / Protein: 200 / Nitrite: NEGATIVE   Leuk Esterase: SMALL / RBC: 3-5 / WBC >50   Sq Epi: MODERATE / Non Sq Epi: x / Bacteria: NEGATIVE    RADIOLOGY & ADDITIONAL STUDIES:    < from: CT Head No Cont (11.15.19 @ 02:51) >  IMPRESSION:    Stable subdural hemorrhages, midline shift, and ventricular size. A   chronic left MCA infarct is again noted. No new abnormality is   visualized. If the patient has new and and persistent symptoms, consider   short interval follow-up head CT or brain MRI follow-up.    < end of copied text >

## 2019-11-15 NOTE — ED ADULT NURSE REASSESSMENT NOTE - NS ED NURSE REASSESS COMMENT FT1
Patient has been asleep since having a seizure yesterday around 1950 and receiving Ativan 2mg IV.  He response to painful stimulation by pulling away and randomly raises his right arm and yawns.  Since I received the patient yesterday @2000, he has not opened his eyes but PERRL noted.  Skin intact.  Connected to cardiac monitor @ sinus rhythm, vitals taken and will continue to monitor patient.

## 2019-11-15 NOTE — PROGRESS NOTE ADULT - PROBLEM SELECTOR PLAN 6
SSI  -adrenal insufficiency work up negative on past admissions  -will taper off steroids in the s/o of possible infection. If pt found to have worsening lymphoma as cause of acute presentation will consider restarting home dose.    #hypothyroidism  -TSH>12  -will check free T4 SSI  -adrenal insufficiency work up negative on past admissions  -per daughter pt has been tapered off steroids    #hypothyroidism  -TSH>12  -will check free T4

## 2019-11-15 NOTE — PATIENT PROFILE ADULT - NSPROEDAABILITYLEARN_GEN_A_NUR
unable to assess at this time  due to mental status unable to assess at this time  due to mental status/none

## 2019-11-16 ENCOUNTER — TRANSCRIPTION ENCOUNTER (OUTPATIENT)
Age: 79
End: 2019-11-16

## 2019-11-16 LAB
ANION GAP SERPL CALC-SCNC: 15 MMO/L — HIGH (ref 7–14)
BACTERIA UR CULT: SIGNIFICANT CHANGE UP
BUN SERPL-MCNC: 10 MG/DL — SIGNIFICANT CHANGE UP (ref 7–23)
CALCIUM SERPL-MCNC: 7.4 MG/DL — LOW (ref 8.4–10.5)
CHLORIDE SERPL-SCNC: 100 MMOL/L — SIGNIFICANT CHANGE UP (ref 98–107)
CO2 SERPL-SCNC: 20 MMOL/L — LOW (ref 22–31)
CREAT SERPL-MCNC: 0.81 MG/DL — SIGNIFICANT CHANGE UP (ref 0.5–1.3)
GLUCOSE SERPL-MCNC: 182 MG/DL — HIGH (ref 70–99)
HCT VFR BLD CALC: 29.2 % — LOW (ref 39–50)
HGB BLD-MCNC: 9.1 G/DL — LOW (ref 13–17)
LACTATE SERPL-SCNC: 1.5 MMOL/L — SIGNIFICANT CHANGE UP (ref 0.5–2)
MAGNESIUM SERPL-MCNC: 1.5 MG/DL — LOW (ref 1.6–2.6)
MCHC RBC-ENTMCNC: 28.4 PG — SIGNIFICANT CHANGE UP (ref 27–34)
MCHC RBC-ENTMCNC: 31.2 % — LOW (ref 32–36)
MCV RBC AUTO: 91.3 FL — SIGNIFICANT CHANGE UP (ref 80–100)
METHOD TYPE: SIGNIFICANT CHANGE UP
NRBC # FLD: 0 K/UL — SIGNIFICANT CHANGE UP (ref 0–0)
ORGANISM # SPEC MICROSCOPIC CNT: SIGNIFICANT CHANGE UP
ORGANISM # SPEC MICROSCOPIC CNT: SIGNIFICANT CHANGE UP
PHOSPHATE SERPL-MCNC: 2.2 MG/DL — LOW (ref 2.5–4.5)
PLATELET # BLD AUTO: 392 K/UL — SIGNIFICANT CHANGE UP (ref 150–400)
PMV BLD: 10.9 FL — SIGNIFICANT CHANGE UP (ref 7–13)
POTASSIUM SERPL-MCNC: 3.6 MMOL/L — SIGNIFICANT CHANGE UP (ref 3.5–5.3)
POTASSIUM SERPL-SCNC: 3.6 MMOL/L — SIGNIFICANT CHANGE UP (ref 3.5–5.3)
RBC # BLD: 3.2 M/UL — LOW (ref 4.2–5.8)
RBC # FLD: 19 % — HIGH (ref 10.3–14.5)
SODIUM SERPL-SCNC: 135 MMOL/L — SIGNIFICANT CHANGE UP (ref 135–145)
WBC # BLD: 15.53 K/UL — HIGH (ref 3.8–10.5)
WBC # FLD AUTO: 15.53 K/UL — HIGH (ref 3.8–10.5)

## 2019-11-16 PROCEDURE — 99233 SBSQ HOSP IP/OBS HIGH 50: CPT | Mod: GC

## 2019-11-16 PROCEDURE — 99232 SBSQ HOSP IP/OBS MODERATE 35: CPT | Mod: GC

## 2019-11-16 RX ORDER — DEXTROSE 50 % IN WATER 50 %
12.5 SYRINGE (ML) INTRAVENOUS ONCE
Refills: 0 | Status: DISCONTINUED | OUTPATIENT
Start: 2019-11-16 | End: 2019-12-24

## 2019-11-16 RX ORDER — SODIUM CHLORIDE 9 MG/ML
1000 INJECTION, SOLUTION INTRAVENOUS
Refills: 0 | Status: DISCONTINUED | OUTPATIENT
Start: 2019-11-16 | End: 2019-12-24

## 2019-11-16 RX ORDER — GLUCAGON INJECTION, SOLUTION 0.5 MG/.1ML
1 INJECTION, SOLUTION SUBCUTANEOUS ONCE
Refills: 0 | Status: DISCONTINUED | OUTPATIENT
Start: 2019-11-16 | End: 2019-12-24

## 2019-11-16 RX ORDER — INSULIN LISPRO 100/ML
VIAL (ML) SUBCUTANEOUS EVERY 6 HOURS
Refills: 0 | Status: DISCONTINUED | OUTPATIENT
Start: 2019-11-16 | End: 2019-11-16

## 2019-11-16 RX ORDER — DEXTROSE 50 % IN WATER 50 %
25 SYRINGE (ML) INTRAVENOUS ONCE
Refills: 0 | Status: DISCONTINUED | OUTPATIENT
Start: 2019-11-16 | End: 2019-12-24

## 2019-11-16 RX ORDER — INSULIN LISPRO 100/ML
VIAL (ML) SUBCUTANEOUS AT BEDTIME
Refills: 0 | Status: DISCONTINUED | OUTPATIENT
Start: 2019-11-16 | End: 2019-11-30

## 2019-11-16 RX ORDER — MAGNESIUM SULFATE 500 MG/ML
2 VIAL (ML) INJECTION ONCE
Refills: 0 | Status: COMPLETED | OUTPATIENT
Start: 2019-11-16 | End: 2019-11-16

## 2019-11-16 RX ORDER — VANCOMYCIN HCL 1 G
1000 VIAL (EA) INTRAVENOUS EVERY 12 HOURS
Refills: 0 | Status: COMPLETED | OUTPATIENT
Start: 2019-11-16 | End: 2019-11-19

## 2019-11-16 RX ORDER — DEXTROSE 50 % IN WATER 50 %
15 SYRINGE (ML) INTRAVENOUS ONCE
Refills: 0 | Status: DISCONTINUED | OUTPATIENT
Start: 2019-11-16 | End: 2019-12-24

## 2019-11-16 RX ORDER — INSULIN LISPRO 100/ML
VIAL (ML) SUBCUTANEOUS
Refills: 0 | Status: DISCONTINUED | OUTPATIENT
Start: 2019-11-16 | End: 2019-11-30

## 2019-11-16 RX ORDER — ENOXAPARIN SODIUM 100 MG/ML
40 INJECTION SUBCUTANEOUS DAILY
Refills: 0 | Status: DISCONTINUED | OUTPATIENT
Start: 2019-11-16 | End: 2019-11-28

## 2019-11-16 RX ADMIN — PIPERACILLIN AND TAZOBACTAM 25 GRAM(S): 4; .5 INJECTION, POWDER, LYOPHILIZED, FOR SOLUTION INTRAVENOUS at 04:53

## 2019-11-16 RX ADMIN — Medication 63.75 MILLIMOLE(S): at 09:31

## 2019-11-16 RX ADMIN — PIPERACILLIN AND TAZOBACTAM 25 GRAM(S): 4; .5 INJECTION, POWDER, LYOPHILIZED, FOR SOLUTION INTRAVENOUS at 13:21

## 2019-11-16 RX ADMIN — PIPERACILLIN AND TAZOBACTAM 25 GRAM(S): 4; .5 INJECTION, POWDER, LYOPHILIZED, FOR SOLUTION INTRAVENOUS at 22:19

## 2019-11-16 RX ADMIN — LEVETIRACETAM 400 MILLIGRAM(S): 250 TABLET, FILM COATED ORAL at 17:07

## 2019-11-16 RX ADMIN — Medication 26.25 MILLIGRAM(S): at 05:30

## 2019-11-16 RX ADMIN — Medication 2: at 13:21

## 2019-11-16 RX ADMIN — Medication 26.25 MILLIGRAM(S): at 17:08

## 2019-11-16 RX ADMIN — LEVETIRACETAM 400 MILLIGRAM(S): 250 TABLET, FILM COATED ORAL at 04:53

## 2019-11-16 RX ADMIN — Medication 1: at 09:22

## 2019-11-16 RX ADMIN — Medication 250 MILLIGRAM(S): at 17:11

## 2019-11-16 RX ADMIN — Medication 1: at 17:35

## 2019-11-16 RX ADMIN — ENOXAPARIN SODIUM 40 MILLIGRAM(S): 100 INJECTION SUBCUTANEOUS at 13:24

## 2019-11-16 RX ADMIN — Medication 50 GRAM(S): at 09:31

## 2019-11-16 NOTE — PROGRESS NOTE ADULT - SUBJECTIVE AND OBJECTIVE BOX
Jordan Martinez  Internal Medicine PGY-1  Pager: [phone] / [phone]      PATIENT:  CARLOS ENRIQUE TATE  [id number]    INTERVAL HISTORY/OVERNIGHT EVENTS:  BCx with GPC in clusters. Started vanc. Continuing zosyn.    SUBJECTIVE:  Pt nonverbal.     OBJECTIVE:    T(C): 36.7 (19 @ 05:20), Max: 37.2 (11-15-19 @ 18:51)  HR: 96 (19 @ 05:20) (78 - 98)  BP: 175/108 (19 @ 05:20) (154/98 - 175/108)  RR: 20 (19 @ 05:20) (16 - 20)  SpO2: 98% (19 @ 05:20) (95% - 100%)      11-15-19 @ 07:01  -  19 @ 07:00  --------------------------------------------------------  IN: 250 mL / OUT: 0 mL / NET: 250 mL          PHYSICAL EXAM:  GENERAL: ill-appearing, lethargic  	HEAD:  Atraumatic, Normocephalic  	EYES: EOMI, PERRLA, conjunctiva and sclera clear, corneal arcus  	ENT: Moist mucous membranes  	NECK: Supple, No JVD  	CHEST/LUNG: Clear to auscultation bilaterally mild expiratory wheezes; using abdominal muscles for respiration  	HEART: Regular rate and rhythm; No murmurs, rubs, or gallops  	ABDOMEN: Bowel sounds present; Soft, RUQ tenderness  	EXTREMITIES:  2+ Peripheral Pulses, brisk capillary refill. 2+ LE edema  	NERVOUS SYSTEM:  A&Ox0. Moans in response to questions. Non-verbal.   	MSK: responds to painful stimuli.   SKIN: No rashes or lesions          LABS:                          9.4    15.38 )-----------( 395      ( 15 Nov 2019 12:59 )             29.3     11-15    128<L>  |  96<L>  |  12  ----------------------------<  220<H>  4.6   |  20<L>  |  0.90    Ca    7.6<L>      15 Nov 2019 07:20  Phos  2.9     11-15  Mg     1.7     11-15    TPro  4.9<L>  /  Alb  2.4<L>  /  TBili  0.3  /  DBili  x   /  AST  31  /  ALT  14  /  AlkPhos  86  11-15    LIVER FUNCTIONS - ( 15 Nov 2019 07:20 )  Alb: 2.4 g/dL / Pro: 4.9 g/dL / ALK PHOS: 86 u/L / ALT: 14 u/L / AST: 31 u/L / GGT: x             PT/INR - ( 2019 14:10 )   PT: 12.3 SEC;   INR: 1.10          PTT - ( 2019 14:10 )  PTT:31.2 SEC        Urinalysis Basic - ( 2019 13:10 )    Color: YELLOW / Appearance: Lt TURBID / S.021 / pH: 6.5  Gluc: 500 / Ketone: NEGATIVE  / Bili: NEGATIVE / Urobili: NORMAL   Blood: SMALL / Protein: 200 / Nitrite: NEGATIVE   Leuk Esterase: SMALL / RBC: 3-5 / WBC >50   Sq Epi: MODERATE / Non Sq Epi: x / Bacteria: NEGATIVE            Culture - Blood (collected 2019 14:24)  Source: Peripheral Site 1  Preliminary Report (15 Nov 2019 14:25):    NO ORGANISMS ISOLATED    NO ORGANISMS ISOLATED AT 24 HOURS    Culture - Blood (collected 2019 13:56)  Source: Peripheral Site 2  Preliminary Report (2019 06:05):    NO ORGANISMS ISOLATED AT 24 HOURS    BCPCR^BLOOD CULTURE PCR    ***Blood Panel PCR results on this specimen are available    approximately 3 hours after the Gram stain result***    Gram stain, PCR, and/or culture results may not always    correspond due to difference in methodologies    ------------------------------------------------------------    This PCR assay was performed using [employer].  The    following targets are tested for:  Enterococcus, vancomycin    resistant enterococci, Listeria monocytogenes,  coagulase    negative staphylococci, S. aureus, methicillin resistant S.    aureus, Streptococcus agalactiae (Group B), S. pneumoniae,    S. pyogenes (Group A), Acinetobacter baumannii, Enterobacter    cloacae, E. coli, Klebsiella oxytoca, K. pneumoniae, Proteus    sp., Serratia marcescens, Haemophilus influenzae, Neisseria    meningitidis, Pseudomonas aeruginosa, Candida albicans, C.    glabrata, C. krusei, C. parapsilosis, C. tropicalis and the    KPC resistance gene.    **NOTE: Due to technical problems,Proteus sp. will NOT be    reported as part of the BCID panel until further notice.    Culture - Urine (collected 2019 13:27)  Source: URINE MIDSTREAM  Preliminary Report (15 Nov 2019 06:59):    NO GROWTH TO DATE          IMAGING:      MEDICATIONS:  MEDICATIONS  (STANDING):  dextrose 5%. 1000 milliLiter(s) (50 mL/Hr) IV Continuous <Continuous>  dextrose 50% Injectable 12.5 Gram(s) IV Push once  dextrose 50% Injectable 25 Gram(s) IV Push once  dextrose 50% Injectable 25 Gram(s) IV Push once  escitalopram 5 milliGRAM(s) Oral daily  insulin lispro (HumaLOG) corrective regimen sliding scale   SubCutaneous every 6 hours  levETIRAcetam  IVPB 500 milliGRAM(s) IV Intermittent every 12 hours  levothyroxine 88 MICROGram(s) Oral daily  pantoprazole    Tablet 40 milliGRAM(s) Oral before breakfast  piperacillin/tazobactam IVPB.. 3.375 Gram(s) IV Intermittent every 8 hours  simvastatin 20 milliGRAM(s) Oral at bedtime  valproate sodium IVPB 250 milliGRAM(s) IV Intermittent two times a day  vancomycin  IVPB 1000 milliGRAM(s) IV Intermittent every 12 hours    MEDICATIONS  (PRN):  acetaminophen   Tablet .. 650 milliGRAM(s) Oral every 6 hours PRN Severe Pain (7 - 10)  dextrose 40% Gel 15 Gram(s) Oral once PRN Blood Glucose LESS THAN 70 milliGRAM(s)/deciliter  glucagon  Injectable 1 milliGRAM(s) IntraMuscular once PRN Glucose LESS THAN 70 milligrams/deciliter Jordan Martinez  Internal Medicine PGY-1  Pager: [phone] / [phone]      PATIENT:  CARLOS ENRIQUE TATE  [id number]    INTERVAL HISTORY/OVERNIGHT EVENTS:  BCx with GPC in clusters. Started vanc. Continuing zosyn.    SUBJECTIVE:  Pt verbal but confused and difficult to interview. A&Ox1. Responds to questions by saying we should contact his daughter. Denies any pain. Reports weakness.     OBJECTIVE:    T(C): 36.7 (19 @ 05:20), Max: 37.2 (11-15-19 @ 18:51)  HR: 96 (19 @ 05:20) (78 - 98)  BP: 175/108 (19 @ 05:20) (154/98 - 175/108)  RR: 20 (19 @ 05:20) (16 - 20)  SpO2: 98% (19 @ 05:20) (95% - 100%)      11-15-19 @ 07:01  -  19 @ 07:00  --------------------------------------------------------  IN: 250 mL / OUT: 0 mL / NET: 250 mL          PHYSICAL EXAM:  GENERAL: ill-appearing, lethargic  	HEAD:  Atraumatic, Normocephalic  	EYES: EOMI, PERRLA, conjunctiva and sclera clear, corneal arcus  	ENT: Moist mucous membranes  	NECK: Supple, No JVD  	CHEST/LUNG: Clear to auscultation bilaterally mild expiratory wheezes; using abdominal muscles for respiration  	HEART: Regular rate and rhythm; No murmurs, rubs, or gallops  	ABDOMEN: Bowel sounds present; Soft, no RUQ tenderness  	EXTREMITIES:  2+ Peripheral Pulses, brisk capillary refill. 2+ LE edema. L UE tenderness. Pitting edema at LUE  	NERVOUS SYSTEM:  A&Ox1. Not able to answer most questions. Able to move all extremities but refuses full strength testing.   	MSK: responds to painful stimuli.   SKIN: No rashes or lesions          LABS:                          9.4    15.38 )-----------( 395      ( 15 Nov 2019 12:59 )             29.3     11-15    128<L>  |  96<L>  |  12  ----------------------------<  220<H>  4.6   |  20<L>  |  0.90    Ca    7.6<L>      15 Nov 2019 07:20  Phos  2.9     11-15  Mg     1.7     11-15    TPro  4.9<L>  /  Alb  2.4<L>  /  TBili  0.3  /  DBili  x   /  AST  31  /  ALT  14  /  AlkPhos  86  11-15    LIVER FUNCTIONS - ( 15 Nov 2019 07:20 )  Alb: 2.4 g/dL / Pro: 4.9 g/dL / ALK PHOS: 86 u/L / ALT: 14 u/L / AST: 31 u/L / GGT: x             PT/INR - ( 2019 14:10 )   PT: 12.3 SEC;   INR: 1.10          PTT - ( 2019 14:10 )  PTT:31.2 SEC        Urinalysis Basic - ( 2019 13:10 )    Color: YELLOW / Appearance: Lt TURBID / S.021 / pH: 6.5  Gluc: 500 / Ketone: NEGATIVE  / Bili: NEGATIVE / Urobili: NORMAL   Blood: SMALL / Protein: 200 / Nitrite: NEGATIVE   Leuk Esterase: SMALL / RBC: 3-5 / WBC >50   Sq Epi: MODERATE / Non Sq Epi: x / Bacteria: NEGATIVE            Culture - Blood (collected 2019 14:24)  Source: Peripheral Site 1  Preliminary Report (15 Nov 2019 14:25):    NO ORGANISMS ISOLATED    NO ORGANISMS ISOLATED AT 24 HOURS    Culture - Blood (collected 2019 13:56)  Source: Peripheral Site 2  Preliminary Report (2019 06:05):    NO ORGANISMS ISOLATED AT 24 HOURS    BCPCR^BLOOD CULTURE PCR    ***Blood Panel PCR results on this specimen are available    approximately 3 hours after the Gram stain result***    Gram stain, PCR, and/or culture results may not always    correspond due to difference in methodologies    ------------------------------------------------------------    This PCR assay was performed using [employer].  The    following targets are tested for:  Enterococcus, vancomycin    resistant enterococci, Listeria monocytogenes,  coagulase    negative staphylococci, S. aureus, methicillin resistant S.    aureus, Streptococcus agalactiae (Group B), S. pneumoniae,    S. pyogenes (Group A), Acinetobacter baumannii, Enterobacter    cloacae, E. coli, Klebsiella oxytoca, K. pneumoniae, Proteus    sp., Serratia marcescens, Haemophilus influenzae, Neisseria    meningitidis, Pseudomonas aeruginosa, Candida albicans, C.    glabrata, C. krusei, C. parapsilosis, C. tropicalis and the    KPC resistance gene.    **NOTE: Due to technical problems,Proteus sp. will NOT be    reported as part of the BCID panel until further notice.    Culture - Urine (collected 2019 13:27)  Source: URINE MIDSTREAM  Preliminary Report (15 Nov 2019 06:59):    NO GROWTH TO DATE          IMAGING:      MEDICATIONS:  MEDICATIONS  (STANDING):  dextrose 5%. 1000 milliLiter(s) (50 mL/Hr) IV Continuous <Continuous>  dextrose 50% Injectable 12.5 Gram(s) IV Push once  dextrose 50% Injectable 25 Gram(s) IV Push once  dextrose 50% Injectable 25 Gram(s) IV Push once  escitalopram 5 milliGRAM(s) Oral daily  insulin lispro (HumaLOG) corrective regimen sliding scale   SubCutaneous every 6 hours  levETIRAcetam  IVPB 500 milliGRAM(s) IV Intermittent every 12 hours  levothyroxine 88 MICROGram(s) Oral daily  pantoprazole    Tablet 40 milliGRAM(s) Oral before breakfast  piperacillin/tazobactam IVPB.. 3.375 Gram(s) IV Intermittent every 8 hours  simvastatin 20 milliGRAM(s) Oral at bedtime  valproate sodium IVPB 250 milliGRAM(s) IV Intermittent two times a day  vancomycin  IVPB 1000 milliGRAM(s) IV Intermittent every 12 hours    MEDICATIONS  (PRN):  acetaminophen   Tablet .. 650 milliGRAM(s) Oral every 6 hours PRN Severe Pain (7 - 10)  dextrose 40% Gel 15 Gram(s) Oral once PRN Blood Glucose LESS THAN 70 milliGRAM(s)/deciliter  glucagon  Injectable 1 milliGRAM(s) IntraMuscular once PRN Glucose LESS THAN 70 milligrams/deciliter Jordan Martinez  Internal Medicine PGY-1  Pager: [phone] / [phone]      PATIENT:  CARLOS ENRIQUE TATE  [id number]    INTERVAL HISTORY/OVERNIGHT EVENTS:  BCx with GPC in clusters. Started vanc. Continuing zosyn.    SUBJECTIVE:  Pt verbal but confused and difficult to interview. A&Ox1. Responds to questions by saying we should contact his daughter. Denies any pain. Reports weakness.     OBJECTIVE:    T(C): 36.7 (19 @ 05:20), Max: 37.2 (11-15-19 @ 18:51)  HR: 96 (19 @ 05:20) (78 - 98)  BP: 175/108 (19 @ 05:20) (154/98 - 175/108)  RR: 20 (19 @ 05:20) (16 - 20)  SpO2: 98% (19 @ 05:20) (95% - 100%)      11-15-19 @ 07:01  -  19 @ 07:00  --------------------------------------------------------  IN: 250 mL / OUT: 0 mL / NET: 250 mL          PHYSICAL EXAM:  GENERAL: ill-appearing, lethargic  	HEAD:  Atraumatic, Normocephalic  	EYES: EOMI, PERRLA, conjunctiva and sclera clear, corneal arcus  	ENT: Moist mucous membranes  	NECK: Supple, No JVD  	CHEST/LUNG: Clear to auscultation bilaterally mild expiratory wheezes; using abdominal muscles for respiration  	HEART: Regular rate and rhythm; No murmurs, rubs, or gallops  	ABDOMEN: Bowel sounds present; Soft, no RUQ tenderness  	EXTREMITIES:  2+ Peripheral Pulses, brisk capillary refill. 2+ LE edema. L UE tenderness. Pitting edema at LUE  	NERVOUS SYSTEM:  A&Ox1. Much improved from yesterday. Not able to answer most questions. Able to move all extremities but refuses full strength testing.   	MSK: responds to painful stimuli.   SKIN: No rashes or lesions          LABS:                          9.4    15.38 )-----------( 395      ( 15 Nov 2019 12:59 )             29.3     11-15    128<L>  |  96<L>  |  12  ----------------------------<  220<H>  4.6   |  20<L>  |  0.90    Ca    7.6<L>      15 Nov 2019 07:20  Phos  2.9     11-15  Mg     1.7     11-15    TPro  4.9<L>  /  Alb  2.4<L>  /  TBili  0.3  /  DBili  x   /  AST  31  /  ALT  14  /  AlkPhos  86  11-15    LIVER FUNCTIONS - ( 15 Nov 2019 07:20 )  Alb: 2.4 g/dL / Pro: 4.9 g/dL / ALK PHOS: 86 u/L / ALT: 14 u/L / AST: 31 u/L / GGT: x             PT/INR - ( 2019 14:10 )   PT: 12.3 SEC;   INR: 1.10          PTT - ( 2019 14:10 )  PTT:31.2 SEC        Urinalysis Basic - ( 2019 13:10 )    Color: YELLOW / Appearance: Lt TURBID / S.021 / pH: 6.5  Gluc: 500 / Ketone: NEGATIVE  / Bili: NEGATIVE / Urobili: NORMAL   Blood: SMALL / Protein: 200 / Nitrite: NEGATIVE   Leuk Esterase: SMALL / RBC: 3-5 / WBC >50   Sq Epi: MODERATE / Non Sq Epi: x / Bacteria: NEGATIVE            Culture - Blood (collected 2019 14:24)  Source: Peripheral Site 1  Preliminary Report (15 Nov 2019 14:25):    NO ORGANISMS ISOLATED    NO ORGANISMS ISOLATED AT 24 HOURS    Culture - Blood (collected 2019 13:56)  Source: Peripheral Site 2  Preliminary Report (2019 06:05):    NO ORGANISMS ISOLATED AT 24 HOURS    BCPCR^BLOOD CULTURE PCR    ***Blood Panel PCR results on this specimen are available    approximately 3 hours after the Gram stain result***    Gram stain, PCR, and/or culture results may not always    correspond due to difference in methodologies    ------------------------------------------------------------    This PCR assay was performed using [employer].  The    following targets are tested for:  Enterococcus, vancomycin    resistant enterococci, Listeria monocytogenes,  coagulase    negative staphylococci, S. aureus, methicillin resistant S.    aureus, Streptococcus agalactiae (Group B), S. pneumoniae,    S. pyogenes (Group A), Acinetobacter baumannii, Enterobacter    cloacae, E. coli, Klebsiella oxytoca, K. pneumoniae, Proteus    sp., Serratia marcescens, Haemophilus influenzae, Neisseria    meningitidis, Pseudomonas aeruginosa, Candida albicans, C.    glabrata, C. krusei, C. parapsilosis, C. tropicalis and the    KPC resistance gene.    **NOTE: Due to technical problems,Proteus sp. will NOT be    reported as part of the BCID panel until further notice.    Culture - Urine (collected 2019 13:27)  Source: URINE MIDSTREAM  Preliminary Report (15 Nov 2019 06:59):    NO GROWTH TO DATE          IMAGING:      MEDICATIONS:  MEDICATIONS  (STANDING):  dextrose 5%. 1000 milliLiter(s) (50 mL/Hr) IV Continuous <Continuous>  dextrose 50% Injectable 12.5 Gram(s) IV Push once  dextrose 50% Injectable 25 Gram(s) IV Push once  dextrose 50% Injectable 25 Gram(s) IV Push once  escitalopram 5 milliGRAM(s) Oral daily  insulin lispro (HumaLOG) corrective regimen sliding scale   SubCutaneous every 6 hours  levETIRAcetam  IVPB 500 milliGRAM(s) IV Intermittent every 12 hours  levothyroxine 88 MICROGram(s) Oral daily  pantoprazole    Tablet 40 milliGRAM(s) Oral before breakfast  piperacillin/tazobactam IVPB.. 3.375 Gram(s) IV Intermittent every 8 hours  simvastatin 20 milliGRAM(s) Oral at bedtime  valproate sodium IVPB 250 milliGRAM(s) IV Intermittent two times a day  vancomycin  IVPB 1000 milliGRAM(s) IV Intermittent every 12 hours    MEDICATIONS  (PRN):  acetaminophen   Tablet .. 650 milliGRAM(s) Oral every 6 hours PRN Severe Pain (7 - 10)  dextrose 40% Gel 15 Gram(s) Oral once PRN Blood Glucose LESS THAN 70 milliGRAM(s)/deciliter  glucagon  Injectable 1 milliGRAM(s) IntraMuscular once PRN Glucose LESS THAN 70 milligrams/deciliter

## 2019-11-16 NOTE — PROGRESS NOTE ADULT - PROBLEM SELECTOR PLAN 2
T- 100.8 in the ED and tachycardic in the field. No identifiable source of infection at present.  -low suspicion for meningitis clinically, CXR without consolidation but BL pleural effusions, UA negative  -procalcitonin 0.42  -no evidence of intraabdominal infection on CT  -now growing GPCs in clusters on BCx. Favors infectious etiology. Still no localizing signs or symptoms of infection. Vanc and zosyn for now. T- 100.8 in the ED and tachycardic in the field. No identifiable source of infection at present.  -low suspicion for meningitis clinically, CXR without consolidation but BL pleural effusions, UA negative  -procalcitonin 0.42  -no evidence of intraabdominal infection on CT  -now growing CNS in 1 of 4 bottles on BCx. Favors contamination but will repeat BCx. Still no localizing signs or symptoms of infection.   -Vanc and zosyn x5 days for possible PNA given consolidations on CT and possible aspiration in s/o seizure

## 2019-11-16 NOTE — PROGRESS NOTE ADULT - PROBLEM SELECTOR PLAN 8
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 Pt was also hyponatremic on a previous admission.  -unclear if pt is hypovolemic, euvolemic, or hypervolemic on exam as pt as LE edema secondary to bl DVTs. Dry mucous membranes, but bilateral pleural effusions.   -1L fluid restriction  -pt may be having seizures in setting of known SDH with lowered seizure threshold 2/2 to hyponatremia. 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 Pt was also hyponatremic on a previous admission.  -unclear if pt is hypovolemic, euvolemic, or hypervolemic on exam as pt as LE edema secondary to bl DVTs. Dry mucous membranes, but bilateral pleural effusions.   -1L fluid restriction  -pt may be having seizures in setting of known SDH with lowered seizure threshold 2/2 to hyponatremia.  -Na 128>135 today  -CTM

## 2019-11-16 NOTE — PROGRESS NOTE ADULT - PROBLEM SELECTOR PLAN 6
SSI  -adrenal insufficiency work up negative on past admissions  -per daughter pt has been tapered off steroids    #hypothyroidism  -TSH>12  -will check free T4

## 2019-11-16 NOTE — PROGRESS NOTE ADULT - PROBLEM SELECTOR PLAN 9
-pt unable to tolerate PO meds at this point. BP stable after fluids. If unable to maintain MAP pt may need levophed ggt in MICU. -BP stable.

## 2019-11-16 NOTE — DISCHARGE NOTE PROVIDER - CARE PROVIDER_API CALL
Dr. Harleen Guaman,   Follow up with Primary Care Provider Dr. Harleen Guaman within 1 week of discharge  Phone: (   )    -  Fax: (   )    -  Follow Up Time:

## 2019-11-16 NOTE — PROGRESS NOTE ADULT - ASSESSMENT
Mr. Browne is an 80 year old man with a PMH of DLBCL, CAD, DM, HTN, BL LE DVT, LUE DVT not on AC, CKD, orthostatic hypotension, and recent admission in November 2019 for SDH who is now presenting with acute encephalopathy ccb by GTC seizure now growing GPCs on BCx. Mr. Browne is an 80 year old man with a PMH of DLBCL, CAD, DM, HTN, BL LE DVT, LUE DVT not on AC, CKD, orthostatic hypotension, and recent admission in November 2019 for SDH who is now presenting with acute encephalopathy ccb by GTC seizure. Mr. 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

## 2019-11-16 NOTE — PROGRESS NOTE ADULT - PROBLEM SELECTOR PLAN 10
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 DVT ppx: ppx lovenox  Diet: Dysphagia 3 diet based on last admission but will leave pt NPO for now given AMS.

## 2019-11-16 NOTE — PROGRESS NOTE ADULT - SUBJECTIVE AND OBJECTIVE BOX
CARLOS ENRIQUE TATE  79y  Patient is a 79y old  Male who presents with a chief complaint of AMS (2019 13:26)    HPI:  This is a patient with Seizures. Noted to have hyponatremia. The Mental status and seizures not likely related.  He was given NS with improvement.    HEALTH ISSUES - PROBLEM Dx:  Seizure: Seizure  Orthostatic hypotension: Orthostatic hypotension  Hyponatremia: Hyponatremia  Lymphoma: Lymphoma  Diabetes: Diabetes  NSTEMI (non-ST elevated myocardial infarction): NSTEMI (non-ST elevated myocardial infarction)  Preventative health care: Preventative health care  DVT (deep venous thrombosis): DVT (deep venous thrombosis)  History of subdural hematoma: History of subdural hematoma  Sepsis: Sepsis  Encephalopathy, unspecified: Encephalopathy, unspecified        MEDICATIONS  (STANDING):  dextrose 5%. 1000 milliLiter(s) (50 mL/Hr) IV Continuous <Continuous>  dextrose 50% Injectable 12.5 Gram(s) IV Push once  dextrose 50% Injectable 25 Gram(s) IV Push once  dextrose 50% Injectable 25 Gram(s) IV Push once  enoxaparin Injectable 40 milliGRAM(s) SubCutaneous daily  escitalopram 5 milliGRAM(s) Oral daily  insulin lispro (HumaLOG) corrective regimen sliding scale   SubCutaneous three times a day before meals  insulin lispro (HumaLOG) corrective regimen sliding scale   SubCutaneous at bedtime  levETIRAcetam  IVPB 500 milliGRAM(s) IV Intermittent every 12 hours  levothyroxine 88 MICROGram(s) Oral daily  pantoprazole    Tablet 40 milliGRAM(s) Oral before breakfast  piperacillin/tazobactam IVPB.. 3.375 Gram(s) IV Intermittent every 8 hours  simvastatin 20 milliGRAM(s) Oral at bedtime  valproate sodium IVPB 250 milliGRAM(s) IV Intermittent two times a day  vancomycin  IVPB 1000 milliGRAM(s) IV Intermittent every 12 hours    MEDICATIONS  (PRN):  acetaminophen   Tablet .. 650 milliGRAM(s) Oral every 6 hours PRN Severe Pain (7 - 10)  dextrose 40% Gel 15 Gram(s) Oral once PRN Blood Glucose LESS THAN 70 milliGRAM(s)/deciliter  glucagon  Injectable 1 milliGRAM(s) IntraMuscular once PRN Glucose LESS THAN 70 milligrams/deciliter    Vital Signs Last 24 Hrs  T(C): 37 (2019 22:00), Max: 37.3 (2019 11:47)  T(F): 98.6 (2019 22:00), Max: 99.1 (2019 11:47)  HR: 97 (2019 22:00) (89 - 98)  BP: 142/93 (2019 22:00) (142/93 - 175/108)  BP(mean): --  RR: 17 (2019 22:00) (16 - 20)  SpO2: 99% (:00) (95% - 100%)  Daily     Daily Weight in k.9 (2019 07:32)    PHYSICAL EXAM:  Constitutional:  He appears comfortable and not distressed. Not diaphoretic.    Neck:  The thyroid is normal. Trachea is midline.     Respiratory: The lungs are clear to auscultation. No dullness and expansion is normal.    Cardiovascular: S1 and S2 are normal. No mummurs, rubs or gallops are present.    Gastrointestinal: The abdomen is soft. No tenderness is present. No masses are present. Bowel sounds are normal.    Genitourinary: The bladder is not distended. No CVA tenderness is present.    Extremities: No edema is noted. No deformities are present.    Neurological: Tone, power and sensation are normal.     Skin: No lesions are seen  or palpated.    Lymph Nodes: No lymphadenopathy is present.    Psychiatric: Mood is appropriate. No hallucinations or flight of ideas are noted.                              9.1    15.53 )-----------( 392      ( 2019 06:30 )             29.2     11-16    135  |  100  |  10  ----------------------------<  182<H>  3.6   |  20<L>  |  0.81    Ca    7.4<L>      2019 06:30  Phos  2.2     11-16  Mg     1.5     11-16    TPro  4.9<L>  /  Alb  2.4<L>  /  TBili  0.3  /  DBili  x   /  AST  31  /  ALT  14  /  AlkPhos  86  11-15    Uric Acid, Serum (19 @ 20:31)    Uric Acid, Serum: 3.1 mg/dL    Osmolality, Serum (11.15.19 @ 07:20)    Osmolality, Serum: 283 mosmo/kg    Sodium, Serum: 128 mmol/L (11.15.19 @ 07:20)

## 2019-11-16 NOTE — PROGRESS NOTE ADULT - PROBLEM SELECTOR PLAN 4
3 known active DVTs. Will obtain BL UE and LE DVTs to evaluate for new DVT/progression.  -also concern for PE given that pt was tachycardic in the field in the setting of 3 known DVT's. Recent V/Q scan on 11/4 with low suspicion for PE.  -heme/onc consulted. Holding off on AC until clearance from neurosurgery.  -repeat BL UE US with no evidence of DVT now. Superficial vein thrombosis visualized in the left cephalic vein. BL LE US with Unchanged subacute DVT right soleal vein. 3 known active DVTs. Will obtain BL UE and LE DVTs to evaluate for new DVT/progression.  -also concern for PE given that pt was tachycardic in the field in the setting of 3 known DVT's. Recent V/Q scan on 11/4 with low suspicion for PE.  -heme/onc consulted.   -restarting lovenox today  -repeat BL UE US with no evidence of DVT now. Superficial vein thrombosis visualized in the left cephalic vein. BL LE US with Unchanged subacute DVT right soleal vein.

## 2019-11-16 NOTE — DISCHARGE NOTE PROVIDER - NSDCCPCAREPLAN_GEN_ALL_CORE_FT
PRINCIPAL DISCHARGE DIAGNOSIS  Diagnosis: Encephalopathy, unspecified  Assessment and Plan of Treatment: You were admitted for confusion and weakness. Your confusion was  caused by many factors: likely from an infection, low salt level and seizure. You were treated for pneumonia with 5 days course of antibiotics (vancomycin and zosyn). You were given salt tabs to improve your salt levels. You were also started on anti-seizure medications (keppra and valproic acid). EEG showed you did not have further seizures during your hospital stay.      SECONDARY DISCHARGE DIAGNOSES  Diagnosis: Lymphoma  Assessment and Plan of Treatment: PRINCIPAL DISCHARGE DIAGNOSIS  Diagnosis: Encephalopathy, unspecified  Assessment and Plan of Treatment: You were admitted for confusion and weakness. Your confusion was  caused by many factors: likely from an infection, low salt level and seizure. You were treated for pneumonia with 5 days course of antibiotics (vancomycin and zosyn). You were given salt tabs to improve your salt levels. You were also started on anti-seizure medications (keppra and valproic acid). EEG showed you did not have further seizures during your hospital stay.      SECONDARY DISCHARGE DIAGNOSES  Diagnosis: Acute respiratory failure with hypoxia  Assessment and Plan of Treatment: You had difficulty breathing likely because food particles were going into your lungs. You were treated with antibiotics for concerns of pneumonia. You were treated with steroids to help with your lung inflammation.    Diagnosis: Lymphoma  Assessment and Plan of Treatment: PRINCIPAL DISCHARGE DIAGNOSIS  Diagnosis: Encephalopathy, unspecified  Assessment and Plan of Treatment: You were admitted for confusion and weakness. Your confusion was  caused by many factors: likely from an infection, low salt level and seizure. You were treated for pneumonia with 5 days course of antibiotics (vancomycin and zosyn). You were given salt tabs to improve your salt levels. You were also started on anti-seizure medications (keppra and valproic acid). EEG showed you did not have further seizures during your hospital stay.      SECONDARY DISCHARGE DIAGNOSES  Diagnosis: Acute respiratory failure with hypoxia  Assessment and Plan of Treatment: You had difficulty breathing likely because food particles were going into your lungs. You were treated with antibiotics for concerns of pneumonia. You were treated with steroids to help with your lung inflammation.    Diagnosis: Encephalopathy, unspecified  Assessment and Plan of Treatment: You were admitted for confusion and weakness. Your confusion was  caused by many factors: likely from an infection, low salt level and seizure. You were treated for pneumonia with 5 days course of antibiotics (vancomycin and zosyn). You were given salt tabs to improve your salt levels. You were also started on anti-seizure medications (keppra and valproic acid). EEG showed you did not have further seizures during your hospital stay.    Diagnosis: Encephalopathy, unspecified  Assessment and Plan of Treatment: You were admitted for confusion and weakness. Your confusion was  caused by many factors: likely from an infection, low salt level and seizure. You were treated for pneumonia with 5 days course of antibiotics (vancomycin and zosyn). You were given salt tabs to improve your salt levels. You were also started on anti-seizure medications (keppra and valproic acid). EEG showed you did not have further seizures during your hospital stay.    Diagnosis: Lymphoma  Assessment and Plan of Treatment: PRINCIPAL DISCHARGE DIAGNOSIS  Diagnosis: Diabetes mellitus  Assessment and Plan of Treatment: Patient to continue bolus feeds. Monitor for episodes of hypoglycemia.  As per ENDO Continue Lantus 10 units qhs. Continue Humalog 4/4/4/4 prebolus. Hold Humalog dose if bolus feeds are held. Continue Humalog moderate correction scale q6h. Monitor finger sticks pre-meal and bedtime, low salt, fat and carbohydrate diet, minimize glucose intake.  Exercise daily for at least 30 minutes and weight loss.  Follow up with primary care physician and endocrinologist for routine Hemoglobin A1C checks and management.  Follow up with your ophthalmologist for routine yearly vision exams.        SECONDARY DISCHARGE DIAGNOSES  Diagnosis: Hypothyroidism  Assessment and Plan of Treatment: Ccurrently on enteral LT4 88 mcg. Make sure given on empty stomach (at least 30 minutes apart from feeds). Last TFTs 12/4/19.   Monitor LFTs    Diagnosis: Adrenal insufficiency  Assessment and Plan of Treatment: Patient to f/u with Dr. Abreu at [facility]. Now on prednisone at supratherapeutic dose. Prednisone 30x 5 days 20x 5 days 10x 5 days       Diagnosis: Seizure  Assessment and Plan of Treatment: no further seizure activity since admission  continue with keppra 500mg BID      Diagnosis: Encephalopathy, unspecified  Assessment and Plan of Treatment: You were admitted for confusion and weakness. Your confusion was  caused by many factors: likely from an infection, low salt level and seizure. You were treated for pneumonia with 5 days course of antibiotics (vancomycin and zosyn). You were given salt tabs to improve your salt levels. You were also started on anti-seizure medications (keppra and valproic acid). EEG showed you did not have further seizures during your hospital stay.    Diagnosis: Acute respiratory failure with hypoxia  Assessment and Plan of Treatment: You had difficulty breathing likely because food particles were going into your lungs. You were treated with antibiotics for concerns of pneumonia. You were treated with steroids to help with your lung inflammation.    Diagnosis: Lymphoma  Assessment and Plan of Treatment: ONC planning on targeted chemo

## 2019-11-16 NOTE — PROGRESS NOTE ADULT - PROBLEM SELECTOR PLAN 7
Per chart review on R-CP. s/p 2 cycles. Last cycle ~4 weeks ago  -no lab evidence of tumor lysis syndrome.   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 Per chart review on R-CP. s/p 2 cycles. Last cycle ~4 weeks ago  -no lab evidence of tumor lysis syndrome.   -MRI brain w IV contrast evaluate for disease progression/secondary CNS lymphoma.  -pt daughter is an oncologist and is interested in pursuing inpatient chemo

## 2019-11-16 NOTE — PROGRESS NOTE ADULT - PROBLEM SELECTOR PLAN 5
Trops 90>115>101. EKG with no ST changes or q wave depression. Pt not complaining of CP.  -likely demand ischemia in the s/o sepsis. RESOLVED  Trops 90>115>101. EKG with no ST changes or q wave depression. Pt not complaining of CP.  -likely demand ischemia in the s/o sepsis.

## 2019-11-16 NOTE — PROGRESS NOTE ADULT - ASSESSMENT
Hyponatremia: Not likely to be symptomatic as the Osmolality was almost wnl. This has improved with NS. However, the Uric Acid was low, so volume depletion was less likely.    PLAN:  ·	 Restrict fluids.  ·	Follow up BMP.  ·	If Na falls < 130, repeat Serum and urine Osmolality and uric acid.  -

## 2019-11-16 NOTE — PROGRESS NOTE ADULT - PROBLEM SELECTOR PLAN 1
At baseline pt is A&Ox2 and ambulatory. Now A&Ox0-1 with diffuse weakness.  Ddx includes infection, stroke (ischemic or paradoxical embolism), secondary CNS lymphoma, post-ictal state in s/o in hospital seizure and possible seizure at home prior to presentation.   -per daughter pt has been tapered off of dexamethasone  -low suspicion for meningitis clinically, CXR without consolidation but BL pleural effusions, UA negative  -CT A/P prelim read with no acute pathology.   -CT chest with apical consolidations, representing infection vs alveolar edema. progression in moderate bilateral effusions (previously with thoracentesis negative for malignancy)  -Depakote level=15.1  -CTH x2 showing normal progression of recent subdural from previous admission in Oct-Nov 2019.  -Neurosurgery consulted with no acute intervention planned.  -now growing GPCs in clusters on BCx. Favors infectious etiology. Still no localizing signs or symptoms of infection. Vanc and zosyn for now. At baseline pt is A&Ox2. Now A&Ox0-1 with diffuse weakness.  Ddx includes infection, secondary CNS lymphoma, post-ictal state in s/o in hospital seizure and possible seizure at home prior to presentation.   -low suspicion for meningitis clinically, CXR without consolidation but BL pleural effusions, UA negative  -CT A/P prelim read with no acute pathology.   -CT chest with apical consolidations, representing infection vs alveolar edema. progression in moderate bilateral effusions (previously with thoracentesis negative for malignancy)  -Depakote level=15.1 (low) (unclear if for mood or seizure ppx; no history of previous seizures prior to presentation)  -CTH x2 showing normal progression of recent subdural from previous admission in Oct-Nov 2019. MRI non-con negative for acute pathology  -ordering MRI with IV contrast  -Neurosurgery consulted with no acute intervention planned.  -now growing CNS in 1 of 4 bottles on BCx. Favors contamination but will repeat BCx. Still no localizing signs or symptoms of infection.   -Vanc and zosyn x5 days for possible PNA given consolidations on CT and possible aspiration in s/o seizure  -Neurology consulted

## 2019-11-16 NOTE — CONSULT NOTE ADULT - ASSESSMENT
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 Neurology consult for new onset seizures. Currently neurological examination demonstrates    Impression: new onset seizure rule out metabolic causes versus in the setting of recent subdural hematoma versus chemotherapy    NOTE INCOMPLETE AT THIS TIME    Plan:  Imaging/Studies:   [ ] MRI brain w/ contrast  [ ] routine EEG    Labs:   [ ] infectious workup per primary team    Meds:   [ ] c/w patient's depakote for mood stabilization  [ ] c/w keppra that was started while patient here  [ ] ativan 2mg for GTC or seizure activity > 3 minutes    Consults:   [ ] hematology/oncology for ?chemotherapy induced seizures given relatively recent diagnosis of DLCBL    -Management & disposition to be discussed with neuro attending, Dr. Salgado Assessment: 81yo man h/o DLBCL on rituxan, cyclophosphamide, decadron, radiographically chronic left MCA infarct (of note per HCP patient never experienced an episode nor given diagnosis of stroke), recent SDH 11/2019, admitted for encephalopathy. Neurology consult for new onset seizures. Currently neurological examination demonstrates intermittent drowsiness, ability to follow some simple commands, ?weakness of left side, and otherwise limited examination due to patient's minimal cooperation at this time. Neuroimaging demonstrates subdural hematoma that is stable from the most recent imaging as well as chronic left MCA infarct changes.     Impression: new onset seizure rule out metabolic causes versus in the setting of recent subdural hematoma versus chemotherapy    NOTE INCOMPLETE AT THIS TIME    Plan:  Imaging/Studies:   [ ] MRI brain w/ contrast  [ ] routine EEG    Labs:   [ ] infectious workup per primary team    Meds:   [ ] c/w patient's depakote for mood stabilization  [ ] c/w keppra that was started while patient here  [ ] ativan 2mg for GTC or seizure activity > 3 minutes    Consults:   [ ] hematology/oncology for ?chemotherapy induced seizures given relatively recent diagnosis of DLCBL    -Management & disposition to be discussed with neuro attending, Dr. Salgado Assessment: 79yo man h/o DLBCL on rituxan, cyclophosphamide, decadron, radiographically chronic left MCA infarct (of note per HCP patient never experienced an episode nor given diagnosis of stroke), recent SDH 11/2019, admitted for encephalopathy. Neurology consult for new onset seizures. Currently neurological examination demonstrates intermittent drowsiness, ability to follow some simple commands, ?weakness of left side, and otherwise limited examination due to patient's minimal cooperation at this time. Neuroimaging demonstrates subdural hematoma that is stable from the most recent imaging as well as chronic left MCA infarct changes.     Impression: new onset seizure rule out metabolic causes versus in the setting of recent subdural hematoma versus chemotherapy    Plan:  Imaging/Studies:   [ ] MRI brain w/ contrast  [ ] routine EEG    Labs:   [ ] infectious workup per primary team    Meds:   [ ] c/w patient's depakote for mood stabilization  [ ] c/w keppra that was started while patient here  [ ] ativan 2mg for GTC or seizure activity > 3 minutes    Consults:   [ ] hematology/oncology for ?chemotherapy induced seizures given relatively recent diagnosis of DLCBL    -Management & disposition to be discussed with neuro attending, Dr. Salgado
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
## 2019-11-16 NOTE — DISCHARGE NOTE PROVIDER - NSDCMRMEDTOKEN_GEN_ALL_CORE_FT
atenolol 25 mg oral tablet: 1 tab(s) orally once a day  Basaglar KwikPen 100 units/mL subcutaneous solution: 3 unit(s) subcutaneous once a day (at bedtime)  dexamethasone 2 mg oral tablet: 1 tab(s) orally once a day   enoxaparin 40 mg/0.4 mL injectable solution: 40 milligram(s) subcutaneously once a day   levothyroxine 88 mcg (0.088 mg) oral capsule: 1 cap(s) orally once a day  Lexapro 5 mg oral tablet: 1 tab(s) orally once a day  midodrine 5 mg oral tablet: 1 tab(s) orally 3 times a day  polyethylene glycol 3350 oral powder for reconstitution: 17 gram(s) orally once a day  Protonix 40 mg oral delayed release tablet: 1 tab(s) orally once a day  simvastatin 20 mg oral tablet: 1 tab(s) orally once a day (at bedtime)  Tylenol 325 mg oral tablet: 2 tab(s) orally every 6 hours, As Needed - for headache  valproic acid 250 mg/5 mL oral liquid: 5 milliliter(s) orally 2 times a day atenolol 25 mg oral tablet: 1 tab(s) orally once a day  Basaglar KwikPen 100 units/mL subcutaneous solution: 3 unit(s) subcutaneous once a day (at bedtime)  dexamethasone 2 mg oral tablet: 1 tab(s) orally once a day   enoxaparin 40 mg/0.4 mL injectable solution: 40 milligram(s) subcutaneously once a day   Gastrostomy Tube Feed: Glucerna 1.2 Derrell (GLUCERNARTH)  Total volume for 24 hours: 1440 mL  Total volume of bolus: 360mL  Tube feed frequency: every 6 hours  Bolus feed rate: 360mL/hr  Bolus feed instructions: for first and second boluses give 180mL, for second and third boluses give 240mL. All other boluses give 360mL  levothyroxine 88 mcg (0.088 mg) oral capsule: 1 cap(s) orally once a day  Lexapro 5 mg oral tablet: 1 tab(s) orally once a day  midodrine 5 mg oral tablet: 1 tab(s) orally 3 times a day  polyethylene glycol 3350 oral powder for reconstitution: 17 gram(s) orally once a day  Protonix 40 mg oral delayed release tablet: 1 tab(s) orally once a day  simvastatin 20 mg oral tablet: 1 tab(s) orally once a day (at bedtime)  Tylenol 325 mg oral tablet: 2 tab(s) orally every 6 hours, As Needed - for headache  valproic acid 250 mg/5 mL oral liquid: 5 milliliter(s) orally 2 times a day atenolol 50 mg oral tablet: 1 tab(s) orally once a day  Basaglar KwikPen 100 units/mL subcutaneous solution: 3 unit(s) subcutaneous once a day (at bedtime)  enoxaparin 40 mg/0.4 mL injectable solution: 40 milligram(s) subcutaneously once a day   Feeding Pole Pump and 30 bags : 1 tab(s) orally once a day   furosemide 40 mg oral tablet: 1 tab(s) orally once a day  Gastrostomy Tube Feed: Glucerna 1.2 Derrell (GLUCERNARTH)  Total volume for 24 hours: 1440 mL  Total volume of bolus: 360mL  Tube feed frequency: every 6 hours  Bolus feed rate: 360mL/hr  Bolus feed instructions: for first and second boluses give 180mL, for second and third boluses give 240mL. All other boluses give 360mL  levETIRAcetam 100 mg/mL oral solution: 5 milliliter(s) orally 2 times a day  levothyroxine 88 mcg (0.088 mg) oral capsule: 1 cap(s) orally once a day  Lexapro 5 mg oral tablet: 1 tab(s) orally once a day  polyethylene glycol 3350 oral powder for reconstitution: 17 gram(s) orally once a day  predniSONE 10 mg oral tablet: 3 tab(s) oral - orally once a day x 5 days  2 tab(s) oral - orally once a day x 5 days  1 tab(s) oral - orally once a day x 5 days  Protonix 40 mg oral delayed release tablet: 1 tab(s) orally once a day  simvastatin 20 mg oral tablet: 1 tab(s) orally once a day (at bedtime)  Tylenol 325 mg oral tablet: 2 tab(s) orally every 6 hours, As Needed - for headache atenolol 50 mg oral tablet: 1 tab(s) orally once a day  enoxaparin 40 mg/0.4 mL injectable solution: 40 milligram(s) subcutaneously once a day   Feeding Pole Pump and 30 bags : 1 tab(s) orally once a day   furosemide 40 mg oral tablet: 1 tab(s) orally once a day  HumaLOG KwikPen 100 units/mL injectable solution: 4 unit(s) injectable 4 times a day (before meals and at bedtime)     SHOULD BE GIVEN PRIOR TO BOLUS FEEDS. IF FEEDS HELD SHOULD NOT BE GIVEN  Lantus Solostar Pen 100 units/mL subcutaneous solution: 10 unit(s) subcutaneous once a day (at bedtime)   levETIRAcetam 100 mg/mL oral solution: 5 milliliter(s) orally 2 times a day  levothyroxine 88 mcg (0.088 mg) oral capsule: 1 cap(s) orally once a day  Lexapro 5 mg oral tablet: 1 tab(s) orally once a day  polyethylene glycol 3350 oral powder for reconstitution: 17 gram(s) orally once a day  predniSONE 10 mg oral tablet: 3 tab(s) oral - orally once a day x 5 days  2 tab(s) oral - orally once a day x 5 days  1 tab(s) oral - orally once a day x 5 days  Protonix 40 mg oral delayed release tablet: 1 tab(s) orally once a day  simvastatin 20 mg oral tablet: 1 tab(s) orally once a day (at bedtime)  Tylenol 325 mg oral tablet: 2 tab(s) orally every 6 hours, As Needed - for headache

## 2019-11-16 NOTE — PROGRESS NOTE ADULT - PROBLEM SELECTOR PLAN 3
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  -neuro consulted.

## 2019-11-16 NOTE — PROGRESS NOTE ADULT - ATTENDING COMMENTS
Pt seen and examined, chart and labs reviewed.   79M with h/o HTN, DM2, hypothyroidism, CAD s/p stents, PUD s/p H.pylori treatment, recent dx of DLBCL at [facility] on Oct 2019 s/p 2 cycles of R-CP, recent fall with R frontal and occipital SDH with no evacuation, LUE DVT and b/l LE DVT formerly on therapeutic Lovenox, p/w acute encephalopathy and had witnessed seizure activity at home Thursday and again had tonic clonic seizure in ER s/p Ativan 2mg IVx1.   Pt remains confused and lethargic.  Assessment/plan:  #Metabolic encephalopathy with seizure activity: multifactorial, unclear if this is a postictal state vs. true organic intracranial pathology from known SDH vs. possible CNS lymphoma vs. infection.  Pt remains sedated after 2mg IV Ativan dose.    CT head showing stable SDH with evolutional changes ; neurosurgery consulted, no urgent indication for evacuation  (family amenable to neurosx procedure).  MRI brain w/o contrast no lymphoma but limited by lack of contrast, stable SDH, chronic L MCA infarct  Plan : repeat MRI brain with contrast to r/o CNS lymphoma, neurology consult     #SIRS: fevers/leukocytosis, elevated lactate - this can all be seen in a postictal state.  CT C/A/P reviewed, showing bilateral patchy consolidation in upper lobes.  r/o aspiration pneumonia though it is typically LL, c/w zosyn, add vanco, check vanco trough   Zosyn and f/u Bcx. repeat lactate 1.5. UA negative, RVP neg    #DLBCL: s/p 2 cycles of R-CP with interval improvement in lymphadenopathy on scans from 11/14.  Hematology consult reviewed and appreciated.  Also discussed care with daughter (Dr. Browne) who is a heme-onc attending.  Would hold off on any chemotherapy at this time until full infectious workup complete.  repeat MRI w/ contrast to assess for CNS involvement of DLBCL.  No evidence of TLS on labs. Would defer LP at this time until we obtain MRI brain imaging.     #Hypercoagulable state, acute b/l LE DVT and LUE DVT: it was decided on last admission to continue pt on therapeutic Lovenox given high risk of VTE formation.  Repeat duplex showing stable clot with no propagation. resume prophylactic lovenox if cleared by neurosx    #Hyponatremia, clinical exam suggestive of fluid overloaded state (including presence of b/l pleural effusions) however SIADH certainly also a possibility given intracranial process: fluid restriction, resolved (Na 135),  Low suspicion that his hyponatremia is precipitating his seizures.     #Elevated troponins: NSTEMI, EKG reviewed TWI present in V1-V4, which were present in the past.  Troponins now downtrending, can stop trending troponins.    Care discussed with daughter Dr. Browne Pt seen and examined, chart and labs reviewed.   79M with h/o HTN, DM2, hypothyroidism, CAD s/p stents, PUD s/p H.pylori treatment, recent dx of DLBCL at [facility] on Oct 2019 s/p 2 cycles of R-CP, recent fall with R frontal and occipital SDH with no evacuation, LUE DVT and b/l LE DVT formerly on therapeutic Lovenox, p/w acute encephalopathy and had witnessed seizure activity at home Thursday and again had tonic clonic seizure in ER s/p Ativan 2mg IVx1.   Pt remains confused and lethargic.  Assessment/plan:  #Metabolic encephalopathy with seizure activity: multifactorial, unclear if this is a postictal state vs. true organic intracranial pathology from known SDH vs. possible CNS lymphoma vs. infection.  Pt remains sedated after 2mg IV Ativan dose.    CT head showing stable SDH with evolutional changes ; neurosurgery consulted, no urgent indication for evacuation  (family amenable to neurosx procedure).  MRI brain w/o contrast no lymphoma but limited by lack of contrast, stable SDH, chronic L MCA infarct  Plan : repeat MRI brain with contrast to r/o CNS lymphoma, neurology consult     #SIRS: fevers/leukocytosis, elevated lactate - this can all be seen in a postictal state.  CT C/A/P reviewed, showing bilateral patchy consolidation in upper lobes.  r/o aspiration pneumonia though it is typically LL, c/w zosyn, add vanco, check vanco trough  bcx coag neg staph likely contaminant, repeat bcx    Zosyn and f/u Bcx. repeat lactate 1.5. UA negative, RVP neg    #DLBCL: s/p 2 cycles of R-CP with interval improvement in lymphadenopathy on scans from 11/14.  Hematology consult reviewed and appreciated.  Also discussed care with daughter (Dr. Browne) who is a heme-onc attending.  Would hold off on any chemotherapy at this time until full infectious workup complete.  repeat MRI w/ contrast to assess for CNS involvement of DLBCL.  No evidence of TLS on labs. Would defer LP at this time until we obtain MRI brain imaging.     #Hypercoagulable state, acute b/l LE DVT and LUE DVT: it was decided on last admission to continue pt on therapeutic Lovenox given high risk of VTE formation.  Repeat duplex showing stable clot with no propagation. resume prophylactic lovenox if cleared by neurosx    #Hyponatremia, clinical exam suggestive of fluid overloaded state (including presence of b/l pleural effusions) however SIADH certainly also a possibility given intracranial process: fluid restriction, resolved (Na 135),  Low suspicion that his hyponatremia is precipitating his seizures.     #Elevated troponins: NSTEMI, EKG reviewed TWI present in V1-V4, which were present in the past.  Troponins now downtrending, can stop trending troponins.    Care discussed with daughter Dr. Browne

## 2019-11-16 NOTE — DISCHARGE NOTE PROVIDER - PROVIDER TOKENS
FREE:[LAST:[Dr. Harleen Guaman],PHONE:[(   )    -],FAX:[(   )    -],ADDRESS:[Follow up with Primary Care Provider Dr. Harleen Guaman within 1 week of discharge]]

## 2019-11-16 NOTE — DISCHARGE NOTE PROVIDER - HOSPITAL COURSE
Mr. 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         At baseline pt is A&Ox2 and ambulatory. Now A&Ox0-1 with diffuse weakness.    Ddx includes infection, stroke (ischemic or paradoxical embolism), secondary CNS lymphoma, post-ictal state in s/o in hospital seizure and likely seizure at home prior to presentation. An infectious workup was negative including negative UA, CXR without consolidation, CT A/P with no evidence of infection, RVP negative. He did have a CT chest with apical consolidations, representing infection vs alveolar edema. progression in moderate bilateral effusions (previously with thoracentesis negative for malignancy). Low suspicion for meningitis clinically. He had a blood culture growing CNS. His procalcitonin was elevated at 0.42. He was started on zosyn empirically and got vanc when his BCx grew GPCs (which eventually proved to be CNS).        He had a witnessed GTC seizure in the hospital on hospital day 1 and likely seizure at home prior to admission and his MS may possibly be 2/2 to post-ictal state. His depakote level was low at 15.1 (although unclear if it is for psych reason vs seizure ppx). He has never had a seizure prior to this presentation in the past. He had CTH x2 and an MRI non-con which showed no acute pathology other than his known subdural hematomas which were discovered on a prior admission in late October and early November after a fall. Neurosurgery was consulted and they again do not recommend surgical intervention. His seizures were most likely 2/2 to acute hyponatremia lowering his seizure threshold in the setting of his known SDHs. Low suspicion for secondary CNS lymphoma given negative MRI but MRI with IV contrast would be more sensitive. After his seizure in the hospital he was keppra loaded and continued on keppra and depakote. His hyponatremia resolved during his admission with fluid restriction and isotonic fluids.         He also had multiple known DVTs when he was admitted. Repeat US of all 4 extremities showed BL LE US with Unchanged subacute DVT right soleal vein. BL UE US with no evidence of DVT now. Superficial vein thrombosis visualized in the left cephalic vein.         A vEEG showed_______.         Pt also had an NSTEMI on admission, likely demand ischemia in the s/o sepsis. Mr. 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         At baseline pt is A&Ox2 and ambulatory. On admission pt was A&Ox0-1 with diffuse weakness and noted to have leukocytosis and hyponatremia.     Ddx for his encephalopathy included infection, stroke (ischemic or paradoxical embolism), secondary CNS lymphoma, post-ictal state in s/o in hospital seizure and likely seizure at home prior to presentation. An infectious workup was notable for CT chest with apical consolidations, representing infection vs alveolar edema. Pt got vanc/zosyn x5 days for concerns of possible aspiration PNA. Other infection workup was negative: negative UA, CXR without consolidation, CT A/P with no evidence of infection, RVP negative. Blood culture grew coag-negative staph - however likely contaminant as all other blood culture sets were negative. CTHx2 and MRI brain showed no acute pathology other than his known subdural hematomas; no enhancing lesions in the brain were seen on MRI.         Pt had a witnessed GTC seizure in the hospital on hospital day 1 and likely had a seizure at home prior to admission. He has never had a seizure prior to this presentation in the past. His seizures were likely i/s/o infection and acute hyponatremia. Neurology was consulted for seizure - pt was started on keppra and valproic acid. vEEG showed no seizure activity. Valproic acid was d/c, and keppra was continued. Nephrology was consulted for hyponatremia. 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         Pt also had an NSTEMI on admission, likely demand ischemia in the s/o sepsis. Pt was monitored on tele. Mr. 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         At baseline pt is A&Ox2 and ambulatory. On admission pt was A&Ox0-1 with diffuse weakness and noted to have leukocytosis and hyponatremia. For his encephalopathy, pt was worked up for infection, stroke, secondary CNS lymphoma and seizure. An infectious workup was notable for CT chest with apical consolidations, representing infection vs alveolar edema. Pt got vanc/zosyn x5 days for concerns of possible aspiration PNA. Other infection workup was negative. Blood culture grew coag-negative staph - however likely contaminant as all other blood culture sets were negative. CTHx2 and MRI brain showed no acute pathology other than his known subdural hematomas; no enhancing lesions in the brain were seen on MRI. Pt had a witnessed GTC seizure in the hospital on hospital day 1. VEEG showed no further seizure activity while pt was hospitalized. Neuro was consulted - pt was started on keppra and valproic acid. Valproic acid was later d/c. Pt's mental status eventually returned to baseline.        Hospital course was complicated by acute respiratory failure, likely from aspiration pneumonitis and . Pulm was consulted. Pt was placed on brief bipap, then switched to high flow NC. Repeat chest CT showed multifocal pneumonia. Pt was started on meropenem and vancomycin for concerns of multifocal pneumonia from aspiration (benny/vanc to cover for HAP).         Nephrology was consulted for hyponatremia. 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         Pt also had an NSTEMI on admission, likely demand ischemia in the s/o sepsis. Pt was monitored on tele. Mr. 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         At baseline pt is A&Ox2 and ambulatory. On admission pt was A&Ox0-1 with diffuse weakness and noted to have leukocytosis and hyponatremia. For his encephalopathy, pt was worked up for infection, stroke, secondary CNS lymphoma and seizure. An infectious workup was notable for CT chest with apical consolidations, representing infection vs alveolar edema. Pt got vanc/zosyn x5 days for concerns of possible aspiration PNA. Other infection workup was negative. Blood culture grew coag-negative staph - however likely contaminant as all other blood culture sets were negative. CTHx2 and MRI brain showed no acute pathology other than his known subdural hematomas; no enhancing lesions in the brain were seen on MRI. Pt had a witnessed GTC seizure in the hospital on hospital day 1. VEEG showed no further seizure activity while pt was hospitalized. Neuro was consulted - pt was started on keppra and valproic acid. Valproic acid was later d/c. Pt's mental status eventually returned to baseline.        Hospital course was complicated by acute respiratory failure, likely from aspiration pneumonitis. Pulm was consulted. Pt was placed on brief bipap, then switched to high flow NC. Repeat chest CT showed multifocal pneumonia. Pt was started on meropenem and vancomycin for concerns of multifocal pneumonia from aspiration (benny/vanc to cover for HAP). Pt was also hyponatremic during hospital stay. Nephrology was consulted, recommended salt tabs and fluid restriction.         For nutrition, pt had a peg tube placed by IR. Mr. 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         At baseline pt is A&Ox2 and ambulatory. On admission pt was A&Ox0-1 with diffuse weakness and noted to have leukocytosis and hyponatremia. For his encephalopathy, pt was worked up for infection, stroke, secondary CNS lymphoma and seizure. An infectious workup was notable for CT chest with apical consolidations, representing infection vs alveolar edema. Pt got vanc/zosyn x5 days for concerns of possible aspiration PNA. Other infection workup was negative. Blood culture grew coag-negative staph - however likely contaminant as all other blood culture sets were negative. CTHx2 and MRI brain showed no acute pathology other than his known subdural hematomas; no enhancing lesions in the brain were seen on MRI. Pt had a witnessed GTC seizure in the hospital on hospital day 1. VEEG showed no further seizure activity while pt was hospitalized. Neuro was consulted - pt was started on keppra and valproic acid. Valproic acid was later d/c. Pt's mental status waxes and wanes (A&Ox1-2).        Hospital course was complicated by acute respiratory failure, likely from aspiration pneumonitis. Pulm was consulted. Pt was placed on brief bipap, then switched to high flow NC. Repeat chest CT showed multifocal pneumonia and bilateral pleural effusion. Pt got a 7 day course of meropenem for concerns of multifocal pneumonia from aspiration. Steroids were also initiated for pneumonitis, which caused pt to become agitated. Psych was consulted for agitation, recommended zyprexa prn for agitation.         For electrolyte abnormality, pt's sodium fluctuated from hypo to hypernatremia. Nephrology was consulted. When pt was hyponatremic, he was given salt tabs and fluid restricted. For hypernatremia, he got free water flushes.           For malnutrition, pt had a peg tube placed by IR. 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         At baseline pt is A&Ox2 and ambulatory. On admission pt was A&Ox0-1 with diffuse weakness and noted to have leukocytosis and hyponatremia. For his encephalopathy, pt was worked up for infection, stroke, secondary CNS lymphoma and seizure. An infectious workup was notable for CT chest with apical consolidations, representing infection vs alveolar edema. Pt got vanc/zosyn x5 days for concerns of possible aspiration PNA. Other infection workup was negative. Blood culture grew coag-negative staph - however likely contaminant as all other blood culture sets were negative. CTHx2 and MRI brain showed no acute pathology other than his known subdural hematomas; no enhancing lesions in the brain were seen on MRI. Pt had a witnessed GTC seizure in the hospital on hospital day 1. VEEG showed no further seizure activity while pt was hospitalized. Neuro was consulted - pt was started on keppra and valproic acid. Valproic acid was later d/c. Bilateral LE US with unchanged subacute DVT right soleal vein and ok to restart Lovenox per Neurosurgery (resumed on 11/16).  Pt's mental status waxes and wanes (A&Ox1-2).        Hospital course was complicated by acute respiratory failure, likely from aspiration pneumonitis. Pulm was consulted. Pt was placed on brief bipap, then switched to high flow NC, then weaned off and onto NC. Home O2 set up by CM given persistent hypoxia on room air. Repeat chest CT showed multifocal pneumonia and bilateral pleural effusion. Patient started on IV Lasix for pleural effusions and anasarca, then switched to po on discharge. Pt got a 7 day course of meropenem for concerns of multifocal pneumonia from aspiration. Steroids were also initiated for pneumonitis, which caused pt to become agitated. Psych was consulted for agitation, recommended zyprexa prn for agitation.         Heme/Onc was following patient for DLBCL. Last CT A/P non contrast 11/15 now shows treatment response with decrease in size of the spleen and resolution of retroperitoneal adenopathy.  MRI brain with contrast without lesions, SDH stable. Repeat CT A/P 12/13 - stable disease. CT chest 11/15 with unchanged subcarinal lymphadenopathy, new b/l pleural effusions and patchy opacities. Patient treated with polatuzumab and rituximab on 12/20. No evidence of tumor lysis syndrome. Patient to f/u with Dr. Abreu at [facility] [place].        For electrolyte abnormality, pt's sodium fluctuated from hypo to hypernatremia. Nephrology was consulted. When pt was hyponatremic, he was given salt tabs and fluid restricted. For hypernatremia, he got free water flushes.           For malnutrition, pt had a peg tube placed by IR. Dietitian consulted for tube feed recommendations. Endocrine consulted as pt with episodes of hypoglycemia and insulin regimen adjusted. 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         At baseline pt is A&Ox2 and ambulatory. On admission pt was A&Ox0-1 with diffuse weakness and noted to have leukocytosis and hyponatremia. For his encephalopathy, pt was worked up for infection, stroke, secondary CNS lymphoma and seizure. An infectious workup was notable for CT chest with apical consolidations, representing infection vs alveolar edema. Pt got vanc/zosyn x5 days for concerns of possible aspiration PNA. Other infection workup was negative. Blood culture grew coag-negative staph - however likely contaminant as all other blood culture sets were negative. CTHx2 and MRI brain showed no acute pathology other than his known subdural hematomas; no enhancing lesions in the brain were seen on MRI. Pt had a witnessed GTC seizure in the hospital on hospital day 1. VEEG showed no further seizure activity while pt was hospitalized. Neuro was consulted - pt was started on keppra and valproic acid. Valproic acid was later d/c. Bilateral LE US with unchanged subacute DVT right soleal vein and ok to restart Lovenox per Neurosurgery (resumed on 11/16).  Pt's mental status waxes and wanes (A&Ox1-2).        Hospital course was complicated by acute respiratory failure, likely from aspiration pneumonitis. Pulm was consulted. Pt was placed on brief bipap, then switched to high flow NC, then weaned off and onto NC. Home O2 set up by CM given persistent hypoxia on room air. Repeat chest CT showed multifocal pneumonia and bilateral pleural effusion. Patient started on IV Lasix for pleural effusions and anasarca, then switched to po on discharge. Pt got a 7 day course of meropenem for concerns of multifocal pneumonia from aspiration. Steroids were also initiated for pneumonitis, which caused pt to become agitated. Psych was consulted for agitation, recommended zyprexa prn for agitation.         Heme/Onc was following patient for DLBCL. Last CT A/P non contrast 11/15 now shows treatment response with decrease in size of the spleen and resolution of retroperitoneal adenopathy.  MRI brain with contrast without lesions, SDH stable. Repeat CT A/P 12/13 - stable disease. CT chest 11/15 with unchanged subcarinal lymphadenopathy, new b/l pleural effusions and patchy opacities. Patient treated with polatuzumab and rituximab on 12/20. No evidence of tumor lysis syndrome. Patient to f/u with Dr. Abreu at [facility] [place].        For electrolyte abnormality, pt's sodium fluctuated from hypo to hypernatremia. Nephrology was consulted. When pt was hyponatremic, he was given salt tabs and fluid restricted. For hypernatremia, he got free water flushes.           For malnutrition, pt had a peg tube placed by IR. Dietitian consulted for tube feed recommendations. Endocrine consulted as pt with episodes of hypoglycemia and insulin regimen adjusted.                Acute respiratory failure with hypoxia.  Plan: Course c/b acute respiratory distress 2/2 aspiration pneumonitis, s/p benny (11/28-12/4) for aspiration PNA    sating well on NC    cont chest PT    Given persistent hypoxia, arranging for home O2.         Problem/Plan - 2:    ·  Problem: Electrolyte abnormality.  Plan: hyponatremia, free water on hold for now; improving    cont to monitor, asymptomatic    hypophosphatemia-->resolved.         Problem/Plan - 3:    ·  Problem: Encephalopathy, unspecified.  Plan: multifactorial; PNA, sz, brain mets    cont zyprexa 1.25mg q8h prn, as not needed any    has been calm and cooperative    seems to have resolved and back to baseline.         # HTN (hypertension)    - monitor blood pressure     - Continue atenolol 50mg qd, lasix 20mg IV BID    - holding norvasc 10mg qd.         # Diabetes.      - Patient to continue bolus feeds     - monitor for episodes of hypoglycemia.         Problem/Plan - 6:    Problem: Malnutrition. Plan: - Severe protein calorie malnutrition due to malignancy     s/p peg placement (11/29)    - glucerna tube feeds    dietitian re-eval for bolus feeds for dc planning; family needs teaching    dietitian f/u to convert to bolus feeds.        Problem/Plan - 7:    ·  Problem: Lymphoma.  Plan: onc planning on targeted chemo inpt    s/p 1 u PRBC 12/15.         # Seizure.  Plan: no further sz activity since admission    - c/w keppra 500mg BID    - s/p valproic acid.         Problem/Plan - 9:    ·  Problem: DVT (deep venous thrombosis).  Plan: BL LE US with unchanged subacute DVT right soleal vein.    -restarted lovenox prophylaxis per NSG (started on 11/16).              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         At baseline pt is A&Ox2 and ambulatory. On admission pt was A&Ox0-1 with diffuse weakness and noted to have leukocytosis and hyponatremia. For his encephalopathy, pt was worked up for infection, stroke, secondary CNS lymphoma and seizure. An infectious workup was notable for CT chest with apical consolidations, representing infection vs alveolar edema. Pt got vanc/zosyn x5 days for concerns of possible aspiration PNA. Other infection workup was negative. Blood culture grew coag-negative staph - however likely contaminant as all other blood culture sets were negative. CTHx2 and MRI brain showed no acute pathology other than his known subdural hematomas; no enhancing lesions in the brain were seen on MRI. Pt had a witnessed GTC seizure in the hospital on hospital day 1. VEEG showed no further seizure activity while pt was hospitalized. Neuro was consulted - pt was started on keppra and valproic acid. Valproic acid was later d/c. Bilateral LE US with unchanged subacute DVT right soleal vein and ok to restart Lovenox per Neurosurgery (resumed on 11/16).  Pt's mental status waxes and wanes (A&Ox1-2).                        # Acute respiratory failure with hypoxia:     Course c/b acute respiratory distress secondary to aspiration pneumonitis, s/p benny (11/28-12/4) for aspiration PNA    Given persistent hypoxia, arranging for home O2.         # Electrolyte abnormality    Hyponatremia, free water on hold for now; improving    cont to monitor, asymptomatic    hypophosphatemia: resolved.         # Hypothyroidism:     currently on enteral LT4 88 mcg.     Make sure given on empty stomach (at least 30 minutes apart from feeds).    Last TFTs 12/4/19.     Monitor LFTs        # Adrenal Insufficiency:     Heme/Onc was following patient for DLBCL. Last CT A/P non contrast 11/15 now shows treatment response with decrease in size of the spleen and resolution of retroperitoneal adenopathy.  MRI brain with contrast without lesions, SDH stable. Repeat CT A/P 12/13 - stable disease. CT chest 11/15 with unchanged subcarinal lymphadenopathy, new b/l pleural effusions and patchy opacities. Patient treated with polatuzumab and rituximab on 12/20. No evidence of tumor lysis syndrome. Patient to f/u with Dr. Abreu at [facility] [place].    Last cortisol was 19.7 while off steroids. Currently HD stable, low suspicion for AI.     Now on prednisone at supratherapeutic dose.    Prednisone 30x 5 days 20x 5 days 10x 5 days         # HTN (hypertension):     - monitor blood pressure     - Continue atenolol 50mg qd, Lasix 40 PO     - holding norvasc 10mg qd.             # Encephalopathy, unspecified.  Plan: multifactorial; PNA, sz, brain mets    cont zyprexa 1.25mg q8h prn, as not needed any    has been calm and cooperative    seems to have resolved and back to baseline.         # Diabetes.      - Patient to continue bolus feeds     - monitor for episodes of hypoglycemia.         # Malnutrition    Severe protein calorie malnutrition due to malignancy    For malnutrition, pt had a peg tube placed by IR. Dietitian consulted for tube feed recommendations. Endocrine consulted as pt with episodes of hypoglycemia and insulin regimen adjusted.    s/p peg placement (11/29)    dietitian re-eval for bolus feeds for dc planning; family taught         # Lymphoma.    ONC planning on targeted chemo    s/p 1 u PRBC 12/15.         # Seizure.    no further seizure activity since admission    continue with keppra 500mg BID        # DVT (deep venous thrombosis):     BL LE US with unchanged subacute DVT right soleal vein.    Restarted Lovenox prophylaxis (started on 11/16)         Code Status: DNR (NOT DNI)        12/24: Patient stable for discharge and medications reviewed as per Dr. Dyson 81 yo M PMHx DLBCL s/p 2 cycles of R-CP, CAD, DM, HTN, BL LE DVT, LUE DVT on prophylactic lovenox, CKD, orthostatic hypotension, and recent admission in November 2019 for SDH after fall, presents for acute encephalopathy and new onset seizures secondary to infection. Course c/b acute respiratory distress secondary to aspiration pneumonitis, s/p benny (11/28-12/4) for aspiration PNA. s/p peg placement (11/29)        # Acute respiratory failure with hypoxia:     Acute respiratory distress secondary to aspiration pneumonitis, s/p benny (11/28-12/4) for aspiration PNA    Given persistent hypoxia, arranging for home O2.         # Electrolyte abnormality:     Hyponatremia, free water on hold for now; improving    cont to monitor, asymptomatic    hypophosphatemia: resolved.         # Hypothyroidism:     currently on enteral LT4 88 mcg.     Make sure given on empty stomach (at least 30 minutes apart from feeds).    Last TFTs 12/4/19.     Monitor LFTs        # Adrenal Insufficiency:     Heme/Onc was following patient for DLBCL. Last CT A/P non contrast 11/15 now shows treatment response with decrease in size of the spleen and resolution of retroperitoneal adenopathy.  MRI brain with contrast without lesions, SDH stable. Repeat CT A/P 12/13 - stable disease. CT chest 11/15 with unchanged subcarinal lymphadenopathy, new b/l pleural effusions and patchy opacities. Patient treated with polatuzumab and rituximab on 12/20. No evidence of tumor lysis syndrome. Patient [place] [facility] [place]    Last cortisol was 19.7 while off steroids. Currently HD stable, low suspicion for AI.     Now on prednisone at supratherapeutic dose.    Prednisone 30x 5 days 20x 5 days 10x 5 days         # HTN (hypertension):     - monitor blood pressure     - Continue atenolol 50mg qd, Lasix 40 PO     - holding norvasc 10mg qd.         # Encephalopathy, unspecified.  Plan: multifactorial; PNA, sz, brain mets    cont zyprexa 1.25mg q8h prn, as not needed any    has been calm and cooperative    seems to have resolved and back to baseline.         # Diabetes.      - Patient to continue bolus feeds     - monitor for episodes of hypoglycemia.      - as per ENDO Continue Lantus 10 units qhs    - Continue Humalog 4/4/4/4 prebolus. Hold Humalog dose if bolus feeds are held.    -Continue Humalog moderate correction scale q6h         # Malnutrition    Severe protein calorie malnutrition due to malignancy    For malnutrition, pt had a peg tube placed by IR. Dietitian consulted for tube feed recommendations. Endocrine consulted as pt with episodes of hypoglycemia and insulin regimen adjusted.    s/p peg placement (11/29)    dietitian re-eval for bolus feeds for dc planning; family taught         # Lymphoma.    ONC planning on targeted chemo    s/p 1 u PRBC 12/15.         # Seizure.    no further seizure activity since admission    continue with keppra 500mg BID        # DVT (deep venous thrombosis):     BL LE US with unchanged subacute DVT right soleal vein.    Restarted Lovenox prophylaxis (started on 11/16)         Code Status: DNR (NOT DNI)        12/24: Patient stable for discharge and medications reviewed as per Dr. Dyson

## 2019-11-16 NOTE — PROVIDER CONTACT NOTE (OTHER) - REASON
Patient lethargic and unable to take PO medication. Patient also unable to participate in Neuro check

## 2019-11-17 LAB
-  COAGULASE NEGATIVE STAPHYLOCOCCUS: SIGNIFICANT CHANGE UP
ANION GAP SERPL CALC-SCNC: 11 MMO/L — SIGNIFICANT CHANGE UP (ref 7–14)
BACTERIA BLD CULT: SIGNIFICANT CHANGE UP
BUN SERPL-MCNC: 8 MG/DL — SIGNIFICANT CHANGE UP (ref 7–23)
CALCIUM SERPL-MCNC: 7.7 MG/DL — LOW (ref 8.4–10.5)
CHLORIDE SERPL-SCNC: 95 MMOL/L — LOW (ref 98–107)
CO2 SERPL-SCNC: 21 MMOL/L — LOW (ref 22–31)
CREAT SERPL-MCNC: 0.78 MG/DL — SIGNIFICANT CHANGE UP (ref 0.5–1.3)
GLUCOSE SERPL-MCNC: 222 MG/DL — HIGH (ref 70–99)
HCT VFR BLD CALC: 30 % — LOW (ref 39–50)
HGB BLD-MCNC: 9.6 G/DL — LOW (ref 13–17)
LEVETIRACETAM SERPL-MCNC: 22.1 MCG/ML — SIGNIFICANT CHANGE UP (ref 12–46)
MAGNESIUM SERPL-MCNC: 1.8 MG/DL — SIGNIFICANT CHANGE UP (ref 1.6–2.6)
MCHC RBC-ENTMCNC: 28.2 PG — SIGNIFICANT CHANGE UP (ref 27–34)
MCHC RBC-ENTMCNC: 32 % — SIGNIFICANT CHANGE UP (ref 32–36)
MCV RBC AUTO: 88.2 FL — SIGNIFICANT CHANGE UP (ref 80–100)
NRBC # FLD: 0 K/UL — SIGNIFICANT CHANGE UP (ref 0–0)
ORGANISM # SPEC MICROSCOPIC CNT: SIGNIFICANT CHANGE UP
OSMOLALITY SERPL: 279 MOSMO/KG — SIGNIFICANT CHANGE UP (ref 275–295)
OSMOLALITY UR: 384 MOSMO/KG — SIGNIFICANT CHANGE UP (ref 50–1200)
PHOSPHATE SERPL-MCNC: 1.9 MG/DL — LOW (ref 2.5–4.5)
PLATELET # BLD AUTO: 394 K/UL — SIGNIFICANT CHANGE UP (ref 150–400)
PMV BLD: 11.2 FL — SIGNIFICANT CHANGE UP (ref 7–13)
POTASSIUM SERPL-MCNC: 3.3 MMOL/L — LOW (ref 3.5–5.3)
POTASSIUM SERPL-SCNC: 3.3 MMOL/L — LOW (ref 3.5–5.3)
RBC # BLD: 3.4 M/UL — LOW (ref 4.2–5.8)
RBC # FLD: 18.6 % — HIGH (ref 10.3–14.5)
SODIUM SERPL-SCNC: 127 MMOL/L — LOW (ref 135–145)
SODIUM UR-SCNC: 97 MMOL/L — SIGNIFICANT CHANGE UP
URATE SERPL-MCNC: 1.8 MG/DL — LOW (ref 3.4–8.8)
WBC # BLD: 18.18 K/UL — HIGH (ref 3.8–10.5)
WBC # FLD AUTO: 18.18 K/UL — HIGH (ref 3.8–10.5)

## 2019-11-17 PROCEDURE — 99222 1ST HOSP IP/OBS MODERATE 55: CPT | Mod: GC

## 2019-11-17 PROCEDURE — 99233 SBSQ HOSP IP/OBS HIGH 50: CPT | Mod: GC

## 2019-11-17 RX ORDER — LEVOTHYROXINE SODIUM 125 MCG
44 TABLET ORAL AT BEDTIME
Refills: 0 | Status: DISCONTINUED | OUTPATIENT
Start: 2019-11-17 | End: 2019-11-18

## 2019-11-17 RX ORDER — POTASSIUM CHLORIDE 20 MEQ
10 PACKET (EA) ORAL
Refills: 0 | Status: COMPLETED | OUTPATIENT
Start: 2019-11-17 | End: 2019-11-17

## 2019-11-17 RX ORDER — SODIUM CHLORIDE 9 MG/ML
1 INJECTION INTRAMUSCULAR; INTRAVENOUS; SUBCUTANEOUS THREE TIMES A DAY
Refills: 0 | Status: DISCONTINUED | OUTPATIENT
Start: 2019-11-17 | End: 2019-11-20

## 2019-11-17 RX ORDER — ATENOLOL 25 MG/1
25 TABLET ORAL DAILY
Refills: 0 | Status: DISCONTINUED | OUTPATIENT
Start: 2019-11-17 | End: 2019-11-18

## 2019-11-17 RX ADMIN — Medication 44 MICROGRAM(S): at 22:14

## 2019-11-17 RX ADMIN — Medication 250 MILLIGRAM(S): at 17:22

## 2019-11-17 RX ADMIN — ESCITALOPRAM OXALATE 5 MILLIGRAM(S): 10 TABLET, FILM COATED ORAL at 11:21

## 2019-11-17 RX ADMIN — ENOXAPARIN SODIUM 40 MILLIGRAM(S): 100 INJECTION SUBCUTANEOUS at 11:21

## 2019-11-17 RX ADMIN — Medication 100 MILLIEQUIVALENT(S): at 10:21

## 2019-11-17 RX ADMIN — SIMVASTATIN 20 MILLIGRAM(S): 20 TABLET, FILM COATED ORAL at 22:14

## 2019-11-17 RX ADMIN — LEVETIRACETAM 400 MILLIGRAM(S): 250 TABLET, FILM COATED ORAL at 05:00

## 2019-11-17 RX ADMIN — ATENOLOL 25 MILLIGRAM(S): 25 TABLET ORAL at 13:52

## 2019-11-17 RX ADMIN — Medication 250 MILLIGRAM(S): at 05:01

## 2019-11-17 RX ADMIN — SODIUM CHLORIDE 1 GRAM(S): 9 INJECTION INTRAMUSCULAR; INTRAVENOUS; SUBCUTANEOUS at 13:01

## 2019-11-17 RX ADMIN — PIPERACILLIN AND TAZOBACTAM 25 GRAM(S): 4; .5 INJECTION, POWDER, LYOPHILIZED, FOR SOLUTION INTRAVENOUS at 05:02

## 2019-11-17 RX ADMIN — PIPERACILLIN AND TAZOBACTAM 25 GRAM(S): 4; .5 INJECTION, POWDER, LYOPHILIZED, FOR SOLUTION INTRAVENOUS at 13:01

## 2019-11-17 RX ADMIN — Medication 26.25 MILLIGRAM(S): at 17:22

## 2019-11-17 RX ADMIN — Medication 2: at 13:02

## 2019-11-17 RX ADMIN — LEVETIRACETAM 400 MILLIGRAM(S): 250 TABLET, FILM COATED ORAL at 17:23

## 2019-11-17 RX ADMIN — Medication 100 MILLIEQUIVALENT(S): at 13:01

## 2019-11-17 RX ADMIN — Medication 1: at 08:33

## 2019-11-17 RX ADMIN — PIPERACILLIN AND TAZOBACTAM 25 GRAM(S): 4; .5 INJECTION, POWDER, LYOPHILIZED, FOR SOLUTION INTRAVENOUS at 22:14

## 2019-11-17 RX ADMIN — Medication 100 MILLIEQUIVALENT(S): at 11:20

## 2019-11-17 RX ADMIN — Medication 26.25 MILLIGRAM(S): at 05:00

## 2019-11-17 RX ADMIN — SODIUM CHLORIDE 1 GRAM(S): 9 INJECTION INTRAMUSCULAR; INTRAVENOUS; SUBCUTANEOUS at 22:14

## 2019-11-17 RX ADMIN — Medication 2: at 17:22

## 2019-11-17 NOTE — PROGRESS NOTE ADULT - PROBLEM SELECTOR PLAN 2
T- 100.8 in the ED and tachycardic in the field. No identifiable source of infection at present.  -low suspicion for meningitis clinically, CXR without consolidation but BL pleural effusions, UA negative  -procalcitonin 0.42  -no evidence of intraabdominal infection on CT  -now growing CNS in 1 of 4 bottles on BCx. Favors contamination but will repeat BCx. Still no localizing signs or symptoms of infection.   -Vanc and zosyn x5 days for possible PNA given consolidations on CT and possible aspiration in s/o seizure T- 100.8 in the ED and tachycardic in the field. No identifiable source of infection at present.  -low suspicion for meningitis clinically, CXR without consolidation but BL pleural effusions, UA negative  -procalcitonin 0.42  -no evidence of intraabdominal infection on CT  -now growing CNS in 1 of 4 bottles on BCx. Favors contamination but will repeat BCx. Still no localizing signs or symptoms of infection.   -Vanc and zosyn x5 days for possible PNA given consolidations on CT and possible aspiration in s/o seizure  -ID consulted; f/u ID recs

## 2019-11-17 NOTE — PROGRESS NOTE ADULT - PROBLEM SELECTOR PLAN 4
3 known active DVTs.   -repeat dopplers are negative for progression  -also concern for PE given that pt was tachycardic in the field in the setting of 3 known DVT's. Recent V/Q scan on 11/4 with low suspicion for PE.  -heme/onc consulted.   -ok to start lovenox prophylaxis per NSG (started on 11/16).  -repeat BL UE US with no evidence of DVT now. Superficial vein thrombosis visualized in the left cephalic vein. BL LE US with Unchanged subacute DVT right soleal vein.

## 2019-11-17 NOTE — PROGRESS NOTE ADULT - PROBLEM SELECTOR PLAN 10
DVT ppx: ppx lovenox  Diet: Dysphagia 3 diet based on last admission but will leave pt NPO for now given AMS.

## 2019-11-17 NOTE — PROVIDER CONTACT NOTE (OTHER) - SITUATION
Patient is unable to take morning PO meds Patient is unable to tolerate morning PO meds. Patient was lethargic and refusing PO medications

## 2019-11-17 NOTE — PROGRESS NOTE ADULT - ASSESSMENT
Mr. 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

## 2019-11-17 NOTE — PROGRESS NOTE ADULT - PROBLEM SELECTOR PLAN 1
At baseline pt is A&Ox2. Now A&Ox0-1 with diffuse weakness.  Ddx includes infection, secondary CNS lymphoma, post-ictal state in s/o in hospital seizure and possible seizure at home prior to presentation.   -low suspicion for meningitis clinically, CXR without consolidation but BL pleural effusions, UA negative  -CT A/P prelim read with no acute pathology.   -CT chest with apical consolidations, representing infection vs alveolar edema. progression in moderate bilateral effusions (previously with thoracentesis negative for malignancy)  -Depakote level=15.1 (low) (unclear if for mood or seizure ppx; no history of previous seizures prior to presentation)  -CTH x2 showing normal progression of recent subdural from previous admission in Oct-Nov 2019. MRI non-con negative for acute pathology  -ordering MRI with IV contrast  -Neurosurgery consulted with no acute intervention planned.  -now growing CNS in 1 of 4 bottles on BCx. Favors contamination but will repeat BCx. Still no localizing signs or symptoms of infection.   -Vanc and zosyn x5 days for possible PNA given consolidations on CT and possible aspiration in s/o seizure (Day 2 of 5).  -Neurology consulted

## 2019-11-17 NOTE — PROGRESS NOTE ADULT - ATTENDING COMMENTS
Pt seen and examined, chart and labs reviewed.   79M with h/o HTN, DM2, hypothyroidism, CAD s/p stents, PUD s/p H.pylori treatment, recent dx of DLBCL at [facility] on Oct 2019 s/p 2 cycles of R-CP, recent fall with R frontal and occipital SDH with no evacuation, LUE DVT and b/l LE DVT formerly on Lovenox, p/w acute encephalopathy and had witnessed seizure activity at home Thursday and again had tonic clonic seizure in ER s/p Ativan 2mg IVx1.   Pt is more awake and conversant today. Po intake remains poor.   Assessment/plan:  #Metabolic encephalopathy with seizure activity: multifactorial, unclear if this is a postictal state vs. true organic intracranial pathology from known SDH vs. possible CNS lymphoma vs. infection.  He was sedated after 2mg IV Ativan dose, but now is more awake/alert.    CT head showing stable SDH with evolutional changes ; neurosurgery consulted, no urgent indication for evacuation  d/w neurosx resident, cleared to resume prophylactic lovenox  pending MRI brain w/ contrast,  MRI brain w/o contrast no lymphoma but limited by lack of contrast, stable SDH, chronic L MCA infarct  Plan : repeat MRI brain with contrast to r/o CNS lymphoma, ID consult  neuro consult appreciated, plan vEEG, c/w keppra/valproic acid  daughter would like pt to have mediport and chemo while inpt, but advised her we can to r/o infection first.   daughter Dr. Browne is also amenable to PEG if pt continues to have poor nutrition, nutrition eval, calorie count, c/w glucerna with diet    #SIRS: fevers/leukocytosis, elevated lactate - this can all be seen in a postictal state.  CT C/A/P reviewed, showing bilateral patchy consolidation in upper lobes.  r/o aspiration pneumonia though it is typically LL, c/w zosyn, add vanco, check vanco trough  bcx coag neg staph likely contaminant, repeat bcx    Zosyn and f/u Bcx. repeat lactate 1.5. UA negative, RVP neg    #DLBCL: s/p 2 cycles of R-CP with interval improvement in lymphadenopathy on scans from 11/14.  Hematology consult reviewed and appreciated.  Also discussed care with daughter (Dr. Browne) who is a heme-onc attending. CT shows response to chemo with reduced lymphadenopathy.   Would hold off on any chemotherapy at this time until full infectious workup complete.  repeat MRI w/ contrast to assess for CNS involvement of DLBCL.  No evidence of TLS on labs. Would defer LP at this time until we obtain MRI brain imaging.     #Hypercoagulable state, acute b/l LE DVT and LUE DVT: it was decided on last admission to continue pt on therapeutic Lovenox given high risk of VTE formation.  Repeat duplex showing stable clot with no propagation. resumed on prophylactic lovenox    #Hyponatremia, clinical exam suggestive of fluid overloaded state (including presence of b/l pleural effusions) however SIADH certainly also a possibility given intracranial process: fluid restriction, add salt tabs 1 gm tid, f/u renal  Low suspicion that his hyponatremia is precipitating his seizures.     #Elevated troponins: NSTEMI, EKG reviewed TWI present in V1-V4, which were present in the past.  Troponins now downtrending, can stop trending troponins.    Care discussed with daughter Dr. Browne

## 2019-11-17 NOTE — PROGRESS NOTE ADULT - ASSESSMENT
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 The seizure is less likely to be related as the Hyponatremia is chronic without sudden change.    PLAN:  ·	Restrict fluids to < 1 L/d.  ·	Follow up BMP.  ·	If Na falls < 125, give Tolvaptan 15 mg PO one dose.  ·	Repeat Urine Osmolality.

## 2019-11-17 NOTE — CONSULT NOTE ADULT - SUBJECTIVE AND OBJECTIVE BOX
Patient is a 79y old  Male who presents with a chief complaint of AMS (17 Nov 2019 08:49)    HPI:  Mr. Browne is an 80 year old man with a PMH of DLBCL, CAD, DM, HTN, BL LE DVT, LUE DVT not on AC, CKD, orthostatic hypotension, and recent admission in November 2019 for SDH who is now presenting with acute encephalopathy. The patient is in the ED, difficult to interview, and states that he does not remember events of this morning. Per ED notes, the patient was at baseline (A&Ox3, able to ambulate) until earlier today where he was talking to his aide and suddenly stopped talking. Presently, the patient has no complaints besides abdominal pain to palpation diffusely but specifically in the RUQ. He denies headache, chest pain, palpitations, difficulty breathing, N/V, or D/C. No head trauma/fall/loc. Pt with recent admission after a fall with SDH. Was hypertensive and tachycardic in field, received IVF via EMS. (14 Nov 2019 16:44)    Above reviewed.     prior hospital charts reviewed [x  ]  primary team notes reviewed [ x ]  other consultant notes reviewed [x  ]    PAST MEDICAL & SURGICAL HISTORY:  GI bleed: s/p clipping  History of subdural hematoma: s/p unwitnessed fall with hospitilazation in early November 2019. R and interhemispheric subdural hematomas with mild shift.  Dysphagia: Recently on dysphagia 3 nectar thick liquid diet during admission 11/6/2019  History of orthostatic hypotension: On midodrine and florinef  Acute deep vein thrombosis (DVT) of other vein of upper extremity, unspecified laterality: LUE; on eliquis VQ scan on 9/20 with low probability for PE  Left ventricular outflow obstruction: per daughter, patient had recent echo at [place] that did not re-demonstrate LVOT obstruction; likely was dynamic LVOT obstruction 2/2 volume status  Lymphoma: DLBC recently diagnosed being treated at [place]  Chronic kidney disease (CKD)  Cognitive developmental delay  CAD (coronary artery disease): s/p stents x4  Adult hypothyroidism  Hyperlipidemia  Peptic ulcer: s/p treatment for H pylori  Diabetes: On basaglar insulin and Januvia at home  Essential hypertension  S/P laparoscopic cholecystectomy  S/P coronary artery stent placement: x4, last one in early 2018      Allergies  No Known Allergies    ANTIMICROBIALS:    piperacillin/tazobactam IVPB.. 3.375 every 8 hours  vancomycin  IVPB 1000 every 12 hours      OTHER MEDS: MEDICATIONS  (STANDING):  acetaminophen   Tablet .. 650 every 6 hours PRN  dextrose 40% Gel 15 once PRN  dextrose 50% Injectable 12.5 once  dextrose 50% Injectable 25 once  dextrose 50% Injectable 25 once  enoxaparin Injectable 40 daily  escitalopram 5 daily  glucagon  Injectable 1 once PRN  insulin lispro (HumaLOG) corrective regimen sliding scale  three times a day before meals  insulin lispro (HumaLOG) corrective regimen sliding scale  at bedtime  levETIRAcetam  IVPB 500 every 12 hours  levothyroxine Injectable 44 at bedtime  pantoprazole    Tablet 40 before breakfast  simvastatin 20 at bedtime  valproate sodium IVPB 250 two times a day      SOCIAL HISTORY:   hx smoking  non-smoker    FAMILY HISTORY:      REVIEW OF SYSTEMS  [  ] ROS unobtainable because:    [  ] All other systems negative except as noted below:	    Constitutional:  [ ] fever [ ] chills  [ ] weight loss  [ ] weakness  Skin:  [ ] rash [ ] phlebitis	  Eyes: [ ] icterus [ ] pain  [ ] discharge	  ENMT: [ ] sore throat  [ ] thrush [ ] ulcers [ ] exudates  Respiratory: [ ] dyspnea [ ] hemoptysis [ ] cough [ ] sputum	  Cardiovascular:  [ ] chest pain [ ] palpitations [ ] edema	  Gastrointestinal:  [ ] nausea [ ] vomiting [ ] diarrhea [ ] constipation [ ] pain	  Genitourinary:  [ ] dysuria [ ] frequency [ ] hematuria [ ] discharge [ ] flank pain  [ ] incontinence  Musculoskeletal:  [ ] myalgias [ ] arthralgias [ ] arthritis  [ ] back pain  Neurological:  [ ] headache [ ] seizures  [ ] confusion/altered mental status  Psychiatric:  [ ] anxiety [ ] depression	  Hematology/Lymphatics:  [ ] lymphadenopathy  Endocrine:  [ ] adrenal [ ] thyroid  Allergic/Immunologic:	 [ ] transplant [ ] seasonal    Vital Signs Last 24 Hrs  T(F): 98 (11-17-19 @ 11:30), Max: 100.8 (11-14-19 @ 13:32)    Vital Signs Last 24 Hrs  HR: 105 (11-17-19 @ 11:30) (91 - 105)  BP: 150/102 (11-17-19 @ 11:30) (142/93 - 165/98)  RR: 18 (11-17-19 @ 11:30)  SpO2: 100% (11-17-19 @ 11:30) (96% - 100%)  Wt(kg): --    PHYSICAL EXAM:  General: non-toxic  HEAD/EYES: anicteric, PERRL  ENT:  supple  Cardiovascular:   S1, S2  Respiratory:  clear bilaterally  GI:  soft, non-tender, normal bowel sounds  :  no CVA tenderness   Musculoskeletal:  no synovitis  Neurologic:  grossly non-focal  Skin:  no rash  Lymph: no lymphadenopathy  Psychiatric:  appropriate affect  Vascular:  no phlebitis                                9.6    18.18 )-----------( 394      ( 17 Nov 2019 07:30 )             30.0     11-17    127<L>  |  95<L>  |  8   ----------------------------<  222<H>  3.3<L>   |  21<L>  |  0.78    Ca    7.7<L>      17 Nov 2019 07:30  Phos  1.9     11-17  Mg     1.8     11-17            MICROBIOLOGY:        Culture - Blood (collected 14 Nov 2019 14:24)  Source: Peripheral Site 1  Preliminary Report (16 Nov 2019 14:24):    NO ORGANISMS ISOLATED    NO ORGANISMS ISOLATED AT 48 HRS.    Culture - Blood (collected 14 Nov 2019 13:56)  Source: Peripheral Site 2  Preliminary Report (16 Nov 2019 07:17):    ***Blood Panel PCR results on this specimen are available    approximately 3 hours after the Gram stain result***    Gram stain, PCR, and/or culture results may not always    correspond due to difference in methodologies    ------------------------------------------------------------    This PCR assay was performed using [employer].  The    following targets are tested for:  Enterococcus, vancomycin    resistant enterococci, Listeria monocytogenes,  coagulase    negative staphylococci, S. aureus, methicillin resistant S.    aureus, Streptococcus agalactiae (Group B), S. pneumoniae,    S. pyogenes (Group A), Acinetobacter baumannii, Enterobacter    cloacae, E. coli, Klebsiella oxytoca, K. pneumoniae, Proteus    sp., Serratia marcescens, Haemophilus influenzae, Neisseria    meningitidis, Pseudomonas aeruginosa, Candida albicans, C.    glabrata, C. krusei, C. parapsilosis, C. tropicalis and the    KPC resistance gene.    **NOTE: Due to technical problems, Proteus sp. will NOT be    reported as part of the BCID paneluntil further notice.  Preliminary Report (16 Nov 2019 06:05):    NO ORGANISMS ISOLATED AT 24 HOURS    BCPCR^BLOOD CULTURE PCR    ***Blood Panel PCR results on this specimen are available    approximately 3 hours after the Gram stain result***    Gram stain, PCR, and/or culture results may not always    correspond due to difference in methodologies    ------------------------------------------------------------    This PCR assay was performed using [employer].  The    following targets are tested for:  Enterococcus, vancomycin    resistant enterococci, Listeria monocytogenes,  coagulase    negative staphylococci, S. aureus, methicillin resistant S.    aureus, Streptococcus agalactiae (Group B), S. pneumoniae,    S. pyogenes (Group A), Acinetobacter baumannii, Enterobacter    cloacae, E. coli, Klebsiella oxytoca, K. pneumoniae, Proteus    sp., Serratia marcescens, Haemophilus influenzae, Neisseria    meningitidis, Pseudomonas aeruginosa, Candida albicans, C.    glabrata, C. krusei, C. parapsilosis, C. tropicalis and the    KPC resistance gene.    **NOTE: Due to technical problems,Proteus sp. will NOT be    reported as part of the BCID panel until further notice.  Organism: BLOOD CULTURE PCR (16 Nov 2019 07:17)  Organism: BLOOD CULTURE PCR (16 Nov 2019 07:17)      -  Coagulase negative Staphylococcus: + DETECT LUIZA      Method Type: PCR    Culture - Urine (collected 14 Nov 2019 13:27)  Source: URINE MIDSTREAM  Final Report (16 Nov 2019 09:31):    NO GROWTH AT 24 HOURS                          RADIOLOGY:  imaging below personally reviewed Patient is a 79y old  Male who presents with a chief complaint of AMS (17 Nov 2019 08:49)    HPI:  Mr. Browne is an 80 year old man with a PMH of DLBCL, CAD, DM, HTN, BL LE DVT, LUE DVT not on AC, CKD, orthostatic hypotension, and recent admission in November 2019 for SDH who is now presenting with acute encephalopathy. The patient is in the ED, difficult to interview, and states that he does not remember events of this morning. Per ED notes, the patient was at baseline (A&Ox3, able to ambulate) until earlier today where he was talking to his aide and suddenly stopped talking. Presently, the patient has no complaints besides abdominal pain to palpation diffusely but specifically in the RUQ. He denies headache, chest pain, palpitations, difficulty breathing, N/V, or D/C. No head trauma/fall/loc. Pt with recent admission after a fall with SDH. Was hypertensive and tachycardic in field, received IVF via EMS. (14 Nov 2019 16:44)    Above reviewed. Patient is poor historian but daughter by bedside reported that patient had a seizure Friday morning which is why they  brought him here.   Patient had  recent admission for fall in [facility] and CT head then had shown a subdural hematoma. Post fall, patient stopped walking but was able to identify family members and participate in conversations. After seizure episode and admission this time, patient has been confused, doesnt know where he is and unable to correctly recognize his daughter. Per daughter, he last received chemo last month. She had noticed patient retaining food in mouth after eating.     prior hospital charts reviewed [x  ]  primary team notes reviewed [ x ]  other consultant notes reviewed [x  ]    PAST MEDICAL & SURGICAL HISTORY:  GI bleed: s/p clipping  History of subdural hematoma: s/p unwitnessed fall with hospitilazation in early November 2019. R and interhemispheric subdural hematomas with mild shift.  Dysphagia: Recently on dysphagia 3 nectar thick liquid diet during admission 11/6/2019  History of orthostatic hypotension: On midodrine and florinef  Acute deep vein thrombosis (DVT) of other vein of upper extremity, unspecified laterality: LUE; on eliquis VQ scan on 9/20 with low probability for PE  Left ventricular outflow obstruction: per daughter, patient had recent echo at [place] that did not re-demonstrate LVOT obstruction; likely was dynamic LVOT obstruction 2/2 volume status  Lymphoma: DLBC recently diagnosed being treated at [place]  Chronic kidney disease (CKD)  Cognitive developmental delay  CAD (coronary artery disease): s/p stents x4  Adult hypothyroidism  Hyperlipidemia  Peptic ulcer: s/p treatment for H pylori  Diabetes: On basaglar insulin and Januvia at home  Essential hypertension  S/P laparoscopic cholecystectomy  S/P coronary artery stent placement: x4, last one in early 2018      Allergies  No Known Allergies    ANTIMICROBIALS:    piperacillin/tazobactam IVPB.. 3.375 every 8 hours  vancomycin  IVPB 1000 every 12 hours      OTHER MEDS: MEDICATIONS  (STANDING):  acetaminophen   Tablet .. 650 every 6 hours PRN  dextrose 40% Gel 15 once PRN  dextrose 50% Injectable 12.5 once  dextrose 50% Injectable 25 once  dextrose 50% Injectable 25 once  enoxaparin Injectable 40 daily  escitalopram 5 daily  glucagon  Injectable 1 once PRN  insulin lispro (HumaLOG) corrective regimen sliding scale  three times a day before meals  insulin lispro (HumaLOG) corrective regimen sliding scale  at bedtime  levETIRAcetam  IVPB 500 every 12 hours  levothyroxine Injectable 44 at bedtime  pantoprazole    Tablet 40 before breakfast  simvastatin 20 at bedtime  valproate sodium IVPB 250 two times a day      SOCIAL HISTORY:   hx smoking  non-smoker    FAMILY HISTORY: unable to get from patient. Daughter is not aware of family history in grandparents/patient's siblings       REVIEW OF SYSTEMS  [  ] ROS unobtainable because:    [x  ] All other systems negative except as noted below:	    Constitutional:  [ ] fever [ ] chills  [ ] weight loss  [ ] weakness  Skin:  [ ] rash [ ] phlebitis	  Eyes: [ ] icterus [ ] pain  [ ] discharge	  ENMT: [ ] sore throat  [ ] thrush [ ] ulcers [ ] exudates  Respiratory: [ ] dyspnea [ ] hemoptysis [ ] cough [ ] sputum	  Cardiovascular:  [ ] chest pain [ ] palpitations [ ] edema	  Gastrointestinal:  [ ] nausea [ ] vomiting [ ] diarrhea [ ] constipation [ ] pain	  Genitourinary:  [ ] dysuria [ ] frequency [ ] hematuria [ ] discharge [ ] flank pain  [ ] incontinence  Musculoskeletal:  [ ] myalgias [ ] arthralgias [ ] arthritis  [ ] back pain  Neurological:  [ ] headache [ ] seizures  [ x] confusion/altered mental status  Psychiatric:  [ ] anxiety [ ] depression	  Hematology/Lymphatics:  [ ] lymphadenopathy  Endocrine:  [ ] adrenal [ ] thyroid  Allergic/Immunologic:	 [ ] transplant [ ] seasonal    Vital Signs Last 24 Hrs  T(F): 98 (11-17-19 @ 11:30), Max: 100.8 (11-14-19 @ 13:32)    Vital Signs Last 24 Hrs  HR: 105 (11-17-19 @ 11:30) (91 - 105)  BP: 150/102 (11-17-19 @ 11:30) (142/93 - 165/98)  RR: 18 (11-17-19 @ 11:30)  SpO2: 100% (11-17-19 @ 11:30) (96% - 100%)  Wt(kg): --    PHYSICAL EXAM:  General: confused, AAO*1, right sided facial droop and ptosis   HEAD/EYES: anicteric, PERRL  ENT:  supple  Cardiovascular:   S1, S2  Respiratory:  clear bilaterally  GI:  soft, non-tender, normal bowel sounds  :  no CVA tenderness   Musculoskeletal:  no synovitis  Neurologic: left sided weakness   Skin:  no rash  Lymph: no lymphadenopathy  Psychiatric: unable to assess  Vascular:  no phlebitis                            9.6    18.18 )-----------( 394      ( 17 Nov 2019 07:30 )             30.0     11-17    127<L>  |  95<L>  |  8   ----------------------------<  222<H>  3.3<L>   |  21<L>  |  0.78    Ca    7.7<L>      17 Nov 2019 07:30  Phos  1.9     11-17  Mg     1.8     11-17          MICROBIOLOGY:  Culture - Blood (collected 14 Nov 2019 14:24)  Source: Peripheral Site 1  Preliminary Report (16 Nov 2019 14:24):    NO ORGANISMS ISOLATED    NO ORGANISMS ISOLATED AT 48 HRS.    Culture - Blood (collected 14 Nov 2019 13:56)  Source: Peripheral Site 2  Preliminary Report (16 Nov 2019 07:17):    ***Blood Panel PCR results on this specimen are available    approximately 3 hours after the Gram stain result***    Gram stain, PCR, and/or culture results may not always    correspond due to difference in methodologies    ------------------------------------------------------------    This PCR assay was performed using [employer].  The    following targets are tested for:  Enterococcus, vancomycin    resistant enterococci, Listeria monocytogenes,  coagulase    negative staphylococci, S. aureus, methicillin resistant S.    aureus, Streptococcus agalactiae (Group B), S. pneumoniae,    S. pyogenes (Group A), Acinetobacter baumannii, Enterobacter    cloacae, E. coli, Klebsiella oxytoca, K. pneumoniae, Proteus    sp., Serratia marcescens, Haemophilus influenzae, Neisseria    meningitidis, Pseudomonas aeruginosa, Candida albicans, C.    glabrata, C. krusei, C. parapsilosis, C. tropicalis and the    KPC resistance gene.    **NOTE: Due to technical problems, Proteus sp. will NOT be    reported as part of the BCID paneluntil further notice.  Preliminary Report (16 Nov 2019 06:05):    NO ORGANISMS ISOLATED AT 24 HOURS    BCPCR^BLOOD CULTURE PCR    ***Blood Panel PCR results on this specimen are available    approximately 3 hours after the Gram stain result***    Gram stain, PCR, and/or culture results may not always    correspond due to difference in methodologies    ------------------------------------------------------------    This PCR assay was performed using [employer].  The    following targets are tested for:  Enterococcus, vancomycin    resistant enterococci, Listeria monocytogenes,  coagulase    negative staphylococci, S. aureus, methicillin resistant S.    aureus, Streptococcus agalactiae (Group B), S. pneumoniae,    S. pyogenes (Group A), Acinetobacter baumannii, Enterobacter    cloacae, E. coli, Klebsiella oxytoca, K. pneumoniae, Proteus    sp., Serratia marcescens, Haemophilus influenzae, Neisseria    meningitidis, Pseudomonas aeruginosa, Candida albicans, C.    glabrata, C. krusei, C. parapsilosis, C. tropicalis and the    KPC resistance gene.    **NOTE: Due to technical problems,Proteus sp. will NOT be    reported as part of the BCID panel until further notice.  Organism: BLOOD CULTURE PCR (16 Nov 2019 07:17)  Organism: BLOOD CULTURE PCR (16 Nov 2019 07:17)      -  Coagulase negative Staphylococcus: + DETECT LUIZA      Method Type: PCR    Culture - Urine (collected 14 Nov 2019 13:27)  Source: URINE MIDSTREAM  Final Report (16 Nov 2019 09:31):    NO GROWTH AT 24 HOURS      RADIOLOGY:  < from: MR Head No Cont (11.15.19 @ 20:28) >  IMPRESSION:     Stable subdural hemorrhages, midline shift, and ventricular size. A   chronic left MCA stroke is again noted. No gross evidence for acute   infarct or acute brain parenchymal hemorrhage on a limited study.    Evaluation for the presence or absence of intracranial tumor is limited   by the lack of intravenous contrast.Consider follow-up contrast enhanced   study if there are no contrast contraindications given the patient's   history of lymphoma.

## 2019-11-17 NOTE — PROGRESS NOTE ADULT - PROBLEM SELECTOR PLAN 6
SSI  -adrenal insufficiency work up negative on past admissions  -per daughter pt has been tapered off steroids    #hypothyroidism  -c/w IV levothyroxine 44mg daily

## 2019-11-17 NOTE — PROGRESS NOTE ADULT - PROBLEM SELECTOR PLAN 8
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 Pt was also hyponatremic on a previous admission.  -unclear if pt is hypovolemic, euvolemic, or hypervolemic on exam as pt as LE edema secondary to bl DVTs. Dry mucous membranes, but bilateral pleural effusions.   -1L fluid restriction  -pt may be having seizures in setting of known SDH with lowered seizure threshold 2/2 to hyponatremia.  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 Pt was also hyponatremic on a previous admission.  -unclear if pt is hypovolemic, euvolemic, or hypervolemic on exam as pt as LE edema secondary to bl DVTs. Dry mucous membranes, but bilateral pleural effusions.   -1L fluid restriction  -pt may be having seizures in setting of known SDH with lowered seizure threshold 2/2 to hyponatremia.  -Na 127 today; will send urine osm, serum osm and uric acid  -start salt tab 1g TID

## 2019-11-17 NOTE — PROGRESS NOTE ADULT - PROBLEM SELECTOR PLAN 7
Per chart review on R-CP. s/p 2 cycles. Last cycle ~4 weeks ago  -no lab evidence of tumor lysis syndrome.   -MRI brain w IV contrast evaluate for disease progression/secondary CNS lymphoma.  -pt daughter is an oncologist and is interested in pursuing inpatient chemo

## 2019-11-17 NOTE — CONSULT NOTE ADULT - ASSESSMENT
Mr. Browne is an 80 year old man with a PMH of HTN, DM, CAD< DLBCL, BL LE DVT, LUE DVT not on AC, CKD  and recent admission in November 2019 for SDH who is now presenting with acute encephalopathy. Also witnessed seizure in ED. CT head negative for acute process- shows stable subdural hematoma and chronic MCA stroke. ID consulted for leukocytosis of 18.  Was started empirically on Vancomycin and Zosyn- blood cx negative, ua negative, CT chest with b/l patchy opacities in upper lobe pneumonia vs alveolar edema. WBC has remained elevated despite broad spectrum antibiotics.   MRI brain with contrast is pending. Mr. Browne is an 80 year old man with a PMH of HTN, DM, CAD< DLBCL, BL LE DVT, LUE DVT not on AC, CKD  and recent admission in November 2019 for SDH who is now presenting with acute encephalopathy. Also witnessed seizure in ED. CT head negative for acute process- shows stable subdural hematoma and chronic MCA stroke. ID consulted for leukocytosis of 18.  Was started empirically on Vancomycin and Zosyn- blood cx negative, ua negative, CT chest with b/l patchy opacities in upper lobe pneumonia vs alveolar edema. WBC has remained elevated despite broad spectrum antibiotics.   MRI brain with contrast is pending.       Leukocytosis - multifactorial from lymphoma, DVT, seizures, possible pneumonia,  New left sided weakness - (not c/w left MCA stroke)  seizures   Lymphoma   CNS in blood cx    Suggest:  *c/w vancomycin (day 3) and zosyn (day 3) for possible pneumonia   *f/u MRI brain with contrast to evaluate for mets/lesions that may be responsible for new left sided weakness   *coagulase negative staph in blood is likely a contaminant   *check serum cryptococcus antigen      d/w team

## 2019-11-17 NOTE — PROGRESS NOTE ADULT - SUBJECTIVE AND OBJECTIVE BOX
PROGRESS NOTE:   Christal Ann, PGY 2  [phone]    Patient is a 79y old  Male who presents with a chief complaint of AMS (16 Nov 2019 18:55)      SUBJECTIVE / OVERNIGHT EVENTS:     No acute events overnight.  Patient is awake and responds to voice.  Poor historian.  Denies any pain, fevers, chills, night sweats.  Denies any SOB, chest pain.     Tele: No events.     ADDITIONAL REVIEW OF SYSTEMS:    MEDICATIONS  (STANDING):  dextrose 5%. 1000 milliLiter(s) (50 mL/Hr) IV Continuous <Continuous>  dextrose 50% Injectable 12.5 Gram(s) IV Push once  dextrose 50% Injectable 25 Gram(s) IV Push once  dextrose 50% Injectable 25 Gram(s) IV Push once  enoxaparin Injectable 40 milliGRAM(s) SubCutaneous daily  escitalopram 5 milliGRAM(s) Oral daily  insulin lispro (HumaLOG) corrective regimen sliding scale   SubCutaneous three times a day before meals  insulin lispro (HumaLOG) corrective regimen sliding scale   SubCutaneous at bedtime  levETIRAcetam  IVPB 500 milliGRAM(s) IV Intermittent every 12 hours  levothyroxine Injectable 44 MICROGram(s) IV Push at bedtime  pantoprazole    Tablet 40 milliGRAM(s) Oral before breakfast  piperacillin/tazobactam IVPB.. 3.375 Gram(s) IV Intermittent every 8 hours  potassium chloride  10 mEq/100 mL IVPB 10 milliEquivalent(s) IV Intermittent every 1 hour  simvastatin 20 milliGRAM(s) Oral at bedtime  valproate sodium IVPB 250 milliGRAM(s) IV Intermittent two times a day  vancomycin  IVPB 1000 milliGRAM(s) IV Intermittent every 12 hours    MEDICATIONS  (PRN):  acetaminophen   Tablet .. 650 milliGRAM(s) Oral every 6 hours PRN Severe Pain (7 - 10)  dextrose 40% Gel 15 Gram(s) Oral once PRN Blood Glucose LESS THAN 70 milliGRAM(s)/deciliter  glucagon  Injectable 1 milliGRAM(s) IntraMuscular once PRN Glucose LESS THAN 70 milligrams/deciliter      CAPILLARY BLOOD GLUCOSE      POCT Blood Glucose.: 186 mg/dL (17 Nov 2019 08:22)  POCT Blood Glucose.: 170 mg/dL (16 Nov 2019 21:57)  POCT Blood Glucose.: 179 mg/dL (16 Nov 2019 17:31)  POCT Blood Glucose.: 202 mg/dL (16 Nov 2019 12:30)    I&O's Summary    16 Nov 2019 07:01  -  17 Nov 2019 07:00  --------------------------------------------------------  IN: 450 mL / OUT: 0 mL / NET: 450 mL        PHYSICAL EXAM:  Vital Signs Last 24 Hrs  T(C): 36.7 (17 Nov 2019 07:30), Max: 37.3 (16 Nov 2019 11:47)  T(F): 98 (17 Nov 2019 07:30), Max: 99.1 (16 Nov 2019 11:47)  HR: 102 (17 Nov 2019 07:30) (89 - 102)  BP: 159/96 (17 Nov 2019 07:30) (142/93 - 166/88)  BP(mean): --  RR: 18 (17 Nov 2019 07:30) (16 - 19)  SpO2: 100% (17 Nov 2019 07:30) (95% - 100%)    CONSTITUTIONAL: NAD, well-developed  RESPIRATORY: Normal respiratory effort; lungs are clear to auscultation bilaterally anteriorly; pt is belly breathing similar to when he was here on admission  CARDIOVASCULAR: Regular rate and rhythm, normal S1 and S2, no murmur/rub/gallop; minimal pitting edema Peripheral pulses are 2+ bilaterally  ABDOMEN: Nontender to palpation, normoactive bowel sounds, no rebound/guarding; No hepatosplenomegaly  MUSCLOSKELETAL: no clubbing or cyanosis of digits; no joint swelling or tenderness to palpation  PSYCH: A+O to person, but not place or time.    LABS:                        9.6    18.18 )-----------( 394      ( 17 Nov 2019 07:30 )             30.0     11-17    127<L>  |  95<L>  |  8   ----------------------------<  222<H>  3.3<L>   |  21<L>  |  0.78    Ca    7.7<L>      17 Nov 2019 07:30  Phos  1.9     11-17  Mg     1.8     11-17                Culture - Blood (collected 14 Nov 2019 14:24)  Source: Peripheral Site 1  Preliminary Report (16 Nov 2019 14:24):    NO ORGANISMS ISOLATED    NO ORGANISMS ISOLATED AT 48 HRS.    Culture - Blood (collected 14 Nov 2019 13:56)  Source: Peripheral Site 2  Preliminary Report (16 Nov 2019 07:17):    ***Blood Panel PCR results on this specimen are available    approximately 3 hours after the Gram stain result***    Gram stain, PCR, and/or culture results may not always    correspond due to difference in methodologies    ------------------------------------------------------------    This PCR assay was performed using [employer].  The    following targets are tested for:  Enterococcus, vancomycin    resistant enterococci, Listeria monocytogenes,  coagulase    negative staphylococci, S. aureus, methicillin resistant S.    aureus, Streptococcus agalactiae (Group B), S. pneumoniae,    S. pyogenes (Group A), Acinetobacter baumannii, Enterobacter    cloacae, E. coli, Klebsiella oxytoca, K. pneumoniae, Proteus    sp., Serratia marcescens, Haemophilus influenzae, Neisseria    meningitidis, Pseudomonas aeruginosa, Candida albicans, C.    glabrata, C. krusei, C. parapsilosis, C. tropicalis and the    KPC resistance gene.    **NOTE: Due to technical problems, Proteus sp. will NOT be    reported as part of the BCID paneluntil further notice.  Preliminary Report (16 Nov 2019 06:05):    NO ORGANISMS ISOLATED AT 24 HOURS    BCPCR^BLOOD CULTURE PCR    ***Blood Panel PCR results on this specimen are available    approximately 3 hours after the Gram stain result***    Gram stain, PCR, and/or culture results may not always    correspond due to difference in methodologies    ------------------------------------------------------------    This PCR assay was performed using [employer].  The    following targets are tested for:  Enterococcus, vancomycin    resistant enterococci, Listeria monocytogenes,  coagulase    negative staphylococci, S. aureus, methicillin resistant S.    aureus, Streptococcus agalactiae (Group B), S. pneumoniae,    S. pyogenes (Group A), Acinetobacter baumannii, Enterobacter    cloacae, E. coli, Klebsiella oxytoca, K. pneumoniae, Proteus    sp., Serratia marcescens, Haemophilus influenzae, Neisseria    meningitidis, Pseudomonas aeruginosa, Candida albicans, C.    glabrata, C. krusei, C. parapsilosis, C. tropicalis and the    KPC resistance gene.    **NOTE: Due to technical problems,Proteus sp. will NOT be    reported as part of the BCID panel until further notice.  Organism: BLOOD CULTURE PCR (16 Nov 2019 07:17)  Organism: BLOOD CULTURE PCR (16 Nov 2019 07:17)    Culture - Urine (collected 14 Nov 2019 13:27)  Source: URINE MIDSTREAM  Final Report (16 Nov 2019 09:31):    NO GROWTH AT 24 HOURS        RADIOLOGY & ADDITIONAL TESTS:  Results Reviewed:   Imaging Personally Reviewed:  Electrocardiogram Personally Reviewed:    COORDINATION OF CARE:  Care Discussed with Consultants/Other Providers [Y/N]:  Prior or Outpatient Records Reviewed [Y/N]:

## 2019-11-17 NOTE — PROGRESS NOTE ADULT - PROBLEM SELECTOR PLAN 5
RESOLVED  Trops 90>115>101. EKG with no ST changes or q wave depression. Pt not complaining of CP.  -likely demand ischemia in the s/o sepsis.

## 2019-11-17 NOTE — PROGRESS NOTE ADULT - SUBJECTIVE AND OBJECTIVE BOX
CARLOS ENRIQUE TATE  79y  Patient is a 79y old  Male who presents with a chief complaint of AMS (17 Nov 2019 12:02)    HPI:  This is a patient admitted for seizures. Noted to have Hyponatremia. He was given NS and the Na shelia, now low again.   He is awake and oriented as per daughter.    HEALTH ISSUES - PROBLEM Dx:  Seizure: Seizure  Orthostatic hypotension: Orthostatic hypotension  Hyponatremia: Hyponatremia  Lymphoma: Lymphoma  Diabetes: Diabetes  NSTEMI (non-ST elevated myocardial infarction): NSTEMI (non-ST elevated myocardial infarction)  Preventative health care: Preventative health care  DVT (deep venous thrombosis): DVT (deep venous thrombosis)  History of subdural hematoma: History of subdural hematoma  Sepsis: Sepsis  Encephalopathy, unspecified: Encephalopathy, unspecified        MEDICATIONS  (STANDING):  dextrose 5%. 1000 milliLiter(s) (50 mL/Hr) IV Continuous <Continuous>  dextrose 50% Injectable 12.5 Gram(s) IV Push once  dextrose 50% Injectable 25 Gram(s) IV Push once  dextrose 50% Injectable 25 Gram(s) IV Push once  enoxaparin Injectable 40 milliGRAM(s) SubCutaneous daily  escitalopram 5 milliGRAM(s) Oral daily  insulin lispro (HumaLOG) corrective regimen sliding scale   SubCutaneous three times a day before meals  insulin lispro (HumaLOG) corrective regimen sliding scale   SubCutaneous at bedtime  levETIRAcetam  IVPB 500 milliGRAM(s) IV Intermittent every 12 hours  levothyroxine Injectable 44 MICROGram(s) IV Push at bedtime  pantoprazole    Tablet 40 milliGRAM(s) Oral before breakfast  piperacillin/tazobactam IVPB.. 3.375 Gram(s) IV Intermittent every 8 hours  simvastatin 20 milliGRAM(s) Oral at bedtime  sodium chloride 1 Gram(s) Oral three times a day  valproate sodium IVPB 250 milliGRAM(s) IV Intermittent two times a day  vancomycin  IVPB 1000 milliGRAM(s) IV Intermittent every 12 hours    MEDICATIONS  (PRN):  acetaminophen   Tablet .. 650 milliGRAM(s) Oral every 6 hours PRN Severe Pain (7 - 10)  dextrose 40% Gel 15 Gram(s) Oral once PRN Blood Glucose LESS THAN 70 milliGRAM(s)/deciliter  glucagon  Injectable 1 milliGRAM(s) IntraMuscular once PRN Glucose LESS THAN 70 milligrams/deciliter    Vital Signs Last 24 Hrs  T(C): 36.7 (17 Nov 2019 11:30), Max: 37.1 (16 Nov 2019 18:03)  T(F): 98 (17 Nov 2019 11:30), Max: 98.8 (16 Nov 2019 18:03)  HR: 105 (17 Nov 2019 11:30) (91 - 105)  BP: 150/102 (17 Nov 2019 11:30) (142/93 - 165/98)  BP(mean): --  RR: 18 (17 Nov 2019 11:30) (17 - 19)  SpO2: 100% (17 Nov 2019 11:30) (96% - 100%)  Daily     Daily     PHYSICAL EXAM:  Constitutional: He  appears comfortable and clinically euvolemic.    Respiratory: The lungs are clear to auscultation. No dullness and expansion is normal.    Cardiovascular: S1 and S2 are normal. No mummurs, rubs or gallops are present.    Gastrointestinal: The abdomen is soft. No tenderness is present. No masses are present. Bowel sounds are normal.    Genitourinary: The bladder is not distended. No CVA tenderness is present.    Extremities: No edema is noted. No deformities are present.    Neurological: Cognition is normal. Tone, power and sensation are normal.     Skin: No lesions are seen  or palpated.    Lymph Nodes: No lymphadenopathy is present.    Psychiatric: Mood is appropriate. No hallucinations or flight of ideas are noted.                              9.6    18.18 )-----------( 394      ( 17 Nov 2019 07:30 )             30.0     11-17    127<L>  |  95<L>  |  8   ----------------------------<  222<H>  3.3<L>   |  21<L>  |  0.78    Ca    7.7<L>      17 Nov 2019 07:30  Phos  1.9     11-17  Mg     1.8     11-17    Osmolality, Serum (11.17.19 @ 10:15)    Osmolality, Serum: 279 mosmo/kg    Osmolality, Random Urine (09.12.19 @ 05:00)    Osmolality, Random Urine: 425 mosmo/kg    Uric Acid, Serum: 1.8 mg/dL (11.17.19 @ 07:30)

## 2019-11-17 NOTE — PROGRESS NOTE ADULT - PROBLEM SELECTOR PLAN 3
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 c/w keppra 500mg BID  -per neurology; c/w valproic acid 250 BID

## 2019-11-18 ENCOUNTER — APPOINTMENT (OUTPATIENT)
Dept: NEUROSURGERY | Facility: CLINIC | Age: 79
End: 2019-11-18

## 2019-11-18 DIAGNOSIS — I10 ESSENTIAL (PRIMARY) HYPERTENSION: ICD-10-CM

## 2019-11-18 LAB
ANION GAP SERPL CALC-SCNC: 11 MMO/L — SIGNIFICANT CHANGE UP (ref 7–14)
ANION GAP SERPL CALC-SCNC: 15 MMO/L — HIGH (ref 7–14)
BASOPHILS # BLD AUTO: 0.13 K/UL — SIGNIFICANT CHANGE UP (ref 0–0.2)
BASOPHILS NFR BLD AUTO: 0.7 % — SIGNIFICANT CHANGE UP (ref 0–2)
BUN SERPL-MCNC: 7 MG/DL — SIGNIFICANT CHANGE UP (ref 7–23)
BUN SERPL-MCNC: 8 MG/DL — SIGNIFICANT CHANGE UP (ref 7–23)
CALCIUM SERPL-MCNC: 7.6 MG/DL — LOW (ref 8.4–10.5)
CALCIUM SERPL-MCNC: 7.7 MG/DL — LOW (ref 8.4–10.5)
CHLORIDE SERPL-SCNC: 95 MMOL/L — LOW (ref 98–107)
CHLORIDE SERPL-SCNC: 95 MMOL/L — LOW (ref 98–107)
CO2 SERPL-SCNC: 14 MMOL/L — LOW (ref 22–31)
CO2 SERPL-SCNC: 21 MMOL/L — LOW (ref 22–31)
CREAT SERPL-MCNC: 0.75 MG/DL — SIGNIFICANT CHANGE UP (ref 0.5–1.3)
CREAT SERPL-MCNC: 0.78 MG/DL — SIGNIFICANT CHANGE UP (ref 0.5–1.3)
EOSINOPHIL # BLD AUTO: 0.17 K/UL — SIGNIFICANT CHANGE UP (ref 0–0.5)
EOSINOPHIL NFR BLD AUTO: 0.9 % — SIGNIFICANT CHANGE UP (ref 0–6)
GLUCOSE SERPL-MCNC: 191 MG/DL — HIGH (ref 70–99)
GLUCOSE SERPL-MCNC: 259 MG/DL — HIGH (ref 70–99)
HCT VFR BLD CALC: 31.1 % — LOW (ref 39–50)
HGB BLD-MCNC: 10.3 G/DL — LOW (ref 13–17)
IMM GRANULOCYTES NFR BLD AUTO: 2.6 % — HIGH (ref 0–1.5)
LYMPHOCYTES # BLD AUTO: 12.1 % — LOW (ref 13–44)
LYMPHOCYTES # BLD AUTO: 2.26 K/UL — SIGNIFICANT CHANGE UP (ref 1–3.3)
MAGNESIUM SERPL-MCNC: 1.7 MG/DL — SIGNIFICANT CHANGE UP (ref 1.6–2.6)
MAGNESIUM SERPL-MCNC: 1.8 MG/DL — SIGNIFICANT CHANGE UP (ref 1.6–2.6)
MCHC RBC-ENTMCNC: 29 PG — SIGNIFICANT CHANGE UP (ref 27–34)
MCHC RBC-ENTMCNC: 33.1 % — SIGNIFICANT CHANGE UP (ref 32–36)
MCV RBC AUTO: 87.6 FL — SIGNIFICANT CHANGE UP (ref 80–100)
MONOCYTES # BLD AUTO: 2.26 K/UL — HIGH (ref 0–0.9)
MONOCYTES NFR BLD AUTO: 12.1 % — SIGNIFICANT CHANGE UP (ref 2–14)
NEUTROPHILS # BLD AUTO: 13.43 K/UL — HIGH (ref 1.8–7.4)
NEUTROPHILS NFR BLD AUTO: 71.6 % — SIGNIFICANT CHANGE UP (ref 43–77)
NRBC # FLD: 0 K/UL — SIGNIFICANT CHANGE UP (ref 0–0)
PHOSPHATE SERPL-MCNC: 1.8 MG/DL — LOW (ref 2.5–4.5)
PHOSPHATE SERPL-MCNC: 1.9 MG/DL — LOW (ref 2.5–4.5)
PLATELET # BLD AUTO: 349 K/UL — SIGNIFICANT CHANGE UP (ref 150–400)
PMV BLD: 10.3 FL — SIGNIFICANT CHANGE UP (ref 7–13)
POTASSIUM SERPL-MCNC: 3.5 MMOL/L — SIGNIFICANT CHANGE UP (ref 3.5–5.3)
POTASSIUM SERPL-MCNC: 4.7 MMOL/L — SIGNIFICANT CHANGE UP (ref 3.5–5.3)
POTASSIUM SERPL-SCNC: 3.5 MMOL/L — SIGNIFICANT CHANGE UP (ref 3.5–5.3)
POTASSIUM SERPL-SCNC: 4.7 MMOL/L — SIGNIFICANT CHANGE UP (ref 3.5–5.3)
RBC # BLD: 3.55 M/UL — LOW (ref 4.2–5.8)
RBC # FLD: 18.4 % — HIGH (ref 10.3–14.5)
SODIUM SERPL-SCNC: 124 MMOL/L — LOW (ref 135–145)
SODIUM SERPL-SCNC: 127 MMOL/L — LOW (ref 135–145)
SPECIMEN SOURCE: SIGNIFICANT CHANGE UP
SPECIMEN SOURCE: SIGNIFICANT CHANGE UP
VANCOMYCIN TROUGH SERPL-MCNC: 15.2 UG/ML — SIGNIFICANT CHANGE UP (ref 10–20)
WBC # BLD: 18.74 K/UL — HIGH (ref 3.8–10.5)
WBC # FLD AUTO: 18.74 K/UL — HIGH (ref 3.8–10.5)

## 2019-11-18 PROCEDURE — 99233 SBSQ HOSP IP/OBS HIGH 50: CPT | Mod: GC

## 2019-11-18 PROCEDURE — 70552 MRI BRAIN STEM W/DYE: CPT | Mod: 26

## 2019-11-18 PROCEDURE — 99232 SBSQ HOSP IP/OBS MODERATE 35: CPT

## 2019-11-18 RX ORDER — FUROSEMIDE 40 MG
20 TABLET ORAL DAILY
Refills: 0 | Status: DISCONTINUED | OUTPATIENT
Start: 2019-11-18 | End: 2019-11-25

## 2019-11-18 RX ORDER — ATENOLOL 25 MG/1
50 TABLET ORAL DAILY
Refills: 0 | Status: DISCONTINUED | OUTPATIENT
Start: 2019-11-19 | End: 2019-12-01

## 2019-11-18 RX ORDER — ATENOLOL 25 MG/1
25 TABLET ORAL ONCE
Refills: 0 | Status: COMPLETED | OUTPATIENT
Start: 2019-11-18 | End: 2019-11-18

## 2019-11-18 RX ORDER — LEVOTHYROXINE SODIUM 125 MCG
88 TABLET ORAL DAILY
Refills: 0 | Status: DISCONTINUED | OUTPATIENT
Start: 2019-11-18 | End: 2019-12-01

## 2019-11-18 RX ORDER — TOLVAPTAN 15 MG/1
15 TABLET ORAL ONCE
Refills: 0 | Status: DISCONTINUED | OUTPATIENT
Start: 2019-11-18 | End: 2019-11-18

## 2019-11-18 RX ORDER — SODIUM,POTASSIUM PHOSPHATES 278-250MG
1 POWDER IN PACKET (EA) ORAL ONCE
Refills: 0 | Status: COMPLETED | OUTPATIENT
Start: 2019-11-18 | End: 2019-11-18

## 2019-11-18 RX ADMIN — PIPERACILLIN AND TAZOBACTAM 25 GRAM(S): 4; .5 INJECTION, POWDER, LYOPHILIZED, FOR SOLUTION INTRAVENOUS at 21:41

## 2019-11-18 RX ADMIN — Medication 250 MILLIGRAM(S): at 17:02

## 2019-11-18 RX ADMIN — Medication 88 MICROGRAM(S): at 12:37

## 2019-11-18 RX ADMIN — SODIUM CHLORIDE 1 GRAM(S): 9 INJECTION INTRAMUSCULAR; INTRAVENOUS; SUBCUTANEOUS at 21:41

## 2019-11-18 RX ADMIN — SODIUM CHLORIDE 1 GRAM(S): 9 INJECTION INTRAMUSCULAR; INTRAVENOUS; SUBCUTANEOUS at 16:54

## 2019-11-18 RX ADMIN — PIPERACILLIN AND TAZOBACTAM 25 GRAM(S): 4; .5 INJECTION, POWDER, LYOPHILIZED, FOR SOLUTION INTRAVENOUS at 16:54

## 2019-11-18 RX ADMIN — PIPERACILLIN AND TAZOBACTAM 25 GRAM(S): 4; .5 INJECTION, POWDER, LYOPHILIZED, FOR SOLUTION INTRAVENOUS at 05:54

## 2019-11-18 RX ADMIN — Medication 2: at 12:55

## 2019-11-18 RX ADMIN — Medication 1: at 21:41

## 2019-11-18 RX ADMIN — LEVETIRACETAM 400 MILLIGRAM(S): 250 TABLET, FILM COATED ORAL at 17:02

## 2019-11-18 RX ADMIN — Medication 20 MILLIGRAM(S): at 12:37

## 2019-11-18 RX ADMIN — Medication 26.25 MILLIGRAM(S): at 18:28

## 2019-11-18 RX ADMIN — Medication 1 PACKET(S): at 11:25

## 2019-11-18 RX ADMIN — SODIUM CHLORIDE 1 GRAM(S): 9 INJECTION INTRAMUSCULAR; INTRAVENOUS; SUBCUTANEOUS at 05:54

## 2019-11-18 RX ADMIN — LEVETIRACETAM 400 MILLIGRAM(S): 250 TABLET, FILM COATED ORAL at 06:09

## 2019-11-18 RX ADMIN — Medication 2: at 09:05

## 2019-11-18 RX ADMIN — ATENOLOL 25 MILLIGRAM(S): 25 TABLET ORAL at 05:54

## 2019-11-18 RX ADMIN — Medication 2: at 17:50

## 2019-11-18 RX ADMIN — ESCITALOPRAM OXALATE 5 MILLIGRAM(S): 10 TABLET, FILM COATED ORAL at 11:24

## 2019-11-18 RX ADMIN — SIMVASTATIN 20 MILLIGRAM(S): 20 TABLET, FILM COATED ORAL at 21:41

## 2019-11-18 RX ADMIN — ENOXAPARIN SODIUM 40 MILLIGRAM(S): 100 INJECTION SUBCUTANEOUS at 11:25

## 2019-11-18 RX ADMIN — PANTOPRAZOLE SODIUM 40 MILLIGRAM(S): 20 TABLET, DELAYED RELEASE ORAL at 05:54

## 2019-11-18 RX ADMIN — ATENOLOL 25 MILLIGRAM(S): 25 TABLET ORAL at 11:25

## 2019-11-18 RX ADMIN — Medication 250 MILLIGRAM(S): at 06:43

## 2019-11-18 RX ADMIN — Medication 26.25 MILLIGRAM(S): at 05:55

## 2019-11-18 NOTE — PROGRESS NOTE ADULT - ASSESSMENT
Mr. Browne is an 80 year old man with a PMH of DM, CAD, lymphoma, DVT,  and recent admission in November 2019 for SDH who is now presenting with acute encephalopathy. Also witnessed seizure in ED.  ID consulted for leukocytosis of 18.  Was started empirically on Vancomycin and Zosyn- blood cx negative, ua negative, CT chest with b/l patchy opacities in upper lobe pneumonia vs alveolar edema.    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       Leukocytosis unchanged despite vanco and zosyn day # 4  for possible aspiration pneumonia   seizures   Lymphoma   CNS in blood cx likely contaminant    Suggest:  complete vanco and zosyn x 5 days  check serum cryptococcus antigen  check strongyloides ab  check Quantiferon TB      Marianne Montes MD  Pager: [phone]  After 5 PM or weekends please call fellow on call or office [phone]

## 2019-11-18 NOTE — PROGRESS NOTE ADULT - PROBLEM SELECTOR PLAN 2
T- 100.8 in the ED and tachycardic in the field. No identifiable source of infection at present.  -low suspicion for meningitis clinically, CXR without consolidation but BL pleural effusions, UA negative. No evidence of intraabdominal infection on CT  -now growing CNS in 1 of 4 bottles on BCx. Favors contamination but will repeat BCx. Still no localizing signs or symptoms of infection.   -Vanc and zosyn x5 days for possible PNA given consolidations on CT and possible aspiration in s/o seizure  -ID consulted; f/u ID recs

## 2019-11-18 NOTE — PROGRESS NOTE ADULT - SUBJECTIVE AND OBJECTIVE BOX
Pavithra Alfonso PGY1  -[id number] | [facility] [id number]  =========================    Patient is a 79y old  Male who presents with a chief complaint of AMS (17 Nov 2019 13:24)      INTERVAL HPI/OVERNIGHT EVENTS:  Overnight pt was hypertensive to 186/103.       MEDICATIONS  (STANDING):  ATENolol  Tablet 25 milliGRAM(s) Oral daily  dextrose 5%. 1000 milliLiter(s) (50 mL/Hr) IV Continuous <Continuous>  dextrose 50% Injectable 12.5 Gram(s) IV Push once  dextrose 50% Injectable 25 Gram(s) IV Push once  dextrose 50% Injectable 25 Gram(s) IV Push once  enoxaparin Injectable 40 milliGRAM(s) SubCutaneous daily  escitalopram 5 milliGRAM(s) Oral daily  insulin lispro (HumaLOG) corrective regimen sliding scale   SubCutaneous three times a day before meals  insulin lispro (HumaLOG) corrective regimen sliding scale   SubCutaneous at bedtime  levETIRAcetam  IVPB 500 milliGRAM(s) IV Intermittent every 12 hours  levothyroxine Injectable 44 MICROGram(s) IV Push at bedtime  pantoprazole    Tablet 40 milliGRAM(s) Oral before breakfast  piperacillin/tazobactam IVPB.. 3.375 Gram(s) IV Intermittent every 8 hours  simvastatin 20 milliGRAM(s) Oral at bedtime  sodium chloride 1 Gram(s) Oral three times a day  valproate sodium IVPB 250 milliGRAM(s) IV Intermittent two times a day  vancomycin  IVPB 1000 milliGRAM(s) IV Intermittent every 12 hours    MEDICATIONS  (PRN):  acetaminophen   Tablet .. 650 milliGRAM(s) Oral every 6 hours PRN Severe Pain (7 - 10)  dextrose 40% Gel 15 Gram(s) Oral once PRN Blood Glucose LESS THAN 70 milliGRAM(s)/deciliter  glucagon  Injectable 1 milliGRAM(s) IntraMuscular once PRN Glucose LESS THAN 70 milligrams/deciliter      Allergies  No Known Allergies      Vital Signs Last 24 Hrs  T(C): 36.5 (18 Nov 2019 05:45), Max: 36.9 (17 Nov 2019 21:45)  T(F): 97.7 (18 Nov 2019 05:45), Max: 98.5 (18 Nov 2019 01:45)  HR: 100 (18 Nov 2019 05:45) (100 - 105)  BP: 186/103 (18 Nov 2019 05:45) (150/102 - 186/103)  RR: 16 (18 Nov 2019 05:45) (16 - 18)  SpO2: 100% (18 Nov 2019 05:45) (100% - 100%)    PHYSICAL EXAM:  GENERAL: NAD.   CHEST/LUNG: Clear to auscultation; No rales, rhonchi, or wheezing.  Respiratory effort does not appear labored.  HEART: Regular rate and rhythm; S1 and S2,  no murmurs, rubs, or gallops.  ABDOMEN: Soft, not tender to palpation.  No masses or HSM appreciated.  No distension.  Bowel sounds present.  EXTREMITIES:  No clubbing, cyanosis, or edema.  Moves all extremities with strength 5/5.  SKIN: No obvious rashes or lesions.  Turgor okay.  NEURO:  Alert and oriented x 3, no focal sensory or  motor deficit, DTR 2+ bilaterally.    LABS:            CAPILLARY BLOOD GLUCOSE      POCT Blood Glucose.: 207 mg/dL (17 Nov 2019 21:25)  POCT Blood Glucose.: 230 mg/dL (17 Nov 2019 17:06)  POCT Blood Glucose.: 226 mg/dL (17 Nov 2019 12:24)  POCT Blood Glucose.: 186 mg/dL (17 Nov 2019 08:22)      Culture - Blood (collected 14 Nov 2019 14:24)  Source: Peripheral Site 1  Preliminary Report (17 Nov 2019 14:25):    NO ORGANISMS ISOLATED    NO ORGANISMS ISOLATED AT 72 HRS.    Culture - Blood (collected 14 Nov 2019 13:56)  Source: Peripheral Site 2  Preliminary Report (16 Nov 2019 07:17):    ***Blood Panel PCR results on this specimen are available    approximately 3 hours after the Gram stain result***    Gram stain, PCR, and/or culture results may not always    correspond due to difference in methodologies    ------------------------------------------------------------    This PCR assay was performed using [employer].  The    following targets are tested for:  Enterococcus, vancomycin    resistant enterococci, Listeria monocytogenes,  coagulase    negative staphylococci, S. aureus, methicillin resistant S.    aureus, Streptococcus agalactiae (Group B), S. pneumoniae,    S. pyogenes (Group A), Acinetobacter baumannii, Enterobacter    cloacae, E. coli, Klebsiella oxytoca, K. pneumoniae, Proteus    sp., Serratia marcescens, Haemophilus influenzae, Neisseria    meningitidis, Pseudomonas aeruginosa, Candida albicans, C.    glabrata, C. krusei, C. parapsilosis, C. tropicalis and the    KPC resistance gene.    **NOTE: Due to technical problems, Proteus sp. will NOT be    reported as part of the BCID paneluntil further notice.  Final Report (17 Nov 2019 14:45):    Single set isolate, possible contaminant.    Contact microbiology if susceptibility testing is clinically    indicated.  Organism: BLOOD CULTURE PCR  Staphylococcus sp.,coag neg (17 Nov 2019 14:45)  Organism: Staphylococcus sp.,coag neg (17 Nov 2019 14:45)  Organism: BLOOD CULTURE PCR  ***Blood Panel PCR results on this specimen are available  approximately 3 hours after the Gram stain result***  Gram stain, PCR, and/or culture results may not always  correspond due to difference in methodologies  ------------------------------------------------------------  This PCR assay was performed using [employer].  The  following targets are tested for:  Enterococcus, vancomycin  resistant enterococci, Listeria monocytogenes,  coagulase  negative staphylococci, S. aureus, methicillin resistant S.  aureus, Streptococcus agalactiae (Group B), S. pneumoniae,  S. pyogenes (Group A), Acinetobacter baumannii, Enterobacter  cloacae, E. coli, Klebsiella oxytoca, K. pneumoniae, Proteus  sp., Serratia marcescens, Haemophilus influenzae, Neisseria  meningitidis, Pseudomonas aeruginosa, Candida albicans, C.  glabrata, C. krusei, C. parapsilosis, C. tropicalis and the  KPC resistance gene.  **NOTE: Due to technical problems, Proteus sp. will NOT be  reported as part of the BCID panel until further notice. (17 Nov 2019 14:45)    Culture - Urine (collected 14 Nov 2019 13:27)  Source: URINE MIDSTREAM  Final Report (16 Nov 2019 09:31):    NO GROWTH AT 24 HOURS Pavithra Alfonso PGY1  -[id number] | [facility] [id number]  =========================    Patient is a 79y old  Male who presents with a chief complaint of AMS (17 Nov 2019 13:24)      INTERVAL HPI/OVERNIGHT EVENTS:  Overnight pt was hypertensive to 186/103 - BP came down to 167/91 on its own. Na continued to downtrend - 124, given tolvaptan 15mg x1. Pt seen at bedside - on 1.5L NC. Awake but does not follow command to answer questions appropriately. Respond "no" to all questions.        MEDICATIONS  (STANDING):  ATENolol  Tablet 25 milliGRAM(s) Oral daily  dextrose 5%. 1000 milliLiter(s) (50 mL/Hr) IV Continuous <Continuous>  dextrose 50% Injectable 12.5 Gram(s) IV Push once  dextrose 50% Injectable 25 Gram(s) IV Push once  dextrose 50% Injectable 25 Gram(s) IV Push once  enoxaparin Injectable 40 milliGRAM(s) SubCutaneous daily  escitalopram 5 milliGRAM(s) Oral daily  insulin lispro (HumaLOG) corrective regimen sliding scale   SubCutaneous three times a day before meals  insulin lispro (HumaLOG) corrective regimen sliding scale   SubCutaneous at bedtime  levETIRAcetam  IVPB 500 milliGRAM(s) IV Intermittent every 12 hours  levothyroxine Injectable 44 MICROGram(s) IV Push at bedtime  pantoprazole    Tablet 40 milliGRAM(s) Oral before breakfast  piperacillin/tazobactam IVPB.. 3.375 Gram(s) IV Intermittent every 8 hours  simvastatin 20 milliGRAM(s) Oral at bedtime  sodium chloride 1 Gram(s) Oral three times a day  valproate sodium IVPB 250 milliGRAM(s) IV Intermittent two times a day  vancomycin  IVPB 1000 milliGRAM(s) IV Intermittent every 12 hours    MEDICATIONS  (PRN):  acetaminophen   Tablet .. 650 milliGRAM(s) Oral every 6 hours PRN Severe Pain (7 - 10)  dextrose 40% Gel 15 Gram(s) Oral once PRN Blood Glucose LESS THAN 70 milliGRAM(s)/deciliter  glucagon  Injectable 1 milliGRAM(s) IntraMuscular once PRN Glucose LESS THAN 70 milligrams/deciliter      Allergies  No Known Allergies      Vital Signs Last 24 Hrs  T(C): 36.5 (18 Nov 2019 05:45), Max: 36.9 (17 Nov 2019 21:45)  T(F): 97.7 (18 Nov 2019 05:45), Max: 98.5 (18 Nov 2019 01:45)  HR: 100 (18 Nov 2019 05:45) (100 - 105)  BP: 186/103 (18 Nov 2019 05:45) (150/102 - 186/103)  RR: 16 (18 Nov 2019 05:45) (16 - 18)  SpO2: 100% (18 Nov 2019 05:45) (100% - 100%)    PHYSICAL EXAM:  GENERAL: NAD. comfortable appearing. on NC  CHEST/LUNG: Clear to auscultation anteriorly; Respiratory effort does not appear labored.  HEART: Regular rate and rhythm; S1 and S2,  no murmurs, rubs, or gallops.  ABDOMEN: Soft, not tender to palpation.  No masses or HSM appreciated.  No distension.  Bowel sounds present.  EXTREMITIES:  left hand with mild edema - no erythema or warmth - likely from IV infiltrate.   NEURO:  Awake, and moves extremities spontaneously but does not follow command.      LABS:        11-18    124<127>  |  95<L>  |  8   ----------------------------<  191<H>  4.7   |  14<L>  |  0.75    Ca    7.7<L>      18 Nov 2019 05:00  Phos  1.9     11-18  Mg     1.8     11-18        CAPILLARY BLOOD GLUCOSE      POCT Blood Glucose.: 207 mg/dL (17 Nov 2019 21:25)  POCT Blood Glucose.: 230 mg/dL (17 Nov 2019 17:06)  POCT Blood Glucose.: 226 mg/dL (17 Nov 2019 12:24)  POCT Blood Glucose.: 186 mg/dL (17 Nov 2019 08:22) Pavithra Alfonso PGY1  -[phone] | [facility] [id number]  =========================    Patient is a 79y old  Male who presents with a chief complaint of AMS (17 Nov 2019 13:24)      INTERVAL HPI/OVERNIGHT EVENTS:  Overnight pt was hypertensive to 186/103 - BP came down to 167/91 on its own. Pt seen at bedside - on 1.5L NC. Awake but does not follow command to answer questions appropriately. Respond "no" to all questions.        MEDICATIONS  (STANDING):  ATENolol  Tablet 25 milliGRAM(s) Oral daily  dextrose 5%. 1000 milliLiter(s) (50 mL/Hr) IV Continuous <Continuous>  dextrose 50% Injectable 12.5 Gram(s) IV Push once  dextrose 50% Injectable 25 Gram(s) IV Push once  dextrose 50% Injectable 25 Gram(s) IV Push once  enoxaparin Injectable 40 milliGRAM(s) SubCutaneous daily  escitalopram 5 milliGRAM(s) Oral daily  insulin lispro (HumaLOG) corrective regimen sliding scale   SubCutaneous three times a day before meals  insulin lispro (HumaLOG) corrective regimen sliding scale   SubCutaneous at bedtime  levETIRAcetam  IVPB 500 milliGRAM(s) IV Intermittent every 12 hours  levothyroxine Injectable 44 MICROGram(s) IV Push at bedtime  pantoprazole    Tablet 40 milliGRAM(s) Oral before breakfast  piperacillin/tazobactam IVPB.. 3.375 Gram(s) IV Intermittent every 8 hours  simvastatin 20 milliGRAM(s) Oral at bedtime  sodium chloride 1 Gram(s) Oral three times a day  valproate sodium IVPB 250 milliGRAM(s) IV Intermittent two times a day  vancomycin  IVPB 1000 milliGRAM(s) IV Intermittent every 12 hours    MEDICATIONS  (PRN):  acetaminophen   Tablet .. 650 milliGRAM(s) Oral every 6 hours PRN Severe Pain (7 - 10)  dextrose 40% Gel 15 Gram(s) Oral once PRN Blood Glucose LESS THAN 70 milliGRAM(s)/deciliter  glucagon  Injectable 1 milliGRAM(s) IntraMuscular once PRN Glucose LESS THAN 70 milligrams/deciliter      Allergies  No Known Allergies      Vital Signs Last 24 Hrs  T(C): 36.5 (18 Nov 2019 05:45), Max: 36.9 (17 Nov 2019 21:45)  T(F): 97.7 (18 Nov 2019 05:45), Max: 98.5 (18 Nov 2019 01:45)  HR: 100 (18 Nov 2019 05:45) (100 - 105)  BP: 186/103 (18 Nov 2019 05:45) (150/102 - 186/103)  RR: 16 (18 Nov 2019 05:45) (16 - 18)  SpO2: 100% (18 Nov 2019 05:45) (100% - 100%)    PHYSICAL EXAM:  GENERAL: NAD. comfortable appearing. on NC  CHEST/LUNG: Clear to auscultation anteriorly; Respiratory effort does not appear labored.  HEART: Regular rate and rhythm; S1 and S2,  no murmurs, rubs, or gallops.  ABDOMEN: Soft, not tender to palpation.  No masses or HSM appreciated.  No distension.  Bowel sounds present.  EXTREMITIES:  left hand with mild edema - no erythema or warmth - likely from IV infiltrate.   NEURO:  Awake, and moves extremities spontaneously but does not follow command.      LABS:        11-18    124<127>  |  95<L>  |  8   ----------------------------<  191<H>  4.7   |  14<L>  |  0.75    Ca    7.7<L>      18 Nov 2019 05:00  Phos  1.9     11-18  Mg     1.8     11-18        CAPILLARY BLOOD GLUCOSE      POCT Blood Glucose.: 207 mg/dL (17 Nov 2019 21:25)  POCT Blood Glucose.: 230 mg/dL (17 Nov 2019 17:06)  POCT Blood Glucose.: 226 mg/dL (17 Nov 2019 12:24)  POCT Blood Glucose.: 186 mg/dL (17 Nov 2019 08:22) Pavithra Alfonso PGY1  -0135 | [facility] [id number]  =========================    Patient is a 79y old  Male who presents with a chief complaint of AMS (17 Nov 2019 13:24)      INTERVAL HPI/OVERNIGHT EVENTS:  Overnight pt was hypertensive to 186/103 - BP came down to 167/91 on its own. Pt seen at bedside - on 1.5L NC. Awake but does not follow command or answer questions appropriately. Respond "no" to all questions.        MEDICATIONS  (STANDING):  ATENolol  Tablet 25 milliGRAM(s) Oral daily  dextrose 5%. 1000 milliLiter(s) (50 mL/Hr) IV Continuous <Continuous>  dextrose 50% Injectable 12.5 Gram(s) IV Push once  dextrose 50% Injectable 25 Gram(s) IV Push once  dextrose 50% Injectable 25 Gram(s) IV Push once  enoxaparin Injectable 40 milliGRAM(s) SubCutaneous daily  escitalopram 5 milliGRAM(s) Oral daily  insulin lispro (HumaLOG) corrective regimen sliding scale   SubCutaneous three times a day before meals  insulin lispro (HumaLOG) corrective regimen sliding scale   SubCutaneous at bedtime  levETIRAcetam  IVPB 500 milliGRAM(s) IV Intermittent every 12 hours  levothyroxine Injectable 44 MICROGram(s) IV Push at bedtime  pantoprazole    Tablet 40 milliGRAM(s) Oral before breakfast  piperacillin/tazobactam IVPB.. 3.375 Gram(s) IV Intermittent every 8 hours  simvastatin 20 milliGRAM(s) Oral at bedtime  sodium chloride 1 Gram(s) Oral three times a day  valproate sodium IVPB 250 milliGRAM(s) IV Intermittent two times a day  vancomycin  IVPB 1000 milliGRAM(s) IV Intermittent every 12 hours    MEDICATIONS  (PRN):  acetaminophen   Tablet .. 650 milliGRAM(s) Oral every 6 hours PRN Severe Pain (7 - 10)  dextrose 40% Gel 15 Gram(s) Oral once PRN Blood Glucose LESS THAN 70 milliGRAM(s)/deciliter  glucagon  Injectable 1 milliGRAM(s) IntraMuscular once PRN Glucose LESS THAN 70 milligrams/deciliter      Allergies  No Known Allergies      Vital Signs Last 24 Hrs  T(C): 36.5 (18 Nov 2019 05:45), Max: 36.9 (17 Nov 2019 21:45)  T(F): 97.7 (18 Nov 2019 05:45), Max: 98.5 (18 Nov 2019 01:45)  HR: 100 (18 Nov 2019 05:45) (100 - 105)  BP: 186/103 (18 Nov 2019 05:45) (150/102 - 186/103)  RR: 16 (18 Nov 2019 05:45) (16 - 18)  SpO2: 100% (18 Nov 2019 05:45) (100% - 100%)    PHYSICAL EXAM:  GENERAL: NAD. comfortable appearing. on NC  CHEST/LUNG: Clear to auscultation anteriorly; Respiratory effort does not appear labored.  HEART: Regular rate and rhythm; S1 and S2,  no murmurs, rubs, or gallops.  ABDOMEN: Soft, not tender to palpation.  No masses or HSM appreciated.  No distension.  Bowel sounds present.  EXTREMITIES:  left hand with mild edema - no erythema or warmth - likely from IV infiltrate.   NEURO:  Awake, and moves extremities spontaneously but does not follow command.      LABS:        11-18    124<127>  |  95<L>  |  8   ----------------------------<  191<H>  4.7   |  14<L>  |  0.75    Ca    7.7<L>      18 Nov 2019 05:00  Phos  1.9     11-18  Mg     1.8     11-18        CAPILLARY BLOOD GLUCOSE      POCT Blood Glucose.: 207 mg/dL (17 Nov 2019 21:25)  POCT Blood Glucose.: 230 mg/dL (17 Nov 2019 17:06)  POCT Blood Glucose.: 226 mg/dL (17 Nov 2019 12:24)  POCT Blood Glucose.: 186 mg/dL (17 Nov 2019 08:22)

## 2019-11-18 NOTE — PROGRESS NOTE ADULT - PROBLEM SELECTOR PLAN 5
- c/w atenolol 25mg qd - increasing atenolol to 50mg qd - increasing atenolol to 50mg qd  - started lasix 20mg PO QD

## 2019-11-18 NOTE — PROGRESS NOTE ADULT - PROBLEM SELECTOR PLAN 6
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 Pt was also hyponatremic on a previous admission.  -1L fluid restriction  -pt may be having seizures in setting of known SDH with lowered seizure threshold 2/2 to hyponatremia.  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 Pt was also hyponatremic on a previous admission. Na continues to downtrend.   -1L fluid restriction  - f/u send urine osm, serum osm and uric acid  -salt tab 1g TID  - tolvaptan if Na <125

## 2019-11-18 NOTE — PROGRESS NOTE ADULT - ASSESSMENT
80 year old man with a PMH of DLBCL, CAD, DM, HTN, BL LE DVT, LUE DVT not on AC, CKD, orthostatic hypotension, and recent admission in November 2019 for SDH who is now presenting with acute encephalopathy    CKD stage 3  - baseline Cr stable ~0.9-1.1  - Had Cr ~2 for the past year; ? prolonged FRANCES state (? 2/2 prolonged hypercalcemic state) that has now resolved vs. loss of body mass  - Serum Cr remains stable  - Avoid nephrotoxins agent  - renal diet  - monitor bmp    Hyponatremia  - ? etiology, present with Na 122 received 2L NS with slight improvement   - Na agian low this AM  - Continue lasix, Pt also on salt tablets TID  - s/p Tolvaptan x 1 on 11/18, repeat Na has improved   - Recommend repeat BMP tonight  - free water restriction <1L/day  - optimize dm control  - avoid hypotonic solutions    HTN  - has Hx HTN but was on midodrine for orthostatic hypotension  - BP elevated, now off midodrine   - monitor bp    Hypocalcemia  - Has Hx hypercalcemia of malignancy  - low alb corrected alexander is optimal  - monitor    Pl. effusion b/l/ Anasarca   mod effusion seen on ct chest  Now on lasix 20mg daily. Improving   monitor fluid status and clinical presentation

## 2019-11-18 NOTE — PROGRESS NOTE ADULT - ATTENDING COMMENTS
Pt seen and examined, chart and labs reviewed.   79M with h/o HTN, DM2, hypothyroidism, CAD s/p stents, PUD s/p H.pylori treatment, recent dx of DLBCL at [facility] on Oct 2019 s/p 2 cycles of R-CP, recent fall with R frontal and occipital SDH with no evacuation, LUE DVT and b/l LE DVT formerly on Lovenox, p/w acute encephalopathy and had witnessed seizure activity at home Thursday and again had tonic clonic seizure in ER s/p Ativan 2mg IVx1.     Assessment/plan:  #Metabolic encephalopathy with seizure activity: multifactorial, ?postictal vs. infectious, r/o CNS lymphoma  -expedite MRI brain w/ contrast  -video EEG  -c/w keppra/valproic acid, f/u neurology  CT head showing stable SDH with evolutional changes ; neurosurgery consulted, no urgent indication for evacuation  resumed on prophylactic lovenox  MRI brain w/o contrast no lymphoma but limited by lack of contrast, stable SDH, chronic L MCA infarct  # Leukocytosis, Possible UL pneumonia: c/w vanco/zosyn (day #4), ID consulted   nsult appreciated, plan vEEG, c/w keppra/valproic acid  daughter would like pt to have mediport and chemo while inpt, but we need to r/o infection first  daughter Dr. Browne is also amenable to PEG if pt continues to have poor nutrition, nutrition eval, calorie count, c/w glucerna with diet    #DLBCL: s/p 2 cycles of R-CP with interval improvement in lymphadenopathy on scans from 11/14.    CT shows response to chemo with reduced lymphadenopathy.  Would hold off on any chemotherapy at this time until full infectious workup complete.  repeat MRI w/ contrast to assess for CNS involvement of DLBCL.  No evidence of TLS on labs. Would defer LP at this time until we obtain MRI brain imaging.     #Hypercoagulable state, acute b/l LE DVT and LUE DVT: it was decided on last admission to continue pt on therapeutic Lovenox given high risk of VTE formation.  Repeat duplex showing stable clot with no propagation. resumed on prophylactic lovenox    #Hyponatremia, clinical exam suggestive of fluid overloaded state (including presence of b/l pleural effusions) however SIADH certainly also a possibility given intracranial process: fluid restriction, salt tabs, tolvaptan if Na<125 per renal  per daughter previous pleural tap transudative at [facility], will add lasix 20 mg qd   Low suspicion that his hyponatremia is precipitating his seizures.     #Elevated troponins: NSTEMI, EKG reviewed TWI present in V1-V4, which were present in the past.  Troponins now downtrending, stop trending    Care discussed with daughter Dr. Browne Pt seen and examined, chart and labs reviewed.   79M with h/o HTN, DM2, hypothyroidism, CAD s/p stents, PUD s/p H.pylori treatment, recent dx of DLBCL at [facility] on Oct 2019 s/p 2 cycles of R-CP, recent fall with R frontal and occipital SDH with no evacuation, LUE DVT and b/l LE DVT formerly on Lovenox, p/w acute encephalopathy and had witnessed seizure activity at home Thursday and again had tonic clonic seizure in ER s/p Ativan 2mg IVx1.     Assessment/plan:  #Metabolic encephalopathy with seizure activity: multifactorial, ?postictal vs. infectious, r/o CNS lymphoma  -expedite MRI brain w/ contrast  -video EEG  -c/w keppra/valproic acid, f/u neurology  CT head showing stable SDH with evolutional changes ; neurosurgery consulted, no urgent indication for evacuation  resumed on prophylactic lovenox  MRI brain w/o contrast no lymphoma but limited by lack of contrast, stable SDH, chronic L MCA infarct  # Uncontrolled HTN: increase atenolol to 50 mg qd, add lasix   # Leukocytosis, Possible UL pneumonia: c/w vanco/zosyn (day #4), ID consulted   nsult appreciated, plan vEEG, c/w keppra/valproic acid  daughter would like pt to have mediport and chemo while inpt, but we need to r/o infection first  daughter Dr. Browne is also amenable to PEG if pt continues to have poor nutrition, nutrition eval, calorie count, c/w glucerna with diet    #DLBCL: s/p 2 cycles of R-CP with interval improvement in lymphadenopathy on scans from 11/14.    CT shows response to chemo with reduced lymphadenopathy.  Would hold off on any chemotherapy at this time until full infectious workup complete.  repeat MRI w/ contrast to assess for CNS involvement of DLBCL.  No evidence of TLS on labs. Would defer LP at this time until we obtain MRI brain imaging.     #Hypercoagulable state, acute b/l LE DVT and LUE DVT: it was decided on last admission to continue pt on therapeutic Lovenox given high risk of VTE formation.  Repeat duplex showing stable clot with no propagation. resumed on prophylactic lovenox    #Hyponatremia, clinical exam suggestive of fluid overloaded state (including presence of b/l pleural effusions) however SIADH certainly also a possibility given intracranial process: fluid restriction, salt tabs, tolvaptan if Na<125 per renal  per daughter previous pleural [facility]ative [facility], will add lasix 20 mg qd   Low suspicion that his hyponatremia is precipitating his seizures.     #Elevated troponins: NSTEMI, EKG reviewed TWI present in V1-V4, which were present in the past.  Troponins now downtrending, stop trending    Care discussed with daughter Dr. Browne

## 2019-11-18 NOTE — CHART NOTE - NSCHARTNOTEFT_GEN_A_CORE
NUTRITION SERVICES     Upon Nutritional Assessment by the Registered Dietitian your patient was determined to meet criteria/ has evidence of the following diagnosis/diagnoses:  [ ] Mild Protein Calorie Malnutrition   [ ] Moderate Protein Calorie Malnutrition   [ x] Severe Protein Calorie Malnutrition   [ ] Unspecified Protein Calorie Malnutrition   [ ] Underweight / BMI <19  [ ] Morbid Obesity / BMI >40    Findings as based on:  •  Comprehensive nutritional assessment and consultation    Please refer to Initial Dietitian Evaluation via documents section of [facility] EMR for further recommendations.

## 2019-11-18 NOTE — PROGRESS NOTE ADULT - SUBJECTIVE AND OBJECTIVE BOX
[facility] NEPHROLOGY PRACTICE   MD UYEN HERNANDEZ D.O, PA    TELEPHONE NUMBERS:  OFFICE: [phone]  DR. CASAREZ CELL: [phone]  GORDON ZHANG CELL: [phone]  DR. HERNANDEZ CELL:  [phone]    RENAL FOLLOW UP NOTE  --------------------------------------------------------------------------------  HPI: Pt seen and examined at bedside. More awake, and slightly responsive to questions slowly.   Denies SOB, chest pain, LE edema improving    PAST HISTORY  --------------------------------------------------------------------------------  No significant changes to PMH, PSH, FHx, SHx, unless otherwise noted    ALLERGIES & MEDICATIONS  --------------------------------------------------------------------------------  Allergies    No Known Allergies    Intolerances      Standing Inpatient Medications  dextrose 5%. 1000 milliLiter(s) IV Continuous <Continuous>  dextrose 50% Injectable 12.5 Gram(s) IV Push once  dextrose 50% Injectable 25 Gram(s) IV Push once  dextrose 50% Injectable 25 Gram(s) IV Push once  enoxaparin Injectable 40 milliGRAM(s) SubCutaneous daily  escitalopram 5 milliGRAM(s) Oral daily  furosemide    Tablet 20 milliGRAM(s) Oral daily  insulin lispro (HumaLOG) corrective regimen sliding scale   SubCutaneous three times a day before meals  insulin lispro (HumaLOG) corrective regimen sliding scale   SubCutaneous at bedtime  levETIRAcetam  IVPB 500 milliGRAM(s) IV Intermittent every 12 hours  levothyroxine 88 MICROGram(s) Oral daily  pantoprazole    Tablet 40 milliGRAM(s) Oral before breakfast  piperacillin/tazobactam IVPB.. 3.375 Gram(s) IV Intermittent every 8 hours  simvastatin 20 milliGRAM(s) Oral at bedtime  sodium chloride 1 Gram(s) Oral three times a day  valproate sodium IVPB 250 milliGRAM(s) IV Intermittent two times a day  vancomycin  IVPB 1000 milliGRAM(s) IV Intermittent every 12 hours    PRN Inpatient Medications  acetaminophen   Tablet .. 650 milliGRAM(s) Oral every 6 hours PRN  dextrose 40% Gel 15 Gram(s) Oral once PRN  glucagon  Injectable 1 milliGRAM(s) IntraMuscular once PRN      REVIEW OF SYSTEMS  --------------------------------------------------------------------------------  General: no fever  CVS: no chest pain  RESP: no sob, no cough  ABD: no abdominal pain  : no dysuria  MSK: +edema     VITALS/PHYSICAL EXAM  --------------------------------------------------------------------------------  T(C): 37 (11-18-19 @ 11:14), Max: 37 (11-18-19 @ 11:14)  HR: 80 (11-18-19 @ 11:14) (80 - 103)  BP: 150/94 (11-18-19 @ 11:14) (150/94 - 186/103)  RR: 18 (11-18-19 @ 11:14) (16 - 18)  SpO2: 100% (11-18-19 @ 11:14) (100% - 100%)  Wt(kg): --        11-18-19 @ 07:01  -  11-18-19 @ 15:17  --------------------------------------------------------  IN: 180 mL / OUT: 0 mL / NET: 180 mL      Physical Exam:  	Gen: NAD  	HEENT: MMM  	Pulm: CTA B/L  	CV: S1S2  	Abd: Soft, +BS  	Ext: + LE edema B/L Improving                       Neuro: Awake slowly responsive to questions   	Skin: Warm and Dry     LABS/STUDIES  --------------------------------------------------------------------------------              10.3   18.74 >-----------<  349      [11-18-19 @ 08:22]              31.1     127  |  95  |  7   ----------------------------<  259      [11-18-19 @ 08:22]  3.5   |  21  |  0.78        Ca     7.6     [11-18-19 @ 08:22]      Mg     1.7     [11-18-19 @ 08:22]      Phos  1.8     [11-18-19 @ 08:22]      Uric acid 1.8      [11-17-19 @ 07:30]  Serum Osmolality 279      [11-17-19 @ 10:15]    Creatinine Trend:  SCr 0.78 [11-18 @ 08:22]  SCr 0.75 [11-18 @ 05:00]  SCr 0.78 [11-17 @ 07:30]  SCr 0.81 [11-16 @ 06:30]  SCr 0.90 [11-15 @ 07:20]    Urinalysis - [11-14-19 @ 13:10]      Color YELLOW / Appearance Lt TURBID / SG 1.021 / pH 6.5      Gluc 500 / Ketone NEGATIVE  / Bili NEGATIVE / Urobili NORMAL       Blood SMALL / Protein 200 / Leuk Est SMALL / Nitrite NEGATIVE      RBC 3-5 / WBC >50 / Hyaline 3+ / Gran  / Sq Epi MODERATE / Non Sq Epi  / Bacteria NEGATIVE    Urine Sodium 97      [11-17-19 @ 15:00]  Urine Osmolality 384      [11-17-19 @ 15:00]    Iron 38, TIBC 234, %sat --      [09-12-19 @ 06:08]  Ferritin 214.6      [09-12-19 @ 06:08]  PTH 20.00 (Ca --)      [09-03-19 @ 05:45]   --  PTH 25.46 (Ca --)      [09-01-19 @ 06:00]   --  Vitamin D (25OH) 34.9      [09-03-19 @ 05:45]  HbA1c 7.0      [09-13-19 @ 06:50]  TSH 12.33      [11-14-19 @ 14:10]  Lipid: chol 121, TG 96, HDL 33, LDL 74      [08-31-19 @ 07:00]

## 2019-11-18 NOTE — DIETITIAN INITIAL EVALUATION ADULT. - PERTINENT LABORATORY DATA
11-18 Na 127 mmol/L<L> Glu 259 mg/dL<H> K+ 3.5 mmol/L Cr 0.78 mg/dL BUN 7 mg/dL Phos 1.8 mg/dL<L>  11-18 @ 12:49 POCT 205 mg/dL  11-18 @ 08:52 POCT 221 mg/dL  11-17 @ 21:25 POCT 207 mg/dL  11-17 @ 17:06 POCT 230 mg/dL

## 2019-11-18 NOTE — DIETITIAN INITIAL EVALUATION ADULT. - ADD RECOMMEND
Recommend swallow evaluation as RN reports swallowing difficulty with current diet order. Recommend consistent carbohydrate DASH therapeutic diet order. Encourage PO intake and honor food preferences as able. Please help with menu selections and feeding assistance.

## 2019-11-18 NOTE — DIETITIAN INITIAL EVALUATION ADULT. - PROBLEM SELECTOR PLAN 2
T- 100.8 in the ED and tachycardic in the field. No identifiable source of infection at present.  -low suspicion for meningitis clinically, CXR without consolidation but BL pleural effusions, UA negative  -procalcitonin to evaluate infection vs lymphoma as cause of sepsis  -will obtain CT A/P to evaluate for intrabdominal infection vs progression of lymphoma. Pt has RUQ pain on exam.  -ID c/s  -if HD unstable will start broad spectrum abx

## 2019-11-18 NOTE — DIETITIAN INITIAL EVALUATION ADULT. - PERTINENT MEDS FT
MEDICATIONS  (STANDING):  dextrose 5%. 1000 milliLiter(s) (50 mL/Hr) IV Continuous <Continuous>  dextrose 50% Injectable 12.5 Gram(s) IV Push once  dextrose 50% Injectable 25 Gram(s) IV Push once  dextrose 50% Injectable 25 Gram(s) IV Push once  enoxaparin Injectable 40 milliGRAM(s) SubCutaneous daily  escitalopram 5 milliGRAM(s) Oral daily  furosemide    Tablet 20 milliGRAM(s) Oral daily  insulin lispro (HumaLOG) corrective regimen sliding scale   SubCutaneous three times a day before meals  insulin lispro (HumaLOG) corrective regimen sliding scale   SubCutaneous at bedtime  levETIRAcetam  IVPB 500 milliGRAM(s) IV Intermittent every 12 hours  levothyroxine 88 MICROGram(s) Oral daily  pantoprazole    Tablet 40 milliGRAM(s) Oral before breakfast  piperacillin/tazobactam IVPB.. 3.375 Gram(s) IV Intermittent every 8 hours  simvastatin 20 milliGRAM(s) Oral at bedtime  sodium chloride 1 Gram(s) Oral three times a day  valproate sodium IVPB 250 milliGRAM(s) IV Intermittent two times a day  vancomycin  IVPB 1000 milliGRAM(s) IV Intermittent every 12 hours

## 2019-11-18 NOTE — DIETITIAN INITIAL EVALUATION ADULT. - PROBLEM SELECTOR PLAN 4
3 known active DVTs. Will obtain BL UE and LE DVTs to evaluate for new DVT/progression.  -also concern for PE given that pt was tachycardic in the field in the setting of 3 known DVT's. Recent V/Q scan on 11/4 with low suspicion for PE.

## 2019-11-18 NOTE — PROGRESS NOTE ADULT - PROBLEM SELECTOR PLAN 1
At baseline pt is A&Ox2. Now A&Ox0-1 with diffuse weakness. Ddx includes infection, secondary CNS lymphoma, post-ictal state in s/o in hospital seizure and possible seizure at home prior to presentation. Low suspicion for meningitis clinically. CXR without consolidation but BL pleural effusions, UA negative. CT A/P prelim read with no acute pathology. CT chest with apical consolidations, representing infection vs alveolar edema. progression in moderate bilateral effusions (previously with thoracentesis negative for malignancy). MRI non-con negative for acute pathology.  -f/u MRI with IV contrast  -now growing CNS in 1 of 4 bottles on BCx. Favors contamination but will repeat BCx. Still no localizing signs or symptoms of infection.   -Vanc and zosyn x5 days for possible PNA given consolidations on CT and possible aspiration in s/o seizure (Day 3 of 5).  -Neurology consulted At baseline pt is A&Ox2. Now A&Ox0-1 with diffuse weakness. Ddx includes infection, secondary CNS lymphoma, post-ictal state in s/o in hospital seizure and possible seizure at home prior to presentation. Low suspicion for meningitis clinically. CXR without consolidation but BL pleural effusions, UA negative. CT A/P prelim read with no acute pathology. CT chest with apical consolidations, representing infection vs alveolar edema. progression in moderate bilateral effusions (previously with thoracentesis negative for malignancy). MRI non-con negative for acute pathology.  -f/u MRI with IV contrast  -now growing CNS in 1 of 4 bottles on BCx. Favors contamination but will repeat BCx. Still no localizing signs or symptoms of infection.   -Vanc and zosyn x5 days for possible PNA given consolidations on CT and possible aspiration in s/o seizure (Day 4 of 5).  -Neurology consulted

## 2019-11-18 NOTE — DIETITIAN INITIAL EVALUATION ADULT. - OTHER INFO
79 y/o M presenting with acute encephalopathy and new onset seizures. PMH of DLBCL, stroke, CAD, DM2. RN provided collateral; pt A&Ox1 + confused. Per RN, pt with very poor PO intake, and RN also noticed swallowing difficulties with current diet consistency. No GI distress (nausea/vomiting/diarrhea/constipation.) Pt with recent admission Nov. 2019 for SDH s/p fall. Per last RD note, 10/29/19 wt 65.3 kg. Current dosing wt 67 kg with 3-4+ b/l arm edema.

## 2019-11-18 NOTE — PROGRESS NOTE ADULT - PROBLEM SELECTOR PLAN 4
3 known active DVTs.   -repeat dopplers are negative for progression  -also concern for PE given that pt was tachycardic in the field in the setting of 3 known DVT's. Recent V/Q scan on 11/4 with low suspicion for PE.  -heme/onc consulted.   -restarted lovenox prophylaxis per NSG (started on 11/16).  -repeat BL UE US with no evidence of DVT now. Superficial vein thrombosis visualized in the left cephalic vein. BL LE US with Unchanged subacute DVT right soleal vein.

## 2019-11-18 NOTE — PROGRESS NOTE ADULT - PROBLEM SELECTOR PLAN 10
DVT ppx: ppx lovenox  Diet: Dysphagia 3 diet based on last admission but will leave pt NPO for now given AMS. DVT ppx: ppx lovenox  Diet: Dysphagia 1 diet

## 2019-11-18 NOTE — PROGRESS NOTE ADULT - PROBLEM SELECTOR PLAN 3
DDx includes lowered seizure threshold in s/o of hyponatremia and known recent SDH with mild shift(normal progression on imaging) vs secondary CNS lymphoma  -obtaining MRI with contrast  -vEEG  -s/p ativan 2mg for GTC in ED  -keppra loaded. c/w keppra 500mg BID  -per neurology; c/w valproic acid 250 BID

## 2019-11-18 NOTE — DIETITIAN INITIAL EVALUATION ADULT. - PHYSICAL APPEARANCE
underweight/other (specify) Nutrition focused physical exam conducted - found signs of malnutrition [ ]absent [x ]present   Subcutaneous fat loss: [mild ] Orbital fat pads region, [mild ]Buccal fat region Muscle wasting: [ mild]Temples region, [severe ]Clavicle region, [moderate ]Shoulder region, [ moderate]Scapula region

## 2019-11-18 NOTE — DIETITIAN INITIAL EVALUATION ADULT. - PROBLEM SELECTOR PLAN 1
At baseline pt is A&Ox3 and ambulatory. Now A&Ox0-1 with diffuse weakness.  Ddx includes infection, stroke (ischemic or paradoxical embolism), secondary CNS lymphoma.  -obtain MRI brain to evaluate for leptomingeal enhancement with secondary CNS lymphoma. Will taper off decadron to increase diagnostic yield of MRI as MRI appearances of secondary CNS lymphoma may be dramatically affected by corticosteroid administration, however, do not want to precipitate adrenal insufficiency   -low suspicion for meningitis clinically, CXR without consolidation but BL pleural effusions, UA negative  -will obtain CT A/P to evaluate for intrabdominal infection vs progression of lymphoma. Pt has RUQ pain on exam.  -will obtain CT chest to further evaluate chest and effusions

## 2019-11-18 NOTE — PROGRESS NOTE ADULT - SUBJECTIVE AND OBJECTIVE BOX
Follow Up:  PNA post seizure, lymphoma on chemo, SDH.    Inverval History/ROS:  awakes to name, lethargic.    Allergies  No Known Allergies        ANTIMICROBIALS:  piperacillin/tazobactam IVPB.. 3.375 every 8 hours  vancomycin  IVPB 1000 every 12 hours      OTHER MEDS:  acetaminophen   Tablet .. 650 milliGRAM(s) Oral every 6 hours PRN  dextrose 40% Gel 15 Gram(s) Oral once PRN  dextrose 5%. 1000 milliLiter(s) IV Continuous <Continuous>  dextrose 50% Injectable 12.5 Gram(s) IV Push once  dextrose 50% Injectable 25 Gram(s) IV Push once  dextrose 50% Injectable 25 Gram(s) IV Push once  enoxaparin Injectable 40 milliGRAM(s) SubCutaneous daily  escitalopram 5 milliGRAM(s) Oral daily  furosemide    Tablet 20 milliGRAM(s) Oral daily  glucagon  Injectable 1 milliGRAM(s) IntraMuscular once PRN  insulin lispro (HumaLOG) corrective regimen sliding scale   SubCutaneous three times a day before meals  insulin lispro (HumaLOG) corrective regimen sliding scale   SubCutaneous at bedtime  levETIRAcetam  IVPB 500 milliGRAM(s) IV Intermittent every 12 hours  levothyroxine 88 MICROGram(s) Oral daily  pantoprazole    Tablet 40 milliGRAM(s) Oral before breakfast  simvastatin 20 milliGRAM(s) Oral at bedtime  sodium chloride 1 Gram(s) Oral three times a day  valproate sodium IVPB 250 milliGRAM(s) IV Intermittent two times a day      Vital Signs Last 24 Hrs  T(C): 36.7 (18 Nov 2019 17:15), Max: 37 (18 Nov 2019 11:14)  T(F): 98.1 (18 Nov 2019 17:15), Max: 98.6 (18 Nov 2019 11:14)  HR: 81 (18 Nov 2019 17:15) (80 - 103)  BP: 160/105 (18 Nov 2019 17:16) (150/94 - 186/103)  BP(mean): --  RR: 19 (18 Nov 2019 17:15) (16 - 19)  SpO2: 100% (18 Nov 2019 17:15) (100% - 100%)    PHYSICAL EXAM:  General: weak appearing  HEAD/EYES: slight facial droop right  ENT:  dry mucus membranes   Cardiovascular: s1s2  Respiratory: poor effort a/e bilat  GI:   soft, non-tender, normal bowel sounds  :  warren   Musculoskeletal:  no synovitis  Neurologic: ?left weak   Skin:   no rash  Psychiatric:  [x ] appropriate affect [x ] alert   Lines:  [ ] no phlebitis [ ] central line                            10.3   18.74 )-----------( 349      ( 18 Nov 2019 08:22 )             31.1 11-18    127  |  95  |  7   ----------------------------<  259  3.5   |  21  |  0.78  Ca    7.6      18 Nov 2019 08:22Phos  1.8     11-18Mg     1.7     11-18          MICROBIOLOGY:  Vancomycin Level, Trough: 15.2 ug/mL (11-18-19 @ 05:00)  v  BLOOD VENOUS  11-17-19 --  --  --      Peripheral Site 1  11-14-19 --  --  --      Peripheral Site 2  11-14-19 --  --  BLOOD CULTURE PCR  Staphylococcus sp.,coag neg      URINE MIDSTREAM  11-14-19 --  --  --      .Blood Blood-Peripheral  11-04-19   No growth at 5 days.  --  --        RADIOLOGY:    < from: MR Head w/ IV Cont (11.18.19 @ 16:38) >    EXAM:  MR BRAIN IC        PROCEDURE DATE:  Nov 18 2019         INTERPRETATION:  HISTORY: Lymphoma. Rule out CNS involvement. Subdural   hematoma/intracranial hemorrhage follow-up..    Description: MRI of the brain with and without gadolinium contrast was   performed.    COMPARISON: Noncontrast brain MRI 11/15/2019, head CT 11/15/2019..    Axial T1 and axial T2 FLAIR series were obtained before contrast. After   intravenous gadolinium contrast administration, sagittal, coronal, and   axial T1 postcontrast series were obtained.    7 cc intravenous Gadovist gadolinium contrast was administered, 0.5 cc   contrast was discarded.    The study is limited by motion.    The subdural hemorrhages and mild right to left midline shift are stable.   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 The ventricles are stable in size.   Chronic white matter changes and generalized cerebral volume loss are   again noted.    On the postcontrast images, there is no gross evidence for intracranial   enhancing mass.    IMPRESSION:    No gross evidence for intracranial enhancing mass.    Stable subdural hemorrhages.      < end of copied text >  < from: CT Chest No Cont (11.15.19 @ 02:51) >  Comparison:September 10, 2019    Tubes/Lines: None.    Mediastinum/Vessels/Heart: Aorta and pulmonary arteries are normal in   size. There is no pericardial effusion. Unchanged subcarinal   lymphadenopathy measuring up to 2.7 x 2.0 cm. Coronary artery   calcifications noted. Thyroid gland is unremarkable    Lungs/Pleura/Airways: New moderate bilateral pleural effusions with   adjacent passive atelectasis. Patchy consolidations noted in the upper   lobes.    Visualized abdomen: Multiple splenic masses again noted, without change   from previous exam. Linear hyperdensity within the stomach may represent   a foreign body or capsule. Post cholecystectomy.    Small amount of edema adjacent to the spleen and left paracolic gutter of   uncertain etiology, increased since previous exam.    Bones and soft tissues: No suspicious osseous lesions. Degenerative   changes noted throughout the spine.    IMPRESSION:    Since the prior exam of September 10, 2019:    Interval development of moderate bilateral pleuraleffusions with   adjacent passive atelectasis    Patchy consolidations in the upper lobes which may be infectious or   represent an alveolar component of edema.    Remaining incidental findings as described above    < end of copied text >

## 2019-11-19 ENCOUNTER — RESULT REVIEW (OUTPATIENT)
Age: 79
End: 2019-11-19

## 2019-11-19 LAB
ANION GAP SERPL CALC-SCNC: 12 MMO/L — SIGNIFICANT CHANGE UP (ref 7–14)
BACTERIA BLD CULT: SIGNIFICANT CHANGE UP
BASOPHILS # BLD AUTO: 0.11 K/UL — SIGNIFICANT CHANGE UP (ref 0–0.2)
BASOPHILS NFR BLD AUTO: 0.6 % — SIGNIFICANT CHANGE UP (ref 0–2)
BUN SERPL-MCNC: 11 MG/DL — SIGNIFICANT CHANGE UP (ref 7–23)
CALCIUM SERPL-MCNC: 8.3 MG/DL — LOW (ref 8.4–10.5)
CHLORIDE SERPL-SCNC: 97 MMOL/L — LOW (ref 98–107)
CO2 SERPL-SCNC: 23 MMOL/L — SIGNIFICANT CHANGE UP (ref 22–31)
CREAT SERPL-MCNC: 0.95 MG/DL — SIGNIFICANT CHANGE UP (ref 0.5–1.3)
CRYPTOC AG FLD QL: NEGATIVE — SIGNIFICANT CHANGE UP
EOSINOPHIL # BLD AUTO: 0.33 K/UL — SIGNIFICANT CHANGE UP (ref 0–0.5)
EOSINOPHIL NFR BLD AUTO: 1.9 % — SIGNIFICANT CHANGE UP (ref 0–6)
GLUCOSE SERPL-MCNC: 327 MG/DL — HIGH (ref 70–99)
HCT VFR BLD CALC: 30.5 % — LOW (ref 39–50)
HGB BLD-MCNC: 9.8 G/DL — LOW (ref 13–17)
IMM GRANULOCYTES NFR BLD AUTO: 2 % — HIGH (ref 0–1.5)
LYMPHOCYTES # BLD AUTO: 1.72 K/UL — SIGNIFICANT CHANGE UP (ref 1–3.3)
LYMPHOCYTES # BLD AUTO: 9.7 % — LOW (ref 13–44)
MAGNESIUM SERPL-MCNC: 1.8 MG/DL — SIGNIFICANT CHANGE UP (ref 1.6–2.6)
MCHC RBC-ENTMCNC: 28.3 PG — SIGNIFICANT CHANGE UP (ref 27–34)
MCHC RBC-ENTMCNC: 32.1 % — SIGNIFICANT CHANGE UP (ref 32–36)
MCV RBC AUTO: 88.2 FL — SIGNIFICANT CHANGE UP (ref 80–100)
MONOCYTES # BLD AUTO: 2.01 K/UL — HIGH (ref 0–0.9)
MONOCYTES NFR BLD AUTO: 11.4 % — SIGNIFICANT CHANGE UP (ref 2–14)
NEUTROPHILS # BLD AUTO: 13.16 K/UL — HIGH (ref 1.8–7.4)
NEUTROPHILS NFR BLD AUTO: 74.4 % — SIGNIFICANT CHANGE UP (ref 43–77)
NRBC # FLD: 0 K/UL — SIGNIFICANT CHANGE UP (ref 0–0)
PHOSPHATE SERPL-MCNC: 2 MG/DL — LOW (ref 2.5–4.5)
PLATELET # BLD AUTO: 366 K/UL — SIGNIFICANT CHANGE UP (ref 150–400)
PMV BLD: 10.5 FL — SIGNIFICANT CHANGE UP (ref 7–13)
POTASSIUM SERPL-MCNC: 3.5 MMOL/L — SIGNIFICANT CHANGE UP (ref 3.5–5.3)
POTASSIUM SERPL-SCNC: 3.5 MMOL/L — SIGNIFICANT CHANGE UP (ref 3.5–5.3)
RBC # BLD: 3.46 M/UL — LOW (ref 4.2–5.8)
RBC # FLD: 18.6 % — HIGH (ref 10.3–14.5)
SODIUM SERPL-SCNC: 132 MMOL/L — LOW (ref 135–145)
WBC # BLD: 17.68 K/UL — HIGH (ref 3.8–10.5)
WBC # FLD AUTO: 17.68 K/UL — HIGH (ref 3.8–10.5)

## 2019-11-19 PROCEDURE — 88321 CONSLTJ&REPRT SLD PREP ELSWR: CPT

## 2019-11-19 PROCEDURE — 95819 EEG AWAKE AND ASLEEP: CPT | Mod: 26

## 2019-11-19 PROCEDURE — 99232 SBSQ HOSP IP/OBS MODERATE 35: CPT | Mod: GC

## 2019-11-19 PROCEDURE — 99233 SBSQ HOSP IP/OBS HIGH 50: CPT | Mod: GC

## 2019-11-19 RX ORDER — LEVETIRACETAM 250 MG/1
500 TABLET, FILM COATED ORAL
Refills: 0 | Status: DISCONTINUED | OUTPATIENT
Start: 2019-11-19 | End: 2019-12-01

## 2019-11-19 RX ORDER — INSULIN LISPRO 100/ML
2 VIAL (ML) SUBCUTANEOUS
Refills: 0 | Status: DISCONTINUED | OUTPATIENT
Start: 2019-11-19 | End: 2019-11-19

## 2019-11-19 RX ORDER — AMLODIPINE BESYLATE 2.5 MG/1
5 TABLET ORAL DAILY
Refills: 0 | Status: DISCONTINUED | OUTPATIENT
Start: 2019-11-19 | End: 2019-11-21

## 2019-11-19 RX ORDER — INSULIN LISPRO 100/ML
4 VIAL (ML) SUBCUTANEOUS
Refills: 0 | Status: DISCONTINUED | OUTPATIENT
Start: 2019-11-19 | End: 2019-11-22

## 2019-11-19 RX ORDER — VALPROIC ACID (AS SODIUM SALT) 250 MG/5ML
250 SOLUTION, ORAL ORAL
Refills: 0 | Status: DISCONTINUED | OUTPATIENT
Start: 2019-11-19 | End: 2019-11-20

## 2019-11-19 RX ORDER — INSULIN GLARGINE 100 [IU]/ML
5 INJECTION, SOLUTION SUBCUTANEOUS AT BEDTIME
Refills: 0 | Status: DISCONTINUED | OUTPATIENT
Start: 2019-11-19 | End: 2019-11-19

## 2019-11-19 RX ORDER — SODIUM,POTASSIUM PHOSPHATES 278-250MG
1 POWDER IN PACKET (EA) ORAL ONCE
Refills: 0 | Status: COMPLETED | OUTPATIENT
Start: 2019-11-19 | End: 2019-11-19

## 2019-11-19 RX ORDER — INSULIN GLARGINE 100 [IU]/ML
10 INJECTION, SOLUTION SUBCUTANEOUS AT BEDTIME
Refills: 0 | Status: DISCONTINUED | OUTPATIENT
Start: 2019-11-19 | End: 2019-11-24

## 2019-11-19 RX ADMIN — Medication 1: at 18:09

## 2019-11-19 RX ADMIN — SIMVASTATIN 20 MILLIGRAM(S): 20 TABLET, FILM COATED ORAL at 22:36

## 2019-11-19 RX ADMIN — Medication 4 UNIT(S): at 18:08

## 2019-11-19 RX ADMIN — SODIUM CHLORIDE 1 GRAM(S): 9 INJECTION INTRAMUSCULAR; INTRAVENOUS; SUBCUTANEOUS at 22:36

## 2019-11-19 RX ADMIN — AMLODIPINE BESYLATE 5 MILLIGRAM(S): 2.5 TABLET ORAL at 09:18

## 2019-11-19 RX ADMIN — Medication 26.25 MILLIGRAM(S): at 05:16

## 2019-11-19 RX ADMIN — Medication 2 UNIT(S): at 09:17

## 2019-11-19 RX ADMIN — PIPERACILLIN AND TAZOBACTAM 25 GRAM(S): 4; .5 INJECTION, POWDER, LYOPHILIZED, FOR SOLUTION INTRAVENOUS at 22:35

## 2019-11-19 RX ADMIN — Medication 250 MILLIGRAM(S): at 05:17

## 2019-11-19 RX ADMIN — Medication 20 MILLIGRAM(S): at 05:18

## 2019-11-19 RX ADMIN — Medication 250 MILLIGRAM(S): at 17:11

## 2019-11-19 RX ADMIN — Medication 1 PACKET(S): at 11:46

## 2019-11-19 RX ADMIN — LEVETIRACETAM 400 MILLIGRAM(S): 250 TABLET, FILM COATED ORAL at 05:17

## 2019-11-19 RX ADMIN — Medication 3: at 12:54

## 2019-11-19 RX ADMIN — Medication 88 MICROGRAM(S): at 05:18

## 2019-11-19 RX ADMIN — SODIUM CHLORIDE 1 GRAM(S): 9 INJECTION INTRAMUSCULAR; INTRAVENOUS; SUBCUTANEOUS at 05:18

## 2019-11-19 RX ADMIN — INSULIN GLARGINE 10 UNIT(S): 100 INJECTION, SOLUTION SUBCUTANEOUS at 22:35

## 2019-11-19 RX ADMIN — Medication 4: at 09:16

## 2019-11-19 RX ADMIN — ENOXAPARIN SODIUM 40 MILLIGRAM(S): 100 INJECTION SUBCUTANEOUS at 11:46

## 2019-11-19 RX ADMIN — ESCITALOPRAM OXALATE 5 MILLIGRAM(S): 10 TABLET, FILM COATED ORAL at 11:46

## 2019-11-19 RX ADMIN — PIPERACILLIN AND TAZOBACTAM 25 GRAM(S): 4; .5 INJECTION, POWDER, LYOPHILIZED, FOR SOLUTION INTRAVENOUS at 05:17

## 2019-11-19 RX ADMIN — PANTOPRAZOLE SODIUM 40 MILLIGRAM(S): 20 TABLET, DELAYED RELEASE ORAL at 05:18

## 2019-11-19 RX ADMIN — LEVETIRACETAM 500 MILLIGRAM(S): 250 TABLET, FILM COATED ORAL at 17:11

## 2019-11-19 RX ADMIN — ATENOLOL 50 MILLIGRAM(S): 25 TABLET ORAL at 05:18

## 2019-11-19 RX ADMIN — PIPERACILLIN AND TAZOBACTAM 25 GRAM(S): 4; .5 INJECTION, POWDER, LYOPHILIZED, FOR SOLUTION INTRAVENOUS at 15:33

## 2019-11-19 RX ADMIN — SODIUM CHLORIDE 1 GRAM(S): 9 INJECTION INTRAMUSCULAR; INTRAVENOUS; SUBCUTANEOUS at 15:33

## 2019-11-19 NOTE — PROGRESS NOTE ADULT - SUBJECTIVE AND OBJECTIVE BOX
Pavithra Alfonso PGY1  -[id number] | [facility] [id number]  =========================    Patient is a 79y old  Male who presents with a chief complaint of AMS (18 Nov 2019 18:44)      INTERVAL HPI/OVERNIGHT EVENTS:  Atenolol increased from 25mg to 50mg and lasix 20mg PO added yesterday. BP still elevated overnight - 170s/80s-100.  MRI done yesterday showed no enhancing brain lesions.         MEDICATIONS  (STANDING):  ATENolol  Tablet 50 milliGRAM(s) Oral daily  dextrose 5%. 1000 milliLiter(s) (50 mL/Hr) IV Continuous <Continuous>  dextrose 50% Injectable 12.5 Gram(s) IV Push once  dextrose 50% Injectable 25 Gram(s) IV Push once  dextrose 50% Injectable 25 Gram(s) IV Push once  enoxaparin Injectable 40 milliGRAM(s) SubCutaneous daily  escitalopram 5 milliGRAM(s) Oral daily  furosemide    Tablet 20 milliGRAM(s) Oral daily  insulin lispro (HumaLOG) corrective regimen sliding scale   SubCutaneous three times a day before meals  insulin lispro (HumaLOG) corrective regimen sliding scale   SubCutaneous at bedtime  levETIRAcetam  IVPB 500 milliGRAM(s) IV Intermittent every 12 hours  levothyroxine 88 MICROGram(s) Oral daily  pantoprazole    Tablet 40 milliGRAM(s) Oral before breakfast  piperacillin/tazobactam IVPB.. 3.375 Gram(s) IV Intermittent every 8 hours  simvastatin 20 milliGRAM(s) Oral at bedtime  sodium chloride 1 Gram(s) Oral three times a day  valproate sodium IVPB 250 milliGRAM(s) IV Intermittent two times a day  vancomycin  IVPB 1000 milliGRAM(s) IV Intermittent every 12 hours    MEDICATIONS  (PRN):  acetaminophen   Tablet .. 650 milliGRAM(s) Oral every 6 hours PRN Severe Pain (7 - 10)  dextrose 40% Gel 15 Gram(s) Oral once PRN Blood Glucose LESS THAN 70 milliGRAM(s)/deciliter  glucagon  Injectable 1 milliGRAM(s) IntraMuscular once PRN Glucose LESS THAN 70 milligrams/deciliter      Allergies    No Known Allergies    Intolerances        Vital Signs Last 24 Hrs  T(C): 37 (19 Nov 2019 06:42), Max: 37 (18 Nov 2019 11:14)  T(F): 98.6 (19 Nov 2019 06:42), Max: 98.6 (18 Nov 2019 11:14)  HR: 94 (19 Nov 2019 06:42) (80 - 96)  BP: 172/89 (19 Nov 2019 06:42) (149/96 - 179/104)  BP(mean): --  RR: 16 (19 Nov 2019 06:42) (16 - 19)  SpO2: 100% (19 Nov 2019 06:42) (98% - 100%)    PHYSICAL EXAM:  GENERAL: NAD.  CHEST/LUNG: Clear to auscultation; No rales, rhonchi, or wheezing.  Respiratory effort does not appear labored.  HEART: Regular rate and rhythm; S1 and S2,  no murmurs, rubs, or gallops.  ABDOMEN: Soft, not tender to palpation.  No masses or HSM appreciated.  No distension.  Bowel sounds present.  EXTREMITIES:  No clubbing, cyanosis, or edema.  Moves all extremities with strength 5/5.  SKIN: No obvious rashes or lesions.  Turgor okay.  NEURO:  Alert and oriented x 3, no focal sensory or  motor deficit, DTR 2+ bilaterally.    LABS:                        9.8    17.68 )-----------( 366      ( 19 Nov 2019 05:20 )             30.5     11-19    132<L>  |  97<L>  |  11  ----------------------------<  327<H>  3.5   |  23  |  0.95    Ca    8.3<L>      19 Nov 2019 05:20  Phos  2.0     11-19  Mg     1.8     11-19          CAPILLARY BLOOD GLUCOSE      POCT Blood Glucose.: 255 mg/dL (18 Nov 2019 20:51)  POCT Blood Glucose.: 225 mg/dL (18 Nov 2019 17:28)  POCT Blood Glucose.: 205 mg/dL (18 Nov 2019 12:49)  POCT Blood Glucose.: 221 mg/dL (18 Nov 2019 08:52)      Culture - Blood (collected 17 Nov 2019 07:38)  Source: BLOOD PERIPHERAL  Preliminary Report (18 Nov 2019 07:38):    NO ORGANISMS ISOLATED    NO ORGANISMS ISOLATED AT 24 HOURS    Culture - Blood (collected 17 Nov 2019 07:38)  Source: BLOOD VENOUS  Preliminary Report (18 Nov 2019 07:38):    NO ORGANISMS ISOLATED    NO ORGANISMS ISOLATED AT 24 HOURS    Culture - Blood (collected 14 Nov 2019 14:24)  Source: Peripheral Site 1  Preliminary Report (18 Nov 2019 14:24):    NO ORGANISMS ISOLATED    NO ORGANISMS ISOLATED AT 96 HOURS    Culture - Blood (collected 14 Nov 2019 13:56)  Source: Peripheral Site 2  Preliminary Report (16 Nov 2019 07:17):    ***Blood Panel PCR results on this specimen are available    approximately 3 hours after the Gram stain result***    Gram stain, PCR, and/or culture results may not always    correspond due to difference in methodologies    ------------------------------------------------------------    This PCR assay was performed using [employer].  The    following targets are tested for:  Enterococcus, vancomycin    resistant enterococci, Listeria monocytogenes,  coagulase    negative staphylococci, S. aureus, methicillin resistant S.    aureus, Streptococcus agalactiae (Group B), S. pneumoniae,    S. pyogenes (Group A), Acinetobacter baumannii, Enterobacter    cloacae, E. coli, Klebsiella oxytoca, K. pneumoniae, Proteus    sp., Serratia marcescens, Haemophilus influenzae, Neisseria    meningitidis, Pseudomonas aeruginosa, Candida albicans, C.    glabrata, C. krusei, C. parapsilosis, C. tropicalis and the    KPC resistance gene.    **NOTE: Due to technical problems, Proteus sp. will NOT be    reported as part of the BCID paneluntil further notice.  Final Report (17 Nov 2019 14:45):    Single set isolate, possible contaminant.    Contact microbiology if susceptibility testing is clinically    indicated.  Organism: BLOOD CULTURE PCR  Staphylococcus sp.,coag neg (17 Nov 2019 14:45)  Organism: Staphylococcus sp.,coag neg (17 Nov 2019 14:45)  Organism: BLOOD CULTURE PCR  ***Blood Panel PCR results on this specimen are available  approximately 3 hours after the Gram stain result***  Gram stain, PCR, and/or culture results may not always  correspond due to difference in methodologies  ------------------------------------------------------------  This PCR assay was performed using [employer].  The  following targets are tested for:  Enterococcus, vancomycin  resistant enterococci, Listeria monocytogenes,  coagulase  negative staphylococci, S. aureus, methicillin resistant S.  aureus, Streptococcus agalactiae (Group B), S. pneumoniae,  S. pyogenes (Group A), Acinetobacter baumannii, Enterobacter  cloacae, E. coli, Klebsiella oxytoca, K. pneumoniae, Proteus  sp., Serratia marcescens, Haemophilus influenzae, Neisseria  meningitidis, Pseudomonas aeruginosa, Candida albicans, C.  glabrata, C. krusei, C. parapsilosis, C. tropicalis and the  KPC resistance gene.  **NOTE: Due to technical problems, Proteus sp. will NOT be  reported as part of the BCID panel until further notice. (17 Nov 2019 14:45)    Culture - Urine (collected 14 Nov 2019 13:27)  Source: URINE MIDSTREAM  Final Report (16 Nov 2019 09:31):    NO GROWTH AT 24 HOURS      IMAGING:   < from: MR Head w/ IV Cont (11.18.19 @ 16:38) >  IMPRESSION:    No gross evidence for intracranial enhancing mass.    Stable subdural hemorrhages.    < end of copied text > Pavithra Alfonso PGY1  -0135 | [facility] [id number]  =========================    Patient is a 79y old  Male who presents with a chief complaint of AMS (18 Nov 2019 18:44)      INTERVAL HPI/OVERNIGHT EVENTS:  Atenolol increased from 25mg to 50mg and lasix 20mg PO added yesterday. BP still elevated overnight - 170s/80s-100.  MRI done yesterday showed no enhancing brain lesions. This morning pt is more alert - says he is at home. Denies CP, SOB, abdominal pain. No f/c.         MEDICATIONS  (STANDING):  ATENolol  Tablet 50 milliGRAM(s) Oral daily  dextrose 5%. 1000 milliLiter(s) (50 mL/Hr) IV Continuous <Continuous>  dextrose 50% Injectable 12.5 Gram(s) IV Push once  dextrose 50% Injectable 25 Gram(s) IV Push once  dextrose 50% Injectable 25 Gram(s) IV Push once  enoxaparin Injectable 40 milliGRAM(s) SubCutaneous daily  escitalopram 5 milliGRAM(s) Oral daily  furosemide    Tablet 20 milliGRAM(s) Oral daily  insulin lispro (HumaLOG) corrective regimen sliding scale   SubCutaneous three times a day before meals  insulin lispro (HumaLOG) corrective regimen sliding scale   SubCutaneous at bedtime  levETIRAcetam  IVPB 500 milliGRAM(s) IV Intermittent every 12 hours  levothyroxine 88 MICROGram(s) Oral daily  pantoprazole    Tablet 40 milliGRAM(s) Oral before breakfast  piperacillin/tazobactam IVPB.. 3.375 Gram(s) IV Intermittent every 8 hours  simvastatin 20 milliGRAM(s) Oral at bedtime  sodium chloride 1 Gram(s) Oral three times a day  valproate sodium IVPB 250 milliGRAM(s) IV Intermittent two times a day  vancomycin  IVPB 1000 milliGRAM(s) IV Intermittent every 12 hours    MEDICATIONS  (PRN):  acetaminophen   Tablet .. 650 milliGRAM(s) Oral every 6 hours PRN Severe Pain (7 - 10)  dextrose 40% Gel 15 Gram(s) Oral once PRN Blood Glucose LESS THAN 70 milliGRAM(s)/deciliter  glucagon  Injectable 1 milliGRAM(s) IntraMuscular once PRN Glucose LESS THAN 70 milligrams/deciliter      Allergies    No Known Allergies    Intolerances        Vital Signs Last 24 Hrs  T(C): 37 (19 Nov 2019 06:42), Max: 37 (18 Nov 2019 11:14)  T(F): 98.6 (19 Nov 2019 06:42), Max: 98.6 (18 Nov 2019 11:14)  HR: 94 (19 Nov 2019 06:42) (80 - 96)  BP: 172/89 (19 Nov 2019 06:42) (149/96 - 179/104)  BP(mean): --  RR: 16 (19 Nov 2019 06:42) (16 - 19)  SpO2: 100% (19 Nov 2019 06:42) (98% - 100%) on 2L NC    PHYSICAL EXAM:  GENERAL: NAD. More alert and awake than yesterday. on 2L NC.   CHEST/LUNG: Clear to auscultation; No rales, rhonchi, or wheezing.  Respiratory effort does not appear labored.  HEART: Regular rate and rhythm; S1 and S2,  no murmurs, rubs, or gallops.  ABDOMEN: Soft, not tender to palpation.  No masses or HSM appreciated.  No distension.  Bowel sounds present.  EXTREMITIES: trace edema in LE.  wwp.  SKIN: No obvious rashes or lesions.  Turgor okay.  NEURO:  Alert and oriented x 1.     LABS:                        9.8    17.68 )-----------( 366      ( 19 Nov 2019 05:20 )             30.5     11-19    132<L>  |  97<L>  |  11  ----------------------------<  327<H>  3.5   |  23  |  0.95    Ca    8.3<L>      19 Nov 2019 05:20  Phos  2.0     11-19  Mg     1.8     11-19          CAPILLARY BLOOD GLUCOSE      POCT Blood Glucose.: 255 mg/dL (18 Nov 2019 20:51)  POCT Blood Glucose.: 225 mg/dL (18 Nov 2019 17:28)  POCT Blood Glucose.: 205 mg/dL (18 Nov 2019 12:49)  POCT Blood Glucose.: 221 mg/dL (18 Nov 2019 08:52)      Culture - Blood (collected 17 Nov 2019 07:38)  Source: BLOOD PERIPHERAL  Preliminary Report (18 Nov 2019 07:38):    NO ORGANISMS ISOLATED    NO ORGANISMS ISOLATED AT 24 HOURS    Culture - Blood (collected 17 Nov 2019 07:38)  Source: BLOOD VENOUS  Preliminary Report (18 Nov 2019 07:38):    NO ORGANISMS ISOLATED    NO ORGANISMS ISOLATED AT 24 HOURS    Culture - Blood (collected 14 Nov 2019 14:24)  Source: Peripheral Site 1  Preliminary Report (18 Nov 2019 14:24):    NO ORGANISMS ISOLATED    NO ORGANISMS ISOLATED AT 96 HOURS    Culture - Blood (collected 14 Nov 2019 13:56)  Source: Peripheral Site 2  Preliminary Report (16 Nov 2019 07:17):    ***Blood Panel PCR results on this specimen are available    approximately 3 hours after the Gram stain result***    Gram stain, PCR, and/or culture results may not always    correspond due to difference in methodologies    ------------------------------------------------------------    This PCR assay was performed using [employer].  The    following targets are tested for:  Enterococcus, vancomycin    resistant enterococci, Listeria monocytogenes,  coagulase    negative staphylococci, S. aureus, methicillin resistant S.    aureus, Streptococcus agalactiae (Group B), S. pneumoniae,    S. pyogenes (Group A), Acinetobacter baumannii, Enterobacter    cloacae, E. coli, Klebsiella oxytoca, K. pneumoniae, Proteus    sp., Serratia marcescens, Haemophilus influenzae, Neisseria    meningitidis, Pseudomonas aeruginosa, Candida albicans, C.    glabrata, C. krusei, C. parapsilosis, C. tropicalis and the    KPC resistance gene.    **NOTE: Due to technical problems, Proteus sp. will NOT be    reported as part of the BCID paneluntil further notice.  Final Report (17 Nov 2019 14:45):    Single set isolate, possible contaminant.    Contact microbiology if susceptibility testing is clinically    indicated.  Organism: BLOOD CULTURE PCR  Staphylococcus sp.,coag neg (17 Nov 2019 14:45)  Organism: Staphylococcus sp.,coag neg (17 Nov 2019 14:45)  Organism: BLOOD CULTURE PCR  ***Blood Panel PCR results on this specimen are available  approximately 3 hours after the Gram stain result***  Gram stain, PCR, and/or culture results may not always  correspond due to difference in methodologies  ------------------------------------------------------------  This PCR assay was performed using [employer].  The  following targets are tested for:  Enterococcus, vancomycin  resistant enterococci, Listeria monocytogenes,  coagulase  negative staphylococci, S. aureus, methicillin resistant S.  aureus, Streptococcus agalactiae (Group B), S. pneumoniae,  S. pyogenes (Group A), Acinetobacter baumannii, Enterobacter  cloacae, E. coli, Klebsiella oxytoca, K. pneumoniae, Proteus  sp., Serratia marcescens, Haemophilus influenzae, Neisseria  meningitidis, Pseudomonas aeruginosa, Candida albicans, C.  glabrata, C. krusei, C. parapsilosis, C. tropicalis and the  KPC resistance gene.  **NOTE: Due to technical problems, Proteus sp. will NOT be  reported as part of the BCID panel until further notice. (17 Nov 2019 14:45)    Culture - Urine (collected 14 Nov 2019 13:27)  Source: URINE MIDSTREAM  Final Report (16 Nov 2019 09:31):    NO GROWTH AT 24 HOURS      IMAGING:   < from: MR Head w/ IV Cont (11.18.19 @ 16:38) >  IMPRESSION:    No gross evidence for intracranial enhancing mass.    Stable subdural hemorrhages.    < end of copied text >

## 2019-11-19 NOTE — PROGRESS NOTE ADULT - ATTENDING COMMENTS
79yoM with DLBCL receiving RCP but with recent hospitalization with SDH after fall and LE dvt now a/w seizure  -receiving eeg  -not very responsive to questioning  -currently due for C3 of treatment, however would defer treatment at this time given mental status, will discuss with patient daughter

## 2019-11-19 NOTE — PROGRESS NOTE ADULT - PROBLEM SELECTOR PLAN 1
At baseline pt is A&Ox2. Now A&Ox0-1 with diffuse weakness. Ddx includes infection vs post-ictal state in s/o in hospital seizure and possible seizure at home prior to presentation. Low suspicion for meningitis clinically. CXR without consolidation but BL pleural effusions, UA negative. CT A/P prelim read with no acute pathology. CT chest with apical consolidations, representing infection vs alveolar edema. progression in moderate bilateral effusions (previously with thoracentesis negative for malignancy). MRI with no enhancing brain lesions.   -now growing CNS in 1 of 4 bottles on BCx. Favors contamination but will repeat BCx. Still no localizing signs or symptoms of infection.   -Vanc and zosyn x5 days for possible PNA given consolidations on CT and possible aspiration in s/o seizure (Day 5 of 5).  -Neurology consulted

## 2019-11-19 NOTE — PROGRESS NOTE ADULT - PROBLEM SELECTOR PLAN 2
T- 100.8 in the ED and tachycardic in the field. No identifiable source of infection at present. low suspicion for meningitis clinically, CXR with BL pleural effusions, UA negative. No evidence of intraabdominal infection on CT.  -now growing CNS in 1 of 4 bottles on BCx. Favors contamination but will repeat BCx. Still no localizing signs or symptoms of infection.   -Vanc and zosyn x5 days for possible PNA given consolidations on CT and possible aspiration in s/o seizure  -ID consulted; f/u ID recs

## 2019-11-19 NOTE — PROGRESS NOTE ADULT - PROBLEM SELECTOR PLAN 6
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 Pt was also hyponatremic on a previous admission. Na continues to downtrend.   -1L fluid restriction  - f/u send urine osm, serum osm and uric acid  -salt tab 1g TID  - tolvaptan if Na <125

## 2019-11-19 NOTE — EEG REPORT - NS EEG TEXT BOX
[facility]   COMPREHENSIVE EPILEPSY CENTER   REPORT OF ROUTINE VIDEO EEG     [facility]: [address], [place] [address], Ph#: [phone]  [facility]: [address], [place] [address], Ph#: [phone]  [facility]: [address] [place] [address], Ph#: [phone]    Patient Name: CARLOS ENRIQUE TATE  Age and : 79y ([birth year])  MRN #: [id number]  Location: [address]  Referring Physician: Catina Trinh    Study Date: 19    _____________________________________________________________  TECHNICAL INFORMATION    Placement and Labeling of Electrodes:  The EEG was performed utilizing 20 channels referential EEG connections (coronal over temporal over parasagittal montage) using all standard 10-20 electrode placements with EKG.  Recording was at a sampling rate of 256 samples per second per channel.  Time synchronized digital video recording was done simultaneously with EEG recording.  A low light infrared camera was used for low light recording.  Adama and seizure detection algorithms were utilized.    _____________________________________________________________  HISTORY    Patient is a 79y old  Male who presents with a chief complaint of AMS (2019 13:53)      PERTINENT MEDICATION:  levETIRAcetam 500 milliGRAM(s) Oral two times a day  valproic acid 250 milliGRAM(s) Oral two times a day    _____________________________________________________________  STUDY INTERPRETATION    Findings: The background was continuous, spontaneously variable and reactive. No posterior dominant rhythm seen.    Background Slowing:  Diffuse theta and polymorphic delta slowing.    Focal Slowing:   None were present.    Sleep Background:  Drowsiness was characterized by fragmentation, attenuation, and slowing of the background activity.    Stage II sleep transients were not recorded.    Other Non-Epileptiform Findings:  None were present.    Interictal Epileptiform Activity:   None were present.    Events:  Clinical events: None recorded.  Seizures: None recorded.    Activation Procedures:   Hyperventilation was not performed.    Photic stimulation was performed and did not elicit any abnormality.     Artifacts:  Intermittent myogenic and movement artifacts were noted.    ECG:  The heart rate on single channel ECG was predominantly between 80-90 BPM.    _____________________________________________________________  EEG SUMMARY/CLASSIFICATION    Abnormal EEG in the awake, drowsy states.  - Moderate generalized slowing.    _____________________________________________________________  EEG IMPRESSION/CLINICAL CORRELATE    Abnormal EEG study.  1. Moderate nonspecific diffuse or multifocal cerebral dysfunction.   2. No epileptiform pattern or seizure seen.    _____________________________________________________________    Skyla Shields MD  Attending Physician, [facility]

## 2019-11-19 NOTE — PROGRESS NOTE ADULT - ATTENDING COMMENTS
Pt seen and examined, chart and labs reviewed.   79M with h/o HTN, DM2, hypothyroid, CAD/stents, PUD s/p H. pylori treatment, recent dx of DLBCL at [place] on Oct 2019 s/p 2 cycles of R-CP, recent fall with R frontal and occipital SDH with no evacuation, LUE DVT and b/l LE DVT on prophylactic Lovenox, p/w acute encephalopathy and had witnessed seizure activity at home and again had tonic clonic seizure in ER s/p Ativan 2mg IVx1.     Assessment/plan:  #Metabolic encephalopathy with seizure activity: multifactorial, ?postictal vs. infectious  -MRI w/ contrast no CNS lymphoma  -video EEG today  -c/w keppra/valproic acid, f/u neurology  CT/MRI brain stable SDH with evolutional changes ; neurosurgery consulted, no urgent indication for evacuation  resumed on prophylactic lovenox  MRI brain w contrast no lymphoma, stable SDH, chronic L MCA infarct  # Uncontrolled HTN: increased atenolol to 50 mg qd and added norvasc 5 mg qd and lasix 20 mg qd   # Leukocytosis, Possible UL pneumonia: complete 5 day course of abx (vanco/zosyn) today, f/u ID  # Dispo: pt is very weak/debilitated, likely needs rehab, PT eval  #DLBCL: s/p 2 cycles of R-CP with interval improvement in lymphadenopathy on scans from 11/14.    CT shows response to chemo with reduced lymphadenopathy.  d/w daughter Dr. Browne who's a hemonc attending at [employer], she would like pt to have mediport placed and get chemo, d/w heme fellow Dr. Conway, will review with team, but doubt he will chemo this admission.  # Severe protein/calorie malnutrition: c/w glucerna and encourage intake  # Uncontrolled DM2: increase lantus to 10 U hs and humalog 4 U ac, A1c 7%, monitor FS  # Hyponatremia: improved, c/w lasix and salt tabs Pt seen and examined, chart and labs reviewed.   79M with h/o HTN, DM2, hypothyroid, CAD/stents, PUD s/p H. pylori treatment, recent dx of DLBCL at [place] on Oct 2019 s/p 2 cycles of R-CP, recent fall with R frontal and occipital SDH with no evacuation, LUE DVT and b/l LE DVT on prophylactic Lovenox, p/w acute encephalopathy and had witnessed seizure activity at home and again had tonic clonic seizure in ER s/p Ativan 2mg IVx1.     Assessment/plan:  #Metabolic encephalopathy with seizure activity: multifactorial, ?postictal vs. infectious  -MRI w/ contrast no CNS lymphoma  -video EEG today  -c/w keppra/valproic acid, f/u neurology  CT/MRI brain stable SDH with evolutional changes ; neurosurgery consulted, no urgent indication for evacuation  resumed on prophylactic lovenox  MRI brain w contrast no lymphoma, stable SDH, chronic L MCA infarct  # Uncontrolled HTN: increased atenolol to 50 mg qd and added norvasc 5 mg qd and lasix 20 mg qd   # Leukocytosis, Possible UL pneumonia: complete 5 day course of abx (vanco/zosyn) today, f/u ID  # Dispo: pt is very weak/debilitated, likely needs rehab, PT eval  #DLBCL: s/p 2 cycles of R-CP with interval improvement in lymphadenopathy on scans from 11/14.    CT shows response to chemo with reduced lymphadenopathy.  d/w daughter Dr. Browne who's a hemonc attending at [employer], she would like pt to have mediport placed and get chemo, d/w heme fellow Dr. Conway, will review with team, but doubt he will get chemo this admission.  # Severe protein/calorie malnutrition: c/w glucerna and encourage intake  # Uncontrolled DM2: increase lantus to 10 U hs and humalog 4 U ac, A1c 7%, monitor FS  # Hyponatremia: improved, c/w lasix and salt tabs

## 2019-11-19 NOTE — PROGRESS NOTE ADULT - ASSESSMENT
81 yo M hx DLBCL (dx 9/2019) s/p 2 cycles of R-CP (last ~4weeks ago) at [facility], DM2, recent admission at [facility] (10/29-11/5) for SDH s/p fall found to have b/l LE and LUE DVT (not on AC) p/w AMS and seizure.    1. DLBCL:   - pt was admitted to [facility] in 8-9/2019, was found to be hypercalcemic and CT C/A/P (9/10/19) showed scattered b/l pulmonary nodules, mediastinal lymphadenopathy, retroperitoneal lymphadenopathy, and splenic lesions/enlargement concerning for lymphoma. Pt got an outpatient PET scan and biopsy (not in our system) and was diagnosed with DLBCL. He sees Dr. Jovan Poole at [facility] and was started on Rituxan-Cytoxan-Prednisone (R-CP). He has received 2 cycles thus far, last cycle ~ 4 weeks ago, and has not been able to f/u due to recent hospitalizations. Reports pt had thoracentesis in the past and fluid was negative for malignant cells.   - CTAP non contrast now shows treatment response with decrease in size of the spleen and resolution of retroperitoneal adenopathy   - d/w pt's daughter, requesting if pt can get next cycle of chemo during this hospitalization since it has been difficult for outpatient follow up and he is due for his next cycle. Discussed that we would have to wait for acute issues to resolve prior to next cycle and would need records, pathology reports, and slides for review by our institution. She will work on getting us records and pathology slides.   - Pending MRI brain for further evaluation given AMS and seizures    2. 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    1. DLBCL:   - pt was admitted to [facility] in 8-9/2019, was found to be hypercalcemic and CT C/A/P (9/10/19) showed scattered b/l pulmonary nodules, mediastinal lymphadenopathy, retroperitoneal lymphadenopathy, and splenic lesions/enlargement concerning for lymphoma. Pt got an outpatient PET scan and biopsy (not in our system) and was diagnosed with DLBCL. He sees Dr. Jovan Poole at [facility] and was started on Rituxan-Cytoxan-Prednisone (R-CP). He has received 2 cycles thus far, last cycle ~ 4 weeks ago, and has not been able to f/u due to recent hospitalizations. Reports pt had thoracentesis in the past and fluid was negative for malignant cells.   - CTAP non contrast now shows treatment response with decrease in size of the spleen and resolution of retroperitoneal adenopathy.  MRI brain with contrast without lesions, SDH stable.  - will follow up with pt's daughter, Dr. Browne regarding treating inpatient for C3 chemotherapy.  Pt is currently with poor performance status and unable to follow commands.  Will see if pt clinically improves.  - Na levels improving, appreciate nephrology    2. DVTs: b/l LE (calf) DVTs and LUE DVT  - repeat b/l LE US now shows unchanged subacute DVT  - AC when cleared by neurosurgery  - if concern for propagation, then may need IVC filter to prevent PE    Sarah Gomez  Hematology Fellow  [phone]

## 2019-11-19 NOTE — PROGRESS NOTE ADULT - SUBJECTIVE AND OBJECTIVE BOX
INTERVAL HPI/OVERNIGHT EVENTS:  Patient S&E at bedside.     VITAL SIGNS:  T(F): 98.8 (11-19-19 @ 11:44)  HR: 81 (11-19-19 @ 11:44)  BP: 129/79 (11-19-19 @ 11:44)  RR: 19 (11-19-19 @ 11:44)  SpO2: 100% (11-19-19 @ 11:44)  Wt(kg): --    PHYSICAL EXAM:  Constitutional: NAD  Eyes: EOMI, sclera non-icteric  Neck: supple, no masses, no JVD  Respiratory: CTA b/l, good air entry b/l  Cardiovascular: RRR, no M/R/G  Gastrointestinal: soft, NTND, no masses palpable, + BS, no hepatosplenomegaly  Extremities: no c/c/e  Neurological: AAOx3      MEDICATIONS  (STANDING):  amLODIPine   Tablet 5 milliGRAM(s) Oral daily  ATENolol  Tablet 50 milliGRAM(s) Oral daily  dextrose 5%. 1000 milliLiter(s) (50 mL/Hr) IV Continuous <Continuous>  dextrose 50% Injectable 12.5 Gram(s) IV Push once  dextrose 50% Injectable 25 Gram(s) IV Push once  dextrose 50% Injectable 25 Gram(s) IV Push once  enoxaparin Injectable 40 milliGRAM(s) SubCutaneous daily  escitalopram 5 milliGRAM(s) Oral daily  furosemide    Tablet 20 milliGRAM(s) Oral daily  insulin glargine Injectable (LANTUS) 10 Unit(s) SubCutaneous at bedtime  insulin lispro (HumaLOG) corrective regimen sliding scale   SubCutaneous three times a day before meals  insulin lispro (HumaLOG) corrective regimen sliding scale   SubCutaneous at bedtime  insulin lispro Injectable (HumaLOG) 4 Unit(s) SubCutaneous three times a day before meals  levothyroxine 88 MICROGram(s) Oral daily  pantoprazole    Tablet 40 milliGRAM(s) Oral before breakfast  piperacillin/tazobactam IVPB.. 3.375 Gram(s) IV Intermittent every 8 hours  simvastatin 20 milliGRAM(s) Oral at bedtime  sodium chloride 1 Gram(s) Oral three times a day  vancomycin  IVPB 1000 milliGRAM(s) IV Intermittent every 12 hours    MEDICATIONS  (PRN):  acetaminophen   Tablet .. 650 milliGRAM(s) Oral every 6 hours PRN Severe Pain (7 - 10)  dextrose 40% Gel 15 Gram(s) Oral once PRN Blood Glucose LESS THAN 70 milliGRAM(s)/deciliter  glucagon  Injectable 1 milliGRAM(s) IntraMuscular once PRN Glucose LESS THAN 70 milligrams/deciliter      Allergies    No Known Allergies    Intolerances        LABS:                        9.8    17.68 )-----------( 366      ( 19 Nov 2019 05:20 )             30.5     11-19    132<L>  |  97<L>  |  11  ----------------------------<  327<H>  3.5   |  23  |  0.95    Ca    8.3<L>      19 Nov 2019 05:20  Phos  2.0     11-19  Mg     1.8     11-19            RADIOLOGY & ADDITIONAL TESTS:  Studies reviewed.    ASSESSMENT & PLAN: INTERVAL HPI/OVERNIGHT EVENTS:  Patient S&E at bedside.  AAO x ?1.  Not following commands.   EEG In progress.    VITAL SIGNS:  T(F): 98.8 (11-19-19 @ 11:44)  HR: 81 (11-19-19 @ 11:44)  BP: 129/79 (11-19-19 @ 11:44)  RR: 19 (11-19-19 @ 11:44)  SpO2: 100% (11-19-19 @ 11:44)  Wt(kg): --    PHYSICAL EXAM:  Constitutional: NAD  Eyes: EOMI, sclera non-icteric  Neck: supple, no masses, no JVD  Respiratory: CTA anteriorly  Cardiovascular: RRR, no M/R/G  Gastrointestinal: soft, NTND, no masses palpable, + BS  Extremities: no c/c/e  Neurological: unable      MEDICATIONS  (STANDING):  amLODIPine   Tablet 5 milliGRAM(s) Oral daily  ATENolol  Tablet 50 milliGRAM(s) Oral daily  dextrose 5%. 1000 milliLiter(s) (50 mL/Hr) IV Continuous <Continuous>  dextrose 50% Injectable 12.5 Gram(s) IV Push once  dextrose 50% Injectable 25 Gram(s) IV Push once  dextrose 50% Injectable 25 Gram(s) IV Push once  enoxaparin Injectable 40 milliGRAM(s) SubCutaneous daily  escitalopram 5 milliGRAM(s) Oral daily  furosemide    Tablet 20 milliGRAM(s) Oral daily  insulin glargine Injectable (LANTUS) 10 Unit(s) SubCutaneous at bedtime  insulin lispro (HumaLOG) corrective regimen sliding scale   SubCutaneous three times a day before meals  insulin lispro (HumaLOG) corrective regimen sliding scale   SubCutaneous at bedtime  insulin lispro Injectable (HumaLOG) 4 Unit(s) SubCutaneous three times a day before meals  levothyroxine 88 MICROGram(s) Oral daily  pantoprazole    Tablet 40 milliGRAM(s) Oral before breakfast  piperacillin/tazobactam IVPB.. 3.375 Gram(s) IV Intermittent every 8 hours  simvastatin 20 milliGRAM(s) Oral at bedtime  sodium chloride 1 Gram(s) Oral three times a day  vancomycin  IVPB 1000 milliGRAM(s) IV Intermittent every 12 hours    MEDICATIONS  (PRN):  acetaminophen   Tablet .. 650 milliGRAM(s) Oral every 6 hours PRN Severe Pain (7 - 10)  dextrose 40% Gel 15 Gram(s) Oral once PRN Blood Glucose LESS THAN 70 milliGRAM(s)/deciliter  glucagon  Injectable 1 milliGRAM(s) IntraMuscular once PRN Glucose LESS THAN 70 milligrams/deciliter      Allergies    No Known Allergies    Intolerances        LABS:                        9.8    17.68 )-----------( 366      ( 19 Nov 2019 05:20 )             30.5     11-19    132<L>  |  97<L>  |  11  ----------------------------<  327<H>  3.5   |  23  |  0.95    Ca    8.3<L>      19 Nov 2019 05:20  Phos  2.0     11-19  Mg     1.8     11-19            RADIOLOGY & ADDITIONAL TESTS:  Studies reviewed.    ASSESSMENT & PLAN:

## 2019-11-19 NOTE — PROGRESS NOTE ADULT - SUBJECTIVE AND OBJECTIVE BOX
[facility]   MD UYEN HERNANDEZ D.O, PA    TELEPHONE NUMBERS:  OFFICE: [phone]  DR. CASAREZ CELL: [phone]  GORDON ZHANG CELL: [phone]  DR. HERNANDEZ CELL:  [phone]    RENAL FOLLOW UP NOTE  --------------------------------------------------------------------------------  HPI: Pt seen and examined at bedside. Poorly responsive to questions when probed. Lethargic and weak   States he is not eating much     PAST HISTORY  --------------------------------------------------------------------------------  No significant changes to PMH, PSH, FHx, SHx, unless otherwise noted    ALLERGIES & MEDICATIONS  --------------------------------------------------------------------------------  Allergies    No Known Allergies    Intolerances      Standing Inpatient Medications  amLODIPine   Tablet 5 milliGRAM(s) Oral daily  ATENolol  Tablet 50 milliGRAM(s) Oral daily  dextrose 5%. 1000 milliLiter(s) IV Continuous <Continuous>  dextrose 50% Injectable 12.5 Gram(s) IV Push once  dextrose 50% Injectable 25 Gram(s) IV Push once  dextrose 50% Injectable 25 Gram(s) IV Push once  enoxaparin Injectable 40 milliGRAM(s) SubCutaneous daily  escitalopram 5 milliGRAM(s) Oral daily  furosemide    Tablet 20 milliGRAM(s) Oral daily  insulin glargine Injectable (LANTUS) 10 Unit(s) SubCutaneous at bedtime  insulin lispro (HumaLOG) corrective regimen sliding scale   SubCutaneous three times a day before meals  insulin lispro (HumaLOG) corrective regimen sliding scale   SubCutaneous at bedtime  insulin lispro Injectable (HumaLOG) 4 Unit(s) SubCutaneous three times a day before meals  levETIRAcetam 500 milliGRAM(s) Oral two times a day  levothyroxine 88 MICROGram(s) Oral daily  pantoprazole    Tablet 40 milliGRAM(s) Oral before breakfast  piperacillin/tazobactam IVPB.. 3.375 Gram(s) IV Intermittent every 8 hours  simvastatin 20 milliGRAM(s) Oral at bedtime  sodium chloride 1 Gram(s) Oral three times a day  valproic acid 250 milliGRAM(s) Oral two times a day  vancomycin  IVPB 1000 milliGRAM(s) IV Intermittent every 12 hours    PRN Inpatient Medications  acetaminophen   Tablet .. 650 milliGRAM(s) Oral every 6 hours PRN  dextrose 40% Gel 15 Gram(s) Oral once PRN  glucagon  Injectable 1 milliGRAM(s) IntraMuscular once PRN      REVIEW OF SYSTEMS  --------------------------------------------------------------------------------  Unable to obtain     VITALS/PHYSICAL EXAM  --------------------------------------------------------------------------------  T(C): 37.1 (11-19-19 @ 11:44), Max: 37.1 (11-19-19 @ 11:44)  HR: 81 (11-19-19 @ 11:44) (81 - 96)  BP: 129/79 (11-19-19 @ 11:44) (129/79 - 179/104)  RR: 19 (11-19-19 @ 11:44) (16 - 19)  SpO2: 100% (11-19-19 @ 11:44) (98% - 100%)  Wt(kg): --        11-18-19 @ 07:01  -  11-19-19 @ 07:00  --------------------------------------------------------  IN: 430 mL / OUT: 1 mL / NET: 429 mL    11-19-19 @ 07:01  -  11-19-19 @ 13:53  --------------------------------------------------------  IN: 60 mL / OUT: 0 mL / NET: 60 mL      Physical Exam:  	Gen: NAD  	HEENT: MMM  	Pulm: CTA B/L  	CV: S1S2  	Abd: Soft, +BS  	Ext: + LE edema B/L                      Neuro: Awake, minimally responsive to questions    	Skin: Warm and Dry   	    LABS/STUDIES  --------------------------------------------------------------------------------              9.8    17.68 >-----------<  366      [11-19-19 @ 05:20]              30.5     132  |  97  |  11  ----------------------------<  327      [11-19-19 @ 05:20]  3.5   |  23  |  0.95        Ca     8.3     [11-19-19 @ 05:20]      Mg     1.8     [11-19-19 @ 05:20]      Phos  2.0     [11-19-19 @ 05:20]    Creatinine Trend:  SCr 0.95 [11-19 @ 05:20]  SCr 0.78 [11-18 @ 08:22]  SCr 0.75 [11-18 @ 05:00]  SCr 0.78 [11-17 @ 07:30]  SCr 0.81 [11-16 @ 06:30]    Urinalysis - [11-14-19 @ 13:10]      Color YELLOW / Appearance Lt TURBID / SG 1.021 / pH 6.5      Gluc 500 / Ketone NEGATIVE  / Bili NEGATIVE / Urobili NORMAL       Blood SMALL / Protein 200 / Leuk Est SMALL / Nitrite NEGATIVE      RBC 3-5 / WBC >50 / Hyaline 3+ / Gran  / Sq Epi MODERATE / Non Sq Epi  / Bacteria NEGATIVE    Urine Sodium 97      [11-17-19 @ 15:00]  Urine Osmolality 384      [11-17-19 @ 15:00]    Iron 38, TIBC 234, %sat --      [09-12-19 @ 06:08]  Ferritin 214.6      [09-12-19 @ 06:08]  PTH 20.00 (Ca --)      [09-03-19 @ 05:45]   --  PTH 25.46 (Ca --)      [09-01-19 @ 06:00]   --  Vitamin D (25OH) 34.9      [09-03-19 @ 05:45]  HbA1c 7.0      [09-13-19 @ 06:50]  TSH 12.33      [11-14-19 @ 14:10]  Lipid: chol 121, TG 96, HDL 33, LDL 74      [08-31-19 @ 07:00]

## 2019-11-19 NOTE — PROGRESS NOTE ADULT - ASSESSMENT
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 prolonged FRANCES state (? 2/2 prolonged hypercalcemic state) that has now resolved vs. loss of body mass  - Serum Cr remains stable  - Avoid nephrotoxins agent  - renal diet  - monitor bmp    Hyponatremia  - ? etiology, presented with Na 122   - Serum sodium improved 8meq since yesterday s/p tolvaptan x 1. This is an acceptable increase  - Continue lasix 20mg oral daily, Pt also on salt tablets 1g  TID  - s/p Tolvaptan x 1 on 11/18, repeat Na has improved   - Restart free water restriction <1L/day  - optimize dm control  - Monitor serum sodium. Repeat BMP tonight   - avoid hypotonic solutions    HTN  - has Hx HTN but was on midodrine for orthostatic hypotension in the recent past   - BP was elevated therefore restarted on home BP medications   - Currently BP controlled   - monitor bp    Hypocalcemia  - Has Hx hypercalcemia of malignancy  - low alb corrected alexander is optimal  - monitor    Pl. effusion b/l/ Anasarca   mod effusion seen on ct chest  Now on lasix 20mg PO daily. Pt is clinically improving   monitor fluid status and clinical presentation

## 2019-11-20 LAB
ANION GAP SERPL CALC-SCNC: 15 MMO/L — HIGH (ref 7–14)
BASOPHILS # BLD AUTO: 0.09 K/UL — SIGNIFICANT CHANGE UP (ref 0–0.2)
BASOPHILS NFR BLD AUTO: 0.5 % — SIGNIFICANT CHANGE UP (ref 0–2)
BUN SERPL-MCNC: 13 MG/DL — SIGNIFICANT CHANGE UP (ref 7–23)
CALCIUM SERPL-MCNC: 8.4 MG/DL — SIGNIFICANT CHANGE UP (ref 8.4–10.5)
CHLORIDE SERPL-SCNC: 98 MMOL/L — SIGNIFICANT CHANGE UP (ref 98–107)
CO2 SERPL-SCNC: 24 MMOL/L — SIGNIFICANT CHANGE UP (ref 22–31)
CREAT SERPL-MCNC: 1.06 MG/DL — SIGNIFICANT CHANGE UP (ref 0.5–1.3)
EOSINOPHIL # BLD AUTO: 0.38 K/UL — SIGNIFICANT CHANGE UP (ref 0–0.5)
EOSINOPHIL NFR BLD AUTO: 2.3 % — SIGNIFICANT CHANGE UP (ref 0–6)
GLUCOSE SERPL-MCNC: 249 MG/DL — HIGH (ref 70–99)
HCT VFR BLD CALC: 30.2 % — LOW (ref 39–50)
HGB BLD-MCNC: 10 G/DL — LOW (ref 13–17)
IMM GRANULOCYTES NFR BLD AUTO: 1.8 % — HIGH (ref 0–1.5)
LYMPHOCYTES # BLD AUTO: 1.84 K/UL — SIGNIFICANT CHANGE UP (ref 1–3.3)
LYMPHOCYTES # BLD AUTO: 11 % — LOW (ref 13–44)
MAGNESIUM SERPL-MCNC: 1.7 MG/DL — SIGNIFICANT CHANGE UP (ref 1.6–2.6)
MCHC RBC-ENTMCNC: 28.8 PG — SIGNIFICANT CHANGE UP (ref 27–34)
MCHC RBC-ENTMCNC: 33.1 % — SIGNIFICANT CHANGE UP (ref 32–36)
MCV RBC AUTO: 87 FL — SIGNIFICANT CHANGE UP (ref 80–100)
MONOCYTES # BLD AUTO: 1.92 K/UL — HIGH (ref 0–0.9)
MONOCYTES NFR BLD AUTO: 11.5 % — SIGNIFICANT CHANGE UP (ref 2–14)
NEUTROPHILS # BLD AUTO: 12.2 K/UL — HIGH (ref 1.8–7.4)
NEUTROPHILS NFR BLD AUTO: 72.9 % — SIGNIFICANT CHANGE UP (ref 43–77)
NRBC # FLD: 0 K/UL — SIGNIFICANT CHANGE UP (ref 0–0)
PHOSPHATE SERPL-MCNC: 2.1 MG/DL — LOW (ref 2.5–4.5)
PLATELET # BLD AUTO: 376 K/UL — SIGNIFICANT CHANGE UP (ref 150–400)
PMV BLD: 11 FL — SIGNIFICANT CHANGE UP (ref 7–13)
POTASSIUM SERPL-MCNC: 3.2 MMOL/L — LOW (ref 3.5–5.3)
POTASSIUM SERPL-SCNC: 3.2 MMOL/L — LOW (ref 3.5–5.3)
RBC # BLD: 3.47 M/UL — LOW (ref 4.2–5.8)
RBC # FLD: 18.5 % — HIGH (ref 10.3–14.5)
SODIUM SERPL-SCNC: 137 MMOL/L — SIGNIFICANT CHANGE UP (ref 135–145)
WBC # BLD: 16.73 K/UL — HIGH (ref 3.8–10.5)
WBC # FLD AUTO: 16.73 K/UL — HIGH (ref 3.8–10.5)

## 2019-11-20 PROCEDURE — 99232 SBSQ HOSP IP/OBS MODERATE 35: CPT | Mod: GC

## 2019-11-20 PROCEDURE — 99233 SBSQ HOSP IP/OBS HIGH 50: CPT | Mod: GC

## 2019-11-20 PROCEDURE — 95951: CPT | Mod: 26

## 2019-11-20 RX ORDER — POTASSIUM CHLORIDE 20 MEQ
10 PACKET (EA) ORAL
Refills: 0 | Status: COMPLETED | OUTPATIENT
Start: 2019-11-20 | End: 2019-11-20

## 2019-11-20 RX ORDER — SODIUM,POTASSIUM PHOSPHATES 278-250MG
1 POWDER IN PACKET (EA) ORAL ONCE
Refills: 0 | Status: COMPLETED | OUTPATIENT
Start: 2019-11-20 | End: 2019-11-20

## 2019-11-20 RX ADMIN — Medication 2: at 09:06

## 2019-11-20 RX ADMIN — ESCITALOPRAM OXALATE 5 MILLIGRAM(S): 10 TABLET, FILM COATED ORAL at 12:24

## 2019-11-20 RX ADMIN — AMLODIPINE BESYLATE 5 MILLIGRAM(S): 2.5 TABLET ORAL at 06:01

## 2019-11-20 RX ADMIN — SIMVASTATIN 20 MILLIGRAM(S): 20 TABLET, FILM COATED ORAL at 22:13

## 2019-11-20 RX ADMIN — ATENOLOL 50 MILLIGRAM(S): 25 TABLET ORAL at 06:01

## 2019-11-20 RX ADMIN — SODIUM CHLORIDE 1 GRAM(S): 9 INJECTION INTRAMUSCULAR; INTRAVENOUS; SUBCUTANEOUS at 06:01

## 2019-11-20 RX ADMIN — Medication 88 MICROGRAM(S): at 06:01

## 2019-11-20 RX ADMIN — Medication 1 PACKET(S): at 17:24

## 2019-11-20 RX ADMIN — Medication 4 UNIT(S): at 12:24

## 2019-11-20 RX ADMIN — LEVETIRACETAM 500 MILLIGRAM(S): 250 TABLET, FILM COATED ORAL at 17:23

## 2019-11-20 RX ADMIN — Medication 250 MILLIGRAM(S): at 06:01

## 2019-11-20 RX ADMIN — Medication 100 MILLIEQUIVALENT(S): at 09:22

## 2019-11-20 RX ADMIN — PANTOPRAZOLE SODIUM 40 MILLIGRAM(S): 20 TABLET, DELAYED RELEASE ORAL at 06:01

## 2019-11-20 RX ADMIN — Medication 20 MILLIGRAM(S): at 06:01

## 2019-11-20 RX ADMIN — Medication 4 UNIT(S): at 09:06

## 2019-11-20 RX ADMIN — ENOXAPARIN SODIUM 40 MILLIGRAM(S): 100 INJECTION SUBCUTANEOUS at 12:24

## 2019-11-20 RX ADMIN — LEVETIRACETAM 500 MILLIGRAM(S): 250 TABLET, FILM COATED ORAL at 06:01

## 2019-11-20 RX ADMIN — Medication 100 MILLIEQUIVALENT(S): at 11:18

## 2019-11-20 RX ADMIN — INSULIN GLARGINE 10 UNIT(S): 100 INJECTION, SOLUTION SUBCUTANEOUS at 22:11

## 2019-11-20 RX ADMIN — Medication 4 UNIT(S): at 17:23

## 2019-11-20 RX ADMIN — Medication 100 MILLIEQUIVALENT(S): at 10:13

## 2019-11-20 NOTE — PROGRESS NOTE ADULT - SUBJECTIVE AND OBJECTIVE BOX
Pavithra Alfonso PGY1  -[id number] | [facility] [id number]  =========================    Patient is a 79y old  Male who presents with a chief complaint of AMS (19 Nov 2019 13:53)      INTERVAL HPI/OVERNIGHT EVENTS:  No acute event overnight. EEG done yesterday showed no epileptiform pattern. Heme/onc reevaluated pt for possible inpatient chemo - plan to defer treatment at this time given mental status. BP better controlled - mostly 120-130s/70-80s, with occasional 150s/100s.          MEDICATIONS  (STANDING):  amLODIPine   Tablet 5 milliGRAM(s) Oral daily  ATENolol  Tablet 50 milliGRAM(s) Oral daily  dextrose 5%. 1000 milliLiter(s) (50 mL/Hr) IV Continuous <Continuous>  dextrose 50% Injectable 12.5 Gram(s) IV Push once  dextrose 50% Injectable 25 Gram(s) IV Push once  dextrose 50% Injectable 25 Gram(s) IV Push once  enoxaparin Injectable 40 milliGRAM(s) SubCutaneous daily  escitalopram 5 milliGRAM(s) Oral daily  furosemide    Tablet 20 milliGRAM(s) Oral daily  insulin glargine Injectable (LANTUS) 10 Unit(s) SubCutaneous at bedtime  insulin lispro (HumaLOG) corrective regimen sliding scale   SubCutaneous three times a day before meals  insulin lispro (HumaLOG) corrective regimen sliding scale   SubCutaneous at bedtime  insulin lispro Injectable (HumaLOG) 4 Unit(s) SubCutaneous three times a day before meals  levETIRAcetam 500 milliGRAM(s) Oral two times a day  levothyroxine 88 MICROGram(s) Oral daily  pantoprazole    Tablet 40 milliGRAM(s) Oral before breakfast  simvastatin 20 milliGRAM(s) Oral at bedtime  sodium chloride 1 Gram(s) Oral three times a day  valproic acid 250 milliGRAM(s) Oral two times a day    MEDICATIONS  (PRN):  acetaminophen   Tablet .. 650 milliGRAM(s) Oral every 6 hours PRN Severe Pain (7 - 10)  dextrose 40% Gel 15 Gram(s) Oral once PRN Blood Glucose LESS THAN 70 milliGRAM(s)/deciliter  glucagon  Injectable 1 milliGRAM(s) IntraMuscular once PRN Glucose LESS THAN 70 milligrams/deciliter      Allergies    No Known Allergies    Intolerances        Vital Signs Last 24 Hrs  T(C): 36.7 (20 Nov 2019 06:00), Max: 37.1 (19 Nov 2019 11:44)  T(F): 98.1 (20 Nov 2019 06:00), Max: 98.8 (19 Nov 2019 11:44)  HR: 95 (20 Nov 2019 06:00) (81 - 95)  BP: 150/105 (20 Nov 2019 06:00) (123/70 - 157/105)  BP(mean): --  RR: 17 (20 Nov 2019 06:00) (17 - 19)  SpO2: 99% (20 Nov 2019 06:00) (95% - 100%)    PHYSICAL EXAM:  GENERAL: NAD.  CHEST/LUNG: Clear to auscultation; No rales, rhonchi, or wheezing.  Respiratory effort does not appear labored.  HEART: Regular rate and rhythm; S1 and S2,  no murmurs, rubs, or gallops.  ABDOMEN: Soft, not tender to palpation.  No masses or HSM appreciated.  No distension.  Bowel sounds present.  EXTREMITIES:  No clubbing, cyanosis, or edema.  Moves all extremities with strength 5/5.  SKIN: No obvious rashes or lesions.  Turgor okay.  NEURO:  Alert and oriented x 3, no focal sensory or  motor deficit, DTR 2+ bilaterally.    LABS:                        9.8    17.68 )-----------( 366      ( 19 Nov 2019 05:20 )             30.5     11-19    132<L>  |  97<L>  |  11  ----------------------------<  327<H>  3.5   |  23  |  0.95    Ca    8.3<L>      19 Nov 2019 05:20  Phos  2.0     11-19  Mg     1.8     11-19          CAPILLARY BLOOD GLUCOSE      POCT Blood Glucose.: 215 mg/dL (19 Nov 2019 22:08)  POCT Blood Glucose.: 161 mg/dL (19 Nov 2019 17:24)  POCT Blood Glucose.: 279 mg/dL (19 Nov 2019 12:35)  POCT Blood Glucose.: 305 mg/dL (19 Nov 2019 08:25)      Culture - Blood (collected 17 Nov 2019 07:38)  Source: BLOOD PERIPHERAL  Preliminary Report (19 Nov 2019 07:38):    NO ORGANISMS ISOLATED    NO ORGANISMS ISOLATED AT 48 HRS.    Culture - Blood (collected 17 Nov 2019 07:38)  Source: BLOOD VENOUS  Preliminary Report (19 Nov 2019 07:38):    NO ORGANISMS ISOLATED    NO ORGANISMS ISOLATED AT 48 HRS. Pavithra Alfonso PGY1  -[id number] | [facility] [id number]  =========================    Patient is a 79y old  Male who presents with a chief complaint of AMS (19 Nov 2019 13:53)      INTERVAL HPI/OVERNIGHT EVENTS:  No acute event overnight. EEG done yesterday showed no epileptiform pattern. Heme/onc reevaluated pt for possible inpatient chemo - plan to defer treatment at this time given mental status. BP better controlled - mostly 120-130s/70-80s, with occasional 150s/100s. PT unable to see pt yesterday as pt was hooked on vEEG.     This morning pt is more alert and awake than yesterday. A&Ox2 (to self and place "hospital"). Pt was able to state that he is "not feeling good" and endorsed abdominal pain. Denies chest pain, f/c. Per nursing, pt was able to eat dinner brought from home.       MEDICATIONS  (STANDING):  amLODIPine   Tablet 5 milliGRAM(s) Oral daily  ATENolol  Tablet 50 milliGRAM(s) Oral daily  dextrose 5%. 1000 milliLiter(s) (50 mL/Hr) IV Continuous <Continuous>  dextrose 50% Injectable 12.5 Gram(s) IV Push once  dextrose 50% Injectable 25 Gram(s) IV Push once  dextrose 50% Injectable 25 Gram(s) IV Push once  enoxaparin Injectable 40 milliGRAM(s) SubCutaneous daily  escitalopram 5 milliGRAM(s) Oral daily  furosemide    Tablet 20 milliGRAM(s) Oral daily  insulin glargine Injectable (LANTUS) 10 Unit(s) SubCutaneous at bedtime  insulin lispro (HumaLOG) corrective regimen sliding scale   SubCutaneous three times a day before meals  insulin lispro (HumaLOG) corrective regimen sliding scale   SubCutaneous at bedtime  insulin lispro Injectable (HumaLOG) 4 Unit(s) SubCutaneous three times a day before meals  levETIRAcetam 500 milliGRAM(s) Oral two times a day  levothyroxine 88 MICROGram(s) Oral daily  pantoprazole    Tablet 40 milliGRAM(s) Oral before breakfast  simvastatin 20 milliGRAM(s) Oral at bedtime  sodium chloride 1 Gram(s) Oral three times a day  valproic acid 250 milliGRAM(s) Oral two times a day    MEDICATIONS  (PRN):  acetaminophen   Tablet .. 650 milliGRAM(s) Oral every 6 hours PRN Severe Pain (7 - 10)  dextrose 40% Gel 15 Gram(s) Oral once PRN Blood Glucose LESS THAN 70 milliGRAM(s)/deciliter  glucagon  Injectable 1 milliGRAM(s) IntraMuscular once PRN Glucose LESS THAN 70 milligrams/deciliter      Allergies    No Known Allergies    Intolerances        Vital Signs Last 24 Hrs  T(C): 36.7 (20 Nov 2019 06:00), Max: 37.1 (19 Nov 2019 11:44)  T(F): 98.1 (20 Nov 2019 06:00), Max: 98.8 (19 Nov 2019 11:44)  HR: 95 (20 Nov 2019 06:00) (81 - 95)  BP: 150/105 (20 Nov 2019 06:00) (123/70 - 157/105)  RR: 17 (20 Nov 2019 06:00) (17 - 19)  SpO2: 99% (20 Nov 2019 06:00) (95% - 100%)    PHYSICAL EXAM:  GENERAL: NAD. More awake and alert.  CHEST/LUNG: Clear to auscultation anteriorly.   HEART: Regular rate and rhythm; S1 and S2,  no murmurs, rubs, or gallops.  ABDOMEN: Soft, not tender to palpation.  No masses or HSM appreciated.  No distension.  +Bowel sound  EXTREMITIES:  trace edema in LE  SKIN: No obvious rashes or lesions.  Turgor okay.  NEURO:  Alert and oriented x 2    LABS:                          CAPILLARY BLOOD GLUCOSE      POCT Blood Glucose.: 215 mg/dL (19 Nov 2019 22:08)  POCT Blood Glucose.: 161 mg/dL (19 Nov 2019 17:24)  POCT Blood Glucose.: 279 mg/dL (19 Nov 2019 12:35)  POCT Blood Glucose.: 305 mg/dL (19 Nov 2019 08:25)      Culture - Blood (collected 17 Nov 2019 07:38)  Source: BLOOD PERIPHERAL  Preliminary Report (19 Nov 2019 07:38):    NO ORGANISMS ISOLATED    NO ORGANISMS ISOLATED AT 48 HRS.    Culture - Blood (collected 17 Nov 2019 07:38)  Source: BLOOD VENOUS  Preliminary Report (19 Nov 2019 07:38):    NO ORGANISMS ISOLATED    NO ORGANISMS ISOLATED AT 48 HRS.

## 2019-11-20 NOTE — PROGRESS NOTE ADULT - PROBLEM SELECTOR PLAN 7
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 Pt was also hyponatremic on a previous admission. Improved.   -1L fluid restriction  -salt tab 1g TID  - tolvaptan if Na <125 3 known active DVTs.   -repeat dopplers are negative for progression  -restarted lovenox prophylaxis per NSG (started on 11/16).  -repeat BL UE US with no evidence of DVT now. Superficial vein thrombosis visualized in the left cephalic vein. BL LE US with Unchanged subacute DVT right soleal vein.

## 2019-11-20 NOTE — PROGRESS NOTE ADULT - SUBJECTIVE AND OBJECTIVE BOX
[facility]   MD UYEN HERNANDEZ D.O, PA    TELEPHONE NUMBERS:  OFFICE: [phone]  DR. CASAREZ CELL: [phone]  GORDON ZHANG CELL: [phone]  DR. HERNANDEZ CELL:  [phone]    RENAL FOLLOW UP NOTE  --------------------------------------------------------------------------------  HPI: Pt seen and examined at bedside. Poorly responsive. Getting EEG    PAST HISTORY  --------------------------------------------------------------------------------  No significant changes to PMH, PSH, FHx, SHx, unless otherwise noted    ALLERGIES & MEDICATIONS  --------------------------------------------------------------------------------  Allergies    No Known Allergies    Intolerances      Standing Inpatient Medications  amLODIPine   Tablet 5 milliGRAM(s) Oral daily  ATENolol  Tablet 50 milliGRAM(s) Oral daily  dextrose 5%. 1000 milliLiter(s) IV Continuous <Continuous>  dextrose 50% Injectable 12.5 Gram(s) IV Push once  dextrose 50% Injectable 25 Gram(s) IV Push once  dextrose 50% Injectable 25 Gram(s) IV Push once  enoxaparin Injectable 40 milliGRAM(s) SubCutaneous daily  escitalopram 5 milliGRAM(s) Oral daily  furosemide    Tablet 20 milliGRAM(s) Oral daily  insulin glargine Injectable (LANTUS) 10 Unit(s) SubCutaneous at bedtime  insulin lispro (HumaLOG) corrective regimen sliding scale   SubCutaneous three times a day before meals  insulin lispro (HumaLOG) corrective regimen sliding scale   SubCutaneous at bedtime  insulin lispro Injectable (HumaLOG) 4 Unit(s) SubCutaneous three times a day before meals  levETIRAcetam 500 milliGRAM(s) Oral two times a day  levothyroxine 88 MICROGram(s) Oral daily  pantoprazole    Tablet 40 milliGRAM(s) Oral before breakfast  potassium phosphate / sodium phosphate powder 1 Packet(s) Oral once  simvastatin 20 milliGRAM(s) Oral at bedtime    PRN Inpatient Medications  acetaminophen   Tablet .. 650 milliGRAM(s) Oral every 6 hours PRN  dextrose 40% Gel 15 Gram(s) Oral once PRN  glucagon  Injectable 1 milliGRAM(s) IntraMuscular once PRN      REVIEW OF SYSTEMS  --------------------------------------------------------------------------------  Unable to obtain     VITALS/PHYSICAL EXAM  --------------------------------------------------------------------------------  T(C): 36.6 (11-20-19 @ 10:00), Max: 36.9 (11-20-19 @ 03:40)  HR: 76 (11-20-19 @ 10:00) (76 - 95)  BP: 149/80 (11-20-19 @ 10:00) (123/70 - 150/105)  RR: 18 (11-20-19 @ 10:00) (17 - 18)  SpO2: 100% (11-20-19 @ 10:00) (95% - 100%)  Wt(kg): --        11-19-19 @ 07:01  -  11-20-19 @ 07:00  --------------------------------------------------------  IN: 517 mL / OUT: 0 mL / NET: 517 mL    11-20-19 @ 07:01  -  11-20-19 @ 13:09  --------------------------------------------------------  IN: 240 mL / OUT: 0 mL / NET: 240 mL      Physical Exam:  	Gen: NAD  	HEENT: MMM  	Pulm: CTA B/L  	CV: S1S2  	Abd: Soft, +BS  	Ext:+LE edema B/L                      Neuro: Awake   	Skin: Warm and Dry       LABS/STUDIES  --------------------------------------------------------------------------------              10.0   16.73 >-----------<  376      [11-20-19 @ 06:45]              30.2     137  |  98  |  13  ----------------------------<  249      [11-20-19 @ 06:45]  3.2   |  24  |  1.06        Ca     8.4     [11-20-19 @ 06:45]      Mg     1.7     [11-20-19 @ 06:45]      Phos  2.1     [11-20-19 @ 06:45]    Creatinine Trend:  SCr 1.06 [11-20 @ 06:45]  SCr 0.95 [11-19 @ 05:20]  SCr 0.78 [11-18 @ 08:22]  SCr 0.75 [11-18 @ 05:00]  SCr 0.78 [11-17 @ 07:30]    Urinalysis - [11-14-19 @ 13:10]      Color YELLOW / Appearance Lt TURBID / SG 1.021 / pH 6.5      Gluc 500 / Ketone NEGATIVE  / Bili NEGATIVE / Urobili NORMAL       Blood SMALL / Protein 200 / Leuk Est SMALL / Nitrite NEGATIVE      RBC 3-5 / WBC >50 / Hyaline 3+ / Gran  / Sq Epi MODERATE / Non Sq Epi  / Bacteria NEGATIVE    Urine Sodium 97      [11-17-19 @ 15:00]  Urine Osmolality 384      [11-17-19 @ 15:00]    Iron 38, TIBC 234, %sat --      [09-12-19 @ 06:08]  Ferritin 214.6      [09-12-19 @ 06:08]  PTH 20.00 (Ca --)      [09-03-19 @ 05:45]   --  PTH 25.46 (Ca --)      [09-01-19 @ 06:00]   --  Vitamin D (25OH) 34.9      [09-03-19 @ 05:45]  HbA1c 7.0      [09-13-19 @ 06:50]  TSH 12.33      [11-14-19 @ 14:10]  Lipid: chol 121, TG 96, HDL 33, LDL 74      [08-31-19 @ 07:00]

## 2019-11-20 NOTE — PROGRESS NOTE ADULT - PROBLEM SELECTOR PLAN 8
3 known active DVTs.   -repeat dopplers are negative for progression  -restarted lovenox prophylaxis per NSG (started on 11/16).  -repeat BL UE US with no evidence of DVT now. Superficial vein thrombosis visualized in the left cephalic vein. BL LE US with Unchanged subacute DVT right soleal vein. DVT ppx: ppx lovenox  Diet: Dysphagia 1 diet, glucerna  FULL CODE

## 2019-11-20 NOTE — EEG REPORT - NS EEG TEXT BOX
[facility]   COMPREHENSIVE EPILEPSY CENTER   REPORT OF CONTINUOUS VIDEO EEG     [facility]: [address], [place] [address]  [address]: [phone] [place] [address]  [facility]: [address] [place] [address]    Patient Name: CARLOS ENRIQUE TATE  Age and : 79y ([birth year])  MRN #: [id number]  Location: [address]  Referring Physician: Catina Trinh    Start Time/Date: 14:17 on 19  End Time/Date: 08:00 on 19    _____________________________________________________________  STUDY INFORMATION    EEG Recording Technique:  The patient underwent continuous Video-EEG monitoring, using Telemetry System hardware on the XLTek Digital System. EEG and video data were stored on a computer hard drive with important events saved in digital archive files. The material was reviewed by a physician (electroencephalographer / epileptologist) on a daily basis. Adama and seizure detection algorithms were utilized and reviewed. An EEG Technician attended to the patient, and was available throughout daytime work hours.  The epilepsy center neurologist was available in person or on call 24-hours per day.    EEG Placement and Labeling of Electrodes:  The EEG was performed utilizing 20 channel referential EEG connections (coronal over temporal over parasagittal montage) using all standard 10-20 electrode placements with EKG, with additional electrodes placed in the inferior temporal region using the modified 10-10 montage electrode placements for elective admissions, or if deemed necessary. Recording was at a sampling rate of 256 samples per second per channel. Time synchronized digital video recording was done simultaneously with EEG recording. A low light infrared camera was used for low light recording.     _____________________________________________________________  HISTORY    Patient is a 79y old  Male who presents with a chief complaint of AMS (2019 06:49)      PERTINENT MEDICATION:  levETIRAcetam 500 milliGRAM(s) Oral two times a day    _____________________________________________________________  STUDY INTERPRETATION    Findings: The background was continuous, spontaneously variable and reactive. No posterior dominant rhythm seen.    Background Slowing:  Diffuse theta and polymorphic delta slowing.    Focal Slowing:   None were present.    Sleep Background:  Drowsiness was characterized by fragmentation, attenuation, and slowing of the background activity.    Stage II sleep transients were not recorded.    Other Findings:  Occasional to frequent sharp transients in the right parietooccipital (P4/O2) region, often with field extending to left parietooccipital region.     Events:  Clinical events: None recorded.  Seizures: None recorded.    Activation Procedures:   Hyperventilation was not performed.    Photic stimulation was performed and did not elicit any abnormality.     Artifacts:  Intermittent myogenic and movement artifacts were noted.    ECG:  The heart rate on single channel ECG was predominantly between 80-90 BPM.    _____________________________________________________________  EEG SUMMARY/CLASSIFICATION    Abnormal EEG in the awake, drowsy states.  - Occasional to frequent sharp transients in the right parietooccipital (P4/O2) region, often with field extending to left parietooccipital region.   - Moderate generalized slowing.    _____________________________________________________________  EEG IMPRESSION/CLINICAL CORRELATE    Abnormal EEG study.  1. Cortical hyperexcitability in the right parietooccipital region.   2. Moderate nonspecific diffuse or multifocal cerebral dysfunction.   3. No seizure seen.    _____________________________________________________________    Skyla Shields MD  Attending Physician, [facility]

## 2019-11-20 NOTE — PROGRESS NOTE ADULT - PROBLEM SELECTOR PLAN 3
better controlled  - c/w atenolol 50mg qd, norvasc 5mg qd  - c/w lasix 20mg PO QD Witness tonic clonic seizure in ER s/p ativan 2mg x1.  -vEEG with no seizure activity  -s/p ativan 2mg for GTC in ED  - c/w keppra 500mg BID and valproic acid 250 BID Witness tonic clonic seizure in ER s/p ativan 2mg x1.  -vEEG with no seizure activity  -s/p ativan 2mg for GTC in ED  - c/w keppra 500mg BID   - valproic acid 250 once today, stopping after today. Witness tonic clonic seizure in ER s/p ativan 2mg x1. 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   -vEEG with no seizure activity  - c/w keppra 500mg BID   - valproic acid 250 once today, then d/c

## 2019-11-20 NOTE — CHART NOTE - NSCHARTNOTEFT_GEN_A_CORE
MRI 11/18 reviewed which demonstrates stable SDH with midline shift and old L MCA. No evidence of brain metastases on MRI  EEG 11/19 does not demonstrate any evidence of epileptic activity  Seizures likely in setting of sepsis 2/2 aspiration PNA with underlying brain structural abnormalities due to prior SDH and stroke    Would recommend the following in order to optimize AED regimen:  - Taper off VPA with onetime 250mg dose today, 11/20,  and stop after today  - Keep Keppra 500mg BID, with low threshold to increase to 1g BID if concern for additional seizure activity    Can follow up outpatient upon discharge with Epilepsy clinic at [address] [place] [address]  [phone]

## 2019-11-20 NOTE — PROGRESS NOTE ADULT - PROBLEM SELECTOR PLAN 6
Per chart review on R-CP. s/p 2 cycles. Last cycle ~4 weeks ago.   -pt daughter is an oncologist and is interested in getting mediport placed and pursuing inpatient chemo  - Heme/onc following -> given weak state and mental status, plan to defer chemo

## 2019-11-20 NOTE — PROGRESS NOTE ADULT - ASSESSMENT
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 prolonged FRANCES state (? 2/2 prolonged hypercalcemic state) that has now resolved vs. loss of body mass  - Serum Cr slowly uptrending however is stable   - Avoid nephrotoxins agent  - renal diet  - monitor bmp    Hyponatremia  - ? etiology, presented with Na 122   - Serum sodium improved   - Continue lasix 20mg oral daily, Pt also on salt tablets 1g  TID  - s/p Tolvaptan x 1 on 11/18  - Continue free water restriction <1L/day  - Promote oral solute/food intake   - optimize dm control  - Monitor serum sodium.   - avoid hypotonic solutions    HTN  - has Hx HTN but was on midodrine for orthostatic hypotension in the recent past   - BP was elevated therefore restarted on home BP medications   - Currently BP controlled   - monitor bp    Hypocalcemia  - Has Hx hypercalcemia of malignancy  - low alb corrected alexander is optimal  - monitor    Pl. effusion b/l/ Anasarca   mod effusion seen on ct chest  Now on lasix 20mg PO daily.   Pt is clinically improving. Continue at present  Monitor

## 2019-11-20 NOTE — PROGRESS NOTE ADULT - PROBLEM SELECTOR PLAN 4
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 Nabil reports that he feels that he got more significant relief when we included needling around the metatarsal head of the great toe and that the transdermal neuropathy cream gave him relief but that it wore off quickly within 1-2 hours.   ·   ·  Prior Treatment:gabapentin currently 1 bid   · Pain & Biopsychosoical Scale  · Self-Reported Pain: 4/10   · Self-Reported Biopsychosocial impact of condition on Emotional Wellbeing - How has your condition impacted your:  · Stress: 1/10   · Activity: 1/10  · Sleep: 1/10  · Mood: 1/10  · Fatigue 3/10  · Nausea 0/10   · Post Treatment Comments: 2 /10   ·  Nabil reports that his feet feel warm and less constricted he also reports the pain that he feels in his joints has reduced by at least 50%  ·   Patient Goal(s):   1. Decrease pain     OBJECTIVE:   Physical Inspection: Observations/Measurements  Mallor flush is diminshing, effected gait.   Tongue:stiff, red body     Pulses: rapid    Traditional Chinese Medicine (TCM) Diagnosis:     · Excess heat, wasting thirsting     TODAY'S ACUPUNCTURE/TCM TREATMENT:     Acupuncture: Meridians/Points utilized  Neuropathy POC:   ST 36, SP 6, BaXie  BaFeng , LI 4, LV 3  Additional Points:,LI11,  du 20 sp10, burt at metatarsal head of great toe.   Acupuncture: # of needles used: 30 # of needles out: 30    Other Modalities Utilized:TDP/ transdermal neuropathy cream applied to the hands and feet   Transdermal Neuropathy cream   0.6ml  1%black pepper   1.2ml 2% lavender   In transdermal cream      Consented to trying topical application of the transdermal cream and  was made aware that black pepper is a rubefacient and has the potential to irritate the skin.         Patient Education:   ·  Essential Oils     Discuss transdermal essential oil cream       We followed up on the following recommendations from last week:     ·     · We discuss food and snack options that would be easy and accessible. Nabil states that he understands the role that his uncontrolled high blood sugar is playing in negatively impacting his health and commits to working towards small goal.   ·   · Goal for week 1: Add in snacks and meals to ensure he is eating every 4-6 hours - achieved this week blood sugars decreased from >300 to between 100-140 range   · Week 2- portion control  · Week 3- start eliminating sugar and baked goods  ·       · Patient Understanding of Education: Verbalizes understanding of education.    TCM Prognosis:       · Patient would benefit from skilled Acupuncture/TCM therapy to achieve stated goals  · Potential Resolution of chief complaint is good.        PLAN OF CARE:   · Frequency/Duration: Once a week thru March The plan of care and goals were established with the patient who concurs.      Acupuncture/TCM Daily Billing:    Acupuncture CPT Codes reflect billing claims  Timed Procedures:  · Acupuncture CPT Codes  Untimed Procedures:  · Initial Evaluation CPT Code  Total Time per CPT Codes: 45 min   Witness tonic clonic seizure in ER s/p ativan 2mg x1.  -vEEG with no seizure activity  -s/p ativan 2mg for GTC in ED  - c/w keppra 500mg BID and valproic acid 250 BID CT chest with apical consolidations  - s/p 5 day course of vanc/zosyn for possible PNA

## 2019-11-20 NOTE — PROGRESS NOTE ADULT - ATTENDING COMMENTS
This is an 80 year old man with a history of DLBCL (not double hit) who was in a usual good state of health until July/August of 2019 when he became ill.  Given his poor performance status, he was started on RCD for his DLBCL starting in early OCtober.  Despite receiving 2 cycles, he has still had quite a complicated course since August. Most recently he was admitted with a fall and a subdural hemorrage, on that admission found to have a DVT unable to receive anticoagulation because of the SDH. Although his mental status had declined he was more angry and alert at that time. Last week, he had a seizure and was found to be hyponatremic and is now admitted again.  At this time, his mental status is worse than his previous baseline.    Spoke to Dr Browne at bedside at length about his care. Question concerning whether any of this is due to his lymphoma and if is possible to treat him given his signifciant disease burden while trying to see if he has any change if recovery of his neurologic function.   He is still under going EEG.    1. Slides to be reviewed her to confirm diagnosis  2. Will review scans and records again to have a better sense of his disease and his response thus far - he has a poor ps, but is it possible that treatment of his disease will improve this given such functional decline as of late? patient is well aware that it may not and that patient may be worse with chemotherapy as well  3. PT to see patient tomorrow, will see how easily patient cooperates with PT     If to treat , consider RCD versus RCOEP with etoposide only given 1 of 3 doses, monitor for response, if tolerates, would give the full dose of etoposide. This is an 80 year old man with a history of DLBCL (not double hit) who was in a usual good state of health until July/August of 2019 when he became ill.  Given his poor performance status, once diagnosis of DLBCL confirmed, he was started on RCD for his DLBCL starting in early OCtober.  HE is now s/p 2 cycles but his course has still been complicaed.  Most recently he was admitted with a fall and a subdural hemorrahge, on that admission found to have a DVT unable to receive anticoagulation because of the SDH. Although his mental status had declined he was more angry and alert at that time. Last week, he had a seizure and was found to be hyponatremic and is now admitted again.  At this time, his mental status is worse than his previous baseline.  He also has a leukocytosis and a possible pneumonia.     Spoke to Dr Browne at bedside at length about his care. Question concerning whether any of this is due to his lymphoma and if is possible to treat him given his significant disease burden while trying to see if he has any change if recovery of his neurologic function.       1. Slides to be reviewed here to confirm diagnosis  2. Will review scans and records again to have a better sense of his disease and his response thus far - he has a poor ps, but is it possible that treatment of his disease will improve this given such functional decline as of late? patient is well aware that it may not and that patient may be worse with chemotherapy as well  3. PT to see patient tomorrow, will see how easily patient cooperates with PT   4. favor watching for some recovery prior to initiating therapy, will continue to evaluate    If to treat , consider RCD versus RCOEP with etoposide only given 1 of 3 doses, monitor for response, if tolerates, would give the full dose of etoposide.

## 2019-11-20 NOTE — PROGRESS NOTE ADULT - PROBLEM SELECTOR PLAN 2
CT chest with apical consolidations  - s/p 5 day course of vanc/zosyn for possible PNA   - ID following, recs appreciated better controlled  - c/w atenolol 50mg qd, norvasc 5mg qd  - c/w lasix 20mg PO QD

## 2019-11-20 NOTE — PROGRESS NOTE ADULT - ASSESSMENT
Mr. Browne is an 80 year old man with a PMH of DLBCL s/p 2 cycles of R-CP, CAD, DM, HTN, BL LE DVT, LUE DVT on prophylactic lovenox, CKD, orthostatic hypotension, and recent admission in November 2019 for SDH after fall, presents for acute encephalopathy and new onset seizures.

## 2019-11-20 NOTE — PROGRESS NOTE ADULT - PROBLEM SELECTOR PLAN 5
- increased lantus to 10u qhs, humalog 4u with meals.   - FS    #hypothyroidism  -c/w IV levothyroxine 44mg daily

## 2019-11-20 NOTE — PROGRESS NOTE ADULT - ASSESSMENT
NOTE INCOMPLETE 79 yo M hx DLBCL (dx 9/2019) s/p 2 cycles of R-CP (last ~4weeks ago) at [facility], DM2, recent admission at [facility] (10/29-11/5) for SDH s/p fall found to have b/l LE and LUE DVT (not on AC) p/w AMS and seizure.    1. DLBCL:   - pt was admitted to [facility] in 8-9/2019, was found to be hypercalcemic and CT C/A/P (9/10/19) showed scattered b/l pulmonary nodules, mediastinal lymphadenopathy, retroperitoneal lymphadenopathy, and splenic lesions/enlargement concerning for lymphoma.  LN biopsy consistent with DLBCL, germinal cell type, neg for bcl-2, bcl-6, myc.  Per daughter, no outpatient PET scan and biopsy was done as he was started on treatment right away.  He sees Dr. Jovan Poole at [facility] and was started on Rituxan-Cytoxan-Dex (R-CD). He has received 2 cycles thus far, last cycle ~ 4 weeks ago, and has not been able to f/u due to recent hospitalizations. Reports pt had thoracentesis in the past and fluid was negative for malignant cells.   - CT A/P non contrast now shows treatment response with decrease in size of the spleen and resolution of retroperitoneal adenopathy.  MRI brain with contrast without lesions, SDH stable.  - Pt is currently with poor performance status and unable to follow commands but will continue to follow and may treat inpatient with C3 chemo if there is improvement  - biopsy slides received in our pathology department, currently being verified by Dr. Seymour  - Na levels improving, appreciate nephrology    2. DVTs: b/l LE (calf) DVTs and LUE DVT  - repeat b/l LE US now shows unchanged subacute DVT  - AC when cleared by neurosurgery  - if concern for propagation, then may need IVC filter to prevent PE    Sarah Gomez  Hematology Fellow  [phone]

## 2019-11-20 NOTE — PROGRESS NOTE ADULT - SUBJECTIVE AND OBJECTIVE BOX
INTERVAL HPI/OVERNIGHT EVENTS:  Patient S&E at bedside.  EEG neg for seizures.      VITAL SIGNS:  T(F): 98.1 (11-20-19 @ 06:00)  HR: 95 (11-20-19 @ 06:00)  BP: 150/105 (11-20-19 @ 06:00)  RR: 17 (11-20-19 @ 06:00)  SpO2: 99% (11-20-19 @ 06:00)  Wt(kg): --    PHYSICAL EXAM:  Constitutional: NAD  Eyes: EOMI, sclera non-icteric  Neck: supple, no masses, no JVD  Respiratory: CTA b/l, good air entry b/l  Cardiovascular: RRR, no M/R/G  Gastrointestinal: soft, NTND, no masses palpable, + BS, no hepatosplenomegaly  Extremities: no c/c/e  Neurological: AAOx3      MEDICATIONS  (STANDING):  amLODIPine   Tablet 5 milliGRAM(s) Oral daily  ATENolol  Tablet 50 milliGRAM(s) Oral daily  dextrose 5%. 1000 milliLiter(s) (50 mL/Hr) IV Continuous <Continuous>  dextrose 50% Injectable 12.5 Gram(s) IV Push once  dextrose 50% Injectable 25 Gram(s) IV Push once  dextrose 50% Injectable 25 Gram(s) IV Push once  enoxaparin Injectable 40 milliGRAM(s) SubCutaneous daily  escitalopram 5 milliGRAM(s) Oral daily  furosemide    Tablet 20 milliGRAM(s) Oral daily  insulin glargine Injectable (LANTUS) 10 Unit(s) SubCutaneous at bedtime  insulin lispro (HumaLOG) corrective regimen sliding scale   SubCutaneous three times a day before meals  insulin lispro (HumaLOG) corrective regimen sliding scale   SubCutaneous at bedtime  insulin lispro Injectable (HumaLOG) 4 Unit(s) SubCutaneous three times a day before meals  levETIRAcetam 500 milliGRAM(s) Oral two times a day  levothyroxine 88 MICROGram(s) Oral daily  pantoprazole    Tablet 40 milliGRAM(s) Oral before breakfast  potassium chloride  10 mEq/100 mL IVPB 10 milliEquivalent(s) IV Intermittent every 1 hour  potassium phosphate / sodium phosphate powder 1 Packet(s) Oral once  simvastatin 20 milliGRAM(s) Oral at bedtime    MEDICATIONS  (PRN):  acetaminophen   Tablet .. 650 milliGRAM(s) Oral every 6 hours PRN Severe Pain (7 - 10)  dextrose 40% Gel 15 Gram(s) Oral once PRN Blood Glucose LESS THAN 70 milliGRAM(s)/deciliter  glucagon  Injectable 1 milliGRAM(s) IntraMuscular once PRN Glucose LESS THAN 70 milligrams/deciliter      Allergies    No Known Allergies    Intolerances        LABS:                        10.0   16.73 )-----------( 376      ( 20 Nov 2019 06:45 )             30.2     11-20    137  |  98  |  13  ----------------------------<  249<H>  3.2<L>   |  24  |  1.06    Ca    8.4      20 Nov 2019 06:45  Phos  2.1     11-20  Mg     1.7     11-20            RADIOLOGY & ADDITIONAL TESTS:  Studies reviewed.    ASSESSMENT & PLAN:

## 2019-11-20 NOTE — PROGRESS NOTE ADULT - ATTENDING COMMENTS
Pt seen and examined, chart and labs reviewed.   79M with h/o HTN, DM2, hypothyroid, CAD/stents, PUD s/p H. pylori treatment, recent dx of DLBCL at [place] on Oct 2019 s/p 2 cycles of R-CP, recent fall with R frontal and occipital SDH with no evacuation, LUE DVT and b/l LE DVT on prophylactic Lovenox, p/w acute encephalopathy and had witnessed seizure activity at home and again had tonic clonic seizure in ER s/p Ativan 2mg IVx1.     Assessment/plan:  #Metabolic encephalopathy with seizure activity: multifactorial, ?postictal vs. infectious  -slowly improving  -MRI w/ contrast no CNS lymphoma  -video EEG negative for seizure  -c/w keppra, d/c valproic acid after today, f/u neurology  CT/MRI brain stable SDH with evolutional changes ; neurosurgery consulted, no urgent indication for evacuation  resumed on prophylactic lovenox  MRI brain w contrast no lymphoma, stable SDH, chronic L MCA infarct  # Uncontrolled HTN: better controlled, increased atenolol to 50 mg qd and added norvasc 5 mg qd and lasix 20 mg qd   # Leukocytosis, Possible UL pneumonia: complete 5 day course of abx (vanco/zosyn) 11/19, f/u ID  #DLBCL: s/p 2 cycles of R-CP with interval improvement in lymphadenopathy on scans from 11/14.    CT shows response to chemo with reduced lymphadenopathy and spleen size  d/w daughter Dr. Browne who's a hemonc attending at [facility], she would like pt to have mediport placed and get inpt chemo, but per heme pt is poor mental and functional status and will defer inpt chemo at this time, f/u hematology  # Severe protein/calorie malnutrition: c/w glucerna and encourage intake  # Uncontrolled DM2: better controlled on lantus to 10 U hs and humalog 4 U ac, A1c 7%, monitor FS  # Mild FRANCES, Hyponatremia: improved, c/w lasix, d/c salt tabs, if creat continues to trend up will d/c lasix  # Dispo: pt is very weak/debilitated, likely needs rehab, PT eval

## 2019-11-20 NOTE — PROGRESS NOTE ADULT - PROBLEM SELECTOR PLAN 1
At baseline pt is A&Ox2. Now A&Ox0-1 with diffuse weakness. Encephalopathy likely from infection vs post-ictal state. Low suspicion for meningitis. CT chest with apical consolidations, s/p 5 days vanc/zosyn. MRI with no enhancing brain lesions. No seizure activity seen on vEEG.    - s/p Vanc and zosyn x5 days   - c/w keppra/ valproic acid Encephalopathy likely from infection vs post-ictal state. Low suspicion for meningitis. CT chest with apical consolidations, s/p 5 days vanc/zosyn. MRI with no enhancing brain lesions. No seizure activity seen on vEEG.  Mental status is improving.  - s/p Vanc and zosyn x5 days   - c/w keppra/ valproic acid Encephalopathy likely from infection vs post-ictal state. Low suspicion for meningitis. CT chest with apical consolidations, s/p 5 days vanc/zosyn. MRI with no enhancing brain lesions. No seizure activity seen on vEEG.  Mental status is improving.  - s/p Vanc and zosyn x5 days   - c/w keppra

## 2019-11-21 DIAGNOSIS — N17.9 ACUTE KIDNEY FAILURE, UNSPECIFIED: ICD-10-CM

## 2019-11-21 DIAGNOSIS — D72.829 ELEVATED WHITE BLOOD CELL COUNT, UNSPECIFIED: ICD-10-CM

## 2019-11-21 LAB
ANION GAP SERPL CALC-SCNC: 12 MMO/L — SIGNIFICANT CHANGE UP (ref 7–14)
BASOPHILS # BLD AUTO: 0.08 K/UL — SIGNIFICANT CHANGE UP (ref 0–0.2)
BASOPHILS NFR BLD AUTO: 0.4 % — SIGNIFICANT CHANGE UP (ref 0–2)
BUN SERPL-MCNC: 17 MG/DL — SIGNIFICANT CHANGE UP (ref 7–23)
CALCIUM SERPL-MCNC: 8.6 MG/DL — SIGNIFICANT CHANGE UP (ref 8.4–10.5)
CHLORIDE SERPL-SCNC: 98 MMOL/L — SIGNIFICANT CHANGE UP (ref 98–107)
CO2 SERPL-SCNC: 26 MMOL/L — SIGNIFICANT CHANGE UP (ref 22–31)
CREAT SERPL-MCNC: 1.03 MG/DL — SIGNIFICANT CHANGE UP (ref 0.5–1.3)
EOSINOPHIL # BLD AUTO: 0.21 K/UL — SIGNIFICANT CHANGE UP (ref 0–0.5)
EOSINOPHIL NFR BLD AUTO: 1.1 % — SIGNIFICANT CHANGE UP (ref 0–6)
GLUCOSE SERPL-MCNC: 188 MG/DL — HIGH (ref 70–99)
HCT VFR BLD CALC: 28.7 % — LOW (ref 39–50)
HGB BLD-MCNC: 9.3 G/DL — LOW (ref 13–17)
IMM GRANULOCYTES NFR BLD AUTO: 1.7 % — HIGH (ref 0–1.5)
LDH SERPL L TO P-CCNC: 376 U/L — HIGH (ref 135–225)
LYMPHOCYTES # BLD AUTO: 1.86 K/UL — SIGNIFICANT CHANGE UP (ref 1–3.3)
LYMPHOCYTES # BLD AUTO: 10 % — LOW (ref 13–44)
MAGNESIUM SERPL-MCNC: 1.8 MG/DL — SIGNIFICANT CHANGE UP (ref 1.6–2.6)
MCHC RBC-ENTMCNC: 28.6 PG — SIGNIFICANT CHANGE UP (ref 27–34)
MCHC RBC-ENTMCNC: 32.4 % — SIGNIFICANT CHANGE UP (ref 32–36)
MCV RBC AUTO: 88.3 FL — SIGNIFICANT CHANGE UP (ref 80–100)
MONOCYTES # BLD AUTO: 2.06 K/UL — HIGH (ref 0–0.9)
MONOCYTES NFR BLD AUTO: 11 % — SIGNIFICANT CHANGE UP (ref 2–14)
NEUTROPHILS # BLD AUTO: 14.14 K/UL — HIGH (ref 1.8–7.4)
NEUTROPHILS NFR BLD AUTO: 75.8 % — SIGNIFICANT CHANGE UP (ref 43–77)
NRBC # FLD: 0 K/UL — SIGNIFICANT CHANGE UP (ref 0–0)
PHOSPHATE SERPL-MCNC: 2.1 MG/DL — LOW (ref 2.5–4.5)
PLATELET # BLD AUTO: 413 K/UL — HIGH (ref 150–400)
PMV BLD: 11.2 FL — SIGNIFICANT CHANGE UP (ref 7–13)
POTASSIUM SERPL-MCNC: 3.7 MMOL/L — SIGNIFICANT CHANGE UP (ref 3.5–5.3)
POTASSIUM SERPL-SCNC: 3.7 MMOL/L — SIGNIFICANT CHANGE UP (ref 3.5–5.3)
RBC # BLD: 3.25 M/UL — LOW (ref 4.2–5.8)
RBC # FLD: 18.6 % — HIGH (ref 10.3–14.5)
SODIUM SERPL-SCNC: 136 MMOL/L — SIGNIFICANT CHANGE UP (ref 135–145)
URATE SERPL-MCNC: 2.6 MG/DL — LOW (ref 3.4–8.8)
WBC # BLD: 18.66 K/UL — HIGH (ref 3.8–10.5)
WBC # FLD AUTO: 18.66 K/UL — HIGH (ref 3.8–10.5)

## 2019-11-21 PROCEDURE — 71045 X-RAY EXAM CHEST 1 VIEW: CPT | Mod: 26

## 2019-11-21 PROCEDURE — 95951: CPT | Mod: 26

## 2019-11-21 PROCEDURE — 99232 SBSQ HOSP IP/OBS MODERATE 35: CPT | Mod: GC

## 2019-11-21 PROCEDURE — 99233 SBSQ HOSP IP/OBS HIGH 50: CPT | Mod: GC

## 2019-11-21 PROCEDURE — 99232 SBSQ HOSP IP/OBS MODERATE 35: CPT

## 2019-11-21 RX ORDER — AMLODIPINE BESYLATE 2.5 MG/1
5 TABLET ORAL ONCE
Refills: 0 | Status: COMPLETED | OUTPATIENT
Start: 2019-11-21 | End: 2019-11-21

## 2019-11-21 RX ORDER — SODIUM,POTASSIUM PHOSPHATES 278-250MG
1 POWDER IN PACKET (EA) ORAL ONCE
Refills: 0 | Status: COMPLETED | OUTPATIENT
Start: 2019-11-21 | End: 2019-11-21

## 2019-11-21 RX ORDER — AMLODIPINE BESYLATE 2.5 MG/1
10 TABLET ORAL DAILY
Refills: 0 | Status: DISCONTINUED | OUTPATIENT
Start: 2019-11-22 | End: 2019-12-01

## 2019-11-21 RX ADMIN — Medication 1: at 08:50

## 2019-11-21 RX ADMIN — PANTOPRAZOLE SODIUM 40 MILLIGRAM(S): 20 TABLET, DELAYED RELEASE ORAL at 05:31

## 2019-11-21 RX ADMIN — Medication 20 MILLIGRAM(S): at 05:31

## 2019-11-21 RX ADMIN — Medication 88 MICROGRAM(S): at 05:30

## 2019-11-21 RX ADMIN — AMLODIPINE BESYLATE 5 MILLIGRAM(S): 2.5 TABLET ORAL at 10:38

## 2019-11-21 RX ADMIN — ATENOLOL 50 MILLIGRAM(S): 25 TABLET ORAL at 05:31

## 2019-11-21 RX ADMIN — Medication 1: at 13:24

## 2019-11-21 RX ADMIN — SIMVASTATIN 20 MILLIGRAM(S): 20 TABLET, FILM COATED ORAL at 21:09

## 2019-11-21 RX ADMIN — Medication 1 PACKET(S): at 08:50

## 2019-11-21 RX ADMIN — LEVETIRACETAM 500 MILLIGRAM(S): 250 TABLET, FILM COATED ORAL at 05:31

## 2019-11-21 RX ADMIN — Medication 4 UNIT(S): at 08:50

## 2019-11-21 RX ADMIN — AMLODIPINE BESYLATE 5 MILLIGRAM(S): 2.5 TABLET ORAL at 05:31

## 2019-11-21 RX ADMIN — INSULIN GLARGINE 10 UNIT(S): 100 INJECTION, SOLUTION SUBCUTANEOUS at 21:57

## 2019-11-21 RX ADMIN — ENOXAPARIN SODIUM 40 MILLIGRAM(S): 100 INJECTION SUBCUTANEOUS at 10:39

## 2019-11-21 RX ADMIN — ESCITALOPRAM OXALATE 5 MILLIGRAM(S): 10 TABLET, FILM COATED ORAL at 08:51

## 2019-11-21 RX ADMIN — Medication 4 UNIT(S): at 13:24

## 2019-11-21 RX ADMIN — Medication 1 PACKET(S): at 17:58

## 2019-11-21 RX ADMIN — LEVETIRACETAM 500 MILLIGRAM(S): 250 TABLET, FILM COATED ORAL at 17:58

## 2019-11-21 NOTE — PROGRESS NOTE ADULT - ATTENDING COMMENTS
Pt seen and examined, chart and labs reviewed.   79M with h/o HTN, DM2, hypothyroid, CAD/stents, PUD s/p H. pylori treatment, recent dx of DLBCL at [place] on Oct 2019 s/p 2 cycles of R-CP, recent fall with R frontal and occipital SDH with no evacuation, LUE DVT and b/l LE DVT on prophylactic Lovenox, p/w acute encephalopathy and had witnessed seizure activity at home and again had tonic clonic seizure in ER s/p Ativan 2mg IVx1.     Assessment/plan:  #Metabolic encephalopathy with seizure activity: multifactorial, ?postictal vs. infectious  -still lethargic and slow mentation  -MRI w/ contrast no CNS lymphoma  -video EEG negative for seizure  -c/w keppra, d/c'd valproic acid per neuro  -CT/MRI brain stable SDH with evolutional changes ; neurosurgery consulted, no urgent indication for evacuation  resumed on prophylactic lovenox  -MRI brain w contrast no lymphoma, stable SDH, chronic L MCA infarct  # Uncontrolled HTN: better controlled, c/w atenolol 50 mg and lasix 20 mg, increase norvasc to 10 mg, d/c'd salt tabs, monitor bp   # Leukocytosis, Possible UL pneumonia: complete 5 day course of abx (vanco/zosyn) 11/19, f/u ID, repeat CXR  #DLBCL: s/p 2 cycles of R-CP with interval improvement in lymphadenopathy on scans from 11/14.    CT shows response to chemo with reduced lymphadenopathy and spleen size  daughter Dr. Browne would like to pursue inpt chemo, d/w heme fellow Dr. Conway will review path slides and decide whether to give a dose of chemo to assess response,   PT f/u, pt has poor mental and functional status and likely poor candidate for inpt chemo, will defer to hematology  # Severe protein/calorie malnutrition: c/w glucerna and encourage intake  # Uncontrolled DM2: better controlled on lantus to 10 U hs and humalog 4 U ac, A1c 7%, monitor FS  # Mild FRANCES, Hyponatremia: improved, c/w lasix, d/c salt tabs, if creat continues to trend up will d/c lasix  # Dispo: pt is very weak/debilitated, likely needs rehab, PT eval

## 2019-11-21 NOTE — PROGRESS NOTE ADULT - PROBLEM SELECTOR PLAN 6
CT chest with apical consolidations  - s/p 5 day course of vanc/zosyn for possible PNA CT chest with apical consolidations. WBC uptrending   - s/p 5 day course of vanc/zosyn for possible PNA - c/w lantus 10u qhs, humalog 4u with meals.   - FS    #hypothyroidism  -c/w IV levothyroxine 44mg daily

## 2019-11-21 NOTE — PROGRESS NOTE ADULT - ASSESSMENT
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 prolonged FRANCES state (? 2/2 prolonged hypercalcemic state) that has now resolved vs. loss of body mass  - Serum Cr is stable   - Avoid nephrotoxins agent  - renal diet  - monitor bmp    Hyponatremia  - ? etiology, presented with Na 122   - Serum sodium improved   - Continue lasix 20mg oral daily, Pt also on salt tablets 1g  TID  - s/p Tolvaptan x 1 on 11/18  - Continue free water restriction <1L/day  - Promote oral solute/food intake   - optimize dm control  - Monitor serum sodium.   - avoid hypotonic solutions    HTN  - has Hx HTN but was on midodrine for orthostatic hypotension in the recent past   - BP was elevated therefore restarted on home BP medications   - Currently BP controlled   - monitor bp    Hypocalcemia  - Has Hx hypercalcemia of malignancy  - low alb corrected alexander is optimal  - monitor    Pl. effusion b/l/ Anasarca   mod effusion seen on ct chest  Now on lasix 20mg PO daily.   Pt is clinically improving. Continue at present  Monitor

## 2019-11-21 NOTE — PROGRESS NOTE ADULT - ASSESSMENT
79 yo M hx DLBCL (dx 9/2019) s/p 2 cycles of R-CP (last ~4weeks ago) at [place], DM2, recent admission at [facility] (10/29-11/5) for SDH s/p fall found to have b/l LE and LUE DVT (not on AC) p/w AMS and witnessed seizure.      1. DLBCL:   - pt was admitted to [facility] in 8-9/2019, was found to be hypercalcemic and CT C/A/P (9/10/19) showed scattered b/l pulmonary nodules, mediastinal lymphadenopathy, retroperitoneal lymphadenopathy, and splenic lesions/enlargement concerning for lymphoma.  LN biopsy consistent with DLBCL, germinal cell type, neg for bcl-2, bcl-6, myc.  Per daughter, no outpatient PET scan and biopsy was done as he was started on treatment right away.  He sees Dr. Jovan Poole at [place] and was started on Rituxan-Cytoxan-Dex (R-CD). He has received 2 cycles thus far, last cycle ~ 4 weeks ago, and has not been able to f/u due to recent hospitalizations. Reports pt had thoracentesis in the past and fluid was negative for malignant cells.   - CT A/P non contrast now shows treatment response with decrease in size of the spleen and resolution of retroperitoneal adenopathy.  MRI brain with contrast without lesions, SDH stable.  - Pt is currently with poor performance status, mental status improving, waxing and waning but overall improving.  - biopsy slides received in our pathology department, currently being verified by Dr. Seymour  - rec PT evaluation if pt continues to improve, will consider chemo treatment next week     2. Leukocytosis  - mostly neutrophilic, likely reactive  - r/o infection, appreciate ID consult    3. DVTs: b/l LE (calf) DVTs and LUE DVT  - repeat b/l LE US now shows unchanged subacute DVT  - ppx AC started on 11/6 as per neurosurgery    Sarah Gomez  Hematology Fellow  [phone]

## 2019-11-21 NOTE — PROGRESS NOTE ADULT - SUBJECTIVE AND OBJECTIVE BOX
INTERVAL HPI/OVERNIGHT EVENTS:  Patient S&E at bedside. More responsive today, making eye contact.  AAO x 0.      VITAL SIGNS:  T(F): 97.2 (11-21-19 @ 14:19)  HR: 77 (11-21-19 @ 14:19)  BP: 115/76 (11-21-19 @ 14:19)  RR: 18 (11-21-19 @ 14:19)  SpO2: 98% (11-21-19 @ 14:19)  Wt(kg): --    PHYSICAL EXAM:  Constitutional: NAD  Eyes: EOMI, sclera non-icteric  Neck: supple, no masses, no JVD  Respiratory: CTA b/l, good air entry b/l  Cardiovascular: RRR, no M/R/G  Gastrointestinal: soft, NTND, no masses palpable, + BS  Extremities: no c/c/e  Neurological: AAOx3      MEDICATIONS  (STANDING):  ATENolol  Tablet 50 milliGRAM(s) Oral daily  dextrose 5%. 1000 milliLiter(s) (50 mL/Hr) IV Continuous <Continuous>  dextrose 50% Injectable 12.5 Gram(s) IV Push once  dextrose 50% Injectable 25 Gram(s) IV Push once  dextrose 50% Injectable 25 Gram(s) IV Push once  enoxaparin Injectable 40 milliGRAM(s) SubCutaneous daily  escitalopram 5 milliGRAM(s) Oral daily  furosemide    Tablet 20 milliGRAM(s) Oral daily  insulin glargine Injectable (LANTUS) 10 Unit(s) SubCutaneous at bedtime  insulin lispro (HumaLOG) corrective regimen sliding scale   SubCutaneous three times a day before meals  insulin lispro (HumaLOG) corrective regimen sliding scale   SubCutaneous at bedtime  insulin lispro Injectable (HumaLOG) 4 Unit(s) SubCutaneous three times a day before meals  levETIRAcetam 500 milliGRAM(s) Oral two times a day  levothyroxine 88 MICROGram(s) Oral daily  pantoprazole    Tablet 40 milliGRAM(s) Oral before breakfast  simvastatin 20 milliGRAM(s) Oral at bedtime    MEDICATIONS  (PRN):  acetaminophen   Tablet .. 650 milliGRAM(s) Oral every 6 hours PRN Severe Pain (7 - 10)  dextrose 40% Gel 15 Gram(s) Oral once PRN Blood Glucose LESS THAN 70 milliGRAM(s)/deciliter  glucagon  Injectable 1 milliGRAM(s) IntraMuscular once PRN Glucose LESS THAN 70 milligrams/deciliter      Allergies  No Known Allergies    Intolerances        LABS:                        9.3    18.66 )-----------( 413      ( 21 Nov 2019 05:45 )             28.7     11-21    136  |  98  |  17  ----------------------------<  188<H>  3.7   |  26  |  1.03    Ca    8.6      21 Nov 2019 05:45  Phos  2.1     11-21  Mg     1.8     11-21            RADIOLOGY & ADDITIONAL TESTS:  Studies reviewed.    ASSESSMENT & PLAN:

## 2019-11-21 NOTE — PROGRESS NOTE ADULT - PROBLEM SELECTOR PLAN 4
mild FRANCES likely i/s/o poor PO intake vs SIADH. hyponatremia improved  - trend sCr -> d/c lasix if continue to uptrend mild FRANCES likely i/s/o poor PO intake vs SIADH. hyponatremia improved  - trend sCr -> stable CT chest with apical consolidations. WBC uptrending. Unclear if this is from lymphoma vs infection.   - s/p 5 day course of vanc/zosyn for possible PNA - c/w atenolol 50mg qd, norvasc 10mg qd  - c/w lasix 20mg PO QD

## 2019-11-21 NOTE — PROGRESS NOTE ADULT - ASSESSMENT
Mr. Browne is an 80 year old man with a PMH of DM, CAD, lymphoma, DVT,  and recent admission in November 2019 for SDH who is now presenting with acute encephalopathy. Also witnessed seizure in ED.  ID consulted for leukocytosis of 18.  Was started empirically on Vancomycin and Zosyn- blood cx negative, ua negative, CT chest with b/l patchy opacities in upper lobe pneumonia vs alveolar edema.    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       Leukocytosis unchanged despite tx for possible aspiration pneumonia.  crypt Ag neg.   completed vanco and zosyn x 5 days.  ?CNS source vs non infectious.  seizures   Lymphoma   CNS in blood cx likely contaminant    Suggest:  CXR  check strongyloides ab  check Quantiferon TB  trend WBC off  observe off antibiotics.          Marianne Montes MD  Pager: [phone]  After 5 PM or weekends please call fellow on call or office [phone]

## 2019-11-21 NOTE — PROGRESS NOTE ADULT - PROBLEM SELECTOR PLAN 5
Witness tonic clonic seizure in ER s/p ativan 2mg x1. 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   -vEEG with no seizure activity  - c/w keppra 500mg BID   - s/p valproic acid

## 2019-11-21 NOTE — PROGRESS NOTE ADULT - PROBLEM SELECTOR PLAN 3
Per chart review on R-CP. s/p 2 cycles. Last cycle ~4 weeks ago. Pt's daughter, Dr. Browne, is interested in inpatient chemo.   - Heme/onc following -> given weak state and mental status, plan for pt to get stronger with PT prior to starting inpt chemo  - if treating with inpt chemo, likely RCD vs RCOEP with etoposide CT chest with apical consolidations. WBC uptrending. Unclear if this is from lymphoma vs infection.   - f/u CXR to assess for ASP PNA  - ID following, recs appreciated  - s/p 5 day course of vanc/zosyn for possible PNA

## 2019-11-21 NOTE — PROGRESS NOTE ADULT - SUBJECTIVE AND OBJECTIVE BOX
Pavithra Alfonso PGY1  -[id number] | [facility] [id number]  =========================    Patient is a 79y old  Male who presents with a chief complaint of AMS (20 Nov 2019 13:08)      INTERVAL HPI/OVERNIGHT EVENTS:  Overnight pt was hypertensive to /95.       MEDICATIONS  (STANDING):  amLODIPine   Tablet 5 milliGRAM(s) Oral daily  ATENolol  Tablet 50 milliGRAM(s) Oral daily  dextrose 5%. 1000 milliLiter(s) (50 mL/Hr) IV Continuous <Continuous>  dextrose 50% Injectable 12.5 Gram(s) IV Push once  dextrose 50% Injectable 25 Gram(s) IV Push once  dextrose 50% Injectable 25 Gram(s) IV Push once  enoxaparin Injectable 40 milliGRAM(s) SubCutaneous daily  escitalopram 5 milliGRAM(s) Oral daily  furosemide    Tablet 20 milliGRAM(s) Oral daily  insulin glargine Injectable (LANTUS) 10 Unit(s) SubCutaneous at bedtime  insulin lispro (HumaLOG) corrective regimen sliding scale   SubCutaneous three times a day before meals  insulin lispro (HumaLOG) corrective regimen sliding scale   SubCutaneous at bedtime  insulin lispro Injectable (HumaLOG) 4 Unit(s) SubCutaneous three times a day before meals  levETIRAcetam 500 milliGRAM(s) Oral two times a day  levothyroxine 88 MICROGram(s) Oral daily  pantoprazole    Tablet 40 milliGRAM(s) Oral before breakfast  simvastatin 20 milliGRAM(s) Oral at bedtime    MEDICATIONS  (PRN):  acetaminophen   Tablet .. 650 milliGRAM(s) Oral every 6 hours PRN Severe Pain (7 - 10)  dextrose 40% Gel 15 Gram(s) Oral once PRN Blood Glucose LESS THAN 70 milliGRAM(s)/deciliter  glucagon  Injectable 1 milliGRAM(s) IntraMuscular once PRN Glucose LESS THAN 70 milligrams/deciliter      Allergies    No Known Allergies    Intolerances        Vital Signs Last 24 Hrs  T(C): 36.7 (21 Nov 2019 05:29), Max: 36.8 (21 Nov 2019 02:00)  T(F): 98 (21 Nov 2019 05:29), Max: 98.3 (21 Nov 2019 02:00)  HR: 88 (21 Nov 2019 05:29) (76 - 90)  BP: 144/87 (21 Nov 2019 05:29) (129/83 - 165/95)  RR: 18 (21 Nov 2019 05:29) (18 - 18)  SpO2: 100% (21 Nov 2019 05:29) (96% - 100%)      PHYSICAL EXAM:  GENERAL: NAD.   CHEST/LUNG: Clear to auscultation; No rales, rhonchi, or wheezing.  Respiratory effort does not appear labored.  HEART: Regular rate and rhythm; S1 and S2,  no murmurs, rubs, or gallops.  ABDOMEN: Soft, not tender to palpation.  No masses or HSM appreciated.  No distension.  Bowel sounds present.  EXTREMITIES:  No clubbing, cyanosis, or edema.  Moves all extremities with strength 5/5.  SKIN: No obvious rashes or lesions.  Turgor okay.  NEURO:  Alert and oriented x 3, no focal sensory or  motor deficit, DTR 2+ bilaterally.    LABS:             CAPILLARY BLOOD GLUCOSE      POCT Blood Glucose.: 197 mg/dL (20 Nov 2019 21:49)  POCT Blood Glucose.: 138 mg/dL (20 Nov 2019 17:18)  POCT Blood Glucose.: 127 mg/dL (20 Nov 2019 12:13)  POCT Blood Glucose.: 215 mg/dL (20 Nov 2019 08:24)      Culture - Blood (collected 17 Nov 2019 07:38)  Source: BLOOD PERIPHERAL  Preliminary Report (20 Nov 2019 07:38):    NO ORGANISMS ISOLATED    NO ORGANISMS ISOLATED AT 72 HRS.    Culture - Blood (collected 17 Nov 2019 07:38)  Source: BLOOD VENOUS  Preliminary Report (20 Nov 2019 07:38):    NO ORGANISMS ISOLATED    NO ORGANISMS ISOLATED AT 72 HRS. Pavithra Alfonso PGY1  -[id number] | [facility] [id number]  =========================    Patient is a 79y old  Male who presents with a chief complaint of AMS (20 Nov 2019 13:08)      INTERVAL HPI/OVERNIGHT EVENTS:  Overnight pt was hypertensive to /95. This morning pt is easily arousable but appears slightly more lethargic than yesterday. Unable to assess ROS as pt mostly nods and says yes. Per RN, pt was able to finish his dinner with family last night.       MEDICATIONS  (STANDING):  amLODIPine   Tablet 5 milliGRAM(s) Oral daily  ATENolol  Tablet 50 milliGRAM(s) Oral daily  dextrose 5%. 1000 milliLiter(s) (50 mL/Hr) IV Continuous <Continuous>  dextrose 50% Injectable 12.5 Gram(s) IV Push once  dextrose 50% Injectable 25 Gram(s) IV Push once  dextrose 50% Injectable 25 Gram(s) IV Push once  enoxaparin Injectable 40 milliGRAM(s) SubCutaneous daily  escitalopram 5 milliGRAM(s) Oral daily  furosemide    Tablet 20 milliGRAM(s) Oral daily  insulin glargine Injectable (LANTUS) 10 Unit(s) SubCutaneous at bedtime  insulin lispro (HumaLOG) corrective regimen sliding scale   SubCutaneous three times a day before meals  insulin lispro (HumaLOG) corrective regimen sliding scale   SubCutaneous at bedtime  insulin lispro Injectable (HumaLOG) 4 Unit(s) SubCutaneous three times a day before meals  levETIRAcetam 500 milliGRAM(s) Oral two times a day  levothyroxine 88 MICROGram(s) Oral daily  pantoprazole    Tablet 40 milliGRAM(s) Oral before breakfast  simvastatin 20 milliGRAM(s) Oral at bedtime    MEDICATIONS  (PRN):  acetaminophen   Tablet .. 650 milliGRAM(s) Oral every 6 hours PRN Severe Pain (7 - 10)  dextrose 40% Gel 15 Gram(s) Oral once PRN Blood Glucose LESS THAN 70 milliGRAM(s)/deciliter  glucagon  Injectable 1 milliGRAM(s) IntraMuscular once PRN Glucose LESS THAN 70 milligrams/deciliter      Allergies    No Known Allergies    Intolerances        Vital Signs Last 24 Hrs  T(C): 36.7 (21 Nov 2019 05:29), Max: 36.8 (21 Nov 2019 02:00)  T(F): 98 (21 Nov 2019 05:29), Max: 98.3 (21 Nov 2019 02:00)  HR: 88 (21 Nov 2019 05:29) (76 - 90)  BP: 144/87 (21 Nov 2019 05:29) (129/83 - 165/95)  RR: 18 (21 Nov 2019 05:29) (18 - 18)  SpO2: 100% (21 Nov 2019 05:29) (96% - 100%)      PHYSICAL EXAM:  GENERAL: very weak, on 2.5L NC. Able to open eye, smile and nod.   HEENT: EOMI, clear sclera.   CHEST/LUNG: Clear to auscultation anteriorly; No rales, rhonchi, or wheezing.  HEART: Regular rate and rhythm; S1 and S2,  no murmurs, rubs, or gallops.  ABDOMEN: Soft, not tender to palpation.  No masses or HSM appreciated.  No distension.  Bowel sounds present.  EXTREMITIES:  No clubbing, cyanosis, or edema. moves all extremities spontaneously      LABS:                             9.3    18.66 )-----------( 413      ( 21 Nov 2019 05:45 )             28.7     11-21    136  |  98  |  17  ----------------------------<  188<H>  3.7   |  26  |  1.03    Ca    8.6      21 Nov 2019 05:45  Phos  2.1     11-21  Mg     1.8     11-21          CAPILLARY BLOOD GLUCOSE      POCT Blood Glucose.: 197 mg/dL (20 Nov 2019 21:49)  POCT Blood Glucose.: 138 mg/dL (20 Nov 2019 17:18)  POCT Blood Glucose.: 127 mg/dL (20 Nov 2019 12:13)  POCT Blood Glucose.: 215 mg/dL (20 Nov 2019 08:24)      Culture - Blood (collected 17 Nov 2019 07:38)  Source: BLOOD PERIPHERAL  Preliminary Report (20 Nov 2019 07:38):    NO ORGANISMS ISOLATED    NO ORGANISMS ISOLATED AT 72 HRS.    Culture - Blood (collected 17 Nov 2019 07:38)  Source: BLOOD VENOUS  Preliminary Report (20 Nov 2019 07:38):    NO ORGANISMS ISOLATED    NO ORGANISMS ISOLATED AT 72 HRS.

## 2019-11-21 NOTE — PROVIDER CONTACT NOTE (OTHER) - RECOMMENDATIONS
on zosyn  need to start on Vanco
Notify tele pa, give am BP meds and continue to monitor
Provider made aware
Provider made aware,
give apple juice and feed pt dinner and recheck blood sugar at bedtime, give lantus 10U
no new orders obtained
no new orders obtained   RN unable to due swap provider aware to come to bedside for swap

## 2019-11-21 NOTE — PROGRESS NOTE ADULT - PROBLEM SELECTOR PLAN 7
- c/w lantus 10u qhs, humalog 4u with meals.   - FS    #hypothyroidism  -c/w IV levothyroxine 44mg daily 3 known active DVTs.   -repeat dopplers are negative for progression  -restarted lovenox prophylaxis per NSG (started on 11/16).  -repeat BL UE US with no evidence of DVT now. Superficial vein thrombosis visualized in the left cephalic vein. BL LE US with Unchanged subacute DVT right soleal vein.

## 2019-11-21 NOTE — PROGRESS NOTE ADULT - PROBLEM SELECTOR PLAN 2
- c/w atenolol 50mg qd, norvasc 5mg qd  - c/w lasix 20mg PO QD Per chart review on R-CP. s/p 2 cycles. Last cycle ~4 weeks ago. Pt's daughter, Dr. Browne, is interested in inpatient chemo.   - Heme/onc following -> given weak state and mental status, plan for pt to get stronger with PT prior to starting inpt chemo  - if treating with inpt chemo, likely RCD vs RCOEP with etoposide

## 2019-11-21 NOTE — PROGRESS NOTE ADULT - ASSESSMENT
Mr. 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 Currently planning for inpatient chemo. Mr. 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

## 2019-11-21 NOTE — PROGRESS NOTE ADULT - PROBLEM SELECTOR PLAN 1
Encephalopathy likely from infection vs post-ictal state. CT chest with apical consolidations and pleural effusion, s/p 5 days vanc/zosyn. MRI with no enhancing brain lesions. No seizure activity seen on vEEG.  Mental status is improving.  - s/p Vanc and zosyn x5 days   - c/w keppra Encephalopathy likely from infection vs post-ictal state. CT chest with apical consolidations and pleural effusion, s/p 5 days vanc/zosyn. MRI with no enhancing brain lesions. No seizure activity seen on vEEG.    - s/p Vanc and zosyn x5 days   - c/w keppra Encephalopathy likely from infection vs post-ictal state. CT chest with apical consolidations and pleural effusion, s/p 5 days vanc/zosyn. MRI with no enhancing brain lesions. No seizure activity seen on vEEG. Mental status and functional status still poor.    - s/p Vanc and zosyn x5 days   - c/w keppra

## 2019-11-21 NOTE — PROGRESS NOTE ADULT - SUBJECTIVE AND OBJECTIVE BOX
Follow Up:  PNA post seizure, lymphoma on chemo, SDH.    Inverval History/ROS:  EEG in progress.  WBC trended up 18K. no fever.    Allergies  No Known Allergies        ANTIMICROBIALS: vanco zosyn 11/14- 11/19      OTHER MEDS:  acetaminophen   Tablet .. 650 milliGRAM(s) Oral every 6 hours PRN  dextrose 40% Gel 15 Gram(s) Oral once PRN  dextrose 5%. 1000 milliLiter(s) IV Continuous <Continuous>  dextrose 50% Injectable 12.5 Gram(s) IV Push once  dextrose 50% Injectable 25 Gram(s) IV Push once  dextrose 50% Injectable 25 Gram(s) IV Push once  enoxaparin Injectable 40 milliGRAM(s) SubCutaneous daily  escitalopram 5 milliGRAM(s) Oral daily  furosemide    Tablet 20 milliGRAM(s) Oral daily  glucagon  Injectable 1 milliGRAM(s) IntraMuscular once PRN  insulin lispro (HumaLOG) corrective regimen sliding scale   SubCutaneous three times a day before meals  insulin lispro (HumaLOG) corrective regimen sliding scale   SubCutaneous at bedtime  levETIRAcetam  IVPB 500 milliGRAM(s) IV Intermittent every 12 hours  levothyroxine 88 MICROGram(s) Oral daily  pantoprazole    Tablet 40 milliGRAM(s) Oral before breakfast  simvastatin 20 milliGRAM(s) Oral at bedtime  sodium chloride 1 Gram(s) Oral three times a day  valproate sodium IVPB 250 milliGRAM(s) IV Intermittent two times a day      Vital Signs Last 24 Hrs  T(F): 98.1 (11-21-19 @ 09:30), Max: 98.3 (11-21-19 @ 02:00)  HR: 79 (11-21-19 @ 09:30)  BP: 130/76 (11-21-19 @ 09:30)  RR: 17 (11-21-19 @ 09:30)  SpO2: 97% (11-21-19 @ 09:30) (96% - 100%)    PHYSICAL EXAM:  General: weak appearing  HEAD/EYES: EEG   ENT:  dry mucus membranes   Cardiovascular: s1s2  Respiratory: poor effort a/e bilat  GI:   soft, non-tender, normal bowel sounds  :  warren   Musculoskeletal:  no synovitis  Neurologic: unable to assess  Skin:   no rash  Psychiatric: unable to assess  Lines:  [ ] no phlebitis [ ] central line                            9.3    18.66 )-----------( 413      ( 21 Nov 2019 05:45 )             28.7 11-21    136  |  98  |  17  ----------------------------<  188  3.7   |  26  |  1.03  Ca    8.6      21 Nov 2019 05:45Phos  2.1     11-21Mg     1.8     11-21            MICROBIOLOGY:  Vancomycin Level, Trough: 15.2 ug/mL (11-18-19 @ 05:00)  v  BLOOD VENOUS  11-17-19 --  --  --      Peripheral Site 1  11-14-19 --  --  --      Peripheral Site 2  11-14-19 --  --  BLOOD CULTURE PCR  Staphylococcus sp.,coag neg      URINE MIDSTREAM  11-14-19 --  --  --      .Blood Blood-Peripheral  11-04-19   No growth at 5 days.  --  --        RADIOLOGY:    < from: MR Head w/ IV Cont (11.18.19 @ 16:38) >    EXAM:  MR BRAIN IC        PROCEDURE DATE:  Nov 18 2019         INTERPRETATION:  HISTORY: Lymphoma. Rule out CNS involvement. Subdural   hematoma/intracranial hemorrhage follow-up..    Description: MRI of the brain with and without gadolinium contrast was   performed.    COMPARISON: Noncontrast brain MRI 11/15/2019, head CT 11/15/2019..    Axial T1 and axial T2 FLAIR series were obtained before contrast. After   intravenous gadolinium contrast administration, sagittal, coronal, and   axial T1 postcontrast series were obtained.    7 cc intravenous Gadovist gadolinium contrast was administered, 0.5 cc   contrast was discarded.    The study is limited by motion.    The subdural hemorrhages and mild right to left midline shift are stable.   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 The ventricles are stable in size.   Chronic white matter changes and generalized cerebral volume loss are   again noted.    On the postcontrast images, there is no gross evidence for intracranial   enhancing mass.    IMPRESSION:    No gross evidence for intracranial enhancing mass.    Stable subdural hemorrhages.      < end of copied text >  < from: CT Chest No Cont (11.15.19 @ 02:51) >  Comparison:September 10, 2019    Tubes/Lines: None.    Mediastinum/Vessels/Heart: Aorta and pulmonary arteries are normal in   size. There is no pericardial effusion. Unchanged subcarinal   lymphadenopathy measuring up to 2.7 x 2.0 cm. Coronary artery   calcifications noted. Thyroid gland is unremarkable    Lungs/Pleura/Airways: New moderate bilateral pleural effusions with   adjacent passive atelectasis. Patchy consolidations noted in the upper   lobes.    Visualized abdomen: Multiple splenic masses again noted, without change   from previous exam. Linear hyperdensity within the stomach may represent   a foreign body or capsule. Post cholecystectomy.    Small amount of edema adjacent to the spleen and left paracolic gutter of   uncertain etiology, increased since previous exam.    Bones and soft tissues: No suspicious osseous lesions. Degenerative   changes noted throughout the spine.    IMPRESSION:    Since the prior exam of September 10, 2019:    Interval development of moderate bilateral pleuraleffusions with   adjacent passive atelectasis    Patchy consolidations in the upper lobes which may be infectious or   represent an alveolar component of edema.    Remaining incidental findings as described above    < end of copied text >

## 2019-11-21 NOTE — PROGRESS NOTE ADULT - SUBJECTIVE AND OBJECTIVE BOX
[facility]   MD UYEN HERNANDEZ D.O, PA    TELEPHONE NUMBERS:  OFFICE: [phone]  DR. CASAREZ CELL: [phone]  GORDON ZHANG CELL: [phone]  DR. HERNANDEZ CELL:  [phone]    RENAL FOLLOW UP NOTE  --------------------------------------------------------------------------------  HPI: Pt seen and examined at bedside. Minimally responsive     PAST HISTORY  --------------------------------------------------------------------------------  No significant changes to PMH, PSH, FHx, SHx, unless otherwise noted    ALLERGIES & MEDICATIONS  --------------------------------------------------------------------------------  Allergies    No Known Allergies    Intolerances      Standing Inpatient Medications  ATENolol  Tablet 50 milliGRAM(s) Oral daily  dextrose 5%. 1000 milliLiter(s) IV Continuous <Continuous>  dextrose 50% Injectable 12.5 Gram(s) IV Push once  dextrose 50% Injectable 25 Gram(s) IV Push once  dextrose 50% Injectable 25 Gram(s) IV Push once  enoxaparin Injectable 40 milliGRAM(s) SubCutaneous daily  escitalopram 5 milliGRAM(s) Oral daily  furosemide    Tablet 20 milliGRAM(s) Oral daily  insulin glargine Injectable (LANTUS) 10 Unit(s) SubCutaneous at bedtime  insulin lispro (HumaLOG) corrective regimen sliding scale   SubCutaneous three times a day before meals  insulin lispro (HumaLOG) corrective regimen sliding scale   SubCutaneous at bedtime  insulin lispro Injectable (HumaLOG) 4 Unit(s) SubCutaneous three times a day before meals  levETIRAcetam 500 milliGRAM(s) Oral two times a day  levothyroxine 88 MICROGram(s) Oral daily  pantoprazole    Tablet 40 milliGRAM(s) Oral before breakfast  potassium phosphate / sodium phosphate powder 1 Packet(s) Oral once  simvastatin 20 milliGRAM(s) Oral at bedtime    PRN Inpatient Medications  acetaminophen   Tablet .. 650 milliGRAM(s) Oral every 6 hours PRN  dextrose 40% Gel 15 Gram(s) Oral once PRN  glucagon  Injectable 1 milliGRAM(s) IntraMuscular once PRN      REVIEW OF SYSTEMS  --------------------------------------------------------------------------------  General: not eating   CVS: no chest pain  MSK: no edema     VITALS/PHYSICAL EXAM  --------------------------------------------------------------------------------  T(C): 36.2 (11-21-19 @ 14:19), Max: 36.8 (11-21-19 @ 02:00)  HR: 77 (11-21-19 @ 14:19) (75 - 90)  BP: 115/76 (11-21-19 @ 14:19) (115/76 - 165/95)  RR: 18 (11-21-19 @ 14:19) (17 - 18)  SpO2: 98% (11-21-19 @ 14:19) (96% - 100%)  Wt(kg): --        11-20-19 @ 07:01  -  11-21-19 @ 07:00  --------------------------------------------------------  IN: 760 mL / OUT: 0 mL / NET: 760 mL    Physical Exam:  	Gen: NAD  	HEENT: MMM  	Pulm: CTA B/L  	CV: S1S2  	Abd: Soft, +BS  	Ext: No LE edema B/L                      Neuro: Awake   	Skin: Warm and Dry     LABS/STUDIES  --------------------------------------------------------------------------------              9.3    18.66 >-----------<  413      [11-21-19 @ 05:45]              28.7     136  |  98  |  17  ----------------------------<  188      [11-21-19 @ 05:45]  3.7   |  26  |  1.03        Ca     8.6     [11-21-19 @ 05:45]      Mg     1.8     [11-21-19 @ 05:45]      Phos  2.1     [11-21-19 @ 05:45]    Uric acid 2.6      [11-21-19 @ 05:45]        [11-21-19 @ 05:45]    Creatinine Trend:  SCr 1.03 [11-21 @ 05:45]  SCr 1.06 [11-20 @ 06:45]  SCr 0.95 [11-19 @ 05:20]  SCr 0.78 [11-18 @ 08:22]  SCr 0.75 [11-18 @ 05:00]    Urinalysis - [11-14-19 @ 13:10]      Color YELLOW / Appearance Lt TURBID / SG 1.021 / pH 6.5      Gluc 500 / Ketone NEGATIVE  / Bili NEGATIVE / Urobili NORMAL       Blood SMALL / Protein 200 / Leuk Est SMALL / Nitrite NEGATIVE      RBC 3-5 / WBC >50 / Hyaline 3+ / Gran  / Sq Epi MODERATE / Non Sq Epi  / Bacteria NEGATIVE    Urine Sodium 97      [11-17-19 @ 15:00]  Urine Osmolality 384      [11-17-19 @ 15:00]    Iron 38, TIBC 234, %sat --      [09-12-19 @ 06:08]  Ferritin 214.6      [09-12-19 @ 06:08]  PTH 20.00 (Ca --)      [09-03-19 @ 05:45]   --  PTH 25.46 (Ca --)      [09-01-19 @ 06:00]   --  Vitamin D (25OH) 34.9      [09-03-19 @ 05:45]  HbA1c 7.0      [09-13-19 @ 06:50]  TSH 12.33      [11-14-19 @ 14:10]  Lipid: chol 121, TG 96, HDL 33, LDL 74      [08-31-19 @ 07:00]

## 2019-11-21 NOTE — EEG REPORT - NS EEG TEXT BOX
[facility]   COMPREHENSIVE EPILEPSY CENTER   REPORT OF CONTINUOUS VIDEO EEG     [facility]: [address], [place] [address]  [address]: [id number] [address]  [facility]: [address] [place] [address]    Patient Name: CARLOS ENRIQUE TATE  Age and : 79y ([birth year])  MRN #: [id number]  Location: [address]  Referring Physician: Catina Trinh    Start Time/Date: 08:00 on 19  End Time/Date: 08:00 on 19    _____________________________________________________________  STUDY INFORMATION    EEG Recording Technique:  The patient underwent continuous Video-EEG monitoring, using Telemetry System hardware on the XLTek Digital System. EEG and video data were stored on a computer hard drive with important events saved in digital archive files. The material was reviewed by a physician (electroencephalographer / epileptologist) on a daily basis. Adama and seizure detection algorithms were utilized and reviewed. An EEG Technician attended to the patient, and was available throughout daytime work hours.  The epilepsy center neurologist was available in person or on call 24-hours per day.    EEG Placement and Labeling of Electrodes:  The EEG was performed utilizing 20 channel referential EEG connections (coronal over temporal over parasagittal montage) using all standard 10-20 electrode placements with EKG, with additional electrodes placed in the inferior temporal region using the modified 10-10 montage electrode placements for elective admissions, or if deemed necessary. Recording was at a sampling rate of 256 samples per second per channel. Time synchronized digital video recording was done simultaneously with EEG recording. A low light infrared camera was used for low light recording.     _____________________________________________________________  HISTORY    Patient is a 79y old  Male who presents with a chief complaint of AMS (2019 06:49)      PERTINENT MEDICATION:  levETIRAcetam 500 milliGRAM(s) Oral two times a day    _____________________________________________________________  STUDY INTERPRETATION    Findings: The background was continuous, spontaneously variable and reactive. During wakefulness, the posterior dominant rhythm consisted of fragments of 7 Hz activity, with amplitude to 30 uV, that attenuated to eye opening..    Background Slowing:  Diffuse theta and polymorphic delta slowing.    Focal Slowing:   None were present.    Sleep Background:  Drowsiness was characterized by fragmentation, attenuation, and slowing of the background activity.    Stage II sleep transients were not recorded.    Other Non-epileptiform Findings:  None were present.    Interictal Epileptiform Activity:  During wakefulness, frequent sharp transients and sharp wave discharges in the right posterior quadrant (shifting max P4/O2/P8) region.     Events:  Clinical events: None recorded.  Seizures: None recorded.    Activation Procedures:   Hyperventilation was not performed.    Photic stimulation was performed and did not elicit any abnormality.     Artifacts:   Intermittent myogenic and movement artifacts were noted.    ECG:  The heart rate on single channel ECG was predominantly between 80-90 BPM.    _____________________________________________________________  EEG SUMMARY/CLASSIFICATION    Abnormal EEG in an altered patient.  - During wakefulness, frequent sharp transients and sharp wave discharges in the right posterior quadrant (shifting max P4/O2/P8) region.   - Mild to moderate generalized slowing.    _____________________________________________________________  EEG IMPRESSION/CLINICAL CORRELATE    Abnormal EEG study.  1. Cortical hyperexcitability in the right posterior quadrant.   2. Mild to moderate nonspecific diffuse or multifocal cerebral dysfunction.   3. No seizure seen.    _____________________________________________________________    Skyla Shields MD  Attending Physician, [facility]

## 2019-11-22 LAB
ALBUMIN SERPL ELPH-MCNC: 2.3 G/DL — LOW (ref 3.3–5)
ALP SERPL-CCNC: 116 U/L — SIGNIFICANT CHANGE UP (ref 40–120)
ALT FLD-CCNC: 15 U/L — SIGNIFICANT CHANGE UP (ref 4–41)
ANION GAP SERPL CALC-SCNC: 12 MMO/L — SIGNIFICANT CHANGE UP (ref 7–14)
ANISOCYTOSIS BLD QL: SLIGHT — SIGNIFICANT CHANGE UP
AST SERPL-CCNC: 39 U/L — SIGNIFICANT CHANGE UP (ref 4–40)
BACTERIA BLD CULT: SIGNIFICANT CHANGE UP
BACTERIA BLD CULT: SIGNIFICANT CHANGE UP
BASOPHILS # BLD AUTO: 0.07 K/UL — SIGNIFICANT CHANGE UP (ref 0–0.2)
BASOPHILS NFR BLD AUTO: 0.5 % — SIGNIFICANT CHANGE UP (ref 0–2)
BASOPHILS NFR SPEC: 1 % — SIGNIFICANT CHANGE UP (ref 0–2)
BILIRUB SERPL-MCNC: 0.3 MG/DL — SIGNIFICANT CHANGE UP (ref 0.2–1.2)
BUN SERPL-MCNC: 23 MG/DL — SIGNIFICANT CHANGE UP (ref 7–23)
CALCIUM SERPL-MCNC: 8.9 MG/DL — SIGNIFICANT CHANGE UP (ref 8.4–10.5)
CHLORIDE SERPL-SCNC: 100 MMOL/L — SIGNIFICANT CHANGE UP (ref 98–107)
CO2 SERPL-SCNC: 27 MMOL/L — SIGNIFICANT CHANGE UP (ref 22–31)
CREAT SERPL-MCNC: 1.16 MG/DL — SIGNIFICANT CHANGE UP (ref 0.5–1.3)
EOSINOPHIL # BLD AUTO: 0.19 K/UL — SIGNIFICANT CHANGE UP (ref 0–0.5)
EOSINOPHIL NFR BLD AUTO: 1.2 % — SIGNIFICANT CHANGE UP (ref 0–6)
EOSINOPHIL NFR FLD: 1 % — SIGNIFICANT CHANGE UP (ref 0–6)
GAMMA INTERFERON BACKGROUND BLD IA-ACNC: 0.06 IU/ML — SIGNIFICANT CHANGE UP
GLUCOSE SERPL-MCNC: 231 MG/DL — HIGH (ref 70–99)
HCT VFR BLD CALC: 32.9 % — LOW (ref 39–50)
HGB BLD-MCNC: 10.4 G/DL — LOW (ref 13–17)
HIV 1+2 AB+HIV1 P24 AG SERPL QL IA: SIGNIFICANT CHANGE UP
IMM GRANULOCYTES NFR BLD AUTO: 1.9 % — HIGH (ref 0–1.5)
LYMPHOCYTES # BLD AUTO: 1.21 K/UL — SIGNIFICANT CHANGE UP (ref 1–3.3)
LYMPHOCYTES # BLD AUTO: 7.9 % — LOW (ref 13–44)
LYMPHOCYTES NFR SPEC AUTO: 5 % — LOW (ref 13–44)
M TB IFN-G BLD-IMP: SIGNIFICANT CHANGE UP
M TB IFN-G CD4+ BCKGRND COR BLD-ACNC: 0.01 IU/ML — SIGNIFICANT CHANGE UP
M TB IFN-G CD4+CD8+ BCKGRND COR BLD-ACNC: 0 IU/ML — SIGNIFICANT CHANGE UP
MACROCYTES BLD QL: SLIGHT — SIGNIFICANT CHANGE UP
MAGNESIUM SERPL-MCNC: 1.8 MG/DL — SIGNIFICANT CHANGE UP (ref 1.6–2.6)
MCHC RBC-ENTMCNC: 28.1 PG — SIGNIFICANT CHANGE UP (ref 27–34)
MCHC RBC-ENTMCNC: 31.6 % — LOW (ref 32–36)
MCV RBC AUTO: 88.9 FL — SIGNIFICANT CHANGE UP (ref 80–100)
METAMYELOCYTES # FLD: 1 % — SIGNIFICANT CHANGE UP (ref 0–1)
MONOCYTES # BLD AUTO: 1.62 K/UL — HIGH (ref 0–0.9)
MONOCYTES NFR BLD AUTO: 10.6 % — SIGNIFICANT CHANGE UP (ref 2–14)
MONOCYTES NFR BLD: 8 % — SIGNIFICANT CHANGE UP (ref 2–9)
NEUTROPHIL AB SER-ACNC: 78 % — HIGH (ref 43–77)
NEUTROPHILS # BLD AUTO: 11.86 K/UL — HIGH (ref 1.8–7.4)
NEUTROPHILS NFR BLD AUTO: 77.9 % — HIGH (ref 43–77)
NEUTS BAND # BLD: 1 % — SIGNIFICANT CHANGE UP (ref 0–6)
NRBC # FLD: 0 K/UL — SIGNIFICANT CHANGE UP (ref 0–0)
PHOSPHATE SERPL-MCNC: 2.9 MG/DL — SIGNIFICANT CHANGE UP (ref 2.5–4.5)
PLATELET # BLD AUTO: 395 K/UL — SIGNIFICANT CHANGE UP (ref 150–400)
PMV BLD: 11 FL — SIGNIFICANT CHANGE UP (ref 7–13)
POLYCHROMASIA BLD QL SMEAR: SLIGHT — SIGNIFICANT CHANGE UP
POTASSIUM SERPL-MCNC: 3.7 MMOL/L — SIGNIFICANT CHANGE UP (ref 3.5–5.3)
POTASSIUM SERPL-SCNC: 3.7 MMOL/L — SIGNIFICANT CHANGE UP (ref 3.5–5.3)
PROT SERPL-MCNC: 6.4 G/DL — SIGNIFICANT CHANGE UP (ref 6–8.3)
QUANT TB PLUS MITOGEN MINUS NIL: 0.47 IU/ML — SIGNIFICANT CHANGE UP
RBC # BLD: 3.7 M/UL — LOW (ref 4.2–5.8)
RBC # FLD: 18.8 % — HIGH (ref 10.3–14.5)
REVIEW TO FOLLOW: YES — SIGNIFICANT CHANGE UP
SCHISTOCYTES BLD QL AUTO: SLIGHT — SIGNIFICANT CHANGE UP
SODIUM SERPL-SCNC: 139 MMOL/L — SIGNIFICANT CHANGE UP (ref 135–145)
STRONGYLOIDES AB SER-ACNC: NEGATIVE — SIGNIFICANT CHANGE UP
TARGETS BLD QL SMEAR: SLIGHT — SIGNIFICANT CHANGE UP
VARIANT LYMPHS # FLD: 5 % — SIGNIFICANT CHANGE UP
WBC # BLD: 15.24 K/UL — HIGH (ref 3.8–10.5)
WBC # FLD AUTO: 15.24 K/UL — HIGH (ref 3.8–10.5)

## 2019-11-22 PROCEDURE — 99233 SBSQ HOSP IP/OBS HIGH 50: CPT | Mod: GC

## 2019-11-22 PROCEDURE — 99232 SBSQ HOSP IP/OBS MODERATE 35: CPT

## 2019-11-22 RX ORDER — INSULIN LISPRO 100/ML
2 VIAL (ML) SUBCUTANEOUS
Refills: 0 | Status: DISCONTINUED | OUTPATIENT
Start: 2019-11-22 | End: 2019-11-24

## 2019-11-22 RX ADMIN — SIMVASTATIN 20 MILLIGRAM(S): 20 TABLET, FILM COATED ORAL at 22:03

## 2019-11-22 RX ADMIN — Medication 88 MICROGRAM(S): at 06:02

## 2019-11-22 RX ADMIN — PANTOPRAZOLE SODIUM 40 MILLIGRAM(S): 20 TABLET, DELAYED RELEASE ORAL at 06:02

## 2019-11-22 RX ADMIN — ESCITALOPRAM OXALATE 5 MILLIGRAM(S): 10 TABLET, FILM COATED ORAL at 13:00

## 2019-11-22 RX ADMIN — LEVETIRACETAM 500 MILLIGRAM(S): 250 TABLET, FILM COATED ORAL at 18:08

## 2019-11-22 RX ADMIN — Medication 2: at 09:07

## 2019-11-22 RX ADMIN — Medication 20 MILLIGRAM(S): at 06:02

## 2019-11-22 RX ADMIN — AMLODIPINE BESYLATE 10 MILLIGRAM(S): 2.5 TABLET ORAL at 06:02

## 2019-11-22 RX ADMIN — Medication 1: at 18:08

## 2019-11-22 RX ADMIN — ENOXAPARIN SODIUM 40 MILLIGRAM(S): 100 INJECTION SUBCUTANEOUS at 13:00

## 2019-11-22 RX ADMIN — ATENOLOL 50 MILLIGRAM(S): 25 TABLET ORAL at 06:01

## 2019-11-22 RX ADMIN — Medication 2: at 13:00

## 2019-11-22 RX ADMIN — Medication 2 UNIT(S): at 13:00

## 2019-11-22 RX ADMIN — LEVETIRACETAM 500 MILLIGRAM(S): 250 TABLET, FILM COATED ORAL at 06:01

## 2019-11-22 RX ADMIN — INSULIN GLARGINE 10 UNIT(S): 100 INJECTION, SOLUTION SUBCUTANEOUS at 22:23

## 2019-11-22 RX ADMIN — Medication 2 UNIT(S): at 18:08

## 2019-11-22 RX ADMIN — Medication 2 UNIT(S): at 09:07

## 2019-11-22 NOTE — PROGRESS NOTE ADULT - SUBJECTIVE AND OBJECTIVE BOX
Pavithra Alfonso PGY1  -[id number] | [facility] [id number]  =========================    Patient is a 79y old  Male who presents with a chief complaint of AMS (21 Nov 2019 18:38)      INTERVAL HPI/OVERNIGHT EVENTS:  Yesterday evening pt was hypoglycemic to 67 -> 70 - pt was given apple juice and had dinner, repeat FS: 167.         MEDICATIONS  (STANDING):  amLODIPine   Tablet 10 milliGRAM(s) Oral daily  ATENolol  Tablet 50 milliGRAM(s) Oral daily  dextrose 5%. 1000 milliLiter(s) (50 mL/Hr) IV Continuous <Continuous>  dextrose 50% Injectable 12.5 Gram(s) IV Push once  dextrose 50% Injectable 25 Gram(s) IV Push once  dextrose 50% Injectable 25 Gram(s) IV Push once  enoxaparin Injectable 40 milliGRAM(s) SubCutaneous daily  escitalopram 5 milliGRAM(s) Oral daily  furosemide    Tablet 20 milliGRAM(s) Oral daily  insulin glargine Injectable (LANTUS) 10 Unit(s) SubCutaneous at bedtime  insulin lispro (HumaLOG) corrective regimen sliding scale   SubCutaneous three times a day before meals  insulin lispro (HumaLOG) corrective regimen sliding scale   SubCutaneous at bedtime  insulin lispro Injectable (HumaLOG) 4 Unit(s) SubCutaneous three times a day before meals  levETIRAcetam 500 milliGRAM(s) Oral two times a day  levothyroxine 88 MICROGram(s) Oral daily  pantoprazole    Tablet 40 milliGRAM(s) Oral before breakfast  simvastatin 20 milliGRAM(s) Oral at bedtime    MEDICATIONS  (PRN):  acetaminophen   Tablet .. 650 milliGRAM(s) Oral every 6 hours PRN Severe Pain (7 - 10)  dextrose 40% Gel 15 Gram(s) Oral once PRN Blood Glucose LESS THAN 70 milliGRAM(s)/deciliter  glucagon  Injectable 1 milliGRAM(s) IntraMuscular once PRN Glucose LESS THAN 70 milligrams/deciliter      Allergies    No Known Allergies    Intolerances        Vital Signs Last 24 Hrs  T(C): 36.9 (22 Nov 2019 06:00), Max: 36.9 (22 Nov 2019 02:00)  T(F): 98.5 (22 Nov 2019 06:00), Max: 98.5 (22 Nov 2019 06:00)  HR: 91 (22 Nov 2019 06:00) (72 - 91)  BP: 144/90 (22 Nov 2019 06:00) (115/76 - 144/90)  BP(mean): --  RR: 17 (22 Nov 2019 06:00) (17 - 18)  SpO2: 96% (22 Nov 2019 06:00) (96% - 99%)    PHYSICAL EXAM:  GENERAL: NAD.  CHEST/LUNG: Clear to auscultation; No rales, rhonchi, or wheezing.  Respiratory effort does not appear labored.  HEART: Regular rate and rhythm; S1 and S2,  no murmurs, rubs, or gallops.  ABDOMEN: Soft, not tender to palpation.  No masses or HSM appreciated.  No distension.  Bowel sounds present.  EXTREMITIES:  No clubbing, cyanosis, or edema.  Moves all extremities with strength 5/5.  SKIN: No obvious rashes or lesions.  Turgor okay.  NEURO:  Alert and oriented x 3, no focal sensory or  motor deficit    LABS:                        9.3    18.66 )-----------( 413      ( 21 Nov 2019 05:45 )             28.7     11-21    136  |  98  |  17  ----------------------------<  188<H>  3.7   |  26  |  1.03    Ca    8.6      21 Nov 2019 05:45  Phos  2.1     11-21  Mg     1.8     11-21          CAPILLARY BLOOD GLUCOSE      POCT Blood Glucose.: 167 mg/dL (21 Nov 2019 21:35)  POCT Blood Glucose.: 70 mg/dL (21 Nov 2019 17:23)  POCT Blood Glucose.: 67 mg/dL (21 Nov 2019 17:22)  POCT Blood Glucose.: 163 mg/dL (21 Nov 2019 12:26)  POCT Blood Glucose.: 165 mg/dL (21 Nov 2019 08:38)      Culture - Blood (collected 17 Nov 2019 07:38)  Source: BLOOD PERIPHERAL  Preliminary Report (21 Nov 2019 07:39):    NO ORGANISMS ISOLATED    NO ORGANISMS ISOLATED AT 96 HOURS    Culture - Blood (collected 17 Nov 2019 07:38)  Source: BLOOD VENOUS  Preliminary Report (21 Nov 2019 07:39):    NO ORGANISMS ISOLATED    NO ORGANISMS ISOLATED AT 96 HOURS Pavithra Alfonso PGY1  -[id number] | [facility] [id number]  =========================    Patient is a 79y old  Male who presents with a chief complaint of AMS (21 Nov 2019 18:38)      INTERVAL HPI/OVERNIGHT EVENTS:  Yesterday evening pt was hypoglycemic to 67 -> 70 - pt was given apple juice and had dinner, repeat FS: 167. This morning pt's mental status is significantly improved. Pt said "good morning" and when asked where he is, he joked "With you." A&Ox2 ("hospital" and self). Denies CP, abdominal, SOB.       MEDICATIONS  (STANDING):  amLODIPine   Tablet 10 milliGRAM(s) Oral daily  ATENolol  Tablet 50 milliGRAM(s) Oral daily  dextrose 5%. 1000 milliLiter(s) (50 mL/Hr) IV Continuous <Continuous>  dextrose 50% Injectable 12.5 Gram(s) IV Push once  dextrose 50% Injectable 25 Gram(s) IV Push once  dextrose 50% Injectable 25 Gram(s) IV Push once  enoxaparin Injectable 40 milliGRAM(s) SubCutaneous daily  escitalopram 5 milliGRAM(s) Oral daily  furosemide    Tablet 20 milliGRAM(s) Oral daily  insulin glargine Injectable (LANTUS) 10 Unit(s) SubCutaneous at bedtime  insulin lispro (HumaLOG) corrective regimen sliding scale   SubCutaneous three times a day before meals  insulin lispro (HumaLOG) corrective regimen sliding scale   SubCutaneous at bedtime  insulin lispro Injectable (HumaLOG) 4 Unit(s) SubCutaneous three times a day before meals  levETIRAcetam 500 milliGRAM(s) Oral two times a day  levothyroxine 88 MICROGram(s) Oral daily  pantoprazole    Tablet 40 milliGRAM(s) Oral before breakfast  simvastatin 20 milliGRAM(s) Oral at bedtime    MEDICATIONS  (PRN):  acetaminophen   Tablet .. 650 milliGRAM(s) Oral every 6 hours PRN Severe Pain (7 - 10)  dextrose 40% Gel 15 Gram(s) Oral once PRN Blood Glucose LESS THAN 70 milliGRAM(s)/deciliter  glucagon  Injectable 1 milliGRAM(s) IntraMuscular once PRN Glucose LESS THAN 70 milligrams/deciliter      Allergies    No Known Allergies    Intolerances        Vital Signs Last 24 Hrs  T(C): 36.9 (22 Nov 2019 06:00), Max: 36.9 (22 Nov 2019 02:00)  T(F): 98.5 (22 Nov 2019 06:00), Max: 98.5 (22 Nov 2019 06:00)  HR: 91 (22 Nov 2019 06:00) (72 - 91)  BP: 144/90 (22 Nov 2019 06:00) (115/76 - 144/90)  RR: 17 (22 Nov 2019 06:00) (17 - 18)  SpO2: 96% (22 Nov 2019 06:00) (96% - 99%)    PHYSICAL EXAM:  GENERAL: NAD. Significantly more alert and awake.   HEENT: EOMI, ataumatic, NC.   CHEST/LUNG: Clear to auscultation anteriorly. off NC.   HEART: Regular rate and rhythm; S1 and S2,  no murmurs, rubs, or gallops.  ABDOMEN: Soft, not tender to palpation.  No masses or HSM appreciated.  No distension.  Bowel sounds present.  EXTREMITIES:  No clubbing, cyanosis, or edema.  Moves all extremities.  SKIN: No obvious rashes or lesions.  Turgor okay.  NEURO:  Alert and oriented x 2, no focal sensory or  motor deficit    LABS:               CAPILLARY BLOOD GLUCOSE      POCT Blood Glucose.: 167 mg/dL (21 Nov 2019 21:35)  POCT Blood Glucose.: 70 mg/dL (21 Nov 2019 17:23)  POCT Blood Glucose.: 67 mg/dL (21 Nov 2019 17:22)  POCT Blood Glucose.: 163 mg/dL (21 Nov 2019 12:26)  POCT Blood Glucose.: 165 mg/dL (21 Nov 2019 08:38)      Culture - Blood (collected 17 Nov 2019 07:38)  Source: BLOOD PERIPHERAL  Preliminary Report (21 Nov 2019 07:39):    NO ORGANISMS ISOLATED    NO ORGANISMS ISOLATED AT 96 HOURS    Culture - Blood (collected 17 Nov 2019 07:38)  Source: BLOOD VENOUS  Preliminary Report (21 Nov 2019 07:39):    NO ORGANISMS ISOLATED    NO ORGANISMS ISOLATED AT 96 HOURS Pavithra Alfonso PGY1  -[phone] | [facility] [id number]  =========================    Patient is a 79y old  Male who presents with a chief complaint of AMS (21 Nov 2019 18:38)      INTERVAL HPI/OVERNIGHT EVENTS:  Yesterday evening pt was hypoglycemic to 67 -> 70 - pt was given apple juice and had dinner, repeat FS: 167. This morning pt's mental status is significantly improved. Pt said "good morning" and when asked where he is, he joked "With you." A&Ox2 ("hospital" and self). Denies CP, abdominal, SOB.       MEDICATIONS  (STANDING):  amLODIPine   Tablet 10 milliGRAM(s) Oral daily  ATENolol  Tablet 50 milliGRAM(s) Oral daily  dextrose 5%. 1000 milliLiter(s) (50 mL/Hr) IV Continuous <Continuous>  dextrose 50% Injectable 12.5 Gram(s) IV Push once  dextrose 50% Injectable 25 Gram(s) IV Push once  dextrose 50% Injectable 25 Gram(s) IV Push once  enoxaparin Injectable 40 milliGRAM(s) SubCutaneous daily  escitalopram 5 milliGRAM(s) Oral daily  furosemide    Tablet 20 milliGRAM(s) Oral daily  insulin glargine Injectable (LANTUS) 10 Unit(s) SubCutaneous at bedtime  insulin lispro (HumaLOG) corrective regimen sliding scale   SubCutaneous three times a day before meals  insulin lispro (HumaLOG) corrective regimen sliding scale   SubCutaneous at bedtime  insulin lispro Injectable (HumaLOG) 4 Unit(s) SubCutaneous three times a day before meals  levETIRAcetam 500 milliGRAM(s) Oral two times a day  levothyroxine 88 MICROGram(s) Oral daily  pantoprazole    Tablet 40 milliGRAM(s) Oral before breakfast  simvastatin 20 milliGRAM(s) Oral at bedtime    MEDICATIONS  (PRN):  acetaminophen   Tablet .. 650 milliGRAM(s) Oral every 6 hours PRN Severe Pain (7 - 10)  dextrose 40% Gel 15 Gram(s) Oral once PRN Blood Glucose LESS THAN 70 milliGRAM(s)/deciliter  glucagon  Injectable 1 milliGRAM(s) IntraMuscular once PRN Glucose LESS THAN 70 milligrams/deciliter      Allergies    No Known Allergies    Intolerances        Vital Signs Last 24 Hrs  T(C): 36.9 (22 Nov 2019 06:00), Max: 36.9 (22 Nov 2019 02:00)  T(F): 98.5 (22 Nov 2019 06:00), Max: 98.5 (22 Nov 2019 06:00)  HR: 91 (22 Nov 2019 06:00) (72 - 91)  BP: 144/90 (22 Nov 2019 06:00) (115/76 - 144/90)  RR: 17 (22 Nov 2019 06:00) (17 - 18)  SpO2: 96% (22 Nov 2019 06:00) (96% - 99%)    PHYSICAL EXAM:  GENERAL: NAD. Significantly more alert and awake.   HEENT: EOMI, ataumatic, NC.   CHEST/LUNG: Clear to auscultation anteriorly. off NC.   HEART: Regular rate and rhythm; S1 and S2,  no murmurs, rubs, or gallops.  ABDOMEN: Soft, not tender to palpation.  No masses or HSM appreciated.  No distension.  Bowel sounds present.  EXTREMITIES:  No clubbing, cyanosis, or edema.  Moves all extremities.  SKIN: No obvious rashes or lesions.  Turgor okay.  NEURO:  Alert and oriented x 2, no focal sensory or  motor deficit    LABS:                                 10.4   15.24 )-----------( 395      ( 22 Nov 2019 10:18 )             32.9     11-22    139  |  100  |  23  ----------------------------<  231<H>  3.7   |  27  |  1.16    Ca    8.9      22 Nov 2019 10:18  Phos  2.9     11-22  Mg     1.8     11-22    TPro  6.4  /  Alb  2.3<L>  /  TBili  0.3  /  DBili  x   /  AST  39  /  ALT  15  /  AlkPhos  116  11-22      CAPILLARY BLOOD GLUCOSE      POCT Blood Glucose.: 167 mg/dL (21 Nov 2019 21:35)  POCT Blood Glucose.: 70 mg/dL (21 Nov 2019 17:23)  POCT Blood Glucose.: 67 mg/dL (21 Nov 2019 17:22)  POCT Blood Glucose.: 163 mg/dL (21 Nov 2019 12:26)  POCT Blood Glucose.: 165 mg/dL (21 Nov 2019 08:38)      Culture - Blood (collected 17 Nov 2019 07:38)  Source: BLOOD PERIPHERAL  Preliminary Report (21 Nov 2019 07:39):    NO ORGANISMS ISOLATED    NO ORGANISMS ISOLATED AT 96 HOURS    Culture - Blood (collected 17 Nov 2019 07:38)  Source: BLOOD VENOUS  Preliminary Report (21 Nov 2019 07:39):    NO ORGANISMS ISOLATED    NO ORGANISMS ISOLATED AT 96 HOURS Pavithra Alfonso PGY1  -[phone] | [facility] [id number]  =========================    Patient is a 79y old  Male who presents with a chief complaint of AMS (21 Nov 2019 18:38)      INTERVAL HPI/OVERNIGHT EVENTS:  Yesterday evening pt was hypoglycemic to 67 -> 70 - pt was given apple juice and had dinner, repeat FS: 167. This morning pt's mental status is significantly improved. Pt said "good morning" and when asked where he is, he joked "With you." A&Ox2 ("hospital" and self). Denies CP, abdominal, SOB.       MEDICATIONS  (STANDING):  amLODIPine   Tablet 10 milliGRAM(s) Oral daily  ATENolol  Tablet 50 milliGRAM(s) Oral daily  dextrose 5%. 1000 milliLiter(s) (50 mL/Hr) IV Continuous <Continuous>  dextrose 50% Injectable 12.5 Gram(s) IV Push once  dextrose 50% Injectable 25 Gram(s) IV Push once  dextrose 50% Injectable 25 Gram(s) IV Push once  enoxaparin Injectable 40 milliGRAM(s) SubCutaneous daily  escitalopram 5 milliGRAM(s) Oral daily  furosemide    Tablet 20 milliGRAM(s) Oral daily  insulin glargine Injectable (LANTUS) 10 Unit(s) SubCutaneous at bedtime  insulin lispro (HumaLOG) corrective regimen sliding scale   SubCutaneous three times a day before meals  insulin lispro (HumaLOG) corrective regimen sliding scale   SubCutaneous at bedtime  insulin lispro Injectable (HumaLOG) 4 Unit(s) SubCutaneous three times a day before meals  levETIRAcetam 500 milliGRAM(s) Oral two times a day  levothyroxine 88 MICROGram(s) Oral daily  pantoprazole    Tablet 40 milliGRAM(s) Oral before breakfast  simvastatin 20 milliGRAM(s) Oral at bedtime    MEDICATIONS  (PRN):  acetaminophen   Tablet .. 650 milliGRAM(s) Oral every 6 hours PRN Severe Pain (7 - 10)  dextrose 40% Gel 15 Gram(s) Oral once PRN Blood Glucose LESS THAN 70 milliGRAM(s)/deciliter  glucagon  Injectable 1 milliGRAM(s) IntraMuscular once PRN Glucose LESS THAN 70 milligrams/deciliter      Allergies    No Known Allergies    Intolerances        Vital Signs Last 24 Hrs  T(C): 36.9 (22 Nov 2019 06:00), Max: 36.9 (22 Nov 2019 02:00)  T(F): 98.5 (22 Nov 2019 06:00), Max: 98.5 (22 Nov 2019 06:00)  HR: 91 (22 Nov 2019 06:00) (72 - 91)  BP: 144/90 (22 Nov 2019 06:00) (115/76 - 144/90)  RR: 17 (22 Nov 2019 06:00) (17 - 18)  SpO2: 96% (22 Nov 2019 06:00) (96% - 99%)    PHYSICAL EXAM:  GENERAL: NAD. Significantly more alert and awake.   HEENT: EOMI, ataumatic, NC.   CHEST/LUNG: Clear to auscultation anteriorly. off NC.   HEART: Regular rate and rhythm; S1 and S2,  no murmurs, rubs, or gallops.  ABDOMEN: Soft, not tender to palpation.  No masses or HSM appreciated.  No distension.  Bowel sounds present.  EXTREMITIES:  No clubbing, cyanosis, or edema.  Moves all extremities.   SKIN: No obvious rashes or lesions.  Turgor okay.  NEURO:  Alert and oriented x 1-2    LABS:                                 10.4   15.24 )-----------( 395      ( 22 Nov 2019 10:18 )             32.9     11-22    139  |  100  |  23  ----------------------------<  231<H>  3.7   |  27  |  1.16    Ca    8.9      22 Nov 2019 10:18  Phos  2.9     11-22  Mg     1.8     11-22    TPro  6.4  /  Alb  2.3<L>  /  TBili  0.3  /  DBili  x   /  AST  39  /  ALT  15  /  AlkPhos  116  11-22      CAPILLARY BLOOD GLUCOSE      POCT Blood Glucose.: 167 mg/dL (21 Nov 2019 21:35)  POCT Blood Glucose.: 70 mg/dL (21 Nov 2019 17:23)  POCT Blood Glucose.: 67 mg/dL (21 Nov 2019 17:22)  POCT Blood Glucose.: 163 mg/dL (21 Nov 2019 12:26)  POCT Blood Glucose.: 165 mg/dL (21 Nov 2019 08:38)      Culture - Blood (collected 17 Nov 2019 07:38)  Source: BLOOD PERIPHERAL  Preliminary Report (21 Nov 2019 07:39):    NO ORGANISMS ISOLATED    NO ORGANISMS ISOLATED AT 96 HOURS    Culture - Blood (collected 17 Nov 2019 07:38)  Source: BLOOD VENOUS  Preliminary Report (21 Nov 2019 07:39):    NO ORGANISMS ISOLATED    NO ORGANISMS ISOLATED AT 96 HOURS

## 2019-11-22 NOTE — PHYSICAL THERAPY INITIAL EVALUATION ADULT - PLANNED THERAPY INTERVENTIONS, PT EVAL
gait training/neuromuscular re-education/transfer training/postural re-education/balance training/bed mobility training/motor coordination training/strengthening

## 2019-11-22 NOTE — PHYSICAL THERAPY INITIAL EVALUATION ADULT - ACTIVE RANGE OF MOTION EXAMINATION, REHAB EVAL
Left shoulder flexion ~40 degrees, left elbow/hand/wrist WFL; left knee flexion ~40 degrees/Right UE Active ROM was WFL (within functional limits)/Right LE Active ROM was WFL (within functional limits)

## 2019-11-22 NOTE — PROGRESS NOTE ADULT - SUBJECTIVE AND OBJECTIVE BOX
Follow Up:  PNA post seizure, lymphoma on chemo, SDH.    Inverval History/ROS:  a bit more communicative today.  shakes head "no'"   afebrile.    Allergies  No Known Allergies        ANTIMICROBIALS: vanco zosyn 11/14- 11/19      OTHER MEDS:  acetaminophen   Tablet .. 650 milliGRAM(s) Oral every 6 hours PRN  dextrose 40% Gel 15 Gram(s) Oral once PRN  dextrose 5%. 1000 milliLiter(s) IV Continuous <Continuous>  dextrose 50% Injectable 12.5 Gram(s) IV Push once  dextrose 50% Injectable 25 Gram(s) IV Push once  dextrose 50% Injectable 25 Gram(s) IV Push once  enoxaparin Injectable 40 milliGRAM(s) SubCutaneous daily  escitalopram 5 milliGRAM(s) Oral daily  furosemide    Tablet 20 milliGRAM(s) Oral daily  glucagon  Injectable 1 milliGRAM(s) IntraMuscular once PRN  insulin lispro (HumaLOG) corrective regimen sliding scale   SubCutaneous three times a day before meals  insulin lispro (HumaLOG) corrective regimen sliding scale   SubCutaneous at bedtime  levETIRAcetam  IVPB 500 milliGRAM(s) IV Intermittent every 12 hours  levothyroxine 88 MICROGram(s) Oral daily  pantoprazole    Tablet 40 milliGRAM(s) Oral before breakfast  simvastatin 20 milliGRAM(s) Oral at bedtime  sodium chloride 1 Gram(s) Oral three times a day  valproate sodium IVPB 250 milliGRAM(s) IV Intermittent two times a day      Vital Signs Last 24 Hrs  T(F): 98.3 (11-22-19 @ 18:00), Max: 98.6 (11-22-19 @ 09:06)  HR: 99 (11-22-19 @ 18:00)  BP: 130/69 (11-22-19 @ 18:00)  RR: 17 (11-22-19 @ 18:00)  SpO2: 96% (11-22-19 @ 18:00) (95% - 99%)    PHYSICAL EXAM:  General: weak appearing  HEAD/EYES: opens eyes to name  ENT:  dry mucus membranes   Cardiovascular: s1s2  Respiratory: poor effort a/e bilat  GI:   soft, non-tender, normal bowel sounds  :  warren   Musculoskeletal:  no synovitis  Neurologic: moves all extremities  Skin:   no rash  Psychiatric: unable to assess  Lines:  [ ] no phlebitis [ ] central line                          10.4   15.24 )-----------( 395      ( 22 Nov 2019 10:18 )             32.9 11-22    139  |  100  |  23  ----------------------------<  231  3.7   |  27  |  1.16  Ca    8.9      22 Nov 2019 10:18Phos  2.9     11-22Mg     1.8     11-22  TPro  6.4  /  Alb  2.3  /  TBili  0.3  /  DBili  x   /  AST  39  /  ALT  15  /  AlkPhos  116  11-22            MICROBIOLOGY:  Vancomycin Level, Trough: 15.2 ug/mL (11-18-19 @ 05:00)  v  BLOOD VENOUS  11-17-19 --  --  --      Peripheral Site 1  11-14-19 --  --  --      Peripheral Site 2  11-14-19 --  --  BLOOD CULTURE PCR  Staphylococcus sp.,coag neg      URINE MIDSTREAM  11-14-19 --  --  --      .Blood Blood-Peripheral  11-04-19   No growth at 5 days.  --  --        RADIOLOGY:    < from: MR Head w/ IV Cont (11.18.19 @ 16:38) >    EXAM:  MR BRAIN IC        PROCEDURE DATE:  Nov 18 2019         INTERPRETATION:  HISTORY: Lymphoma. Rule out CNS involvement. Subdural   hematoma/intracranial hemorrhage follow-up..    Description: MRI of the brain with and without gadolinium contrast was   performed.    COMPARISON: Noncontrast brain MRI 11/15/2019, head CT 11/15/2019..    Axial T1 and axial T2 FLAIR series were obtained before contrast. After   intravenous gadolinium contrast administration, sagittal, coronal, and   axial T1 postcontrast series were obtained.    7 cc intravenous Gadovist gadolinium contrast was administered, 0.5 cc   contrast was discarded.    The study is limited by motion.    The subdural hemorrhages and mild right to left midline shift are stable.   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 The ventricles are stable in size.   Chronic white matter changes and generalized cerebral volume loss are   again noted.    On the postcontrast images, there is no gross evidence for intracranial   enhancing mass.    IMPRESSION:    No gross evidence for intracranial enhancing mass.    Stable subdural hemorrhages.      < end of copied text >  < from: CT Chest No Cont (11.15.19 @ 02:51) >  Comparison:September 10, 2019    Tubes/Lines: None.    Mediastinum/Vessels/Heart: Aorta and pulmonary arteries are normal in   size. There is no pericardial effusion. Unchanged subcarinal   lymphadenopathy measuring up to 2.7 x 2.0 cm. Coronary artery   calcifications noted. Thyroid gland is unremarkable    Lungs/Pleura/Airways: New moderate bilateral pleural effusions with   adjacent passive atelectasis. Patchy consolidations noted in the upper   lobes.    Visualized abdomen: Multiple splenic masses again noted, without change   from previous exam. Linear hyperdensity within the stomach may represent   a foreign body or capsule. Post cholecystectomy.    Small amount of edema adjacent to the spleen and left paracolic gutter of   uncertain etiology, increased since previous exam.    Bones and soft tissues: No suspicious osseous lesions. Degenerative   changes noted throughout the spine.    IMPRESSION:    Since the prior exam of September 10, 2019:    Interval development of moderate bilateral pleuraleffusions with   adjacent passive atelectasis    Patchy consolidations in the upper lobes which may be infectious or   represent an alveolar component of edema.    Remaining incidental findings as described above    < end of copied text >

## 2019-11-22 NOTE — PHYSICAL THERAPY INITIAL EVALUATION ADULT - ADDITIONAL COMMENTS
Pt is a poor historian; friend was unable to provide accurate information as well; information regarding pt's prior level of function needs to be verified by a family member. Pt reports that he lives in a private house with his family with no steps to negotiate. Prior to hospital admission pt reports that he was completely independent and used no assistive device with ambulation.    Pt left comfortable in bed, NAD, all lines intact, all precautions maintained, with call bell in reach, bed alarm on, friend @bedside, and RN aware of PT evaluation.

## 2019-11-22 NOTE — PROGRESS NOTE ADULT - ASSESSMENT
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 prolonged FRANCES state (? 2/2 prolonged hypercalcemic state) that has now resolved vs. loss of body mass  - Serum Cr is stable   - Avoid nephrotoxins agent  - renal diet  - monitor bmp    Hyponatremia  - ? etiology, presented with Na 122   - Serum sodium improved   - Continue lasix 20mg oral daily, Pt also on salt tablets 1g TID  - If continues to improve tomorrow can decrease salt tabs to daily  - s/p Tolvaptan x 1 on 11/18  - Continue free water restriction <1L/day  - Promote oral solute/food intake   - optimize dm control  - Monitor serum sodium.   - avoid hypotonic solutions    HTN  - has Hx HTN but was on midodrine for orthostatic hypotension in the recent past   - BP was elevated therefore restarted on home BP medications   - Currently BP controlled   - monitor bp    Hypocalcemia  - Has Hx hypercalcemia of malignancy  - low alb corrected alexander is optimal  - monitor    Pl. effusion b/l/ Anasarca   mod effusion seen on ct chest  Now on lasix 20mg PO daily.   Pt is clinically improving. Continue at present  Monitor

## 2019-11-22 NOTE — PROGRESS NOTE ADULT - PROBLEM SELECTOR PLAN 2
Per chart review on R-CP. s/p 2 cycles. Last cycle ~4 weeks ago. Pt's daughter, Dr. Browne, is interested in inpatient chemo.   - Heme/onc following -> given weak state and mental status, plan for pt to get stronger with PT prior to starting inpt chemo  - if treating with inpt chemo, likely RCD vs RCOEP with etoposide Per chart review on R-CP. s/p 2 cycles. Last cycle ~4 weeks ago. Pt's daughter, Dr. Browne, is interested in inpatient chemo.   - Heme/onc following   - PT evaulation, then possible inpt chemo next week

## 2019-11-22 NOTE — PROGRESS NOTE ADULT - PROBLEM SELECTOR PLAN 7
3 known active DVTs.   -repeat dopplers are negative for progression  -restarted lovenox prophylaxis per NSG (started on 11/16).  -repeat BL UE US with no evidence of DVT now. Superficial vein thrombosis visualized in the left cephalic vein. BL LE US with Unchanged subacute DVT right soleal vein. 3 known active DVTs. repeat BL UE US with no evidence of DVT now. Superficial vein thrombosis visualized in the left cephalic vein. BL LE US with unchanged subacute DVT right soleal vein.  -restarted lovenox prophylaxis per NSG (started on 11/16).

## 2019-11-22 NOTE — PROGRESS NOTE ADULT - PROBLEM SELECTOR PLAN 3
CT chest with apical consolidations. WBC uptrending. Unclear if this is from lymphoma vs infection.   - ID following, recs appreciated  - f/u strongyloides and quantiferon  - s/p 5 day course of vanc/zosyn for possible PNA CT chest with apical consolidations. WBC uptrending. Unclear if this is from lymphoma vs infection.   - ID following, recs appreciated -> observe off antibiotics  - f/u strongyloides and quantiferon  - s/p 5 day course of vanc/zosyn for possible PNA - c/w lantus 10u qhs  - humalog 4u decreased to 2u with meals.   - FS    #hypothyroidism  -c/w IV levothyroxine 44mg daily

## 2019-11-22 NOTE — PHYSICAL THERAPY INITIAL EVALUATION ADULT - GENERAL OBSERVATIONS, REHAB EVAL
Pt encountered in semisupine position, no distress, AxOx1, with +IV, +O2 through nasal cannula, +cardiac monitor, and pt's friend @ bedside.

## 2019-11-22 NOTE — PHYSICAL THERAPY INITIAL EVALUATION ADULT - PERTINENT HX OF CURRENT PROBLEM, REHAB EVAL
Mr. Browne is an 80 year old man with a PMH of  CAD, DM, HTN, bilateral  LE DVT, Left UE DVT not on AC, CKD, orthostatic hypotension, and recent admission in November 2019 for SDH who is now presenting with acute encephalopathy

## 2019-11-22 NOTE — PROGRESS NOTE ADULT - PROBLEM SELECTOR PLAN 5
Witness tonic clonic seizure in ER s/p ativan 2mg x1. 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   -vEEG with no seizure activity  - c/w keppra 500mg BID   - s/p valproic acid CT chest with apical consolidations. WBC downtrending  - f/u strongyloides and quantiferon  - s/p 5 day course of vanc/zosyn for possible PNA

## 2019-11-22 NOTE — PHYSICAL THERAPY INITIAL EVALUATION ADULT - IMPAIRMENTS FOUND, PT EVAL
neuromotor development and sensory integration/cognitive impairment/muscle strength/gait, locomotion, and balance

## 2019-11-22 NOTE — PHYSICAL THERAPY INITIAL EVALUATION ADULT - MANUAL MUSCLE TESTING RESULTS, REHAB EVAL
grossly assessed due to/cognition impairment; right UE at least 3+/5, Left shoulder 3-/5, left elbow/hand/wrist 3/5, Right hamstring and anterior tib 3/5, right quad 3-/5, left LE 3-/5

## 2019-11-22 NOTE — PHYSICAL THERAPY INITIAL EVALUATION ADULT - DIAGNOSIS, PT EVAL
Pt admitted for AMS; MRI (+)SDH; pt presents with decreased strength, decreased balance, and decreased functional mobility

## 2019-11-22 NOTE — PROGRESS NOTE ADULT - PROBLEM SELECTOR PLAN 6
- c/w lantus 10u qhs, humalog 4u with meals.   - FS    #hypothyroidism  -c/w IV levothyroxine 44mg daily - c/w atenolol 50mg qd, norvasc 10mg qd  - c/w lasix 20mg PO QD

## 2019-11-22 NOTE — PROGRESS NOTE ADULT - PROBLEM SELECTOR PLAN 4
- c/w atenolol 50mg qd, norvasc 10mg qd  - c/w lasix 20mg PO QD Witness tonic clonic seizure in ER s/p ativan 2mg x1. 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   -vEEG with no seizure activity  - c/w keppra 500mg BID  - s/p valproic acid

## 2019-11-22 NOTE — PROGRESS NOTE ADULT - SUBJECTIVE AND OBJECTIVE BOX
[facility]   MD UYEN HERNANDEZ D.O, PA    TELEPHONE NUMBERS:  OFFICE: [phone]  DR. CASAREZ CELL: [phone]  GORDON ZHANG CELL: [phone]  DR. HERNANDEZ CELL:  [phone]    RENAL FOLLOW UP NOTE  --------------------------------------------------------------------------------  HPI: Pt seen and examined at bedside.       PAST HISTORY  --------------------------------------------------------------------------------  No significant changes to PMH, PSH, FHx, SHx, unless otherwise noted    ALLERGIES & MEDICATIONS  --------------------------------------------------------------------------------  Allergies    No Known Allergies    Intolerances      Standing Inpatient Medications  amLODIPine   Tablet 10 milliGRAM(s) Oral daily  ATENolol  Tablet 50 milliGRAM(s) Oral daily  dextrose 5%. 1000 milliLiter(s) IV Continuous <Continuous>  dextrose 50% Injectable 12.5 Gram(s) IV Push once  dextrose 50% Injectable 25 Gram(s) IV Push once  dextrose 50% Injectable 25 Gram(s) IV Push once  enoxaparin Injectable 40 milliGRAM(s) SubCutaneous daily  escitalopram 5 milliGRAM(s) Oral daily  furosemide    Tablet 20 milliGRAM(s) Oral daily  insulin glargine Injectable (LANTUS) 10 Unit(s) SubCutaneous at bedtime  insulin lispro (HumaLOG) corrective regimen sliding scale   SubCutaneous three times a day before meals  insulin lispro (HumaLOG) corrective regimen sliding scale   SubCutaneous at bedtime  insulin lispro Injectable (HumaLOG) 2 Unit(s) SubCutaneous three times a day before meals  levETIRAcetam 500 milliGRAM(s) Oral two times a day  levothyroxine 88 MICROGram(s) Oral daily  pantoprazole    Tablet 40 milliGRAM(s) Oral before breakfast  simvastatin 20 milliGRAM(s) Oral at bedtime    PRN Inpatient Medications  acetaminophen   Tablet .. 650 milliGRAM(s) Oral every 6 hours PRN  dextrose 40% Gel 15 Gram(s) Oral once PRN  glucagon  Injectable 1 milliGRAM(s) IntraMuscular once PRN      REVIEW OF SYSTEMS  --------------------------------------------------------------------------------  Unable to obtain     VITALS/PHYSICAL EXAM  --------------------------------------------------------------------------------  T(C): 36.8 (11-22-19 @ 18:00), Max: 37 (11-22-19 @ 09:06)  HR: 99 (11-22-19 @ 18:00) (72 - 99)  BP: 130/69 (11-22-19 @ 18:00) (124/72 - 144/90)  RR: 17 (11-22-19 @ 18:00) (17 - 18)  SpO2: 96% (11-22-19 @ 18:00) (95% - 99%)  Wt(kg): --      11-21-19 @ 07:01  -  11-22-19 @ 07:00  --------------------------------------------------------  IN: 200 mL / OUT: 0 mL / NET: 200 mL      Physical Exam:  	Gen: NAD, poorly responsive  	HEENT: MMM  	Pulm: CTA B/L  	CV: S1S2  	Abd: Soft, +BS  	Ext: No LE edema B/L                      Neuro: Awake   	Skin: Warm and Dry   	    LABS/STUDIES  --------------------------------------------------------------------------------              10.4   15.24 >-----------<  395      [11-22-19 @ 10:18]              32.9     139  |  100  |  23  ----------------------------<  231      [11-22-19 @ 10:18]  3.7   |  27  |  1.16        Ca     8.9     [11-22-19 @ 10:18]      Mg     1.8     [11-22-19 @ 10:18]      Phos  2.9     [11-22-19 @ 10:18]    TPro  6.4  /  Alb  2.3  /  TBili  0.3  /  DBili  x   /  AST  39  /  ALT  15  /  AlkPhos  116  [11-22-19 @ 10:18]        Uric acid 2.6      [11-21-19 @ 05:45]        [11-21-19 @ 05:45]    Creatinine Trend:  SCr 1.16 [11-22 @ 10:18]  SCr 1.03 [11-21 @ 05:45]  SCr 1.06 [11-20 @ 06:45]  SCr 0.95 [11-19 @ 05:20]  SCr 0.78 [11-18 @ 08:22]    Urinalysis - [11-14-19 @ 13:10]      Color YELLOW / Appearance Lt TURBID / SG 1.021 / pH 6.5      Gluc 500 / Ketone NEGATIVE  / Bili NEGATIVE / Urobili NORMAL       Blood SMALL / Protein 200 / Leuk Est SMALL / Nitrite NEGATIVE      RBC 3-5 / WBC >50 / Hyaline 3+ / Gran  / Sq Epi MODERATE / Non Sq Epi  / Bacteria NEGATIVE    Urine Sodium 97      [11-17-19 @ 15:00]  Urine Osmolality 384      [11-17-19 @ 15:00]    Iron 38, TIBC 234, %sat --      [09-12-19 @ 06:08]  Ferritin 214.6      [09-12-19 @ 06:08]  PTH 20.00 (Ca --)      [09-03-19 @ 05:45]   --  PTH 25.46 (Ca --)      [09-01-19 @ 06:00]   --  Vitamin D (25OH) 34.9      [09-03-19 @ 05:45]  HbA1c 7.0      [09-13-19 @ 06:50]  TSH 12.33      [11-14-19 @ 14:10]  Lipid: chol 121, TG 96, HDL 33, LDL 74      [08-31-19 @ 07:00]    HIV Nonreactive The HIV Ag/Ab Combo test performed screens for HIV-1 p24  antigen, antibodies to HIV-1 (group M and group O), and  antibodies to HIV-2. All specimens repeatedly reactive  will reflex to an HIV 1/2 antibody confirmation and  differentiation test. This assay detects p24 antigen which  may be present prior to the development of HIV antibodies,  therefore a reactive result with a negative HIV 1/2 AB  Confirmation should be followed up with HIV-1 RNA, HIV-2  RNA and repeat testing in 4-8 weeks. A nonreactive result  does not preclude previous exposure to or infection with  HIV-1 or HIV-2. [facility] prohibits disclosure of this  result to any unauthorized party.      [11-22-19 @ 10:18]

## 2019-11-22 NOTE — PROGRESS NOTE ADULT - PROBLEM SELECTOR PLAN 1
Encephalopathy likely from infection vs post-ictal state. CT chest with apical consolidations and pleural effusion, s/p 5 days vanc/zosyn. MRI with no enhancing brain lesions. No seizure activity seen on vEEG. Mental status and functional status still poor.    - s/p Vanc and zosyn x5 days   - c/w keppra Encephalopathy likely from infection vs post-ictal state. CT chest with apical consolidations and pleural effusion, s/p 5 days vanc/zosyn. MRI with no enhancing brain lesions. No seizure activity seen on vEEG. Mental status significantly improved.  - s/p Vanc and zosyn x5 days   - c/w keppra

## 2019-11-22 NOTE — PROGRESS NOTE ADULT - ATTENDING COMMENTS
Pt seen and examined, chart and labs reviewed.   79M with h/o HTN, DM2, hypothyroid, CAD/stents, PUD s/p H. pylori treatment, recent dx of DLBCL at [place] on Oct 2019 s/p 2 cycles of R-CP, recent fall with R frontal and occipital SDH with no evacuation, LUE DVT and b/l LE DVT on prophylactic Lovenox, p/w acute encephalopathy and had witnessed seizure activity at home and again had tonic clonic seizure in ER s/p Ativan 2mg IVx1.     Assessment/plan:  #Metabolic encephalopathy with seizure activity: multifactorial, ?postictal vs. infectious  -mental status slowly improving  -MRI w/ contrast no CNS lymphoma  -video EEG negative for seizure  -c/w keppra, d/c'd valproic acid per neuro  -CT/MRI brain stable SDH with evolutional changes ; neurosurgery consulted, no urgent indication for evacuation  resumed on prophylactic lovenox  -MRI brain w contrast no lymphoma, stable SDH, chronic L MCA infarct  # Uncontrolled HTN: better controlled, c/w atenolol 50 mg and lasix 20 mg, increase norvasc to 10 mg, d/c'd salt tabs, monitor bp   # Leukocytosis, Possible UL pneumonia: complete 5 day course of abx (vanco/zosyn) 11/19, f/u ID, repeat CXR  #DLBCL: s/p 2 cycles of R-CP with interval improvement in lymphadenopathy on scans from 11/14.    CT shows response to chemo with reduced lymphadenopathy and spleen size  d/w heme fellow Dr. Conway, if functional/mental status improves with PT, may consider a dose of chemo next week  # Severe protein/calorie malnutrition: c/w glucerna and encourage intake  # Uncontrolled DM2, hypoglycemia: c/w lantus 10 U hs and reduce humalog 2 U ac, A1c 7%, monitor FS  # Mild FRANCES, Hyponatremia: improved, c/w lasix, fluid restriction  # Dispo: pt is very weak/debilitated, likely needs rehab, PT saw pt today, needs rehab

## 2019-11-22 NOTE — PROGRESS NOTE ADULT - ASSESSMENT
Mr. 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 Currently pending PT eval for possible inpatient chemo next week.

## 2019-11-22 NOTE — PROGRESS NOTE ADULT - ASSESSMENT
Mr. Browne is an 80 year old man with a PMH of DM, CAD, lymphoma, DVT,  and recent admission in November 2019 for SDH who is now presenting with acute encephalopathy. Also witnessed seizure in ED.  ID consulted for leukocytosis of 18.  Was started empirically on Vancomycin and Zosyn- blood cx negative, ua negative, CT chest with b/l patchy opacities in upper lobe pneumonia vs alveolar edema.    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     Leukocytosis - possible aspiration pneumonia.  crypt Ag neg.   completed vanco and zosyn x 5 days.    seizures   Lymphoma   CNS in blood cx likely contaminant    Suggest:  check strongyloides antibodies.  check Quantiferon TB-  was indeterminate in Sept 2019  observe off antibiotics for now.    I will be away --> 12/2.  ID service available for questions.          Marianne Montes MD  Pager: [phone]  After 5 PM or weekends please call fellow on call or office [phone] Mr. Browne is an 80 year old man with a PMH of DM, CAD, lymphoma, DVT,  and recent admission in November 2019 for SDH who is now presenting with acute encephalopathy. Also witnessed seizure in ED.  ID consulted for leukocytosis of 18.  Was started empirically on Vancomycin and Zosyn- blood cx negative, ua negative, CT chest with b/l patchy opacities in upper lobe pneumonia vs alveolar edema.    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     Leukocytosis - possible aspiration pneumonia.  crypt Ag neg.  strongyloides Ab neg.   completed vanco and zosyn x 5 days.    seizures   Lymphoma   CNS in blood cx likely contaminant    Suggest:    check Quantiferon TB-  was indeterminate in Sept 2019  observe off antibiotics for now.    I will be away --> 12/2.  ID service available for questions.          Marianne Montes MD  Pager: [phone]  After 5 PM or weekends please call fellow on call or office [phone]

## 2019-11-23 LAB
ANION GAP SERPL CALC-SCNC: 12 MMO/L — SIGNIFICANT CHANGE UP (ref 7–14)
BASOPHILS # BLD AUTO: 0.09 K/UL — SIGNIFICANT CHANGE UP (ref 0–0.2)
BASOPHILS NFR BLD AUTO: 0.5 % — SIGNIFICANT CHANGE UP (ref 0–2)
BUN SERPL-MCNC: 26 MG/DL — HIGH (ref 7–23)
CALCIUM SERPL-MCNC: 9.4 MG/DL — SIGNIFICANT CHANGE UP (ref 8.4–10.5)
CHLORIDE SERPL-SCNC: 100 MMOL/L — SIGNIFICANT CHANGE UP (ref 98–107)
CO2 SERPL-SCNC: 29 MMOL/L — SIGNIFICANT CHANGE UP (ref 22–31)
CREAT SERPL-MCNC: 1.11 MG/DL — SIGNIFICANT CHANGE UP (ref 0.5–1.3)
EOSINOPHIL # BLD AUTO: 0.36 K/UL — SIGNIFICANT CHANGE UP (ref 0–0.5)
EOSINOPHIL NFR BLD AUTO: 2 % — SIGNIFICANT CHANGE UP (ref 0–6)
GLUCOSE SERPL-MCNC: 204 MG/DL — HIGH (ref 70–99)
HAV IGM SER-ACNC: NONREACTIVE — SIGNIFICANT CHANGE UP
HBV CORE AB SER-ACNC: REACTIVE — SIGNIFICANT CHANGE UP
HBV CORE IGM SER-ACNC: NONREACTIVE — SIGNIFICANT CHANGE UP
HBV SURFACE AB SER-ACNC: REACTIVE — SIGNIFICANT CHANGE UP
HBV SURFACE AG SER-ACNC: NONREACTIVE — SIGNIFICANT CHANGE UP
HCT VFR BLD CALC: 30.7 % — LOW (ref 39–50)
HCV AB S/CO SERPL IA: 0.12 S/CO — SIGNIFICANT CHANGE UP (ref 0–0.99)
HCV AB SERPL-IMP: SIGNIFICANT CHANGE UP
HGB BLD-MCNC: 9.7 G/DL — LOW (ref 13–17)
IMM GRANULOCYTES NFR BLD AUTO: 1.9 % — HIGH (ref 0–1.5)
LYMPHOCYTES # BLD AUTO: 1.95 K/UL — SIGNIFICANT CHANGE UP (ref 1–3.3)
LYMPHOCYTES # BLD AUTO: 10.9 % — LOW (ref 13–44)
MAGNESIUM SERPL-MCNC: 1.9 MG/DL — SIGNIFICANT CHANGE UP (ref 1.6–2.6)
MCHC RBC-ENTMCNC: 27.2 PG — SIGNIFICANT CHANGE UP (ref 27–34)
MCHC RBC-ENTMCNC: 31.6 % — LOW (ref 32–36)
MCV RBC AUTO: 86.2 FL — SIGNIFICANT CHANGE UP (ref 80–100)
MONOCYTES # BLD AUTO: 1.89 K/UL — HIGH (ref 0–0.9)
MONOCYTES NFR BLD AUTO: 10.5 % — SIGNIFICANT CHANGE UP (ref 2–14)
NEUTROPHILS # BLD AUTO: 13.32 K/UL — HIGH (ref 1.8–7.4)
NEUTROPHILS NFR BLD AUTO: 74.2 % — SIGNIFICANT CHANGE UP (ref 43–77)
NRBC # FLD: 0 K/UL — SIGNIFICANT CHANGE UP (ref 0–0)
PHOSPHATE SERPL-MCNC: 3.1 MG/DL — SIGNIFICANT CHANGE UP (ref 2.5–4.5)
PLATELET # BLD AUTO: 449 K/UL — HIGH (ref 150–400)
PMV BLD: 11.7 FL — SIGNIFICANT CHANGE UP (ref 7–13)
POTASSIUM SERPL-MCNC: 3.5 MMOL/L — SIGNIFICANT CHANGE UP (ref 3.5–5.3)
POTASSIUM SERPL-SCNC: 3.5 MMOL/L — SIGNIFICANT CHANGE UP (ref 3.5–5.3)
RBC # BLD: 3.56 M/UL — LOW (ref 4.2–5.8)
RBC # FLD: 18.6 % — HIGH (ref 10.3–14.5)
SODIUM SERPL-SCNC: 141 MMOL/L — SIGNIFICANT CHANGE UP (ref 135–145)
WBC # BLD: 17.95 K/UL — HIGH (ref 3.8–10.5)
WBC # FLD AUTO: 17.95 K/UL — HIGH (ref 3.8–10.5)

## 2019-11-23 PROCEDURE — 99233 SBSQ HOSP IP/OBS HIGH 50: CPT | Mod: GC

## 2019-11-23 RX ADMIN — SIMVASTATIN 20 MILLIGRAM(S): 20 TABLET, FILM COATED ORAL at 22:21

## 2019-11-23 RX ADMIN — Medication 2 UNIT(S): at 09:05

## 2019-11-23 RX ADMIN — Medication 1: at 22:26

## 2019-11-23 RX ADMIN — Medication 20 MILLIGRAM(S): at 06:09

## 2019-11-23 RX ADMIN — LEVETIRACETAM 500 MILLIGRAM(S): 250 TABLET, FILM COATED ORAL at 06:08

## 2019-11-23 RX ADMIN — ESCITALOPRAM OXALATE 5 MILLIGRAM(S): 10 TABLET, FILM COATED ORAL at 09:06

## 2019-11-23 RX ADMIN — ENOXAPARIN SODIUM 40 MILLIGRAM(S): 100 INJECTION SUBCUTANEOUS at 09:05

## 2019-11-23 RX ADMIN — Medication 1: at 09:05

## 2019-11-23 RX ADMIN — INSULIN GLARGINE 10 UNIT(S): 100 INJECTION, SOLUTION SUBCUTANEOUS at 22:26

## 2019-11-23 RX ADMIN — Medication 2 UNIT(S): at 17:54

## 2019-11-23 RX ADMIN — LEVETIRACETAM 500 MILLIGRAM(S): 250 TABLET, FILM COATED ORAL at 17:54

## 2019-11-23 RX ADMIN — AMLODIPINE BESYLATE 10 MILLIGRAM(S): 2.5 TABLET ORAL at 06:08

## 2019-11-23 RX ADMIN — ATENOLOL 50 MILLIGRAM(S): 25 TABLET ORAL at 06:08

## 2019-11-23 RX ADMIN — Medication 2 UNIT(S): at 13:23

## 2019-11-23 RX ADMIN — Medication 1: at 17:54

## 2019-11-23 RX ADMIN — PANTOPRAZOLE SODIUM 40 MILLIGRAM(S): 20 TABLET, DELAYED RELEASE ORAL at 06:08

## 2019-11-23 RX ADMIN — Medication 88 MICROGRAM(S): at 06:08

## 2019-11-23 NOTE — PROGRESS NOTE ADULT - PROBLEM SELECTOR PLAN 3
- c/w lantus 10u qhs  - humalog 2u with meals.   - FS    #hypothyroidism  -c/w IV levothyroxine 44mg daily

## 2019-11-23 NOTE — PROGRESS NOTE ADULT - ATTENDING COMMENTS
Pt seen and examined, chart and labs reviewed.   79M with h/o HTN, DM2, hypothyroid, CAD/stents, PUD s/p H. pylori treatment, recent dx of DLBCL at [place] on Oct 2019 s/p 2 cycles of R-CP, recent fall with R frontal and occipital SDH with no evacuation, LUE DVT and b/l LE DVT on prophylactic Lovenox, p/w acute encephalopathy and had witnessed seizure activity at home and again had tonic clonic seizure in ER s/p Ativan 2mg IVx1.     Assessment/plan:  #Metabolic encephalopathy with seizure activity: multifactorial, ?postictal vs. infectious  -mental status slightly improved  -MRI w/ contrast no CNS lymphoma  -video EEG negative for seizure, c/w keppra  -stable SDH, no neurosx intervention, resumed on  prophylactic lovenox  -MRI brain w contrast no lymphoma, stable SDH, chronic L MCA infarct  # HTN: now better controlled on atenolol 50 mg, norvasc 10mg, lasix 20 mg   # Leukocytosis, Possible UL pneumonia: complete 5 day course of abx (vanco/zosyn) 11/19, f/u ID, check quantiferon TB  #DLBCL: s/p 2 cycles of R-CP with interval improvement in lymphadenopathy on scans from 11/14.    CT shows response to chemo with reduced lymphadenopathy and spleen size  poor functional and mental status, doubt pt is candidate for inpt chemo, f/u hematology  dc plan to rehab if no plan for inpt chemo  # Severe protein/calorie malnutrition: c/w glucerna and encourage intake  # Uncontrolled DM2, hypoglycemia: c/w lantus 10 U hs and  humalog 2 U ac, A1c 7%, monitor FS  #SIADH, Hyponatremia: improved, c/w lasix, fluid restriction  # Dispo: pt is very weak/debilitated, PT is recommending rehab

## 2019-11-23 NOTE — PROGRESS NOTE ADULT - SUBJECTIVE AND OBJECTIVE BOX
Pavithra Alfonso PGY1  -[id number] | [facility] [id number]  =========================    Patient is a 79y old  Male who presents with a chief complaint of AMS (22 Nov 2019 21:24)      INTERVAL HPI/OVERNIGHT EVENTS:  No acute event overnight. This morning pt is awake and alert. Pt able to say "good morning" and responded "hospital" when asked where he is. Able to follow simple commands like squeezing his hand. Denies CP, abdominal pain, SOB. Still on 3L NC.        MEDICATIONS  (STANDING):  amLODIPine   Tablet 10 milliGRAM(s) Oral daily  ATENolol  Tablet 50 milliGRAM(s) Oral daily  dextrose 5%. 1000 milliLiter(s) (50 mL/Hr) IV Continuous <Continuous>  dextrose 50% Injectable 12.5 Gram(s) IV Push once  dextrose 50% Injectable 25 Gram(s) IV Push once  dextrose 50% Injectable 25 Gram(s) IV Push once  enoxaparin Injectable 40 milliGRAM(s) SubCutaneous daily  escitalopram 5 milliGRAM(s) Oral daily  furosemide    Tablet 20 milliGRAM(s) Oral daily  insulin glargine Injectable (LANTUS) 10 Unit(s) SubCutaneous at bedtime  insulin lispro (HumaLOG) corrective regimen sliding scale   SubCutaneous three times a day before meals  insulin lispro (HumaLOG) corrective regimen sliding scale   SubCutaneous at bedtime  insulin lispro Injectable (HumaLOG) 2 Unit(s) SubCutaneous three times a day before meals  levETIRAcetam 500 milliGRAM(s) Oral two times a day  levothyroxine 88 MICROGram(s) Oral daily  pantoprazole    Tablet 40 milliGRAM(s) Oral before breakfast  simvastatin 20 milliGRAM(s) Oral at bedtime    MEDICATIONS  (PRN):  acetaminophen   Tablet .. 650 milliGRAM(s) Oral every 6 hours PRN Severe Pain (7 - 10)  dextrose 40% Gel 15 Gram(s) Oral once PRN Blood Glucose LESS THAN 70 milliGRAM(s)/deciliter  glucagon  Injectable 1 milliGRAM(s) IntraMuscular once PRN Glucose LESS THAN 70 milligrams/deciliter      Allergies    No Known Allergies    Intolerances        Vital Signs Last 24 Hrs  T(C): 36.8 (23 Nov 2019 06:04), Max: 37.3 (22 Nov 2019 22:34)  T(F): 98.2 (23 Nov 2019 06:04), Max: 99.1 (22 Nov 2019 22:34)  HR: 92 (23 Nov 2019 06:04) (85 - 99)  BP: 142/90 (23 Nov 2019 06:04) (130/69 - 142/90)  BP(mean): --  RR: 17 (23 Nov 2019 06:04) (17 - 18)  SpO2: 99% (23 Nov 2019 06:04) (95% - 99%)    PHYSICAL EXAM:  GENERAL: NAD. A&Ox2. alert and awake. on 3L NC.   HEENT: EOMI, clear sclera.   CHEST/LUNG: Clear to auscultation; Respiratory effort does not appear labored.  HEART: Regular rate and rhythm; S1 and S2,  no murmurs, rubs, or gallops.  ABDOMEN: Soft, not tender to palpation.  No masses or HSM appreciated.  No distension.  Bowel sounds present.  EXTREMITIES:  No clubbing, cyanosis, or edema.  moves R arm more than L arm.   SKIN: No obvious rashes or lesions.  Turgor okay.  NEURO:  Alert and oriented x 2, able to follow simple command. R arm stronger than L arm.     LABS:                        9.7    17.95 )-----------( 449      ( 23 Nov 2019 05:25 )             30.7     11-23    141  |  100  |  26<H>  ----------------------------<  204<H>  3.5   |  29  |  1.11    Ca    9.4      23 Nov 2019 05:25  Phos  3.1     11-23  Mg     1.9     11-23    TPro  6.4  /  Alb  2.3<L>  /  TBili  0.3  /  DBili  x   /  AST  39  /  ALT  15  /  AlkPhos  116  11-22        CAPILLARY BLOOD GLUCOSE      POCT Blood Glucose.: 213 mg/dL (22 Nov 2019 22:14)  POCT Blood Glucose.: 171 mg/dL (22 Nov 2019 17:38)  POCT Blood Glucose.: 229 mg/dL (22 Nov 2019 12:16)  POCT Blood Glucose.: 218 mg/dL (22 Nov 2019 08:38)

## 2019-11-23 NOTE — PROGRESS NOTE ADULT - ASSESSMENT
Mr. 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 Plan for possible inpatient chemo next week. Mr. 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

## 2019-11-23 NOTE — PROGRESS NOTE ADULT - PROBLEM SELECTOR PLAN 2
Per chart review on R-CP. s/p 2 cycles. Last cycle ~4 weeks ago. Pt's daughter, Dr. Browne, is interested in inpatient chemo.   - Heme/onc following   - PT to gain strength, then possible inpt chemo next week Per chart review on R-CP. s/p 2 cycles. Last cycle ~4 weeks ago. Pt's daughter, Dr. Browne, is interested in inpatient chemo.   - Heme/onc following - recommend PT to get stronger prior to chemo

## 2019-11-23 NOTE — PROGRESS NOTE ADULT - PROBLEM SELECTOR PLAN 7
3 known active DVTs. repeat BL UE US with no evidence of DVT now. Superficial vein thrombosis visualized in the left cephalic vein. BL LE US with unchanged subacute DVT right soleal vein.  -restarted lovenox prophylaxis per NSG (started on 11/16).

## 2019-11-23 NOTE — PROGRESS NOTE ADULT - PROBLEM SELECTOR PLAN 5
CT chest with apical consolidations. WBC fluctuating. May be related to lymphoma. No active signs or symptoms of infection.  - f/u strongyloides and quantiferon  - s/p 5 day course of vanc/zosyn for possible PNA

## 2019-11-24 LAB
ANION GAP SERPL CALC-SCNC: 15 MMO/L — HIGH (ref 7–14)
ANISOCYTOSIS BLD QL: SIGNIFICANT CHANGE UP
BASOPHILS # BLD AUTO: 0.06 K/UL — SIGNIFICANT CHANGE UP (ref 0–0.2)
BASOPHILS NFR BLD AUTO: 0.4 % — SIGNIFICANT CHANGE UP (ref 0–2)
BASOPHILS NFR SPEC: 0 % — SIGNIFICANT CHANGE UP (ref 0–2)
BLASTS # FLD: 0 % — SIGNIFICANT CHANGE UP (ref 0–0)
BUN SERPL-MCNC: 32 MG/DL — HIGH (ref 7–23)
CALCIUM SERPL-MCNC: 9.5 MG/DL — SIGNIFICANT CHANGE UP (ref 8.4–10.5)
CHLORIDE SERPL-SCNC: 101 MMOL/L — SIGNIFICANT CHANGE UP (ref 98–107)
CO2 SERPL-SCNC: 26 MMOL/L — SIGNIFICANT CHANGE UP (ref 22–31)
CREAT SERPL-MCNC: 1.18 MG/DL — SIGNIFICANT CHANGE UP (ref 0.5–1.3)
EOSINOPHIL # BLD AUTO: 0.11 K/UL — SIGNIFICANT CHANGE UP (ref 0–0.5)
EOSINOPHIL NFR BLD AUTO: 0.8 % — SIGNIFICANT CHANGE UP (ref 0–6)
EOSINOPHIL NFR FLD: 0 % — SIGNIFICANT CHANGE UP (ref 0–6)
GIANT PLATELETS BLD QL SMEAR: PRESENT — SIGNIFICANT CHANGE UP
GLUCOSE SERPL-MCNC: 299 MG/DL — HIGH (ref 70–99)
HCT VFR BLD CALC: 31 % — LOW (ref 39–50)
HGB BLD-MCNC: 9.9 G/DL — LOW (ref 13–17)
IMM GRANULOCYTES NFR BLD AUTO: 4.4 % — HIGH (ref 0–1.5)
LYMPHOCYTES # BLD AUTO: 1.52 K/UL — SIGNIFICANT CHANGE UP (ref 1–3.3)
LYMPHOCYTES # BLD AUTO: 10.7 % — LOW (ref 13–44)
LYMPHOCYTES NFR SPEC AUTO: 2.6 % — LOW (ref 13–44)
MACROCYTES BLD QL: SLIGHT — SIGNIFICANT CHANGE UP
MAGNESIUM SERPL-MCNC: 1.8 MG/DL — SIGNIFICANT CHANGE UP (ref 1.6–2.6)
MCHC RBC-ENTMCNC: 27.6 PG — SIGNIFICANT CHANGE UP (ref 27–34)
MCHC RBC-ENTMCNC: 31.9 % — LOW (ref 32–36)
MCV RBC AUTO: 86.4 FL — SIGNIFICANT CHANGE UP (ref 80–100)
METAMYELOCYTES # FLD: 0 % — SIGNIFICANT CHANGE UP (ref 0–1)
MONOCYTES # BLD AUTO: 1.3 K/UL — HIGH (ref 0–0.9)
MONOCYTES NFR BLD AUTO: 9.1 % — SIGNIFICANT CHANGE UP (ref 2–14)
MONOCYTES NFR BLD: 12.2 % — HIGH (ref 2–9)
MYELOCYTES NFR BLD: 0 % — SIGNIFICANT CHANGE UP (ref 0–0)
NEUTROPHIL AB SER-ACNC: 82.6 % — HIGH (ref 43–77)
NEUTROPHILS # BLD AUTO: 10.63 K/UL — HIGH (ref 1.8–7.4)
NEUTROPHILS NFR BLD AUTO: 74.6 % — SIGNIFICANT CHANGE UP (ref 43–77)
NEUTS BAND # BLD: 1.7 % — SIGNIFICANT CHANGE UP (ref 0–6)
NRBC # FLD: 0.04 K/UL — SIGNIFICANT CHANGE UP (ref 0–0)
OTHER - HEMATOLOGY %: 0 — SIGNIFICANT CHANGE UP
PHOSPHATE SERPL-MCNC: 3.4 MG/DL — SIGNIFICANT CHANGE UP (ref 2.5–4.5)
PLATELET # BLD AUTO: 449 K/UL — HIGH (ref 150–400)
PLATELET COUNT - ESTIMATE: NORMAL — SIGNIFICANT CHANGE UP
PMV BLD: 11.6 FL — SIGNIFICANT CHANGE UP (ref 7–13)
POIKILOCYTOSIS BLD QL AUTO: SLIGHT — SIGNIFICANT CHANGE UP
POLYCHROMASIA BLD QL SMEAR: SIGNIFICANT CHANGE UP
POTASSIUM SERPL-MCNC: 3.5 MMOL/L — SIGNIFICANT CHANGE UP (ref 3.5–5.3)
POTASSIUM SERPL-SCNC: 3.5 MMOL/L — SIGNIFICANT CHANGE UP (ref 3.5–5.3)
PROMYELOCYTES # FLD: 0 % — SIGNIFICANT CHANGE UP (ref 0–0)
RBC # BLD: 3.59 M/UL — LOW (ref 4.2–5.8)
RBC # FLD: 18.8 % — HIGH (ref 10.3–14.5)
SODIUM SERPL-SCNC: 142 MMOL/L — SIGNIFICANT CHANGE UP (ref 135–145)
VARIANT LYMPHS # BLD: 0.9 % — SIGNIFICANT CHANGE UP
WBC # BLD: 14.24 K/UL — HIGH (ref 3.8–10.5)
WBC # FLD AUTO: 14.24 K/UL — HIGH (ref 3.8–10.5)

## 2019-11-24 PROCEDURE — 99233 SBSQ HOSP IP/OBS HIGH 50: CPT | Mod: GC

## 2019-11-24 RX ORDER — INSULIN GLARGINE 100 [IU]/ML
12 INJECTION, SOLUTION SUBCUTANEOUS AT BEDTIME
Refills: 0 | Status: DISCONTINUED | OUTPATIENT
Start: 2019-11-24 | End: 2019-11-27

## 2019-11-24 RX ORDER — INSULIN LISPRO 100/ML
3 VIAL (ML) SUBCUTANEOUS
Refills: 0 | Status: DISCONTINUED | OUTPATIENT
Start: 2019-11-24 | End: 2019-11-27

## 2019-11-24 RX ADMIN — Medication 3 UNIT(S): at 12:53

## 2019-11-24 RX ADMIN — SIMVASTATIN 20 MILLIGRAM(S): 20 TABLET, FILM COATED ORAL at 21:01

## 2019-11-24 RX ADMIN — PANTOPRAZOLE SODIUM 40 MILLIGRAM(S): 20 TABLET, DELAYED RELEASE ORAL at 06:39

## 2019-11-24 RX ADMIN — LEVETIRACETAM 500 MILLIGRAM(S): 250 TABLET, FILM COATED ORAL at 18:11

## 2019-11-24 RX ADMIN — LEVETIRACETAM 500 MILLIGRAM(S): 250 TABLET, FILM COATED ORAL at 06:37

## 2019-11-24 RX ADMIN — ESCITALOPRAM OXALATE 5 MILLIGRAM(S): 10 TABLET, FILM COATED ORAL at 09:09

## 2019-11-24 RX ADMIN — ATENOLOL 50 MILLIGRAM(S): 25 TABLET ORAL at 06:37

## 2019-11-24 RX ADMIN — Medication 88 MICROGRAM(S): at 06:37

## 2019-11-24 RX ADMIN — INSULIN GLARGINE 12 UNIT(S): 100 INJECTION, SOLUTION SUBCUTANEOUS at 21:01

## 2019-11-24 RX ADMIN — Medication 3 UNIT(S): at 18:12

## 2019-11-24 RX ADMIN — ENOXAPARIN SODIUM 40 MILLIGRAM(S): 100 INJECTION SUBCUTANEOUS at 09:09

## 2019-11-24 RX ADMIN — Medication 2: at 18:12

## 2019-11-24 RX ADMIN — Medication 3: at 12:53

## 2019-11-24 RX ADMIN — Medication 4: at 09:08

## 2019-11-24 RX ADMIN — AMLODIPINE BESYLATE 10 MILLIGRAM(S): 2.5 TABLET ORAL at 06:37

## 2019-11-24 RX ADMIN — Medication 20 MILLIGRAM(S): at 06:37

## 2019-11-24 NOTE — PROGRESS NOTE ADULT - ATTENDING COMMENTS
Pt seen and examined, chart and labs reviewed.   79M with h/o HTN, DM2, hypothyroid, CAD/stents, PUD s/p H. pylori treatment, recent dx of DLBCL at [place] on Oct 2019 s/p 2 cycles of R-CD, recent fall with R frontal and occipital SDH with no evacuation, LUE DVT and b/l LE DVT on prophylactic Lovenox, p/w acute encephalopathy and had witnessed seizure activity at home and again had tonic clonic seizure in ER s/p Ativan 2mg IVx1.     Assessment/plan:  #Metabolic encephalopathy with seizure activity: multifactorial, ?postictal vs. infectious  -mental status wax/wanes, per daughter when family is around his mental status is better and he converse more  -MRI w/ contrast no CNS lymphoma  -video EEG negative for seizure, c/w keppra  -stable SDH, no neurosx intervention, resumed on  prophylactic lovenox  -MRI brain w contrast no lymphoma, stable SDH, chronic L MCA infarct  # HTN: now better controlled on atenolol 50 mg, norvasc 10mg, lasix 20 mg   # Leukocytosis, Possible UL pneumonia: complete 5 day course of abx (vanco/zosyn) 11/19, f/u ID, check quantiferon TB  #DLBCL: s/p 2 cycles of R-CD with interval improvement in lymphadenopathy on scans from 11/14.    CT shows response to chemo with reduced lymphadenopathy and spleen size  poor functional and mental status, I d/w pt's daughter Dr. Browne today who's still hoping for one dose of chemo (rituxan, cytoxan, decadron) inpt before he goes to rehab, will f/u hematology  dc plan to rehab if no plan for inpt chemo  # Severe protein/calorie malnutrition: c/w glucerna and encourage intake  # Uncontrolled DM2, hypoglycemia: increase lantus to 12 U hs and humalog 3 U ac  , A1c 7%, monitor FS  #SIADH, Hyponatremia: improved, c/w lasix, fluid restriction  # Dispo: pt is very weak/debilitated, PT is recommending rehab

## 2019-11-24 NOTE — PROGRESS NOTE ADULT - PROBLEM SELECTOR PLAN 1
Encephalopathy likely from infection vs post-ictal state. CT chest with apical consolidations and pleural effusion, s/p 5 days vanc/zosyn. MRI with no enhancing brain lesions. No seizure activity seen on vEEG. Mental status significantly improved.  - s/p Vanc and zosyn x5 days   - c/w keppra

## 2019-11-24 NOTE — PROGRESS NOTE ADULT - PROBLEM SELECTOR PLAN 3
- c/w lantus 10u qhs  - humalog 2u with meals.   - FS    #hypothyroidism  -c/w IV levothyroxine 44mg daily -  lantus 10u qhs increased to 12 units since pt was hyperglycemic (FS in 300s today)  - humalog 2u with meals, increased to 3 u  - FS    #hypothyroidism  -c/w IV levothyroxine 44mg daily

## 2019-11-24 NOTE — PROGRESS NOTE ADULT - SUBJECTIVE AND OBJECTIVE BOX
Gin Booker MD-PGY2  Department of Internal Medicine  Pager [phone]        CARLOS ENRIQUE TATE  79y  MRN: [id number]    Patient is a 79y old  Male who presents with a chief complaint of AMS (23 Nov 2019 07:48)      Subjective: no events ON. Denies fever, CP, SOB, abn pain, N/V. Tolerating diet.      MEDICATIONS  (STANDING):  amLODIPine   Tablet 10 milliGRAM(s) Oral daily  ATENolol  Tablet 50 milliGRAM(s) Oral daily  dextrose 5%. 1000 milliLiter(s) (50 mL/Hr) IV Continuous <Continuous>  dextrose 50% Injectable 12.5 Gram(s) IV Push once  dextrose 50% Injectable 25 Gram(s) IV Push once  dextrose 50% Injectable 25 Gram(s) IV Push once  enoxaparin Injectable 40 milliGRAM(s) SubCutaneous daily  escitalopram 5 milliGRAM(s) Oral daily  furosemide    Tablet 20 milliGRAM(s) Oral daily  insulin glargine Injectable (LANTUS) 10 Unit(s) SubCutaneous at bedtime  insulin lispro (HumaLOG) corrective regimen sliding scale   SubCutaneous three times a day before meals  insulin lispro (HumaLOG) corrective regimen sliding scale   SubCutaneous at bedtime  insulin lispro Injectable (HumaLOG) 2 Unit(s) SubCutaneous three times a day before meals  levETIRAcetam 500 milliGRAM(s) Oral two times a day  levothyroxine 88 MICROGram(s) Oral daily  pantoprazole    Tablet 40 milliGRAM(s) Oral before breakfast  simvastatin 20 milliGRAM(s) Oral at bedtime    MEDICATIONS  (PRN):  acetaminophen   Tablet .. 650 milliGRAM(s) Oral every 6 hours PRN Severe Pain (7 - 10)  dextrose 40% Gel 15 Gram(s) Oral once PRN Blood Glucose LESS THAN 70 milliGRAM(s)/deciliter  glucagon  Injectable 1 milliGRAM(s) IntraMuscular once PRN Glucose LESS THAN 70 milligrams/deciliter      Objective:    Vitals: Vital Signs Last 24 Hrs  T(C): 36.7 (11-24-19 @ 06:29), Max: 37.3 (11-23-19 @ 22:19)  T(F): 98.1 (11-24-19 @ 06:29), Max: 99.2 (11-23-19 @ 22:19)  HR: 97 (11-24-19 @ 06:29) (80 - 97)  BP: 154/85 (11-24-19 @ 06:29) (127/78 - 154/85)  BP(mean): --  RR: 16 (11-24-19 @ 06:29) (15 - 16)  SpO2: 95% (11-24-19 @ 06:29) (94% - 96%)              I&O's Summary    23 Nov 2019 07:01  -  24 Nov 2019 07:00  --------------------------------------------------------  IN: 360 mL / OUT: 0 mL / NET: 360 mL        PHYSICAL EXAM:  GENERAL: NAD  HEAD:  Atraumatic, Normocephalic  EYES: EOMI, conjunctiva and sclera clear  CHEST/LUNG: Clear to percussion bilaterally; No rales, rhonchi, wheezing, on 2L NC now  HEART: Regular rate and rhythm; No murmurs, rubs, or gallops  ABDOMEN: Soft, Nontender, Nondistended; no rebound or guarding  SKIN: No rashes or lesions  NERVOUS SYSTEM:  Alert & Oriented X2    LABS:                        9.9    14.24 )-----------( 449      ( 24 Nov 2019 06:32 )             31.0                         9.7    17.95 )-----------( 449      ( 23 Nov 2019 05:25 )             30.7                         10.4   15.24 )-----------( 395      ( 22 Nov 2019 10:18 )             32.9     11-23    141  |  100  |  26<H>  ----------------------------<  204<H>  3.5   |  29  |  1.11  11-22    139  |  100  |  23  ----------------------------<  231<H>  3.7   |  27  |  1.16    Ca    9.4      23 Nov 2019 05:25  Ca    8.9      22 Nov 2019 10:18  Phos  3.1     11-23  Mg     1.9     11-23    TPro  6.4  /  Alb  2.3<L>  /  TBili  0.3  /  DBili  x   /  AST  39  /  ALT  15  /  AlkPhos  116  11-22                      CAPILLARY BLOOD GLUCOSE      POCT Blood Glucose.: 258 mg/dL (23 Nov 2019 22:25)  POCT Blood Glucose.: 187 mg/dL (23 Nov 2019 17:43)  POCT Blood Glucose.: 148 mg/dL (23 Nov 2019 12:52)  POCT Blood Glucose.: 170 mg/dL (23 Nov 2019 08:32)      RADIOLOGY & ADDITIONAL TESTS:  Imaging Personally Reviewed:  [x ] YES  [ ] NO    Consultants involved in case:   Consultant(s) Notes Reviewed:  [ x] YES  [ ] NO:   Care Discussed with Consultants/Other Providers [x ] YES  [ ] NO Gin Booker MD-PGY2  Department of Internal Medicine  Pager [phone]        CARLOS ENRIQUE TATE  79y  MRN: [id number]    Patient is a 79y old  Male who presents with a chief complaint of AMS (23 Nov 2019 07:48)      Subjective: no events ON. Pt seen and examined at bedside, he is awake and alert, responds to some commands.     MEDICATIONS  (STANDING):  amLODIPine   Tablet 10 milliGRAM(s) Oral daily  ATENolol  Tablet 50 milliGRAM(s) Oral daily  dextrose 5%. 1000 milliLiter(s) (50 mL/Hr) IV Continuous <Continuous>  dextrose 50% Injectable 12.5 Gram(s) IV Push once  dextrose 50% Injectable 25 Gram(s) IV Push once  dextrose 50% Injectable 25 Gram(s) IV Push once  enoxaparin Injectable 40 milliGRAM(s) SubCutaneous daily  escitalopram 5 milliGRAM(s) Oral daily  furosemide    Tablet 20 milliGRAM(s) Oral daily  insulin glargine Injectable (LANTUS) 10 Unit(s) SubCutaneous at bedtime  insulin lispro (HumaLOG) corrective regimen sliding scale   SubCutaneous three times a day before meals  insulin lispro (HumaLOG) corrective regimen sliding scale   SubCutaneous at bedtime  insulin lispro Injectable (HumaLOG) 2 Unit(s) SubCutaneous three times a day before meals  levETIRAcetam 500 milliGRAM(s) Oral two times a day  levothyroxine 88 MICROGram(s) Oral daily  pantoprazole    Tablet 40 milliGRAM(s) Oral before breakfast  simvastatin 20 milliGRAM(s) Oral at bedtime    MEDICATIONS  (PRN):  acetaminophen   Tablet .. 650 milliGRAM(s) Oral every 6 hours PRN Severe Pain (7 - 10)  dextrose 40% Gel 15 Gram(s) Oral once PRN Blood Glucose LESS THAN 70 milliGRAM(s)/deciliter  glucagon  Injectable 1 milliGRAM(s) IntraMuscular once PRN Glucose LESS THAN 70 milligrams/deciliter      Objective:    Vitals: Vital Signs Last 24 Hrs  T(C): 36.7 (11-24-19 @ 06:29), Max: 37.3 (11-23-19 @ 22:19)  T(F): 98.1 (11-24-19 @ 06:29), Max: 99.2 (11-23-19 @ 22:19)  HR: 97 (11-24-19 @ 06:29) (80 - 97)  BP: 154/85 (11-24-19 @ 06:29) (127/78 - 154/85)  BP(mean): --  RR: 16 (11-24-19 @ 06:29) (15 - 16)  SpO2: 95% (11-24-19 @ 06:29) (94% - 96%)              I&O's Summary    23 Nov 2019 07:01  -  24 Nov 2019 07:00  --------------------------------------------------------  IN: 360 mL / OUT: 0 mL / NET: 360 mL        PHYSICAL EXAM:  GENERAL: NAD  HEAD:  Atraumatic, Normocephalic  EYES: EOMI, conjunctiva and sclera clear  CHEST/LUNG: Clear to percussion bilaterally; No rales, rhonchi, wheezing, on 2L NC now  HEART: Regular rate and rhythm; No murmurs, rubs, or gallops  ABDOMEN: Soft, Nontender, Nondistended; no rebound or guarding  SKIN: No rashes or lesions  NERVOUS SYSTEM:  Alert & Oriented X2    LABS:                        9.9    14.24 )-----------( 449      ( 24 Nov 2019 06:32 )             31.0                         9.7    17.95 )-----------( 449      ( 23 Nov 2019 05:25 )             30.7                         10.4   15.24 )-----------( 395      ( 22 Nov 2019 10:18 )             32.9     11-23    141  |  100  |  26<H>  ----------------------------<  204<H>  3.5   |  29  |  1.11  11-22    139  |  100  |  23  ----------------------------<  231<H>  3.7   |  27  |  1.16    Ca    9.4      23 Nov 2019 05:25  Ca    8.9      22 Nov 2019 10:18  Phos  3.1     11-23  Mg     1.9     11-23    TPro  6.4  /  Alb  2.3<L>  /  TBili  0.3  /  DBili  x   /  AST  39  /  ALT  15  /  AlkPhos  116  11-22                      CAPILLARY BLOOD GLUCOSE      POCT Blood Glucose.: 258 mg/dL (23 Nov 2019 22:25)  POCT Blood Glucose.: 187 mg/dL (23 Nov 2019 17:43)  POCT Blood Glucose.: 148 mg/dL (23 Nov 2019 12:52)  POCT Blood Glucose.: 170 mg/dL (23 Nov 2019 08:32)      RADIOLOGY & ADDITIONAL TESTS:  Imaging Personally Reviewed:  [x ] YES  [ ] NO    Consultants involved in case:   Consultant(s) Notes Reviewed:  [ x] YES  [ ] NO:   Care Discussed with Consultants/Other Providers [x ] YES  [ ] NO

## 2019-11-24 NOTE — PROGRESS NOTE ADULT - PROBLEM SELECTOR PLAN 2
Per chart review on R-CP. s/p 2 cycles. Last cycle ~4 weeks ago. Pt's daughter, Dr. Browne, is interested in inpatient chemo.   - Heme/onc following - recommend PT to get stronger prior to chemo

## 2019-11-24 NOTE — PROGRESS NOTE ADULT - ASSESSMENT
Mr. 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

## 2019-11-25 LAB
ANION GAP SERPL CALC-SCNC: 13 MMO/L — SIGNIFICANT CHANGE UP (ref 7–14)
APPEARANCE UR: CLEAR — SIGNIFICANT CHANGE UP
BACTERIA # UR AUTO: NEGATIVE — SIGNIFICANT CHANGE UP
BASOPHILS # BLD AUTO: 0.07 K/UL — SIGNIFICANT CHANGE UP (ref 0–0.2)
BASOPHILS NFR BLD AUTO: 0.3 % — SIGNIFICANT CHANGE UP (ref 0–2)
BILIRUB UR-MCNC: NEGATIVE — SIGNIFICANT CHANGE UP
BLOOD UR QL VISUAL: NEGATIVE — SIGNIFICANT CHANGE UP
BUN SERPL-MCNC: 36 MG/DL — HIGH (ref 7–23)
CALCIUM SERPL-MCNC: 9.7 MG/DL — SIGNIFICANT CHANGE UP (ref 8.4–10.5)
CHLORIDE SERPL-SCNC: 104 MMOL/L — SIGNIFICANT CHANGE UP (ref 98–107)
CO2 SERPL-SCNC: 29 MMOL/L — SIGNIFICANT CHANGE UP (ref 22–31)
COLOR SPEC: YELLOW — SIGNIFICANT CHANGE UP
CREAT SERPL-MCNC: 1.21 MG/DL — SIGNIFICANT CHANGE UP (ref 0.5–1.3)
EOSINOPHIL # BLD AUTO: 0.18 K/UL — SIGNIFICANT CHANGE UP (ref 0–0.5)
EOSINOPHIL NFR BLD AUTO: 0.9 % — SIGNIFICANT CHANGE UP (ref 0–6)
GLUCOSE SERPL-MCNC: 199 MG/DL — HIGH (ref 70–99)
GLUCOSE UR-MCNC: NEGATIVE — SIGNIFICANT CHANGE UP
HCT VFR BLD CALC: 31.4 % — LOW (ref 39–50)
HGB BLD-MCNC: 10 G/DL — LOW (ref 13–17)
HYALINE CASTS # UR AUTO: NEGATIVE — SIGNIFICANT CHANGE UP
IMM GRANULOCYTES NFR BLD AUTO: 4.3 % — HIGH (ref 0–1.5)
KETONES UR-MCNC: NEGATIVE — SIGNIFICANT CHANGE UP
LEUKOCYTE ESTERASE UR-ACNC: NEGATIVE — SIGNIFICANT CHANGE UP
LYMPHOCYTES # BLD AUTO: 1.57 K/UL — SIGNIFICANT CHANGE UP (ref 1–3.3)
LYMPHOCYTES # BLD AUTO: 7.7 % — LOW (ref 13–44)
MAGNESIUM SERPL-MCNC: 1.9 MG/DL — SIGNIFICANT CHANGE UP (ref 1.6–2.6)
MANUAL SMEAR VERIFICATION: SIGNIFICANT CHANGE UP
MCHC RBC-ENTMCNC: 27.9 PG — SIGNIFICANT CHANGE UP (ref 27–34)
MCHC RBC-ENTMCNC: 31.8 % — LOW (ref 32–36)
MCV RBC AUTO: 87.7 FL — SIGNIFICANT CHANGE UP (ref 80–100)
MONOCYTES # BLD AUTO: 1.48 K/UL — HIGH (ref 0–0.9)
MONOCYTES NFR BLD AUTO: 7.2 % — SIGNIFICANT CHANGE UP (ref 2–14)
NEUTROPHILS # BLD AUTO: 16.28 K/UL — HIGH (ref 1.8–7.4)
NEUTROPHILS NFR BLD AUTO: 79.6 % — HIGH (ref 43–77)
NITRITE UR-MCNC: NEGATIVE — SIGNIFICANT CHANGE UP
NRBC # FLD: 0.06 K/UL — SIGNIFICANT CHANGE UP (ref 0–0)
PH UR: 6 — SIGNIFICANT CHANGE UP (ref 5–8)
PHOSPHATE SERPL-MCNC: 3.6 MG/DL — SIGNIFICANT CHANGE UP (ref 2.5–4.5)
PLATELET # BLD AUTO: 557 K/UL — HIGH (ref 150–400)
PMV BLD: 11.7 FL — SIGNIFICANT CHANGE UP (ref 7–13)
POTASSIUM SERPL-MCNC: 3.5 MMOL/L — SIGNIFICANT CHANGE UP (ref 3.5–5.3)
POTASSIUM SERPL-SCNC: 3.5 MMOL/L — SIGNIFICANT CHANGE UP (ref 3.5–5.3)
PROT UR-MCNC: 100 — HIGH
RBC # BLD: 3.58 M/UL — LOW (ref 4.2–5.8)
RBC # FLD: 19.6 % — HIGH (ref 10.3–14.5)
RBC CASTS # UR COMP ASSIST: HIGH (ref 0–?)
SODIUM SERPL-SCNC: 146 MMOL/L — HIGH (ref 135–145)
SP GR SPEC: 1.02 — SIGNIFICANT CHANGE UP (ref 1–1.04)
SQUAMOUS # UR AUTO: SIGNIFICANT CHANGE UP
UROBILINOGEN FLD QL: SIGNIFICANT CHANGE UP
WBC # BLD: 20.47 K/UL — HIGH (ref 3.8–10.5)
WBC # FLD AUTO: 20.47 K/UL — HIGH (ref 3.8–10.5)
WBC UR QL: SIGNIFICANT CHANGE UP (ref 0–?)

## 2019-11-25 PROCEDURE — 99233 SBSQ HOSP IP/OBS HIGH 50: CPT | Mod: GC

## 2019-11-25 PROCEDURE — 99232 SBSQ HOSP IP/OBS MODERATE 35: CPT | Mod: GC

## 2019-11-25 RX ADMIN — ATENOLOL 50 MILLIGRAM(S): 25 TABLET ORAL at 05:26

## 2019-11-25 RX ADMIN — Medication 1: at 08:42

## 2019-11-25 RX ADMIN — INSULIN GLARGINE 12 UNIT(S): 100 INJECTION, SOLUTION SUBCUTANEOUS at 21:51

## 2019-11-25 RX ADMIN — PANTOPRAZOLE SODIUM 40 MILLIGRAM(S): 20 TABLET, DELAYED RELEASE ORAL at 05:26

## 2019-11-25 RX ADMIN — SIMVASTATIN 20 MILLIGRAM(S): 20 TABLET, FILM COATED ORAL at 21:52

## 2019-11-25 RX ADMIN — LEVETIRACETAM 500 MILLIGRAM(S): 250 TABLET, FILM COATED ORAL at 17:56

## 2019-11-25 RX ADMIN — Medication 3 UNIT(S): at 18:38

## 2019-11-25 RX ADMIN — Medication 88 MICROGRAM(S): at 05:26

## 2019-11-25 RX ADMIN — LEVETIRACETAM 500 MILLIGRAM(S): 250 TABLET, FILM COATED ORAL at 05:26

## 2019-11-25 RX ADMIN — Medication 20 MILLIGRAM(S): at 05:26

## 2019-11-25 RX ADMIN — ENOXAPARIN SODIUM 40 MILLIGRAM(S): 100 INJECTION SUBCUTANEOUS at 08:42

## 2019-11-25 RX ADMIN — Medication 3 UNIT(S): at 08:42

## 2019-11-25 RX ADMIN — ESCITALOPRAM OXALATE 5 MILLIGRAM(S): 10 TABLET, FILM COATED ORAL at 08:41

## 2019-11-25 RX ADMIN — Medication 3 UNIT(S): at 13:12

## 2019-11-25 RX ADMIN — AMLODIPINE BESYLATE 10 MILLIGRAM(S): 2.5 TABLET ORAL at 05:26

## 2019-11-25 NOTE — PROGRESS NOTE ADULT - SUBJECTIVE AND OBJECTIVE BOX
Pavithra Alfonso PGY1  -[id number] | [facility] [id number]  =========================    Patient is a 79y old  Male who presents with a chief complaint of AMS (24 Nov 2019 07:02)      INTERVAL HPI/OVERNIGHT EVENTS:  No acute event overnight. This morning pt is awake and alert, able to follow simple commands. Mostly smiles and nods. Able to say "hospital" when asked where he is.       MEDICATIONS  (STANDING):  amLODIPine   Tablet 10 milliGRAM(s) Oral daily  ATENolol  Tablet 50 milliGRAM(s) Oral daily  dextrose 5%. 1000 milliLiter(s) (50 mL/Hr) IV Continuous <Continuous>  dextrose 50% Injectable 12.5 Gram(s) IV Push once  dextrose 50% Injectable 25 Gram(s) IV Push once  dextrose 50% Injectable 25 Gram(s) IV Push once  enoxaparin Injectable 40 milliGRAM(s) SubCutaneous daily  escitalopram 5 milliGRAM(s) Oral daily  furosemide    Tablet 20 milliGRAM(s) Oral daily  insulin glargine Injectable (LANTUS) 12 Unit(s) SubCutaneous at bedtime  insulin lispro (HumaLOG) corrective regimen sliding scale   SubCutaneous three times a day before meals  insulin lispro (HumaLOG) corrective regimen sliding scale   SubCutaneous at bedtime  insulin lispro Injectable (HumaLOG) 3 Unit(s) SubCutaneous three times a day before meals  levETIRAcetam 500 milliGRAM(s) Oral two times a day  levothyroxine 88 MICROGram(s) Oral daily  pantoprazole    Tablet 40 milliGRAM(s) Oral before breakfast  simvastatin 20 milliGRAM(s) Oral at bedtime    MEDICATIONS  (PRN):  acetaminophen   Tablet .. 650 milliGRAM(s) Oral every 6 hours PRN Severe Pain (7 - 10)  dextrose 40% Gel 15 Gram(s) Oral once PRN Blood Glucose LESS THAN 70 milliGRAM(s)/deciliter  glucagon  Injectable 1 milliGRAM(s) IntraMuscular once PRN Glucose LESS THAN 70 milligrams/deciliter      Allergies    No Known Allergies    Intolerances        Vital Signs Last 24 Hrs  T(C): 36.4 (25 Nov 2019 05:24), Max: 36.9 (24 Nov 2019 13:17)  T(F): 97.5 (25 Nov 2019 05:24), Max: 98.4 (24 Nov 2019 13:17)  HR: 93 (25 Nov 2019 05:24) (89 - 93)  BP: 142/85 (25 Nov 2019 05:24) (123/69 - 142/85)  BP(mean): --  RR: 18 (25 Nov 2019 05:24) (15 - 18)  SpO2: 94% (25 Nov 2019 05:24) (94% - 97%)    PHYSICAL EXAM:  GENERAL: NAD. comfortable appearing  CHEST/LUNG: Clear to auscultation; No rales, rhonchi, or wheezing.  Respiratory effort does not appear labored.  HEART: Regular rate and rhythm; S1 and S2,  no murmurs, rubs, or gallops.  ABDOMEN: Soft, not tender to palpation.  No masses or HSM appreciated.  No distension.  Bowel sounds present.  EXTREMITIES:  No clubbing, cyanosis, or edema.  Moves all extremities (R stronger than L)  SKIN: No obvious rashes or lesions.  Turgor okay.  NEURO:  Alert and oriented x 1-2, no focal sensory or  motor deficit,    LABS:                        CAPILLARY BLOOD GLUCOSE      POCT Blood Glucose.: 191 mg/dL (24 Nov 2019 20:58)  POCT Blood Glucose.: 214 mg/dL (24 Nov 2019 17:17)  POCT Blood Glucose.: 280 mg/dL (24 Nov 2019 12:26)  POCT Blood Glucose.: 323 mg/dL (24 Nov 2019 08:29) Pavithra Alfonso PGY1  -0135 | [facility] [id number]  =========================    Patient is a 79y old  Male who presents with a chief complaint of AMS (24 Nov 2019 07:02)      INTERVAL HPI/OVERNIGHT EVENTS:  No acute event overnight. Lantus increased to 12u and humalog increased to 3u yesterday. FS better controlled. This morning pt is awake and alert, able to follow simple commands. Denies CP, abdominal pain, SOB. On room air today and more agitated compared to before.       MEDICATIONS  (STANDING):  amLODIPine   Tablet 10 milliGRAM(s) Oral daily  ATENolol  Tablet 50 milliGRAM(s) Oral daily  dextrose 5%. 1000 milliLiter(s) (50 mL/Hr) IV Continuous <Continuous>  dextrose 50% Injectable 12.5 Gram(s) IV Push once  dextrose 50% Injectable 25 Gram(s) IV Push once  dextrose 50% Injectable 25 Gram(s) IV Push once  enoxaparin Injectable 40 milliGRAM(s) SubCutaneous daily  escitalopram 5 milliGRAM(s) Oral daily  furosemide    Tablet 20 milliGRAM(s) Oral daily  insulin glargine Injectable (LANTUS) 12 Unit(s) SubCutaneous at bedtime  insulin lispro (HumaLOG) corrective regimen sliding scale   SubCutaneous three times a day before meals  insulin lispro (HumaLOG) corrective regimen sliding scale   SubCutaneous at bedtime  insulin lispro Injectable (HumaLOG) 3 Unit(s) SubCutaneous three times a day before meals  levETIRAcetam 500 milliGRAM(s) Oral two times a day  levothyroxine 88 MICROGram(s) Oral daily  pantoprazole    Tablet 40 milliGRAM(s) Oral before breakfast  simvastatin 20 milliGRAM(s) Oral at bedtime    MEDICATIONS  (PRN):  acetaminophen   Tablet .. 650 milliGRAM(s) Oral every 6 hours PRN Severe Pain (7 - 10)  dextrose 40% Gel 15 Gram(s) Oral once PRN Blood Glucose LESS THAN 70 milliGRAM(s)/deciliter  glucagon  Injectable 1 milliGRAM(s) IntraMuscular once PRN Glucose LESS THAN 70 milligrams/deciliter      Allergies    No Known Allergies    Intolerances        Vital Signs Last 24 Hrs  T(C): 36.4 (25 Nov 2019 05:24), Max: 36.9 (24 Nov 2019 13:17)  T(F): 97.5 (25 Nov 2019 05:24), Max: 98.4 (24 Nov 2019 13:17)  HR: 93 (25 Nov 2019 05:24) (89 - 93)  BP: 142/85 (25 Nov 2019 05:24) (123/69 - 142/85)  BP(mean): --  RR: 18 (25 Nov 2019 05:24) (15 - 18)  SpO2: 94% (25 Nov 2019 05:24) (94% - 97%)    PHYSICAL EXAM:  GENERAL: NAD. comfortable appearing  CHEST/LUNG: Clear to auscultation; No rales, rhonchi, or wheezing.  Respiratory effort does not appear labored.  HEART: Regular rate and rhythm; S1 and S2,  no murmurs, rubs, or gallops.  ABDOMEN: Soft, not tender to palpation.  No masses or HSM appreciated.  No distension.  Bowel sounds present.  EXTREMITIES:  No clubbing, cyanosis, or edema.  Moves all extremities (R stronger than L)  SKIN: No obvious rashes or lesions.  Turgor okay.      LABS:                           10.0   x     )-----------( 557      ( 25 Nov 2019 06:36 )             31.4     11-25    146<H>  |  104  |  36<H>  ----------------------------<  199<H>  3.5   |  29  |  1.21    Ca    9.7      25 Nov 2019 06:36  Phos  3.6     11-25  Mg     1.9     11-25                       CAPILLARY BLOOD GLUCOSE      POCT Blood Glucose.: 191 mg/dL (24 Nov 2019 20:58)  POCT Blood Glucose.: 214 mg/dL (24 Nov 2019 17:17)  POCT Blood Glucose.: 280 mg/dL (24 Nov 2019 12:26)  POCT Blood Glucose.: 323 mg/dL (24 Nov 2019 08:29)

## 2019-11-25 NOTE — PROGRESS NOTE ADULT - PROBLEM SELECTOR PLAN 2
Per chart review on R-CP. s/p 2 cycles. Last cycle ~4 weeks ago. Pt's daughter, Dr. Browne, is interested in inpatient chemo.   - Heme/onc following - recommend PT to get stronger prior to chemo Per chart review on R-CP. s/p 2 cycles. Last cycle ~4 weeks ago. Pt's daughter, Dr. Browne, is interested in inpatient chemo, however patient very weak, plan for [facility] and reassess if patient can be a candidate for inpatient rehab in the future after following up with heme/onc Per chart review on R-CP. s/p 2 cycles. Last cycle ~4 weeks ago. Pt's daughter, Dr. Browne, is interested in inpatient chemo, however patient very weak so plan for L[facility]ONNE and reassess if patient can be a candidate for inpatient chemo in the future after following up with heme/onc outpatient

## 2019-11-25 NOTE — PROGRESS NOTE ADULT - ATTENDING COMMENTS
Pt seen and examined, chart and labs reviewed.   79M with h/o HTN, DM2, hypothyroid, CAD/stents, PUD s/p H. pylori treatment, recent dx of DLBCL at [facility] on Oct 2019 s/p 2 cycles of R-CD, recent fall with R frontal and occipital SDH with no evacuation, LUE DVT and b/l LE DVT on prophylactic Lovenox, p/w acute encephalopathy and had witnessed seizure activity at home and again had tonic clonic seizure in ER s/p Ativan 2mg IVx1.     Assessment/plan:  #Metabolic encephalopathy with seizure activity: multifactorial, ?postictal vs. infectious  -mental status wax/wanes, per daughter when family is around his mental status is better   -MRI w/ contrast no CNS lymphoma  -video EEG negative for seizure, c/w keppra  -stable SDH, no neurosx intervention, resumed on  prophylactic lovenox  -MRI brain w contrast no lymphoma, stable SDH, chronic L MCA infarct  -will repeat CT chest w/ persistent leukocytosis  # HTN: controlled on atenolol, norvasc, d/c lasix w/ mild hypernatremia  and azotemia  # Leukocytosis, Possible UL pneumonia: completed 5 day course of abx (vanco/zosyn) 11/19, repeat CT chest, f/u ID  #DLBCL: s/p 2 cycles of R-CD with interval improvement in lymphadenopathy on scans from 11/14.    CT shows response to chemo with reduced lymphadenopathy and spleen size  poor functional and mental status, pt's daughter who's an oncologist Dr. Browne would like pt to get one dose of chemo(rituxan, cytoxan, decadron) inpt before he goes to rehab. I d/w heme fellow Dr. Conway, will see pt today and assess,  f/u hematology  dc plan to rehab if no plan for inpt chemo  # Severe protein/calorie malnutrition: c/w glucerna and encourage intake  # Uncontrolled DM2: now contorlled on lantus to 12 U hs and humalog 3 U ac  , A1c 7%, monitor FS  #H/o SIADH, now hypernatremia: liberalize fluid intake, d/c lasix, trend Na  # Dispo: pt is very weak/debilitated, PT is recommending rehab

## 2019-11-25 NOTE — PROGRESS NOTE ADULT - PROBLEM SELECTOR PLAN 3
-  lantus 10u qhs increased to 12 units since pt was hyperglycemic (FS in 300s today)  - humalog 2u with meals, increased to 3 u  - FS    #hypothyroidism  -c/w IV levothyroxine 44mg daily -  lantus 10u qhs increased to 12 units since pt was hyperglycemic   - humalog 2u with meals, increased to 3 u  - FS    #hypothyroidism  -c/w IV levothyroxine 44mg daily

## 2019-11-25 NOTE — PROGRESS NOTE ADULT - ASSESSMENT
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 prolonged FRANCES state (? 2/2 prolonged hypercalcemic state) that has now resolved vs. loss of body mass  - Serum Cr is stable   - Avoid nephrotoxins agent  - renal diet  - monitor bmp    Hyponatremia  - ? etiology, presented with Na 122  - work up suggested SIADH  - Serum sodium improved   - now hypernatremia, Na tab d/c'd  - hypernatremia likely sec to poor po intake. encourage increase oral intake  - Continue lasix 20mg oral daily  - s/p Tolvaptan x 1 on 11/18  - Continue free water restriction <1L/day  - Promote oral solute/food intake   - optimize dm control  - Monitor serum sodium.   - avoid hypotonic solutions    HTN  - has Hx HTN but was on midodrine for orthostatic hypotension in the recent past   - BP was elevated therefore restarted on home BP medications   - Currently BP controlled   - monitor bp    Hypocalcemia  - Has Hx hypercalcemia of malignancy  - low alb corrected alexander is optimal  - monitor    Pl. effusion b/l/ Anasarca   mod effusion seen on ct chest  Now on lasix 20mg PO daily.   Pt is clinically improving. Continue at present  Monitor

## 2019-11-25 NOTE — PROGRESS NOTE ADULT - SUBJECTIVE AND OBJECTIVE BOX
[facility]   MD UYEN HERNANDEZ DO MARYANNE SOURIAL, LEATHA NICOLE    TEL:  OFFICE: [phone]  DR CASAREZ CELL: [phone]  DR. HERNANDEZ CELL: [phone]  DR. REYNOSO CELL: [phone]  CHUCKIE TERRAZAS CELL: [phone]        Patient is a 79y old  Male who presents with a chief complaint of AMS (25 Nov 2019 06:49)      Patient seen and examined at bedside. is agitated but able to answer some questions today    VITALS:  T(F): 98.1 (11-25-19 @ 11:14), Max: 98.4 (11-24-19 @ 13:17)  HR: 82 (11-25-19 @ 11:14)  BP: 115/63 (11-25-19 @ 11:14)  RR: 18 (11-25-19 @ 11:14)  SpO2: 96% (11-25-19 @ 11:14)  Wt(kg): --        PHYSICAL EXAM:  Constitutional: NAD  Neck: No JVD  Respiratory: crackles   Cardiovascular: S1, S2, RRR  Gastrointestinal: BS+, soft, NT/ND  Extremities: No peripheral edema LE, right UE swelling    Hospital Medications:   MEDICATIONS  (STANDING):  amLODIPine   Tablet 10 milliGRAM(s) Oral daily  ATENolol  Tablet 50 milliGRAM(s) Oral daily  dextrose 5%. 1000 milliLiter(s) (50 mL/Hr) IV Continuous <Continuous>  dextrose 50% Injectable 12.5 Gram(s) IV Push once  dextrose 50% Injectable 25 Gram(s) IV Push once  dextrose 50% Injectable 25 Gram(s) IV Push once  enoxaparin Injectable 40 milliGRAM(s) SubCutaneous daily  escitalopram 5 milliGRAM(s) Oral daily  insulin glargine Injectable (LANTUS) 12 Unit(s) SubCutaneous at bedtime  insulin lispro (HumaLOG) corrective regimen sliding scale   SubCutaneous three times a day before meals  insulin lispro (HumaLOG) corrective regimen sliding scale   SubCutaneous at bedtime  insulin lispro Injectable (HumaLOG) 3 Unit(s) SubCutaneous three times a day before meals  levETIRAcetam 500 milliGRAM(s) Oral two times a day  levothyroxine 88 MICROGram(s) Oral daily  pantoprazole    Tablet 40 milliGRAM(s) Oral before breakfast  simvastatin 20 milliGRAM(s) Oral at bedtime      LABS:  11-25    146<H>  |  104  |  36<H>  ----------------------------<  199<H>  3.5   |  29  |  1.21    Ca    9.7      25 Nov 2019 06:36  Phos  3.6     11-25  Mg     1.9     11-25      Creatinine Trend: 1.21 <--, 1.18 <--, 1.11 <--, 1.16 <--, 1.03 <--, 1.06 <--, 0.95 <--    Phosphorus Level, Serum: 3.6 mg/dL (11-25 @ 06:36)                              10.0   20.47 )-----------( 557      ( 25 Nov 2019 06:36 )             31.4     Urine Studies:  Urinalysis - [11-14-19 @ 13:10]      Color YELLOW / Appearance Lt TURBID / SG 1.021 / pH 6.5      Gluc 500 / Ketone NEGATIVE  / Bili NEGATIVE / Urobili NORMAL       Blood SMALL / Protein 200 / Leuk Est SMALL / Nitrite NEGATIVE      RBC 3-5 / WBC >50 / Hyaline 3+ / Gran  / Sq Epi MODERATE / Non Sq Epi  / Bacteria NEGATIVE      Iron 38, TIBC 234, %sat --      [09-12-19 @ 06:08]  Ferritin 214.6      [09-12-19 @ 06:08]  PTH 20.00 (Ca --)      [09-03-19 @ 05:45]   --  PTH 25.46 (Ca --)      [09-01-19 @ 06:00]   --  Vitamin D (25OH) 34.9      [09-03-19 @ 05:45]  HbA1c 7.0      [09-13-19 @ 06:50]  TSH 12.33      [11-14-19 @ 14:10]  Lipid: chol 121, TG 96, HDL 33, LDL 74      [08-31-19 @ 07:00]    HBsAb Reactive      [11-23-19 @ 05:25]  HBsAg Nonreactive      [11-23-19 @ 05:25]  HBcAb Reactive      [11-23-19 @ 05:25]  HCV 0.12, Nonreactive Hepatitis C AB  S/CO Ratio                        Interpretation  < 1.00                                   Non-Reactive  1.00 - 4.99                         Weakly-Reactive  >= 5.00                                Reactive  Non-Reactive: Aperson with a non-reactive HCV antibody  result is considered uninfected.  No further action is  needed unless recent infection is suspected.  In these  cases, consider repeat testing later to detect  seroconversion..  Weakly-Reactive: HCV antibody test is abnormal, HCV RNA  Qualitative test will follow.  Reactive: HCV antibody test is abnormal, HCV RNA  Qualitative test will follow.  Note: HCV antibody testing is performed on the Abbott   system.      [11-23-19 @ 05:25]  HIV Nonreactive The HIV Ag/Ab Combo test performed screens for HIV-1 p24  antigen, antibodies to HIV-1 (group M and group O), and  antibodies to HIV-2. All specimens repeatedly reactive  will reflex to an HIV 1/2 antibody confirmation and  differentiation test. This assay detects p24 antigen which  may be present prior to the development of HIV antibodies,  therefore a reactive result with a negative HIV 1/2 AB  Confirmation should be followed up with HIV-1 RNA, HIV-2  RNA and repeat testing in 4-8 weeks. A nonreactive result  does not preclude previous exposure to or infection with  HIV-1 or HIV-2. [facility] prohibits disclosure of this  result to any unauthorized party.      [11-22-19 @ 10:18]      RADIOLOGY & ADDITIONAL STUDIES:

## 2019-11-25 NOTE — PROGRESS NOTE ADULT - ASSESSMENT
----------INCOMPLETE NOTE -----------------    79 yo M hx DLBCL (dx 9/2019) s/p 2 cycles of R-CP (last ~4weeks ago) at [place], DM2, recent admission at [facility] (10/29-11/5) for SDH s/p fall found to have b/l LE and LUE DVT (not on AC) p/w AMS and witnessed seizure.      1. DLBCL:   - pt was admitted to [facility] in 8-9/2019, was found to be hypercalcemic and CT C/A/P (9/10/19) showed scattered b/l pulmonary nodules, mediastinal lymphadenopathy, retroperitoneal lymphadenopathy, and splenic lesions/enlargement concerning for lymphoma.  LN biopsy consistent with DLBCL, germinal cell type, neg for bcl-2, bcl-6, myc.  Per daughter, no outpatient PET scan and biopsy was done as he was started on treatment right away.  He sees Dr. Jovan Poole at [place] and was started on Rituxan-Cytoxan-Dex (R-CD). He has received 2 cycles thus far, last cycle ~ 4 weeks ago, and has not been able to f/u due to recent hospitalizations. Reports pt had thoracentesis in the past and fluid was negative for malignant cells.   - CT A/P non contrast now shows treatment response with decrease in size of the spleen and resolution of retroperitoneal adenopathy.  MRI brain with contrast without lesions, SDH stable.  - Pt is currently with poor performance status, mental status improving, waxing and waning but overall improving.  - biopsy slides received in our pathology department, currently being verified by Dr. Seymour  - appreciate PT evaluation, if pt continues to improve, will consider chemo treatment this week     2. Leukocytosis  - mostly neutrophilic, likely reactive  - r/o infection, appreciate ID consult    3. DVTs: b/l LE (calf) DVTs and LUE DVT  - repeat b/l LE US now shows unchanged subacute DVT  - ppx AC started on 11/6 as per neurosurgery    Sarah Gomez  Hematology Fellow  [phone] 81 yo M hx DLBCL (dx 9/2019) s/p 2 cycles of R-CP (last ~4weeks ago) at [place], DM2, recent admission at [facility] (10/29-11/5) for SDH s/p fall found to have b/l LE and LUE DVT (not on AC) p/w AMS and witnessed seizure.      1. DLBCL:   - pt was admitted to [facility] in 8-9/2019, was found to be hypercalcemic and CT C/A/P (9/10/19) showed scattered b/l pulmonary nodules, mediastinal lymphadenopathy, retroperitoneal lymphadenopathy, and splenic lesions/enlargement concerning for lymphoma.  LN biopsy consistent with DLBCL, germinal cell type, neg for bcl-2, bcl-6, myc.  Per daughter, no outpatient PET scan and biopsy was done as he was started on treatment right away.  He sees Dr. Jovan Poole at [place] and was started on Rituxan-Cytoxan-Dex (R-CD). He has received 2 cycles thus far, last cycle ~ 4 weeks ago, and has not been able to f/u due to recent hospitalizations. Reports pt had thoracentesis in the past and fluid was negative for malignant cells.   - CT A/P non contrast now shows treatment response with decrease in size of the spleen and resolution of retroperitoneal adenopathy.  MRI brain with contrast without lesions, SDH stable.  - Pt is currently with poor performance status, mental status improving, waxing and waning but overall improving.  - biopsy slides received in our pathology department, currently being verified by Dr. Seymour  - appreciate PT evaluation, if pt continues to improve, will consider chemo treatment this week     2. Leukocytosis  - mostly neutrophilic, likely reactive  - r/o infection, appreciate ID consult    3. DVTs: b/l LE (calf) DVTs and LUE DVT  - repeat b/l LE US now shows unchanged subacute DVT  - ppx AC started on 11/6 as per neurosurgery    Sarah Gomez  Hematology Fellow  [phone] 79 yo M hx DLBCL (dx 9/2019) s/p 2 cycles of R-CP (last ~4weeks ago) at [place], DM2, recent admission at [facility] (10/29-11/5) for SDH s/p fall found to have b/l LE and LUE DVT (not on AC) p/w AMS and witnessed seizure.      1. DLBCL:   - pt was admitted to [facility] in 8-9/2019, was found to be hypercalcemic and CT C/A/P (9/10/19) showed scattered b/l pulmonary nodules, mediastinal lymphadenopathy, retroperitoneal lymphadenopathy, and splenic lesions/enlargement concerning for lymphoma.  LN biopsy consistent with DLBCL, germinal cell type, neg for bcl-2, bcl-6, myc.  Per daughter, no outpatient PET scan and biopsy was done as he was started on treatment right away.  He sees Dr. Jovan Poole at [place] and was started on Rituxan-Cytoxan-Dex (R-CD). He has received 2 cycles thus far, last cycle ~ 4 weeks ago, and has not been able to f/u due to recent hospitalizations. Reports pt had thoracentesis in the past and fluid was negative for malignant cells.   - CT A/P non contrast now shows treatment response with decrease in size of the spleen and resolution of retroperitoneal adenopathy.  MRI brain with contrast without lesions, SDH stable.  - Pt is currently with poor performance status, mental status improving, waxing and waning but overall improving.  - biopsy slides received in our pathology department, confirmed by Dr. Seymour  - appreciate PT evaluation, will plan for inpatient chemo treatment this week     2. Leukocytosis  - mostly neutrophilic, likely reactive  - r/o infection, appreciate ID consult  -CT chest pending    3. DVTs: b/l LE (calf) DVTs and LUE DVT  - repeat b/l LE US now shows unchanged subacute DVT  - ppx AC started on 11/6 as per neurosurgery    Sarah Gomez  Hematology Fellow  [phone]

## 2019-11-25 NOTE — PROGRESS NOTE ADULT - ATTENDING COMMENTS
80 yom DLBCL w recent complicated course with persistent altered mental status  -was slightly better on Friday, but doesn't seem to answer appropriately today  -unclear etiology of thrombocytosis and leukocytosis - reassess for infection given rising wbc  -encephalopathy is multifactorial and lymphoma on scans are improving  -I think given the complicated course and his recent clinical decline makes it more difficult to treat him with chemotherapy  -however continuation of therapy may allow for some slight improvement; if improvement is seen, would favor adding etoposide and vincristine to regimen and giving  RCOEP  -plan for treatment prior to discharge   -will discuss plan with patient daughter, Dr Browne.

## 2019-11-25 NOTE — PROGRESS NOTE ADULT - SUBJECTIVE AND OBJECTIVE BOX
INTERVAL HPI/OVERNIGHT EVENTS:  Patient S&E at bedside.  Appears uncomfortable, mildly tachypneic, AAO x 0 today.     VITAL SIGNS:  T(F): 98.1 (11-25-19 @ 11:14)  HR: 82 (11-25-19 @ 11:14)  BP: 115/63 (11-25-19 @ 11:14)  RR: 18 (11-25-19 @ 11:14)  SpO2: 96% (11-25-19 @ 11:14)  Wt(kg): --    PHYSICAL EXAM:  Constitutional: NAD  Eyes: EOMI, sclera non-icteric  Neck: supple, no masses, no JVD  Respiratory: CTA b/l, good air entry b/l  Cardiovascular: RRR, no M/R/G  Gastrointestinal: soft, NTND, no masses palpable, + BS, no hepatosplenomegaly  Extremities: no c/c/e  Neurological: AAOx3      MEDICATIONS  (STANDING):  amLODIPine   Tablet 10 milliGRAM(s) Oral daily  ATENolol  Tablet 50 milliGRAM(s) Oral daily  dextrose 5%. 1000 milliLiter(s) (50 mL/Hr) IV Continuous <Continuous>  dextrose 50% Injectable 12.5 Gram(s) IV Push once  dextrose 50% Injectable 25 Gram(s) IV Push once  dextrose 50% Injectable 25 Gram(s) IV Push once  enoxaparin Injectable 40 milliGRAM(s) SubCutaneous daily  escitalopram 5 milliGRAM(s) Oral daily  insulin glargine Injectable (LANTUS) 12 Unit(s) SubCutaneous at bedtime  insulin lispro (HumaLOG) corrective regimen sliding scale   SubCutaneous three times a day before meals  insulin lispro (HumaLOG) corrective regimen sliding scale   SubCutaneous at bedtime  insulin lispro Injectable (HumaLOG) 3 Unit(s) SubCutaneous three times a day before meals  levETIRAcetam 500 milliGRAM(s) Oral two times a day  levothyroxine 88 MICROGram(s) Oral daily  pantoprazole    Tablet 40 milliGRAM(s) Oral before breakfast  simvastatin 20 milliGRAM(s) Oral at bedtime    MEDICATIONS  (PRN):  acetaminophen   Tablet .. 650 milliGRAM(s) Oral every 6 hours PRN Severe Pain (7 - 10)  dextrose 40% Gel 15 Gram(s) Oral once PRN Blood Glucose LESS THAN 70 milliGRAM(s)/deciliter  glucagon  Injectable 1 milliGRAM(s) IntraMuscular once PRN Glucose LESS THAN 70 milligrams/deciliter      Allergies  No Known Allergies    Intolerances        LABS:                        10.0   20.47 )-----------( 557      ( 25 Nov 2019 06:36 )             31.4     11-25    146<H>  |  104  |  36<H>  ----------------------------<  199<H>  3.5   |  29  |  1.21    Ca    9.7      25 Nov 2019 06:36  Phos  3.6     11-25  Mg     1.9     11-25            RADIOLOGY & ADDITIONAL TESTS:  Studies reviewed.    ASSESSMENT & PLAN: INTERVAL HPI/OVERNIGHT EVENTS:  Patient S&E at bedside.  Appears uncomfortable, mildly tachypneic, AAO x 0 today.     VITAL SIGNS:  T(F): 98.1 (11-25-19 @ 11:14)  HR: 82 (11-25-19 @ 11:14)  BP: 115/63 (11-25-19 @ 11:14)  RR: 18 (11-25-19 @ 11:14)  SpO2: 96% (11-25-19 @ 11:14)  Wt(kg): --    PHYSICAL EXAM:  Constitutional: NAD  Eyes: EOMI, sclera non-icteric  Neck: supple, no masses, no JVD  Respiratory: CTA b/l, good air entry b/l  Cardiovascular: RRR, no M/R/G  Gastrointestinal: soft, NTND, no masses palpable, + BS, no hepatosplenomegaly  Extremities: no c/c/e  Neurological: AAOx0      MEDICATIONS  (STANDING):  amLODIPine   Tablet 10 milliGRAM(s) Oral daily  ATENolol  Tablet 50 milliGRAM(s) Oral daily  dextrose 5%. 1000 milliLiter(s) (50 mL/Hr) IV Continuous <Continuous>  dextrose 50% Injectable 12.5 Gram(s) IV Push once  dextrose 50% Injectable 25 Gram(s) IV Push once  dextrose 50% Injectable 25 Gram(s) IV Push once  enoxaparin Injectable 40 milliGRAM(s) SubCutaneous daily  escitalopram 5 milliGRAM(s) Oral daily  insulin glargine Injectable (LANTUS) 12 Unit(s) SubCutaneous at bedtime  insulin lispro (HumaLOG) corrective regimen sliding scale   SubCutaneous three times a day before meals  insulin lispro (HumaLOG) corrective regimen sliding scale   SubCutaneous at bedtime  insulin lispro Injectable (HumaLOG) 3 Unit(s) SubCutaneous three times a day before meals  levETIRAcetam 500 milliGRAM(s) Oral two times a day  levothyroxine 88 MICROGram(s) Oral daily  pantoprazole    Tablet 40 milliGRAM(s) Oral before breakfast  simvastatin 20 milliGRAM(s) Oral at bedtime    MEDICATIONS  (PRN):  acetaminophen   Tablet .. 650 milliGRAM(s) Oral every 6 hours PRN Severe Pain (7 - 10)  dextrose 40% Gel 15 Gram(s) Oral once PRN Blood Glucose LESS THAN 70 milliGRAM(s)/deciliter  glucagon  Injectable 1 milliGRAM(s) IntraMuscular once PRN Glucose LESS THAN 70 milligrams/deciliter      Allergies  No Known Allergies    Intolerances        LABS:                        10.0   20.47 )-----------( 557      ( 25 Nov 2019 06:36 )             31.4     11-25    146<H>  |  104  |  36<H>  ----------------------------<  199<H>  3.5   |  29  |  1.21    Ca    9.7      25 Nov 2019 06:36  Phos  3.6     11-25  Mg     1.9     11-25            RADIOLOGY & ADDITIONAL TESTS:  Studies reviewed.    ASSESSMENT & PLAN:

## 2019-11-25 NOTE — PROGRESS NOTE ADULT - ASSESSMENT
Mr. 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 Mr. 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 Pending LAVONNE placement.

## 2019-11-26 DIAGNOSIS — E87.0 HYPEROSMOLALITY AND HYPERNATREMIA: ICD-10-CM

## 2019-11-26 LAB
ANION GAP SERPL CALC-SCNC: 14 MMO/L — SIGNIFICANT CHANGE UP (ref 7–14)
ANION GAP SERPL CALC-SCNC: 17 MMO/L — HIGH (ref 7–14)
BASOPHILS # BLD AUTO: 0.07 K/UL — SIGNIFICANT CHANGE UP (ref 0–0.2)
BASOPHILS NFR BLD AUTO: 0.4 % — SIGNIFICANT CHANGE UP (ref 0–2)
BUN SERPL-MCNC: 40 MG/DL — HIGH (ref 7–23)
BUN SERPL-MCNC: 40 MG/DL — HIGH (ref 7–23)
CALCIUM SERPL-MCNC: 10 MG/DL — SIGNIFICANT CHANGE UP (ref 8.4–10.5)
CALCIUM SERPL-MCNC: 9 MG/DL — SIGNIFICANT CHANGE UP (ref 8.4–10.5)
CHLORIDE SERPL-SCNC: 104 MMOL/L — SIGNIFICANT CHANGE UP (ref 98–107)
CHLORIDE SERPL-SCNC: 106 MMOL/L — SIGNIFICANT CHANGE UP (ref 98–107)
CO2 SERPL-SCNC: 24 MMOL/L — SIGNIFICANT CHANGE UP (ref 22–31)
CO2 SERPL-SCNC: 31 MMOL/L — SIGNIFICANT CHANGE UP (ref 22–31)
CREAT SERPL-MCNC: 1.16 MG/DL — SIGNIFICANT CHANGE UP (ref 0.5–1.3)
CREAT SERPL-MCNC: 1.25 MG/DL — SIGNIFICANT CHANGE UP (ref 0.5–1.3)
EOSINOPHIL # BLD AUTO: 0.33 K/UL — SIGNIFICANT CHANGE UP (ref 0–0.5)
EOSINOPHIL NFR BLD AUTO: 1.7 % — SIGNIFICANT CHANGE UP (ref 0–6)
GLUCOSE SERPL-MCNC: 152 MG/DL — HIGH (ref 70–99)
GLUCOSE SERPL-MCNC: 154 MG/DL — HIGH (ref 70–99)
HCT VFR BLD CALC: 32.9 % — LOW (ref 39–50)
HGB BLD-MCNC: 10.2 G/DL — LOW (ref 13–17)
IMM GRANULOCYTES NFR BLD AUTO: 4.7 % — HIGH (ref 0–1.5)
LYMPHOCYTES # BLD AUTO: 1.92 K/UL — SIGNIFICANT CHANGE UP (ref 1–3.3)
LYMPHOCYTES # BLD AUTO: 9.9 % — LOW (ref 13–44)
MAGNESIUM SERPL-MCNC: 2 MG/DL — SIGNIFICANT CHANGE UP (ref 1.6–2.6)
MAGNESIUM SERPL-MCNC: 2.1 MG/DL — SIGNIFICANT CHANGE UP (ref 1.6–2.6)
MCHC RBC-ENTMCNC: 26.8 PG — LOW (ref 27–34)
MCHC RBC-ENTMCNC: 31 % — LOW (ref 32–36)
MCV RBC AUTO: 86.4 FL — SIGNIFICANT CHANGE UP (ref 80–100)
MONOCYTES # BLD AUTO: 1.18 K/UL — HIGH (ref 0–0.9)
MONOCYTES NFR BLD AUTO: 6.1 % — SIGNIFICANT CHANGE UP (ref 2–14)
NEUTROPHILS # BLD AUTO: 14.95 K/UL — HIGH (ref 1.8–7.4)
NEUTROPHILS NFR BLD AUTO: 77.2 % — HIGH (ref 43–77)
NRBC # FLD: 0.09 K/UL — SIGNIFICANT CHANGE UP (ref 0–0)
PHOSPHATE SERPL-MCNC: 3.5 MG/DL — SIGNIFICANT CHANGE UP (ref 2.5–4.5)
PHOSPHATE SERPL-MCNC: 4 MG/DL — SIGNIFICANT CHANGE UP (ref 2.5–4.5)
PLATELET # BLD AUTO: 603 K/UL — HIGH (ref 150–400)
PMV BLD: 11.9 FL — SIGNIFICANT CHANGE UP (ref 7–13)
POTASSIUM SERPL-MCNC: 3.1 MMOL/L — LOW (ref 3.5–5.3)
POTASSIUM SERPL-MCNC: SIGNIFICANT CHANGE UP MMOL/L (ref 3.5–5.3)
POTASSIUM SERPL-SCNC: 3.1 MMOL/L — LOW (ref 3.5–5.3)
POTASSIUM SERPL-SCNC: SIGNIFICANT CHANGE UP MMOL/L (ref 3.5–5.3)
RBC # BLD: 3.81 M/UL — LOW (ref 4.2–5.8)
RBC # FLD: 19.4 % — HIGH (ref 10.3–14.5)
SODIUM SERPL-SCNC: 147 MMOL/L — HIGH (ref 135–145)
SODIUM SERPL-SCNC: 149 MMOL/L — HIGH (ref 135–145)
SPECIMEN SOURCE: SIGNIFICANT CHANGE UP
WBC # BLD: 19.37 K/UL — HIGH (ref 3.8–10.5)
WBC # FLD AUTO: 19.37 K/UL — HIGH (ref 3.8–10.5)

## 2019-11-26 PROCEDURE — 99233 SBSQ HOSP IP/OBS HIGH 50: CPT | Mod: GC

## 2019-11-26 PROCEDURE — 70490 CT SOFT TISSUE NECK W/O DYE: CPT | Mod: 26

## 2019-11-26 PROCEDURE — 93010 ELECTROCARDIOGRAM REPORT: CPT

## 2019-11-26 PROCEDURE — 71250 CT THORAX DX C-: CPT | Mod: 26

## 2019-11-26 RX ORDER — POTASSIUM CHLORIDE 20 MEQ
40 PACKET (EA) ORAL ONCE
Refills: 0 | Status: COMPLETED | OUTPATIENT
Start: 2019-11-26 | End: 2019-11-26

## 2019-11-26 RX ORDER — SODIUM CHLORIDE 9 MG/ML
1000 INJECTION, SOLUTION INTRAVENOUS
Refills: 0 | Status: DISCONTINUED | OUTPATIENT
Start: 2019-11-26 | End: 2019-11-27

## 2019-11-26 RX ADMIN — Medication 3 UNIT(S): at 13:06

## 2019-11-26 RX ADMIN — SIMVASTATIN 20 MILLIGRAM(S): 20 TABLET, FILM COATED ORAL at 22:09

## 2019-11-26 RX ADMIN — ESCITALOPRAM OXALATE 5 MILLIGRAM(S): 10 TABLET, FILM COATED ORAL at 09:08

## 2019-11-26 RX ADMIN — INSULIN GLARGINE 12 UNIT(S): 100 INJECTION, SOLUTION SUBCUTANEOUS at 22:08

## 2019-11-26 RX ADMIN — Medication 88 MICROGRAM(S): at 05:54

## 2019-11-26 RX ADMIN — Medication 40 MILLIEQUIVALENT(S): at 09:07

## 2019-11-26 RX ADMIN — ENOXAPARIN SODIUM 40 MILLIGRAM(S): 100 INJECTION SUBCUTANEOUS at 09:08

## 2019-11-26 RX ADMIN — AMLODIPINE BESYLATE 10 MILLIGRAM(S): 2.5 TABLET ORAL at 05:54

## 2019-11-26 RX ADMIN — ATENOLOL 50 MILLIGRAM(S): 25 TABLET ORAL at 05:54

## 2019-11-26 RX ADMIN — PANTOPRAZOLE SODIUM 40 MILLIGRAM(S): 20 TABLET, DELAYED RELEASE ORAL at 05:54

## 2019-11-26 RX ADMIN — Medication 3 UNIT(S): at 18:06

## 2019-11-26 RX ADMIN — Medication 3 UNIT(S): at 09:08

## 2019-11-26 RX ADMIN — LEVETIRACETAM 500 MILLIGRAM(S): 250 TABLET, FILM COATED ORAL at 18:06

## 2019-11-26 RX ADMIN — LEVETIRACETAM 500 MILLIGRAM(S): 250 TABLET, FILM COATED ORAL at 05:54

## 2019-11-26 RX ADMIN — SODIUM CHLORIDE 75 MILLILITER(S): 9 INJECTION, SOLUTION INTRAVENOUS at 10:31

## 2019-11-26 NOTE — PROGRESS NOTE ADULT - ASSESSMENT
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 prolonged FRANCES state (? 2/2 prolonged hypercalcemic state) that has now resolved vs. loss of body mass  - Serum Cr is stable   - Avoid nephrotoxins agent  - renal diet  - monitor bmp    Hyponatremia  - ? etiology, presented with Na 122  - work up suggested SIADH  - Serum sodium improved   - now hypernatremia, Na tab and lasix d/c'd  - hypernatremia is worsen today likely sec to poor PO intake, did receive lasix 20 11/25. metal status seems to be better today  - started on D5W @ 75cc/hr x 10 hrs by team  - Promote oral solute/food intake   - Monitor serum sodium and fluid status closely   - avoid hypotonic solutions    HTN  - has Hx HTN but was on midodrine for orthostatic hypotension in the recent past   - BP was elevated therefore restarted on home BP medications   - Currently BP controlled   - monitor bp    Hypocalcemia  - Has Hx hypercalcemia of malignancy  - low alb corrected alexander is optimal  - monitor    Pl. effusion b/l/ Anasarca   mod effusion seen on ct chest  was on lasix 20mg PO daily. (last dose 11/25)   patient clinically improving  Monitor     Hypokalemia  supplemented  monitor

## 2019-11-26 NOTE — PROGRESS NOTE ADULT - ATTENDING COMMENTS
Pt seen and examined, chart and labs reviewed.   79M with h/o HTN, DM2, hypothyroid, CAD/stents, PUD s/p H. pylori treatment, recent dx of DLBCL at [place] on Oct 2019 s/p 2 cycles of R-CD, recent fall with R frontal and occipital SDH with no evacuation, LUE DVT and b/l LE DVT on prophylactic Lovenox, p/w acute encephalopathy and had witnessed seizure activity at home and again had tonic clonic seizure in ER s/p Ativan 2mg IVx1.     Assessment/plan:  #Metabolic encephalopathy with seizure activity: multifactorial, ?postictal vs. infectious  -mental status wax/wanes, still confused at times   -MRI w/ contrast no CNS lymphoma  -video EEG negative for seizure, c/w keppra  -stable SDH, no neurosx intervention, resumed on  prophylactic lovenox  -MRI brain w contrast no lymphoma, stable SDH, chronic L MCA infarct  -expedite CT chest/neck to r/o recurrent aspiration pneumonia  # HTN: controlled on atenolol, norvasc,   # Hypernatremia: off lasix, liberalize fluid intake, D5W at 75 ml/h x10 h, trend BMP  # Leukocytosis, UL pneumonia: completed 5 day course of abx (vanco/zosyn) 11/19, repeat CT chest/neck, f/u ID  #DLBCL: s/p 2 cycles of R-CD with interval improvement in lymphadenopathy on scans from 11/14.    CT shows response to chemo with reduced lymphadenopathy and spleen size  poor functional and mental status, pt's daughter who's an oncologist Dr. Browne would like pt to get one dose of chemo (RCD) inpt before he goes to rehab. Heme is considering inpt chemo if his condition improves before discharge     # Severe protein/calorie malnutrition: c/w glucerna and encourage intake, if recurrent aspiration pna on CT, will call GI for PEG, case d/w daughter Dr. Browne  # Uncontrolled DM2: now contorlled on lantus to 12 U hs and humalog 3 U ac, A1c 7%, monitor FS  #H/o hyponatremia, now hypernatremia: D5W and encourage free water intake as above  #Dispo: pt is very weak/debilitated, PT is recommending rehab

## 2019-11-26 NOTE — PROGRESS NOTE ADULT - SUBJECTIVE AND OBJECTIVE BOX
Pavithra Alfonso PGY1  -[id number] | [facility] [id number]  =========================    Patient is a 79y old  Male who presents with a chief complaint of AMS (2019 13:30)      INTERVAL HPI/OVERNIGHT EVENTS:  No acute events overnight.       MEDICATIONS  (STANDING):  amLODIPine   Tablet 10 milliGRAM(s) Oral daily  ATENolol  Tablet 50 milliGRAM(s) Oral daily  dextrose 5%. 1000 milliLiter(s) (50 mL/Hr) IV Continuous <Continuous>  dextrose 50% Injectable 12.5 Gram(s) IV Push once  dextrose 50% Injectable 25 Gram(s) IV Push once  dextrose 50% Injectable 25 Gram(s) IV Push once  enoxaparin Injectable 40 milliGRAM(s) SubCutaneous daily  escitalopram 5 milliGRAM(s) Oral daily  insulin glargine Injectable (LANTUS) 12 Unit(s) SubCutaneous at bedtime  insulin lispro (HumaLOG) corrective regimen sliding scale   SubCutaneous three times a day before meals  insulin lispro (HumaLOG) corrective regimen sliding scale   SubCutaneous at bedtime  insulin lispro Injectable (HumaLOG) 3 Unit(s) SubCutaneous three times a day before meals  levETIRAcetam 500 milliGRAM(s) Oral two times a day  levothyroxine 88 MICROGram(s) Oral daily  pantoprazole    Tablet 40 milliGRAM(s) Oral before breakfast  simvastatin 20 milliGRAM(s) Oral at bedtime    MEDICATIONS  (PRN):  acetaminophen   Tablet .. 650 milliGRAM(s) Oral every 6 hours PRN Severe Pain (7 - 10)  dextrose 40% Gel 15 Gram(s) Oral once PRN Blood Glucose LESS THAN 70 milliGRAM(s)/deciliter  glucagon  Injectable 1 milliGRAM(s) IntraMuscular once PRN Glucose LESS THAN 70 milligrams/deciliter      Allergies    No Known Allergies    Intolerances        Vital Signs Last 24 Hrs  T(C): 36.6 (2019 05:18), Max: 37.1 (2019 21:56)  T(F): 97.9 (2019 05:18), Max: 98.8 (2019 21:56)  HR: 81 (2019 05:18) (81 - 91)  BP: 117/69 (2019 05:18) (115/63 - 131/61)  BP(mean): --  RR: 16 (2019 05:18) (16 - 18)  SpO2: 95% (2019 05:18) (95% - 96%)    PHYSICAL EXAM:  GENERAL: NAD.  CHEST/LUNG: Clear to auscultation; No rales, rhonchi, or wheezing.  Respiratory effort does not appear labored.  HEART: Regular rate and rhythm; S1 and S2,  no murmurs, rubs, or gallops.  ABDOMEN: Soft, not tender to palpation.  No masses or HSM appreciated.  No distension.  Bowel sounds present.  EXTREMITIES:  No clubbing, cyanosis, or edema.  Moves all extremities  SKIN: No obvious rashes or lesions.  Turgor okay.      LABS:                        10.2   19.37 )-----------( 603      ( 2019 05:07 )             32.9     11-25    146<H>  |  104  |  36<H>  ----------------------------<  199<H>  3.5   |  29  |  1.21    Ca    9.7      2019 06:36  Phos  3.6     11-25  Mg     1.9     11-25        Urinalysis Basic - ( 2019 18:43 )    Color: YELLOW / Appearance: CLEAR / S.020 / pH: 6.0  Gluc: NEGATIVE / Ketone: NEGATIVE  / Bili: NEGATIVE / Urobili: TRACE   Blood: NEGATIVE / Protein: 100 / Nitrite: NEGATIVE   Leuk Esterase: NEGATIVE / RBC: 6-10 / WBC 0-2   Sq Epi: OCC / Non Sq Epi: x / Bacteria: NEGATIVE      CAPILLARY BLOOD GLUCOSE      POCT Blood Glucose.: 174 mg/dL (2019 21:03)  POCT Blood Glucose.: 94 mg/dL (2019 18:38)  POCT Blood Glucose.: 99 mg/dL (2019 17:09)  POCT Blood Glucose.: 122 mg/dL (2019 12:14)  POCT Blood Glucose.: 166 mg/dL (2019 08:38) Pavithra Alfonso PGY1  -[id number] | [facility] [id number]  =========================    Patient is a 79y old  Male who presents with a chief complaint of AMS (2019 13:30)      INTERVAL HPI/OVERNIGHT EVENTS:  No acute events overnight. This morning pt with 10 beats of NSVT on tele. This morning pt is alert and awake, requesting for something to drink. Denies chest pain, abdominal pain. A&Ox1.       MEDICATIONS  (STANDING):  amLODIPine   Tablet 10 milliGRAM(s) Oral daily  ATENolol  Tablet 50 milliGRAM(s) Oral daily  dextrose 5%. 1000 milliLiter(s) (50 mL/Hr) IV Continuous <Continuous>  dextrose 50% Injectable 12.5 Gram(s) IV Push once  dextrose 50% Injectable 25 Gram(s) IV Push once  dextrose 50% Injectable 25 Gram(s) IV Push once  enoxaparin Injectable 40 milliGRAM(s) SubCutaneous daily  escitalopram 5 milliGRAM(s) Oral daily  insulin glargine Injectable (LANTUS) 12 Unit(s) SubCutaneous at bedtime  insulin lispro (HumaLOG) corrective regimen sliding scale   SubCutaneous three times a day before meals  insulin lispro (HumaLOG) corrective regimen sliding scale   SubCutaneous at bedtime  insulin lispro Injectable (HumaLOG) 3 Unit(s) SubCutaneous three times a day before meals  levETIRAcetam 500 milliGRAM(s) Oral two times a day  levothyroxine 88 MICROGram(s) Oral daily  pantoprazole    Tablet 40 milliGRAM(s) Oral before breakfast  simvastatin 20 milliGRAM(s) Oral at bedtime    MEDICATIONS  (PRN):  acetaminophen   Tablet .. 650 milliGRAM(s) Oral every 6 hours PRN Severe Pain (7 - 10)  dextrose 40% Gel 15 Gram(s) Oral once PRN Blood Glucose LESS THAN 70 milliGRAM(s)/deciliter  glucagon  Injectable 1 milliGRAM(s) IntraMuscular once PRN Glucose LESS THAN 70 milligrams/deciliter      Allergies    No Known Allergies    Intolerances        Vital Signs Last 24 Hrs  T(C): 36.6 (2019 05:18), Max: 37.1 (2019 21:56)  T(F): 97.9 (2019 05:18), Max: 98.8 (2019 21:56)  HR: 81 (2019 05:18) (81 - 91)  BP: 117/69 (2019 05:18) (115/63 - 131/61)  RR: 16 (2019 05:18) (16 - 18)  SpO2: 95% (2019 05:18) (95% - 96%)    PHYSICAL EXAM:  GENERAL: NAD. alert and awake, on 2L NC.  CHEST/LUNG: Clear to auscultation; No rales, rhonchi, or wheezing.    HEART: Regular rate and rhythm; S1 and S2,  no murmurs, rubs, or gallops.  ABDOMEN: Soft, not tender to palpation.  No masses or HSM appreciated.  No distension.  Bowel sounds present.  EXTREMITIES:  No clubbing, cyanosis, or edema.  Moves all extremities  SKIN: No obvious rashes or lesions.  Turgor okay.      LABS:                        10.2   19.37 )-----------( 603      ( 2019 05:07 )             32.9     11-25    146<H>  |  104  |  36<H>  ----------------------------<  199<H>  3.5   |  29  |  1.21    Ca    9.7      2019 06:36  Phos  3.6     11-25  Mg     1.9     11-25        Urinalysis Basic - ( 2019 18:43 )    Color: YELLOW / Appearance: CLEAR / S.020 / pH: 6.0  Gluc: NEGATIVE / Ketone: NEGATIVE  / Bili: NEGATIVE / Urobili: TRACE   Blood: NEGATIVE / Protein: 100 / Nitrite: NEGATIVE   Leuk Esterase: NEGATIVE / RBC: 6-10 / WBC 0-2   Sq Epi: OCC / Non Sq Epi: x / Bacteria: NEGATIVE      CAPILLARY BLOOD GLUCOSE      POCT Blood Glucose.: 174 mg/dL (2019 21:03)  POCT Blood Glucose.: 94 mg/dL (2019 18:38)  POCT Blood Glucose.: 99 mg/dL (2019 17:09)  POCT Blood Glucose.: 122 mg/dL (2019 12:14)  POCT Blood Glucose.: 166 mg/dL (2019 08:38)

## 2019-11-26 NOTE — PROGRESS NOTE ADULT - PROBLEM SELECTOR PLAN 2
Per chart review on R-CP. s/p 2 cycles. Pt's daughter, Dr. Browne, is interested in inpatient chemo, however patient very weak so plan for further PT with possible treatment prior to discharge.   - f/u with heme/onc regarding inpt chem vs LAVONNE

## 2019-11-26 NOTE — PROGRESS NOTE ADULT - PROBLEM SELECTOR PLAN 4
Pt was hyponatremic, now hypernatremic likely from poor PO intake.  - d/c salt tabs and lasix  - encourage fluid intake

## 2019-11-26 NOTE — CHART NOTE - NSCHARTNOTEFT_GEN_A_CORE
Source: Patient [ ]    Family [ ]     other [x ] RN, chart review     Malnutrition f/u. 81 y/o MM with encephalopathy, seizures RLE DVT. Pending decision for inpatient chemo (dx DLBCL). Per RN, pt with improving PO intake. Pt is not drinking the Glucerna. Difficulty swallowing- family bringing in food not consistent with dysphagia 1 pureed, Nectar Consistency fluids and patient is coughing. No family at bedside currently to educate.     Diet, Dysphagia 1 Pureed-Nectar Consistency Fluid:   Consistent Carbohydrate {Evening Snack} (CSTCHOSN)  DASH/TLC {Sodium & Cholesterol Restricted} (DASH)  Supplement Feeding Modality:  Oral  Glucerna Shake Cans or Servings Per Day:  1       Frequency:  Three Times a day (11-25-19 @ 10:44)    Reported:  [ ] nausea  [ ] vomiting [ ] diarrhea [ ] constipation  [ ]chewing problems [ ] swallowing issues  [x ] other:  BM 11/25   PO intake:  < 50% [ ] 50-75% [x ]   % [ ]  other :  Weight trend:   11/26: 129.1 pounds   11/22: 134.4 pounds   11/19: 137.3 pounds   6% wt loss x1 week     Edema: 1+ L arm, 2+ L/R foot   Pressure Injuries: none noted     __________________ Pertinent Medications__________________   MEDICATIONS  (STANDING):  amLODIPine   Tablet 10 milliGRAM(s) Oral daily  ATENolol  Tablet 50 milliGRAM(s) Oral daily  dextrose 5%. 1000 milliLiter(s) (50 mL/Hr) IV Continuous <Continuous>  dextrose 5%. 1000 milliLiter(s) (75 mL/Hr) IV Continuous <Continuous>  dextrose 50% Injectable 12.5 Gram(s) IV Push once  dextrose 50% Injectable 25 Gram(s) IV Push once  dextrose 50% Injectable 25 Gram(s) IV Push once  enoxaparin Injectable 40 milliGRAM(s) SubCutaneous daily  escitalopram 5 milliGRAM(s) Oral daily  insulin glargine Injectable (LANTUS) 12 Unit(s) SubCutaneous at bedtime  insulin lispro (HumaLOG) corrective regimen sliding scale   SubCutaneous three times a day before meals  insulin lispro (HumaLOG) corrective regimen sliding scale   SubCutaneous at bedtime  insulin lispro Injectable (HumaLOG) 3 Unit(s) SubCutaneous three times a day before meals  levETIRAcetam 500 milliGRAM(s) Oral two times a day  levothyroxine 88 MICROGram(s) Oral daily  pantoprazole    Tablet 40 milliGRAM(s) Oral before breakfast  simvastatin 20 milliGRAM(s) Oral at bedtime    MEDICATIONS  (PRN):  acetaminophen   Tablet .. 650 milliGRAM(s) Oral every 6 hours PRN Severe Pain (7 - 10)  dextrose 40% Gel 15 Gram(s) Oral once PRN Blood Glucose LESS THAN 70 milliGRAM(s)/deciliter  glucagon  Injectable 1 milliGRAM(s) IntraMuscular once PRN Glucose LESS THAN 70 milligrams/deciliter      __________________ Pertinent Labs__________________   11-26 Na149 mmol/L<H> Glu 152 mg/dL<H> K+ 3.1 mmol/L<L> Cr  1.25 mg/dL BUN 40 mg/dL<H> 11-26 Phos 3.5 mg/dL 11-22 Alb 2.3 g/dL<L>    CAPILLARY BLOOD GLUCOSE      POCT Blood Glucose.: 125 mg/dL (26 Nov 2019 12:52)  POCT Blood Glucose.: 144 mg/dL (26 Nov 2019 08:38)  POCT Blood Glucose.: 174 mg/dL (25 Nov 2019 21:03)  POCT Blood Glucose.: 94 mg/dL (25 Nov 2019 18:38)  POCT Blood Glucose.: 99 mg/dL (25 Nov 2019 17:09)      Estimated Needs:   [ x] no change since previous assessment  [ ] recalculated:       Previous Nutrition Diagnosis: severe protein calorie malnutrition     Nutrition Diagnosis is [x ] ongoing  [ ] resolved [ ] not applicable       Recommendations:    1. Diet change to: Dysphagia 1 Pureed- Nectar Consistency Fluid, consistent carbohydrate + Glucerna  3x daily (660kcals, 30g protein)   2. Please mix Glucerna with thickening packets provided on trays in order to provide pt with appropriate nectar consistency.   3. Encourage PO intake and honor food preferences as able. Please provide feeding assistance.   4. F/u as feasible with family regarding education on dysphagia diet.         Monitoring and Evaluation:     [x ] PO intake [ x] Tolerance to diet prescription [x ] weights [x ] follow up per protocol  [ ] other:

## 2019-11-26 NOTE — PROGRESS NOTE ADULT - SUBJECTIVE AND OBJECTIVE BOX
[facility] [place]   MD UYEN HERNANDEZ DO MARYANNE SOURIAL, LEATHA NICOLE    TEL:  OFFICE: [phone]  DR CASAREZ CELL: [phone]  DR. HERNANDEZ CELL: [phone]  DR. REYNOSO CELL: [phone]  CHUCKIE TERRAZAS CELL: [phone]        Patient is a 79y old  Male who presents with a chief complaint of AMS (26 Nov 2019 06:27)      Patient seen and examined at bedside. No chest pain/sob    VITALS:  T(F): 97.8 (11-26-19 @ 07:09), Max: 98.8 (11-25-19 @ 21:56)  HR: 80 (11-26-19 @ 07:09)  BP: 105/61 (11-26-19 @ 07:09)  RR: 16 (11-26-19 @ 07:09)  SpO2: 95% (11-26-19 @ 07:09)  Wt(kg): --        PHYSICAL EXAM:  Constitutional: NAD  Neck: No JVD  Respiratory: b/l crackles  Cardiovascular: S1, S2, RRR  Gastrointestinal: BS+, soft, NT/ND  Extremities: No peripheral edema    Hospital Medications:   MEDICATIONS  (STANDING):  amLODIPine   Tablet 10 milliGRAM(s) Oral daily  ATENolol  Tablet 50 milliGRAM(s) Oral daily  dextrose 5%. 1000 milliLiter(s) (50 mL/Hr) IV Continuous <Continuous>  dextrose 5%. 1000 milliLiter(s) (75 mL/Hr) IV Continuous <Continuous>  dextrose 50% Injectable 12.5 Gram(s) IV Push once  dextrose 50% Injectable 25 Gram(s) IV Push once  dextrose 50% Injectable 25 Gram(s) IV Push once  enoxaparin Injectable 40 milliGRAM(s) SubCutaneous daily  escitalopram 5 milliGRAM(s) Oral daily  insulin glargine Injectable (LANTUS) 12 Unit(s) SubCutaneous at bedtime  insulin lispro (HumaLOG) corrective regimen sliding scale   SubCutaneous three times a day before meals  insulin lispro (HumaLOG) corrective regimen sliding scale   SubCutaneous at bedtime  insulin lispro Injectable (HumaLOG) 3 Unit(s) SubCutaneous three times a day before meals  levETIRAcetam 500 milliGRAM(s) Oral two times a day  levothyroxine 88 MICROGram(s) Oral daily  pantoprazole    Tablet 40 milliGRAM(s) Oral before breakfast  simvastatin 20 milliGRAM(s) Oral at bedtime      LABS:  11-26    149<H>  |  104  |  40<H>  ----------------------------<  152<H>  3.1<L>   |  31  |  1.25    Ca    10.0      26 Nov 2019 05:07  Phos  3.5     11-26  Mg     2.0     11-26      Creatinine Trend: 1.25 <--, 1.21 <--, 1.18 <--, 1.11 <--, 1.16 <--, 1.03 <--, 1.06 <--    Phosphorus Level, Serum: 3.5 mg/dL (11-26 @ 05:07)                              10.2   19.37 )-----------( 603      ( 26 Nov 2019 05:07 )             32.9     Urine Studies:  Urinalysis - [11-25-19 @ 18:43]      Color YELLOW / Appearance CLEAR / SG 1.020 / pH 6.0      Gluc NEGATIVE / Ketone NEGATIVE  / Bili NEGATIVE / Urobili TRACE       Blood NEGATIVE / Protein 100 / Leuk Est NEGATIVE / Nitrite NEGATIVE      RBC 6-10 / WBC 0-2 / Hyaline NEGATIVE / Gran  / Sq Epi OCC / Non Sq Epi  / Bacteria NEGATIVE      Iron 38, TIBC 234, %sat --      [09-12-19 @ 06:08]  Ferritin 214.6      [09-12-19 @ 06:08]  PTH 20.00 (Ca --)      [09-03-19 @ 05:45]   --  PTH 25.46 (Ca --)      [09-01-19 @ 06:00]   --  Vitamin D (25OH) 34.9      [09-03-19 @ 05:45]  HbA1c 7.0      [09-13-19 @ 06:50]  TSH 12.33      [11-14-19 @ 14:10]  Lipid: chol 121, TG 96, HDL 33, LDL 74      [08-31-19 @ 07:00]    HBsAb Reactive      [11-23-19 @ 05:25]  HBsAg Nonreactive      [11-23-19 @ 05:25]  HBcAb Reactive      [11-23-19 @ 05:25]  HCV 0.12, Nonreactive Hepatitis C AB  S/CO Ratio                        Interpretation  < 1.00                                   Non-Reactive  1.00 - 4.99                         Weakly-Reactive  >= 5.00                                Reactive  Non-Reactive: Aperson with a non-reactive HCV antibody  result is considered uninfected.  No further action is  needed unless recent infection is suspected.  In these  cases, consider repeat testing later to detect  seroconversion..  Weakly-Reactive: HCV antibody test is abnormal, HCV RNA  Qualitative test will follow.  Reactive: HCV antibody test is abnormal, HCV RNA  Qualitative test will follow.  Note: HCV antibody testing is performed on the [employer]   system.      [11-23-19 @ 05:25]  HIV Nonreactive The HIV Ag/Ab Combo test performed screens for HIV-1 p24  antigen, antibodies to HIV-1 (group M and group O), and  antibodies to HIV-2. All specimens repeatedly reactive  will reflex to an HIV 1/2 antibody confirmation and  differentiation test. This assay detects p24 antigen which  may be present prior to the development of HIV antibodies,  therefore a reactive result with a negative HIV 1/2 AB  Confirmation should be followed up with HIV-1 RNA, HIV-2  RNA and repeat testing in 4-8 weeks. A nonreactive result  does not preclude previous exposure to or infection with  HIV-1 or HIV-2. [facility] prohibits disclosure of this  result to any unauthorized party.      [11-22-19 @ 10:18]      RADIOLOGY & ADDITIONAL STUDIES:

## 2019-11-26 NOTE — PROGRESS NOTE ADULT - PROBLEM SELECTOR PLAN 1
Encephalopathy likely from infection vs post-ictal state. CT chest with apical consolidations and pleural effusion, s/p 5 days vanc/zosyn. MRI with no enhancing brain lesions. No seizure activity seen on vEEG. Mental status waxes and wan, overall improved.  - s/p Vanc and zosyn x5 days   - c/w keppra

## 2019-11-26 NOTE — PROGRESS NOTE ADULT - PROBLEM SELECTOR PLAN 3
CT chest with apical consolidations. WBC fluctuating. May be related to lymphoma vs possible infection. No active signs or symptoms of infection.  - f/u repeat bcx  - per ID, observe off antibiotics  - s/p 5 day course of vanc/zosyn for possible PNA

## 2019-11-27 DIAGNOSIS — E46 UNSPECIFIED PROTEIN-CALORIE MALNUTRITION: ICD-10-CM

## 2019-11-27 LAB
ALBUMIN SERPL ELPH-MCNC: 2.2 G/DL — LOW (ref 3.3–5)
ALP SERPL-CCNC: 152 U/L — HIGH (ref 40–120)
ALT FLD-CCNC: 20 U/L — SIGNIFICANT CHANGE UP (ref 4–41)
ANION GAP SERPL CALC-SCNC: 13 MMO/L — SIGNIFICANT CHANGE UP (ref 7–14)
AST SERPL-CCNC: 36 U/L — SIGNIFICANT CHANGE UP (ref 4–40)
BACTERIA UR CULT: SIGNIFICANT CHANGE UP
BASE EXCESS BLDA CALC-SCNC: 7.2 MMOL/L — SIGNIFICANT CHANGE UP
BASOPHILS # BLD AUTO: 0.05 K/UL — SIGNIFICANT CHANGE UP (ref 0–0.2)
BASOPHILS NFR BLD AUTO: 0.3 % — SIGNIFICANT CHANGE UP (ref 0–2)
BILIRUB DIRECT SERPL-MCNC: < 0.2 MG/DL — SIGNIFICANT CHANGE UP (ref 0.1–0.2)
BILIRUB SERPL-MCNC: 0.4 MG/DL — SIGNIFICANT CHANGE UP (ref 0.2–1.2)
BUN SERPL-MCNC: 39 MG/DL — HIGH (ref 7–23)
CALCIUM SERPL-MCNC: 9.3 MG/DL — SIGNIFICANT CHANGE UP (ref 8.4–10.5)
CHLORIDE SERPL-SCNC: 103 MMOL/L — SIGNIFICANT CHANGE UP (ref 98–107)
CO2 SERPL-SCNC: 29 MMOL/L — SIGNIFICANT CHANGE UP (ref 22–31)
CREAT SERPL-MCNC: 1.23 MG/DL — SIGNIFICANT CHANGE UP (ref 0.5–1.3)
EOSINOPHIL # BLD AUTO: 0.47 K/UL — SIGNIFICANT CHANGE UP (ref 0–0.5)
EOSINOPHIL NFR BLD AUTO: 3.2 % — SIGNIFICANT CHANGE UP (ref 0–6)
GLUCOSE SERPL-MCNC: 84 MG/DL — SIGNIFICANT CHANGE UP (ref 70–99)
HCO3 BLDA-SCNC: 31 MMOL/L — HIGH (ref 22–26)
HCT VFR BLD CALC: 31.6 % — LOW (ref 39–50)
HGB BLD-MCNC: 9.8 G/DL — LOW (ref 13–17)
IMM GRANULOCYTES NFR BLD AUTO: 5.4 % — HIGH (ref 0–1.5)
LYMPHOCYTES # BLD AUTO: 1.61 K/UL — SIGNIFICANT CHANGE UP (ref 1–3.3)
LYMPHOCYTES # BLD AUTO: 11 % — LOW (ref 13–44)
MAGNESIUM SERPL-MCNC: 1.9 MG/DL — SIGNIFICANT CHANGE UP (ref 1.6–2.6)
MCHC RBC-ENTMCNC: 26.8 PG — LOW (ref 27–34)
MCHC RBC-ENTMCNC: 31 % — LOW (ref 32–36)
MCV RBC AUTO: 86.3 FL — SIGNIFICANT CHANGE UP (ref 80–100)
MONOCYTES # BLD AUTO: 0.79 K/UL — SIGNIFICANT CHANGE UP (ref 0–0.9)
MONOCYTES NFR BLD AUTO: 5.4 % — SIGNIFICANT CHANGE UP (ref 2–14)
NEUTROPHILS # BLD AUTO: 10.97 K/UL — HIGH (ref 1.8–7.4)
NEUTROPHILS NFR BLD AUTO: 74.7 % — SIGNIFICANT CHANGE UP (ref 43–77)
NRBC # FLD: 0.17 K/UL — SIGNIFICANT CHANGE UP (ref 0–0)
NRBC FLD-RTO: 1.2 — SIGNIFICANT CHANGE UP
PCO2 BLDA: 38 MMHG — SIGNIFICANT CHANGE UP (ref 35–48)
PH BLDA: 7.51 PH — HIGH (ref 7.35–7.45)
PHOSPHATE SERPL-MCNC: 3.3 MG/DL — SIGNIFICANT CHANGE UP (ref 2.5–4.5)
PLATELET # BLD AUTO: 535 K/UL — HIGH (ref 150–400)
PMV BLD: 12.2 FL — SIGNIFICANT CHANGE UP (ref 7–13)
PO2 BLDA: 55 MMHG — LOW (ref 83–108)
POTASSIUM SERPL-MCNC: 3.4 MMOL/L — LOW (ref 3.5–5.3)
POTASSIUM SERPL-SCNC: 3.4 MMOL/L — LOW (ref 3.5–5.3)
PROT SERPL-MCNC: 6.7 G/DL — SIGNIFICANT CHANGE UP (ref 6–8.3)
RBC # BLD: 3.66 M/UL — LOW (ref 4.2–5.8)
RBC # FLD: 19.3 % — HIGH (ref 10.3–14.5)
REVIEW TO FOLLOW: YES — SIGNIFICANT CHANGE UP
SAO2 % BLDA: 87 % — LOW (ref 95–99)
SODIUM SERPL-SCNC: 145 MMOL/L — SIGNIFICANT CHANGE UP (ref 135–145)
SPECIMEN SOURCE: SIGNIFICANT CHANGE UP
WBC # BLD: 14.69 K/UL — HIGH (ref 3.8–10.5)
WBC # FLD AUTO: 14.69 K/UL — HIGH (ref 3.8–10.5)

## 2019-11-27 PROCEDURE — 74220 X-RAY XM ESOPHAGUS 1CNTRST: CPT | Mod: 26

## 2019-11-27 PROCEDURE — 99223 1ST HOSP IP/OBS HIGH 75: CPT | Mod: GC

## 2019-11-27 PROCEDURE — 99233 SBSQ HOSP IP/OBS HIGH 50: CPT | Mod: GC

## 2019-11-27 PROCEDURE — 99221 1ST HOSP IP/OBS SF/LOW 40: CPT

## 2019-11-27 RX ORDER — SODIUM CHLORIDE 9 MG/ML
1000 INJECTION, SOLUTION INTRAVENOUS
Refills: 0 | Status: DISCONTINUED | OUTPATIENT
Start: 2019-11-27 | End: 2019-11-27

## 2019-11-27 RX ORDER — PAMIDRONATE DISODIUM 9 MG/ML
60 INJECTION, SOLUTION INTRAVENOUS ONCE
Refills: 0 | Status: DISCONTINUED | OUTPATIENT
Start: 2019-11-27 | End: 2019-11-28

## 2019-11-27 RX ORDER — MEROPENEM 1 G/30ML
1000 INJECTION INTRAVENOUS EVERY 12 HOURS
Refills: 0 | Status: COMPLETED | OUTPATIENT
Start: 2019-11-27 | End: 2019-12-04

## 2019-11-27 RX ORDER — POTASSIUM CHLORIDE 20 MEQ
10 PACKET (EA) ORAL
Refills: 0 | Status: COMPLETED | OUTPATIENT
Start: 2019-11-27 | End: 2019-11-27

## 2019-11-27 RX ORDER — DEXTROSE 50 % IN WATER 50 %
25 SYRINGE (ML) INTRAVENOUS ONCE
Refills: 0 | Status: COMPLETED | OUTPATIENT
Start: 2019-11-27 | End: 2019-11-27

## 2019-11-27 RX ORDER — DEXTROSE 50 % IN WATER 50 %
15 SYRINGE (ML) INTRAVENOUS ONCE
Refills: 0 | Status: COMPLETED | OUTPATIENT
Start: 2019-11-27 | End: 2019-11-27

## 2019-11-27 RX ORDER — MEROPENEM 1 G/30ML
1000 INJECTION INTRAVENOUS EVERY 8 HOURS
Refills: 0 | Status: DISCONTINUED | OUTPATIENT
Start: 2019-11-27 | End: 2019-11-27

## 2019-11-27 RX ORDER — PIPERACILLIN AND TAZOBACTAM 4; .5 G/20ML; G/20ML
3.38 INJECTION, POWDER, LYOPHILIZED, FOR SOLUTION INTRAVENOUS ONCE
Refills: 0 | Status: COMPLETED | OUTPATIENT
Start: 2019-11-27 | End: 2019-11-27

## 2019-11-27 RX ORDER — PIPERACILLIN AND TAZOBACTAM 4; .5 G/20ML; G/20ML
3.38 INJECTION, POWDER, LYOPHILIZED, FOR SOLUTION INTRAVENOUS EVERY 8 HOURS
Refills: 0 | Status: DISCONTINUED | OUTPATIENT
Start: 2019-11-27 | End: 2019-11-27

## 2019-11-27 RX ORDER — SODIUM CHLORIDE 9 MG/ML
1000 INJECTION, SOLUTION INTRAVENOUS
Refills: 0 | Status: DISCONTINUED | OUTPATIENT
Start: 2019-11-27 | End: 2019-11-29

## 2019-11-27 RX ORDER — VANCOMYCIN HCL 1 G
1000 VIAL (EA) INTRAVENOUS EVERY 24 HOURS
Refills: 0 | Status: DISCONTINUED | OUTPATIENT
Start: 2019-11-28 | End: 2019-11-30

## 2019-11-27 RX ORDER — VANCOMYCIN HCL 1 G
VIAL (EA) INTRAVENOUS
Refills: 0 | Status: DISCONTINUED | OUTPATIENT
Start: 2019-11-27 | End: 2019-11-30

## 2019-11-27 RX ORDER — VANCOMYCIN HCL 1 G
1000 VIAL (EA) INTRAVENOUS ONCE
Refills: 0 | Status: COMPLETED | OUTPATIENT
Start: 2019-11-27 | End: 2019-11-27

## 2019-11-27 RX ADMIN — Medication 25 MILLILITER(S): at 09:56

## 2019-11-27 RX ADMIN — Medication 100 MILLIEQUIVALENT(S): at 10:37

## 2019-11-27 RX ADMIN — SIMVASTATIN 20 MILLIGRAM(S): 20 TABLET, FILM COATED ORAL at 21:36

## 2019-11-27 RX ADMIN — Medication 15 GRAM(S): at 09:38

## 2019-11-27 RX ADMIN — SODIUM CHLORIDE 60 MILLILITER(S): 9 INJECTION, SOLUTION INTRAVENOUS at 18:24

## 2019-11-27 RX ADMIN — PIPERACILLIN AND TAZOBACTAM 200 GRAM(S): 4; .5 INJECTION, POWDER, LYOPHILIZED, FOR SOLUTION INTRAVENOUS at 16:25

## 2019-11-27 RX ADMIN — ESCITALOPRAM OXALATE 5 MILLIGRAM(S): 10 TABLET, FILM COATED ORAL at 09:26

## 2019-11-27 RX ADMIN — ATENOLOL 50 MILLIGRAM(S): 25 TABLET ORAL at 05:42

## 2019-11-27 RX ADMIN — PANTOPRAZOLE SODIUM 40 MILLIGRAM(S): 20 TABLET, DELAYED RELEASE ORAL at 05:42

## 2019-11-27 RX ADMIN — SODIUM CHLORIDE 75 MILLILITER(S): 9 INJECTION, SOLUTION INTRAVENOUS at 10:03

## 2019-11-27 RX ADMIN — LEVETIRACETAM 500 MILLIGRAM(S): 250 TABLET, FILM COATED ORAL at 05:42

## 2019-11-27 RX ADMIN — ENOXAPARIN SODIUM 40 MILLIGRAM(S): 100 INJECTION SUBCUTANEOUS at 09:27

## 2019-11-27 RX ADMIN — AMLODIPINE BESYLATE 10 MILLIGRAM(S): 2.5 TABLET ORAL at 05:42

## 2019-11-27 RX ADMIN — Medication 88 MICROGRAM(S): at 05:42

## 2019-11-27 RX ADMIN — Medication 250 MILLIGRAM(S): at 18:08

## 2019-11-27 RX ADMIN — Medication 100 MILLIEQUIVALENT(S): at 09:27

## 2019-11-27 RX ADMIN — SODIUM CHLORIDE 60 MILLILITER(S): 9 INJECTION, SOLUTION INTRAVENOUS at 21:36

## 2019-11-27 NOTE — PROVIDER CONTACT NOTE (CRITICAL VALUE NOTIFICATION) - ASSESSMENT
patient a&ox2, at baseline with no complaints. pt given oral glucose as per hypoglycemia protocol, repeat POCT after 15 mins 43
vitals stable   no acute distress noted   fall safety maintained

## 2019-11-27 NOTE — CONSULT NOTE ADULT - ASSESSMENT
80yo M with CKD, CAD, and lymphoma on chemo admitted for management of encephalopathy, referred to IR for gastrostomy placement  - Pt will need anesthesia for procedure  - Consent from daughter  - Currently on BIPAP  - Currently on lovenox  - VSS  - Mild hypokalemia 78yo M with CKD, CAD, and lymphoma on chemo admitted for management of encephalopathy and aspiration, referred to IR for gastrostomy placement  - Pt will need anesthesia for procedure  - Consent from daughter  - Currently on BIPAP  - Currently on lovenox  - VSS  - Mild hypokalemia

## 2019-11-27 NOTE — PROGRESS NOTE ADULT - PROBLEM SELECTOR PLAN 2
Per chart review on R-CP. s/p 2 cycles. Pt's daughter, Dr. Browne, is interested in inpatient chemo, however patient very weak so plan for further PT with possible treatment prior to discharge.   - f/u with heme/onc regarding inpt chem vs LAVONNE Per chart review on R-CP. s/p 2 cycles. Pt's daughter, Dr. Browne, is interested in inpatient chemo, however patient very weak so plan for further PT with likely chemo treatment prior to discharge.   - f/u with heme/onc regarding inpt chem

## 2019-11-27 NOTE — CONSULT NOTE ADULT - SUBJECTIVE AND OBJECTIVE BOX
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 D/t pt's weakened state, Ir was consulted for gastrostomy placement for nutrition optimization. Pt was seen at bedside with no issues, denied CP, fever, abdominal pain, NVD.     Allergies  No Known Allergies    MEDICATIONS  (STANDING):  amLODIPine   Tablet 10 milliGRAM(s) Oral daily  ATENolol  Tablet 50 milliGRAM(s) Oral daily  dextrose 5%. 1000 milliLiter(s) (50 mL/Hr) IV Continuous <Continuous>  dextrose 5%. 1000 milliLiter(s) (75 mL/Hr) IV Continuous <Continuous>  dextrose 50% Injectable 12.5 Gram(s) IV Push once  dextrose 50% Injectable 25 Gram(s) IV Push once  dextrose 50% Injectable 25 Gram(s) IV Push once  enoxaparin Injectable 40 milliGRAM(s) SubCutaneous daily  escitalopram 5 milliGRAM(s) Oral daily  insulin lispro (HumaLOG) corrective regimen sliding scale   SubCutaneous three times a day before meals  insulin lispro (HumaLOG) corrective regimen sliding scale   SubCutaneous at bedtime  levETIRAcetam 500 milliGRAM(s) Oral two times a day  levothyroxine 88 MICROGram(s) Oral daily  pamidronate IVPB 60 milliGRAM(s) IV Intermittent once  pantoprazole    Tablet 40 milliGRAM(s) Oral before breakfast  simvastatin 20 milliGRAM(s) Oral at bedtime  vancomycin  IVPB        MEDICATIONS  (PRN):  acetaminophen   Tablet .. 650 milliGRAM(s) Oral every 6 hours PRN Severe Pain (7 - 10)  dextrose 40% Gel 15 Gram(s) Oral once PRN Blood Glucose LESS THAN 70 milliGRAM(s)/deciliter  glucagon  Injectable 1 milliGRAM(s) IntraMuscular once PRN Glucose LESS THAN 70 milligrams/deciliter      PAST MEDICAL & SURGICAL HISTORY:  GI bleed: s/p clipping  History of subdural hematoma: s/p unwitnessed fall with hospitilazation in early November 2019. R and interhemispheric subdural hematomas with mild shift.  Dysphagia: Recently on dysphagia 3 nectar thick liquid diet during admission 11/6/2019  History of orthostatic hypotension: On midodrine and florinef  Acute deep vein thrombosis (DVT) of other vein of upper extremity, unspecified laterality: LUE; on eliquis VQ scan on 9/20 with low probability for PE  Left ventricular outflow obstruction: per daughter, patient had recent echo at [facility] that did not re-demonstrate LVOT obstruction; likely was dynamic LVOT obstruction 2/2 volume status  Lymphoma: DLBC recently diagnosed being treated at [facility]  Chronic kidney disease (CKD)  Cognitive developmental delay  CAD (coronary artery disease): s/p stents x4  Adult hypothyroidism  Hyperlipidemia  Peptic ulcer: s/p treatment for H pylori  Diabetes: On basaglar insulin and Januvia at home  Essential hypertension  S/P laparoscopic cholecystectomy  S/P coronary artery stent placement: x4, last one in early 2018      Review of Systems:    GENERAL/CONSTITUTIONAL: +fatigue, weakness. The patient denies fever    HEAD, EYES, EARS, NOSE AND THROAT: Eyes – The patient denies pain, redness, loss of vision    CARDIOVASCULAR:  The patient denies chest pain, irregular heartbeats, sudden changes in heartbeat or palpitation, shortness of breath    RESPIRATORY:  The patient denies chronic dry cough, coughing up blood    GASTROINTESTINAL:  The patient denies decreased appetite, nausea, vomiting    GENITOURINARY: The patient denies difficult urination, pain or burning with urination, blood in the urine    Physical Exam:    Vital Signs Last 24 Hrs  T(C): 36.8 (27 Nov 2019 12:13), Max: 36.8 (27 Nov 2019 09:09)  T(F): 98.2 (27 Nov 2019 12:13), Max: 98.2 (27 Nov 2019 09:09)  HR: 73 (27 Nov 2019 15:16) (58 - 84)  BP: 131/74 (27 Nov 2019 15:12) (106/70 - 146/76)  BP(mean): --  RR: 23 (27 Nov 2019 15:12) (16 - 23)  SpO2: 99% (27 Nov 2019 15:16) (90% - 99%)    GENERAL APPEARANCE: Well developed, well nourished, looks exhausted    LUNGS: Currently on BIPAP    CARDIOVASCULAR: There was a regular rate and rhythm without any murmurs, gallops, rubs.     ABDOMEN: Soft and nontender with normal bowel sounds.     NEUROLOGIC: Alert and oriented x 3. Normal affect.     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 D/t pt's weakened state and aspiration issues, IR was consulted for gastrostomy placement for nutrition optimization. Pt was seen at bedside with no issues, denied CP, fever, abdominal pain, NVD.     Allergies  No Known Allergies    MEDICATIONS  (STANDING):  amLODIPine   Tablet 10 milliGRAM(s) Oral daily  ATENolol  Tablet 50 milliGRAM(s) Oral daily  dextrose 5%. 1000 milliLiter(s) (50 mL/Hr) IV Continuous <Continuous>  dextrose 5%. 1000 milliLiter(s) (75 mL/Hr) IV Continuous <Continuous>  dextrose 50% Injectable 12.5 Gram(s) IV Push once  dextrose 50% Injectable 25 Gram(s) IV Push once  dextrose 50% Injectable 25 Gram(s) IV Push once  enoxaparin Injectable 40 milliGRAM(s) SubCutaneous daily  escitalopram 5 milliGRAM(s) Oral daily  insulin lispro (HumaLOG) corrective regimen sliding scale   SubCutaneous three times a day before meals  insulin lispro (HumaLOG) corrective regimen sliding scale   SubCutaneous at bedtime  levETIRAcetam 500 milliGRAM(s) Oral two times a day  levothyroxine 88 MICROGram(s) Oral daily  pamidronate IVPB 60 milliGRAM(s) IV Intermittent once  pantoprazole    Tablet 40 milliGRAM(s) Oral before breakfast  simvastatin 20 milliGRAM(s) Oral at bedtime  vancomycin  IVPB        MEDICATIONS  (PRN):  acetaminophen   Tablet .. 650 milliGRAM(s) Oral every 6 hours PRN Severe Pain (7 - 10)  dextrose 40% Gel 15 Gram(s) Oral once PRN Blood Glucose LESS THAN 70 milliGRAM(s)/deciliter  glucagon  Injectable 1 milliGRAM(s) IntraMuscular once PRN Glucose LESS THAN 70 milligrams/deciliter      PAST MEDICAL & SURGICAL HISTORY:  GI bleed: s/p clipping  History of subdural hematoma: s/p unwitnessed fall with hospitilazation in early November 2019. R and interhemispheric subdural hematomas with mild shift.  Dysphagia: Recently on dysphagia 3 nectar thick liquid diet during admission 11/6/2019  History of orthostatic hypotension: On midodrine and florinef  Acute deep vein thrombosis (DVT) of other vein of upper extremity, unspecified laterality: LUE; on eliquis VQ scan on 9/20 with low probability for PE  Left ventricular outflow obstruction: per daughter, patient had recent echo at [facility] that did not re-demonstrate LVOT obstruction; likely was dynamic LVOT obstruction 2/2 volume status  Lymphoma: DLBC recently diagnosed being treated at [facility]  Chronic kidney disease (CKD)  Cognitive developmental delay  CAD (coronary artery disease): s/p stents x4  Adult hypothyroidism  Hyperlipidemia  Peptic ulcer: s/p treatment for H pylori  Diabetes: On basaglar insulin and Januvia at home  Essential hypertension  S/P laparoscopic cholecystectomy  S/P coronary artery stent placement: x4, last one in early 2018      Review of Systems:    GENERAL/CONSTITUTIONAL: +fatigue, weakness. The patient denies fever    HEAD, EYES, EARS, NOSE AND THROAT: Eyes – The patient denies pain, redness, loss of vision    CARDIOVASCULAR:  The patient denies chest pain, irregular heartbeats, sudden changes in heartbeat or palpitation, shortness of breath    RESPIRATORY:  The patient denies chronic dry cough, coughing up blood    GASTROINTESTINAL:  The patient denies decreased appetite, nausea, vomiting    GENITOURINARY: The patient denies difficult urination, pain or burning with urination, blood in the urine    Physical Exam:    Vital Signs Last 24 Hrs  T(C): 36.8 (27 Nov 2019 12:13), Max: 36.8 (27 Nov 2019 09:09)  T(F): 98.2 (27 Nov 2019 12:13), Max: 98.2 (27 Nov 2019 09:09)  HR: 73 (27 Nov 2019 15:16) (58 - 84)  BP: 131/74 (27 Nov 2019 15:12) (106/70 - 146/76)  BP(mean): --  RR: 23 (27 Nov 2019 15:12) (16 - 23)  SpO2: 99% (27 Nov 2019 15:16) (90% - 99%)    GENERAL APPEARANCE: Well developed, well nourished, looks exhausted    LUNGS: Currently on BIPAP    CARDIOVASCULAR: There was a regular rate and rhythm without any murmurs, gallops, rubs.     ABDOMEN: Soft and nontender with normal bowel sounds.     NEUROLOGIC: Alert and oriented x 3. Normal affect.     CBC                        9.8    14.69 )-----------( 535      ( 27 Nov 2019 06:00 )             31.6       Chemistry  11-27    145  |  103  |  39<H>  ----------------------------<  84  3.4<L>   |  29  |  1.23    Ca    9.3      27 Nov 2019 06:00  Phos  3.3     11-27  Mg     1.9     11-27    TPro  6.7  /  Alb  2.2<L>  /  TBili  0.4  /  DBili  < 0.2  /  AST  36  /  ALT  20  /  AlkPhos  152<H>  11-27

## 2019-11-27 NOTE — PROGRESS NOTE ADULT - ASSESSMENT
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 prolonged FRANCES state (? 2/2 prolonged hypercalcemic state) that has now resolved vs. loss of body mass  - Serum Cr is stable   - Avoid nephrotoxins agent  - renal diet  - monitor bmp    Hyponatremia  - ? etiology, presented with Na 122  - work up suggested SIADH  - Serum sodium improved   - now hypernatremia, Na tab and lasix d/c'd recieved D5W 11/26.   - Na better today  - Promote oral solute/food intake   - Monitor serum sodium and fluid status closely     HTN  - has Hx HTN but was on midodrine for orthostatic hypotension in the recent past   - BP was elevated therefore restarted on home BP medications   - Currently BP controlled   - monitor bp    Hypocalcemia  - Has Hx hypercalcemia of malignancy  - low alb corrected alexander is optimal  - monitor    Pl. effusion b/l/ Anasarca   mod effusion seen on ct chest  was on lasix 20mg PO daily. (last dose 11/25)   patient clinically improving  Monitor     Hypokalemia  supplemented  monitor

## 2019-11-27 NOTE — PROGRESS NOTE ADULT - PROBLEM SELECTOR PLAN 3
Initial CT chest with apical consolidations. WBC fluctuating. May be related to lymphoma vs possible infection. No active signs or symptoms of infection. Repeat CT chest with new bilateral groundglass opacities, may be pulmonary edema rather than pneumonia.   - f/u repeat bcx  - per ID, observe off antibiotics  - s/p 5 day course of vanc/zosyn for possible PNA Initial CT chest with apical consolidations. WBC fluctuates. May be related to lymphoma vs possible infection. No active signs or symptoms of infection. Repeat CT chest with new bilateral groundglass opacities, may be pulmonary edema rather than pneumonia.   - f/u repeat bcx - NGTD  - per ID, observe off antibiotics  - s/p 5 day course of vanc/zosyn for possible PNA

## 2019-11-27 NOTE — CONSULT NOTE ADULT - PROBLEM SELECTOR RECOMMENDATION 9
- Please keep pt NPO starting midnight prior to procedure  - Repeat AM labs (CBC, CMP, Coags)  - optimize potassium if needed  - Hold Lovenox for 12 hours prior  - Consent obtained from daughter

## 2019-11-27 NOTE — PROGRESS NOTE ADULT - ASSESSMENT
Mr. 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 Mr. 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 Currently being evaluated for inpatient chemo.

## 2019-11-27 NOTE — PROGRESS NOTE ADULT - SUBJECTIVE AND OBJECTIVE BOX
Pavithra Alfonso PGY1  -[id number] | [facility] [id number]  =========================    Patient is a 79y old  Male who presents with a chief complaint of AMS (2019 11:50)      INTERVAL HPI/OVERNIGHT EVENTS:  No acute events overnight.       MEDICATIONS  (STANDING):  amLODIPine   Tablet 10 milliGRAM(s) Oral daily  ATENolol  Tablet 50 milliGRAM(s) Oral daily  dextrose 5%. 1000 milliLiter(s) (50 mL/Hr) IV Continuous <Continuous>  dextrose 5%. 1000 milliLiter(s) (75 mL/Hr) IV Continuous <Continuous>  dextrose 50% Injectable 12.5 Gram(s) IV Push once  dextrose 50% Injectable 25 Gram(s) IV Push once  dextrose 50% Injectable 25 Gram(s) IV Push once  enoxaparin Injectable 40 milliGRAM(s) SubCutaneous daily  escitalopram 5 milliGRAM(s) Oral daily  insulin glargine Injectable (LANTUS) 12 Unit(s) SubCutaneous at bedtime  insulin lispro (HumaLOG) corrective regimen sliding scale   SubCutaneous three times a day before meals  insulin lispro (HumaLOG) corrective regimen sliding scale   SubCutaneous at bedtime  insulin lispro Injectable (HumaLOG) 3 Unit(s) SubCutaneous three times a day before meals  levETIRAcetam 500 milliGRAM(s) Oral two times a day  levothyroxine 88 MICROGram(s) Oral daily  pantoprazole    Tablet 40 milliGRAM(s) Oral before breakfast  simvastatin 20 milliGRAM(s) Oral at bedtime    MEDICATIONS  (PRN):  acetaminophen   Tablet .. 650 milliGRAM(s) Oral every 6 hours PRN Severe Pain (7 - 10)  dextrose 40% Gel 15 Gram(s) Oral once PRN Blood Glucose LESS THAN 70 milliGRAM(s)/deciliter  glucagon  Injectable 1 milliGRAM(s) IntraMuscular once PRN Glucose LESS THAN 70 milligrams/deciliter      Allergies    No Known Allergies    Intolerances        Vital Signs Last 24 Hrs  T(C): 36.6 (2019 05:07), Max: 36.6 (2019 07:09)  T(F): 97.8 (2019 05:07), Max: 97.9 (2019 14:38)  HR: 84 (2019 05:07) (72 - 84)  BP: 119/67 (2019 05:07) (105/61 - 123/72)  BP(mean): --  RR: 16 (2019 05:07) (16 - 18)  SpO2: 95% (2019 05:07) (93% - 95%)    PHYSICAL EXAM:  GENERAL: NAD.  CHEST/LUNG: Clear to auscultation; No rales, rhonchi, or wheezing.  Respiratory effort does not appear labored.  HEART: Regular rate and rhythm; S1 and S2,  no murmurs, rubs, or gallops.  ABDOMEN: Soft, not tender to palpation.  No masses or HSM appreciated.  No distension.  Bowel sounds present.  EXTREMITIES:  No clubbing, cyanosis, or edema.  Moves all extremities   SKIN: No obvious rashes or lesions.  Turgor okay.  NEURO:  Alert and oriented x 3.     LABS:                      Urinalysis Basic - ( 2019 18:43 )    Color: YELLOW / Appearance: CLEAR / S.020 / pH: 6.0  Gluc: NEGATIVE / Ketone: NEGATIVE  / Bili: NEGATIVE / Urobili: TRACE   Blood: NEGATIVE / Protein: 100 / Nitrite: NEGATIVE   Leuk Esterase: NEGATIVE / RBC: 6-10 / WBC 0-2   Sq Epi: OCC / Non Sq Epi: x / Bacteria: NEGATIVE      CAPILLARY BLOOD GLUCOSE      POCT Blood Glucose.: 119 mg/dL (2019 21:57)  POCT Blood Glucose.: 133 mg/dL (2019 17:41)  POCT Blood Glucose.: 125 mg/dL (2019 12:52)  POCT Blood Glucose.: 144 mg/dL (2019 08:38)      Culture - Blood (collected 2019 06:26)  Source: BLOOD  Preliminary Report (2019 06:27):    NO ORGANISMS ISOLATED    NO ORGANISMS ISOLATED AT 24 HOURS    Culture - Urine (collected 2019 19:50)  Source: URINE MIDSTREAM  Preliminary Report (2019 09:00):    NO GROWTH TO DATE        IMAGING:    < from: CT Neck Soft Tissue No Cont (19 @ 12:44) >  IMPRESSION:    Motion limited noncontrast neck CT without gross evidence for acute   abnormality in the neck. If the patient has new and persistent symptoms,   consider follow-up contrast-enhanced neck CT study, provided there are no   contrast contraindications.    Extensive lung opacification and bilateral pleural effusions are   partially visualized. Please see separate chest CT report from the same   day.    The known subdural hemorrhages, right to left midline shift, and chronic   left MCA infarct are grossly stable compared to the 2019 MRI study.   Please note that the intracranial compartment is not fully covered on   this neck CT and serial brain imaging follow-up over time is recommended   to monitor for stability.    The thoracic esophagus appears mildly dilated with fluid. Consider   correlation with esophagram and swallow study to evaluate for esophageal   dysfunction, distal narrowing, or aspiration.      < end of copied text >        < from: CT Chest No Cont (.19 @ 12:44) >  IMPRESSION:     1.  New bilateral groundglass opacities and dense opacities with   thickening of interlobular septa. These findings could occur in the   clinical setting of pulmonary edema rather than pneumonia.  2.  Bilateral pleural effusions and passive atelectasis.  3.  A subcarinal lymph node has decreased in size since 9/10/2019    < end of copied text > Pavithra Alfonso PGY1  -0135 | [facility] [id number]  =========================    Patient is a 79y old  Male who presents with a chief complaint of AMS (2019 11:50)      INTERVAL HPI/OVERNIGHT EVENTS:  No acute events overnight. CT chest done yesterday showed new bilateral ground glass opacity, more likely pulmonary edema. CT neck showed mildly dilated esophagus with fluid. This morning pt is alert and awake, able to ask RN "what are you doing?" Denies CP, abdominal pain. A&Ox1.       MEDICATIONS  (STANDING):  amLODIPine   Tablet 10 milliGRAM(s) Oral daily  ATENolol  Tablet 50 milliGRAM(s) Oral daily  dextrose 5%. 1000 milliLiter(s) (50 mL/Hr) IV Continuous <Continuous>  dextrose 5%. 1000 milliLiter(s) (75 mL/Hr) IV Continuous <Continuous>  dextrose 50% Injectable 12.5 Gram(s) IV Push once  dextrose 50% Injectable 25 Gram(s) IV Push once  dextrose 50% Injectable 25 Gram(s) IV Push once  enoxaparin Injectable 40 milliGRAM(s) SubCutaneous daily  escitalopram 5 milliGRAM(s) Oral daily  insulin glargine Injectable (LANTUS) 12 Unit(s) SubCutaneous at bedtime  insulin lispro (HumaLOG) corrective regimen sliding scale   SubCutaneous three times a day before meals  insulin lispro (HumaLOG) corrective regimen sliding scale   SubCutaneous at bedtime  insulin lispro Injectable (HumaLOG) 3 Unit(s) SubCutaneous three times a day before meals  levETIRAcetam 500 milliGRAM(s) Oral two times a day  levothyroxine 88 MICROGram(s) Oral daily  pantoprazole    Tablet 40 milliGRAM(s) Oral before breakfast  simvastatin 20 milliGRAM(s) Oral at bedtime    MEDICATIONS  (PRN):  acetaminophen   Tablet .. 650 milliGRAM(s) Oral every 6 hours PRN Severe Pain (7 - 10)  dextrose 40% Gel 15 Gram(s) Oral once PRN Blood Glucose LESS THAN 70 milliGRAM(s)/deciliter  glucagon  Injectable 1 milliGRAM(s) IntraMuscular once PRN Glucose LESS THAN 70 milligrams/deciliter      Allergies    No Known Allergies    Intolerances        Vital Signs Last 24 Hrs  T(C): 36.6 (2019 05:07), Max: 36.6 (2019 07:09)  T(F): 97.8 (2019 05:07), Max: 97.9 (2019 14:38)  HR: 84 (2019 05:07) (72 - 84)  BP: 119/67 (2019 05:07) (105/61 - 123/72)  BP(mean): --  RR: 16 (2019 05:07) (16 - 18)  SpO2: 95% (2019 05:07) (93% - 95%)    PHYSICAL EXAM:  GENERAL: NAD. awake and alert. on 2L NC.   CHEST/LUNG: crackles at bilateral bases  HEART: Regular rate and rhythm; S1 and S2,  no murmurs, rubs, or gallops.  ABDOMEN: Soft, not tender to palpation.  No masses or HSM appreciated.  No distension.  Bowel sounds present.  EXTREMITIES:  No clubbing, cyanosis, or edema.  Moves all extremities, R side stronger than L  SKIN: No obvious rashes or lesions.  Turgor okay.  NEURO:  Alert and oriented x 1.    LABS:                               9.8    14.69 )-----------( 535      ( 2019 06:00 )             31.6     11-27    145  |  103  |  39<H>  ----------------------------<  84  3.4<L>   |  29  |  1.23    Ca    9.3      2019 06:00  Phos  3.3     11-  Mg     1.9     11                 Urinalysis Basic - ( 2019 18:43 )    Color: YELLOW / Appearance: CLEAR / S.020 / pH: 6.0  Gluc: NEGATIVE / Ketone: NEGATIVE  / Bili: NEGATIVE / Urobili: TRACE   Blood: NEGATIVE / Protein: 100 / Nitrite: NEGATIVE   Leuk Esterase: NEGATIVE / RBC: 6-10 / WBC 0-2   Sq Epi: OCC / Non Sq Epi: x / Bacteria: NEGATIVE      CAPILLARY BLOOD GLUCOSE      POCT Blood Glucose.: 119 mg/dL (2019 21:57)  POCT Blood Glucose.: 133 mg/dL (2019 17:41)  POCT Blood Glucose.: 125 mg/dL (2019 12:52)  POCT Blood Glucose.: 144 mg/dL (2019 08:38)      Culture - Blood (collected 2019 06:26)  Source: BLOOD  Preliminary Report (2019 06:27):    NO ORGANISMS ISOLATED    NO ORGANISMS ISOLATED AT 24 HOURS    Culture - Urine (collected 2019 19:50)  Source: URINE MIDSTREAM  Preliminary Report (2019 09:00):    NO GROWTH TO DATE        IMAGING:    < from: CT Neck Soft Tissue No Cont (19 @ 12:44) >  IMPRESSION:    Motion limited noncontrast neck CT without gross evidence for acute   abnormality in the neck. If the patient has new and persistent symptoms,   consider follow-up contrast-enhanced neck CT study, provided there are no   contrast contraindications.    Extensive lung opacification and bilateral pleural effusions are   partially visualized. Please see separate chest CT report from the same   day.    The known subdural hemorrhages, right to left midline shift, and chronic   left MCA infarct are grossly stable compared to the 2019 MRI study.   Please note that the intracranial compartment is not fully covered on   this neck CT and serial brain imaging follow-up over time is recommended   to monitor for stability.    The thoracic esophagus appears mildly dilated with fluid. Consider   correlation with esophagram and swallow study to evaluate for esophageal   dysfunction, distal narrowing, or aspiration.      < end of copied text >        < from: CT Chest No Cont (19 @ 12:44) >  IMPRESSION:     1.  New bilateral groundglass opacities and dense opacities with   thickening of interlobular septa. These findings could occur in the   clinical setting of pulmonary edema rather than pneumonia.  2.  Bilateral pleural effusions and passive atelectasis.  3.  A subcarinal lymph node has decreased in size since 9/10/2019    < end of copied text >

## 2019-11-27 NOTE — PROGRESS NOTE ADULT - PROBLEM SELECTOR PLAN 4
- CT neck with dilated esophagus -> will f/u esophagram to evaluate for esophageal dysfunction  - c/w dysphagia 1 diet with glucerna TID

## 2019-11-27 NOTE — PROGRESS NOTE ADULT - ATTENDING COMMENTS
Pt seen and examined, chart and labs reviewed.   79M with h/o HTN, DM2, hypothyroid, CAD/stents, PUD s/p H. 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     Assessment/plan:  #Metabolic encephalopathy with seizure activity: multifactorial, ?postictal vs. infectious  -mental status wax/wanes, still confused at times   -MRI w/ contrast no CNS lymphoma  -video EEG negative for seizure, c/w keppra  -stable SDH, no neurosx intervention, resumed on  prophylactic lovenox  # HTN: controlled on atenolol, norvasc,   # Hypernatremia: off lasix, improved on D5, trend BMP  # Leukocytosis, UL pneumonia: completed 5 day course of abx (vanco/zosyn) 11/19, repeat CT chest b/l opacities, pleural effusion ,dilated thoracic esophagus, esophagram tertiary contraction, difficulty drinking, otherwise unremarkable  pt maybe silently aspirating, NPO for now, GI consult for PEG  #DLBCL: s/p 2 cycles of R-CD with interval improvement in lymphadenopathy on scans from 11/14.    CT shows response to chemo with reduced lymphadenopathy and spleen size  poor functional and mental status, Pt's daughter who's an oncologist Dr. Browne would like pt to get one dose of chemo (RCD) inpt before he goes to rehab. Heme is considering inpt chemo if his condition improves before discharge   # acute hypoxic respiratory failure: c/w supplemental O2, likely silently aspirating, b/l opacities on CT appears worse to my view, consult pulmonary  # Severe protein/calorie malnutrition, FTT: NPO for now, daughter wants PEG tube, consult GI for PEG  # Uncontrolled DM2: hypoglycemic this am, given d50 1 amp, c/w D5, d/c basal/prandial insulin for now,  A1c 7%, monitor FS  #Dispo: pt is very weak/debilitated, PT is recommending rehab  pt is DNR, but not DNI per daughter Pt seen and examined, chart and labs reviewed.   79M with h/o HTN, DM2, hypothyroid, CAD/stents, PUD s/p H. pylori treatment, recent dx of DLBCL at [facility] on Oct 2019 s/p 2 cycles of R-CD, recent fall with R frontal and occipital SDH with no evacuation, LUE DVT and b/l LE DVT on prophylactic Lovenox, p/w acute encephalopathy and had witnessed seizure activity at home and again had tonic clonic seizure in ER s/p Ativan 2mg IVx1.     Assessment/plan:  # acute respiratory failure w/ hypoxia, labored breathing: BIPAP 10/5/40%, pulm consult, likely recurrent aspiration pneumonitis, worsening b/l opacities on CT  #Metabolic encephalopathy with seizure activity: multifactorial, ?postictal vs. infectious  -mental status wax/wanes, still confused at times   -MRI w/ contrast no CNS lymphoma  -video EEG negative for seizure, c/w keppra  -stable SDH, no neurosx intervention, resumed on  prophylactic lovenox  # HTN: controlled on atenolol, norvasc,   # Hypernatremia: off lasix, improved on D5, trend BMP  # Leukocytosis, UL pneumonia: completed 5 day course of abx (vanco/zosyn) 11/19, repeat CT chest (11/26) b/l opacities, pleural effusion ,dilated thoracic esophagus, esophagram tertiary contraction, difficulty drinking, otherwise unremarkable  pt maybe silently aspirating, NPO for now, plan for IR PEG on Friday, d/w Dr. Neal  #DLBCL: s/p 2 cycles of R-CD with interval improvement in lymphadenopathy on scans from 11/14.    CT shows response to chemo with reduced lymphadenopathy and spleen size  poor functional and mental status, Pt's daughter who's an oncologist Dr. Browne would like pt to get one dose of chemo (RCD) inpt before he goes to rehab. Heme is considering inpt chemo if his condition improves before discharge but this is not possible at this time.  # Mild hypercalcemia: likely d/t lymphoma, will give a dose of palmidronate 60 mg x1 and trend Ca, daughter Dr. Browne states pt had hypercalcemia and clinical deterioration before that was treated with palmidronate and calcitonin   # Severe protein/calorie malnutrition, FTT: NPO for now, daughter wants PEG tube, plan for IR PEG by Dr. Neal on Friday  # DM2: hypoglycemic this am, given d50 1 amp, c/w D5, d/c basal/prandial insulin for now,  A1c 7%, monitor FS  #Dispo: pt is very weak/debilitated, PT is recommending rehab  # Advanced planning: pt is DNR, but not DNI per daughter, singed MOLST form Pt seen and examined, chart and labs reviewed.   79M with h/o HTN, DM2, hypothyroid, CAD/stents, PUD s/p H. 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     Assessment/plan:  # acute respiratory failure w/ hypoxia, labored breathing: BIPAP 10/5/40%, pulm consult, likely recurrent aspiration pneumonitis, worsening b/l opacities on CT, resumed Zosyn  #Metabolic encephalopathy with seizure activity: multifactorial, ?postictal vs. infectious  -mental status wax/wanes, still confused at times   -MRI w/ contrast no CNS lymphoma  -video EEG negative for seizure, c/w keppra  -stable SDH, no neurosx intervention, resumed on  prophylactic lovenox  # HTN: controlled on atenolol, norvasc,   # Hypernatremia: off lasix, improved on D5, trend BMP  # Leukocytosis, UL pneumonia: completed 5 day course of abx (vanco/zosyn) 11/19, repeat CT chest (11/26) b/l opacities, pleural effusion ,dilated thoracic esophagus, esophagram tertiary contraction, difficulty drinking, otherwise unremarkable  pt maybe silently aspirating, NPO for now, plan for IR PEG on Friday, d/w Dr. Neal  #DLBCL: s/p 2 cycles of R-CD with interval improvement in lymphadenopathy on scans from 11/14.    CT shows response to chemo with reduced lymphadenopathy and spleen size  poor functional and mental status, Pt's daughter who's an oncologist Dr. Browne would like pt to get one dose of chemo (RCD) inpt before he goes to rehab. Heme is considering inpt chemo if his condition improves before discharge but this is not possible at this time.  # Mild hypercalcemia: likely d/t lymphoma, will give a dose of palmidronate 60 mg x1 and trend Ca, daughter Dr. Browne states pt had hypercalcemia and clinical deterioration before that was treated with palmidronate and calcitonin   # Severe protein/calorie malnutrition, FTT: NPO for now, daughter wants PEG tube, plan for IR PEG by Dr. Neal on Friday  # DM2: hypoglycemic this am, given d50 1 amp, c/w D5, d/c basal/prandial insulin for now,  A1c 7%, monitor FS  #Dispo: pt is very weak/debilitated, PT is recommending rehab  # Advanced planning: pt is DNR, but not DNI per daughter, singed MOLST form

## 2019-11-27 NOTE — CHART NOTE - NSCHARTNOTEFT_GEN_A_CORE
PRE-INTERVENTIONAL RADIOLOGY PROCEDURE NOTE      Patient Age: 79    Patient Gender: M    Procedure: PEG tube    Diagnosis/Indication: malnutrition    Interventional Radiology Attending Physician: Dr. Neal    Ordering Attending Physician: Dr. REGLA Trinh    Pertinent Medical History: DLBCL, aspiration PNA    Pertinent labs:                      9.8    14.69 )-----------( 535      ( 27 Nov 2019 06:00 )             31.6       11-27    145  |  103  |  39<H>  ----------------------------<  84  3.4<L>   |  29  |  1.23    Ca    9.3      27 Nov 2019 06:00  Phos  3.3     11-27  Mg     1.9     11-27    TPro  6.7  /  Alb  2.2<L>  /  TBili  0.4  /  DBili  < 0.2  /  AST  36  /  ALT  20  /  AlkPhos  152<H>  11-27              Patient and Family Aware ? Yes

## 2019-11-27 NOTE — PROGRESS NOTE ADULT - SUBJECTIVE AND OBJECTIVE BOX
[facility]   MD UYEN HERNANDEZ DO MARYANNE SOURIAL, LEATHA NICOLE    TEL:  OFFICE: [phone]  DR CASAREZ CELL: [phone]  DR. HERNANDEZ CELL: [phone]  DR. REYNOSO CELL: [phone]  CHUCKIE TERRAZAS CELL: [phone]        Patient is a 79y old  Male who presents with a chief complaint of AMS (27 Nov 2019 06:56)      Patient seen and examined at bedside. No chest pain/sob    VITALS:  T(F): 98.2 (11-27-19 @ 09:09), Max: 98.2 (11-27-19 @ 09:09)  HR: 77 (11-27-19 @ 09:09)  BP: 106/70 (11-27-19 @ 09:09)  RR: 21 (11-27-19 @ 09:09)  SpO2: 94% (11-27-19 @ 09:09)  Wt(kg): --        PHYSICAL EXAM:  Constitutional: NAD  Neck: No JVD  Respiratory: b/l crackles   Cardiovascular: S1, S2, RRR  Gastrointestinal: BS+, soft, NT/ND  Extremities: No peripheral edema    Hospital Medications:   MEDICATIONS  (STANDING):  amLODIPine   Tablet 10 milliGRAM(s) Oral daily  ATENolol  Tablet 50 milliGRAM(s) Oral daily  dextrose 5%. 1000 milliLiter(s) (50 mL/Hr) IV Continuous <Continuous>  dextrose 5%. 1000 milliLiter(s) (75 mL/Hr) IV Continuous <Continuous>  dextrose 50% Injectable 12.5 Gram(s) IV Push once  dextrose 50% Injectable 25 Gram(s) IV Push once  dextrose 50% Injectable 25 Gram(s) IV Push once  enoxaparin Injectable 40 milliGRAM(s) SubCutaneous daily  escitalopram 5 milliGRAM(s) Oral daily  insulin lispro (HumaLOG) corrective regimen sliding scale   SubCutaneous three times a day before meals  insulin lispro (HumaLOG) corrective regimen sliding scale   SubCutaneous at bedtime  levETIRAcetam 500 milliGRAM(s) Oral two times a day  levothyroxine 88 MICROGram(s) Oral daily  pantoprazole    Tablet 40 milliGRAM(s) Oral before breakfast  simvastatin 20 milliGRAM(s) Oral at bedtime      LABS:  11-27    145  |  103  |  39<H>  ----------------------------<  84  3.4<L>   |  29  |  1.23    Ca    9.3      27 Nov 2019 06:00  Phos  3.3     11-27  Mg     1.9     11-27      Creatinine Trend: 1.23 <--, 1.16 <--, 1.25 <--, 1.21 <--, 1.18 <--, 1.11 <--, 1.16 <--, 1.03 <--    Phosphorus Level, Serum: 3.3 mg/dL (11-27 @ 06:00)  Phosphorus Level, Serum: 4.0 mg/dL (11-26 @ 16:23)                              9.8    14.69 )-----------( 535      ( 27 Nov 2019 06:00 )             31.6     Urine Studies:  Urinalysis - [11-25-19 @ 18:43]      Color YELLOW / Appearance CLEAR / SG 1.020 / pH 6.0      Gluc NEGATIVE / Ketone NEGATIVE  / Bili NEGATIVE / Urobili TRACE       Blood NEGATIVE / Protein 100 / Leuk Est NEGATIVE / Nitrite NEGATIVE      RBC 6-10 / WBC 0-2 / Hyaline NEGATIVE / Gran  / Sq Epi OCC / Non Sq Epi  / Bacteria NEGATIVE      Iron 38, TIBC 234, %sat --      [09-12-19 @ 06:08]  Ferritin 214.6      [09-12-19 @ 06:08]  PTH 20.00 (Ca --)      [09-03-19 @ 05:45]   --  PTH 25.46 (Ca --)      [09-01-19 @ 06:00]   --  Vitamin D (25OH) 34.9      [09-03-19 @ 05:45]  HbA1c 7.0      [09-13-19 @ 06:50]  TSH 12.33      [11-14-19 @ 14:10]  Lipid: chol 121, TG 96, HDL 33, LDL 74      [08-31-19 @ 07:00]    HBsAb Reactive      [11-23-19 @ 05:25]  HBsAg Nonreactive      [11-23-19 @ 05:25]  HBcAb Reactive      [11-23-19 @ 05:25]  HCV 0.12, Nonreactive Hepatitis C AB  S/CO Ratio                        Interpretation  < 1.00                                   Non-Reactive  1.00 - 4.99                         Weakly-Reactive  >= 5.00                                Reactive  Non-Reactive: [place] with a non-reactive HCV antibody  result is considered uninfected.  No further action is  needed unless recent infection is suspected.  In these  cases, consider repeat testing later to detect  seroconversion..  Weakly-Reactive: HCV antibody test is abnormal, HCV RNA  Qualitative test will follow.  Reactive: HCV antibody test is abnormal, HCV RNA  Qualitative test will follow.  Note: HCV antibody testing is performed on the [employer]   system.      [11-23-19 @ 05:25]  HIV Nonreactive The HIV Ag/Ab Combo test performed screens for HIV-1 p24  antigen, antibodies to HIV-1 (group M and group O), and  antibodies to HIV-2. All specimens repeatedly reactive  will reflex to an HIV 1/2 antibody confirmation and  differentiation test. This assay detects p24 antigen which  may be present prior to the development of HIV antibodies,  therefore a reactive result with a negative HIV 1/2 AB  Confirmation should be followed up with HIV-1 RNA, HIV-2  RNA and repeat testing in 4-8 weeks. A nonreactive result  does not preclude previous exposure to or infection with  HIV-1 or HIV-2. [facility] prohibits disclosure of this  result to any unauthorized party.      [11-22-19 @ 10:18]      RADIOLOGY & ADDITIONAL STUDIES:

## 2019-11-27 NOTE — CONSULT NOTE ADULT - ASSESSMENT
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 Pulmonary service consulted to assist in management of hypoxic respiratory failure requiring NIV.     CT chest with new ground glass opacities and dense consolidations in upper lobes bilateral and known bilateral pleural effusions. Overall findings concerning for multifocal pneumonia.      Recommendations:  - Would try high flow nasal cannula FiO2 60% / 50 L and wean as tolerated for goal of O2 sat > 92%  - BIPAP as needed for increased work of breathing  - Would broad antibiotics to meropenem / vancomycin to cover for hospital acquired pneumonia and because he recently finished course with Zosyn  - Follow up blood cultures  - Obtain sputum culture if able    --------------------------------------------------------  Ton Mcdonald, PGY-4  Pulmonary/Critical Care Fellow  Pager: [phone] (MUNIR), [phone] (NS)  Pulmonary spectra: [id number] 78 yo man with chronic kidney disease, coronary artery disease, recent subdural hematoma and 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 Pulmonary service consulted to assist in management of hypoxic respiratory failure requiring NIV.     CT chest with new ground glass opacities and dense consolidations in upper lobes bilateral and known bilateral pleural effusions. Overall findings concerning for multifocal pneumonia.     Recommendations:  - Would try high flow nasal cannula FiO2 60% / 50 L and wean as tolerated for goal of O2 sat > 92%  - BIPAP as needed for increased work of breathing  - Would broad antibiotics to meropenem / vancomycin to cover for hospital acquired pneumonia and because he recently finished course with Zosyn  - Follow up blood cultures  - Obtain sputum culture if able    --------------------------------------------------------  Ton Mcdonald, PGY-4  Pulmonary/Critical Care Fellow  Pager: [phone] (MUNIR), [phone] (NS)  Pulmonary spectra: [id number]

## 2019-11-27 NOTE — CONSULT NOTE ADULT - SUBJECTIVE AND OBJECTIVE BOX
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 Pulmonary service consulted to assist in management of hypoxic respiratory failure requiring NIV. He developed hypoxia and increased work of breathing today afternoon. Placed on BIPAP 10/5 with FiO2 40%. CT chest with new ground glass and dense consolidations in upper lobes bilateral. No fever or chills. Per family member, patient has been having coughing when eating, so she is concerned for aspiration.       PAST MEDICAL & SURGICAL HISTORY:  GI bleed: s/p clipping  History of subdural hematoma: s/p unwitnessed fall with hospitilazation in early November 2019. R and interhemispheric subdural hematomas with mild shift.  Dysphagia: Recently on dysphagia 3 nectar thick liquid diet during admission 11/6/2019  History of orthostatic hypotension: On midodrine and florinef  Acute deep vein thrombosis (DVT) of other vein of upper extremity, unspecified laterality: LUE; on eliquis VQ scan on 9/20 with low probability for PE  Left ventricular outflow obstruction: per daughter, patient had recent echo at [facility] that did not re-demonstrate LVOT obstruction; likely was dynamic LVOT obstruction 2/2 volume status  Lymphoma: DLBC recently diagnosed being treated at [facility]  Chronic kidney disease (CKD)  Cognitive developmental delay  CAD (coronary artery disease): s/p stents x4  Adult hypothyroidism  Hyperlipidemia  Peptic ulcer: s/p treatment for H pylori  Diabetes: On basaglar insulin and Januvia at home  Essential hypertension  S/P laparoscopic cholecystectomy  S/P coronary artery stent placement: x4, last one in early 2018      FAMILY HISTORY:  No pertinent family history    SOCIAL HISTORY  Former smoker    MEDICATIONS  (STANDING):  amLODIPine   Tablet 10 milliGRAM(s) Oral daily  ATENolol  Tablet 50 milliGRAM(s) Oral daily  dextrose 5%. 1000 milliLiter(s) (50 mL/Hr) IV Continuous <Continuous>  dextrose 5%. 1000 milliLiter(s) (75 mL/Hr) IV Continuous <Continuous>  dextrose 50% Injectable 12.5 Gram(s) IV Push once  dextrose 50% Injectable 25 Gram(s) IV Push once  dextrose 50% Injectable 25 Gram(s) IV Push once  enoxaparin Injectable 40 milliGRAM(s) SubCutaneous daily  escitalopram 5 milliGRAM(s) Oral daily  insulin lispro (HumaLOG) corrective regimen sliding scale   SubCutaneous three times a day before meals  insulin lispro (HumaLOG) corrective regimen sliding scale   SubCutaneous at bedtime  levETIRAcetam 500 milliGRAM(s) Oral two times a day  levothyroxine 88 MICROGram(s) Oral daily  pamidronate IVPB 60 milliGRAM(s) IV Intermittent once  pantoprazole    Tablet 40 milliGRAM(s) Oral before breakfast  piperacillin/tazobactam IVPB.. 3.375 Gram(s) IV Intermittent every 8 hours  simvastatin 20 milliGRAM(s) Oral at bedtime    MEDICATIONS  (PRN):  acetaminophen   Tablet .. 650 milliGRAM(s) Oral every 6 hours PRN Severe Pain (7 - 10)  dextrose 40% Gel 15 Gram(s) Oral once PRN Blood Glucose LESS THAN 70 milliGRAM(s)/deciliter  glucagon  Injectable 1 milliGRAM(s) IntraMuscular once PRN Glucose LESS THAN 70 milligrams/deciliter    ALLERGIES  No known medication allergies    OBJECTIVE    Vital Signs Last 24 Hrs  T(C): 36.8 (27 Nov 2019 12:13), Max: 36.8 (27 Nov 2019 09:09)  T(F): 98.2 (27 Nov 2019 12:13), Max: 98.2 (27 Nov 2019 09:09)  HR: 73 (27 Nov 2019 15:16) (58 - 84)  BP: 131/74 (27 Nov 2019 15:12) (106/70 - 146/76)  BP(mean): --  RR: 23 (27 Nov 2019 15:12) (16 - 23)  SpO2: 99% (27 Nov 2019 15:16) (90% - 99%)    PHYSICAL EXAM:  General: on BIPAP  HEENT: normocephalic  Eyes: extraocular movements intact, pupils equal and reactive to light  Neck: supple  Respiratory: tachypnea, diffuse crackles bilateral  Cardiovascular: normal rate, regular rhythm  Gastrointestinal: abdomen soft, non tender, non distended  Extremities: no lower extremity edema  Neurological: opening eyes  Psychiatric: cooperative      LABORATORY                        9.8    14.69 )-----------( 535      ( 27 Nov 2019 06:00 )             31.6     11-27    145  |  103  |  39<H>  ----------------------------<  84  3.4<L>   |  29  |  1.23    Ca    9.3      27 Nov 2019 06:00  Phos  3.3     11-27  Mg     1.9     11-27    TPro  6.7  /  Alb  2.2<L>  /  TBili  0.4  /  DBili  < 0.2  /  AST  36  /  ALT  20  /  AlkPhos  152<H>  11-27      ABG - ( 27 Nov 2019 13:50 )  pH, Arterial: 7.51 /  pCO2: 38    /  pO2: 55    / HCO3: 31    / Base Excess: 7.2   /  SaO2: 87.0        MICROBIOLOGY    Culture - Blood (collected 26 Nov 2019 06:26)    NO ORGANISMS ISOLATED AT 24 HOURS    Culture - Urine (collected 25 Nov 2019 19:50)    NO GROWTH AT 24 HOURS      RADIOLOGY    CT Chest No Cont (11.26.19 @ 12:44) >  1.  New bilateral groundglass opacities and dense opacities with thickening of interlobular septa. These findings could occur in the clinical setting of pulmonary edema rather than pneumonia.  2.  Bilateral pleural effusions and passive atelectasis.  3.  A subcarinal lymph node has decreased in size since 9/10/2019      < end of copied text >

## 2019-11-27 NOTE — PROGRESS NOTE ADULT - PROBLEM SELECTOR PLAN 5
Pt was hyponatremic, now hypernatremic likely from poor PO intake.  - d/c salt tabs and lasix  - encourage fluid intake  - s/p D5 75cc/hr x 10 hrs Pt was hyponatremic, then hypernatremic likely from poor PO intake. Now improved  - d/c salt tabs and lasix  - encourage fluid intake  - s/p D5 75cc/hr x 10 hrs

## 2019-11-28 DIAGNOSIS — R06.03 ACUTE RESPIRATORY DISTRESS: ICD-10-CM

## 2019-11-28 DIAGNOSIS — J96.01 ACUTE RESPIRATORY FAILURE WITH HYPOXIA: ICD-10-CM

## 2019-11-28 LAB
ALBUMIN SERPL ELPH-MCNC: 1.8 G/DL — LOW (ref 3.3–5)
ALP SERPL-CCNC: 128 U/L — HIGH (ref 40–120)
ALT FLD-CCNC: 14 U/L — SIGNIFICANT CHANGE UP (ref 4–41)
ANION GAP SERPL CALC-SCNC: 11 MMO/L — SIGNIFICANT CHANGE UP (ref 7–14)
ANISOCYTOSIS BLD QL: SIGNIFICANT CHANGE UP
AST SERPL-CCNC: 34 U/L — SIGNIFICANT CHANGE UP (ref 4–40)
BASOPHILS # BLD AUTO: 0.05 K/UL — SIGNIFICANT CHANGE UP (ref 0–0.2)
BASOPHILS NFR BLD AUTO: 0.4 % — SIGNIFICANT CHANGE UP (ref 0–2)
BASOPHILS NFR SPEC: 1.7 % — SIGNIFICANT CHANGE UP (ref 0–2)
BILIRUB SERPL-MCNC: 0.3 MG/DL — SIGNIFICANT CHANGE UP (ref 0.2–1.2)
BLASTS # FLD: 0 % — SIGNIFICANT CHANGE UP (ref 0–0)
BUN SERPL-MCNC: 33 MG/DL — HIGH (ref 7–23)
CALCIUM SERPL-MCNC: 8.2 MG/DL — LOW (ref 8.4–10.5)
CHLORIDE SERPL-SCNC: 97 MMOL/L — LOW (ref 98–107)
CO2 SERPL-SCNC: 28 MMOL/L — SIGNIFICANT CHANGE UP (ref 22–31)
CREAT SERPL-MCNC: 1.23 MG/DL — SIGNIFICANT CHANGE UP (ref 0.5–1.3)
EOSINOPHIL # BLD AUTO: 0.62 K/UL — HIGH (ref 0–0.5)
EOSINOPHIL NFR BLD AUTO: 5.4 % — SIGNIFICANT CHANGE UP (ref 0–6)
EOSINOPHIL NFR FLD: 3.5 % — SIGNIFICANT CHANGE UP (ref 0–6)
GAMMA INTERFERON BACKGROUND BLD IA-ACNC: 0.05 IU/ML — SIGNIFICANT CHANGE UP
GIANT PLATELETS BLD QL SMEAR: PRESENT — SIGNIFICANT CHANGE UP
GLUCOSE SERPL-MCNC: 96 MG/DL — SIGNIFICANT CHANGE UP (ref 70–99)
HCT VFR BLD CALC: 27.6 % — LOW (ref 39–50)
HGB BLD-MCNC: 8.7 G/DL — LOW (ref 13–17)
HYPOCHROMIA BLD QL: SLIGHT — SIGNIFICANT CHANGE UP
IMM GRANULOCYTES NFR BLD AUTO: 6.5 % — HIGH (ref 0–1.5)
INR BLD: 1.15 — SIGNIFICANT CHANGE UP (ref 0.88–1.17)
LYMPHOCYTES # BLD AUTO: 1.04 K/UL — SIGNIFICANT CHANGE UP (ref 1–3.3)
LYMPHOCYTES # BLD AUTO: 9.1 % — LOW (ref 13–44)
LYMPHOCYTES NFR SPEC AUTO: 1.7 % — LOW (ref 13–44)
M TB IFN-G BLD-IMP: NEGATIVE — SIGNIFICANT CHANGE UP
M TB IFN-G CD4+ BCKGRND COR BLD-ACNC: 0 IU/ML — SIGNIFICANT CHANGE UP
M TB IFN-G CD4+CD8+ BCKGRND COR BLD-ACNC: 0.01 IU/ML — SIGNIFICANT CHANGE UP
MACROCYTES BLD QL: SIGNIFICANT CHANGE UP
MAGNESIUM SERPL-MCNC: 1.9 MG/DL — SIGNIFICANT CHANGE UP (ref 1.6–2.6)
MCHC RBC-ENTMCNC: 26.7 PG — LOW (ref 27–34)
MCHC RBC-ENTMCNC: 31.5 % — LOW (ref 32–36)
MCV RBC AUTO: 84.7 FL — SIGNIFICANT CHANGE UP (ref 80–100)
METAMYELOCYTES # FLD: 3.5 % — HIGH (ref 0–1)
MONOCYTES # BLD AUTO: 0.63 K/UL — SIGNIFICANT CHANGE UP (ref 0–0.9)
MONOCYTES NFR BLD AUTO: 5.5 % — SIGNIFICANT CHANGE UP (ref 2–14)
MONOCYTES NFR BLD: 5.3 % — SIGNIFICANT CHANGE UP (ref 2–9)
MYELOCYTES NFR BLD: 1.8 % — HIGH (ref 0–0)
NEUTROPHIL AB SER-ACNC: 64 % — SIGNIFICANT CHANGE UP (ref 43–77)
NEUTROPHILS # BLD AUTO: 8.34 K/UL — HIGH (ref 1.8–7.4)
NEUTROPHILS NFR BLD AUTO: 73.1 % — SIGNIFICANT CHANGE UP (ref 43–77)
NEUTS BAND # BLD: 12.3 % — HIGH (ref 0–6)
NRBC # BLD: 3 /100WBC — SIGNIFICANT CHANGE UP
NRBC # FLD: 0.15 K/UL — SIGNIFICANT CHANGE UP (ref 0–0)
NRBC FLD-RTO: 1.3 — SIGNIFICANT CHANGE UP
OTHER - HEMATOLOGY %: 0 — SIGNIFICANT CHANGE UP
OVALOCYTES BLD QL SMEAR: SLIGHT — SIGNIFICANT CHANGE UP
PHOSPHATE SERPL-MCNC: 3.6 MG/DL — SIGNIFICANT CHANGE UP (ref 2.5–4.5)
PLATELET # BLD AUTO: 455 K/UL — HIGH (ref 150–400)
PLATELET COUNT - ESTIMATE: NORMAL — SIGNIFICANT CHANGE UP
PMV BLD: 12.5 FL — SIGNIFICANT CHANGE UP (ref 7–13)
POIKILOCYTOSIS BLD QL AUTO: SLIGHT — SIGNIFICANT CHANGE UP
POLYCHROMASIA BLD QL SMEAR: SLIGHT — SIGNIFICANT CHANGE UP
POTASSIUM SERPL-MCNC: 3.9 MMOL/L — SIGNIFICANT CHANGE UP (ref 3.5–5.3)
POTASSIUM SERPL-SCNC: 3.9 MMOL/L — SIGNIFICANT CHANGE UP (ref 3.5–5.3)
PROMYELOCYTES # FLD: 0 % — SIGNIFICANT CHANGE UP (ref 0–0)
PROT SERPL-MCNC: 5.8 G/DL — LOW (ref 6–8.3)
PROTHROM AB SERPL-ACNC: 13.2 SEC — HIGH (ref 9.8–13.1)
QUANT TB PLUS MITOGEN MINUS NIL: 0.99 IU/ML — SIGNIFICANT CHANGE UP
RBC # BLD: 3.26 M/UL — LOW (ref 4.2–5.8)
RBC # FLD: 18.9 % — HIGH (ref 10.3–14.5)
REVIEW TO FOLLOW: YES — SIGNIFICANT CHANGE UP
SODIUM SERPL-SCNC: 136 MMOL/L — SIGNIFICANT CHANGE UP (ref 135–145)
TARGETS BLD QL SMEAR: SLIGHT — SIGNIFICANT CHANGE UP
VARIANT LYMPHS # BLD: 5.3 % — SIGNIFICANT CHANGE UP
WBC # BLD: 11.42 K/UL — HIGH (ref 3.8–10.5)
WBC # FLD AUTO: 11.42 K/UL — HIGH (ref 3.8–10.5)

## 2019-11-28 PROCEDURE — 99233 SBSQ HOSP IP/OBS HIGH 50: CPT | Mod: GC

## 2019-11-28 RX ORDER — ENOXAPARIN SODIUM 100 MG/ML
40 INJECTION SUBCUTANEOUS ONCE
Refills: 0 | Status: COMPLETED | OUTPATIENT
Start: 2019-11-28 | End: 2019-11-28

## 2019-11-28 RX ADMIN — SIMVASTATIN 20 MILLIGRAM(S): 20 TABLET, FILM COATED ORAL at 22:11

## 2019-11-28 RX ADMIN — LEVETIRACETAM 500 MILLIGRAM(S): 250 TABLET, FILM COATED ORAL at 17:10

## 2019-11-28 RX ADMIN — LEVETIRACETAM 500 MILLIGRAM(S): 250 TABLET, FILM COATED ORAL at 05:44

## 2019-11-28 RX ADMIN — Medication 88 MICROGRAM(S): at 05:44

## 2019-11-28 RX ADMIN — ESCITALOPRAM OXALATE 5 MILLIGRAM(S): 10 TABLET, FILM COATED ORAL at 17:10

## 2019-11-28 RX ADMIN — ENOXAPARIN SODIUM 40 MILLIGRAM(S): 100 INJECTION SUBCUTANEOUS at 11:14

## 2019-11-28 RX ADMIN — AMLODIPINE BESYLATE 10 MILLIGRAM(S): 2.5 TABLET ORAL at 05:44

## 2019-11-28 RX ADMIN — PANTOPRAZOLE SODIUM 40 MILLIGRAM(S): 20 TABLET, DELAYED RELEASE ORAL at 05:44

## 2019-11-28 RX ADMIN — ATENOLOL 50 MILLIGRAM(S): 25 TABLET ORAL at 05:44

## 2019-11-28 RX ADMIN — MEROPENEM 100 MILLIGRAM(S): 1 INJECTION INTRAVENOUS at 05:44

## 2019-11-28 RX ADMIN — MEROPENEM 100 MILLIGRAM(S): 1 INJECTION INTRAVENOUS at 17:10

## 2019-11-28 RX ADMIN — Medication 250 MILLIGRAM(S): at 17:10

## 2019-11-28 NOTE — PROGRESS NOTE ADULT - PROBLEM SELECTOR PLAN 9
3 known active DVTs. repeat BL UE US with no evidence of DVT now. Superficial vein thrombosis visualized in the left cephalic vein. BL LE US with unchanged subacute DVT right soleal vein.  -restarted lovenox prophylaxis per NSG (started on 11/16) - will hold prior to procedure

## 2019-11-28 NOTE — PROGRESS NOTE ADULT - ASSESSMENT
Mr. 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 Course c/b acute respiratory distress likely 2/2 multiple silent aspirations, on vanc/benny (11/28-). Plan for PEG tube placement tomorrow.

## 2019-11-28 NOTE — PROGRESS NOTE ADULT - PROBLEM SELECTOR PLAN 4
given recent hypoglycemia will hold off insulin while NPO  - FS    #hypothyroidism  -c/w IV levothyroxine 44mg daily

## 2019-11-28 NOTE — PROGRESS NOTE ADULT - SUBJECTIVE AND OBJECTIVE BOX
Pavithra Alfonso PGY1  -[id number] | [facility] [id number]  =========================    Patient is a 79y old  Male who presents with a chief complaint of AMS (27 Nov 2019 17:25)      INTERVAL HPI/OVERNIGHT EVENTS:  No acute event overnight. Pulm was consulted yesterday for respiratory distress, recommended broadening zosyn to benny/vanc. BIPAP was switched to high flow.       MEDICATIONS  (STANDING):  amLODIPine   Tablet 10 milliGRAM(s) Oral daily  ATENolol  Tablet 50 milliGRAM(s) Oral daily  dextrose 5%. 1000 milliLiter(s) (50 mL/Hr) IV Continuous <Continuous>  dextrose 5%. 1000 milliLiter(s) (60 mL/Hr) IV Continuous <Continuous>  dextrose 50% Injectable 12.5 Gram(s) IV Push once  dextrose 50% Injectable 25 Gram(s) IV Push once  dextrose 50% Injectable 25 Gram(s) IV Push once  enoxaparin Injectable 40 milliGRAM(s) SubCutaneous daily  escitalopram 5 milliGRAM(s) Oral daily  insulin lispro (HumaLOG) corrective regimen sliding scale   SubCutaneous three times a day before meals  insulin lispro (HumaLOG) corrective regimen sliding scale   SubCutaneous at bedtime  levETIRAcetam 500 milliGRAM(s) Oral two times a day  levothyroxine 88 MICROGram(s) Oral daily  meropenem  IVPB 1000 milliGRAM(s) IV Intermittent every 12 hours  pamidronate IVPB 60 milliGRAM(s) IV Intermittent once  pantoprazole    Tablet 40 milliGRAM(s) Oral before breakfast  simvastatin 20 milliGRAM(s) Oral at bedtime  vancomycin  IVPB      vancomycin  IVPB 1000 milliGRAM(s) IV Intermittent every 24 hours    MEDICATIONS  (PRN):  acetaminophen   Tablet .. 650 milliGRAM(s) Oral every 6 hours PRN Severe Pain (7 - 10)  dextrose 40% Gel 15 Gram(s) Oral once PRN Blood Glucose LESS THAN 70 milliGRAM(s)/deciliter  glucagon  Injectable 1 milliGRAM(s) IntraMuscular once PRN Glucose LESS THAN 70 milligrams/deciliter      Allergies    No Known Allergies    Intolerances        Vital Signs Last 24 Hrs  T(C): 36.3 (28 Nov 2019 05:42), Max: 36.8 (27 Nov 2019 09:09)  T(F): 97.4 (28 Nov 2019 05:42), Max: 98.2 (27 Nov 2019 09:09)  HR: 78 (28 Nov 2019 05:42) (58 - 78)  BP: 135/78 (28 Nov 2019 05:42) (106/70 - 146/76)  BP(mean): --  RR: 24 (28 Nov 2019 05:42) (18 - 25)  SpO2: 96% (28 Nov 2019 05:42) (90% - 100%)    PHYSICAL EXAM:  GENERAL: NAD.   CHEST/LUNG: Clear to auscultation; No rales, rhonchi, or wheezing.  Respiratory effort does not appear labored.  HEART: Regular rate and rhythm; S1 and S2,  no murmurs, rubs, or gallops.  ABDOMEN: Soft, not tender to palpation.  No masses or HSM appreciated.  No distension.  Bowel sounds present.  EXTREMITIES:  No clubbing, cyanosis, or edema.  Moves all extremities   SKIN: No obvious rashes or lesions.  Turgor okay.      LABS:       CAPILLARY BLOOD GLUCOSE      POCT Blood Glucose.: 141 mg/dL (27 Nov 2019 21:46)  POCT Blood Glucose.: 141 mg/dL (27 Nov 2019 17:56)  POCT Blood Glucose.: 183 mg/dL (27 Nov 2019 13:03)  POCT Blood Glucose.: 135 mg/dL (27 Nov 2019 10:40)  POCT Blood Glucose.: 167 mg/dL (27 Nov 2019 10:23)  POCT Blood Glucose.: 81 mg/dL (27 Nov 2019 10:05)  POCT Blood Glucose.: 55 mg/dL (27 Nov 2019 09:49)  POCT Blood Glucose.: 43 mg/dL (27 Nov 2019 09:47)  POCT Blood Glucose.: 43 mg/dL (27 Nov 2019 09:31)  POCT Blood Glucose.: 40 mg/dL (27 Nov 2019 09:30)      Culture - Blood (collected 26 Nov 2019 06:26)  Source: BLOOD  Preliminary Report (28 Nov 2019 06:26):    NO ORGANISMS ISOLATED    NO ORGANISMS ISOLATED AT 48 HRS.    Culture - Urine (collected 25 Nov 2019 19:50)  Source: URINE MIDSTREAM  Final Report (27 Nov 2019 08:34):    NO GROWTH AT 24 HOURS Pavithra Alfonso PGY1  -[id number] | [facility] [id number]  =========================    Patient is a 79y old  Male who presents with a chief complaint of AMS (27 Nov 2019 17:25)      INTERVAL HPI/OVERNIGHT EVENTS:  No acute event overnight. Pulm was consulted yesterday for respiratory distress, recommended broadening zosyn to benny/vanc. BIPAP was switched to high flow. This morning pt appears comfortable on high flow. Denies CP, abdominal pain, SOB. A&Ox1-2. Per nursing, pt has multiple BM per day.      MEDICATIONS  (STANDING):  amLODIPine   Tablet 10 milliGRAM(s) Oral daily  ATENolol  Tablet 50 milliGRAM(s) Oral daily  dextrose 5%. 1000 milliLiter(s) (50 mL/Hr) IV Continuous <Continuous>  dextrose 5%. 1000 milliLiter(s) (60 mL/Hr) IV Continuous <Continuous>  dextrose 50% Injectable 12.5 Gram(s) IV Push once  dextrose 50% Injectable 25 Gram(s) IV Push once  dextrose 50% Injectable 25 Gram(s) IV Push once  enoxaparin Injectable 40 milliGRAM(s) SubCutaneous daily  escitalopram 5 milliGRAM(s) Oral daily  insulin lispro (HumaLOG) corrective regimen sliding scale   SubCutaneous three times a day before meals  insulin lispro (HumaLOG) corrective regimen sliding scale   SubCutaneous at bedtime  levETIRAcetam 500 milliGRAM(s) Oral two times a day  levothyroxine 88 MICROGram(s) Oral daily  meropenem  IVPB 1000 milliGRAM(s) IV Intermittent every 12 hours  pamidronate IVPB 60 milliGRAM(s) IV Intermittent once  pantoprazole    Tablet 40 milliGRAM(s) Oral before breakfast  simvastatin 20 milliGRAM(s) Oral at bedtime  vancomycin  IVPB      vancomycin  IVPB 1000 milliGRAM(s) IV Intermittent every 24 hours    MEDICATIONS  (PRN):  acetaminophen   Tablet .. 650 milliGRAM(s) Oral every 6 hours PRN Severe Pain (7 - 10)  dextrose 40% Gel 15 Gram(s) Oral once PRN Blood Glucose LESS THAN 70 milliGRAM(s)/deciliter  glucagon  Injectable 1 milliGRAM(s) IntraMuscular once PRN Glucose LESS THAN 70 milligrams/deciliter      Allergies    No Known Allergies    Intolerances        Vital Signs Last 24 Hrs  T(C): 36.3 (28 Nov 2019 05:42), Max: 36.8 (27 Nov 2019 09:09)  T(F): 97.4 (28 Nov 2019 05:42), Max: 98.2 (27 Nov 2019 09:09)  HR: 78 (28 Nov 2019 05:42) (58 - 78)  BP: 135/78 (28 Nov 2019 05:42) (106/70 - 146/76)  BP(mean): --  RR: 24 (28 Nov 2019 05:42) (18 - 25)  SpO2: 96% (28 Nov 2019 05:42) (90% - 100%)    PHYSICAL EXAM:  GENERAL: NAD. alert and awake.  CHEST/LUNG: crackles at bases bilaterally. on high flow NC.   HEART: Regular rate and rhythm; S1 and S2,  no murmurs, rubs, or gallops.  ABDOMEN: Soft, not tender to palpation.  No masses or HSM appreciated.  No distension.  Bowel sounds present.  EXTREMITIES:  No clubbing, cyanosis, or edema.  Moves all extremities   SKIN: No obvious rashes or lesions.  Turgor okay.      LABS:                        8.7    11.42 )-----------( 455      ( 28 Nov 2019 06:19 )             27.6   11-28    136  |  97<L>  |  33<H>  ----------------------------<  96  3.9   |  28  |  1.23    Ca    8.2<L>      28 Nov 2019 06:19  Phos  3.6     11-28  Mg     1.9     11-28    TPro  5.8<L>  /  Alb  1.8<L>  /  TBili  0.3  /  DBili  x   /  AST  34  /  ALT  14  /  AlkPhos  128<H>  11-28         CAPILLARY BLOOD GLUCOSE  POCT Blood Glucose.: 141 mg/dL (27 Nov 2019 21:46)  POCT Blood Glucose.: 141 mg/dL (27 Nov 2019 17:56)  POCT Blood Glucose.: 183 mg/dL (27 Nov 2019 13:03)  POCT Blood Glucose.: 135 mg/dL (27 Nov 2019 10:40)  POCT Blood Glucose.: 167 mg/dL (27 Nov 2019 10:23)  POCT Blood Glucose.: 81 mg/dL (27 Nov 2019 10:05)  POCT Blood Glucose.: 55 mg/dL (27 Nov 2019 09:49)  POCT Blood Glucose.: 43 mg/dL (27 Nov 2019 09:47)  POCT Blood Glucose.: 43 mg/dL (27 Nov 2019 09:31)  POCT Blood Glucose.: 40 mg/dL (27 Nov 2019 09:30)      Culture - Blood (collected 26 Nov 2019 06:26)  Source: BLOOD  Preliminary Report (28 Nov 2019 06:26):    NO ORGANISMS ISOLATED    NO ORGANISMS ISOLATED AT 48 HRS.    Culture - Urine (collected 25 Nov 2019 19:50)  Source: URINE MIDSTREAM  Final Report (27 Nov 2019 08:34):    NO GROWTH AT 24 HOURS

## 2019-11-28 NOTE — PROGRESS NOTE ADULT - PROBLEM SELECTOR PLAN 3
Per chart review on R-CP. s/p 2 cycles. Pt's daughter, Dr. Browne, is interested in inpatient chemo.   - per heme/onc, plan for inpt chemo prior to discharge

## 2019-11-28 NOTE — PRE-OP CHECKLIST - SITE MARKED BY SURGEON
Problem: Patient Care Overview  Goal: Plan of Care Review  Outcome: Ongoing (interventions implemented as appropriate)  Patient arrived to room. PIV placed. Admit assessment completed. Plan of care discussed with patient. Will monitor       n/a

## 2019-11-28 NOTE — PROGRESS NOTE ADULT - PROBLEM SELECTOR PLAN 1
Respiratory distress likely from multiple silent aspirations.  Pt is s/p 5-day course of zosyn for aspiration PNA. CT chest with new ground glass opacities and dense consolidations in upper lobes bilaterally and known b/l pleural effusions, concerning for multifocal PNA.   - on vanc/benny (11/28-) for coverage of HAP  - on high flow NC 60%/50L -> wean as tolerated for goal O2 sat >92%  - BIPAP as needed for increase WOB  - pulm consulted, recs appreciated Hypoxia likely from aspiration pneumonitis.  Pt is s/p 5-day course of zosyn for aspiration PNA. CT chest with new ground glass opacities and dense consolidations in upper lobes bilaterally and known b/l pleural effusions, concerning for multifocal PNA.   - on vanc/benny (11/28-) for coverage of HAP  - on high flow NC 60%/50L -> wean as tolerated for goal O2 sat >92%  - BIPAP as needed for increase WOB  - pulm consulted, recs appreciated

## 2019-11-28 NOTE — PROGRESS NOTE ADULT - ATTENDING COMMENTS
Acute hypoxic respiratory failure, likely recurrent aspiration pneumonitis, worsening b/l opacities on CT, c/w IV Vanco and Meropenem, on HF oxygen   Metabolic encephalopathy with seizure activity: likely d/t infection, mental status wax/wanes, VEEG negative for seizures and will c/w Keppra, stable SDH - no intervention by NSx   Dysphagia, on dysphagia diet, plan for IR PEG on Friday   DLBCL: s/p 2 cycles of R-CD with interval improvement in lymphadenopathy on scans from 11/14, CT shows response to chemo with reduced lymphadenopathy and spleen size, Heme is considering inpt chemo if his condition improves   Mild hypercalcemia: likely d/t lymphoma, will give a dose of palmidronate 60 mg x1 and trend Ca

## 2019-11-28 NOTE — PROGRESS NOTE ADULT - PROBLEM SELECTOR PLAN 2
- NPO except meds given concerns for aspiration  - PEG placement tomorrow by IR - Severe protein calorie malnutrition   - PEG placement tomorrow by IR

## 2019-11-28 NOTE — PROGRESS NOTE ADULT - PROBLEM SELECTOR PLAN 6
Pt was hyponatremic, then hypernatremic likely from poor PO intake. Now improved  - d/c salt tabs and lasix  - s/p D5 75cc/hr Pt was hyponatremic, then hypernatremic likely from poor PO intake. Now improved  - d/c salt tabs and lasix  - s/p D5 75cc/hr      #hypercalcemia: likely 2/2 lymphoma  - pamidronate 60mg x1 Pt was hyponatremic, then hypernatremic likely from poor PO intake. Resolved  - d/c salt tabs and lasix  - s/p D5 75cc/hr

## 2019-11-28 NOTE — PROGRESS NOTE ADULT - PROBLEM SELECTOR PLAN 10
DVT ppx: ppx lovenox  Diet: NPO, PEG placement tomorrow  FULL CODE DVT ppx: ppx lovenox  Diet: NPO, PEG placement tomorrow  DNR (NOT DNI)

## 2019-11-28 NOTE — PROGRESS NOTE ADULT - SUBJECTIVE AND OBJECTIVE BOX
[facility] [place] [facility]OLOGY [facility]ACT[facility]   MD Anusha Siddiqui D.O. Fatima Sheikh, D.O. Ruoru Wong, PA    From 7 AM - 5 PM:  OFFICE: [phone]  Dr. Radford cell: [phone]  Dr. Townsend cell: [phone]  Dr. Steinberg cell: [phone]  LEATHA Agee cell: [phone]    From 5 PM - 7 AM: Answering Service: [phone]      RENAL FOLLOW UP NOTE  --------------------------------------------------------------------------------  HPI: Pt seen and examined at bedside. On high flow oxygen. Awake and responsive minimally. Not eating     PAST HISTORY  --------------------------------------------------------------------------------  No significant changes to PMH, PSH, FHx, SHx, unless otherwise noted    ALLERGIES & MEDICATIONS  --------------------------------------------------------------------------------  Allergies    No Known Allergies    Intolerances      Standing Inpatient Medications  amLODIPine   Tablet 10 milliGRAM(s) Oral daily  ATENolol  Tablet 50 milliGRAM(s) Oral daily  dextrose 5%. 1000 milliLiter(s) IV Continuous <Continuous>  dextrose 5%. 1000 milliLiter(s) IV Continuous <Continuous>  dextrose 50% Injectable 12.5 Gram(s) IV Push once  dextrose 50% Injectable 25 Gram(s) IV Push once  dextrose 50% Injectable 25 Gram(s) IV Push once  escitalopram 5 milliGRAM(s) Oral daily  insulin lispro (HumaLOG) corrective regimen sliding scale   SubCutaneous three times a day before meals  insulin lispro (HumaLOG) corrective regimen sliding scale   SubCutaneous at bedtime  levETIRAcetam 500 milliGRAM(s) Oral two times a day  levothyroxine 88 MICROGram(s) Oral daily  meropenem  IVPB 1000 milliGRAM(s) IV Intermittent every 12 hours  pantoprazole    Tablet 40 milliGRAM(s) Oral before breakfast  simvastatin 20 milliGRAM(s) Oral at bedtime  vancomycin  IVPB      vancomycin  IVPB 1000 milliGRAM(s) IV Intermittent every 24 hours    PRN Inpatient Medications  acetaminophen   Tablet .. 650 milliGRAM(s) Oral every 6 hours PRN  dextrose 40% Gel 15 Gram(s) Oral once PRN  glucagon  Injectable 1 milliGRAM(s) IntraMuscular once PRN      REVIEW OF SYSTEMS  --------------------------------------------------------------------------------  Unable to fully obtain     VITALS/PHYSICAL EXAM  --------------------------------------------------------------------------------  T(C): 36.7 (11-28-19 @ 12:21), Max: 36.7 (11-28-19 @ 12:21)  HR: 69 (11-28-19 @ 12:21) (69 - 80)  BP: 107/70 (11-28-19 @ 12:21) (107/70 - 135/78)  RR: 19 (11-28-19 @ 12:21) (19 - 25)  SpO2: 100% (11-28-19 @ 12:21) (94% - 100%)  Wt(kg): --      11-27-19 @ 07:01  -  11-28-19 @ 07:00  --------------------------------------------------------  IN: 730 mL / OUT: 0 mL / NET: 730 mL      Physical Exam:  	Gen: NAD on high flow o2  	HEENT: MMM  	Pulm: CTA B/L increased work of breathing   	CV: S1S2  	Abd: Soft, +BS  	Ext: No LE edema B/L                      Neuro: Awake   	Skin: Warm and Dry       LABS/STUDIES  --------------------------------------------------------------------------------              8.7    11.42 >-----------<  455      [11-28-19 @ 06:19]              27.6     136  |  97  |  33  ----------------------------<  96      [11-28-19 @ 06:19]  3.9   |  28  |  1.23        Ca     8.2     [11-28-19 @ 06:19]      Mg     1.9     [11-28-19 @ 06:19]      Phos  3.6     [11-28-19 @ 06:19]    TPro  5.8  /  Alb  1.8  /  TBili  0.3  /  DBili  x   /  AST  34  /  ALT  14  /  AlkPhos  128  [11-28-19 @ 06:19]    PT/INR: PT 13.2 , INR 1.15       [11-28-19 @ 06:19]      Creatinine Trend:  SCr 1.23 [11-28 @ 06:19]  SCr 1.23 [11-27 @ 06:00]  SCr 1.16 [11-26 @ 16:23]  SCr 1.25 [11-26 @ 05:07]  SCr 1.21 [11-25 @ 06:36]    Urinalysis - [11-25-19 @ 18:43]      Color YELLOW / Appearance CLEAR / SG 1.020 / pH 6.0      Gluc NEGATIVE / Ketone NEGATIVE  / Bili NEGATIVE / Urobili TRACE       Blood NEGATIVE / Protein 100 / Leuk Est NEGATIVE / Nitrite NEGATIVE      RBC 6-10 / WBC 0-2 / Hyaline NEGATIVE / Gran  / Sq Epi OCC / Non Sq Epi  / Bacteria NEGATIVE      Iron 38, TIBC 234, %sat --      [09-12-19 @ 06:08]  Ferritin 214.6      [09-12-19 @ 06:08]  PTH 20.00 (Ca --)      [09-03-19 @ 05:45]   --  PTH 25.46 (Ca --)      [09-01-19 @ 06:00]   --  Vitamin D (25OH) 34.9      [09-03-19 @ 05:45]  HbA1c 7.0      [09-13-19 @ 06:50]  TSH 12.33      [11-14-19 @ 14:10]  Lipid: chol 121, TG 96, HDL 33, LDL 74      [08-31-19 @ 07:00]    HBsAb Reactive      [11-23-19 @ 05:25]  HBsAg Nonreactive      [11-23-19 @ 05:25]  HBcAb Reactive      [11-23-19 @ 05:25]  HCV 0.12, Nonreactive Hepatitis C AB  S/CO Ratio                        Interpretation  < 1.00                                   Non-Reactive  1.00 - 4.99                         Weakly-Reactive  >= 5.00                                Reactive  Non-Reactive: Aperson with a non-reactive HCV antibody  result is considered uninfected.  No further action is  needed unless recent infection is suspected.  In these  cases, consider repeat testing later to detect  seroconversion..  Weakly-Reactive: HCV antibody test is abnormal, HCV RNA  Qualitative test will follow.  Reactive: HCV antibody test is abnormal, HCV RNA  Qualitative test will follow.  Note: HCV antibody testing is performed on the [employer] system.      [11-23-19 @ 05:25]  HIV Nonreactive The HIV Ag/Ab Combo test performed screens for HIV-1 p24  antigen, antibodies to HIV-1 (group M and group O), and  antibodies to HIV-2. All specimens repeatedly reactive  will reflex to an HIV 1/2 antibody confirmation and  differentiation test. This assay detects p24 antigen which  may be present prior to the development of HIV antibodies,  therefore a reactive result with a negative HIV 1/2 AB  Confirmation should be followed up with HIV-1 RNA, HIV-2  RNA and repeat testing in 4-8 weeks. A nonreactive result  does not preclude previous exposure to or infection with  HIV-1 or HIV-2. [facility] prohibits disclosure of this  result to any unauthorized party.      [11-22-19 @ 10:18]

## 2019-11-28 NOTE — PROGRESS NOTE ADULT - ASSESSMENT
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 prolonged FRANCES state (? 2/2 prolonged hypercalcemic state) that has now resolved vs. loss of body mass  - Serum Cr is stable   - Avoid nephrotoxins agent  - renal diet  - monitor bmp    Hyponatremia  - ? etiology, presented with Na 122  - work up suggested SIADH  - Serum sodium improved   - d/miroslava Nacl tab and lasix d/c'd recieved D5W 11/26.   - Na stable today  - Promote oral solute/food intake   - Monitor serum sodium and fluid status closely     HTN  - has Hx HTN but was on midodrine for orthostatic hypotension in the recent past   - BP was elevated therefore restarted on home BP medications   - Currently BP controlled   - monitor bp    Hypocalcemia  - Has Hx hypercalcemia of malignancy  - low alb corrected alexander is optimal  - monitor    Pl. effusion b/l/ Anasarca   mod effusion seen on ct chest  was on lasix 20mg PO daily. (last dose 11/25)   patient clinically improving  Now on high flow. Can give a dose on lasix 20 IV if needed PRN SOB/ hypervolemia  Monitor     Hypokalemia  supplemented  monitor

## 2019-11-29 LAB
ALBUMIN SERPL ELPH-MCNC: 2.2 G/DL — LOW (ref 3.3–5)
ALP SERPL-CCNC: 129 U/L — HIGH (ref 40–120)
ALT FLD-CCNC: 14 U/L — SIGNIFICANT CHANGE UP (ref 4–41)
ANION GAP SERPL CALC-SCNC: 10 MMO/L — SIGNIFICANT CHANGE UP (ref 7–14)
AST SERPL-CCNC: 27 U/L — SIGNIFICANT CHANGE UP (ref 4–40)
BASOPHILS # BLD AUTO: 0.04 K/UL — SIGNIFICANT CHANGE UP (ref 0–0.2)
BASOPHILS NFR BLD AUTO: 0.3 % — SIGNIFICANT CHANGE UP (ref 0–2)
BILIRUB SERPL-MCNC: 0.3 MG/DL — SIGNIFICANT CHANGE UP (ref 0.2–1.2)
BUN SERPL-MCNC: 27 MG/DL — HIGH (ref 7–23)
CALCIUM SERPL-MCNC: 8 MG/DL — LOW (ref 8.4–10.5)
CHLORIDE SERPL-SCNC: 95 MMOL/L — LOW (ref 98–107)
CO2 SERPL-SCNC: 26 MMOL/L — SIGNIFICANT CHANGE UP (ref 22–31)
CREAT SERPL-MCNC: 1.05 MG/DL — SIGNIFICANT CHANGE UP (ref 0.5–1.3)
EOSINOPHIL # BLD AUTO: 0.5 K/UL — SIGNIFICANT CHANGE UP (ref 0–0.5)
EOSINOPHIL NFR BLD AUTO: 4.3 % — SIGNIFICANT CHANGE UP (ref 0–6)
GLUCOSE SERPL-MCNC: 187 MG/DL — HIGH (ref 70–99)
HCT VFR BLD CALC: 25.1 % — LOW (ref 39–50)
HGB BLD-MCNC: 8.2 G/DL — LOW (ref 13–17)
IMM GRANULOCYTES NFR BLD AUTO: 6.4 % — HIGH (ref 0–1.5)
INR BLD: 1.25 — HIGH (ref 0.88–1.17)
LYMPHOCYTES # BLD AUTO: 1 K/UL — SIGNIFICANT CHANGE UP (ref 1–3.3)
LYMPHOCYTES # BLD AUTO: 8.5 % — LOW (ref 13–44)
MAGNESIUM SERPL-MCNC: 1.8 MG/DL — SIGNIFICANT CHANGE UP (ref 1.6–2.6)
MCHC RBC-ENTMCNC: 27.6 PG — SIGNIFICANT CHANGE UP (ref 27–34)
MCHC RBC-ENTMCNC: 32.7 % — SIGNIFICANT CHANGE UP (ref 32–36)
MCV RBC AUTO: 84.5 FL — SIGNIFICANT CHANGE UP (ref 80–100)
MONOCYTES # BLD AUTO: 0.57 K/UL — SIGNIFICANT CHANGE UP (ref 0–0.9)
MONOCYTES NFR BLD AUTO: 4.9 % — SIGNIFICANT CHANGE UP (ref 2–14)
NEUTROPHILS # BLD AUTO: 8.84 K/UL — HIGH (ref 1.8–7.4)
NEUTROPHILS NFR BLD AUTO: 75.6 % — SIGNIFICANT CHANGE UP (ref 43–77)
NRBC # FLD: 0.05 K/UL — SIGNIFICANT CHANGE UP (ref 0–0)
PHOSPHATE SERPL-MCNC: 2.5 MG/DL — SIGNIFICANT CHANGE UP (ref 2.5–4.5)
PLATELET # BLD AUTO: 499 K/UL — HIGH (ref 150–400)
PMV BLD: 12.4 FL — SIGNIFICANT CHANGE UP (ref 7–13)
POTASSIUM SERPL-MCNC: 3.4 MMOL/L — LOW (ref 3.5–5.3)
POTASSIUM SERPL-SCNC: 3.4 MMOL/L — LOW (ref 3.5–5.3)
PROT SERPL-MCNC: 5.6 G/DL — LOW (ref 6–8.3)
PROTHROM AB SERPL-ACNC: 14 SEC — HIGH (ref 9.8–13.1)
RBC # BLD: 2.97 M/UL — LOW (ref 4.2–5.8)
RBC # FLD: 19.5 % — HIGH (ref 10.3–14.5)
SODIUM SERPL-SCNC: 131 MMOL/L — LOW (ref 135–145)
VANCOMYCIN TROUGH SERPL-MCNC: 13.7 UG/ML — SIGNIFICANT CHANGE UP (ref 10–20)
WBC # BLD: 11.7 K/UL — HIGH (ref 3.8–10.5)
WBC # FLD AUTO: 11.7 K/UL — HIGH (ref 3.8–10.5)

## 2019-11-29 PROCEDURE — 99232 SBSQ HOSP IP/OBS MODERATE 35: CPT | Mod: GC

## 2019-11-29 PROCEDURE — 99233 SBSQ HOSP IP/OBS HIGH 50: CPT | Mod: GC

## 2019-11-29 PROCEDURE — 49440 PLACE GASTROSTOMY TUBE PERC: CPT

## 2019-11-29 RX ORDER — ENOXAPARIN SODIUM 100 MG/ML
40 INJECTION SUBCUTANEOUS ONCE
Refills: 0 | Status: COMPLETED | OUTPATIENT
Start: 2019-11-29 | End: 2019-11-29

## 2019-11-29 RX ORDER — POTASSIUM CHLORIDE 20 MEQ
10 PACKET (EA) ORAL
Refills: 0 | Status: COMPLETED | OUTPATIENT
Start: 2019-11-29 | End: 2019-11-29

## 2019-11-29 RX ORDER — SODIUM CHLORIDE 9 MG/ML
1000 INJECTION, SOLUTION INTRAVENOUS
Refills: 0 | Status: DISCONTINUED | OUTPATIENT
Start: 2019-11-29 | End: 2019-11-30

## 2019-11-29 RX ORDER — ENOXAPARIN SODIUM 100 MG/ML
40 INJECTION SUBCUTANEOUS DAILY
Refills: 0 | Status: DISCONTINUED | OUTPATIENT
Start: 2019-11-30 | End: 2019-12-24

## 2019-11-29 RX ADMIN — ENOXAPARIN SODIUM 40 MILLIGRAM(S): 100 INJECTION SUBCUTANEOUS at 19:54

## 2019-11-29 RX ADMIN — Medication 250 MILLIGRAM(S): at 17:39

## 2019-11-29 RX ADMIN — SODIUM CHLORIDE 50 MILLILITER(S): 9 INJECTION, SOLUTION INTRAVENOUS at 11:50

## 2019-11-29 RX ADMIN — Medication 1: at 17:39

## 2019-11-29 RX ADMIN — SIMVASTATIN 20 MILLIGRAM(S): 20 TABLET, FILM COATED ORAL at 19:54

## 2019-11-29 RX ADMIN — Medication 100 MILLIEQUIVALENT(S): at 11:49

## 2019-11-29 RX ADMIN — Medication 1: at 14:21

## 2019-11-29 RX ADMIN — AMLODIPINE BESYLATE 10 MILLIGRAM(S): 2.5 TABLET ORAL at 05:18

## 2019-11-29 RX ADMIN — MEROPENEM 100 MILLIGRAM(S): 1 INJECTION INTRAVENOUS at 17:35

## 2019-11-29 RX ADMIN — ATENOLOL 50 MILLIGRAM(S): 25 TABLET ORAL at 05:18

## 2019-11-29 RX ADMIN — LEVETIRACETAM 500 MILLIGRAM(S): 250 TABLET, FILM COATED ORAL at 05:18

## 2019-11-29 RX ADMIN — Medication 100 MILLIEQUIVALENT(S): at 13:52

## 2019-11-29 RX ADMIN — SODIUM CHLORIDE 60 MILLILITER(S): 9 INJECTION, SOLUTION INTRAVENOUS at 05:18

## 2019-11-29 RX ADMIN — Medication 100 MILLIEQUIVALENT(S): at 14:20

## 2019-11-29 RX ADMIN — PANTOPRAZOLE SODIUM 40 MILLIGRAM(S): 20 TABLET, DELAYED RELEASE ORAL at 05:18

## 2019-11-29 RX ADMIN — Medication 88 MICROGRAM(S): at 05:18

## 2019-11-29 RX ADMIN — ESCITALOPRAM OXALATE 5 MILLIGRAM(S): 10 TABLET, FILM COATED ORAL at 17:35

## 2019-11-29 RX ADMIN — LEVETIRACETAM 500 MILLIGRAM(S): 250 TABLET, FILM COATED ORAL at 17:35

## 2019-11-29 RX ADMIN — MEROPENEM 100 MILLIGRAM(S): 1 INJECTION INTRAVENOUS at 05:18

## 2019-11-29 NOTE — PROGRESS NOTE ADULT - ASSESSMENT
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 Pulmonary service consulted to assist in management of hypoxic respiratory failure requiring NIV. 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 Pulmonary service consulted to assist in management of hypoxic respiratory failure requiring NIV.     1. Acute hypoxic respiratory failure - CT chest with new ground glass opacities and dense consolidations in upper lobes bilateral and known bilateral pleural effusions. Overall findings concerning for multifocal pneumonia and may also be a component of fluid overload.     Recommendations:  - Continue high flow nasal cannula and wean as tolerated for goal of O2 sat > 92%  - BIPAP as needed for increased work of breathing  - Continue meropenem / vancomycin  - Follow up blood cultures and MRSA nasal swab  - Sputum culture if able  - If no evidence of MRSA within 48h, may discontinue vancomycin  - Furosemide as needed     --------------------------------------------------------  Ton Mcdonald, PGY-4  Pulmonary/Critical Care Fellow  Pager: [phone] (MUNIR), [phone] (NS)  Pulmonary spectra: [id number]

## 2019-11-29 NOTE — PROGRESS NOTE ADULT - SUBJECTIVE AND OBJECTIVE BOX
[facility] [place] [facility]OLOG[facility] PRACT[facility]   MD UYEN HERNANDEZ, DO MARYANN REYNOSO, LEATHA NICOLE    TEL:  OFFICE: [phone]  DR CASAREZ CELL: [phone]  DR. HERNANDEZ CELL: [phone]  DR. REYNOSO CELL: [phone]  CHUCKIE TERRAZAS CELL: [phone]        Patient is a 79y old  Male who presents with a chief complaint of AMS (29 Nov 2019 07:51)      Patient seen and examined at bedside.     VITALS:  T(F): 97.8 (11-29-19 @ 05:16), Max: 97.8 (11-29-19 @ 05:16)  HR: 76 (11-29-19 @ 11:23)  BP: 109/62 (11-29-19 @ 05:16)  RR: 20 (11-29-19 @ 11:23)  SpO2: 98% (11-29-19 @ 11:23)  Wt(kg): --    11-28 @ 07:01  -  11-29 @ 07:00  --------------------------------------------------------  IN: 930 mL / OUT: 0 mL / NET: 930 mL      Height (cm): 157.5 (11-28 @ 19:46)    PHYSICAL EXAM:  Constitutional: NAD. on high flow  Neck: No JVD  Respiratory: CTAB, no wheezes, rales or rhonchi  Cardiovascular: S1, S2, RRR  Gastrointestinal: BS+, soft, NT/ND  Extremities: No peripheral edema    Hospital Medications:   MEDICATIONS  (STANDING):  amLODIPine   Tablet 10 milliGRAM(s) Oral daily  ATENolol  Tablet 50 milliGRAM(s) Oral daily  dextrose 5% + sodium chloride 0.9%. 1000 milliLiter(s) (50 mL/Hr) IV Continuous <Continuous>  dextrose 5%. 1000 milliLiter(s) (50 mL/Hr) IV Continuous <Continuous>  dextrose 50% Injectable 12.5 Gram(s) IV Push once  dextrose 50% Injectable 25 Gram(s) IV Push once  dextrose 50% Injectable 25 Gram(s) IV Push once  enoxaparin Injectable 40 milliGRAM(s) SubCutaneous once  escitalopram 5 milliGRAM(s) Oral daily  insulin lispro (HumaLOG) corrective regimen sliding scale   SubCutaneous three times a day before meals  insulin lispro (HumaLOG) corrective regimen sliding scale   SubCutaneous at bedtime  levETIRAcetam 500 milliGRAM(s) Oral two times a day  levothyroxine 88 MICROGram(s) Oral daily  meropenem  IVPB 1000 milliGRAM(s) IV Intermittent every 12 hours  pantoprazole    Tablet 40 milliGRAM(s) Oral before breakfast  potassium chloride  10 mEq/100 mL IVPB 10 milliEquivalent(s) IV Intermittent every 1 hour  simvastatin 20 milliGRAM(s) Oral at bedtime  vancomycin  IVPB 1000 milliGRAM(s) IV Intermittent every 24 hours  vancomycin  IVPB          LABS:  11-29    131<L>  |  95<L>  |  27<H>  ----------------------------<  187<H>  3.4<L>   |  26  |  1.05    Ca    8.0<L>      29 Nov 2019 06:10  Phos  2.5     11-29  Mg     1.8     11-29    TPro  5.6<L>  /  Alb  2.2<L>  /  TBili  0.3  /  DBili      /  AST  27  /  ALT  14  /  AlkPhos  129<H>  11-29    Creatinine Trend: 1.05 <--, 1.23 <--, 1.23 <--, 1.16 <--, 1.25 <--, 1.21 <--, 1.18 <--, 1.11 <--    Phosphorus Level, Serum: 2.5 mg/dL (11-29 @ 06:10)  Albumin, Serum: 2.2 g/dL (11-29 @ 06:10)                              8.2    11.70 )-----------( 499      ( 29 Nov 2019 06:10 )             25.1     Urine Studies:  Urinalysis - [11-25-19 @ 18:43]      Color YELLOW / Appearance CLEAR / SG 1.020 / pH 6.0      Gluc NEGATIVE / Ketone NEGATIVE  / Bili NEGATIVE / Urobili TRACE       Blood NEGATIVE / Protein 100 / Leuk Est NEGATIVE / Nitrite NEGATIVE      RBC 6-10 / WBC 0-2 / Hyaline NEGATIVE / Gran  / Sq Epi OCC / Non Sq Epi  / Bacteria NEGATIVE      Iron 38, TIBC 234, %sat --      [09-12-19 @ 06:08]  Ferritin 214.6      [09-12-19 @ 06:08]  PTH 20.00 (Ca --)      [09-03-19 @ 05:45]   --  PTH 25.46 (Ca --)      [09-01-19 @ 06:00]   --  Vitamin D (25OH) 34.9      [09-03-19 @ 05:45]  HbA1c 7.0      [09-13-19 @ 06:50]  TSH 12.33      [11-14-19 @ 14:10]  Lipid: chol 121, TG 96, HDL 33, LDL 74      [08-31-19 @ 07:00]    HBsAb Reactive      [11-23-19 @ 05:25]  HBsAg Nonreactive      [11-23-19 @ 05:25]  HBcAb Reactive      [11-23-19 @ 05:25]  HCV 0.12, Nonreactive Hepatitis C AB  S/CO Ratio                        Interpretation  < 1.00                                   Non-Reactive  1.00 - 4.99                         Weakly-Reactive  >= 5.00                                Reactive  Non-Reactive: Aperson with a non-reactive HCV antibody  result is considered uninfected.  No further action is  needed unless recent infection is suspected.  In these  cases, consider repeat testing later to detect  seroconversion..  Weakly-Reactive: HCV antibody test is abnormal, HCV RNA  Qualitative test will follow.  Reactive: HCV antibody test is abnormal, HCV RNA  Qualitative test will follow.  Note: HCV antibody testing is performed on the [employer]   system.      [11-23-19 @ 05:25]  HIV Nonreactive The HIV Ag/Ab Combo test performed screens for HIV-1 p24  antigen, antibodies to HIV-1 (group M and group O), and  antibodies to HIV-2. All specimens repeatedly reactive  will reflex to an HIV 1/2 antibody confirmation and  differentiation test. This assay detects p24 antigen which  may be present prior to the development of HIV antibodies,  therefore a reactive result with a negative HIV 1/2 AB  Confirmation should be followed up with HIV-1 RNA, HIV-2  RNA and repeat testing in 4-8 weeks. A nonreactive result  does not preclude previous exposure to or infection with  HIV-1 or HIV-2. [facility] prohibits disclosure of this  result to any unauthorized party.      [11-22-19 @ 10:18]      RADIOLOGY & ADDITIONAL STUDIES:

## 2019-11-29 NOTE — PROGRESS NOTE ADULT - ASSESSMENT
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 prolonged FRANCES state (? 2/2 prolonged hypercalcemic state) that has now resolved vs. loss of body mass  - Serum Cr is stable   - Avoid nephrotoxins agent  - renal diet  - monitor bmp    Hyponatremia  - ? etiology, presented with Na 122  - work up suggested SIADH  - Serum sodium improved   - d/miroslava Nacl tab and lasix d/c'd recieved D5W 11/26.   - Na again low today. on D5w+ NS @ 50cc/hr. S/p PEG today. would dc IVF once start feeding   - Monitor serum sodium and fluid status closely     HTN  - has Hx HTN but was on midodrine for orthostatic hypotension in the recent past   - BP was elevated therefore restarted on home BP medications   - Currently BP controlled   - monitor bp    Hypocalcemia  - Has Hx hypercalcemia of malignancy  - low alb corrected alexander is optimal  - monitor    Pl. effusion b/l/ Anasarca   mod effusion seen on ct chest  was on lasix 20mg PO daily. (last dose 11/25)   patient clinically improving  Now on high flow. pulm on board  SOB possible sec to Pna on vanco. Can give a dose on lasix 20 IV if needed PRN SOB/ hypervolemia  Monitor     Hypokalemia  supplemented  monitor 80 year old man with a PMH of DLBCL, CAD, DM, HTN, BL LE DVT, LUE DVT not on AC, CKD, orthostatic hypotension, and recent admission in November 2019 for SDH who is now presenting with acute encephalopathy    CKD stage III  - baseline Cr stable ~0.9-1.1  - Had Cr ~2 for the past prior year; ? prolonged FRANCES state (? 2/2 prolonged hypercalcemic state) that has now resolved vs. loss of body mass  - Serum Cr is stable   - Avoid nephrotoxins agent  - renal diet  - monitor bmp    Hyponatremia  - ? etiology, presented with Na 122  - work up suggested SIADH  - Serum sodium improved   - d/miroslava Nacl tab and lasix d/c'd recieved D5W 11/26.   - Na again low today. on D5w+ NS @ 50cc/hr. S/p PEG today. would dc IVF once start feeding   - Monitor serum sodium and fluid status closely     HTN  - has Hx HTN but was on midodrine for orthostatic hypotension in the recent past   - BP was elevated therefore restarted on home BP medications   - Currently BP controlled   - monitor bp    Hypocalcemia  - Has Hx hypercalcemia of malignancy  - low alb corrected alexander is optimal  - monitor    Pl. effusion b/l/ Anasarca   mod effusion seen on ct chest  was on lasix 20mg PO daily. (last dose 11/25)   patient clinically improving  Now on high flow. pulm on board  SOB possible sec to aspiration Pna on vanco. Can give a dose on lasix 20 IV if needed PRN SOB/ hypervolemia  Monitor     Hypokalemia  supplemented  monitor

## 2019-11-29 NOTE — PROGRESS NOTE ADULT - SUBJECTIVE AND OBJECTIVE BOX
INTERVAL EVENTS  No acute events overnight    OBJECTIVE    Vital Signs Last 24 Hrs  T(C): 36.6 (29 Nov 2019 05:16), Max: 36.7 (28 Nov 2019 12:21)  T(F): 97.8 (29 Nov 2019 05:16), Max: 98 (28 Nov 2019 12:21)  HR: 73 (29 Nov 2019 06:58) (69 - 84)  BP: 109/62 (29 Nov 2019 05:16) (106/54 - 109/62)  BP(mean): --  RR: 20 (29 Nov 2019 06:58) (19 - 25)  SpO2: 100% (29 Nov 2019 06:58) (94% - 100%)              MEDICATIONS  (STANDING):  amLODIPine   Tablet 10 milliGRAM(s) Oral daily  ATENolol  Tablet 50 milliGRAM(s) Oral daily  dextrose 5%. 1000 milliLiter(s) (50 mL/Hr) IV Continuous <Continuous>  dextrose 5%. 1000 milliLiter(s) (60 mL/Hr) IV Continuous <Continuous>  dextrose 50% Injectable 12.5 Gram(s) IV Push once  dextrose 50% Injectable 25 Gram(s) IV Push once  dextrose 50% Injectable 25 Gram(s) IV Push once  escitalopram 5 milliGRAM(s) Oral daily  insulin lispro (HumaLOG) corrective regimen sliding scale   SubCutaneous three times a day before meals  insulin lispro (HumaLOG) corrective regimen sliding scale   SubCutaneous at bedtime  levETIRAcetam 500 milliGRAM(s) Oral two times a day  levothyroxine 88 MICROGram(s) Oral daily  meropenem  IVPB 1000 milliGRAM(s) IV Intermittent every 12 hours  pantoprazole    Tablet 40 milliGRAM(s) Oral before breakfast  simvastatin 20 milliGRAM(s) Oral at bedtime  vancomycin  IVPB      vancomycin  IVPB 1000 milliGRAM(s) IV Intermittent every 24 hours    MEDICATIONS  (PRN):  acetaminophen   Tablet .. 650 milliGRAM(s) Oral every 6 hours PRN Severe Pain (7 - 10)  dextrose 40% Gel 15 Gram(s) Oral once PRN Blood Glucose LESS THAN 70 milliGRAM(s)/deciliter  glucagon  Injectable 1 milliGRAM(s) IntraMuscular once PRN Glucose LESS THAN 70 milligrams/deciliter      LABORATORY                          8.2    11.70 )-----------( 499      ( 29 Nov 2019 06:10 )             25.1     11-28    136  |  97<L>  |  33<H>  ----------------------------<  96  3.9   |  28  |  1.23    Ca    8.2<L>      28 Nov 2019 06:19  Phos  3.6     11-28  Mg     1.9     11-28    TPro  5.8<L>  /  Alb  1.8<L>  /  TBili  0.3  /  DBili  x   /  AST  34  /  ALT  14  /  AlkPhos  128<H>  11-28    ABG - ( 27 Nov 2019 13:50 )  pH, Arterial: 7.51  /  pCO2: 38    /  pO2: 55    / HCO3: 31    / Base Excess: 7.2   /  SaO2: 87.0        RADIOLOGY    CT Chest No Cont (11.26.19 @ 12:44) >  1.  New bilateral groundglass opacities and dense opacities with thickening of interlobular septa. These findings could occur in the clinical setting of pulmonary edema rather than pneumonia.  2.  Bilateral pleural effusions and passive atelectasis.  3.  A subcarinal lymph node has decreased in size since 9/10/2019    < end of copied text >      Transthoracic Echocardiogram (10.29.19 @ 16:21) >  1. Aortic valve not well visualized; appears calcified. Peak transaortic valve gradient equals 14 mm Hg, estimated aortic valve area equals 2 sqcm (by continuity equation),  aortic valve velocity time integral equals 41 cm, consistent with mild aortic stenosis. Minimal aortic regurgitation.  2. Increased relative wall thickness with normal left ventricular mass index, consistent with concentric left ventricular remodeling.  3. Hyperdynamic left ventricular systolic function.  4. The right ventricle is not well visualized; grossly normal right ventricular systolic function.  5. Estimated right ventricular systolic pressure equals 20 mm Hg, assuming right atrial pressure equals 8 mm Hg, consistent with normal pulmonary pressures.  6. Left pleural effusion.  *** No previous Echo exam.    < end of copied text > INTERVAL EVENTS  No acute events overnight  On high flow 60% / 50 L saturating 94%    REVIEW OF SYSTEMS  General: no fever or chills  Skin: no rash  Ophthalmologic: no acute changes in vision  ENMT: no nasal congestion	  Respiratory and Thorax: as above  Cardiovascular:	no chest pain  Gastrointestinal:	no abdominal pain  Genitourinary: no dysuria  Musculoskeletal:	 no muscle pain      OBJECTIVE    Vital Signs Last 24 Hrs  T(C): 36.6 (29 Nov 2019 05:16), Max: 36.7 (28 Nov 2019 12:21)  T(F): 97.8 (29 Nov 2019 05:16), Max: 98 (28 Nov 2019 12:21)  HR: 73 (29 Nov 2019 06:58) (69 - 84)  BP: 109/62 (29 Nov 2019 05:16) (106/54 - 109/62)  BP(mean): --  RR: 20 (29 Nov 2019 06:58) (19 - 25)  SpO2: 100% (29 Nov 2019 06:58) (94% - 100%)    PHYSICAL EXAM:  General: no acute distress  HEENT: normocephalic  Eyes: extraocular movements intact, pupils equal and reactive to light  Neck: supple  Respiratory: non labored breathing, decreased breath sounds in bases and crackles bilateral  Cardiovascular: normal rate, regular rhythm  Gastrointestinal: abdomen soft, non tender, non distended  Extremities: lower extremity edema  Neurological: alert  Psychiatric: cooperative      MEDICATIONS  (STANDING):  amLODIPine   Tablet 10 milliGRAM(s) Oral daily  ATENolol  Tablet 50 milliGRAM(s) Oral daily  dextrose 5%. 1000 milliLiter(s) (50 mL/Hr) IV Continuous <Continuous>  dextrose 5%. 1000 milliLiter(s) (60 mL/Hr) IV Continuous <Continuous>  dextrose 50% Injectable 12.5 Gram(s) IV Push once  dextrose 50% Injectable 25 Gram(s) IV Push once  dextrose 50% Injectable 25 Gram(s) IV Push once  escitalopram 5 milliGRAM(s) Oral daily  insulin lispro (HumaLOG) corrective regimen sliding scale   SubCutaneous three times a day before meals  insulin lispro (HumaLOG) corrective regimen sliding scale   SubCutaneous at bedtime  levETIRAcetam 500 milliGRAM(s) Oral two times a day  levothyroxine 88 MICROGram(s) Oral daily  meropenem  IVPB 1000 milliGRAM(s) IV Intermittent every 12 hours  pantoprazole    Tablet 40 milliGRAM(s) Oral before breakfast  simvastatin 20 milliGRAM(s) Oral at bedtime  vancomycin  IVPB      vancomycin  IVPB 1000 milliGRAM(s) IV Intermittent every 24 hours    MEDICATIONS  (PRN):  acetaminophen   Tablet .. 650 milliGRAM(s) Oral every 6 hours PRN Severe Pain (7 - 10)  dextrose 40% Gel 15 Gram(s) Oral once PRN Blood Glucose LESS THAN 70 milliGRAM(s)/deciliter  glucagon  Injectable 1 milliGRAM(s) IntraMuscular once PRN Glucose LESS THAN 70 milligrams/deciliter      LABORATORY                          8.2    11.70 )-----------( 499      ( 29 Nov 2019 06:10 )             25.1     11-28    136  |  97<L>  |  33<H>  ----------------------------<  96  3.9   |  28  |  1.23    Ca    8.2<L>      28 Nov 2019 06:19  Phos  3.6     11-28  Mg     1.9     11-28    TPro  5.8<L>  /  Alb  1.8<L>  /  TBili  0.3  /  DBili  x   /  AST  34  /  ALT  14  /  AlkPhos  128<H>  11-28    ABG - ( 27 Nov 2019 13:50 )  pH, Arterial: 7.51  /  pCO2: 38    /  pO2: 55    / HCO3: 31    / Base Excess: 7.2   /  SaO2: 87.0        RADIOLOGY    CT Chest No Cont (11.26.19 @ 12:44) >  1.  New bilateral groundglass opacities and dense opacities with thickening of interlobular septa. These findings could occur in the clinical setting of pulmonary edema rather than pneumonia.  2.  Bilateral pleural effusions and passive atelectasis.  3.  A subcarinal lymph node has decreased in size since 9/10/2019    < end of copied text >      Transthoracic Echocardiogram (10.29.19 @ 16:21) >  1. Aortic valve not well visualized; appears calcified. Peak transaortic valve gradient equals 14 mm Hg, estimated aortic valve area equals 2 sqcm (by continuity equation),  aortic valve velocity time integral equals 41 cm, consistent with mild aortic stenosis. Minimal aortic regurgitation.  2. Increased relative wall thickness with normal left ventricular mass index, consistent with concentric left ventricular remodeling.  3. Hyperdynamic left ventricular systolic function.  4. The right ventricle is not well visualized; grossly normal right ventricular systolic function.  5. Estimated right ventricular systolic pressure equals 20 mm Hg, assuming right atrial pressure equals 8 mm Hg, consistent with normal pulmonary pressures.  6. Left pleural effusion.  *** No previous Echo exam.    < end of copied text >

## 2019-11-29 NOTE — PROGRESS NOTE ADULT - PROBLEM SELECTOR PLAN 1
Hypoxia likely from aspiration pneumonitis.  Pt is s/p 5-day course of zosyn for aspiration PNA. CT chest with new ground glass opacities and dense consolidations in upper lobes bilaterally and known b/l pleural effusions, concerning for multifocal PNA.   - on vanc/benny (11/28-) for coverage of HAP  - on high flow NC 60%/50L -> wean as tolerated for goal O2 sat >92%  - BIPAP as needed for increase WOB  - pulm consulted, recs appreciated

## 2019-11-29 NOTE — PROGRESS NOTE ADULT - ATTENDING COMMENTS
Pt seen and examined, chart and labs reviewed.   79M with h/o HTN, DM2, hypothyroid, CAD/stents, PUD s/p H. pylori treatment, recent dx of DLBCL at [facility] on Oct 2019 s/p 2 cycles of R-CD, recent fall with R frontal and occipital SDH with no evacuation, LUE DVT and b/l LE DVT on prophylactic Lovenox, p/w acute encephalopathy and had witnessed seizure activity at home and again had tonic clonic seizure in ER s/p Ativan 2mg IVx1.     Assessment/plan:  # acute hypoxic resp failure: recurrent aspiration pneumonia, c/w HFO2, worsening b/l opacities on repeat CT chest, c/w vanco/meropenem, ID f/u  -check pro-bnp  #Metabolic encephalopathy with seizure activity: multifactorial, infectious, metabolic, postictal  -mental status wax/wanes, still confused at times   -MRI w/ contrast no CNS lymphoma  -video EEG negative for seizure, c/w keppra  -stable SDH, no neurosx intervention, resumed on  prophylactic lovenox  # HTN: controlled on atenolol, norvasc,   # dysnatremia: now hyponatremia, change IVF to D5NS, trend BMP  #Severe protein calorie malnutrition, FTT: s/p IR PEG placement 11/29, nutrition consult for tube feed, plan to start tube feed tomorrow  # DLBCL: s/p 2 cycles of R-CD with interval improvement in lymphadenopathy on scans from 11/14.    CT shows response to chemo with reduced lymphadenopathy and spleen size  poor functional and mental status. Pt's daughter who's an oncologist Dr. Browne would like pt to get one dose of chemo (RCD) inpt before he goes to rehab. Heme is considering inpt chemo if his condition improves before discharge but this is not possible at this time.  # Mild hypercalcemia: likely d/t lymphoma: Ca improved, not given palmidronate   # DM2: humalog ISS as he's NPO, A1c 7%, monitor FS  #Dispo: pt is very weak/debilitated, PT is recommending rehab  # Advanced planning: pt is DNR, but not DNI per daughter, MOLST form signed  case d/w daughter Dr. Browne at bedside

## 2019-11-29 NOTE — PROGRESS NOTE ADULT - SUBJECTIVE AND OBJECTIVE BOX
INTERVAL HPI/OVERNIGHT EVENTS:  Patient S&E at bedside.  s/p PEG tube today.  Daughter Dr. Browne at bedside.     VITAL SIGNS:  T(F): 97.8 (11-29-19 @ 05:16)  HR: 76 (11-29-19 @ 11:23)  BP: 109/62 (11-29-19 @ 05:16)  RR: 20 (11-29-19 @ 11:23)  SpO2: 98% (11-29-19 @ 11:23)  Wt(kg): --    PHYSICAL EXAM:  Constitutional: NAD  Eyes: EOMI, sclera non-icteric  Neck: supple, no masses, no JVD  Respiratory: CTA b/l, good air entry b/l  Cardiovascular: RRR, no M/R/G  Gastrointestinal: soft, NTND, no masses palpable, + BS  Extremities: no c/c/e  Neurological: AAOx3      MEDICATIONS  (STANDING):  amLODIPine   Tablet 10 milliGRAM(s) Oral daily  ATENolol  Tablet 50 milliGRAM(s) Oral daily  dextrose 5% + sodium chloride 0.9%. 1000 milliLiter(s) (50 mL/Hr) IV Continuous <Continuous>  dextrose 5%. 1000 milliLiter(s) (50 mL/Hr) IV Continuous <Continuous>  dextrose 50% Injectable 12.5 Gram(s) IV Push once  dextrose 50% Injectable 25 Gram(s) IV Push once  dextrose 50% Injectable 25 Gram(s) IV Push once  enoxaparin Injectable 40 milliGRAM(s) SubCutaneous once  escitalopram 5 milliGRAM(s) Oral daily  insulin lispro (HumaLOG) corrective regimen sliding scale   SubCutaneous three times a day before meals  insulin lispro (HumaLOG) corrective regimen sliding scale   SubCutaneous at bedtime  levETIRAcetam 500 milliGRAM(s) Oral two times a day  levothyroxine 88 MICROGram(s) Oral daily  meropenem  IVPB 1000 milliGRAM(s) IV Intermittent every 12 hours  pantoprazole    Tablet 40 milliGRAM(s) Oral before breakfast  simvastatin 20 milliGRAM(s) Oral at bedtime  vancomycin  IVPB 1000 milliGRAM(s) IV Intermittent every 24 hours  vancomycin  IVPB        MEDICATIONS  (PRN):  acetaminophen   Tablet .. 650 milliGRAM(s) Oral every 6 hours PRN Severe Pain (7 - 10)  dextrose 40% Gel 15 Gram(s) Oral once PRN Blood Glucose LESS THAN 70 milliGRAM(s)/deciliter  glucagon  Injectable 1 milliGRAM(s) IntraMuscular once PRN Glucose LESS THAN 70 milligrams/deciliter      Allergies  No Known Allergies    Intolerances        LABS:                        8.2    11.70 )-----------( 499      ( 29 Nov 2019 06:10 )             25.1     11-29    131<L>  |  95<L>  |  27<H>  ----------------------------<  187<H>  3.4<L>   |  26  |  1.05    Ca    8.0<L>      29 Nov 2019 06:10  Phos  2.5     11-29  Mg     1.8     11-29    TPro  5.6<L>  /  Alb  2.2<L>  /  TBili  0.3  /  DBili  x   /  AST  27  /  ALT  14  /  AlkPhos  129<H>  11-29    PT/INR - ( 29 Nov 2019 06:10 )   PT: 14.0 SEC;   INR: 1.25                RADIOLOGY & ADDITIONAL TESTS:  Studies reviewed.    ASSESSMENT & PLAN:

## 2019-11-29 NOTE — PROGRESS NOTE ADULT - ASSESSMENT
Mr. 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 Course c/b acute respiratory distress likely 2/2 multiple silent aspirations, on vanc/benny (11/28-). Plan for PEG tube placement today.

## 2019-11-29 NOTE — PROGRESS NOTE ADULT - SUBJECTIVE AND OBJECTIVE BOX
Pavithra Alfonso PGY1  -[id number] | [facility] [id number]  =========================    Patient is a 79y old  Male who presents with a chief complaint of AMS (28 Nov 2019 13:20)      INTERVAL HPI/OVERNIGHT EVENTS:  No acute event overnight.       MEDICATIONS  (STANDING):  amLODIPine   Tablet 10 milliGRAM(s) Oral daily  ATENolol  Tablet 50 milliGRAM(s) Oral daily  dextrose 5%. 1000 milliLiter(s) (50 mL/Hr) IV Continuous <Continuous>  dextrose 5%. 1000 milliLiter(s) (60 mL/Hr) IV Continuous <Continuous>  dextrose 50% Injectable 12.5 Gram(s) IV Push once  dextrose 50% Injectable 25 Gram(s) IV Push once  dextrose 50% Injectable 25 Gram(s) IV Push once  escitalopram 5 milliGRAM(s) Oral daily  insulin lispro (HumaLOG) corrective regimen sliding scale   SubCutaneous three times a day before meals  insulin lispro (HumaLOG) corrective regimen sliding scale   SubCutaneous at bedtime  levETIRAcetam 500 milliGRAM(s) Oral two times a day  levothyroxine 88 MICROGram(s) Oral daily  meropenem  IVPB 1000 milliGRAM(s) IV Intermittent every 12 hours  pantoprazole    Tablet 40 milliGRAM(s) Oral before breakfast  simvastatin 20 milliGRAM(s) Oral at bedtime  vancomycin  IVPB      vancomycin  IVPB 1000 milliGRAM(s) IV Intermittent every 24 hours    MEDICATIONS  (PRN):  acetaminophen   Tablet .. 650 milliGRAM(s) Oral every 6 hours PRN Severe Pain (7 - 10)  dextrose 40% Gel 15 Gram(s) Oral once PRN Blood Glucose LESS THAN 70 milliGRAM(s)/deciliter  glucagon  Injectable 1 milliGRAM(s) IntraMuscular once PRN Glucose LESS THAN 70 milligrams/deciliter      Allergies    No Known Allergies    Intolerances        Vital Signs Last 24 Hrs  T(C): 36.6 (29 Nov 2019 05:16), Max: 36.7 (28 Nov 2019 12:21)  T(F): 97.8 (29 Nov 2019 05:16), Max: 98 (28 Nov 2019 12:21)  HR: 84 (29 Nov 2019 05:16) (69 - 84)  BP: 109/62 (29 Nov 2019 05:16) (106/54 - 109/62)  BP(mean): --  RR: 22 (29 Nov 2019 05:16) (19 - 25)  SpO2: 100% (29 Nov 2019 05:16) (94% - 100%)    PHYSICAL EXAM:  GENERAL: NAD.   CHEST/LUNG: crackles at bases bilaterally  HEART: Regular rate and rhythm; S1 and S2,  no murmurs, rubs, or gallops.  ABDOMEN: Soft, not tender to palpation.  No masses or HSM appreciated.  No distension.  Bowel sounds present.  EXTREMITIES:  No clubbing, cyanosis, or edema.  Moves all extremities   SKIN: No obvious rashes or lesions.  Turgor okay.  NEURO:  Alert and oriented x 1-2      LABS:                        8.7    11.42 )-----------( 455      ( 28 Nov 2019 06:19 )             27.6     11-28    136  |  97<L>  |  33<H>  ----------------------------<  96  3.9   |  28  |  1.23    Ca    8.2<L>      28 Nov 2019 06:19  Phos  3.6     11-28  Mg     1.9     11-28    TPro  5.8<L>  /  Alb  1.8<L>  /  TBili  0.3  /  DBili  x   /  AST  34  /  ALT  14  /  AlkPhos  128<H>  11-28    PT/INR - ( 28 Nov 2019 06:19 )   PT: 13.2 SEC;   INR: 1.15              CAPILLARY BLOOD GLUCOSE      POCT Blood Glucose.: 204 mg/dL (28 Nov 2019 22:13)  POCT Blood Glucose.: 136 mg/dL (28 Nov 2019 17:20)  POCT Blood Glucose.: 134 mg/dL (28 Nov 2019 12:32)  POCT Blood Glucose.: 100 mg/dL (28 Nov 2019 08:24)      Culture - Blood (collected 26 Nov 2019 06:26)  Source: BLOOD  Preliminary Report (29 Nov 2019 06:26):    NO ORGANISMS ISOLATED    NO ORGANISMS ISOLATED AT 72 HRS.    Culture - Urine (collected 25 Nov 2019 19:50)  Source: URINE MIDSTREAM  Final Report (27 Nov 2019 08:34):    NO GROWTH AT 24 HOURS Pavithra Alfonso PGY1  -[id number] | [facility] [id number]  =========================    Patient is a 79y old  Male who presents with a chief complaint of AMS (28 Nov 2019 13:20)      INTERVAL HPI/OVERNIGHT EVENTS:  No acute event overnight. Pt seen at bedside with high flow NC on. Pt asking for water; denies, CP, abdominal pain, SOB.       MEDICATIONS  (STANDING):  amLODIPine   Tablet 10 milliGRAM(s) Oral daily  ATENolol  Tablet 50 milliGRAM(s) Oral daily  dextrose 5%. 1000 milliLiter(s) (50 mL/Hr) IV Continuous <Continuous>  dextrose 5%. 1000 milliLiter(s) (60 mL/Hr) IV Continuous <Continuous>  dextrose 50% Injectable 12.5 Gram(s) IV Push once  dextrose 50% Injectable 25 Gram(s) IV Push once  dextrose 50% Injectable 25 Gram(s) IV Push once  escitalopram 5 milliGRAM(s) Oral daily  insulin lispro (HumaLOG) corrective regimen sliding scale   SubCutaneous three times a day before meals  insulin lispro (HumaLOG) corrective regimen sliding scale   SubCutaneous at bedtime  levETIRAcetam 500 milliGRAM(s) Oral two times a day  levothyroxine 88 MICROGram(s) Oral daily  meropenem  IVPB 1000 milliGRAM(s) IV Intermittent every 12 hours  pantoprazole    Tablet 40 milliGRAM(s) Oral before breakfast  simvastatin 20 milliGRAM(s) Oral at bedtime  vancomycin  IVPB      vancomycin  IVPB 1000 milliGRAM(s) IV Intermittent every 24 hours    MEDICATIONS  (PRN):  acetaminophen   Tablet .. 650 milliGRAM(s) Oral every 6 hours PRN Severe Pain (7 - 10)  dextrose 40% Gel 15 Gram(s) Oral once PRN Blood Glucose LESS THAN 70 milliGRAM(s)/deciliter  glucagon  Injectable 1 milliGRAM(s) IntraMuscular once PRN Glucose LESS THAN 70 milligrams/deciliter      Allergies    No Known Allergies    Intolerances        Vital Signs Last 24 Hrs  T(C): 36.6 (29 Nov 2019 05:16), Max: 36.7 (28 Nov 2019 12:21)  T(F): 97.8 (29 Nov 2019 05:16), Max: 98 (28 Nov 2019 12:21)  HR: 84 (29 Nov 2019 05:16) (69 - 84)  BP: 109/62 (29 Nov 2019 05:16) (106/54 - 109/62)  BP(mean): --  RR: 22 (29 Nov 2019 05:16) (19 - 25)  SpO2: 100% (29 Nov 2019 05:16) (94% - 100%)    PHYSICAL EXAM:  GENERAL: NAD. on high flow NC  HEENT: EOMI, clear sclera  CHEST/LUNG: crackles at bases bilaterally  HEART: Regular rate and rhythm; S1 and S2,  no murmurs, rubs, or gallops.  ABDOMEN: Soft, not tender to palpation.  No masses or HSM appreciated.  No distension.  Bowel sounds present.  EXTREMITIES:  No clubbing, cyanosis, or edema.  Moves all extremities   SKIN: No obvious rashes or lesions.  Turgor okay.  NEURO:  Alert and oriented x 1-2      LABS:                        8.7    11.42 )-----------( 455      ( 28 Nov 2019 06:19 )             27.6     11-28    136  |  97<L>  |  33<H>  ----------------------------<  96  3.9   |  28  |  1.23    Ca    8.2<L>      28 Nov 2019 06:19  Phos  3.6     11-28  Mg     1.9     11-28    TPro  5.8<L>  /  Alb  1.8<L>  /  TBili  0.3  /  DBili  x   /  AST  34  /  ALT  14  /  AlkPhos  128<H>  11-28    PT/INR - ( 28 Nov 2019 06:19 )   PT: 13.2 SEC;   INR: 1.15              CAPILLARY BLOOD GLUCOSE      POCT Blood Glucose.: 204 mg/dL (28 Nov 2019 22:13)  POCT Blood Glucose.: 136 mg/dL (28 Nov 2019 17:20)  POCT Blood Glucose.: 134 mg/dL (28 Nov 2019 12:32)  POCT Blood Glucose.: 100 mg/dL (28 Nov 2019 08:24)      Culture - Blood (collected 26 Nov 2019 06:26)  Source: BLOOD  Preliminary Report (29 Nov 2019 06:26):    NO ORGANISMS ISOLATED    NO ORGANISMS ISOLATED AT 72 HRS.    Culture - Urine (collected 25 Nov 2019 19:50)  Source: URINE MIDSTREAM  Final Report (27 Nov 2019 08:34):    NO GROWTH AT 24 HOURS Pavithra Alfonso PGY1  -[id number] | [facility] [id number]  =========================    Patient is a 79y old  Male who presents with a chief complaint of AMS (28 Nov 2019 13:20)      INTERVAL HPI/OVERNIGHT EVENTS:  No acute event overnight. Pt seen at bedside with high flow NC on. Pt asking for water; denies, CP, abdominal pain, SOB.       MEDICATIONS  (STANDING):  amLODIPine   Tablet 10 milliGRAM(s) Oral daily  ATENolol  Tablet 50 milliGRAM(s) Oral daily  dextrose 5%. 1000 milliLiter(s) (50 mL/Hr) IV Continuous <Continuous>  dextrose 5%. 1000 milliLiter(s) (60 mL/Hr) IV Continuous <Continuous>  dextrose 50% Injectable 12.5 Gram(s) IV Push once  dextrose 50% Injectable 25 Gram(s) IV Push once  dextrose 50% Injectable 25 Gram(s) IV Push once  escitalopram 5 milliGRAM(s) Oral daily  insulin lispro (HumaLOG) corrective regimen sliding scale   SubCutaneous three times a day before meals  insulin lispro (HumaLOG) corrective regimen sliding scale   SubCutaneous at bedtime  levETIRAcetam 500 milliGRAM(s) Oral two times a day  levothyroxine 88 MICROGram(s) Oral daily  meropenem  IVPB 1000 milliGRAM(s) IV Intermittent every 12 hours  pantoprazole    Tablet 40 milliGRAM(s) Oral before breakfast  simvastatin 20 milliGRAM(s) Oral at bedtime  vancomycin  IVPB      vancomycin  IVPB 1000 milliGRAM(s) IV Intermittent every 24 hours    MEDICATIONS  (PRN):  acetaminophen   Tablet .. 650 milliGRAM(s) Oral every 6 hours PRN Severe Pain (7 - 10)  dextrose 40% Gel 15 Gram(s) Oral once PRN Blood Glucose LESS THAN 70 milliGRAM(s)/deciliter  glucagon  Injectable 1 milliGRAM(s) IntraMuscular once PRN Glucose LESS THAN 70 milligrams/deciliter      Allergies    No Known Allergies    Intolerances        Vital Signs Last 24 Hrs  T(C): 36.6 (29 Nov 2019 05:16), Max: 36.7 (28 Nov 2019 12:21)  T(F): 97.8 (29 Nov 2019 05:16), Max: 98 (28 Nov 2019 12:21)  HR: 84 (29 Nov 2019 05:16) (69 - 84)  BP: 109/62 (29 Nov 2019 05:16) (106/54 - 109/62)  BP(mean): --  RR: 22 (29 Nov 2019 05:16) (19 - 25)  SpO2: 100% (29 Nov 2019 05:16) (94% - 100%)    PHYSICAL EXAM:  GENERAL: NAD. on high flow NC  HEENT: EOMI, clear sclera  CHEST/LUNG: crackles at bases bilaterally  HEART: Regular rate and rhythm; S1 and S2,  no murmurs, rubs, or gallops.  ABDOMEN: Soft, not tender to palpation.  No masses or HSM appreciated.  No distension.  Bowel sounds present.  EXTREMITIES:  No clubbing, cyanosis, or edema.  Moves all extremities   SKIN: No obvious rashes or lesions.  Turgor okay.  NEURO:  Alert and oriented x 1-2      LABS:                               8.2    11.70 )-----------( 499      ( 29 Nov 2019 06:10 )             25.1     11-29    131<L>  |  95<L>  |  27<H>  ----------------------------<  187<H>  3.4<L>   |  26  |  1.05    Ca    8.0<L>      29 Nov 2019 06:10  Phos  2.5     11-29  Mg     1.8     11-29    TPro  5.6<L>  /  Alb  2.2<L>  /  TBili  0.3  /  DBili  x   /  AST  27  /  ALT  14  /  AlkPhos  129<H>  11-29         CAPILLARY BLOOD GLUCOSE      POCT Blood Glucose.: 204 mg/dL (28 Nov 2019 22:13)  POCT Blood Glucose.: 136 mg/dL (28 Nov 2019 17:20)  POCT Blood Glucose.: 134 mg/dL (28 Nov 2019 12:32)  POCT Blood Glucose.: 100 mg/dL (28 Nov 2019 08:24)      Culture - Blood (collected 26 Nov 2019 06:26)  Source: BLOOD  Preliminary Report (29 Nov 2019 06:26):    NO ORGANISMS ISOLATED    NO ORGANISMS ISOLATED AT 72 HRS.    Culture - Urine (collected 25 Nov 2019 19:50)  Source: URINE MIDSTREAM  Final Report (27 Nov 2019 08:34):    NO GROWTH AT 24 HOURS

## 2019-11-29 NOTE — PROGRESS NOTE ADULT - PROBLEM SELECTOR PLAN 5
Encephalopathy likely from infection vs post-ictal state. CT chest with apical consolidations and pleural effusion, s/p 5 days vanc/zosyn. MRI with no enhancing brain lesions. No seizure activity seen on vEEG. Mental status waxes and wan, overall improved.  - s/p Vanc and zosyn x5 days   - c/w keppra given recent hypoglycemia will hold off insulin for now  - FS    #hypothyroidism  -c/w IV levothyroxine 44mg daily

## 2019-11-29 NOTE — PROGRESS NOTE ADULT - ATTENDING COMMENTS
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 He went this am for PEG tube placement.    Mental status: waxing and waning is multifactorial; also believe it may be language barrier, he speaks Bagn[place] and is not coversational in english, though when he is healthy he does understand english. New multifocal pneumonia, WBC is down on broad spectrum antibiotics. Monitor mental status for further improvement. MRI of the brain has been negative to show any lymphoma.    Pneumonia: continue broad spectrum antitbiotics    DLBCL: with improvement on scans after 2 cycles of RCD, however has been receiving therapy in order to temporize his disease. If looks improved next week, would favor resumption of chemo, possibly with etoposide and vincristine added and if some questions of his mental status remain, would discuss possible LP. At this time, patient performance status and infectious issues will not allow for this, however would readdress next week.  If he resumes chemotherapy, he will need better access- possibly a midline. Discussed with patient daughter, Dr Browne at the bedside.

## 2019-11-29 NOTE — PROGRESS NOTE ADULT - PROBLEM SELECTOR PLAN 6
Pt was hyponatremic, then hypernatremic likely from poor PO intake. Resolved  - d/c salt tabs and lasix  - s/p D5 75cc/hr Encephalopathy likely from infection vs post-ictal state. CT chest with apical consolidations and pleural effusion, s/p 5 days vanc/zosyn. MRI with no enhancing brain lesions. No seizure activity seen on vEEG. Mental status waxes and wan, overall improved.  - s/p Vanc and zosyn x5 days   - c/w keppra

## 2019-11-29 NOTE — PROGRESS NOTE ADULT - ASSESSMENT
81 yo M hx DLBCL (dx 9/2019) s/p 2 cycles of R-CP (last ~4weeks ago) at [place], DM2, recent admission at [facility] (10/29-11/5) for SDH s/p fall found to have b/l LE and LUE DVT (not on AC) p/w AMS and witnessed seizure.  Course c/b aspiration pna and PEG tube placement.    1. DLBCL:   - pt was admitted to [facility] in 8-9/2019, was found to be hypercalcemic and CT C/A/P (9/10/19) showed scattered b/l pulmonary nodules, mediastinal lymphadenopathy, retroperitoneal lymphadenopathy, and splenic lesions/enlargement concerning for lymphoma.  LN biopsy consistent with DLBCL, germinal cell type, neg for bcl-2, bcl-6, myc.  Per daughter, no outpatient PET scan and biopsy was done as he was started on treatment right away.  He sees Dr. Jovan Poole at [place] and was started on Rituxan-Cytoxan-Dex (R-CD). He has received 2 cycles thus far, last cycle ~ 4 weeks ago, and has not been able to f/u due to recent hospitalizations. Reports pt had thoracentesis in the past and fluid was negative for malignant cells.   - CT A/P non contrast now shows treatment response with decrease in size of the spleen and resolution of retroperitoneal adenopathy.  MRI brain with contrast without lesions, SDH stable.  - Pt is currently with poor performance status, mental status improving, waxing and waning but overall improving.  - biopsy slides received in our pathology department, confirmed by Dr. Seymour  - appreciate PT evaluation  - will reassess after treatment for infection; will consider giving chemo inpatient if mental status improves    2. Leukocytosis, being treated for aspiration pna  - CT chest reviewed  - on broad spectrum abx    3. DVTs: b/l LE (calf) DVTs and LUE DVT  - repeat b/l LE US now shows unchanged subacute DVT  - ppx AC started on 11/6 as per neurosurgery    Sarah Gomez  Hematology Fellow  [phone]

## 2019-11-29 NOTE — PROGRESS NOTE ADULT - PROBLEM SELECTOR PLAN 4
given recent hypoglycemia will hold off insulin for now  - FS    #hypothyroidism  -c/w IV levothyroxine 44mg daily fluctuates between hypo and hypernatremia  - fluid restriction   - d/c salt tabs and lasix  - s/p D5 75cc/hr fluctuates between hypo and hypernatremia. Currently hyponatremia likely from fluid.  - D5NS for 20 hours  - d/c salt tabs and lasix  - s/p D5 75cc/hr

## 2019-11-29 NOTE — PROGRESS NOTE ADULT - PROBLEM SELECTOR PLAN 10
DVT ppx: ppx lovenox  Diet: NPO, PEG placement tomorrow  DNR (NOT DNI) DVT ppx: ppx lovenox  Diet: NPO, PEG placed today  DNR (NOT DNI)

## 2019-11-29 NOTE — PROGRESS NOTE ADULT - PROBLEM SELECTOR PLAN 2
- Severe protein calorie malnutrition   - PEG placement today - Severe protein calorie malnutrition   - PEG placement today -> gravity drainage for 24 hours  - PEG feeds starting tomorrow - Severe protein calorie malnutrition   - PEG placement today -> gravity drainage for 24 hours  - nutrition consulted for peg feed

## 2019-11-30 LAB
ANION GAP SERPL CALC-SCNC: 13 MMO/L — SIGNIFICANT CHANGE UP (ref 7–14)
BACTERIA NPH CULT: SIGNIFICANT CHANGE UP
BASOPHILS # BLD AUTO: 0.04 K/UL — SIGNIFICANT CHANGE UP (ref 0–0.2)
BASOPHILS NFR BLD AUTO: 0.3 % — SIGNIFICANT CHANGE UP (ref 0–2)
BUN SERPL-MCNC: 20 MG/DL — SIGNIFICANT CHANGE UP (ref 7–23)
CALCIUM SERPL-MCNC: 8.4 MG/DL — SIGNIFICANT CHANGE UP (ref 8.4–10.5)
CHLORIDE SERPL-SCNC: 96 MMOL/L — LOW (ref 98–107)
CO2 SERPL-SCNC: 24 MMOL/L — SIGNIFICANT CHANGE UP (ref 22–31)
CREAT SERPL-MCNC: 0.98 MG/DL — SIGNIFICANT CHANGE UP (ref 0.5–1.3)
EOSINOPHIL # BLD AUTO: 0.16 K/UL — SIGNIFICANT CHANGE UP (ref 0–0.5)
EOSINOPHIL NFR BLD AUTO: 1.2 % — SIGNIFICANT CHANGE UP (ref 0–6)
GLUCOSE SERPL-MCNC: 250 MG/DL — HIGH (ref 70–99)
HCT VFR BLD CALC: 29.7 % — LOW (ref 39–50)
HGB BLD-MCNC: 9.5 G/DL — LOW (ref 13–17)
IMM GRANULOCYTES NFR BLD AUTO: 6.4 % — HIGH (ref 0–1.5)
LYMPHOCYTES # BLD AUTO: 1.56 K/UL — SIGNIFICANT CHANGE UP (ref 1–3.3)
LYMPHOCYTES # BLD AUTO: 12 % — LOW (ref 13–44)
MAGNESIUM SERPL-MCNC: 2 MG/DL — SIGNIFICANT CHANGE UP (ref 1.6–2.6)
MCHC RBC-ENTMCNC: 27.3 PG — SIGNIFICANT CHANGE UP (ref 27–34)
MCHC RBC-ENTMCNC: 32 % — SIGNIFICANT CHANGE UP (ref 32–36)
MCV RBC AUTO: 85.3 FL — SIGNIFICANT CHANGE UP (ref 80–100)
MONOCYTES # BLD AUTO: 0.85 K/UL — SIGNIFICANT CHANGE UP (ref 0–0.9)
MONOCYTES NFR BLD AUTO: 6.5 % — SIGNIFICANT CHANGE UP (ref 2–14)
NEUTROPHILS # BLD AUTO: 9.59 K/UL — HIGH (ref 1.8–7.4)
NEUTROPHILS NFR BLD AUTO: 73.6 % — SIGNIFICANT CHANGE UP (ref 43–77)
NRBC # FLD: 0.02 K/UL — SIGNIFICANT CHANGE UP (ref 0–0)
NT-PROBNP SERPL-SCNC: 1147 PG/ML — SIGNIFICANT CHANGE UP
PHOSPHATE SERPL-MCNC: 2.4 MG/DL — LOW (ref 2.5–4.5)
PLATELET # BLD AUTO: 570 K/UL — HIGH (ref 150–400)
PMV BLD: 12 FL — SIGNIFICANT CHANGE UP (ref 7–13)
POTASSIUM SERPL-MCNC: 4.1 MMOL/L — SIGNIFICANT CHANGE UP (ref 3.5–5.3)
POTASSIUM SERPL-SCNC: 4.1 MMOL/L — SIGNIFICANT CHANGE UP (ref 3.5–5.3)
RBC # BLD: 3.48 M/UL — LOW (ref 4.2–5.8)
RBC # FLD: 19.4 % — HIGH (ref 10.3–14.5)
SODIUM SERPL-SCNC: 133 MMOL/L — LOW (ref 135–145)
SPECIMEN SOURCE: SIGNIFICANT CHANGE UP
WBC # BLD: 13.04 K/UL — HIGH (ref 3.8–10.5)
WBC # FLD AUTO: 13.04 K/UL — HIGH (ref 3.8–10.5)

## 2019-11-30 PROCEDURE — 99233 SBSQ HOSP IP/OBS HIGH 50: CPT | Mod: GC

## 2019-11-30 RX ORDER — POTASSIUM PHOSPHATE, MONOBASIC POTASSIUM PHOSPHATE, DIBASIC 236; 224 MG/ML; MG/ML
15 INJECTION, SOLUTION INTRAVENOUS ONCE
Refills: 0 | Status: COMPLETED | OUTPATIENT
Start: 2019-11-30 | End: 2019-11-30

## 2019-11-30 RX ORDER — INSULIN LISPRO 100/ML
VIAL (ML) SUBCUTANEOUS EVERY 6 HOURS
Refills: 0 | Status: DISCONTINUED | OUTPATIENT
Start: 2019-11-30 | End: 2019-12-04

## 2019-11-30 RX ORDER — VANCOMYCIN HCL 1 G
1000 VIAL (EA) INTRAVENOUS EVERY 24 HOURS
Refills: 0 | Status: DISCONTINUED | OUTPATIENT
Start: 2019-11-30 | End: 2019-11-30

## 2019-11-30 RX ADMIN — ATENOLOL 50 MILLIGRAM(S): 25 TABLET ORAL at 05:11

## 2019-11-30 RX ADMIN — ESCITALOPRAM OXALATE 5 MILLIGRAM(S): 10 TABLET, FILM COATED ORAL at 09:11

## 2019-11-30 RX ADMIN — POTASSIUM PHOSPHATE, MONOBASIC POTASSIUM PHOSPHATE, DIBASIC 62.5 MILLIMOLE(S): 236; 224 INJECTION, SOLUTION INTRAVENOUS at 09:11

## 2019-11-30 RX ADMIN — LEVETIRACETAM 500 MILLIGRAM(S): 250 TABLET, FILM COATED ORAL at 05:11

## 2019-11-30 RX ADMIN — Medication 88 MICROGRAM(S): at 05:11

## 2019-11-30 RX ADMIN — Medication 2: at 09:12

## 2019-11-30 RX ADMIN — ENOXAPARIN SODIUM 40 MILLIGRAM(S): 100 INJECTION SUBCUTANEOUS at 09:11

## 2019-11-30 RX ADMIN — SIMVASTATIN 20 MILLIGRAM(S): 20 TABLET, FILM COATED ORAL at 21:40

## 2019-11-30 RX ADMIN — AMLODIPINE BESYLATE 10 MILLIGRAM(S): 2.5 TABLET ORAL at 05:11

## 2019-11-30 RX ADMIN — LEVETIRACETAM 500 MILLIGRAM(S): 250 TABLET, FILM COATED ORAL at 17:07

## 2019-11-30 RX ADMIN — MEROPENEM 100 MILLIGRAM(S): 1 INJECTION INTRAVENOUS at 05:11

## 2019-11-30 RX ADMIN — Medication 1: at 23:38

## 2019-11-30 RX ADMIN — PANTOPRAZOLE SODIUM 40 MILLIGRAM(S): 20 TABLET, DELAYED RELEASE ORAL at 05:11

## 2019-11-30 RX ADMIN — Medication 1: at 12:18

## 2019-11-30 RX ADMIN — MEROPENEM 100 MILLIGRAM(S): 1 INJECTION INTRAVENOUS at 17:07

## 2019-11-30 NOTE — PROGRESS NOTE ADULT - PROBLEM SELECTOR PLAN 10
DVT ppx: ppx lovenox  Diet: NPO, PEG placed today  DNR (NOT DNI) DVT ppx: ppx lovenox  Diet: PEG   DNR (NOT DNI)

## 2019-11-30 NOTE — PROGRESS NOTE ADULT - PROBLEM SELECTOR PLAN 3
Per chart review on R-CP. s/p 2 cycles. Pt's daughter, Dr. Browne, is interested in inpatient chemo.   - per heme/onc, plan for inpt chemo prior to discharge Per chart review on R-CP. s/p 2 cycles. Pt's daughter, Dr. Browne, is interested in inpatient chemo.   - per heme/onc, plan for inpt chemo prior to discharge -> will coordinate with heme/onc after pt is medically optimized

## 2019-11-30 NOTE — CHART NOTE - NSCHARTNOTEFT_GEN_A_CORE
Source: PA and EMC reviewed  Current Diet : Diet, NPO after Midnight:      NPO Start Date: 28-Nov-2019,   NPO Start Time: 23:59  Except Medications (11-28-19 @ 09:53)  Current Weight: 11/30 65.8 kg    Nutrition Consult ordered for tube feeding recommendations. Spoke with PA who said that pt had a PEG placed yesterday and wished to start pt on enteral feeding today. Full nutrition assessment was done on 11/18 with nutrition diagnosis of severe malnutrition. In view of this diagnosis, recommend initiating enteral feeding of Glucerna 1.2 at 20 ml/hr, and increasing rate by 10 ml, every 6 hours, until goal rate of 60 ml/hr x 24 hrs is met. Glucerna 1.2 @ 60 ml/hr x 24 hrs would provide 1728 kcals, 86 gms protein, 1159 ml free H2O in a total volume of 1440 ml/day. This would give pt 26 kcals/kg and 1.3 gms protein/kg of current wt. 3/4+ extremity edema from 11/18 now 1+ generalized. Wt measurement most likely reflects fluid status changes.         __________________ Pertinent Medications__________________   MEDICATIONS  (STANDING):  amLODIPine   Tablet 10 milliGRAM(s) Oral daily  ATENolol  Tablet 50 milliGRAM(s) Oral daily  dextrose 5% + sodium chloride 0.9%. 1000 milliLiter(s) (50 mL/Hr) IV Continuous <Continuous>  dextrose 5%. 1000 milliLiter(s) (50 mL/Hr) IV Continuous <Continuous>  dextrose 50% Injectable 12.5 Gram(s) IV Push once  dextrose 50% Injectable 25 Gram(s) IV Push once  dextrose 50% Injectable 25 Gram(s) IV Push once  enoxaparin Injectable 40 milliGRAM(s) SubCutaneous daily  escitalopram 5 milliGRAM(s) Oral daily  insulin lispro (HumaLOG) corrective regimen sliding scale   SubCutaneous three times a day before meals  insulin lispro (HumaLOG) corrective regimen sliding scale   SubCutaneous at bedtime  levETIRAcetam 500 milliGRAM(s) Oral two times a day  levothyroxine 88 MICROGram(s) Oral daily  meropenem  IVPB 1000 milliGRAM(s) IV Intermittent every 12 hours  pantoprazole    Tablet 40 milliGRAM(s) Oral before breakfast  simvastatin 20 milliGRAM(s) Oral at bedtime    MEDICATIONS  (PRN):  acetaminophen   Tablet .. 650 milliGRAM(s) Oral every 6 hours PRN Severe Pain (7 - 10)  dextrose 40% Gel 15 Gram(s) Oral once PRN Blood Glucose LESS THAN 70 milliGRAM(s)/deciliter  glucagon  Injectable 1 milliGRAM(s) IntraMuscular once PRN Glucose LESS THAN 70 milligrams/deciliter      __________________ Pertinent Labs__________________   11-30 Na133 mmol/L<L> Glu 250 mg/dL<H> K+ 4.1 mmol/L Cr  0.98 mg/dL BUN 20 mg/dL 11-30 Phos 2.4 mg/dL<L> 11-29 Alb 2.2 g/dL<L>    Skin: Intact    Estimated Needs:   [x ] no change since previous assessment    Previous Nutrition Diagnosis:     Severe Malnutrition     Nutrition Diagnosis is [x ] ongoing     Recommend:    1). Suggest the provision of Glucerna 1.2 @ 60 ml/hr x 24 hrs.    2). Monitor weights, labs, BM's, skin integrity, edema and tolerance to enteral feeding.    3). Follow pt as per protocol.

## 2019-11-30 NOTE — PROGRESS NOTE ADULT - ASSESSMENT
Mr. 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 Course c/b acute respiratory distress likely 2/2 multiple silent aspirations, on vanc/benny (11/28-). s/p peg placement (11/29) Mr. 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 Course c/b acute respiratory distress 2/2 multifocal PNA i/s/o multiple silent aspirations, on vanc/benny (11/28-). s/p peg placement (11/29)

## 2019-11-30 NOTE — PROGRESS NOTE ADULT - SUBJECTIVE AND OBJECTIVE BOX
[facility]   MD Anusha Siddiqui D.O. Fatima Sheikh, D.O. Ruoru Wong, PA    From 7 AM - 5 PM:  OFFICE: [phone]  Dr. Radford cell: [phone]  Dr. Townsend cell: [phone]  Dr. Steinberg cell: [phone]  LEATHA Agee cell: [phone]    From 5 PM - 7 AM: Answering Service: [phone]      RENAL FOLLOW UP NOTE  --------------------------------------------------------------------------------  HPI: Pt seen and examined at bedside. Pt on high flow oxygen. Poorly responsive to questions. arousable     PAST HISTORY  --------------------------------------------------------------------------------  No significant changes to PMH, PSH, FHx, SHx, unless otherwise noted    ALLERGIES & MEDICATIONS  --------------------------------------------------------------------------------  Allergies    No Known Allergies    Intolerances      Standing Inpatient Medications  amLODIPine   Tablet 10 milliGRAM(s) Oral daily  ATENolol  Tablet 50 milliGRAM(s) Oral daily  dextrose 5%. 1000 milliLiter(s) IV Continuous <Continuous>  dextrose 50% Injectable 12.5 Gram(s) IV Push once  dextrose 50% Injectable 25 Gram(s) IV Push once  dextrose 50% Injectable 25 Gram(s) IV Push once  enoxaparin Injectable 40 milliGRAM(s) SubCutaneous daily  escitalopram 5 milliGRAM(s) Oral daily  insulin lispro (HumaLOG) corrective regimen sliding scale   SubCutaneous every 6 hours  levETIRAcetam 500 milliGRAM(s) Oral two times a day  levothyroxine 88 MICROGram(s) Oral daily  meropenem  IVPB 1000 milliGRAM(s) IV Intermittent every 12 hours  pantoprazole    Tablet 40 milliGRAM(s) Oral before breakfast  simvastatin 20 milliGRAM(s) Oral at bedtime    PRN Inpatient Medications  acetaminophen   Tablet .. 650 milliGRAM(s) Oral every 6 hours PRN  dextrose 40% Gel 15 Gram(s) Oral once PRN  glucagon  Injectable 1 milliGRAM(s) IntraMuscular once PRN      REVIEW OF SYSTEMS  --------------------------------------------------------------------------------  Unable to obtain     VITALS/PHYSICAL EXAM  --------------------------------------------------------------------------------  T(C): 36.3 (11-30-19 @ 05:50), Max: 36.4 (11-29-19 @ 22:30)  HR: 88 (11-30-19 @ 15:52) (75 - 88)  BP: 131/67 (11-30-19 @ 11:33) (96/56 - 134/68)  RR: 20 (11-30-19 @ 15:52) (17 - 20)  SpO2: 95% (11-30-19 @ 15:52) (95% - 100%)  Wt(kg): --  Height (cm): 157.5 (11-28-19 @ 19:46)      11-29-19 @ 07:01  -  11-30-19 @ 07:00  --------------------------------------------------------  IN: 600 mL / OUT: 0 mL / NET: 600 mL      Physical Exam:  	Gen: NAD on high flow  	HEENT: MMM  	Pulm: CTA B/L  	CV: S1S2  	Abd: Soft, +BS + PEG  	Ext: No LE edema B/L                      Neuro: Awake   	Skin: Warm and Dry       LABS/STUDIES  --------------------------------------------------------------------------------              9.5    13.04 >-----------<  570      [11-30-19 @ 06:00]              29.7     133  |  96  |  20  ----------------------------<  250      [11-30-19 @ 06:00]  4.1   |  24  |  0.98        Ca     8.4     [11-30-19 @ 06:00]      Mg     2.0     [11-30-19 @ 06:00]      Phos  2.4     [11-30-19 @ 06:00]    TPro  5.6  /  Alb  2.2  /  TBili  0.3  /  DBili  x   /  AST  27  /  ALT  14  /  AlkPhos  129  [11-29-19 @ 06:10]    PT/INR: PT 14.0 , INR 1.25       [11-29-19 @ 06:10]      Creatinine Trend:  SCr 0.98 [11-30 @ 06:00]  SCr 1.05 [11-29 @ 06:10]  SCr 1.23 [11-28 @ 06:19]  SCr 1.23 [11-27 @ 06:00]  SCr 1.16 [11-26 @ 16:23]    Urinalysis - [11-25-19 @ 18:43]      Color YELLOW / Appearance CLEAR / SG 1.020 / pH 6.0      Gluc NEGATIVE / Ketone NEGATIVE  / Bili NEGATIVE / Urobili TRACE       Blood NEGATIVE / Protein 100 / Leuk Est NEGATIVE / Nitrite NEGATIVE      RBC 6-10 / WBC 0-2 / Hyaline NEGATIVE / Gran  / Sq Epi OCC / Non Sq Epi  / Bacteria NEGATIVE      Iron 38, TIBC 234, %sat --      [09-12-19 @ 06:08]  Ferritin 214.6      [09-12-19 @ 06:08]  PTH 20.00 (Ca --)      [09-03-19 @ 05:45]   --  PTH 25.46 (Ca --)      [09-01-19 @ 06:00]   --  Vitamin D (25OH) 34.9      [09-03-19 @ 05:45]  HbA1c 7.0      [09-13-19 @ 06:50]  TSH 12.33      [11-14-19 @ 14:10]  Lipid: chol 121, TG 96, HDL 33, LDL 74      [08-31-19 @ 07:00]    HBsAb Reactive      [11-23-19 @ 05:25]  HBsAg Nonreactive      [11-23-19 @ 05:25]  HBcAb Reactive      [11-23-19 @ 05:25]  HCV 0.12, Nonreactive Hepatitis C AB  S/CO Ratio                        Interpretation  < 1.00                                   Non-Reactive  1.00 - 4.99                         Weakly-Reactive  >= 5.00                                Reactive  Non-Reactive: [place]erson with a non-reactive HCV antibody  result is considered uninfected.  No further action is  needed unless recent infection is suspected.  In these  cases, consider repeat testing later to detect  seroconversion..  Weakly-Reactive: HCV antibody test is abnormal, HCV RNA  Qualitative test will follow.  Reactive: HCV antibody test is abnormal, HCV RNA  Qualitative test will follow.  Note: HCV antibody testing is performed on the Abbott   system.      [11-23-19 @ 05:25]  HIV Nonreactive The HIV Ag/Ab Combo test performed screens for HIV-1 p24  antigen, antibodies to HIV-1 (group M and group O), and  antibodies to HIV-2. All specimens repeatedly reactive  will reflex to an HIV 1/2 antibody confirmation and  differentiation test. This assay detects p24 antigen which  may be present prior to the development of HIV antibodies,  therefore a reactive result with a negative HIV 1/2 AB  Confirmation should be followed up with HIV-1 RNA, HIV-2  RNA and repeat testing in 4-8 weeks. A nonreactive result  does not preclude previous exposure to or infection with  HIV-1 or HIV-2. [facility] prohibits disclosure of this  result to any unauthorized party.      [11-22-19 @ 10:18]

## 2019-11-30 NOTE — PROGRESS NOTE ADULT - PROBLEM SELECTOR PLAN 5
given recent hypoglycemia will hold off insulin for now  - FS    #hypothyroidism  -c/w IV levothyroxine 44mg daily given recent hypoglycemia will hold off insulin for now until resume feed  - FS    #hypothyroidism  -c/w IV levothyroxine 44mg daily

## 2019-11-30 NOTE — PROGRESS NOTE ADULT - SUBJECTIVE AND OBJECTIVE BOX
Pavithra Alfonso PGY1  -[id number] | [facility] [id number]  =========================    Patient is a 79y old  Male who presents with a chief complaint of AMS (29 Nov 2019 16:01)      INTERVAL HPI/OVERNIGHT EVENTS:  No acute event overnight. Peg tube placed yesterday.       MEDICATIONS  (STANDING):  amLODIPine   Tablet 10 milliGRAM(s) Oral daily  ATENolol  Tablet 50 milliGRAM(s) Oral daily  dextrose 5% + sodium chloride 0.9%. 1000 milliLiter(s) (50 mL/Hr) IV Continuous <Continuous>  dextrose 5%. 1000 milliLiter(s) (50 mL/Hr) IV Continuous <Continuous>  dextrose 50% Injectable 12.5 Gram(s) IV Push once  dextrose 50% Injectable 25 Gram(s) IV Push once  dextrose 50% Injectable 25 Gram(s) IV Push once  enoxaparin Injectable 40 milliGRAM(s) SubCutaneous daily  escitalopram 5 milliGRAM(s) Oral daily  insulin lispro (HumaLOG) corrective regimen sliding scale   SubCutaneous three times a day before meals  insulin lispro (HumaLOG) corrective regimen sliding scale   SubCutaneous at bedtime  levETIRAcetam 500 milliGRAM(s) Oral two times a day  levothyroxine 88 MICROGram(s) Oral daily  meropenem  IVPB 1000 milliGRAM(s) IV Intermittent every 12 hours  pantoprazole    Tablet 40 milliGRAM(s) Oral before breakfast  simvastatin 20 milliGRAM(s) Oral at bedtime  vancomycin  IVPB 1000 milliGRAM(s) IV Intermittent every 24 hours    MEDICATIONS  (PRN):  acetaminophen   Tablet .. 650 milliGRAM(s) Oral every 6 hours PRN Severe Pain (7 - 10)  dextrose 40% Gel 15 Gram(s) Oral once PRN Blood Glucose LESS THAN 70 milliGRAM(s)/deciliter  glucagon  Injectable 1 milliGRAM(s) IntraMuscular once PRN Glucose LESS THAN 70 milligrams/deciliter      Allergies    No Known Allergies    Intolerances        Vital Signs Last 24 Hrs  T(C): 36.3 (30 Nov 2019 05:50), Max: 36.4 (29 Nov 2019 22:30)  T(F): 97.4 (30 Nov 2019 05:50), Max: 97.6 (29 Nov 2019 22:30)  HR: 86 (30 Nov 2019 05:50) (75 - 87)  BP: 134/68 (30 Nov 2019 05:50) (96/56 - 134/68)  RR: 18 (30 Nov 2019 05:50) (18 - 20)  SpO2: 99% (30 Nov 2019 05:50) (96% - 100%)    PHYSICAL EXAM:  GENERAL: NAD.  CHEST/LUNG: Clear to auscultation; No rales, rhonchi, or wheezing.  Respiratory effort does not appear labored.  HEART: Regular rate and rhythm; S1 and S2,  no murmurs, rubs, or gallops.  ABDOMEN: Soft, not tender to palpation.  No masses or HSM appreciated.  No distension.  Bowel sounds present.  EXTREMITIES:  No clubbing, cyanosis, or edema.  Moves all extremities with strength 5/5.  SKIN: No obvious rashes or lesions.  Turgor okay.  NEURO:  Alert and oriented x 3, no focal sensory or  motor deficit    LABS:              CAPILLARY BLOOD GLUCOSE      POCT Blood Glucose.: 220 mg/dL (30 Nov 2019 05:16)  POCT Blood Glucose.: 222 mg/dL (29 Nov 2019 23:32)  POCT Blood Glucose.: 198 mg/dL (29 Nov 2019 17:34)  POCT Blood Glucose.: 161 mg/dL (29 Nov 2019 14:17)  POCT Blood Glucose.: 175 mg/dL (29 Nov 2019 08:14)      Culture - Blood (collected 26 Nov 2019 06:26)  Source: BLOOD  Preliminary Report (30 Nov 2019 06:26):    NO ORGANISMS ISOLATED    NO ORGANISMS ISOLATED AT 96 HOURS    Culture - Urine (collected 25 Nov 2019 19:50)  Source: URINE MIDSTREAM  Final Report (27 Nov 2019 08:34):    NO GROWTH AT 24 HOURS Pavithra Alfonso Y1  -[id number] | [facility] [id number]  =========================    Patient is a 79y old  Male who presents with a chief complaint of AMS (29 Nov 2019 16:01)      INTERVAL HPI/OVERNIGHT EVENTS:  No acute event overnight. Peg tube placed yesterday. This morning pt appears comfortable, on high flow NC, asking for water and pillow. Denies f/c/ns, abdominal pain, CP, SOB.       MEDICATIONS  (STANDING):  amLODIPine   Tablet 10 milliGRAM(s) Oral daily  ATENolol  Tablet 50 milliGRAM(s) Oral daily  dextrose 5% + sodium chloride 0.9%. 1000 milliLiter(s) (50 mL/Hr) IV Continuous <Continuous>  dextrose 5%. 1000 milliLiter(s) (50 mL/Hr) IV Continuous <Continuous>  dextrose 50% Injectable 12.5 Gram(s) IV Push once  dextrose 50% Injectable 25 Gram(s) IV Push once  dextrose 50% Injectable 25 Gram(s) IV Push once  enoxaparin Injectable 40 milliGRAM(s) SubCutaneous daily  escitalopram 5 milliGRAM(s) Oral daily  insulin lispro (HumaLOG) corrective regimen sliding scale   SubCutaneous three times a day before meals  insulin lispro (HumaLOG) corrective regimen sliding scale   SubCutaneous at bedtime  levETIRAcetam 500 milliGRAM(s) Oral two times a day  levothyroxine 88 MICROGram(s) Oral daily  meropenem  IVPB 1000 milliGRAM(s) IV Intermittent every 12 hours  pantoprazole    Tablet 40 milliGRAM(s) Oral before breakfast  simvastatin 20 milliGRAM(s) Oral at bedtime  vancomycin  IVPB 1000 milliGRAM(s) IV Intermittent every 24 hours    MEDICATIONS  (PRN):  acetaminophen   Tablet .. 650 milliGRAM(s) Oral every 6 hours PRN Severe Pain (7 - 10)  dextrose 40% Gel 15 Gram(s) Oral once PRN Blood Glucose LESS THAN 70 milliGRAM(s)/deciliter  glucagon  Injectable 1 milliGRAM(s) IntraMuscular once PRN Glucose LESS THAN 70 milligrams/deciliter      Allergies    No Known Allergies    Intolerances        Vital Signs Last 24 Hrs  T(C): 36.3 (30 Nov 2019 05:50), Max: 36.4 (29 Nov 2019 22:30)  T(F): 97.4 (30 Nov 2019 05:50), Max: 97.6 (29 Nov 2019 22:30)  HR: 86 (30 Nov 2019 05:50) (75 - 87)  BP: 134/68 (30 Nov 2019 05:50) (96/56 - 134/68)  RR: 18 (30 Nov 2019 05:50) (18 - 20)  SpO2: 99% (30 Nov 2019 05:50) (96% - 100%)    PHYSICAL EXAM:  GENERAL: NAD. on high flow NS.   HEENT: EOMI, clear sclera  CHEST/LUNG: crackles bilaterally; no wheezes. on high flow  HEART: Regular rate and rhythm; S1 and S2,  no murmurs, rubs, or gallops.  ABDOMEN: peg tube site clean. Soft, not tender to palpation.  No masses or HSM appreciated.  No distension.  decreased BS.  EXTREMITIES:  No clubbing, cyanosis, or edema.  Moves all extremities.   SKIN: No obvious rashes or lesions.  Turgor okay.  NEURO:  Alert and oriented x1-2    LABS:                        9.5    13.04 )-----------( 570      ( 30 Nov 2019 06:00 )             29.7   11-30    133<L>  |  96<L>  |  20  ----------------------------<  250<H>  4.1   |  24  |  0.98    Ca    8.4      30 Nov 2019 06:00  Phos  2.4     11-30  Mg     2.0     11-30    TPro  5.6<L>  /  Alb  2.2<L>  /  TBili  0.3  /  DBili  x   /  AST  27  /  ALT  14  /  AlkPhos  129<H>  11-29              CAPILLARY BLOOD GLUCOSE      POCT Blood Glucose.: 220 mg/dL (30 Nov 2019 05:16)  POCT Blood Glucose.: 222 mg/dL (29 Nov 2019 23:32)  POCT Blood Glucose.: 198 mg/dL (29 Nov 2019 17:34)  POCT Blood Glucose.: 161 mg/dL (29 Nov 2019 14:17)  POCT Blood Glucose.: 175 mg/dL (29 Nov 2019 08:14)      Culture - Blood (collected 26 Nov 2019 06:26)  Source: BLOOD  Preliminary Report (30 Nov 2019 06:26):    NO ORGANISMS ISOLATED    NO ORGANISMS ISOLATED AT 96 HOURS    Culture - Urine (collected 25 Nov 2019 19:50)  Source: URINE MIDSTREAM  Final Report (27 Nov 2019 08:34):    NO GROWTH AT 24 HOURS

## 2019-11-30 NOTE — PROGRESS NOTE ADULT - PROBLEM SELECTOR PLAN 2
- Severe protein calorie malnutrition   - PEG placement today -> gravity drainage for 24 hours  - nutrition consulted for peg feed Severe protein calorie malnutrition. s/p peg placement (11/29)  - nutrition consulted for peg feed

## 2019-11-30 NOTE — PROGRESS NOTE ADULT - ATTENDING COMMENTS
Acute hypoxic respiratory failure, likely recurrent aspiration pneumonitis, worsening b/l opacities on CT, c/w IV Meropenem, on HF oxygen   Metabolic encephalopathy with seizure activity: likely d/t infection, mental status wax/wanes, VEEG negative for seizures and will c/w Keppra, stable SDH - no intervention by NSx   Dysphagia, s/p PEG 11/29, nutrition consult to start tube feeds   DLBCL: s/p 2 cycles of R-CD with interval improvement in lymphadenopathy on scans from 11/14, CT shows response to chemo with reduced lymphadenopathy and spleen size, Heme is considering inpt chemo if his condition improves   Mild hypercalcemia: likely d/t lymphoma, Ca improved

## 2019-11-30 NOTE — PROGRESS NOTE ADULT - ASSESSMENT
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 prolonged FRANCES state (? 2/2 prolonged hypercalcemic state) that has now resolved vs. loss of body mass  - Serum Cr is stable   - Avoid nephrotoxins agent  - renal diet  - monitor bmp    Hyponatremia  - ? etiology, presented with Na 122  - work up suggested SIADH  - Serum sodium improved   - d/miroslava Nacl tab and lasix d/c'd recieved D5W 11/26.   - Na low but stable today, should improve once PEG feeds start  - Monitor serum sodium and fluid status closely     HTN  - has Hx HTN but was on midodrine for orthostatic hypotension in the recent past   - BP was elevated therefore restarted on home BP medications   - Currently BP controlled   - monitor bp    Hypocalcemia  - Has Hx hypercalcemia of malignancy  - low alb corrected alexander is optimal  - monitor    Pl. effusion b/l/ Anasarca   mod effusion seen on ct chest  was on lasix 20mg PO daily. (last dose 11/25)   patient clinically improving  Now on high flow. pulm on board  SOB possible sec to aspiration Pna on vanco. Can give a dose on lasix 20 IV if needed PRN SOB/ hypervolemia  Monitor     Hypokalemia  supplemented  monitor

## 2019-11-30 NOTE — PROGRESS NOTE ADULT - PROBLEM SELECTOR PLAN 4
fluctuates between hypo and hypernatremia. Currently hyponatremia likely from fluid.  - D5NS for 20 hours  - d/c salt tabs and lasix  - s/p D5 75cc/hr fluctuates between hypo and hypernatremia. hyponatremia now improved.   - d/c salt tabs and lasix  - daily BMP

## 2019-12-01 LAB
ANION GAP SERPL CALC-SCNC: 11 MMO/L — SIGNIFICANT CHANGE UP (ref 7–14)
BACTERIA BLD CULT: SIGNIFICANT CHANGE UP
BUN SERPL-MCNC: 18 MG/DL — SIGNIFICANT CHANGE UP (ref 7–23)
CALCIUM SERPL-MCNC: 8.3 MG/DL — LOW (ref 8.4–10.5)
CHLORIDE SERPL-SCNC: 103 MMOL/L — SIGNIFICANT CHANGE UP (ref 98–107)
CO2 SERPL-SCNC: 20 MMOL/L — LOW (ref 22–31)
CREAT SERPL-MCNC: 0.92 MG/DL — SIGNIFICANT CHANGE UP (ref 0.5–1.3)
GLUCOSE SERPL-MCNC: 183 MG/DL — HIGH (ref 70–99)
MAGNESIUM SERPL-MCNC: 2 MG/DL — SIGNIFICANT CHANGE UP (ref 1.6–2.6)
PHOSPHATE SERPL-MCNC: 2.5 MG/DL — SIGNIFICANT CHANGE UP (ref 2.5–4.5)
POTASSIUM SERPL-MCNC: 5 MMOL/L — SIGNIFICANT CHANGE UP (ref 3.5–5.3)
POTASSIUM SERPL-SCNC: 5 MMOL/L — SIGNIFICANT CHANGE UP (ref 3.5–5.3)
SODIUM SERPL-SCNC: 134 MMOL/L — LOW (ref 135–145)

## 2019-12-01 PROCEDURE — 99233 SBSQ HOSP IP/OBS HIGH 50: CPT | Mod: GC

## 2019-12-01 RX ORDER — FUROSEMIDE 40 MG
10 TABLET ORAL ONCE
Refills: 0 | Status: COMPLETED | OUTPATIENT
Start: 2019-12-01 | End: 2019-12-01

## 2019-12-01 RX ORDER — ESCITALOPRAM OXALATE 10 MG/1
5 TABLET, FILM COATED ORAL DAILY
Refills: 0 | Status: DISCONTINUED | OUTPATIENT
Start: 2019-12-01 | End: 2019-12-24

## 2019-12-01 RX ORDER — FUROSEMIDE 40 MG
20 TABLET ORAL DAILY
Refills: 0 | Status: DISCONTINUED | OUTPATIENT
Start: 2019-12-02 | End: 2019-12-03

## 2019-12-01 RX ORDER — ACETAMINOPHEN 500 MG
650 TABLET ORAL EVERY 6 HOURS
Refills: 0 | Status: DISCONTINUED | OUTPATIENT
Start: 2019-12-01 | End: 2019-12-01

## 2019-12-01 RX ORDER — AMLODIPINE BESYLATE 2.5 MG/1
10 TABLET ORAL DAILY
Refills: 0 | Status: DISCONTINUED | OUTPATIENT
Start: 2019-12-01 | End: 2019-12-05

## 2019-12-01 RX ORDER — LEVOTHYROXINE SODIUM 125 MCG
88 TABLET ORAL DAILY
Refills: 0 | Status: DISCONTINUED | OUTPATIENT
Start: 2019-12-01 | End: 2019-12-01

## 2019-12-01 RX ORDER — LEVETIRACETAM 250 MG/1
500 TABLET, FILM COATED ORAL
Refills: 0 | Status: DISCONTINUED | OUTPATIENT
Start: 2019-12-01 | End: 2019-12-01

## 2019-12-01 RX ORDER — PANTOPRAZOLE SODIUM 20 MG/1
40 TABLET, DELAYED RELEASE ORAL DAILY
Refills: 0 | Status: DISCONTINUED | OUTPATIENT
Start: 2019-12-01 | End: 2019-12-24

## 2019-12-01 RX ORDER — SIMVASTATIN 20 MG/1
20 TABLET, FILM COATED ORAL AT BEDTIME
Refills: 0 | Status: DISCONTINUED | OUTPATIENT
Start: 2019-12-01 | End: 2019-12-24

## 2019-12-01 RX ORDER — ACETAMINOPHEN 500 MG
650 TABLET ORAL EVERY 6 HOURS
Refills: 0 | Status: DISCONTINUED | OUTPATIENT
Start: 2019-12-01 | End: 2019-12-24

## 2019-12-01 RX ORDER — ATENOLOL 25 MG/1
50 TABLET ORAL DAILY
Refills: 0 | Status: DISCONTINUED | OUTPATIENT
Start: 2019-12-01 | End: 2019-12-06

## 2019-12-01 RX ORDER — LEVOTHYROXINE SODIUM 125 MCG
44 TABLET ORAL AT BEDTIME
Refills: 0 | Status: DISCONTINUED | OUTPATIENT
Start: 2019-12-01 | End: 2019-12-05

## 2019-12-01 RX ORDER — PANTOPRAZOLE SODIUM 20 MG/1
40 TABLET, DELAYED RELEASE ORAL
Refills: 0 | Status: DISCONTINUED | OUTPATIENT
Start: 2019-12-01 | End: 2019-12-01

## 2019-12-01 RX ORDER — LEVETIRACETAM 250 MG/1
500 TABLET, FILM COATED ORAL
Refills: 0 | Status: DISCONTINUED | OUTPATIENT
Start: 2019-12-01 | End: 2019-12-24

## 2019-12-01 RX ADMIN — AMLODIPINE BESYLATE 10 MILLIGRAM(S): 2.5 TABLET ORAL at 05:38

## 2019-12-01 RX ADMIN — ESCITALOPRAM OXALATE 5 MILLIGRAM(S): 10 TABLET, FILM COATED ORAL at 11:34

## 2019-12-01 RX ADMIN — Medication 88 MICROGRAM(S): at 05:39

## 2019-12-01 RX ADMIN — MEROPENEM 100 MILLIGRAM(S): 1 INJECTION INTRAVENOUS at 17:11

## 2019-12-01 RX ADMIN — Medication 2: at 11:35

## 2019-12-01 RX ADMIN — ENOXAPARIN SODIUM 40 MILLIGRAM(S): 100 INJECTION SUBCUTANEOUS at 11:35

## 2019-12-01 RX ADMIN — Medication 2: at 23:24

## 2019-12-01 RX ADMIN — Medication 10 MILLIGRAM(S): at 11:34

## 2019-12-01 RX ADMIN — LEVETIRACETAM 500 MILLIGRAM(S): 250 TABLET, FILM COATED ORAL at 05:38

## 2019-12-01 RX ADMIN — LEVETIRACETAM 500 MILLIGRAM(S): 250 TABLET, FILM COATED ORAL at 17:12

## 2019-12-01 RX ADMIN — Medication 1: at 05:38

## 2019-12-01 RX ADMIN — Medication 3: at 18:19

## 2019-12-01 RX ADMIN — SIMVASTATIN 20 MILLIGRAM(S): 20 TABLET, FILM COATED ORAL at 22:11

## 2019-12-01 RX ADMIN — Medication 44 MICROGRAM(S): at 22:11

## 2019-12-01 RX ADMIN — MEROPENEM 100 MILLIGRAM(S): 1 INJECTION INTRAVENOUS at 05:39

## 2019-12-01 RX ADMIN — ATENOLOL 50 MILLIGRAM(S): 25 TABLET ORAL at 05:38

## 2019-12-01 RX ADMIN — PANTOPRAZOLE SODIUM 40 MILLIGRAM(S): 20 TABLET, DELAYED RELEASE ORAL at 13:07

## 2019-12-01 RX ADMIN — Medication 10 MILLIGRAM(S): at 07:46

## 2019-12-01 NOTE — PROGRESS NOTE ADULT - SUBJECTIVE AND OBJECTIVE BOX
Pavithra Alfonso Y1  -[id number] | [facility] [id number]  =========================    Patient is a 79y old  Male who presents with a chief complaint of AMS (30 Nov 2019 16:50)      INTERVAL HPI/OVERNIGHT EVENTS:  No acute event overnight. Started tube feeds yesterday - pt tolerated well.       MEDICATIONS  (STANDING):  amLODIPine   Tablet 10 milliGRAM(s) Oral daily  ATENolol  Tablet 50 milliGRAM(s) Oral daily  dextrose 5%. 1000 milliLiter(s) (50 mL/Hr) IV Continuous <Continuous>  dextrose 50% Injectable 12.5 Gram(s) IV Push once  dextrose 50% Injectable 25 Gram(s) IV Push once  dextrose 50% Injectable 25 Gram(s) IV Push once  enoxaparin Injectable 40 milliGRAM(s) SubCutaneous daily  escitalopram 5 milliGRAM(s) Oral daily  insulin lispro (HumaLOG) corrective regimen sliding scale   SubCutaneous every 6 hours  levETIRAcetam  Solution 500 milliGRAM(s) Oral two times a day  levothyroxine Injectable 44 MICROGram(s) IV Push at bedtime  meropenem  IVPB 1000 milliGRAM(s) IV Intermittent every 12 hours  pantoprazole   Suspension 40 milliGRAM(s) Oral daily  simvastatin 20 milliGRAM(s) Oral at bedtime    MEDICATIONS  (PRN):  acetaminophen    Suspension .. 650 milliGRAM(s) Oral every 6 hours PRN Severe Pain (7 - 10)  dextrose 40% Gel 15 Gram(s) Oral once PRN Blood Glucose LESS THAN 70 milliGRAM(s)/deciliter  glucagon  Injectable 1 milliGRAM(s) IntraMuscular once PRN Glucose LESS THAN 70 milligrams/deciliter      Allergies    No Known Allergies    Intolerances        Vital Signs Last 24 Hrs  T(C): 36.4 (01 Dec 2019 05:33), Max: 36.6 (30 Nov 2019 20:30)  T(F): 97.6 (01 Dec 2019 05:33), Max: 97.8 (30 Nov 2019 20:30)  HR: 98 (01 Dec 2019 05:33) (82 - 98)  BP: 150/82 (01 Dec 2019 05:33) (131/67 - 150/82)  BP(mean): --  RR: 20 (01 Dec 2019 05:33) (17 - 21)  SpO2: 96% (01 Dec 2019 05:33) (93% - 99%)    PHYSICAL EXAM:  GENERAL: NAD. on high flow NC  CHEST/LUNG: Clear to auscultation; No rales, rhonchi, or wheezing.  Respiratory effort does not appear labored.  HEART: Regular rate and rhythm; S1 and S2,  no murmurs, rubs, or gallops.  ABDOMEN: Soft, not tender to palpation.  No masses or HSM appreciated.  No distension.  Bowel sounds present.  EXTREMITIES:  No clubbing, cyanosis, or edema.  Moves all extremities   SKIN: No obvious rashes or lesions.  Turgor okay.  NEURO:  Alert and oriented x 1-2      LABS:                       12-01    134<L>  |  103  |  18  ----------------------------<  183<H>  5.0   |  20<L>  |  0.92    Ca    8.3<L>      01 Dec 2019 05:22  Phos  2.5     12-01  Mg     2.0     12-01          CAPILLARY BLOOD GLUCOSE      POCT Blood Glucose.: 157 mg/dL (01 Dec 2019 04:57)  POCT Blood Glucose.: 174 mg/dL (30 Nov 2019 23:33)  POCT Blood Glucose.: 157 mg/dL (30 Nov 2019 21:59)  POCT Blood Glucose.: 94 mg/dL (30 Nov 2019 17:18)  POCT Blood Glucose.: 196 mg/dL (30 Nov 2019 11:55)  POCT Blood Glucose.: 224 mg/dL (30 Nov 2019 08:54)      Culture - Nose (collected 28 Nov 2019 17:35)  Source: NOSE  Final Report (30 Nov 2019 10:05):    No growth of Methicillin-Resistant Staphylococcus aureus    Growth of Methicillin-Susceptible Staphylococcus aureus    STAU^Staphylococcus aureus    QUANTITY OF GROWTH: FEW Pavithra Alfonso PGY1  -[id number] | [facility] [id number]  =========================    Patient is a 79y old  Male who presents with a chief complaint of AMS (30 Nov 2019 16:50)      INTERVAL HPI/OVERNIGHT EVENTS:  No acute event overnight. Started tube feeds yesterday - pt tolerated well. This morning pt appeared uncomfortable with increase work of breathing, satting 84-88%. high flow NC was not positioned properly - repositioned, O2 sat came up to 92%. Says "i dont know" when asked about abdominal pain and chest pain. No f/c.       MEDICATIONS  (STANDING):  amLODIPine   Tablet 10 milliGRAM(s) Oral daily  ATENolol  Tablet 50 milliGRAM(s) Oral daily  dextrose 5%. 1000 milliLiter(s) (50 mL/Hr) IV Continuous <Continuous>  dextrose 50% Injectable 12.5 Gram(s) IV Push once  dextrose 50% Injectable 25 Gram(s) IV Push once  dextrose 50% Injectable 25 Gram(s) IV Push once  enoxaparin Injectable 40 milliGRAM(s) SubCutaneous daily  escitalopram 5 milliGRAM(s) Oral daily  insulin lispro (HumaLOG) corrective regimen sliding scale   SubCutaneous every 6 hours  levETIRAcetam  Solution 500 milliGRAM(s) Oral two times a day  levothyroxine Injectable 44 MICROGram(s) IV Push at bedtime  meropenem  IVPB 1000 milliGRAM(s) IV Intermittent every 12 hours  pantoprazole   Suspension 40 milliGRAM(s) Oral daily  simvastatin 20 milliGRAM(s) Oral at bedtime    MEDICATIONS  (PRN):  acetaminophen    Suspension .. 650 milliGRAM(s) Oral every 6 hours PRN Severe Pain (7 - 10)  dextrose 40% Gel 15 Gram(s) Oral once PRN Blood Glucose LESS THAN 70 milliGRAM(s)/deciliter  glucagon  Injectable 1 milliGRAM(s) IntraMuscular once PRN Glucose LESS THAN 70 milligrams/deciliter      Allergies    No Known Allergies    Intolerances        Vital Signs Last 24 Hrs  T(C): 36.4 (01 Dec 2019 05:33), Max: 36.6 (30 Nov 2019 20:30)  T(F): 97.6 (01 Dec 2019 05:33), Max: 97.8 (30 Nov 2019 20:30)  HR: 98 (01 Dec 2019 05:33) (82 - 98)  BP: 150/82 (01 Dec 2019 05:33) (131/67 - 150/82)  BP(mean): --  RR: 20 (01 Dec 2019 05:33) (17 - 21)  SpO2: 96% (01 Dec 2019 05:33) (93% - 99%)    PHYSICAL EXAM:  GENERAL: NAD. on high flow NC  CHEST/LUNG: Increase crackles bilaterally to mid lungs, belly breathing.   HEART: Regular rate and rhythm; S1 and S2,  no murmurs, rubs, or gallops.  ABDOMEN: Soft, not tender to palpation.  No masses or HSM appreciated.  No distension.  Bowel sounds present.  EXTREMITIES: L arm more edematous.   SKIN: No obvious rashes or lesions.  Turgor okay.  NEURO:  Alert and oriented x 1.       LABS:                       12-01    134<L>  |  103  |  18  ----------------------------<  183<H>  5.0   |  20<L>  |  0.92    Ca    8.3<L>      01 Dec 2019 05:22  Phos  2.5     12-01  Mg     2.0     12-01          CAPILLARY BLOOD GLUCOSE      POCT Blood Glucose.: 157 mg/dL (01 Dec 2019 04:57)  POCT Blood Glucose.: 174 mg/dL (30 Nov 2019 23:33)  POCT Blood Glucose.: 157 mg/dL (30 Nov 2019 21:59)  POCT Blood Glucose.: 94 mg/dL (30 Nov 2019 17:18)  POCT Blood Glucose.: 196 mg/dL (30 Nov 2019 11:55)  POCT Blood Glucose.: 224 mg/dL (30 Nov 2019 08:54)      Culture - Nose (collected 28 Nov 2019 17:35)  Source: NOSE  Final Report (30 Nov 2019 10:05):    No growth of Methicillin-Resistant Staphylococcus aureus    Growth of Methicillin-Susceptible Staphylococcus aureus    STAU^Staphylococcus aureus    QUANTITY OF GROWTH: FEW Pavithra Alfonso PGY1  -[id number] | [facility] [id number]  =========================    Patient is a 79y old  Male who presents with a chief complaint of AMS (30 Nov 2019 16:50)      INTERVAL HPI/OVERNIGHT EVENTS:  No acute event overnight. Started tube feeds yesterday - pt tolerated well. This morning pt appeared uncomfortable with increase work of breathing, satting 84-88%. high flow NC was not positioned properly - repositioned, O2 sat came up to 92%. Lungs with increase crackles - giving lasix 20 IV. Says "i dont know" when asked about abdominal pain and chest pain. No f/c.       MEDICATIONS  (STANDING):  amLODIPine   Tablet 10 milliGRAM(s) Oral daily  ATENolol  Tablet 50 milliGRAM(s) Oral daily  dextrose 5%. 1000 milliLiter(s) (50 mL/Hr) IV Continuous <Continuous>  dextrose 50% Injectable 12.5 Gram(s) IV Push once  dextrose 50% Injectable 25 Gram(s) IV Push once  dextrose 50% Injectable 25 Gram(s) IV Push once  enoxaparin Injectable 40 milliGRAM(s) SubCutaneous daily  escitalopram 5 milliGRAM(s) Oral daily  insulin lispro (HumaLOG) corrective regimen sliding scale   SubCutaneous every 6 hours  levETIRAcetam  Solution 500 milliGRAM(s) Oral two times a day  levothyroxine Injectable 44 MICROGram(s) IV Push at bedtime  meropenem  IVPB 1000 milliGRAM(s) IV Intermittent every 12 hours  pantoprazole   Suspension 40 milliGRAM(s) Oral daily  simvastatin 20 milliGRAM(s) Oral at bedtime    MEDICATIONS  (PRN):  acetaminophen    Suspension .. 650 milliGRAM(s) Oral every 6 hours PRN Severe Pain (7 - 10)  dextrose 40% Gel 15 Gram(s) Oral once PRN Blood Glucose LESS THAN 70 milliGRAM(s)/deciliter  glucagon  Injectable 1 milliGRAM(s) IntraMuscular once PRN Glucose LESS THAN 70 milligrams/deciliter      Allergies    No Known Allergies    Intolerances        Vital Signs Last 24 Hrs  T(C): 36.4 (01 Dec 2019 05:33), Max: 36.6 (30 Nov 2019 20:30)  T(F): 97.6 (01 Dec 2019 05:33), Max: 97.8 (30 Nov 2019 20:30)  HR: 98 (01 Dec 2019 05:33) (82 - 98)  BP: 150/82 (01 Dec 2019 05:33) (131/67 - 150/82)  BP(mean): --  RR: 20 (01 Dec 2019 05:33) (17 - 21)  SpO2: 96% (01 Dec 2019 05:33) (93% - 99%)    PHYSICAL EXAM:  GENERAL: NAD. on high flow NC  CHEST/LUNG: Increase crackles bilaterally to mid lungs, belly breathing.   HEART: Regular rate and rhythm; S1 and S2,  no murmurs, rubs, or gallops.  ABDOMEN: Soft, not tender to palpation.  No masses or HSM appreciated.  No distension.  Bowel sounds present.  EXTREMITIES: L arm more edematous.   SKIN: No obvious rashes or lesions.  Turgor okay.  NEURO:  Alert and oriented x 1.       LABS:                       12-01    134<L>  |  103  |  18  ----------------------------<  183<H>  5.0   |  20<L>  |  0.92    Ca    8.3<L>      01 Dec 2019 05:22  Phos  2.5     12-01  Mg     2.0     12-01          CAPILLARY BLOOD GLUCOSE      POCT Blood Glucose.: 157 mg/dL (01 Dec 2019 04:57)  POCT Blood Glucose.: 174 mg/dL (30 Nov 2019 23:33)  POCT Blood Glucose.: 157 mg/dL (30 Nov 2019 21:59)  POCT Blood Glucose.: 94 mg/dL (30 Nov 2019 17:18)  POCT Blood Glucose.: 196 mg/dL (30 Nov 2019 11:55)  POCT Blood Glucose.: 224 mg/dL (30 Nov 2019 08:54)      Culture - Nose (collected 28 Nov 2019 17:35)  Source: NOSE  Final Report (30 Nov 2019 10:05):    No growth of Methicillin-Resistant Staphylococcus aureus    Growth of Methicillin-Susceptible Staphylococcus aureus    STAU^Staphylococcus aureus    QUANTITY OF GROWTH: FEW

## 2019-12-01 NOTE — PROGRESS NOTE ADULT - PROBLEM SELECTOR PLAN 1
Hypoxia likely from aspiration pneumonitis.  Pt is s/p 5-day course of zosyn for aspiration PNA. CT chest with new ground glass opacities and dense consolidations in upper lobes bilaterally and known b/l pleural effusions, concerning for multifocal PNA.   - on benny (11/28-) for coverage of HAP. s/p vanc (11/28-11/30)  - on high flow NC 60%/50L -> wean as tolerated for goal O2 sat >92%  - BIPAP as needed for increase WOB  - pulm consulted, recs appreciated Hypoxia likely from aspiration pneumonitis.  Pt is s/p 5-day course of zosyn for aspiration PNA. CT chest with new ground glass opacities and dense consolidations in upper lobes bilaterally and known b/l pleural effusions, concerning for multifocal PNA.   - on benny (11/28-) for coverage of HAP. s/p vanc (11/28-11/30)  - on high flow NC 60%/50L -> wean as tolerated for goal O2 sat >92%  - BIPAP as needed for increase WOB  - restarted lasix 20mg qd  - pulm consulted, recs appreciated

## 2019-12-01 NOTE — PROGRESS NOTE ADULT - PROBLEM SELECTOR PLAN 2
Severe protein calorie malnutrition. s/p peg placement (11/29)  - glucerna 1.2 continuous feed @ 20cc/hr

## 2019-12-01 NOTE — PROGRESS NOTE ADULT - ASSESSMENT
Mr. 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 Course c/b acute respiratory distress 2/2 multifocal PNA i/s/o multiple silent aspirations, on vanc/benny (11/28-). s/p peg placement (11/29)

## 2019-12-01 NOTE — PROGRESS NOTE ADULT - PROBLEM SELECTOR PLAN 6
Encephalopathy likely from infection vs post-ictal state. CT chest with apical consolidations and pleural effusion, s/p 5 days vanc/zosyn. MRI with no enhancing brain lesions. No seizure activity seen on vEEG. Mental status waxes and wan, overall improved.  - s/p Vanc and zosyn x5 days   - c/w keppra given recent hypoglycemia will hold off insulin for now until resume feed  - FS  - ISS    #hypothyroidism  -c/w IV levothyroxine 44mg daily

## 2019-12-01 NOTE — PROGRESS NOTE ADULT - PROBLEM SELECTOR PLAN 5
given recent hypoglycemia will hold off insulin for now until resume feed  - FS  - ISS    #hypothyroidism  -c/w IV levothyroxine 44mg daily fluctuates between hypo and hypernatremia. hyponatremia now improved.   - d/c salt tabs and lasix  - daily BMP fluctuates between hypo and hypernatremia. hyponatremia resolved  - d/c salt tabs   - daily BMP

## 2019-12-01 NOTE — PROGRESS NOTE ADULT - ASSESSMENT
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 prolonged FRANCES state (? 2/2 prolonged hypercalcemic state) that has now resolved vs. loss of body mass  - Serum Cr is stable   - Avoid nephrotoxins agent  - renal diet  - monitor bmp    Hyponatremia  - ? etiology, presented with Na 122  - work up suggested SIADH  - Serum sodium improved   - d/miroslava Nacl tab and lasix d/c'd recieved D5W 11/26.   - Pt started on PEG tube feeds. Should improve serum sodium   - Monitor serum sodium and fluid status closely     HTN  - has Hx HTN but was on midodrine for orthostatic hypotension in the recent past   - BP was elevated therefore restarted on home BP medications   - Currently BP controlled   - monitor bp    Hypocalcemia  - Has Hx hypercalcemia of malignancy  - low alb corrected alexander is optimal  - monitor    Pl. effusion b/l/ Anasarca   mod effusion seen on ct chest  was on lasix 20mg PO daily. (last dose 11/25)   patient clinically improving  remains on high flow o2. pulm on board  SOB possible sec to aspiration Pna on vanco. Can give a dose on lasix 20 IV if needed PRN SOB/ hypervolemia  Monitor

## 2019-12-01 NOTE — PROGRESS NOTE ADULT - PROBLEM SELECTOR PLAN 4
fluctuates between hypo and hypernatremia. hyponatremia now improved.   - d/c salt tabs and lasix  - daily BMP Encephalopathy likely from infection vs post-ictal state. CT chest with apical consolidations and pleural effusion, s/p 5 days vanc/zosyn. MRI with no enhancing brain lesions. No seizure activity seen on vEEG. Mental status waxes and wan, overall improved.  - s/p Vanc and zosyn x5 days   - c/w keppra

## 2019-12-01 NOTE — PROGRESS NOTE ADULT - PROBLEM SELECTOR PLAN 3
Per chart review on R-CP. s/p 2 cycles. Pt's daughter, Dr. Browne, is interested in inpatient chemo.   - per heme/onc, plan for inpt chemo prior to discharge -> will coordinate with heme/onc after pt is medically optimized

## 2019-12-01 NOTE — PROGRESS NOTE ADULT - ATTENDING COMMENTS
Acute hypoxic respiratory failure, likely recurrent aspiration pneumonitis, worsening b/l opacities on CT, c/w IV Meropenem, on HF oxygen, start Lasix to keep on dry side   Metabolic encephalopathy with seizure activity: likely d/t infection, mental status wax/wanes, VEEG negative for seizures and will c/w Keppra, stable SDH - no intervention by NSx   Dysphagia, s/p PEG 11/29, c/w tube feeds   DLBCL: s/p 2 cycles of R-CD with interval improvement in lymphadenopathy on scans from 11/14, CT shows response to chemo with reduced lymphadenopathy and spleen size, Heme is considering inpt chemo if his condition improves   Mild hypercalcemia: likely d/t lymphoma, Ca improved .

## 2019-12-01 NOTE — PROGRESS NOTE ADULT - SUBJECTIVE AND OBJECTIVE BOX
[facility]   MD Anusha Siddiqui D.O. Fatima Sheikh, D.O. Ruoru Wong, PA    From 7 AM - 5 PM:  OFFICE: [phone]  Dr. Radford cell: [phone]  Dr. Townsend cell: [phone]  Dr. Steinberg cell: [phone]  LEATHA Agee cell: [phone]    From 5 PM - 7 AM: Answering Service: [phone]      RENAL FOLLOW UP NOTE  --------------------------------------------------------------------------------  HPI:Pt seen and examined at bedside. Arousable. On high flow. Started on peg tube feeds     PAST HISTORY  --------------------------------------------------------------------------------  No significant changes to PMH, PSH, FHx, SHx, unless otherwise noted    ALLERGIES & MEDICATIONS  --------------------------------------------------------------------------------  Allergies    No Known Allergies    Intolerances      Standing Inpatient Medications  amLODIPine   Tablet 10 milliGRAM(s) Oral daily  ATENolol  Tablet 50 milliGRAM(s) Oral daily  dextrose 5%. 1000 milliLiter(s) IV Continuous <Continuous>  dextrose 50% Injectable 12.5 Gram(s) IV Push once  dextrose 50% Injectable 25 Gram(s) IV Push once  dextrose 50% Injectable 25 Gram(s) IV Push once  enoxaparin Injectable 40 milliGRAM(s) SubCutaneous daily  escitalopram 5 milliGRAM(s) Oral daily  insulin lispro (HumaLOG) corrective regimen sliding scale   SubCutaneous every 6 hours  levETIRAcetam  Solution 500 milliGRAM(s) Oral two times a day  levothyroxine Injectable 44 MICROGram(s) IV Push at bedtime  meropenem  IVPB 1000 milliGRAM(s) IV Intermittent every 12 hours  pantoprazole   Suspension 40 milliGRAM(s) Oral daily  simvastatin 20 milliGRAM(s) Oral at bedtime    PRN Inpatient Medications  acetaminophen    Suspension .. 650 milliGRAM(s) Oral every 6 hours PRN  dextrose 40% Gel 15 Gram(s) Oral once PRN  glucagon  Injectable 1 milliGRAM(s) IntraMuscular once PRN      REVIEW OF SYSTEMS  --------------------------------------------------------------------------------  Unable to obtain      VITALS/PHYSICAL EXAM  --------------------------------------------------------------------------------  T(C): 36.2 (12-01-19 @ 11:01), Max: 36.6 (11-30-19 @ 20:30)  HR: 85 (12-01-19 @ 11:25) (84 - 98)  BP: 132/72 (12-01-19 @ 11:01) (132/72 - 150/82)  RR: 21 (12-01-19 @ 11:25) (20 - 22)  SpO2: 96% (12-01-19 @ 11:25) (92% - 99%)  Wt(kg): --        Physical Exam:  	Gen: NAD on high flow  	HEENT: MMM  	Pulm: Course BS B/L  	CV: S1S2  	Abd: Soft, +BS + PEG  	Ext: No LE edema B/L                      Neuro: Awake   	Skin: Warm and Dry   	    LABS/STUDIES  --------------------------------------------------------------------------------              9.5    13.04 >-----------<  570      [11-30-19 @ 06:00]              29.7     134  |  103  |  18  ----------------------------<  183      [12-01-19 @ 05:22]  5.0   |  20  |  0.92        Ca     8.3     [12-01-19 @ 05:22]      Mg     2.0     [12-01-19 @ 05:22]      Phos  2.5     [12-01-19 @ 05:22]    Creatinine Trend:  SCr 0.92 [12-01 @ 05:22]  SCr 0.98 [11-30 @ 06:00]  SCr 1.05 [11-29 @ 06:10]  SCr 1.23 [11-28 @ 06:19]  SCr 1.23 [11-27 @ 06:00]    Urinalysis - [11-25-19 @ 18:43]      Color YELLOW / Appearance CLEAR / SG 1.020 / pH 6.0      Gluc NEGATIVE / Ketone NEGATIVE  / Bili NEGATIVE / Urobili TRACE       Blood NEGATIVE / Protein 100 / Leuk Est NEGATIVE / Nitrite NEGATIVE      RBC 6-10 / WBC 0-2 / Hyaline NEGATIVE / Gran  / Sq Epi OCC / Non Sq Epi  / Bacteria NEGATIVE      Iron 38, TIBC 234, %sat --      [09-12-19 @ 06:08]  Ferritin 214.6      [09-12-19 @ 06:08]  PTH 20.00 (Ca --)      [09-03-19 @ 05:45]   --  PTH 25.46 (Ca --)      [09-01-19 @ 06:00]   --  Vitamin D (25OH) 34.9      [09-03-19 @ 05:45]  HbA1c 7.0      [09-13-19 @ 06:50]  TSH 12.33      [11-14-19 @ 14:10]  Lipid: chol 121, TG 96, HDL 33, LDL 74      [08-31-19 @ 07:00]    HBsAb Reactive      [11-23-19 @ 05:25]  HBsAg Nonreactive      [11-23-19 @ 05:25]  HBcAb Reactive      [11-23-19 @ 05:25]  HCV 0.12, Nonreactive Hepatitis C AB  S/CO Ratio                        Interpretation  < 1.00                                   Non-Reactive  1.00 - 4.99                         Weakly-Reactive  >= 5.00                                Reactive  Non-Reactive: Aperson with a non-reactive HCV antibody  result is considered uninfected.  No further action is  needed unless recent infection is suspected.  In these  cases, consider repeat testing later to detect  seroconversion..  Weakly-Reactive: HCV antibody test is abnormal, HCV RNA  Qualitative test will follow.  Reactive: HCV antibody test is abnormal, HCV RNA  Qualitative test will follow.  Note: HCV antibody testing is performed on the [employer]   system.      [11-23-19 @ 05:25]  HIV Nonreactive The HIV Ag/Ab Combo test performed screens for HIV-1 p24  antigen, antibodies to HIV-1 (group M and group O), and  antibodies to HIV-2. All specimens repeatedly reactive  will reflex to an HIV 1/2 antibody confirmation and  differentiation test. This assay detects p24 antigen which  may be present prior to the development of HIV antibodies,  therefore a reactive result with a negative HIV 1/2 AB  Confirmation should be followed up with HIV-1 RNA, HIV-2  RNA and repeat testing in 4-8 weeks. A nonreactive result  does not preclude previous exposure to or infection with  HIV-1 or HIV-2. [facility] prohibits disclosure of this  result to any unauthorized party.      [11-22-19 @ 10:18]

## 2019-12-02 LAB
ALBUMIN SERPL ELPH-MCNC: 1.7 G/DL — LOW (ref 3.3–5)
ALP SERPL-CCNC: 243 U/L — HIGH (ref 40–120)
ALT FLD-CCNC: 21 U/L — SIGNIFICANT CHANGE UP (ref 4–41)
ANION GAP SERPL CALC-SCNC: 11 MMO/L — SIGNIFICANT CHANGE UP (ref 7–14)
AST SERPL-CCNC: 51 U/L — HIGH (ref 4–40)
BASOPHILS # BLD AUTO: 0.09 K/UL — SIGNIFICANT CHANGE UP (ref 0–0.2)
BASOPHILS NFR BLD AUTO: 0.6 % — SIGNIFICANT CHANGE UP (ref 0–2)
BASOPHILS NFR SPEC: 0 % — SIGNIFICANT CHANGE UP (ref 0–2)
BILIRUB SERPL-MCNC: 0.2 MG/DL — SIGNIFICANT CHANGE UP (ref 0.2–1.2)
BUN SERPL-MCNC: 25 MG/DL — HIGH (ref 7–23)
CALCIUM SERPL-MCNC: 8.6 MG/DL — SIGNIFICANT CHANGE UP (ref 8.4–10.5)
CHLORIDE SERPL-SCNC: 103 MMOL/L — SIGNIFICANT CHANGE UP (ref 98–107)
CO2 SERPL-SCNC: 23 MMOL/L — SIGNIFICANT CHANGE UP (ref 22–31)
CREAT SERPL-MCNC: 0.94 MG/DL — SIGNIFICANT CHANGE UP (ref 0.5–1.3)
EOSINOPHIL # BLD AUTO: 0.33 K/UL — SIGNIFICANT CHANGE UP (ref 0–0.5)
EOSINOPHIL NFR BLD AUTO: 2.3 % — SIGNIFICANT CHANGE UP (ref 0–6)
EOSINOPHIL NFR FLD: 2 % — SIGNIFICANT CHANGE UP (ref 0–6)
GLUCOSE SERPL-MCNC: 293 MG/DL — HIGH (ref 70–99)
HCT VFR BLD CALC: 29.4 % — LOW (ref 39–50)
HGB BLD-MCNC: 8.9 G/DL — LOW (ref 13–17)
IMM GRANULOCYTES NFR BLD AUTO: 6.4 % — HIGH (ref 0–1.5)
LG PLATELETS BLD QL AUTO: SIGNIFICANT CHANGE UP
LYMPHOCYTES # BLD AUTO: 1.56 K/UL — SIGNIFICANT CHANGE UP (ref 1–3.3)
LYMPHOCYTES # BLD AUTO: 11.1 % — LOW (ref 13–44)
LYMPHOCYTES NFR SPEC AUTO: 10 % — LOW (ref 13–44)
MACROCYTES BLD QL: SLIGHT — SIGNIFICANT CHANGE UP
MAGNESIUM SERPL-MCNC: 2.1 MG/DL — SIGNIFICANT CHANGE UP (ref 1.6–2.6)
MCHC RBC-ENTMCNC: 26.5 PG — LOW (ref 27–34)
MCHC RBC-ENTMCNC: 30.3 % — LOW (ref 32–36)
MCV RBC AUTO: 87.5 FL — SIGNIFICANT CHANGE UP (ref 80–100)
METAMYELOCYTES # FLD: 1 % — SIGNIFICANT CHANGE UP (ref 0–1)
MONOCYTES # BLD AUTO: 0.89 K/UL — SIGNIFICANT CHANGE UP (ref 0–0.9)
MONOCYTES NFR BLD AUTO: 6.3 % — SIGNIFICANT CHANGE UP (ref 2–14)
MONOCYTES NFR BLD: 2 % — SIGNIFICANT CHANGE UP (ref 2–9)
MYELOCYTES NFR BLD: 1 % — HIGH (ref 0–0)
NEUTROPHIL AB SER-ACNC: 80 % — HIGH (ref 43–77)
NEUTROPHILS # BLD AUTO: 10.28 K/UL — HIGH (ref 1.8–7.4)
NEUTROPHILS NFR BLD AUTO: 73.3 % — SIGNIFICANT CHANGE UP (ref 43–77)
NEUTS BAND # BLD: 3 % — SIGNIFICANT CHANGE UP (ref 0–6)
NRBC # BLD: 0 /100WBC — SIGNIFICANT CHANGE UP
NRBC # FLD: 0.03 K/UL — SIGNIFICANT CHANGE UP (ref 0–0)
PHOSPHATE SERPL-MCNC: 2.4 MG/DL — LOW (ref 2.5–4.5)
PLATELET # BLD AUTO: 574 K/UL — HIGH (ref 150–400)
PLATELET CLUMP BLD QL SMEAR: SIGNIFICANT CHANGE UP
PLATELET COUNT - ESTIMATE: SIGNIFICANT CHANGE UP
PMV BLD: 12.5 FL — SIGNIFICANT CHANGE UP (ref 7–13)
POTASSIUM SERPL-MCNC: 5.6 MMOL/L — HIGH (ref 3.5–5.3)
POTASSIUM SERPL-SCNC: 5.6 MMOL/L — HIGH (ref 3.5–5.3)
PROT SERPL-MCNC: 6.1 G/DL — SIGNIFICANT CHANGE UP (ref 6–8.3)
RBC # BLD: 3.36 M/UL — LOW (ref 4.2–5.8)
RBC # FLD: 19.9 % — HIGH (ref 10.3–14.5)
REVIEW TO FOLLOW: YES — SIGNIFICANT CHANGE UP
SODIUM SERPL-SCNC: 137 MMOL/L — SIGNIFICANT CHANGE UP (ref 135–145)
VARIANT LYMPHS # BLD: 1 % — SIGNIFICANT CHANGE UP
WBC # BLD: 14.05 K/UL — HIGH (ref 3.8–10.5)
WBC # FLD AUTO: 14.05 K/UL — HIGH (ref 3.8–10.5)

## 2019-12-02 PROCEDURE — 99233 SBSQ HOSP IP/OBS HIGH 50: CPT | Mod: GC

## 2019-12-02 RX ORDER — INSULIN LISPRO 100/ML
3 VIAL (ML) SUBCUTANEOUS
Refills: 0 | Status: DISCONTINUED | OUTPATIENT
Start: 2019-12-02 | End: 2019-12-02

## 2019-12-02 RX ORDER — HUMAN INSULIN 100 [IU]/ML
6 INJECTION, SUSPENSION SUBCUTANEOUS EVERY 6 HOURS
Refills: 0 | Status: DISCONTINUED | OUTPATIENT
Start: 2019-12-02 | End: 2019-12-03

## 2019-12-02 RX ORDER — SODIUM,POTASSIUM PHOSPHATES 278-250MG
1 POWDER IN PACKET (EA) ORAL ONCE
Refills: 0 | Status: DISCONTINUED | OUTPATIENT
Start: 2019-12-02 | End: 2019-12-02

## 2019-12-02 RX ORDER — INSULIN GLARGINE 100 [IU]/ML
6 INJECTION, SOLUTION SUBCUTANEOUS AT BEDTIME
Refills: 0 | Status: DISCONTINUED | OUTPATIENT
Start: 2019-12-02 | End: 2019-12-02

## 2019-12-02 RX ADMIN — HUMAN INSULIN 6 UNIT(S): 100 INJECTION, SUSPENSION SUBCUTANEOUS at 15:45

## 2019-12-02 RX ADMIN — ESCITALOPRAM OXALATE 5 MILLIGRAM(S): 10 TABLET, FILM COATED ORAL at 09:55

## 2019-12-02 RX ADMIN — PANTOPRAZOLE SODIUM 40 MILLIGRAM(S): 20 TABLET, DELAYED RELEASE ORAL at 09:55

## 2019-12-02 RX ADMIN — HUMAN INSULIN 6 UNIT(S): 100 INJECTION, SUSPENSION SUBCUTANEOUS at 22:55

## 2019-12-02 RX ADMIN — ATENOLOL 50 MILLIGRAM(S): 25 TABLET ORAL at 05:46

## 2019-12-02 RX ADMIN — Medication 3: at 06:04

## 2019-12-02 RX ADMIN — Medication 20 MILLIGRAM(S): at 05:46

## 2019-12-02 RX ADMIN — Medication 2: at 22:56

## 2019-12-02 RX ADMIN — Medication 63.75 MILLIMOLE(S): at 09:54

## 2019-12-02 RX ADMIN — ENOXAPARIN SODIUM 40 MILLIGRAM(S): 100 INJECTION SUBCUTANEOUS at 09:55

## 2019-12-02 RX ADMIN — Medication 5: at 18:17

## 2019-12-02 RX ADMIN — MEROPENEM 100 MILLIGRAM(S): 1 INJECTION INTRAVENOUS at 18:19

## 2019-12-02 RX ADMIN — Medication 5: at 13:00

## 2019-12-02 RX ADMIN — Medication 3 UNIT(S): at 13:48

## 2019-12-02 RX ADMIN — MEROPENEM 100 MILLIGRAM(S): 1 INJECTION INTRAVENOUS at 05:46

## 2019-12-02 RX ADMIN — Medication 44 MICROGRAM(S): at 23:56

## 2019-12-02 RX ADMIN — LEVETIRACETAM 500 MILLIGRAM(S): 250 TABLET, FILM COATED ORAL at 05:46

## 2019-12-02 RX ADMIN — SIMVASTATIN 20 MILLIGRAM(S): 20 TABLET, FILM COATED ORAL at 22:55

## 2019-12-02 RX ADMIN — LEVETIRACETAM 500 MILLIGRAM(S): 250 TABLET, FILM COATED ORAL at 18:19

## 2019-12-02 RX ADMIN — AMLODIPINE BESYLATE 10 MILLIGRAM(S): 2.5 TABLET ORAL at 05:46

## 2019-12-02 RX ADMIN — HUMAN INSULIN 6 UNIT(S): 100 INJECTION, SUSPENSION SUBCUTANEOUS at 18:16

## 2019-12-02 NOTE — PROGRESS NOTE ADULT - PROBLEM SELECTOR PLAN 1
Hypoxia likely from aspiration pneumonitis.  Pt is s/p 5-day course of zosyn for aspiration PNA. CT chest with new ground glass opacities and dense consolidations in upper lobes bilaterally and known b/l pleural effusions, concerning for multifocal PNA.   - on benny (11/28-) for coverage of HAP. s/p vanc (11/28-11/30)  - on high flow NC 60%/50L -> wean as tolerated for goal O2 sat >92%  - BIPAP as needed for increase WOB  - restarted lasix 20mg qd  - pulm consulted, recs appreciated Hypoxia likely from aspiration pneumonitis. CT chest with new ground glass opacities and dense consolidations in upper lobes bilaterally and known b/l pleural effusions, concerning for multifocal PNA.   - on benny (11/28-) for coverage of HAP. s/p vanc (11/28-11/30)  - on high flow NC 60%/50L -> wean as tolerated for goal O2 sat >92%  - BIPAP as needed for increase WOB  - c/w lasix 20mg qd  - pulm consulted, recs appreciated

## 2019-12-02 NOTE — PROGRESS NOTE ADULT - ATTENDING COMMENTS
patient with acute hypoxemic respiratory failure, on HiFlow due to multifocal pna, now on meropenem. Agree with trial of corticosteroids if no improvement for possible pneumonitis. patient with acute hypoxemic respiratory failure, on HiFlow due to multifocal pna. s/p a course of zosyn x 5 days, now on meropenem day 5, remains on vanco since 11/15, without major respiratory improvement on antibiotics . 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

## 2019-12-02 NOTE — PROGRESS NOTE ADULT - SUBJECTIVE AND OBJECTIVE BOX
CHIEF COMPLAINT: AMS    Interval Events:  -Remains on HFNC 60/40; this morning  -No new complaints; reports mild SOB which is unchanged  -No other complaints    REVIEW OF SYSTEMS:  [x] All other systems negative  [ ] Unable to assess ROS because ________    OBJECTIVE:  ICU Vital Signs Last 24 Hrs  T(C): 37.2 (02 Dec 2019 08:43), Max: 37.2 (02 Dec 2019 08:43)  T(F): 98.9 (02 Dec 2019 08:43), Max: 98.9 (02 Dec 2019 08:43)  HR: 81 (02 Dec 2019 08:43) (74 - 85)  BP: 125/74 (02 Dec 2019 08:43) (120/67 - 132/72)  BP(mean): --  ABP: --  ABP(mean): --  RR: 19 (02 Dec 2019 08:43) (18 - 21)  SpO2: 99% (02 Dec 2019 08:43) (96% - 100%)        CAPILLARY BLOOD GLUCOSE      POCT Blood Glucose.: 282 mg/dL (02 Dec 2019 05:46)      PHYSICAL EXAM:  General: no acute distress  Neck: supple  Respiratory: non labored breathing, decreased breath sounds in bases and crackles bilateral  Cardiovascular: normal rate, regular rhythm  Gastrointestinal: abdomen soft, non tender, non distended  Extremities: lower extremity edema  Neurological: alert    HOSPITAL MEDICATIONS:  MEDICATIONS  (STANDING):  amLODIPine   Tablet 10 milliGRAM(s) Oral daily  ATENolol  Tablet 50 milliGRAM(s) Oral daily  dextrose 5%. 1000 milliLiter(s) (50 mL/Hr) IV Continuous <Continuous>  dextrose 50% Injectable 12.5 Gram(s) IV Push once  dextrose 50% Injectable 25 Gram(s) IV Push once  dextrose 50% Injectable 25 Gram(s) IV Push once  enoxaparin Injectable 40 milliGRAM(s) SubCutaneous daily  escitalopram 5 milliGRAM(s) Oral daily  furosemide    Tablet 20 milliGRAM(s) Oral daily  insulin lispro (HumaLOG) corrective regimen sliding scale   SubCutaneous every 6 hours  levETIRAcetam  Solution 500 milliGRAM(s) Oral two times a day  levothyroxine Injectable 44 MICROGram(s) IV Push at bedtime  meropenem  IVPB 1000 milliGRAM(s) IV Intermittent every 12 hours  pantoprazole   Suspension 40 milliGRAM(s) Oral daily  simvastatin 20 milliGRAM(s) Oral at bedtime  sodium phosphate IVPB 15 milliMole(s) IV Intermittent once    MEDICATIONS  (PRN):  acetaminophen    Suspension .. 650 milliGRAM(s) Oral every 6 hours PRN Severe Pain (7 - 10)  dextrose 40% Gel 15 Gram(s) Oral once PRN Blood Glucose LESS THAN 70 milliGRAM(s)/deciliter  glucagon  Injectable 1 milliGRAM(s) IntraMuscular once PRN Glucose LESS THAN 70 milligrams/deciliter      LABS:                        8.9    14.05 )-----------( 574      ( 02 Dec 2019 06:00 )             29.4     12-02    137  |  103  |  25<H>  ----------------------------<  293<H>  5.6<H>   |  23  |  0.94    Ca    8.6      02 Dec 2019 06:00  Phos  2.4     12-02  Mg     2.1     12-02    TPro  6.1  /  Alb  1.7<L>  /  TBili  0.2  /  DBili  x   /  AST  51<H>  /  ALT  21  /  AlkPhos  243<H>  12-02              MICROBIOLOGY:     =============================================================================================  RADIOLOGY:  [x ] Reviewed and interpreted by me      CT Chest No Cont (11.26.19 @ 12:44) >  1.  New bilateral groundglass opacities and dense opacities with thickening of interlobular septa. These findings could occur in the clinical setting of pulmonary edema rather than pneumonia.  2.  Bilateral pleural effusions and passive atelectasis.  3.  A subcarinal lymph node has decreased in size since 9/10/2019    < end of copied text >      Transthoracic Echocardiogram (10.29.19 @ 16:21) >  1. Aortic valve not well visualized; appears calcified. Peak transaortic valve gradient equals 14 mm Hg, estimated aortic valve area equals 2 sqcm (by continuity equation),  aortic valve velocity time integral equals 41 cm, consistent with mild aortic stenosis. Minimal aortic regurgitation.  2. Increased relative wall thickness with normal left ventricular mass index, consistent with concentric left ventricular remodeling.  3. Hyperdynamic left ventricular systolic function.  4. The right ventricle is not well visualized; grossly normal right ventricular systolic function.  5. Estimated right ventricular systolic pressure equals 20 mm Hg, assuming right atrial pressure equals 8 mm Hg, consistent with normal pulmonary pressures.  6. Left pleural effusion.  *** No previous Echo exam. CHIEF COMPLAINT: AMS    Interval Events:  -NAEO  -Remains on HFNC   -Repeating he "wants water"  -No new complaints    REVIEW OF SYSTEMS:  [x] All other systems negative  [ ] Unable to assess ROS because ________    OBJECTIVE:  ICU Vital Signs Last 24 Hrs  T(C): 37.2 (02 Dec 2019 08:43), Max: 37.2 (02 Dec 2019 08:43)  T(F): 98.9 (02 Dec 2019 08:43), Max: 98.9 (02 Dec 2019 08:43)  HR: 82 (02 Dec 2019 11:33) (74 - 85)  BP: 125/74 (02 Dec 2019 08:43) (120/67 - 125/74)  BP(mean): --  ABP: --  ABP(mean): --  RR: 18 (02 Dec 2019 11:33) (18 - 20)  SpO2: 92% (02 Dec 2019 11:33) (92% - 100%)        CAPILLARY BLOOD GLUCOSE      POCT Blood Glucose.: 360 mg/dL (02 Dec 2019 12:56)      PHYSICAL EXAM:  General: no acute distress  Neck: supple  Respiratory: non labored breathing, decreased breath sounds in bases and crackles bilateral  Cardiovascular: normal rate, regular rhythm  Gastrointestinal: abdomen soft, non tender, non distended  Extremities: lower extremity edema    HOSPITAL MEDICATIONS:  MEDICATIONS  (STANDING):  amLODIPine   Tablet 10 milliGRAM(s) Oral daily  ATENolol  Tablet 50 milliGRAM(s) Oral daily  dextrose 5%. 1000 milliLiter(s) (50 mL/Hr) IV Continuous <Continuous>  dextrose 50% Injectable 12.5 Gram(s) IV Push once  dextrose 50% Injectable 25 Gram(s) IV Push once  dextrose 50% Injectable 25 Gram(s) IV Push once  enoxaparin Injectable 40 milliGRAM(s) SubCutaneous daily  escitalopram 5 milliGRAM(s) Oral daily  furosemide    Tablet 20 milliGRAM(s) Oral daily  insulin lispro (HumaLOG) corrective regimen sliding scale   SubCutaneous every 6 hours  insulin NPH human recombinant 6 Unit(s) SubCutaneous every 6 hours  levETIRAcetam  Solution 500 milliGRAM(s) Oral two times a day  levothyroxine Injectable 44 MICROGram(s) IV Push at bedtime  meropenem  IVPB 1000 milliGRAM(s) IV Intermittent every 12 hours  pantoprazole   Suspension 40 milliGRAM(s) Oral daily  simvastatin 20 milliGRAM(s) Oral at bedtime    MEDICATIONS  (PRN):  acetaminophen    Suspension .. 650 milliGRAM(s) Oral every 6 hours PRN Severe Pain (7 - 10)  dextrose 40% Gel 15 Gram(s) Oral once PRN Blood Glucose LESS THAN 70 milliGRAM(s)/deciliter  glucagon  Injectable 1 milliGRAM(s) IntraMuscular once PRN Glucose LESS THAN 70 milligrams/deciliter      LABS:                        8.9    14.05 )-----------( 574      ( 02 Dec 2019 06:00 )             29.4     12-02    137  |  103  |  25<H>  ----------------------------<  293<H>  5.6<H>   |  23  |  0.94    Ca    8.6      02 Dec 2019 06:00  Phos  2.4     12-02  Mg     2.1     12-02    TPro  6.1  /  Alb  1.7<L>  /  TBili  0.2  /  DBili  x   /  AST  51<H>  /  ALT  21  /  AlkPhos  243<H>  12-02              MICROBIOLOGY:     =============================================================================================  RADIOLOGY:  [ ] Reviewed and interpreted by me    ============================================================================================  Point of Care Ultrasound Findings:    CT Chest No Cont (11.26.19 @ 12:44) >  1.  New bilateral groundglass opacities and dense opacities with thickening of interlobular septa. These findings could occur in the clinical setting of pulmonary edema rather than pneumonia.  2.  Bilateral pleural effusions and passive atelectasis.  3.  A subcarinal lymph node has decreased in size since 9/10/2019    < end of copied text >      Transthoracic Echocardiogram (10.29.19 @ 16:21) >  1. Aortic valve not well visualized; appears calcified. Peak transaortic valve gradient equals 14 mm Hg, estimated aortic valve area equals 2 sqcm (by continuity equation),  aortic valve velocity time integral equals 41 cm, consistent with mild aortic stenosis. Minimal aortic regurgitation.  2. Increased relative wall thickness with normal left ventricular mass index, consistent with concentric left ventricular remodeling.  3. Hyperdynamic left ventricular systolic function.  4. The right ventricle is not well visualized; grossly normal right ventricular systolic function.  5. Estimated right ventricular systolic pressure equals 20 mm Hg, assuming right atrial pressure equals 8 mm Hg, consistent with normal pulmonary pressures.  6. Left pleural effusion.  *** No previous Echo exam

## 2019-12-02 NOTE — PROGRESS NOTE ADULT - SUBJECTIVE AND OBJECTIVE BOX
Pavithra Alfonso Y1  -[id number] | [facility] [id number]  =========================    Patient is a 79y old  Male who presents with a chief complaint of AMS (02 Dec 2019 07:06)      INTERVAL HPI/OVERNIGHT EVENTS:  No acute event overnight. This morning pt appears comfortable, alert and awake. He says "with you" when asked where he is. Magda CP, abdominal pain, SOB. No f/c/ns.         MEDICATIONS  (STANDING):  amLODIPine   Tablet 10 milliGRAM(s) Oral daily  ATENolol  Tablet 50 milliGRAM(s) Oral daily  dextrose 5%. 1000 milliLiter(s) (50 mL/Hr) IV Continuous <Continuous>  dextrose 50% Injectable 12.5 Gram(s) IV Push once  dextrose 50% Injectable 25 Gram(s) IV Push once  dextrose 50% Injectable 25 Gram(s) IV Push once  enoxaparin Injectable 40 milliGRAM(s) SubCutaneous daily  escitalopram 5 milliGRAM(s) Oral daily  furosemide    Tablet 20 milliGRAM(s) Oral daily  insulin lispro (HumaLOG) corrective regimen sliding scale   SubCutaneous every 6 hours  levETIRAcetam  Solution 500 milliGRAM(s) Oral two times a day  levothyroxine Injectable 44 MICROGram(s) IV Push at bedtime  meropenem  IVPB 1000 milliGRAM(s) IV Intermittent every 12 hours  pantoprazole   Suspension 40 milliGRAM(s) Oral daily  simvastatin 20 milliGRAM(s) Oral at bedtime    MEDICATIONS  (PRN):  acetaminophen    Suspension .. 650 milliGRAM(s) Oral every 6 hours PRN Severe Pain (7 - 10)  dextrose 40% Gel 15 Gram(s) Oral once PRN Blood Glucose LESS THAN 70 milliGRAM(s)/deciliter  glucagon  Injectable 1 milliGRAM(s) IntraMuscular once PRN Glucose LESS THAN 70 milligrams/deciliter      Allergies    No Known Allergies    Intolerances        Vital Signs Last 24 Hrs  T(C): 36.6 (02 Dec 2019 05:44), Max: 36.7 (01 Dec 2019 20:42)  T(F): 97.8 (02 Dec 2019 05:44), Max: 98 (01 Dec 2019 20:42)  HR: 85 (02 Dec 2019 05:44) (76 - 85)  BP: 122/73 (02 Dec 2019 05:44) (120/67 - 132/72)  BP(mean): --  RR: 20 (02 Dec 2019 05:44) (18 - 21)  SpO2: 100% (02 Dec 2019 05:44) (96% - 100%)    PHYSICAL EXAM:  GENERAL: NAD. on high flow NC. comfortable appearing. Alert and oriented x 1-2 (waxes and wane).  CHEST/LUNG: crackles bilaterally.  Respiratory effort does not appear labored.  HEART: Regular rate and rhythm; S1 and S2,  no murmurs, rubs, or gallops.  ABDOMEN: Peg tube site clean. Soft, not tender to palpation. Appears more bloated, but no distension. Bowel sounds present.  EXTREMITIES:  No clubbing, cyanosis, or edema.  Moves all extremities. Unchanged L forearm edema. R arm stronger than L - unchanged.   SKIN: No obvious rashes or lesions.  Turgor okay.      LABS:          CAPILLARY BLOOD GLUCOSE      POCT Blood Glucose.: 282 mg/dL (02 Dec 2019 05:46)  POCT Blood Glucose.: 244 mg/dL (01 Dec 2019 23:02)  POCT Blood Glucose.: 258 mg/dL (01 Dec 2019 18:10)  POCT Blood Glucose.: 215 mg/dL (01 Dec 2019 11:28)      Culture - Nose (collected 28 Nov 2019 17:35)  Source: NOSE  Final Report (30 Nov 2019 10:05):    No growth of Methicillin-Resistant Staphylococcus aureus    Growth of Methicillin-Susceptible Staphylococcus aureus    STAU^Staphylococcus aureus    QUANTITY OF GROWTH: FEW

## 2019-12-02 NOTE — PROGRESS NOTE ADULT - ASSESSMENT
Mr. 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 Course c/b acute respiratory distress 2/2 multifocal PNA i/s/o multiple silent aspirations, on benny (11/28-). s/p peg placement (11/29)

## 2019-12-02 NOTE — PROGRESS NOTE ADULT - PROBLEM SELECTOR PLAN 5
fluctuates between hypo and hypernatremia. hyponatremia resolved  - d/c salt tabs   - daily BMP Encephalopathy likely from infection vs post-ictal state. CT chest with apical consolidations and pleural effusion, s/p 5 days vanc/zosyn. MRI with no enhancing brain lesions. No seizure activity seen on vEEG. Mental status waxes and wan, overall improved.  - s/p Vanc and zosyn x5 days   - c/w keppra

## 2019-12-02 NOTE — PROGRESS NOTE ADULT - PROBLEM SELECTOR PLAN 4
Encephalopathy likely from infection vs post-ictal state. CT chest with apical consolidations and pleural effusion, s/p 5 days vanc/zosyn. MRI with no enhancing brain lesions. No seizure activity seen on vEEG. Mental status waxes and wan, overall improved.  - s/p Vanc and zosyn x5 days   - c/w keppra - FS  - ISS    #hypothyroidism  -c/w IV levothyroxine 44mg daily - NPH 6u q6h  - ISS + FS    #hypothyroidism  -c/w IV levothyroxine 44mg daily

## 2019-12-02 NOTE — PROGRESS NOTE ADULT - ASSESSMENT
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 Pulmonary service consulted to assist in management of hypoxic respiratory failure requiring NIV; now on HFNC     1. Acute hypoxic respiratory failure - CT chest with new ground glass opacities and dense consolidations in upper lobes bilateral and known bilateral pleural effusions. Overall findings concerning for multifocal pneumonia vs aspiration pneumonitis vs fluid overload.     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 Pulmonary service consulted to assist in management of hypoxic respiratory failure requiring NIV; now on HFNC     1. Acute hypoxic respiratory failure - CT chest with new ground glass opacities and dense consolidations in upper lobes bilateral and known bilateral pleural effusions. Overall findings concerning for multifocal pneumonia vs aspiration pneumonitis vs fluid overload. Additionally, patient is high risk for PE with LE distal DVTs as well as upper extremity DVTs. V/Q scan from 11/14 was low probability. Unclear role of IVCF and AC is high risk given subdural hematoma.     Recommendations:  - Continue high flow nasal cannula and wean as tolerated for goal of O2 sat > 92%; decreased to 50/50 this morning  - Unclear role for IVCF in this patient given upper extremity DVTs; he remains high risk for PE  - BIPAP as needed for increased work of breathing  - Continue meropenem for aspiration pneumonia  - Sputum culture if able; follow up blood cultures  - ? pneumonitis related to aspiration; if no clinical response to abx in the next day or two would consider steroids  Jared Richmond MD, PGY5  Pulmonary and Critical Care Fellow  [id number]/[phone]

## 2019-12-02 NOTE — PROGRESS NOTE ADULT - ASSESSMENT
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 prolonged FRANCES state (? 2/2 prolonged hypercalcemic state) that has now resolved vs. loss of body mass  - Serum Cr is stable   - Avoid nephrotoxins agent  - renal diet  - monitor bmp    Hyponatremia  - ? etiology, presented with Na 122  - work up suggested SIADH  - Serum sodium improved and was hypernatremia   - d/miroslava Nacl tab and lasix d/c'd recieved D5W 11/26.   - Pt started on PEG tube feeds.   - Na now within normal  - Monitor serum sodium and fluid status closely     HTN  - has Hx HTN but was on midodrine for orthostatic hypotension in the recent past   - BP was elevated therefore restarted on home BP medications   - Currently BP controlled   - monitor bp    Hypocalcemia  - Has Hx hypercalcemia of malignancy  - low alb corrected alexander is optimal  - monitor    Pl. effusion b/l/ Anasarca   mod effusion seen on ct chest  restarted on lasix 20mg PO daily.  patient clinically improving  remains on high flow o2. pulm on board  Monitor     Hyperkalemia  hemolyzed specimen  repeat bmp  low k diet  monitor 80 year old man with a PMH of DLBCL, CAD, DM, HTN, BL LE DVT, LUE DVT not on AC, CKD, orthostatic hypotension, and recent admission in November 2019 for SDH who is now presenting with acute encephalopathy    CKD stage III  - baseline Cr stable ~0.9-1.1  - Had Cr ~2 for the past prior year; ? prolonged FRANCES state (? 2/2 prolonged hypercalcemic state) that has now resolved vs. loss of body mass  - Serum Cr is stable   - Avoid nephrotoxins agent  - renal diet  - monitor bmp    Hyponatremia  - presented with Na 122  - work up suggested SIADH  - Serum sodium improved   - Pt started on PEG tube feeds.   - Na now within normal  - Monitor serum sodium and fluid status closely     HTN  - has Hx HTN but was on midodrine for orthostatic hypotension in the recent past   - BP was elevated therefore restarted on home BP medications   - Currently BP controlled   - monitor bp    Hypocalcemia  - Has Hx hypercalcemia of malignancy  - low alb corrected alexander is optimal  - monitor    Pl. effusion b/l/ Anasarca   mod effusion seen on ct chest  restarted on lasix 20mg PO daily.  patient clinically improving  remains on high flow o2. pulm on board  Monitor     Hyperkalemia  hemolyzed specimen  repeat bmp  low k diet  monitor

## 2019-12-02 NOTE — PROGRESS NOTE ADULT - PROBLEM SELECTOR PLAN 6
given recent hypoglycemia will hold off insulin for now until resume feed  - FS  - ISS    #hypothyroidism  -c/w IV levothyroxine 44mg daily fluctuates between hypo and hypernatremia. hyponatremia resolved  - d/c salt tabs   - daily BMP

## 2019-12-02 NOTE — PROGRESS NOTE ADULT - SUBJECTIVE AND OBJECTIVE BOX
[facility]   MD UYEN HERNANDEZ DO MARYANNE SOURIAL, LEATHA NICOLE    TEL:  OFFICE: [phone]  DR CASAREZ CELL: [phone]  DR. HERNANDEZ CELL: [phone]  DR. REYNOSO CELL: [phone]  CHUCKIE TERRAZAS CELL: [phone]        Patient is a 79y old  Male who presents with a chief complaint of AMS (02 Dec 2019 08:14)      Patient seen and examined at bedside.     VITALS:  T(F): 98.9 (12-02-19 @ 08:43), Max: 98.9 (12-02-19 @ 08:43)  HR: 82 (12-02-19 @ 11:33)  BP: 125/74 (12-02-19 @ 08:43)  RR: 18 (12-02-19 @ 11:33)  SpO2: 92% (12-02-19 @ 11:33)  Wt(kg): --        PHYSICAL EXAM:  Constitutional: NAD. on high flow  Neck: No JVD  Respiratory: slight crackles   Cardiovascular: S1, S2, RRR  Gastrointestinal: BS+, soft, NT/ND  Extremities: No peripheral edema    Hospital Medications:   MEDICATIONS  (STANDING):  amLODIPine   Tablet 10 milliGRAM(s) Oral daily  ATENolol  Tablet 50 milliGRAM(s) Oral daily  dextrose 5%. 1000 milliLiter(s) (50 mL/Hr) IV Continuous <Continuous>  dextrose 50% Injectable 12.5 Gram(s) IV Push once  dextrose 50% Injectable 25 Gram(s) IV Push once  dextrose 50% Injectable 25 Gram(s) IV Push once  enoxaparin Injectable 40 milliGRAM(s) SubCutaneous daily  escitalopram 5 milliGRAM(s) Oral daily  furosemide    Tablet 20 milliGRAM(s) Oral daily  insulin glargine Injectable (LANTUS) 6 Unit(s) SubCutaneous at bedtime  insulin lispro (HumaLOG) corrective regimen sliding scale   SubCutaneous every 6 hours  levETIRAcetam  Solution 500 milliGRAM(s) Oral two times a day  levothyroxine Injectable 44 MICROGram(s) IV Push at bedtime  meropenem  IVPB 1000 milliGRAM(s) IV Intermittent every 12 hours  pantoprazole   Suspension 40 milliGRAM(s) Oral daily  simvastatin 20 milliGRAM(s) Oral at bedtime      LABS:  12-02    137  |  103  |  25<H>  ----------------------------<  293<H>  5.6<H>   |  23  |  0.94    Ca    8.6      02 Dec 2019 06:00  Phos  2.4     12-02  Mg     2.1     12-02    TPro  6.1  /  Alb  1.7<L>  /  TBili  0.2  /  DBili      /  AST  51<H>  /  ALT  21  /  AlkPhos  243<H>  12-02    Creatinine Trend: 0.94 <--, 0.92 <--, 0.98 <--, 1.05 <--, 1.23 <--, 1.23 <--, 1.16 <--, 1.25 <--    Phosphorus Level, Serum: 2.4 mg/dL (12-02 @ 06:00)  Albumin, Serum: 1.7 g/dL (12-02 @ 06:00)                              8.9    14.05 )-----------( 574      ( 02 Dec 2019 06:00 )             29.4     Urine Studies:  Urinalysis - [11-25-19 @ 18:43]      Color YELLOW / Appearance CLEAR / SG 1.020 / pH 6.0      Gluc NEGATIVE / Ketone NEGATIVE  / Bili NEGATIVE / Urobili TRACE       Blood NEGATIVE / Protein 100 / Leuk Est NEGATIVE / Nitrite NEGATIVE      RBC 6-10 / WBC 0-2 / Hyaline NEGATIVE / Gran  / Sq Epi OCC / Non Sq Epi  / Bacteria NEGATIVE      Iron 38, TIBC 234, %sat --      [09-12-19 @ 06:08]  Ferritin 214.6      [09-12-19 @ 06:08]  PTH 20.00 (Ca --)      [09-03-19 @ 05:45]   --  PTH 25.46 (Ca --)      [09-01-19 @ 06:00]   --  Vitamin D (25OH) 34.9      [09-03-19 @ 05:45]  HbA1c 7.0      [09-13-19 @ 06:50]  TSH 12.33      [11-14-19 @ 14:10]  Lipid: chol 121, TG 96, HDL 33, LDL 74      [08-31-19 @ 07:00]    HBsAb Reactive      [11-23-19 @ 05:25]  HBsAg Nonreactive      [11-23-19 @ 05:25]  HBcAb Reactive      [11-23-19 @ 05:25]  HCV 0.12, Nonreactive Hepatitis C AB  S/CO Ratio                        Interpretation  < 1.00                                   Non-Reactive  1.00 - 4.99                         Weakly-Reactive  >= 5.00                                Reactive  Non-Reactive: Aperson with a non-reactive HCV antibody  result is considered uninfected.  No further action is  needed unless recent infection is suspected.  In these  cases, consider repeat testing later to detect  seroconversion..  Weakly-Reactive: HCV antibody test is abnormal, HCV RNA  Qualitative test will follow.  Reactive: HCV antibody test is abnormal, HCV RNA  Qualitative test will follow.  Note: HCV antibody testing is performed on the [id number] system.      [11-23-19 @ 05:25]  HIV Nonreactive The HIV Ag/Ab Combo test performed screens for HIV-1 p24  antigen, antibodies to HIV-1 (group M and group O), and  antibodies to HIV-2. All specimens repeatedly reactive  will reflex to an HIV 1/2 antibody confirmation and  differentiation test. This assay detects p24 antigen which  may be present prior to the development of HIV antibodies,  therefore a reactive result with a negative HIV 1/2 AB  Confirmation should be followed up with HIV-1 RNA, HIV-2  RNA and repeat testing in 4-8 weeks. A nonreactive result  does not preclude previous exposure to or infection with  HIV-1 or HIV-2. [facility] prohibits disclosure of this  result to any unauthorized party.      [11-22-19 @ 10:18]      RADIOLOGY & ADDITIONAL STUDIES:

## 2019-12-03 DIAGNOSIS — E11.22 TYPE 2 DIABETES MELLITUS WITH DIABETIC CHRONIC KIDNEY DISEASE: ICD-10-CM

## 2019-12-03 DIAGNOSIS — E03.9 HYPOTHYROIDISM, UNSPECIFIED: ICD-10-CM

## 2019-12-03 DIAGNOSIS — E83.52 HYPERCALCEMIA: ICD-10-CM

## 2019-12-03 DIAGNOSIS — E27.40 UNSPECIFIED ADRENOCORTICAL INSUFFICIENCY: ICD-10-CM

## 2019-12-03 LAB
ALBUMIN SERPL ELPH-MCNC: 2.1 G/DL — LOW (ref 3.3–5)
ALP SERPL-CCNC: 254 U/L — HIGH (ref 40–120)
ALT FLD-CCNC: 22 U/L — SIGNIFICANT CHANGE UP (ref 4–41)
ANION GAP SERPL CALC-SCNC: 9 MMO/L — SIGNIFICANT CHANGE UP (ref 7–14)
AST SERPL-CCNC: 40 U/L — SIGNIFICANT CHANGE UP (ref 4–40)
BASOPHILS # BLD AUTO: 0.05 K/UL — SIGNIFICANT CHANGE UP (ref 0–0.2)
BASOPHILS NFR BLD AUTO: 0.4 % — SIGNIFICANT CHANGE UP (ref 0–2)
BILIRUB SERPL-MCNC: < 0.2 MG/DL — LOW (ref 0.2–1.2)
BUN SERPL-MCNC: 29 MG/DL — HIGH (ref 7–23)
CALCIUM SERPL-MCNC: 8.9 MG/DL — SIGNIFICANT CHANGE UP (ref 8.4–10.5)
CHLORIDE SERPL-SCNC: 106 MMOL/L — SIGNIFICANT CHANGE UP (ref 98–107)
CO2 SERPL-SCNC: 31 MMOL/L — SIGNIFICANT CHANGE UP (ref 22–31)
CREAT SERPL-MCNC: 0.92 MG/DL — SIGNIFICANT CHANGE UP (ref 0.5–1.3)
EOSINOPHIL # BLD AUTO: 0.32 K/UL — SIGNIFICANT CHANGE UP (ref 0–0.5)
EOSINOPHIL NFR BLD AUTO: 2.3 % — SIGNIFICANT CHANGE UP (ref 0–6)
GLUCOSE SERPL-MCNC: 181 MG/DL — HIGH (ref 70–99)
HCT VFR BLD CALC: 27 % — LOW (ref 39–50)
HGB BLD-MCNC: 8.4 G/DL — LOW (ref 13–17)
IMM GRANULOCYTES NFR BLD AUTO: 8 % — HIGH (ref 0–1.5)
LYMPHOCYTES # BLD AUTO: 1.63 K/UL — SIGNIFICANT CHANGE UP (ref 1–3.3)
LYMPHOCYTES # BLD AUTO: 11.8 % — LOW (ref 13–44)
MAGNESIUM SERPL-MCNC: 2.1 MG/DL — SIGNIFICANT CHANGE UP (ref 1.6–2.6)
MCHC RBC-ENTMCNC: 27.4 PG — SIGNIFICANT CHANGE UP (ref 27–34)
MCHC RBC-ENTMCNC: 31.1 % — LOW (ref 32–36)
MCV RBC AUTO: 87.9 FL — SIGNIFICANT CHANGE UP (ref 80–100)
MONOCYTES # BLD AUTO: 0.81 K/UL — SIGNIFICANT CHANGE UP (ref 0–0.9)
MONOCYTES NFR BLD AUTO: 5.9 % — SIGNIFICANT CHANGE UP (ref 2–14)
NEUTROPHILS # BLD AUTO: 9.91 K/UL — HIGH (ref 1.8–7.4)
NEUTROPHILS NFR BLD AUTO: 71.6 % — SIGNIFICANT CHANGE UP (ref 43–77)
NRBC # FLD: 0.07 K/UL — SIGNIFICANT CHANGE UP (ref 0–0)
PHOSPHATE SERPL-MCNC: 2.4 MG/DL — LOW (ref 2.5–4.5)
PLATELET # BLD AUTO: 541 K/UL — HIGH (ref 150–400)
PMV BLD: 12.5 FL — SIGNIFICANT CHANGE UP (ref 7–13)
POTASSIUM SERPL-MCNC: 4.6 MMOL/L — SIGNIFICANT CHANGE UP (ref 3.5–5.3)
POTASSIUM SERPL-SCNC: 4.6 MMOL/L — SIGNIFICANT CHANGE UP (ref 3.5–5.3)
PROT SERPL-MCNC: 5.9 G/DL — LOW (ref 6–8.3)
RBC # BLD: 3.07 M/UL — LOW (ref 4.2–5.8)
RBC # FLD: 19.4 % — HIGH (ref 10.3–14.5)
SODIUM SERPL-SCNC: 146 MMOL/L — HIGH (ref 135–145)
WBC # BLD: 13.83 K/UL — HIGH (ref 3.8–10.5)
WBC # FLD AUTO: 13.83 K/UL — HIGH (ref 3.8–10.5)

## 2019-12-03 PROCEDURE — 99233 SBSQ HOSP IP/OBS HIGH 50: CPT

## 2019-12-03 PROCEDURE — 99223 1ST HOSP IP/OBS HIGH 75: CPT | Mod: GC

## 2019-12-03 PROCEDURE — 99232 SBSQ HOSP IP/OBS MODERATE 35: CPT | Mod: GC

## 2019-12-03 PROCEDURE — 99233 SBSQ HOSP IP/OBS HIGH 50: CPT | Mod: GC

## 2019-12-03 RX ORDER — HUMAN INSULIN 100 [IU]/ML
7 INJECTION, SUSPENSION SUBCUTANEOUS EVERY 6 HOURS
Refills: 0 | Status: DISCONTINUED | OUTPATIENT
Start: 2019-12-03 | End: 2019-12-04

## 2019-12-03 RX ADMIN — HUMAN INSULIN 6 UNIT(S): 100 INJECTION, SUSPENSION SUBCUTANEOUS at 13:12

## 2019-12-03 RX ADMIN — Medication 63.75 MILLIMOLE(S): at 09:59

## 2019-12-03 RX ADMIN — PANTOPRAZOLE SODIUM 40 MILLIGRAM(S): 20 TABLET, DELAYED RELEASE ORAL at 13:11

## 2019-12-03 RX ADMIN — LEVETIRACETAM 500 MILLIGRAM(S): 250 TABLET, FILM COATED ORAL at 18:18

## 2019-12-03 RX ADMIN — ESCITALOPRAM OXALATE 5 MILLIGRAM(S): 10 TABLET, FILM COATED ORAL at 13:11

## 2019-12-03 RX ADMIN — HUMAN INSULIN 7 UNIT(S): 100 INJECTION, SUSPENSION SUBCUTANEOUS at 23:57

## 2019-12-03 RX ADMIN — Medication 1: at 06:11

## 2019-12-03 RX ADMIN — MEROPENEM 100 MILLIGRAM(S): 1 INJECTION INTRAVENOUS at 18:17

## 2019-12-03 RX ADMIN — Medication 44 MICROGRAM(S): at 23:57

## 2019-12-03 RX ADMIN — Medication 1: at 23:57

## 2019-12-03 RX ADMIN — HUMAN INSULIN 7 UNIT(S): 100 INJECTION, SUSPENSION SUBCUTANEOUS at 18:18

## 2019-12-03 RX ADMIN — Medication 1: at 18:18

## 2019-12-03 RX ADMIN — HUMAN INSULIN 6 UNIT(S): 100 INJECTION, SUSPENSION SUBCUTANEOUS at 06:11

## 2019-12-03 RX ADMIN — ENOXAPARIN SODIUM 40 MILLIGRAM(S): 100 INJECTION SUBCUTANEOUS at 13:12

## 2019-12-03 RX ADMIN — Medication 1: at 13:12

## 2019-12-03 RX ADMIN — MEROPENEM 100 MILLIGRAM(S): 1 INJECTION INTRAVENOUS at 06:14

## 2019-12-03 RX ADMIN — SIMVASTATIN 20 MILLIGRAM(S): 20 TABLET, FILM COATED ORAL at 23:57

## 2019-12-03 RX ADMIN — ATENOLOL 50 MILLIGRAM(S): 25 TABLET ORAL at 06:11

## 2019-12-03 RX ADMIN — AMLODIPINE BESYLATE 10 MILLIGRAM(S): 2.5 TABLET ORAL at 06:11

## 2019-12-03 RX ADMIN — Medication 20 MILLIGRAM(S): at 06:11

## 2019-12-03 RX ADMIN — LEVETIRACETAM 500 MILLIGRAM(S): 250 TABLET, FILM COATED ORAL at 06:11

## 2019-12-03 NOTE — PROGRESS NOTE ADULT - SUBJECTIVE AND OBJECTIVE BOX
Patient is a 79y old  Male who presents with a chief complaint of AMS (02 Dec 2019 12:05)      SUBJECTIVE / OVERNIGHT EVENTS:  There were no acute events overnight.  The patient denies fever, chest pain, or shortness of breath.     MEDICATIONS  (STANDING):  amLODIPine   Tablet 10 milliGRAM(s) Oral daily  ATENolol  Tablet 50 milliGRAM(s) Oral daily  dextrose 5%. 1000 milliLiter(s) (50 mL/Hr) IV Continuous <Continuous>  dextrose 50% Injectable 12.5 Gram(s) IV Push once  dextrose 50% Injectable 25 Gram(s) IV Push once  dextrose 50% Injectable 25 Gram(s) IV Push once  enoxaparin Injectable 40 milliGRAM(s) SubCutaneous daily  escitalopram 5 milliGRAM(s) Oral daily  furosemide    Tablet 20 milliGRAM(s) Oral daily  insulin lispro (HumaLOG) corrective regimen sliding scale   SubCutaneous every 6 hours  insulin NPH human recombinant 6 Unit(s) SubCutaneous every 6 hours  levETIRAcetam  Solution 500 milliGRAM(s) Oral two times a day  levothyroxine Injectable 44 MICROGram(s) IV Push at bedtime  meropenem  IVPB 1000 milliGRAM(s) IV Intermittent every 12 hours  pantoprazole   Suspension 40 milliGRAM(s) Oral daily  simvastatin 20 milliGRAM(s) Oral at bedtime    MEDICATIONS  (PRN):  acetaminophen    Suspension .. 650 milliGRAM(s) Oral every 6 hours PRN Severe Pain (7 - 10)  dextrose 40% Gel 15 Gram(s) Oral once PRN Blood Glucose LESS THAN 70 milliGRAM(s)/deciliter  glucagon  Injectable 1 milliGRAM(s) IntraMuscular once PRN Glucose LESS THAN 70 milligrams/deciliter      Vital Signs Last 24 Hrs  T(C): 36.8 (02 Dec 2019 22:00), Max: 37.2 (02 Dec 2019 08:43)  T(F): 98.3 (02 Dec 2019 22:00), Max: 98.9 (02 Dec 2019 08:43)  HR: 78 (03 Dec 2019 08:17) (78 - 86)  BP: 131/86 (02 Dec 2019 22:00) (125/74 - 131/86)  BP(mean): --  RR: 18 (02 Dec 2019 22:00) (18 - 20)  SpO2: 96% (03 Dec 2019 08:17) (92% - 100%)  CAPILLARY BLOOD GLUCOSE      POCT Blood Glucose.: 163 mg/dL (03 Dec 2019 05:16)  POCT Blood Glucose.: 212 mg/dL (02 Dec 2019 22:29)  POCT Blood Glucose.: 354 mg/dL (02 Dec 2019 17:44)  POCT Blood Glucose.: 360 mg/dL (02 Dec 2019 12:56)    I&O's Summary    02 Dec 2019 07:01  -  03 Dec 2019 07:00  --------------------------------------------------------  IN: 50 mL / OUT: 0 mL / NET: 50 mL        PHYSICAL EXAM:  GENERAL: NAD  HEAD:  Atraumatic, Normocephalic  EYES: EOMI, PERRLA, conjunctiva and sclera clear  NECK: Supple, No JVD  CHEST/LUNG: Clear to auscultation bilaterally; No wheezes  HEART: Regular rate and rhythm; No murmurs, rubs, or gallops  ABDOMEN: Soft, Nontender, Nondistended; Bowel sounds present  EXTREMITIES:  2+ Peripheral Pulses, No clubbing, cyanosis, or edema  PSYCH: AAOx2  NEUROLOGY: non-focal  SKIN: No rashes or lesions    LABS:                        8.4    13.83 )-----------( 541      ( 03 Dec 2019 06:00 )             27.0     12-02    137  |  103  |  25<H>  ----------------------------<  293<H>  5.6<H>   |  23  |  0.94    Ca    8.6      02 Dec 2019 06:00  Phos  2.4     12-02  Mg     2.1     12-02    TPro  6.1  /  Alb  1.7<L>  /  TBili  0.2  /  DBili  x   /  AST  51<H>  /  ALT  21  /  AlkPhos  243<H>  12-02              RADIOLOGY & ADDITIONAL TESTS:    Imaging Personally Reviewed:    Consultant(s) Notes Reviewed:      Care Discussed with Consultants/Other Providers:

## 2019-12-03 NOTE — PROGRESS NOTE ADULT - PROBLEM SELECTOR PLAN 6
fluctuates between hypo and hypernatremia. hyponatremia resolved  - s/p salt tabs   - daily BMP Encephalopathy likely from infection vs post-ictal state. CT chest with apical consolidations and pleural effusion, s/p 5 days vanc/zosyn. MRI with no enhancing brain lesions. No seizure activity seen on vEEG. Mental status waxes and wan, overall improved.  - s/p Vanc and zosyn x5 days   - c/w keppra

## 2019-12-03 NOTE — PROGRESS NOTE ADULT - ASSESSMENT
81 yo M hx DLBCL (dx 9/2019) s/p 2 cycles of R-CP (last ~4weeks ago) at [facility], DM2, recent admission at [facility] (10/29-11/5) for SDH s/p fall found to have b/l LE and LUE DVT (not on AC) p/w AMS and witnessed seizure.  Course c/b aspiration pna, hypoxic respiratory failure on high flow support and s/p PEG tube placement.    1. DLBCL:   - pt was admitted to [facility] in 8-9/2019, was found to be hypercalcemic and CT C/A/P (9/10/19) showed scattered b/l pulmonary nodules, mediastinal lymphadenopathy, retroperitoneal lymphadenopathy, and splenic lesions/enlargement concerning for lymphoma.  LN biopsy consistent with DLBCL, germinal cell type, neg for bcl-2, bcl-6, myc.  Per daughter, no outpatient PET scan and biopsy was done as he was started on treatment right away.  He sees Dr. Jovan Poole at [facility] and was started on Rituxan-Cytoxan-Dex (R-CD). He has received 2 cycles thus far, last cycle ~ 4 weeks ago, and has not been able to f/u due to recent hospitalizations. Reports pt had thoracentesis in the past and fluid was negative for malignant cells.   - CT A/P non contrast now shows treatment response with decrease in size of the spleen and resolution of retroperitoneal adenopathy.  MRI brain with contrast without lesions, SDH stable.  - Pt is currently with poor performance status, mental status waxing and waning, overall not significant improvement since admission.  Also now with aspiration pna, hypoxic respiratory failure on high flow oxygen support.  He currently has low tumor burden and had a great response to his previous chemotherapy treatments.  We will hold off on treatment for now until clinically stable.      3. DVTs: b/l LE (calf) DVTs and LUE DVT  - repeat b/l LE US now shows unchanged subacute DVT  - ppx AC started on 11/6 as per neurosurgery    Will continue to follow.     Sarah Gomez  Hematology Fellow  [phone]

## 2019-12-03 NOTE — CONSULT NOTE ADULT - PROBLEM SELECTOR RECOMMENDATION 4
currently on LT4 44 mcg IV, previously on oral 88 mcg. Last TSH 12.3 on admission 2 weeks ago.   -repeat TSH, LT4. May need adjustment of LT4 IV  -continue LT4 44 mcg IV currently on LT4 44 mcg IV, previously on oral 88 mcg. Last TSH 12.3 on admission 2 weeks ago.   -repeat TSH, LT4. May need adjustment of LT4 IV  -long term plan would be po LT4, can hold tube feed 1/2 hour before and 1/2 hour after giving LT4 in peg.   -continue LT4 44 mcg IV

## 2019-12-03 NOTE — PROGRESS NOTE ADULT - ATTENDING COMMENTS
Acute hypoxic respiratory failure, likely recurrent aspiration pneumonitis, worsening b/l opacities on CT  #Asp PNA vs. pneumonitis- c/w IV Meropenem, leukocytosis trending down slightly, remains afebrile, will continue to monitor for now, remains on HFNC, will consider IV solumedrol if unable to wean off today, pulm following, recs appreciated   Metabolic encephalopathy with seizure activity: likely d/t infection, mental status wax/wanes, VEEG negative for seizures and will c/w Keppra, stable SDH - no intervention by NSx   Hypernatremia- DC lasix, increase free water boluses, monitor BMP  Dysphagia, s/p PEG 11/29, c/w tube feeds, endo consult to assist with diabetes management   DLBCL: s/p 2 cycles of R-CD with interval improvement in lymphadenopathy on scans from 11/14, CT shows response to chemo with reduced lymphadenopathy and spleen size, Heme is considering inpt chemo if his condition improves   Mild hypercalcemia: likely d/t lymphoma, Ca improved Acute hypoxic respiratory failure, likely recurrent aspiration pneumonitis, worsening b/l opacities on CT  Asp PNA vs. pneumonitis- c/w IV Meropenem, leukocytosis trending down slightly, remains afebrile, will continue to monitor for now, remains on HFNC, will consider IV solumedrol if unable to wean off today, pulm following, recs appreciated   Metabolic encephalopathy with seizure activity: likely d/t infection, mental status wax/wanes, VEEG negative for seizures and will c/w Keppra, stable SDH - no intervention by NSx   Hypernatremia- DC lasix, increase free water boluses, monitor BMP  Dysphagia, s/p PEG 11/29, c/w tube feeds, endo consult to assist with diabetes management   DLBCL: s/p 2 cycles of R-CD with interval improvement in lymphadenopathy on scans from 11/14, CT shows response to chemo with reduced lymphadenopathy and spleen size, Heme is considering inpt chemo if his condition improves   Mild hypercalcemia: likely d/t lymphoma, Ca improved

## 2019-12-03 NOTE — PROGRESS NOTE ADULT - ATTENDING COMMENTS
pt remains on Hiflow, trials of weaning down each day, however still overall tachypneic. would trial corticosteroids for pneumonitis given that pt is on second course of antibiotics without significant improvement. Pt being medically followed for bilateral DVTs since contraindication for anticoagulation. Pt with higher risk for progression of DVT and with low lung reserve given acute respiratory hypoxemic failure, should consider IVC filter to prevent PE.

## 2019-12-03 NOTE — CONSULT NOTE ADULT - PROBLEM SELECTOR RECOMMENDATION 3
last cortisol was 19.7 while off steroids. Currently HD stable, low suspicion for AI.   -continue to monitor

## 2019-12-03 NOTE — CONSULT NOTE ADULT - PROBLEM SELECTOR RECOMMENDATION 9
Hgb a1c 7.0, now on TF.   -increase NPH to 7 units q6  -continue humalog low dose sliding scale q6h   -TF glucerna at goal rate   -if TF is stopped, please start dextrose gtt to avoid hypoglycemia and hold NPH   -if starting on steroids, please notify endocrine to help increase insulin Hgb a1c 7.0, now on continuous enteral feeds x 24 hours.  -increase NPH to 7 units q6  -continue humalog low dose sliding scale q6h   -TF glucerna at goal rate   -if TF is stopped, please start dextrose gtt to avoid hypoglycemia and hold NPH   -if starting on steroids, please notify endocrine to help increase insulin

## 2019-12-03 NOTE — PROGRESS NOTE ADULT - PROBLEM SELECTOR PLAN 3
Per chart review on R-CP. s/p 2 cycles. Pt's daughter, Dr. Browne, is interested in inpatient chemo.   - per heme/onc, plan for inpt chemo prior to discharge -> will coordinate with heme/onc after pt is medically optimized Severe protein calorie malnutrition. s/p peg placement (11/29)  - glucerna 1.2 continuous feed @ 20cc/hr

## 2019-12-03 NOTE — PROGRESS NOTE ADULT - ATTENDING COMMENTS
79 yo M hx DLBCL (dx 9/2019) s/p 2 cycles of R-CP at [facility]  recent admission at [facility] (10/29-11/5) for SDH s/p fall found to have b/l LE and LUE DVT (not on AC) now admitted with AMS and witnessed seizure, found to have aspiration pna.    He appears more tachyneic on 6L NC.  Resume hi flow oxygen.  On broad spectrum antibiotics for probable aspiration pneumonia, CT scan on 11/26 with ?pulmonary edema.  Last given last via peg yesterday- consider further IV lasix.    Mental status appears stable, need to converse in French rather than English.  MRI of the brain has been negative to show any lymphoma.  DLBCL: with improvement on scans after 2 cycles of RCD.  At this time hold on further chemotherapy for now.

## 2019-12-03 NOTE — CONSULT NOTE ADULT - PROBLEM SELECTOR RECOMMENDATION 2
Hypercalcemia corrected to 10.5 with normal PTH and elevated 1,25 vitamin D. 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   -currently calcium is stable, if it increases may consider bisphosphanate   -continue to monitor calcium Hypercalcemia corrected to 10.5 with normal PTH and elevated 1,25 vitamin D. 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   -currently calcium is stable, if it increases may consider steroids  -continue to monitor calcium

## 2019-12-03 NOTE — PROGRESS NOTE ADULT - SUBJECTIVE AND OBJECTIVE BOX
INTERVAL HPI/OVERNIGHT EVENTS:  Patient S&E at bedside.     VITAL SIGNS:  T(F): 98.3 (12-02-19 @ 22:00)  HR: 78 (12-03-19 @ 08:17)  BP: 131/86 (12-02-19 @ 22:00)  RR: 18 (12-02-19 @ 22:00)  SpO2: 96% (12-03-19 @ 08:17)  Wt(kg): --    PHYSICAL EXAM:  Constitutional: NAD  Eyes: EOMI, sclera non-icteric  Neck: supple, no masses, no JVD  Respiratory: CTA b/l, good air entry b/l  Cardiovascular: RRR, no M/R/G  Gastrointestinal: soft, NTND, no masses palpable, + BS, no hepatosplenomegaly  Extremities: no c/c/e  Neurological: AAOx3      MEDICATIONS  (STANDING):  amLODIPine   Tablet 10 milliGRAM(s) Oral daily  ATENolol  Tablet 50 milliGRAM(s) Oral daily  dextrose 5%. 1000 milliLiter(s) (50 mL/Hr) IV Continuous <Continuous>  dextrose 50% Injectable 12.5 Gram(s) IV Push once  dextrose 50% Injectable 25 Gram(s) IV Push once  dextrose 50% Injectable 25 Gram(s) IV Push once  enoxaparin Injectable 40 milliGRAM(s) SubCutaneous daily  escitalopram 5 milliGRAM(s) Oral daily  insulin lispro (HumaLOG) corrective regimen sliding scale   SubCutaneous every 6 hours  insulin NPH human recombinant 6 Unit(s) SubCutaneous every 6 hours  levETIRAcetam  Solution 500 milliGRAM(s) Oral two times a day  levothyroxine Injectable 44 MICROGram(s) IV Push at bedtime  meropenem  IVPB 1000 milliGRAM(s) IV Intermittent every 12 hours  pantoprazole   Suspension 40 milliGRAM(s) Oral daily  simvastatin 20 milliGRAM(s) Oral at bedtime    MEDICATIONS  (PRN):  acetaminophen    Suspension .. 650 milliGRAM(s) Oral every 6 hours PRN Severe Pain (7 - 10)  dextrose 40% Gel 15 Gram(s) Oral once PRN Blood Glucose LESS THAN 70 milliGRAM(s)/deciliter  glucagon  Injectable 1 milliGRAM(s) IntraMuscular once PRN Glucose LESS THAN 70 milligrams/deciliter      Allergies  No Known Allergies    Intolerances        LABS:                        8.4    13.83 )-----------( 541      ( 03 Dec 2019 06:00 )             27.0     12-03    146<H>  |  106  |  29<H>  ----------------------------<  181<H>  4.6   |  31  |  0.92    Ca    8.9      03 Dec 2019 06:00  Phos  2.4     12-03  Mg     2.1     12-03    TPro  5.9<L>  /  Alb  2.1<L>  /  TBili  < 0.2<L>  /  DBili  x   /  AST  40  /  ALT  22  /  AlkPhos  254<H>  12-03          RADIOLOGY & ADDITIONAL TESTS:  Studies reviewed.    ASSESSMENT & PLAN: INTERVAL HPI/OVERNIGHT EVENTS:  Patient S&E at bedside.  On high flow 60% more alert in the AM but later in the afternoon, appeared more tachypneic on 6L n/c.      VITAL SIGNS:  T(F): 98.3 (12-02-19 @ 22:00)  HR: 78 (12-03-19 @ 08:17)  BP: 131/86 (12-02-19 @ 22:00)  RR: 18 (12-02-19 @ 22:00)  SpO2: 96% (12-03-19 @ 08:17)  Wt(kg): --    PHYSICAL EXAM:  Constitutional: tachypneic  Eyes: EOMI, sclera non-icteric  Neck: supple, no masses, no JVD  Respiratory: crackles in the basilar lungs b/l  Cardiovascular: RRR, no M/R/G  Gastrointestinal: soft, NTND, no masses palpable, + BS  Extremities: no c/c/e  Neurological: AAO x 1      MEDICATIONS  (STANDING):  amLODIPine   Tablet 10 milliGRAM(s) Oral daily  ATENolol  Tablet 50 milliGRAM(s) Oral daily  dextrose 5%. 1000 milliLiter(s) (50 mL/Hr) IV Continuous <Continuous>  dextrose 50% Injectable 12.5 Gram(s) IV Push once  dextrose 50% Injectable 25 Gram(s) IV Push once  dextrose 50% Injectable 25 Gram(s) IV Push once  enoxaparin Injectable 40 milliGRAM(s) SubCutaneous daily  escitalopram 5 milliGRAM(s) Oral daily  insulin lispro (HumaLOG) corrective regimen sliding scale   SubCutaneous every 6 hours  insulin NPH human recombinant 6 Unit(s) SubCutaneous every 6 hours  levETIRAcetam  Solution 500 milliGRAM(s) Oral two times a day  levothyroxine Injectable 44 MICROGram(s) IV Push at bedtime  meropenem  IVPB 1000 milliGRAM(s) IV Intermittent every 12 hours  pantoprazole   Suspension 40 milliGRAM(s) Oral daily  simvastatin 20 milliGRAM(s) Oral at bedtime    MEDICATIONS  (PRN):  acetaminophen    Suspension .. 650 milliGRAM(s) Oral every 6 hours PRN Severe Pain (7 - 10)  dextrose 40% Gel 15 Gram(s) Oral once PRN Blood Glucose LESS THAN 70 milliGRAM(s)/deciliter  glucagon  Injectable 1 milliGRAM(s) IntraMuscular once PRN Glucose LESS THAN 70 milligrams/deciliter      Allergies  No Known Allergies    Intolerances        LABS:                        8.4    13.83 )-----------( 541      ( 03 Dec 2019 06:00 )             27.0     12-03    146<H>  |  106  |  29<H>  ----------------------------<  181<H>  4.6   |  31  |  0.92    Ca    8.9      03 Dec 2019 06:00  Phos  2.4     12-03  Mg     2.1     12-03    TPro  5.9<L>  /  Alb  2.1<L>  /  TBili  < 0.2<L>  /  DBili  x   /  AST  40  /  ALT  22  /  AlkPhos  254<H>  12-03          RADIOLOGY & ADDITIONAL TESTS:  Studies reviewed.    ASSESSMENT & PLAN:

## 2019-12-03 NOTE — PROGRESS NOTE ADULT - PROBLEM SELECTOR PLAN 5
Encephalopathy likely from infection vs post-ictal state. CT chest with apical consolidations and pleural effusion, s/p 5 days vanc/zosyn. MRI with no enhancing brain lesions. No seizure activity seen on vEEG. Mental status waxes and wan, overall improved.  - s/p Vanc and zosyn x5 days   - c/w keppra - NPH 6u q6h  - ISS + FS    #hypothyroidism  -c/w IV levothyroxine 44mg daily

## 2019-12-03 NOTE — PROGRESS NOTE ADULT - PROBLEM SELECTOR PLAN 2
Severe protein calorie malnutrition. s/p peg placement (11/29)  - glucerna 1.2 continuous feed @ 20cc/hr Patient with sodium of 146.  - etiology likely 2/2 diuresis  - will discontinue lasix  - will increase free water boluses from 250 q6h to 250 q4h

## 2019-12-03 NOTE — PROGRESS NOTE ADULT - SUBJECTIVE AND OBJECTIVE BOX
[facility]   MD UYEN HERNANDEZ, DO MARYANN REYNOSO, LEATHA NICOLE    TEL:  OFFICE: [phone]  DR CASAREZ CELL: [phone]  DR. HERNANDEZ CELL: [phone]  DR. REYNOSO CELL: [phone]  CHUCKIE TERRAZAS CELL: [phone]        Patient is a 79y old  Male who presents with a chief complaint of AMS (03 Dec 2019 10:45)      Patient seen and examined at bedside. No chest pain/sob    VITALS:  T(F): 98.3 (12-02-19 @ 22:00), Max: 98.3 (12-02-19 @ 22:00)  HR: 78 (12-03-19 @ 08:17)  BP: 131/86 (12-02-19 @ 22:00)  RR: 18 (12-02-19 @ 22:00)  SpO2: 96% (12-03-19 @ 08:17)  Wt(kg): --    12-02 @ 07:01  -  12-03 @ 07:00  --------------------------------------------------------  IN: 50 mL / OUT: 0 mL / NET: 50 mL    12-03 @ 07:01  -  12-03 @ 12:42  --------------------------------------------------------  IN: 240 mL / OUT: 0 mL / NET: 240 mL          PHYSICAL EXAM:  Constitutional: NAD, still on high flow  Neck: No JVD  Respiratory: slight rhonchi  Cardiovascular: S1, S2, RRR  Gastrointestinal: BS+, soft, NT/ND  Extremities: trace peripheral edema    Hospital Medications:   MEDICATIONS  (STANDING):  amLODIPine   Tablet 10 milliGRAM(s) Oral daily  ATENolol  Tablet 50 milliGRAM(s) Oral daily  dextrose 5%. 1000 milliLiter(s) (50 mL/Hr) IV Continuous <Continuous>  dextrose 50% Injectable 12.5 Gram(s) IV Push once  dextrose 50% Injectable 25 Gram(s) IV Push once  dextrose 50% Injectable 25 Gram(s) IV Push once  enoxaparin Injectable 40 milliGRAM(s) SubCutaneous daily  escitalopram 5 milliGRAM(s) Oral daily  insulin lispro (HumaLOG) corrective regimen sliding scale   SubCutaneous every 6 hours  insulin NPH human recombinant 6 Unit(s) SubCutaneous every 6 hours  levETIRAcetam  Solution 500 milliGRAM(s) Oral two times a day  levothyroxine Injectable 44 MICROGram(s) IV Push at bedtime  meropenem  IVPB 1000 milliGRAM(s) IV Intermittent every 12 hours  pantoprazole   Suspension 40 milliGRAM(s) Oral daily  simvastatin 20 milliGRAM(s) Oral at bedtime      LABS:  12-03    146<H>  |  106  |  29<H>  ----------------------------<  181<H>  4.6   |  31  |  0.92    Ca    8.9      03 Dec 2019 06:00  Phos  2.4     12-03  Mg     2.1     12-03    TPro  5.9<L>  /  Alb  2.1<L>  /  TBili  < 0.2<L>  /  DBili      /  AST  40  /  ALT  22  /  AlkPhos  254<H>  12-03    Creatinine Trend: 0.92 <--, 0.94 <--, 0.92 <--, 0.98 <--, 1.05 <--, 1.23 <--, 1.23 <--, 1.16 <--    Phosphorus Level, Serum: 2.4 mg/dL (12-03 @ 06:00)  Albumin, Serum: 2.1 g/dL (12-03 @ 06:00)                              8.4    13.83 )-----------( 541      ( 03 Dec 2019 06:00 )             27.0     Urine Studies:  Urinalysis - [11-25-19 @ 18:43]      Color YELLOW / Appearance CLEAR / SG 1.020 / pH 6.0      Gluc NEGATIVE / Ketone NEGATIVE  / Bili NEGATIVE / Urobili TRACE       Blood NEGATIVE / Protein 100 / Leuk Est NEGATIVE / Nitrite NEGATIVE      RBC 6-10 / WBC 0-2 / Hyaline NEGATIVE / Gran  / Sq Epi OCC / Non Sq Epi  / Bacteria NEGATIVE      Iron 38, TIBC 234, %sat --      [09-12-19 @ 06:08]  Ferritin 214.6      [09-12-19 @ 06:08]  PTH 20.00 (Ca --)      [09-03-19 @ 05:45]   --  PTH 25.46 (Ca --)      [09-01-19 @ 06:00]   --  Vitamin D (25OH) 34.9      [09-03-19 @ 05:45]  HbA1c 7.0      [09-13-19 @ 06:50]  TSH 12.33      [11-14-19 @ 14:10]  Lipid: chol 121, TG 96, HDL 33, LDL 74      [08-31-19 @ 07:00]    HBsAb Reactive      [11-23-19 @ 05:25]  HBsAg Nonreactive      [11-23-19 @ 05:25]  HBcAb Reactive      [11-23-19 @ 05:25]  HCV 0.12, Nonreactive Hepatitis C AB  S/CO Ratio                        Interpretation  < 1.00                                   Non-Reactive  1.00 - 4.99                         Weakly-Reactive  >= 5.00                                Reactive  Non-Reactive: Aperson with a non-reactive HCV antibody  result is considered uninfected.  No further action is  needed unless recent infection is suspected.  In these  cases, consider repeat testing later to detect  seroconversion..  Weakly-Reactive: HCV antibody test is abnormal, HCV RNA  Qualitative test will follow.  Reactive: HCV antibody test is abnormal, HCV RNA  Qualitative test will follow.  Note: HCV antibody testing is performed on the [employer]   system.      [11-23-19 @ 05:25]  HIV Nonreactive The HIV Ag/Ab Combo test performed screens for HIV-1 p24  antigen, antibodies to HIV-1 (group M and group O), and  antibodies to HIV-2. All specimens repeatedly reactive  will reflex to an HIV 1/2 antibody confirmation and  differentiation test. This assay detects p24 antigen which  may be present prior to the development of HIV antibodies,  therefore a reactive result with a negative HIV 1/2 AB  Confirmation should be followed up with HIV-1 RNA, HIV-2  RNA and repeat testing in 4-8 weeks. A nonreactive result  does not preclude previous exposure to or infection with  HIV-1 or HIV-2. [facility] prohibits disclosure of this  result to any unauthorized party.      [11-22-19 @ 10:18]      RADIOLOGY & ADDITIONAL STUDIES:

## 2019-12-03 NOTE — CONSULT NOTE ADULT - SUBJECTIVE AND OBJECTIVE BOX
HPI:  Mr. Browne is an 80 year old man with a PMH of DLBCL, CAD, DM, HTN, BL LE DVT, LUE DVT not on AC, CKD, orthostatic hypotension, and recent admission in November 2019 for SDH who is admitted for acute encephalopathy. Found to have new onset seizures and multifocal pneumonia.     Endocrine History: Patient is altered, hx mainly obtained from Dr. Browne (daughter) and chart review.   T2DM on basaglar 3 units qhs. Recently admitted to hospital for hypoglycemia; sulfonylurea was d/c during that visit. Complications of nephropathy and CAD.   Currently has peg tube and on TF.     Hypercalcemia, recently diagnosed with elevated 1,25 vitamin D and normal PTH. Patient has DLBCL.     Adrenal insufficiency - Patient had indeterminate AM cortisol results last admission. In the interim patient was on dexamethasone outpatient as part of DLBCL treatment, last dose was approximately 11/10 with dexamethasone 1mg. He has been off steroids since then.     Hypothyroidism- Stable dosage of LT4 88 mcg, now changed to LT4 44 mcg 2 days ago.       PAST MEDICAL & SURGICAL HISTORY:  GI bleed: s/p clipping  History of subdural hematoma: s/p unwitnessed fall with hospitilazation in early November 2019. R and interhemispheric subdural hematomas with mild shift.  Dysphagia: Recently on dysphagia 3 nectar thick liquid diet during admission 11/6/2019  History of orthostatic hypotension: On midodrine and florinef  Acute deep vein thrombosis (DVT) of other vein of upper extremity, unspecified laterality: LUE; on eliquis VQ scan on 9/20 with low probability for PE  Left ventricular outflow obstruction: per daughter, patient had recent echo at [facility] that did not re-demonstrate LVOT obstruction; likely was dynamic LVOT obstruction 2/2 volume status  Lymphoma: DLBC recently diagnosed being treated at [facility]  Chronic kidney disease (CKD)  Cognitive developmental delay  CAD (coronary artery disease): s/p stents x4  Adult hypothyroidism  Hyperlipidemia  Peptic ulcer: s/p treatment for H pylori  Diabetes: On basaglar insulin and Januvia at home  Essential hypertension  S/P laparoscopic cholecystectomy  S/P coronary artery stent placement: x4, last one in early 2018      FAMILY HISTORY: Unable to obtain       Social History: Unable to obtain     Outpatient Medications: Home Medications:  atenolol 25 mg oral tablet: 1 tab(s) orally once a day (04 Nov 2019 16:43)  Basaglar KwikPen 100 units/mL subcutaneous solution: 3 unit(s) subcutaneous once a day (at bedtime) (05 Nov 2019 10:43)  levothyroxine 88 mcg (0.088 mg) oral capsule: 1 cap(s) orally once a day (04 Nov 2019 16:43)  Lexapro 5 mg oral tablet: 1 tab(s) orally once a day (04 Nov 2019 16:43)  polyethylene glycol 3350 oral powder for reconstitution: 17 gram(s) orally once a day (05 Nov 2019 10:43)  Protonix 40 mg oral delayed release tablet: 1 tab(s) orally once a day (04 Nov 2019 16:43)  simvastatin 20 mg oral tablet: 1 tab(s) orally once a day (at bedtime) (04 Nov 2019 16:43)  Tylenol 325 mg oral tablet: 2 tab(s) orally every 6 hours, As Needed - for headache (05 Nov 2019 10:43)      MEDICATIONS  (STANDING):  amLODIPine   Tablet 10 milliGRAM(s) Oral daily  ATENolol  Tablet 50 milliGRAM(s) Oral daily  dextrose 5%. 1000 milliLiter(s) (50 mL/Hr) IV Continuous <Continuous>  dextrose 50% Injectable 12.5 Gram(s) IV Push once  dextrose 50% Injectable 25 Gram(s) IV Push once  dextrose 50% Injectable 25 Gram(s) IV Push once  enoxaparin Injectable 40 milliGRAM(s) SubCutaneous daily  escitalopram 5 milliGRAM(s) Oral daily  insulin lispro (HumaLOG) corrective regimen sliding scale   SubCutaneous every 6 hours  insulin NPH human recombinant 7 Unit(s) SubCutaneous every 6 hours  levETIRAcetam  Solution 500 milliGRAM(s) Oral two times a day  levothyroxine Injectable 44 MICROGram(s) IV Push at bedtime  meropenem  IVPB 1000 milliGRAM(s) IV Intermittent every 12 hours  pantoprazole   Suspension 40 milliGRAM(s) Oral daily  simvastatin 20 milliGRAM(s) Oral at bedtime    MEDICATIONS  (PRN):  acetaminophen    Suspension .. 650 milliGRAM(s) Oral every 6 hours PRN Severe Pain (7 - 10)  dextrose 40% Gel 15 Gram(s) Oral once PRN Blood Glucose LESS THAN 70 milliGRAM(s)/deciliter  glucagon  Injectable 1 milliGRAM(s) IntraMuscular once PRN Glucose LESS THAN 70 milligrams/deciliter      Allergies    No Known Allergies    Intolerances      Review of Systems:  unable to obtain         PHYSICAL EXAM:  VITALS: T(C): 36.4 (12-03-19 @ 12:48)  T(F): 97.6 (12-03-19 @ 12:48), Max: 98.3 (12-02-19 @ 22:00)  HR: 68 (12-03-19 @ 14:19) (68 - 86)  BP: 103/56 (12-03-19 @ 12:48) (103/56 - 131/86)  RR:  (17 - 20)  SpO2:  (95% - 100%)  Wt(kg): --  GENERAL: NAD, well-groomed, well-developed  EYES: No proptosis, anicteric  HEENT:  Atraumatic, Normocephalic, moist mucous membranes  RESPIRATORY: b/l rales, tachypneic   CARDIOVASCULAR: Regular rate and rhythm; No murmurs; no peripheral edema  GI: Soft, nontender, non distended, normal bowel sounds  SKIN: Dry, intact, No rashes or lesions  NEURO: AOx0    POCT Blood Glucose.: 185 mg/dL (12-03-19 @ 12:25)  POCT Blood Glucose.: 163 mg/dL (12-03-19 @ 05:16)  POCT Blood Glucose.: 212 mg/dL (12-02-19 @ 22:29)  POCT Blood Glucose.: 354 mg/dL (12-02-19 @ 17:44)  POCT Blood Glucose.: 360 mg/dL (12-02-19 @ 12:56)  POCT Blood Glucose.: 282 mg/dL (12-02-19 @ 05:46)  POCT Blood Glucose.: 244 mg/dL (12-01-19 @ 23:02)  POCT Blood Glucose.: 258 mg/dL (12-01-19 @ 18:10)  POCT Blood Glucose.: 215 mg/dL (12-01-19 @ 11:28)  POCT Blood Glucose.: 157 mg/dL (12-01-19 @ 04:57)  POCT Blood Glucose.: 174 mg/dL (11-30-19 @ 23:33)  POCT Blood Glucose.: 157 mg/dL (11-30-19 @ 21:59)  POCT Blood Glucose.: 94 mg/dL (11-30-19 @ 17:18)                            8.4    13.83 )-----------( 541      ( 03 Dec 2019 06:00 )             27.0       12-03    146<H>  |  106  |  29<H>  ----------------------------<  181<H>  4.6   |  31  |  0.92    EGFR if : 91  EGFR if non : 78    Ca    8.9      12-03  Mg     2.1     12-03  Phos  2.4     12-03    TPro  5.9<L>  /  Alb  2.1<L>  /  TBili  < 0.2<L>  /  DBili  x   /  AST  40  /  ALT  22  /  AlkPhos  254<H>  12-03      Thyroid Function Tests:  11-14 @ 14:10 TSH 12.33 FreeT4 0.99 T3 -- Anti TPO -- Anti Thyroglobulin Ab -- TSI --      Hemoglobin A1C, Whole Blood: 7.0 % <H> [4.0 - 5.6] (09-13-19 @ 06:50)            Radiology:

## 2019-12-03 NOTE — PROGRESS NOTE ADULT - PROBLEM SELECTOR PLAN 1
Hypoxia likely from aspiration pneumonitis. CT chest with new ground glass opacities and dense consolidations in upper lobes bilaterally and known b/l pleural effusions, concerning for multifocal PNA.   - on benny (11/28-) for coverage of HAP. s/p vanc (11/28-11/30)  - on high flow NC 60%/50L -> wean as tolerated for goal O2 sat >92%  - BIPAP as needed for increase WOB  - c/w lasix 20mg qd  - pulm consulted, recs appreciated

## 2019-12-03 NOTE — CONSULT NOTE ADULT - PROVIDER SPECIALTY LIST ADULT
MICU
Heme/Onc
Infectious Disease
Nephrology
Neurology
Neurosurgery
Pulmonology
Intervent Radiology
Endocrinology

## 2019-12-03 NOTE — PROGRESS NOTE ADULT - PROBLEM SELECTOR PLAN 4
- NPH 6u q6h  - ISS + FS    #hypothyroidism  -c/w IV levothyroxine 44mg daily Per chart review on R-CP. s/p 2 cycles. Pt's daughter, Dr. Browne, is interested in inpatient chemo.   - per heme/onc, plan for inpt chemo prior to discharge -> will coordinate with heme/onc after pt is medically optimized

## 2019-12-03 NOTE — PROGRESS NOTE ADULT - ASSESSMENT
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 prolonged FRANCES state (? 2/2 prolonged hypercalcemic state) that has now resolved vs. loss of body mass  - Serum Cr is stable   - Avoid nephrotoxins agent  - renal diet  - monitor bmp    Hyponatremia  - presented with Na 122  - work up suggested SIADH  - Serum sodium improved, fluctuates hypernatremia and hyponatremia  - Pt started on PEG tube feeds.   - now off Na tab and restarted on lasix  - Monitor serum sodium and fluid status closely     HTN  - has Hx HTN but was on midodrine for orthostatic hypotension in the recent past   - BP was elevated therefore restarted on home BP medications   - Currently BP controlled   - monitor bp    Hypocalcemia  - Has Hx hypercalcemia of malignancy  - low alb corrected alexander is optimal  - monitor    Pl. effusion b/l/ Anasarca   mod effusion, new consolidation seen on ct chest  restarted on lasix 20mg PO daily.  remains on high flow o2. pulm on board  on IV antibiotic   Monitor     Hyperkalemia  hemolyzed specimen  repeat bmp is better   low k diet  monitor 80 year old man with a PMH of DLBCL, CAD, DM, HTN, BL LE DVT, LUE DVT not on AC, CKD, orthostatic hypotension, and recent admission in November 2019 for SDH who is now presenting with acute encephalopathy    CKD stage III  - baseline Cr stable ~0.9-1.1  - Had Cr ~2 for the past prior year; ? prolonged FRANCES state (? 2/2 prolonged hypercalcemic state) that has now resolved vs. loss of body mass  - Serum Cr is stable   - Avoid nephrotoxins agent  - renal diet  - monitor bmp    Hyponatremia  - presented with Na 122  - work up suggested SIADH  - Serum sodium improved, fluctuates now hypernatremia and hyponatremia  - Pt started on PEG tube feeds.   - now off Na tab and restarted on lasix  - Monitor serum sodium and fluid status closely     HTN  - has Hx HTN but was on midodrine for orthostatic hypotension in the recent past   - BP was elevated therefore restarted on home BP medications   - Currently BP controlled   - monitor bp    Hypocalcemia  - Has Hx hypercalcemia of malignancy  - low alb corrected alexander is optimal  - monitor    Pl. effusion b/l/ Anasarca   mod effusion, new consolidation seen on ct chest  restarted on lasix 20mg PO daily.  remains on high flow o2. pulm on board  on IV antibiotic   Monitor     Hyperkalemia  hemolyzed specimen  repeat bmp is better   low k diet  monitor

## 2019-12-03 NOTE — PROGRESS NOTE ADULT - SUBJECTIVE AND OBJECTIVE BOX
CHIEF COMPLAINT:    Interval Events: No acute events. Endorses feeling improved SOB    REVIEW OF SYSTEMS:  Constitutional: [ ] negative [ ] fevers [ ] chills [ ] weight loss [ ] weight gain  HEENT: [ ] negative [ ] dry eyes [ ] eye irritation [ ] postnasal drip [ ] nasal congestion  CV: [ ] negative  [ ] chest pain [ ] orthopnea [ ] palpitations [ ] murmur  Resp: [ ] negative [ ] cough [X] shortness of breath [X] dyspnea [ ] wheezing [ ] sputum [ ] hemoptysis  GI: [ ] negative [ ] nausea [ ] vomiting [ ] diarrhea [ ] constipation [ ] abd pain [ ] dysphagia   : [ ] negative [ ] dysuria [ ] nocturia [ ] hematuria [ ] increased urinary frequency  Musculoskeletal: [ ] negative [ ] back pain [ ] myalgias [ ] arthralgias [ ] fracture  Skin: [ ] negative [ ] rash [ ] itch  Neurological: [ ] negative [ ] headache [ ] dizziness [ ] syncope [ ] weakness [ ] numbness  Psychiatric: [ ] negative [ ] anxiety [ ] depression  Endocrine: [ ] negative [ ] diabetes [ ] thyroid problem  Hematologic/Lymphatic: [ ] negative [ ] anemia [ ] bleeding problem  Allergic/Immunologic: [ ] negative [ ] itchy eyes [ ] nasal discharge [ ] hives [ ] angioedema  [X] All other systems negative  [ ] Unable to assess ROS because ________    OBJECTIVE:  ICU Vital Signs Last 24 Hrs  T(C): 36.8 (02 Dec 2019 22:00), Max: 36.8 (02 Dec 2019 22:00)  T(F): 98.3 (02 Dec 2019 22:00), Max: 98.3 (02 Dec 2019 22:00)  HR: 78 (03 Dec 2019 08:17) (78 - 86)  BP: 131/86 (02 Dec 2019 22:00) (131/86 - 131/86)  BP(mean): --  ABP: --  ABP(mean): --  RR: 18 (02 Dec 2019 22:00) (18 - 20)  SpO2: 96% (03 Dec 2019 08:17) (92% - 100%)        12-02 @ 07:01  -  12-03 @ 07:00  --------------------------------------------------------  IN: 50 mL / OUT: 0 mL / NET: 50 mL      CAPILLARY BLOOD GLUCOSE      POCT Blood Glucose.: 163 mg/dL (03 Dec 2019 05:16)      PHYSICAL EXAM:  General: Mild respiratory distress  Respiratory: b/l upper crackles  Cardiovascular: normal rate, regular rhythm  Gastrointestinal: abdomen soft, non tender, non distended  Extremities: lower extremity edema    HOSPITAL MEDICATIONS:  MEDICATIONS  (STANDING):  amLODIPine   Tablet 10 milliGRAM(s) Oral daily  ATENolol  Tablet 50 milliGRAM(s) Oral daily  dextrose 5%. 1000 milliLiter(s) (50 mL/Hr) IV Continuous <Continuous>  dextrose 50% Injectable 12.5 Gram(s) IV Push once  dextrose 50% Injectable 25 Gram(s) IV Push once  dextrose 50% Injectable 25 Gram(s) IV Push once  enoxaparin Injectable 40 milliGRAM(s) SubCutaneous daily  escitalopram 5 milliGRAM(s) Oral daily  furosemide    Tablet 20 milliGRAM(s) Oral daily  insulin lispro (HumaLOG) corrective regimen sliding scale   SubCutaneous every 6 hours  insulin NPH human recombinant 6 Unit(s) SubCutaneous every 6 hours  levETIRAcetam  Solution 500 milliGRAM(s) Oral two times a day  levothyroxine Injectable 44 MICROGram(s) IV Push at bedtime  meropenem  IVPB 1000 milliGRAM(s) IV Intermittent every 12 hours  pantoprazole   Suspension 40 milliGRAM(s) Oral daily  simvastatin 20 milliGRAM(s) Oral at bedtime  sodium phosphate IVPB 15 milliMole(s) IV Intermittent once    MEDICATIONS  (PRN):  acetaminophen    Suspension .. 650 milliGRAM(s) Oral every 6 hours PRN Severe Pain (7 - 10)  dextrose 40% Gel 15 Gram(s) Oral once PRN Blood Glucose LESS THAN 70 milliGRAM(s)/deciliter  glucagon  Injectable 1 milliGRAM(s) IntraMuscular once PRN Glucose LESS THAN 70 milligrams/deciliter      LABS:                        8.4    13.83 )-----------( 541      ( 03 Dec 2019 06:00 )             27.0     Hgb Trend: 8.4<--, 8.9<--, 9.5<--, 8.2<--, 8.7<--  12-03    146<H>  |  106  |  29<H>  ----------------------------<  181<H>  4.6   |  31  |  0.92    Ca    8.9      03 Dec 2019 06:00  Phos  2.4     12-03  Mg     2.1     12-03    TPro  5.9<L>  /  Alb  2.1<L>  /  TBili  < 0.2<L>  /  DBili  x   /  AST  40  /  ALT  22  /  AlkPhos  254<H>  12-03    Creatinine Trend: 0.92<--, 0.94<--, 0.92<--, 0.98<--, 1.05<--, 1.23<--            MICROBIOLOGY:       RADIOLOGY:  [ ] Reviewed and interpreted by me    PULMONARY FUNCTION TESTS:    EKG:

## 2019-12-03 NOTE — PROGRESS NOTE ADULT - ASSESSMENT
78 yo man with chronic kidney disease, coronary artery disease, recent subdural hematoma, DVTs and 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 Pulmonary service consulted to assist in management of hypoxic respiratory failure requiring NIV; now on HFNC     1. Acute hypoxic respiratory failure - CT chest with new ground glass opacities and dense consolidations in upper lobes bilateral and known bilateral pleural effusions. Overall findings concerning for multifocal pneumonia vs aspiration pneumonitis vs fluid overload. Additionally, patient is high risk for PE with LE distal DVTs as well as upper extremity DVTs. V/Q scan from 11/14 was low probability. Unclear role of IVCF and AC is high risk given subdural hematoma.   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 Pulmonary service consulted to assist in management of hypoxic respiratory failure requiring NIV; now on HFNC     1. Acute hypoxic respiratory failure - CT chest with new ground glass opacities and dense consolidations in upper lobes bilateral and known bilateral pleural effusions. Overall findings concerning for multifocal pneumonia vs aspiration pneumonitis vs fluid overload. Additionally, patient is high risk for PE with LE distal DVTs as well as upper extremity DVTs. V/Q scan from 11/14 was low probability. Unclear role of IVCF and AC is high risk given subdural hematoma.   - wean HiFlo as tolerated for goal of O2 sat > 92%  - Unclear role for IVCF in this patient given upper extremity DVTs; he remains high risk  - c/w BIPAP PRN increased work of breathing  - c/w meropenem for aspiration pneumonia  - Sputum culture if able; follow up blood cultures  - Would trial Prednisone 40mg PO daily for now    Jorge Agee MD  Pulmonary & Critical Care Fellow  [phone]

## 2019-12-03 NOTE — CONSULT NOTE ADULT - ASSESSMENT
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 Found to have new onset seizures and multifocal pneumonia.   Consulted for T2DM on glucerna TF, hypothyroidism, adrenal insufficiency and hypercalcemia.

## 2019-12-03 NOTE — CONSULT NOTE ADULT - ATTENDING COMMENTS
79 yo M hx DLBCL s/p 2 cycles of R-CP (last ~4weeks ago) c/b recent SDH s/p fall found to have b/l LE and LUE DVT (not on AC)  not p/w AMS and seizure.  chemo on hold for now  cont supportive care  neuro and NSGY on board  no AC until cleared by NSGY  consider chemo once pt clinically improved  outpt f/u with primary hematologist
81 yo man with DLBCL, CAD, DM, DVT's (not on A/C 2/2 SDH), on decadron and midodrine for orthostatic hypotension, presented after acute change in MS and poss sz at home; initially alert and eating in ED but then had witnessed GTC sz and received 2mg ativan and labetolol for elevated BP. Loaded with Keppra Since, pt has been unresponsive but hemodynamically stable and oxygenating well on low flow O2.  Pt seen and examined with resident  NAD unresponsive to stimuli    coarse upper airway noises on lung exam; abd soft; ext 1-2+ pitting edema; PERRL  CT reviewed - no new findings; labs of note: Na 125    New Seizure in pt with DLBCL, DVT's, SDH  MS 2/2 post ictal state and s/p ativan  - Pt does not require MICU admission at this time  - please continue to monitor MS and respiratory status; if deteriorates, please reconsult MICU  - agree with above recs
Patient with large cell lymphoma, now with acute onset of bilateral infiltrates and hypoxic respiratory failure. Placed on bipap with success, but patient might be more comfortable with high flow oxygen instead. Agree with broadening antibiotic coverage if ok with id service. Would also check BNP, may be element of fluid overload, given symmetry of CT findings. Pleural effusions appear simple, bilateral and chronic, probably related to low albumin and diastolic failure, no need for thoracentesis at this time.
Marianne Montes MD  Pager: [phone]  After 5 PM or weekends please call fellow on call or office [phone]
seizure in setting of SDH and CVA and subtherapeutic [facility] serum drug level.    will bolus with valproic acid for better seizure control    curious to have both thromboses and bleeding, but platelets are ~400, so disseminated intravascular coagulopathy less likely.    happy to see as outpatient, can call [phone] for appointment.
Uncontrolled DM2 with hyperglycemia while on 24 hour continuous enteral feeds. Proceed with insulin plan as outlined.  Patient also with history of hypercalcemia likely related to lymphoma, currently controlled in upper normal range.  Hypothyroidism on levothyroxine. Once on enteral dosing will need to hold enteral feeds before and after levothyroxine administration.

## 2019-12-04 LAB
ALBUMIN SERPL ELPH-MCNC: 2 G/DL — LOW (ref 3.3–5)
ALP SERPL-CCNC: 291 U/L — HIGH (ref 40–120)
ALT FLD-CCNC: 33 U/L — SIGNIFICANT CHANGE UP (ref 4–41)
ANION GAP SERPL CALC-SCNC: 8 MMO/L — SIGNIFICANT CHANGE UP (ref 7–14)
AST SERPL-CCNC: 67 U/L — HIGH (ref 4–40)
BASOPHILS # BLD AUTO: 0.07 K/UL — SIGNIFICANT CHANGE UP (ref 0–0.2)
BASOPHILS NFR BLD AUTO: 0.6 % — SIGNIFICANT CHANGE UP (ref 0–2)
BILIRUB SERPL-MCNC: < 0.2 MG/DL — LOW (ref 0.2–1.2)
BUN SERPL-MCNC: 31 MG/DL — HIGH (ref 7–23)
CALCIUM SERPL-MCNC: 8.9 MG/DL — SIGNIFICANT CHANGE UP (ref 8.4–10.5)
CHLORIDE SERPL-SCNC: 98 MMOL/L — SIGNIFICANT CHANGE UP (ref 98–107)
CO2 SERPL-SCNC: 31 MMOL/L — SIGNIFICANT CHANGE UP (ref 22–31)
CREAT SERPL-MCNC: 0.9 MG/DL — SIGNIFICANT CHANGE UP (ref 0.5–1.3)
EOSINOPHIL # BLD AUTO: 0.45 K/UL — SIGNIFICANT CHANGE UP (ref 0–0.5)
EOSINOPHIL NFR BLD AUTO: 3.5 % — SIGNIFICANT CHANGE UP (ref 0–6)
GLUCOSE SERPL-MCNC: 215 MG/DL — HIGH (ref 70–99)
HCT VFR BLD CALC: 27.4 % — LOW (ref 39–50)
HGB BLD-MCNC: 8.4 G/DL — LOW (ref 13–17)
IMM GRANULOCYTES NFR BLD AUTO: 8.6 % — HIGH (ref 0–1.5)
LYMPHOCYTES # BLD AUTO: 1.25 K/UL — SIGNIFICANT CHANGE UP (ref 1–3.3)
LYMPHOCYTES # BLD AUTO: 9.8 % — LOW (ref 13–44)
MAGNESIUM SERPL-MCNC: 2.3 MG/DL — SIGNIFICANT CHANGE UP (ref 1.6–2.6)
MCHC RBC-ENTMCNC: 26.8 PG — LOW (ref 27–34)
MCHC RBC-ENTMCNC: 30.7 % — LOW (ref 32–36)
MCV RBC AUTO: 87.3 FL — SIGNIFICANT CHANGE UP (ref 80–100)
MONOCYTES # BLD AUTO: 0.82 K/UL — SIGNIFICANT CHANGE UP (ref 0–0.9)
MONOCYTES NFR BLD AUTO: 6.5 % — SIGNIFICANT CHANGE UP (ref 2–14)
NEUTROPHILS # BLD AUTO: 9.02 K/UL — HIGH (ref 1.8–7.4)
NEUTROPHILS NFR BLD AUTO: 71 % — SIGNIFICANT CHANGE UP (ref 43–77)
NRBC # FLD: 0.07 K/UL — SIGNIFICANT CHANGE UP (ref 0–0)
PHOSPHATE SERPL-MCNC: 3.2 MG/DL — SIGNIFICANT CHANGE UP (ref 2.5–4.5)
PLATELET # BLD AUTO: 531 K/UL — HIGH (ref 150–400)
PMV BLD: 12.6 FL — SIGNIFICANT CHANGE UP (ref 7–13)
POTASSIUM SERPL-MCNC: 4.7 MMOL/L — SIGNIFICANT CHANGE UP (ref 3.5–5.3)
POTASSIUM SERPL-SCNC: 4.7 MMOL/L — SIGNIFICANT CHANGE UP (ref 3.5–5.3)
PROT SERPL-MCNC: 6 G/DL — SIGNIFICANT CHANGE UP (ref 6–8.3)
RBC # BLD: 3.14 M/UL — LOW (ref 4.2–5.8)
RBC # FLD: 19.7 % — HIGH (ref 10.3–14.5)
SODIUM SERPL-SCNC: 137 MMOL/L — SIGNIFICANT CHANGE UP (ref 135–145)
T4 FREE SERPL-MCNC: 1.07 NG/DL — SIGNIFICANT CHANGE UP (ref 0.9–1.8)
TSH SERPL-MCNC: 6.55 UIU/ML — HIGH (ref 0.27–4.2)
WBC # BLD: 12.7 K/UL — HIGH (ref 3.8–10.5)
WBC # FLD AUTO: 12.7 K/UL — HIGH (ref 3.8–10.5)

## 2019-12-04 PROCEDURE — 99233 SBSQ HOSP IP/OBS HIGH 50: CPT

## 2019-12-04 PROCEDURE — 99233 SBSQ HOSP IP/OBS HIGH 50: CPT | Mod: GC

## 2019-12-04 RX ORDER — IPRATROPIUM/ALBUTEROL SULFATE 18-103MCG
3 AEROSOL WITH ADAPTER (GRAM) INHALATION EVERY 8 HOURS
Refills: 0 | Status: DISCONTINUED | OUTPATIENT
Start: 2019-12-04 | End: 2019-12-24

## 2019-12-04 RX ORDER — IPRATROPIUM/ALBUTEROL SULFATE 18-103MCG
3 AEROSOL WITH ADAPTER (GRAM) INHALATION
Refills: 0 | Status: DISCONTINUED | OUTPATIENT
Start: 2019-12-04 | End: 2019-12-04

## 2019-12-04 RX ORDER — HUMAN INSULIN 100 [IU]/ML
9 INJECTION, SUSPENSION SUBCUTANEOUS EVERY 6 HOURS
Refills: 0 | Status: DISCONTINUED | OUTPATIENT
Start: 2019-12-04 | End: 2019-12-19

## 2019-12-04 RX ORDER — INSULIN LISPRO 100/ML
VIAL (ML) SUBCUTANEOUS EVERY 6 HOURS
Refills: 0 | Status: DISCONTINUED | OUTPATIENT
Start: 2019-12-04 | End: 2019-12-19

## 2019-12-04 RX ORDER — FUROSEMIDE 40 MG
20 TABLET ORAL DAILY
Refills: 0 | Status: DISCONTINUED | OUTPATIENT
Start: 2019-12-04 | End: 2019-12-10

## 2019-12-04 RX ADMIN — HUMAN INSULIN 9 UNIT(S): 100 INJECTION, SUSPENSION SUBCUTANEOUS at 17:57

## 2019-12-04 RX ADMIN — LEVETIRACETAM 500 MILLIGRAM(S): 250 TABLET, FILM COATED ORAL at 17:57

## 2019-12-04 RX ADMIN — Medication 20 MILLIGRAM(S): at 11:26

## 2019-12-04 RX ADMIN — LEVETIRACETAM 500 MILLIGRAM(S): 250 TABLET, FILM COATED ORAL at 05:55

## 2019-12-04 RX ADMIN — Medication 20 MILLIGRAM(S): at 11:07

## 2019-12-04 RX ADMIN — Medication 2: at 17:57

## 2019-12-04 RX ADMIN — AMLODIPINE BESYLATE 10 MILLIGRAM(S): 2.5 TABLET ORAL at 05:55

## 2019-12-04 RX ADMIN — ESCITALOPRAM OXALATE 5 MILLIGRAM(S): 10 TABLET, FILM COATED ORAL at 11:08

## 2019-12-04 RX ADMIN — Medication 44 MICROGRAM(S): at 22:02

## 2019-12-04 RX ADMIN — MEROPENEM 100 MILLIGRAM(S): 1 INJECTION INTRAVENOUS at 05:55

## 2019-12-04 RX ADMIN — SIMVASTATIN 20 MILLIGRAM(S): 20 TABLET, FILM COATED ORAL at 22:02

## 2019-12-04 RX ADMIN — Medication 2: at 05:55

## 2019-12-04 RX ADMIN — Medication 2: at 12:43

## 2019-12-04 RX ADMIN — HUMAN INSULIN 9 UNIT(S): 100 INJECTION, SUSPENSION SUBCUTANEOUS at 22:02

## 2019-12-04 RX ADMIN — HUMAN INSULIN 9 UNIT(S): 100 INJECTION, SUSPENSION SUBCUTANEOUS at 12:42

## 2019-12-04 RX ADMIN — PANTOPRAZOLE SODIUM 40 MILLIGRAM(S): 20 TABLET, DELAYED RELEASE ORAL at 11:08

## 2019-12-04 RX ADMIN — ENOXAPARIN SODIUM 40 MILLIGRAM(S): 100 INJECTION SUBCUTANEOUS at 11:08

## 2019-12-04 RX ADMIN — Medication 2: at 22:03

## 2019-12-04 RX ADMIN — HUMAN INSULIN 7 UNIT(S): 100 INJECTION, SUSPENSION SUBCUTANEOUS at 05:55

## 2019-12-04 RX ADMIN — MEROPENEM 100 MILLIGRAM(S): 1 INJECTION INTRAVENOUS at 18:41

## 2019-12-04 NOTE — PROGRESS NOTE ADULT - ASSESSMENT
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 prolonged FRANCES state (? 2/2 prolonged hypercalcemic state) that has now resolved vs. loss of body mass  - Serum Cr is stable   - Avoid nephrotoxins agent  - renal diet  - monitor bmp    Hyponatremia  - presented with Na 122  - work up suggested SIADH  - Serum sodium improved, fluctuates now hypernatremia and hyponatremia  - Pt started on PEG tube feeds.   - now off Na tab and restarted on lasix  - Monitor serum sodium and fluid status closely     HTN  - has Hx HTN but was on midodrine for orthostatic hypotension in the recent past   - BP was elevated therefore restarted on home BP medications   - Currently BP controlled   - monitor bp    Hypocalcemia  - Has Hx hypercalcemia of malignancy  - low alb corrected alexander is optimal  - monitor    Pl. effusion b/l/ Anasarca   mod effusion, new consolidation seen on ct chest  restarted on lasix 20mg PO daily.  remains on high flow o2. pulm on board  on IV antibiotic   Monitor     Hyperkalemia  hemolyzed specimen  repeat bmp is better   low k diet  monitor

## 2019-12-04 NOTE — PROGRESS NOTE ADULT - SUBJECTIVE AND OBJECTIVE BOX
CHIEF COMPLAINT: AMS    Interval Events:  -No acute events overnight  -Remains on HFNC  -Following commands    REVIEW OF SYSTEMS:  [x] All other systems negative  [ ] Unable to assess ROS because ________    OBJECTIVE:  ICU Vital Signs Last 24 Hrs  T(C): 36.8 (04 Dec 2019 05:52), Max: 36.8 (04 Dec 2019 05:52)  T(F): 98.3 (04 Dec 2019 05:52), Max: 98.3 (04 Dec 2019 05:52)  HR: 75 (04 Dec 2019 07:51) (68 - 82)  BP: 106/56 (04 Dec 2019 05:52) (103/56 - 118/74)  BP(mean): --  ABP: --  ABP(mean): --  RR: 18 (04 Dec 2019 05:52) (17 - 18)  SpO2: 99% (04 Dec 2019 07:51) (96% - 100%)        12-03 @ 07:01  -  12-04 @ 07:00  --------------------------------------------------------  IN: 3340 mL / OUT: 0 mL / NET: 3340 mL      CAPILLARY BLOOD GLUCOSE      POCT Blood Glucose.: 211 mg/dL (04 Dec 2019 05:04)      PHYSICAL EXAM:  General: Mild respiratory distress  Respiratory: b/l upper crackles  Cardiovascular: normal rate, regular rhythm  Gastrointestinal: abdomen soft, non tender, non distended  Extremities: lower extremity edema    HOSPITAL MEDICATIONS:  MEDICATIONS  (STANDING):  amLODIPine   Tablet 10 milliGRAM(s) Oral daily  ATENolol  Tablet 50 milliGRAM(s) Oral daily  dextrose 5%. 1000 milliLiter(s) (50 mL/Hr) IV Continuous <Continuous>  dextrose 50% Injectable 12.5 Gram(s) IV Push once  dextrose 50% Injectable 25 Gram(s) IV Push once  dextrose 50% Injectable 25 Gram(s) IV Push once  enoxaparin Injectable 40 milliGRAM(s) SubCutaneous daily  escitalopram 5 milliGRAM(s) Oral daily  insulin lispro (HumaLOG) corrective regimen sliding scale   SubCutaneous every 6 hours  insulin NPH human recombinant 7 Unit(s) SubCutaneous every 6 hours  levETIRAcetam  Solution 500 milliGRAM(s) Oral two times a day  levothyroxine Injectable 44 MICROGram(s) IV Push at bedtime  meropenem  IVPB 1000 milliGRAM(s) IV Intermittent every 12 hours  pantoprazole   Suspension 40 milliGRAM(s) Oral daily  simvastatin 20 milliGRAM(s) Oral at bedtime    MEDICATIONS  (PRN):  acetaminophen    Suspension .. 650 milliGRAM(s) Oral every 6 hours PRN Severe Pain (7 - 10)  dextrose 40% Gel 15 Gram(s) Oral once PRN Blood Glucose LESS THAN 70 milliGRAM(s)/deciliter  glucagon  Injectable 1 milliGRAM(s) IntraMuscular once PRN Glucose LESS THAN 70 milligrams/deciliter      LABS:                        8.4    12.70 )-----------( 531      ( 04 Dec 2019 05:05 )             27.4     12-04    137  |  98  |  31<H>  ----------------------------<  215<H>  4.7   |  31  |  0.90    Ca    8.9      04 Dec 2019 05:05  Phos  3.2     12-04  Mg     2.3     12-04    TPro  6.0  /  Alb  2.0<L>  /  TBili  < 0.2<L>  /  DBili  x   /  AST  67<H>  /  ALT  33  /  AlkPhos  291<H>  12-04              MICROBIOLOGY:     =============================================================================================  RADIOLOGY:  [ ] Reviewed and interpreted by me    ============================================================================================  Point of Care Ultrasound Findings: CHIEF COMPLAINT: AMS    Interval Events:  -No acute events overnight  -Remains on HFNC  -Following commands    REVIEW OF SYSTEMS:  [x] All other systems negative  [ ] Unable to assess ROS because ________    OBJECTIVE:  ICU Vital Signs Last 24 Hrs  T(C): 36.8 (04 Dec 2019 05:52), Max: 36.8 (04 Dec 2019 05:52)  T(F): 98.3 (04 Dec 2019 05:52), Max: 98.3 (04 Dec 2019 05:52)  HR: 75 (04 Dec 2019 07:51) (68 - 82)  BP: 106/56 (04 Dec 2019 05:52) (103/56 - 118/74)  BP(mean): --  ABP: --  ABP(mean): --  RR: 18 (04 Dec 2019 05:52) (17 - 18)  SpO2: 99% (04 Dec 2019 07:51) (96% - 100%)        12-03 @ 07:01  -  12-04 @ 07:00  --------------------------------------------------------  IN: 3340 mL / OUT: 0 mL / NET: 3340 mL      CAPILLARY BLOOD GLUCOSE      POCT Blood Glucose.: 211 mg/dL (04 Dec 2019 05:04)      PHYSICAL EXAM:  General: Mild respiratory distress  Respiratory: b/l upper crackles  Cardiovascular: normal rate, regular rhythm  Gastrointestinal: abdomen soft, non tender, non distended  Extremities: lower extremity edema    HOSPITAL MEDICATIONS:  MEDICATIONS  (STANDING):  amLODIPine   Tablet 10 milliGRAM(s) Oral daily  ATENolol  Tablet 50 milliGRAM(s) Oral daily  dextrose 5%. 1000 milliLiter(s) (50 mL/Hr) IV Continuous <Continuous>  dextrose 50% Injectable 12.5 Gram(s) IV Push once  dextrose 50% Injectable 25 Gram(s) IV Push once  dextrose 50% Injectable 25 Gram(s) IV Push once  enoxaparin Injectable 40 milliGRAM(s) SubCutaneous daily  escitalopram 5 milliGRAM(s) Oral daily  insulin lispro (HumaLOG) corrective regimen sliding scale   SubCutaneous every 6 hours  insulin NPH human recombinant 7 Unit(s) SubCutaneous every 6 hours  levETIRAcetam  Solution 500 milliGRAM(s) Oral two times a day  levothyroxine Injectable 44 MICROGram(s) IV Push at bedtime  meropenem  IVPB 1000 milliGRAM(s) IV Intermittent every 12 hours  pantoprazole   Suspension 40 milliGRAM(s) Oral daily  simvastatin 20 milliGRAM(s) Oral at bedtime    MEDICATIONS  (PRN):  acetaminophen    Suspension .. 650 milliGRAM(s) Oral every 6 hours PRN Severe Pain (7 - 10)  dextrose 40% Gel 15 Gram(s) Oral once PRN Blood Glucose LESS THAN 70 milliGRAM(s)/deciliter  glucagon  Injectable 1 milliGRAM(s) IntraMuscular once PRN Glucose LESS THAN 70 milligrams/deciliter      LABS:                        8.4    12.70 )-----------( 531      ( 04 Dec 2019 05:05 )             27.4     12-04    137  |  98  |  31<H>  ----------------------------<  215<H>  4.7   |  31  |  0.90    Ca    8.9      04 Dec 2019 05:05  Phos  3.2     12-04  Mg     2.3     12-04    TPro  6.0  /  Alb  2.0<L>  /  TBili  < 0.2<L>  /  DBili  x   /  AST  67<H>  /  ALT  33  /  AlkPhos  291<H>  12-04              MICROBIOLOGY:     =============================================================================================  RADIOLOGY:  [ ] Reviewed and interpreted by me

## 2019-12-04 NOTE — PROGRESS NOTE ADULT - ATTENDING COMMENTS
#Acute hypoxic respiratory failure, likely recurrent aspiration pneumonitis, worsening b/l opacities on CT  #Asp PNA vs. pneumonitis- c/w IV Meropenem, leukocytosis trending down, remains afebrile, remains on HFNC, restart lasix, will start IV solumedrol today, pulm following, recs appreciated   #Metabolic encephalopathy with seizure activity: likely d/t infection, mental status wax/wanes, VEEG negative for seizures and will c/w Keppra, stable #SDH - no intervention by NSx   #Hypernatremia- Improved, continue free water bolus  #Dysphagia, s/p PEG 11/29, c/w tube feeds, endo consult to assist with diabetes management   #DLBCL: s/p 2 cycles of R-CD with interval improvement in lymphadenopathy on scans from 11/14, CT shows response to chemo with reduced lymphadenopathy and spleen size, Heme is considering inpt chemo if his condition improves   #Mild hypercalcemia: likely d/t lymphoma, Ca improved.

## 2019-12-04 NOTE — PROGRESS NOTE ADULT - SUBJECTIVE AND OBJECTIVE BOX
[facility] [place] [facility]   MD UYEN HERNANDEZ, DO MARYANN REYNOSO, LEATHA NICOLE    TEL:  OFFICE: [phone]  DR CASAREZ CELL: [phone]  DR. HERNANDEZ CELL: [phone]  DR. REYNOSO CELL: [phone]  CHUCKIE TERRAZAS CELL: [phone]        Patient is a 79y old  Male who presents with a chief complaint of AMS (04 Dec 2019 07:50)      Patient seen and examined at bedside.     VITALS:  T(F): 98 (12-04-19 @ 11:23), Max: 98.3 (12-04-19 @ 05:52)  HR: 75 (12-04-19 @ 11:23)  BP: 105/62 (12-04-19 @ 11:23)  RR: 17 (12-04-19 @ 11:23)  SpO2: 98% (12-04-19 @ 11:23)  Wt(kg): --    12-03 @ 07:01  -  12-04 @ 07:00  --------------------------------------------------------  IN: 3340 mL / OUT: 0 mL / NET: 3340 mL          PHYSICAL EXAM:  Constitutional: NAD  Neck: No JVD  Respiratory: + rhonchi  Cardiovascular: S1, S2, RRR  Gastrointestinal: BS+, soft, NT/ND  Extremities: trace peripheral edema    Hospital Medications:   MEDICATIONS  (STANDING):  amLODIPine   Tablet 10 milliGRAM(s) Oral daily  ATENolol  Tablet 50 milliGRAM(s) Oral daily  dextrose 5%. 1000 milliLiter(s) (50 mL/Hr) IV Continuous <Continuous>  dextrose 50% Injectable 12.5 Gram(s) IV Push once  dextrose 50% Injectable 25 Gram(s) IV Push once  dextrose 50% Injectable 25 Gram(s) IV Push once  enoxaparin Injectable 40 milliGRAM(s) SubCutaneous daily  escitalopram 5 milliGRAM(s) Oral daily  furosemide    Tablet 20 milliGRAM(s) Oral daily  insulin lispro (HumaLOG) corrective regimen sliding scale   SubCutaneous every 6 hours  insulin NPH human recombinant 9 Unit(s) SubCutaneous every 6 hours  levETIRAcetam  Solution 500 milliGRAM(s) Oral two times a day  levothyroxine Injectable 44 MICROGram(s) IV Push at bedtime  meropenem  IVPB 1000 milliGRAM(s) IV Intermittent every 12 hours  methylPREDNISolone sodium succinate Injectable 20 milliGRAM(s) IV Push daily  pantoprazole   Suspension 40 milliGRAM(s) Oral daily  simvastatin 20 milliGRAM(s) Oral at bedtime      LABS:  12-04    137  |  98  |  31<H>  ----------------------------<  215<H>  4.7   |  31  |  0.90    Ca    8.9      04 Dec 2019 05:05  Phos  3.2     12-04  Mg     2.3     12-04    TPro  6.0  /  Alb  2.0<L>  /  TBili  < 0.2<L>  /  DBili      /  AST  67<H>  /  ALT  33  /  AlkPhos  291<H>  12-04    Creatinine Trend: 0.90 <--, 0.92 <--, 0.94 <--, 0.92 <--, 0.98 <--, 1.05 <--, 1.23 <--    Phosphorus Level, Serum: 3.2 mg/dL (12-04 @ 05:05)  Albumin, Serum: 2.0 g/dL (12-04 @ 05:05)                              8.4    12.70 )-----------( 531      ( 04 Dec 2019 05:05 )             27.4     Urine Studies:  Urinalysis - [11-25-19 @ 18:43]      Color YELLOW / Appearance CLEAR / SG 1.020 / pH 6.0      Gluc NEGATIVE / Ketone NEGATIVE  / Bili NEGATIVE / Urobili TRACE       Blood NEGATIVE / Protein 100 / Leuk Est NEGATIVE / Nitrite NEGATIVE      RBC 6-10 / WBC 0-2 / Hyaline NEGATIVE / Gran  / Sq Epi OCC / Non Sq Epi  / Bacteria NEGATIVE      Iron 38, TIBC 234, %sat --      [09-12-19 @ 06:08]  Ferritin 214.6      [09-12-19 @ 06:08]  PTH 20.00 (Ca --)      [09-03-19 @ 05:45]   --  PTH 25.46 (Ca --)      [09-01-19 @ 06:00]   --  Vitamin D (25OH) 34.9      [09-03-19 @ 05:45]  HbA1c 7.0      [09-13-19 @ 06:50]  TSH 6.55      [12-04-19 @ 05:05]  Lipid: chol 121, TG 96, HDL 33, LDL 74      [08-31-19 @ 07:00]    HBsAb Reactive      [11-23-19 @ 05:25]  HBsAg Nonreactive      [11-23-19 @ 05:25]  HBcAb Reactive      [11-23-19 @ 05:25]  HCV 0.12, Nonreactive Hepatitis C AB  S/CO Ratio                        Interpretation  < 1.00                                   Non-Reactive  1.00 - 4.99                         Weakly-Reactive  >= 5.00                                Reactive  Non-Reactive: Aperson with a non-reactive HCV antibody  result is considered uninfected.  No further action is  needed unless recent infection is suspected.  In these  cases, consider repeat testing later to detect  seroconversion..  Weakly-Reactive: HCV antibody test is abnormal, HCV RNA  Qualitative test will follow.  Reactive: HCV antibody test is abnormal, HCV RNA  Qualitative test will follow.  Note: HCV antibody testing is performed on the [employer]   system.      [11-23-19 @ 05:25]  HIV Nonreactive The HIV Ag/Ab Combo test performed screens for HIV-1 p24  antigen, antibodies to HIV-1 (group M and group O), and  antibodies to HIV-2. All specimens repeatedly reactive  will reflex to an HIV 1/2 antibody confirmation and  differentiation test. This assay detects p24 antigen which  may be present prior to the development of HIV antibodies,  therefore a reactive result with a negative HIV 1/2 AB  Confirmation should be followed up with HIV-1 RNA, HIV-2  RNA and repeat testing in 4-8 weeks. A nonreactive result  does not preclude previous exposure to or infection with  HIV-1 or HIV-2. [facility] prohibits disclosure of this  result to any unauthorized party.      [11-22-19 @ 10:18]      RADIOLOGY & ADDITIONAL STUDIES:

## 2019-12-04 NOTE — PROGRESS NOTE ADULT - PROBLEM SELECTOR PLAN 2
Hypercalcemia corrected to 10.5 with normal PTH and elevated 1,25 vitamin D. 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   -currently calcium is stable, should improve with steroids   -continue to monitor calcium.

## 2019-12-04 NOTE — PROGRESS NOTE ADULT - ASSESSMENT
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 Pulmonary service consulted to assist in management of hypoxic respiratory failure requiring NIV; now on HFNC     1. Acute hypoxic respiratory failure - CT chest with new ground glass opacities and dense consolidations in upper lobes bilateral and known bilateral pleural effusions. Overall findings concerning for multifocal pneumonia vs aspiration pneumonitis vs fluid overload. Additionally, patient is high risk for PE with LE distal DVTs as well as upper extremity DVTs. V/Q scan from 11/14 was low probability. Unclear role of IVCF and AC is high risk given subdural hematoma.   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 Pulmonary service consulted to assist in management of hypoxic respiratory failure requiring NIV; now on HFNC     1. Acute hypoxic respiratory failure - CT chest with new ground glass opacities and dense consolidations in upper lobes bilateral and known bilateral pleural effusions. Overall findings concerning for multifocal pneumonia vs aspiration pneumonitis vs fluid overload. Additionally, patient is high risk for PE with LE distal DVTs as well as upper extremity DVTs. V/Q scan from 11/14 was low probability. Unclear role of IVCF and AC is high risk given subdural hematoma.   - wean HiFlo as tolerated for goal of O2 sat > 92%  - Unclear role for IVCF in this patient given upper extremity DVTs; he remains high risk  - c/w BIPAP PRN increased work of breathing  - c/w meropenem for aspiration pneumonia for now  - Sputum culture if able; follow up blood cultures  - Recommend corticosteroids (no objection to Solumedrol) for possible pneumonitis    Jared Richmond MD, PGY5  Pulmonary and Critical Care Fellow  [phone]

## 2019-12-04 NOTE — PROGRESS NOTE ADULT - ATTENDING COMMENTS
D/w pt's daughter who is oncologist. Start solumedrol 40 mg daily today for possible pneumonitis. Please order manual chest PT BID, with duoneb Q8.

## 2019-12-04 NOTE — PROGRESS NOTE ADULT - PROBLEM SELECTOR PLAN 3
last cortisol was 19.7 while off steroids. Currently HD stable, low suspicion for AI.   Solumedrol 20mg IV qday started on 12/4 AM for pulmonary   -continue to monitor.

## 2019-12-04 NOTE — PROGRESS NOTE ADULT - PROBLEM SELECTOR PLAN 1
hgb a1c 7.0, now on continuous enteral feeds x 24 hours. Solumedrol 20mg IV qday started on 12/4 AM   -increase NPH to 9 units q6  -continue humalog mod dose sliding scale q6h   -TF glucerna at goal rate   -if TF is stopped, please start dextrose gtt to avoid hypoglycemia and hold NPH   -if there are changes on steroids, please notify endocrine to help increase insulin.

## 2019-12-04 NOTE — PROGRESS NOTE ADULT - PROBLEM SELECTOR PLAN 4
currently on LT4 44 mcg IV, previously on oral 88 mcg. Last TSH 6.55 with FT4 1.07   -continue LT4 44 mcg IV for now  -consider transition to po LT4 88, can hold tube feed 1/2 hour before and 1/2 hour after giving LT4 in peg.

## 2019-12-04 NOTE — PROGRESS NOTE ADULT - SUBJECTIVE AND OBJECTIVE BOX
Chief Complaint: T2DM, hypercalcemia, hypothyroidism     History: Solumedrol 20mg IV qday started today, TF is continuous at 60 cc/hr.     MEDICATIONS  (STANDING):  amLODIPine   Tablet 10 milliGRAM(s) Oral daily  ATENolol  Tablet 50 milliGRAM(s) Oral daily  dextrose 5%. 1000 milliLiter(s) (50 mL/Hr) IV Continuous <Continuous>  dextrose 50% Injectable 12.5 Gram(s) IV Push once  dextrose 50% Injectable 25 Gram(s) IV Push once  dextrose 50% Injectable 25 Gram(s) IV Push once  enoxaparin Injectable 40 milliGRAM(s) SubCutaneous daily  escitalopram 5 milliGRAM(s) Oral daily  furosemide    Tablet 20 milliGRAM(s) Oral daily  insulin lispro (HumaLOG) corrective regimen sliding scale   SubCutaneous every 6 hours  insulin NPH human recombinant 9 Unit(s) SubCutaneous every 6 hours  levETIRAcetam  Solution 500 milliGRAM(s) Oral two times a day  levothyroxine Injectable 44 MICROGram(s) IV Push at bedtime  meropenem  IVPB 1000 milliGRAM(s) IV Intermittent every 12 hours  methylPREDNISolone sodium succinate Injectable 20 milliGRAM(s) IV Push daily  pantoprazole   Suspension 40 milliGRAM(s) Oral daily  simvastatin 20 milliGRAM(s) Oral at bedtime    MEDICATIONS  (PRN):  acetaminophen    Suspension .. 650 milliGRAM(s) Oral every 6 hours PRN Severe Pain (7 - 10)  dextrose 40% Gel 15 Gram(s) Oral once PRN Blood Glucose LESS THAN 70 milliGRAM(s)/deciliter  glucagon  Injectable 1 milliGRAM(s) IntraMuscular once PRN Glucose LESS THAN 70 milligrams/deciliter      Allergies    No Known Allergies    Intolerances        PHYSICAL EXAM:  VITALS: T(C): 36.7 (12-04-19 @ 11:23)  T(F): 98 (12-04-19 @ 11:23), Max: 98.3 (12-04-19 @ 05:52)  HR: 86 (12-04-19 @ 15:35) (71 - 86)  BP: 105/62 (12-04-19 @ 11:23) (105/62 - 118/74)  RR:  (17 - 18)  SpO2:  (96% - 100%)  Wt(kg): --  GENERAL: NAD, cachectic , tachypneic   HEENT:  Atraumatic, Normocephalic, moist mucous membranes    POCT Blood Glucose.: 162 mg/dL (12-04-19 @ 12:32)  POCT Blood Glucose.: 211 mg/dL (12-04-19 @ 05:04)  POCT Blood Glucose.: 196 mg/dL (12-03-19 @ 23:44)  POCT Blood Glucose.: 193 mg/dL (12-03-19 @ 18:07)  POCT Blood Glucose.: 185 mg/dL (12-03-19 @ 12:25)  POCT Blood Glucose.: 163 mg/dL (12-03-19 @ 05:16)  POCT Blood Glucose.: 212 mg/dL (12-02-19 @ 22:29)  POCT Blood Glucose.: 354 mg/dL (12-02-19 @ 17:44)  POCT Blood Glucose.: 360 mg/dL (12-02-19 @ 12:56)  POCT Blood Glucose.: 282 mg/dL (12-02-19 @ 05:46)  POCT Blood Glucose.: 244 mg/dL (12-01-19 @ 23:02)  POCT Blood Glucose.: 258 mg/dL (12-01-19 @ 18:10)      12-04    137  |  98  |  31<H>  ----------------------------<  215<H>  4.7   |  31  |  0.90    EGFR if : 93  EGFR if non : 80    Ca    8.9      12-04  Mg     2.3     12-04  Phos  3.2     12-04    TPro  6.0  /  Alb  2.0<L>  /  TBili  < 0.2<L>  /  DBili  x   /  AST  67<H>  /  ALT  33  /  AlkPhos  291<H>  12-04          Thyroid Function Tests:  12-04 @ 05:05 TSH 6.55 FreeT4 1.07 T3 -- Anti TPO -- Anti Thyroglobulin Ab -- TSI --  11-14 @ 14:10 TSH 12.33 FreeT4 0.99 T3 -- Anti TPO -- Anti Thyroglobulin Ab -- TSI --      Hemoglobin A1C, Whole Blood: 7.0 % <H> [4.0 - 5.6] (09-13-19 @ 06:50)

## 2019-12-04 NOTE — PROGRESS NOTE ADULT - PROBLEM SELECTOR PLAN 1
Hypoxia likely from aspiration pneumonitis. CT chest with new ground glass opacities and dense consolidations in upper lobes bilaterally and known b/l pleural effusions, concerning for multifocal PNA.   - on benny (11/28-) for coverage of HAP - today is day 7. s/p vanc (11/28-11/30)  - consider starting steroids   - on high flow NC 60%/50L -> wean as tolerated for goal O2 sat >92%  - BIPAP as needed for increase WOB  - pulm consulted, recs appreciated

## 2019-12-04 NOTE — PROGRESS NOTE ADULT - PROBLEM SELECTOR PLAN 9
3 known active DVTs. repeat BL UE US with no evidence of DVT now. Superficial vein thrombosis visualized in the left cephalic vein. BL LE US with unchanged subacute DVT right soleal vein.  -restarted lovenox prophylaxis per NSG (started on 11/16) - will hold prior to procedure antibody screen/HIV/group B strep/HBsAG/VDRL/RPR antibody screen/group B strep/rubella/VDRL/RPR/HBsAG/blood type/HIV

## 2019-12-04 NOTE — PROGRESS NOTE ADULT - PROBLEM SELECTOR PLAN 2
Patient with sodium of 146.  - etiology likely 2/2 diuresis  - will discontinue lasix  - will increase free water boluses from 250 q6h to 250 q4h

## 2019-12-04 NOTE — PROGRESS NOTE ADULT - SUBJECTIVE AND OBJECTIVE BOX
Pavithra Alfonso Y1  -[id number] | [facility] [id number]  =========================    Patient is a 79y old  Male who presents with a chief complaint of AMS (04 Dec 2019 07:00)      INTERVAL HPI/OVERNIGHT EVENTS:  No acute event overnight. This morning pt is alert and awake, following commands. Denies f/c, CP, abdominal pain.      MEDICATIONS  (STANDING):  amLODIPine   Tablet 10 milliGRAM(s) Oral daily  ATENolol  Tablet 50 milliGRAM(s) Oral daily  dextrose 5%. 1000 milliLiter(s) (50 mL/Hr) IV Continuous <Continuous>  dextrose 50% Injectable 12.5 Gram(s) IV Push once  dextrose 50% Injectable 25 Gram(s) IV Push once  dextrose 50% Injectable 25 Gram(s) IV Push once  enoxaparin Injectable 40 milliGRAM(s) SubCutaneous daily  escitalopram 5 milliGRAM(s) Oral daily  insulin lispro (HumaLOG) corrective regimen sliding scale   SubCutaneous every 6 hours  insulin NPH human recombinant 7 Unit(s) SubCutaneous every 6 hours  levETIRAcetam  Solution 500 milliGRAM(s) Oral two times a day  levothyroxine Injectable 44 MICROGram(s) IV Push at bedtime  meropenem  IVPB 1000 milliGRAM(s) IV Intermittent every 12 hours  pantoprazole   Suspension 40 milliGRAM(s) Oral daily  simvastatin 20 milliGRAM(s) Oral at bedtime    MEDICATIONS  (PRN):  acetaminophen    Suspension .. 650 milliGRAM(s) Oral every 6 hours PRN Severe Pain (7 - 10)  dextrose 40% Gel 15 Gram(s) Oral once PRN Blood Glucose LESS THAN 70 milliGRAM(s)/deciliter  glucagon  Injectable 1 milliGRAM(s) IntraMuscular once PRN Glucose LESS THAN 70 milligrams/deciliter      Allergies    No Known Allergies    Intolerances        Vital Signs Last 24 Hrs  T(C): 36.8 (04 Dec 2019 05:52), Max: 36.8 (04 Dec 2019 05:52)  T(F): 98.3 (04 Dec 2019 05:52), Max: 98.3 (04 Dec 2019 05:52)  HR: 82 (04 Dec 2019 05:52) (68 - 82)  BP: 106/56 (04 Dec 2019 05:52) (103/56 - 118/74)  BP(mean): --  RR: 18 (04 Dec 2019 05:52) (17 - 18)  SpO2: 97% (04 Dec 2019 05:52) (96% - 100%)    PHYSICAL EXAM:  GENERAL: NAD. on high flow  CHEST/LUNG: crackles bilaterally   HEART: Regular rate and rhythm; S1 and S2,  no murmurs, rubs, or gallops.  ABDOMEN: Soft, not tender to palpation.  No masses or HSM appreciated.  No distension.  Bowel sounds present. peg site clean and dry  EXTREMITIES:  No clubbing, cyanosis, or edema.    SKIN: No obvious rashes or lesions.  Turgor okay.  NEURO:  Alert and oriented x 3, no focal sensory or  motor deficit, DTR 2+ bilaterally.    LABS:                        8.4    12.70 )-----------( 531      ( 04 Dec 2019 05:05 )             27.4     12-04    137  |  98  |  31<H>  ----------------------------<  215<H>  4.7   |  31  |  0.90    Ca    8.9      04 Dec 2019 05:05  Phos  3.2     12-04  Mg     2.3     12-04    TPro  6.0  /  Alb  2.0<L>  /  TBili  < 0.2<L>  /  DBili  x   /  AST  67<H>  /  ALT  33  /  AlkPhos  291<H>  12-04        CAPILLARY BLOOD GLUCOSE      POCT Blood Glucose.: 211 mg/dL (04 Dec 2019 05:04)  POCT Blood Glucose.: 196 mg/dL (03 Dec 2019 23:44)  POCT Blood Glucose.: 193 mg/dL (03 Dec 2019 18:07)  POCT Blood Glucose.: 185 mg/dL (03 Dec 2019 12:25)

## 2019-12-05 PROCEDURE — 99233 SBSQ HOSP IP/OBS HIGH 50: CPT | Mod: GC

## 2019-12-05 PROCEDURE — 99233 SBSQ HOSP IP/OBS HIGH 50: CPT

## 2019-12-05 RX ORDER — LEVOTHYROXINE SODIUM 125 MCG
88 TABLET ORAL AT BEDTIME
Refills: 0 | Status: DISCONTINUED | OUTPATIENT
Start: 2019-12-05 | End: 2019-12-05

## 2019-12-05 RX ORDER — LEVOTHYROXINE SODIUM 125 MCG
88 TABLET ORAL AT BEDTIME
Refills: 0 | Status: DISCONTINUED | OUTPATIENT
Start: 2019-12-05 | End: 2019-12-24

## 2019-12-05 RX ADMIN — Medication 88 MICROGRAM(S): at 21:42

## 2019-12-05 RX ADMIN — LEVETIRACETAM 500 MILLIGRAM(S): 250 TABLET, FILM COATED ORAL at 05:53

## 2019-12-05 RX ADMIN — SIMVASTATIN 20 MILLIGRAM(S): 20 TABLET, FILM COATED ORAL at 21:42

## 2019-12-05 RX ADMIN — Medication 4: at 13:00

## 2019-12-05 RX ADMIN — Medication 20 MILLIGRAM(S): at 05:53

## 2019-12-05 RX ADMIN — HUMAN INSULIN 9 UNIT(S): 100 INJECTION, SUSPENSION SUBCUTANEOUS at 18:12

## 2019-12-05 RX ADMIN — LEVETIRACETAM 500 MILLIGRAM(S): 250 TABLET, FILM COATED ORAL at 18:12

## 2019-12-05 RX ADMIN — ENOXAPARIN SODIUM 40 MILLIGRAM(S): 100 INJECTION SUBCUTANEOUS at 12:08

## 2019-12-05 RX ADMIN — Medication 4: at 05:52

## 2019-12-05 RX ADMIN — Medication 6: at 18:12

## 2019-12-05 RX ADMIN — HUMAN INSULIN 9 UNIT(S): 100 INJECTION, SUSPENSION SUBCUTANEOUS at 05:52

## 2019-12-05 RX ADMIN — ESCITALOPRAM OXALATE 5 MILLIGRAM(S): 10 TABLET, FILM COATED ORAL at 12:09

## 2019-12-05 RX ADMIN — HUMAN INSULIN 9 UNIT(S): 100 INJECTION, SUSPENSION SUBCUTANEOUS at 13:00

## 2019-12-05 RX ADMIN — AMLODIPINE BESYLATE 10 MILLIGRAM(S): 2.5 TABLET ORAL at 05:53

## 2019-12-05 RX ADMIN — PANTOPRAZOLE SODIUM 40 MILLIGRAM(S): 20 TABLET, DELAYED RELEASE ORAL at 12:09

## 2019-12-05 NOTE — PROGRESS NOTE ADULT - ASSESSMENT
Mr. 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 Course c/b acute respiratory distress 2/2 multifocal PNA i/s/o multiple silent aspirations, s/p benny (11/28-12/4). s/p peg placement (11/29)

## 2019-12-05 NOTE — PROGRESS NOTE ADULT - ATTENDING COMMENTS
Acute hypoxic respiratory failure, likely recurrent aspiration pneumonitis, worsening b/l opacities on CT  #Asp PNA vs. pneumonitis- s/p 7 day course of IV Meropenem, leukocytosis trending down, remains afebrile, unable to wean off HFNC, continue lasix, will speak with Pulm about increasing diuresis with borderline BP, continue IV solumedrol today, pulm following, recs appreciated   #Metabolic encephalopathy with seizure activity: likely d/t infection, mental status wax/wanes, VEEG negative for seizures and will c/w Keppra, stable #SDH - no intervention by NSx   #Hypernatremia- Improved, continue free water bolus, pending labs from today  #Dysphagia, s/p PEG 11/29, c/w tube feeds, endo consult to assist with diabetes management   #DLBCL: s/p 2 cycles of R-CD with interval improvement in lymphadenopathy on scans from 11/14, CT shows response to chemo with reduced lymphadenopathy and spleen size, Heme is considering inpt chemo if his condition improves   #Mild hypercalcemia: likely d/t lymphoma, Ca improved, monitor BMP

## 2019-12-05 NOTE — PROGRESS NOTE ADULT - PROBLEM SELECTOR PLAN 1
Hypoxia likely from aspiration pneumonitis and multifocal PNA from aspiration, s/p benny (11/28-12/4) and vanc (11/28-11/30).  - c/w solumedrol 20mg IV (12/4-)  - on high flow NC 60%/50L -> wean as tolerated for goal O2 sat >92%  - Chest PT, duoneb PRN, lasix 20mg qd  - BIPAP as needed for increase WOB  - pulm consulted, recs appreciated

## 2019-12-05 NOTE — PROGRESS NOTE ADULT - SUBJECTIVE AND OBJECTIVE BOX
Chief Complaint: DM2 on tube feeds and on steroid    History: Patient agitated, refused fingerstick and insulin dose today.    MEDICATIONS  (STANDING):  ATENolol  Tablet 50 milliGRAM(s) Oral daily  dextrose 5%. 1000 milliLiter(s) (50 mL/Hr) IV Continuous <Continuous>  dextrose 50% Injectable 12.5 Gram(s) IV Push once  dextrose 50% Injectable 25 Gram(s) IV Push once  dextrose 50% Injectable 25 Gram(s) IV Push once  enoxaparin Injectable 40 milliGRAM(s) SubCutaneous daily  escitalopram 5 milliGRAM(s) Oral daily  furosemide    Tablet 20 milliGRAM(s) Oral daily  insulin lispro (HumaLOG) corrective regimen sliding scale   SubCutaneous every 6 hours  insulin NPH human recombinant 9 Unit(s) SubCutaneous every 6 hours  levETIRAcetam  Solution 500 milliGRAM(s) Oral two times a day  levothyroxine 88 MICROGram(s) Oral at bedtime  pantoprazole   Suspension 40 milliGRAM(s) Oral daily  predniSONE   Tablet 40 milliGRAM(s) Oral daily  simvastatin 20 milliGRAM(s) Oral at bedtime    MEDICATIONS  (PRN):  acetaminophen    Suspension .. 650 milliGRAM(s) Oral every 6 hours PRN Severe Pain (7 - 10)  albuterol/ipratropium for Nebulization. 3 milliLiter(s) Nebulizer every 8 hours PRN Shortness of Breath  dextrose 40% Gel 15 Gram(s) Oral once PRN Blood Glucose LESS THAN 70 milliGRAM(s)/deciliter  glucagon  Injectable 1 milliGRAM(s) IntraMuscular once PRN Glucose LESS THAN 70 milligrams/deciliter      Allergies    No Known Allergies    Intolerances      Review of Systems:    UNABLE TO OBTAIN    PHYSICAL EXAM:  VITALS: T(C): 36.4 (12-05-19 @ 12:04)  T(F): 97.5 (12-05-19 @ 12:04), Max: 98.3 (12-05-19 @ 05:50)  HR: 77 (12-05-19 @ 16:33) (76 - 98)  BP: 104/62 (12-05-19 @ 12:04) (95/55 - 104/62)  RR:  (17 - 21)  SpO2:  (95% - 100%)  Wt(kg): --  GENERAL: agitation noted  EYES: No proptosis, no lid lag, anicteric  HEENT:  Atraumatic, Normocephalic, moist mucous membranes  GI: PEG  SKIN: Dry, intact, No rashes or lesions  PSYCH: awake but confused      CAPILLARY BLOOD GLUCOSE      POCT Blood Glucose.: 238 mg/dL (05 Dec 2019 15:13)  POCT Blood Glucose.: 234 mg/dL (05 Dec 2019 05:07)  POCT Blood Glucose.: 191 mg/dL (04 Dec 2019 21:14)      12-04    137  |  98  |  31<H>  ----------------------------<  215<H>  4.7   |  31  |  0.90    EGFR if : 93  EGFR if non : 80    Ca    8.9      12-04  Mg     2.3     12-04  Phos  3.2     12-04    TPro  6.0  /  Alb  2.0<L>  /  TBili  < 0.2<L>  /  DBili  x   /  AST  67<H>  /  ALT  33  /  AlkPhos  291<H>  12-04          Thyroid Function Tests:  12-04 @ 05:05 TSH 6.55 FreeT4 1.07 T3 -- Anti TPO -- Anti Thyroglobulin Ab -- TSI --  11-14 @ 14:10 TSH 12.33 FreeT4 0.99 T3 -- Anti TPO -- Anti Thyroglobulin Ab -- TSI --      Hemoglobin A1C, Whole Blood: 7.0 % <H> [4.0 - 5.6] (09-13-19 @ 06:50)

## 2019-12-05 NOTE — PROGRESS NOTE ADULT - SUBJECTIVE AND OBJECTIVE BOX
Pavithra Alfonso PGY1  -[id number] | [facility] [id number]  =========================    Patient is a 79y old  Male who presents with a chief complaint of AMS (04 Dec 2019 16:24)      INTERVAL HPI/OVERNIGHT EVENTS:  No acute event overnight.         MEDICATIONS  (STANDING):  amLODIPine   Tablet 10 milliGRAM(s) Oral daily  ATENolol  Tablet 50 milliGRAM(s) Oral daily  dextrose 5%. 1000 milliLiter(s) (50 mL/Hr) IV Continuous <Continuous>  dextrose 50% Injectable 12.5 Gram(s) IV Push once  dextrose 50% Injectable 25 Gram(s) IV Push once  dextrose 50% Injectable 25 Gram(s) IV Push once  enoxaparin Injectable 40 milliGRAM(s) SubCutaneous daily  escitalopram 5 milliGRAM(s) Oral daily  furosemide    Tablet 20 milliGRAM(s) Oral daily  insulin lispro (HumaLOG) corrective regimen sliding scale   SubCutaneous every 6 hours  insulin NPH human recombinant 9 Unit(s) SubCutaneous every 6 hours  levETIRAcetam  Solution 500 milliGRAM(s) Oral two times a day  levothyroxine Injectable 44 MICROGram(s) IV Push at bedtime  methylPREDNISolone sodium succinate Injectable 20 milliGRAM(s) IV Push daily  pantoprazole   Suspension 40 milliGRAM(s) Oral daily  simvastatin 20 milliGRAM(s) Oral at bedtime    MEDICATIONS  (PRN):  acetaminophen    Suspension .. 650 milliGRAM(s) Oral every 6 hours PRN Severe Pain (7 - 10)  albuterol/ipratropium for Nebulization. 3 milliLiter(s) Nebulizer every 8 hours PRN Shortness of Breath  dextrose 40% Gel 15 Gram(s) Oral once PRN Blood Glucose LESS THAN 70 milliGRAM(s)/deciliter  glucagon  Injectable 1 milliGRAM(s) IntraMuscular once PRN Glucose LESS THAN 70 milligrams/deciliter      Allergies    No Known Allergies    Intolerances        Vital Signs Last 24 Hrs  T(C): 36.8 (05 Dec 2019 05:50), Max: 36.8 (05 Dec 2019 05:50)  T(F): 98.3 (05 Dec 2019 05:50), Max: 98.3 (05 Dec 2019 05:50)  HR: 98 (05 Dec 2019 05:50) (75 - 98)  BP: 101/58 (05 Dec 2019 05:50) (95/55 - 105/62)  BP(mean): --  RR: 18 (05 Dec 2019 05:50) (17 - 18)  SpO2: 99% (05 Dec 2019 05:50) (97% - 100%)    PHYSICAL EXAM:  GENERAL: NAD.  CHEST/LUNG: crackles bilaterally.   HEART: Regular rate and rhythm; S1 and S2,  no murmurs, rubs, or gallops.  ABDOMEN: Soft, not tender to palpation.  No masses or HSM appreciated.  No distension.  Bowel sounds present.  EXTREMITIES:  No clubbing, cyanosis, or edema.  Moves all extremities  SKIN: No obvious rashes or lesions.  Turgor okay.  NEURO:  Alert and oriented x 1    LABS:                        CAPILLARY BLOOD GLUCOSE      POCT Blood Glucose.: 234 mg/dL (05 Dec 2019 05:07)  POCT Blood Glucose.: 191 mg/dL (04 Dec 2019 21:14)  POCT Blood Glucose.: 172 mg/dL (04 Dec 2019 17:25)  POCT Blood Glucose.: 162 mg/dL (04 Dec 2019 12:32) Pavithra Alfonso PGY1  -0135 | [facility] [id number]  =========================    Patient is a 79y old  Male who presents with a chief complaint of AMS (04 Dec 2019 16:24)      INTERVAL HPI/OVERNIGHT EVENTS:  No acute event overnight. This morning pt is awake, A&Ox1-2 ("hospital" and self). Denies CP, abdominal pain. No fevers.       MEDICATIONS  (STANDING):  amLODIPine   Tablet 10 milliGRAM(s) Oral daily  ATENolol  Tablet 50 milliGRAM(s) Oral daily  dextrose 5%. 1000 milliLiter(s) (50 mL/Hr) IV Continuous <Continuous>  dextrose 50% Injectable 12.5 Gram(s) IV Push once  dextrose 50% Injectable 25 Gram(s) IV Push once  dextrose 50% Injectable 25 Gram(s) IV Push once  enoxaparin Injectable 40 milliGRAM(s) SubCutaneous daily  escitalopram 5 milliGRAM(s) Oral daily  furosemide    Tablet 20 milliGRAM(s) Oral daily  insulin lispro (HumaLOG) corrective regimen sliding scale   SubCutaneous every 6 hours  insulin NPH human recombinant 9 Unit(s) SubCutaneous every 6 hours  levETIRAcetam  Solution 500 milliGRAM(s) Oral two times a day  levothyroxine Injectable 44 MICROGram(s) IV Push at bedtime  methylPREDNISolone sodium succinate Injectable 20 milliGRAM(s) IV Push daily  pantoprazole   Suspension 40 milliGRAM(s) Oral daily  simvastatin 20 milliGRAM(s) Oral at bedtime    MEDICATIONS  (PRN):  acetaminophen    Suspension .. 650 milliGRAM(s) Oral every 6 hours PRN Severe Pain (7 - 10)  albuterol/ipratropium for Nebulization. 3 milliLiter(s) Nebulizer every 8 hours PRN Shortness of Breath  dextrose 40% Gel 15 Gram(s) Oral once PRN Blood Glucose LESS THAN 70 milliGRAM(s)/deciliter  glucagon  Injectable 1 milliGRAM(s) IntraMuscular once PRN Glucose LESS THAN 70 milligrams/deciliter      Allergies    No Known Allergies    Intolerances        Vital Signs Last 24 Hrs  T(C): 36.8 (05 Dec 2019 05:50), Max: 36.8 (05 Dec 2019 05:50)  T(F): 98.3 (05 Dec 2019 05:50), Max: 98.3 (05 Dec 2019 05:50)  HR: 98 (05 Dec 2019 05:50) (75 - 98)  BP: 101/58 (05 Dec 2019 05:50) (95/55 - 105/62)  BP(mean): --  RR: 18 (05 Dec 2019 05:50) (17 - 18)  SpO2: 99% (05 Dec 2019 05:50) (97% - 100%)    PHYSICAL EXAM:  GENERAL: NAD.  HEENT: EOMI, clear sclera  CHEST/LUNG: crackles bilaterally to upper lobes.   HEART: Regular rate and rhythm; S1 and S2,  no murmurs, rubs, or gallops.  ABDOMEN: Soft, not tender to palpation.  No masses or HSM appreciated.  No distension.  Bowel sounds present.  EXTREMITIES:  1+ pitting edema to shins bilaterally - more edematous than yesterday  SKIN: No obvious rashes or lesions.  Turgor okay.  NEURO:  Alert and oriented x 1    LABS:                        CAPILLARY BLOOD GLUCOSE      POCT Blood Glucose.: 234 mg/dL (05 Dec 2019 05:07)  POCT Blood Glucose.: 191 mg/dL (04 Dec 2019 21:14)  POCT Blood Glucose.: 172 mg/dL (04 Dec 2019 17:25)  POCT Blood Glucose.: 162 mg/dL (04 Dec 2019 12:32)

## 2019-12-05 NOTE — PROGRESS NOTE ADULT - PROBLEM SELECTOR PLAN 2
Pt initially presented for encephalopathy likely from infection vs post-ictal state. Pt got treated for PNA twice - 5 day course of vanc/zosyn and 7 day course of vanc/benny. MRI with no enhancing brain lesions. No seizure activity seen on vEEG. Mental status waxes and wan, overall improved from admission.  - s/p Vanc and zosyn x5 days   - c/w keppra

## 2019-12-05 NOTE — PROGRESS NOTE ADULT - PROBLEM SELECTOR PLAN 1
hgb a1c 7.0, now on continuous enteral feeds x 24 hours. Solumedrol 20mg IV qday started on 12/4 AM now changed to prednisone.  Patient refused FS and missed insulin dose earlier.  Observe trend of glucose on new steroid.  -Continue NPH 9 units q6h (hold dose if feeding held)  -continue humalog mod dose sliding scale q6h   -if TF is stopped, please start dextrose gtt to avoid hypoglycemia and hold NPH   -if there are changes on steroids, please notify endocrine to help increase insulin.

## 2019-12-05 NOTE — PROGRESS NOTE ADULT - ASSESSMENT
78 yo man with chronic kidney disease, coronary artery disease, recent subdural hematoma, DVTs and 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 Pulmonary service consulted to assist in management of hypoxic respiratory failure requiring NIV; now on HFNC     1. Acute hypoxic respiratory failure - CT chest with new ground glass opacities and dense consolidations in upper lobes bilateral and known bilateral pleural effusions. Overall findings concerning for multifocal pneumonia vs aspiration pneumonitis vs fluid overload. Additionally, patient is high risk for PE with LE distal DVTs as well as upper extremity DVTs. V/Q scan from 11/14 was low probability. Unclear role of IVCF and AC is high risk given subdural hematoma.   - wean HiFlo as tolerated for goal of O2 sat > 92%; titrated down to 50% this AM  - Unclear role for IVCF in this patient given upper extremity DVTs; he remains high risk  - c/w BIPAP PRN increased work of breathingnow  - Continue  Solumedrol for possible pneumonitis; seems to be mildly improved  - Monitor for steroid-psychosis and hyperglycemia    Jared Richmond MD, PGY5  Pulmonary and Critical Care Fellow  [phone]

## 2019-12-05 NOTE — PROGRESS NOTE ADULT - SUBJECTIVE AND OBJECTIVE BOX
CHIEF COMPLAINT: AMS    Interval Events:  -Agitated  -Family notes looks better today  -Remains on HFNC, titrated down to 50% this AM    REVIEW OF SYSTEMS:  [] All other systems negative  [x ] Unable to assess ROS because _mental status    OBJECTIVE:  ICU Vital Signs Last 24 Hrs  T(C): 36.4 (05 Dec 2019 12:04), Max: 36.8 (05 Dec 2019 05:50)  T(F): 97.5 (05 Dec 2019 12:04), Max: 98.3 (05 Dec 2019 05:50)  HR: 84 (05 Dec 2019 12:28) (76 - 98)  BP: 104/62 (05 Dec 2019 12:04) (95/55 - 104/62)  BP(mean): --  ABP: --  ABP(mean): --  RR: 19 (05 Dec 2019 12:28) (17 - 19)  SpO2: 98% (05 Dec 2019 12:28) (95% - 100%)        12-04 @ 07:01  -  12-05 @ 07:00  --------------------------------------------------------  IN: 1160 mL / OUT: 0 mL / NET: 1160 mL      CAPILLARY BLOOD GLUCOSE      POCT Blood Glucose.: 234 mg/dL (05 Dec 2019 05:07)      PHYSICAL EXAM:  Exam limited as patient agitated and would not allow    HOSPITAL MEDICATIONS:  MEDICATIONS  (STANDING):  ATENolol  Tablet 50 milliGRAM(s) Oral daily  dextrose 5%. 1000 milliLiter(s) (50 mL/Hr) IV Continuous <Continuous>  dextrose 50% Injectable 12.5 Gram(s) IV Push once  dextrose 50% Injectable 25 Gram(s) IV Push once  dextrose 50% Injectable 25 Gram(s) IV Push once  enoxaparin Injectable 40 milliGRAM(s) SubCutaneous daily  escitalopram 5 milliGRAM(s) Oral daily  furosemide    Tablet 20 milliGRAM(s) Oral daily  insulin lispro (HumaLOG) corrective regimen sliding scale   SubCutaneous every 6 hours  insulin NPH human recombinant 9 Unit(s) SubCutaneous every 6 hours  levETIRAcetam  Solution 500 milliGRAM(s) Oral two times a day  levothyroxine Injectable 44 MICROGram(s) IV Push at bedtime  methylPREDNISolone sodium succinate Injectable 20 milliGRAM(s) IV Push daily  pantoprazole   Suspension 40 milliGRAM(s) Oral daily  simvastatin 20 milliGRAM(s) Oral at bedtime    MEDICATIONS  (PRN):  acetaminophen    Suspension .. 650 milliGRAM(s) Oral every 6 hours PRN Severe Pain (7 - 10)  albuterol/ipratropium for Nebulization. 3 milliLiter(s) Nebulizer every 8 hours PRN Shortness of Breath  dextrose 40% Gel 15 Gram(s) Oral once PRN Blood Glucose LESS THAN 70 milliGRAM(s)/deciliter  glucagon  Injectable 1 milliGRAM(s) IntraMuscular once PRN Glucose LESS THAN 70 milligrams/deciliter      LABS:                        8.4    12.70 )-----------( 531      ( 04 Dec 2019 05:05 )             27.4     12-04    137  |  98  |  31<H>  ----------------------------<  215<H>  4.7   |  31  |  0.90    Ca    8.9      04 Dec 2019 05:05  Phos  3.2     12-04  Mg     2.3     12-04    TPro  6.0  /  Alb  2.0<L>  /  TBili  < 0.2<L>  /  DBili  x   /  AST  67<H>  /  ALT  33  /  AlkPhos  291<H>  12-04              MICROBIOLOGY:     =============================================================================================  RADIOLOGY:  [ ] Reviewed and interpreted by me

## 2019-12-05 NOTE — PROGRESS NOTE ADULT - SUBJECTIVE AND OBJECTIVE BOX
[facility]   MD UYEN HERNANDEZ, DO MARYANN REYNOSO, LEATHA NICOLE    TEL:  OFFICE: [phone]  DR CASAREZ CELL: [phone]  DR. HERNANDEZ CELL: [phone]  DR. REYNOSO CELL: [phone]  CHUCKIE TERRAZAS CELL: [phone]        Patient is a 79y old  Male who presents with a chief complaint of AMS (05 Dec 2019 07:33)      Patient seen and examined at bedside. No chest pain/sob    VITALS:  T(F): 97.5 (12-05-19 @ 12:04), Max: 98.3 (12-05-19 @ 05:50)  HR: 84 (12-05-19 @ 12:28)  BP: 104/62 (12-05-19 @ 12:04)  RR: 19 (12-05-19 @ 12:28)  SpO2: 98% (12-05-19 @ 12:28)  Wt(kg): --    12-04 @ 07:01  -  12-05 @ 07:00  --------------------------------------------------------  IN: 1160 mL / OUT: 0 mL / NET: 1160 mL          PHYSICAL EXAM:  Constitutional: NAD. on high flow  Neck: No JVD  Respiratory: slight rhonchi  Cardiovascular: S1, S2, RRR  Gastrointestinal: BS+, soft, NT/ND  Extremities: trace peripheral edema    Hospital Medications:   MEDICATIONS  (STANDING):  ATENolol  Tablet 50 milliGRAM(s) Oral daily  dextrose 5%. 1000 milliLiter(s) (50 mL/Hr) IV Continuous <Continuous>  dextrose 50% Injectable 12.5 Gram(s) IV Push once  dextrose 50% Injectable 25 Gram(s) IV Push once  dextrose 50% Injectable 25 Gram(s) IV Push once  enoxaparin Injectable 40 milliGRAM(s) SubCutaneous daily  escitalopram 5 milliGRAM(s) Oral daily  furosemide    Tablet 20 milliGRAM(s) Oral daily  insulin lispro (HumaLOG) corrective regimen sliding scale   SubCutaneous every 6 hours  insulin NPH human recombinant 9 Unit(s) SubCutaneous every 6 hours  levETIRAcetam  Solution 500 milliGRAM(s) Oral two times a day  levothyroxine 88 MICROGram(s) Oral at bedtime  methylPREDNISolone sodium succinate Injectable 20 milliGRAM(s) IV Push daily  pantoprazole   Suspension 40 milliGRAM(s) Oral daily  simvastatin 20 milliGRAM(s) Oral at bedtime      LABS:  12-04    137  |  98  |  31<H>  ----------------------------<  215<H>  4.7   |  31  |  0.90    Ca    8.9      04 Dec 2019 05:05  Phos  3.2     12-04  Mg     2.3     12-04    TPro  6.0  /  Alb  2.0<L>  /  TBili  < 0.2<L>  /  DBili      /  AST  67<H>  /  ALT  33  /  AlkPhos  291<H>  12-04    Creatinine Trend: 0.90 <--, 0.92 <--, 0.94 <--, 0.92 <--, 0.98 <--, 1.05 <--                                8.4    12.70 )-----------( 531      ( 04 Dec 2019 05:05 )             27.4     Urine Studies:  Urinalysis - [11-25-19 @ 18:43]      Color YELLOW / Appearance CLEAR / SG 1.020 / pH 6.0      Gluc NEGATIVE / Ketone NEGATIVE  / Bili NEGATIVE / Urobili TRACE       Blood NEGATIVE / Protein 100 / Leuk Est NEGATIVE / Nitrite NEGATIVE      RBC 6-10 / WBC 0-2 / Hyaline NEGATIVE / Gran  / Sq Epi OCC / Non Sq Epi  / Bacteria NEGATIVE      Iron 38, TIBC 234, %sat --      [09-12-19 @ 06:08]  Ferritin 214.6      [09-12-19 @ 06:08]  PTH 20.00 (Ca --)      [09-03-19 @ 05:45]   --  PTH 25.46 (Ca --)      [09-01-19 @ 06:00]   --  Vitamin D (25OH) 34.9      [09-03-19 @ 05:45]  HbA1c 7.0      [09-13-19 @ 06:50]  TSH 6.55      [12-04-19 @ 05:05]  Lipid: chol 121, TG 96, HDL 33, LDL 74      [08-31-19 @ 07:00]    HBsAb Reactive      [11-23-19 @ 05:25]  HBsAg Nonreactive      [11-23-19 @ 05:25]  HBcAb Reactive      [11-23-19 @ 05:25]  HCV 0.12, Nonreactive Hepatitis C AB  S/CO Ratio                        Interpretation  < 1.00                                   Non-Reactive  1.00 - 4.99                         Weakly-Reactive  >= 5.00                                Reactive  Non-Reactive: Aperson with a non-reactive HCV antibody  result is considered uninfected.  No further action is  needed unless recent infection is suspected.  In these  cases, consider repeat testing later to detect  seroconversion..  Weakly-Reactive: HCV antibody test is abnormal, HCV RNA  Qualitative test will follow.  Reactive: HCV antibody test is abnormal, HCV RNA  Qualitative test will follow.  Note: HCV antibody testing is performed on the [employer]   system.      [11-23-19 @ 05:25]  HIV Nonreactive The HIV Ag/Ab Combo test performed screens for HIV-1 p24  antigen, antibodies to HIV-1 (group M and group O), and  antibodies to HIV-2. All specimens repeatedly reactive  will reflex to an HIV 1/2 antibody confirmation and  differentiation test. This assay detects p24 antigen which  may be present prior to the development of HIV antibodies,  therefore a reactive result with a negative HIV 1/2 AB  Confirmation should be followed up with HIV-1 RNA, HIV-2  RNA and repeat testing in 4-8 weeks. A nonreactive result  does not preclude previous exposure to or infection with  HIV-1 or HIV-2. [facility] prohibits disclosure of this  result to any unauthorized party.      [11-22-19 @ 10:18]      RADIOLOGY & ADDITIONAL STUDIES:

## 2019-12-06 DIAGNOSIS — R41.0 DISORIENTATION, UNSPECIFIED: ICD-10-CM

## 2019-12-06 LAB
ALBUMIN SERPL ELPH-MCNC: 2.3 G/DL — LOW (ref 3.3–5)
ALP SERPL-CCNC: 317 U/L — HIGH (ref 40–120)
ALT FLD-CCNC: 46 U/L — HIGH (ref 4–41)
ANION GAP SERPL CALC-SCNC: 10 MMO/L — SIGNIFICANT CHANGE UP (ref 7–14)
AST SERPL-CCNC: 70 U/L — HIGH (ref 4–40)
BILIRUB SERPL-MCNC: < 0.2 MG/DL — LOW (ref 0.2–1.2)
BUN SERPL-MCNC: 44 MG/DL — HIGH (ref 7–23)
CALCIUM SERPL-MCNC: 9 MG/DL — SIGNIFICANT CHANGE UP (ref 8.4–10.5)
CHLORIDE SERPL-SCNC: 94 MMOL/L — LOW (ref 98–107)
CO2 SERPL-SCNC: 33 MMOL/L — HIGH (ref 22–31)
CREAT SERPL-MCNC: 0.92 MG/DL — SIGNIFICANT CHANGE UP (ref 0.5–1.3)
GLUCOSE SERPL-MCNC: 172 MG/DL — HIGH (ref 70–99)
HCT VFR BLD CALC: 23.6 % — LOW (ref 39–50)
HGB BLD-MCNC: 7.4 G/DL — LOW (ref 13–17)
MAGNESIUM SERPL-MCNC: 2.3 MG/DL — SIGNIFICANT CHANGE UP (ref 1.6–2.6)
MCHC RBC-ENTMCNC: 26.7 PG — LOW (ref 27–34)
MCHC RBC-ENTMCNC: 31.4 % — LOW (ref 32–36)
MCV RBC AUTO: 85.2 FL — SIGNIFICANT CHANGE UP (ref 80–100)
NRBC # FLD: 0.14 K/UL — SIGNIFICANT CHANGE UP (ref 0–0)
NRBC FLD-RTO: 1 — SIGNIFICANT CHANGE UP
PHOSPHATE SERPL-MCNC: 3.2 MG/DL — SIGNIFICANT CHANGE UP (ref 2.5–4.5)
PLATELET # BLD AUTO: 514 K/UL — HIGH (ref 150–400)
PMV BLD: 12 FL — SIGNIFICANT CHANGE UP (ref 7–13)
POTASSIUM SERPL-MCNC: 5.6 MMOL/L — HIGH (ref 3.5–5.3)
POTASSIUM SERPL-SCNC: 5.6 MMOL/L — HIGH (ref 3.5–5.3)
PROT SERPL-MCNC: 6 G/DL — SIGNIFICANT CHANGE UP (ref 6–8.3)
RBC # BLD: 2.77 M/UL — LOW (ref 4.2–5.8)
RBC # FLD: 19.3 % — HIGH (ref 10.3–14.5)
SODIUM SERPL-SCNC: 137 MMOL/L — SIGNIFICANT CHANGE UP (ref 135–145)
WBC # BLD: 14.52 K/UL — HIGH (ref 3.8–10.5)
WBC # FLD AUTO: 14.52 K/UL — HIGH (ref 3.8–10.5)

## 2019-12-06 PROCEDURE — 74018 RADEX ABDOMEN 1 VIEW: CPT | Mod: 26,76

## 2019-12-06 PROCEDURE — 99233 SBSQ HOSP IP/OBS HIGH 50: CPT | Mod: GC

## 2019-12-06 PROCEDURE — 99223 1ST HOSP IP/OBS HIGH 75: CPT

## 2019-12-06 PROCEDURE — 99233 SBSQ HOSP IP/OBS HIGH 50: CPT

## 2019-12-06 PROCEDURE — 36573 INSJ PICC RS&I 5 YR+: CPT | Mod: 79

## 2019-12-06 PROCEDURE — 93010 ELECTROCARDIOGRAM REPORT: CPT

## 2019-12-06 RX ORDER — SODIUM CHLORIDE 9 MG/ML
10 INJECTION INTRAMUSCULAR; INTRAVENOUS; SUBCUTANEOUS
Refills: 0 | Status: DISCONTINUED | OUTPATIENT
Start: 2019-12-06 | End: 2019-12-24

## 2019-12-06 RX ORDER — IOHEXOL 300 MG/ML
30 INJECTION, SOLUTION INTRAVENOUS ONCE
Refills: 0 | Status: DISCONTINUED | OUTPATIENT
Start: 2019-12-06 | End: 2019-12-06

## 2019-12-06 RX ORDER — LANOLIN ALCOHOL/MO/W.PET/CERES
3 CREAM (GRAM) TOPICAL AT BEDTIME
Refills: 0 | Status: DISCONTINUED | OUTPATIENT
Start: 2019-12-06 | End: 2019-12-24

## 2019-12-06 RX ORDER — CHLORHEXIDINE GLUCONATE 213 G/1000ML
1 SOLUTION TOPICAL
Refills: 0 | Status: DISCONTINUED | OUTPATIENT
Start: 2019-12-06 | End: 2019-12-24

## 2019-12-06 RX ORDER — OLANZAPINE 15 MG/1
1.25 TABLET, FILM COATED ORAL EVERY 8 HOURS
Refills: 0 | Status: DISCONTINUED | OUTPATIENT
Start: 2019-12-06 | End: 2019-12-06

## 2019-12-06 RX ORDER — OLANZAPINE 15 MG/1
1.25 TABLET, FILM COATED ORAL EVERY 8 HOURS
Refills: 0 | Status: DISCONTINUED | OUTPATIENT
Start: 2019-12-06 | End: 2019-12-24

## 2019-12-06 RX ORDER — ATENOLOL 25 MG/1
50 TABLET ORAL DAILY
Refills: 0 | Status: DISCONTINUED | OUTPATIENT
Start: 2019-12-06 | End: 2019-12-24

## 2019-12-06 RX ORDER — DIATRIZOATE MEGLUMINE 180 MG/ML
30 INJECTION, SOLUTION INTRAVESICAL ONCE
Refills: 0 | Status: COMPLETED | OUTPATIENT
Start: 2019-12-06 | End: 2019-12-06

## 2019-12-06 RX ORDER — FUROSEMIDE 40 MG
20 TABLET ORAL ONCE
Refills: 0 | Status: DISCONTINUED | OUTPATIENT
Start: 2019-12-06 | End: 2019-12-06

## 2019-12-06 RX ORDER — OLANZAPINE 15 MG/1
1.25 TABLET, FILM COATED ORAL ONCE
Refills: 0 | Status: DISCONTINUED | OUTPATIENT
Start: 2019-12-06 | End: 2019-12-06

## 2019-12-06 RX ADMIN — HUMAN INSULIN 9 UNIT(S): 100 INJECTION, SUSPENSION SUBCUTANEOUS at 18:03

## 2019-12-06 RX ADMIN — ENOXAPARIN SODIUM 40 MILLIGRAM(S): 100 INJECTION SUBCUTANEOUS at 12:15

## 2019-12-06 RX ADMIN — Medication 2: at 23:29

## 2019-12-06 RX ADMIN — SIMVASTATIN 20 MILLIGRAM(S): 20 TABLET, FILM COATED ORAL at 23:29

## 2019-12-06 RX ADMIN — Medication 88 MICROGRAM(S): at 23:29

## 2019-12-06 RX ADMIN — Medication 40 MILLIGRAM(S): at 05:10

## 2019-12-06 RX ADMIN — LEVETIRACETAM 500 MILLIGRAM(S): 250 TABLET, FILM COATED ORAL at 17:23

## 2019-12-06 RX ADMIN — Medication 20 MILLIGRAM(S): at 05:00

## 2019-12-06 RX ADMIN — Medication 2: at 06:34

## 2019-12-06 RX ADMIN — HUMAN INSULIN 9 UNIT(S): 100 INJECTION, SUSPENSION SUBCUTANEOUS at 00:41

## 2019-12-06 RX ADMIN — HUMAN INSULIN 9 UNIT(S): 100 INJECTION, SUSPENSION SUBCUTANEOUS at 12:15

## 2019-12-06 RX ADMIN — Medication 3 MILLIGRAM(S): at 23:29

## 2019-12-06 RX ADMIN — ESCITALOPRAM OXALATE 5 MILLIGRAM(S): 10 TABLET, FILM COATED ORAL at 12:15

## 2019-12-06 RX ADMIN — LEVETIRACETAM 500 MILLIGRAM(S): 250 TABLET, FILM COATED ORAL at 05:00

## 2019-12-06 RX ADMIN — DIATRIZOATE MEGLUMINE 30 MILLILITER(S): 180 INJECTION, SOLUTION INTRAVESICAL at 18:04

## 2019-12-06 RX ADMIN — Medication 2: at 00:42

## 2019-12-06 RX ADMIN — PANTOPRAZOLE SODIUM 40 MILLIGRAM(S): 20 TABLET, DELAYED RELEASE ORAL at 12:15

## 2019-12-06 RX ADMIN — HUMAN INSULIN 9 UNIT(S): 100 INJECTION, SUSPENSION SUBCUTANEOUS at 06:34

## 2019-12-06 RX ADMIN — HUMAN INSULIN 9 UNIT(S): 100 INJECTION, SUSPENSION SUBCUTANEOUS at 23:29

## 2019-12-06 RX ADMIN — Medication 2: at 18:16

## 2019-12-06 NOTE — CHART NOTE - NSCHARTNOTEFT_GEN_A_CORE
PRE-INTERVENTIONAL RADIOLOGY PROCEDURE NOTE      Patient Age: 79    Patient Gender: M    Procedure: PICC line - Discussed with Samuel Odell regarding need for PICC as pt with no access and had 11beats of vtach    Diagnosis/Indication: acute respiratory failure, vtach      Interventional Radiology Attending Physician:    Ordering Attending Physician: Odilia    Pertinent Medical History: DLBCL    Pertinent labs:    12/4:   WBC 12.7  H/H: 8.4/27.4  Plt: 531      BMP: 137/4.7/98/31/31/0.9            Patient and Family Aware ? Yes

## 2019-12-06 NOTE — PROGRESS NOTE ADULT - PROBLEM SELECTOR PLAN 1
Hypoxia likely from aspiration pneumonitis and multifocal PNA from aspiration, s/p benny (11/28-12/4) and vanc (11/28-11/30).  - c/w solumedrol 20mg IV (12/4-)  - on high flow NC 60%/50L -> wean as tolerated for goal O2 sat >92%  - Chest PT, duoneb PRN, lasix 20mg qd  - BIPAP as needed for increase WOB  - pulm consulted, recs appreciated Hypoxia likely from aspiration pneumonitis and multifocal PNA from aspiration, s/p benny (11/28-12/4) and vanc (11/28-11/30).  - c/w solumedrol 20mg IV (12/4-)  - on high flow NC 60%/50L -> wean as tolerated for goal O2 sat >92%  - Chest PT, duoneb PRN, lasix 20mg qd -> will try to go up on lasix if BP tolerates  - BIPAP as needed for increase WOB  - pulm consulted, recs appreciated

## 2019-12-06 NOTE — PROGRESS NOTE ADULT - PROBLEM SELECTOR PLAN 3
- NPH 9u q6h   - ISS + FS    #hypothyroidism  -c/w IV levothyroxine 44mg daily BP has been on softer side.   - c/w atenolol 50mg qd, lasix 20mg qd  - holding norvasc 10mg qd

## 2019-12-06 NOTE — PROGRESS NOTE ADULT - PROBLEM SELECTOR PLAN 3
last cortisol was 19.7 while off steroids. Currently HD stable, low suspicion for AI.   Now on prednisone at supratherapeutic dose.

## 2019-12-06 NOTE — PROGRESS NOTE ADULT - PROBLEM SELECTOR PLAN 1
hgb a1c 7.0, now on continuous enteral feeds x 24 hours. On prednisone 40mg daily.  BG well controlled at goal 100-180 mg/dl.  -Continue NPH 9 units q6h (hold dose if feeding held)  -continue humalog mod dose correction scale q6h   -if TF is stopped, please start D10w@30 cc/hour to avoid hypoglycemia and hold NPH

## 2019-12-06 NOTE — PROGRESS NOTE ADULT - PROBLEM SELECTOR PLAN 6
- free water boluses from 250 q6h Per chart review on R-CP. s/p 2 cycles. Pt's daughter, Dr. Browne, is interested in inpatient chemo.   - per heme/onc, plan for inpt chemo prior to discharge -> will coordinate with heme/onc after pt is medically optimized

## 2019-12-06 NOTE — PROGRESS NOTE ADULT - PROBLEM SELECTOR PROBLEM 1
Type 2 diabetes mellitus with chronic kidney disease, with long-term current use of insulin, unspecified CKD stage DISCHARGE

## 2019-12-06 NOTE — BEHAVIORAL HEALTH ASSESSMENT NOTE - SUMMARY
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    Though he does not recall his period of agitation, the patient was conversational and cordial with daughter present. Waxing and waning mental status consistent with delirium.    *****PLAN NOT FINAL -- TO BE DISCUSSED WITH ATTENDING*****  Recommendations:  [ ] Valproic acid 250 mg for mood stabilization & added benefit of seizure prophylaxis  [ ] Melatonin 0.1 mg for sleep  [ ] Recheck EKG to ensure QTc < 500  [ ] Zyprexa 1.25 mg PRN agitation (QTc < 500) The patient is a 79 year old man, Yoruba speaking, , domiciled, with no known PPH, PMH DLBCL, CAD, DM, HTN, BL LE DVT, LUE DVT not on AC, CKD, orthostatic hypotension, and recent admission in November 2019 for SDH, presenting to the hospital with altered mental status.    Though he does not recall his period of agitation, the patient was conversational and cordial with daughter present. Waxing and waning mental status consistent with delirium.    Recommendations:  [ ] Melatonin 3 mg for sleep  [ ] Recheck EKG to ensure QTc < 500  [ ] if QTC < 500, can use  Zyprexa 1.25 mg q8h PRN PO/IM for severe agitation (QTc < 500)  [ ] Can discuss with neuro whether may be worth switching to VPA which may add additional benefit for mood stability and off label use of agitation (if doing so- will need to monitor NH3, LFTs, Platelets, and VPA level)

## 2019-12-06 NOTE — PROGRESS NOTE ADULT - ATTENDING COMMENTS
agree with trial of increase diuresis and will reassess pleural effusions. continue prednisone and wean Hiflow as tolerated to keep O2 sat > 92%.

## 2019-12-06 NOTE — PROGRESS NOTE ADULT - SUBJECTIVE AND OBJECTIVE BOX
[facility] [place] [facility]   MD UYEN HERNANDEZ, DO MARYANN REYNOSO, LEATHA NICOLE    TEL:  OFFICE: [phone]  DR CASAREZ CELL: [phone]  DR. HERNANDEZ CELL: [phone]  DR. REYNOSO CELL: [phone]  CHUCKIE TERRAZAS CELL: [phone]        Patient is a 79y old  Male who presents with a chief complaint of AMS (06 Dec 2019 09:36)      Patient seen and examined at bedside.     VITALS:  T(F): 97.5 (12-06-19 @ 11:38), Max: 97.6 (12-05-19 @ 19:40)  HR: 70 (12-06-19 @ 12:10)  BP: 103/73 (12-06-19 @ 11:38)  RR: 18 (12-06-19 @ 12:10)  SpO2: 97% (12-06-19 @ 12:10)  Wt(kg): --    12-05 @ 07:01  -  12-06 @ 07:00  --------------------------------------------------------  IN: 1780 mL / OUT: 0 mL / NET: 1780 mL          PHYSICAL EXAM:  Constitutional: NAD, still on high flow  Neck: CTA b/l (anterior exam)  Respiratory: CTAB, no wheezes, rales or rhonchi  Cardiovascular: S1, S2, RRR  Gastrointestinal: BS+, soft, NT/ND, +PEG  Extremities: No peripheral edema    Hospital Medications:   MEDICATIONS  (STANDING):  dextrose 5%. 1000 milliLiter(s) (50 mL/Hr) IV Continuous <Continuous>  dextrose 50% Injectable 12.5 Gram(s) IV Push once  dextrose 50% Injectable 25 Gram(s) IV Push once  dextrose 50% Injectable 25 Gram(s) IV Push once  enoxaparin Injectable 40 milliGRAM(s) SubCutaneous daily  escitalopram 5 milliGRAM(s) Oral daily  furosemide    Tablet 20 milliGRAM(s) Oral daily  insulin lispro (HumaLOG) corrective regimen sliding scale   SubCutaneous every 6 hours  insulin NPH human recombinant 9 Unit(s) SubCutaneous every 6 hours  levETIRAcetam  Solution 500 milliGRAM(s) Oral two times a day  levothyroxine 88 MICROGram(s) Oral at bedtime  pantoprazole   Suspension 40 milliGRAM(s) Oral daily  predniSONE   Tablet 40 milliGRAM(s) Oral daily  simvastatin 20 milliGRAM(s) Oral at bedtime      LABS:        Creatinine Trend: 0.90 <--, 0.92 <--, 0.94 <--, 0.92 <--, 0.98 <--            Urine Studies:  Urinalysis - [11-25-19 @ 18:43]      Color YELLOW / Appearance CLEAR / SG 1.020 / pH 6.0      Gluc NEGATIVE / Ketone NEGATIVE  / Bili NEGATIVE / Urobili TRACE       Blood NEGATIVE / Protein 100 / Leuk Est NEGATIVE / Nitrite NEGATIVE      RBC 6-10 / WBC 0-2 / Hyaline NEGATIVE / Gran  / Sq Epi OCC / Non Sq Epi  / Bacteria NEGATIVE      Iron 38, TIBC 234, %sat --      [09-12-19 @ 06:08]  Ferritin 214.6      [09-12-19 @ 06:08]  PTH 20.00 (Ca --)      [09-03-19 @ 05:45]   --  PTH 25.46 (Ca --)      [09-01-19 @ 06:00]   --  Vitamin D (25OH) 34.9      [09-03-19 @ 05:45]  HbA1c 7.0      [09-13-19 @ 06:50]  TSH 6.55      [12-04-19 @ 05:05]  Lipid: chol 121, TG 96, HDL 33, LDL 74      [08-31-19 @ 07:00]    HBsAb Reactive      [11-23-19 @ 05:25]  HBsAg Nonreactive      [11-23-19 @ 05:25]  HBcAb Reactive      [11-23-19 @ 05:25]  HCV 0.12, Nonreactive Hepatitis C AB  S/CO Ratio                        Interpretation  < 1.00                                   Non-Reactive  1.00 - 4.99                         Weakly-Reactive  >= 5.00                                Reactive  Non-Reactive: Aperson with a non-reactive HCV antibody  result is considered uninfected.  No further action is  needed unless recent infection is suspected.  In these  cases, consider repeat testing later to detect  seroconversion..  Weakly-Reactive: HCV antibody test is abnormal, HCV RNA  Qualitative test will follow.  Reactive: HCV antibody test is abnormal, HCV RNA  Qualitative test will follow.  Note: HCV antibody testing is performed on the Abbott   system.      [11-23-19 @ 05:25]  HIV Nonreactive The HIV Ag/Ab Combo test performed screens for HIV-1 p24  antigen, antibodies to HIV-1 (group M and group O), and  antibodies to HIV-2. All specimens repeatedly reactive  will reflex to an HIV 1/2 antibody confirmation and  differentiation test. This assay detects p24 antigen which  may be present prior to the development of HIV antibodies,  therefore a reactive result with a negative HIV 1/2 AB  Confirmation should be followed up with HIV-1 RNA, HIV-2  RNA and repeat testing in 4-8 weeks. A nonreactive result  does not preclude previous exposure to or infection with  HIV-1 or HIV-2. [facility] prohibits disclosure of this  result to any unauthorized party.      [11-22-19 @ 10:18]      RADIOLOGY & ADDITIONAL STUDIES:

## 2019-12-06 NOTE — BEHAVIORAL HEALTH ASSESSMENT NOTE - NSBHCHARTREVIEWLAB_PSY_A_CORE FT
CAPILLARY BLOOD GLUCOSE    POCT Blood Glucose.: 136 mg/dL (06 Dec 2019 12:09)  POCT Blood Glucose.: 155 mg/dL (06 Dec 2019 06:19)  POCT Blood Glucose.: 188 mg/dL (06 Dec 2019 00:24)  POCT Blood Glucose.: 203 mg/dL (05 Dec 2019 21:33)  POCT Blood Glucose.: 258 mg/dL (05 Dec 2019 17:47)  POCT Blood Glucose.: 238 mg/dL (05 Dec 2019 15:13)

## 2019-12-06 NOTE — BEHAVIORAL HEALTH ASSESSMENT NOTE - CASE SUMMARY
Chart reviewed, patient seen and examined with MS3 Prashant. I agree with his history, MSE, A/P with below additions. Patient is pleasant and has poor attention. Daughter- who is oncologist- helps translate. He denies SI/HI intent or plan. No AH/VH elicited. His thought process is somewhat goal directed, at times slightly tangential- though translation may exacerbate this. His attention is impaired. He is likely delirious. Agree with recs as per above. Call with questions. Will follow.

## 2019-12-06 NOTE — PROGRESS NOTE ADULT - PROBLEM SELECTOR PLAN 2
Pt initially presented for encephalopathy likely from infection vs post-ictal state. Pt got treated for PNA twice - 5 day course of vanc/zosyn and 7 day course of vanc/benny. MRI with no enhancing brain lesions. No seizure activity seen on vEEG. Mental status waxes and wan. Currently agitated, likely from steroids  - s/p Vanc and zosyn x5 days   - c/w keppra Pt initially presented for encephalopathy likely from infection vs post-ictal state. Pt got treated for PNA twice - 5 day course of vanc/zosyn and 7 day course of vanc/benny. MRI with no enhancing brain lesions. No seizure activity seen on vEEG. Mental status waxes and wan. Currently agitated since starting steroids  - s/p Vanc and zosyn x5 days   - c/w keppra  - psych consult for agitation Pt initially presented for encephalopathy likely from infection vs post-ictal state. Pt got treated for PNA twice - 5 day course of vanc/zosyn and 7 day course of vanc/benny. MRI with no enhancing brain lesions. No seizure activity seen on vEEG. Mental status waxes and wan. Currently agitated since starting steroids  - s/p Vanc and zosyn x5 days   - c/w keppra  - psych consult for agitation - zyprexa 1.25mg q8h prn

## 2019-12-06 NOTE — BEHAVIORAL HEALTH ASSESSMENT NOTE - NSBHCHARTREVIEWIMAGING_PSY_A_CORE FT
< from: MR Head w/ IV Cont (11.18.19 @ 16:38) >    IMPRESSION:    No gross evidence for intracranial enhancing mass.    Stable subdural hemorrhages.    < end of copied text >

## 2019-12-06 NOTE — PROGRESS NOTE ADULT - SUBJECTIVE AND OBJECTIVE BOX
Chief Complaint: DM2    History: Patient with periods of agitation. Concern for possible PEG leak?  Glucerna continues @ 60 cc/hour x 24 hours.    MEDICATIONS  (STANDING):  ATENolol  Tablet 50 milliGRAM(s) Oral daily  chlorhexidine 4% Liquid 1 Application(s) Topical <User Schedule>  dextrose 5%. 1000 milliLiter(s) (50 mL/Hr) IV Continuous <Continuous>  dextrose 50% Injectable 12.5 Gram(s) IV Push once  dextrose 50% Injectable 25 Gram(s) IV Push once  dextrose 50% Injectable 25 Gram(s) IV Push once  enoxaparin Injectable 40 milliGRAM(s) SubCutaneous daily  escitalopram 5 milliGRAM(s) Oral daily  furosemide    Tablet 20 milliGRAM(s) Oral daily  insulin lispro (HumaLOG) corrective regimen sliding scale   SubCutaneous every 6 hours  insulin NPH human recombinant 9 Unit(s) SubCutaneous every 6 hours  levETIRAcetam  Solution 500 milliGRAM(s) Oral two times a day  levothyroxine 88 MICROGram(s) Oral at bedtime  melatonin 3 milliGRAM(s) Oral at bedtime  pantoprazole   Suspension 40 milliGRAM(s) Oral daily  predniSONE   Tablet 40 milliGRAM(s) Oral daily  simvastatin 20 milliGRAM(s) Oral at bedtime    MEDICATIONS  (PRN):  acetaminophen    Suspension .. 650 milliGRAM(s) Oral every 6 hours PRN Severe Pain (7 - 10)  albuterol/ipratropium for Nebulization. 3 milliLiter(s) Nebulizer every 8 hours PRN Shortness of Breath  dextrose 40% Gel 15 Gram(s) Oral once PRN Blood Glucose LESS THAN 70 milliGRAM(s)/deciliter  glucagon  Injectable 1 milliGRAM(s) IntraMuscular once PRN Glucose LESS THAN 70 milligrams/deciliter  OLANZapine Injectable 1.25 milliGRAM(s) IntraMuscular every 8 hours PRN agitation  sodium chloride 0.9% lock flush 10 milliLiter(s) IV Push every 1 hour PRN Pre/post blood products, medications, blood draw, and to maintain line patency      Allergies    No Known Allergies    Intolerances      Review of Systems:      UNABLE TO OBTAIN    PHYSICAL EXAM:  VITALS: T(C): 36.4 (12-06-19 @ 11:38)  T(F): 97.5 (12-06-19 @ 11:38), Max: 97.5 (12-06-19 @ 11:38)  HR: 86 (12-06-19 @ 19:15) (70 - 86)  BP: 103/73 (12-06-19 @ 11:38) (103/73 - 106/57)  RR:  (18 - 20)  SpO2:  (97% - 99%)  Wt(kg): --  GENERAL: NAD, well-groomed, well-developed  EYES: No proptosis, no lid lag, anicteric  HEENT:  Atraumatic, Normocephalic, moist mucous membranes  RESPIRATORY: nonlabored respirations  GI: PEG with feeds infusing        CAPILLARY BLOOD GLUCOSE      POCT Blood Glucose.: 155 mg/dL (06 Dec 2019 18:15)  POCT Blood Glucose.: 136 mg/dL (06 Dec 2019 12:09)  POCT Blood Glucose.: 155 mg/dL (06 Dec 2019 06:19)  POCT Blood Glucose.: 188 mg/dL (06 Dec 2019 00:24)  POCT Blood Glucose.: 203 mg/dL (05 Dec 2019 21:33)      12-06    137  |  94<L>  |  44<H>  ----------------------------<  172<H>  5.6<H>   |  33<H>  |  0.92    EGFR if : 91  EGFR if non : 78    Ca    9.0      12-06  Mg     2.3     12-06  Phos  3.2     12-06    TPro  6.0  /  Alb  2.3<L>  /  TBili  < 0.2<L>  /  DBili  x   /  AST  70<H>  /  ALT  46<H>  /  AlkPhos  317<H>  12-06          Thyroid Function Tests:  12-04 @ 05:05 TSH 6.55 FreeT4 1.07 T3 -- Anti TPO -- Anti Thyroglobulin Ab -- TSI --  11-14 @ 14:10 TSH 12.33 FreeT4 0.99 T3 -- Anti TPO -- Anti Thyroglobulin Ab -- TSI --      Hemoglobin A1C, Whole Blood: 7.0 % <H> [4.0 - 5.6] (09-13-19 @ 06:50)

## 2019-12-06 NOTE — ADVANCED PRACTICE NURSE CONSULT - ASSESSMENT
Request to assess the patient for an IV access. Patient has small, limited vein visible on ultrasound.   I was asked to place a Midline or Arrow catheter, however, given the anatomy of his veins, I feel the patient would be better suited with a 20 CM Midline, or a PICC.    Patient will continue to require blood draws for labs. His left arm is weak and has a history of a DVT according to Dr. Browne, the patients daughter.  Discussed at patients bedside this am with Dr. Barrow. She said she would speak to Interventional Radiology regarding access. GIFTY Beck RN Request to assess the patient for an IV access. Patient has small, limited vein visible on ultrasound.   I was asked to place a Midline or Arrow catheter, however, given the anatomy of his veins, I feel the patient would be better suited with a 20 CM Midline, or a PICC.    Patient will continue to require blood draws for labs. His left arm is weak and has a history of a DVT according to Dr. Browne, the patients daughter.  Discussed at patients bedside this am with Dr. Hartley. She said she would speak to Interventional Radiology regarding access. GIFTY Beck RN

## 2019-12-06 NOTE — BEHAVIORAL HEALTH ASSESSMENT NOTE - HPI (INCLUDE ILLNESS QUALITY, SEVERITY, DURATION, TIMING, CONTEXT, MODIFYING FACTORS, ASSOCIATED SIGNS AND SYMPTOMS)
The patient is a 79 year old man, Urdu speaking, , domiciled, with no known PPH, PMH DLBCL, CAD, DM, HTN, BL LE DVT, LUE DVT not on AC, CKD, orthostatic hypotension, and recent admission in November 2019 for SDH, presenting to the hospital with altered mental status.    Today, Mr. Browne is pleasant and cooperative with the interview. While he denies depression, he endorses missing looking outside his window at home. He notes that he is preoccupied with "lots of thoughts" and does not recall his periods of agitation 12/6/19 in the AM and the night prior. He says that he has been sleeping "so-so." He denies prior psychiatric hospitalization and feelings of sadness. He denies SI/HI/AVH.     Per collateral from daughter: she notes that he was seen once in [facility], at which point he admitted to the physicians that he was depressed. She endorses that he is looking much better since the morning and that he is normally a very talkative person. The patient is a 79 year old man, Amharic speaking, , domiciled, with no known PPH, PMH DLBCL, CAD, DM, HTN, BL LE DVT, LUE DVT not on AC, CKD, orthostatic hypotension, and recent admission in November 2019 for SDH, presenting to the hospital with altered mental status.    Today, Mr. Browne is pleasant and cooperative with the interview. While he denies depression, he endorses missing looking outside his window at home. He notes that he is preoccupied with "lots of thoughts" and does not recall his periods of agitation 12/6/19 in the AM and the night prior. He says that he has been sleeping "so-so." He denies prior psychiatric hospitalization and feelings of sadness. He denies SI/HI/AVH. He admits that he is confused. He did not know the date, did not know exact location, but did know he is in a hospital. Attention seemed impaired.    Per collateral from daughter: she notes that he was seen once in [facility], at which point he admitted to the physicians that he was depressed. She endorses that he is looking much better since the morning and that he is normally a very talkative person. Spoke to daughter about using antipsychotic off label for agitation, discussed black box warning with daughter, she understands and is in agreement with use.

## 2019-12-06 NOTE — PROGRESS NOTE ADULT - ATTENDING COMMENTS
Acute hypoxic respiratory failure, likely recurrent aspiration pneumonitis, worsening b/l opacities on CT  Patient pulled IV yesterday, was agitated and refusing care by RN, had 11 beats of Vtach yesterday at 1940, this morning evaluated with Mary Beck RN who was attempting to place midline, patient with deep small veins on US, IR amenable to placing PICC  #Asp PNA vs. pneumonitis- s/p 7 day course of IV Meropenem, leukocytosis were trending down, no labs since 12/4, remains afebrile, unable to wean off HFNC 40/50, continue lasix, po prednisone, Pulm to do bedside POCUS will F/U recs, consult appreciated    #Metabolic encephalopathy with seizure activity: likely d/t infection, mental status wax/wanes, VEEG negative for seizures and will c/w Keppra, stable #SDH - no intervention by NSx   #Hypernatremia- Improved, continue free water bolus, pending labs   #Dysphagia, s/p PEG 11/29, c/w tube feeds, endo consult to assist with diabetes management   #DLBCL: s/p 2 cycles of R-CD with interval improvement in lymphadenopathy on scans from 11/14, CT shows response to chemo with reduced lymphadenopathy and spleen size, Heme is considering inpt chemo if his condition improves   #Mild hypercalcemia: likely d/t lymphoma, Ca improved, monitor BMP . Acute hypoxic respiratory failure, likely recurrent aspiration pneumonitis, worsening b/l opacities on CT  Patient pulled IV yesterday, was agitated and refusing care by RN, had 11 beats of Vtach yesterday at 1940, this morning evaluated with Mary Beck RN who was attempting to place midline, patient with deep small veins on US, IR amenable to placing PICC  #Asp PNA vs. pneumonitis- s/p 7 day course of IV Meropenem, leukocytosis were trending down, no labs since 12/4, remains afebrile, unable to wean off HFNC 40/50, continue lasix, po prednisone, Pulm to do bedside POCUS will F/U recs, consult appreciated    #NSVT: TTE reviewed from 10/2019, awaiting BMP, resume home atenolol, continue to monitor on tele  #Metabolic encephalopathy with seizure activity: likely d/t infection, mental status wax/wanes, VEEG negative for seizures and will c/w Keppra, stable #SDH - no intervention by NSx   #Hypernatremia- Improved, continue free water bolus, pending labs   #Dysphagia, s/p PEG 11/29, c/w tube feeds, endo consult to assist with diabetes management   #DLBCL: s/p 2 cycles of R-CD with interval improvement in lymphadenopathy on scans from 11/14, CT shows response to chemo with reduced lymphadenopathy and spleen size, Heme is considering inpt chemo if his condition improves   #Mild hypercalcemia: likely d/t lymphoma, Ca improved, monitor BMP .

## 2019-12-06 NOTE — BEHAVIORAL HEALTH ASSESSMENT NOTE - NSBHCHARTREVIEWVS_PSY_A_CORE FT
Vital Signs Last 24 Hrs  T(C): 36.4 (06 Dec 2019 11:38), Max: 36.4 (05 Dec 2019 19:40)  T(F): 97.5 (06 Dec 2019 11:38), Max: 97.6 (05 Dec 2019 19:40)  HR: 72 (06 Dec 2019 11:38) (72 - 86)  BP: 103/73 (06 Dec 2019 11:38) (103/73 - 106/62)  BP(mean): --  RR: 18 (06 Dec 2019 11:38) (18 - 21)  SpO2: 98% (06 Dec 2019 11:38) (96% - 100%)

## 2019-12-06 NOTE — PROGRESS NOTE ADULT - SUBJECTIVE AND OBJECTIVE BOX
CHIEF COMPLAINT: AMS    Interval Events:  -Tele with 11 beats of VTach  -Less agitated this AM with daughter present  -No new concerns from family    REVIEW OF SYSTEMS:  [] All other systems negative  [x ] Unable to assess ROS because mental status    OBJECTIVE:  ICU Vital Signs Last 24 Hrs  T(C): 36.4 (06 Dec 2019 11:38), Max: 36.4 (05 Dec 2019 19:40)  T(F): 97.5 (06 Dec 2019 11:38), Max: 97.6 (05 Dec 2019 19:40)  HR: 70 (06 Dec 2019 12:10) (70 - 86)  BP: 103/73 (06 Dec 2019 11:38) (103/73 - 106/62)  BP(mean): --  ABP: --  ABP(mean): --  RR: 18 (06 Dec 2019 12:10) (18 - 21)  SpO2: 97% (06 Dec 2019 12:10) (96% - 100%)        12-05 @ 07:01  -  12-06 @ 07:00  --------------------------------------------------------  IN: 1780 mL / OUT: 0 mL / NET: 1780 mL      CAPILLARY BLOOD GLUCOSE      POCT Blood Glucose.: 136 mg/dL (06 Dec 2019 12:09)      PHYSICAL EXAM:  General: Mild respiratory distress  Respiratory: b/l upper crackles  Cardiovascular: normal rate, regular rhythm  Gastrointestinal: abdomen soft, non tender, non distended  Extremities: lower extremity edema    HOSPITAL MEDICATIONS:  MEDICATIONS  (STANDING):  dextrose 5%. 1000 milliLiter(s) (50 mL/Hr) IV Continuous <Continuous>  dextrose 50% Injectable 12.5 Gram(s) IV Push once  dextrose 50% Injectable 25 Gram(s) IV Push once  dextrose 50% Injectable 25 Gram(s) IV Push once  enoxaparin Injectable 40 milliGRAM(s) SubCutaneous daily  escitalopram 5 milliGRAM(s) Oral daily  furosemide    Tablet 20 milliGRAM(s) Oral daily  insulin lispro (HumaLOG) corrective regimen sliding scale   SubCutaneous every 6 hours  insulin NPH human recombinant 9 Unit(s) SubCutaneous every 6 hours  levETIRAcetam  Solution 500 milliGRAM(s) Oral two times a day  levothyroxine 88 MICROGram(s) Oral at bedtime  pantoprazole   Suspension 40 milliGRAM(s) Oral daily  predniSONE   Tablet 40 milliGRAM(s) Oral daily  simvastatin 20 milliGRAM(s) Oral at bedtime    MEDICATIONS  (PRN):  acetaminophen    Suspension .. 650 milliGRAM(s) Oral every 6 hours PRN Severe Pain (7 - 10)  albuterol/ipratropium for Nebulization. 3 milliLiter(s) Nebulizer every 8 hours PRN Shortness of Breath  dextrose 40% Gel 15 Gram(s) Oral once PRN Blood Glucose LESS THAN 70 milliGRAM(s)/deciliter  glucagon  Injectable 1 milliGRAM(s) IntraMuscular once PRN Glucose LESS THAN 70 milligrams/deciliter      LABS:                    MICROBIOLOGY:     =============================================================================================  RADIOLOGY:  [x ] Reviewed and interpreted by me    ============================================================================================  Point of Care Ultrasound Findings:  Chest: multifocal areas of B lines with irregular pleura with spared areas of normal aeration, bilateral pleural effusions, left >right. Associated bilateral B lines

## 2019-12-06 NOTE — PROGRESS NOTE ADULT - ASSESSMENT
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 prolonged FRANCES state (? 2/2 prolonged hypercalcemic state) that has now resolved vs. loss of body mass  - Serum Cr is stable (last lab 12/4)  - Avoid nephrotoxins agent  - renal diet  - monitor bmp    Hyponatremia  - presented with Na 122  - work up suggested SIADH  - Serum sodium improved, fluctuates now hypernatremia and hyponatremia  - Pt started on PEG tube feeds.   - now off Na tab and restarted on lasix  - Monitor serum sodium and fluid status closely     HTN  - has Hx HTN but was on midodrine for orthostatic hypotension in the recent past   - BP was elevated therefore restarted on home BP medications   - Currently BP controlled   - monitor bp    Hypocalcemia  - Has Hx hypercalcemia of malignancy  - low alb corrected alexander is optimal  - monitor    Pl. effusion b/l/ Anasarca   mod effusion, new consolidation seen on ct chest  restarted on lasix 20mg PO daily.  remains on high flow o2. pulm on board  Monitor     Hyperkalemia  hemolyzed specimen  repeat bmp is better   low k diet  monitor

## 2019-12-06 NOTE — PROGRESS NOTE ADULT - PROBLEM SELECTOR PLAN 7
Witness tonic clonic seizure in ER s/p ativan 2mg x1. 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   -vEEG with no seizure activity  - c/w keppra 500mg BID  - s/p valproic acid - free water boluses from 250 q6h

## 2019-12-06 NOTE — PROGRESS NOTE ADULT - SUBJECTIVE AND OBJECTIVE BOX
Pavithra Alfonso PGY1  -[id number] | [facility] [id number]  =========================    Patient is a 79y old  Male who presents with a chief complaint of AMS (05 Dec 2019 17:38)      INTERVAL HPI/OVERNIGHT EVENTS:  Overnight pt had 11 beats of Vtach, asymptomatic. Pt was agitated this morning - refused AM prednisone, grabbed syringe from RN and attempted to hit her with it; pt received majority of prednisone dose.       MEDICATIONS  (STANDING):  ATENolol  Tablet 50 milliGRAM(s) Oral daily  dextrose 5%. 1000 milliLiter(s) (50 mL/Hr) IV Continuous <Continuous>  dextrose 50% Injectable 12.5 Gram(s) IV Push once  dextrose 50% Injectable 25 Gram(s) IV Push once  dextrose 50% Injectable 25 Gram(s) IV Push once  enoxaparin Injectable 40 milliGRAM(s) SubCutaneous daily  escitalopram 5 milliGRAM(s) Oral daily  furosemide    Tablet 20 milliGRAM(s) Oral daily  insulin lispro (HumaLOG) corrective regimen sliding scale   SubCutaneous every 6 hours  insulin NPH human recombinant 9 Unit(s) SubCutaneous every 6 hours  levETIRAcetam  Solution 500 milliGRAM(s) Oral two times a day  levothyroxine 88 MICROGram(s) Oral at bedtime  pantoprazole   Suspension 40 milliGRAM(s) Oral daily  predniSONE   Tablet 40 milliGRAM(s) Oral daily  simvastatin 20 milliGRAM(s) Oral at bedtime    MEDICATIONS  (PRN):  acetaminophen    Suspension .. 650 milliGRAM(s) Oral every 6 hours PRN Severe Pain (7 - 10)  albuterol/ipratropium for Nebulization. 3 milliLiter(s) Nebulizer every 8 hours PRN Shortness of Breath  dextrose 40% Gel 15 Gram(s) Oral once PRN Blood Glucose LESS THAN 70 milliGRAM(s)/deciliter  glucagon  Injectable 1 milliGRAM(s) IntraMuscular once PRN Glucose LESS THAN 70 milligrams/deciliter      Allergies    No Known Allergies    Intolerances        Vital Signs Last 24 Hrs  T(C): 36.3 (06 Dec 2019 04:56), Max: 36.4 (05 Dec 2019 12:04)  T(F): 97.4 (06 Dec 2019 04:56), Max: 97.6 (05 Dec 2019 19:40)  HR: 77 (06 Dec 2019 04:56) (76 - 86)  BP: 106/57 (06 Dec 2019 04:56) (104/62 - 106/62)  BP(mean): --  RR: 19 (06 Dec 2019 04:56) (17 - 21)  SpO2: 97% (06 Dec 2019 04:56) (95% - 100%)    PHYSICAL EXAM:  GENERAL: NAD.   CHEST/LUNG: Clear to auscultation; No rales, rhonchi, or wheezing.  Respiratory effort does not appear labored.  HEART: Regular rate and rhythm; S1 and S2,  no murmurs, rubs, or gallops.  ABDOMEN: Soft, not tender to palpation.  No masses or HSM appreciated.  No distension.  Bowel sounds present.  EXTREMITIES:  No clubbing, cyanosis, or edema.  Moves all extremities   SKIN: No obvious rashes or lesions.  Turgor okay.  NEURO:  Alert and oriented x 3, no focal sensory or  motor deficit    LABS:              CAPILLARY BLOOD GLUCOSE      POCT Blood Glucose.: 155 mg/dL (06 Dec 2019 06:19)  POCT Blood Glucose.: 188 mg/dL (06 Dec 2019 00:24)  POCT Blood Glucose.: 203 mg/dL (05 Dec 2019 21:33)  POCT Blood Glucose.: 258 mg/dL (05 Dec 2019 17:47)  POCT Blood Glucose.: 238 mg/dL (05 Dec 2019 15:13) Pavithra Alfonso PGY1  -[id number] | [facility] [id number]  =========================    Patient is a 79y old  Male who presents with a chief complaint of AMS (05 Dec 2019 17:38)      INTERVAL HPI/OVERNIGHT EVENTS:  Overnight pt had 11 beats of Vtach, asymptomatic. Pt was agitated this morning - refused AM prednisone, grabbed syringe from RN and attempted to hit her with it; pt received majority of prednisone dose.   Pt seen at bedside, still agitated and yelling "I want to go home." Called daughter, Dr. Browne, at bedside. Dr. Browne is interested in getting psych involved for agitation since starting steroid.      MEDICATIONS  (STANDING):  ATENolol  Tablet 50 milliGRAM(s) Oral daily  dextrose 5%. 1000 milliLiter(s) (50 mL/Hr) IV Continuous <Continuous>  dextrose 50% Injectable 12.5 Gram(s) IV Push once  dextrose 50% Injectable 25 Gram(s) IV Push once  dextrose 50% Injectable 25 Gram(s) IV Push once  enoxaparin Injectable 40 milliGRAM(s) SubCutaneous daily  escitalopram 5 milliGRAM(s) Oral daily  furosemide    Tablet 20 milliGRAM(s) Oral daily  insulin lispro (HumaLOG) corrective regimen sliding scale   SubCutaneous every 6 hours  insulin NPH human recombinant 9 Unit(s) SubCutaneous every 6 hours  levETIRAcetam  Solution 500 milliGRAM(s) Oral two times a day  levothyroxine 88 MICROGram(s) Oral at bedtime  pantoprazole   Suspension 40 milliGRAM(s) Oral daily  predniSONE   Tablet 40 milliGRAM(s) Oral daily  simvastatin 20 milliGRAM(s) Oral at bedtime    MEDICATIONS  (PRN):  acetaminophen    Suspension .. 650 milliGRAM(s) Oral every 6 hours PRN Severe Pain (7 - 10)  albuterol/ipratropium for Nebulization. 3 milliLiter(s) Nebulizer every 8 hours PRN Shortness of Breath  dextrose 40% Gel 15 Gram(s) Oral once PRN Blood Glucose LESS THAN 70 milliGRAM(s)/deciliter  glucagon  Injectable 1 milliGRAM(s) IntraMuscular once PRN Glucose LESS THAN 70 milligrams/deciliter      Allergies    No Known Allergies    Intolerances        Vital Signs Last 24 Hrs  T(C): 36.3 (06 Dec 2019 04:56), Max: 36.4 (05 Dec 2019 12:04)  T(F): 97.4 (06 Dec 2019 04:56), Max: 97.6 (05 Dec 2019 19:40)  HR: 77 (06 Dec 2019 04:56) (76 - 86)  BP: 106/57 (06 Dec 2019 04:56) (104/62 - 106/62)  BP(mean): --  RR: 19 (06 Dec 2019 04:56) (17 - 21)  SpO2: 97% (06 Dec 2019 04:56) (95% - 100%)    PHYSICAL EXAM:  GENERAL: NAD. agitated.   CHEST/LUNG: crackles bilaterally to upper lobe  HEART: Regular rate and rhythm; S1 and S2,  no murmurs, rubs, or gallops.  ABDOMEN: Soft, not tender to palpation.  No masses or HSM appreciated.  No distension.  Bowel sounds present. peg tube site clean.   EXTREMITIES:  1+ pitting edema to shin bilaterally  SKIN: No obvious rashes or lesions.  Turgor okay.      LABS:              CAPILLARY BLOOD GLUCOSE      POCT Blood Glucose.: 155 mg/dL (06 Dec 2019 06:19)  POCT Blood Glucose.: 188 mg/dL (06 Dec 2019 00:24)  POCT Blood Glucose.: 203 mg/dL (05 Dec 2019 21:33)  POCT Blood Glucose.: 258 mg/dL (05 Dec 2019 17:47)  POCT Blood Glucose.: 238 mg/dL (05 Dec 2019 15:13)

## 2019-12-06 NOTE — BEHAVIORAL HEALTH ASSESSMENT NOTE - OTHER
At times intense Constantly wanting to engage in conversation in english, despite poor language proficiency In bed Limited assessment due to translation n/a goal directed

## 2019-12-06 NOTE — PROGRESS NOTE ADULT - PROBLEM SELECTOR PLAN 4
currently on enteral LT4 88 mcg.    hold tube feed 1/2 hour before and 1/2 hour after giving LT4 in PEG.

## 2019-12-06 NOTE — BEHAVIORAL HEALTH ASSESSMENT NOTE - RISK ASSESSMENT
Low Acute Suicide Risk The patient is future oriented with strong support from the family. Denies past or present SI/HI. Low acute and chronic risk for suicide at this time.

## 2019-12-06 NOTE — PROGRESS NOTE ADULT - ASSESSMENT
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 Pulmonary service consulted to assist in management of hypoxic respiratory failure requiring NIV; now on HFNC     1. Acute hypoxic respiratory failure - CT chest with new ground glass opacities and dense consolidations in upper lobes bilateral and known bilateral pleural effusions. Overall findings concerning for multifocal pneumonia vs aspiration pneumonitis vs fluid overload. Additionally, patient is high risk for PE with LE distal DVTs as well as upper extremity DVTs. V/Q scan from 11/14 was low probability. Unclear role of IVCF and AC is high risk given subdural hematoma.   - wean HiFlo as tolerated for goal of O2 sat > 92%  - Unclear role for IVCF in this patient given upper extremity DVTs; he remains high risk  - c/w BIPAP PRN increased work of breathing  - Continue prednisone 40mg PO/PEG  for possible pneumonitis  - Would consider trial of lasix 40IV once and monitor output  - Discussed with daughter at bedside -- if pleural effusions persistent/larger and remains HFNC dependent by Monday, we can consider diagnostic/therapeutic thoracentesis     Jared Richmond MD, PGY5  Pulmonary and Critical Care Fellow  [phone]

## 2019-12-06 NOTE — PROGRESS NOTE ADULT - PROBLEM SELECTOR PLAN 4
Severe protein calorie malnutrition. s/p peg placement (11/29)  - glucerna 1.2 continuous feed @ 20cc/hr - NPH 9u q6h   - ISS + FS    #hypothyroidism  -c/w IV levothyroxine 44mg daily

## 2019-12-06 NOTE — CHART NOTE - NSCHARTNOTEFT_GEN_A_CORE
Patient agitated with RN, refusing administration of AM prednisone via PEG tube. Grabbed syringe from RN and attempted to hit her with it. On arrival to bedside, patient refusing to hand over syringe, states that he does not want medication and that the nursing staff is not helping him. Attempts to calm patient initially unsuccessful. Eventually able to calm patient down with reminding patient of daughter, who is a doctor. Able to grab syringe from patient, no acute medical sedation required. Residual prednisone not administered (most of it was Patient agitated with RN, refusing administration of AM prednisone via PEG tube. Grabbed syringe from RN and attempted to hit her with it. On arrival to bedside, patient refusing to hand over syringe, states that he does not want medication and that the nursing staff is not helping him. Attempts to calm patient initially unsuccessful. Eventually able to calm patient down with reminding patient of daughter, who is a doctor. Able to grab syringe from patient, no acute medical sedation required. Residual prednisone not administered (most of it had been administered prior to incident). Relayed events to primary team.    ***************************************************************  Dr. Tha Reina, PGY1  Internal Medicine   [facility] Pager: [phone]  [facility] Pager: [phone]  ***************************************************************

## 2019-12-07 DIAGNOSIS — E87.8 OTHER DISORDERS OF ELECTROLYTE AND FLUID BALANCE, NOT ELSEWHERE CLASSIFIED: ICD-10-CM

## 2019-12-07 LAB
ANION GAP SERPL CALC-SCNC: 12 MMO/L — SIGNIFICANT CHANGE UP (ref 7–14)
ANION GAP SERPL CALC-SCNC: 13 MMO/L — SIGNIFICANT CHANGE UP (ref 7–14)
BASOPHILS # BLD AUTO: 0.03 K/UL — SIGNIFICANT CHANGE UP (ref 0–0.2)
BASOPHILS NFR BLD AUTO: 0.2 % — SIGNIFICANT CHANGE UP (ref 0–2)
BASOPHILS NFR SPEC: 0 % — SIGNIFICANT CHANGE UP (ref 0–2)
BLASTS # FLD: 0 % — SIGNIFICANT CHANGE UP (ref 0–0)
BUN SERPL-MCNC: 42 MG/DL — HIGH (ref 7–23)
BUN SERPL-MCNC: 47 MG/DL — HIGH (ref 7–23)
CALCIUM SERPL-MCNC: 8.9 MG/DL — SIGNIFICANT CHANGE UP (ref 8.4–10.5)
CALCIUM SERPL-MCNC: 9 MG/DL — SIGNIFICANT CHANGE UP (ref 8.4–10.5)
CHLORIDE SERPL-SCNC: 89 MMOL/L — LOW (ref 98–107)
CHLORIDE SERPL-SCNC: 91 MMOL/L — LOW (ref 98–107)
CO2 SERPL-SCNC: 31 MMOL/L — SIGNIFICANT CHANGE UP (ref 22–31)
CO2 SERPL-SCNC: 34 MMOL/L — HIGH (ref 22–31)
CREAT SERPL-MCNC: 0.88 MG/DL — SIGNIFICANT CHANGE UP (ref 0.5–1.3)
CREAT SERPL-MCNC: 0.97 MG/DL — SIGNIFICANT CHANGE UP (ref 0.5–1.3)
EOSINOPHIL # BLD AUTO: 0.06 K/UL — SIGNIFICANT CHANGE UP (ref 0–0.5)
EOSINOPHIL NFR BLD AUTO: 0.5 % — SIGNIFICANT CHANGE UP (ref 0–6)
EOSINOPHIL NFR FLD: 0 % — SIGNIFICANT CHANGE UP (ref 0–6)
GLUCOSE SERPL-MCNC: 131 MG/DL — HIGH (ref 70–99)
GLUCOSE SERPL-MCNC: 184 MG/DL — HIGH (ref 70–99)
HCT VFR BLD CALC: 23.7 % — LOW (ref 39–50)
HGB BLD-MCNC: 7.2 G/DL — LOW (ref 13–17)
HYPOCHROMIA BLD QL: SIGNIFICANT CHANGE UP
IMM GRANULOCYTES NFR BLD AUTO: 8.3 % — HIGH (ref 0–1.5)
LYMPHOCYTES # BLD AUTO: 1.49 K/UL — SIGNIFICANT CHANGE UP (ref 1–3.3)
LYMPHOCYTES # BLD AUTO: 11.9 % — LOW (ref 13–44)
LYMPHOCYTES NFR SPEC AUTO: 8 % — LOW (ref 13–44)
MAGNESIUM SERPL-MCNC: 2.3 MG/DL — SIGNIFICANT CHANGE UP (ref 1.6–2.6)
MAGNESIUM SERPL-MCNC: 2.3 MG/DL — SIGNIFICANT CHANGE UP (ref 1.6–2.6)
MANUAL SMEAR VERIFICATION: SIGNIFICANT CHANGE UP
MCHC RBC-ENTMCNC: 26 PG — LOW (ref 27–34)
MCHC RBC-ENTMCNC: 30.4 % — LOW (ref 32–36)
MCV RBC AUTO: 85.6 FL — SIGNIFICANT CHANGE UP (ref 80–100)
METAMYELOCYTES # FLD: 1.8 % — HIGH (ref 0–1)
MONOCYTES # BLD AUTO: 1.36 K/UL — HIGH (ref 0–0.9)
MONOCYTES NFR BLD AUTO: 10.9 % — SIGNIFICANT CHANGE UP (ref 2–14)
MONOCYTES NFR BLD: 7.1 % — SIGNIFICANT CHANGE UP (ref 2–9)
MYELOCYTES NFR BLD: 1.8 % — HIGH (ref 0–0)
NEUTROPHIL AB SER-ACNC: 77.7 % — HIGH (ref 43–77)
NEUTROPHILS # BLD AUTO: 8.55 K/UL — HIGH (ref 1.8–7.4)
NEUTROPHILS NFR BLD AUTO: 68.2 % — SIGNIFICANT CHANGE UP (ref 43–77)
NEUTS BAND # BLD: 2.7 % — SIGNIFICANT CHANGE UP (ref 0–6)
NRBC # BLD: 2 /100WBC — SIGNIFICANT CHANGE UP
NRBC # FLD: 0.19 K/UL — SIGNIFICANT CHANGE UP (ref 0–0)
NRBC FLD-RTO: 1.5 — SIGNIFICANT CHANGE UP
OTHER - HEMATOLOGY %: 0 — SIGNIFICANT CHANGE UP
PHOSPHATE SERPL-MCNC: 2.9 MG/DL — SIGNIFICANT CHANGE UP (ref 2.5–4.5)
PHOSPHATE SERPL-MCNC: 3.8 MG/DL — SIGNIFICANT CHANGE UP (ref 2.5–4.5)
PLATELET # BLD AUTO: 462 K/UL — HIGH (ref 150–400)
PLATELET COUNT - ESTIMATE: NORMAL — SIGNIFICANT CHANGE UP
PMV BLD: 11.9 FL — SIGNIFICANT CHANGE UP (ref 7–13)
POIKILOCYTOSIS BLD QL AUTO: SIGNIFICANT CHANGE UP
POLYCHROMASIA BLD QL SMEAR: SLIGHT — SIGNIFICANT CHANGE UP
POTASSIUM SERPL-MCNC: 5.3 MMOL/L — SIGNIFICANT CHANGE UP (ref 3.5–5.3)
POTASSIUM SERPL-MCNC: 5.6 MMOL/L — HIGH (ref 3.5–5.3)
POTASSIUM SERPL-SCNC: 5.3 MMOL/L — SIGNIFICANT CHANGE UP (ref 3.5–5.3)
POTASSIUM SERPL-SCNC: 5.6 MMOL/L — HIGH (ref 3.5–5.3)
PROMYELOCYTES # FLD: 0 % — SIGNIFICANT CHANGE UP (ref 0–0)
RBC # BLD: 2.77 M/UL — LOW (ref 4.2–5.8)
RBC # FLD: 19.5 % — HIGH (ref 10.3–14.5)
ROULEAUX BLD QL SMEAR: PRESENT — SIGNIFICANT CHANGE UP
SODIUM SERPL-SCNC: 135 MMOL/L — SIGNIFICANT CHANGE UP (ref 135–145)
SODIUM SERPL-SCNC: 135 MMOL/L — SIGNIFICANT CHANGE UP (ref 135–145)
VARIANT LYMPHS # BLD: 0.9 % — SIGNIFICANT CHANGE UP
WBC # BLD: 12.53 K/UL — HIGH (ref 3.8–10.5)
WBC # FLD AUTO: 12.53 K/UL — HIGH (ref 3.8–10.5)

## 2019-12-07 PROCEDURE — 99233 SBSQ HOSP IP/OBS HIGH 50: CPT | Mod: GC

## 2019-12-07 PROCEDURE — 93010 ELECTROCARDIOGRAM REPORT: CPT

## 2019-12-07 RX ORDER — ALBUTEROL 90 UG/1
10 AEROSOL, METERED ORAL ONCE
Refills: 0 | Status: DISCONTINUED | OUTPATIENT
Start: 2019-12-07 | End: 2019-12-07

## 2019-12-07 RX ORDER — FUROSEMIDE 40 MG
20 TABLET ORAL ONCE
Refills: 0 | Status: COMPLETED | OUTPATIENT
Start: 2019-12-07 | End: 2019-12-07

## 2019-12-07 RX ORDER — ALBUTEROL 90 UG/1
10 AEROSOL, METERED ORAL ONCE
Refills: 0 | Status: COMPLETED | OUTPATIENT
Start: 2019-12-07 | End: 2019-12-07

## 2019-12-07 RX ORDER — SODIUM ZIRCONIUM CYCLOSILICATE 10 G/10G
5 POWDER, FOR SUSPENSION ORAL THREE TIMES A DAY
Refills: 0 | Status: DISCONTINUED | OUTPATIENT
Start: 2019-12-07 | End: 2019-12-08

## 2019-12-07 RX ADMIN — LEVETIRACETAM 500 MILLIGRAM(S): 250 TABLET, FILM COATED ORAL at 17:18

## 2019-12-07 RX ADMIN — PANTOPRAZOLE SODIUM 40 MILLIGRAM(S): 20 TABLET, DELAYED RELEASE ORAL at 09:24

## 2019-12-07 RX ADMIN — ALBUTEROL 2.5 MILLIGRAM(S): 90 AEROSOL, METERED ORAL at 16:37

## 2019-12-07 RX ADMIN — Medication 4: at 13:04

## 2019-12-07 RX ADMIN — ATENOLOL 50 MILLIGRAM(S): 25 TABLET ORAL at 06:17

## 2019-12-07 RX ADMIN — Medication 20 MILLIGRAM(S): at 06:17

## 2019-12-07 RX ADMIN — SODIUM ZIRCONIUM CYCLOSILICATE 5 GRAM(S): 10 POWDER, FOR SUSPENSION ORAL at 13:22

## 2019-12-07 RX ADMIN — CHLORHEXIDINE GLUCONATE 1 APPLICATION(S): 213 SOLUTION TOPICAL at 06:17

## 2019-12-07 RX ADMIN — ENOXAPARIN SODIUM 40 MILLIGRAM(S): 100 INJECTION SUBCUTANEOUS at 11:09

## 2019-12-07 RX ADMIN — LEVETIRACETAM 500 MILLIGRAM(S): 250 TABLET, FILM COATED ORAL at 06:17

## 2019-12-07 RX ADMIN — Medication 20 MILLIGRAM(S): at 12:48

## 2019-12-07 RX ADMIN — HUMAN INSULIN 9 UNIT(S): 100 INJECTION, SUSPENSION SUBCUTANEOUS at 13:04

## 2019-12-07 RX ADMIN — Medication 40 MILLIGRAM(S): at 06:17

## 2019-12-07 RX ADMIN — HUMAN INSULIN 9 UNIT(S): 100 INJECTION, SUSPENSION SUBCUTANEOUS at 18:01

## 2019-12-07 RX ADMIN — HUMAN INSULIN 9 UNIT(S): 100 INJECTION, SUSPENSION SUBCUTANEOUS at 06:17

## 2019-12-07 RX ADMIN — ESCITALOPRAM OXALATE 5 MILLIGRAM(S): 10 TABLET, FILM COATED ORAL at 09:25

## 2019-12-07 RX ADMIN — Medication 88 MICROGRAM(S): at 21:47

## 2019-12-07 RX ADMIN — Medication 3 MILLIGRAM(S): at 21:47

## 2019-12-07 RX ADMIN — SIMVASTATIN 20 MILLIGRAM(S): 20 TABLET, FILM COATED ORAL at 21:47

## 2019-12-07 RX ADMIN — Medication 4: at 18:01

## 2019-12-07 NOTE — PROGRESS NOTE ADULT - PROBLEM SELECTOR PLAN 9
3 known active DVTs. repeat BL UE US with no evidence of DVT now. Superficial vein thrombosis visualized in the left cephalic vein. BL LE US with unchanged subacute DVT right soleal vein.  -restarted lovenox prophylaxis per NSG (started on 11/16)

## 2019-12-07 NOTE — PROGRESS NOTE ADULT - SUBJECTIVE AND OBJECTIVE BOX
Patient is a 79y old  Male who presents with a chief complaint of AMS (07 Dec 2019 11:33)    Erica Castillo MD  Internal Medicine, PGY-3  Pager [phone]      SUBJECTIVE / OVERNIGHT EVENTS:  No overnight events. No events on telemetry overnight. Unable to give clear response to questions (baseline mental status reported to be AxO1-2). In no apparent pain or distress.     MEDICATIONS  (STANDING):  ALBUTerol   0.5% 10 milliGRAM(s) Nebulizer once  ATENolol  Tablet 50 milliGRAM(s) Oral daily  chlorhexidine 4% Liquid 1 Application(s) Topical <User Schedule>  dextrose 5%. 1000 milliLiter(s) (50 mL/Hr) IV Continuous <Continuous>  dextrose 50% Injectable 12.5 Gram(s) IV Push once  dextrose 50% Injectable 25 Gram(s) IV Push once  dextrose 50% Injectable 25 Gram(s) IV Push once  enoxaparin Injectable 40 milliGRAM(s) SubCutaneous daily  escitalopram 5 milliGRAM(s) Oral daily  furosemide    Tablet 20 milliGRAM(s) Oral daily  insulin lispro (HumaLOG) corrective regimen sliding scale   SubCutaneous every 6 hours  insulin NPH human recombinant 9 Unit(s) SubCutaneous every 6 hours  levETIRAcetam  Solution 500 milliGRAM(s) Oral two times a day  levothyroxine 88 MICROGram(s) Oral at bedtime  melatonin 3 milliGRAM(s) Oral at bedtime  pantoprazole   Suspension 40 milliGRAM(s) Oral daily  predniSONE   Tablet 40 milliGRAM(s) Oral daily  simvastatin 20 milliGRAM(s) Oral at bedtime  sodium zirconium cyclosilicate 5 Gram(s) Oral three times a day    MEDICATIONS  (PRN):  acetaminophen    Suspension .. 650 milliGRAM(s) Oral every 6 hours PRN Severe Pain (7 - 10)  albuterol/ipratropium for Nebulization. 3 milliLiter(s) Nebulizer every 8 hours PRN Shortness of Breath  dextrose 40% Gel 15 Gram(s) Oral once PRN Blood Glucose LESS THAN 70 milliGRAM(s)/deciliter  glucagon  Injectable 1 milliGRAM(s) IntraMuscular once PRN Glucose LESS THAN 70 milligrams/deciliter  OLANZapine Injectable 1.25 milliGRAM(s) IntraMuscular every 8 hours PRN agitation  sodium chloride 0.9% lock flush 10 milliLiter(s) IV Push every 1 hour PRN Pre/post blood products, medications, blood draw, and to maintain line patency      CAPILLARY BLOOD GLUCOSE      POCT Blood Glucose.: 148 mg/dL (07 Dec 2019 08:28)  POCT Blood Glucose.: 129 mg/dL (07 Dec 2019 05:56)  POCT Blood Glucose.: 159 mg/dL (06 Dec 2019 23:22)  POCT Blood Glucose.: 155 mg/dL (06 Dec 2019 18:15)    I&O's Summary    06 Dec 2019 07:01  -  07 Dec 2019 07:00  --------------------------------------------------------  IN: 1220 mL / OUT: 0 mL / NET: 1220 mL        PHYSICAL EXAM:  GENERAL: NAD, well-developed  HEAD:  Atraumatic, Normocephalic  CHEST/LUNG: Clear to auscultation, L anterior rales, on high flow NC  HEART: Regular rate and rhythm; No murmurs, rubs, or gallops  ABDOMEN: Soft, Nontender, Nondistended; PEG present  EXTREMITIES:  Pedal edema  PSYCH: Calm, AxOx0, alert  SKIN: No rashes or lesions    LABS:                        7.2    12.53 )-----------( 462      ( 07 Dec 2019 06:30 )             23.7     12-07    135  |  91<L>  |  42<H>  ----------------------------<  131<H>  5.6<H>   |  31  |  0.88    Ca    8.9      07 Dec 2019 06:30  Phos  2.9     12-07  Mg     2.3     12-07    TPro  6.0  /  Alb  2.3<L>  /  TBili  < 0.2<L>  /  DBili  x   /  AST  70<H>  /  ALT  46<H>  /  AlkPhos  317<H>  12-06              RADIOLOGY & ADDITIONAL TESTS:    Imaging Personally Reviewed:    Consultant(s) Notes Reviewed:      Care Discussed with Consultants/Other Providers:

## 2019-12-07 NOTE — PROGRESS NOTE ADULT - ATTENDING COMMENTS
Pt seen and examined, chart and labs reviewed.  Care discussed with Dr. Castillo.  Briefly, a 79M CAD sp stents, DM2, recent dx of DLBCL s/p 2 cycles of R-CD, b/l LE DVTs and LUE DVT formerly on Lovenox however fell and had large SDH, presents with acute encephalopathy. Course now c/b aspiration pneumonitis on steroids and s/p rx for aspiration PNA.  Pt today is calm with no signs of agitation - poor historian, but denies any acute complaints.    Found to have mild hyperkalemia on labs, EKG pending, will temporize and recheck BMP this afternoon.  Pt would benefit from IV Lasix 20mg x1 today, which may also help with hyperkalemia.

## 2019-12-07 NOTE — PROGRESS NOTE ADULT - PROBLEM SELECTOR PLAN 3
Pt initially presented for encephalopathy likely from infection vs post-ictal state. Pt got treated for PNA twice - 5 day course of vanc/zosyn and 7 day course of vanc/benny. MRI with no enhancing brain lesions. No seizure activity seen on vEEG. Mental status waxes and wan. Currently agitated since starting steroids  - s/p Vanc and zosyn x5 days   - c/w keppra  - psych consult for agitation - zyprexa 1.25mg q8h prn

## 2019-12-07 NOTE — PROGRESS NOTE ADULT - PROBLEM SELECTOR PLAN 2
#Hyperkalemia: Mildly elevated today to 5.6 (not hemolyzed)  - Starting 2 days of lokelma, and one additional dose IV lasix  - Albuterol for temporizing measures    #Hypernatremia (resolved)  - free water boluses from 250 q6h

## 2019-12-07 NOTE — PROGRESS NOTE ADULT - ASSESSMENT
79 yo M PMHx DLBCL s/p 2 cycles of R-CP, CAD, DM, HTN, BL LE DVT, LUE DVT on prophylactic lovenox, CKD, orthostatic hypotension, and recent admission in November 2019 for SDH after fall, presents for acute encephalopathy and new onset seizures secondary to infection. Course c/b acute respiratory distress 2/2 multifocal PNA w/ multiple silent aspirations, s/p benny (11/28-12/4). s/p peg placement (11/29)

## 2019-12-07 NOTE — PROGRESS NOTE ADULT - PROBLEM SELECTOR PLAN 1
Hypoxia likely from aspiration pneumonitis and multifocal PNA from aspiration, s/p benny (11/28-12/4) and vanc (11/28-11/30).  - c/w solumedrol 20mg IV (12/4-)  - on high flow NC 60%/50L -> wean as tolerated for goal O2 sat >92%  - Chest PT, duoneb PRN, lasix 20mg qd -> will try to go up on lasix if BP tolerates  - BIPAP as needed for increase WOB  - pulm consulted, recs appreciated

## 2019-12-08 LAB
ANION GAP SERPL CALC-SCNC: 6 MMO/L — LOW (ref 7–14)
BASOPHILS # BLD AUTO: 0.03 K/UL — SIGNIFICANT CHANGE UP (ref 0–0.2)
BASOPHILS NFR BLD AUTO: 0.2 % — SIGNIFICANT CHANGE UP (ref 0–2)
BUN SERPL-MCNC: 44 MG/DL — HIGH (ref 7–23)
CALCIUM SERPL-MCNC: 8.9 MG/DL — SIGNIFICANT CHANGE UP (ref 8.4–10.5)
CHLORIDE SERPL-SCNC: 93 MMOL/L — LOW (ref 98–107)
CO2 SERPL-SCNC: 36 MMOL/L — HIGH (ref 22–31)
CREAT SERPL-MCNC: 0.95 MG/DL — SIGNIFICANT CHANGE UP (ref 0.5–1.3)
EOSINOPHIL # BLD AUTO: 0.06 K/UL — SIGNIFICANT CHANGE UP (ref 0–0.5)
EOSINOPHIL NFR BLD AUTO: 0.5 % — SIGNIFICANT CHANGE UP (ref 0–6)
GLUCOSE SERPL-MCNC: 146 MG/DL — HIGH (ref 70–99)
HCT VFR BLD CALC: 23.1 % — LOW (ref 39–50)
HGB BLD-MCNC: 7.1 G/DL — LOW (ref 13–17)
IMM GRANULOCYTES NFR BLD AUTO: 9.9 % — HIGH (ref 0–1.5)
LYMPHOCYTES # BLD AUTO: 0.99 K/UL — LOW (ref 1–3.3)
LYMPHOCYTES # BLD AUTO: 8.2 % — LOW (ref 13–44)
MAGNESIUM SERPL-MCNC: 2.2 MG/DL — SIGNIFICANT CHANGE UP (ref 1.6–2.6)
MCHC RBC-ENTMCNC: 26.6 PG — LOW (ref 27–34)
MCHC RBC-ENTMCNC: 30.7 % — LOW (ref 32–36)
MCV RBC AUTO: 86.5 FL — SIGNIFICANT CHANGE UP (ref 80–100)
MONOCYTES # BLD AUTO: 1 K/UL — HIGH (ref 0–0.9)
MONOCYTES NFR BLD AUTO: 8.3 % — SIGNIFICANT CHANGE UP (ref 2–14)
NEUTROPHILS # BLD AUTO: 8.84 K/UL — HIGH (ref 1.8–7.4)
NEUTROPHILS NFR BLD AUTO: 72.9 % — SIGNIFICANT CHANGE UP (ref 43–77)
NRBC # FLD: 0.23 K/UL — SIGNIFICANT CHANGE UP (ref 0–0)
NRBC FLD-RTO: 1.9 — SIGNIFICANT CHANGE UP
PHOSPHATE SERPL-MCNC: 3.4 MG/DL — SIGNIFICANT CHANGE UP (ref 2.5–4.5)
PLATELET # BLD AUTO: 467 K/UL — HIGH (ref 150–400)
PMV BLD: 12.1 FL — SIGNIFICANT CHANGE UP (ref 7–13)
POTASSIUM SERPL-MCNC: 5.1 MMOL/L — SIGNIFICANT CHANGE UP (ref 3.5–5.3)
POTASSIUM SERPL-SCNC: 5.1 MMOL/L — SIGNIFICANT CHANGE UP (ref 3.5–5.3)
RBC # BLD: 2.67 M/UL — LOW (ref 4.2–5.8)
RBC # FLD: 19.5 % — HIGH (ref 10.3–14.5)
SODIUM SERPL-SCNC: 135 MMOL/L — SIGNIFICANT CHANGE UP (ref 135–145)
WBC # BLD: 12.12 K/UL — HIGH (ref 3.8–10.5)
WBC # FLD AUTO: 12.12 K/UL — HIGH (ref 3.8–10.5)

## 2019-12-08 PROCEDURE — 99233 SBSQ HOSP IP/OBS HIGH 50: CPT

## 2019-12-08 RX ADMIN — Medication 20 MILLIGRAM(S): at 05:13

## 2019-12-08 RX ADMIN — SODIUM ZIRCONIUM CYCLOSILICATE 5 GRAM(S): 10 POWDER, FOR SUSPENSION ORAL at 00:12

## 2019-12-08 RX ADMIN — ENOXAPARIN SODIUM 40 MILLIGRAM(S): 100 INJECTION SUBCUTANEOUS at 12:23

## 2019-12-08 RX ADMIN — LEVETIRACETAM 500 MILLIGRAM(S): 250 TABLET, FILM COATED ORAL at 05:13

## 2019-12-08 RX ADMIN — SODIUM ZIRCONIUM CYCLOSILICATE 5 GRAM(S): 10 POWDER, FOR SUSPENSION ORAL at 07:19

## 2019-12-08 RX ADMIN — PANTOPRAZOLE SODIUM 40 MILLIGRAM(S): 20 TABLET, DELAYED RELEASE ORAL at 12:23

## 2019-12-08 RX ADMIN — Medication 88 MICROGRAM(S): at 21:24

## 2019-12-08 RX ADMIN — Medication 3 MILLIGRAM(S): at 21:24

## 2019-12-08 RX ADMIN — CHLORHEXIDINE GLUCONATE 1 APPLICATION(S): 213 SOLUTION TOPICAL at 05:19

## 2019-12-08 RX ADMIN — ATENOLOL 50 MILLIGRAM(S): 25 TABLET ORAL at 05:13

## 2019-12-08 RX ADMIN — Medication 40 MILLIGRAM(S): at 05:13

## 2019-12-08 RX ADMIN — ESCITALOPRAM OXALATE 5 MILLIGRAM(S): 10 TABLET, FILM COATED ORAL at 12:23

## 2019-12-08 RX ADMIN — Medication 2: at 12:23

## 2019-12-08 RX ADMIN — HUMAN INSULIN 9 UNIT(S): 100 INJECTION, SUSPENSION SUBCUTANEOUS at 05:51

## 2019-12-08 RX ADMIN — LEVETIRACETAM 500 MILLIGRAM(S): 250 TABLET, FILM COATED ORAL at 18:32

## 2019-12-08 RX ADMIN — SIMVASTATIN 20 MILLIGRAM(S): 20 TABLET, FILM COATED ORAL at 21:24

## 2019-12-08 RX ADMIN — HUMAN INSULIN 9 UNIT(S): 100 INJECTION, SUSPENSION SUBCUTANEOUS at 00:11

## 2019-12-08 RX ADMIN — HUMAN INSULIN 9 UNIT(S): 100 INJECTION, SUSPENSION SUBCUTANEOUS at 18:31

## 2019-12-08 RX ADMIN — HUMAN INSULIN 9 UNIT(S): 100 INJECTION, SUSPENSION SUBCUTANEOUS at 12:23

## 2019-12-08 NOTE — PROGRESS NOTE ADULT - PROBLEM SELECTOR PLAN 6
Severe protein calorie malnutrition. s/p peg placement (11/29)  - glucerna 1.2 continuous feed @ 20cc/hr - Severe protein calorie malnutrition. s/p peg placement (11/29)  - glucerna tube feeds (consider nutrition evaluation to see if tube feeds are contributing to hyperkalemia)

## 2019-12-08 NOTE — PROGRESS NOTE ADULT - PROBLEM SELECTOR PLAN 9
3 known active DVTs. repeat BL UE US with no evidence of DVT now. Superficial vein thrombosis visualized in the left cephalic vein. BL LE US with unchanged subacute DVT right soleal vein.  -restarted lovenox prophylaxis per NSG (started on 11/16) - 3 known active DVTs. repeat BL UE US with no evidence of DVT now. Superficial vein thrombosis visualized in the left cephalic vein. BL LE US with unchanged subacute DVT right soleal vein.  -restarted lovenox prophylaxis per NSG (started on 11/16)

## 2019-12-08 NOTE — PROGRESS NOTE ADULT - PROBLEM SELECTOR PLAN 4
BP has been on softer side.   - c/w atenolol 50mg qd, lasix 20mg qd  - holding norvasc 10mg qd - BP has been on softer side.   - c/w atenolol 50mg qd, lasix 20mg qd  - holding norvasc 10mg qd

## 2019-12-08 NOTE — PROGRESS NOTE ADULT - PROBLEM SELECTOR PLAN 7
Per chart review on R-CP. s/p 2 cycles. Pt's daughter, Dr. Browne, is interested in inpatient chemo.   - per heme/onc, plan for inpt chemo prior to discharge -> will coordinate with heme/onc after pt is medically optimized - Per chart review on R-CP. s/p 2 cycles. Pt's daughter, Dr. Browne, is interested in inpatient chemo.   - per heme/onc, plan for inpt chemo prior to discharge -> does not seem like pt is optimal candidate for further chemotherapy

## 2019-12-08 NOTE — PROGRESS NOTE ADULT - SUBJECTIVE AND OBJECTIVE BOX
Pavithra Alfonso PGY1  -0135 | [facility] [id number]  =========================    Patient is a 79y old  Male who presents with a chief complaint of AMS (07 Dec 2019 11:33)      INTERVAL HPI/OVERNIGHT EVENTS:  No acute events overnight. Repeat K downtrended to 5.3.          MEDICATIONS  (STANDING):  ATENolol  Tablet 50 milliGRAM(s) Oral daily  chlorhexidine 4% Liquid 1 Application(s) Topical <User Schedule>  dextrose 5%. 1000 milliLiter(s) (50 mL/Hr) IV Continuous <Continuous>  dextrose 50% Injectable 12.5 Gram(s) IV Push once  dextrose 50% Injectable 25 Gram(s) IV Push once  dextrose 50% Injectable 25 Gram(s) IV Push once  enoxaparin Injectable 40 milliGRAM(s) SubCutaneous daily  escitalopram 5 milliGRAM(s) Oral daily  furosemide    Tablet 20 milliGRAM(s) Oral daily  insulin lispro (HumaLOG) corrective regimen sliding scale   SubCutaneous every 6 hours  insulin NPH human recombinant 9 Unit(s) SubCutaneous every 6 hours  levETIRAcetam  Solution 500 milliGRAM(s) Oral two times a day  levothyroxine 88 MICROGram(s) Oral at bedtime  melatonin 3 milliGRAM(s) Oral at bedtime  pantoprazole   Suspension 40 milliGRAM(s) Oral daily  predniSONE   Tablet 40 milliGRAM(s) Oral daily  simvastatin 20 milliGRAM(s) Oral at bedtime  sodium zirconium cyclosilicate 5 Gram(s) Oral three times a day    MEDICATIONS  (PRN):  acetaminophen    Suspension .. 650 milliGRAM(s) Oral every 6 hours PRN Severe Pain (7 - 10)  albuterol/ipratropium for Nebulization. 3 milliLiter(s) Nebulizer every 8 hours PRN Shortness of Breath  dextrose 40% Gel 15 Gram(s) Oral once PRN Blood Glucose LESS THAN 70 milliGRAM(s)/deciliter  glucagon  Injectable 1 milliGRAM(s) IntraMuscular once PRN Glucose LESS THAN 70 milligrams/deciliter  OLANZapine Injectable 1.25 milliGRAM(s) IntraMuscular every 8 hours PRN agitation  sodium chloride 0.9% lock flush 10 milliLiter(s) IV Push every 1 hour PRN Pre/post blood products, medications, blood draw, and to maintain line patency      Allergies    No Known Allergies    Intolerances        Vital Signs Last 24 Hrs  T(C): 36.6 (08 Dec 2019 05:10), Max: 36.6 (08 Dec 2019 05:10)  T(F): 97.8 (08 Dec 2019 05:10), Max: 97.8 (08 Dec 2019 05:10)  HR: 70 (08 Dec 2019 05:10) (60 - 76)  BP: 119/64 (08 Dec 2019 05:10) (107/64 - 119/64)  BP(mean): --  RR: 17 (08 Dec 2019 05:10) (17 - 19)  SpO2: 99% (08 Dec 2019 05:10) (98% - 100%)    PHYSICAL EXAM:  GENERAL: NAD.  CHEST/LUNG: Clear to auscultation; No rales, rhonchi, or wheezing.  Respiratory effort does not appear labored.  HEART: Regular rate and rhythm; S1 and S2,  no murmurs, rubs, or gallops.  ABDOMEN: Soft, not tender to palpation.  No masses or HSM appreciated.  No distension.  Bowel sounds present.  EXTREMITIES:  Moves all extremities, weaker on L side  SKIN: No obvious rashes or lesions.  Turgor okay.  NEURO:  Alert and oriented x1    LABS:            CAPILLARY BLOOD GLUCOSE      POCT Blood Glucose.: 136 mg/dL (08 Dec 2019 05:23)  POCT Blood Glucose.: 140 mg/dL (07 Dec 2019 23:42)  POCT Blood Glucose.: 205 mg/dL (07 Dec 2019 17:55)  POCT Blood Glucose.: 212 mg/dL (07 Dec 2019 13:02)  POCT Blood Glucose.: 148 mg/dL (07 Dec 2019 08:28) Patient is a 79y old  Male who presents with a chief complaint of AMS (07 Dec 2019 11:33)      INTERVAL HPI/OVERNIGHT EVENTS:  No acute events overnight. Repeat K downtrended to 5.1.  Pt overall a poor historian, but denies acute pain or SOB         MEDICATIONS  (STANDING):  ATENolol  Tablet 50 milliGRAM(s) Oral daily  chlorhexidine 4% Liquid 1 Application(s) Topical <User Schedule>  dextrose 5%. 1000 milliLiter(s) (50 mL/Hr) IV Continuous <Continuous>  dextrose 50% Injectable 12.5 Gram(s) IV Push once  dextrose 50% Injectable 25 Gram(s) IV Push once  dextrose 50% Injectable 25 Gram(s) IV Push once  enoxaparin Injectable 40 milliGRAM(s) SubCutaneous daily  escitalopram 5 milliGRAM(s) Oral daily  furosemide    Tablet 20 milliGRAM(s) Oral daily  insulin lispro (HumaLOG) corrective regimen sliding scale   SubCutaneous every 6 hours  insulin NPH human recombinant 9 Unit(s) SubCutaneous every 6 hours  levETIRAcetam  Solution 500 milliGRAM(s) Oral two times a day  levothyroxine 88 MICROGram(s) Oral at bedtime  melatonin 3 milliGRAM(s) Oral at bedtime  pantoprazole   Suspension 40 milliGRAM(s) Oral daily  predniSONE   Tablet 40 milliGRAM(s) Oral daily  simvastatin 20 milliGRAM(s) Oral at bedtime  sodium zirconium cyclosilicate 5 Gram(s) Oral three times a day    MEDICATIONS  (PRN):  acetaminophen    Suspension .. 650 milliGRAM(s) Oral every 6 hours PRN Severe Pain (7 - 10)  albuterol/ipratropium for Nebulization. 3 milliLiter(s) Nebulizer every 8 hours PRN Shortness of Breath  dextrose 40% Gel 15 Gram(s) Oral once PRN Blood Glucose LESS THAN 70 milliGRAM(s)/deciliter  glucagon  Injectable 1 milliGRAM(s) IntraMuscular once PRN Glucose LESS THAN 70 milligrams/deciliter  OLANZapine Injectable 1.25 milliGRAM(s) IntraMuscular every 8 hours PRN agitation  sodium chloride 0.9% lock flush 10 milliLiter(s) IV Push every 1 hour PRN Pre/post blood products, medications, blood draw, and to maintain line patency      Allergies    No Known Allergies    Intolerances        Vital Signs Last 24 Hrs  T(C): 36.6 (08 Dec 2019 05:10), Max: 36.6 (08 Dec 2019 05:10)  T(F): 97.8 (08 Dec 2019 05:10), Max: 97.8 (08 Dec 2019 05:10)  HR: 70 (08 Dec 2019 05:10) (60 - 76)  BP: 119/64 (08 Dec 2019 05:10) (107/64 - 119/64)  BP(mean): --  RR: 17 (08 Dec 2019 05:10) (17 - 19)  SpO2: 99% (08 Dec 2019 05:10) (98% - 100%)    PHYSICAL EXAM:  GENERAL: NAD.  CHEST/LUNG: Clear to auscultation; No rales, rhonchi, or wheezing.  Respiratory effort does not appear labored.  HEART: Regular rate and rhythm; S1 and S2,  no murmurs, rubs, or gallops.  ABDOMEN: Soft, not tender to palpation.  No masses or HSM appreciated.  No distension.  Bowel sounds present.  EXTREMITIES:  Moves all extremities, weaker on L side  SKIN: No obvious rashes or lesions.  Turgor okay.  NEURO:  Alert and oriented x1    LABS:            CAPILLARY BLOOD GLUCOSE      POCT Blood Glucose.: 136 mg/dL (08 Dec 2019 05:23)  POCT Blood Glucose.: 140 mg/dL (07 Dec 2019 23:42)  POCT Blood Glucose.: 205 mg/dL (07 Dec 2019 17:55)  POCT Blood Glucose.: 212 mg/dL (07 Dec 2019 13:02)  POCT Blood Glucose.: 148 mg/dL (07 Dec 2019 08:28)

## 2019-12-08 NOTE — PROGRESS NOTE ADULT - ASSESSMENT
79 yo M PMHx DLBCL s/p 2 cycles of R-CP, CAD, DM, HTN, BL LE DVT, LUE DVT on prophylactic lovenox, CKD, orthostatic hypotension, and recent admission in November 2019 for SDH after fall, presents for acute encephalopathy and new onset seizures secondary to infection. Course c/b acute respiratory distress 2/2 aspiration pneumonitis, s/p benny (11/28-12/4) for aspiration PNA. s/p peg placement (11/29)

## 2019-12-08 NOTE — PROGRESS NOTE ADULT - PROBLEM SELECTOR PLAN 3
Pt initially presented for encephalopathy likely from infection vs post-ictal state. Pt got treated for PNA twice - 5 day course of vanc/zosyn and 7 day course of vanc/benny. MRI with no enhancing brain lesions. No seizure activity seen on vEEG. Mental status waxes and wan. Currently agitated since starting steroids  - s/p Vanc and zosyn x5 days   - c/w keppra  - psych consult for agitation - zyprexa 1.25mg q8h prn - Pt initially presented for encephalopathy likely from infection vs post-ictal state. Pt got treated for PNA twice - 5 day course of vanc/zosyn and 7 day course of vanc/benny. MRI with no enhancing brain lesions. No seizure activity seen on vEEG. Mental status waxes and wan. Currently agitated since starting steroids  - c/w keppra  - psych consult for agitation - zyprexa 1.25mg q8h prn

## 2019-12-08 NOTE — PROGRESS NOTE ADULT - PROBLEM SELECTOR PLAN 5
- NPH 9u q6h   - ISS + FS    #hypothyroidism  -c/w IV levothyroxine 44mg daily - NPH 9u q6h   - ISS + FS    #hypothyroidism  -c/w PO levothyroxine daily - NPH 9u q6h   - ISS + FS  #hypothyroidism  -c/w PO levothyroxine daily

## 2019-12-08 NOTE — PROGRESS NOTE ADULT - PROBLEM SELECTOR PLAN 1
Hypoxia is likely multifactorial from aspiration pneumonitis, pulmonary edema and pleural effusion, s/p benny (11/28-12/4) and vanc (11/28-11/30) for PNA.  - c/w prednisone 40mg QD   - on high flow NC 60%/50L -> wean as tolerated for goal O2 sat >92%  - Chest PT, duoneb PRN, lasix 20mg qd -> will try to go up on lasix if BP tolerates  - BIPAP as needed for increase WOB  - pulm consulted, recs appreciated - Hypoxia is likely multifactorial from aspiration pneumonitis, pulmonary edema and pleural effusion, s/p benny (11/28-12/4) and vanc (11/28-11/30) for PNA.  - Per discussion with RN, hi flow was attempted to be weaned to 5L NC on friday, but pt desaturated.  Will continue with hi-flow for now (on 40/45)  - c/w prednisone 40mg QD for aspiration pneumonitis per pulm recs  - Chest PT, duoneb PRN, lasix 20mg qd

## 2019-12-09 LAB
ANION GAP SERPL CALC-SCNC: 8 MMO/L — SIGNIFICANT CHANGE UP (ref 7–14)
BUN SERPL-MCNC: 38 MG/DL — HIGH (ref 7–23)
CALCIUM SERPL-MCNC: 7.6 MG/DL — LOW (ref 8.4–10.5)
CHLORIDE SERPL-SCNC: 95 MMOL/L — LOW (ref 98–107)
CO2 SERPL-SCNC: 32 MMOL/L — HIGH (ref 22–31)
CREAT SERPL-MCNC: 0.77 MG/DL — SIGNIFICANT CHANGE UP (ref 0.5–1.3)
GLUCOSE BLDC GLUCOMTR-MCNC: 120 MG/DL — HIGH (ref 70–99)
GLUCOSE BLDC GLUCOMTR-MCNC: 157 MG/DL — HIGH (ref 70–99)
GLUCOSE SERPL-MCNC: 143 MG/DL — HIGH (ref 70–99)
HCT VFR BLD CALC: 28 % — LOW (ref 39–50)
HGB BLD-MCNC: 8.4 G/DL — LOW (ref 13–17)
MAGNESIUM SERPL-MCNC: 1.9 MG/DL — SIGNIFICANT CHANGE UP (ref 1.6–2.6)
MCHC RBC-ENTMCNC: 26.4 PG — LOW (ref 27–34)
MCHC RBC-ENTMCNC: 30 % — LOW (ref 32–36)
MCV RBC AUTO: 88.1 FL — SIGNIFICANT CHANGE UP (ref 80–100)
NRBC # FLD: 0.2 K/UL — SIGNIFICANT CHANGE UP (ref 0–0)
NRBC FLD-RTO: 1.5 — SIGNIFICANT CHANGE UP
PHOSPHATE SERPL-MCNC: 2.5 MG/DL — SIGNIFICANT CHANGE UP (ref 2.5–4.5)
PLATELET # BLD AUTO: 473 K/UL — HIGH (ref 150–400)
PMV BLD: 12.1 FL — SIGNIFICANT CHANGE UP (ref 7–13)
POTASSIUM SERPL-MCNC: 4.5 MMOL/L — SIGNIFICANT CHANGE UP (ref 3.5–5.3)
POTASSIUM SERPL-SCNC: 4.5 MMOL/L — SIGNIFICANT CHANGE UP (ref 3.5–5.3)
RBC # BLD: 3.18 M/UL — LOW (ref 4.2–5.8)
RBC # FLD: 19.7 % — HIGH (ref 10.3–14.5)
SODIUM SERPL-SCNC: 135 MMOL/L — SIGNIFICANT CHANGE UP (ref 135–145)
WBC # BLD: 13.78 K/UL — HIGH (ref 3.8–10.5)
WBC # FLD AUTO: 13.78 K/UL — HIGH (ref 3.8–10.5)

## 2019-12-09 PROCEDURE — 99233 SBSQ HOSP IP/OBS HIGH 50: CPT

## 2019-12-09 RX ADMIN — HUMAN INSULIN 9 UNIT(S): 100 INJECTION, SUSPENSION SUBCUTANEOUS at 18:02

## 2019-12-09 RX ADMIN — Medication 88 MICROGRAM(S): at 21:51

## 2019-12-09 RX ADMIN — LEVETIRACETAM 500 MILLIGRAM(S): 250 TABLET, FILM COATED ORAL at 06:10

## 2019-12-09 RX ADMIN — LEVETIRACETAM 500 MILLIGRAM(S): 250 TABLET, FILM COATED ORAL at 17:30

## 2019-12-09 RX ADMIN — CHLORHEXIDINE GLUCONATE 1 APPLICATION(S): 213 SOLUTION TOPICAL at 06:10

## 2019-12-09 RX ADMIN — HUMAN INSULIN 9 UNIT(S): 100 INJECTION, SUSPENSION SUBCUTANEOUS at 01:33

## 2019-12-09 RX ADMIN — Medication 40 MILLIGRAM(S): at 06:11

## 2019-12-09 RX ADMIN — HUMAN INSULIN 9 UNIT(S): 100 INJECTION, SUSPENSION SUBCUTANEOUS at 06:11

## 2019-12-09 RX ADMIN — Medication 20 MILLIGRAM(S): at 06:11

## 2019-12-09 RX ADMIN — PANTOPRAZOLE SODIUM 40 MILLIGRAM(S): 20 TABLET, DELAYED RELEASE ORAL at 06:11

## 2019-12-09 RX ADMIN — Medication 2: at 18:02

## 2019-12-09 RX ADMIN — ESCITALOPRAM OXALATE 5 MILLIGRAM(S): 10 TABLET, FILM COATED ORAL at 12:27

## 2019-12-09 RX ADMIN — Medication 3 MILLIGRAM(S): at 21:51

## 2019-12-09 RX ADMIN — ATENOLOL 50 MILLIGRAM(S): 25 TABLET ORAL at 06:10

## 2019-12-09 RX ADMIN — SIMVASTATIN 20 MILLIGRAM(S): 20 TABLET, FILM COATED ORAL at 21:51

## 2019-12-09 RX ADMIN — HUMAN INSULIN 9 UNIT(S): 100 INJECTION, SUSPENSION SUBCUTANEOUS at 12:27

## 2019-12-09 RX ADMIN — Medication 2: at 01:33

## 2019-12-09 RX ADMIN — ENOXAPARIN SODIUM 40 MILLIGRAM(S): 100 INJECTION SUBCUTANEOUS at 12:27

## 2019-12-09 NOTE — PROGRESS NOTE ADULT - SUBJECTIVE AND OBJECTIVE BOX
CHIEF COMPLAINT: AMS    Interval Events:  -NAEO  -Pleasant this AM  -Remains on HFNC 35/45    REVIEW OF SYSTEMS:  [] All other systems negative  [x ] Unable to assess ROS because AMS    OBJECTIVE:  ICU Vital Signs Last 24 Hrs  T(C): 36.8 (09 Dec 2019 06:00), Max: 36.9 (08 Dec 2019 21:28)  T(F): 98.3 (09 Dec 2019 06:00), Max: 98.4 (08 Dec 2019 21:28)  HR: 70 (09 Dec 2019 07:50) (60 - 80)  BP: 131/71 (09 Dec 2019 06:00) (121/60 - 132/71)  BP(mean): --  ABP: --  ABP(mean): --  RR: 18 (09 Dec 2019 07:50) (17 - 19)  SpO2: 99% (09 Dec 2019 07:50) (97% - 99%)        12-08 @ 07:01  -  12-09 @ 07:00  --------------------------------------------------------  IN: 2440 mL / OUT: 0 mL / NET: 2440 mL      CAPILLARY BLOOD GLUCOSE      POCT Blood Glucose.: 114 mg/dL (09 Dec 2019 05:57)      PHYSICAL EXAM:  General: Mild respiratory distress  Respiratory: b/l upper crackles  Cardiovascular: normal rate, regular rhythm  Gastrointestinal: abdomen soft, non tender, non distended  Extremities: lower extremity edema    HOSPITAL MEDICATIONS:  MEDICATIONS  (STANDING):  ATENolol  Tablet 50 milliGRAM(s) Oral daily  chlorhexidine 4% Liquid 1 Application(s) Topical <User Schedule>  dextrose 5%. 1000 milliLiter(s) (50 mL/Hr) IV Continuous <Continuous>  dextrose 50% Injectable 12.5 Gram(s) IV Push once  dextrose 50% Injectable 25 Gram(s) IV Push once  dextrose 50% Injectable 25 Gram(s) IV Push once  enoxaparin Injectable 40 milliGRAM(s) SubCutaneous daily  escitalopram 5 milliGRAM(s) Oral daily  furosemide    Tablet 20 milliGRAM(s) Oral daily  insulin lispro (HumaLOG) corrective regimen sliding scale   SubCutaneous every 6 hours  insulin NPH human recombinant 9 Unit(s) SubCutaneous every 6 hours  levETIRAcetam  Solution 500 milliGRAM(s) Oral two times a day  levothyroxine 88 MICROGram(s) Oral at bedtime  melatonin 3 milliGRAM(s) Oral at bedtime  pantoprazole   Suspension 40 milliGRAM(s) Oral daily  predniSONE   Tablet 40 milliGRAM(s) Oral daily  simvastatin 20 milliGRAM(s) Oral at bedtime    MEDICATIONS  (PRN):  acetaminophen    Suspension .. 650 milliGRAM(s) Oral every 6 hours PRN Severe Pain (7 - 10)  albuterol/ipratropium for Nebulization. 3 milliLiter(s) Nebulizer every 8 hours PRN Shortness of Breath  dextrose 40% Gel 15 Gram(s) Oral once PRN Blood Glucose LESS THAN 70 milliGRAM(s)/deciliter  glucagon  Injectable 1 milliGRAM(s) IntraMuscular once PRN Glucose LESS THAN 70 milligrams/deciliter  OLANZapine Injectable 1.25 milliGRAM(s) IntraMuscular every 8 hours PRN agitation  sodium chloride 0.9% lock flush 10 milliLiter(s) IV Push every 1 hour PRN Pre/post blood products, medications, blood draw, and to maintain line patency      LABS:                        7.1    12.12 )-----------( 467      ( 08 Dec 2019 06:30 )             23.1     12-08    135  |  93<L>  |  44<H>  ----------------------------<  146<H>  5.1   |  36<H>  |  0.95    Ca    8.9      08 Dec 2019 06:30  Phos  3.4     12-08  Mg     2.2     12-08                MICROBIOLOGY:     =============================================================================================  RADIOLOGY:  [ ] Reviewed and interpreted by me

## 2019-12-09 NOTE — PROGRESS NOTE ADULT - PROBLEM SELECTOR PLAN 1
- Hypoxia is likely multifactorial from aspiration pneumonitis, pulmonary edema and pleural effusion, s/p benny (11/28-12/4) and vanc (11/28-11/30) for PNA.  - Per discussion with RN, hi flow was attempted to be weaned to 5L NC on friday, but pt desaturated.  Will continue with hi-flow for now (on 40/45)  - c/w prednisone 40mg QD for aspiration pneumonitis per pulm recs  - Chest PT, duoneb PRN, lasix 20mg qd

## 2019-12-09 NOTE — PROGRESS NOTE ADULT - ASSESSMENT
78 yo man with chronic kidney disease, coronary artery disease, recent subdural hematoma, DVTs and 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 Pulmonary service consulted to assist in management of hypoxic respiratory failure requiring NIV; now on HFNC     1. Acute hypoxic respiratory failure - CT chest with new ground glass opacities and dense consolidations in upper lobes bilateral and known bilateral pleural effusions. Overall findings concerning for multifocal pneumonia vs aspiration pneumonitis vs fluid overload. Additionally, patient is high risk for PE with LE distal DVTs as well as upper extremity DVTs. V/Q scan from 11/14 was low probability. Unclear role of IVCF and AC is high risk given subdural hematoma.   - wean HiFlo as tolerated for goal of O2 sat > 92%--currently on 35/45  - Unclear role for IVCF in this patient given upper extremity DVTs; he remains high risk  - c/w BIPAP PRN increased work of breathing  - Continue prednisone 40mg PO/PEG  for possible pneumonitis for now  - Consider trial of increased diuresis  - Will revisit patient later this afternoon and reassess pleural effusions; if good pocket for thoracentesis can coordinate with family to proceed with diagnostic/therapeutic thoracentesis     Jared Richmond MD, PGY5  Pulmonary and Critical Care Fellow  [phone]

## 2019-12-09 NOTE — PROGRESS NOTE ADULT - ASSESSMENT
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 prolonged FRANCES state (? 2/2 prolonged hypercalcemic state) that has now resolved vs. loss of body mass  - Serum Cr is stable, BUn has been elevated likely sec to prednisone   - Avoid nephrotoxins agent  - renal diet  - monitor bmp    Hyponatremia  - presented with Na 122  - work up suggested SIADH  - Serum sodium improved, fluctuates now hypernatremia and hyponatremia  - Pt started on PEG tube feeds.   - now off Na tab and restarted on lasix  - Monitor serum sodium and fluid status closely     HTN  - has Hx HTN but was on midodrine for orthostatic hypotension in the recent past   - BP was elevated therefore restarted on home BP medications   - Currently BP controlled   - monitor bp    Hypocalcemia  - Has Hx hypercalcemia of malignancy  - low alb corrected alexander is optimal  - monitor    Pl. effusion b/l/ Anasarca   mod effusion, new consolidation seen on ct chest  restarted on lasix 20mg PO daily.  remains on high flow o2. pulm on board  Monitor     Hyperkalemia  hemolyzed specimen  repeat bmp is better   low k diet  monitor

## 2019-12-09 NOTE — PROGRESS NOTE ADULT - SUBJECTIVE AND OBJECTIVE BOX
[facility] Division of Hospital Medicine  Alejandra Rodas MD  Pager [phone]    Patient is a 79y old  Male who presents with a chief complaint of AMS       SUBJECTIVE / OVERNIGHT EVENTS: appears comfortable on hi flow    MEDICATIONS  (STANDING):  ATENolol  Tablet 50 milliGRAM(s) Oral daily  chlorhexidine 4% Liquid 1 Application(s) Topical <User Schedule>  dextrose 5%. 1000 milliLiter(s) (50 mL/Hr) IV Continuous <Continuous>  dextrose 50% Injectable 12.5 Gram(s) IV Push once  dextrose 50% Injectable 25 Gram(s) IV Push once  dextrose 50% Injectable 25 Gram(s) IV Push once  enoxaparin Injectable 40 milliGRAM(s) SubCutaneous daily  escitalopram 5 milliGRAM(s) Oral daily  furosemide    Tablet 20 milliGRAM(s) Oral daily  insulin lispro (HumaLOG) corrective regimen sliding scale   SubCutaneous every 6 hours  insulin NPH human recombinant 9 Unit(s) SubCutaneous every 6 hours  levETIRAcetam  Solution 500 milliGRAM(s) Oral two times a day  levothyroxine 88 MICROGram(s) Oral at bedtime  melatonin 3 milliGRAM(s) Oral at bedtime  pantoprazole   Suspension 40 milliGRAM(s) Oral daily  predniSONE   Tablet 40 milliGRAM(s) Oral daily  simvastatin 20 milliGRAM(s) Oral at bedtime    MEDICATIONS  (PRN):  acetaminophen    Suspension .. 650 milliGRAM(s) Oral every 6 hours PRN Severe Pain (7 - 10)  albuterol/ipratropium for Nebulization. 3 milliLiter(s) Nebulizer every 8 hours PRN Shortness of Breath  dextrose 40% Gel 15 Gram(s) Oral once PRN Blood Glucose LESS THAN 70 milliGRAM(s)/deciliter  glucagon  Injectable 1 milliGRAM(s) IntraMuscular once PRN Glucose LESS THAN 70 milligrams/deciliter  OLANZapine Injectable 1.25 milliGRAM(s) IntraMuscular every 8 hours PRN agitation  sodium chloride 0.9% lock flush 10 milliLiter(s) IV Push every 1 hour PRN Pre/post blood products, medications, blood draw, and to maintain line patency      CAPILLARY BLOOD GLUCOSE  POCT Blood Glucose.: 129 mg/dL (09 Dec 2019 12:01)  POCT Blood Glucose.: 114 mg/dL (09 Dec 2019 05:57)  POCT Blood Glucose.: 161 mg/dL (09 Dec 2019 00:54)  POCT Blood Glucose.: 138 mg/dL (08 Dec 2019 18:14)          PHYSICAL EXAM:  Vital Signs Last 24 Hrs  T(F): 98.3 (09 Dec 2019 06:00), Max: 98.4 (08 Dec 2019 21:28)  HR: 69 (09 Dec 2019 10:57) (60 - 74)  BP: 131/71 (09 Dec 2019 06:00) (131/71 - 132/71)  RR: 20 (09 Dec 2019 10:57) (17 - 20)  SpO2: 99% (09 Dec 2019 10:57) (97% - 99%)    CONSTITUTIONAL: NAD  NECK: Supple,  RESPIRATORY: limited exam due to position; dec BS at b/l bases;  CARDIOVASCULAR: Regular rate and rhythm, No lower extremity edema;   ABDOMEN: Nontender to palpation, normoactive bowel sounds, no rebound/guarding; No hepatosplenomegaly  MUSCULOSKELETAL: no clubbing or cyanosis of digits; no joint swelling or tenderness to palpation  PSYCH: confused; smiling  NEUROLOGY: no gross sensory deficits       LABS:             P

## 2019-12-09 NOTE — PROGRESS NOTE ADULT - PROBLEM SELECTOR PLAN 9
BL LE US with unchanged subacute DVT right soleal vein.  -restarted lovenox prophylaxis per NSG (started on 11/16)

## 2019-12-09 NOTE — PROGRESS NOTE ADULT - PROBLEM SELECTOR PLAN 7
- Per chart review on R-CP. s/p 2 cycles. Pt's daughter, Dr. Browne, is interested in inpatient chemo.   - per heme/onc, plan for inpt chemo prior to discharge -> does not seem like pt is optimal candidate for further chemotherapy, at this time

## 2019-12-09 NOTE — PROGRESS NOTE ADULT - SUBJECTIVE AND OBJECTIVE BOX
[facility] [place] [facility]   MD UYEN HERNANDEZ, DO MARYANN REYNOSO, LEATHA NICOLE    TEL:  OFFICE: [phone]  DR CASAREZ CELL: [phone]  DR. HERNANDEZ CELL: [phone]  DR. REYNOSO CELL: [phone]  CHUCKIE TERRAZAS CELL: [phone]        Patient is a 79y old  Male who presents with a chief complaint of AMS (09 Dec 2019 10:16)      Patient seen and examined at bedside. No chest pain/sob    VITALS:  T(F): 98.3 (12-09-19 @ 06:00), Max: 98.4 (12-08-19 @ 21:28)  HR: 69 (12-09-19 @ 10:57)  BP: 131/71 (12-09-19 @ 06:00)  RR: 20 (12-09-19 @ 10:57)  SpO2: 99% (12-09-19 @ 10:57)  Wt(kg): --    12-08 @ 07:01  -  12-09 @ 07:00  --------------------------------------------------------  IN: 2440 mL / OUT: 0 mL / NET: 2440 mL          PHYSICAL EXAM:  Constitutional: still on high flow  Neck: No JVD  Respiratory: left crackles  Cardiovascular: S1, S2, RRR  Gastrointestinal: BS+, soft, NT/ND  Extremities: No peripheral edema    Hospital Medications:   MEDICATIONS  (STANDING):  ATENolol  Tablet 50 milliGRAM(s) Oral daily  chlorhexidine 4% Liquid 1 Application(s) Topical <User Schedule>  dextrose 5%. 1000 milliLiter(s) (50 mL/Hr) IV Continuous <Continuous>  dextrose 50% Injectable 12.5 Gram(s) IV Push once  dextrose 50% Injectable 25 Gram(s) IV Push once  dextrose 50% Injectable 25 Gram(s) IV Push once  enoxaparin Injectable 40 milliGRAM(s) SubCutaneous daily  escitalopram 5 milliGRAM(s) Oral daily  furosemide    Tablet 20 milliGRAM(s) Oral daily  insulin lispro (HumaLOG) corrective regimen sliding scale   SubCutaneous every 6 hours  insulin NPH human recombinant 9 Unit(s) SubCutaneous every 6 hours  levETIRAcetam  Solution 500 milliGRAM(s) Oral two times a day  levothyroxine 88 MICROGram(s) Oral at bedtime  melatonin 3 milliGRAM(s) Oral at bedtime  pantoprazole   Suspension 40 milliGRAM(s) Oral daily  predniSONE   Tablet 40 milliGRAM(s) Oral daily  simvastatin 20 milliGRAM(s) Oral at bedtime      LABS:  12-08    135  |  93<L>  |  44<H>  ----------------------------<  146<H>  5.1   |  36<H>  |  0.95    Ca    8.9      08 Dec 2019 06:30  Phos  3.4     12-08  Mg     2.2     12-08      Creatinine Trend: 0.95 <--, 0.97 <--, 0.88 <--, 0.92 <--, 0.90 <--, 0.92 <--                                7.1    12.12 )-----------( 467      ( 08 Dec 2019 06:30 )             23.1     Urine Studies:  Urinalysis - [11-25-19 @ 18:43]      Color YELLOW / Appearance CLEAR / SG 1.020 / pH 6.0      Gluc NEGATIVE / Ketone NEGATIVE  / Bili NEGATIVE / Urobili TRACE       Blood NEGATIVE / Protein 100 / Leuk Est NEGATIVE / Nitrite NEGATIVE      RBC 6-10 / WBC 0-2 / Hyaline NEGATIVE / Gran  / Sq Epi OCC / Non Sq Epi  / Bacteria NEGATIVE      Iron 38, TIBC 234, %sat --      [09-12-19 @ 06:08]  Ferritin 214.6      [09-12-19 @ 06:08]  PTH 20.00 (Ca --)      [09-03-19 @ 05:45]   --  PTH 25.46 (Ca --)      [09-01-19 @ 06:00]   --  Vitamin D (25OH) 34.9      [09-03-19 @ 05:45]  HbA1c 7.0      [09-13-19 @ 06:50]  TSH 6.55      [12-04-19 @ 05:05]  Lipid: chol 121, TG 96, HDL 33, LDL 74      [08-31-19 @ 07:00]    HBsAb Reactive      [11-23-19 @ 05:25]  HBsAg Nonreactive      [11-23-19 @ 05:25]  HBcAb Reactive      [11-23-19 @ 05:25]  HCV 0.12, Nonreactive Hepatitis C AB  S/CO Ratio                        Interpretation  < 1.00                                   Non-Reactive  1.00 - 4.99                         Weakly-Reactive  >= 5.00                                Reactive  Non-Reactive: Aperson with a non-reactive HCV antibody  result is considered uninfected.  No further action is  needed unless recent infection is suspected.  In these  cases, consider repeat testing later to detect  seroconversion..  Weakly-Reactive: HCV antibody test is abnormal, HCV RNA  Qualitative test will follow.  Reactive: HCV antibody test is abnormal, HCV RNA  Qualitative test will follow.  Note: HCV antibody testing is performed on the Abbott   system.      [11-23-19 @ 05:25]  HIV Nonreactive The HIV Ag/Ab Combo test performed screens for HIV-1 p24  antigen, antibodies to HIV-1 (group M and group O), and  antibodies to HIV-2. All specimens repeatedly reactive  will reflex to an HIV 1/2 antibody confirmation and  differentiation test. This assay detects p24 antigen which  may be present prior to the development of HIV antibodies,  therefore a reactive result with a negative HIV 1/2 AB  Confirmation should be followed up with HIV-1 RNA, HIV-2  RNA and repeat testing in 4-8 weeks. A nonreactive result  does not preclude previous exposure to or infection with  HIV-1 or HIV-2. [facility] prohibits disclosure of this  result to any unauthorized party.      [11-22-19 @ 10:18]      RADIOLOGY & ADDITIONAL STUDIES:

## 2019-12-09 NOTE — PROGRESS NOTE ADULT - PROBLEM SELECTOR PLAN 6
- Severe protein calorie malnutrition. s/p peg placement (11/29)  - glucerna tube feeds   if electrolytes cont to be abnl, will have dietitian re-eval for TF change

## 2019-12-09 NOTE — PROGRESS NOTE ADULT - ASSESSMENT
81 yo M PMHx DLBCL s/p 2 cycles of R-CP, CAD, DM, HTN, BL LE DVT, LUE DVT on prophylactic lovenox, CKD, orthostatic hypotension, and recent admission in November 2019 for SDH after fall, presents for acute encephalopathy and new onset seizures secondary to infection. Course c/b acute respiratory distress 2/2 aspiration pneumonitis, s/p benny (11/28-12/4) for aspiration PNA. s/p peg placement (11/29)

## 2019-12-10 LAB
ALBUMIN SERPL ELPH-MCNC: 2.1 G/DL — LOW (ref 3.3–5)
ANION GAP SERPL CALC-SCNC: 11 MMO/L — SIGNIFICANT CHANGE UP (ref 7–14)
BASOPHILS # BLD AUTO: 0.02 K/UL — SIGNIFICANT CHANGE UP (ref 0–0.2)
BASOPHILS NFR BLD AUTO: 0.2 % — SIGNIFICANT CHANGE UP (ref 0–2)
BUN SERPL-MCNC: 38 MG/DL — HIGH (ref 7–23)
CALCIUM SERPL-MCNC: 7.8 MG/DL — LOW (ref 8.4–10.5)
CHLORIDE SERPL-SCNC: 95 MMOL/L — LOW (ref 98–107)
CO2 SERPL-SCNC: 29 MMOL/L — SIGNIFICANT CHANGE UP (ref 22–31)
CREAT SERPL-MCNC: 0.76 MG/DL — SIGNIFICANT CHANGE UP (ref 0.5–1.3)
EOSINOPHIL # BLD AUTO: 0.08 K/UL — SIGNIFICANT CHANGE UP (ref 0–0.5)
EOSINOPHIL NFR BLD AUTO: 0.7 % — SIGNIFICANT CHANGE UP (ref 0–6)
GLUCOSE BLDC GLUCOMTR-MCNC: 111 MG/DL — HIGH (ref 70–99)
GLUCOSE BLDC GLUCOMTR-MCNC: 116 MG/DL — HIGH (ref 70–99)
GLUCOSE BLDC GLUCOMTR-MCNC: 133 MG/DL — HIGH (ref 70–99)
GLUCOSE BLDC GLUCOMTR-MCNC: 175 MG/DL — HIGH (ref 70–99)
GLUCOSE BLDC GLUCOMTR-MCNC: 215 MG/DL — HIGH (ref 70–99)
GLUCOSE SERPL-MCNC: 113 MG/DL — HIGH (ref 70–99)
HCT VFR BLD CALC: 23.4 % — LOW (ref 39–50)
HCT VFR BLD CALC: 24.6 % — LOW (ref 39–50)
HGB BLD-MCNC: 7.1 G/DL — LOW (ref 13–17)
HGB BLD-MCNC: 7.4 G/DL — LOW (ref 13–17)
IMM GRANULOCYTES NFR BLD AUTO: 7.9 % — HIGH (ref 0–1.5)
LYMPHOCYTES # BLD AUTO: 1.12 K/UL — SIGNIFICANT CHANGE UP (ref 1–3.3)
LYMPHOCYTES # BLD AUTO: 10.3 % — LOW (ref 13–44)
MAGNESIUM SERPL-MCNC: 1.9 MG/DL — SIGNIFICANT CHANGE UP (ref 1.6–2.6)
MCHC RBC-ENTMCNC: 25.9 PG — LOW (ref 27–34)
MCHC RBC-ENTMCNC: 26.5 PG — LOW (ref 27–34)
MCHC RBC-ENTMCNC: 30.1 % — LOW (ref 32–36)
MCHC RBC-ENTMCNC: 30.3 % — LOW (ref 32–36)
MCV RBC AUTO: 86 FL — SIGNIFICANT CHANGE UP (ref 80–100)
MCV RBC AUTO: 87.3 FL — SIGNIFICANT CHANGE UP (ref 80–100)
MONOCYTES # BLD AUTO: 0.91 K/UL — HIGH (ref 0–0.9)
MONOCYTES NFR BLD AUTO: 8.4 % — SIGNIFICANT CHANGE UP (ref 2–14)
NEUTROPHILS # BLD AUTO: 7.87 K/UL — HIGH (ref 1.8–7.4)
NEUTROPHILS NFR BLD AUTO: 72.5 % — SIGNIFICANT CHANGE UP (ref 43–77)
NRBC # FLD: 0.16 K/UL — SIGNIFICANT CHANGE UP (ref 0–0)
NRBC # FLD: 0.19 K/UL — SIGNIFICANT CHANGE UP (ref 0–0)
NRBC FLD-RTO: 1.4 — SIGNIFICANT CHANGE UP
NRBC FLD-RTO: 1.7 — SIGNIFICANT CHANGE UP
PHOSPHATE SERPL-MCNC: 2.1 MG/DL — LOW (ref 2.5–4.5)
PLATELET # BLD AUTO: 420 K/UL — HIGH (ref 150–400)
PLATELET # BLD AUTO: 484 K/UL — HIGH (ref 150–400)
PMV BLD: 11.8 FL — SIGNIFICANT CHANGE UP (ref 7–13)
PMV BLD: 11.9 FL — SIGNIFICANT CHANGE UP (ref 7–13)
POTASSIUM SERPL-MCNC: 4.4 MMOL/L — SIGNIFICANT CHANGE UP (ref 3.5–5.3)
POTASSIUM SERPL-SCNC: 4.4 MMOL/L — SIGNIFICANT CHANGE UP (ref 3.5–5.3)
RBC # BLD: 2.68 M/UL — LOW (ref 4.2–5.8)
RBC # BLD: 2.86 M/UL — LOW (ref 4.2–5.8)
RBC # FLD: 19.8 % — HIGH (ref 10.3–14.5)
RBC # FLD: 20.1 % — HIGH (ref 10.3–14.5)
SODIUM SERPL-SCNC: 135 MMOL/L — SIGNIFICANT CHANGE UP (ref 135–145)
WBC # BLD: 10.86 K/UL — HIGH (ref 3.8–10.5)
WBC # BLD: 11.63 K/UL — HIGH (ref 3.8–10.5)
WBC # FLD AUTO: 10.86 K/UL — HIGH (ref 3.8–10.5)
WBC # FLD AUTO: 11.63 K/UL — HIGH (ref 3.8–10.5)

## 2019-12-10 PROCEDURE — 99232 SBSQ HOSP IP/OBS MODERATE 35: CPT | Mod: GC

## 2019-12-10 PROCEDURE — 99233 SBSQ HOSP IP/OBS HIGH 50: CPT | Mod: GC

## 2019-12-10 PROCEDURE — 99233 SBSQ HOSP IP/OBS HIGH 50: CPT

## 2019-12-10 RX ORDER — FUROSEMIDE 40 MG
20 TABLET ORAL
Refills: 0 | Status: COMPLETED | OUTPATIENT
Start: 2019-12-10 | End: 2019-12-10

## 2019-12-10 RX ADMIN — Medication 20 MILLIGRAM(S): at 23:16

## 2019-12-10 RX ADMIN — HUMAN INSULIN 9 UNIT(S): 100 INJECTION, SUSPENSION SUBCUTANEOUS at 12:50

## 2019-12-10 RX ADMIN — Medication 40 MILLIGRAM(S): at 05:56

## 2019-12-10 RX ADMIN — LEVETIRACETAM 500 MILLIGRAM(S): 250 TABLET, FILM COATED ORAL at 18:14

## 2019-12-10 RX ADMIN — ESCITALOPRAM OXALATE 5 MILLIGRAM(S): 10 TABLET, FILM COATED ORAL at 11:25

## 2019-12-10 RX ADMIN — CHLORHEXIDINE GLUCONATE 1 APPLICATION(S): 213 SOLUTION TOPICAL at 05:56

## 2019-12-10 RX ADMIN — LEVETIRACETAM 500 MILLIGRAM(S): 250 TABLET, FILM COATED ORAL at 05:56

## 2019-12-10 RX ADMIN — HUMAN INSULIN 9 UNIT(S): 100 INJECTION, SUSPENSION SUBCUTANEOUS at 06:23

## 2019-12-10 RX ADMIN — Medication 20 MILLIGRAM(S): at 05:56

## 2019-12-10 RX ADMIN — HUMAN INSULIN 9 UNIT(S): 100 INJECTION, SUSPENSION SUBCUTANEOUS at 18:14

## 2019-12-10 RX ADMIN — Medication 88 MICROGRAM(S): at 23:16

## 2019-12-10 RX ADMIN — SIMVASTATIN 20 MILLIGRAM(S): 20 TABLET, FILM COATED ORAL at 23:16

## 2019-12-10 RX ADMIN — ENOXAPARIN SODIUM 40 MILLIGRAM(S): 100 INJECTION SUBCUTANEOUS at 11:24

## 2019-12-10 RX ADMIN — HUMAN INSULIN 9 UNIT(S): 100 INJECTION, SUSPENSION SUBCUTANEOUS at 02:30

## 2019-12-10 RX ADMIN — Medication 20 MILLIGRAM(S): at 11:24

## 2019-12-10 RX ADMIN — ATENOLOL 50 MILLIGRAM(S): 25 TABLET ORAL at 05:56

## 2019-12-10 RX ADMIN — PANTOPRAZOLE SODIUM 40 MILLIGRAM(S): 20 TABLET, DELAYED RELEASE ORAL at 05:56

## 2019-12-10 RX ADMIN — Medication 2: at 18:14

## 2019-12-10 RX ADMIN — Medication 3 MILLIGRAM(S): at 23:16

## 2019-12-10 NOTE — PROGRESS NOTE ADULT - ASSESSMENT
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 prolonged FRANCES state (? 2/2 prolonged hypercalcemic state) that has now resolved vs. loss of body mass  - Serum Cr is stable, BUn has been elevated likely sec to prednisone   - Avoid nephrotoxins agent  - renal diet  - monitor bmp    Hyponatremia  - presented with Na 122  - work up suggested SIADH  - Serum sodium improved, fluctuates now hypernatremia and hyponatremia  - Pt started on PEG tube feeds.   - now off Na tab and restarted on lasix  - Monitor serum sodium and fluid status closely     HTN  - has Hx HTN but was on midodrine for orthostatic hypotension in the recent past   - Currently BP controlled   - monitor bp    Hypocalcemia  - Has Hx hypercalcemia of malignancy  - low alb corrected alexander is optimal  - monitor    Pl. effusion b/l/ Anasarca   mod effusion, new consolidation seen on ct chest  restarted on lasix 20mg PO daily.  remains on high flow o2. pulm on board, possible thoracentesis tmr  Monitor     Hyperkalemia  hemolyzed specimen  repeat bmp is better   low k diet  monitor

## 2019-12-10 NOTE — CHART NOTE - NSCHARTNOTEFT_GEN_A_CORE
Source: Patient [ ]    Family [ ]     other [x ] RN, chart review.     Malnutrition f/u. Pt is A&Ox0. Collateral obtained from RN. Tube feeds seen running at goal rate of Glucerna 1.2 @ 60 mL/hr. Pt also receiving free water bolus 250 mL q 6. Per RN, No GI distress (nausea/vomiting/diarrhea/constipation.) Per chart, pt presented for acute encephalopathy and new onset seizures secondary to infection. Course c/b acute respiratory distress 2/2 aspiration pneumonitis, s/p benny (11/28-12/4) for aspiration PNA. s/p peg placement (11/29)    Diet, NPO with Tube Feed:   Tube Feeding Modality: Gastrostomy  Glucerna 1.2 Derrell (GLUCERNAR)  Total Volume for 24 Hours (mL): 1440  Continuous  Starting Tube Feed Rate {mL per Hour}: 20  Increase Tube Feed Rate by (mL): 10     Every 6 hours  Until Goal Tube Feed Rate (mL per Hour): 60  Tube Feed Duration (in Hours): 24  Tube Feed Start Time: 12:00 (11-30-19 @ 13:57)    Enteral /Parenteral Nutrition: Provides 1440 mL, 1728 derrell, 86 gm pro   Weight:   12/4: 139.7 pounds   11/29: 139.1 pounds   11/20: 136.4 pounds   11/16: 143 pounds       Edema: 2+ generalized   Pressure Injuries: none noted     __________________ Pertinent Medications__________________   MEDICATIONS  (STANDING):  ATENolol  Tablet 50 milliGRAM(s) Oral daily  chlorhexidine 4% Liquid 1 Application(s) Topical <User Schedule>  dextrose 5%. 1000 milliLiter(s) (50 mL/Hr) IV Continuous <Continuous>  dextrose 50% Injectable 12.5 Gram(s) IV Push once  dextrose 50% Injectable 25 Gram(s) IV Push once  dextrose 50% Injectable 25 Gram(s) IV Push once  enoxaparin Injectable 40 milliGRAM(s) SubCutaneous daily  escitalopram 5 milliGRAM(s) Oral daily  furosemide   Injectable 20 milliGRAM(s) IV Push two times a day  insulin lispro (HumaLOG) corrective regimen sliding scale   SubCutaneous every 6 hours  insulin NPH human recombinant 9 Unit(s) SubCutaneous every 6 hours  levETIRAcetam  Solution 500 milliGRAM(s) Oral two times a day  levothyroxine 88 MICROGram(s) Oral at bedtime  melatonin 3 milliGRAM(s) Oral at bedtime  pantoprazole   Suspension 40 milliGRAM(s) Oral daily  predniSONE   Tablet 40 milliGRAM(s) Oral daily  simvastatin 20 milliGRAM(s) Oral at bedtime    MEDICATIONS  (PRN):  acetaminophen    Suspension .. 650 milliGRAM(s) Oral every 6 hours PRN Severe Pain (7 - 10)  albuterol/ipratropium for Nebulization. 3 milliLiter(s) Nebulizer every 8 hours PRN Shortness of Breath  dextrose 40% Gel 15 Gram(s) Oral once PRN Blood Glucose LESS THAN 70 milliGRAM(s)/deciliter  glucagon  Injectable 1 milliGRAM(s) IntraMuscular once PRN Glucose LESS THAN 70 milligrams/deciliter  OLANZapine Injectable 1.25 milliGRAM(s) IntraMuscular every 8 hours PRN agitation  sodium chloride 0.9% lock flush 10 milliLiter(s) IV Push every 1 hour PRN Pre/post blood products, medications, blood draw, and to maintain line patency      __________________ Pertinent Labs__________________   12-10 Na135 mmol/L Glu 113 mg/dL<H> K+ 4.4 mmol/L Cr  0.76 mg/dL BUN 38 mg/dL<H> 12-10 Phos 2.1 mg/dL<L> 12-10 Alb 2.1 g/dL<L>    CAPILLARY BLOOD GLUCOSE      POCT Blood Glucose.: 111 mg/dL (10 Dec 2019 12:16)  POCT Blood Glucose.: 133 mg/dL (10 Dec 2019 05:59)  POCT Blood Glucose.: 116 mg/dL (10 Dec 2019 01:59)  POCT Blood Glucose.: 120 mg/dL (09 Dec 2019 21:43)  POCT Blood Glucose.: 157 mg/dL (09 Dec 2019 17:11)      Estimated Needs:   [ x] no change since previous assessment  [ ] recalculated:       Previous Nutrition Diagnosis: severe protein calorie malnutrition     Nutrition Diagnosis is [ x] ongoing  [ ] resolved [ ] not applicable       Recommendations:  1. Continue current tube feed regimen: Glucerna  1.2 @ 60 mL/hr x 24 hrs.       Monitoring and Evaluation:     [ x] Tolerance to diet prescription [x ] weights [x ] follow up per protocol  [ ] other:

## 2019-12-10 NOTE — PROGRESS NOTE ADULT - ASSESSMENT
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 Pulmonary service consulted to assist in management of hypoxic respiratory failure requiring NIV; now on HFNC     1. Acute hypoxic respiratory failure - CT chest with new ground glass opacities and dense consolidations in upper lobes bilateral and known bilateral pleural effusions. Overall findings concerning for multifocal pneumonia vs aspiration pneumonitis vs fluid overload. Additionally, patient is high risk for PE with LE distal DVTs as well as upper extremity DVTs. V/Q scan from 11/14 was low probability  - wean HiFlo as tolerated for goal of O2 sat > 92%--currently on 35/40  - Unclear role for IVCF in this patient given upper extremity DVTs; he remains high risk  - Continue prednisone 40mg PO/PEG  for possible pneumonitis for now  - Consider trial of increased diuresis  - Spoke with Dr. Browne (daughter) about possibility of thoracentesis. Oxygenation is improving but still on HFNC. Dr. Browne will be at the bedside tomorrow, 12/11, around 3PM. At that point we will assess his oxygenation and perform thoracentesis if indicated.   -Please hold Lovenox in the AM    Jared Richmond MD, PGY5  Pulmonary and Critical Care Fellow  [phone]

## 2019-12-10 NOTE — PROGRESS NOTE ADULT - SUBJECTIVE AND OBJECTIVE BOX
[facility] Division of Hospital Medicine  Alejandra Rodas MD  Pager [phone]    Patient is a 79y old  Male who presents with a chief complaint of AMS     SUBJECTIVE / OVERNIGHT EVENTS: resting comfortably; smiled at me      MEDICATIONS  (STANDING):  ATENolol  Tablet 50 milliGRAM(s) Oral daily  chlorhexidine 4% Liquid 1 Application(s) Topical <User Schedule>  dextrose 5%. 1000 milliLiter(s) (50 mL/Hr) IV Continuous <Continuous>  dextrose 50% Injectable 12.5 Gram(s) IV Push once  dextrose 50% Injectable 25 Gram(s) IV Push once  dextrose 50% Injectable 25 Gram(s) IV Push once  enoxaparin Injectable 40 milliGRAM(s) SubCutaneous daily  escitalopram 5 milliGRAM(s) Oral daily  furosemide    Tablet 20 milliGRAM(s) Oral daily  insulin lispro (HumaLOG) corrective regimen sliding scale   SubCutaneous every 6 hours  insulin NPH human recombinant 9 Unit(s) SubCutaneous every 6 hours  levETIRAcetam  Solution 500 milliGRAM(s) Oral two times a day  levothyroxine 88 MICROGram(s) Oral at bedtime  melatonin 3 milliGRAM(s) Oral at bedtime  pantoprazole   Suspension 40 milliGRAM(s) Oral daily  predniSONE   Tablet 40 milliGRAM(s) Oral daily  simvastatin 20 milliGRAM(s) Oral at bedtime    MEDICATIONS  (PRN):  acetaminophen    Suspension .. 650 milliGRAM(s) Oral every 6 hours PRN Severe Pain (7 - 10)  albuterol/ipratropium for Nebulization. 3 milliLiter(s) Nebulizer every 8 hours PRN Shortness of Breath  dextrose 40% Gel 15 Gram(s) Oral once PRN Blood Glucose LESS THAN 70 milliGRAM(s)/deciliter  glucagon  Injectable 1 milliGRAM(s) IntraMuscular once PRN Glucose LESS THAN 70 milligrams/deciliter  OLANZapine Injectable 1.25 milliGRAM(s) IntraMuscular every 8 hours PRN agitation  sodium chloride 0.9% lock flush 10 milliLiter(s) IV Push every 1 hour PRN Pre/post blood products, medications, blood draw, and to maintain line patency      CAPILLARY BLOOD GLUCOSE  POCT Blood Glucose.: 133 mg/dL (10 Dec 2019 05:59)  POCT Blood Glucose.: 116 mg/dL (10 Dec 2019 01:59)  POCT Blood Glucose.: 120 mg/dL (09 Dec 2019 21:43)  POCT Blood Glucose.: 157 mg/dL (09 Dec 2019 17:11)  POCT Blood Glucose.: 129 mg/dL (09 Dec 2019 12:01)        PHYSICAL EXAM:  Vital Signs Last 24 Hrs  T(F): 98.4 (10 Dec 2019 05:51), Max: 98.4 (09 Dec 2019 21:45)  HR: 69 (10 Dec 2019 07:29) (65 - 76)  BP: 122/87 (10 Dec 2019 05:51) (118/69 - 122/87)  RR: 18 (10 Dec 2019 07:29) (18 - 20)  SpO2: 99% (10 Dec 2019 07:29) (96% - 99%)    CONSTITUTIONAL: NAD  RESPIRATORY: b/l AE grossly  CARDIOVASCULAR: Regular rate and rhythm  No lower extremity edema  ABDOMEN: Nontender to palpation, normoactive bowel sounds, + PEG  MUSCULOSKELETAL:  no clubbing or cyanosis of digits; no joint swelling or tenderness to palpation  PSYCH: calm, alert  NEUROLOGY:  no gross sensory deficits       LABS:                        7.1    10.86 )-----------( 420      ( 10 Dec 2019 06:15 )             23.4     12-10    135  |  95<L>  |  38<H>  ----------------------------<  113<H>  4.4   |  29  |  0.76    Ca    7.8<L>      10 Dec 2019 06:15  Phos  2.1     12-10  Mg     1.9     12-10    TPro  x   /  Alb  2.1<L>  /  TBili  x   /  DBili  x   /  AST  x   /  ALT  x   /  AlkPhos  x   12-10

## 2019-12-10 NOTE — PROGRESS NOTE ADULT - ATTENDING COMMENTS
Agree with above.  Patient seen and examined. Chart reviewed.    79 year old man with CKD, CAD, recent SDH, DVTs and recently diagnosed diffuse large B cell lymphoma pulmonary service consulted to assist in management of hypoxic respiratory failure    - currently on high flow oxygen  - continue steroids for question of pneumonitis  - continue diuresis    possible thoracentesis 12/11

## 2019-12-10 NOTE — PROGRESS NOTE ADULT - SUBJECTIVE AND OBJECTIVE BOX
INTERVAL HPI/OVERNIGHT EVENTS:  Patient S&E at bedside. On high flow.    VITAL SIGNS:  T(F): 98.2 (12-10-19 @ 11:23)  HR: 69 (12-10-19 @ 11:23)  BP: 102/55 (12-10-19 @ 11:23)  RR: 20 (12-10-19 @ 15:35)  SpO2: 97% (12-10-19 @ 15:35)  Wt(kg): --    PHYSICAL EXAM:  Constitutional: NAD  Eyes: EOMI, sclera non-icteric  Neck: supple, no masses, no JVD  Respiratory: CTA b/l, good air entry b/l  Cardiovascular: RRR, no M/R/G  Gastrointestinal: soft, NTND, no masses palpable, + BS  Extremities: no c/c/e  Neurological: unable      MEDICATIONS  (STANDING):  ATENolol  Tablet 50 milliGRAM(s) Oral daily  chlorhexidine 4% Liquid 1 Application(s) Topical <User Schedule>  dextrose 5%. 1000 milliLiter(s) (50 mL/Hr) IV Continuous <Continuous>  dextrose 50% Injectable 12.5 Gram(s) IV Push once  dextrose 50% Injectable 25 Gram(s) IV Push once  dextrose 50% Injectable 25 Gram(s) IV Push once  enoxaparin Injectable 40 milliGRAM(s) SubCutaneous daily  escitalopram 5 milliGRAM(s) Oral daily  furosemide   Injectable 20 milliGRAM(s) IV Push two times a day  insulin lispro (HumaLOG) corrective regimen sliding scale   SubCutaneous every 6 hours  insulin NPH human recombinant 9 Unit(s) SubCutaneous every 6 hours  levETIRAcetam  Solution 500 milliGRAM(s) Oral two times a day  levothyroxine 88 MICROGram(s) Oral at bedtime  melatonin 3 milliGRAM(s) Oral at bedtime  pantoprazole   Suspension 40 milliGRAM(s) Oral daily  predniSONE   Tablet 40 milliGRAM(s) Oral daily  simvastatin 20 milliGRAM(s) Oral at bedtime    MEDICATIONS  (PRN):  acetaminophen    Suspension .. 650 milliGRAM(s) Oral every 6 hours PRN Severe Pain (7 - 10)  albuterol/ipratropium for Nebulization. 3 milliLiter(s) Nebulizer every 8 hours PRN Shortness of Breath  dextrose 40% Gel 15 Gram(s) Oral once PRN Blood Glucose LESS THAN 70 milliGRAM(s)/deciliter  glucagon  Injectable 1 milliGRAM(s) IntraMuscular once PRN Glucose LESS THAN 70 milligrams/deciliter  OLANZapine Injectable 1.25 milliGRAM(s) IntraMuscular every 8 hours PRN agitation  sodium chloride 0.9% lock flush 10 milliLiter(s) IV Push every 1 hour PRN Pre/post blood products, medications, blood draw, and to maintain line patency      Allergies    No Known Allergies    Intolerances        LABS:                        7.4    11.63 )-----------( 484      ( 10 Dec 2019 13:55 )             24.6     12-10    135  |  95<L>  |  38<H>  ----------------------------<  113<H>  4.4   |  29  |  0.76    Ca    7.8<L>      10 Dec 2019 06:15  Phos  2.1     12-10  Mg     1.9     12-10    TPro  x   /  Alb  2.1<L>  /  TBili  x   /  DBili  x   /  AST  x   /  ALT  x   /  AlkPhos  x   12-10          RADIOLOGY & ADDITIONAL TESTS:  Studies reviewed.    ASSESSMENT & PLAN: INTERVAL HPI/OVERNIGHT EVENTS:  Patient S&E at bedside. More alert today. On high flow.      VITAL SIGNS:  T(F): 98.2 (12-10-19 @ 11:23)  HR: 69 (12-10-19 @ 11:23)  BP: 102/55 (12-10-19 @ 11:23)  RR: 20 (12-10-19 @ 15:35)  SpO2: 97% (12-10-19 @ 15:35)  Wt(kg): --    PHYSICAL EXAM:  Constitutional: NAD  Eyes: EOMI, sclera non-icteric  Neck: supple, no masses, no JVD  Respiratory: CTA anteriorly  Cardiovascular: RRR, no M/R/G  Gastrointestinal: soft, NTND, no masses palpable, + BS  Extremities: no c/c/e  Neurological: unable    MEDICATIONS  (STANDING):  ATENolol  Tablet 50 milliGRAM(s) Oral daily  chlorhexidine 4% Liquid 1 Application(s) Topical <User Schedule>  dextrose 5%. 1000 milliLiter(s) (50 mL/Hr) IV Continuous <Continuous>  dextrose 50% Injectable 12.5 Gram(s) IV Push once  dextrose 50% Injectable 25 Gram(s) IV Push once  dextrose 50% Injectable 25 Gram(s) IV Push once  enoxaparin Injectable 40 milliGRAM(s) SubCutaneous daily  escitalopram 5 milliGRAM(s) Oral daily  furosemide   Injectable 20 milliGRAM(s) IV Push two times a day  insulin lispro (HumaLOG) corrective regimen sliding scale   SubCutaneous every 6 hours  insulin NPH human recombinant 9 Unit(s) SubCutaneous every 6 hours  levETIRAcetam  Solution 500 milliGRAM(s) Oral two times a day  levothyroxine 88 MICROGram(s) Oral at bedtime  melatonin 3 milliGRAM(s) Oral at bedtime  pantoprazole   Suspension 40 milliGRAM(s) Oral daily  predniSONE   Tablet 40 milliGRAM(s) Oral daily  simvastatin 20 milliGRAM(s) Oral at bedtime    MEDICATIONS  (PRN):  acetaminophen    Suspension .. 650 milliGRAM(s) Oral every 6 hours PRN Severe Pain (7 - 10)  albuterol/ipratropium for Nebulization. 3 milliLiter(s) Nebulizer every 8 hours PRN Shortness of Breath  dextrose 40% Gel 15 Gram(s) Oral once PRN Blood Glucose LESS THAN 70 milliGRAM(s)/deciliter  glucagon  Injectable 1 milliGRAM(s) IntraMuscular once PRN Glucose LESS THAN 70 milligrams/deciliter  OLANZapine Injectable 1.25 milliGRAM(s) IntraMuscular every 8 hours PRN agitation  sodium chloride 0.9% lock flush 10 milliLiter(s) IV Push every 1 hour PRN Pre/post blood products, medications, blood draw, and to maintain line patency      Allergies  No Known Allergies    Intolerances        LABS:                        7.4    11.63 )-----------( 484      ( 10 Dec 2019 13:55 )             24.6     12-10    135  |  95<L>  |  38<H>  ----------------------------<  113<H>  4.4   |  29  |  0.76    Ca    7.8<L>      10 Dec 2019 06:15  Phos  2.1     12-10  Mg     1.9     12-10    TPro  x   /  Alb  2.1<L>  /  TBili  x   /  DBili  x   /  AST  x   /  ALT  x   /  AlkPhos  x   12-10          RADIOLOGY & ADDITIONAL TESTS:  Studies reviewed.    ASSESSMENT & PLAN:

## 2019-12-10 NOTE — PROGRESS NOTE ADULT - ATTENDING COMMENTS
79 yo M hx DLBCL (dx 9/2019) s/p 2 cycles of R-CP at [place]  recent admission at [facility] (10/29-11/5) for SDH s/p fall found to have b/l LE and LUE DVT (not on AC) now admitted with AMS and witnessed seizure, found to have aspiration pna, continued respiratory distress.     Today he appears very lucid, able to answer questions and follow commands.  Suspect the continued suspicion for AMS is due to language barrier.    He continues to require hi flow oxygen with possible plan for thoracentesis tomorrow.  Last CXR was on 11/21, Chest CT was on 11/26.    Consider repeat imaging.  He remains on lasix empirically along with prednisone 40 mg for presumed pneumonitis.    Would offer transfusion to see if it would improve his oxygenation.     DLBCL: with improvement on scans after 2 cycles of RCD- last scan chest was 11/26 but abd/pelvis was on 11/14.  Recommend to check his LDH and repeat abdominal imaging before deciding if patient requires any further treatment.

## 2019-12-10 NOTE — PROGRESS NOTE ADULT - ASSESSMENT
81 yo M hx DLBCL (dx 9/2019) s/p 2 cycles of R-CP (last ~4weeks ago) at [place], DM2, recent admission at [facility] (10/29-11/5) for SDH s/p fall found to have b/l LE and LUE DVT (not on AC) p/w AMS and witnessed seizure.  Course c/b aspiration pna, hypoxic respiratory failure on high flow support and s/p PEG tube placement.    1. DLBCL:   - pt was admitted to [facility] in 8-9/2019, was found to be hypercalcemic and CT C/A/P (9/10/19) showed scattered b/l pulmonary nodules, mediastinal lymphadenopathy, retroperitoneal lymphadenopathy, and splenic lesions/enlargement concerning for lymphoma.  LN biopsy consistent with DLBCL, germinal cell type, neg for bcl-2, bcl-6, myc.  Per daughter, no outpatient PET scan and biopsy was done as he was started on treatment right away.  He sees Dr. Jovan Poole at [facility] and was started on Rituxan-Cytoxan-Dex (R-CD). He has received 2 cycles thus far, last cycle ~ 4 weeks ago, and has not been able to f/u due to recent hospitalizations. Reports pt had thoracentesis in the past and fluid was negative for malignant cells.   - CT A/P non contrast now shows treatment response with decrease in size of the spleen and resolution of retroperitoneal adenopathy.  MRI brain with contrast without lesions, SDH stable.  - Pt is currently with poor performance status, mental status waxing and waning, overall not significant improvement since admission.  Also now with aspiration pna, hypoxic respiratory failure on high flow oxygen support.  He currently has low tumor burden and had a great response to his previous chemotherapy treatments.  We will hold off on treatment for now until clinically stable.      3. DVTs: b/l LE (calf) DVTs and LUE DVT  - repeat b/l LE US now shows unchanged subacute DVT  - ppx AC started on 11/6 as per neurosurgery    Will continue to follow.     Sarah Gomez  Hematology Fellow  [phone] 81 yo M hx DLBCL (dx 9/2019) s/p 2 cycles of R-CP (last ~4weeks ago) at [place], DM2, recent admission at [facility] (10/29-11/5) for SDH s/p fall found to have b/l LE and LUE DVT (not on AC) p/w AMS and witnessed seizure.  Hospital course prolonged due to complications of hypoxic respiratory failure on high flow support, aspiration pna, pleural effusion and s/p PEG tube placement.      1. DLBCL:   - pt was admitted to [facility] in 8-9/2019, was found to be hypercalcemic and CT C/A/P (9/10/19) showed scattered b/l pulmonary nodules, mediastinal lymphadenopathy, retroperitoneal lymphadenopathy, and splenic lesions/enlargement concerning for lymphoma.  LN biopsy consistent with DLBCL, germinal cell type, neg for bcl-2, bcl-6, myc.  Per daughter, no outpatient PET scan and biopsy was done as he was started on treatment right away.  He sees Dr. Jovan Poole at [place] and was started on Rituxan-Cytoxan-Dex (R-CD). He has received 2 cycles thus far, last cycle ~ 4 weeks ago, and has not been able to f/u due to recent hospitalizations. Reports pt had thoracentesis in the past and fluid was negative for malignant cells.   - CT A/P non contrast now shows treatment response with decrease in size of the spleen and resolution of retroperitoneal adenopathy.  MRI brain with contrast without lesions, SDH stable.  - Pt is currently with poor performance status, mental status waxing and waning, overall not significant improvement since admission.  Also now with aspiration pna, hypoxic respiratory failure on high flow oxygen support.  He currently has low tumor burden and had a great response to his previous chemotherapy treatments.  We will hold off on treatment for now until clinically stable.      3. DVTs: b/l LE (calf) DVTs and LUE DVT  - repeat b/l LE US now shows unchanged subacute DVT  - ppx AC started on 11/6 as per neurosurgery    Will continue to follow.     Sarha Gomez  Hematology Fellow  [phone] 81 yo M hx DLBCL (dx 9/2019) s/p 2 cycles of R-CP (last ~4weeks ago) at [place], DM2, recent admission at [facility] (10/29-11/5) for SDH s/p fall found to have b/l LE and LUE DVT (not on AC) p/w AMS and witnessed seizure.  Hospital course prolonged due to complications of hypoxic respiratory failure on high flow support, aspiration pna, pleural effusion and s/p PEG tube placement.      #Hypoxic respiratory failure on high flow: aspiration pna, pleural effusion, aspiration pneumonitis, fluid overload  -appreciate pulmonary following  -on lasix and prednisone for poss pneumonitis  -completed abx course  -possible thoracentesis on 12/11 depending on oxygenation status  -of note,  pt had pleural effusions in the past and thoracentesis in the past with fluid negative for malignant cells.     #DLBCL:   - originally, pt was admitted to [facility] in 8-9/2019, was found to be hypercalcemic and CT C/A/P (9/10/19) showed scattered b/l pulmonary nodules, mediastinal lymphadenopathy, retroperitoneal lymphadenopathy, and splenic lesions/enlargement concerning for lymphoma.  LN biopsy consistent with DLBCL, germinal cell type, neg for bcl-2, bcl-6, myc.  Per daughter, no outpatient PET scan and biopsy was done as he was started on treatment right away.  He sees Dr. Jovan Poole at [place] and was started on Rituxan-Cytoxan-Dex (R-CD), last tx 10/2019, and has not been able to f/u due to recent hospitalizations.   - Last CT A/P non contrast 11/15 now shows treatment response with decrease in size of the spleen and resolution of retroperitoneal adenopathy.  MRI brain with contrast without lesions, SDH stable.  - Pt is currently with poor performance status, mental status waxing and waning, overall not significant improvement since admission.  Also now with aspiration pna, hypoxic respiratory failure on high flow oxygen support.  He currently has low tumor burden and had a great response to his previous chemotherapy treatments.  We will hold off on treatment for now until clinically stable.      #DVTs: b/l LE (calf) DVTs and LUE DVT  - repeat b/l LE US showed unchanged subacute DVT  - ppx AC started on 11/6 as per neurosurgery    Will continue to follow.     Sarah Gomez  Hematology Fellow  [phone]

## 2019-12-10 NOTE — PROGRESS NOTE ADULT - PROBLEM SELECTOR PLAN 1
- Hypoxia is likely multifactorial from aspiration pneumonitis, pulmonary edema and pleural effusion, s/p benny (11/28-12/4) and vanc (11/28-11/30) for asp pna  - c/w prednisone 40mg QD for aspiration pneumonitis per pulm recs  - Chest PT, duoneb PRN, lasix 20mg qd  await pulm f/u for poss thoracentesis; if no intervention will attempt again to trial off hi flow

## 2019-12-10 NOTE — PROGRESS NOTE ADULT - SUBJECTIVE AND OBJECTIVE BOX
CHIEF COMPLAINT: AMS    Interval Events:  -NAEO  -Remains on HFNC  -ROS limited by delerium/dementia    REVIEW OF SYSTEMS:  [] All other systems negative  [x ] Unable to assess ROS because mental status    OBJECTIVE:  ICU Vital Signs Last 24 Hrs  T(C): 36.8 (10 Dec 2019 11:23), Max: 36.9 (09 Dec 2019 21:45)  T(F): 98.2 (10 Dec 2019 11:23), Max: 98.4 (09 Dec 2019 21:45)  HR: 69 (10 Dec 2019 11:23) (65 - 76)  BP: 102/55 (10 Dec 2019 11:23) (102/55 - 122/87)  BP(mean): --  ABP: --  ABP(mean): --  RR: 18 (10 Dec 2019 11:23) (17 - 19)  SpO2: 99% (10 Dec 2019 11:23) (96% - 99%)        12-09 @ 07:01  -  12-10 @ 07:00  --------------------------------------------------------  IN: 1850 mL / OUT: 0 mL / NET: 1850 mL    12-10 @ 07:01  -  12-10 @ 12:37  --------------------------------------------------------  IN: 610 mL / OUT: 0 mL / NET: 610 mL      CAPILLARY BLOOD GLUCOSE      POCT Blood Glucose.: 111 mg/dL (10 Dec 2019 12:16)      PHYSICAL EXAM:  General: Mild respiratory distress  Respiratory: b/l upper crackles  Cardiovascular: normal rate, regular rhythm  Gastrointestinal: abdomen soft, non tender, non distended  Extremities: lower extremity edema    HOSPITAL MEDICATIONS:  MEDICATIONS  (STANDING):  ATENolol  Tablet 50 milliGRAM(s) Oral daily  chlorhexidine 4% Liquid 1 Application(s) Topical <User Schedule>  dextrose 5%. 1000 milliLiter(s) (50 mL/Hr) IV Continuous <Continuous>  dextrose 50% Injectable 12.5 Gram(s) IV Push once  dextrose 50% Injectable 25 Gram(s) IV Push once  dextrose 50% Injectable 25 Gram(s) IV Push once  enoxaparin Injectable 40 milliGRAM(s) SubCutaneous daily  escitalopram 5 milliGRAM(s) Oral daily  furosemide   Injectable 20 milliGRAM(s) IV Push two times a day  insulin lispro (HumaLOG) corrective regimen sliding scale   SubCutaneous every 6 hours  insulin NPH human recombinant 9 Unit(s) SubCutaneous every 6 hours  levETIRAcetam  Solution 500 milliGRAM(s) Oral two times a day  levothyroxine 88 MICROGram(s) Oral at bedtime  melatonin 3 milliGRAM(s) Oral at bedtime  pantoprazole   Suspension 40 milliGRAM(s) Oral daily  predniSONE   Tablet 40 milliGRAM(s) Oral daily  simvastatin 20 milliGRAM(s) Oral at bedtime    MEDICATIONS  (PRN):  acetaminophen    Suspension .. 650 milliGRAM(s) Oral every 6 hours PRN Severe Pain (7 - 10)  albuterol/ipratropium for Nebulization. 3 milliLiter(s) Nebulizer every 8 hours PRN Shortness of Breath  dextrose 40% Gel 15 Gram(s) Oral once PRN Blood Glucose LESS THAN 70 milliGRAM(s)/deciliter  glucagon  Injectable 1 milliGRAM(s) IntraMuscular once PRN Glucose LESS THAN 70 milligrams/deciliter  OLANZapine Injectable 1.25 milliGRAM(s) IntraMuscular every 8 hours PRN agitation  sodium chloride 0.9% lock flush 10 milliLiter(s) IV Push every 1 hour PRN Pre/post blood products, medications, blood draw, and to maintain line patency      LABS:                        7.1    10.86 )-----------( 420      ( 10 Dec 2019 06:15 )             23.4     12-10    135  |  95<L>  |  38<H>  ----------------------------<  113<H>  4.4   |  29  |  0.76    Ca    7.8<L>      10 Dec 2019 06:15  Phos  2.1     12-10  Mg     1.9     12-10    TPro  x   /  Alb  2.1<L>  /  TBili  x   /  DBili  x   /  AST  x   /  ALT  x   /  AlkPhos  x   12-10              MICROBIOLOGY:     =============================================================================================  RADIOLOGY:  [ ] Reviewed and interpreted by me

## 2019-12-10 NOTE — PROGRESS NOTE ADULT - ATTENDING COMMENTS
await pulm f/u for recs  will d/w onc for plans, suspect chemo on hold for now  repeat hg, dropped yesterday and repeat OK, dropped again; poss lab error; will cont to monitor

## 2019-12-11 LAB
ANION GAP SERPL CALC-SCNC: 10 MMO/L — SIGNIFICANT CHANGE UP (ref 7–14)
ANION GAP SERPL CALC-SCNC: 9 MMO/L — SIGNIFICANT CHANGE UP (ref 7–14)
APTT BLD: 22.1 SEC — LOW (ref 27.5–36.3)
BUN SERPL-MCNC: 43 MG/DL — HIGH (ref 7–23)
BUN SERPL-MCNC: 43 MG/DL — HIGH (ref 7–23)
CALCIUM SERPL-MCNC: 8.2 MG/DL — LOW (ref 8.4–10.5)
CALCIUM SERPL-MCNC: 8.7 MG/DL — SIGNIFICANT CHANGE UP (ref 8.4–10.5)
CHLORIDE SERPL-SCNC: 90 MMOL/L — LOW (ref 98–107)
CHLORIDE SERPL-SCNC: 92 MMOL/L — LOW (ref 98–107)
CO2 SERPL-SCNC: 32 MMOL/L — HIGH (ref 22–31)
CO2 SERPL-SCNC: 33 MMOL/L — HIGH (ref 22–31)
CREAT SERPL-MCNC: 0.88 MG/DL — SIGNIFICANT CHANGE UP (ref 0.5–1.3)
CREAT SERPL-MCNC: 0.91 MG/DL — SIGNIFICANT CHANGE UP (ref 0.5–1.3)
GLUCOSE BLDC GLUCOMTR-MCNC: 119 MG/DL — HIGH (ref 70–99)
GLUCOSE BLDC GLUCOMTR-MCNC: 133 MG/DL — HIGH (ref 70–99)
GLUCOSE BLDC GLUCOMTR-MCNC: 155 MG/DL — HIGH (ref 70–99)
GLUCOSE BLDC GLUCOMTR-MCNC: 186 MG/DL — HIGH (ref 70–99)
GLUCOSE SERPL-MCNC: 122 MG/DL — HIGH (ref 70–99)
GLUCOSE SERPL-MCNC: 132 MG/DL — HIGH (ref 70–99)
HCT VFR BLD CALC: 25.8 % — LOW (ref 39–50)
HGB BLD-MCNC: 7.8 G/DL — LOW (ref 13–17)
INR BLD: 1.03 — SIGNIFICANT CHANGE UP (ref 0.88–1.17)
MAGNESIUM SERPL-MCNC: 2 MG/DL — SIGNIFICANT CHANGE UP (ref 1.6–2.6)
MAGNESIUM SERPL-MCNC: 2.1 MG/DL — SIGNIFICANT CHANGE UP (ref 1.6–2.6)
MCHC RBC-ENTMCNC: 25.8 PG — LOW (ref 27–34)
MCHC RBC-ENTMCNC: 30.2 % — LOW (ref 32–36)
MCV RBC AUTO: 85.4 FL — SIGNIFICANT CHANGE UP (ref 80–100)
NRBC # FLD: 0.31 K/UL — SIGNIFICANT CHANGE UP (ref 0–0)
NRBC FLD-RTO: 2 — SIGNIFICANT CHANGE UP
PHOSPHATE SERPL-MCNC: 2.4 MG/DL — LOW (ref 2.5–4.5)
PHOSPHATE SERPL-MCNC: 2.5 MG/DL — SIGNIFICANT CHANGE UP (ref 2.5–4.5)
PLATELET # BLD AUTO: 519 K/UL — HIGH (ref 150–400)
PMV BLD: 12 FL — SIGNIFICANT CHANGE UP (ref 7–13)
POTASSIUM SERPL-MCNC: 4.6 MMOL/L — SIGNIFICANT CHANGE UP (ref 3.5–5.3)
POTASSIUM SERPL-MCNC: 5 MMOL/L — SIGNIFICANT CHANGE UP (ref 3.5–5.3)
POTASSIUM SERPL-SCNC: 4.6 MMOL/L — SIGNIFICANT CHANGE UP (ref 3.5–5.3)
POTASSIUM SERPL-SCNC: 5 MMOL/L — SIGNIFICANT CHANGE UP (ref 3.5–5.3)
PROTHROM AB SERPL-ACNC: 11.8 SEC — SIGNIFICANT CHANGE UP (ref 9.8–13.1)
RBC # BLD: 3.02 M/UL — LOW (ref 4.2–5.8)
RBC # FLD: 20.2 % — HIGH (ref 10.3–14.5)
SODIUM SERPL-SCNC: 133 MMOL/L — LOW (ref 135–145)
SODIUM SERPL-SCNC: 133 MMOL/L — LOW (ref 135–145)
WBC # BLD: 15.64 K/UL — HIGH (ref 3.8–10.5)
WBC # FLD AUTO: 15.64 K/UL — HIGH (ref 3.8–10.5)

## 2019-12-11 PROCEDURE — 99232 SBSQ HOSP IP/OBS MODERATE 35: CPT | Mod: GC

## 2019-12-11 PROCEDURE — 99233 SBSQ HOSP IP/OBS HIGH 50: CPT

## 2019-12-11 RX ORDER — FUROSEMIDE 40 MG
20 TABLET ORAL
Refills: 0 | Status: COMPLETED | OUTPATIENT
Start: 2019-12-11 | End: 2019-12-11

## 2019-12-11 RX ADMIN — Medication 2: at 12:04

## 2019-12-11 RX ADMIN — HUMAN INSULIN 9 UNIT(S): 100 INJECTION, SUSPENSION SUBCUTANEOUS at 23:03

## 2019-12-11 RX ADMIN — PANTOPRAZOLE SODIUM 40 MILLIGRAM(S): 20 TABLET, DELAYED RELEASE ORAL at 06:41

## 2019-12-11 RX ADMIN — HUMAN INSULIN 9 UNIT(S): 100 INJECTION, SUSPENSION SUBCUTANEOUS at 12:03

## 2019-12-11 RX ADMIN — HUMAN INSULIN 9 UNIT(S): 100 INJECTION, SUSPENSION SUBCUTANEOUS at 06:40

## 2019-12-11 RX ADMIN — Medication 4: at 00:48

## 2019-12-11 RX ADMIN — CHLORHEXIDINE GLUCONATE 1 APPLICATION(S): 213 SOLUTION TOPICAL at 12:03

## 2019-12-11 RX ADMIN — ATENOLOL 50 MILLIGRAM(S): 25 TABLET ORAL at 06:40

## 2019-12-11 RX ADMIN — LEVETIRACETAM 500 MILLIGRAM(S): 250 TABLET, FILM COATED ORAL at 17:54

## 2019-12-11 RX ADMIN — Medication 2: at 17:54

## 2019-12-11 RX ADMIN — Medication 3 MILLIGRAM(S): at 23:02

## 2019-12-11 RX ADMIN — ESCITALOPRAM OXALATE 5 MILLIGRAM(S): 10 TABLET, FILM COATED ORAL at 12:03

## 2019-12-11 RX ADMIN — SIMVASTATIN 20 MILLIGRAM(S): 20 TABLET, FILM COATED ORAL at 23:02

## 2019-12-11 RX ADMIN — Medication 20 MILLIGRAM(S): at 23:02

## 2019-12-11 RX ADMIN — Medication 20 MILLIGRAM(S): at 10:35

## 2019-12-11 RX ADMIN — LEVETIRACETAM 500 MILLIGRAM(S): 250 TABLET, FILM COATED ORAL at 06:41

## 2019-12-11 RX ADMIN — Medication 40 MILLIGRAM(S): at 06:41

## 2019-12-11 RX ADMIN — HUMAN INSULIN 9 UNIT(S): 100 INJECTION, SUSPENSION SUBCUTANEOUS at 00:49

## 2019-12-11 RX ADMIN — Medication 88 MICROGRAM(S): at 23:02

## 2019-12-11 RX ADMIN — HUMAN INSULIN 9 UNIT(S): 100 INJECTION, SUSPENSION SUBCUTANEOUS at 17:55

## 2019-12-11 NOTE — PROGRESS NOTE ADULT - PROBLEM SELECTOR PLAN 4
- BP has been on softer side.   - c/w atenolol 50mg qd, lasix 20mg IV BID  - holding norvasc 10mg qd

## 2019-12-11 NOTE — PROGRESS NOTE ADULT - ASSESSMENT
81 yo M hx DLBCL (dx 9/2019) s/p 2 cycles of R-CP (last ~4weeks ago) at [facility], DM2, recent admission at [facility] (10/29-11/5) for SDH s/p fall found to have b/l LE and LUE DVT (not on AC) p/w AMS and witnessed seizure.  Hospital course prolonged due to complications of hypoxic respiratory failure on high flow support, aspiration pna, pleural effusion and s/p PEG tube placement.      #Hypoxic respiratory failure on high flow: aspiration pna, pleural effusion, aspiration pneumonitis, fluid overload  -appreciate pulmonary following  -on lasix and prednisone for poss pneumonitis  -completed abx course  -after discussion with daughter Dr. Browne, thoracentesis deferred on 12/11, will continue IV lasix  -of note, pt had pleural effusions in the past and thoracentesis in the past with fluid negative for malignant cells.     #DLBCL:   - originally, pt was admitted to [facility] in 8-9/2019, was found to be hypercalcemic and CT C/A/P (9/10/19) showed scattered b/l pulmonary nodules, mediastinal lymphadenopathy, retroperitoneal lymphadenopathy, and splenic lesions/enlargement concerning for lymphoma.  LN biopsy consistent with DLBCL, germinal cell type, neg for bcl-2, bcl-6, myc.  Per daughter, no outpatient PET scan and biopsy was done as he was started on treatment right away.  He sees Dr. Jovan Poole at [facility] and was started on Rituxan-Cytoxan-Dex (R-CD), last tx 10/2019, and has not been able to f/u due to recent hospitalizations.   - Last CT A/P non contrast 11/15 now shows treatment response with decrease in size of the spleen and resolution of retroperitoneal adenopathy.  MRI brain with contrast without lesions, SDH stable.  - Pt is currently with poor performance status, mental status waxing and waning, overall not significant improvement since admission.      -Last non contrast CT chest was 11/26, non contrast CT A/P noncontrast 11/14.  -Recommend to repeat CT C/A/P if contrast not contraindicated to re-evaluate lymphoma status.      #DVTs: b/l LE (calf) DVTs and LUE DVT  - repeat b/l LE US showed unchanged subacute DVT  - ppx AC started on 11/6 as per neurosurgery    Will continue to follow.     Sarah Gomez  Hematology Fellow  [phone] 79 yo M hx DLBCL (dx 9/2019) s/p 2 cycles of R-CP (last ~4weeks ago) at [facility], DM2, recent admission at [facility] (10/29-11/5) for SDH s/p fall found to have b/l LE and LUE DVT (not on AC) p/w AMS and witnessed seizure.  Hospital course prolonged due to complications of hypoxic respiratory failure on high flow support, aspiration pna, pleural effusion and s/p PEG tube placement.      #Hypoxic respiratory failure on high flow: aspiration pna, pleural effusion, aspiration pneumonitis, fluid overload  -appreciate pulmonary following  -on lasix and prednisone for poss pneumonitis  -completed abx course  -after discussion with daughter Dr. Browne, thoracentesis deferred on 12/11, will continue IV lasix  -of note, pt had pleural effusions in the past and thoracentesis in the past with fluid negative for malignant cells.     #DLBCL:   - originally, pt was admitted to [facility] in 8-9/2019, was found to be hypercalcemic and CT C/A/P (9/10/19) showed scattered b/l pulmonary nodules, mediastinal lymphadenopathy, retroperitoneal lymphadenopathy, and splenic lesions/enlargement concerning for lymphoma.  LN biopsy consistent with DLBCL, germinal cell type, neg for bcl-2, bcl-6, myc.  Per daughter, no outpatient PET scan and biopsy was done as he was started on treatment right away.  He sees Dr. Jovan Poole at [facility] and was started on Rituxan-Cytoxan-Dex (R-CD), last tx 10/2019, and has not been able to f/u due to recent hospitalizations.   - Last CT A/P non contrast 11/15 now shows treatment response with decrease in size of the spleen and resolution of retroperitoneal adenopathy.  MRI brain with contrast without lesions, SDH stable.  - Pt is currently with poor performance status, mental status waxing and waning, overall not significant improvement since admission.      -Last non contrast CT chest was 11/26, non contrast CT A/P noncontrast 11/14.    -Recommend to repeat CT A/P if contrast not contraindicated to re-evaluate lymphoma status.    -check LDH, uric acid    #DVTs: b/l LE (calf) DVTs and LUE DVT  - repeat b/l LE US showed unchanged subacute DVT  - ppx AC started on 11/6 as per neurosurgery    Will continue to follow.     Sarah Gomez  Hematology Fellow  [phone]

## 2019-12-11 NOTE — PROGRESS NOTE ADULT - ASSESSMENT
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 prolonged FRANCES state (? 2/2 prolonged hypercalcemic state) that has now resolved vs. loss of body mass  - Serum Cr is stable, BUn has been elevated likely sec to prednisone   - Avoid nephrotoxins agent  - renal diet  - monitor bmp    Hyponatremia  - presented with Na 122  - work up suggested SIADH  - Serum sodium improved, fluctuates between hypernatremia and hyponatremia  - Pt started on PEG tube feeds.   - now off Na tab and restarted on lasix  - decrease free water to peg 200 q6  - Monitor serum sodium and fluid status closely     HTN  - has Hx HTN but was on midodrine for orthostatic hypotension in the recent past   - Currently BP controlled   - monitor bp    Hypocalcemia  - Has Hx hypercalcemia of malignancy  - low alb corrected alexander is optimal  - monitor    Pl. effusion b/l/ Anasarca   mod effusion, new consolidation seen on ct chest  on lasix 20 bid iv  remains on high flow o2. pulm on board, possible thoracentesis tmr  Monitor     Hyperkalemia  hemolyzed specimen  repeat bmp is better   low k diet  monitor

## 2019-12-11 NOTE — PROGRESS NOTE ADULT - ATTENDING COMMENTS
81 yo M hx DLBCL (dx 9/2019) s/p 2 cycles of R-CP at [facility]  recent admission at [facility] (10/29-11/5) for SDH s/p fall found to have b/l LE and LUE DVT (not on AC) now admitted with AMS and witnessed seizure, found to have aspiration pna, continued respiratory distress.     He continues to be more awake and alert, his daughter at his bedside.   Suspect the continued suspicion for AMS is due to language barrier.    He continues to require hi flow oxygen, pulm to continue prednisone with diuresis.    Consider repeat imaging.   Would offer transfusion to see if it would improve his oxygenation.     DLBCL: with improvement on scans after 2 cycles of RCD- last scan chest was 11/26 but abd/pelvis was on 11/14.  Recommend to check his LDH and repeat abdominal imaging before deciding if patient requires any further treatment.

## 2019-12-11 NOTE — CHART NOTE - NSCHARTNOTEFT_GEN_A_CORE
Please hold AM Lovenox and obtain coags this AM. Plan to meet with family at bedside at 3PM and evaluate for thoracentesis

## 2019-12-11 NOTE — PROGRESS NOTE ADULT - ASSESSMENT
FULL NOTE TO FOLLOW    Patient evaluated at bedside with family. POCUS reveals improved anterior aeration pattern but still with focal B lines. b/l moderate effusions present (L>R) which may be contributing but unlikely to be ultimately responsible for continued hypoxemia. After discussion of R/B/A with family (Dr. Browne), will defer thoracentesis. Would consider trial of IV diuresis and continued serial examinations. 78 yo man with chronic kidney disease, coronary artery disease, recent subdural hematoma, DVTs and 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 Pulmonary service consulted to assist in management of hypoxic respiratory failure requiring NIV; now on HFNC     1. Acute hypoxic respiratory failure - CT chest with new ground glass opacities and dense consolidations in upper lobes bilateral and known bilateral pleural effusions. Overall findings concerning for multifocal pneumonia vs aspiration pneumonitis vs fluid overload. Additionally, patient is high risk for PE with LE distal DVTs as well as upper extremity DVTs. V/Q scan from 11/14 was low probability  - wean HiFlo as tolerated for goal of O2 sat > 92%--currently on 35/40  - Continue prednisone 40mg PO/PEG  for possible pneumonitis for now  - Would attempt IV diuresis to improve overall volume status  - Patient evaluated at bedside with family. POCUS reveals improved anterior aeration pattern but still with focal B lines. b/l moderate effusions present (L>R) which may be contributing but unlikely to be ultimately responsible for continued hypoxemia. After discussion of R/B/A with family (Dr. Browne), will defer thoracentesis. Would consider trial of IV diuresis and continued serial examinations.   - Will follow up    Jared Richmond MD, PGY5  Pulmonary and Critical Care Fellow  [phone]

## 2019-12-11 NOTE — PROGRESS NOTE ADULT - PROBLEM SELECTOR PLAN 1
- Hypoxia is likely multifactorial from aspiration pneumonitis, pulmonary edema and pleural effusion, s/p benny (11/28-12/4) and vanc (11/28-11/30) for asp pna  - c/w prednisone 40mg QD for aspiration pneumonitis per pulm recs  - Chest PT, duoneb PRN, lasix 20mg IV BID for now  monitor bicarb  await pulm f/u for poss thoracentesis;  not lulú titration off hi flow

## 2019-12-11 NOTE — PROGRESS NOTE ADULT - SUBJECTIVE AND OBJECTIVE BOX
[facility] Division of Hospital Medicine  Alejandra Rodas MD  Pager [phone]    Patient is a 79y old  Male who presents with a chief complaint of AMS      SUBJECTIVE / OVERNIGHT EVENTS: sleeping this AM; woke to my voice; smiled at me and went back to sleep; per RN unable to wean from hi flow, desats to 80s      MEDICATIONS  (STANDING):  ATENolol  Tablet 50 milliGRAM(s) Oral daily  chlorhexidine 4% Liquid 1 Application(s) Topical <User Schedule>  dextrose 5%. 1000 milliLiter(s) (50 mL/Hr) IV Continuous <Continuous>  dextrose 50% Injectable 12.5 Gram(s) IV Push once  dextrose 50% Injectable 25 Gram(s) IV Push once  dextrose 50% Injectable 25 Gram(s) IV Push once  enoxaparin Injectable 40 milliGRAM(s) SubCutaneous daily  escitalopram 5 milliGRAM(s) Oral daily  furosemide   Injectable 20 milliGRAM(s) IV Push two times a day  insulin lispro (HumaLOG) corrective regimen sliding scale   SubCutaneous every 6 hours  insulin NPH human recombinant 9 Unit(s) SubCutaneous every 6 hours  levETIRAcetam  Solution 500 milliGRAM(s) Oral two times a day  levothyroxine 88 MICROGram(s) Oral at bedtime  melatonin 3 milliGRAM(s) Oral at bedtime  pantoprazole   Suspension 40 milliGRAM(s) Oral daily  predniSONE   Tablet 40 milliGRAM(s) Oral daily  simvastatin 20 milliGRAM(s) Oral at bedtime    MEDICATIONS  (PRN):  acetaminophen    Suspension .. 650 milliGRAM(s) Oral every 6 hours PRN Severe Pain (7 - 10)  albuterol/ipratropium for Nebulization. 3 milliLiter(s) Nebulizer every 8 hours PRN Shortness of Breath  dextrose 40% Gel 15 Gram(s) Oral once PRN Blood Glucose LESS THAN 70 milliGRAM(s)/deciliter  glucagon  Injectable 1 milliGRAM(s) IntraMuscular once PRN Glucose LESS THAN 70 milligrams/deciliter  OLANZapine Injectable 1.25 milliGRAM(s) IntraMuscular every 8 hours PRN agitation  sodium chloride 0.9% lock flush 10 milliLiter(s) IV Push every 1 hour PRN Pre/post blood products, medications, blood draw, and to maintain line patency      CAPILLARY BLOOD GLUCOSE  POCT Blood Glucose.: 119 mg/dL (11 Dec 2019 06:37)  POCT Blood Glucose.: 215 mg/dL (10 Dec 2019 23:46)  POCT Blood Glucose.: 175 mg/dL (10 Dec 2019 18:01)  POCT Blood Glucose.: 111 mg/dL (10 Dec 2019 12:16)        PHYSICAL EXAM:  Vital Signs Last 24 Hrs  T(F): 98.2 (11 Dec 2019 10:15), Max: 98.2 (11 Dec 2019 06:20)  HR: 74 (11 Dec 2019 10:15) (68 - 75)  BP: 128/70 (11 Dec 2019 10:15) (128/70 - 133/67)  RR: 16 (11 Dec 2019 10:15) (16 - 20)  SpO2: 98% (11 Dec 2019 10:15) (96% - 98%)    CONSTITUTIONAL: NAD  ENMT: Moist oral mucosa  RESPIRATORY: grossly clear ant/lat; no resp distress; lying flat  CARDIOVASCULAR: Regular rate and rhythm; No lower extremity edema;   ABDOMEN: Nontender to palpation, normoactive bowel sounds + PEG, site clean  MUSCULOSKELETAL:  no clubbing or cyanosis of digits; no joint swelling or tenderness to palpation  PSYCH: calm  NEUROLOGY: moves all ext      LABS:                        7.8    15.64 )-----------( 519      ( 11 Dec 2019 06:30 )             25.8     12-11    133<L>  |  90<L>  |  43<H>  ----------------------------<  132<H>  5.0   |  33<H>  |  0.91    Ca    8.7      11 Dec 2019 06:30  Phos  2.4     12-11  Mg     2.0     12-11    TPro  x   /  Alb  2.1<L>  /  TBili  x   /  DBili  x   /  AST  x   /  ALT  x   /  AlkPhos  x   12-10    PT/INR - ( 11 Dec 2019 08:12 )   PT: 11.8 SEC;   INR: 1.03          PTT - ( 11 Dec 2019 08:12 )  PTT:22.1 SEC

## 2019-12-11 NOTE — PROGRESS NOTE ADULT - ATTENDING COMMENTS
mild leukocytosis, trend for now s/p multiple courses of abx; non toxic appearing  hg stable but might give a unit of PRBC if pulm not able to do thora to assist with oxygen saturation

## 2019-12-11 NOTE — PROGRESS NOTE ADULT - ATTENDING COMMENTS
Agree with above.  Patient seen and examined. Chart reviewed.    79 year old man with CKD,CAD, B-cell lymphoma, recent SDH and DVTs acute hypoxemic respiratory failure on supplemental oxygen with decreasing FiO2 clinically and symptomatically improving.    will follow

## 2019-12-11 NOTE — PROGRESS NOTE ADULT - SUBJECTIVE AND OBJECTIVE BOX
CHIEF COMPLAINT: ams    Interval Events:  -No acute events overnight  -On HFNC  -Pleasant this afternoon, no new complaints from daughter    REVIEW OF SYSTEMS:  [x] All other systems negative  [ ] Unable to assess ROS because ________    OBJECTIVE:  ICU Vital Signs Last 24 Hrs  T(C): 36.7 (11 Dec 2019 14:35), Max: 36.8 (11 Dec 2019 06:20)  T(F): 98.1 (11 Dec 2019 14:35), Max: 98.2 (11 Dec 2019 06:20)  HR: 70 (11 Dec 2019 16:56) (68 - 75)  BP: 100/59 (11 Dec 2019 16:56) (100/59 - 133/67)  BP(mean): --  ABP: --  ABP(mean): --  RR: 18 (11 Dec 2019 16:56) (16 - 18)  SpO2: 98% (11 Dec 2019 16:56) (96% - 98%)        12-10 @ 07:01 - 12-11 @ 07:00  --------------------------------------------------------  IN: 2380 mL / OUT: 0 mL / NET: 2380 mL    12-11 @ 07:01  -  12-11 @ 19:00  --------------------------------------------------------  IN: 500 mL / OUT: 0 mL / NET: 500 mL      CAPILLARY BLOOD GLUCOSE      POCT Blood Glucose.: 155 mg/dL (11 Dec 2019 17:32)      PHYSICAL EXAM:  General: Comfortable, pleasant  Respiratory: b/l upper crackles  Cardiovascular: normal rate, regular rhythm  Gastrointestinal: abdomen soft, non tender, non distended  Extremities: lower extremity edema    HOSPITAL MEDICATIONS:  MEDICATIONS  (STANDING):  ATENolol  Tablet 50 milliGRAM(s) Oral daily  chlorhexidine 4% Liquid 1 Application(s) Topical <User Schedule>  dextrose 5%. 1000 milliLiter(s) (50 mL/Hr) IV Continuous <Continuous>  dextrose 50% Injectable 12.5 Gram(s) IV Push once  dextrose 50% Injectable 25 Gram(s) IV Push once  dextrose 50% Injectable 25 Gram(s) IV Push once  enoxaparin Injectable 40 milliGRAM(s) SubCutaneous daily  escitalopram 5 milliGRAM(s) Oral daily  furosemide   Injectable 20 milliGRAM(s) IV Push two times a day  insulin lispro (HumaLOG) corrective regimen sliding scale   SubCutaneous every 6 hours  insulin NPH human recombinant 9 Unit(s) SubCutaneous every 6 hours  levETIRAcetam  Solution 500 milliGRAM(s) Oral two times a day  levothyroxine 88 MICROGram(s) Oral at bedtime  melatonin 3 milliGRAM(s) Oral at bedtime  pantoprazole   Suspension 40 milliGRAM(s) Oral daily  predniSONE   Tablet 40 milliGRAM(s) Oral daily  simvastatin 20 milliGRAM(s) Oral at bedtime    MEDICATIONS  (PRN):  acetaminophen    Suspension .. 650 milliGRAM(s) Oral every 6 hours PRN Severe Pain (7 - 10)  albuterol/ipratropium for Nebulization. 3 milliLiter(s) Nebulizer every 8 hours PRN Shortness of Breath  dextrose 40% Gel 15 Gram(s) Oral once PRN Blood Glucose LESS THAN 70 milliGRAM(s)/deciliter  glucagon  Injectable 1 milliGRAM(s) IntraMuscular once PRN Glucose LESS THAN 70 milligrams/deciliter  OLANZapine Injectable 1.25 milliGRAM(s) IntraMuscular every 8 hours PRN agitation  sodium chloride 0.9% lock flush 10 milliLiter(s) IV Push every 1 hour PRN Pre/post blood products, medications, blood draw, and to maintain line patency      LABS:                        7.8    15.64 )-----------( 519      ( 11 Dec 2019 06:30 )             25.8     12-11    133<L>  |  90<L>  |  43<H>  ----------------------------<  132<H>  5.0   |  33<H>  |  0.91    Ca    8.7      11 Dec 2019 06:30  Phos  2.4     12-11  Mg     2.0     12-11    TPro  x   /  Alb  2.1<L>  /  TBili  x   /  DBili  x   /  AST  x   /  ALT  x   /  AlkPhos  x   12-10    PT/INR - ( 11 Dec 2019 08:12 )   PT: 11.8 SEC;   INR: 1.03          PTT - ( 11 Dec 2019 08:12 )  PTT:22.1 SEC          MICROBIOLOGY:     =============================================================================================  RADIOLOGY:  [ ] Reviewed and interpreted by me    ============================================================================================  Point of Care Ultrasound Findings:  Slight improved anterior aeration pattern but still with focal B lines. b/l moderate effusions present (L>R)

## 2019-12-11 NOTE — PROGRESS NOTE ADULT - SUBJECTIVE AND OBJECTIVE BOX
[facility]   MD UYEN HERNANDEZ, DO MARYANN REYNOSO, LEATHA NICOLE    TEL:  OFFICE: [phone]  DR CASAREZ CELL: [phone]  DR. HERNANDEZ CELL: [phone]  DR. REYNOSO CELL: [phone]  CHUCKIE TERRAZAS CELL: [phone]        Patient is a 79y old  Male who presents with a chief complaint of AMS (11 Dec 2019 11:55)      Patient seen and examined at bedside.     VITALS:  T(F): 98.2 (12-11-19 @ 10:15), Max: 98.2 (12-11-19 @ 06:20)  HR: 70 (12-11-19 @ 11:12)  BP: 128/70 (12-11-19 @ 10:15)  RR: 18 (12-11-19 @ 11:12)  SpO2: 98% (12-11-19 @ 11:12)  Wt(kg): --    12-10 @ 07:01  -  12-11 @ 07:00  --------------------------------------------------------  IN: 2380 mL / OUT: 0 mL / NET: 2380 mL          PHYSICAL EXAM:  Constitutional: still on high flow  Neck: No JVD  Respiratory: slight rhonchi L>R  Cardiovascular: S1, S2, RRR  Gastrointestinal: BS+, soft, NT/ND  Extremities: No peripheral edema    Hospital Medications:   MEDICATIONS  (STANDING):  ATENolol  Tablet 50 milliGRAM(s) Oral daily  chlorhexidine 4% Liquid 1 Application(s) Topical <User Schedule>  dextrose 5%. 1000 milliLiter(s) (50 mL/Hr) IV Continuous <Continuous>  dextrose 50% Injectable 12.5 Gram(s) IV Push once  dextrose 50% Injectable 25 Gram(s) IV Push once  dextrose 50% Injectable 25 Gram(s) IV Push once  enoxaparin Injectable 40 milliGRAM(s) SubCutaneous daily  escitalopram 5 milliGRAM(s) Oral daily  furosemide   Injectable 20 milliGRAM(s) IV Push two times a day  insulin lispro (HumaLOG) corrective regimen sliding scale   SubCutaneous every 6 hours  insulin NPH human recombinant 9 Unit(s) SubCutaneous every 6 hours  levETIRAcetam  Solution 500 milliGRAM(s) Oral two times a day  levothyroxine 88 MICROGram(s) Oral at bedtime  melatonin 3 milliGRAM(s) Oral at bedtime  pantoprazole   Suspension 40 milliGRAM(s) Oral daily  predniSONE   Tablet 40 milliGRAM(s) Oral daily  simvastatin 20 milliGRAM(s) Oral at bedtime      LABS:  12-11    133<L>  |  90<L>  |  43<H>  ----------------------------<  132<H>  5.0   |  33<H>  |  0.91    Ca    8.7      11 Dec 2019 06:30  Phos  2.4     12-11  Mg     2.0     12-11    TPro      /  Alb  2.1<L>  /  TBili      /  DBili      /  AST      /  ALT      /  AlkPhos      12-10    Creatinine Trend: 0.91 <--, 0.76 <--, 0.77 <--, 0.95 <--, 0.97 <--, 0.88 <--, 0.92 <--    Phosphorus Level, Serum: 2.4 mg/dL (12-11 @ 06:30)                              7.8    15.64 )-----------( 519      ( 11 Dec 2019 06:30 )             25.8     Urine Studies:  Urinalysis - [11-25-19 @ 18:43]      Color YELLOW / Appearance CLEAR / SG 1.020 / pH 6.0      Gluc NEGATIVE / Ketone NEGATIVE  / Bili NEGATIVE / Urobili TRACE       Blood NEGATIVE / Protein 100 / Leuk Est NEGATIVE / Nitrite NEGATIVE      RBC 6-10 / WBC 0-2 / Hyaline NEGATIVE / Gran  / Sq Epi OCC / Non Sq Epi  / Bacteria NEGATIVE      Iron 38, TIBC 234, %sat --      [09-12-19 @ 06:08]  Ferritin 214.6      [09-12-19 @ 06:08]  PTH 20.00 (Ca --)      [09-03-19 @ 05:45]   --  PTH 25.46 (Ca --)      [09-01-19 @ 06:00]   --  Vitamin D (25OH) 34.9      [09-03-19 @ 05:45]  HbA1c 7.0      [09-13-19 @ 06:50]  TSH 6.55      [12-04-19 @ 05:05]  Lipid: chol 121, TG 96, HDL 33, LDL 74      [08-31-19 @ 07:00]    HBsAb Reactive      [11-23-19 @ 05:25]  HBsAg Nonreactive      [11-23-19 @ 05:25]  HBcAb Reactive      [11-23-19 @ 05:25]  HCV 0.12, Nonreactive Hepatitis C AB  S/CO Ratio                        Interpretation  < 1.00                                   Non-Reactive  1.00 - 4.99                         Weakly-Reactive  >= 5.00                                Reactive  Non-Reactive: Aperson with a non-reactive HCV antibody  result is considered uninfected.  No further action is  needed unless recent infection is suspected.  In these  cases, consider repeat testing later to detect  seroconversion..  Weakly-Reactive: HCV antibody test is abnormal, HCV RNA  Qualitative test will follow.  Reactive: HCV antibody test is abnormal, HCV RNA  Qualitative test will follow.  Note: HCV antibody testing is performed on the [employer] system.      [11-23-19 @ 05:25]  HIV Nonreactive The HIV Ag/Ab Combo test performed screens for HIV-1 p24  antigen, antibodies to HIV-1 (group M and group O), and  antibodies to HIV-2. All specimens repeatedly reactive  will reflex to an HIV 1/2 antibody confirmation and  differentiation test. This assay detects p24 antigen which  may be present prior to the development of HIV antibodies,  therefore a reactive result with a negative HIV 1/2 AB  Confirmation should be followed up with HIV-1 RNA, HIV-2  RNA and repeat testing in 4-8 weeks. A nonreactive result  does not preclude previous exposure to or infection with  HIV-1 or HIV-2. [facility] prohibits disclosure of this  result to any unauthorized party.      [11-22-19 @ 10:18]      RADIOLOGY & ADDITIONAL STUDIES:

## 2019-12-11 NOTE — PROGRESS NOTE ADULT - SUBJECTIVE AND OBJECTIVE BOX
INTERVAL HPI/OVERNIGHT EVENTS:  Patient S&E at bedside. No o/n events, patient resting comfortably. German speaking.  On high flow.  Working with PT.     VITAL SIGNS:  T(F): 98.1 (12-11-19 @ 14:35)  HR: 70 (12-11-19 @ 16:56  BP: 100/59 (12-11-19 @ 16:56)  RR: 18 (12-11-19 @ 16:56)  SpO2: 98% (12-11-19 @ 16:56)  Wt(kg): --    PHYSICAL EXAM:  Constitutional: NAD  Eyes: EOMI, sclera non-icteric  Neck: supple, no masses, no JVD  Respiratory: CTA b/l, good air entry b/l  Cardiovascular: RRR, no M/R/G  Gastrointestinal: soft, NTND, no masses palpable, + BS, PEG tube  Extremities: no c/c/e  Neurological: unable      MEDICATIONS  (STANDING):  ATENolol  Tablet 50 milliGRAM(s) Oral daily  chlorhexidine 4% Liquid 1 Application(s) Topical <User Schedule>  dextrose 5%. 1000 milliLiter(s) (50 mL/Hr) IV Continuous <Continuous>  dextrose 50% Injectable 12.5 Gram(s) IV Push once  dextrose 50% Injectable 25 Gram(s) IV Push once  dextrose 50% Injectable 25 Gram(s) IV Push once  enoxaparin Injectable 40 milliGRAM(s) SubCutaneous daily  escitalopram 5 milliGRAM(s) Oral daily  furosemide   Injectable 20 milliGRAM(s) IV Push two times a day  insulin lispro (HumaLOG) corrective regimen sliding scale   SubCutaneous every 6 hours  insulin NPH human recombinant 9 Unit(s) SubCutaneous every 6 hours  levETIRAcetam  Solution 500 milliGRAM(s) Oral two times a day  levothyroxine 88 MICROGram(s) Oral at bedtime  melatonin 3 milliGRAM(s) Oral at bedtime  pantoprazole   Suspension 40 milliGRAM(s) Oral daily  predniSONE   Tablet 40 milliGRAM(s) Oral daily  simvastatin 20 milliGRAM(s) Oral at bedtime    MEDICATIONS  (PRN):  acetaminophen    Suspension .. 650 milliGRAM(s) Oral every 6 hours PRN Severe Pain (7 - 10)  albuterol/ipratropium for Nebulization. 3 milliLiter(s) Nebulizer every 8 hours PRN Shortness of Breath  dextrose 40% Gel 15 Gram(s) Oral once PRN Blood Glucose LESS THAN 70 milliGRAM(s)/deciliter  glucagon  Injectable 1 milliGRAM(s) IntraMuscular once PRN Glucose LESS THAN 70 milligrams/deciliter  OLANZapine Injectable 1.25 milliGRAM(s) IntraMuscular every 8 hours PRN agitation  sodium chloride 0.9% lock flush 10 milliLiter(s) IV Push every 1 hour PRN Pre/post blood products, medications, blood draw, and to maintain line patency      Allergies    No Known Allergies    Intolerances        LABS:                        7.8    15.64 )-----------( 519      ( 11 Dec 2019 06:30 )             25.8     12-11    133<L>  |  90<L>  |  43<H>  ----------------------------<  132<H>  5.0   |  33<H>  |  0.91    Ca    8.7      11 Dec 2019 06:30  Phos  2.4     12-11  Mg     2.0     12-11    TPro  x   /  Alb  2.1<L>  /  TBili  x   /  DBili  x   /  AST  x   /  ALT  x   /  AlkPhos  x   12-10    PT/INR - ( 11 Dec 2019 08:12 )   PT: 11.8 SEC;   INR: 1.03          PTT - ( 11 Dec 2019 08:12 )  PTT:22.1 SEC      RADIOLOGY & ADDITIONAL TESTS:  Studies reviewed.    ASSESSMENT & PLAN:

## 2019-12-12 LAB
ANION GAP SERPL CALC-SCNC: 11 MMO/L — SIGNIFICANT CHANGE UP (ref 7–14)
BUN SERPL-MCNC: 43 MG/DL — HIGH (ref 7–23)
CALCIUM SERPL-MCNC: 8.3 MG/DL — LOW (ref 8.4–10.5)
CHLORIDE SERPL-SCNC: 91 MMOL/L — LOW (ref 98–107)
CHOLEST SERPL-MCNC: 124 MG/DL — SIGNIFICANT CHANGE UP (ref 120–199)
CO2 SERPL-SCNC: 30 MMOL/L — SIGNIFICANT CHANGE UP (ref 22–31)
CREAT SERPL-MCNC: 0.9 MG/DL — SIGNIFICANT CHANGE UP (ref 0.5–1.3)
GLUCOSE BLDC GLUCOMTR-MCNC: 153 MG/DL — HIGH (ref 70–99)
GLUCOSE BLDC GLUCOMTR-MCNC: 157 MG/DL — HIGH (ref 70–99)
GLUCOSE BLDC GLUCOMTR-MCNC: 161 MG/DL — HIGH (ref 70–99)
GLUCOSE BLDC GLUCOMTR-MCNC: 181 MG/DL — HIGH (ref 70–99)
GLUCOSE SERPL-MCNC: 149 MG/DL — HIGH (ref 70–99)
HCT VFR BLD CALC: 24.8 % — LOW (ref 39–50)
HDLC SERPL-MCNC: 50 MG/DL — SIGNIFICANT CHANGE UP (ref 35–55)
HGB BLD-MCNC: 7.5 G/DL — LOW (ref 13–17)
LDH SERPL L TO P-CCNC: 369 U/L — HIGH (ref 135–225)
LIPID PNL WITH DIRECT LDL SERPL: 57 MG/DL — SIGNIFICANT CHANGE UP
MAGNESIUM SERPL-MCNC: 2.1 MG/DL — SIGNIFICANT CHANGE UP (ref 1.6–2.6)
MCHC RBC-ENTMCNC: 26.1 PG — LOW (ref 27–34)
MCHC RBC-ENTMCNC: 30.2 % — LOW (ref 32–36)
MCV RBC AUTO: 86.4 FL — SIGNIFICANT CHANGE UP (ref 80–100)
NRBC # FLD: 0.2 K/UL — SIGNIFICANT CHANGE UP (ref 0–0)
NRBC FLD-RTO: 1.9 — SIGNIFICANT CHANGE UP
PLATELET # BLD AUTO: 435 K/UL — HIGH (ref 150–400)
PMV BLD: 11.7 FL — SIGNIFICANT CHANGE UP (ref 7–13)
POTASSIUM SERPL-MCNC: 4.5 MMOL/L — SIGNIFICANT CHANGE UP (ref 3.5–5.3)
POTASSIUM SERPL-SCNC: 4.5 MMOL/L — SIGNIFICANT CHANGE UP (ref 3.5–5.3)
RBC # BLD: 2.87 M/UL — LOW (ref 4.2–5.8)
RBC # FLD: 19.9 % — HIGH (ref 10.3–14.5)
SODIUM SERPL-SCNC: 132 MMOL/L — LOW (ref 135–145)
TRIGL SERPL-MCNC: 140 MG/DL — SIGNIFICANT CHANGE UP (ref 10–149)
URATE SERPL-MCNC: 4.5 MG/DL — SIGNIFICANT CHANGE UP (ref 3.4–8.8)
WBC # BLD: 10.8 K/UL — HIGH (ref 3.8–10.5)
WBC # FLD AUTO: 10.8 K/UL — HIGH (ref 3.8–10.5)

## 2019-12-12 PROCEDURE — 99233 SBSQ HOSP IP/OBS HIGH 50: CPT

## 2019-12-12 RX ORDER — FUROSEMIDE 40 MG
20 TABLET ORAL
Refills: 0 | Status: DISCONTINUED | OUTPATIENT
Start: 2019-12-12 | End: 2019-12-12

## 2019-12-12 RX ADMIN — Medication 2: at 12:11

## 2019-12-12 RX ADMIN — CHLORHEXIDINE GLUCONATE 1 APPLICATION(S): 213 SOLUTION TOPICAL at 12:13

## 2019-12-12 RX ADMIN — LEVETIRACETAM 500 MILLIGRAM(S): 250 TABLET, FILM COATED ORAL at 18:02

## 2019-12-12 RX ADMIN — HUMAN INSULIN 9 UNIT(S): 100 INJECTION, SUSPENSION SUBCUTANEOUS at 23:44

## 2019-12-12 RX ADMIN — HUMAN INSULIN 9 UNIT(S): 100 INJECTION, SUSPENSION SUBCUTANEOUS at 18:01

## 2019-12-12 RX ADMIN — ESCITALOPRAM OXALATE 5 MILLIGRAM(S): 10 TABLET, FILM COATED ORAL at 12:12

## 2019-12-12 RX ADMIN — PANTOPRAZOLE SODIUM 40 MILLIGRAM(S): 20 TABLET, DELAYED RELEASE ORAL at 06:10

## 2019-12-12 RX ADMIN — Medication 40 MILLIGRAM(S): at 06:10

## 2019-12-12 RX ADMIN — Medication 3 MILLIGRAM(S): at 21:55

## 2019-12-12 RX ADMIN — Medication 88 MICROGRAM(S): at 21:55

## 2019-12-12 RX ADMIN — ENOXAPARIN SODIUM 40 MILLIGRAM(S): 100 INJECTION SUBCUTANEOUS at 12:12

## 2019-12-12 RX ADMIN — HUMAN INSULIN 9 UNIT(S): 100 INJECTION, SUSPENSION SUBCUTANEOUS at 12:11

## 2019-12-12 RX ADMIN — SIMVASTATIN 20 MILLIGRAM(S): 20 TABLET, FILM COATED ORAL at 21:55

## 2019-12-12 RX ADMIN — Medication 2: at 23:45

## 2019-12-12 RX ADMIN — ATENOLOL 50 MILLIGRAM(S): 25 TABLET ORAL at 06:10

## 2019-12-12 RX ADMIN — Medication 2: at 18:01

## 2019-12-12 RX ADMIN — LEVETIRACETAM 500 MILLIGRAM(S): 250 TABLET, FILM COATED ORAL at 06:10

## 2019-12-12 RX ADMIN — Medication 2: at 06:11

## 2019-12-12 RX ADMIN — HUMAN INSULIN 9 UNIT(S): 100 INJECTION, SUSPENSION SUBCUTANEOUS at 06:11

## 2019-12-12 NOTE — PROGRESS NOTE ADULT - PROBLEM SELECTOR PLAN 6
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

## 2019-12-12 NOTE — PROGRESS NOTE ADULT - SUBJECTIVE AND OBJECTIVE BOX
[facility] [place] [facility]   MD Anusha Siddiqui D.O. Fatima Sheikh, D.O. Ruoru Wong, PA    From 7 AM - 5 PM:  OFFICE: [phone]  Dr. Radford cell: [phone]  Dr. Townsend cell: [phone]  Dr. Steinberg cell: [phone]  LEATHA Agee cell: [phone]    From 5 PM - 7 AM: Answering Service: [phone]      RENAL FOLLOW UP NOTE  --------------------------------------------------------------------------------  HPI: Pt seen and examined at bedside. Remains on high flow oxygen     PAST HISTORY  --------------------------------------------------------------------------------  No significant changes to PMH, PSH, FHx, SHx, unless otherwise noted    ALLERGIES & MEDICATIONS  --------------------------------------------------------------------------------  Allergies    No Known Allergies    Intolerances      Standing Inpatient Medications  ATENolol  Tablet 50 milliGRAM(s) Oral daily  chlorhexidine 4% Liquid 1 Application(s) Topical <User Schedule>  dextrose 5%. 1000 milliLiter(s) IV Continuous <Continuous>  dextrose 50% Injectable 12.5 Gram(s) IV Push once  dextrose 50% Injectable 25 Gram(s) IV Push once  dextrose 50% Injectable 25 Gram(s) IV Push once  enoxaparin Injectable 40 milliGRAM(s) SubCutaneous daily  escitalopram 5 milliGRAM(s) Oral daily  furosemide   Injectable 20 milliGRAM(s) IV Push two times a day  insulin lispro (HumaLOG) corrective regimen sliding scale   SubCutaneous every 6 hours  insulin NPH human recombinant 9 Unit(s) SubCutaneous every 6 hours  levETIRAcetam  Solution 500 milliGRAM(s) Oral two times a day  levothyroxine 88 MICROGram(s) Oral at bedtime  melatonin 3 milliGRAM(s) Oral at bedtime  pantoprazole   Suspension 40 milliGRAM(s) Oral daily  predniSONE   Tablet 40 milliGRAM(s) Oral daily  simvastatin 20 milliGRAM(s) Oral at bedtime    PRN Inpatient Medications  acetaminophen    Suspension .. 650 milliGRAM(s) Oral every 6 hours PRN  albuterol/ipratropium for Nebulization. 3 milliLiter(s) Nebulizer every 8 hours PRN  dextrose 40% Gel 15 Gram(s) Oral once PRN  glucagon  Injectable 1 milliGRAM(s) IntraMuscular once PRN  OLANZapine Injectable 1.25 milliGRAM(s) IntraMuscular every 8 hours PRN  sodium chloride 0.9% lock flush 10 milliLiter(s) IV Push every 1 hour PRN      REVIEW OF SYSTEMS  --------------------------------------------------------------------------------  General: no fever  CVS: no chest pain  RESP: no sob, no cough  ABD: no abdominal pain  : no dysuria  MSK: no edema     VITALS/PHYSICAL EXAM  --------------------------------------------------------------------------------  T(C): 36.7 (12-12-19 @ 06:06), Max: 36.7 (12-11-19 @ 14:35)  HR: 72 (12-12-19 @ 06:06) (69 - 78)  BP: 126/82 (12-12-19 @ 06:06) (100/59 - 126/82)  RR: 18 (12-12-19 @ 12:46) (18 - 18)  SpO2: 100% (12-12-19 @ 12:46) (97% - 100%)  Wt(kg): --        12-11-19 @ 07:01  -  12-12-19 @ 07:00  --------------------------------------------------------  IN: 1720 mL / OUT: 0 mL / NET: 1720 mL      Physical Exam:  	Gen: NAD on high flow   	HEENT: MMM  	Pulm: CTA B/L (clearing)  	CV: S1S2  	Abd: Soft, +BS  	Ext: No LE edema B/L                      Neuro: Awake   	Skin: Warm and Dry   	    LABS/STUDIES  --------------------------------------------------------------------------------              7.5    10.80 >-----------<  435      [12-12-19 @ 06:30]              24.8     132  |  91  |  43  ----------------------------<  149      [12-12-19 @ 06:30]  4.5   |  30  |  0.90        Ca     8.3     [12-12-19 @ 06:30]      Mg     2.1     [12-12-19 @ 06:30]      Phos  2.5     [12-11-19 @ 22:50]      PT/INR: PT 11.8 , INR 1.03       [12-11-19 @ 08:12]  PTT: 22.1       [12-11-19 @ 08:12]    Uric acid 4.5      [12-12-19 @ 06:30]        [12-12-19 @ 06:30]    Creatinine Trend:  SCr 0.90 [12-12 @ 06:30]  SCr 0.88 [12-11 @ 22:50]  SCr 0.91 [12-11 @ 06:30]  SCr 0.76 [12-10 @ 06:15]  SCr 0.77 [12-09 @ 14:00]    Urinalysis - [11-25-19 @ 18:43]      Color YELLOW / Appearance CLEAR / SG 1.020 / pH 6.0      Gluc NEGATIVE / Ketone NEGATIVE  / Bili NEGATIVE / Urobili TRACE       Blood NEGATIVE / Protein 100 / Leuk Est NEGATIVE / Nitrite NEGATIVE      RBC 6-10 / WBC 0-2 / Hyaline NEGATIVE / Gran  / Sq Epi OCC / Non Sq Epi  / Bacteria NEGATIVE      Iron 38, TIBC 234, %sat --      [09-12-19 @ 06:08]  Ferritin 214.6      [09-12-19 @ 06:08]  PTH 20.00 (Ca --)      [09-03-19 @ 05:45]   --  PTH 25.46 (Ca --)      [09-01-19 @ 06:00]   --  Vitamin D (25OH) 34.9      [09-03-19 @ 05:45]  HbA1c 7.0      [09-13-19 @ 06:50]  TSH 6.55      [12-04-19 @ 05:05]  Lipid: chol 124, , HDL 50, LDL 57      [12-12-19 @ 06:30]    HBsAb Reactive      [11-23-19 @ 05:25]  HBsAg Nonreactive      [11-23-19 @ 05:25]  HBcAb Reactive      [11-23-19 @ 05:25]  HCV 0.12, Nonreactive Hepatitis C AB  S/CO Ratio                        Interpretation  < 1.00                                   Non-Reactive  1.00 - 4.99                         Weakly-Reactive  >= 5.00                                Reactive  Non-Reactive: Aperson with a non-reactive HCV antibody  result is considered uninfected.  No further action is  needed unless recent infection is suspected.  In these  cases, consider repeat testing later to detect  seroconversion..  Weakly-Reactive: HCV antibody test is abnormal, HCV RNA  Qualitative test will follow.  Reactive: HCV antibody test is abnormal, HCV RNA  Qualitative test will follow.  Note: HCV antibody testing is performed on the [employer] system.      [11-23-19 @ 05:25]  HIV Nonreactive The HIV Ag/Ab Combo test performed screens for HIV-1 p24  antigen, antibodies to HIV-1 (group M and group O), and  antibodies to HIV-2. All specimens repeatedly reactive  will reflex to an HIV 1/2 antibody confirmation and  differentiation test. This assay detects p24 antigen which  may be present prior to the development of HIV antibodies,  therefore a reactive result with a negative HIV 1/2 AB  Confirmation should be followed up with HIV-1 RNA, HIV-2  RNA and repeat testing in 4-8 weeks. A nonreactive result  does not preclude previous exposure to or infection with  HIV-1 or HIV-2. [facility] prohibits disclosure of this  result to any unauthorized party.      [11-22-19 @ 10:18]

## 2019-12-12 NOTE — CHART NOTE - NSCHARTNOTEFT_GEN_A_CORE
discussed with Dr Steinberg - may perform ct scan - if ct performed 12/12 - then hold evening dose lasix 12/12 and am dose lasix 12/13.  If ct performed 12/13 then also hold evening dose lasix 12/13

## 2019-12-12 NOTE — PROGRESS NOTE ADULT - PROBLEM SELECTOR PLAN 1
- Hypoxia is likely multifactorial: aspiration pneumonitis, pulmonary edema, pleural effusion  s/p benny (11/28-12/4) and vanc (11/28-11/30) for asp pna  - c/w prednisone 40mg QD for aspiration pneumonitis per pulm recs  - Chest PT, duoneb PRN, lasix 20mg IV BID for now  monitor bicarb  lulú hi flow as toleated with diuresis

## 2019-12-12 NOTE — PROGRESS NOTE ADULT - SUBJECTIVE AND OBJECTIVE BOX
[facility] Division of Hospital Medicine  Alejandra Rodas MD  Pager [phone]    Patient is a 79y old  Male who presents with a chief complaint of AMS       SUBJECTIVE / OVERNIGHT EVENTS: notes from pulm and onc reviewed; will cont for diuresis and repeat CT abd/pelvis to eval disease; pt sleeping comfortably in bed; wakes to voices, smiles at me with a small wave and closes back his eyes      MEDICATIONS  (STANDING):  ATENolol  Tablet 50 milliGRAM(s) Oral daily  chlorhexidine 4% Liquid 1 Application(s) Topical <User Schedule>  dextrose 5%. 1000 milliLiter(s) (50 mL/Hr) IV Continuous <Continuous>  dextrose 50% Injectable 12.5 Gram(s) IV Push once  dextrose 50% Injectable 25 Gram(s) IV Push once  dextrose 50% Injectable 25 Gram(s) IV Push once  enoxaparin Injectable 40 milliGRAM(s) SubCutaneous daily  escitalopram 5 milliGRAM(s) Oral daily  furosemide   Injectable 20 milliGRAM(s) IV Push two times a day  insulin lispro (HumaLOG) corrective regimen sliding scale   SubCutaneous every 6 hours  insulin NPH human recombinant 9 Unit(s) SubCutaneous every 6 hours  levETIRAcetam  Solution 500 milliGRAM(s) Oral two times a day  levothyroxine 88 MICROGram(s) Oral at bedtime  melatonin 3 milliGRAM(s) Oral at bedtime  pantoprazole   Suspension 40 milliGRAM(s) Oral daily  predniSONE   Tablet 40 milliGRAM(s) Oral daily  simvastatin 20 milliGRAM(s) Oral at bedtime    MEDICATIONS  (PRN):  acetaminophen    Suspension .. 650 milliGRAM(s) Oral every 6 hours PRN Severe Pain (7 - 10)  albuterol/ipratropium for Nebulization. 3 milliLiter(s) Nebulizer every 8 hours PRN Shortness of Breath  dextrose 40% Gel 15 Gram(s) Oral once PRN Blood Glucose LESS THAN 70 milliGRAM(s)/deciliter  glucagon  Injectable 1 milliGRAM(s) IntraMuscular once PRN Glucose LESS THAN 70 milligrams/deciliter  OLANZapine Injectable 1.25 milliGRAM(s) IntraMuscular every 8 hours PRN agitation  sodium chloride 0.9% lock flush 10 milliLiter(s) IV Push every 1 hour PRN Pre/post blood products, medications, blood draw, and to maintain line patency      CAPILLARY BLOOD GLUCOSE  POCT Blood Glucose.: 157 mg/dL (12 Dec 2019 06:03)  POCT Blood Glucose.: 133 mg/dL (11 Dec 2019 22:53)  POCT Blood Glucose.: 155 mg/dL (11 Dec 2019 17:32)  POCT Blood Glucose.: 186 mg/dL (11 Dec 2019 11:59)          PHYSICAL EXAM:  Vital Signs Last 24 Hrs  T(F): 98.1 (12 Dec 2019 06:06), Max: 98.1 (11 Dec 2019 14:35)  HR: 72 (12 Dec 2019 06:06) (69 - 78)  BP: 126/82 (12 Dec 2019 06:06) (100/59 - 126/82)  RR: 18 (12 Dec 2019 07:17) (18 - 18)  SpO2: 99% (12 Dec 2019 07:17) (97% - 99%)    CONSTITUTIONAL: NAD  ENMT: Moist oral mucosa  RESPIRATORY: limited exam due to position; grossly b/l AE  CARDIOVASCULAR: Regular rate and rhythm; No lower extremity edema;   ABDOMEN: Nontender to palpation, normoactive bowel sounds, +PEG, site clean  MUSCULOSKELETAL: no clubbing or cyanosis of digits; no joint swelling or tenderness to palpation  PSYCH: pleasant, coop  SKIN: heel booties in place  LABS:                        7.5    10.80 )-----------( 435      ( 12 Dec 2019 06:30 )             24.8     12-12    132<L>  |  91<L>  |  43<H>  ----------------------------<  149<H>  4.5   |  30  |  0.90    Ca    8.3<L>      12 Dec 2019 06:30  Mg     2.1     12-12

## 2019-12-12 NOTE — PROGRESS NOTE ADULT - PROBLEM SELECTOR PLAN 7
- Per chart review on R-CP. s/p 2 cycles.   CT abd/pelvis to eval disease  await results to determine decision regarding inpt chemo

## 2019-12-12 NOTE — PROGRESS NOTE ADULT - ASSESSMENT
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 prolonged FRANCES state (? 2/2 prolonged hypercalcemic state) that has now resolved vs. loss of body mass  - Serum Cr is stable  - BUN has been elevated likely sec to prednisone and protein rich tube feeds   - Avoid nephrotoxins agent  - renal diet  - monitor bmp    Hyponatremia  - presented with Na 122  - work up suggested SIADH  - Serum sodium improved, fluctuates between hypernatremia and hyponatremia  - Pt started on PEG tube feeds.   - now off Na tab and restarted on lasix  - Monitor serum bicarb while on diuretic   - Avoid excessive free water via peg. dose 200 q8  - Monitor serum sodium and fluid status closely     HTN  - has Hx HTN but was on midodrine for orthostatic hypotension in the recent past   - Currently BP controlled   - monitor bp    Hypocalcemia  - Has Hx hypercalcemia of malignancy  - low alb corrected alexander is optimal  - monitor    Pl. effusion b/l/ Anasarca   mod effusion, new consolidation seen on ct chest  on lasix 20 bid iv  remains on high flow o2. 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 prolonged FRANCES state (? 2/2 prolonged hypercalcemic state) that has now resolved vs. loss of body mass  - Serum Cr is stable  - BUN has been elevated likely sec to prednisone and protein rich tube feeds   - If pt planned for CT scan. Hold Lasix and monitor for ADELSO post CT scan   - Avoid nephrotoxins agent  - renal diet  - monitor bmp    Hyponatremia  - presented with Na 122  - work up suggested SIADH  - Serum sodium improved, fluctuates between hypernatremia and hyponatremia  - Pt started on PEG tube feeds.   - now off Na tab and restarted on lasix  - Monitor serum bicarb while on diuretic   - Avoid excessive free water via peg. dose 200 q8  - Monitor serum sodium and fluid status closely     HTN  - has Hx HTN but was on midodrine for orthostatic hypotension in the recent past   - Currently BP controlled   - monitor bp    Hypocalcemia  - Has Hx hypercalcemia of malignancy  - low alb corrected alexander is optimal  - monitor    Pl. effusion b/l/ Anasarca   mod effusion, new consolidation seen on ct chest  on lasix 20 bid iv  remains on high flow o2. pulm on board, possible thoracentesis   Monitor     Hyperkalemia  Resolved   low k diet  monitor

## 2019-12-13 LAB
ANION GAP SERPL CALC-SCNC: 8 MMO/L — SIGNIFICANT CHANGE UP (ref 7–14)
BUN SERPL-MCNC: 39 MG/DL — HIGH (ref 7–23)
CALCIUM SERPL-MCNC: 8.2 MG/DL — LOW (ref 8.4–10.5)
CHLORIDE SERPL-SCNC: 93 MMOL/L — LOW (ref 98–107)
CO2 SERPL-SCNC: 30 MMOL/L — SIGNIFICANT CHANGE UP (ref 22–31)
CREAT SERPL-MCNC: 0.82 MG/DL — SIGNIFICANT CHANGE UP (ref 0.5–1.3)
GLUCOSE BLDC GLUCOMTR-MCNC: 107 MG/DL — HIGH (ref 70–99)
GLUCOSE BLDC GLUCOMTR-MCNC: 141 MG/DL — HIGH (ref 70–99)
GLUCOSE BLDC GLUCOMTR-MCNC: 153 MG/DL — HIGH (ref 70–99)
GLUCOSE SERPL-MCNC: 130 MG/DL — HIGH (ref 70–99)
HCT VFR BLD CALC: 23.5 % — LOW (ref 39–50)
HGB BLD-MCNC: 7.2 G/DL — LOW (ref 13–17)
MAGNESIUM SERPL-MCNC: 2 MG/DL — SIGNIFICANT CHANGE UP (ref 1.6–2.6)
MCHC RBC-ENTMCNC: 26.3 PG — LOW (ref 27–34)
MCHC RBC-ENTMCNC: 30.6 % — LOW (ref 32–36)
MCV RBC AUTO: 85.8 FL — SIGNIFICANT CHANGE UP (ref 80–100)
NRBC # FLD: 0.12 K/UL — SIGNIFICANT CHANGE UP (ref 0–0)
NRBC FLD-RTO: 1 — SIGNIFICANT CHANGE UP
PHOSPHATE SERPL-MCNC: 2 MG/DL — LOW (ref 2.5–4.5)
PLATELET # BLD AUTO: 673 K/UL — HIGH (ref 150–400)
PMV BLD: 11.7 FL — SIGNIFICANT CHANGE UP (ref 7–13)
POTASSIUM SERPL-MCNC: 4.8 MMOL/L — SIGNIFICANT CHANGE UP (ref 3.5–5.3)
POTASSIUM SERPL-SCNC: 4.8 MMOL/L — SIGNIFICANT CHANGE UP (ref 3.5–5.3)
RBC # BLD: 2.74 M/UL — LOW (ref 4.2–5.8)
RBC # FLD: 20.1 % — HIGH (ref 10.3–14.5)
SODIUM SERPL-SCNC: 131 MMOL/L — LOW (ref 135–145)
WBC # BLD: 11.52 K/UL — HIGH (ref 3.8–10.5)
WBC # FLD AUTO: 11.52 K/UL — HIGH (ref 3.8–10.5)

## 2019-12-13 PROCEDURE — 74177 CT ABD & PELVIS W/CONTRAST: CPT | Mod: 26

## 2019-12-13 PROCEDURE — 99233 SBSQ HOSP IP/OBS HIGH 50: CPT | Mod: GC

## 2019-12-13 PROCEDURE — 99233 SBSQ HOSP IP/OBS HIGH 50: CPT

## 2019-12-13 RX ORDER — SENNA PLUS 8.6 MG/1
5 TABLET ORAL AT BEDTIME
Refills: 0 | Status: DISCONTINUED | OUTPATIENT
Start: 2019-12-13 | End: 2019-12-24

## 2019-12-13 RX ADMIN — ATENOLOL 50 MILLIGRAM(S): 25 TABLET ORAL at 05:17

## 2019-12-13 RX ADMIN — CHLORHEXIDINE GLUCONATE 1 APPLICATION(S): 213 SOLUTION TOPICAL at 05:16

## 2019-12-13 RX ADMIN — SIMVASTATIN 20 MILLIGRAM(S): 20 TABLET, FILM COATED ORAL at 22:02

## 2019-12-13 RX ADMIN — ESCITALOPRAM OXALATE 5 MILLIGRAM(S): 10 TABLET, FILM COATED ORAL at 11:26

## 2019-12-13 RX ADMIN — LEVETIRACETAM 500 MILLIGRAM(S): 250 TABLET, FILM COATED ORAL at 18:07

## 2019-12-13 RX ADMIN — ENOXAPARIN SODIUM 40 MILLIGRAM(S): 100 INJECTION SUBCUTANEOUS at 11:26

## 2019-12-13 RX ADMIN — Medication 88 MICROGRAM(S): at 22:02

## 2019-12-13 RX ADMIN — Medication 2: at 18:06

## 2019-12-13 RX ADMIN — Medication 3 MILLIGRAM(S): at 22:02

## 2019-12-13 RX ADMIN — HUMAN INSULIN 9 UNIT(S): 100 INJECTION, SUSPENSION SUBCUTANEOUS at 05:18

## 2019-12-13 RX ADMIN — Medication 40 MILLIGRAM(S): at 05:17

## 2019-12-13 RX ADMIN — SENNA PLUS 5 MILLILITER(S): 8.6 TABLET ORAL at 22:02

## 2019-12-13 RX ADMIN — HUMAN INSULIN 9 UNIT(S): 100 INJECTION, SUSPENSION SUBCUTANEOUS at 11:26

## 2019-12-13 RX ADMIN — HUMAN INSULIN 9 UNIT(S): 100 INJECTION, SUSPENSION SUBCUTANEOUS at 18:07

## 2019-12-13 RX ADMIN — PANTOPRAZOLE SODIUM 40 MILLIGRAM(S): 20 TABLET, DELAYED RELEASE ORAL at 05:17

## 2019-12-13 RX ADMIN — LEVETIRACETAM 500 MILLIGRAM(S): 250 TABLET, FILM COATED ORAL at 05:18

## 2019-12-13 NOTE — PROGRESS NOTE ADULT - SUBJECTIVE AND OBJECTIVE BOX
[facility] Division of Hospital Medicine  Alejandra Rodas MD  Pager [phone]    Patient is a 79y old  Male who presents with a chief complaint of AMS       SUBJECTIVE / OVERNIGHT EVENTS: resting comfortably in bed; NAD      MEDICATIONS  (STANDING):  ATENolol  Tablet 50 milliGRAM(s) Oral daily  chlorhexidine 4% Liquid 1 Application(s) Topical <User Schedule>  dextrose 5%. 1000 milliLiter(s) (50 mL/Hr) IV Continuous <Continuous>  dextrose 50% Injectable 12.5 Gram(s) IV Push once  dextrose 50% Injectable 25 Gram(s) IV Push once  dextrose 50% Injectable 25 Gram(s) IV Push once  enoxaparin Injectable 40 milliGRAM(s) SubCutaneous daily  escitalopram 5 milliGRAM(s) Oral daily  insulin lispro (HumaLOG) corrective regimen sliding scale   SubCutaneous every 6 hours  insulin NPH human recombinant 9 Unit(s) SubCutaneous every 6 hours  levETIRAcetam  Solution 500 milliGRAM(s) Oral two times a day  levothyroxine 88 MICROGram(s) Oral at bedtime  melatonin 3 milliGRAM(s) Oral at bedtime  pantoprazole   Suspension 40 milliGRAM(s) Oral daily  predniSONE   Tablet 40 milliGRAM(s) Oral daily  simvastatin 20 milliGRAM(s) Oral at bedtime    MEDICATIONS  (PRN):  acetaminophen    Suspension .. 650 milliGRAM(s) Oral every 6 hours PRN Severe Pain (7 - 10)  albuterol/ipratropium for Nebulization. 3 milliLiter(s) Nebulizer every 8 hours PRN Shortness of Breath  dextrose 40% Gel 15 Gram(s) Oral once PRN Blood Glucose LESS THAN 70 milliGRAM(s)/deciliter  glucagon  Injectable 1 milliGRAM(s) IntraMuscular once PRN Glucose LESS THAN 70 milligrams/deciliter  OLANZapine Injectable 1.25 milliGRAM(s) IntraMuscular every 8 hours PRN agitation  sodium chloride 0.9% lock flush 10 milliLiter(s) IV Push every 1 hour PRN Pre/post blood products, medications, blood draw, and to maintain line patency      CAPILLARY BLOOD GLUCOSE  POCT Blood Glucose.: 107 mg/dL (13 Dec 2019 11:25)  POCT Blood Glucose.: 141 mg/dL (13 Dec 2019 04:55)  POCT Blood Glucose.: 181 mg/dL (12 Dec 2019 23:42)  POCT Blood Glucose.: 161 mg/dL (12 Dec 2019 17:55)          PHYSICAL EXAM:  Vital Signs Last 24 Hrs  T(F): 98.4 (13 Dec 2019 12:03), Max: 98.4 (13 Dec 2019 12:03)  HR: 72 (13 Dec 2019 12:03) (72 - 85)  BP: 112/72 (13 Dec 2019 12:03) (112/72 - 123/76)  RR: 18 (13 Dec 2019 12:03) (16 - 20)  SpO2: 100% (13 Dec 2019 12:03) (100% - 100%)    CONSTITUTIONAL: NAD  ENMT: Moist oral mucosa  RESPIRATORY: b/l AE ant/lat  CARDIOVASCULAR: Regular rate and rhythm; No lower extremity edema;  ABDOMEN: Nontender to palpation, normoactive bowel sounds, +PEG  MUSCULOSKELETAL: no clubbing or cyanosis of digits; no joint swelling or tenderness to palpation  PSYCH: calm      LABS:                        7.2    11.52 )-----------( 673      ( 13 Dec 2019 04:20 )             23.5     12-13    131<L>  |  93<L>  |  39<H>  ----------------------------<  130<H>  4.8   |  30  |  0.82    Ca    8.2<L>      13 Dec 2019 04:20  Phos  2.0     12-13  Mg     2.0     12-13

## 2019-12-13 NOTE — PROGRESS NOTE ADULT - PROBLEM SELECTOR PLAN 7
- Per chart review on R-CP. s/p 2 cycles.   CT abd/pelvis to eval disease  await results to determine decision regarding inpt chemo  CT stable with respect to spleen, new lesion L upper renal pole; will check UA and renal sono  stool in colon, added senna

## 2019-12-13 NOTE — PROGRESS NOTE ADULT - SUBJECTIVE AND OBJECTIVE BOX
CHIEF COMPLAINT:    Interval Events: No acute events overnight. Titrated Hi Mary down from 40L/50% to 30L/30%    REVIEW OF SYSTEMS:  Constitutional: [ ] negative [ ] fevers [ ] chills [ ] weight loss [ ] weight gain  HEENT: [ ] negative [ ] dry eyes [ ] eye irritation [ ] postnasal drip [ ] nasal congestion  CV: [ ] negative  [ ] chest pain [ ] orthopnea [ ] palpitations [ ] murmur  Resp: [ ] negative [ ] cough [ ] shortness of breath [ ] dyspnea [ ] wheezing [ ] sputum [ ] hemoptysis  GI: [ ] negative [ ] nausea [ ] vomiting [ ] diarrhea [ ] constipation [ ] abd pain [ ] dysphagia   : [ ] negative [ ] dysuria [ ] nocturia [ ] hematuria [ ] increased urinary frequency  Musculoskeletal: [ ] negative [ ] back pain [ ] myalgias [ ] arthralgias [ ] fracture  Skin: [ ] negative [ ] rash [ ] itch  Neurological: [ ] negative [ ] headache [ ] dizziness [ ] syncope [ ] weakness [ ] numbness  Psychiatric: [ ] negative [ ] anxiety [ ] depression  Endocrine: [ ] negative [ ] diabetes [ ] thyroid problem  Hematologic/Lymphatic: [ ] negative [ ] anemia [ ] bleeding problem  Allergic/Immunologic: [ ] negative [ ] itchy eyes [ ] nasal discharge [ ] hives [ ] angioedema  [X] All other systems negative  [ ] Unable to assess ROS because ________    OBJECTIVE:  ICU Vital Signs Last 24 Hrs  T(C): 36.9 (13 Dec 2019 12:03), Max: 36.9 (13 Dec 2019 12:03)  T(F): 98.4 (13 Dec 2019 12:03), Max: 98.4 (13 Dec 2019 12:03)  HR: 75 (13 Dec 2019 15:35) (72 - 85)  BP: 112/72 (13 Dec 2019 12:03) (112/72 - 123/76)  BP(mean): --  ABP: --  ABP(mean): --  RR: 20 (13 Dec 2019 15:35) (16 - 20)  SpO2: 100% (13 Dec 2019 15:35) (100% - 100%)        12-12 @ 07:01  -  12-13 @ 07:00  --------------------------------------------------------  IN: 500 mL / OUT: 0 mL / NET: 500 mL      CAPILLARY BLOOD GLUCOSE      POCT Blood Glucose.: 107 mg/dL (13 Dec 2019 11:25)      PHYSICAL EXAM:  General: Comfortable, pleasant  Respiratory: b/l upper crackles  Cardiovascular: normal rate, regular rhythm  Gastrointestinal: Soft, NTND, BS+  Extremities: b/l [facility] MEDICATIONS:  MEDICATIONS  (STANDING):  ATENolol  Tablet 50 milliGRAM(s) Oral daily  chlorhexidine 4% Liquid 1 Application(s) Topical <User Schedule>  dextrose 5%. 1000 milliLiter(s) (50 mL/Hr) IV Continuous <Continuous>  dextrose 50% Injectable 12.5 Gram(s) IV Push once  dextrose 50% Injectable 25 Gram(s) IV Push once  dextrose 50% Injectable 25 Gram(s) IV Push once  enoxaparin Injectable 40 milliGRAM(s) SubCutaneous daily  escitalopram 5 milliGRAM(s) Oral daily  insulin lispro (HumaLOG) corrective regimen sliding scale   SubCutaneous every 6 hours  insulin NPH human recombinant 9 Unit(s) SubCutaneous every 6 hours  levETIRAcetam  Solution 500 milliGRAM(s) Oral two times a day  levothyroxine 88 MICROGram(s) Oral at bedtime  melatonin 3 milliGRAM(s) Oral at bedtime  pantoprazole   Suspension 40 milliGRAM(s) Oral daily  predniSONE   Tablet 40 milliGRAM(s) Oral daily  senna Syrup 5 milliLiter(s) Oral at bedtime  simvastatin 20 milliGRAM(s) Oral at bedtime    MEDICATIONS  (PRN):  acetaminophen    Suspension .. 650 milliGRAM(s) Oral every 6 hours PRN Severe Pain (7 - 10)  albuterol/ipratropium for Nebulization. 3 milliLiter(s) Nebulizer every 8 hours PRN Shortness of Breath  dextrose 40% Gel 15 Gram(s) Oral once PRN Blood Glucose LESS THAN 70 milliGRAM(s)/deciliter  glucagon  Injectable 1 milliGRAM(s) IntraMuscular once PRN Glucose LESS THAN 70 milligrams/deciliter  OLANZapine Injectable 1.25 milliGRAM(s) IntraMuscular every 8 hours PRN agitation  sodium chloride 0.9% lock flush 10 milliLiter(s) IV Push every 1 hour PRN Pre/post blood products, medications, blood draw, and to maintain line patency      LABS:                        7.2    11.52 )-----------( 673      ( 13 Dec 2019 04:20 )             23.5     Hgb Trend: 7.2<--, 7.5<--, 7.8<--, 7.4<--, 7.1<--  12-13    131<L>  |  93<L>  |  39<H>  ----------------------------<  130<H>  4.8   |  30  |  0.82    Ca    8.2<L>      13 Dec 2019 04:20  Phos  2.0     12-13  Mg     2.0     12-13      Creatinine Trend: 0.82<--, 0.90<--, 0.88<--, 0.91<--, 0.76<--, 0.77<--            MICROBIOLOGY:       RADIOLOGY:  [ ] Reviewed and interpreted by me    PULMONARY FUNCTION TESTS:    EKG:

## 2019-12-13 NOTE — PROGRESS NOTE ADULT - PROBLEM SELECTOR PLAN 1
hgb a1c 7.0, on continuous enteral feeds over 24 hours. On prednisone 40mg daily.  BG well controlled at goal 100-180 mg/dl.  -Continue NPH 9 units q6h (hold dose if feeding held)  -continue humalog mod dose correction scale q6h   -if TF is stopped, please start D10w@30 cc/hour to avoid hypoglycemia and hold NPH

## 2019-12-13 NOTE — PROGRESS NOTE ADULT - ASSESSMENT
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 Found to have new onset seizures and multifocal pneumonia.   Consulted for T2DM on glucerna feeds, hypothyroidism, adrenal insufficiency and hypercalcemia.

## 2019-12-13 NOTE — PROGRESS NOTE ADULT - SUBJECTIVE AND OBJECTIVE BOX
[facility] [place] [facility]ACT[facility]   MD Anusha Siddiqui D.O. Fatima Sheikh, D.O. Ruoru Wong, PA    From 7 AM - 5 PM:  OFFICE: [phone]  Dr. Radford cell: [phone]  Dr. Townsend cell: [phone]  Dr. Steinberg cell: [phone]  LEATHA Agee cell: [phone]    From 5 PM - 7 AM: Answering Service: [phone]      RENAL FOLLOW UP NOTE  --------------------------------------------------------------------------------  HPI: Pt seen and examined at bedside. Remains on high flow o2     PAST HISTORY  --------------------------------------------------------------------------------  No significant changes to PMH, PSH, FHx, SHx, unless otherwise noted    ALLERGIES & MEDICATIONS  --------------------------------------------------------------------------------  Allergies    No Known Allergies    Intolerances      Standing Inpatient Medications  ATENolol  Tablet 50 milliGRAM(s) Oral daily  chlorhexidine 4% Liquid 1 Application(s) Topical <User Schedule>  dextrose 5%. 1000 milliLiter(s) IV Continuous <Continuous>  dextrose 50% Injectable 12.5 Gram(s) IV Push once  dextrose 50% Injectable 25 Gram(s) IV Push once  dextrose 50% Injectable 25 Gram(s) IV Push once  enoxaparin Injectable 40 milliGRAM(s) SubCutaneous daily  escitalopram 5 milliGRAM(s) Oral daily  insulin lispro (HumaLOG) corrective regimen sliding scale   SubCutaneous every 6 hours  insulin NPH human recombinant 9 Unit(s) SubCutaneous every 6 hours  levETIRAcetam  Solution 500 milliGRAM(s) Oral two times a day  levothyroxine 88 MICROGram(s) Oral at bedtime  melatonin 3 milliGRAM(s) Oral at bedtime  pantoprazole   Suspension 40 milliGRAM(s) Oral daily  predniSONE   Tablet 40 milliGRAM(s) Oral daily  senna Syrup 5 milliLiter(s) Oral at bedtime  simvastatin 20 milliGRAM(s) Oral at bedtime    PRN Inpatient Medications  acetaminophen    Suspension .. 650 milliGRAM(s) Oral every 6 hours PRN  albuterol/ipratropium for Nebulization. 3 milliLiter(s) Nebulizer every 8 hours PRN  dextrose 40% Gel 15 Gram(s) Oral once PRN  glucagon  Injectable 1 milliGRAM(s) IntraMuscular once PRN  OLANZapine Injectable 1.25 milliGRAM(s) IntraMuscular every 8 hours PRN  sodium chloride 0.9% lock flush 10 milliLiter(s) IV Push every 1 hour PRN      REVIEW OF SYSTEMS  --------------------------------------------------------------------------------  Unable to obtain     VITALS/PHYSICAL EXAM  --------------------------------------------------------------------------------  T(C): 36.9 (12-13-19 @ 12:03), Max: 36.9 (12-13-19 @ 12:03)  HR: 75 (12-13-19 @ 15:35) (72 - 85)  BP: 112/72 (12-13-19 @ 12:03) (112/72 - 123/76)  RR: 20 (12-13-19 @ 15:35) (16 - 20)  SpO2: 100% (12-13-19 @ 15:35) (100% - 100%)  Wt(kg): --        12-12-19 @ 07:01  -  12-13-19 @ 07:00  --------------------------------------------------------  IN: 500 mL / OUT: 0 mL / NET: 500 mL      Physical Exam:  	Gen: NAD on high flow   	HEENT: MMM  	Pulm: CTA B/L  	CV: S1S2  	Abd: Soft, +BS  	Ext: No LE edema B/L                      Neuro: Awake   	Skin: Warm and Dry   	    LABS/STUDIES  --------------------------------------------------------------------------------              7.2    11.52 >-----------<  673      [12-13-19 @ 04:20]              23.5     131  |  93  |  39  ----------------------------<  130      [12-13-19 @ 04:20]  4.8   |  30  |  0.82        Ca     8.2     [12-13-19 @ 04:20]      Mg     2.0     [12-13-19 @ 04:20]      Phos  2.0     [12-13-19 @ 04:20]    Uric acid 4.5      [12-12-19 @ 06:30]        [12-12-19 @ 06:30]    Creatinine Trend:  SCr 0.82 [12-13 @ 04:20]  SCr 0.90 [12-12 @ 06:30]  SCr 0.88 [12-11 @ 22:50]  SCr 0.91 [12-11 @ 06:30]  SCr 0.76 [12-10 @ 06:15]    Urinalysis - [11-25-19 @ 18:43]      Color YELLOW / Appearance CLEAR / SG 1.020 / pH 6.0      Gluc NEGATIVE / Ketone NEGATIVE  / Bili NEGATIVE / Urobili TRACE       Blood NEGATIVE / Protein 100 / Leuk Est NEGATIVE / Nitrite NEGATIVE      RBC 6-10 / WBC 0-2 / Hyaline NEGATIVE / Gran  / Sq Epi OCC / Non Sq Epi  / Bacteria NEGATIVE      Iron 38, TIBC 234, %sat --      [09-12-19 @ 06:08]  Ferritin 214.6      [09-12-19 @ 06:08]  PTH 20.00 (Ca --)      [09-03-19 @ 05:45]   --  PTH 25.46 (Ca --)      [09-01-19 @ 06:00]   --  Vitamin D (25OH) 34.9      [09-03-19 @ 05:45]  HbA1c 7.0      [09-13-19 @ 06:50]  TSH 6.55      [12-04-19 @ 05:05]  Lipid: chol 124, , HDL 50, LDL 57      [12-12-19 @ 06:30]    HBsAb Reactive      [11-23-19 @ 05:25]  HBsAg Nonreactive      [11-23-19 @ 05:25]  HBcAb Reactive      [11-23-19 @ 05:25]  HCV 0.12, Nonreactive Hepatitis C AB  S/CO Ratio                        Interpretation  < 1.00                                   Non-Reactive  1.00 - 4.99                         Weakly-Reactive  >= 5.00                                Reactive  Non-Reactive: Aperson with a non-reactive HCV antibody  result is considered uninfected.  No further action is  needed unless recent infection is suspected.  In these  cases, consider repeat testing later to detect  seroconversion..  Weakly-Reactive: HCV antibody test is abnormal, HCV RNA  Qualitative test will follow.  Reactive: HCV antibody test is abnormal, HCV RNA  Qualitative test will follow.  Note: HCV antibody testing is performed on the [employer] [employer] system.      [11-23-19 @ 05:25]  HIV Nonreactive The HIV Ag/Ab Combo test performed screens for HIV-1 p24  antigen, antibodies to HIV-1 (group M and group O), and  antibodies to HIV-2. All specimens repeatedly reactive  will reflex to an HIV 1/2 antibody confirmation and  differentiation test. This assay detects p24 antigen which  may be present prior to the development of HIV antibodies,  therefore a reactive result with a negative HIV 1/2 AB  Confirmation should be followed up with HIV-1 RNA, HIV-2  RNA and repeat testing in 4-8 weeks. A nonreactive result  does not preclude previous exposure to or infection with  HIV-1 or HIV-2. [facility] prohibits disclosure of this  result to any unauthorized party.      [11-22-19 @ 10:18]

## 2019-12-13 NOTE — PROGRESS NOTE ADULT - ASSESSMENT
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 prolonged FRANCES state (? 2/2 prolonged hypercalcemic state) that has now resolved vs. loss of body mass  - Serum Cr is stable  - BUN has been elevated likely sec to prednisone and protein rich tube feeds   - s/p CT scan with IV contrast. Held Lasix. monitor for ADELSO post CT scan. can restart tmrw as needed   - Avoid nephrotoxins agent  - renal diet  - monitor bmp    Hyponatremia  - admitted with Na 122  - work up suggested SIADH  - Serum sodium improved, fluctuates between hypernatremia and hyponatremia  - Pt started on PEG tube feeds.   - now off Na tab and restarted on lasix  - Serum sodium stable   - Monitor serum bicarb while on diuretic   - Avoid excessive free water via peg. dose 200 q8  - Monitor serum sodium and fluid status closely     HTN  - has Hx HTN but was on midodrine for orthostatic hypotension in the recent past   - Currently BP controlled   - monitor bp    Hypocalcemia  - Has Hx hypercalcemia of malignancy  - low alb corrected alexander is optimal  - monitor    Pl. effusion b/l/ Anasarca   mod effusion,  on ct chest  on lasix 20 bid iv. held but can be restarted as needed  remains on high flow o2. pulm on board  Monitor     Hyperkalemia  Resolved   low k diet  monitor

## 2019-12-13 NOTE — PROGRESS NOTE ADULT - ATTENDING COMMENTS
Agree with above.  Patient seen and examined. Chart reviewed.    79 year old man with CKD,CAD, B-cell lymphoma, recent SDH and DVTs acute hypoxemic respiratory failure on supplemental oxygen with clinically and symptomatically improving.  continue to titrate down oxygen as tolerated currently saturation 95% on 30L and 35%    will follow

## 2019-12-13 NOTE — PROGRESS NOTE ADULT - PROBLEM SELECTOR PLAN 1
- Hypoxia is likely multifactorial: aspiration pneumonitis, pulmonary edema, pleural effusion  s/p benny (11/28-12/4) and vanc (11/28-11/30) for asp pna  - c/w prednisone 40mg QD for aspiration pneumonitis per pulm recs  - Chest PT, duoneb PRN, lasix 20mg IV BID for now  monitor bicarb  lulú hi flow as toleated with diuresis  pulm to eval again today with POCUS

## 2019-12-13 NOTE — PROGRESS NOTE ADULT - SUBJECTIVE AND OBJECTIVE BOX
Chief Complaint: DM2    History: Continues on enteral feeds Glucerna @ 60cc/hour x 24 hours  Per staff patient has been calm  No hypoglycemia noted    MEDICATIONS  (STANDING):  ATENolol  Tablet 50 milliGRAM(s) Oral daily  chlorhexidine 4% Liquid 1 Application(s) Topical <User Schedule>  dextrose 5%. 1000 milliLiter(s) (50 mL/Hr) IV Continuous <Continuous>  dextrose 50% Injectable 12.5 Gram(s) IV Push once  dextrose 50% Injectable 25 Gram(s) IV Push once  dextrose 50% Injectable 25 Gram(s) IV Push once  enoxaparin Injectable 40 milliGRAM(s) SubCutaneous daily  escitalopram 5 milliGRAM(s) Oral daily  insulin lispro (HumaLOG) corrective regimen sliding scale   SubCutaneous every 6 hours  insulin NPH human recombinant 9 Unit(s) SubCutaneous every 6 hours  levETIRAcetam  Solution 500 milliGRAM(s) Oral two times a day  levothyroxine 88 MICROGram(s) Oral at bedtime  melatonin 3 milliGRAM(s) Oral at bedtime  pantoprazole   Suspension 40 milliGRAM(s) Oral daily  predniSONE   Tablet 40 milliGRAM(s) Oral daily  senna Syrup 5 milliLiter(s) Oral at bedtime  simvastatin 20 milliGRAM(s) Oral at bedtime    MEDICATIONS  (PRN):  acetaminophen    Suspension .. 650 milliGRAM(s) Oral every 6 hours PRN Severe Pain (7 - 10)  albuterol/ipratropium for Nebulization. 3 milliLiter(s) Nebulizer every 8 hours PRN Shortness of Breath  dextrose 40% Gel 15 Gram(s) Oral once PRN Blood Glucose LESS THAN 70 milliGRAM(s)/deciliter  glucagon  Injectable 1 milliGRAM(s) IntraMuscular once PRN Glucose LESS THAN 70 milligrams/deciliter  OLANZapine Injectable 1.25 milliGRAM(s) IntraMuscular every 8 hours PRN agitation  sodium chloride 0.9% lock flush 10 milliLiter(s) IV Push every 1 hour PRN Pre/post blood products, medications, blood draw, and to maintain line patency      Allergies    No Known Allergies    Intolerances      Review of Systems:    UNABLE TO OBTAIN    PHYSICAL EXAM:  VITALS: T(C): 36.9 (12-13-19 @ 12:03)  T(F): 98.4 (12-13-19 @ 12:03), Max: 98.4 (12-13-19 @ 12:03)  HR: 72 (12-13-19 @ 12:03) (72 - 85)  BP: 112/72 (12-13-19 @ 12:03) (112/72 - 123/76)  RR:  (16 - 20)  SpO2:  (100% - 100%)  Wt(kg): --  GENERAL: NAD, comfortable  EYES: No proptosis, anicteric  HEENT:  Atraumatic, Normocephalic, moist mucous membranes  RESPIRATORY: nonlabored respirations  GI: PEG with feeds infusing      CAPILLARY BLOOD GLUCOSE      POCT Blood Glucose.: 107 mg/dL (13 Dec 2019 11:25)  POCT Blood Glucose.: 141 mg/dL (13 Dec 2019 04:55)  POCT Blood Glucose.: 181 mg/dL (12 Dec 2019 23:42)  POCT Blood Glucose.: 161 mg/dL (12 Dec 2019 17:55)      12-13    131<L>  |  93<L>  |  39<H>  ----------------------------<  130<H>  4.8   |  30  |  0.82    EGFR if : 97  EGFR if non : 84    Ca    8.2<L>      12-13  Mg     2.0     12-13  Phos  2.0     12-13            Thyroid Function Tests:  12-04 @ 05:05 TSH 6.55 FreeT4 1.07 T3 -- Anti TPO -- Anti Thyroglobulin Ab -- TSI --  11-14 @ 14:10 TSH 12.33 FreeT4 0.99 T3 -- Anti TPO -- Anti Thyroglobulin Ab -- TSI --

## 2019-12-13 NOTE — PROGRESS NOTE ADULT - ASSESSMENT
Patient is a 78 yo M/ CKD, CAD, recent SDH, DVTs and recently diagnosed DLBCL s/ 2 cycles of chemo admitted for encephalopathy and concern for seizures. Pulmonary service consulted to assist in management of hypoxic respiratory failure requiring NIV; now on HFNC     1. Acute hypoxic respiratory failure - CT chest with new ground glass opacities and dense consolidations in upper lobes bilateral and known bilateral pleural effusions. Overall findings concerning for multifocal pneumonia vs aspiration pneumonitis vs fluid overload. Additionally, patient is high risk for PE with LE distal DVTs as well as upper extremity DVTs. V/Q scan from 11/14 was low probability. HiFlo titrated down today to 30%/30L. POCUS with scattered B lines anteriorly and b/l small/moderate pleural effusions  - c/w supplemental O2 to keep SO2 > 90%, can likely try to titrate to nasal cannula tomorrow  - c/w prednisone 40mg PO/PEG  for possible pneumonitis for now  - Would attempt IV diuresis as tolerated    Jorge Agee MD  Pulmonary & Critical Care Fellow  [phone]

## 2019-12-13 NOTE — PROGRESS NOTE ADULT - PROBLEM SELECTOR PLAN 2
Calcium corrected to 9.7 improved.  elevated 1,25 vitamin D. 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   -currently calcium is stable, improved with steroids

## 2019-12-14 LAB
ANION GAP SERPL CALC-SCNC: 10 MMO/L — SIGNIFICANT CHANGE UP (ref 7–14)
APPEARANCE UR: CLEAR — SIGNIFICANT CHANGE UP
BACTERIA # UR AUTO: NEGATIVE — SIGNIFICANT CHANGE UP
BILIRUB UR-MCNC: NEGATIVE — SIGNIFICANT CHANGE UP
BLOOD UR QL VISUAL: NEGATIVE — SIGNIFICANT CHANGE UP
BUN SERPL-MCNC: 34 MG/DL — HIGH (ref 7–23)
CALCIUM SERPL-MCNC: 8.2 MG/DL — LOW (ref 8.4–10.5)
CHLORIDE SERPL-SCNC: 91 MMOL/L — LOW (ref 98–107)
CO2 SERPL-SCNC: 27 MMOL/L — SIGNIFICANT CHANGE UP (ref 22–31)
COLOR SPEC: SIGNIFICANT CHANGE UP
CREAT SERPL-MCNC: 0.83 MG/DL — SIGNIFICANT CHANGE UP (ref 0.5–1.3)
GLUCOSE BLDC GLUCOMTR-MCNC: 127 MG/DL — HIGH (ref 70–99)
GLUCOSE BLDC GLUCOMTR-MCNC: 135 MG/DL — HIGH (ref 70–99)
GLUCOSE BLDC GLUCOMTR-MCNC: 228 MG/DL — HIGH (ref 70–99)
GLUCOSE BLDC GLUCOMTR-MCNC: 231 MG/DL — HIGH (ref 70–99)
GLUCOSE SERPL-MCNC: 130 MG/DL — HIGH (ref 70–99)
GLUCOSE UR-MCNC: 30 — SIGNIFICANT CHANGE UP
HCT VFR BLD CALC: 23.9 % — LOW (ref 39–50)
HGB BLD-MCNC: 7.1 G/DL — LOW (ref 13–17)
HYALINE CASTS # UR AUTO: NEGATIVE — SIGNIFICANT CHANGE UP
KETONES UR-MCNC: NEGATIVE — SIGNIFICANT CHANGE UP
LEUKOCYTE ESTERASE UR-ACNC: NEGATIVE — SIGNIFICANT CHANGE UP
MAGNESIUM SERPL-MCNC: 2 MG/DL — SIGNIFICANT CHANGE UP (ref 1.6–2.6)
MCHC RBC-ENTMCNC: 25.3 PG — LOW (ref 27–34)
MCHC RBC-ENTMCNC: 29.7 % — LOW (ref 32–36)
MCV RBC AUTO: 85.1 FL — SIGNIFICANT CHANGE UP (ref 80–100)
NITRITE UR-MCNC: NEGATIVE — SIGNIFICANT CHANGE UP
NRBC # FLD: 0.08 K/UL — SIGNIFICANT CHANGE UP (ref 0–0)
PH UR: 8 — SIGNIFICANT CHANGE UP (ref 5–8)
PHOSPHATE SERPL-MCNC: 1.9 MG/DL — LOW (ref 2.5–4.5)
PLATELET # BLD AUTO: 388 K/UL — SIGNIFICANT CHANGE UP (ref 150–400)
PMV BLD: 12 FL — SIGNIFICANT CHANGE UP (ref 7–13)
POTASSIUM SERPL-MCNC: 4.5 MMOL/L — SIGNIFICANT CHANGE UP (ref 3.5–5.3)
POTASSIUM SERPL-SCNC: 4.5 MMOL/L — SIGNIFICANT CHANGE UP (ref 3.5–5.3)
PROT UR-MCNC: 20 — SIGNIFICANT CHANGE UP
RBC # BLD: 2.81 M/UL — LOW (ref 4.2–5.8)
RBC # FLD: 19.9 % — HIGH (ref 10.3–14.5)
RBC CASTS # UR COMP ASSIST: SIGNIFICANT CHANGE UP (ref 0–?)
SODIUM SERPL-SCNC: 128 MMOL/L — LOW (ref 135–145)
SP GR SPEC: 1.02 — SIGNIFICANT CHANGE UP (ref 1–1.04)
SQUAMOUS # UR AUTO: SIGNIFICANT CHANGE UP
UROBILINOGEN FLD QL: NORMAL — SIGNIFICANT CHANGE UP
WBC # BLD: 10.08 K/UL — SIGNIFICANT CHANGE UP (ref 3.8–10.5)
WBC # FLD AUTO: 10.08 K/UL — SIGNIFICANT CHANGE UP (ref 3.8–10.5)
WBC UR QL: SIGNIFICANT CHANGE UP (ref 0–?)

## 2019-12-14 PROCEDURE — 99233 SBSQ HOSP IP/OBS HIGH 50: CPT | Mod: GC

## 2019-12-14 PROCEDURE — 99233 SBSQ HOSP IP/OBS HIGH 50: CPT

## 2019-12-14 RX ORDER — FUROSEMIDE 40 MG
20 TABLET ORAL
Refills: 0 | Status: DISCONTINUED | OUTPATIENT
Start: 2019-12-14 | End: 2019-12-24

## 2019-12-14 RX ADMIN — Medication 4: at 18:52

## 2019-12-14 RX ADMIN — Medication 88 MICROGRAM(S): at 22:35

## 2019-12-14 RX ADMIN — ESCITALOPRAM OXALATE 5 MILLIGRAM(S): 10 TABLET, FILM COATED ORAL at 11:01

## 2019-12-14 RX ADMIN — Medication 40 MILLIGRAM(S): at 05:16

## 2019-12-14 RX ADMIN — HUMAN INSULIN 9 UNIT(S): 100 INJECTION, SUSPENSION SUBCUTANEOUS at 06:20

## 2019-12-14 RX ADMIN — Medication 63.75 MILLIMOLE(S): at 11:00

## 2019-12-14 RX ADMIN — HUMAN INSULIN 9 UNIT(S): 100 INJECTION, SUSPENSION SUBCUTANEOUS at 12:06

## 2019-12-14 RX ADMIN — HUMAN INSULIN 9 UNIT(S): 100 INJECTION, SUSPENSION SUBCUTANEOUS at 18:53

## 2019-12-14 RX ADMIN — LEVETIRACETAM 500 MILLIGRAM(S): 250 TABLET, FILM COATED ORAL at 05:15

## 2019-12-14 RX ADMIN — ATENOLOL 50 MILLIGRAM(S): 25 TABLET ORAL at 05:15

## 2019-12-14 RX ADMIN — CHLORHEXIDINE GLUCONATE 1 APPLICATION(S): 213 SOLUTION TOPICAL at 05:14

## 2019-12-14 RX ADMIN — HUMAN INSULIN 9 UNIT(S): 100 INJECTION, SUSPENSION SUBCUTANEOUS at 02:23

## 2019-12-14 RX ADMIN — Medication 4: at 12:05

## 2019-12-14 RX ADMIN — Medication 20 MILLIGRAM(S): at 18:09

## 2019-12-14 RX ADMIN — SIMVASTATIN 20 MILLIGRAM(S): 20 TABLET, FILM COATED ORAL at 21:50

## 2019-12-14 RX ADMIN — LEVETIRACETAM 500 MILLIGRAM(S): 250 TABLET, FILM COATED ORAL at 17:57

## 2019-12-14 RX ADMIN — PANTOPRAZOLE SODIUM 40 MILLIGRAM(S): 20 TABLET, DELAYED RELEASE ORAL at 05:15

## 2019-12-14 RX ADMIN — Medication 20 MILLIGRAM(S): at 10:20

## 2019-12-14 RX ADMIN — Medication 3 MILLIGRAM(S): at 21:50

## 2019-12-14 RX ADMIN — SENNA PLUS 5 MILLILITER(S): 8.6 TABLET ORAL at 21:49

## 2019-12-14 RX ADMIN — ENOXAPARIN SODIUM 40 MILLIGRAM(S): 100 INJECTION SUBCUTANEOUS at 11:01

## 2019-12-14 NOTE — PROGRESS NOTE ADULT - ASSESSMENT
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 prolonged FRANCES state (? 2/2 prolonged hypercalcemic state) that has now resolved vs. loss of body mass  - Serum Cr is stable  - BUN has been elevated likely sec to prednisone and protein rich tube feeds   - s/p CT scan with IV contrast. Held Lasix. monitor for ADELSO post CT scan.    restart lasix    - Avoid nephrotoxins agent  - renal diet  - monitor bmp    Hyponatremia  - admitted with Na 122  - work up suggested SIADH  - Serum sodium improved, fluctuates between hypernatremia and hyponatremia  - Pt started on PEG tube feeds.   - now off Na tab and restarted on lasix  - Serum sodium stable   - Monitor serum bicarb while on diuretic   - Avoid excessive free water via peg.   - Monitor serum sodium and fluid status closely     - hold free water flushes w/ dropping Na today    HTN  - has Hx HTN but was on midodrine for orthostatic hypotension in the recent past   - Currently BP controlled   - monitor bp    Hypocalcemia  - Has Hx hypercalcemia of malignancy  - low alb corrected alexander is optimal  - monitor    Pl. effusion b/l/ Anasarca   mod effusion,  on ct chest  on lasix 20 bid iv. held but can be restarted  remains on high flow o2. pulm on board  Monitor     Hyperkalemia  Resolved   low k diet  monitor

## 2019-12-14 NOTE — PROGRESS NOTE ADULT - PROBLEM SELECTOR PLAN 1
- Hypoxia is likely multifactorial: aspiration pneumonitis, pulmonary edema, pleural effusion  s/p benny (11/28-12/4) and vanc (11/28-11/30) for asp pna  - c/w prednisone 40mg QD for aspiration pneumonitis per pulm recs  - Chest PT, duoneb PRN, lasix 20mg IV BID for now  monitor bicarb  lulú hi flow as toleated with diuresis  no need for thoracentesis at this time  cont diuresis

## 2019-12-14 NOTE — PROGRESS NOTE ADULT - SUBJECTIVE AND OBJECTIVE BOX
[facility] Division of Hospital Medicine  Alejandra Rodas MD  Pager [phone]    Patient is a 79y old  Male who presents with a chief complaint of AMS       SUBJECTIVE / OVERNIGHT EVENTS: sleeping comfortably on hi flow; wakes easily to voice; told me my hands are soft; no complaints      MEDICATIONS  (STANDING):  ATENolol  Tablet 50 milliGRAM(s) Oral daily  chlorhexidine 4% Liquid 1 Application(s) Topical <User Schedule>  dextrose 5%. 1000 milliLiter(s) (50 mL/Hr) IV Continuous <Continuous>  dextrose 50% Injectable 12.5 Gram(s) IV Push once  dextrose 50% Injectable 25 Gram(s) IV Push once  dextrose 50% Injectable 25 Gram(s) IV Push once  enoxaparin Injectable 40 milliGRAM(s) SubCutaneous daily  escitalopram 5 milliGRAM(s) Oral daily  insulin lispro (HumaLOG) corrective regimen sliding scale   SubCutaneous every 6 hours  insulin NPH human recombinant 9 Unit(s) SubCutaneous every 6 hours  levETIRAcetam  Solution 500 milliGRAM(s) Oral two times a day  levothyroxine 88 MICROGram(s) Oral at bedtime  melatonin 3 milliGRAM(s) Oral at bedtime  pantoprazole   Suspension 40 milliGRAM(s) Oral daily  predniSONE   Tablet 40 milliGRAM(s) Oral daily  senna Syrup 5 milliLiter(s) Oral at bedtime  simvastatin 20 milliGRAM(s) Oral at bedtime  sodium phosphate IVPB 15 milliMole(s) IV Intermittent once    MEDICATIONS  (PRN):  acetaminophen    Suspension .. 650 milliGRAM(s) Oral every 6 hours PRN Severe Pain (7 - 10)  albuterol/ipratropium for Nebulization. 3 milliLiter(s) Nebulizer every 8 hours PRN Shortness of Breath  dextrose 40% Gel 15 Gram(s) Oral once PRN Blood Glucose LESS THAN 70 milliGRAM(s)/deciliter  glucagon  Injectable 1 milliGRAM(s) IntraMuscular once PRN Glucose LESS THAN 70 milligrams/deciliter  OLANZapine Injectable 1.25 milliGRAM(s) IntraMuscular every 8 hours PRN agitation  sodium chloride 0.9% lock flush 10 milliLiter(s) IV Push every 1 hour PRN Pre/post blood products, medications, blood draw, and to maintain line patency      CAPILLARY BLOOD GLUCOSE  POCT Blood Glucose.: 127 mg/dL (14 Dec 2019 06:18)  POCT Blood Glucose.: 135 mg/dL (14 Dec 2019 02:21)  POCT Blood Glucose.: 153 mg/dL (13 Dec 2019 18:03)  POCT Blood Glucose.: 107 mg/dL (13 Dec 2019 11:25)          PHYSICAL EXAM:  Vital Signs Last 24 Hrs  T(F): 98.4 (14 Dec 2019 04:48), Max: 98.4 (13 Dec 2019 12:03)  HR: 74 (14 Dec 2019 04:48) (72 - 90)  BP: 114/65 (14 Dec 2019 04:48) (112/72 - 116/72)  RR: 18 (14 Dec 2019 04:48) (18 - 20)  SpO2: 100% (14 Dec 2019 04:48) (100% - 100%)    CONSTITUTIONAL: NAD, thin  ENMT: Moist oral mucosa  RESPIRATORY: b/l AE ant/lat  CARDIOVASCULAR: Regular rate and rhythm; No lower extremity edema;   ABDOMEN: Nontender to palpation, normoactive bowel sounds,+ PEG, site clean  MUSCULOSKELETAL: no clubbing or cyanosis of digits; no joint swelling or tenderness to palpation  PSYCH: calm, coop  NEUROLOGY: moves all ext      LABS:                        7.1    10.08 )-----------( 388      ( 14 Dec 2019 04:05 )             23.9     12-14    128<L>  |  91<L>  |  34<H>  ----------------------------<  130<H>  4.5   |  27  |  0.83    Ca    8.2<L>      14 Dec 2019 06:05  Phos  1.9     12-14  Mg     2.0     12-14

## 2019-12-14 NOTE — PROGRESS NOTE ADULT - SUBJECTIVE AND OBJECTIVE BOX
CHIEF COMPLAINT:    Interval Events: Patient tolerating HFNC at 30% well. No chest pain. Do dyspnea.     REVIEW OF SYSTEMS:  CONSTITUTIONAL: No weakness, fevers or chills  EYES/ENT: No visual changes;  No vertigo or throat pain   NECK: No pain or stiffness  RESPIRATORY: No cough, wheezing, hemoptysis; No shortness of breath  CARDIOVASCULAR: No chest pain or palpitations  GASTROINTESTINAL: No abdominal or epigastric pain. No nausea, vomiting, or hematemesis; No diarrhea or constipation. No melena or hematochezia.  GENITOURINARY: No dysuria, frequency or hematuria  NEUROLOGICAL: No numbness or weakness  SKIN: No itching, burning, rashes, or lesions   All other review of systems is negative unless indicated above.    OBJECTIVE:  ICU Vital Signs Last 24 Hrs  T(C): 37.2 (14 Dec 2019 11:16), Max: 37.2 (14 Dec 2019 11:16)  T(F): 98.9 (14 Dec 2019 11:16), Max: 98.9 (14 Dec 2019 11:16)  HR: 94 (14 Dec 2019 11:16) (74 - 96)  BP: 121/71 (14 Dec 2019 11:16) (114/65 - 126/75)  BP(mean): --  ABP: --  ABP(mean): --  RR: 22 (14 Dec 2019 11:34) (18 - 22)  SpO2: 95% (14 Dec 2019 11:34) (95% - 100%)         @ 07:01  -  -14 @ 07:00  --------------------------------------------------------  IN: 2440 mL / OUT: 0 mL / NET: 2440 mL      CAPILLARY BLOOD GLUCOSE      POCT Blood Glucose.: 231 mg/dL (14 Dec 2019 12:03)      PHYSICAL EXAM:  General: WN/WD NAD  Neurology: A&Ox3, nonfocal, SIM x 4  Eyes: PERRLA/ EOMI, Gross vision intact  ENT/Neck: Neck supple, trachea midline, No JVD, Gross hearing intact  Respiratory: CTA B/L, No wheezing, rales, rhonchi  CV: RRR, +S1/S2, -S3/S4, no murmurs, rubs or gallops  Abdominal: Soft, NT, ND +BS, No HSM  MSK: 5/5 strength UE/LE bilaterally  Extremities: No edema, 2+ peripheral pulses  Skin: No Rashes, Hematoma, Ecchymosis    HOSPITAL MEDICATIONS:  MEDICATIONS  (STANDING):  ATENolol  Tablet 50 milliGRAM(s) Oral daily  chlorhexidine 4% Liquid 1 Application(s) Topical <User Schedule>  dextrose 5%. 1000 milliLiter(s) (50 mL/Hr) IV Continuous <Continuous>  dextrose 50% Injectable 12.5 Gram(s) IV Push once  dextrose 50% Injectable 25 Gram(s) IV Push once  dextrose 50% Injectable 25 Gram(s) IV Push once  enoxaparin Injectable 40 milliGRAM(s) SubCutaneous daily  escitalopram 5 milliGRAM(s) Oral daily  furosemide   Injectable 20 milliGRAM(s) IV Push two times a day  insulin lispro (HumaLOG) corrective regimen sliding scale   SubCutaneous every 6 hours  insulin NPH human recombinant 9 Unit(s) SubCutaneous every 6 hours  levETIRAcetam  Solution 500 milliGRAM(s) Oral two times a day  levothyroxine 88 MICROGram(s) Oral at bedtime  melatonin 3 milliGRAM(s) Oral at bedtime  pantoprazole   Suspension 40 milliGRAM(s) Oral daily  predniSONE   Tablet 40 milliGRAM(s) Oral daily  senna Syrup 5 milliLiter(s) Oral at bedtime  simvastatin 20 milliGRAM(s) Oral at bedtime    MEDICATIONS  (PRN):  acetaminophen    Suspension .. 650 milliGRAM(s) Oral every 6 hours PRN Severe Pain (7 - 10)  albuterol/ipratropium for Nebulization. 3 milliLiter(s) Nebulizer every 8 hours PRN Shortness of Breath  dextrose 40% Gel 15 Gram(s) Oral once PRN Blood Glucose LESS THAN 70 milliGRAM(s)/deciliter  glucagon  Injectable 1 milliGRAM(s) IntraMuscular once PRN Glucose LESS THAN 70 milligrams/deciliter  OLANZapine Injectable 1.25 milliGRAM(s) IntraMuscular every 8 hours PRN agitation  sodium chloride 0.9% lock flush 10 milliLiter(s) IV Push every 1 hour PRN Pre/post blood products, medications, blood draw, and to maintain line patency      LABS:                        7.1    10.08 )-----------( 388      ( 14 Dec 2019 04:05 )             23.9     Hgb Trend: 7.1<--, 7.2<--, 7.5<--, 7.8<--, 7.4<--  12-14    128<L>  |  91<L>  |  34<H>  ----------------------------<  130<H>  4.5   |  27  |  0.83    Ca    8.2<L>      14 Dec 2019 06:05  Phos  1.9       Mg     2.0     14      Creatinine Trend: 0.83<--, 0.82<--, 0.90<--, 0.88<--, 0.91<--, 0.76<--    Urinalysis Basic - ( 14 Dec 2019 11:10 )    Color: LIGHT YELLOW / Appearance: CLEAR / S.016 / pH: 8.0  Gluc: 30 / Ketone: NEGATIVE  / Bili: NEGATIVE / Urobili: NORMAL   Blood: NEGATIVE / Protein: 20 / Nitrite: NEGATIVE   Leuk Esterase: NEGATIVE / RBC: 0-2 / WBC 0-2   Sq Epi: OCC / Non Sq Epi: x / Bacteria: NEGATIVE

## 2019-12-14 NOTE — PROGRESS NOTE ADULT - ASSESSMENT
78 yo M/ CKD, CAD, recent SDH, DVTs and recently diagnosed DLBCL s/ 2 cycles of chemo admitted for encephalopathy and concern for seizures. Pulmonary service consulted to assist in management of hypoxic respiratory failure requiring NIV; now on NC, down from HFNC.    1. Acute hypoxic respiratory failure - CT chest with new ground glass opacities and dense consolidations in upper lobes bilateral and known bilateral pleural effusions. Overall findings concerning for multifocal pneumonia vs aspiration pneumonitis vs fluid overload. Additionally, patient is high risk for PE with LE distal DVTs as well as upper extremity DVTs. V/Q scan from 11/14 was low probability. HiFlo titrated down today to NC.  - c/w supplemental O2 to keep SO2 > 92%  - c/w prednisone 40mg PO/PEG  for possible pneumonitis for now  - Would attempt IV diuresis as tolerated    Rex Ricardo MD  PGY-5  Pulmonary and Critical Care Fellow  Pager: [phone]

## 2019-12-14 NOTE — PROGRESS NOTE ADULT - PROBLEM SELECTOR PLAN 7
- Per chart review on R-CP. s/p 2 cycles.   CT abd/pelvis to eval disease  await onc decision about chemo   CT stable with respect to spleen, new lesion L upper renal pole;  await UA and renal sono

## 2019-12-14 NOTE — PROGRESS NOTE ADULT - SUBJECTIVE AND OBJECTIVE BOX
[facility] [place] [facility]OLOGY [facility]ACT[facility]   MD Anusha Siddiqui D.O. Fatima Sheikh, D.O. Ruoru Wong, PA    From 7 AM - 5 PM:  OFFICE: [phone]  Dr. Radford cell: [phone]  Dr. Townsend cell: [phone]  Dr. Steinberg cell: [phone]  LEATHA Agee cell: [phone]    From 5 PM - 7 AM: Answering Service: [phone]        RENAL FOLLOW UP NOTE  --------------------------------------------------------------------------------  HPI: Pt seen and examined. Has no complaints today. On O2 via face tent        PAST HISTORY  --------------------------------------------------------------------------------  No significant changes to PMH, PSH, FHx, SHx, unless otherwise noted    ALLERGIES & MEDICATIONS  --------------------------------------------------------------------------------  Allergies    No Known Allergies    Intolerances      Standing Inpatient Medications  ATENolol  Tablet 50 milliGRAM(s) Oral daily  chlorhexidine 4% Liquid 1 Application(s) Topical <User Schedule>  dextrose 5%. 1000 milliLiter(s) IV Continuous <Continuous>  dextrose 50% Injectable 12.5 Gram(s) IV Push once  dextrose 50% Injectable 25 Gram(s) IV Push once  dextrose 50% Injectable 25 Gram(s) IV Push once  enoxaparin Injectable 40 milliGRAM(s) SubCutaneous daily  escitalopram 5 milliGRAM(s) Oral daily  furosemide   Injectable 20 milliGRAM(s) IV Push two times a day  insulin lispro (HumaLOG) corrective regimen sliding scale   SubCutaneous every 6 hours  insulin NPH human recombinant 9 Unit(s) SubCutaneous every 6 hours  levETIRAcetam  Solution 500 milliGRAM(s) Oral two times a day  levothyroxine 88 MICROGram(s) Oral at bedtime  melatonin 3 milliGRAM(s) Oral at bedtime  pantoprazole   Suspension 40 milliGRAM(s) Oral daily  predniSONE   Tablet 40 milliGRAM(s) Oral daily  senna Syrup 5 milliLiter(s) Oral at bedtime  simvastatin 20 milliGRAM(s) Oral at bedtime    PRN Inpatient Medications  acetaminophen    Suspension .. 650 milliGRAM(s) Oral every 6 hours PRN  albuterol/ipratropium for Nebulization. 3 milliLiter(s) Nebulizer every 8 hours PRN  dextrose 40% Gel 15 Gram(s) Oral once PRN  glucagon  Injectable 1 milliGRAM(s) IntraMuscular once PRN  OLANZapine Injectable 1.25 milliGRAM(s) IntraMuscular every 8 hours PRN  sodium chloride 0.9% lock flush 10 milliLiter(s) IV Push every 1 hour PRN      REVIEW OF SYSTEMS  --------------------------------------------------------------------------------  General: no fever  CVS: no chest pain  RESP: no sob, no cough  ABD: no abdominal pain  : no dysuria,  MSK: no edema     VITALS/PHYSICAL EXAM  --------------------------------------------------------------------------------  T(C): 37.9 (12-14-19 @ 17:05), Max: 37.9 (12-14-19 @ 17:05)  HR: 99 (12-14-19 @ 17:05) (74 - 99)  BP: 111/62 (12-14-19 @ 17:05) (111/62 - 126/75)  RR: 20 (12-14-19 @ 17:05) (18 - 22)  SpO2: 96% (12-14-19 @ 17:05) (95% - 100%)  Wt(kg): --        12-13-19 @ 07:01  -  12-14-19 @ 07:00  --------------------------------------------------------  IN: 2440 mL / OUT: 0 mL / NET: 2440 mL      Physical Exam:  	Gen: NAD  	HEENT: MMM  	Pulm: CTA B/L  	CV: S1S2  	Abd: Soft, +BS. PEG noted  	Ext: No LE edema B/L              Neuro: Awake   	Skin: Warm and Dry   	    LABS/STUDIES  --------------------------------------------------------------------------------              7.1    10.08 >-----------<  388      [12-14-19 @ 04:05]              23.9     128  |  91  |  34  ----------------------------<  130      [12-14-19 @ 06:05]  4.5   |  27  |  0.83        Ca     8.2     [12-14-19 @ 06:05]      Mg     2.0     [12-14-19 @ 06:05]      Phos  1.9     [12-14-19 @ 06:05]            Creatinine Trend:  SCr 0.83 [12-14 @ 06:05]  SCr 0.82 [12-13 @ 04:20]  SCr 0.90 [12-12 @ 06:30]  SCr 0.88 [12-11 @ 22:50]  SCr 0.91 [12-11 @ 06:30]    Urinalysis - [12-14-19 @ 11:10]      Color LIGHT YELLOW / Appearance CLEAR / SG 1.016 / pH 8.0      Gluc 30 / Ketone NEGATIVE  / Bili NEGATIVE / Urobili NORMAL       Blood NEGATIVE / Protein 20 / Leuk Est NEGATIVE / Nitrite NEGATIVE      RBC 0-2 / WBC 0-2 / Hyaline NEGATIVE / Gran  / Sq Epi OCC / Non Sq Epi  / Bacteria NEGATIVE      Iron 38, TIBC 234, %sat --      [09-12-19 @ 06:08]  Ferritin 214.6      [09-12-19 @ 06:08]  PTH 20.00 (Ca --)      [09-03-19 @ 05:45]   --  PTH 25.46 (Ca --)      [09-01-19 @ 06:00]   --  Vitamin D (25OH) 34.9      [09-03-19 @ 05:45]  HbA1c 7.0      [09-13-19 @ 06:50]  TSH 6.55      [12-04-19 @ 05:05]  Lipid: chol 124, , HDL 50, LDL 57      [12-12-19 @ 06:30]    HBsAb Reactive      [11-23-19 @ 05:25]  HBsAg Nonreactive      [11-23-19 @ 05:25]  HBcAb Reactive      [11-23-19 @ 05:25]  HCV 0.12, Nonreactive Hepatitis C AB  S/CO Ratio                        Interpretation  < 1.00                                   Non-Reactive  1.00 - 4.99                         Weakly-Reactive  >= 5.00                                Reactive  Non-Reactive: Aperson with a non-reactive HCV antibody  result is considered uninfected.  No further action is  needed unless recent infection is suspected.  In these  cases, consider repeat testing later to detect  seroconversion..  Weakly-Reactive: HCV antibody test is abnormal, HCV RNA  Qualitative test will follow.  Reactive: HCV antibody test is abnormal, HCV RNA  Qualitative test will follow.  Note: HCV antibody testing is performed on the [employer] system.      [11-23-19 @ 05:25]  HIV Nonreactive The HIV Ag/Ab Combo test performed screens for HIV-1 p24  antigen, antibodies to HIV-1 (group M and group O), and  antibodies to HIV-2. All specimens repeatedly reactive  will reflex to an HIV 1/2 antibody confirmation and  differentiation test. This assay detects p24 antigen which  may be present prior to the development of HIV antibodies,  therefore a reactive result with a negative HIV 1/2 AB  Confirmation should be followed up with HIV-1 RNA, HIV-2  RNA and repeat testing in 4-8 weeks. A nonreactive result  does not preclude previous exposure to or infection with  HIV-1 or HIV-2. [facility] prohibits disclosure of this  result to any unauthorized party.      [11-22-19 @ 10:18]

## 2019-12-14 NOTE — PROGRESS NOTE ADULT - ATTENDING COMMENTS
Patient with acute hypoxic respiratory failure due to aspiration pneumonia vs pneumonitis vs pulmonary edema.  Oxygenation continues to improve with diuretics and steroids.  Weaned off HFNC to NC today and O2 sat is >92%.  c/w steroids, diuretics, chest PT.

## 2019-12-15 LAB
ANION GAP SERPL CALC-SCNC: 11 MMO/L — SIGNIFICANT CHANGE UP (ref 7–14)
BLD GP AB SCN SERPL QL: NEGATIVE — SIGNIFICANT CHANGE UP
BUN SERPL-MCNC: 33 MG/DL — HIGH (ref 7–23)
CALCIUM SERPL-MCNC: 8.3 MG/DL — LOW (ref 8.4–10.5)
CHLORIDE SERPL-SCNC: 91 MMOL/L — LOW (ref 98–107)
CO2 SERPL-SCNC: 27 MMOL/L — SIGNIFICANT CHANGE UP (ref 22–31)
CREAT SERPL-MCNC: 0.92 MG/DL — SIGNIFICANT CHANGE UP (ref 0.5–1.3)
GLUCOSE BLDC GLUCOMTR-MCNC: 118 MG/DL — HIGH (ref 70–99)
GLUCOSE BLDC GLUCOMTR-MCNC: 155 MG/DL — HIGH (ref 70–99)
GLUCOSE BLDC GLUCOMTR-MCNC: 163 MG/DL — HIGH (ref 70–99)
GLUCOSE BLDC GLUCOMTR-MCNC: 211 MG/DL — HIGH (ref 70–99)
GLUCOSE SERPL-MCNC: 136 MG/DL — HIGH (ref 70–99)
HCT VFR BLD CALC: 26.8 % — LOW (ref 39–50)
HGB BLD-MCNC: 8 G/DL — LOW (ref 13–17)
MAGNESIUM SERPL-MCNC: 1.9 MG/DL — SIGNIFICANT CHANGE UP (ref 1.6–2.6)
MCHC RBC-ENTMCNC: 25.6 PG — LOW (ref 27–34)
MCHC RBC-ENTMCNC: 29.9 % — LOW (ref 32–36)
MCV RBC AUTO: 85.6 FL — SIGNIFICANT CHANGE UP (ref 80–100)
NRBC # FLD: 0.06 K/UL — SIGNIFICANT CHANGE UP (ref 0–0)
PHOSPHATE SERPL-MCNC: 2.3 MG/DL — LOW (ref 2.5–4.5)
PLATELET # BLD AUTO: 454 K/UL — HIGH (ref 150–400)
PMV BLD: 11.8 FL — SIGNIFICANT CHANGE UP (ref 7–13)
POTASSIUM SERPL-MCNC: 4.5 MMOL/L — SIGNIFICANT CHANGE UP (ref 3.5–5.3)
POTASSIUM SERPL-SCNC: 4.5 MMOL/L — SIGNIFICANT CHANGE UP (ref 3.5–5.3)
RBC # BLD: 3.13 M/UL — LOW (ref 4.2–5.8)
RBC # FLD: 19.7 % — HIGH (ref 10.3–14.5)
RH IG SCN BLD-IMP: POSITIVE — SIGNIFICANT CHANGE UP
SODIUM SERPL-SCNC: 129 MMOL/L — LOW (ref 135–145)
WBC # BLD: 9.49 K/UL — SIGNIFICANT CHANGE UP (ref 3.8–10.5)
WBC # FLD AUTO: 9.49 K/UL — SIGNIFICANT CHANGE UP (ref 3.8–10.5)

## 2019-12-15 PROCEDURE — 99233 SBSQ HOSP IP/OBS HIGH 50: CPT

## 2019-12-15 RX ORDER — SODIUM,POTASSIUM PHOSPHATES 278-250MG
1 POWDER IN PACKET (EA) ORAL
Refills: 0 | Status: COMPLETED | OUTPATIENT
Start: 2019-12-15 | End: 2019-12-16

## 2019-12-15 RX ADMIN — Medication 3 MILLIGRAM(S): at 21:42

## 2019-12-15 RX ADMIN — PANTOPRAZOLE SODIUM 40 MILLIGRAM(S): 20 TABLET, DELAYED RELEASE ORAL at 05:49

## 2019-12-15 RX ADMIN — LEVETIRACETAM 500 MILLIGRAM(S): 250 TABLET, FILM COATED ORAL at 18:20

## 2019-12-15 RX ADMIN — Medication 20 MILLIGRAM(S): at 05:49

## 2019-12-15 RX ADMIN — Medication 2: at 06:29

## 2019-12-15 RX ADMIN — Medication 88 MICROGRAM(S): at 21:42

## 2019-12-15 RX ADMIN — Medication 40 MILLIGRAM(S): at 05:49

## 2019-12-15 RX ADMIN — Medication 4: at 18:20

## 2019-12-15 RX ADMIN — ATENOLOL 50 MILLIGRAM(S): 25 TABLET ORAL at 05:49

## 2019-12-15 RX ADMIN — Medication 1 PACKET(S): at 06:29

## 2019-12-15 RX ADMIN — LEVETIRACETAM 500 MILLIGRAM(S): 250 TABLET, FILM COATED ORAL at 05:49

## 2019-12-15 RX ADMIN — HUMAN INSULIN 9 UNIT(S): 100 INJECTION, SUSPENSION SUBCUTANEOUS at 18:20

## 2019-12-15 RX ADMIN — CHLORHEXIDINE GLUCONATE 1 APPLICATION(S): 213 SOLUTION TOPICAL at 05:58

## 2019-12-15 RX ADMIN — ESCITALOPRAM OXALATE 5 MILLIGRAM(S): 10 TABLET, FILM COATED ORAL at 12:12

## 2019-12-15 RX ADMIN — ENOXAPARIN SODIUM 40 MILLIGRAM(S): 100 INJECTION SUBCUTANEOUS at 12:12

## 2019-12-15 RX ADMIN — Medication 20 MILLIGRAM(S): at 18:19

## 2019-12-15 RX ADMIN — HUMAN INSULIN 9 UNIT(S): 100 INJECTION, SUSPENSION SUBCUTANEOUS at 06:29

## 2019-12-15 RX ADMIN — SIMVASTATIN 20 MILLIGRAM(S): 20 TABLET, FILM COATED ORAL at 21:42

## 2019-12-15 RX ADMIN — HUMAN INSULIN 9 UNIT(S): 100 INJECTION, SUSPENSION SUBCUTANEOUS at 12:11

## 2019-12-15 RX ADMIN — Medication 2: at 12:11

## 2019-12-15 RX ADMIN — Medication 1 PACKET(S): at 18:19

## 2019-12-15 RX ADMIN — HUMAN INSULIN 9 UNIT(S): 100 INJECTION, SUSPENSION SUBCUTANEOUS at 00:51

## 2019-12-15 RX ADMIN — SENNA PLUS 5 MILLILITER(S): 8.6 TABLET ORAL at 21:42

## 2019-12-15 RX ADMIN — Medication 1 PACKET(S): at 12:12

## 2019-12-15 NOTE — PROGRESS NOTE ADULT - SUBJECTIVE AND OBJECTIVE BOX
[facility] [place] [facility]   MD Anusha Siddiqui D.O. Fatima Sheikh, D.O. Ruoru Wong, PA    From 7 AM - 5 PM:  OFFICE: [phone]  Dr. Radford cell: [phone]  Dr. Townsend cell: [phone]  Dr. Steinberg cell: [phone]  LEATHA Agee cell: [phone]    From 5 PM - 7 AM: Answering Service: [phone]        RENAL FOLLOW UP NOTE  --------------------------------------------------------------------------------  HPI: Pt seen and examined. Reports pain but does not localize where. Per RN, had chest PT for possible mucus plug earlier and may be having back pain from chest PT. Pt denies any SOB, abd pain, N/V.        PAST HISTORY  --------------------------------------------------------------------------------  No significant changes to PMH, PSH, FHx, SHx, unless otherwise noted    ALLERGIES & MEDICATIONS  --------------------------------------------------------------------------------  Allergies    No Known Allergies    Intolerances      Standing Inpatient Medications  ATENolol  Tablet 50 milliGRAM(s) Oral daily  chlorhexidine 4% Liquid 1 Application(s) Topical <User Schedule>  dextrose 5%. 1000 milliLiter(s) IV Continuous <Continuous>  dextrose 50% Injectable 12.5 Gram(s) IV Push once  dextrose 50% Injectable 25 Gram(s) IV Push once  dextrose 50% Injectable 25 Gram(s) IV Push once  enoxaparin Injectable 40 milliGRAM(s) SubCutaneous daily  escitalopram 5 milliGRAM(s) Oral daily  furosemide   Injectable 20 milliGRAM(s) IV Push two times a day  insulin lispro (HumaLOG) corrective regimen sliding scale   SubCutaneous every 6 hours  insulin NPH human recombinant 9 Unit(s) SubCutaneous every 6 hours  levETIRAcetam  Solution 500 milliGRAM(s) Oral two times a day  levothyroxine 88 MICROGram(s) Oral at bedtime  melatonin 3 milliGRAM(s) Oral at bedtime  pantoprazole   Suspension 40 milliGRAM(s) Oral daily  potassium phosphate / sodium phosphate powder 1 Packet(s) Oral three times a day before meals  predniSONE   Tablet 40 milliGRAM(s) Oral daily  senna Syrup 5 milliLiter(s) Oral at bedtime  simvastatin 20 milliGRAM(s) Oral at bedtime    PRN Inpatient Medications  acetaminophen    Suspension .. 650 milliGRAM(s) Oral every 6 hours PRN  albuterol/ipratropium for Nebulization. 3 milliLiter(s) Nebulizer every 8 hours PRN  dextrose 40% Gel 15 Gram(s) Oral once PRN  glucagon  Injectable 1 milliGRAM(s) IntraMuscular once PRN  OLANZapine Injectable 1.25 milliGRAM(s) IntraMuscular every 8 hours PRN  sodium chloride 0.9% lock flush 10 milliLiter(s) IV Push every 1 hour PRN      REVIEW OF SYSTEMS  --------------------------------------------------------------------------------  General: no fever  CVS: no chest pain  RESP: no sob, no cough  ABD: no abdominal pain  : no dysuria,  MSK: no edema     VITALS/PHYSICAL EXAM  --------------------------------------------------------------------------------  T(C): 36.2 (12-15-19 @ 13:21), Max: 37.1 (12-14-19 @ 21:17)  HR: 70 (12-15-19 @ 12:46) (70 - 110)  BP: 115/70 (12-15-19 @ 12:46) (101/65 - 150/79)  RR: 20 (12-15-19 @ 12:46) (18 - 20)  SpO2: 100% (12-15-19 @ 12:46) (95% - 100%)  Wt(kg): --        12-14-19 @ 07:01  -  12-15-19 @ 07:00  --------------------------------------------------------  IN: 660 mL / OUT: 0 mL / NET: 660 mL      Physical Exam:  	Gen: NAD  	HEENT: MMM  	Pulm: CTA B/L  	CV: S1S2  	Abd: Soft, +BS. PEG noted  	Ext: No LE edema B/L              Neuro: Awake   	Skin: Warm and Dry   	  LABS/STUDIES  --------------------------------------------------------------------------------              8.0    9.49  >-----------<  454      [12-15-19 @ 04:45]              26.8     129  |  91  |  33  ----------------------------<  136      [12-15-19 @ 04:45]  4.5   |  27  |  0.92        Ca     8.3     [12-15-19 @ 04:45]      Mg     1.9     [12-15-19 @ 04:45]      Phos  2.3     [12-15-19 @ 04:45]            Creatinine Trend:  SCr 0.92 [12-15 @ 04:45]  SCr 0.83 [12-14 @ 06:05]  SCr 0.82 [12-13 @ 04:20]  SCr 0.90 [12-12 @ 06:30]  SCr 0.88 [12-11 @ 22:50]    Urinalysis - [12-14-19 @ 11:10]      Color LIGHT YELLOW / Appearance CLEAR / SG 1.016 / pH 8.0      Gluc 30 / Ketone NEGATIVE  / Bili NEGATIVE / Urobili NORMAL       Blood NEGATIVE / Protein 20 / Leuk Est NEGATIVE / Nitrite NEGATIVE      RBC 0-2 / WBC 0-2 / Hyaline NEGATIVE / Gran  / Sq Epi OCC / Non Sq Epi  / Bacteria NEGATIVE      Iron 38, TIBC 234, %sat --      [09-12-19 @ 06:08]  Ferritin 214.6      [09-12-19 @ 06:08]  PTH 20.00 (Ca --)      [09-03-19 @ 05:45]   --  PTH 25.46 (Ca --)      [09-01-19 @ 06:00]   --  Vitamin D (25OH) 34.9      [09-03-19 @ 05:45]  HbA1c 7.0      [09-13-19 @ 06:50]  TSH 6.55      [12-04-19 @ 05:05]  Lipid: chol 124, , HDL 50, LDL 57      [12-12-19 @ 06:30]    HBsAb Reactive      [11-23-19 @ 05:25]  HBsAg Nonreactive      [11-23-19 @ 05:25]  HBcAb Reactive      [11-23-19 @ 05:25]  HCV 0.12, Nonreactive Hepatitis C AB  S/CO Ratio                        Interpretation  < 1.00                                   Non-Reactive  1.00 - 4.99                         Weakly-Reactive  >= 5.00                                Reactive  Non-Reactive: Aperson with a non-reactive HCV antibody  result is considered uninfected.  No further action is  needed unless recent infection is suspected.  In these  cases, consider repeat testing later to detect  seroconversion..  Weakly-Reactive: HCV antibody test is abnormal, HCV RNA  Qualitative test will follow.  Reactive: HCV antibody test is abnormal, HCV RNA  Qualitative test will follow.  Note: HCV antibody testing is performed on the [employer] system.      [11-23-19 @ 05:25]  HIV Nonreactive The HIV Ag/Ab Combo test performed screens for HIV-1 p24  antigen, antibodies to HIV-1 (group M and group O), and  antibodies to HIV-2. All specimens repeatedly reactive  will reflex to an HIV 1/2 antibody confirmation and  differentiation test. This assay detects p24 antigen which  may be present prior to the development of HIV antibodies,  therefore a reactive result with a negative HIV 1/2 AB  Confirmation should be followed up with HIV-1 RNA, HIV-2  RNA and repeat testing in 4-8 weeks. A nonreactive result  does not preclude previous exposure to or infection with  HIV-1 or HIV-2. [facility] prohibits disclosure of this  result to any unauthorized party.      [11-22-19 @ 10:18]

## 2019-12-15 NOTE — PROGRESS NOTE ADULT - ASSESSMENT
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 prolonged FRANCES state (? 2/2 prolonged hypercalcemic state) that has now resolved vs. loss of body mass  - Serum Cr is stable  - BUN has been elevated likely sec to prednisone and protein rich tube feeds   - s/p CT scan with IV contrast. Held Lasix. monitor for ADELSO post CT scan.    continue lasix    - Avoid nephrotoxins agent  - renal diet  - monitor bmp    Hyponatremia  - admitted with Na 122  - work up suggested SIADH  - Serum sodium improved, fluctuates between hypernatremia and hyponatremia  - Pt started on PEG tube feeds.   - now off Na tab and restarted on lasix  - Serum sodium stable   - Monitor serum bicarb while on diuretic   - Avoid excessive free water via peg.   - Monitor serum sodium and fluid status closely     - hold free water flushes   - continue lasix today    HTN  - has Hx HTN but was on midodrine for orthostatic hypotension in the recent past   - Currently BP controlled   - monitor bp    Hypocalcemia  - Has Hx hypercalcemia of malignancy  - low alb corrected alexander is optimal  - monitor    Pl. effusion b/l/ Anasarca   mod effusion,  on ct chest  on lasix 20 bid iv. held but can be restarted  remains on high flow o2. pulm on board  Monitor     Hyperkalemia  Resolved   low k diet  monitor

## 2019-12-15 NOTE — PROGRESS NOTE ADULT - SUBJECTIVE AND OBJECTIVE BOX
[facility] Division of Hospital Medicine  Alejandra Rodas MD  Pager [phone]    Patient is a 79y old  Male who presents with a chief complaint of AMS      SUBJECTIVE / OVERNIGHT EVENTS: weaned down to NC and sating well; resting comfortably      MEDICATIONS  (STANDING):  ATENolol  Tablet 50 milliGRAM(s) Oral daily  chlorhexidine 4% Liquid 1 Application(s) Topical <User Schedule>  dextrose 5%. 1000 milliLiter(s) (50 mL/Hr) IV Continuous <Continuous>  dextrose 50% Injectable 12.5 Gram(s) IV Push once  dextrose 50% Injectable 25 Gram(s) IV Push once  dextrose 50% Injectable 25 Gram(s) IV Push once  enoxaparin Injectable 40 milliGRAM(s) SubCutaneous daily  escitalopram 5 milliGRAM(s) Oral daily  furosemide   Injectable 20 milliGRAM(s) IV Push two times a day  insulin lispro (HumaLOG) corrective regimen sliding scale   SubCutaneous every 6 hours  insulin NPH human recombinant 9 Unit(s) SubCutaneous every 6 hours  levETIRAcetam  Solution 500 milliGRAM(s) Oral two times a day  levothyroxine 88 MICROGram(s) Oral at bedtime  melatonin 3 milliGRAM(s) Oral at bedtime  pantoprazole   Suspension 40 milliGRAM(s) Oral daily  potassium phosphate / sodium phosphate powder 1 Packet(s) Oral three times a day before meals  predniSONE   Tablet 40 milliGRAM(s) Oral daily  senna Syrup 5 milliLiter(s) Oral at bedtime  simvastatin 20 milliGRAM(s) Oral at bedtime    MEDICATIONS  (PRN):  acetaminophen    Suspension .. 650 milliGRAM(s) Oral every 6 hours PRN Severe Pain (7 - 10)  albuterol/ipratropium for Nebulization. 3 milliLiter(s) Nebulizer every 8 hours PRN Shortness of Breath  dextrose 40% Gel 15 Gram(s) Oral once PRN Blood Glucose LESS THAN 70 milliGRAM(s)/deciliter  glucagon  Injectable 1 milliGRAM(s) IntraMuscular once PRN Glucose LESS THAN 70 milligrams/deciliter  OLANZapine Injectable 1.25 milliGRAM(s) IntraMuscular every 8 hours PRN agitation  sodium chloride 0.9% lock flush 10 milliLiter(s) IV Push every 1 hour PRN Pre/post blood products, medications, blood draw, and to maintain line patency      CAPILLARY BLOOD GLUCOSE  POCT Blood Glucose.: 163 mg/dL (15 Dec 2019 06:03)  POCT Blood Glucose.: 118 mg/dL (15 Dec 2019 00:38)  POCT Blood Glucose.: 228 mg/dL (14 Dec 2019 18:50)  POCT Blood Glucose.: 231 mg/dL (14 Dec 2019 12:03)          PHYSICAL EXAM:  Vital Signs Last 24 Hrs  T(F): 98.8 (15 Dec 2019 05:47), Max: 100.2 (14 Dec 2019 17:05)  HR: 88 (15 Dec 2019 06:20) (88 - 110)  BP: 122/69 (15 Dec 2019 06:20) (101/65 - 150/79)  RR: 18 (15 Dec 2019 06:20) (18 - 22)  SpO2: 98% (15 Dec 2019 06:20) (95% - 99%)    CONSTITUTIONAL: NAD  ENMT: Moist oral mucosa  NECK: Supple, no palpable masses; no thyromegaly  RESPIRATORY: b/l AE ant.lat  CARDIOVASCULAR: Regular rate and rhythm; No lower extremity edema;   ABDOMEN: Nontender to palpation, normoactive bowel sounds, +PEG, site clean  MUSCULOSKELETAL: no clubbing or cyanosis of digits; no joint swelling or tenderness to palpation  SKIN: +heel booties in place    LABS:                        8.0    9.49  )-----------( 454      ( 15 Dec 2019 04:45 )             26.8     12-15    129<L>  |  91<L>  |  33<H>  ----------------------------<  136<H>  4.5   |  27  |  0.92    Ca    8.3<L>      15 Dec 2019 04:45  Phos  2.3     12-15  Mg     1.9     12-15            Urinalysis Basic - ( 14 Dec 2019 11:10 )    Color: LIGHT YELLOW / Appearance: CLEAR / S.016 / pH: 8.0  Gluc: 30 / Ketone: NEGATIVE  / Bili: NEGATIVE / Urobili: NORMAL   Blood: NEGATIVE / Protein: 20 / Nitrite: NEGATIVE   Leuk Esterase: NEGATIVE / RBC: 0-2 / WBC 0-2   Sq Epi: OCC / Non Sq Epi: x / Bacteria: NEGATIVE

## 2019-12-15 NOTE — PROGRESS NOTE ADULT - PROBLEM SELECTOR PLAN 2
hyponatremia, free water on hold for now  cont to monitor, asymptomatic  hypophosphatemia-->supplement

## 2019-12-15 NOTE — PROGRESS NOTE ADULT - PROBLEM SELECTOR PLAN 7
- Per chart review on R-CP. s/p 2 cycles.   CT abd/pelvis to eval disease  await onc decision about chemo   CT stable with respect to spleen, new lesion L upper renal pole;  renal sono P  UA negative

## 2019-12-15 NOTE — CHART NOTE - NSCHARTNOTEFT_GEN_A_CORE
Case discussed with Dr. Rodas, Pt with microcytic anemia H/H 8.0/26.8, following discussion with Pts daughter, 1 unit of PRBC was recommended. T+S sent. Consent obtained from Pts daughter at bedside, will continue to monitor closely, f/u post transfusion CBC.

## 2019-12-15 NOTE — PROGRESS NOTE ADULT - PROBLEM SELECTOR PLAN 3
multifactorial; PNA, sz, brain mets  cont zyprexa 1.25mg q8h prn, as not needed any  has been calm and coop

## 2019-12-16 LAB
ALBUMIN SERPL ELPH-MCNC: 2.4 G/DL — LOW (ref 3.3–5)
ALP SERPL-CCNC: 186 U/L — HIGH (ref 40–120)
ALT FLD-CCNC: 27 U/L — SIGNIFICANT CHANGE UP (ref 4–41)
ANION GAP SERPL CALC-SCNC: 12 MMO/L — SIGNIFICANT CHANGE UP (ref 7–14)
AST SERPL-CCNC: 28 U/L — SIGNIFICANT CHANGE UP (ref 4–40)
BILIRUB SERPL-MCNC: 0.2 MG/DL — SIGNIFICANT CHANGE UP (ref 0.2–1.2)
BUN SERPL-MCNC: 38 MG/DL — HIGH (ref 7–23)
CALCIUM SERPL-MCNC: 8.5 MG/DL — SIGNIFICANT CHANGE UP (ref 8.4–10.5)
CHLORIDE SERPL-SCNC: 91 MMOL/L — LOW (ref 98–107)
CO2 SERPL-SCNC: 28 MMOL/L — SIGNIFICANT CHANGE UP (ref 22–31)
CREAT SERPL-MCNC: 0.89 MG/DL — SIGNIFICANT CHANGE UP (ref 0.5–1.3)
GLUCOSE BLDC GLUCOMTR-MCNC: 132 MG/DL — HIGH (ref 70–99)
GLUCOSE BLDC GLUCOMTR-MCNC: 172 MG/DL — HIGH (ref 70–99)
GLUCOSE BLDC GLUCOMTR-MCNC: 176 MG/DL — HIGH (ref 70–99)
GLUCOSE BLDC GLUCOMTR-MCNC: 179 MG/DL — HIGH (ref 70–99)
GLUCOSE SERPL-MCNC: 130 MG/DL — HIGH (ref 70–99)
HCT VFR BLD CALC: 29.9 % — LOW (ref 39–50)
HGB BLD-MCNC: 9.3 G/DL — LOW (ref 13–17)
MAGNESIUM SERPL-MCNC: 1.9 MG/DL — SIGNIFICANT CHANGE UP (ref 1.6–2.6)
MCHC RBC-ENTMCNC: 26.2 PG — LOW (ref 27–34)
MCHC RBC-ENTMCNC: 31.1 % — LOW (ref 32–36)
MCV RBC AUTO: 84.2 FL — SIGNIFICANT CHANGE UP (ref 80–100)
NRBC # FLD: 0.03 K/UL — SIGNIFICANT CHANGE UP (ref 0–0)
PHOSPHATE SERPL-MCNC: 3.2 MG/DL — SIGNIFICANT CHANGE UP (ref 2.5–4.5)
PLATELET # BLD AUTO: 430 K/UL — HIGH (ref 150–400)
PMV BLD: 11.5 FL — SIGNIFICANT CHANGE UP (ref 7–13)
POTASSIUM SERPL-MCNC: 4.6 MMOL/L — SIGNIFICANT CHANGE UP (ref 3.5–5.3)
POTASSIUM SERPL-SCNC: 4.6 MMOL/L — SIGNIFICANT CHANGE UP (ref 3.5–5.3)
PROT SERPL-MCNC: 6.3 G/DL — SIGNIFICANT CHANGE UP (ref 6–8.3)
RBC # BLD: 3.55 M/UL — LOW (ref 4.2–5.8)
RBC # FLD: 17.8 % — HIGH (ref 10.3–14.5)
SODIUM SERPL-SCNC: 131 MMOL/L — LOW (ref 135–145)
WBC # BLD: 9.5 K/UL — SIGNIFICANT CHANGE UP (ref 3.8–10.5)
WBC # FLD AUTO: 9.5 K/UL — SIGNIFICANT CHANGE UP (ref 3.8–10.5)

## 2019-12-16 PROCEDURE — 99233 SBSQ HOSP IP/OBS HIGH 50: CPT | Mod: GC

## 2019-12-16 PROCEDURE — 99233 SBSQ HOSP IP/OBS HIGH 50: CPT

## 2019-12-16 RX ADMIN — Medication 1 PACKET(S): at 12:29

## 2019-12-16 RX ADMIN — Medication 1 PACKET(S): at 06:04

## 2019-12-16 RX ADMIN — Medication 40 MILLIGRAM(S): at 06:04

## 2019-12-16 RX ADMIN — Medication 20 MILLIGRAM(S): at 19:28

## 2019-12-16 RX ADMIN — HUMAN INSULIN 9 UNIT(S): 100 INJECTION, SUSPENSION SUBCUTANEOUS at 00:49

## 2019-12-16 RX ADMIN — LEVETIRACETAM 500 MILLIGRAM(S): 250 TABLET, FILM COATED ORAL at 06:05

## 2019-12-16 RX ADMIN — Medication 1 PACKET(S): at 19:24

## 2019-12-16 RX ADMIN — Medication 2: at 12:29

## 2019-12-16 RX ADMIN — Medication 88 MICROGRAM(S): at 23:32

## 2019-12-16 RX ADMIN — Medication 2: at 18:26

## 2019-12-16 RX ADMIN — HUMAN INSULIN 9 UNIT(S): 100 INJECTION, SUSPENSION SUBCUTANEOUS at 12:30

## 2019-12-16 RX ADMIN — ESCITALOPRAM OXALATE 5 MILLIGRAM(S): 10 TABLET, FILM COATED ORAL at 12:29

## 2019-12-16 RX ADMIN — Medication 3 MILLIGRAM(S): at 23:32

## 2019-12-16 RX ADMIN — HUMAN INSULIN 9 UNIT(S): 100 INJECTION, SUSPENSION SUBCUTANEOUS at 19:27

## 2019-12-16 RX ADMIN — PANTOPRAZOLE SODIUM 40 MILLIGRAM(S): 20 TABLET, DELAYED RELEASE ORAL at 06:05

## 2019-12-16 RX ADMIN — LEVETIRACETAM 500 MILLIGRAM(S): 250 TABLET, FILM COATED ORAL at 19:25

## 2019-12-16 RX ADMIN — Medication 2: at 00:51

## 2019-12-16 RX ADMIN — HUMAN INSULIN 9 UNIT(S): 100 INJECTION, SUSPENSION SUBCUTANEOUS at 06:16

## 2019-12-16 RX ADMIN — SENNA PLUS 5 MILLILITER(S): 8.6 TABLET ORAL at 23:32

## 2019-12-16 RX ADMIN — ENOXAPARIN SODIUM 40 MILLIGRAM(S): 100 INJECTION SUBCUTANEOUS at 12:29

## 2019-12-16 RX ADMIN — CHLORHEXIDINE GLUCONATE 1 APPLICATION(S): 213 SOLUTION TOPICAL at 06:41

## 2019-12-16 RX ADMIN — SIMVASTATIN 20 MILLIGRAM(S): 20 TABLET, FILM COATED ORAL at 23:32

## 2019-12-16 NOTE — PROGRESS NOTE ADULT - SUBJECTIVE AND OBJECTIVE BOX
CHIEF COMPLAINT:    Interval Events:    REVIEW OF SYSTEMS:  Constitutional: [ ] negative [ ] fevers [ ] chills [ ] weight loss [ ] weight gain  HEENT: [ ] negative [ ] dry eyes [ ] eye irritation [ ] postnasal drip [ ] nasal congestion  CV: [ ] negative  [ ] chest pain [ ] orthopnea [ ] palpitations [ ] murmur  Resp: [ ] negative [ ] cough [ ] shortness of breath [ ] dyspnea [ ] wheezing [ ] sputum [ ] hemoptysis  GI: [ ] negative [ ] nausea [ ] vomiting [ ] diarrhea [ ] constipation [ ] abd pain [ ] dysphagia   : [ ] negative [ ] dysuria [ ] nocturia [ ] hematuria [ ] increased urinary frequency  Musculoskeletal: [ ] negative [ ] back pain [ ] myalgias [ ] arthralgias [ ] fracture  Skin: [ ] negative [ ] rash [ ] itch  Neurological: [ ] negative [ ] headache [ ] dizziness [ ] syncope [ ] weakness [ ] numbness  Psychiatric: [ ] negative [ ] anxiety [ ] depression  Endocrine: [ ] negative [ ] diabetes [ ] thyroid problem  Hematologic/Lymphatic: [ ] negative [ ] anemia [ ] bleeding problem  Allergic/Immunologic: [ ] negative [ ] itchy eyes [ ] nasal discharge [ ] hives [ ] angioedema  [ ] All other systems negative  [ ] Unable to assess ROS because ________    OBJECTIVE:  ICU Vital Signs Last 24 Hrs  T(C): 36.3 (16 Dec 2019 12:05), Max: 36.4 (15 Dec 2019 17:28)  T(F): 97.3 (16 Dec 2019 12:05), Max: 97.6 (15 Dec 2019 19:13)  HR: 83 (16 Dec 2019 12:05) (79 - 88)  BP: 112/65 (16 Dec 2019 12:05) (99/55 - 119/68)  BP(mean): --  ABP: --  ABP(mean): --  RR: 22 (16 Dec 2019 12:05) (10 - 22)  SpO2: 98% (16 Dec 2019 12:05) (98% - 100%)        12-15 @ 07:01  -  12-16 @ 07:00  --------------------------------------------------------  IN: 660 mL / OUT: 0 mL / NET: 660 mL      CAPILLARY BLOOD GLUCOSE      POCT Blood Glucose.: 179 mg/dL (16 Dec 2019 12:12)      PHYSICAL EXAM:  General:   HEENT:   Lymph Nodes:  Neck:   Respiratory:   Cardiovascular:   Abdomen:   Extremities:   Skin:   Neurological:  Psychiatry:    HOSPITAL MEDICATIONS:  MEDICATIONS  (STANDING):  ATENolol  Tablet 50 milliGRAM(s) Oral daily  chlorhexidine 4% Liquid 1 Application(s) Topical <User Schedule>  dextrose 5%. 1000 milliLiter(s) (50 mL/Hr) IV Continuous <Continuous>  dextrose 50% Injectable 12.5 Gram(s) IV Push once  dextrose 50% Injectable 25 Gram(s) IV Push once  dextrose 50% Injectable 25 Gram(s) IV Push once  enoxaparin Injectable 40 milliGRAM(s) SubCutaneous daily  escitalopram 5 milliGRAM(s) Oral daily  furosemide   Injectable 20 milliGRAM(s) IV Push two times a day  insulin lispro (HumaLOG) corrective regimen sliding scale   SubCutaneous every 6 hours  insulin NPH human recombinant 9 Unit(s) SubCutaneous every 6 hours  levETIRAcetam  Solution 500 milliGRAM(s) Oral two times a day  levothyroxine 88 MICROGram(s) Oral at bedtime  melatonin 3 milliGRAM(s) Oral at bedtime  pantoprazole   Suspension 40 milliGRAM(s) Oral daily  potassium phosphate / sodium phosphate powder 1 Packet(s) Oral three times a day before meals  predniSONE   Tablet 40 milliGRAM(s) Oral daily  senna Syrup 5 milliLiter(s) Oral at bedtime  simvastatin 20 milliGRAM(s) Oral at bedtime    MEDICATIONS  (PRN):  acetaminophen    Suspension .. 650 milliGRAM(s) Oral every 6 hours PRN Severe Pain (7 - 10)  albuterol/ipratropium for Nebulization. 3 milliLiter(s) Nebulizer every 8 hours PRN Shortness of Breath  dextrose 40% Gel 15 Gram(s) Oral once PRN Blood Glucose LESS THAN 70 milliGRAM(s)/deciliter  glucagon  Injectable 1 milliGRAM(s) IntraMuscular once PRN Glucose LESS THAN 70 milligrams/deciliter  OLANZapine Injectable 1.25 milliGRAM(s) IntraMuscular every 8 hours PRN agitation  sodium chloride 0.9% lock flush 10 milliLiter(s) IV Push every 1 hour PRN Pre/post blood products, medications, blood draw, and to maintain line patency      LABS:                        9.3    9.50  )-----------( 430      ( 16 Dec 2019 06:12 )             29.9     Hgb Trend: 9.3<--, 8.0<--, 7.1<--, 7.2<--, 7.5<--  12-16    131<L>  |  91<L>  |  38<H>  ----------------------------<  130<H>  4.6   |  28  |  0.89    Ca    8.5      16 Dec 2019 06:12  Phos  3.2     12-16  Mg     1.9     12-16    TPro  6.3  /  Alb  2.4<L>  /  TBili  0.2  /  DBili  x   /  AST  28  /  ALT  27  /  AlkPhos  186<H>  12-16    Creatinine Trend: 0.89<--, 0.92<--, 0.83<--, 0.82<--, 0.90<--, 0.88<--            MICROBIOLOGY:       RADIOLOGY:  [ ] Reviewed and interpreted by me    PULMONARY FUNCTION TESTS:    EKG: CHIEF COMPLAINT: Altered mental status    Interval Events: No events overnight. He denies shortness of breath, chest pain, fever, and cough. Pt comfortable and saturating in the 90s on nasal canula.     REVIEW OF SYSTEMS:  Constitutional: [x ] negative [ ] fevers [ ] chills [ ] weight loss [ ] weight gain  HEENT: [ x] negative [ ] dry eyes [ ] eye irritation [ ] postnasal drip [ ] nasal congestion  CV: [ x] negative  [ ] chest pain [ ] orthopnea [ ] palpitations [ ] murmur  Resp: [ x] negative [ ] cough [ ] shortness of breath [ ] dyspnea [ ] wheezing [ ] sputum [ ] hemoptysis  GI: [ x] negative [ ] nausea [ ] vomiting [ ] diarrhea [ ] constipation [ ] abd pain [ ] dysphagia   : [x ] negative [ ] dysuria [ ] nocturia [ ] hematuria [ ] increased urinary frequency  Musculoskeletal: [x ] negative [ ] back pain [ ] myalgias [ ] arthralgias [ ] fracture  Skin: [ x] negative [ ] rash [ ] itch  Neurological: [ x] negative [ ] headache [ ] dizziness [ ] syncope [ ] weakness [ ] numbness  Psychiatric: [x ] negative [ ] anxiety [ ] depression  Endocrine: [ x] negative [ ] diabetes [ ] thyroid problem  Hematologic/Lymphatic: [x ] negative [ ] anemia [ ] bleeding problem  Allergic/Immunologic: [x ] negative [ ] itchy eyes [ ] nasal discharge [ ] hives [ ] angioedema  [x ] All other systems negative  [ ] Unable to assess ROS because ________    OBJECTIVE:  ICU Vital Signs Last 24 Hrs  T(C): 36.3 (16 Dec 2019 12:05), Max: 36.4 (15 Dec 2019 17:28)  T(F): 97.3 (16 Dec 2019 12:05), Max: 97.6 (15 Dec 2019 19:13)  HR: 83 (16 Dec 2019 12:05) (79 - 88)  BP: 112/65 (16 Dec 2019 12:05) (99/55 - 119/68)  BP(mean): --  ABP: --  ABP(mean): --  RR: 22 (16 Dec 2019 12:05) (10 - 22)  SpO2: 98% (16 Dec 2019 12:05) (98% - 100%)        12-15 @ 07:01  -  12-16 @ 07:00  --------------------------------------------------------  IN: 660 mL / OUT: 0 mL / NET: 660 mL      CAPILLARY BLOOD GLUCOSE      POCT Blood Glucose.: 179 mg/dL (16 Dec 2019 12:12)      PHYSICAL EXAM:  General: NAD, appears comfortable  Skin: Warm and dry, no rashes  Neuro: alert, nonfocal  HEENT: PERRL, EOMI, no oral lesions  Neck: Full range of motion, no thyromehaly or JVD  Lymph: No cervical, axillary, or supraclavicular lymphadenopathy  Lungs: Clear to ascultation bilatereally, no wheezes, rales, or rhonchi   Heart: Regular rate and rhythm, no murmurs  Abdomen: Normoactive bowl sounds, soft, nontender, nondistended  Extremities: No lower extremity tenderness, erythema, or edema   Psych: normal mood and affect    HOSPITAL MEDICATIONS:  MEDICATIONS  (STANDING):  ATENolol  Tablet 50 milliGRAM(s) Oral daily  chlorhexidine 4% Liquid 1 Application(s) Topical <User Schedule>  dextrose 5%. 1000 milliLiter(s) (50 mL/Hr) IV Continuous <Continuous>  dextrose 50% Injectable 12.5 Gram(s) IV Push once  dextrose 50% Injectable 25 Gram(s) IV Push once  dextrose 50% Injectable 25 Gram(s) IV Push once  enoxaparin Injectable 40 milliGRAM(s) SubCutaneous daily  escitalopram 5 milliGRAM(s) Oral daily  furosemide   Injectable 20 milliGRAM(s) IV Push two times a day  insulin lispro (HumaLOG) corrective regimen sliding scale   SubCutaneous every 6 hours  insulin NPH human recombinant 9 Unit(s) SubCutaneous every 6 hours  levETIRAcetam  Solution 500 milliGRAM(s) Oral two times a day  levothyroxine 88 MICROGram(s) Oral at bedtime  melatonin 3 milliGRAM(s) Oral at bedtime  pantoprazole   Suspension 40 milliGRAM(s) Oral daily  potassium phosphate / sodium phosphate powder 1 Packet(s) Oral three times a day before meals  predniSONE   Tablet 40 milliGRAM(s) Oral daily  senna Syrup 5 milliLiter(s) Oral at bedtime  simvastatin 20 milliGRAM(s) Oral at bedtime    MEDICATIONS  (PRN):  acetaminophen    Suspension .. 650 milliGRAM(s) Oral every 6 hours PRN Severe Pain (7 - 10)  albuterol/ipratropium for Nebulization. 3 milliLiter(s) Nebulizer every 8 hours PRN Shortness of Breath  dextrose 40% Gel 15 Gram(s) Oral once PRN Blood Glucose LESS THAN 70 milliGRAM(s)/deciliter  glucagon  Injectable 1 milliGRAM(s) IntraMuscular once PRN Glucose LESS THAN 70 milligrams/deciliter  OLANZapine Injectable 1.25 milliGRAM(s) IntraMuscular every 8 hours PRN agitation  sodium chloride 0.9% lock flush 10 milliLiter(s) IV Push every 1 hour PRN Pre/post blood products, medications, blood draw, and to maintain line patency      LABS:                        9.3    9.50  )-----------( 430      ( 16 Dec 2019 06:12 )             29.9     Hgb Trend: 9.3<--, 8.0<--, 7.1<--, 7.2<--, 7.5<--  12-16    131<L>  |  91<L>  |  38<H>  ----------------------------<  130<H>  4.6   |  28  |  0.89    Ca    8.5      16 Dec 2019 06:12  Phos  3.2     12-16  Mg     1.9     12-16    TPro  6.3  /  Alb  2.4<L>  /  TBili  0.2  /  DBili  x   /  AST  28  /  ALT  27  /  AlkPhos  186<H>  12-16    Creatinine Trend: 0.89<--, 0.92<--, 0.83<--, 0.82<--, 0.90<--, 0.88<--      < from: CT Chest No Cont (11.26.19 @ 12:44) >  IMPRESSION:     1.  New bilateral groundglass opacities and dense opacities with   thickening of interlobular septa. These findings could occur in the   clinical setting of pulmonary edema rather than pneumonia.  2.  Bilateral pleural effusions and passive atelectasis.  3.  A subcarinal lymph node has decreased in size since 9/10/2019      ELISE PURCELL M.D., ATTENDING RADIOLOGIST  This document has been electronically signed. Nov 26 2019  1:10PM    < end of copied text >

## 2019-12-16 NOTE — PROGRESS NOTE ADULT - ATTENDING COMMENTS
Patient with improved clinical status from CHF/drug induced pneumonitis, favor the former. Would continue diuretics, taper of steroids as above.

## 2019-12-16 NOTE — PROGRESS NOTE ADULT - ASSESSMENT
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 prolonged FRANCES state (? 2/2 prolonged hypercalcemic state) that has now resolved vs. loss of body mass  - Serum Cr is stable  - BUN has been elevated likely sec to prednisone and protein rich tube feeds   - s/p CT scan with IV contrast. Held Lasix. monitor for ADELSO post CT scan.    continue lasix    - Avoid nephrotoxins agent  - renal diet  - monitor bmp    Hyponatremia  - admitted with Na 122  - work up suggested SIADH  - Serum sodium improved, fluctuates between hypernatremia and hyponatremia  - Pt started on PEG tube feeds.   - now off Na tab and restarted on lasix  - Monitor serum bicarb while on diuretic   - Avoid excessive free water via peg.   - Monitor serum sodium and fluid status closely     - hold free water flushes   - continue lasix     HTN  - has Hx HTN but was on midodrine for orthostatic hypotension in the recent past   - Currently BP controlled   - monitor bp    Hypocalcemia  - Has Hx hypercalcemia of malignancy  - low alb corrected alexander is optimal  - monitor    Pl. effusion b/l/ Anasarca   mod effusion,  on ct chest  on lasix 20 bid iv. held but can be restarted  remains on high flow o2. pulm on board  Monitor     Hyperkalemia  Resolved   low k diet  monitor

## 2019-12-16 NOTE — PROGRESS NOTE ADULT - SUBJECTIVE AND OBJECTIVE BOX
[facility] Division of Hospital Medicine  Alejandra Rodas MD  Pager [phone]    Patient is a 79y old  Male who presents with a chief complaint of AMS       SUBJECTIVE / OVERNIGHT EVENTS: more alert today; sating well on NC      MEDICATIONS  (STANDING):  ATENolol  Tablet 50 milliGRAM(s) Oral daily  chlorhexidine 4% Liquid 1 Application(s) Topical <User Schedule>  dextrose 5%. 1000 milliLiter(s) (50 mL/Hr) IV Continuous <Continuous>  dextrose 50% Injectable 12.5 Gram(s) IV Push once  dextrose 50% Injectable 25 Gram(s) IV Push once  dextrose 50% Injectable 25 Gram(s) IV Push once  enoxaparin Injectable 40 milliGRAM(s) SubCutaneous daily  escitalopram 5 milliGRAM(s) Oral daily  furosemide   Injectable 20 milliGRAM(s) IV Push two times a day  insulin lispro (HumaLOG) corrective regimen sliding scale   SubCutaneous every 6 hours  insulin NPH human recombinant 9 Unit(s) SubCutaneous every 6 hours  levETIRAcetam  Solution 500 milliGRAM(s) Oral two times a day  levothyroxine 88 MICROGram(s) Oral at bedtime  melatonin 3 milliGRAM(s) Oral at bedtime  pantoprazole   Suspension 40 milliGRAM(s) Oral daily  potassium phosphate / sodium phosphate powder 1 Packet(s) Oral three times a day before meals  predniSONE   Tablet 40 milliGRAM(s) Oral daily  senna Syrup 5 milliLiter(s) Oral at bedtime  simvastatin 20 milliGRAM(s) Oral at bedtime    MEDICATIONS  (PRN):  acetaminophen    Suspension .. 650 milliGRAM(s) Oral every 6 hours PRN Severe Pain (7 - 10)  albuterol/ipratropium for Nebulization. 3 milliLiter(s) Nebulizer every 8 hours PRN Shortness of Breath  dextrose 40% Gel 15 Gram(s) Oral once PRN Blood Glucose LESS THAN 70 milliGRAM(s)/deciliter  glucagon  Injectable 1 milliGRAM(s) IntraMuscular once PRN Glucose LESS THAN 70 milligrams/deciliter  OLANZapine Injectable 1.25 milliGRAM(s) IntraMuscular every 8 hours PRN agitation  sodium chloride 0.9% lock flush 10 milliLiter(s) IV Push every 1 hour PRN Pre/post blood products, medications, blood draw, and to maintain line patency      CAPILLARY BLOOD GLUCOSE  POCT Blood Glucose.: 179 mg/dL (16 Dec 2019 12:12)  POCT Blood Glucose.: 132 mg/dL (16 Dec 2019 06:11)  POCT Blood Glucose.: 176 mg/dL (16 Dec 2019 00:44)  POCT Blood Glucose.: 211 mg/dL (15 Dec 2019 17:32)          PHYSICAL EXAM:  Vital Signs Last 24 Hrs  T(F): 97.4 (16 Dec 2019 05:58), Max: 97.6 (15 Dec 2019 19:13)  HR: 83 (16 Dec 2019 05:58) (70 - 88)  BP: 99/55 (16 Dec 2019 05:58) (99/55 - 119/68)  RR: 10 (16 Dec 2019 05:58) (10 - 20)  SpO2: 100% (16 Dec 2019 05:58) (100% - 100%)    CONSTITUTIONAL: NAD  ENMT: Moist oral mucosa  RESPIRATORY: good AE b/l  CARDIOVASCULAR: Regular rate and rhythm; No lower extremity edema;   ABDOMEN: Nontender to palpation, normoactive bowel sounds, +PEG, site clean  MUSCULOSKELETAL: no clubbing or cyanosis of digits; no joint swelling or tenderness to palpation  PSYCH: more alert today;  NEUROLOGY: CN 2-12 are intact and symmetric; no gross sensory deficits       LABS:                        9.3    9.50  )-----------( 430      ( 16 Dec 2019 06:12 )             29.9     12-16    131<L>  |  91<L>  |  38<H>  ----------------------------<  130<H>  4.6   |  28  |  0.89    Ca    8.5      16 Dec 2019 06:12  Phos  3.2     12-16  Mg     1.9     12-16    TPro  6.3  /  Alb  2.4<L>  /  TBili  0.2  /  DBili  x   /  AST  28  /  ALT  27  /  AlkPhos  186<H>  12-16

## 2019-12-16 NOTE — PROGRESS NOTE ADULT - PROBLEM SELECTOR PLAN 2
hyponatremia, free water on hold for now; improving  cont to monitor, asymptomatic  hypophosphatemia-->resolved

## 2019-12-16 NOTE — PROGRESS NOTE ADULT - SUBJECTIVE AND OBJECTIVE BOX
[facility]   MD UYEN HERNANDEZ, DO MARYANN REYNOSO, LEATHA NICOLE    TEL:  OFFICE: [phone]  DR CASAREZ CELL: [phone]  DR. HERNANDEZ CELL: [phone]  DR. REYNOSO CELL: [phone]  CHUCKIE TERRAZAS CELL: [phone]        Patient is a 79y old  Male who presents with a chief complaint of AMS (15 Dec 2019 17:02)      Patient seen and examined at bedside. No chest pain/sob    VITALS:  T(F): 97.4 (12-16-19 @ 05:58), Max: 97.6 (12-15-19 @ 19:13)  HR: 83 (12-16-19 @ 05:58)  BP: 99/55 (12-16-19 @ 05:58)  RR: 10 (12-16-19 @ 05:58)  SpO2: 100% (12-16-19 @ 05:58)  Wt(kg): --    12-15 @ 07:01  -  12-16 @ 07:00  --------------------------------------------------------  IN: 660 mL / OUT: 0 mL / NET: 660 mL          PHYSICAL EXAM:  Constitutional: NAD  Neck: No JVD  Respiratory: CTAB, no wheezes, rales or rhonchi  Cardiovascular: S1, S2, RRR  Gastrointestinal: BS+, soft, NT/ND  Extremities: No peripheral edema    Hospital Medications:   MEDICATIONS  (STANDING):  ATENolol  Tablet 50 milliGRAM(s) Oral daily  chlorhexidine 4% Liquid 1 Application(s) Topical <User Schedule>  dextrose 5%. 1000 milliLiter(s) (50 mL/Hr) IV Continuous <Continuous>  dextrose 50% Injectable 12.5 Gram(s) IV Push once  dextrose 50% Injectable 25 Gram(s) IV Push once  dextrose 50% Injectable 25 Gram(s) IV Push once  enoxaparin Injectable 40 milliGRAM(s) SubCutaneous daily  escitalopram 5 milliGRAM(s) Oral daily  furosemide   Injectable 20 milliGRAM(s) IV Push two times a day  insulin lispro (HumaLOG) corrective regimen sliding scale   SubCutaneous every 6 hours  insulin NPH human recombinant 9 Unit(s) SubCutaneous every 6 hours  levETIRAcetam  Solution 500 milliGRAM(s) Oral two times a day  levothyroxine 88 MICROGram(s) Oral at bedtime  melatonin 3 milliGRAM(s) Oral at bedtime  pantoprazole   Suspension 40 milliGRAM(s) Oral daily  potassium phosphate / sodium phosphate powder 1 Packet(s) Oral three times a day before meals  predniSONE   Tablet 40 milliGRAM(s) Oral daily  senna Syrup 5 milliLiter(s) Oral at bedtime  simvastatin 20 milliGRAM(s) Oral at bedtime      LABS:  12-16    131<L>  |  91<L>  |  38<H>  ----------------------------<  130<H>  4.6   |  28  |  0.89    Ca    8.5      16 Dec 2019 06:12  Phos  3.2     12-16  Mg     1.9     12-16    TPro  6.3  /  Alb  2.4<L>  /  TBili  0.2  /  DBili      /  AST  28  /  ALT  27  /  AlkPhos  186<H>  12-16    Creatinine Trend: 0.89 <--, 0.92 <--, 0.83 <--, 0.82 <--, 0.90 <--, 0.88 <--, 0.91 <--, 0.76 <--, 0.77 <--    Phosphorus Level, Serum: 3.2 mg/dL (12-16 @ 06:12)  Albumin, Serum: 2.4 g/dL (12-16 @ 06:12)                              9.3    9.50  )-----------( 430      ( 16 Dec 2019 06:12 )             29.9     Urine Studies:  Urinalysis - [12-14-19 @ 11:10]      Color LIGHT YELLOW / Appearance CLEAR / SG 1.016 / pH 8.0      Gluc 30 / Ketone NEGATIVE  / Bili NEGATIVE / Urobili NORMAL       Blood NEGATIVE / Protein 20 / Leuk Est NEGATIVE / Nitrite NEGATIVE      RBC 0-2 / WBC 0-2 / Hyaline NEGATIVE / Gran  / Sq Epi OCC / Non Sq Epi  / Bacteria NEGATIVE      Iron 38, TIBC 234, %sat --      [09-12-19 @ 06:08]  Ferritin 214.6      [09-12-19 @ 06:08]  PTH 20.00 (Ca --)      [09-03-19 @ 05:45]   --  PTH 25.46 (Ca --)      [09-01-19 @ 06:00]   --  Vitamin D (25OH) 34.9      [09-03-19 @ 05:45]  HbA1c 7.0      [09-13-19 @ 06:50]  TSH 6.55      [12-04-19 @ 05:05]  Lipid: chol 124, , HDL 50, LDL 57      [12-12-19 @ 06:30]    HBsAb Reactive      [11-23-19 @ 05:25]  HBsAg Nonreactive      [11-23-19 @ 05:25]  HBcAb Reactive      [11-23-19 @ 05:25]  HCV 0.12, Nonreactive Hepatitis C AB  S/CO Ratio                        Interpretation  < 1.00                                   Non-Reactive  1.00 - 4.99                         Weakly-Reactive  >= 5.00                                Reactive  Non-Reactive: Aperson with a non-reactive HCV antibody  result is considered uninfected.  No further action is  needed unless recent infection is suspected.  In these  cases, consider repeat testing later to detect  seroconversion..  Weakly-Reactive: HCV antibody test is abnormal, HCV RNA  Qualitative test will follow.  Reactive: HCV antibody test is abnormal, HCV RNA  Qualitative test will follow.  Note: HCV antibody testing is performed on the [employer]   system.      [11-23-19 @ 05:25]  HIV Nonreactive The HIV Ag/Ab Combo test performed screens for HIV-1 p24  antigen, antibodies to HIV-1 (group M and group O), and  antibodies to HIV-2. All specimens repeatedly reactive  will reflex to an HIV 1/2 antibody confirmation and  differentiation test. This assay detects p24 antigen which  may be present prior to the development of HIV antibodies,  therefore a reactive result with a negative HIV 1/2 AB  Confirmation should be followed up with HIV-1 RNA, HIV-2  RNA and repeat testing in 4-8 weeks. A nonreactive result  does not preclude previous exposure to or infection with  HIV-1 or HIV-2. [facility] prohibits disclosure of this  result to any unauthorized party.      [11-22-19 @ 10:18]      RADIOLOGY & ADDITIONAL STUDIES:

## 2019-12-16 NOTE — PROGRESS NOTE ADULT - ASSESSMENT
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 Pulmonary service consulted to assist in management of hypoxemic respiratory failure initially requiring noninvasive ventilation, now downtitrated from high flow to  nasal canula and doing well.     # Acute hypoxemic respiratory failure: CT chest with new ground glass opacities and dense consolidations in upper lobes bilateral and known bilateral pleural effusions. Overall findings were concerning for multifocal pneumonia vs. pneumonitis vs fluid overload from diastolic HF. Pt improved clinically with antibiotics, steroids, and diuresis but is unclear which intervention precipitated this improvement. Additionally, patient is high risk for PE with LE distal DVTs as well as upper extremity DVTs. V/Q scan from 11/14 was low probability.     - c/w supplemental O2 to keep SO2 > 92%, may need home O2 on discharge  - c/w prednisone 40mg via PEG, would taper by 10 mg weekly; adjust insulin accordingly as steroids are downtitrated   - c/w diuresis  - strict I's and O's daily weights  - will sign off, please call with any questions    Olegario Briscoe MD  Fellow, Pulmonary and Critical Care Medicine  [facility]  Pager: [phone], [phone] ([facility])  Email: [email or web address] 78 yo M/ CKD, CAD, recent SDH, DVTs and recently diagnosed DLBCL s/ 2 cycles of chemo admitted for encephalopathy and concern for seizures. Pulmonary service consulted to assist in management of hypoxemic respiratory failure initially requiring noninvasive ventilation, now downtitrated from high flow to  nasal canula and doing well.     # Acute hypoxemic respiratory failure: CT chest with new ground glass opacities and dense consolidations in upper lobes bilateral and known bilateral pleural effusions. Overall findings were concerning for multifocal pneumonia vs. pneumonitis vs fluid overload from diastolic HF. Pt improved clinically with antibiotics, steroids, and diuresis but is unclear which intervention precipitated this improvement. Additionally, patient is high risk for PE with LE distal DVTs as well as upper extremity DVTs. V/Q scan from  was low probability.     - c/w supplemental O2 to keep SO2 > 92%, may need home O2 on discharge  - c/w prednisone 40mg via PEG, would taper by 10 mg weekly; adjust insulin accordingly as steroids are downtitrated   - c/w diuresis  - strict I's and O's daily weights  - will sign off, please call with any questions  - Patient can follow up with us outpatient at [address] [place] [address], [address]. Number [phone]  Appointments can also be made by e-mailing [email or web address] and providing patient's name, , and discharge diagnosis      Olegario Briscoe MD  Fellow, Pulmonary and Critical Care Medicine  [facility]  Pager: [phone], [phone] ([facility])  Email: [email or web address] 80 yo M/ CKD, CAD, recent SDH, DVTs and recently diagnosed DLBCL s/ 2 cycles of chemo admitted for encephalopathy and concern for seizures. Pulmonary service consulted to assist in management of hypoxemic respiratory failure initially requiring noninvasive ventilation, now downtitrated from high flow to  nasal canula and doing well.     # Acute hypoxemic respiratory failure: CT chest with new ground glass opacities and dense consolidations in upper lobes bilateral and known bilateral pleural effusions. Overall findings were concerning for multifocal pneumonia vs. pneumonitis vs fluid overload from diastolic HF. Pt improved clinically with antibiotics, steroids, and diuresis but is unclear which intervention precipitated this improvement. Additionally, patient is high risk for PE with LE distal DVTs as well as upper extremity DVTs. V/Q scan from  was low probability.     - c/w supplemental O2 to keep SO2 > 92%, may need home O2 on discharge  - c/w prednisone 40mg via PEG, would taper by 10 mg every 5 days; adjust insulin accordingly as steroids are downtitrated   - c/w diuresis  - strict I's and O's daily weights  - will sign off, please call with any questions  - Patient can follow up with us outpatient at [address] [place] [address], [address]. Number [phone]  Appointments can also be made by e-mailing [email or web address] and providing patient's name, , and discharge diagnosis      Olegario Briscoe MD  Fellow, Pulmonary and Critical Care Medicine  [facility]  Pager: [phone], [phone] ([facility])  Email: [email or web address]

## 2019-12-16 NOTE — PROGRESS NOTE ADULT - PROBLEM SELECTOR PLAN 7
- Per chart review on R-CP. s/p 2 cycles.   CT abd/pelvis to eval disease  await onc decision about chemo   CT stable with respect to spleen, new lesion L upper renal pole;  renal sono P  UA negative  s/p 1 u PRBC 12/15

## 2019-12-16 NOTE — PROGRESS NOTE ADULT - ASSESSMENT
Eye Color: dark [place] Have You Had Laser Hair Removal Before?: has had previous treatment 81 yo M PMHx DLBCL s/p 2 cycles of R-CP, CAD, DM, HTN, BL LE DVT, LUE DVT on prophylactic lovenox, CKD, orthostatic hypotension, and recent admission in November 2019 for SDH after fall, presents for acute encephalopathy and new onset seizures secondary to infection. Course c/b acute respiratory distress 2/2 aspiration pneumonitis, s/p benny (11/28-12/4) for aspiration PNA. s/p peg placement (11/29) When Outside In The Sun, Do You...: rarely burns, tans with ease

## 2019-12-16 NOTE — PROGRESS NOTE ADULT - NSHPATTENDINGPLANDISCUSS_GEN_ALL_CORE
primary
resident/fellow/nurse
Pulmonary fellow
Pulmonary fellow and family at bedside
team
Pulmonary fellow and family at bedside
team

## 2019-12-17 LAB
ANION GAP SERPL CALC-SCNC: 13 MMO/L — SIGNIFICANT CHANGE UP (ref 7–14)
ANISOCYTOSIS BLD QL: SLIGHT — SIGNIFICANT CHANGE UP
BASOPHILS # BLD AUTO: 0.03 K/UL — SIGNIFICANT CHANGE UP (ref 0–0.2)
BASOPHILS NFR BLD AUTO: 0.3 % — SIGNIFICANT CHANGE UP (ref 0–2)
BASOPHILS NFR SPEC: 0 % — SIGNIFICANT CHANGE UP (ref 0–2)
BLASTS # FLD: 0 % — SIGNIFICANT CHANGE UP (ref 0–0)
BUN SERPL-MCNC: 41 MG/DL — HIGH (ref 7–23)
CALCIUM SERPL-MCNC: 8.6 MG/DL — SIGNIFICANT CHANGE UP (ref 8.4–10.5)
CHLORIDE SERPL-SCNC: 90 MMOL/L — LOW (ref 98–107)
CO2 SERPL-SCNC: 30 MMOL/L — SIGNIFICANT CHANGE UP (ref 22–31)
CREAT SERPL-MCNC: 0.92 MG/DL — SIGNIFICANT CHANGE UP (ref 0.5–1.3)
EOSINOPHIL # BLD AUTO: 0.12 K/UL — SIGNIFICANT CHANGE UP (ref 0–0.5)
EOSINOPHIL NFR BLD AUTO: 1.2 % — SIGNIFICANT CHANGE UP (ref 0–6)
EOSINOPHIL NFR FLD: 1.7 % — SIGNIFICANT CHANGE UP (ref 0–6)
GIANT PLATELETS BLD QL SMEAR: PRESENT — SIGNIFICANT CHANGE UP
GLUCOSE BLDC GLUCOMTR-MCNC: 130 MG/DL — HIGH (ref 70–99)
GLUCOSE BLDC GLUCOMTR-MCNC: 139 MG/DL — HIGH (ref 70–99)
GLUCOSE BLDC GLUCOMTR-MCNC: 139 MG/DL — HIGH (ref 70–99)
GLUCOSE BLDC GLUCOMTR-MCNC: 170 MG/DL — HIGH (ref 70–99)
GLUCOSE BLDC GLUCOMTR-MCNC: 186 MG/DL — HIGH (ref 70–99)
GLUCOSE SERPL-MCNC: 128 MG/DL — HIGH (ref 70–99)
HCT VFR BLD CALC: 29.6 % — LOW (ref 39–50)
HGB BLD-MCNC: 9.3 G/DL — LOW (ref 13–17)
HYPOCHROMIA BLD QL: SIGNIFICANT CHANGE UP
IMM GRANULOCYTES NFR BLD AUTO: 9.2 % — HIGH (ref 0–1.5)
LYMPHOCYTES # BLD AUTO: 1.11 K/UL — SIGNIFICANT CHANGE UP (ref 1–3.3)
LYMPHOCYTES # BLD AUTO: 11.2 % — LOW (ref 13–44)
LYMPHOCYTES NFR SPEC AUTO: 10.4 % — LOW (ref 13–44)
MACROCYTES BLD QL: SLIGHT — SIGNIFICANT CHANGE UP
MAGNESIUM SERPL-MCNC: 1.9 MG/DL — SIGNIFICANT CHANGE UP (ref 1.6–2.6)
MCHC RBC-ENTMCNC: 27 PG — SIGNIFICANT CHANGE UP (ref 27–34)
MCHC RBC-ENTMCNC: 31.4 % — LOW (ref 32–36)
MCV RBC AUTO: 86 FL — SIGNIFICANT CHANGE UP (ref 80–100)
METAMYELOCYTES # FLD: 3.5 % — HIGH (ref 0–1)
MONOCYTES # BLD AUTO: 1.54 K/UL — HIGH (ref 0–0.9)
MONOCYTES NFR BLD AUTO: 15.5 % — HIGH (ref 2–14)
MONOCYTES NFR BLD: 12.2 % — HIGH (ref 2–9)
MYELOCYTES NFR BLD: 0.9 % — HIGH (ref 0–0)
NEUTROPHIL AB SER-ACNC: 48.7 % — SIGNIFICANT CHANGE UP (ref 43–77)
NEUTROPHILS # BLD AUTO: 6.2 K/UL — SIGNIFICANT CHANGE UP (ref 1.8–7.4)
NEUTROPHILS NFR BLD AUTO: 62.6 % — SIGNIFICANT CHANGE UP (ref 43–77)
NEUTS BAND # BLD: 20 % — HIGH (ref 0–6)
NRBC # BLD: 3 /100WBC — SIGNIFICANT CHANGE UP
NRBC # FLD: 0.04 K/UL — SIGNIFICANT CHANGE UP (ref 0–0)
OTHER - HEMATOLOGY %: 0 — SIGNIFICANT CHANGE UP
PHOSPHATE SERPL-MCNC: 2.6 MG/DL — SIGNIFICANT CHANGE UP (ref 2.5–4.5)
PLATELET # BLD AUTO: 419 K/UL — HIGH (ref 150–400)
PLATELET COUNT - ESTIMATE: NORMAL — SIGNIFICANT CHANGE UP
PMV BLD: 11.3 FL — SIGNIFICANT CHANGE UP (ref 7–13)
POIKILOCYTOSIS BLD QL AUTO: SLIGHT — SIGNIFICANT CHANGE UP
POLYCHROMASIA BLD QL SMEAR: SLIGHT — SIGNIFICANT CHANGE UP
POTASSIUM SERPL-MCNC: 5 MMOL/L — SIGNIFICANT CHANGE UP (ref 3.5–5.3)
POTASSIUM SERPL-SCNC: 5 MMOL/L — SIGNIFICANT CHANGE UP (ref 3.5–5.3)
PROMYELOCYTES # FLD: 0 % — SIGNIFICANT CHANGE UP (ref 0–0)
RBC # BLD: 3.44 M/UL — LOW (ref 4.2–5.8)
RBC # FLD: 18.3 % — HIGH (ref 10.3–14.5)
SODIUM SERPL-SCNC: 133 MMOL/L — LOW (ref 135–145)
TARGETS BLD QL SMEAR: SLIGHT — SIGNIFICANT CHANGE UP
VARIANT LYMPHS # BLD: 2.6 % — SIGNIFICANT CHANGE UP
WBC # BLD: 9.91 K/UL — SIGNIFICANT CHANGE UP (ref 3.8–10.5)
WBC # FLD AUTO: 9.91 K/UL — SIGNIFICANT CHANGE UP (ref 3.8–10.5)

## 2019-12-17 PROCEDURE — 99232 SBSQ HOSP IP/OBS MODERATE 35: CPT | Mod: GC

## 2019-12-17 PROCEDURE — 99233 SBSQ HOSP IP/OBS HIGH 50: CPT

## 2019-12-17 PROCEDURE — 76770 US EXAM ABDO BACK WALL COMP: CPT | Mod: 26

## 2019-12-17 RX ADMIN — LEVETIRACETAM 500 MILLIGRAM(S): 250 TABLET, FILM COATED ORAL at 06:20

## 2019-12-17 RX ADMIN — ENOXAPARIN SODIUM 40 MILLIGRAM(S): 100 INJECTION SUBCUTANEOUS at 17:00

## 2019-12-17 RX ADMIN — HUMAN INSULIN 9 UNIT(S): 100 INJECTION, SUSPENSION SUBCUTANEOUS at 06:21

## 2019-12-17 RX ADMIN — HUMAN INSULIN 9 UNIT(S): 100 INJECTION, SUSPENSION SUBCUTANEOUS at 12:15

## 2019-12-17 RX ADMIN — PANTOPRAZOLE SODIUM 40 MILLIGRAM(S): 20 TABLET, DELAYED RELEASE ORAL at 06:21

## 2019-12-17 RX ADMIN — LEVETIRACETAM 500 MILLIGRAM(S): 250 TABLET, FILM COATED ORAL at 17:00

## 2019-12-17 RX ADMIN — SIMVASTATIN 20 MILLIGRAM(S): 20 TABLET, FILM COATED ORAL at 22:13

## 2019-12-17 RX ADMIN — HUMAN INSULIN 9 UNIT(S): 100 INJECTION, SUSPENSION SUBCUTANEOUS at 23:56

## 2019-12-17 RX ADMIN — CHLORHEXIDINE GLUCONATE 1 APPLICATION(S): 213 SOLUTION TOPICAL at 10:41

## 2019-12-17 RX ADMIN — HUMAN INSULIN 9 UNIT(S): 100 INJECTION, SUSPENSION SUBCUTANEOUS at 01:25

## 2019-12-17 RX ADMIN — Medication 20 MILLIGRAM(S): at 17:00

## 2019-12-17 RX ADMIN — Medication 2: at 18:06

## 2019-12-17 RX ADMIN — SENNA PLUS 5 MILLILITER(S): 8.6 TABLET ORAL at 22:13

## 2019-12-17 RX ADMIN — HUMAN INSULIN 9 UNIT(S): 100 INJECTION, SUSPENSION SUBCUTANEOUS at 18:06

## 2019-12-17 RX ADMIN — Medication 3 MILLIGRAM(S): at 22:13

## 2019-12-17 RX ADMIN — ESCITALOPRAM OXALATE 5 MILLIGRAM(S): 10 TABLET, FILM COATED ORAL at 17:00

## 2019-12-17 RX ADMIN — Medication 2: at 12:14

## 2019-12-17 RX ADMIN — Medication 88 MICROGRAM(S): at 22:13

## 2019-12-17 RX ADMIN — Medication 40 MILLIGRAM(S): at 06:21

## 2019-12-17 RX ADMIN — Medication 20 MILLIGRAM(S): at 06:20

## 2019-12-17 NOTE — PROGRESS NOTE ADULT - ATTENDING COMMENTS
resp improved  await onc decision for poss inpt chemo; if not will plan for dc home with home O2; per daughter, wife not ready for hospice eval yet; can be done in the community when ready

## 2019-12-17 NOTE — PROGRESS NOTE ADULT - ASSESSMENT
81 yo M hx DLBCL (dx 9/2019) s/p 2 cycles of R-CP (last ~4weeks ago) at [facility], DM2, recent admission at [facility] (10/29-11/5) for SDH s/p fall found to have b/l LE and LUE DVT (not on AC) p/w AMS and witnessed seizure.  Hospital course prolonged due to complications of hypoxic respiratory failure on high flow support, aspiration pna, pleural effusion and s/p PEG tube placement.      Patient is now weaned off of high flow oxygen support and on nasal cannula.  Clinically improving.      #Hypoxic respiratory failure on high flow: aspiration pna, pleural effusion, aspiration pneumonitis, fluid overload  -appreciate pulmonary following  -on lasix and prednisone taper for poss pneumonitis  -completed abx course  -after discussion with daughter Dr. Browne, thoracentesis deferred on 12/11, to continue IV lasix  -of note, pt had pleural effusions in the past and thoracentesis in the past with fluid negative for malignant cells.     #DLBCL:   - originally, pt was admitted to [facility] in 8-9/2019, was found to be hypercalcemic and CT C/A/P (9/10/19) showed scattered b/l pulmonary nodules, mediastinal lymphadenopathy, retroperitoneal lymphadenopathy, and splenic lesions/enlargement concerning for lymphoma.  LN biopsy consistent with DLBCL, germinal cell type, neg for bcl-2, bcl-6, myc.  Per daughter, no outpatient PET scan and biopsy was done as he was started on treatment right away.  He sees Dr. Jovan Poole at [facility] and was started on Rituxan-Cytoxan-Dex (R-CD), last tx 10/2019, and has not been able to f/u due to recent hospitalizations.   - Last CT A/P non contrast 11/15 now shows treatment response with decrease in size of the spleen and resolution of retroperitoneal adenopathy.  MRI brain with contrast without lesions, SDH stable.  - Repeat CT C/A/P - stable disease  - now that patient is clinically improved, plan to treat inpatient with Polatuzumab +Rituxan and awaiting approval.  Plan discussed with daughter Dr. Browne.   - monitor LDH and uric acid daily    #DVTs: b/l LE (calf) DVTs and LUE DVT  - repeat b/l LE US 11/15 showed unchanged subacute right soleal vein DVT  - ppx AC started on 11/6 as per neurosurgery    Will continue to follow.     Sarah Gomez  Hematology Fellow  [phone] 79 yo M hx DLBCL (dx 9/2019) s/p 2 cycles of R-CP (last ~4weeks ago) at [facility], DM2, recent admission at [facility] (10/29-11/5) for SDH s/p fall found to have b/l LE and LUE DVT (not on AC) p/w AMS and witnessed seizure.  Hospital course prolonged due to complications of hypoxic respiratory failure on high flow support, aspiration pna, pleural effusion and s/p PEG tube placement.      Patient is now weaned off of high flow oxygen support and on nasal cannula.  Clinically improving.      #Hypoxic respiratory failure on high flow: aspiration pna, pleural effusion, aspiration pneumonitis, fluid overload  -appreciate pulmonary following  -on lasix and prednisone taper for poss pneumonitis  -completed abx course  -after discussion with daughter Dr. Browne, thoracentesis deferred on 12/11, to continue IV lasix  -of note, pt had pleural effusions in the past and thoracentesis in the past with fluid negative for malignant cells.     #DLBCL:   - originally, pt was admitted to [facility] in 8-9/2019, was found to be hypercalcemic and CT C/A/P (9/10/19) showed scattered b/l pulmonary nodules, mediastinal lymphadenopathy, retroperitoneal lymphadenopathy, and splenic lesions/enlargement concerning for lymphoma.  LN biopsy consistent with DLBCL, germinal cell type, neg for bcl-2, bcl-6, myc.  Per daughter, no outpatient PET scan and biopsy was done as he was started on treatment right away.  He sees Dr. Jovan Poole at [facility] and was started on Rituxan-Cytoxan-Dex (R-CD), last tx 10/2019, and has not been able to f/u due to recent hospitalizations.   - Last CT A/P non contrast 11/15 now shows treatment response with decrease in size of the spleen and resolution of retroperitoneal adenopathy.  MRI brain with contrast without lesions, SDH stable.  - Repeat CT C/A/P - stable disease  - now that patient is clinically improved, plan to treat inpatient with Polatuzumab +Rituxan and awaiting approval.  Plan discussed with daughter Dr. Browne.   - monitor LDH and uric acid daily    #DVTs: b/l LE (calf) DVTs and LUE DVT  - repeat b/l LE US 11/15 showed unchanged subacute right soleal vein DVT  - b/l UE US 11/4 showed the left axillary and brachial veins DVT.  Superficial thrombophlebitis in both the right and left basilic and cephalic veins.  - ppx AC started on 11/6 as per neurosurgery    Will continue to follow.     Sarah Gomez  Hematology Fellow  [phone]

## 2019-12-17 NOTE — PROGRESS NOTE ADULT - PROBLEM SELECTOR PLAN 7
- Per chart review on R-CP. s/p 2 cycles.   CT abd/pelvis to eval disease  await onc decision about chemo   CT stable with respect to spleen, new lesion L upper renal pole;  renal sono unable to identify lesion seen on CT due to poor window  UA negative  s/p 1 u PRBC 12/15

## 2019-12-17 NOTE — PROGRESS NOTE ADULT - PROBLEM SELECTOR PLAN 6
- Severe protein calorie malnutrition due to malignancy   s/p peg placement (11/29)  - glucerna tube feeds

## 2019-12-17 NOTE — PROGRESS NOTE ADULT - SUBJECTIVE AND OBJECTIVE BOX
[facility]   MD UYEN HERNANDEZ DO MARYANNE SOURIAL, LEATHA NICOLE    TEL:  OFFICE: [phone]  DR CASAREZ CELL: [phone]  DR. HERNANDEZ CELL: [phone]  DR. REYNOSO CELL: [phone]  CHUCKIE TERRAZAS CELL: [phone]        Patient is a 79y old  Male who presents with a chief complaint of AMS (17 Dec 2019 11:33)      Patient seen and examined at bedside. feeling better, denied cp/sob    VITALS:  T(F): 98 (12-17-19 @ 06:15), Max: 98 (12-16-19 @ 23:15)  HR: 89 (12-17-19 @ 06:15)  BP: 105/62 (12-17-19 @ 06:15)  RR: 18 (12-17-19 @ 06:15)  SpO2: 99% (12-17-19 @ 06:15)  Wt(kg): --        PHYSICAL EXAM:  Constitutional: NAD, on NC  Neck: No JVD  Respiratory: + rhonchi  Cardiovascular: S1, S2, RRR  Gastrointestinal: BS+, soft, NT/ND  Extremities: No peripheral edema    Hospital Medications:   MEDICATIONS  (STANDING):  ATENolol  Tablet 50 milliGRAM(s) Oral daily  chlorhexidine 4% Liquid 1 Application(s) Topical <User Schedule>  dextrose 5%. 1000 milliLiter(s) (50 mL/Hr) IV Continuous <Continuous>  dextrose 50% Injectable 12.5 Gram(s) IV Push once  dextrose 50% Injectable 25 Gram(s) IV Push once  dextrose 50% Injectable 25 Gram(s) IV Push once  enoxaparin Injectable 40 milliGRAM(s) SubCutaneous daily  escitalopram 5 milliGRAM(s) Oral daily  furosemide   Injectable 20 milliGRAM(s) IV Push two times a day  insulin lispro (HumaLOG) corrective regimen sliding scale   SubCutaneous every 6 hours  insulin NPH human recombinant 9 Unit(s) SubCutaneous every 6 hours  levETIRAcetam  Solution 500 milliGRAM(s) Oral two times a day  levothyroxine 88 MICROGram(s) Oral at bedtime  melatonin 3 milliGRAM(s) Oral at bedtime  pantoprazole   Suspension 40 milliGRAM(s) Oral daily  predniSONE   Tablet 40 milliGRAM(s) Oral daily  senna Syrup 5 milliLiter(s) Oral at bedtime  simvastatin 20 milliGRAM(s) Oral at bedtime      LABS:  12-17    133<L>  |  90<L>  |  41<H>  ----------------------------<  128<H>  5.0   |  30  |  0.92    Ca    8.6      17 Dec 2019 06:00  Phos  2.6     12-17  Mg     1.9     12-17    TPro  6.3  /  Alb  2.4<L>  /  TBili  0.2  /  DBili      /  AST  28  /  ALT  27  /  AlkPhos  186<H>  12-16    Creatinine Trend: 0.92 <--, 0.89 <--, 0.92 <--, 0.83 <--, 0.82 <--, 0.90 <--, 0.88 <--, 0.91 <--    Phosphorus Level, Serum: 2.6 mg/dL (12-17 @ 06:00)                              9.3    9.91  )-----------( 419      ( 17 Dec 2019 06:00 )             29.6     Urine Studies:  Urinalysis - [12-14-19 @ 11:10]      Color LIGHT YELLOW / Appearance CLEAR / SG 1.016 / pH 8.0      Gluc 30 / Ketone NEGATIVE  / Bili NEGATIVE / Urobili NORMAL       Blood NEGATIVE / Protein 20 / Leuk Est NEGATIVE / Nitrite NEGATIVE      RBC 0-2 / WBC 0-2 / Hyaline NEGATIVE / Gran  / Sq Epi OCC / Non Sq Epi  / Bacteria NEGATIVE      Iron 38, TIBC 234, %sat --      [09-12-19 @ 06:08]  Ferritin 214.6      [09-12-19 @ 06:08]  PTH 20.00 (Ca --)      [09-03-19 @ 05:45]   --  PTH 25.46 (Ca --)      [09-01-19 @ 06:00]   --  Vitamin D (25OH) 34.9      [09-03-19 @ 05:45]  HbA1c 7.0      [09-13-19 @ 06:50]  TSH 6.55      [12-04-19 @ 05:05]  Lipid: chol 124, , HDL 50, LDL 57      [12-12-19 @ 06:30]    HBsAb Reactive      [11-23-19 @ 05:25]  HBsAg Nonreactive      [11-23-19 @ 05:25]  HBcAb Reactive      [11-23-19 @ 05:25]  HCV 0.12, Nonreactive Hepatitis C AB  S/CO Ratio                        Interpretation  < 1.00                                   Non-Reactive  1.00 - 4.99                         Weakly-Reactive  >= 5.00                                Reactive  Non-Reactive: Aperson with a non-reactive HCV antibody  result is considered uninfected.  No further action is  needed unless recent infection is suspected.  In these  cases, consider repeat testing later to detect  seroconversion..  Weakly-Reactive: HCV antibody test is abnormal, HCV RNA  Qualitative test will follow.  Reactive: HCV antibody test is abnormal, HCV RNA  Qualitative test will follow.  Note: HCV antibody testing is performed on the [employer] system.      [11-23-19 @ 05:25]  HIV Nonreactive The HIV Ag/Ab Combo test performed screens for HIV-1 p24  antigen, antibodies to HIV-1 (group M and group O), and  antibodies to HIV-2. All specimens repeatedly reactive  will reflex to an HIV 1/2 antibody confirmation and  differentiation test. This assay detects p24 antigen which  may be present prior to the development of HIV antibodies,  therefore a reactive result with a negative HIV 1/2 AB  Confirmation should be followed up with HIV-1 RNA, HIV-2  RNA and repeat testing in 4-8 weeks. A nonreactive result  does not preclude previous exposure to or infection with  HIV-1 or HIV-2. [facility] prohibits disclosure of this  result to any unauthorized party.      [11-22-19 @ 10:18]      RADIOLOGY & ADDITIONAL STUDIES:

## 2019-12-17 NOTE — PROGRESS NOTE ADULT - ATTENDING COMMENTS
Mr. Browne is seen during hematology followup rounds today. I have not met him previously. He was diagnosed with diffuse large B-cell lymphoma in September of 2019 area and in the subcarinal lymph node, retroperitoneal lymph nodes as well as an enlarged spleen. He received 2 cycles of Rituxan along with Cytoxan and prednisone as palliative therapy given his status. He had a recent subdural hematoma after a fall. He was found to have bilateral lower extremity in the lower upper extremity DVT. He presented with altered mental status and weakness seizure. He had toxic respiratory failure and was on high flow support along with aspiration pneumonia. Large pleural effusions. He had a PEG placed.    He is now clinically improving on 4 L nasal cannula. He is awake and alert and able to answer questions. He was originally admitted with hypercalcemia. A recent CT scan demonstrates resolution of retroperitoneal adenopathy and the spleen appears to be smaller with necrosis.    Laboratory values and imaging studies were reviewed. The plan is to treat him with Rituxan and polatuzumab vedotin. I agree with the assessment and plan as stated above the note from Dr. Gomez.

## 2019-12-17 NOTE — PROGRESS NOTE ADULT - SUBJECTIVE AND OBJECTIVE BOX
INTERVAL HPI/OVERNIGHT EVENTS:  Patient S&E at bedside.  High flow to nasal cannula.      VITAL SIGNS:  T(F): 98 (12-17-19 @ 06:15)  HR: 89 (12-17-19 @ 06:15)  BP: 105/62 (12-17-19 @ 06:15)  RR: 18 (12-17-19 @ 06:15)  SpO2: 99% (12-17-19 @ 06:15)  Wt(kg): --    PHYSICAL EXAM:  Constitutional: NAD  Eyes: EOMI, sclera non-icteric  Neck: supple, no masses, no JVD  Respiratory: CTA b/l, good air entry b/l  Cardiovascular: RRR, no M/R/G  Gastrointestinal: soft, NTND, no masses palpable, + BS, no hepatosplenomegaly  Extremities: no c/c/e  Neurological: AAOx3      MEDICATIONS  (STANDING):  ATENolol  Tablet 50 milliGRAM(s) Oral daily  chlorhexidine 4% Liquid 1 Application(s) Topical <User Schedule>  dextrose 5%. 1000 milliLiter(s) (50 mL/Hr) IV Continuous <Continuous>  dextrose 50% Injectable 12.5 Gram(s) IV Push once  dextrose 50% Injectable 25 Gram(s) IV Push once  dextrose 50% Injectable 25 Gram(s) IV Push once  enoxaparin Injectable 40 milliGRAM(s) SubCutaneous daily  escitalopram 5 milliGRAM(s) Oral daily  furosemide   Injectable 20 milliGRAM(s) IV Push two times a day  insulin lispro (HumaLOG) corrective regimen sliding scale   SubCutaneous every 6 hours  insulin NPH human recombinant 9 Unit(s) SubCutaneous every 6 hours  levETIRAcetam  Solution 500 milliGRAM(s) Oral two times a day  levothyroxine 88 MICROGram(s) Oral at bedtime  melatonin 3 milliGRAM(s) Oral at bedtime  pantoprazole   Suspension 40 milliGRAM(s) Oral daily  predniSONE   Tablet 40 milliGRAM(s) Oral daily  senna Syrup 5 milliLiter(s) Oral at bedtime  simvastatin 20 milliGRAM(s) Oral at bedtime    MEDICATIONS  (PRN):  acetaminophen    Suspension .. 650 milliGRAM(s) Oral every 6 hours PRN Severe Pain (7 - 10)  albuterol/ipratropium for Nebulization. 3 milliLiter(s) Nebulizer every 8 hours PRN Shortness of Breath  dextrose 40% Gel 15 Gram(s) Oral once PRN Blood Glucose LESS THAN 70 milliGRAM(s)/deciliter  glucagon  Injectable 1 milliGRAM(s) IntraMuscular once PRN Glucose LESS THAN 70 milligrams/deciliter  OLANZapine Injectable 1.25 milliGRAM(s) IntraMuscular every 8 hours PRN agitation  sodium chloride 0.9% lock flush 10 milliLiter(s) IV Push every 1 hour PRN Pre/post blood products, medications, blood draw, and to maintain line patency      Allergies  No Known Allergies    Intolerances        LABS:                        9.3    9.91  )-----------( 419      ( 17 Dec 2019 06:00 )             29.6     12-17    133<L>  |  90<L>  |  41<H>  ----------------------------<  128<H>  5.0   |  30  |  0.92    Ca    8.6      17 Dec 2019 06:00  Phos  2.6     12-17  Mg     1.9     12-17    TPro  6.3  /  Alb  2.4<L>  /  TBili  0.2  /  DBili  x   /  AST  28  /  ALT  27  /  AlkPhos  186<H>  12-16          RADIOLOGY & ADDITIONAL TESTS:  Studies reviewed.    ASSESSMENT & PLAN: INTERVAL HPI/OVERNIGHT EVENTS:  Patient S&E at bedside.  High flow downgraded to 4L nasal cannula.  Patient is more alert and answering questions appropriately.    VITAL SIGNS:  T(F): 98 (12-17-19 @ 06:15)  HR: 89 (12-17-19 @ 06:15)  BP: 105/62 (12-17-19 @ 06:15)  RR: 18 (12-17-19 @ 06:15)  SpO2: 99% (12-17-19 @ 06:15)  Wt(kg): --    PHYSICAL EXAM:  Constitutional: NAD  Eyes: EOMI, sclera non-icteric  Neck: supple, no masses, no JVD  Respiratory: CTA b/l, good air entry b/l  Cardiovascular: RRR, no M/R/G  Gastrointestinal: soft, NTND, no masses palpable, + BS  Extremities: no c/c/e  Neurological: AAOx2    MEDICATIONS  (STANDING):  ATENolol  Tablet 50 milliGRAM(s) Oral daily  chlorhexidine 4% Liquid 1 Application(s) Topical <User Schedule>  dextrose 5%. 1000 milliLiter(s) (50 mL/Hr) IV Continuous <Continuous>  dextrose 50% Injectable 12.5 Gram(s) IV Push once  dextrose 50% Injectable 25 Gram(s) IV Push once  dextrose 50% Injectable 25 Gram(s) IV Push once  enoxaparin Injectable 40 milliGRAM(s) SubCutaneous daily  escitalopram 5 milliGRAM(s) Oral daily  furosemide   Injectable 20 milliGRAM(s) IV Push two times a day  insulin lispro (HumaLOG) corrective regimen sliding scale   SubCutaneous every 6 hours  insulin NPH human recombinant 9 Unit(s) SubCutaneous every 6 hours  levETIRAcetam  Solution 500 milliGRAM(s) Oral two times a day  levothyroxine 88 MICROGram(s) Oral at bedtime  melatonin 3 milliGRAM(s) Oral at bedtime  pantoprazole   Suspension 40 milliGRAM(s) Oral daily  predniSONE   Tablet 40 milliGRAM(s) Oral daily  senna Syrup 5 milliLiter(s) Oral at bedtime  simvastatin 20 milliGRAM(s) Oral at bedtime    MEDICATIONS  (PRN):  acetaminophen    Suspension .. 650 milliGRAM(s) Oral every 6 hours PRN Severe Pain (7 - 10)  albuterol/ipratropium for Nebulization. 3 milliLiter(s) Nebulizer every 8 hours PRN Shortness of Breath  dextrose 40% Gel 15 Gram(s) Oral once PRN Blood Glucose LESS THAN 70 milliGRAM(s)/deciliter  glucagon  Injectable 1 milliGRAM(s) IntraMuscular once PRN Glucose LESS THAN 70 milligrams/deciliter  OLANZapine Injectable 1.25 milliGRAM(s) IntraMuscular every 8 hours PRN agitation  sodium chloride 0.9% lock flush 10 milliLiter(s) IV Push every 1 hour PRN Pre/post blood products, medications, blood draw, and to maintain line patency      Allergies  No Known Allergies    Intolerances        LABS:                        9.3    9.91  )-----------( 419      ( 17 Dec 2019 06:00 )             29.6     12-17    133<L>  |  90<L>  |  41<H>  ----------------------------<  128<H>  5.0   |  30  |  0.92    Ca    8.6      17 Dec 2019 06:00  Phos  2.6     12-17  Mg     1.9     12-17    TPro  6.3  /  Alb  2.4<L>  /  TBili  0.2  /  DBili  x   /  AST  28  /  ALT  27  /  AlkPhos  186<H>  12-16          RADIOLOGY & ADDITIONAL TESTS:  Studies reviewed.    ASSESSMENT & PLAN:

## 2019-12-17 NOTE — PROGRESS NOTE ADULT - SUBJECTIVE AND OBJECTIVE BOX
[facility] Division of Hospital Medicine  Alejandra Rodas MD  Pager [phone]    Patient is a 79y old  Male who presents with a chief complaint of AMS       SUBJECTIVE / OVERNIGHT EVENTS: comfortable; lulú NC; smiling      MEDICATIONS  (STANDING):  ATENolol  Tablet 50 milliGRAM(s) Oral daily  chlorhexidine 4% Liquid 1 Application(s) Topical <User Schedule>  dextrose 5%. 1000 milliLiter(s) (50 mL/Hr) IV Continuous <Continuous>  dextrose 50% Injectable 12.5 Gram(s) IV Push once  dextrose 50% Injectable 25 Gram(s) IV Push once  dextrose 50% Injectable 25 Gram(s) IV Push once  enoxaparin Injectable 40 milliGRAM(s) SubCutaneous daily  escitalopram 5 milliGRAM(s) Oral daily  furosemide   Injectable 20 milliGRAM(s) IV Push two times a day  insulin lispro (HumaLOG) corrective regimen sliding scale   SubCutaneous every 6 hours  insulin NPH human recombinant 9 Unit(s) SubCutaneous every 6 hours  levETIRAcetam  Solution 500 milliGRAM(s) Oral two times a day  levothyroxine 88 MICROGram(s) Oral at bedtime  melatonin 3 milliGRAM(s) Oral at bedtime  pantoprazole   Suspension 40 milliGRAM(s) Oral daily  predniSONE   Tablet 40 milliGRAM(s) Oral daily  senna Syrup 5 milliLiter(s) Oral at bedtime  simvastatin 20 milliGRAM(s) Oral at bedtime    MEDICATIONS  (PRN):  acetaminophen    Suspension .. 650 milliGRAM(s) Oral every 6 hours PRN Severe Pain (7 - 10)  albuterol/ipratropium for Nebulization. 3 milliLiter(s) Nebulizer every 8 hours PRN Shortness of Breath  dextrose 40% Gel 15 Gram(s) Oral once PRN Blood Glucose LESS THAN 70 milliGRAM(s)/deciliter  glucagon  Injectable 1 milliGRAM(s) IntraMuscular once PRN Glucose LESS THAN 70 milligrams/deciliter  OLANZapine Injectable 1.25 milliGRAM(s) IntraMuscular every 8 hours PRN agitation  sodium chloride 0.9% lock flush 10 milliLiter(s) IV Push every 1 hour PRN Pre/post blood products, medications, blood draw, and to maintain line patency      CAPILLARY BLOOD GLUCOSE  POCT Blood Glucose.: 139 mg/dL (17 Dec 2019 06:06)  POCT Blood Glucose.: 139 mg/dL (17 Dec 2019 01:21)  POCT Blood Glucose.: 172 mg/dL (16 Dec 2019 18:11)  POCT Blood Glucose.: 179 mg/dL (16 Dec 2019 12:12)          PHYSICAL EXAM:  Vital Signs Last 24 Hrs  T(F): 98 (17 Dec 2019 06:15), Max: 98 (16 Dec 2019 23:15)  HR: 89 (17 Dec 2019 06:15) (82 - 89)  BP: 105/62 (17 Dec 2019 06:15) (101/55 - 112/65)  RR: 18 (17 Dec 2019 06:15) (18 - 22)  SpO2: 99% (17 Dec 2019 06:15) (98% - 99%)    CONSTITUTIONAL: NAD  ENMT: Moist oral mucosa  RESPIRATORY: grossly b/l AE ant/lat  CARDIOVASCULAR: Regular rate and rhythm; No lower extremity edema;  ABDOMEN: Nontender to palpation, normoactive bowel sounds, +PEG, site clean  MUSCULOSKELETAL: no clubbing or cyanosis of digits; no joint swelling or tenderness to palpation  PSYCH: calm, coop  NEUROLOGY: no gross sensory deficits       LABS:                        9.3    9.91  )-----------( 419      ( 17 Dec 2019 06:00 )             29.6     12-17    133<L>  |  90<L>  |  41<H>  ----------------------------<  128<H>  5.0   |  30  |  0.92    Ca    8.6      17 Dec 2019 06:00  Phos  2.6     12-17  Mg     1.9     12-17    TPro  6.3  /  Alb  2.4<L>  /  TBili  0.2  /  DBili  x   /  AST  28  /  ALT  27  /  AlkPhos  186<H>  12-16

## 2019-12-17 NOTE — PROGRESS NOTE ADULT - PROVIDER SPECIALTY LIST ADULT
Nephrology Pt up to bathroom. Pt back in bed. No complaints at this time. Bed low/locked/alarmed. Call light within reach. WIll continue to monitor.

## 2019-12-18 LAB
ANION GAP SERPL CALC-SCNC: 14 MMO/L — SIGNIFICANT CHANGE UP (ref 7–14)
BASOPHILS # BLD AUTO: 0.05 K/UL — SIGNIFICANT CHANGE UP (ref 0–0.2)
BASOPHILS NFR BLD AUTO: 0.6 % — SIGNIFICANT CHANGE UP (ref 0–2)
BUN SERPL-MCNC: 44 MG/DL — HIGH (ref 7–23)
CALCIUM SERPL-MCNC: 8.6 MG/DL — SIGNIFICANT CHANGE UP (ref 8.4–10.5)
CHLORIDE SERPL-SCNC: 91 MMOL/L — LOW (ref 98–107)
CO2 SERPL-SCNC: 29 MMOL/L — SIGNIFICANT CHANGE UP (ref 22–31)
CREAT SERPL-MCNC: 0.97 MG/DL — SIGNIFICANT CHANGE UP (ref 0.5–1.3)
EOSINOPHIL # BLD AUTO: 0.08 K/UL — SIGNIFICANT CHANGE UP (ref 0–0.5)
EOSINOPHIL NFR BLD AUTO: 0.9 % — SIGNIFICANT CHANGE UP (ref 0–6)
GLUCOSE BLDC GLUCOMTR-MCNC: 151 MG/DL — HIGH (ref 70–99)
GLUCOSE BLDC GLUCOMTR-MCNC: 185 MG/DL — HIGH (ref 70–99)
GLUCOSE BLDC GLUCOMTR-MCNC: 203 MG/DL — HIGH (ref 70–99)
GLUCOSE BLDC GLUCOMTR-MCNC: 52 MG/DL — LOW (ref 70–99)
GLUCOSE BLDC GLUCOMTR-MCNC: 55 MG/DL — LOW (ref 70–99)
GLUCOSE SERPL-MCNC: 136 MG/DL — HIGH (ref 70–99)
HCT VFR BLD CALC: 30.1 % — LOW (ref 39–50)
HGB BLD-MCNC: 9.4 G/DL — LOW (ref 13–17)
IMM GRANULOCYTES NFR BLD AUTO: 10.5 % — HIGH (ref 0–1.5)
LDH SERPL L TO P-CCNC: 360 U/L — HIGH (ref 135–225)
LYMPHOCYTES # BLD AUTO: 0.74 K/UL — LOW (ref 1–3.3)
LYMPHOCYTES # BLD AUTO: 8.2 % — LOW (ref 13–44)
MAGNESIUM SERPL-MCNC: 1.9 MG/DL — SIGNIFICANT CHANGE UP (ref 1.6–2.6)
MCHC RBC-ENTMCNC: 26.4 PG — LOW (ref 27–34)
MCHC RBC-ENTMCNC: 31.2 % — LOW (ref 32–36)
MCV RBC AUTO: 84.6 FL — SIGNIFICANT CHANGE UP (ref 80–100)
MONOCYTES # BLD AUTO: 1.28 K/UL — HIGH (ref 0–0.9)
MONOCYTES NFR BLD AUTO: 14.1 % — HIGH (ref 2–14)
NEUTROPHILS # BLD AUTO: 5.95 K/UL — SIGNIFICANT CHANGE UP (ref 1.8–7.4)
NEUTROPHILS NFR BLD AUTO: 65.7 % — SIGNIFICANT CHANGE UP (ref 43–77)
NRBC # FLD: 0.05 K/UL — SIGNIFICANT CHANGE UP (ref 0–0)
PLATELET # BLD AUTO: 430 K/UL — HIGH (ref 150–400)
PMV BLD: 11.1 FL — SIGNIFICANT CHANGE UP (ref 7–13)
POTASSIUM SERPL-MCNC: 4.6 MMOL/L — SIGNIFICANT CHANGE UP (ref 3.5–5.3)
POTASSIUM SERPL-SCNC: 4.6 MMOL/L — SIGNIFICANT CHANGE UP (ref 3.5–5.3)
RBC # BLD: 3.56 M/UL — LOW (ref 4.2–5.8)
RBC # FLD: 18.6 % — HIGH (ref 10.3–14.5)
SODIUM SERPL-SCNC: 134 MMOL/L — LOW (ref 135–145)
URATE SERPL-MCNC: 4.3 MG/DL — SIGNIFICANT CHANGE UP (ref 3.4–8.8)
WBC # BLD: 9.05 K/UL — SIGNIFICANT CHANGE UP (ref 3.8–10.5)
WBC # FLD AUTO: 9.05 K/UL — SIGNIFICANT CHANGE UP (ref 3.8–10.5)

## 2019-12-18 PROCEDURE — 99233 SBSQ HOSP IP/OBS HIGH 50: CPT

## 2019-12-18 RX ORDER — DEXTROSE 50 % IN WATER 50 %
12.5 SYRINGE (ML) INTRAVENOUS ONCE
Refills: 0 | Status: COMPLETED | OUTPATIENT
Start: 2019-12-18 | End: 2019-12-18

## 2019-12-18 RX ADMIN — CHLORHEXIDINE GLUCONATE 1 APPLICATION(S): 213 SOLUTION TOPICAL at 05:25

## 2019-12-18 RX ADMIN — Medication 2: at 12:11

## 2019-12-18 RX ADMIN — Medication 2: at 06:27

## 2019-12-18 RX ADMIN — Medication 40 MILLIGRAM(S): at 05:25

## 2019-12-18 RX ADMIN — Medication 3 MILLIGRAM(S): at 23:48

## 2019-12-18 RX ADMIN — Medication 12.5 GRAM(S): at 23:43

## 2019-12-18 RX ADMIN — LEVETIRACETAM 500 MILLIGRAM(S): 250 TABLET, FILM COATED ORAL at 05:25

## 2019-12-18 RX ADMIN — Medication 88 MICROGRAM(S): at 23:47

## 2019-12-18 RX ADMIN — HUMAN INSULIN 9 UNIT(S): 100 INJECTION, SUSPENSION SUBCUTANEOUS at 12:12

## 2019-12-18 RX ADMIN — Medication 20 MILLIGRAM(S): at 05:25

## 2019-12-18 RX ADMIN — PANTOPRAZOLE SODIUM 40 MILLIGRAM(S): 20 TABLET, DELAYED RELEASE ORAL at 05:26

## 2019-12-18 RX ADMIN — HUMAN INSULIN 9 UNIT(S): 100 INJECTION, SUSPENSION SUBCUTANEOUS at 18:08

## 2019-12-18 RX ADMIN — ATENOLOL 50 MILLIGRAM(S): 25 TABLET ORAL at 05:25

## 2019-12-18 RX ADMIN — HUMAN INSULIN 9 UNIT(S): 100 INJECTION, SUSPENSION SUBCUTANEOUS at 06:27

## 2019-12-18 RX ADMIN — Medication 4: at 18:08

## 2019-12-18 RX ADMIN — LEVETIRACETAM 500 MILLIGRAM(S): 250 TABLET, FILM COATED ORAL at 17:24

## 2019-12-18 RX ADMIN — ESCITALOPRAM OXALATE 5 MILLIGRAM(S): 10 TABLET, FILM COATED ORAL at 17:25

## 2019-12-18 RX ADMIN — ENOXAPARIN SODIUM 40 MILLIGRAM(S): 100 INJECTION SUBCUTANEOUS at 17:25

## 2019-12-18 RX ADMIN — SIMVASTATIN 20 MILLIGRAM(S): 20 TABLET, FILM COATED ORAL at 23:47

## 2019-12-18 RX ADMIN — Medication 20 MILLIGRAM(S): at 17:24

## 2019-12-18 NOTE — PROGRESS NOTE ADULT - SUBJECTIVE AND OBJECTIVE BOX
[facility] [place] [facility]   MD UYEN HERNANDEZ DO MARYANNE SOURIAL, LEATHA NICOLE    TEL:  OFFICE: [phone]  DR CASAREZ CELL: [phone]  DR. HERNANDEZ CELL: [phone]  DR. REYNOSO CELL: [phone]  CHUCKIE TERRAZAS CELL: [phone]        Patient is a 79y old  Male who presents with a chief complaint of AMS (18 Dec 2019 12:02)      Patient seen and examined at bedside. No chest pain/sob    VITALS:  T(F): 97.3 (12-18-19 @ 11:29), Max: 98.2 (12-17-19 @ 14:01)  HR: 79 (12-18-19 @ 11:29)  BP: 109/72 (12-18-19 @ 11:29)  RR: 18 (12-18-19 @ 11:29)  SpO2: 98% (12-18-19 @ 11:29)  Wt(kg): --        PHYSICAL EXAM:  Constitutional: NAD, on NC  Neck: No JVD  Respiratory: slight crackles  Cardiovascular: S1, S2, RRR  Gastrointestinal: BS+, soft, NT/ND  Extremities: No peripheral edema    Hospital Medications:   MEDICATIONS  (STANDING):  ATENolol  Tablet 50 milliGRAM(s) Oral daily  chlorhexidine 4% Liquid 1 Application(s) Topical <User Schedule>  dextrose 5%. 1000 milliLiter(s) (50 mL/Hr) IV Continuous <Continuous>  dextrose 50% Injectable 12.5 Gram(s) IV Push once  dextrose 50% Injectable 25 Gram(s) IV Push once  dextrose 50% Injectable 25 Gram(s) IV Push once  enoxaparin Injectable 40 milliGRAM(s) SubCutaneous daily  escitalopram 5 milliGRAM(s) Oral daily  furosemide   Injectable 20 milliGRAM(s) IV Push two times a day  insulin lispro (HumaLOG) corrective regimen sliding scale   SubCutaneous every 6 hours  insulin NPH human recombinant 9 Unit(s) SubCutaneous every 6 hours  levETIRAcetam  Solution 500 milliGRAM(s) Oral two times a day  levothyroxine 88 MICROGram(s) Oral at bedtime  melatonin 3 milliGRAM(s) Oral at bedtime  pantoprazole   Suspension 40 milliGRAM(s) Oral daily  predniSONE   Tablet 40 milliGRAM(s) Oral daily  senna Syrup 5 milliLiter(s) Oral at bedtime  simvastatin 20 milliGRAM(s) Oral at bedtime      LABS:  12-18    134<L>  |  91<L>  |  44<H>  ----------------------------<  136<H>  4.6   |  29  |  0.97    Ca    8.6      18 Dec 2019 05:20  Phos  2.6     12-17  Mg     1.9     12-18      Creatinine Trend: 0.97 <--, 0.92 <--, 0.89 <--, 0.92 <--, 0.83 <--, 0.82 <--, 0.90 <--, 0.88 <--                                9.4    9.05  )-----------( 430      ( 18 Dec 2019 05:20 )             30.1     Urine Studies:  Urinalysis - [12-14-19 @ 11:10]      Color LIGHT YELLOW / Appearance CLEAR / SG 1.016 / pH 8.0      Gluc 30 / Ketone NEGATIVE  / Bili NEGATIVE / Urobili NORMAL       Blood NEGATIVE / Protein 20 / Leuk Est NEGATIVE / Nitrite NEGATIVE      RBC 0-2 / WBC 0-2 / Hyaline NEGATIVE / Gran  / Sq Epi OCC / Non Sq Epi  / Bacteria NEGATIVE      Iron 38, TIBC 234, %sat --      [09-12-19 @ 06:08]  Ferritin 214.6      [09-12-19 @ 06:08]  PTH 20.00 (Ca --)      [09-03-19 @ 05:45]   --  PTH 25.46 (Ca --)      [09-01-19 @ 06:00]   --  Vitamin D (25OH) 34.9      [09-03-19 @ 05:45]  HbA1c 7.0      [09-13-19 @ 06:50]  TSH 6.55      [12-04-19 @ 05:05]  Lipid: chol 124, , HDL 50, LDL 57      [12-12-19 @ 06:30]    HBsAb Reactive      [11-23-19 @ 05:25]  HBsAg Nonreactive      [11-23-19 @ 05:25]  HBcAb Reactive      [11-23-19 @ 05:25]  HCV 0.12, Nonreactive Hepatitis C AB  S/CO Ratio                        Interpretation  < 1.00                                   Non-Reactive  1.00 - 4.99                         Weakly-Reactive  >= 5.00                                Reactive  Non-Reactive: Aperson with a non-reactive HCV antibody  result is considered uninfected.  No further action is  needed unless recent infection is suspected.  In these  cases, consider repeat testing later to detect  seroconversion..  Weakly-Reactive: HCV antibody test is abnormal, HCV RNA  Qualitative test will follow.  Reactive: HCV antibody test is abnormal, HCV RNA  Qualitative test will follow.  Note: HCV antibody testing is performed on the Abbott   system.      [11-23-19 @ 05:25]  HIV Nonreactive The HIV Ag/Ab Combo test performed screens for HIV-1 p24  antigen, antibodies to HIV-1 (group M and group O), and  antibodies to HIV-2. All specimens repeatedly reactive  will reflex to an HIV 1/2 antibody confirmation and  differentiation test. This assay detects p24 antigen which  may be present prior to the development of HIV antibodies,  therefore a reactive result with a negative HIV 1/2 AB  Confirmation should be followed up with HIV-1 RNA, HIV-2  RNA and repeat testing in 4-8 weeks. A nonreactive result  does not preclude previous exposure to or infection with  HIV-1 or HIV-2. [facility] prohibits disclosure of this  result to any unauthorized party.      [11-22-19 @ 10:18]      RADIOLOGY & ADDITIONAL STUDIES:

## 2019-12-18 NOTE — PROGRESS NOTE ADULT - PROBLEM SELECTOR PLAN 6
- Severe protein calorie malnutrition due to malignancy   s/p peg placement (11/29)  - glucerna tube feeds  dietitian f/u to convert to bolus feeds

## 2019-12-18 NOTE — PROGRESS NOTE ADULT - PROBLEM SELECTOR PLAN 3
multifactorial; PNA, sz, brain mets  cont zyprexa 1.25mg q8h prn, as not needed any  has been calm and coop  seems to have resolved and back to baseline

## 2019-12-18 NOTE — PROGRESS NOTE ADULT - ASSESSMENT
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 prolonged FRANCES state (? 2/2 prolonged hypercalcemic state) that has now resolved vs. loss of body mass  - Serum Cr is stable    continue lasix    - Avoid nephrotoxins agent  - renal diet  - monitor bmp    Hyponatremia  - admitted with Na 122  - work up suggested SIADH  - Serum sodium improved, fluctuates between hypernatremia and hyponatremia  - Pt started on PEG tube feeds.   - now off Na tab and restarted on lasix  - Monitor serum bicarb while on diuretic   - Avoid excessive free water via peg.   - Monitor serum sodium and fluid status closely     - hold free water flushes   - continue lasix     HTN  - has Hx HTN but was on midodrine for orthostatic hypotension in the recent past   - Currently BP controlled   - monitor bp    Hypocalcemia  - Has Hx hypercalcemia of malignancy  - low alb corrected alexander is optimal  - monitor    Pl. effusion b/l/ Anasarca   mod effusion,  on ct chest  on lasix 20 bid iv. held but can be restarted  remains on high flow o2. pulm on board  Monitor     Hyperkalemia  Resolved   low k diet  monitor

## 2019-12-18 NOTE — PROGRESS NOTE ADULT - SUBJECTIVE AND OBJECTIVE BOX
[facility] Division of Hospital Medicine  Alejandra Rodas MD  Pager [phone]    Patient is a 79y old  Male who presents with a chief complaint of AMS       SUBJECTIVE / OVERNIGHT EVENTS: looks great today; family eager to move forward with chemo and get pt home; smiled at me when I told him he looked good today; His response was "me?"      MEDICATIONS  (STANDING):  ATENolol  Tablet 50 milliGRAM(s) Oral daily  chlorhexidine 4% Liquid 1 Application(s) Topical <User Schedule>  dextrose 5%. 1000 milliLiter(s) (50 mL/Hr) IV Continuous <Continuous>  dextrose 50% Injectable 12.5 Gram(s) IV Push once  dextrose 50% Injectable 25 Gram(s) IV Push once  dextrose 50% Injectable 25 Gram(s) IV Push once  enoxaparin Injectable 40 milliGRAM(s) SubCutaneous daily  escitalopram 5 milliGRAM(s) Oral daily  furosemide   Injectable 20 milliGRAM(s) IV Push two times a day  insulin lispro (HumaLOG) corrective regimen sliding scale   SubCutaneous every 6 hours  insulin NPH human recombinant 9 Unit(s) SubCutaneous every 6 hours  levETIRAcetam  Solution 500 milliGRAM(s) Oral two times a day  levothyroxine 88 MICROGram(s) Oral at bedtime  melatonin 3 milliGRAM(s) Oral at bedtime  pantoprazole   Suspension 40 milliGRAM(s) Oral daily  predniSONE   Tablet 40 milliGRAM(s) Oral daily  senna Syrup 5 milliLiter(s) Oral at bedtime  simvastatin 20 milliGRAM(s) Oral at bedtime    MEDICATIONS  (PRN):  acetaminophen    Suspension .. 650 milliGRAM(s) Oral every 6 hours PRN Severe Pain (7 - 10)  albuterol/ipratropium for Nebulization. 3 milliLiter(s) Nebulizer every 8 hours PRN Shortness of Breath  dextrose 40% Gel 15 Gram(s) Oral once PRN Blood Glucose LESS THAN 70 milliGRAM(s)/deciliter  glucagon  Injectable 1 milliGRAM(s) IntraMuscular once PRN Glucose LESS THAN 70 milligrams/deciliter  OLANZapine Injectable 1.25 milliGRAM(s) IntraMuscular every 8 hours PRN agitation  sodium chloride 0.9% lock flush 10 milliLiter(s) IV Push every 1 hour PRN Pre/post blood products, medications, blood draw, and to maintain line patency      CAPILLARY BLOOD GLUCOSE  POCT Blood Glucose.: 185 mg/dL (18 Dec 2019 12:00)  POCT Blood Glucose.: 151 mg/dL (18 Dec 2019 06:19)  POCT Blood Glucose.: 130 mg/dL (17 Dec 2019 23:51)  POCT Blood Glucose.: 186 mg/dL (17 Dec 2019 18:03)          PHYSICAL EXAM:  Vital Signs Last 24 Hrs  T(F): 97.3 (18 Dec 2019 11:29), Max: 98.2 (17 Dec 2019 14:01)  HR: 79 (18 Dec 2019 11:29) (79 - 99)  BP: 109/72 (18 Dec 2019 11:29) (109/71 - 120/82)  RR: 18 (18 Dec 2019 11:29) (17 - 18)  SpO2: 98% (18 Dec 2019 11:29) (97% - 100%)    CONSTITUTIONAL: NAD  ENMT: Moist oral mucosa  RESPIRATORY: good AE b/l  CARDIOVASCULAR: Regular rate and rhythm; No lower extremity edema;   ABDOMEN: Nontender to palpation, normoactive bowel sounds, +PEG  MUSCULOSKELETAL: no clubbing or cyanosis of digits; no joint swelling or tenderness to palpation  PSYCH: affect appropriate  NEUROLOGY: no gross sensory deficits       LABS:                        9.4    9.05  )-----------( 430      ( 18 Dec 2019 05:20 )             30.1     12-18    134<L>  |  91<L>  |  44<H>  ----------------------------<  136<H>  4.6   |  29  |  0.97    Ca    8.6      18 Dec 2019 05:20  Mg     1.9     12-18

## 2019-12-19 LAB
ANION GAP SERPL CALC-SCNC: 14 MMO/L — SIGNIFICANT CHANGE UP (ref 7–14)
BUN SERPL-MCNC: 48 MG/DL — HIGH (ref 7–23)
CALCIUM SERPL-MCNC: 8.6 MG/DL — SIGNIFICANT CHANGE UP (ref 8.4–10.5)
CHLORIDE SERPL-SCNC: 94 MMOL/L — LOW (ref 98–107)
CO2 SERPL-SCNC: 28 MMOL/L — SIGNIFICANT CHANGE UP (ref 22–31)
CREAT SERPL-MCNC: 1.05 MG/DL — SIGNIFICANT CHANGE UP (ref 0.5–1.3)
GLUCOSE BLDC GLUCOMTR-MCNC: 108 MG/DL — HIGH (ref 70–99)
GLUCOSE BLDC GLUCOMTR-MCNC: 124 MG/DL — HIGH (ref 70–99)
GLUCOSE BLDC GLUCOMTR-MCNC: 185 MG/DL — HIGH (ref 70–99)
GLUCOSE BLDC GLUCOMTR-MCNC: 206 MG/DL — HIGH (ref 70–99)
GLUCOSE BLDC GLUCOMTR-MCNC: 262 MG/DL — HIGH (ref 70–99)
GLUCOSE BLDC GLUCOMTR-MCNC: 70 MG/DL — SIGNIFICANT CHANGE UP (ref 70–99)
GLUCOSE SERPL-MCNC: 100 MG/DL — HIGH (ref 70–99)
HCT VFR BLD CALC: 31 % — LOW (ref 39–50)
HGB BLD-MCNC: 9.6 G/DL — LOW (ref 13–17)
MAGNESIUM SERPL-MCNC: 2 MG/DL — SIGNIFICANT CHANGE UP (ref 1.6–2.6)
MCHC RBC-ENTMCNC: 26.7 PG — LOW (ref 27–34)
MCHC RBC-ENTMCNC: 31 % — LOW (ref 32–36)
MCV RBC AUTO: 86.4 FL — SIGNIFICANT CHANGE UP (ref 80–100)
NRBC # FLD: 0.05 K/UL — SIGNIFICANT CHANGE UP (ref 0–0)
PHOSPHATE SERPL-MCNC: 2.8 MG/DL — SIGNIFICANT CHANGE UP (ref 2.5–4.5)
PLATELET # BLD AUTO: 419 K/UL — HIGH (ref 150–400)
PMV BLD: 11.4 FL — SIGNIFICANT CHANGE UP (ref 7–13)
POTASSIUM SERPL-MCNC: 4.7 MMOL/L — SIGNIFICANT CHANGE UP (ref 3.5–5.3)
POTASSIUM SERPL-SCNC: 4.7 MMOL/L — SIGNIFICANT CHANGE UP (ref 3.5–5.3)
RBC # BLD: 3.59 M/UL — LOW (ref 4.2–5.8)
RBC # FLD: 18.9 % — HIGH (ref 10.3–14.5)
SODIUM SERPL-SCNC: 136 MMOL/L — SIGNIFICANT CHANGE UP (ref 135–145)
WBC # BLD: 10.9 K/UL — HIGH (ref 3.8–10.5)
WBC # FLD AUTO: 10.9 K/UL — HIGH (ref 3.8–10.5)

## 2019-12-19 PROCEDURE — 99233 SBSQ HOSP IP/OBS HIGH 50: CPT

## 2019-12-19 RX ORDER — INSULIN LISPRO 100/ML
VIAL (ML) SUBCUTANEOUS EVERY 6 HOURS
Refills: 0 | Status: DISCONTINUED | OUTPATIENT
Start: 2019-12-19 | End: 2019-12-22

## 2019-12-19 RX ORDER — INSULIN GLARGINE 100 [IU]/ML
14 INJECTION, SOLUTION SUBCUTANEOUS
Refills: 0 | Status: DISCONTINUED | OUTPATIENT
Start: 2019-12-19 | End: 2019-12-20

## 2019-12-19 RX ORDER — INSULIN LISPRO 100/ML
5 VIAL (ML) SUBCUTANEOUS EVERY 6 HOURS
Refills: 0 | Status: DISCONTINUED | OUTPATIENT
Start: 2019-12-19 | End: 2019-12-20

## 2019-12-19 RX ADMIN — Medication 4: at 11:22

## 2019-12-19 RX ADMIN — Medication 40 MILLIGRAM(S): at 05:22

## 2019-12-19 RX ADMIN — SIMVASTATIN 20 MILLIGRAM(S): 20 TABLET, FILM COATED ORAL at 22:38

## 2019-12-19 RX ADMIN — LEVETIRACETAM 500 MILLIGRAM(S): 250 TABLET, FILM COATED ORAL at 05:20

## 2019-12-19 RX ADMIN — HUMAN INSULIN 9 UNIT(S): 100 INJECTION, SUSPENSION SUBCUTANEOUS at 05:20

## 2019-12-19 RX ADMIN — ENOXAPARIN SODIUM 40 MILLIGRAM(S): 100 INJECTION SUBCUTANEOUS at 17:17

## 2019-12-19 RX ADMIN — Medication 3 MILLIGRAM(S): at 22:38

## 2019-12-19 RX ADMIN — HUMAN INSULIN 9 UNIT(S): 100 INJECTION, SUSPENSION SUBCUTANEOUS at 00:39

## 2019-12-19 RX ADMIN — ESCITALOPRAM OXALATE 5 MILLIGRAM(S): 10 TABLET, FILM COATED ORAL at 17:17

## 2019-12-19 RX ADMIN — ATENOLOL 50 MILLIGRAM(S): 25 TABLET ORAL at 05:20

## 2019-12-19 RX ADMIN — HUMAN INSULIN 9 UNIT(S): 100 INJECTION, SUSPENSION SUBCUTANEOUS at 11:22

## 2019-12-19 RX ADMIN — LEVETIRACETAM 500 MILLIGRAM(S): 250 TABLET, FILM COATED ORAL at 17:17

## 2019-12-19 RX ADMIN — Medication 20 MILLIGRAM(S): at 05:20

## 2019-12-19 RX ADMIN — HUMAN INSULIN 9 UNIT(S): 100 INJECTION, SUSPENSION SUBCUTANEOUS at 17:18

## 2019-12-19 RX ADMIN — CHLORHEXIDINE GLUCONATE 1 APPLICATION(S): 213 SOLUTION TOPICAL at 05:22

## 2019-12-19 RX ADMIN — Medication 88 MICROGRAM(S): at 22:38

## 2019-12-19 RX ADMIN — PANTOPRAZOLE SODIUM 40 MILLIGRAM(S): 20 TABLET, DELAYED RELEASE ORAL at 05:20

## 2019-12-19 NOTE — CHART NOTE - NSCHARTNOTEFT_GEN_A_CORE
Plan to treat with Polatuzumab and Rituxan on 12/20.    Sarah Gomez  Hematology Fellow  [phone]

## 2019-12-19 NOTE — PROGRESS NOTE ADULT - PROBLEM SELECTOR PLAN 7
onc planning on targeted chemo inpt  await approval; hopeful for chemo to start in next day or so  s/p 1 u PRBC 12/15

## 2019-12-19 NOTE — CHART NOTE - NSCHARTNOTEFT_GEN_A_CORE
spoke with hepatology  no issue regarding the hep surface ab + since antigen core and surface neg  with respect to starting rituxan

## 2019-12-19 NOTE — PROGRESS NOTE ADULT - PROBLEM SELECTOR PLAN 6
- Severe protein calorie malnutrition due to malignancy   s/p peg placement (11/29)  - glucerna tube feeds  dietitian re-eval for bolus feeds for dc planning; family needs teaching  dietitian f/u to convert to bolus feeds

## 2019-12-19 NOTE — PROGRESS NOTE ADULT - ASSESSMENT
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 Found to have new onset seizures and multifocal pneumonia.   Consulted for T2DM on glucerna feeds, hypothyroidism, adrenal insufficiency and hypercalcemia.     1) DM2    hgb a1c 7.0, on continuous enteral feeds over 24 hours. On prednisone 40mg daily.  BG has been mostly controlled at goal 100-180 mg/dl except for hypoglycemia isolated event yesterday evening.  May have occurred around the time feeds are held for levothyroxine administration. Also occurred after receiving 4 units Humalog correction.  -Continue NPH 9 units q6h (hold dose if feeding held)  -Lower Humalog correction scale to low scale q6h   -if TF is stopped for more than one hour, please start D10w@30 cc/hour to avoid hypoglycemia and hold NPH  -If enteral feeds are changed to bolus feeds insulin regimen will need to be changed. Please notify endocrine if changing feeding plan to bolus feeds.    2) Hypercalcemia    Calcium corrected to 9.9 improved.  elevated 1,25 vitamin D. 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   -currently calcium is stable, improved with steroids    3) Adrenal Insufficiency    last cortisol was 19.7 while off steroids. Currently HD stable, low suspicion for AI.   Now on prednisone at supratherapeutic dose.    4) Hypothyroidism    currently on enteral LT4 88 mcg.    hold tube feed 1/2 hour before and 1/2 hour after giving LT4 in PEG. 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 Found to have new onset seizures and multifocal pneumonia.   Consulted for T2DM on glucerna feeds, hypothyroidism, adrenal insufficiency and hypercalcemia.     1) DM2    hgb a1c 7.0, on continuous enteral feeds over 24 hours. On prednisone 40mg daily.  BG has been mostly controlled at goal 100-180 mg/dl except for hypoglycemia isolated event yesterday evening.  May have occurred around the time feeds are held for levothyroxine administration. Also occurred after receiving 4 units Humalog correction.  -Continue NPH 9 units q6h (hold dose if feeding held)  -Lower Humalog correction scale to low scale q6h   -if TF is stopped for more than one hour, please start D10w@30 cc/hour to avoid hypoglycemia and hold NPH  -If enteral feeds are changed to bolus feeds insulin regimen will need to be changed. Please notify endocrine if changing feeding plan to bolus feeds.  -Check HbA1c in AM    2) Hypercalcemia    Calcium corrected to 9.9 improved.  elevated 1,25 vitamin D. 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   -currently calcium is stable, improved with steroids    3) Adrenal Insufficiency    last cortisol was 19.7 while off steroids. Currently HD stable, low suspicion for AI.   Now on prednisone at supratherapeutic dose.    4) Hypothyroidism    currently on enteral LT4 88 mcg.    hold tube feed 1/2 hour before and 1/2 hour after giving LT4 in PEG.

## 2019-12-19 NOTE — PROGRESS NOTE ADULT - SUBJECTIVE AND OBJECTIVE BOX
[facility] [place] [facility]   MD UYEN HERNANDEZ DO MARYANNE SOURIAL, LEATHA NICOLE    TEL:  OFFICE: [phone]  DR CASAREZ CELL: [phone]  DR. HERNANDEZ CELL: [phone]  DR. REYNOSO CELL: [phone]  CHUCKIE TERRAZAS CELL: [phone]        Patient is a 79y old  Male who presents with a chief complaint of AMS (18 Dec 2019 13:51)      Patient seen and examined at bedside. No chest pain/sob    VITALS:  T(F): 97.9 (12-19-19 @ 11:31), Max: 97.9 (12-19-19 @ 11:31)  HR: 60 (12-19-19 @ 11:31)  BP: 110/64 (12-19-19 @ 11:31)  RR: 17 (12-19-19 @ 11:31)  SpO2: 100% (12-19-19 @ 11:31)  Wt(kg): --        PHYSICAL EXAM:  Constitutional: NAD  Neck: No JVD  Respiratory: CTAB, no wheezes, rales or rhonchi  Cardiovascular: S1, S2, RRR  Gastrointestinal: BS+, soft, NT/ND  Extremities: No peripheral edema    Hospital Medications:   MEDICATIONS  (STANDING):  ATENolol  Tablet 50 milliGRAM(s) Oral daily  chlorhexidine 4% Liquid 1 Application(s) Topical <User Schedule>  dextrose 5%. 1000 milliLiter(s) (50 mL/Hr) IV Continuous <Continuous>  dextrose 50% Injectable 12.5 Gram(s) IV Push once  dextrose 50% Injectable 25 Gram(s) IV Push once  dextrose 50% Injectable 25 Gram(s) IV Push once  enoxaparin Injectable 40 milliGRAM(s) SubCutaneous daily  escitalopram 5 milliGRAM(s) Oral daily  furosemide   Injectable 20 milliGRAM(s) IV Push two times a day  insulin lispro (HumaLOG) corrective regimen sliding scale   SubCutaneous every 6 hours  insulin NPH human recombinant 9 Unit(s) SubCutaneous every 6 hours  levETIRAcetam  Solution 500 milliGRAM(s) Oral two times a day  levothyroxine 88 MICROGram(s) Oral at bedtime  melatonin 3 milliGRAM(s) Oral at bedtime  pantoprazole   Suspension 40 milliGRAM(s) Oral daily  predniSONE   Tablet 40 milliGRAM(s) Oral daily  senna Syrup 5 milliLiter(s) Oral at bedtime  simvastatin 20 milliGRAM(s) Oral at bedtime      LABS:  12-19    136  |  94<L>  |  48<H>  ----------------------------<  100<H>  4.7   |  28  |  1.05    Ca    8.6      19 Dec 2019 05:00  Phos  2.8     12-19  Mg     2.0     12-19      Creatinine Trend: 1.05 <--, 0.97 <--, 0.92 <--, 0.89 <--, 0.92 <--, 0.83 <--, 0.82 <--    Phosphorus Level, Serum: 2.8 mg/dL (12-19 @ 05:00)                              9.6    10.90 )-----------( 419      ( 19 Dec 2019 05:00 )             31.0     Urine Studies:  Urinalysis - [12-14-19 @ 11:10]      Color LIGHT YELLOW / Appearance CLEAR / SG 1.016 / pH 8.0      Gluc 30 / Ketone NEGATIVE  / Bili NEGATIVE / Urobili NORMAL       Blood NEGATIVE / Protein 20 / Leuk Est NEGATIVE / Nitrite NEGATIVE      RBC 0-2 / WBC 0-2 / Hyaline NEGATIVE / Gran  / Sq Epi OCC / Non Sq Epi  / Bacteria NEGATIVE      Iron 38, TIBC 234, %sat --      [09-12-19 @ 06:08]  Ferritin 214.6      [09-12-19 @ 06:08]  PTH 20.00 (Ca --)      [09-03-19 @ 05:45]   --  PTH 25.46 (Ca --)      [09-01-19 @ 06:00]   --  Vitamin D (25OH) 34.9      [09-03-19 @ 05:45]  HbA1c 7.0      [09-13-19 @ 06:50]  TSH 6.55      [12-04-19 @ 05:05]  Lipid: chol 124, , HDL 50, LDL 57      [12-12-19 @ 06:30]    HBsAb Reactive      [11-23-19 @ 05:25]  HBsAg Nonreactive      [11-23-19 @ 05:25]  HBcAb Reactive      [11-23-19 @ 05:25]  HCV 0.12, Nonreactive Hepatitis C AB  S/CO Ratio                        Interpretation  < 1.00                                   Non-Reactive  1.00 - 4.99                         Weakly-Reactive  >= 5.00                                Reactive  Non-Reactive: Aperson with a non-reactive HCV antibody  result is considered uninfected.  No further action is  needed unless recent infection is suspected.  In these  cases, consider repeat testing later to detect  seroconversion..  Weakly-Reactive: HCV antibody test is abnormal, HCV RNA  Qualitative test will follow.  Reactive: HCV antibody test is abnormal, HCV RNA  Qualitative test will follow.  Note: HCV antibody testing is performed on the Abbott   system.      [11-23-19 @ 05:25]  HIV Nonreactive The HIV Ag/Ab Combo test performed screens for HIV-1 p24  antigen, antibodies to HIV-1 (group M and group O), and  antibodies to HIV-2. All specimens repeatedly reactive  will reflex to an HIV 1/2 antibody confirmation and  differentiation test. This assay detects p24 antigen which  may be present prior to the development of HIV antibodies,  therefore a reactive result with a negative HIV 1/2 AB  Confirmation should be followed up with HIV-1 RNA, HIV-2  RNA and repeat testing in 4-8 weeks. A nonreactive result  does not preclude previous exposure to or infection with  HIV-1 or HIV-2. [facility] prohibits disclosure of this  result to any unauthorized party.      [11-22-19 @ 10:18]      RADIOLOGY & ADDITIONAL STUDIES:

## 2019-12-19 NOTE — CHART NOTE - NSCHARTNOTEFT_GEN_A_CORE
Source: Patient [ ]    Family [ ]     other [x ] RN, PA    Pt is A&Ox2. Nutrition consult for transition from continuous to bolus feeds. Hospital course events noted. Plan for inpatient chemo and d/c home with services.   Hypoglycemic episode noted yesterday with BG @ 55,52 mg/dL. Per RN, No GI distress (nausea/vomiting/diarrhea/constipation) with tube feedings.     Diet, NPO with Tube Feed:   Tube Feeding Modality: Gastrostomy  Glucerna 1.2 Derrell (GLUCERNARTH)  Total Volume for 24 Hours (mL): 1440  Continuous  Starting Tube Feed Rate {mL per Hour}: 20  Increase Tube Feed Rate by (mL): 10     Every 6 hours  Until Goal Tube Feed Rate (mL per Hour): 60  Tube Feed Duration (in Hours): 24  Tube Feed Start Time: 12:00 (11-30-19 @ 13:57)       Enteral /Parenteral Nutrition: 1440 mL, 1728 derrell, 86 gm protein   Current Weight: (12/19) 50 kg (110 pounds)  erroneous?  12/4: 139.7 pounds   11/29: 139.1 pounds   11/20: 136.4 pounds   11/16: 143 pounds     Edema: none  Pressure Injuries: none    __________________ Pertinent Medications__________________   MEDICATIONS  (STANDING):  ATENolol  Tablet 50 milliGRAM(s) Oral daily  chlorhexidine 4% Liquid 1 Application(s) Topical <User Schedule>  dextrose 5%. 1000 milliLiter(s) (50 mL/Hr) IV Continuous <Continuous>  dextrose 50% Injectable 12.5 Gram(s) IV Push once  dextrose 50% Injectable 25 Gram(s) IV Push once  dextrose 50% Injectable 25 Gram(s) IV Push once  enoxaparin Injectable 40 milliGRAM(s) SubCutaneous daily  escitalopram 5 milliGRAM(s) Oral daily  furosemide   Injectable 20 milliGRAM(s) IV Push two times a day  insulin lispro (HumaLOG) corrective regimen sliding scale   SubCutaneous every 6 hours  insulin NPH human recombinant 9 Unit(s) SubCutaneous every 6 hours  levETIRAcetam  Solution 500 milliGRAM(s) Oral two times a day  levothyroxine 88 MICROGram(s) Oral at bedtime  melatonin 3 milliGRAM(s) Oral at bedtime  pantoprazole   Suspension 40 milliGRAM(s) Oral daily  predniSONE   Tablet 40 milliGRAM(s) Oral daily  senna Syrup 5 milliLiter(s) Oral at bedtime  simvastatin 20 milliGRAM(s) Oral at bedtime    MEDICATIONS  (PRN):  acetaminophen    Suspension .. 650 milliGRAM(s) Oral every 6 hours PRN Severe Pain (7 - 10)  albuterol/ipratropium for Nebulization. 3 milliLiter(s) Nebulizer every 8 hours PRN Shortness of Breath  dextrose 40% Gel 15 Gram(s) Oral once PRN Blood Glucose LESS THAN 70 milliGRAM(s)/deciliter  glucagon  Injectable 1 milliGRAM(s) IntraMuscular once PRN Glucose LESS THAN 70 milligrams/deciliter  OLANZapine Injectable 1.25 milliGRAM(s) IntraMuscular every 8 hours PRN agitation  sodium chloride 0.9% lock flush 10 milliLiter(s) IV Push every 1 hour PRN Pre/post blood products, medications, blood draw, and to maintain line patency      __________________ Pertinent Labs__________________   12-19 Na136 mmol/L Glu 100 mg/dL<H> K+ 4.7 mmol/L Cr  1.05 mg/dL BUN 48 mg/dL<H> 12-19 Phos 2.8 mg/dL 12-16 Alb 2.4 g/dL<L> 12-12 Chol 124 mg/dL LDL 57 mg/dL HDL 50 mg/dL Trig 140 mg/dL    CAPILLARY BLOOD GLUCOSE      POCT Blood Glucose.: 206 mg/dL (19 Dec 2019 10:55)  POCT Blood Glucose.: 108 mg/dL (19 Dec 2019 05:19)  POCT Blood Glucose.: 185 mg/dL (19 Dec 2019 00:22)  POCT Blood Glucose.: 262 mg/dL (18 Dec 2019 23:56)  POCT Blood Glucose.: 55 mg/dL (18 Dec 2019 23:37)  POCT Blood Glucose.: 52 mg/dL (18 Dec 2019 23:34)  POCT Blood Glucose.: 203 mg/dL (18 Dec 2019 18:03)      Estimated Needs:   [x ] no change since previous assessment  [ ] recalculated:       Previous Nutrition Diagnosis: severe protein calorie malnutrition     Nutrition Diagnosis is [x ] ongoing  [ ] resolved [ ] not applicable     Recommendations:  1. Recommend  360 mL bolus of Glucerna 1.2 every 6 hours.  Nursing instructions: First two boluses @ 180 mL, third and fourth bolus @ 240 mL, fifth bolus @ goal of 360 mL.   Bolus regimen provides 1440 mL, 1728 derrell, 86 gm pro.   2. Zeroed-out bed weight to assess weight changes.       Monitoring and Evaluation:     [ x] Tolerance to diet prescription [x ] weights [x ] follow up per protocol  [ ] other:

## 2019-12-19 NOTE — PROGRESS NOTE ADULT - SUBJECTIVE AND OBJECTIVE BOX
[facility] Division of Hospital Medicine  Alejandra Rodas MD  Pager [phone]    Patient is a 79y old  Male who presents with a chief complaint of AMS      SUBJECTIVE / OVERNIGHT EVENTS: awake, alert; pleasant; no complaints      MEDICATIONS  (STANDING):  ATENolol  Tablet 50 milliGRAM(s) Oral daily  chlorhexidine 4% Liquid 1 Application(s) Topical <User Schedule>  dextrose 5%. 1000 milliLiter(s) (50 mL/Hr) IV Continuous <Continuous>  dextrose 50% Injectable 12.5 Gram(s) IV Push once  dextrose 50% Injectable 25 Gram(s) IV Push once  dextrose 50% Injectable 25 Gram(s) IV Push once  enoxaparin Injectable 40 milliGRAM(s) SubCutaneous daily  escitalopram 5 milliGRAM(s) Oral daily  furosemide   Injectable 20 milliGRAM(s) IV Push two times a day  insulin lispro (HumaLOG) corrective regimen sliding scale   SubCutaneous every 6 hours  insulin NPH human recombinant 9 Unit(s) SubCutaneous every 6 hours  levETIRAcetam  Solution 500 milliGRAM(s) Oral two times a day  levothyroxine 88 MICROGram(s) Oral at bedtime  melatonin 3 milliGRAM(s) Oral at bedtime  pantoprazole   Suspension 40 milliGRAM(s) Oral daily  predniSONE   Tablet 40 milliGRAM(s) Oral daily  senna Syrup 5 milliLiter(s) Oral at bedtime  simvastatin 20 milliGRAM(s) Oral at bedtime    MEDICATIONS  (PRN):  acetaminophen    Suspension .. 650 milliGRAM(s) Oral every 6 hours PRN Severe Pain (7 - 10)  albuterol/ipratropium for Nebulization. 3 milliLiter(s) Nebulizer every 8 hours PRN Shortness of Breath  dextrose 40% Gel 15 Gram(s) Oral once PRN Blood Glucose LESS THAN 70 milliGRAM(s)/deciliter  glucagon  Injectable 1 milliGRAM(s) IntraMuscular once PRN Glucose LESS THAN 70 milligrams/deciliter  OLANZapine Injectable 1.25 milliGRAM(s) IntraMuscular every 8 hours PRN agitation  sodium chloride 0.9% lock flush 10 milliLiter(s) IV Push every 1 hour PRN Pre/post blood products, medications, blood draw, and to maintain line patency      CAPILLARY BLOOD GLUCOSE  POCT Blood Glucose.: 206 mg/dL (19 Dec 2019 10:55)  POCT Blood Glucose.: 108 mg/dL (19 Dec 2019 05:19)  POCT Blood Glucose.: 185 mg/dL (19 Dec 2019 00:22)  POCT Blood Glucose.: 262 mg/dL (18 Dec 2019 23:56)  POCT Blood Glucose.: 55 mg/dL (18 Dec 2019 23:37)  POCT Blood Glucose.: 52 mg/dL (18 Dec 2019 23:34)  POCT Blood Glucose.: 203 mg/dL (18 Dec 2019 18:03)        PHYSICAL EXAM:  Vital Signs Last 24 Hrs  T(F): 97.9 (19 Dec 2019 11:31), Max: 97.9 (19 Dec 2019 11:31)  HR: 60 (19 Dec 2019 11:31) (60 - 82)  BP: 110/64 (19 Dec 2019 11:31) (108/72 - 123/75)  RR: 17 (19 Dec 2019 11:31) (16 - 17)  SpO2: 100% (19 Dec 2019 11:31) (98% - 100%)    CONSTITUTIONAL: NAD  ENMT: Moist oral mucosa  RESPIRATORY: grossly b/l Ae ant/lat  CARDIOVASCULAR: Regular rate and rhythm; No lower extremity edema;   ABDOMEN: Nontender to palpation, normoactive bowel sounds, +PEG  MUSCULOSKELETAL: no clubbing or cyanosis of digits; no joint swelling or tenderness to palpation  PSYCH:  affect appropriate  NEUROLOGY: no gross sensory deficits       LABS:                        9.6    10.90 )-----------( 419      ( 19 Dec 2019 05:00 )             31.0     12-19    136  |  94<L>  |  48<H>  ----------------------------<  100<H>  4.7   |  28  |  1.05    Ca    8.6      19 Dec 2019 05:00  Phos  2.8     12-19  Mg     2.0     12-19

## 2019-12-19 NOTE — PROGRESS NOTE ADULT - SUBJECTIVE AND OBJECTIVE BOX
Chief Complaint: DM2    History: Tolerating enteral feeds Glucerna 1.2 @ 60 cc/hour x 24 h  One hypoglycemia event noted yesterday in the evening.  Patient denies complaints.    MEDICATIONS  (STANDING):  ATENolol  Tablet 50 milliGRAM(s) Oral daily  chlorhexidine 4% Liquid 1 Application(s) Topical <User Schedule>  dextrose 5%. 1000 milliLiter(s) (50 mL/Hr) IV Continuous <Continuous>  dextrose 50% Injectable 12.5 Gram(s) IV Push once  dextrose 50% Injectable 25 Gram(s) IV Push once  dextrose 50% Injectable 25 Gram(s) IV Push once  enoxaparin Injectable 40 milliGRAM(s) SubCutaneous daily  escitalopram 5 milliGRAM(s) Oral daily  furosemide   Injectable 20 milliGRAM(s) IV Push two times a day  insulin lispro (HumaLOG) corrective regimen sliding scale   SubCutaneous every 6 hours  insulin NPH human recombinant 9 Unit(s) SubCutaneous every 6 hours  levETIRAcetam  Solution 500 milliGRAM(s) Oral two times a day  levothyroxine 88 MICROGram(s) Oral at bedtime  melatonin 3 milliGRAM(s) Oral at bedtime  pantoprazole   Suspension 40 milliGRAM(s) Oral daily  predniSONE   Tablet 40 milliGRAM(s) Oral daily  senna Syrup 5 milliLiter(s) Oral at bedtime  simvastatin 20 milliGRAM(s) Oral at bedtime    MEDICATIONS  (PRN):  acetaminophen    Suspension .. 650 milliGRAM(s) Oral every 6 hours PRN Severe Pain (7 - 10)  albuterol/ipratropium for Nebulization. 3 milliLiter(s) Nebulizer every 8 hours PRN Shortness of Breath  dextrose 40% Gel 15 Gram(s) Oral once PRN Blood Glucose LESS THAN 70 milliGRAM(s)/deciliter  glucagon  Injectable 1 milliGRAM(s) IntraMuscular once PRN Glucose LESS THAN 70 milligrams/deciliter  OLANZapine Injectable 1.25 milliGRAM(s) IntraMuscular every 8 hours PRN agitation  sodium chloride 0.9% lock flush 10 milliLiter(s) IV Push every 1 hour PRN Pre/post blood products, medications, blood draw, and to maintain line patency      Allergies    No Known Allergies    Intolerances      Review of Systems:    UNABLE TO OBTAIN    PHYSICAL EXAM:  VITALS: T(C): 36.6 (12-19-19 @ 11:31)  T(F): 97.9 (12-19-19 @ 11:31), Max: 97.9 (12-19-19 @ 11:31)  HR: 60 (12-19-19 @ 11:31) (60 - 82)  BP: 110/64 (12-19-19 @ 11:31) (108/72 - 123/75)  RR:  (16 - 17)  SpO2:  (98% - 100%)  Wt(kg): --  GENERAL: NAD, appears comfortable  EYES: No proptosis, no lid lag, anicteric  HEENT:  Atraumatic, Normocephalic, moist mucous membranes  GI: enteral feeds infusing via PEG      CAPILLARY BLOOD GLUCOSE      POCT Blood Glucose.: 206 mg/dL (19 Dec 2019 10:55)  POCT Blood Glucose.: 108 mg/dL (19 Dec 2019 05:19)  POCT Blood Glucose.: 185 mg/dL (19 Dec 2019 00:22)  POCT Blood Glucose.: 262 mg/dL (18 Dec 2019 23:56)  POCT Blood Glucose.: 55 mg/dL (18 Dec 2019 23:37)  POCT Blood Glucose.: 52 mg/dL (18 Dec 2019 23:34)  POCT Blood Glucose.: 203 mg/dL (18 Dec 2019 18:03)      12-19    136  |  94<L>  |  48<H>  ----------------------------<  100<H>  4.7   |  28  |  1.05    EGFR if : 77  EGFR if non : 67    Ca    8.6      12-19  Mg     2.0     12-19  Phos  2.8     12-19            Thyroid Function Tests:  12-04 @ 05:05 TSH 6.55 FreeT4 1.07 T3 -- Anti TPO -- Anti Thyroglobulin Ab -- TSI --

## 2019-12-20 LAB
ANION GAP SERPL CALC-SCNC: 12 MMO/L — SIGNIFICANT CHANGE UP (ref 7–14)
BASOPHILS # BLD AUTO: 0.04 K/UL — SIGNIFICANT CHANGE UP (ref 0–0.2)
BASOPHILS NFR BLD AUTO: 0.3 % — SIGNIFICANT CHANGE UP (ref 0–2)
BUN SERPL-MCNC: 50 MG/DL — HIGH (ref 7–23)
CALCIUM SERPL-MCNC: 9 MG/DL — SIGNIFICANT CHANGE UP (ref 8.4–10.5)
CHLORIDE SERPL-SCNC: 96 MMOL/L — LOW (ref 98–107)
CO2 SERPL-SCNC: 28 MMOL/L — SIGNIFICANT CHANGE UP (ref 22–31)
CREAT SERPL-MCNC: 0.99 MG/DL — SIGNIFICANT CHANGE UP (ref 0.5–1.3)
EOSINOPHIL # BLD AUTO: 0 K/UL — SIGNIFICANT CHANGE UP (ref 0–0.5)
EOSINOPHIL NFR BLD AUTO: 0 % — SIGNIFICANT CHANGE UP (ref 0–6)
GLUCOSE BLDC GLUCOMTR-MCNC: 151 MG/DL — HIGH (ref 70–99)
GLUCOSE BLDC GLUCOMTR-MCNC: 157 MG/DL — HIGH (ref 70–99)
GLUCOSE BLDC GLUCOMTR-MCNC: 188 MG/DL — HIGH (ref 70–99)
GLUCOSE BLDC GLUCOMTR-MCNC: 209 MG/DL — HIGH (ref 70–99)
GLUCOSE BLDC GLUCOMTR-MCNC: 255 MG/DL — HIGH (ref 70–99)
GLUCOSE BLDC GLUCOMTR-MCNC: 315 MG/DL — HIGH (ref 70–99)
GLUCOSE BLDC GLUCOMTR-MCNC: 63 MG/DL — LOW (ref 70–99)
GLUCOSE BLDC GLUCOMTR-MCNC: 64 MG/DL — LOW (ref 70–99)
GLUCOSE SERPL-MCNC: 51 MG/DL — LOW (ref 70–99)
HBA1C BLD-MCNC: 6.4 % — HIGH (ref 4–5.6)
HCT VFR BLD CALC: 31.5 % — LOW (ref 39–50)
HGB BLD-MCNC: 9.6 G/DL — LOW (ref 13–17)
IMM GRANULOCYTES NFR BLD AUTO: 10.2 % — HIGH (ref 0–1.5)
LDH SERPL L TO P-CCNC: 442 U/L — HIGH (ref 135–225)
LYMPHOCYTES # BLD AUTO: 0.71 K/UL — LOW (ref 1–3.3)
LYMPHOCYTES # BLD AUTO: 6.2 % — LOW (ref 13–44)
MAGNESIUM SERPL-MCNC: 2.2 MG/DL — SIGNIFICANT CHANGE UP (ref 1.6–2.6)
MANUAL SMEAR VERIFICATION: SIGNIFICANT CHANGE UP
MCHC RBC-ENTMCNC: 26.6 PG — LOW (ref 27–34)
MCHC RBC-ENTMCNC: 30.5 % — LOW (ref 32–36)
MCV RBC AUTO: 87.3 FL — SIGNIFICANT CHANGE UP (ref 80–100)
MONOCYTES # BLD AUTO: 0.58 K/UL — SIGNIFICANT CHANGE UP (ref 0–0.9)
MONOCYTES NFR BLD AUTO: 5 % — SIGNIFICANT CHANGE UP (ref 2–14)
NEUTROPHILS # BLD AUTO: 9.02 K/UL — HIGH (ref 1.8–7.4)
NEUTROPHILS NFR BLD AUTO: 78.3 % — HIGH (ref 43–77)
NRBC # FLD: 0.05 K/UL — SIGNIFICANT CHANGE UP (ref 0–0)
PHOSPHATE SERPL-MCNC: 3.2 MG/DL — SIGNIFICANT CHANGE UP (ref 2.5–4.5)
PLATELET # BLD AUTO: 408 K/UL — HIGH (ref 150–400)
PMV BLD: 11.6 FL — SIGNIFICANT CHANGE UP (ref 7–13)
POTASSIUM SERPL-MCNC: 4.3 MMOL/L — SIGNIFICANT CHANGE UP (ref 3.5–5.3)
POTASSIUM SERPL-SCNC: 4.3 MMOL/L — SIGNIFICANT CHANGE UP (ref 3.5–5.3)
RBC # BLD: 3.61 M/UL — LOW (ref 4.2–5.8)
RBC # FLD: 19 % — HIGH (ref 10.3–14.5)
SODIUM SERPL-SCNC: 136 MMOL/L — SIGNIFICANT CHANGE UP (ref 135–145)
URATE SERPL-MCNC: 5.4 MG/DL — SIGNIFICANT CHANGE UP (ref 3.4–8.8)
WBC # BLD: 11.53 K/UL — HIGH (ref 3.8–10.5)
WBC # FLD AUTO: 11.53 K/UL — HIGH (ref 3.8–10.5)

## 2019-12-20 PROCEDURE — 99233 SBSQ HOSP IP/OBS HIGH 50: CPT

## 2019-12-20 PROCEDURE — 99232 SBSQ HOSP IP/OBS MODERATE 35: CPT | Mod: GC

## 2019-12-20 RX ORDER — RITUXIMAB 10 MG/ML
555 INJECTION, SOLUTION INTRAVENOUS ONCE
Refills: 0 | Status: COMPLETED | OUTPATIENT
Start: 2019-12-20 | End: 2019-12-20

## 2019-12-20 RX ORDER — ACETAMINOPHEN 500 MG
650 TABLET ORAL ONCE
Refills: 0 | Status: COMPLETED | OUTPATIENT
Start: 2019-12-20 | End: 2019-12-20

## 2019-12-20 RX ORDER — DEXTROSE 50 % IN WATER 50 %
12.5 SYRINGE (ML) INTRAVENOUS ONCE
Refills: 0 | Status: COMPLETED | OUTPATIENT
Start: 2019-12-20 | End: 2019-12-20

## 2019-12-20 RX ORDER — HYDROCORTISONE 20 MG
50 TABLET ORAL ONCE
Refills: 0 | Status: COMPLETED | OUTPATIENT
Start: 2019-12-20 | End: 2019-12-20

## 2019-12-20 RX ORDER — INSULIN GLARGINE 100 [IU]/ML
10 INJECTION, SOLUTION SUBCUTANEOUS AT BEDTIME
Refills: 0 | Status: DISCONTINUED | OUTPATIENT
Start: 2019-12-20 | End: 2019-12-24

## 2019-12-20 RX ORDER — MEPERIDINE HYDROCHLORIDE 50 MG/ML
25 INJECTION INTRAMUSCULAR; INTRAVENOUS; SUBCUTANEOUS
Refills: 0 | Status: DISCONTINUED | OUTPATIENT
Start: 2019-12-20 | End: 2019-12-21

## 2019-12-20 RX ORDER — POLATUZUMAB VEDOTIN 30 MG/1.88ML
90 INJECTION, POWDER, LYOPHILIZED, FOR SOLUTION INTRAVENOUS ONCE
Refills: 0 | Status: COMPLETED | OUTPATIENT
Start: 2019-12-20 | End: 2019-12-20

## 2019-12-20 RX ORDER — DIPHENHYDRAMINE HCL 50 MG
50 CAPSULE ORAL ONCE
Refills: 0 | Status: COMPLETED | OUTPATIENT
Start: 2019-12-20 | End: 2019-12-20

## 2019-12-20 RX ORDER — HYDROCORTISONE 20 MG
50 TABLET ORAL
Refills: 0 | Status: DISCONTINUED | OUTPATIENT
Start: 2019-12-20 | End: 2019-12-24

## 2019-12-20 RX ORDER — INSULIN LISPRO 100/ML
4 VIAL (ML) SUBCUTANEOUS EVERY 6 HOURS
Refills: 0 | Status: DISCONTINUED | OUTPATIENT
Start: 2019-12-20 | End: 2019-12-24

## 2019-12-20 RX ADMIN — Medication 4 UNIT(S): at 17:28

## 2019-12-20 RX ADMIN — Medication 20 MILLIGRAM(S): at 05:48

## 2019-12-20 RX ADMIN — RITUXIMAB 555 MILLIGRAM(S): 10 INJECTION, SOLUTION INTRAVENOUS at 17:39

## 2019-12-20 RX ADMIN — POLATUZUMAB VEDOTIN 66.67 MILLIGRAM(S): 30 INJECTION, POWDER, LYOPHILIZED, FOR SOLUTION INTRAVENOUS at 15:49

## 2019-12-20 RX ADMIN — PANTOPRAZOLE SODIUM 40 MILLIGRAM(S): 20 TABLET, DELAYED RELEASE ORAL at 05:48

## 2019-12-20 RX ADMIN — Medication 40 MILLIGRAM(S): at 05:49

## 2019-12-20 RX ADMIN — Medication 20 MILLIGRAM(S): at 21:17

## 2019-12-20 RX ADMIN — Medication 50 MILLIGRAM(S): at 14:46

## 2019-12-20 RX ADMIN — LEVETIRACETAM 500 MILLIGRAM(S): 250 TABLET, FILM COATED ORAL at 17:28

## 2019-12-20 RX ADMIN — Medication 12.5 GRAM(S): at 05:37

## 2019-12-20 RX ADMIN — ESCITALOPRAM OXALATE 5 MILLIGRAM(S): 10 TABLET, FILM COATED ORAL at 12:57

## 2019-12-20 RX ADMIN — LEVETIRACETAM 500 MILLIGRAM(S): 250 TABLET, FILM COATED ORAL at 05:48

## 2019-12-20 RX ADMIN — SENNA PLUS 5 MILLILITER(S): 8.6 TABLET ORAL at 21:18

## 2019-12-20 RX ADMIN — Medication 88 MICROGRAM(S): at 21:17

## 2019-12-20 RX ADMIN — Medication 650 MILLIGRAM(S): at 14:47

## 2019-12-20 RX ADMIN — Medication 50 MILLIGRAM(S): at 14:53

## 2019-12-20 RX ADMIN — Medication 1: at 12:01

## 2019-12-20 RX ADMIN — INSULIN GLARGINE 10 UNIT(S): 100 INJECTION, SOLUTION SUBCUTANEOUS at 22:10

## 2019-12-20 RX ADMIN — Medication 3 MILLIGRAM(S): at 21:17

## 2019-12-20 RX ADMIN — SIMVASTATIN 20 MILLIGRAM(S): 20 TABLET, FILM COATED ORAL at 21:17

## 2019-12-20 RX ADMIN — Medication 2: at 17:27

## 2019-12-20 RX ADMIN — Medication 5 UNIT(S): at 12:56

## 2019-12-20 RX ADMIN — CHLORHEXIDINE GLUCONATE 1 APPLICATION(S): 213 SOLUTION TOPICAL at 05:48

## 2019-12-20 RX ADMIN — ATENOLOL 50 MILLIGRAM(S): 25 TABLET ORAL at 05:48

## 2019-12-20 RX ADMIN — ENOXAPARIN SODIUM 40 MILLIGRAM(S): 100 INJECTION SUBCUTANEOUS at 12:56

## 2019-12-20 NOTE — PROGRESS NOTE ADULT - SUBJECTIVE AND OBJECTIVE BOX
[facility] [place] [facility]   MD UYEN HERNANDEZ, DO MARYANN REYNOSO, LEATHA NICOLE    TEL:  OFFICE: [phone]  DR CASAREZ CELL: [phone]  DR. HERNANDEZ CELL: [phone]  DR. REYNOSO CELL: [phone]  CHUCKIE TERRAZAS CELL: [phone]        Patient is a 79y old  Male who presents with a chief complaint of AMS (20 Dec 2019 08:36)      Patient seen and examined at bedside. No chest pain/sob    VITALS:  T(F): 97.3 (12-20-19 @ 11:55), Max: 97.6 (12-19-19 @ 22:35)  HR: 66 (12-20-19 @ 11:55)  BP: 92/62 (12-20-19 @ 11:55)  RR: 17 (12-20-19 @ 11:55)  SpO2: 92% (12-20-19 @ 11:55)  Wt(kg): --      Weight (kg): 49.9 (12-19 @ 14:40)    PHYSICAL EXAM:  Constitutional: NAD  Neck: No JVD  Respiratory: rhonchi  Cardiovascular: S1, S2, RRR  Gastrointestinal: BS+, soft, NT/ND  Extremities: No peripheral edema    Hospital Medications:   MEDICATIONS  (STANDING):  acetaminophen   Tablet .. 650 milliGRAM(s) Oral once  ATENolol  Tablet 50 milliGRAM(s) Oral daily  chlorhexidine 4% Liquid 1 Application(s) Topical <User Schedule>  dextrose 5%. 1000 milliLiter(s) (50 mL/Hr) IV Continuous <Continuous>  dextrose 50% Injectable 12.5 Gram(s) IV Push once  dextrose 50% Injectable 25 Gram(s) IV Push once  dextrose 50% Injectable 25 Gram(s) IV Push once  diphenhydrAMINE 50 milliGRAM(s) Oral once  enoxaparin Injectable 40 milliGRAM(s) SubCutaneous daily  escitalopram 5 milliGRAM(s) Oral daily  furosemide   Injectable 20 milliGRAM(s) IV Push two times a day  hydrocortisone sodium succinate Injectable 50 milliGRAM(s) IV Push once  insulin glargine Injectable (LANTUS) 14 Unit(s) SubCutaneous <User Schedule>  insulin lispro (HumaLOG) corrective regimen sliding scale   SubCutaneous every 6 hours  insulin lispro Injectable (HumaLOG) 5 Unit(s) SubCutaneous every 6 hours  levETIRAcetam  Solution 500 milliGRAM(s) Oral two times a day  levothyroxine 88 MICROGram(s) Oral at bedtime  melatonin 3 milliGRAM(s) Oral at bedtime  meperidine     Injectable 25 milliGRAM(s) IV Push two times a day  pantoprazole   Suspension 40 milliGRAM(s) Oral daily  polatuzumab vedotin-piiq (POLIVY) IVPB (eMAR) 90 milliGRAM(s) IV Intermittent once  predniSONE   Tablet 40 milliGRAM(s) Oral daily  riTUXimab IVPB (eMAR) 555 milliGRAM(s) IV Intermittent once  senna Syrup 5 milliLiter(s) Oral at bedtime  simvastatin 20 milliGRAM(s) Oral at bedtime      LABS:  12-20    136  |  96<L>  |  50<H>  ----------------------------<  51<L>  4.3   |  28  |  0.99    Ca    9.0      20 Dec 2019 05:34  Phos  3.2     12-20  Mg     2.2     12-20      Creatinine Trend: 0.99 <--, 1.05 <--, 0.97 <--, 0.92 <--, 0.89 <--, 0.92 <--, 0.83 <--    Phosphorus Level, Serum: 3.2 mg/dL (12-20 @ 05:34)                              9.6    10.90 )-----------( 419      ( 19 Dec 2019 05:00 )             31.0     Urine Studies:  Urinalysis - [12-14-19 @ 11:10]      Color LIGHT YELLOW / Appearance CLEAR / SG 1.016 / pH 8.0      Gluc 30 / Ketone NEGATIVE  / Bili NEGATIVE / Urobili NORMAL       Blood NEGATIVE / Protein 20 / Leuk Est NEGATIVE / Nitrite NEGATIVE      RBC 0-2 / WBC 0-2 / Hyaline NEGATIVE / Gran  / Sq Epi OCC / Non Sq Epi  / Bacteria NEGATIVE      Iron 38, TIBC 234, %sat --      [09-12-19 @ 06:08]  Ferritin 214.6      [09-12-19 @ 06:08]  PTH 20.00 (Ca --)      [09-03-19 @ 05:45]   --  PTH 25.46 (Ca --)      [09-01-19 @ 06:00]   --  Vitamin D (25OH) 34.9      [09-03-19 @ 05:45]  HbA1c 6.4      [12-20-19 @ 05:34]  TSH 6.55      [12-04-19 @ 05:05]  Lipid: chol 124, , HDL 50, LDL 57      [12-12-19 @ 06:30]    HBsAb Reactive      [11-23-19 @ 05:25]  HBsAg Nonreactive      [11-23-19 @ 05:25]  HBcAb Reactive      [11-23-19 @ 05:25]  HCV 0.12, Nonreactive Hepatitis C AB  S/CO Ratio                        Interpretation  < 1.00                                   Non-Reactive  1.00 - 4.99                         Weakly-Reactive  >= 5.00                                Reactive  Non-Reactive: Aperson with a non-reactive HCV antibody  result is considered uninfected.  No further action is  needed unless recent infection is suspected.  In these  cases, consider repeat testing later to detect  seroconversion..  Weakly-Reactive: HCV antibody test is abnormal, HCV RNA  Qualitative test will follow.  Reactive: HCV antibody test is abnormal, HCV RNA  Qualitative test will follow.  Note: HCV antibody testing is performed on the Abbott   system.      [11-23-19 @ 05:25]  HIV Nonreactive The HIV Ag/Ab Combo test performed screens for HIV-1 p24  antigen, antibodies to HIV-1 (group M and group O), and  antibodies to HIV-2. All specimens repeatedly reactive  will reflex to an HIV 1/2 antibody confirmation and  differentiation test. This assay detects p24 antigen which  may be present prior to the development of HIV antibodies,  therefore a reactive result with a negative HIV 1/2 AB  Confirmation should be followed up with HIV-1 RNA, HIV-2  RNA and repeat testing in 4-8 weeks. A nonreactive result  does not preclude previous exposure to or infection with  HIV-1 or HIV-2. The [facility] prohibits disclosure of this  result to any unauthorized party.      [11-22-19 @ 10:18]      RADIOLOGY & ADDITIONAL STUDIES:

## 2019-12-20 NOTE — PROGRESS NOTE ADULT - SUBJECTIVE AND OBJECTIVE BOX
[facility] Division of Hospital Medicine  Alejandra Rodas MD  Pager [phone]    Patient is a 79y old  Male who presents with a chief complaint of AMS       SUBJECTIVE / OVERNIGHT EVENTS: without complaints; hypoglycemic this AM; protocol initiated; MS at baseline      MEDICATIONS  (STANDING):  ATENolol  Tablet 50 milliGRAM(s) Oral daily  chlorhexidine 4% Liquid 1 Application(s) Topical <User Schedule>  dextrose 5%. 1000 milliLiter(s) (50 mL/Hr) IV Continuous <Continuous>  dextrose 50% Injectable 12.5 Gram(s) IV Push once  dextrose 50% Injectable 25 Gram(s) IV Push once  dextrose 50% Injectable 25 Gram(s) IV Push once  enoxaparin Injectable 40 milliGRAM(s) SubCutaneous daily  escitalopram 5 milliGRAM(s) Oral daily  furosemide   Injectable 20 milliGRAM(s) IV Push two times a day  insulin glargine Injectable (LANTUS) 14 Unit(s) SubCutaneous <User Schedule>  insulin lispro (HumaLOG) corrective regimen sliding scale   SubCutaneous every 6 hours  insulin lispro Injectable (HumaLOG) 5 Unit(s) SubCutaneous every 6 hours  levETIRAcetam  Solution 500 milliGRAM(s) Oral two times a day  levothyroxine 88 MICROGram(s) Oral at bedtime  melatonin 3 milliGRAM(s) Oral at bedtime  pantoprazole   Suspension 40 milliGRAM(s) Oral daily  predniSONE   Tablet 40 milliGRAM(s) Oral daily  senna Syrup 5 milliLiter(s) Oral at bedtime  simvastatin 20 milliGRAM(s) Oral at bedtime    MEDICATIONS  (PRN):  acetaminophen    Suspension .. 650 milliGRAM(s) Oral every 6 hours PRN Severe Pain (7 - 10)  albuterol/ipratropium for Nebulization. 3 milliLiter(s) Nebulizer every 8 hours PRN Shortness of Breath  dextrose 40% Gel 15 Gram(s) Oral once PRN Blood Glucose LESS THAN 70 milliGRAM(s)/deciliter  glucagon  Injectable 1 milliGRAM(s) IntraMuscular once PRN Glucose LESS THAN 70 milligrams/deciliter  OLANZapine Injectable 1.25 milliGRAM(s) IntraMuscular every 8 hours PRN agitation  sodium chloride 0.9% lock flush 10 milliLiter(s) IV Push every 1 hour PRN Pre/post blood products, medications, blood draw, and to maintain line patency      CAPILLARY BLOOD GLUCOSE  POCT Blood Glucose.: 151 mg/dL (20 Dec 2019 06:12)  POCT Blood Glucose.: 188 mg/dL (20 Dec 2019 05:56)  POCT Blood Glucose.: 63 mg/dL (20 Dec 2019 05:29)  POCT Blood Glucose.: 64 mg/dL (20 Dec 2019 05:25)  POCT Blood Glucose.: 70 mg/dL (19 Dec 2019 23:06)  POCT Blood Glucose.: 124 mg/dL (19 Dec 2019 17:03)  POCT Blood Glucose.: 206 mg/dL (19 Dec 2019 10:55)        PHYSICAL EXAM:  Vital Signs Last 24 Hrs  T(F): 97.3 (20 Dec 2019 05:11), Max: 97.9 (19 Dec 2019 11:31)  HR: 81 (20 Dec 2019 05:11) (60 - 81)  BP: 111/71 (20 Dec 2019 05:11) (92/56 - 111/71)  RR: 17 (20 Dec 2019 05:11) (16 - 18)  SpO2: 95% (20 Dec 2019 05:11) (88% - 100%)    CONSTITUTIONAL: NAD  ENMT: Moist oral mucosa  RESPIRATORY: grossly b/l AE  CARDIOVASCULAR: Regular rate and rhythm; No lower extremity edema;  ABDOMEN: Nontender to palpation, normoactive bowel sounds, +PEG, site clean  MUSCULOSKELETAL: no clubbing or cyanosis of digits; no joint swelling or tenderness to palpation  PSYCH: affect appropriate  NEUROLOGY: no gross sensory deficits       LABS:                        9.6    10.90 )-----------( 419      ( 19 Dec 2019 05:00 )             31.0     12-20    136  |  96<L>  |  50<H>  ----------------------------<  51<L>  4.3   |  28  |  0.99    Ca    9.0      20 Dec 2019 05:34  Phos  3.2     12-20  Mg     2.2     12-20

## 2019-12-20 NOTE — PROGRESS NOTE ADULT - ATTENDING COMMENTS
Patient seen and examined on rounds. He received Benadryl as premedication, and he was sleepy at the time. He is receiving Rituxan today and polatuzumab as per the recommendations of Dr. Abreu. The orders were written by her. This was communicated to her daughter, Dr. Browne, by Dr. Abreu. Tumor lysis laboratories will be checked. I agree with the assessment and plan as noted in the assessment by Dr. Gomez.

## 2019-12-20 NOTE — PROGRESS NOTE ADULT - ASSESSMENT
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 Found to have new onset seizures and multifocal pneumonia.   Consulted for T2DM on glucerna feeds, hypothyroidism, adrenal insufficiency and hypercalcemia.     1) DM2    On prednisone 40mg daily.   Was transitioned from continuous feeds to bolus feeds yesterday evening. Hypoglycemia due to NPH doses received while still on continuous feeds but with lingering effect after continuous feeds stopped. On lower volume for first few boluses but now up to full volume boluses q6h.  Lantus was held this AM. Change to Lantus 10 units qhs, give first dose tonight.  Lower Humalog to 4/4/4/4 prebolus. Hold Humalog dose if bolus feeds are held.  -Continue Humalog low correction scale q6h     2) Hypercalcemia    Calcium corrected 10.3 upper normal range, recheck serum albumin for accurate corrected calcium.  elevated 1,25 vitamin D. 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   -currently calcium is stable, improved with steroids    3) Adrenal Insufficiency    last cortisol was 19.7 while off steroids. Currently HD stable, low suspicion for AI.   Now on prednisone at supratherapeutic dose.    4) Hypothyroidism    currently on enteral LT4 88 mcg.   Make sure given on empty stomach (at least 30 minutes apart from feeds).

## 2019-12-20 NOTE — CHART NOTE - NSCHARTNOTEFT_GEN_A_CORE
Mr Browne has B cell Lymphoma and has been on supplemental oxygen throughout his hospital stay.  Mr Browne is non ambulatory and saturates 88% on room air and will require oxygen at home Mr Browne has B cell Lymphoma and has been on supplemental oxygen throughout his hospital stay.  Mr Browne is non ambulatory and saturates 88% on room air and will require oxygen 2L at home

## 2019-12-20 NOTE — PROGRESS NOTE ADULT - ASSESSMENT
79 yo M hx DLBCL (dx 9/2019) s/p 2 cycles of R-CP (last ~4weeks ago) at [facility], DM2, recent admission at [facility] (10/29-11/5) for SDH s/p fall found to have b/l LE and LUE DVT (not on AC) p/w AMS and witnessed seizure.  Hospital course prolonged due to complications of hypoxic respiratory failure on high flow support, aspiration pna, pleural effusion and s/p PEG tube placement.      Patient is now weaned off of high flow oxygen support and on nasal cannula.  Clinically improving.      #DLBCL:   - originally, pt was admitted to [facility] in 8-9/2019, was found to be hypercalcemic and CT C/A/P (9/10/19) showed scattered b/l pulmonary nodules, mediastinal lymphadenopathy, retroperitoneal lymphadenopathy, and splenic lesions/enlargement concerning for lymphoma.  LN biopsy consistent with DLBCL, germinal cell type, neg for bcl-2, bcl-6, myc.  Per daughter, no outpatient PET scan and biopsy was done as he was started on treatment right away.  He sees Dr. Jovan Poole at [facility] and was started on Rituxan-Cytoxan-Dex (R-CD), last tx 10/2019, and has not been able to f/u due to recent hospitalizations.   - Last CT A/P non contrast 11/15 now shows treatment response with decrease in size of the spleen and resolution of retroperitoneal adenopathy.  MRI brain with contrast without lesions, SDH stable.  - Repeat CT C/A/P - stable disease  - now that patient is clinically improved, plan to treat with Polatuzumab +Rituxan today 12/20.  Plan discussed with daughter Dr. Browne.  Consent in the chart.  - monitor LDH and uric acid daily  - if stable over the weekend, daughter would like to take patient home with home care and we will set up follow up at [facility] with Dr. Rubi Abreu upon discharge.    #DVTs: b/l LE (calf) DVTs and LUE DVT  - repeat b/l LE US 11/15 showed unchanged subacute right soleal vein DVT  - b/l UE US 11/4 showed the left axillary and brachial veins DVT.  Superficial thrombophlebitis in both the right and left basilic and cephalic veins.  - ppx AC started on 11/6 as per neurosurgery    Will continue to follow.     Sarah Gomez  Hematology Fellow  [phone]

## 2019-12-20 NOTE — CHART NOTE - NSCHARTNOTEFT_GEN_A_CORE
medical necessity for oxygen  pt a/w acute hypoxic respiratory failure due to aspiration pneumonia which has since resolved  Pt has dysphagia and diffuse large B cell lymphoma which are chronic and stable  Pt was able to be weaned from hi flow oxygen to NC 2L  However on room air at rest pt unable to maintain oxygen saturation above 88%   Upon replacing NC 2L, oxygen saturation increased to 95%

## 2019-12-20 NOTE — PROGRESS NOTE ADULT - ASSESSMENT
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 prolonged FRANCES state (? 2/2 prolonged hypercalcemic state) that has now resolved vs. loss of body mass  - Serum Cr is stable    continue lasix    - Avoid nephrotoxins agent  - renal diet  - monitor bmp    Hyponatremia  - admitted with Na 122  - work up suggested SIADH  - Serum sodium improved, fluctuates between hypernatremia and hyponatremia  - Pt started on PEG tube feeds.   - now off Na tab and restarted on lasix  - Monitor serum bicarb while on diuretic   - Avoid excessive free water via peg.   - Monitor serum sodium and fluid status closely     -Na optimal  - monitor on lasix    HTN  - has Hx HTN but was on midodrine for orthostatic hypotension in the recent past   - Currently BP controlled   - monitor bp    Hypocalcemia  - Has Hx hypercalcemia of malignancy  - low alb corrected alexander is optimal  - monitor    Pl. effusion b/l/ Anasarca   mod effusion,  on ct chest  on lasix 20 bid iv.  respiratory status better  Monitor     Hyperkalemia  Resolved   low k diet  monitor

## 2019-12-20 NOTE — PROGRESS NOTE ADULT - SUBJECTIVE AND OBJECTIVE BOX
INTERVAL HPI/OVERNIGHT EVENTS:  Patient S&E at bedside. No o/n events, patient resting comfortably. Afebrile.  Lymphoma treatment today.     VITAL SIGNS:  T(F): 97.3 (12-20-19 @ 11:55)  HR: 78 (12-20-19 @ 16:01)  BP: 104/63 (12-20-19 @ 16:01)  RR: 16 (12-20-19 @ 16:01)  SpO2: 100% (12-20-19 @ 16:01)  Wt(kg): --    PHYSICAL EXAM:  Constitutional: NAD  Eyes: EOMI, sclera non-icteric  Neck: supple, no masses, no JVD  Respiratory: CTA b/l, good air entry b/l  Cardiovascular: RRR, no M/R/G  Gastrointestinal: soft, NTND, no masses palpable, + BS  Extremities: no c/c/e  Neurological: AAOx2    MEDICATIONS  (STANDING):  ATENolol  Tablet 50 milliGRAM(s) Oral daily  chlorhexidine 4% Liquid 1 Application(s) Topical <User Schedule>  dextrose 5%. 1000 milliLiter(s) (50 mL/Hr) IV Continuous <Continuous>  dextrose 50% Injectable 12.5 Gram(s) IV Push once  dextrose 50% Injectable 25 Gram(s) IV Push once  dextrose 50% Injectable 25 Gram(s) IV Push once  enoxaparin Injectable 40 milliGRAM(s) SubCutaneous daily  escitalopram 5 milliGRAM(s) Oral daily  furosemide   Injectable 20 milliGRAM(s) IV Push two times a day  insulin glargine Injectable (LANTUS) 10 Unit(s) SubCutaneous at bedtime  insulin lispro (HumaLOG) corrective regimen sliding scale   SubCutaneous every 6 hours  insulin lispro Injectable (HumaLOG) 4 Unit(s) SubCutaneous every 6 hours  levETIRAcetam  Solution 500 milliGRAM(s) Oral two times a day  levothyroxine 88 MICROGram(s) Oral at bedtime  melatonin 3 milliGRAM(s) Oral at bedtime  meperidine     Injectable 25 milliGRAM(s) IV Push two times a day  pantoprazole   Suspension 40 milliGRAM(s) Oral daily  predniSONE   Tablet 40 milliGRAM(s) Oral daily  riTUXimab IVPB (eMAR) 555 milliGRAM(s) IV Intermittent once  senna Syrup 5 milliLiter(s) Oral at bedtime  simvastatin 20 milliGRAM(s) Oral at bedtime    MEDICATIONS  (PRN):  acetaminophen    Suspension .. 650 milliGRAM(s) Oral every 6 hours PRN Severe Pain (7 - 10)  albuterol/ipratropium for Nebulization. 3 milliLiter(s) Nebulizer every 8 hours PRN Shortness of Breath  dextrose 40% Gel 15 Gram(s) Oral once PRN Blood Glucose LESS THAN 70 milliGRAM(s)/deciliter  glucagon  Injectable 1 milliGRAM(s) IntraMuscular once PRN Glucose LESS THAN 70 milligrams/deciliter  hydrocortisone sodium succinate Injectable 50 milliGRAM(s) IV Push two times a day PRN REACTION  OLANZapine Injectable 1.25 milliGRAM(s) IntraMuscular every 8 hours PRN agitation  sodium chloride 0.9% lock flush 10 milliLiter(s) IV Push every 1 hour PRN Pre/post blood products, medications, blood draw, and to maintain line patency      Allergies    No Known Allergies    Intolerances        LABS:                        9.6    11.53 )-----------( 408      ( 20 Dec 2019 15:00 )             31.5     12-20    136  |  96<L>  |  50<H>  ----------------------------<  51<L>  4.3   |  28  |  0.99    Ca    9.0      20 Dec 2019 05:34  Phos  3.2     12-20  Mg     2.2     12-20            RADIOLOGY & ADDITIONAL TESTS:  Studies reviewed.    ASSESSMENT & PLAN:

## 2019-12-20 NOTE — PROGRESS NOTE ADULT - SUBJECTIVE AND OBJECTIVE BOX
Chief Complaint: DM2    History: Patient was transitioned from continuous to bolus feeds last night.  He received 3 doses of NPH yesterday. Hypoglycemia was noted overnight.  Bolus feedings were started at lower volume at first but now started at full volume beginning with afternoon feeding today.  Lantus was held this AM due to concern of hypoglycemia and boluses at lower volume.    MEDICATIONS  (STANDING):  ATENolol  Tablet 50 milliGRAM(s) Oral daily  chlorhexidine 4% Liquid 1 Application(s) Topical <User Schedule>  dextrose 5%. 1000 milliLiter(s) (50 mL/Hr) IV Continuous <Continuous>  dextrose 50% Injectable 12.5 Gram(s) IV Push once  dextrose 50% Injectable 25 Gram(s) IV Push once  dextrose 50% Injectable 25 Gram(s) IV Push once  enoxaparin Injectable 40 milliGRAM(s) SubCutaneous daily  escitalopram 5 milliGRAM(s) Oral daily  furosemide   Injectable 20 milliGRAM(s) IV Push two times a day  insulin glargine Injectable (LANTUS) 14 Unit(s) SubCutaneous <User Schedule>  insulin lispro (HumaLOG) corrective regimen sliding scale   SubCutaneous every 6 hours  insulin lispro Injectable (HumaLOG) 5 Unit(s) SubCutaneous every 6 hours  levETIRAcetam  Solution 500 milliGRAM(s) Oral two times a day  levothyroxine 88 MICROGram(s) Oral at bedtime  melatonin 3 milliGRAM(s) Oral at bedtime  meperidine     Injectable 25 milliGRAM(s) IV Push two times a day  pantoprazole   Suspension 40 milliGRAM(s) Oral daily  predniSONE   Tablet 40 milliGRAM(s) Oral daily  riTUXimab IVPB (eMAR) 555 milliGRAM(s) IV Intermittent once  senna Syrup 5 milliLiter(s) Oral at bedtime  simvastatin 20 milliGRAM(s) Oral at bedtime    MEDICATIONS  (PRN):  acetaminophen    Suspension .. 650 milliGRAM(s) Oral every 6 hours PRN Severe Pain (7 - 10)  albuterol/ipratropium for Nebulization. 3 milliLiter(s) Nebulizer every 8 hours PRN Shortness of Breath  dextrose 40% Gel 15 Gram(s) Oral once PRN Blood Glucose LESS THAN 70 milliGRAM(s)/deciliter  glucagon  Injectable 1 milliGRAM(s) IntraMuscular once PRN Glucose LESS THAN 70 milligrams/deciliter  hydrocortisone sodium succinate Injectable 50 milliGRAM(s) IV Push two times a day PRN REACTION  OLANZapine Injectable 1.25 milliGRAM(s) IntraMuscular every 8 hours PRN agitation  sodium chloride 0.9% lock flush 10 milliLiter(s) IV Push every 1 hour PRN Pre/post blood products, medications, blood draw, and to maintain line patency      Allergies    No Known Allergies    Intolerances      Review of Systems:    UNABLE TO OBTAIN    PHYSICAL EXAM:  VITALS: T(C): 36.3 (12-20-19 @ 11:55)  T(F): 97.3 (12-20-19 @ 11:55), Max: 97.6 (12-19-19 @ 22:35)  HR: 78 (12-20-19 @ 16:01) (66 - 81)  BP: 104/63 (12-20-19 @ 16:01) (92/56 - 111/71)  RR:  (16 - 20)  SpO2:  (88% - 100%)  Wt(kg): --  GENERAL: NAD, thin male  EYES: No proptosis, no lid lag, anicteric  HEENT:  Atraumatic, Normocephalic, moist mucous membranes  RESPIRATORY: nonlabored respirations  GI: PEG in place      CAPILLARY BLOOD GLUCOSE      POCT Blood Glucose.: 157 mg/dL (20 Dec 2019 11:01)  POCT Blood Glucose.: 151 mg/dL (20 Dec 2019 06:12)  POCT Blood Glucose.: 188 mg/dL (20 Dec 2019 05:56)  POCT Blood Glucose.: 63 mg/dL (20 Dec 2019 05:29)  POCT Blood Glucose.: 64 mg/dL (20 Dec 2019 05:25)  POCT Blood Glucose.: 70 mg/dL (19 Dec 2019 23:06)  POCT Blood Glucose.: 124 mg/dL (19 Dec 2019 17:03)      12-20    136  |  96<L>  |  50<H>  ----------------------------<  51<L>  4.3   |  28  |  0.99    EGFR if : 83  EGFR if non : 72    Ca    9.0      12-20  Mg     2.2     12-20  Phos  3.2     12-20            Thyroid Function Tests:  12-04 @ 05:05 TSH 6.55 FreeT4 1.07 T3 -- Anti TPO -- Anti Thyroglobulin Ab -- TSI --      Hemoglobin A1C, Whole Blood: 6.4 % <H> [4.0 - 5.6] (12-20-19 @ 05:34)

## 2019-12-21 LAB
ANION GAP SERPL CALC-SCNC: 13 MMO/L — SIGNIFICANT CHANGE UP (ref 7–14)
BUN SERPL-MCNC: 54 MG/DL — HIGH (ref 7–23)
CALCIUM SERPL-MCNC: 8.5 MG/DL — SIGNIFICANT CHANGE UP (ref 8.4–10.5)
CHLORIDE SERPL-SCNC: 96 MMOL/L — LOW (ref 98–107)
CO2 SERPL-SCNC: 27 MMOL/L — SIGNIFICANT CHANGE UP (ref 22–31)
CREAT SERPL-MCNC: 1.05 MG/DL — SIGNIFICANT CHANGE UP (ref 0.5–1.3)
GLUCOSE BLDC GLUCOMTR-MCNC: 172 MG/DL — HIGH (ref 70–99)
GLUCOSE BLDC GLUCOMTR-MCNC: 177 MG/DL — HIGH (ref 70–99)
GLUCOSE BLDC GLUCOMTR-MCNC: 206 MG/DL — HIGH (ref 70–99)
GLUCOSE BLDC GLUCOMTR-MCNC: 262 MG/DL — HIGH (ref 70–99)
GLUCOSE BLDC GLUCOMTR-MCNC: 94 MG/DL — SIGNIFICANT CHANGE UP (ref 70–99)
GLUCOSE SERPL-MCNC: 156 MG/DL — HIGH (ref 70–99)
HCT VFR BLD CALC: 30.3 % — LOW (ref 39–50)
HGB BLD-MCNC: 9.1 G/DL — LOW (ref 13–17)
LDH SERPL L TO P-CCNC: 330 U/L — HIGH (ref 135–225)
MAGNESIUM SERPL-MCNC: 2.3 MG/DL — SIGNIFICANT CHANGE UP (ref 1.6–2.6)
MCHC RBC-ENTMCNC: 25.8 PG — LOW (ref 27–34)
MCHC RBC-ENTMCNC: 30 % — LOW (ref 32–36)
MCV RBC AUTO: 85.8 FL — SIGNIFICANT CHANGE UP (ref 80–100)
NRBC # FLD: 0.04 K/UL — SIGNIFICANT CHANGE UP (ref 0–0)
PHOSPHATE SERPL-MCNC: 2.7 MG/DL — SIGNIFICANT CHANGE UP (ref 2.5–4.5)
PLATELET # BLD AUTO: 395 K/UL — SIGNIFICANT CHANGE UP (ref 150–400)
PMV BLD: 11.1 FL — SIGNIFICANT CHANGE UP (ref 7–13)
POTASSIUM SERPL-MCNC: 3.7 MMOL/L — SIGNIFICANT CHANGE UP (ref 3.5–5.3)
POTASSIUM SERPL-SCNC: 3.7 MMOL/L — SIGNIFICANT CHANGE UP (ref 3.5–5.3)
RBC # BLD: 3.53 M/UL — LOW (ref 4.2–5.8)
RBC # FLD: 18.9 % — HIGH (ref 10.3–14.5)
SODIUM SERPL-SCNC: 136 MMOL/L — SIGNIFICANT CHANGE UP (ref 135–145)
URATE SERPL-MCNC: 5.4 MG/DL — SIGNIFICANT CHANGE UP (ref 3.4–8.8)
WBC # BLD: 12.59 K/UL — HIGH (ref 3.8–10.5)
WBC # FLD AUTO: 12.59 K/UL — HIGH (ref 3.8–10.5)

## 2019-12-21 PROCEDURE — 99233 SBSQ HOSP IP/OBS HIGH 50: CPT

## 2019-12-21 RX ADMIN — Medication 4: at 00:00

## 2019-12-21 RX ADMIN — SIMVASTATIN 20 MILLIGRAM(S): 20 TABLET, FILM COATED ORAL at 22:35

## 2019-12-21 RX ADMIN — Medication 20 MILLIGRAM(S): at 08:53

## 2019-12-21 RX ADMIN — Medication 88 MICROGRAM(S): at 22:35

## 2019-12-21 RX ADMIN — Medication 20 MILLIGRAM(S): at 17:32

## 2019-12-21 RX ADMIN — ENOXAPARIN SODIUM 40 MILLIGRAM(S): 100 INJECTION SUBCUTANEOUS at 12:47

## 2019-12-21 RX ADMIN — LEVETIRACETAM 500 MILLIGRAM(S): 250 TABLET, FILM COATED ORAL at 05:39

## 2019-12-21 RX ADMIN — Medication 1: at 05:37

## 2019-12-21 RX ADMIN — INSULIN GLARGINE 10 UNIT(S): 100 INJECTION, SOLUTION SUBCUTANEOUS at 22:36

## 2019-12-21 RX ADMIN — Medication 2: at 12:47

## 2019-12-21 RX ADMIN — ESCITALOPRAM OXALATE 5 MILLIGRAM(S): 10 TABLET, FILM COATED ORAL at 12:48

## 2019-12-21 RX ADMIN — Medication 4 UNIT(S): at 12:48

## 2019-12-21 RX ADMIN — Medication 4 UNIT(S): at 00:01

## 2019-12-21 RX ADMIN — SENNA PLUS 5 MILLILITER(S): 8.6 TABLET ORAL at 22:36

## 2019-12-21 RX ADMIN — LEVETIRACETAM 500 MILLIGRAM(S): 250 TABLET, FILM COATED ORAL at 17:32

## 2019-12-21 RX ADMIN — PANTOPRAZOLE SODIUM 40 MILLIGRAM(S): 20 TABLET, DELAYED RELEASE ORAL at 05:40

## 2019-12-21 RX ADMIN — CHLORHEXIDINE GLUCONATE 1 APPLICATION(S): 213 SOLUTION TOPICAL at 05:36

## 2019-12-21 RX ADMIN — Medication 40 MILLIGRAM(S): at 05:36

## 2019-12-21 RX ADMIN — ATENOLOL 50 MILLIGRAM(S): 25 TABLET ORAL at 05:35

## 2019-12-21 RX ADMIN — Medication 4 UNIT(S): at 05:37

## 2019-12-21 RX ADMIN — Medication 3 MILLIGRAM(S): at 22:35

## 2019-12-21 NOTE — PROGRESS NOTE ADULT - SUBJECTIVE AND OBJECTIVE BOX
CHIEF COMPLAINT: Patient is a 79y old  male who presents with a chief complaint of AMS (21 Dec 2019 13:06)      SUBJECTIVE / OVERNIGHT EVENTS:    Denies any complaints. Denies SOB. Denies abdominal pain.    MEDICATIONS  (STANDING):  ATENolol  Tablet 50 milliGRAM(s) Oral daily  chlorhexidine 4% Liquid 1 Application(s) Topical <User Schedule>  dextrose 5%. 1000 milliLiter(s) (50 mL/Hr) IV Continuous <Continuous>  dextrose 50% Injectable 12.5 Gram(s) IV Push once  dextrose 50% Injectable 25 Gram(s) IV Push once  dextrose 50% Injectable 25 Gram(s) IV Push once  enoxaparin Injectable 40 milliGRAM(s) SubCutaneous daily  escitalopram 5 milliGRAM(s) Oral daily  furosemide   Injectable 20 milliGRAM(s) IV Push two times a day  insulin glargine Injectable (LANTUS) 10 Unit(s) SubCutaneous at bedtime  insulin lispro (HumaLOG) corrective regimen sliding scale   SubCutaneous every 6 hours  insulin lispro Injectable (HumaLOG) 4 Unit(s) SubCutaneous every 6 hours  levETIRAcetam  Solution 500 milliGRAM(s) Oral two times a day  levothyroxine 88 MICROGram(s) Oral at bedtime  melatonin 3 milliGRAM(s) Oral at bedtime  pantoprazole   Suspension 40 milliGRAM(s) Oral daily  predniSONE   Tablet 40 milliGRAM(s) Oral daily  senna Syrup 5 milliLiter(s) Oral at bedtime  simvastatin 20 milliGRAM(s) Oral at bedtime    MEDICATIONS  (PRN):  acetaminophen    Suspension .. 650 milliGRAM(s) Oral every 6 hours PRN Severe Pain (7 - 10)  albuterol/ipratropium for Nebulization. 3 milliLiter(s) Nebulizer every 8 hours PRN Shortness of Breath  dextrose 40% Gel 15 Gram(s) Oral once PRN Blood Glucose LESS THAN 70 milliGRAM(s)/deciliter  glucagon  Injectable 1 milliGRAM(s) IntraMuscular once PRN Glucose LESS THAN 70 milligrams/deciliter  hydrocortisone sodium succinate Injectable 50 milliGRAM(s) IV Push two times a day PRN REACTION  OLANZapine Injectable 1.25 milliGRAM(s) IntraMuscular every 8 hours PRN agitation  sodium chloride 0.9% lock flush 10 milliLiter(s) IV Push every 1 hour PRN Pre/post blood products, medications, blood draw, and to maintain line patency      VITALS:  T(F): 98.6 (12-21-19 @ 11:37), Max: 98.6 (12-21-19 @ 11:37)  HR: 67 (12-21-19 @ 11:37) (67 - 84)  BP: 113/70 (12-21-19 @ 11:37) (91/59 - 125/75)  RR: 17 (12-21-19 @ 11:37) (16 - 20)  SpO2: 98% (12-21-19 @ 11:37)      CAPILLARY BLOOD GLUCOSE    Output     I&O's Summary  T(F): 98.6 (12-21-19 @ 11:37), Max: 98.6 (12-21-19 @ 11:37)  HR: 67 (12-21-19 @ 11:37) (67 - 84)  BP: 113/70 (12-21-19 @ 11:37) (91/59 - 125/75)  RR: 17 (12-21-19 @ 11:37) (16 - 20)  SpO2: 98% (12-21-19 @ 11:37)    PHYSICAL EXAM:  GENERAL: NAD, well-developed  HEAD:  Atraumatic, Normocephalic  EYES: EOMI  NECK: Supple, No JVD  CHEST/LUNG: nonlabored breathing  HEART: nl S1/S2  ABDOMEN: nondistended, soft  EXTREMITIES:  no LE edema  PSYCH: alert, answering questions appropriately  NEUROLOGY: non-focal  SKIN: No rashes noted    LABS:              9.1                  136  | 27   | 54           12.59 >-----------< 395     ------------------------< 156                   30.3                 3.7  | 96   | 1.05                                         Ca 8.5   Mg 2.3   Ph 2.7                        MICROBIOLOGY:        RADIOLOGY & ADDITIONAL TESTS:    Imaging Personally Reviewed:  < from: US Kidney and Bladder (12.17.19 @ 10:30) >      < end of copied text >          [x] Care Discussed with Consultants/Other Providers:      Cecil Dyson M.D.  Hospitalist  Pager [phone]

## 2019-12-21 NOTE — PROGRESS NOTE ADULT - PROBLEM SELECTOR PLAN 3
multifactorial; PNA, sz, brain mets  cont zyprexa 1.25mg q8h prn, as not needed any  has been calm and cooperative  seems to have resolved and back to baseline

## 2019-12-21 NOTE — PROGRESS NOTE ADULT - SUBJECTIVE AND OBJECTIVE BOX
[facility] [place] [facility]   MD UYEN HERNANDEZ, DO MARYANN REYNOSO, LEATHA NICOLE    TEL:  OFFICE: [phone]  DR CASAREZ CELL: [phone]  DR. HERNANDEZ CELL: [phone]  DR. REYNOSO CELL: [phone]  CHUCKIE TERRAZAS CELL: [phone]        Patient is a 79y old  Male who presents with a chief complaint of AMS (20 Dec 2019 17:16)      Patient seen and examined at bedside. No chest pain/sob    VITALS:  T(F): 98.6 (12-21-19 @ 11:37), Max: 98.6 (12-21-19 @ 11:37)  HR: 67 (12-21-19 @ 11:37)  BP: 113/70 (12-21-19 @ 11:37)  RR: 17 (12-21-19 @ 11:37)  SpO2: 98% (12-21-19 @ 11:37)  Wt(kg): --    12-20 @ 07:01  -  12-21 @ 07:00  --------------------------------------------------------  IN: 360 mL / OUT: 0 mL / NET: 360 mL          PHYSICAL EXAM:  Constitutional: NAD  Neck: No JVD  Respiratory: slight rhonchi  Cardiovascular: S1, S2, RRR  Gastrointestinal: BS+, soft, NT/ND  Extremities: No peripheral edema    Hospital Medications:   MEDICATIONS  (STANDING):  ATENolol  Tablet 50 milliGRAM(s) Oral daily  chlorhexidine 4% Liquid 1 Application(s) Topical <User Schedule>  dextrose 5%. 1000 milliLiter(s) (50 mL/Hr) IV Continuous <Continuous>  dextrose 50% Injectable 12.5 Gram(s) IV Push once  dextrose 50% Injectable 25 Gram(s) IV Push once  dextrose 50% Injectable 25 Gram(s) IV Push once  enoxaparin Injectable 40 milliGRAM(s) SubCutaneous daily  escitalopram 5 milliGRAM(s) Oral daily  furosemide   Injectable 20 milliGRAM(s) IV Push two times a day  insulin glargine Injectable (LANTUS) 10 Unit(s) SubCutaneous at bedtime  insulin lispro (HumaLOG) corrective regimen sliding scale   SubCutaneous every 6 hours  insulin lispro Injectable (HumaLOG) 4 Unit(s) SubCutaneous every 6 hours  levETIRAcetam  Solution 500 milliGRAM(s) Oral two times a day  levothyroxine 88 MICROGram(s) Oral at bedtime  melatonin 3 milliGRAM(s) Oral at bedtime  pantoprazole   Suspension 40 milliGRAM(s) Oral daily  predniSONE   Tablet 40 milliGRAM(s) Oral daily  senna Syrup 5 milliLiter(s) Oral at bedtime  simvastatin 20 milliGRAM(s) Oral at bedtime      LABS:  12-21    136  |  96<L>  |  54<H>  ----------------------------<  156<H>  3.7   |  27  |  1.05    Ca    8.5      21 Dec 2019 05:45  Phos  2.7     12-21  Mg     2.3     12-21      Creatinine Trend: 1.05 <--, 0.99 <--, 1.05 <--, 0.97 <--, 0.92 <--, 0.89 <--, 0.92 <--    Phosphorus Level, Serum: 2.7 mg/dL (12-21 @ 05:45)                              9.1    12.59 )-----------( 395      ( 21 Dec 2019 05:45 )             30.3     Urine Studies:  Urinalysis - [12-14-19 @ 11:10]      Color LIGHT YELLOW / Appearance CLEAR / SG 1.016 / pH 8.0      Gluc 30 / Ketone NEGATIVE  / Bili NEGATIVE / Urobili NORMAL       Blood NEGATIVE / Protein 20 / Leuk Est NEGATIVE / Nitrite NEGATIVE      RBC 0-2 / WBC 0-2 / Hyaline NEGATIVE / Gran  / Sq Epi OCC / Non Sq Epi  / Bacteria NEGATIVE      Iron 38, TIBC 234, %sat --      [09-12-19 @ 06:08]  Ferritin 214.6      [09-12-19 @ 06:08]  PTH 20.00 (Ca --)      [09-03-19 @ 05:45]   --  PTH 25.46 (Ca --)      [09-01-19 @ 06:00]   --  Vitamin D (25OH) 34.9      [09-03-19 @ 05:45]  HbA1c 6.4      [12-20-19 @ 05:34]  TSH 6.55      [12-04-19 @ 05:05]  Lipid: chol 124, , HDL 50, LDL 57      [12-12-19 @ 06:30]    HBsAb Reactive      [11-23-19 @ 05:25]  HBsAg Nonreactive      [11-23-19 @ 05:25]  HBcAb Reactive      [11-23-19 @ 05:25]  HCV 0.12, Nonreactive Hepatitis C AB  S/CO Ratio                        Interpretation  < 1.00                                   Non-Reactive  1.00 - 4.99                         Weakly-Reactive  >= 5.00                                Reactive  Non-Reactive: Aperson with a non-reactive HCV antibody  result is considered uninfected.  No further action is  needed unless recent infection is suspected.  In these  cases, consider repeat testing later to detect  seroconversion..  Weakly-Reactive: HCV antibody test is abnormal, HCV RNA  Qualitative test will follow.  Reactive: HCV antibody test is abnormal, HCV RNA  Qualitative test will follow.  Note: HCV antibody testing is performed on the minicabit system.      [11-23-19 @ 05:25]  HIV Nonreactive The HIV Ag/Ab Combo test performed screens for HIV-1 p24  antigen, antibodies to HIV-1 (group M and group O), and  antibodies to HIV-2. All specimens repeatedly reactive  will reflex to an HIV 1/2 antibody confirmation and  differentiation test. This assay detects p24 antigen which  may be present prior to the development of HIV antibodies,  therefore a reactive result with a negative HIV 1/2 AB  Confirmation should be followed up with HIV-1 RNA, HIV-2  RNA and repeat testing in 4-8 weeks. A nonreactive result  does not preclude previous exposure to or infection with  HIV-1 or HIV-2. [facility] prohibits disclosure of this  result to any unauthorized party.      [11-22-19 @ 10:18]      RADIOLOGY & ADDITIONAL STUDIES:

## 2019-12-22 LAB
ANION GAP SERPL CALC-SCNC: 13 MMO/L — SIGNIFICANT CHANGE UP (ref 7–14)
BUN SERPL-MCNC: 50 MG/DL — HIGH (ref 7–23)
CALCIUM SERPL-MCNC: 8.4 MG/DL — SIGNIFICANT CHANGE UP (ref 8.4–10.5)
CHLORIDE SERPL-SCNC: 97 MMOL/L — LOW (ref 98–107)
CO2 SERPL-SCNC: 28 MMOL/L — SIGNIFICANT CHANGE UP (ref 22–31)
CREAT SERPL-MCNC: 1 MG/DL — SIGNIFICANT CHANGE UP (ref 0.5–1.3)
GLUCOSE BLDC GLUCOMTR-MCNC: 106 MG/DL — HIGH (ref 70–99)
GLUCOSE BLDC GLUCOMTR-MCNC: 176 MG/DL — HIGH (ref 70–99)
GLUCOSE BLDC GLUCOMTR-MCNC: 186 MG/DL — HIGH (ref 70–99)
GLUCOSE BLDC GLUCOMTR-MCNC: 238 MG/DL — HIGH (ref 70–99)
GLUCOSE BLDC GLUCOMTR-MCNC: 262 MG/DL — HIGH (ref 70–99)
GLUCOSE BLDC GLUCOMTR-MCNC: 398 MG/DL — HIGH (ref 70–99)
GLUCOSE SERPL-MCNC: 185 MG/DL — HIGH (ref 70–99)
HCT VFR BLD CALC: 35.5 % — LOW (ref 39–50)
HGB BLD-MCNC: 10.5 G/DL — LOW (ref 13–17)
LDH SERPL L TO P-CCNC: 346 U/L — HIGH (ref 135–225)
MAGNESIUM SERPL-MCNC: 2.1 MG/DL — SIGNIFICANT CHANGE UP (ref 1.6–2.6)
MCHC RBC-ENTMCNC: 26.2 PG — LOW (ref 27–34)
MCHC RBC-ENTMCNC: 29.6 % — LOW (ref 32–36)
MCV RBC AUTO: 88.5 FL — SIGNIFICANT CHANGE UP (ref 80–100)
NRBC # FLD: 0.04 K/UL — SIGNIFICANT CHANGE UP (ref 0–0)
PHOSPHATE SERPL-MCNC: 1.8 MG/DL — LOW (ref 2.5–4.5)
PLATELET # BLD AUTO: 327 K/UL — SIGNIFICANT CHANGE UP (ref 150–400)
PMV BLD: 11.1 FL — SIGNIFICANT CHANGE UP (ref 7–13)
POTASSIUM SERPL-MCNC: 4 MMOL/L — SIGNIFICANT CHANGE UP (ref 3.5–5.3)
POTASSIUM SERPL-SCNC: 4 MMOL/L — SIGNIFICANT CHANGE UP (ref 3.5–5.3)
RBC # BLD: 4.01 M/UL — LOW (ref 4.2–5.8)
RBC # FLD: 18.8 % — HIGH (ref 10.3–14.5)
SODIUM SERPL-SCNC: 138 MMOL/L — SIGNIFICANT CHANGE UP (ref 135–145)
URATE SERPL-MCNC: 5.6 MG/DL — SIGNIFICANT CHANGE UP (ref 3.4–8.8)
WBC # BLD: 9.23 K/UL — SIGNIFICANT CHANGE UP (ref 3.8–10.5)
WBC # FLD AUTO: 9.23 K/UL — SIGNIFICANT CHANGE UP (ref 3.8–10.5)

## 2019-12-22 PROCEDURE — 99232 SBSQ HOSP IP/OBS MODERATE 35: CPT

## 2019-12-22 RX ORDER — INSULIN LISPRO 100/ML
VIAL (ML) SUBCUTANEOUS EVERY 6 HOURS
Refills: 0 | Status: DISCONTINUED | OUTPATIENT
Start: 2019-12-22 | End: 2019-12-24

## 2019-12-22 RX ADMIN — Medication 5: at 06:32

## 2019-12-22 RX ADMIN — Medication 4 UNIT(S): at 13:14

## 2019-12-22 RX ADMIN — SIMVASTATIN 20 MILLIGRAM(S): 20 TABLET, FILM COATED ORAL at 21:46

## 2019-12-22 RX ADMIN — Medication 4 UNIT(S): at 18:06

## 2019-12-22 RX ADMIN — Medication 40 MILLIGRAM(S): at 06:20

## 2019-12-22 RX ADMIN — Medication 4 UNIT(S): at 06:32

## 2019-12-22 RX ADMIN — Medication 3 MILLIGRAM(S): at 21:46

## 2019-12-22 RX ADMIN — ESCITALOPRAM OXALATE 5 MILLIGRAM(S): 10 TABLET, FILM COATED ORAL at 13:14

## 2019-12-22 RX ADMIN — ATENOLOL 50 MILLIGRAM(S): 25 TABLET ORAL at 06:20

## 2019-12-22 RX ADMIN — Medication 4 UNIT(S): at 01:19

## 2019-12-22 RX ADMIN — CHLORHEXIDINE GLUCONATE 1 APPLICATION(S): 213 SOLUTION TOPICAL at 06:21

## 2019-12-22 RX ADMIN — INSULIN GLARGINE 10 UNIT(S): 100 INJECTION, SOLUTION SUBCUTANEOUS at 21:46

## 2019-12-22 RX ADMIN — Medication 88 MICROGRAM(S): at 21:46

## 2019-12-22 RX ADMIN — Medication 20 MILLIGRAM(S): at 18:05

## 2019-12-22 RX ADMIN — LEVETIRACETAM 500 MILLIGRAM(S): 250 TABLET, FILM COATED ORAL at 06:19

## 2019-12-22 RX ADMIN — Medication 1: at 01:18

## 2019-12-22 RX ADMIN — ENOXAPARIN SODIUM 40 MILLIGRAM(S): 100 INJECTION SUBCUTANEOUS at 13:14

## 2019-12-22 RX ADMIN — Medication 63.75 MILLIMOLE(S): at 07:32

## 2019-12-22 RX ADMIN — PANTOPRAZOLE SODIUM 40 MILLIGRAM(S): 20 TABLET, DELAYED RELEASE ORAL at 06:19

## 2019-12-22 RX ADMIN — Medication 20 MILLIGRAM(S): at 06:20

## 2019-12-22 RX ADMIN — LEVETIRACETAM 500 MILLIGRAM(S): 250 TABLET, FILM COATED ORAL at 18:06

## 2019-12-22 RX ADMIN — Medication 6: at 18:05

## 2019-12-22 RX ADMIN — Medication 2: at 13:14

## 2019-12-22 NOTE — PROGRESS NOTE ADULT - ASSESSMENT
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 prolonged FRANCES state (? 2/2 prolonged hypercalcemic state) that has now resolved vs. loss of body mass  - Serum Cr is better than baseline    continue lasix    - Avoid nephrotoxins agent  - renal diet  - monitor bmp    Hyponatremia  - admitted with Na 122  - work up suggested SIADH  - Serum sodium improved  - Pt is on PEG tube feeds.   - now off Na tab and restarted on lasix  - Monitor serum bicarb while on diuretic   - Avoid excessive free water via peg.   - Monitor serum sodium and fluid status closely     -Na optimal  - monitor on lasix    HTN  - has Hx HTN but was on midodrine for orthostatic hypotension in the recent past   - Currently BP controlled   - monitor bp    Hypocalcemia  - Has Hx hypercalcemia of malignancy  - low alb corrected alexander is optimal  - monitor    Pl. effusion b/l/ Anasarca   mod effusion,  on ct chest  on lasix 20 bid iv.  respiratory status better  Monitor     Hyperkalemia  Resolved   low k diet  monitor    Hypophosphatemia  Supplemented  Monitor PO4 level 80 year old man with a PMH of DLBCL, CAD, DM, HTN, BL LE DVT, LUE DVT not on AC, CKD, orthostatic hypotension, and recent admission in November 2019 for SDH who is now presenting with acute encephalopathy    CKD stage III  - baseline Cr stable ~0.9-1.1  - Had Cr ~2 for the past prior year; ? prolonged FRANCES state (? 2/2 prolonged hypercalcemic state) that has now resolved vs. loss of body mass  - Serum Cr is better than baseline    continue lasix    - Avoid nephrotoxins agent  - renal diet  - monitor bmp    Hyponatremia  - admitted with Na 122  - work up suggested SIADH  - Serum sodium improved  - Pt is on PEG tube feeds.   - now off Na tab and restarted on lasix  - Monitor serum bicarb while on diuretic   - Avoid excessive free water via peg.   - Monitor serum sodium and fluid status closely     -Na optimal at present  - monitor on lasix    HTN  - has Hx HTN but was on midodrine for orthostatic hypotension in the recent past   - Currently BP controlled   - monitor bp    Hypocalcemia  - Has Hx hypercalcemia of malignancy  - low alb corrected alexander is optimal  - monitor    Pl. effusion b/l/ Anasarca   mod effusion,  on ct chest  on lasix 20 bid iv.  respiratory status better  Monitor     Hyperkalemia  Resolved   low k diet  monitor    Hypophosphatemia  Supplemented  Monitor PO4 level

## 2019-12-22 NOTE — CHART NOTE - NSCHARTNOTEFT_GEN_A_CORE
Recent glucose pattern with hyperglycemia   Will increase correctional scale to moderate for today   Endocrine team to follow tomorrow for standing insulin adjustments   Endocrine can be reached at     CAPILLARY BLOOD GLUCOSE      POCT Blood Glucose.: 238 mg/dL (22 Dec 2019 12:43)  POCT Blood Glucose.: 398 mg/dL (22 Dec 2019 06:26)  POCT Blood Glucose.: 176 mg/dL (22 Dec 2019 01:14)  POCT Blood Glucose.: 262 mg/dL (21 Dec 2019 21:43)  POCT Blood Glucose.: 94 mg/dL (21 Dec 2019 17:38)

## 2019-12-22 NOTE — PROGRESS NOTE ADULT - SUBJECTIVE AND OBJECTIVE BOX
Patient seen and examined at bedside. No chest pain/sob    VITALS:  T(F): 97.4 (12-22-19 @ 06:17), Max: 98.6 (12-21-19 @ 11:37)  HR: 80 (12-22-19 @ 06:17)  BP: 103/61 (12-22-19 @ 06:17)  RR: 18 (12-22-19 @ 06:17)  SpO2: 100% (12-22-19 @ 06:17)  Wt(kg): --    12-21 @ 07:01  -  12-22 @ 07:00  --------------------------------------------------------  IN: 1220 mL / OUT: 0 mL / NET: 1220 mL          PHYSICAL EXAM:  Constitutional: NAD  Neck: No JVD  Respiratory: CTAB, no wheezes, rales or rhonchi  Cardiovascular: S1, S2, RRR  Gastrointestinal: BS+, soft, NT/ND  Extremities: No peripheral edema    Hospital Medications:   MEDICATIONS  (STANDING):  ATENolol  Tablet 50 milliGRAM(s) Oral daily  chlorhexidine 4% Liquid 1 Application(s) Topical <User Schedule>  dextrose 5%. 1000 milliLiter(s) (50 mL/Hr) IV Continuous <Continuous>  dextrose 50% Injectable 12.5 Gram(s) IV Push once  dextrose 50% Injectable 25 Gram(s) IV Push once  dextrose 50% Injectable 25 Gram(s) IV Push once  enoxaparin Injectable 40 milliGRAM(s) SubCutaneous daily  escitalopram 5 milliGRAM(s) Oral daily  furosemide   Injectable 20 milliGRAM(s) IV Push two times a day  insulin glargine Injectable (LANTUS) 10 Unit(s) SubCutaneous at bedtime  insulin lispro (HumaLOG) corrective regimen sliding scale   SubCutaneous every 6 hours  insulin lispro Injectable (HumaLOG) 4 Unit(s) SubCutaneous every 6 hours  levETIRAcetam  Solution 500 milliGRAM(s) Oral two times a day  levothyroxine 88 MICROGram(s) Oral at bedtime  melatonin 3 milliGRAM(s) Oral at bedtime  pantoprazole   Suspension 40 milliGRAM(s) Oral daily  predniSONE   Tablet 40 milliGRAM(s) Oral daily  senna Syrup 5 milliLiter(s) Oral at bedtime  simvastatin 20 milliGRAM(s) Oral at bedtime      LABS:  12-22    138  |  97<L>  |  50<H>  ----------------------------<  185<H>  4.0   |  28  |  1.00    Ca    8.4      22 Dec 2019 03:08  Phos  1.8     12-22  Mg     2.1     12-22      Creatinine Trend: 1.00 <--, 1.05 <--, 0.99 <--, 1.05 <--, 0.97 <--, 0.92 <--, 0.89 <--  Phosphorus Level, Serum: 1.8 mg/dL (12-22 @ 03:08)                              10.5   9.23  )-----------( 327      ( 22 Dec 2019 03:08 )             35.5     Urine Studies:      Urinalysis - [12-14-19 @ 11:10]      Color LIGHT YELLOW / Appearance CLEAR / SG 1.016 / pH 8.0      Gluc 30 / Ketone NEGATIVE  / Bili NEGATIVE / Urobili NORMAL       Blood NEGATIVE / Protein 20 / Leuk Est NEGATIVE / Nitrite NEGATIVE      RBC 0-2 / WBC 0-2 / Hyaline NEGATIVE / Gran  / Sq Epi OCC / Non Sq Epi  / Bacteria NEGATIVE      Iron 38, TIBC 234, %sat --      [09-12-19 @ 06:08]  Ferritin 214.6      [09-12-19 @ 06:08]  PTH 20.00 (Ca --)      [09-03-19 @ 05:45]   --  PTH 25.46 (Ca --)      [09-01-19 @ 06:00]   --  Vitamin D (25OH) 34.9      [09-03-19 @ 05:45]  HbA1c 6.4      [12-20-19 @ 05:34]  TSH 6.55      [12-04-19 @ 05:05]  Lipid: chol 124, , HDL 50, LDL 57      [12-12-19 @ 06:30]    HBsAb Reactive      [11-23-19 @ 05:25]  HBsAg Nonreactive      [11-23-19 @ 05:25]  HBcAb Reactive      [11-23-19 @ 05:25]  HCV 0.12, Nonreactive Hepatitis C AB  S/CO Ratio                        Interpretation  < 1.00                                   Non-Reactive  1.00 - 4.99                         Weakly-Reactive  >= 5.00                                Reactive  Non-Reactive: Aperson with a non-reactive HCV antibody  result is considered uninfected.  No further action is  needed unless recent infection is suspected.  In these  cases, consider repeat testing later to detect  seroconversion..  Weakly-Reactive: HCV antibody test is abnormal, HCV RNA  Qualitative test will follow.  Reactive: HCV antibody test is abnormal, HCV RNA  Qualitative test will follow.  Note: HCV antibody testing is performed on the [employer]   system.      [11-23-19 @ 05:25]      RADIOLOGY & ADDITIONAL STUDIES:

## 2019-12-22 NOTE — PROGRESS NOTE ADULT - SUBJECTIVE AND OBJECTIVE BOX
CHIEF COMPLAINT: Patient is a 79y old  male who presents with a chief complaint of AMS (22 Dec 2019 11:09)      SUBJECTIVE / OVERNIGHT EVENTS:    No acute events overnight. No complaints.    MEDICATIONS  (STANDING):  ATENolol  Tablet 50 milliGRAM(s) Oral daily  chlorhexidine 4% Liquid 1 Application(s) Topical <User Schedule>  dextrose 5%. 1000 milliLiter(s) (50 mL/Hr) IV Continuous <Continuous>  dextrose 50% Injectable 12.5 Gram(s) IV Push once  dextrose 50% Injectable 25 Gram(s) IV Push once  dextrose 50% Injectable 25 Gram(s) IV Push once  enoxaparin Injectable 40 milliGRAM(s) SubCutaneous daily  escitalopram 5 milliGRAM(s) Oral daily  furosemide   Injectable 20 milliGRAM(s) IV Push two times a day  insulin glargine Injectable (LANTUS) 10 Unit(s) SubCutaneous at bedtime  insulin lispro (HumaLOG) corrective regimen sliding scale   SubCutaneous every 6 hours  insulin lispro Injectable (HumaLOG) 4 Unit(s) SubCutaneous every 6 hours  levETIRAcetam  Solution 500 milliGRAM(s) Oral two times a day  levothyroxine 88 MICROGram(s) Oral at bedtime  melatonin 3 milliGRAM(s) Oral at bedtime  pantoprazole   Suspension 40 milliGRAM(s) Oral daily  predniSONE   Tablet 40 milliGRAM(s) Oral daily  senna Syrup 5 milliLiter(s) Oral at bedtime  simvastatin 20 milliGRAM(s) Oral at bedtime    MEDICATIONS  (PRN):  acetaminophen    Suspension .. 650 milliGRAM(s) Oral every 6 hours PRN Severe Pain (7 - 10)  albuterol/ipratropium for Nebulization. 3 milliLiter(s) Nebulizer every 8 hours PRN Shortness of Breath  dextrose 40% Gel 15 Gram(s) Oral once PRN Blood Glucose LESS THAN 70 milliGRAM(s)/deciliter  glucagon  Injectable 1 milliGRAM(s) IntraMuscular once PRN Glucose LESS THAN 70 milligrams/deciliter  hydrocortisone sodium succinate Injectable 50 milliGRAM(s) IV Push two times a day PRN REACTION  OLANZapine Injectable 1.25 milliGRAM(s) IntraMuscular every 8 hours PRN agitation  sodium chloride 0.9% lock flush 10 milliLiter(s) IV Push every 1 hour PRN Pre/post blood products, medications, blood draw, and to maintain line patency      VITALS:  T(F): 97.4 (12-22-19 @ 06:17), Max: 98.4 (12-21-19 @ 22:24)  HR: 69 (12-22-19 @ 13:07) (63 - 80)  BP: 109/69 (12-22-19 @ 13:07) (102/67 - 152/75)  RR: 17 (12-22-19 @ 13:07) (17 - 18)  SpO2: 100% (12-22-19 @ 13:07)      CAPILLARY BLOOD GLUCOSE    Output     I&O's Summary  T(F): 97.4 (12-22-19 @ 06:17), Max: 98.4 (12-21-19 @ 22:24)  HR: 69 (12-22-19 @ 13:07) (63 - 80)  BP: 109/69 (12-22-19 @ 13:07) (102/67 - 152/75)  RR: 17 (12-22-19 @ 13:07) (17 - 18)  SpO2: 100% (12-22-19 @ 13:07)    PHYSICAL EXAM:  GENERAL: NAD, well-developed  HEAD:  Atraumatic, Normocephalic  EYES: EOMI  NECK: Supple, No JVD  CHEST/LUNG: nonlabored breathing  HEART: nl S1/S2  ABDOMEN: nondistended, soft  EXTREMITIES:  no LE edema  PSYCH: alert, answering questions appropriately  NEUROLOGY: non-focal  SKIN: No rashes noted    LABS:              10.5                 138  | 28   | 50           9.23  >-----------< 327     ------------------------< 185                   35.5                 4.0  | 97   | 1.00                                         Ca 8.4   Mg 2.1   Ph 1.8                        MICROBIOLOGY:        RADIOLOGY & ADDITIONAL TESTS:    Imaging Personally Reviewed:    < from: US Kidney and Bladder (12.17.19 @ 10:30) >      < end of copied text >        [x] Care Discussed with Consultants/Other Providers:      Cecil Dyson M.D.  Hospitalist  Pager [phone]

## 2019-12-23 LAB
ANION GAP SERPL CALC-SCNC: 11 MMO/L — SIGNIFICANT CHANGE UP (ref 7–14)
BUN SERPL-MCNC: 51 MG/DL — HIGH (ref 7–23)
CALCIUM SERPL-MCNC: 9.6 MG/DL — SIGNIFICANT CHANGE UP (ref 8.4–10.5)
CHLORIDE SERPL-SCNC: 97 MMOL/L — LOW (ref 98–107)
CO2 SERPL-SCNC: 33 MMOL/L — HIGH (ref 22–31)
CREAT SERPL-MCNC: 1 MG/DL — SIGNIFICANT CHANGE UP (ref 0.5–1.3)
GLUCOSE BLDC GLUCOMTR-MCNC: 155 MG/DL — HIGH (ref 70–99)
GLUCOSE BLDC GLUCOMTR-MCNC: 171 MG/DL — HIGH (ref 70–99)
GLUCOSE BLDC GLUCOMTR-MCNC: 83 MG/DL — SIGNIFICANT CHANGE UP (ref 70–99)
GLUCOSE SERPL-MCNC: 150 MG/DL — HIGH (ref 70–99)
HCT VFR BLD CALC: 33.4 % — LOW (ref 39–50)
HGB BLD-MCNC: 9.9 G/DL — LOW (ref 13–17)
LDH SERPL L TO P-CCNC: 361 U/L — HIGH (ref 135–225)
MAGNESIUM SERPL-MCNC: 2.2 MG/DL — SIGNIFICANT CHANGE UP (ref 1.6–2.6)
MCHC RBC-ENTMCNC: 25.9 PG — LOW (ref 27–34)
MCHC RBC-ENTMCNC: 29.6 % — LOW (ref 32–36)
MCV RBC AUTO: 87.4 FL — SIGNIFICANT CHANGE UP (ref 80–100)
NRBC # FLD: 0.05 K/UL — SIGNIFICANT CHANGE UP (ref 0–0)
PHOSPHATE SERPL-MCNC: 2.1 MG/DL — LOW (ref 2.5–4.5)
PLATELET # BLD AUTO: 380 K/UL — SIGNIFICANT CHANGE UP (ref 150–400)
PMV BLD: 11.1 FL — SIGNIFICANT CHANGE UP (ref 7–13)
POTASSIUM SERPL-MCNC: 4.9 MMOL/L — SIGNIFICANT CHANGE UP (ref 3.5–5.3)
POTASSIUM SERPL-SCNC: 4.9 MMOL/L — SIGNIFICANT CHANGE UP (ref 3.5–5.3)
RBC # BLD: 3.82 M/UL — LOW (ref 4.2–5.8)
RBC # FLD: 18.9 % — HIGH (ref 10.3–14.5)
SODIUM SERPL-SCNC: 141 MMOL/L — SIGNIFICANT CHANGE UP (ref 135–145)
URATE SERPL-MCNC: 5.4 MG/DL — SIGNIFICANT CHANGE UP (ref 3.4–8.8)
WBC # BLD: 12.12 K/UL — HIGH (ref 3.8–10.5)
WBC # FLD AUTO: 12.12 K/UL — HIGH (ref 3.8–10.5)

## 2019-12-23 PROCEDURE — 99232 SBSQ HOSP IP/OBS MODERATE 35: CPT | Mod: GC

## 2019-12-23 PROCEDURE — 99233 SBSQ HOSP IP/OBS HIGH 50: CPT

## 2019-12-23 RX ADMIN — Medication 3 MILLIGRAM(S): at 23:57

## 2019-12-23 RX ADMIN — Medication 88 MICROGRAM(S): at 23:45

## 2019-12-23 RX ADMIN — Medication 2: at 06:52

## 2019-12-23 RX ADMIN — SENNA PLUS 5 MILLILITER(S): 8.6 TABLET ORAL at 23:45

## 2019-12-23 RX ADMIN — INSULIN GLARGINE 10 UNIT(S): 100 INJECTION, SOLUTION SUBCUTANEOUS at 23:44

## 2019-12-23 RX ADMIN — Medication 40 MILLIGRAM(S): at 05:11

## 2019-12-23 RX ADMIN — SIMVASTATIN 20 MILLIGRAM(S): 20 TABLET, FILM COATED ORAL at 23:46

## 2019-12-23 RX ADMIN — Medication 4 UNIT(S): at 12:22

## 2019-12-23 RX ADMIN — ESCITALOPRAM OXALATE 5 MILLIGRAM(S): 10 TABLET, FILM COATED ORAL at 12:22

## 2019-12-23 RX ADMIN — ENOXAPARIN SODIUM 40 MILLIGRAM(S): 100 INJECTION SUBCUTANEOUS at 12:22

## 2019-12-23 RX ADMIN — CHLORHEXIDINE GLUCONATE 1 APPLICATION(S): 213 SOLUTION TOPICAL at 05:11

## 2019-12-23 RX ADMIN — LEVETIRACETAM 500 MILLIGRAM(S): 250 TABLET, FILM COATED ORAL at 05:11

## 2019-12-23 RX ADMIN — LEVETIRACETAM 500 MILLIGRAM(S): 250 TABLET, FILM COATED ORAL at 17:31

## 2019-12-23 RX ADMIN — ATENOLOL 50 MILLIGRAM(S): 25 TABLET ORAL at 05:11

## 2019-12-23 RX ADMIN — Medication 20 MILLIGRAM(S): at 17:31

## 2019-12-23 RX ADMIN — Medication 4 UNIT(S): at 17:32

## 2019-12-23 RX ADMIN — Medication 4 UNIT(S): at 00:10

## 2019-12-23 RX ADMIN — Medication 20 MILLIGRAM(S): at 05:11

## 2019-12-23 RX ADMIN — Medication 4 UNIT(S): at 23:46

## 2019-12-23 RX ADMIN — Medication 2: at 17:31

## 2019-12-23 RX ADMIN — PANTOPRAZOLE SODIUM 40 MILLIGRAM(S): 20 TABLET, DELAYED RELEASE ORAL at 05:12

## 2019-12-23 NOTE — PROGRESS NOTE ADULT - SUBJECTIVE AND OBJECTIVE BOX
INTERVAL HPI/OVERNIGHT EVENTS:    Patient     VITAL SIGNS:  T(F): 97.7 (12-23-19 @ 05:08)  HR: 80 (12-23-19 @ 05:08)  BP: 115/77 (12-23-19 @ 05:08)  RR: 17 (12-23-19 @ 05:08)  SpO2: 100% (12-23-19 @ 05:08)  Wt(kg): --    PHYSICAL EXAM:    Constitutional: NAD  Eyes: EOMI, sclera non-icteric  Neck: supple, no masses, no JVD  Respiratory: CTA b/l, good air entry b/l  Cardiovascular: RRR, no M/R/G  Gastrointestinal: soft, NTND, no masses palpable, + BS, no hepatosplenomegaly  Extremities: no c/c/e  Neurological: AAOx3      MEDICATIONS  (STANDING):  ATENolol  Tablet 50 milliGRAM(s) Oral daily  chlorhexidine 4% Liquid 1 Application(s) Topical <User Schedule>  dextrose 5%. 1000 milliLiter(s) (50 mL/Hr) IV Continuous <Continuous>  dextrose 50% Injectable 12.5 Gram(s) IV Push once  dextrose 50% Injectable 25 Gram(s) IV Push once  dextrose 50% Injectable 25 Gram(s) IV Push once  enoxaparin Injectable 40 milliGRAM(s) SubCutaneous daily  escitalopram 5 milliGRAM(s) Oral daily  furosemide   Injectable 20 milliGRAM(s) IV Push two times a day  insulin glargine Injectable (LANTUS) 10 Unit(s) SubCutaneous at bedtime  insulin lispro (HumaLOG) corrective regimen sliding scale   SubCutaneous every 6 hours  insulin lispro Injectable (HumaLOG) 4 Unit(s) SubCutaneous every 6 hours  levETIRAcetam  Solution 500 milliGRAM(s) Oral two times a day  levothyroxine 88 MICROGram(s) Oral at bedtime  melatonin 3 milliGRAM(s) Oral at bedtime  pantoprazole   Suspension 40 milliGRAM(s) Oral daily  predniSONE   Tablet 40 milliGRAM(s) Oral daily  senna Syrup 5 milliLiter(s) Oral at bedtime  simvastatin 20 milliGRAM(s) Oral at bedtime    MEDICATIONS  (PRN):  acetaminophen    Suspension .. 650 milliGRAM(s) Oral every 6 hours PRN Severe Pain (7 - 10)  albuterol/ipratropium for Nebulization. 3 milliLiter(s) Nebulizer every 8 hours PRN Shortness of Breath  dextrose 40% Gel 15 Gram(s) Oral once PRN Blood Glucose LESS THAN 70 milliGRAM(s)/deciliter  glucagon  Injectable 1 milliGRAM(s) IntraMuscular once PRN Glucose LESS THAN 70 milligrams/deciliter  hydrocortisone sodium succinate Injectable 50 milliGRAM(s) IV Push two times a day PRN REACTION  OLANZapine Injectable 1.25 milliGRAM(s) IntraMuscular every 8 hours PRN agitation  sodium chloride 0.9% lock flush 10 milliLiter(s) IV Push every 1 hour PRN Pre/post blood products, medications, blood draw, and to maintain line patency      Allergies    No Known Allergies    Intolerances        LABS:                        9.9    12.12 )-----------( 380      ( 23 Dec 2019 06:00 )             33.4     12-23    141  |  97<L>  |  51<H>  ----------------------------<  150<H>  4.9   |  33<H>  |  1.00    Ca    9.6      23 Dec 2019 06:00  Phos  2.1     12-23  Mg     2.2     12-23            RADIOLOGY & ADDITIONAL TESTS:  Studies reviewed.    ASSESSMENT & PLAN: INTERVAL HPI/OVERNIGHT EVENTS:    Patient states he feels well. Still on 4L O2, denies any nausea/vomiting, constipation, diarrhea. He tolerated his next chemotherapy treatment on 12/20 without issue.    VITAL SIGNS:  T(F): 97.7 (12-23-19 @ 05:08)  HR: 80 (12-23-19 @ 05:08)  BP: 115/77 (12-23-19 @ 05:08)  RR: 17 (12-23-19 @ 05:08)  SpO2: 100% (12-23-19 @ 05:08)  Wt(kg): --    PHYSICAL EXAM:    Constitutional: NAD, lean  Eyes: EOMI, sclera non-icteric  Neck: supple, no masses, no JVD  Respiratory: CTA b/l, good air entry b/l  Cardiovascular: RRR, no M/R/G  Gastrointestinal: soft, NTND, no masses palpable, + BS, no hepatosplenomegaly  Extremities: no c/c/e  Neurological: AAOx3      MEDICATIONS  (STANDING):  ATENolol  Tablet 50 milliGRAM(s) Oral daily  chlorhexidine 4% Liquid 1 Application(s) Topical <User Schedule>  dextrose 5%. 1000 milliLiter(s) (50 mL/Hr) IV Continuous <Continuous>  dextrose 50% Injectable 12.5 Gram(s) IV Push once  dextrose 50% Injectable 25 Gram(s) IV Push once  dextrose 50% Injectable 25 Gram(s) IV Push once  enoxaparin Injectable 40 milliGRAM(s) SubCutaneous daily  escitalopram 5 milliGRAM(s) Oral daily  furosemide   Injectable 20 milliGRAM(s) IV Push two times a day  insulin glargine Injectable (LANTUS) 10 Unit(s) SubCutaneous at bedtime  insulin lispro (HumaLOG) corrective regimen sliding scale   SubCutaneous every 6 hours  insulin lispro Injectable (HumaLOG) 4 Unit(s) SubCutaneous every 6 hours  levETIRAcetam  Solution 500 milliGRAM(s) Oral two times a day  levothyroxine 88 MICROGram(s) Oral at bedtime  melatonin 3 milliGRAM(s) Oral at bedtime  pantoprazole   Suspension 40 milliGRAM(s) Oral daily  predniSONE   Tablet 40 milliGRAM(s) Oral daily  senna Syrup 5 milliLiter(s) Oral at bedtime  simvastatin 20 milliGRAM(s) Oral at bedtime    MEDICATIONS  (PRN):  acetaminophen    Suspension .. 650 milliGRAM(s) Oral every 6 hours PRN Severe Pain (7 - 10)  albuterol/ipratropium for Nebulization. 3 milliLiter(s) Nebulizer every 8 hours PRN Shortness of Breath  dextrose 40% Gel 15 Gram(s) Oral once PRN Blood Glucose LESS THAN 70 milliGRAM(s)/deciliter  glucagon  Injectable 1 milliGRAM(s) IntraMuscular once PRN Glucose LESS THAN 70 milligrams/deciliter  hydrocortisone sodium succinate Injectable 50 milliGRAM(s) IV Push two times a day PRN REACTION  OLANZapine Injectable 1.25 milliGRAM(s) IntraMuscular every 8 hours PRN agitation  sodium chloride 0.9% lock flush 10 milliLiter(s) IV Push every 1 hour PRN Pre/post blood products, medications, blood draw, and to maintain line patency      Allergies    No Known Allergies    Intolerances        LABS:                        9.9    12.12 )-----------( 380      ( 23 Dec 2019 06:00 )             33.4     12-23    141  |  97<L>  |  51<H>  ----------------------------<  150<H>  4.9   |  33<H>  |  1.00    Ca    9.6      23 Dec 2019 06:00  Phos  2.1     12-23  Mg     2.2     12-23            RADIOLOGY & ADDITIONAL TESTS:  Studies reviewed.    ASSESSMENT & PLAN:

## 2019-12-23 NOTE — PROVIDER CONTACT NOTE (OTHER) - ACTION/TREATMENT ORDERED:
Cont to monitor
Continue to monitor
HS3 Team, Efren SCHMITT, informed. Will continue to monitor and follow up with any further instructions given.
HS3 Team, brandie Schwartz. Will follow up with any further instructions given and continue to monitor.
O2 back up when high flow was reapplied.
cont to monitor
waiting for orders
HS3 Reddy notified. Will cont. to monitor and VS q4h.
HS3 Reddy notified. Will cont. to monitor and vs q4h
MIKA Hernandez the midline nurse plans to get blood from patient 12/6 at 0800 as well as place an IV for the patient.
pt given 12.5mg of IV D50 all insulin held pt fs now over 100 rechecked twice
pt given half an amp of dextrose fs rechecked twice both over 100 pt currently sleeping in bed continuos tube feeds running
5MG PO amlodipine administered, VS Q4 maintained.
Give am BP meds and continue to monitor
Provider made aware, continue to monitor VS Q4
no new orders obtained
no new orders obtained   RN unable to due swap provider aware to come to bedside for swap
will give apple juice and feed pt dinner and will tell night RN to recheck blood sugar at bedtime and give lantus 10U if blood sugar is WDL

## 2019-12-23 NOTE — PROGRESS NOTE ADULT - PROBLEM SELECTOR PLAN 1
Course c/b acute respiratory distress 2/2 aspiration pneumonitis, s/p benny (11/28-12/4) for aspiration PNA  sating well on NC  cont chest PT  Given persistent hypoxia, arranging for home O2

## 2019-12-23 NOTE — PROGRESS NOTE ADULT - ASSESSMENT
79 yo M hx DLBCL (dx 9/2019) s/p 2 cycles of R-CP (last ~4weeks ago) at [place], DM2, recent admission at [facility] (10/29-11/5) for SDH s/p fall found to have b/l LE and LUE DVT (not on AC) p/w AMS and witnessed seizure.  Hospital course prolonged due to complications of hypoxic respiratory failure on high flow support, aspiration pna, pleural effusion and s/p PEG tube placement.      Patient is now weaned off of high flow oxygen support and on nasal cannula.  Clinically improving.      #DLBCL:   - originally, pt was admitted to [facility] in 8-9/2019, was found to be hypercalcemic and CT C/A/P (9/10/19) showed scattered b/l pulmonary nodules, mediastinal lymphadenopathy, retroperitoneal lymphadenopathy, and splenic lesions/enlargement concerning for lymphoma.  LN biopsy consistent with DLBCL, germinal cell type, neg for bcl-2, bcl-6, myc.  Per daughter, no outpatient PET scan and BM biopsy was done as he was started on treatment right away.  He sees Dr. Jovan Poole at [place] and was started on Rituxan-Cytoxan-Dex (R-CD), last tx 10/2019, and has not been able to f/u due to recent hospitalizations.   - Last CT A/P non contrast 11/15 now shows treatment response with decrease in size of the spleen and resolution of retroperitoneal adenopathy.  MRI brain with contrast without lesions, SDH stable.  - Repeat CT A/P 12/13 - stable disease. CT chest 11/15 with unchanged subcarinal lymphadenopathy, new b/l pleural effusions and patchy opacities.   - patient treated with polatuzumab and rituximab on 12/20, C1D4 today  - monitor LDH and uric acid daily, no evidence of tumor lysis syndrome.  - dispo planning, to f/u with Dr. Abreu at [facility] [place]    #DVTs: b/l LE (calf) DVTs and LUE DVT  - repeat b/l LE US 11/15 showed unchanged subacute right soleal vein DVT  - b/l UE US 11/4 showed the left axillary and brachial veins DVT.  Superficial thrombophlebitis in both the right and left basilic and cephalic veins.  - ppx AC started on 11/6 as per neurosurgery    Will continue to follow.     Jaime Bloom, PGY6  Hematology Fellow  Pager: [phone] 81 yo M hx DLBCL (dx 9/2019) s/p 2 cycles of R-CP (last ~4weeks ago) at [place], DM2, recent admission at [facility] (10/29-11/5) for SDH s/p fall found to have b/l LE and LUE DVT (not on AC) p/w AMS and witnessed seizure.  Hospital course prolonged due to complications of hypoxic respiratory failure on high flow support, aspiration pna, pleural effusion and s/p PEG tube placement.      Patient is now weaned off of high flow oxygen support and on nasal cannula.  Clinically improving.      #DLBCL:   - originally, pt was admitted to [facility] in 8-9/2019, was found to be hypercalcemic and CT C/A/P (9/10/19) showed scattered b/l pulmonary nodules, mediastinal lymphadenopathy, retroperitoneal lymphadenopathy, and splenic lesions/enlargement concerning for lymphoma.  LN biopsy consistent with DLBCL, germinal cell type, neg for bcl-2, bcl-6, myc.  Per daughter, no outpatient PET scan and BM biopsy was done as he was started on treatment right away.  He sees Dr. Jovan Poole at [place] and was started on Rituxan-Cytoxan-Dex (R-CD), last tx 10/2019, and has not been able to f/u due to recent hospitalizations.   - Last CT A/P non contrast 11/15 shows treatment response with decrease in size of the spleen and resolution of retroperitoneal adenopathy.  MRI brain with contrast without lesions, SDH stable.  - Repeat CT A/P 12/13 - stable disease. CT chest 11/15 with unchanged subcarinal lymphadenopathy, new b/l pleural effusions and patchy opacities.   - patient treated with polatuzumab and rituximab on 12/20, C1D4 today  - monitor LDH and uric acid daily, no evidence of tumor lysis syndrome.  - dispo planning, to f/u with Dr. Abreu at [facility] [place]    #DVTs: b/l LE (calf) DVTs and LUE DVT  - repeat b/l LE US 11/15 showed unchanged subacute right soleal vein DVT  - b/l UE US 11/4 showed the left axillary and brachial veins DVT.  Superficial thrombophlebitis in both the right and left basilic and cephalic veins.  - ppx AC started on 11/6 as per neurosurgery    Jaime Bloom, PGY6  Hematology Fellow  Pager: [phone]

## 2019-12-23 NOTE — PROGRESS NOTE ADULT - SUBJECTIVE AND OBJECTIVE BOX
[facility]   MD UYEN HERNANDEZ, DO MARYANN REYNOSO, LEATHA NICOLE    TEL:  OFFICE: [phone]  DR CASAREZ CELL: [phone]  DR. HERNANDEZ CELL: [phone]  DR. REYNOSO CELL: [phone]  CHUCKIE TERRAZAS CELL: [phone]        Patient is a 79y old  Male who presents with a chief complaint of AMS (23 Dec 2019 12:12)      Patient seen and examined at bedside. No chest pain/sob    VITALS:  T(F): 97.7 (12-23-19 @ 05:08), Max: 97.7 (12-23-19 @ 05:08)  HR: 80 (12-23-19 @ 05:08)  BP: 115/77 (12-23-19 @ 05:08)  RR: 17 (12-23-19 @ 05:08)  SpO2: 100% (12-23-19 @ 05:08)  Wt(kg): --    12-22 @ 07:01  -  12-23 @ 07:00  --------------------------------------------------------  IN: 360 mL / OUT: 0 mL / NET: 360 mL          PHYSICAL EXAM:  Constitutional: NAD  Neck: No JVD  Respiratory: CTAB, no wheezes, rales or rhonchi  Cardiovascular: S1, S2, RRR  Gastrointestinal: BS+, soft, NT/ND  Extremities: No peripheral edema    Hospital Medications:   MEDICATIONS  (STANDING):  ATENolol  Tablet 50 milliGRAM(s) Oral daily  chlorhexidine 4% Liquid 1 Application(s) Topical <User Schedule>  dextrose 5%. 1000 milliLiter(s) (50 mL/Hr) IV Continuous <Continuous>  dextrose 50% Injectable 12.5 Gram(s) IV Push once  dextrose 50% Injectable 25 Gram(s) IV Push once  dextrose 50% Injectable 25 Gram(s) IV Push once  enoxaparin Injectable 40 milliGRAM(s) SubCutaneous daily  escitalopram 5 milliGRAM(s) Oral daily  furosemide   Injectable 20 milliGRAM(s) IV Push two times a day  insulin glargine Injectable (LANTUS) 10 Unit(s) SubCutaneous at bedtime  insulin lispro (HumaLOG) corrective regimen sliding scale   SubCutaneous every 6 hours  insulin lispro Injectable (HumaLOG) 4 Unit(s) SubCutaneous every 6 hours  levETIRAcetam  Solution 500 milliGRAM(s) Oral two times a day  levothyroxine 88 MICROGram(s) Oral at bedtime  melatonin 3 milliGRAM(s) Oral at bedtime  pantoprazole   Suspension 40 milliGRAM(s) Oral daily  predniSONE   Tablet 40 milliGRAM(s) Oral daily  senna Syrup 5 milliLiter(s) Oral at bedtime  simvastatin 20 milliGRAM(s) Oral at bedtime      LABS:  12-23    141  |  97<L>  |  51<H>  ----------------------------<  150<H>  4.9   |  33<H>  |  1.00    Ca    9.6      23 Dec 2019 06:00  Phos  2.1     12-23  Mg     2.2     12-23      Creatinine Trend: 1.00 <--, 1.00 <--, 1.05 <--, 0.99 <--, 1.05 <--, 0.97 <--, 0.92 <--    Phosphorus Level, Serum: 2.1 mg/dL (12-23 @ 06:00)                              9.9    12.12 )-----------( 380      ( 23 Dec 2019 06:00 )             33.4     Urine Studies:  Urinalysis - [12-14-19 @ 11:10]      Color LIGHT YELLOW / Appearance CLEAR / SG 1.016 / pH 8.0      Gluc 30 / Ketone NEGATIVE  / Bili NEGATIVE / Urobili NORMAL       Blood NEGATIVE / Protein 20 / Leuk Est NEGATIVE / Nitrite NEGATIVE      RBC 0-2 / WBC 0-2 / Hyaline NEGATIVE / Gran  / Sq Epi OCC / Non Sq Epi  / Bacteria NEGATIVE      Iron 38, TIBC 234, %sat --      [09-12-19 @ 06:08]  Ferritin 214.6      [09-12-19 @ 06:08]  PTH 20.00 (Ca --)      [09-03-19 @ 05:45]   --  PTH 25.46 (Ca --)      [09-01-19 @ 06:00]   --  Vitamin D (25OH) 34.9      [09-03-19 @ 05:45]  HbA1c 6.4      [12-20-19 @ 05:34]  TSH 6.55      [12-04-19 @ 05:05]  Lipid: chol 124, , HDL 50, LDL 57      [12-12-19 @ 06:30]        RADIOLOGY & ADDITIONAL STUDIES:

## 2019-12-23 NOTE — PROGRESS NOTE ADULT - SUBJECTIVE AND OBJECTIVE BOX
CHIEF COMPLAINT: Patient is a 79y old  male who presents with a chief complaint of AMS (23 Dec 2019 10:48)      SUBJECTIVE / OVERNIGHT EVENTS:    Denies any complaints. Denies SOB, abdominal pain. Overall feeling well.    MEDICATIONS  (STANDING):  ATENolol  Tablet 50 milliGRAM(s) Oral daily  chlorhexidine 4% Liquid 1 Application(s) Topical <User Schedule>  dextrose 5%. 1000 milliLiter(s) (50 mL/Hr) IV Continuous <Continuous>  dextrose 50% Injectable 12.5 Gram(s) IV Push once  dextrose 50% Injectable 25 Gram(s) IV Push once  dextrose 50% Injectable 25 Gram(s) IV Push once  enoxaparin Injectable 40 milliGRAM(s) SubCutaneous daily  escitalopram 5 milliGRAM(s) Oral daily  furosemide   Injectable 20 milliGRAM(s) IV Push two times a day  insulin glargine Injectable (LANTUS) 10 Unit(s) SubCutaneous at bedtime  insulin lispro (HumaLOG) corrective regimen sliding scale   SubCutaneous every 6 hours  insulin lispro Injectable (HumaLOG) 4 Unit(s) SubCutaneous every 6 hours  levETIRAcetam  Solution 500 milliGRAM(s) Oral two times a day  levothyroxine 88 MICROGram(s) Oral at bedtime  melatonin 3 milliGRAM(s) Oral at bedtime  pantoprazole   Suspension 40 milliGRAM(s) Oral daily  predniSONE   Tablet 40 milliGRAM(s) Oral daily  senna Syrup 5 milliLiter(s) Oral at bedtime  simvastatin 20 milliGRAM(s) Oral at bedtime    MEDICATIONS  (PRN):  acetaminophen    Suspension .. 650 milliGRAM(s) Oral every 6 hours PRN Severe Pain (7 - 10)  albuterol/ipratropium for Nebulization. 3 milliLiter(s) Nebulizer every 8 hours PRN Shortness of Breath  dextrose 40% Gel 15 Gram(s) Oral once PRN Blood Glucose LESS THAN 70 milliGRAM(s)/deciliter  glucagon  Injectable 1 milliGRAM(s) IntraMuscular once PRN Glucose LESS THAN 70 milligrams/deciliter  hydrocortisone sodium succinate Injectable 50 milliGRAM(s) IV Push two times a day PRN REACTION  OLANZapine Injectable 1.25 milliGRAM(s) IntraMuscular every 8 hours PRN agitation  sodium chloride 0.9% lock flush 10 milliLiter(s) IV Push every 1 hour PRN Pre/post blood products, medications, blood draw, and to maintain line patency      VITALS:  T(F): 97.7 (12-23-19 @ 05:08), Max: 97.7 (12-23-19 @ 05:08)  HR: 80 (12-23-19 @ 05:08) (69 - 97)  BP: 115/77 (12-23-19 @ 05:08) (109/69 - 115/77)  RR: 17 (12-23-19 @ 05:08) (17 - 17)  SpO2: 100% (12-23-19 @ 05:08)      CAPILLARY BLOOD GLUCOSE    Output     I&O's Summary  T(F): 97.7 (12-23-19 @ 05:08), Max: 97.7 (12-23-19 @ 05:08)  HR: 80 (12-23-19 @ 05:08) (69 - 97)  BP: 115/77 (12-23-19 @ 05:08) (109/69 - 115/77)  RR: 17 (12-23-19 @ 05:08) (17 - 17)  SpO2: 100% (12-23-19 @ 05:08)    PHYSICAL EXAM:  GENERAL: NAD, well-developed  HEAD:  Atraumatic, Normocephalic  EYES: EOMI  NECK: Supple, No JVD  CHEST/LUNG: nonlabored breathing, CTAB  HEART: nl S1/S2  ABDOMEN: nondistended, soft  EXTREMITIES:  no LE edema  PSYCH: alert, answering questions appropriately  NEUROLOGY: non-focal  SKIN: No rashes noted    LABS:              9.9                  141  | 33   | 51           12.12 >-----------< 380     ------------------------< 150                   33.4                 4.9  | 97   | 1.00                                         Ca 9.6   Mg 2.2   Ph 2.1                        MICROBIOLOGY:        RADIOLOGY & ADDITIONAL TESTS:    Imaging Personally Reviewed:    < from: CT Abdomen and Pelvis w/ IV Cont (12.13.19 @ 11:09) >      < end of copied text >        [x] Care Discussed with Consultants/Other Providers:      Cecil Dyson M.D.  Hospitalist  Pager [phone]

## 2019-12-23 NOTE — PROGRESS NOTE ADULT - ATTENDING COMMENTS
Mr. Browne is seen during hematology followup rounds today. He received rituximab plus polatuzumab on Friday of last week. He tolerated therapy well and today is day #4. There's no evidence of tumor lysis syndrome. He is noted to be in bed every day. He has significant muscle mass loss as a result of this illness. I agree with the assessment and plan as stated in a noted above from Dr. Bloom.

## 2019-12-23 NOTE — PROGRESS NOTE ADULT - ASSESSMENT
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 prolonged FRANCES state (? 2/2 prolonged hypercalcemic state) that has now resolved vs. loss of body mass  - Serum Cr is better than baseline  - Avoid nephrotoxins agent  - renal diet  - monitor bmp  - on lasix 20 bid iv, fluid status much better, consider transition to po today    Hyponatremia  - admitted with Na 122  - work up suggested SIADH  - Serum sodium improved  - Pt is on PEG tube feeds.   - now off Na tab and restarted on lasix  - Monitor serum bicarb while on diuretic   - Avoid excessive free water via peg.   - Monitor serum sodium and fluid status closely     -Na optimal at present  - monitor on lasix    HTN  - has Hx HTN but was on midodrine for orthostatic hypotension in the recent past   - Currently BP controlled   - monitor bp    Hypocalcemia  - Has Hx hypercalcemia of malignancy  - low alb corrected laexander is optimal  - monitor    Pl. effusion b/l/ Anasarca   mod effusion,  on ct chest  on lasix 20 bid iv.  respiratory status better  Monitor     Hyperkalemia  Resolved   low k diet  monitor    Hypophosphatemia  Supplemented  Monitor PO4 level

## 2019-12-23 NOTE — PROVIDER CONTACT NOTE (OTHER) - SITUATION
Pt agitated this AM, pt refusing to allow RN to give bolus tube feed that is due at 0600. Supplemental 4units insulin held.

## 2019-12-23 NOTE — PROGRESS NOTE ADULT - PROBLEM SELECTOR PLAN 10
DVT ppx: lovenox  Diet: PEG   DNR (NOT DNI)    Medically stable for DC home, pending home O2, tube feed teaching to family.    40 min discharge time    Transitions of Care Status:  1.  Name of PCP: Dr. Harleen Guaman  2.  PCP Contacted on Admission: [ ] Y    [ ] N   [ x ] N/A  3.  PCP contacted at Discharge: [ x ] Y    [ ] N    [ ] N/A  4.  Post-Discharge Appointment Date and Location:  5.  Summary of Handoff given to PCP:

## 2019-12-24 ENCOUNTER — TRANSCRIPTION ENCOUNTER (OUTPATIENT)
Age: 79
End: 2019-12-24

## 2019-12-24 VITALS
RESPIRATION RATE: 18 BRPM | SYSTOLIC BLOOD PRESSURE: 127 MMHG | HEART RATE: 102 BPM | OXYGEN SATURATION: 99 % | DIASTOLIC BLOOD PRESSURE: 81 MMHG | TEMPERATURE: 98 F

## 2019-12-24 LAB
ANION GAP SERPL CALC-SCNC: 14 MMO/L — SIGNIFICANT CHANGE UP (ref 7–14)
BUN SERPL-MCNC: 44 MG/DL — HIGH (ref 7–23)
CALCIUM SERPL-MCNC: 8.8 MG/DL — SIGNIFICANT CHANGE UP (ref 8.4–10.5)
CHLORIDE SERPL-SCNC: 93 MMOL/L — LOW (ref 98–107)
CO2 SERPL-SCNC: 30 MMOL/L — SIGNIFICANT CHANGE UP (ref 22–31)
CREAT SERPL-MCNC: 0.89 MG/DL — SIGNIFICANT CHANGE UP (ref 0.5–1.3)
GLUCOSE SERPL-MCNC: 314 MG/DL — HIGH (ref 70–99)
HCT VFR BLD CALC: 32 % — LOW (ref 39–50)
HGB BLD-MCNC: 9.7 G/DL — LOW (ref 13–17)
LDH SERPL L TO P-CCNC: 362 U/L — HIGH (ref 135–225)
MAGNESIUM SERPL-MCNC: 2.1 MG/DL — SIGNIFICANT CHANGE UP (ref 1.6–2.6)
MCHC RBC-ENTMCNC: 26.4 PG — LOW (ref 27–34)
MCHC RBC-ENTMCNC: 30.3 % — LOW (ref 32–36)
MCV RBC AUTO: 87 FL — SIGNIFICANT CHANGE UP (ref 80–100)
NRBC # FLD: 0.05 K/UL — SIGNIFICANT CHANGE UP (ref 0–0)
PHOSPHATE SERPL-MCNC: 2.2 MG/DL — LOW (ref 2.5–4.5)
PLATELET # BLD AUTO: 355 K/UL — SIGNIFICANT CHANGE UP (ref 150–400)
PMV BLD: 11.2 FL — SIGNIFICANT CHANGE UP (ref 7–13)
POTASSIUM SERPL-MCNC: 4.1 MMOL/L — SIGNIFICANT CHANGE UP (ref 3.5–5.3)
POTASSIUM SERPL-SCNC: 4.1 MMOL/L — SIGNIFICANT CHANGE UP (ref 3.5–5.3)
RBC # BLD: 3.68 M/UL — LOW (ref 4.2–5.8)
RBC # FLD: 19.1 % — HIGH (ref 10.3–14.5)
SODIUM SERPL-SCNC: 137 MMOL/L — SIGNIFICANT CHANGE UP (ref 135–145)
URATE SERPL-MCNC: 5.4 MG/DL — SIGNIFICANT CHANGE UP (ref 3.4–8.8)
WBC # BLD: 10.44 K/UL — SIGNIFICANT CHANGE UP (ref 3.8–10.5)
WBC # FLD AUTO: 10.44 K/UL — SIGNIFICANT CHANGE UP (ref 3.8–10.5)

## 2019-12-24 PROCEDURE — 99239 HOSP IP/OBS DSCHRG MGMT >30: CPT

## 2019-12-24 PROCEDURE — 99233 SBSQ HOSP IP/OBS HIGH 50: CPT

## 2019-12-24 RX ORDER — FUROSEMIDE 40 MG
40 TABLET ORAL DAILY
Refills: 0 | Status: DISCONTINUED | OUTPATIENT
Start: 2019-12-24 | End: 2019-12-24

## 2019-12-24 RX ORDER — ATENOLOL 25 MG/1
1 TABLET ORAL
Qty: 30 | Refills: 0
Start: 2019-12-24 | End: 2020-01-22

## 2019-12-24 RX ORDER — FUROSEMIDE 40 MG
1 TABLET ORAL
Qty: 30 | Refills: 0
Start: 2019-12-24 | End: 2020-01-22

## 2019-12-24 RX ORDER — ENOXAPARIN SODIUM 100 MG/ML
10 INJECTION SUBCUTANEOUS
Qty: 30 | Refills: 0
Start: 2019-12-24 | End: 2020-01-22

## 2019-12-24 RX ORDER — INSULIN LISPRO 100/ML
4 VIAL (ML) SUBCUTANEOUS
Qty: 30 | Refills: 0
Start: 2019-12-24 | End: 2020-01-22

## 2019-12-24 RX ORDER — ATENOLOL 25 MG/1
1 TABLET ORAL
Qty: 0 | Refills: 0 | DISCHARGE

## 2019-12-24 RX ORDER — INSULIN LISPRO 100/ML
VIAL (ML) SUBCUTANEOUS
Refills: 0 | Status: DISCONTINUED | OUTPATIENT
Start: 2019-12-24 | End: 2019-12-24

## 2019-12-24 RX ORDER — LEVETIRACETAM 250 MG/1
5 TABLET, FILM COATED ORAL
Qty: 150 | Refills: 0
Start: 2019-12-24 | End: 2020-01-22

## 2019-12-24 RX ADMIN — Medication 4 UNIT(S): at 11:07

## 2019-12-24 RX ADMIN — LEVETIRACETAM 500 MILLIGRAM(S): 250 TABLET, FILM COATED ORAL at 17:20

## 2019-12-24 RX ADMIN — Medication 40 MILLIGRAM(S): at 05:17

## 2019-12-24 RX ADMIN — PANTOPRAZOLE SODIUM 40 MILLIGRAM(S): 20 TABLET, DELAYED RELEASE ORAL at 05:17

## 2019-12-24 RX ADMIN — Medication 10: at 17:21

## 2019-12-24 RX ADMIN — ESCITALOPRAM OXALATE 5 MILLIGRAM(S): 10 TABLET, FILM COATED ORAL at 11:07

## 2019-12-24 RX ADMIN — Medication 6: at 05:26

## 2019-12-24 RX ADMIN — Medication 4 UNIT(S): at 17:21

## 2019-12-24 RX ADMIN — Medication 6: at 10:57

## 2019-12-24 RX ADMIN — Medication 20 MILLIGRAM(S): at 05:17

## 2019-12-24 RX ADMIN — ENOXAPARIN SODIUM 40 MILLIGRAM(S): 100 INJECTION SUBCUTANEOUS at 11:07

## 2019-12-24 RX ADMIN — LEVETIRACETAM 500 MILLIGRAM(S): 250 TABLET, FILM COATED ORAL at 05:17

## 2019-12-24 RX ADMIN — Medication 4 UNIT(S): at 05:26

## 2019-12-24 RX ADMIN — CHLORHEXIDINE GLUCONATE 1 APPLICATION(S): 213 SOLUTION TOPICAL at 09:56

## 2019-12-24 NOTE — PROGRESS NOTE ADULT - MINUTES
35
25
35
35
10
25
35
25
30
35
35
15
20
25
30

## 2019-12-24 NOTE — DISCHARGE NOTE NURSING/CASE MANAGEMENT/SOCIAL WORK - NSSCNAMETXT_GEN_ALL_CORE
Visiting Nurse Service [facility]    Start of care withing 48hrs pending holiday straffing

## 2019-12-24 NOTE — PROGRESS NOTE ADULT - SUBJECTIVE AND OBJECTIVE BOX
Chief Complaint: DM2    History: Receiving Glucerna bolus feeds 360 ml q6h.  Continues on prednisone.    MEDICATIONS  (STANDING):  ATENolol  Tablet 50 milliGRAM(s) Oral daily  chlorhexidine 4% Liquid 1 Application(s) Topical <User Schedule>  dextrose 5%. 1000 milliLiter(s) (50 mL/Hr) IV Continuous <Continuous>  dextrose 50% Injectable 12.5 Gram(s) IV Push once  dextrose 50% Injectable 25 Gram(s) IV Push once  dextrose 50% Injectable 25 Gram(s) IV Push once  enoxaparin Injectable 40 milliGRAM(s) SubCutaneous daily  escitalopram 5 milliGRAM(s) Oral daily  furosemide    Tablet 40 milliGRAM(s) Oral daily  insulin glargine Injectable (LANTUS) 10 Unit(s) SubCutaneous at bedtime  insulin lispro (HumaLOG) corrective regimen sliding scale   SubCutaneous four times a day before meals  insulin lispro Injectable (HumaLOG) 4 Unit(s) SubCutaneous every 6 hours  levETIRAcetam  Solution 500 milliGRAM(s) Oral two times a day  levothyroxine 88 MICROGram(s) Oral at bedtime  melatonin 3 milliGRAM(s) Oral at bedtime  pantoprazole   Suspension 40 milliGRAM(s) Oral daily  predniSONE   Tablet 40 milliGRAM(s) Oral daily  senna Syrup 5 milliLiter(s) Oral at bedtime  simvastatin 20 milliGRAM(s) Oral at bedtime    MEDICATIONS  (PRN):  acetaminophen    Suspension .. 650 milliGRAM(s) Oral every 6 hours PRN Severe Pain (7 - 10)  albuterol/ipratropium for Nebulization. 3 milliLiter(s) Nebulizer every 8 hours PRN Shortness of Breath  dextrose 40% Gel 15 Gram(s) Oral once PRN Blood Glucose LESS THAN 70 milliGRAM(s)/deciliter  glucagon  Injectable 1 milliGRAM(s) IntraMuscular once PRN Glucose LESS THAN 70 milligrams/deciliter  hydrocortisone sodium succinate Injectable 50 milliGRAM(s) IV Push two times a day PRN REACTION  OLANZapine Injectable 1.25 milliGRAM(s) IntraMuscular every 8 hours PRN agitation  sodium chloride 0.9% lock flush 10 milliLiter(s) IV Push every 1 hour PRN Pre/post blood products, medications, blood draw, and to maintain line patency      Allergies    No Known Allergies    Intolerances      Review of Systems:    UNABLE TO OBTAIN    PHYSICAL EXAM:  VITALS: T(C): 36.6 (12-24-19 @ 13:51)  T(F): 97.9 (12-24-19 @ 13:51), Max: 98.1 (12-23-19 @ 23:07)  HR: 102 (12-24-19 @ 13:51) (90 - 102)  BP: 127/81 (12-24-19 @ 13:51) (102/68 - 127/81)  RR:  (18 - 18)  SpO2:  (98% - 99%)  Wt(kg): --  GENERAL: NAD, thin male  EYES: No proptosis, no lid lag, anicteric  HEENT:  Atraumatic, Normocephalic, moist mucous membranes  RESPIRATORY: nonlabored respirations  GI: PEG with feeds infusing  SKIN: Dry, intact, No rashes or lesions  PSYCH: awake but confused      CAPILLARY BLOOD GLUCOSE      POCT Blood Glucose.: 276 mg/dL (24 Dec 2019 10:37)  POCT Blood Glucose.: 284 mg/dL (24 Dec 2019 05:20)  POCT Blood Glucose.: 148 mg/dL (23 Dec 2019 23:24)  POCT Blood Glucose.: 171 mg/dL (23 Dec 2019 17:06)      12-24    137  |  93<L>  |  44<H>  ----------------------------<  314<H>  4.1   |  30  |  0.89    EGFR if : 94  EGFR if non : 81    Ca    8.8      12-24  Mg     2.1     12-24  Phos  2.2     12-24            Thyroid Function Tests:  12-04 @ 05:05 TSH 6.55 FreeT4 1.07 T3 -- Anti TPO -- Anti Thyroglobulin Ab -- TSI --      Hemoglobin A1C, Whole Blood: 6.4 % <H> [4.0 - 5.6] (12-20-19 @ 05:34)

## 2019-12-24 NOTE — PROGRESS NOTE ADULT - SUBJECTIVE AND OBJECTIVE BOX
CHIEF COMPLAINT: Patient is a 79y old  male who presents with a chief complaint of AMS (23 Dec 2019 13:01)      SUBJECTIVE / OVERNIGHT EVENTS:    Patient denies any complaints other than frustration about not being allowed to eat or drink by mouth.    MEDICATIONS  (STANDING):  ATENolol  Tablet 50 milliGRAM(s) Oral daily  chlorhexidine 4% Liquid 1 Application(s) Topical <User Schedule>  dextrose 5%. 1000 milliLiter(s) (50 mL/Hr) IV Continuous <Continuous>  dextrose 50% Injectable 12.5 Gram(s) IV Push once  dextrose 50% Injectable 25 Gram(s) IV Push once  dextrose 50% Injectable 25 Gram(s) IV Push once  enoxaparin Injectable 40 milliGRAM(s) SubCutaneous daily  escitalopram 5 milliGRAM(s) Oral daily  furosemide    Tablet 40 milliGRAM(s) Oral daily  insulin glargine Injectable (LANTUS) 10 Unit(s) SubCutaneous at bedtime  insulin lispro (HumaLOG) corrective regimen sliding scale   SubCutaneous four times a day before meals  insulin lispro Injectable (HumaLOG) 4 Unit(s) SubCutaneous every 6 hours  levETIRAcetam  Solution 500 milliGRAM(s) Oral two times a day  levothyroxine 88 MICROGram(s) Oral at bedtime  melatonin 3 milliGRAM(s) Oral at bedtime  pantoprazole   Suspension 40 milliGRAM(s) Oral daily  predniSONE   Tablet 40 milliGRAM(s) Oral daily  senna Syrup 5 milliLiter(s) Oral at bedtime  simvastatin 20 milliGRAM(s) Oral at bedtime    MEDICATIONS  (PRN):  acetaminophen    Suspension .. 650 milliGRAM(s) Oral every 6 hours PRN Severe Pain (7 - 10)  albuterol/ipratropium for Nebulization. 3 milliLiter(s) Nebulizer every 8 hours PRN Shortness of Breath  dextrose 40% Gel 15 Gram(s) Oral once PRN Blood Glucose LESS THAN 70 milliGRAM(s)/deciliter  glucagon  Injectable 1 milliGRAM(s) IntraMuscular once PRN Glucose LESS THAN 70 milligrams/deciliter  hydrocortisone sodium succinate Injectable 50 milliGRAM(s) IV Push two times a day PRN REACTION  OLANZapine Injectable 1.25 milliGRAM(s) IntraMuscular every 8 hours PRN agitation  sodium chloride 0.9% lock flush 10 milliLiter(s) IV Push every 1 hour PRN Pre/post blood products, medications, blood draw, and to maintain line patency      VITALS:  T(F): 98.1 (12-24-19 @ 05:15), Max: 98.1 (12-23-19 @ 23:07)  HR: 98 (12-24-19 @ 05:15) (78 - 98)  BP: 102/68 (12-24-19 @ 05:15) (102/68 - 113/77)  RR: 18 (12-24-19 @ 05:15) (16 - 18)  SpO2: 99% (12-24-19 @ 05:15)      CAPILLARY BLOOD GLUCOSE    Output     I&O's Summary  T(F): 98.1 (12-24-19 @ 05:15), Max: 98.1 (12-23-19 @ 23:07)  HR: 98 (12-24-19 @ 05:15) (78 - 98)  BP: 102/68 (12-24-19 @ 05:15) (102/68 - 113/77)  RR: 18 (12-24-19 @ 05:15) (16 - 18)  SpO2: 99% (12-24-19 @ 05:15)    PHYSICAL EXAM:  GENERAL: NAD, well-developed  HEAD:  Atraumatic, Normocephalic  EYES: EOMI  NECK: Supple, No JVD  CHEST/LUNG: nonlabored breathing  HEART: nl S1/S2  ABDOMEN: nondistended, soft  EXTREMITIES:  no LE edema  PSYCH: alert, answering questions appropriately  NEUROLOGY: non-focal  SKIN: No rashes noted    LABS:              9.7                  137  | 30   | 44           10.44 >-----------< 355     ------------------------< 314                   32.0                 4.1  | 93   | 0.89                                         Ca 8.8   Mg 2.1   Ph 2.2                        MICROBIOLOGY:        RADIOLOGY & ADDITIONAL TESTS:    Imaging Personally Reviewed:    < from: CT Abdomen and Pelvis w/ IV Cont (12.13.19 @ 11:09) >      < end of copied text >        [x] Care Discussed with Consultants/Other Providers:      Cecil Dyson M.D.  Hospitalist  Pager [phone]

## 2019-12-24 NOTE — PROGRESS NOTE ADULT - SUBJECTIVE AND OBJECTIVE BOX
[facility]   MD UYEN HERNANDEZ DO MARYANNE SOURIAL, LEATHA NICOLE    TEL:  OFFICE: [phone]  DR CASAREZ CELL: [phone]  DR. HERNANDEZ CELL: [phone]  DR. REYNOSO CELL: [phone]  CHUCKIE TERRAZAS CELL: [phone]        Patient is a 79y old  Male who presents with a chief complaint of AMS (24 Dec 2019 14:08)      Patient seen and examined at bedside. pt very agitated and combative     VITALS:  T(F): 97.9 (12-24-19 @ 13:51), Max: 98.1 (12-23-19 @ 23:07)  HR: 102 (12-24-19 @ 13:51)  BP: 127/81 (12-24-19 @ 13:51)  RR: 18 (12-24-19 @ 13:51)  SpO2: 99% (12-24-19 @ 13:51)  Wt(kg): --        PHYSICAL EXAM:  Constitutional: agitated, refuse to be touched    Hospital Medications:   MEDICATIONS  (STANDING):  ATENolol  Tablet 50 milliGRAM(s) Oral daily  chlorhexidine 4% Liquid 1 Application(s) Topical <User Schedule>  dextrose 5%. 1000 milliLiter(s) (50 mL/Hr) IV Continuous <Continuous>  dextrose 50% Injectable 12.5 Gram(s) IV Push once  dextrose 50% Injectable 25 Gram(s) IV Push once  dextrose 50% Injectable 25 Gram(s) IV Push once  enoxaparin Injectable 40 milliGRAM(s) SubCutaneous daily  escitalopram 5 milliGRAM(s) Oral daily  furosemide    Tablet 40 milliGRAM(s) Oral daily  insulin glargine Injectable (LANTUS) 10 Unit(s) SubCutaneous at bedtime  insulin lispro (HumaLOG) corrective regimen sliding scale   SubCutaneous four times a day before meals  insulin lispro Injectable (HumaLOG) 4 Unit(s) SubCutaneous every 6 hours  levETIRAcetam  Solution 500 milliGRAM(s) Oral two times a day  levothyroxine 88 MICROGram(s) Oral at bedtime  melatonin 3 milliGRAM(s) Oral at bedtime  pantoprazole   Suspension 40 milliGRAM(s) Oral daily  predniSONE   Tablet 40 milliGRAM(s) Oral daily  senna Syrup 5 milliLiter(s) Oral at bedtime  simvastatin 20 milliGRAM(s) Oral at bedtime      LABS:  12-24    137  |  93<L>  |  44<H>  ----------------------------<  314<H>  4.1   |  30  |  0.89    Ca    8.8      24 Dec 2019 06:45  Phos  2.2     12-24  Mg     2.1     12-24      Creatinine Trend: 0.89 <--, 1.00 <--, 1.00 <--, 1.05 <--, 0.99 <--, 1.05 <--, 0.97 <--    Phosphorus Level, Serum: 2.2 mg/dL (12-24 @ 06:45)                              9.7    10.44 )-----------( 355      ( 24 Dec 2019 06:45 )             32.0     Urine Studies:  Urinalysis - [12-14-19 @ 11:10]      Color LIGHT YELLOW / Appearance CLEAR / SG 1.016 / pH 8.0      Gluc 30 / Ketone NEGATIVE  / Bili NEGATIVE / Urobili NORMAL       Blood NEGATIVE / Protein 20 / Leuk Est NEGATIVE / Nitrite NEGATIVE      RBC 0-2 / WBC 0-2 / Hyaline NEGATIVE / Gran  / Sq Epi OCC / Non Sq Epi  / Bacteria NEGATIVE      Iron 38, TIBC 234, %sat --      [09-12-19 @ 06:08]  Ferritin 214.6      [09-12-19 @ 06:08]  PTH 20.00 (Ca --)      [09-03-19 @ 05:45]   --  PTH 25.46 (Ca --)      [09-01-19 @ 06:00]   --  Vitamin D (25OH) 34.9      [09-03-19 @ 05:45]  HbA1c 6.4      [12-20-19 @ 05:34]  TSH 6.55      [12-04-19 @ 05:05]  Lipid: chol 124, , HDL 50, LDL 57      [12-12-19 @ 06:30]        RADIOLOGY & ADDITIONAL STUDIES:

## 2019-12-24 NOTE — PROGRESS NOTE ADULT - PROBLEM SELECTOR PLAN 10
DVT ppx: lovenox  Diet: PEG   DNR (NOT DNI)    Medically stable for DC home, pending home O2, tube feed teaching to family.    40 min discharge time    Transitions of Care Status:  1.  Name of PCP: Dr. Harleen Guaman  2.  PCP Contacted on Admission: [ ] Y    [ ] N   [ x ] N/A  3.  PCP contacted at Discharge: [ x ] Y    [ ] N    [ ] N/A  4.  Post-Discharge Appointment Date and Location:  5.  Summary of Handoff given to PCP:    Discussed patient is medically stable for discharge home today. Per daughter, she is willing to be taught how to perform tube feeds, and will be in later.    45 min discharge time

## 2019-12-24 NOTE — PROGRESS NOTE ADULT - ASSESSMENT
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 Found to have new onset seizures and multifocal pneumonia.   Consulted for T2DM on glucerna feeds, hypothyroidism, adrenal insufficiency and hypercalcemia.     1) DM2    On prednisone 40mg daily.   Some variability in glucose levels but overall fair control.  Continue Lantus 10 units qhs  Continue Humalog 4/4/4/4 prebolus. Hold Humalog dose if bolus feeds are held.  -Continue Humalog moderate correction scale q6h   DC plan: anticipate continue above regimen at discharge if insulin can be administered to patient by family or caregiver.    2) Hypercalcemia    Calcium corrected 10.1 normal range.  elevated 1,25 vitamin D. 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   -currently calcium is stable, improved with steroids    3) Adrenal Insufficiency    last cortisol was 19.7 while off steroids. Currently HD stable, low suspicion for AI.   Now on prednisone at supratherapeutic dose.    4) Hypothyroidism    currently on enteral LT4 88 mcg.   Make sure given on empty stomach (at least 30 minutes apart from feeds).  Last TFTs 12/4/19. Can recheck in AM.

## 2019-12-24 NOTE — CHART NOTE - NSCHARTNOTEFT_GEN_A_CORE
Source: Patient [ ]    Family [ x]     other [ x] RN, chart review     Malnutrition f/u. 12/19: Pt transitioned to bolus feeds via PEG. Per  RN, pt has been refusing tube feeds today; however, he has been tolerating tube feeds without any abdominal distension or GI distress (nausea/vomiting/diarrhea/constipation.) .    Diet, NPO with Tube Feed:   Tube Feeding Modality: Gastrostomy  Glucerna 1.2 Derrell (GLUCERNARTH)  Total Volume for 24 Hours (mL): 1440  Bolus  Total Volume of Bolus (mL):  360  Tube Feed Frequency: Every 6 hours   Tube Feed Start Time: 18:00  Bolus Feed Rate (mL per Hour): 360   Bolus Feed Duration (in Hours): 1  Bolus Feed Instructions:  For first and second boluses please give 180 mL, for  second and third boluses give 240 mL. All other boluses at 360 mL. (12-19-19 @ 17:53)    Enteral /Parenteral Nutrition: 1440 mL, 1728 derrell, 86 gm protein   Current Weight:  12/21 112.8 pounds   (12/19) 50 kg (110 pounds)  erroneous?  12/4: 139.7 pounds   11/29: 139.1 pounds   11/20: 136.4 pounds   11/16: 143 pounds     Edema: none  Pressure Injuries: none    __________________ Pertinent Medications__________________   MEDICATIONS  (STANDING):  ATENolol  Tablet 50 milliGRAM(s) Oral daily  chlorhexidine 4% Liquid 1 Application(s) Topical <User Schedule>  dextrose 5%. 1000 milliLiter(s) (50 mL/Hr) IV Continuous <Continuous>  dextrose 50% Injectable 12.5 Gram(s) IV Push once  dextrose 50% Injectable 25 Gram(s) IV Push once  dextrose 50% Injectable 25 Gram(s) IV Push once  enoxaparin Injectable 40 milliGRAM(s) SubCutaneous daily  escitalopram 5 milliGRAM(s) Oral daily  furosemide    Tablet 40 milliGRAM(s) Oral daily  insulin glargine Injectable (LANTUS) 10 Unit(s) SubCutaneous at bedtime  insulin lispro (HumaLOG) corrective regimen sliding scale   SubCutaneous four times a day before meals  insulin lispro Injectable (HumaLOG) 4 Unit(s) SubCutaneous every 6 hours  levETIRAcetam  Solution 500 milliGRAM(s) Oral two times a day  levothyroxine 88 MICROGram(s) Oral at bedtime  melatonin 3 milliGRAM(s) Oral at bedtime  pantoprazole   Suspension 40 milliGRAM(s) Oral daily  predniSONE   Tablet 40 milliGRAM(s) Oral daily  senna Syrup 5 milliLiter(s) Oral at bedtime  simvastatin 20 milliGRAM(s) Oral at bedtime    MEDICATIONS  (PRN):  acetaminophen    Suspension .. 650 milliGRAM(s) Oral every 6 hours PRN Severe Pain (7 - 10)  albuterol/ipratropium for Nebulization. 3 milliLiter(s) Nebulizer every 8 hours PRN Shortness of Breath  dextrose 40% Gel 15 Gram(s) Oral once PRN Blood Glucose LESS THAN 70 milliGRAM(s)/deciliter  glucagon  Injectable 1 milliGRAM(s) IntraMuscular once PRN Glucose LESS THAN 70 milligrams/deciliter  hydrocortisone sodium succinate Injectable 50 milliGRAM(s) IV Push two times a day PRN REACTION  OLANZapine Injectable 1.25 milliGRAM(s) IntraMuscular every 8 hours PRN agitation  sodium chloride 0.9% lock flush 10 milliLiter(s) IV Push every 1 hour PRN Pre/post blood products, medications, blood draw, and to maintain line patency      __________________ Pertinent Labs__________________   12-24 Na137 mmol/L Glu 314 mg/dL<H> K+ 4.1 mmol/L Cr  0.89 mg/dL BUN 44 mg/dL<H> 12-24 Phos 2.2 mg/dL<L> 12-20 [id number] 6.4 %<H> 12-12 Chol 124 mg/dL LDL 57 mg/dL HDL 50 mg/dL Trig 140 mg/dL            Estimated Needs:   [x  ] no change since previous assessment  [ ] recalculated:       Previous Nutrition Diagnosis: severe protein calorie malnutrition     Nutrition Diagnosis is [X ] ongoing  [ ] resolved [ ] not applicable       Recommendations:  1. Continue 360 mL bolus of Glucerna 1.2 q 6 hrs.        Monitoring and Evaluation:     [ x] Tolerance to diet prescription [x ] weights [x ] follow up per protocol  [ ] other:

## 2019-12-24 NOTE — PROGRESS NOTE ADULT - ASSESSMENT
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 prolonged FRANCES state (? 2/2 prolonged hypercalcemic state) that has now resolved vs. loss of body mass  - Serum Cr is better than baseline  - Avoid nephrotoxins agent  - renal diet  - monitor bmp  - on lasix 20 bid iv, fluid status much better, transitioned to lasix 40 po daily today    Hyponatremia  - admitted with Na 122  - work up suggested SIADH  - Serum sodium improved  - Pt is on PEG tube feeds.   - now off Na tab and restarted on lasix  - Monitor serum bicarb while on diuretic   - Avoid excessive free water via peg.   - Monitor serum sodium and fluid status closely     -Na optimal at present  - monitor on lasix    HTN  - has Hx HTN but was on midodrine for orthostatic hypotension in the recent past   - Currently BP controlled   - monitor bp    Hypocalcemia  - Has Hx hypercalcemia of malignancy  - low alb corrected alexander is optimal  - monitor    Pl. effusion b/l/ Anasarca   mod effusion,  on ct chest  on lasix   respiratory status better  Monitor     Hyperkalemia  Resolved   low k diet  monitor    Hypophosphatemia  Supplemented  Monitor PO4 level

## 2019-12-24 NOTE — DISCHARGE NOTE NURSING/CASE MANAGEMENT/SOCIAL WORK - PATIENT PORTAL LINK FT
You can access the FollowMyHealth Patient Portal offered by [facility] by registering at the following website: [email or web address]. By joining [employer]’s FollowMyHealth portal, you will also be able to view your health information using other applications (apps) compatible with our system.

## 2019-12-24 NOTE — PROGRESS NOTE ADULT - REASON FOR ADMISSION
AMS

## 2019-12-24 NOTE — PROGRESS NOTE ADULT - PROBLEM SELECTOR PROBLEM 9
Orthostatic hypotension
DVT (deep venous thrombosis)
Orthostatic hypotension
Preventative health care
DVT (deep venous thrombosis)

## 2019-12-24 NOTE — PROGRESS NOTE ADULT - PROBLEM SELECTOR PROBLEM 10
Preventative health care

## 2019-12-25 NOTE — CHART NOTE - NSCHARTNOTEFT_GEN_A_CORE
80 YO Male   PICC Line removed [12/24] from Right arm, tip intact.   Site dry, no bleeding, pressure applied and covered.   Daughter and nurse at bedside   Will monitor

## 2020-01-15 NOTE — PROVIDER CONTACT NOTE (OTHER) - REASON
Hematology/Oncology Outpatient Follow Up    PATIENT NAME:Dl Holbrook Sr.  :1953  MRN: [id number]  PRIMARY CARE PHYSICIAN: Mike Humphrey MD  REFERRING PHYSICIAN: Mike Humphrey MD    Chief Complaint   Patient presents with   • Follow-up     hemochromatosis        HISTORY OF PRESENT ILLNESS:   · This is a 66 year old  male claims to have been diagnosed with the hemochromatosis on a liver biopsy around age 49.  He claims that he was feeling bad at that time and his primary care provider had checked a ferritin level on him which was 3 times normal.  Labs were repeated and he was referred to a gastroenterologist in [place] who had eventually performed a liver biopsy.  He is not sure if he had any genetic testing performed but was put on phlebotomy weekly for 2 years and then intermittently after that.  He claims that he has not needed a phlebotomy since age 59 and his iron levels have been monitored by his primary care provider.  He was diagnosed with coronary artery disease in 2018 when he underwent coronary artery stenting.  He was getting more tired and short of breath recently which he thought was related to his heart and underwent work-up by Dr. Madrid which had come back okay.  CBC was performed on 2019 revealing WBC 4.6 with 47% neutrophils, 30% lymphocytes, 9% monocytes and 12% eosinophils.  Hemoglobin was 13.1,  and platelet count 207,000.  Comprehensive metabolic panel was performed on 2019 and was normal.  He was referred here for further evaluation by Dr. Madrid.  · 19 -patient seen in initial consultation at the cancer center for anemia and history of hemochromatosis by Laci Madrid MD.  Medication review for anemia. Cozaar does have anemia listed as a common side effect (1%-10%). Paroxetine has anemia listed as uncommon (0.1%-1%).WBC 4.75, hemoglobin 13.3, .3, platelet count 200,000.  Rest work-up revealed retic 0.64  unable to obtain rapid height (0.70-1.90). Iron 127 (). Iron saturation 66 (20-50). TIBC 193 (228-428). Ferritin 139 (2-336). Vitamin B12 970 (180-914). Folate 15.6 (5.90-24.8). Haptoglobin 70 (). SPEP showed normal electrophoresis pattern. TSH 4.82 (0.34-5.6). 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   · 9/19/2019 patient hospitalized at [facility] between 9/19/2019 and 9/26/2019 through the emergency room complaining of fever, body aches and a cough of 4 days duration.  His WBC was 1.1, ANC 0.5, hemoglobin 13.5 and platelet count 110,000 for which hematology consultation was obtained.  Work-up was significant for RVP, influenza, CMV/EBV, Lyme and tick panels negative.  Coags were okay, SARAH negative and hit antibodies negative.  CT abdomen pelvis had no hepatosplenomegaly.  Platelet antibodies were positive.  Peripheral blood flow cytometry normal.  His WBC improved to 3.2 at the time of discharge and platelet count was 144,000.  · 10/15/19 WBC 4.3 with 41% neutrophils, 39% lymphocytes, 10% monocytes, 9% eosinophils, hemoglobin 13, , platelet count 160,000.  Discussed bone marrow biopsy with patient.  He wants to wait on it.  Patient started on phlebotomy program 500 cc monthly.    Past Medical History:   Diagnosis Date   • Bradycardia    • CAD (coronary artery disease)    • CAD (coronary artery disease)    • COPD (chronic obstructive pulmonary disease) (CMS/HCC) 2003   • GERD (gastroesophageal reflux disease) 2009   • High cholesterol 2017   • History of ETOH abuse    • Hypertension 2017   • Syncope        Past Surgical History:   Procedure Laterality Date   • CARDIAC CATHETERIZATION     • CORONARY ANGIOPLASTY     • CORONARY ANGIOPLASTY WITH STENT PLACEMENT  2018    cardiac stent placement    • ROTATOR CUFF REPAIR     • ROTATOR CUFF REPAIR Right 2009   • TOE METACARPOPHALANGEAL JOINT SYNOVECTOMY Right 1992    right great toe joint          Current Outpatient Medications:    •  amLODIPine (NORVASC) 10 MG tablet, Take 10 mg by mouth Every Night., Disp: , Rfl:   •  aspirin 81 MG chewable tablet, Chew 81 mg Daily., Disp: , Rfl:   •  atorvastatin (LIPITOR) 10 MG tablet, TAKE 1 TABLET BY MOUTH AT BEDTIME , Disp: 90 tablet, Rfl: 0  •  isosorbide mononitrate (IMDUR) 30 MG 24 hr tablet, TAKE 1 TABLET BY MOUTH ONE TIME A DAY , Disp: 90 tablet, Rfl: 1  •  losartan (COZAAR) 50 MG tablet, Take 50 mg by mouth Every Night., Disp: , Rfl: 3  •  meloxicam (MOBIC) 15 MG tablet, Take 15 mg by mouth Daily., Disp: , Rfl:   •  omeprazole (priLOSEC) 20 MG capsule, Take 20 mg by mouth Daily., Disp: , Rfl:   •  PARoxetine (PAXIL) 10 MG tablet, Take 10 mg by mouth Every Morning., Disp: , Rfl:   •  SYMBICORT 160-4.5 MCG/ACT inhaler, Inhale 2 puffs 2 (Two) Times a Day., Disp: , Rfl: 11  •  vitamin B-12 (CYANOCOBALAMIN) 1000 MCG tablet, Take 1,000 mcg by mouth Daily., Disp: , Rfl:     Allergies   Allergen Reactions   • Lortab [Hydrocodone-Acetaminophen] GI Intolerance       Family History   Problem Relation Age of Onset   • Heart disease Father    • Heart disease Sister    • Colon cancer Sister 60   • Heart disease Brother    • Lung cancer Mother 70       Cancer-related family history includes Colon cancer (age of onset: 60) in his sister; Lung cancer (age of onset: 70) in his mother.    Social History     Tobacco Use   • Smoking status: Former Smoker     Packs/day: 2.00     Years: 12.00     Pack years: 24.00     Types: Cigarettes     Last attempt to quit:      Years since quittin.0   • Smokeless tobacco: Never Used   Substance Use Topics   • Alcohol use: Yes     Frequency: 4 or more times a week     Drinks per session: 3 or 4     Binge frequency: Never     Comment: case of beer a week    • Drug use: No       I have reviewed the history of present illness, past medical history, family history, social history, lab results, all notes and other records since the patient was last seen on  "12/11/2019.    SUBJECTIVE:    Patient is scheduled for phlebotomy today.  He has no specific complaints.  So far he is tolerating the procedure very well.  He denies chest pain, shortness of breath, nausea or vomiting.          REVIEW OF SYSTEMS:  Review of Systems   Constitutional: Negative for chills and fever.   HENT: Negative for ear pain, mouth sores, nosebleeds and sore throat.    Eyes: Negative for photophobia and visual disturbance.   Respiratory: Negative for wheezing and stridor.    Cardiovascular: Negative for chest pain and palpitations.   Gastrointestinal: Negative for abdominal pain, diarrhea, nausea and vomiting.   Endocrine: Negative for cold intolerance and heat intolerance.   Genitourinary: Negative for dysuria and hematuria.   Musculoskeletal: Negative for joint swelling and neck stiffness.   Skin: Negative for color change and rash.   Neurological: Negative for seizures and syncope.   Hematological: Negative for adenopathy.        No obvious bleeding   Psychiatric/Behavioral: Negative for agitation, confusion and hallucinations.       OBJECTIVE:    Vitals:    01/15/20 1450   BP: 143/91   Pulse: 75   Resp: 18   Temp: 98.8 °F (37.1 °C)   Weight: 97.2 kg (214 lb 3.2 oz)   Height: 180.3 cm (71\")   PainSc: 0-No pain       ECOG  (0) Fully active, able to carry on all predisease performance without restriction    Physical Exam   Constitutional: He is oriented to person, place, and time. No distress.   HENT:   Head: Normocephalic and atraumatic.   Eyes: Conjunctivae and EOM are normal. Right eye exhibits no discharge. Left eye exhibits no discharge. No scleral icterus.   Neck: Normal range of motion. Neck supple. No thyromegaly present.   Cardiovascular: Normal rate, regular rhythm and normal heart sounds. Exam reveals no gallop and no friction rub.   Pulmonary/Chest: Effort normal. No stridor. No respiratory distress. He has no wheezes.   Abdominal: Soft. Bowel sounds are normal. He exhibits no mass. " There is no tenderness. There is no rebound and no guarding.   Musculoskeletal: Normal range of motion. He exhibits no tenderness.   Lymphadenopathy:     He has no cervical adenopathy.   Neurological: He is alert and oriented to person, place, and time. He exhibits normal muscle tone.   Skin: Skin is warm. No rash noted. He is not diaphoretic. No erythema.   Psychiatric: He has a normal mood and affect. His behavior is normal.   Nursing note and vitals reviewed.      RECENT LABS  WBC   Date Value Ref Range Status   01/15/2020 5.52 3.40 - 10.80 10*3/mm3 Final     RBC   Date Value Ref Range Status   01/15/2020 4.18 4.14 - 5.80 10*6/mm3 Final     Hemoglobin   Date Value Ref Range Status   01/15/2020 14.8 13.0 - 17.7 g/dL Final     Hematocrit   Date Value Ref Range Status   01/15/2020 41.4 37.5 - 51.0 % Final     MCV   Date Value Ref Range Status   01/15/2020 99.0 (H) 79.0 - 97.0 fL Final     MCH   Date Value Ref Range Status   01/15/2020 35.4 (H) 26.6 - 33.0 pg Final     MCHC   Date Value Ref Range Status   01/15/2020 35.7 31.5 - 35.7 g/dL Final     RDW   Date Value Ref Range Status   01/15/2020 10.7 (L) 12.3 - 15.4 % Final     RDW-SD   Date Value Ref Range Status   01/15/2020 37.7 37.0 - 54.0 fl Final     MPV   Date Value Ref Range Status   01/15/2020 8.8 6.0 - 12.0 fL Final     Platelets   Date Value Ref Range Status   01/15/2020 217 140 - 450 10*3/mm3 Final     Neutrophil %   Date Value Ref Range Status   01/15/2020 39.1 (L) 42.7 - 76.0 % Final     Lymphocyte %   Date Value Ref Range Status   01/15/2020 33.9 19.6 - 45.3 % Final     Monocyte %   Date Value Ref Range Status   01/15/2020 8.0 5.0 - 12.0 % Final     Eosinophil %   Date Value Ref Range Status   01/15/2020 17.2 (H) 0.3 - 6.2 % Final     Basophil %   Date Value Ref Range Status   01/15/2020 1.8 (H) 0.0 - 1.5 % Final     Neutrophils, Absolute   Date Value Ref Range Status   01/15/2020 2.16 1.70 - 7.00 10*3/mm3 Final     Lymphocytes, Absolute   Date Value Ref  Range Status   01/15/2020 1.87 0.70 - 3.10 10*3/mm3 Final     Monocytes, Absolute   Date Value Ref Range Status   01/15/2020 0.44 0.10 - 0.90 10*3/mm3 Final     Eosinophils, Absolute   Date Value Ref Range Status   01/15/2020 0.95 (H) 0.00 - 0.40 10*3/mm3 Final     Basophils, Absolute   Date Value Ref Range Status   01/15/2020 0.10 0.00 - 0.20 10*3/mm3 Final     nRBC   Date Value Ref Range Status   09/25/2019 0.1 0.0 - 0.2 /100 WBC Final       Lab Results   Component Value Date    GLUCOSE 100 (H) 09/26/2019    BUN 18 09/26/2019    CREATININE 1.00 09/26/2019    EGFRIFNONA 75 09/26/2019    EGFRIFAFRI 76 02/16/2017    BCR 18.0 09/26/2019    K 4.6 09/26/2019    CO2 28.0 09/26/2019    CALCIUM 8.7 (L) 09/26/2019    ALBUMIN 3.30 (L) 09/26/2019    LABIL2 1.7 (calc) 02/16/2017    AST 70 (H) 09/26/2019     (H) 09/26/2019         Assessment/Plan     Hemochromatosis, unspecified hemochromatosis type  - Iron Profile  - Ferritin    Anemia, unspecified type  - Iron Profile  - Ferritin      ASSESSMENT:    1. Hemochromatosis.,  On monthly phlebotomies  2. Macrocytosis  3. Chronic ITP    Patient is tolerating phlebotomies very well.   He is having these done on a monthly basis for now.  I have drawn a ferritin level today would review the results.  Goal is to keep his ferritin level at 50 or less so long as his hemoglobin remains above 12.5 g per DL.    PLAN:     Continue phlebotomize 500 cc once a month.   Monthly CBC and ferritin level.  Phlebotomize his ferritin is more than 50 and above and hemoglobin is up to 12.5 g per DL and above  Follow-up in 3 months  Follow-up with primary care physician for ongoing medical needs             I have reviewed labs results, imaging, vitals, and medications with the patient today. Will follow up in 3 months with me.       Patient verbalized understanding and is in agreement of the above plan.

## 2020-02-26 NOTE — ED PROVIDER NOTE - CPE EDP PSYCH NORM
- takes percocet at home, norco is also on file  - acute on chronic pain is 2/2 recent bm bx  - have stopped both to avoid masking fevers  - ordered oxy 10 mg q4 hr prn starting 2/17    - - -

## 2020-10-30 NOTE — PROGRESS NOTE ADULT - ASSESSMENT
Patient with h/o DM, HTN, CAD, CKD stage 4 admitted with abdominal pain Mucosal Advancement Flap Text: Given the location of the defect, shape of the defect and the proximity to free margins a mucosal advancement flap was deemed most appropriate. Incisions were made with a 15 blade scalpel in the appropriate fashion along the cutaneous vermilion border and the mucosal lip. The remaining actinically damaged mucosal tissue was excised.  The mucosal advancement flap was then elevated to the gingival sulcus with care taken to preserve the neurovascular structures and advanced into the primary defect. Care was taken to ensure that precise realignment of the vermilion border was achieved.

## 2020-11-13 NOTE — PROGRESS NOTE ADULT - PROBLEM SELECTOR PLAN 1
Encephalopathy likely from infection vs post-ictal state. CT chest with apical consolidations and pleural effusion, s/p 5 days vanc/zosyn. MRI with no enhancing brain lesions. No seizure activity seen on vEEG. Mental status significantly improved.  - s/p Vanc and zosyn x5 days   - c/w keppra done

## 2021-05-27 NOTE — PROVIDER CONTACT NOTE (CRITICAL VALUE NOTIFICATION) - ACTION/TREATMENT ORDERED:
d50 amp, repeat fbs 15 min, apple juice given. repeat trop cmp, fluids changed to d5 ns.
Normal vision: sees adequately in most situations; can see medication labels, newsprint

## 2021-07-23 NOTE — PROGRESS NOTE ADULT - SUBJECTIVE AND OBJECTIVE BOX
[facility]   MD UYEN HERNANDEZ, DO MARYANN REYNOSO, LEATHA NICOLE    TEL:  OFFICE: [phone]  DR CASAREZ CELL: [phone]  DR. HERNANDEZ CELL: [phone]  DR. REYNOSO CELL: [phone]  CHUCKIE TERRAZAS CELL: [phone]        Patient is a 79y old  Male who presents with a chief complaint of AMS (10 Dec 2019 11:15)      Patient seen and examined at bedside.     VITALS:  T(F): 98.2 (12-10-19 @ 11:23), Max: 98.4 (12-09-19 @ 21:45)  HR: 69 (12-10-19 @ 11:23)  BP: 102/55 (12-10-19 @ 11:23)  RR: 18 (12-10-19 @ 11:23)  SpO2: 99% (12-10-19 @ 11:23)  Wt(kg): --    12-09 @ 07:01  -  12-10 @ 07:00  --------------------------------------------------------  IN: 1850 mL / OUT: 0 mL / NET: 1850 mL    12-10 @ 07:01  -  12-10 @ 13:01  --------------------------------------------------------  IN: 610 mL / OUT: 0 mL / NET: 610 mL          PHYSICAL EXAM:  Constitutional: on high flow  Neck: No JVD  Respiratory: + rhonchi  Cardiovascular: S1, S2, RRR  Gastrointestinal: BS+, soft, NT/ND  Extremities: No peripheral edema    Hospital Medications:   MEDICATIONS  (STANDING):  ATENolol  Tablet 50 milliGRAM(s) Oral daily  chlorhexidine 4% Liquid 1 Application(s) Topical <User Schedule>  dextrose 5%. 1000 milliLiter(s) (50 mL/Hr) IV Continuous <Continuous>  dextrose 50% Injectable 12.5 Gram(s) IV Push once  dextrose 50% Injectable 25 Gram(s) IV Push once  dextrose 50% Injectable 25 Gram(s) IV Push once  enoxaparin Injectable 40 milliGRAM(s) SubCutaneous daily  escitalopram 5 milliGRAM(s) Oral daily  furosemide   Injectable 20 milliGRAM(s) IV Push two times a day  insulin lispro (HumaLOG) corrective regimen sliding scale   SubCutaneous every 6 hours  insulin NPH human recombinant 9 Unit(s) SubCutaneous every 6 hours  levETIRAcetam  Solution 500 milliGRAM(s) Oral two times a day  levothyroxine 88 MICROGram(s) Oral at bedtime  melatonin 3 milliGRAM(s) Oral at bedtime  pantoprazole   Suspension 40 milliGRAM(s) Oral daily  predniSONE   Tablet 40 milliGRAM(s) Oral daily  simvastatin 20 milliGRAM(s) Oral at bedtime      LABS:  12-10    135  |  95<L>  |  38<H>  ----------------------------<  113<H>  4.4   |  29  |  0.76    Ca    7.8<L>      10 Dec 2019 06:15  Phos  2.1     12-10  Mg     1.9     12-10    TPro      /  Alb  2.1<L>  /  TBili      /  DBili      /  AST      /  ALT      /  AlkPhos      12-10    Creatinine Trend: 0.76 <--, 0.77 <--, 0.95 <--, 0.97 <--, 0.88 <--, 0.92 <--, 0.90 <--    Phosphorus Level, Serum: 2.1 mg/dL (12-10 @ 06:15)  Albumin, Serum: 2.1 g/dL (12-10 @ 06:15)  Phosphorus Level, Serum: 2.5 mg/dL (12-09 @ 14:00)                              7.1    10.86 )-----------( 420      ( 10 Dec 2019 06:15 )             23.4     Urine Studies:  Urinalysis - [11-25-19 @ 18:43]      Color YELLOW / Appearance CLEAR / SG 1.020 / pH 6.0      Gluc NEGATIVE / Ketone NEGATIVE  / Bili NEGATIVE / Urobili TRACE       Blood NEGATIVE / Protein 100 / Leuk Est NEGATIVE / Nitrite NEGATIVE      RBC 6-10 / WBC 0-2 / Hyaline NEGATIVE / Gran  / Sq Epi OCC / Non Sq Epi  / Bacteria NEGATIVE      Iron 38, TIBC 234, %sat --      [09-12-19 @ 06:08]  Ferritin 214.6      [09-12-19 @ 06:08]  PTH 20.00 (Ca --)      [09-03-19 @ 05:45]   --  PTH 25.46 (Ca --)      [09-01-19 @ 06:00]   --  Vitamin D (25OH) 34.9      [09-03-19 @ 05:45]  HbA1c 7.0      [09-13-19 @ 06:50]  TSH 6.55      [12-04-19 @ 05:05]  Lipid: chol 121, TG 96, HDL 33, LDL 74      [08-31-19 @ 07:00]    HBsAb Reactive      [11-23-19 @ 05:25]  HBsAg Nonreactive      [11-23-19 @ 05:25]  HBcAb Reactive      [11-23-19 @ 05:25]  HCV 0.12, Nonreactive Hepatitis C AB  S/CO Ratio                        Interpretation  < 1.00                                   Non-Reactive  1.00 - 4.99                         Weakly-Reactive  >= 5.00                                Reactive  Non-Reactive: Aperson with a non-reactive HCV antibody  result is considered uninfected.  No further action is  needed unless recent infection is suspected.  In these  cases, consider repeat testing later to detect  seroconversion..  Weakly-Reactive: HCV antibody test is abnormal, HCV RNA  Qualitative test will follow.  Reactive: HCV antibody test is abnormal, HCV RNA  Qualitative test will follow.  Note: HCV antibody testing is performed on the [employer]   system.      [11-23-19 @ 05:25]  HIV Nonreactive The HIV Ag/Ab Combo test performed screens for HIV-1 p24  antigen, antibodies to HIV-1 (group M and group O), and  antibodies to HIV-2. All specimens repeatedly reactive  will reflex to an HIV 1/2 antibody confirmation and  differentiation test. This assay detects p24 antigen which  may be present prior to the development of HIV antibodies,  therefore a reactive result with a negative HIV 1/2 AB  Confirmation should be followed up with HIV-1 RNA, HIV-2  RNA and repeat testing in 4-8 weeks. A nonreactive result  does not preclude previous exposure to or infection with  HIV-1 or HIV-2. [facility] prohibits disclosure of this  result to any unauthorized party.      [11-22-19 @ 10:18]      RADIOLOGY & ADDITIONAL STUDIES: 96

## 2022-07-07 NOTE — PROGRESS NOTE ADULT - PROBLEM SELECTOR PLAN 2
Detail Level: Generalized #Hyperkalemia: Improved to 5.3  - c/w with 2 days of lokelma  - Albuterol for temporizing measures    #Hypernatremia (resolved)  - free water boluses from 250 q6h Detail Level: Simple #Hyperkalemia: Improved to 5.1  - s/p albuterol and Lokelma yesterday for temporizing measure, no peaking of T waves on EKG  #Hypernatremia (resolved)  - free water boluses from 250 q6h

## 2022-07-15 NOTE — H&P ADULT - NSHPREVIEWOFSYSTEMS_GEN_ALL_CORE

## 2022-11-22 NOTE — PATIENT PROFILE ADULT - NSPROCHRONICPAIN_GEN_A_NUR
Caller: Ro Conti    Relationship: Self    Best call back number: [phone]    Requested Prescriptions:   Requested Prescriptions     Pending Prescriptions Disp Refills   • metFORMIN ER (GLUCOPHAGE-XR) 500 MG 24 hr tablet 90 tablet 0     Sig: TAKE ONE TABLET BY MOUTH DAILY WITH THE LARGEST MEAL   • SITagliptin (Januvia) 50 MG tablet 90 tablet 1     Sig: Take 1 tablet by mouth Daily.        Pharmacy where request should be sent: [facility] - [address] - [phone] [facility] [phone] FX     Additional details provided by patient:     Does the patient have less than a 3 day supply:  [x] Yes  [] No    John Lam Rep   11/22/22 13:40 EST           
no

## 2023-01-12 NOTE — ED ADULT TRIAGE NOTE - MODE OF ARRIVAL
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

## 2023-06-28 NOTE — ED PROVIDER NOTE - NSRISKOFTRANSFER_ED_A_ED
Zelda Ca, RN  to [facility]-Sched Msg Pool          6/27/23  7:11 AM  Please call patient to offer consultation visit - per Dr. Castro's OV note the patient would like to discuss mohs with Dr. Méndez.       Thank you!      Transportation Risk (There is always a risk of traffic delays resulting in deterioration of condition.)

## 2023-08-10 NOTE — PATIENT PROFILE ADULT - PRIMARY ROLES/RESPONSIBILITIES
AMBULATORY CASE MANAGEMENT NOTE    Name and Relationship of Patient/Support Person: Jose Shaikh M - Self    Patient Outreach    Patient identified using ER data.     HRCM Enrolled.     Patient called PCP to report that he fell at home in the bathroom and went to [facility], patient was transferred to [facility] and has been admitted since 8/1/23.      Patient reports he was supposed to be discharged 2 days ago, however discharge to SNF fell through so he is still at [facility].     Plan to follow patient until discharge from SNF and assist with follow up with PCP.         Ella RAMOS  Ambulatory Case Management    8/10/2023, 10:24 EDT  
Reordered kenalog 0.1% ointment  Message sent to patient.     Josie Macias MD   
unable to assess at this time  due to mental status

## 2023-09-29 NOTE — CONSULT NOTE ADULT - CONSULT REASON
talk to your doctor before you take decongestants or anti-inflammatory medicine, such as ibuprofen. Some of these medicines can raise blood pressure. Learn how to check your blood pressure at home. Lifestyle changes  Stay at a healthy weight. This is especially important if you put on weight around the waist. Losing even 10 pounds can help you lower your blood pressure. If your doctor recommends it, get more exercise. Walking is a good choice. Bit by bit, increase the amount you walk every day. Try for at least 30 minutes on most days of the week. You also may want to swim, bike, or do other activities. Avoid or limit alcohol. Talk to your doctor about whether you can drink any alcohol. Try to limit how much sodium you eat to less than 2,300 milligrams (mg) a day. Your doctor may ask you to try to eat less than 1,500 mg a day. Eat plenty of fruits (such as bananas and oranges), vegetables, legumes, whole grains, and low-fat dairy products. Lower the amount of saturated fat in your diet. Saturated fat is found in animal products such as milk, cheese, and meat. Limiting these foods may help you lose weight and also lower your risk for heart disease. Do not smoke. Smoking increases your risk for heart attack and stroke. If you need help quitting, talk to your doctor about stop-smoking programs and medicines. These can increase your chances of quitting for good. When should you call for help? Call 911  anytime you think you may need emergency care. This may mean having symptoms that suggest that your blood pressure is causing a serious heart or blood vessel problem. Your blood pressure may be over 180/120. For example, call 911 if:    You have symptoms of a heart attack. These may include:  Chest pain or pressure, or a strange feeling in the chest.  Sweating. Shortness of breath. Nausea or vomiting.   Pain, pressure, or a strange feeling in the back, neck, jaw, or upper belly or in one or both shoulders or 80yo M with CKD, CAD, and lymphoma on chemo admitted for management of encephalopathy, referred to IR for gastrostomy placement 78yo M with CKD, CAD, and lymphoma on chemo admitted for management of encephalopathy with aspiration, referred to IR for gastrostomy placement

## 2023-10-10 NOTE — BEHAVIORAL HEALTH ASSESSMENT NOTE - NSBHATTESTSEENBY_PSY_A_CORE
Render In Strict Bullet Format?: No
Initiate Treatment: Ketoconazole cream mixed with Mometasone ointment QD for scalp \\nKetoconazole cream mixed with tacrolimus QD for face and ears
Discontinue Regimen: Ciclopirox shampoo weekly\\nEconazole 1% cream QD
Detail Level: Zone
attending Psychiatrist without NP/Trainee

## 2023-10-19 NOTE — ED PROVIDER NOTE - EYES, MLM
Hospital Medicine Service -  History & Physical        CHIEF COMPLAINT   Fall at home, found down     HISTORY OF PRESENT ILLNESS      Madelyn Ruiz is a 89 y.o. female with a past medical history of ABPA, allergic rhinitis, chronic obstructive pulmonary disease, hypertension, hyperlipidemia, GERD who presents after a fall at home, found down.    To preface, pt and daughter are both difficult disorganized historians. Able to be redirected. Pt is currently AAOx4. Denies CP, SOB, palpitations, LH/dizziness, HA, visual disturbance, nausea/vomiting.    Pt was admitted in August for pelvis/sacral fracture. She went to [facility]. Then from there she returned home. Was there until sept 17th. Has been getting PT/OT at home. She lives alone. Daughter lives about 10 minutes away. She wants to live in her own home. Daughter wants to pursue HHA.     Pt can recall that she wasn't sleeping well, she was in a lot of pain. She turned the TV on, she didn't finish her drink vodka and water, about 6-8oz. She went to bed.     Kavita with PT had a 12 o'clock appointment to see pt, she opeend the door and she found th pt on the floor covered in blood. She was between the kitchen and her dining area. Kavita called 911. Daughter shortly arrived thereafter.    Pt cannot recall falling.  She was still in her night gown. She was had gotten out of bed and made her bed. Rollaider in the kitchen.    Her daughter has noticed the pt has had detoriation with balance and strength. She also had a recent fall on Sunday for which she sustained a R elbow skin tear.    She will have 1 vodka drink at night. Pt and daughter do not find this problematic.    PAST MEDICAL AND SURGICAL HISTORY      Past Medical History:   Diagnosis Date   • Allergic bronchopulmonary aspergillosis (CMS/[facility]) 04/02/2018    Allergist: Dr. Arriaza.  Stable FEV1 in 8/2015.  IgE levels and eosinophils stable in 8/2015.  Managed with Fasenra, Azithromycin and Flovent.   •  Allergic rhinitis 04/02/2018    Pulmonologist: Dr. Walker. Managed with Flonase.   • Asthma    • Atrial flutter with rapid ventricular response (CMS/HCC) 02/21/2023    Diagnosed in 02/2023 and hospitalized Managed with Apixaban and Diltiazem Echocardiogram 02/2023 •  Left Ventricle: Normal ventricle size. Mild concentric left ventricular hypertrophy. Preserved systolic function. Estimated EF 65-70%. No regional wall motion abnormalities. Grade I diastolic dysfunction. •  Right Ventricle: Normal ventricle size. Normal systolic function. •  Left Atrium: Moderately   • Calcification of aorta (CMS/HCC) 06/29/2020    Seen incidentally on CT scan.   • Chronic obstructive pulmonary disease (CMS/HCC) 04/02/2018    Pulmonologist: Dr. Walker.  Seen on PFTs in hospital in 2014.  Controlled with Spiriva and FLovent.   • GERD (gastroesophageal reflux disease) 06/08/2019    Managed with Esomeprazole.  Should take medication 30 minutes prior to meal.  Advised to avoid caffeine, carbonated beverages, and should not eat within 3 hours of going to sleep.  Advised to reduce BMI < 25.    • Insomnia 04/02/2018    Managed with Lorazepam as needed.   • Lumbar spinal stenosis 04/02/2018    Neurosurgeon: Dr. Miguel. MRI with Central and lateral recess spinal stenosis. Has Spondylolisthesis at L4/L5. S/P epidural injection by Dr. Martin with some relief in past. Had Lumbar fusion and Lumbar laminectomy by Dr. Miguel in 4/2015.   • Macular degeneration disease    • Obstructive sleep apnea syndrome 04/02/2018    Mild STACIA noted in sleep study 7/2015. Treated with nasal CPAP automatic with pressure range 5-10 CM H2Ox couple months. Off now   • Osteoporosis 04/02/2018    Rheumatologist: Dr. Bradley.  Dexa scan 7/2016 with osteoporosis of lumbar spine LS-2.5, LH-2.1, RH -2.2.  Has been on Fosamax in the past for 8 years.  Worsened by Prednisone in past.  Continue vitamin D, calcium and weight bearing exercise. DEXA in 7/2017 with LS -1.7, LH -2.2,  and RH -2.0.  Next due in 7/2019.  Seen by Dr. Bradley in 8/2015 who agreed with initiating Prolia. Took for 2 years. N   • Primary hypertension 04/02/2018    Managed on Diltiazem and Losartan. Advised to follow a low sodium diet and keep BMI < 25.  Advised to exercise for 2.5 hours per week.  Instructed to take home blood pressure readings and call if consistently above 140/90.     • Pulmonary embolism (CMS/HCC) 06/09/2019    Hematologist: Dr. Bhakta Diagnosed in 06/2019 in right upper lobe. Vascular ultrasound negative of bilateral lower extremities. Managed on Eliquis. Hypercoagulable workup was negative for beta-2 glycoprotein's, RIGO, Antithrombin III, lupus anticoagulant, protein C activity protein S activity, and prothrombin gene mutation, and factor V Leiden. Now off Apixaban as CT scan in 12/2019 was without pu   • SVT (supraventricular tachycardia) (CMS/HCC)    • Vertebral compression fracture (CMS/HCC)     At T12 level       Past Surgical History:   Procedure Laterality Date   • APPENDECTOMY     • CARPAL TUNNEL RELEASE Bilateral    • CATARACT EXTRACTION W/  INTRAOCULAR LENS IMPLANT Bilateral    • HYSTERECTOMY  1972   • LUMBAR LAMINECTOMY  04/21/2015    S/P lumbar fusion   • TONSILLECTOMY         PCP: Henry Linares MD    MEDICATIONS      Prior to Admission medications    Medication Sig Start Date End Date Taking? Authorizing Provider   apixaban (ELIQUIS) 2.5 mg tablet Take 2.5 mg by mouth 2 (two) times a day.    Provider, Maine Dickson MD   azithromycin (ZITHROMAX) 250 mg tablet Take 1 tablet (250 mg total) by mouth 3 (three) times a week (Mon, Wed, Fri). 10/11/23   Henry Linares MD   calcium carbonate 600 mg calcium (1,500 mg) tablet Take 1 tablet by mouth 2 (two) times a day. 6/30/11   Henry Linares MD   cholecalciferol, vitamin D3, 25 mcg (1,000 unit) capsule Take 1 tablet by mouth daily. 6/30/11   Henry Linares MD   docusate sodium (COLACE) 100 mg capsule Take 100 mg by mouth 2 (two) times a day as  needed for constipation.    Henry Linares MD   DULoxetine (CYMBALTA) 60 mg capsule Take 60 mg by mouth at bedtime.  3/22/19   Henry Linares MD   fexofenadine (ALLEGRA) 180 mg tablet Take 180 mg by mouth daily as needed (allergies).    Provider, MD Yessi   FOLIC ACID/MULTIVIT,IRON, (CENTRUM ORAL) Take 1 tablet by mouth daily. 6/27/11   Henry Linares MD   furosemide (LASIX) 20 mg tablet Take 1 tablet (20 mg total) by mouth every other day. 10/12/23   Valentino, Michael A, MD PhD   lidocaine (LIDODERM) 5 % patch Place 1 patch on the skin daily. Remove & discard patch within 12 hours or as directed by prescriber. 9/21/23 10/21/23  Ute Gonzalez, ARTURO   lifitegrast (XIIDRA) 5 % dropperette dropperette     Provider, maurice Dickson MD   LORazepam (ATIVAN) 1 mg tablet Take 1 mg by mouth nightly as needed for anxiety or sleep.    Provider, maurice Dickson MD   losartan (COZAAR) 50 mg tablet Take 1 tablet (50 mg total) by mouth nightly. 9/20/23   Janna Beltran CRNP   pregabalin (LYRICA) 25 mg capsule Take 25 mg by mouth daily with lunch. PRN 7/24/23   Henry Linares MD   pregabalin (LYRICA) 50 mg capsule Take 50 mg by mouth 2 (two) times a day. 1/13/23   Henry Linares MD   SPIRIVA RESPIMAT 2.5 mcg/actuation mist inhaler Inhale 2 puffs daily. 3/31/20   Henry Linares MD   SYMBICORT 80-4.5 mcg/actuation inhaler Inhale 2 puffs 2 (two) times a day. 7/15/20   Henry Linares MD   TIADYLT  mg 24 hr capsule TAKE 1 CAPSULE BY MOUTH EVERY DAY 10/5/23   Valentino, Michael A, MD PhD   traMADoL (ULTRAM) 50 mg tablet Take 50 mg by mouth 3 (three) times a day as needed. 9/11/23   Henry Linares MD       ALLERGIES      Mepolizumab, Morphine, and Oxycodone    FAMILY HISTORY      Family History   Problem Relation Age of Onset   • Glaucoma Biological Mother    • Macular degeneration Biological Mother    • Hypertension Biological Mother    • Lung cancer Biological Father    • Heart disease Biological Brother    •  Breast cancer Mother's Sister    • No Known Problems Biological Son    • No Known Problems Biological Son    • No Known Problems Biological Daughter    • No Known Problems Biological Daughter    • No Known Problems Biological Daughter        SOCIAL HISTORY      Social History     Socioeconomic History   • Marital status:    • Number of children: 5   Occupational History   • Occupation: Retired     Comment: Former Teacher   Tobacco Use   • Smoking status: Never   • Smokeless tobacco: Never   Vaping Use   • Vaping Use: Never used   Substance and Sexual Activity   • Alcohol use: Yes     Comment: Rarely   • Drug use: No   Social History Narrative    Exercise: No formal exercise. Due to back pain from spinal stenosis.     Social Determinants of Health     Financial Resource Strain: Low Risk  (8/22/2023)    Overall Financial Resource Strain (CARDIA)    • Difficulty of Paying Living Expenses: Not hard at all   Food Insecurity: No Food Insecurity (10/19/2023)    Hunger Vital Sign    • Worried About Running Out of Food in the Last Year: Never true    • Ran Out of Food in the Last Year: Never true   Transportation Needs: No Transportation Needs (8/22/2023)    PRAPARE - Transportation    • Lack of Transportation (Medical): No    • Lack of Transportation (Non-Medical): No   Physical Activity: Inactive (10/2/2020)    Exercise Vital Sign    • Days of Exercise per Week: 0 days    • Minutes of Exercise per Session: 0 min   Stress: No Stress Concern Present (2/21/2023)    [employer] - Occupational Stress Questionnaire    • Feeling of Stress : Not at all   Housing Stability: Low Risk  (8/22/2023)    Housing Stability Vital Sign    • Unable to Pay for Housing in the Last Year: No    • Number of Places Lived in the Last Year: 1    • Unstable Housing in the Last Year: No       REVIEW OF SYSTEMS      All other systems reviewed and negative except as noted in HPI    PHYSICAL EXAMINATION      Temp:   [36.2 °C (97.2 °F)-37.1 °C (98.7 °F)] 36.2 °C (97.2 °F)  Heart Rate:  [] 98  Resp:  [16-21] 20  BP: (151-199)/(70-97) 171/83  Body mass index is 22.85 kg/m².    Physical Exam  Vitals and nursing note reviewed.   Constitutional:       General: She is not in acute distress.  HENT:      Head: Normocephalic.   Eyes:      Conjunctiva/sclera: Conjunctivae normal.   Cardiovascular:      Rate and Rhythm: Regular rhythm. Tachycardia present.   Abdominal:      General: Bowel sounds are normal. There is no distension.      Palpations: Abdomen is soft.      Tenderness: There is no abdominal tenderness.   Musculoskeletal:      Cervical back: Neck supple.      Right lower leg: No edema.      Left lower leg: No edema.   Skin:     General: Skin is warm and dry.      Comments: R elbow skin tear, scalp laceration/hematoma with dried blood in hair   Neurological:      Mental Status: She is alert and oriented to person, place, and time. Mental status is at baseline.   Psychiatric:         Mood and Affect: Mood normal.         Behavior: Behavior normal.         LABS / IMAGING / EKG        Labs  Results from last 7 days   Lab Units 10/19/23  1328   SODIUM mEQ/L 144   POTASSIUM mEQ/L 4.0   CHLORIDE mEQ/L 110*   CO2 mEQ/L 26   BUN mg/dL 27*   CREATININE mg/dL 0.7   CALCIUM mg/dL 9.6   ALBUMIN g/dL 4.0   BILIRUBIN TOTAL mg/dL 0.5   ALK PHOS IU/L 56   ALT IU/L 17   AST IU/L 17   GLUCOSE mg/dL 112*     Results from last 7 days   Lab Units 10/19/23  1328   WBC K/uL 12.50*   HEMOGLOBIN g/dL 10.2*   HEMATOCRIT % 31.3*   PLATELETS K/uL 251   DIFF TYPE  Auto   NRBC % 0.0   IMM GRANULOCYTES % 0.6   NEUTROPHILS % 81.1   LYMPHOCYTES % 8.2   MONOCYTES % 10.0   EOSINOPHILS % 0.0   BASOPHILS % 0.1   IMM GRANUCOCYTES ABS K/uL 0.08   MONO ABS AUTO K/uL 1.25*   EOS ABS AUTO K/uL 0.00*   BASO ABS AUTO K/uL 0.01         Results from last 7 days   Lab Units 10/19/23  1328   CK TOTAL U/L 250*             Imaging  CT HEAD WITHOUT IV CONTRAST    Result  Date: 10/19/2023  IMPRESSION:  No acute intracranial abnormality. No evidence for acute fracture or traumatic subluxation of the cervical spine. Marked multilevel degenerative spondylosis, with several levels of severe right-sided foraminal narrowing. COMMENT: A standard noncontrast head CT was performed. Noncontrast CT examination of the cervical spine performed following the standard protocol. Sagittal and coronal reformations rendered from axial source images. Images reviewed in bone and soft tissue windows. CT DOSE:  One or more dose reduction techniques (e.g. automated exposure control, adjustment of the mA and/or kV according to patient size, use of iterative reconstruction technique) utilized for this examination. Comparison:  Head and cervical spine CT dated August 21, 2023. Findings:  Sulci, ventricles and basal cisterns are within normal limits for patient's age. Scattered periventricular subcortical white matter hypoattenuation, nonspecific, likely sequela microangiopathic disease. Prominent perivascular space of the right basal ganglia. No acute hemorrhage, acute territorial infarct, or mass effect is seen.  There is no extra-axial fluid collection.  The visualized paranasal sinuses demonstrates complete opacification with inspissated secretions of the left maxillary sinus and similarly in the left sphenoid sinus. Bilateral ocular lens implants. Mastoid air cells are clear. Left suboccipital scalp hematoma. Cervicothoracic alignment: Mild curvature of the cervical spine to the left. Normal cervical lordosis. Prevertebral soft tissues: Normal in thickness. Vertebral bodies: Normal in height. Intervertebral discs: Disc osteophyte complex at C2-3 and C5-6. Cervical and upper thoracic spinal canal: Patent. Axial images: Skull base: Normal. C1-2: Normal. C2-3: Disc osteophyte complex with exuberant left facet hypertrophy and moderate uncovertebral hypertrophic changes bilaterally, contributing to moderate  left foraminal narrowing, mild canal stenosis. C3-4: Exuberant right facet hypertrophy, moderate right uncovertebral hypertrophic changes, contributing to severe right foraminal narrowing. C4-5: Disc osteophyte complex with exuberant right and severe left facet hypertrophy, mild uncovertebral hypertrophic changes bilaterally, contributing to moderate to severe right and moderate left foraminal narrowing. C5-6: Disc osteophyte complex with severe right facet hypertrophy, severe right uncovertebral hypertrophic changes, contributing to moderate to severe right foraminal narrowing. C6-7: Exuberant right and severe left facet hypertrophy, contributing to moderate right and mild left foraminal narrowing. C7-T1: Normal. Other: Right apical pleural-parenchymal scarring.    CT CERVICAL SPINE WITHOUT IV CONTRAST    Result Date: 10/19/2023  IMPRESSION:  No acute intracranial abnormality. No evidence for acute fracture or traumatic subluxation of the cervical spine. Marked multilevel degenerative spondylosis, with several levels of severe right-sided foraminal narrowing. COMMENT: A standard noncontrast head CT was performed. Noncontrast CT examination of the cervical spine performed following the standard protocol. Sagittal and coronal reformations rendered from axial source images. Images reviewed in bone and soft tissue windows. CT DOSE:  One or more dose reduction techniques (e.g. automated exposure control, adjustment of the mA and/or kV according to patient size, use of iterative reconstruction technique) utilized for this examination. Comparison:  Head and cervical spine CT dated August 21, 2023. Findings:  Sulci, ventricles and basal cisterns are within normal limits for patient's age. Scattered periventricular subcortical white matter hypoattenuation, nonspecific, likely sequela microangiopathic disease. Prominent perivascular space of the right basal ganglia. No acute hemorrhage, acute territorial infarct, or mass  effect is seen.  There is no extra-axial fluid collection.  The visualized paranasal sinuses demonstrates complete opacification with inspissated secretions of the left maxillary sinus and similarly in the left sphenoid sinus. Bilateral ocular lens implants. Mastoid air cells are clear. Left suboccipital scalp hematoma. Cervicothoracic alignment: Mild curvature of the cervical spine to the left. Normal cervical lordosis. Prevertebral soft tissues: Normal in thickness. Vertebral bodies: Normal in height. Intervertebral discs: Disc osteophyte complex at C2-3 and C5-6. Cervical and upper thoracic spinal canal: Patent. Axial images: Skull base: Normal. C1-2: Normal. C2-3: Disc osteophyte complex with exuberant left facet hypertrophy and moderate uncovertebral hypertrophic changes bilaterally, contributing to moderate left foraminal narrowing, mild canal stenosis. C3-4: Exuberant right facet hypertrophy, moderate right uncovertebral hypertrophic changes, contributing to severe right foraminal narrowing. C4-5: Disc osteophyte complex with exuberant right and severe left facet hypertrophy, mild uncovertebral hypertrophic changes bilaterally, contributing to moderate to severe right and moderate left foraminal narrowing. C5-6: Disc osteophyte complex with severe right facet hypertrophy, severe right uncovertebral hypertrophic changes, contributing to moderate to severe right foraminal narrowing. C6-7: Exuberant right and severe left facet hypertrophy, contributing to moderate right and mild left foraminal narrowing. C7-T1: Normal. Other: Right apical pleural-parenchymal scarring.    CT ABDOMEN PELVIS WITH IV CONTRAST    Result Date: 10/19/2023  IMPRESSION:  Please see associated report same date    CT CHEST WITH IV CONTRAST    Result Date: 10/19/2023  IMPRESSION: No acute trauma related findings within the chest, abdomen, pelvis. Multiple old bilateral rib fractures.  Healing bilateral sacral wing fractures since 8/21/2023.   Old pelvic pubic rami fractures. COMMENT: Computed tomography of the chest, abdomen, pelvis was performed at 3 mm intervals following intravenous contrast administration only.  A total of 100 cc of Omnipaque 350 contrast material was administered intravenously.  The current examination was compared to that dated 8/21/2023.  The mediastinum is intact. There are no mediastinal masses or fluid collections. The thoracic aorta and pulmonary arteries are unremarkable. There is no pericardial effusion. No pulmonic infiltrates are present to suggest contusion.  Chronic pleural-parenchymal scarring has remained stable since prior imaging of 8/21/2023. No new volume loss or pleural effusions have developed. There is no evidence of pneumothorax. Old bilateral rib fractures are seen.  No acute rib fractures are demonstrated. The liver and spleen are intact.  No biliary ductal dilatation is seen.  No gallbladder calculi are demonstrated.  A 5.60 m diameter cyst within the inferior segment of the right hepatic lobe is redemonstrated. The pancreas is unremarkable. There is no evidence of bowel obstruction or acute inflammatory changes of the bowel.  No free fluid or fluid collections are present within the abdomen and pelvis. There is colonic diverticulosis without evidence of diverticulitis. Both kidneys are intact with normal nephrograms and contrast excretion.  There are no perinephric fluid collections. Adrenal glands are normal.  The retroperitoneum is unremarkable. Diffuse calcific atherosclerotic changes of the aortoiliac vessels are present without evidence of aneurysm or dissection. No pelvic masses are seen. Urinary bladder appears intact, containing no calculi. There are healing bilateral sacral wing fractures.  No acute pelvic fractures are seen.  Old healing/healed bilateral pubic ramus fractures are redemonstrated. There is no evidence of acute hip fracture. A superior compression deformity of the T12 vertebral body  has remained stable. No acute thoracolumbar spinal fracture or dislocation is demonstrated. CT DOSE:  One or more dose reduction techniques (e.g. automated exposure control, adjustment of the mA and/or kV according to patient size, use of iterative reconstruction technique) utilized for this examination.       ECG/Telemetry  Obtain baseline EKG for admission    ASSESSMENT AND PLAN           * Fall  Assessment & Plan  --Pt presents after being found down by home PT surrounded in her own blood. Sustained scalp laceration/hematoma.  --CPK 200s. Ethanol negative.  --Unclear etiology for fall, pt cannot recall events. She did have a vodka drink last night, she is also on ativan prn unclear if she mixed. There is question if this was a syncopal event.  --Pt has a history of multiple falls. Recent traumatic fall in August sustaining pelvic/sacral fx. She lives alone. Daughter lives close by.     --CTAP: No acute trauma related findings within the chest, abdomen, pelvis. Multiple old bilateral rib fractures.  Healing bilateral sacral wing fractures since 8/21/2023.  Old pelvic pubic rami fractures.   --CTH/CT c-spine: No acute intracranial abnormality. No evidence for acute fracture or traumatic subluxation of the cervical spine. Marked multilevel degenerative spondylosis, with several levels of severe right-sided foraminal narrowing.     Plan:  --Monitor on telemetry  --Gentle IVF overnight  --Check orthostatic VS  --Fall precautions  --PT/OT  --TTE Feb 2023: EF 65-70%. No regional wall motion abnormalities. Grade I diastolic dysfunction.  --Could consider repeating TTE +/- EP consult in the AM if etiology remains unclear pending above work up  --She also has a R elbow skin tear from a prior fall POA; consult wound care    Anemia  Assessment & Plan  Most recent hgb 12.0 in August  Hgb 10.2 on admission, likely acute blood loss anemia from scalp lac/hematoma  Holding eliquis this evening  Monitor cbc, transfuse for hgb  <7    Leukocytosis  Assessment & Plan  Most likely reactive in the s/o fall  UA bland, no infectious findings on CT CAP  Monitor off abx, repeat cbc    COPD (chronic obstructive pulmonary disease) (CMS/[facility])  Assessment & Plan  No evidence of acute exacerbation  Symbicort is nonformulary - use equivalent in house    A-fib (CMS/[facility])  Assessment & Plan  Pt with a history of Afib/Aflutter  She is tachycardic but this appears to be sinus  Check TSH  Continue diltiazem 300mg/day  Holding eliquis for this evening in s/o scalp laceration/hematoma, likely can restart in AM if hgb stable without recurrence of bleeding    Primary hypertension  Assessment & Plan  Continue losartan 50mg/day and diltiazem 300/day    Allergic bronchopulmonary aspergillosis (CMS/[facility])  Assessment & Plan  Allergist Dr. Arriaza  Managed with azithro, flovent       VTE Assessment: Padua    VTE Prophylaxis: Current anticoagulants:  [Provider Managed Hold] apixaban (ELIQUIS) tablet 2.5 mg, oral, BID      Code Status: Full Code        Estimated Discharge Date: 10/20/2023  Disposition Planning: pending clinical course     MICA Rob  10/19/2023                Clear bilaterally,

## 2023-10-30 NOTE — PHYSICAL THERAPY INITIAL EVALUATION ADULT - PHYSICAL ASSIST/NONPHYSICAL ASSIST: STAND/SIT, REHAB EVAL
Detail Level: Simple Patient Specific Counseling (Will Not Stick From Patient To Patient): - Since other measures have failed, discussed treating with oral itraconazole.\\n- pt to d/c terbinafine.\\n- Pt to use a dremmal on the nail, and follow up in one month. Improvement is not suffcient with terbinafine. supervision

## 2024-01-31 NOTE — PROGRESS NOTE ADULT - PROBLEM SELECTOR PLAN 7
5 Witness tonic clonic seizure in ER s/p ativan 2mg x1. 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   -vEEG with no seizure activity  - c/w keppra 500mg BID  - s/p valproic acid

## 2024-03-15 NOTE — PATIENT PROFILE ADULT - DO YOU FEEL LIKE HURTING YOURSELF OR OTHERS?
Head, normocephalic, atraumatic, Face, Face within normal limits, Ears, External ears within normal limits, Nose/Nasopharynx, External nose normal appearance, nares patent, Mouth and Throat, Oral cavity appearance normal, Lips, Appearance normal
no

## 2025-01-15 NOTE — ED ADULT NURSE NOTE - TEMPLATE
Render Risk Assessment In Note?: no Detail Level: Zone Recommendations (Free Text): To follow up with their primary care physician yearly or as indicated.\\nNo suspicious lesions noted on skin examination. Recommendation Preamble: The following recommendations were made during the visit: Neuro

## 2025-02-24 NOTE — PROVIDER CONTACT NOTE (CRITICAL VALUE NOTIFICATION) - RECOMMENDATIONS
Price (Do Not Change): 0.00 Detail Level: Simple Safety mantained,will continue to monitor. Instructions: This plan will send the code FBSE to the PM system.  DO NOT or CHANGE the price.

## 2025-04-29 NOTE — PHYSICAL THERAPY INITIAL EVALUATION ADULT - BALANCE DISTURBANCE, SYSTEM IMPAIRMENT CONTRIBUTE, REHAB EVAL
cognitive/neuromuscular/musculoskeletal
Bed in lowest position, wheels locked, appropriate side rails in place/Call bell, personal items and telephone in reach/Instruct patient to call for assistance before getting out of bed or chair/Non-slip footwear when patient is out of bed/[place] to call system/Physically safe environment - no spills, clutter or unnecessary equipment/Purposeful Proactive Rounding/Room/bathroom lighting operational, light cord in reach

## 2025-05-08 NOTE — H&P ADULT - CRANIAL NERVE
Pt has tub with curtain and grab bars  Pt owns a cane which he intermittently uses  Pt is right handed
2-12 intact